# Patient Record
Sex: FEMALE | Race: WHITE | NOT HISPANIC OR LATINO | Employment: OTHER | URBAN - METROPOLITAN AREA
[De-identification: names, ages, dates, MRNs, and addresses within clinical notes are randomized per-mention and may not be internally consistent; named-entity substitution may affect disease eponyms.]

---

## 2019-12-19 ENCOUNTER — HOSPITAL ENCOUNTER (INPATIENT)
Facility: HOSPITAL | Age: 79
LOS: 1 days | Discharge: HOME | DRG: 305 | End: 2019-12-20
Attending: EMERGENCY MEDICINE | Admitting: HOSPITALIST
Payer: MEDICARE

## 2019-12-19 ENCOUNTER — APPOINTMENT (EMERGENCY)
Dept: CT IMAGING | Facility: HOSPITAL | Age: 79
DRG: 305 | End: 2019-12-19
Payer: MEDICARE

## 2019-12-19 DIAGNOSIS — R51.9 HEADACHE: Primary | ICD-10-CM

## 2019-12-19 DIAGNOSIS — N30.00 ACUTE CYSTITIS WITHOUT HEMATURIA: ICD-10-CM

## 2019-12-19 DIAGNOSIS — Z79.4 CONTROLLED TYPE 2 DIABETES MELLITUS WITHOUT COMPLICATION, WITH LONG-TERM CURRENT USE OF INSULIN (HCC): Chronic | ICD-10-CM

## 2019-12-19 DIAGNOSIS — E11.9 CONTROLLED TYPE 2 DIABETES MELLITUS WITHOUT COMPLICATION, WITH LONG-TERM CURRENT USE OF INSULIN (HCC): Chronic | ICD-10-CM

## 2019-12-19 PROBLEM — C80.1 CANCER (HCC): Status: ACTIVE | Noted: 2019-12-19

## 2019-12-19 LAB
ALBUMIN SERPL BCP-MCNC: 4.6 G/DL (ref 3.5–5.7)
ALP SERPL-CCNC: 42 U/L (ref 55–165)
ALT SERPL W P-5'-P-CCNC: 19 U/L (ref 7–52)
ANION GAP SERPL CALCULATED.3IONS-SCNC: 10 MMOL/L (ref 4–13)
APTT PPP: 40 SECONDS (ref 23–37)
AST SERPL W P-5'-P-CCNC: 26 U/L (ref 13–39)
ATRIAL RATE: 82 BPM
BACTERIA UR QL AUTO: ABNORMAL /HPF
BASOPHILS # BLD AUTO: 0.1 THOUSANDS/ΜL (ref 0–0.1)
BASOPHILS NFR BLD AUTO: 1 % (ref 0–2)
BILIRUB SERPL-MCNC: 0.4 MG/DL (ref 0.2–1)
BILIRUB UR QL STRIP: NEGATIVE
BNP SERPL-MCNC: 67 PG/ML (ref 1–100)
BUN SERPL-MCNC: 24 MG/DL (ref 7–25)
CALCIUM SERPL-MCNC: 9.9 MG/DL (ref 8.6–10.5)
CHLORIDE SERPL-SCNC: 102 MMOL/L (ref 98–107)
CHOLEST SERPL-MCNC: 198 MG/DL (ref 0–200)
CLARITY UR: ABNORMAL
CO2 SERPL-SCNC: 28 MMOL/L (ref 21–31)
COLOR UR: YELLOW
CREAT SERPL-MCNC: 0.89 MG/DL (ref 0.6–1.2)
EOSINOPHIL # BLD AUTO: 0.1 THOUSAND/ΜL (ref 0–0.61)
EOSINOPHIL NFR BLD AUTO: 1 % (ref 0–5)
ERYTHROCYTE [DISTWIDTH] IN BLOOD BY AUTOMATED COUNT: 12.2 % (ref 11.5–14.5)
EST. AVERAGE GLUCOSE BLD GHB EST-MCNC: 160 MG/DL
GFR SERPL CREATININE-BSD FRML MDRD: 62 ML/MIN/1.73SQ M
GLUCOSE SERPL-MCNC: 128 MG/DL (ref 65–140)
GLUCOSE SERPL-MCNC: 151 MG/DL (ref 65–140)
GLUCOSE SERPL-MCNC: 220 MG/DL (ref 65–140)
GLUCOSE SERPL-MCNC: 254 MG/DL (ref 65–140)
GLUCOSE SERPL-MCNC: 259 MG/DL (ref 65–140)
GLUCOSE SERPL-MCNC: 59 MG/DL (ref 65–140)
GLUCOSE SERPL-MCNC: 79 MG/DL (ref 65–99)
GLUCOSE UR STRIP-MCNC: NEGATIVE MG/DL
HBA1C MFR BLD: 7.2 % (ref 4.2–6.3)
HCT VFR BLD AUTO: 39 % (ref 42–47)
HDLC SERPL-MCNC: 108 MG/DL
HGB BLD-MCNC: 13.1 G/DL (ref 12–16)
HGB UR QL STRIP.AUTO: NEGATIVE
INR PPP: 0.84 (ref 0.9–1.5)
KETONES UR STRIP-MCNC: ABNORMAL MG/DL
LDLC SERPL CALC-MCNC: 77 MG/DL (ref 0–100)
LEUKOCYTE ESTERASE UR QL STRIP: ABNORMAL
LYMPHOCYTES # BLD AUTO: 1.7 THOUSANDS/ΜL (ref 0.6–4.47)
LYMPHOCYTES NFR BLD AUTO: 22 % (ref 21–51)
MCH RBC QN AUTO: 32.9 PG (ref 26–34)
MCHC RBC AUTO-ENTMCNC: 33.7 G/DL (ref 31–37)
MCV RBC AUTO: 98 FL (ref 81–99)
MONOCYTES # BLD AUTO: 0.6 THOUSAND/ΜL (ref 0.17–1.22)
MONOCYTES NFR BLD AUTO: 8 % (ref 2–12)
NEUTROPHILS # BLD AUTO: 5.1 THOUSANDS/ΜL (ref 1.4–6.5)
NEUTS SEG NFR BLD AUTO: 68 % (ref 42–75)
NITRITE UR QL STRIP: POSITIVE
NON-SQ EPI CELLS URNS QL MICRO: ABNORMAL /HPF
P AXIS: 55 DEGREES
PH UR STRIP.AUTO: 7.5 [PH]
PLATELET # BLD AUTO: 202 THOUSANDS/UL (ref 149–390)
PMV BLD AUTO: 8.3 FL (ref 8.6–11.7)
POTASSIUM SERPL-SCNC: 4.3 MMOL/L (ref 3.5–5.5)
PR INTERVAL: 154 MS
PROT SERPL-MCNC: 7.1 G/DL (ref 6.4–8.9)
PROT UR STRIP-MCNC: NEGATIVE MG/DL
PROTHROMBIN TIME: 9.7 SECONDS (ref 10.2–13)
QRS AXIS: 37 DEGREES
QRSD INTERVAL: 72 MS
QT INTERVAL: 388 MS
QTC INTERVAL: 453 MS
RBC # BLD AUTO: 3.99 MILLION/UL (ref 3.9–5.2)
RBC #/AREA URNS AUTO: ABNORMAL /HPF
SODIUM SERPL-SCNC: 140 MMOL/L (ref 134–143)
SP GR UR STRIP.AUTO: 1.01 (ref 1–1.03)
T WAVE AXIS: 47 DEGREES
TRIGL SERPL-MCNC: 67 MG/DL (ref 44–166)
TROPONIN I SERPL-MCNC: <0.03 NG/ML
TSH SERPL DL<=0.05 MIU/L-ACNC: 6.32 UIU/ML (ref 0.45–5.33)
UROBILINOGEN UR QL STRIP.AUTO: 0.2 E.U./DL
VENTRICULAR RATE: 82 BPM
WBC # BLD AUTO: 7.6 THOUSAND/UL (ref 4.8–10.8)
WBC #/AREA URNS AUTO: ABNORMAL /HPF

## 2019-12-19 PROCEDURE — 82948 REAGENT STRIP/BLOOD GLUCOSE: CPT

## 2019-12-19 PROCEDURE — 99285 EMERGENCY DEPT VISIT HI MDM: CPT

## 2019-12-19 PROCEDURE — 87077 CULTURE AEROBIC IDENTIFY: CPT | Performed by: EMERGENCY MEDICINE

## 2019-12-19 PROCEDURE — 70498 CT ANGIOGRAPHY NECK: CPT

## 2019-12-19 PROCEDURE — 93010 ELECTROCARDIOGRAM REPORT: CPT | Performed by: INTERNAL MEDICINE

## 2019-12-19 PROCEDURE — 84443 ASSAY THYROID STIM HORMONE: CPT | Performed by: PHYSICIAN ASSISTANT

## 2019-12-19 PROCEDURE — 96375 TX/PRO/DX INJ NEW DRUG ADDON: CPT

## 2019-12-19 PROCEDURE — 83880 ASSAY OF NATRIURETIC PEPTIDE: CPT | Performed by: EMERGENCY MEDICINE

## 2019-12-19 PROCEDURE — 70450 CT HEAD/BRAIN W/O DYE: CPT

## 2019-12-19 PROCEDURE — 1123F ACP DISCUSS/DSCN MKR DOCD: CPT | Performed by: NURSE PRACTITIONER

## 2019-12-19 PROCEDURE — G8978 MOBILITY CURRENT STATUS: HCPCS

## 2019-12-19 PROCEDURE — G8979 MOBILITY GOAL STATUS: HCPCS

## 2019-12-19 PROCEDURE — G8987 SELF CARE CURRENT STATUS: HCPCS

## 2019-12-19 PROCEDURE — 80053 COMPREHEN METABOLIC PANEL: CPT | Performed by: EMERGENCY MEDICINE

## 2019-12-19 PROCEDURE — 83036 HEMOGLOBIN GLYCOSYLATED A1C: CPT | Performed by: PHYSICIAN ASSISTANT

## 2019-12-19 PROCEDURE — 36415 COLL VENOUS BLD VENIPUNCTURE: CPT | Performed by: EMERGENCY MEDICINE

## 2019-12-19 PROCEDURE — 97163 PT EVAL HIGH COMPLEX 45 MIN: CPT

## 2019-12-19 PROCEDURE — 80061 LIPID PANEL: CPT | Performed by: PHYSICIAN ASSISTANT

## 2019-12-19 PROCEDURE — 93005 ELECTROCARDIOGRAM TRACING: CPT

## 2019-12-19 PROCEDURE — 85025 COMPLETE CBC W/AUTO DIFF WBC: CPT | Performed by: EMERGENCY MEDICINE

## 2019-12-19 PROCEDURE — G8988 SELF CARE GOAL STATUS: HCPCS

## 2019-12-19 PROCEDURE — 70496 CT ANGIOGRAPHY HEAD: CPT

## 2019-12-19 PROCEDURE — 85610 PROTHROMBIN TIME: CPT | Performed by: EMERGENCY MEDICINE

## 2019-12-19 PROCEDURE — 87086 URINE CULTURE/COLONY COUNT: CPT | Performed by: EMERGENCY MEDICINE

## 2019-12-19 PROCEDURE — 96374 THER/PROPH/DIAG INJ IV PUSH: CPT

## 2019-12-19 PROCEDURE — 81001 URINALYSIS AUTO W/SCOPE: CPT | Performed by: EMERGENCY MEDICINE

## 2019-12-19 PROCEDURE — 99223 1ST HOSP IP/OBS HIGH 75: CPT | Performed by: NURSE PRACTITIONER

## 2019-12-19 PROCEDURE — 99285 EMERGENCY DEPT VISIT HI MDM: CPT | Performed by: EMERGENCY MEDICINE

## 2019-12-19 PROCEDURE — 84484 ASSAY OF TROPONIN QUANT: CPT | Performed by: EMERGENCY MEDICINE

## 2019-12-19 PROCEDURE — 87186 SC STD MICRODIL/AGAR DIL: CPT | Performed by: EMERGENCY MEDICINE

## 2019-12-19 PROCEDURE — 85730 THROMBOPLASTIN TIME PARTIAL: CPT | Performed by: EMERGENCY MEDICINE

## 2019-12-19 PROCEDURE — 97167 OT EVAL HIGH COMPLEX 60 MIN: CPT

## 2019-12-19 RX ORDER — ACETAMINOPHEN 325 MG/1
650 TABLET ORAL EVERY 4 HOURS PRN
Status: DISCONTINUED | OUTPATIENT
Start: 2019-12-19 | End: 2019-12-20 | Stop reason: HOSPADM

## 2019-12-19 RX ORDER — DOCUSATE SODIUM 100 MG/1
300 CAPSULE, LIQUID FILLED ORAL DAILY
Status: ON HOLD | COMMUNITY
End: 2020-05-26 | Stop reason: CLARIF

## 2019-12-19 RX ORDER — HYDRALAZINE HYDROCHLORIDE 20 MG/ML
10 INJECTION INTRAMUSCULAR; INTRAVENOUS EVERY 6 HOURS PRN
Status: DISCONTINUED | OUTPATIENT
Start: 2019-12-19 | End: 2019-12-20 | Stop reason: HOSPADM

## 2019-12-19 RX ORDER — LEVOTHYROXINE SODIUM 0.05 MG/1
50 TABLET ORAL WEEKLY
COMMUNITY
End: 2020-11-05

## 2019-12-19 RX ORDER — IPRATROPIUM BROMIDE 21 UG/1
1 SPRAY, METERED NASAL 2 TIMES DAILY
Status: DISCONTINUED | OUTPATIENT
Start: 2019-12-19 | End: 2019-12-20 | Stop reason: HOSPADM

## 2019-12-19 RX ORDER — CALCIUM CARBONATE 500(1250)
1 TABLET ORAL 2 TIMES DAILY WITH MEALS
Status: DISCONTINUED | OUTPATIENT
Start: 2019-12-19 | End: 2019-12-20 | Stop reason: HOSPADM

## 2019-12-19 RX ORDER — PHENOL 1.4 %
600 AEROSOL, SPRAY (ML) MUCOUS MEMBRANE 2 TIMES DAILY WITH MEALS
Status: ON HOLD | COMMUNITY
End: 2020-06-17 | Stop reason: CLARIF

## 2019-12-19 RX ORDER — SODIUM CHLORIDE 9 MG/ML
75 INJECTION, SOLUTION INTRAVENOUS CONTINUOUS
Status: DISPENSED | OUTPATIENT
Start: 2019-12-19 | End: 2019-12-20

## 2019-12-19 RX ORDER — DEXTROSE MONOHYDRATE 25 G/50ML
25 INJECTION, SOLUTION INTRAVENOUS ONCE
Status: COMPLETED | OUTPATIENT
Start: 2019-12-19 | End: 2019-12-19

## 2019-12-19 RX ORDER — ASPIRIN 81 MG/1
81 TABLET, CHEWABLE ORAL DAILY
Status: DISCONTINUED | OUTPATIENT
Start: 2019-12-19 | End: 2019-12-20 | Stop reason: HOSPADM

## 2019-12-19 RX ORDER — MELATONIN
2000 DAILY
Status: DISCONTINUED | OUTPATIENT
Start: 2019-12-19 | End: 2019-12-20 | Stop reason: HOSPADM

## 2019-12-19 RX ORDER — LEVOTHYROXINE SODIUM 0.05 MG/1
50 TABLET ORAL WEEKLY
Status: DISCONTINUED | OUTPATIENT
Start: 2019-12-22 | End: 2019-12-20 | Stop reason: HOSPADM

## 2019-12-19 RX ORDER — DOCUSATE SODIUM 100 MG/1
300 CAPSULE, LIQUID FILLED ORAL DAILY
Status: DISCONTINUED | OUTPATIENT
Start: 2019-12-19 | End: 2019-12-20 | Stop reason: HOSPADM

## 2019-12-19 RX ORDER — ATORVASTATIN CALCIUM 20 MG/1
20 TABLET, FILM COATED ORAL
COMMUNITY

## 2019-12-19 RX ORDER — MELATONIN
2000 DAILY
Status: ON HOLD | COMMUNITY
End: 2020-06-17 | Stop reason: CLARIF

## 2019-12-19 RX ORDER — BUTALBITAL, ACETAMINOPHEN AND CAFFEINE 50; 325; 40 MG/1; MG/1; MG/1
1 TABLET ORAL EVERY 4 HOURS PRN
Status: DISCONTINUED | OUTPATIENT
Start: 2019-12-19 | End: 2019-12-20 | Stop reason: HOSPADM

## 2019-12-19 RX ORDER — LEVOTHYROXINE SODIUM 0.1 MG/1
100 TABLET ORAL 3 TIMES WEEKLY
Status: DISCONTINUED | OUTPATIENT
Start: 2019-12-20 | End: 2019-12-20 | Stop reason: HOSPADM

## 2019-12-19 RX ORDER — ASPIRIN 81 MG/1
81 TABLET, CHEWABLE ORAL DAILY
Status: ON HOLD | COMMUNITY
End: 2020-06-17 | Stop reason: CLARIF

## 2019-12-19 RX ORDER — CEFTRIAXONE 1 G/50ML
1000 INJECTION, SOLUTION INTRAVENOUS EVERY 24 HOURS
Status: DISCONTINUED | OUTPATIENT
Start: 2019-12-19 | End: 2019-12-20 | Stop reason: HOSPADM

## 2019-12-19 RX ORDER — ONDANSETRON 2 MG/ML
4 INJECTION INTRAMUSCULAR; INTRAVENOUS ONCE
Status: COMPLETED | OUTPATIENT
Start: 2019-12-19 | End: 2019-12-19

## 2019-12-19 RX ORDER — ROSUVASTATIN CALCIUM 10 MG/1
10 TABLET, COATED ORAL DAILY
Status: ON HOLD | COMMUNITY
End: 2020-05-26 | Stop reason: CLARIF

## 2019-12-19 RX ORDER — RAMIPRIL 5 MG/1
5 CAPSULE ORAL DAILY
Status: ON HOLD | COMMUNITY
End: 2020-06-17 | Stop reason: CLARIF

## 2019-12-19 RX ORDER — DIPHENOXYLATE HYDROCHLORIDE AND ATROPINE SULFATE 2.5; .025 MG/1; MG/1
1 TABLET ORAL DAILY
Status: ON HOLD | COMMUNITY
End: 2020-06-17 | Stop reason: CLARIF

## 2019-12-19 RX ORDER — ONDANSETRON 2 MG/ML
4 INJECTION INTRAMUSCULAR; INTRAVENOUS EVERY 6 HOURS PRN
Status: DISCONTINUED | OUTPATIENT
Start: 2019-12-19 | End: 2019-12-20 | Stop reason: HOSPADM

## 2019-12-19 RX ORDER — PRAVASTATIN SODIUM 40 MG
80 TABLET ORAL
Status: DISCONTINUED | OUTPATIENT
Start: 2019-12-19 | End: 2019-12-20 | Stop reason: HOSPADM

## 2019-12-19 RX ORDER — MORPHINE SULFATE 4 MG/ML
4 INJECTION, SOLUTION INTRAMUSCULAR; INTRAVENOUS ONCE
Status: COMPLETED | OUTPATIENT
Start: 2019-12-19 | End: 2019-12-19

## 2019-12-19 RX ORDER — LEVOTHYROXINE SODIUM 0.1 MG/1
100 TABLET ORAL 3 TIMES WEEKLY
Status: DISCONTINUED | OUTPATIENT
Start: 2019-12-23 | End: 2019-12-20 | Stop reason: HOSPADM

## 2019-12-19 RX ORDER — LISINOPRIL 20 MG/1
20 TABLET ORAL DAILY
Status: DISCONTINUED | OUTPATIENT
Start: 2019-12-19 | End: 2019-12-20 | Stop reason: HOSPADM

## 2019-12-19 RX ADMIN — DEXTROSE 50 % IN WATER (D50W) INTRAVENOUS SYRINGE 25 ML: at 04:38

## 2019-12-19 RX ADMIN — INSULIN LISPRO 2 UNITS: 100 INJECTION, SOLUTION INTRAVENOUS; SUBCUTANEOUS at 17:05

## 2019-12-19 RX ADMIN — ENOXAPARIN SODIUM 40 MG: 40 INJECTION SUBCUTANEOUS at 08:18

## 2019-12-19 RX ADMIN — CALCIUM 1 TABLET: 500 TABLET ORAL at 08:18

## 2019-12-19 RX ADMIN — PRAVASTATIN SODIUM 80 MG: 40 TABLET ORAL at 17:05

## 2019-12-19 RX ADMIN — VITAMIN D, TAB 1000IU (100/BT) 2000 UNITS: 25 TAB at 08:17

## 2019-12-19 RX ADMIN — MORPHINE SULFATE 4 MG: 4 INJECTION INTRAVENOUS at 04:35

## 2019-12-19 RX ADMIN — ACETAMINOPHEN 650 MG: 325 TABLET ORAL at 21:09

## 2019-12-19 RX ADMIN — LISINOPRIL 20 MG: 20 TABLET ORAL at 08:18

## 2019-12-19 RX ADMIN — SODIUM CHLORIDE 75 ML/HR: 9 INJECTION, SOLUTION INTRAVENOUS at 08:20

## 2019-12-19 RX ADMIN — SODIUM CHLORIDE 75 ML/HR: 9 INJECTION, SOLUTION INTRAVENOUS at 23:37

## 2019-12-19 RX ADMIN — INSULIN DETEMIR 5 UNITS: 100 INJECTION, SOLUTION SUBCUTANEOUS at 21:14

## 2019-12-19 RX ADMIN — ASPIRIN 81 MG 81 MG: 81 TABLET ORAL at 08:18

## 2019-12-19 RX ADMIN — CEFTRIAXONE 1000 MG: 1 INJECTION, SOLUTION INTRAVENOUS at 08:20

## 2019-12-19 RX ADMIN — ONDANSETRON 4 MG: 2 INJECTION INTRAMUSCULAR; INTRAVENOUS at 04:35

## 2019-12-19 RX ADMIN — CALCIUM 1 TABLET: 500 TABLET ORAL at 17:05

## 2019-12-19 RX ADMIN — IOHEXOL 85 ML: 350 INJECTION, SOLUTION INTRAVENOUS at 05:45

## 2019-12-19 RX ADMIN — INSULIN LISPRO 2 UNITS: 100 INJECTION, SOLUTION INTRAVENOUS; SUBCUTANEOUS at 12:52

## 2019-12-19 RX ADMIN — DOCUSATE SODIUM 300 MG: 100 CAPSULE, LIQUID FILLED ORAL at 08:17

## 2019-12-19 NOTE — ED NOTES
Patient does not want any further pain medications at this time  Denies any nausea       Sophie Ernandez RN  12/19/19 0600

## 2019-12-19 NOTE — ED PROVIDER NOTES
History  Chief Complaint   Patient presents with    Headache     called son in law 80 stating that she can't think, woke up with frontal headache     States she woke up wit headache, acute onset and severe, frontal  Light sensitivity  Does not get frequent headaches, this is unusual for her  She feels "foggy" like she can not think she reported  No cp, sob, leg swelling  Not anticoagualted  No hx of anuersym in family per family  No trauma  States " im not thinking clearly"          Prior to Admission Medications   Prescriptions Last Dose Informant Patient Reported? Taking?    BD INSULIN SYRINGE U/F UNIT 31G X " 0 3 ML MISC   Yes No   Cyanocobalamin (B-12) 2500 MCG SUBL 2019 at Unknown time  Yes Yes   Sig: Place under the tongue   Nepafenac (ILEVRO) 0 3 % SUSP   Yes No   Sig: Ilevro 0 3 % eye drops,suspension   ONE TOUCH ULTRA TEST test strip   Yes No   aspirin 81 mg chewable tablet 2019 at Unknown time  Yes Yes   Sig: Chew 81 mg daily   atorvastatin (LIPITOR) 20 mg tablet 2019 at Unknown time  Yes Yes   Sig: Take 20 mg by mouth daily   calcium carbonate (OS-FELICE) 600 MG tablet 2019 at Unknown time  Yes Yes   Sig: Take 600 mg by mouth 2 (two) times a day with meals    cholecalciferol (VITAMIN D3) 1,000 units tablet 2019 at Unknown time  Yes Yes   Sig: Take 2,000 Units by mouth daily   docusate sodium (COLACE) 100 mg capsule 2019 at Unknown time  Yes Yes   Sig: Take 300 mg by mouth daily   insulin aspart (NOVOLOG) 100 units/mL injection 2019 at Unknown time  Yes Yes   Sig: Sliding scale   insulin detemir (LEVEMIR) 100 units/mL subcutaneous injection 2019 at Unknown time  Yes Yes   Sig: Sliding scale   ipratropium (ATROVENT) 0 03 % nasal spray   No No   Si spray into each nostril 2 (two) times a day for 180 days   levothyroxine 100 mcg tablet 2019 at Unknown time  Yes Yes   Sig: levothyroxine 100 mcg tablet   levothyroxine 50 mcg tablet Past Week at Unknown time  Yes Yes   Sig: Take 50 mcg by mouth once a week On sundays   metFORMIN (GLUCOPHAGE) 500 mg tablet 12/18/2019 at Unknown time  Yes Yes   Sig: metformin 500 mg tablet   take 1 tablet by mouth twice a day   multivitamin (THERAGRAN) TABS 12/18/2019 at Unknown time  Yes Yes   Sig: Take 1 tablet by mouth daily   ramipril (ALTACE) 5 mg capsule 12/18/2019 at Unknown time  Yes Yes   Sig: Take 5 mg by mouth daily   rosuvastatin (CRESTOR) 10 MG tablet Not Taking at Unknown time  Yes No   Sig: Take 10 mg by mouth daily   triamcinolone (KENALOG) 0 025 % cream Not Taking at Unknown time  Yes No   Sig: triamcinolone acetonide 0 025 % topical cream      Facility-Administered Medications: None       Past Medical History:   Diagnosis Date    Cancer ([facility] )     Diabetes mellitus ([facility] )     Disease of thyroid gland     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    HL (hearing loss)     Hodgkin's disease ([facility] ) 2008    Left neck, had radiation    Hypertension     Neuropathy     Osteoporosis        Past Surgical History:   Procedure Laterality Date    ADENOIDECTOMY      MOUTH SURGERY      oral cancer left lower lip    OVARY SURGERY      TONSILLECTOMY      TOTAL THYROIDECTOMY  2008    Performed after left neck dissection and left oral cancer diagnosis       Family History   Problem Relation Age of Onset    Cancer Mother     Diabetes Mother     Diabetes Father     Hypertension Father      I have reviewed and agree with the history as documented  Social History     Tobacco Use    Smoking status: Never Smoker    Smokeless tobacco: Never Used   Substance Use Topics    Alcohol use: Never     Frequency: Never    Drug use: Never        Review of Systems   All other systems reviewed and are negative  Physical Exam  Physical Exam   Constitutional: She is oriented to person, place, and time  She appears well-developed and well-nourished  HENT:   Head: Normocephalic and atraumatic     Right Ear: External ear normal    Left Ear: External ear normal    Eyes: Pupils are equal, round, and reactive to light  Right eye exhibits no discharge  Left eye exhibits no discharge  Neck: Normal range of motion  Neck supple  No JVD present  Cardiovascular: Normal rate, regular rhythm and normal heart sounds  Exam reveals no gallop and no friction rub  No murmur heard  Pulmonary/Chest: Effort normal and breath sounds normal  No stridor  No respiratory distress  She has no wheezes  She has no rales  She exhibits no tenderness  Abdominal: She exhibits no distension and no mass  There is no tenderness  There is no rebound and no guarding  No hernia  Musculoskeletal: Normal range of motion  She exhibits no edema, tenderness or deformity  Neurological: She is alert and oriented to person, place, and time  She displays normal reflexes  No cranial nerve deficit or sensory deficit  She exhibits normal muscle tone  Coordination normal    Skin: Skin is warm  Capillary refill takes less than 2 seconds  No rash noted  No erythema  Psychiatric: She has a normal mood and affect         Vital Signs  ED Triage Vitals   Temperature Pulse Respirations Blood Pressure SpO2   12/19/19 0415 12/19/19 0415 12/19/19 0415 12/19/19 0415 12/19/19 0415   (!) 96 9 °F (36 1 °C) 76 22 (!) 201/98 100 %      Temp Source Heart Rate Source Patient Position - Orthostatic VS BP Location FiO2 (%)   12/19/19 0415 12/19/19 0415 12/19/19 0415 12/19/19 0415 --   Temporal Monitor Lying Left arm       Pain Score       12/19/19 0407       Worst Possible Pain           Vitals:    12/19/19 0700 12/19/19 0757 12/19/19 1530 12/19/19 2209   BP: 153/70 132/82 122/66 96/57   Pulse: 70 68 79 69   Patient Position - Orthostatic VS:  Lying Lying Lying         Visual Acuity  Visual Acuity      Most Recent Value   L Pupil Size (mm)  3   R Pupil Size (mm)  3          ED Medications  Medications   ondansetron (ZOFRAN) injection 4 mg (has no administration in time range) acetaminophen (TYLENOL) tablet 650 mg (650 mg Oral Given 12/19/19 2109)   butalbital-acetaminophen-caffeine (FIORICET,ESGIC) -40 mg per tablet 1 tablet (has no administration in time range)   hydrALAZINE (APRESOLINE) injection 10 mg (has no administration in time range)   insulin lispro (HumaLOG) 100 units/mL subcutaneous injection 1-5 Units (2 Units Subcutaneous Given 12/19/19 1705)   insulin lispro (HumaLOG) 100 units/mL subcutaneous injection 1-5 Units (0 Units Subcutaneous Not Given 12/19/19 2112)   aspirin chewable tablet 81 mg (81 mg Oral Given 12/19/19 0818)   calcium carbonate (OYSTER SHELL,OSCAL) 500 mg tablet 1 tablet (1 tablet Oral Given 12/19/19 1705)   cholecalciferol (VITAMIN D3) tablet 2,000 Units (2,000 Units Oral Given 12/19/19 0817)   docusate sodium (COLACE) capsule 300 mg (300 mg Oral Given 12/19/19 0817)   ipratropium (ATROVENT) 0 03 % nasal spray 1 spray (1 spray Nasal Not Given 12/19/19 2043)   levothyroxine tablet 50 mcg (has no administration in time range)   lisinopril (ZESTRIL) tablet 20 mg (20 mg Oral Given 12/19/19 0818)   pravastatin (PRAVACHOL) tablet 80 mg (80 mg Oral Given 12/19/19 1705)   insulin detemir (LEVEMIR) subcutaneous injection 5 Units (5 Units Subcutaneous Given 12/19/19 2114)   levothyroxine tablet 100 mcg (has no administration in time range)   sodium chloride 0 9 % infusion (75 mL/hr Intravenous New Bag 12/19/19 2337)   enoxaparin (LOVENOX) subcutaneous injection 40 mg (40 mg Subcutaneous Given 12/19/19 0818)   cefTRIAXone (ROCEPHIN) IVPB (premix) 1,000 mg (1,000 mg Intravenous New Bag 12/19/19 0820)   levothyroxine tablet 100 mcg (has no administration in time range)   morphine (PF) 4 mg/mL injection 4 mg (4 mg Intravenous Given 12/19/19 0435)   ondansetron (ZOFRAN) injection 4 mg (4 mg Intravenous Given 12/19/19 3434)   dextrose 50 % IV solution 25 mL (25 mL Intravenous Given 12/19/19 1689)   iohexol (OMNIPAQUE) 350 MG/ML injection (SINGLE-DOSE) 85 mL (85 mL Intravenous Given 12/19/19 0545)       Diagnostic Studies  Results Reviewed     Procedure Component Value Units Date/Time    Hemoglobin A1c w/EAG Estimation (Orders if not completed within the last 90 days) [[id number]]  (Abnormal) Collected:  12/19/19 0438    Lab Status:  Final result Specimen:  Blood from Arm, Right Updated:  12/19/19 1007     Hemoglobin A1C 7 2 %       mg/dl     TSH, 3rd generation [[id number]]  (Abnormal) Collected:  12/19/19 0438    Lab Status:  Final result Specimen:  Blood from Arm, Right Updated:  12/19/19 0730     TSH 3RD GENERATON 6 320 uIU/mL     Narrative:       Patients undergoing fluorescein dye angiography may retain small amounts of fluorescein in the body for 48-72 hours post procedure  Samples containing fluorescein can produce falsely depressed TSH values  If the patient had this procedure,a specimen should be resubmitted post fluorescein clearance  Urine Microscopic [[id number]]  (Abnormal) Collected:  12/19/19 0650    Lab Status:  Final result Specimen:  Urine, Clean Catch Updated:  12/19/19 0709     RBC, UA None Seen /hpf      WBC, UA 10-20 /hpf      Epithelial Cells None Seen /hpf      Bacteria, UA Innumerable /hpf     Urine culture [[id number]] Collected:  12/19/19 0650    Lab Status:   In process Specimen:  Urine, Clean Catch Updated:  12/19/19 0709    UA w Reflex to Microscopic w Reflex to Culture [[id number]]  (Abnormal) Collected:  12/19/19 0650    Lab Status:  Final result Specimen:  Urine, Clean Catch Updated:  12/19/19 0703     Color, UA Yellow     Clarity, UA Slightly Cloudy     Specific [place], UA 1 010     pH, UA 7 5     Leukocytes, UA 1+     Nitrite, UA Positive     Protein, UA Negative mg/dl      Glucose, UA Negative mg/dl      Ketones, UA Trace mg/dl      Urobilinogen, UA 0 2 E U /dl      Bilirubin, UA Negative     Blood, UA Negative    Lipid Panel with Direct LDL reflex [[id number]]  (Normal) Collected:  12/19/19 0438    Lab Status:  Final result Specimen: Blood from Arm, Right Updated:  12/19/19 0659     Cholesterol 198 mg/dL      Triglycerides 67 mg/dL      HDL, Direct 108 mg/dL      LDL Calculated 77 mg/dL     B-Type Natriuretic Peptide ([address] [place] [address]) [[id number]]  (Normal) Collected:  12/19/19 0438    Lab Status:  Final result Specimen:  Blood from Arm, Right Updated:  12/19/19 0513     BNP 67 pg/mL     Comprehensive metabolic panel [[id number]]  (Abnormal) Collected:  12/19/19 0438    Lab Status:  Final result Specimen:  Blood from Arm, Right Updated:  12/19/19 0507     Sodium 140 mmol/L      Potassium 4 3 mmol/L      Chloride 102 mmol/L      CO2 28 mmol/L      ANION GAP 10 mmol/L      BUN 24 mg/dL      Creatinine 0 89 mg/dL      Glucose 79 mg/dL      Calcium 9 9 mg/dL      AST 26 U/L      ALT 19 U/L      Alkaline Phosphatase 42 U/L      Total Protein 7 1 g/dL      Albumin 4 6 g/dL      Total Bilirubin 0 40 mg/dL      eGFR 62 ml/min/1 73sq m     Narrative:       [place] guidelines for Chronic Kidney Disease (CKD):     Stage 1 with normal or high GFR (GFR > 90 mL/min/1 73 square meters)    Stage 2 Mild CKD (GFR = 60-89 mL/min/1 73 square meters)    Stage 3A Moderate CKD (GFR = 45-59 mL/min/1 73 square meters)    Stage 3B Moderate CKD (GFR = 30-44 mL/min/1 73 square meters)    Stage 4 Severe CKD (GFR = 15-29 mL/min/1 73 square meters)    Stage 5 End Stage CKD (GFR <15 mL/min/1 73 square meters)  Note: GFR calculation is accurate only with a steady state creatinine    Troponin I [[id number]]  (Normal) Collected:  12/19/19 0438    Lab Status:  Final result Specimen:  Blood from Arm, Right Updated:  12/19/19 0507     Troponin I <0 03 ng/mL     Fingerstick Glucose (POCT) [[id number]]  (Normal) Collected:  12/19/19 0504    Lab Status:  Final result Updated:  12/19/19 0504     POC Glucose 128 mg/dl     Fingerstick Glucose (POCT) [[id number]]  (Abnormal) Collected:  12/19/19 0433    Lab Status:  Final result Updated:  12/19/19 0504     POC Glucose 59 mg/dl     Protime-INR [[id number]]  (Abnormal) Collected:  12/19/19 0438    Lab Status:  Final result Specimen:  Blood from Arm, Right Updated:  12/19/19 0456     Protime 9 7 seconds      INR 0 84    APTT [[id number]]  (Abnormal) Collected:  12/19/19 0438    Lab Status:  Final result Specimen:  Blood from Arm, Right Updated:  12/19/19 0456     PTT 40 seconds     CBC and differential [[id number]]  (Abnormal) Collected:  12/19/19 0438    Lab Status:  Final result Specimen:  Blood from Arm, Right Updated:  12/19/19 0448     WBC 7 60 Thousand/uL      RBC 3 99 Million/uL      Hemoglobin 13 1 g/dL      Hematocrit 39 0 %      MCV 98 fL      MCH 32 9 pg      MCHC 33 7 g/dL      RDW 12 2 %      MPV 8 3 fL      Platelets 031 Thousands/uL      Neutrophils Relative 68 %      Lymphocytes Relative 22 %      Monocytes Relative 8 %      Eosinophils Relative 1 %      Basophils Relative 1 %      Neutrophils Absolute 5 10 Thousands/µL      Lymphocytes Absolute 1 70 Thousands/µL      Monocytes Absolute 0 60 Thousand/µL      Eosinophils Absolute 0 10 Thousand/µL      Basophils Absolute 0 10 Thousands/µL                  CTA head and neck with and without contrast   Final Result by Negin Boyd DO (12/19 4722)   1  Cerebral atrophy with chronic small vessel ischemic white matter disease  2   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[place]  No evidence of aneurysm, vascular malformation or vascular cut off  No intracranial large vessel central occlusive disease  3   Abnormal appearance of the cervical segments of the internal carotid arteries bilaterally, suggesting underlying fibromuscular dysplasia  No evidence of flow-limiting stenosis  4   No evidence of carotid or vertebral artery dissection  The study was marked in [facility] for immediate notification  Workstation performed: [id number]         CT head without contrast   Final Result by Balbir Hodges MD (12/19 8278)      No acute intracranial abnormality  Microangiopathic changes  Workstation performed: [id number]                    Procedures  Procedures         ED Course                               MDM  Number of Diagnoses or Management Options  Headache:   Diagnosis management comments: Glucose noted to be 55, she is confused and shaky in er  bp 200/100 range and with headache we went over to ct immediately after iv placed and d50 given  She is feeling better, repeat bp 171/82  Headache still present but improved  I do not see a large bleed on ct and have ordered cta brain to look for aneurysm  Her bp is responding to pain control and at this point I have not given any bp lowering meds as her sx are improving wo them and her bp to this point is trending down  bp continues to trend down 138/68 when I am last in room  Still has headache but this is improved  She has is refuses further meds for headache  She remains neuro intact      ekg- nsr at 82, no obvious st deviation, normal axis and intervals  There is baseline artifact that makes some leads harder to eval    cta negative for bld /anursym  Her headache is improving  Sp d50 she did stop with tremors  Hypoglycemia is possibly etiology of her headache trigger  I have spoken to zainab who accepted for cyndy VASQUEZ discussed and refused, I feel this is appropriate  She still is with left sided headache, improved  Neuro fully intact  Disposition  Final diagnoses:   Headache     Time reflects when diagnosis was documented in both MDM as applicable and the Disposition within this note     Time User Action Codes Description Comment    12/19/2019  6:36 AM Celsa Trinh Add [R51] Headache       ED Disposition     ED Disposition Condition Date/Time Comment    Admit Stable Thu Dec 19, 2019  6:36 AM Case was discussed with zainab and the patient's admission status was agreed to be Admission Status: observation status to the service of Dr Damon Quintero           Follow-up Information    None Current Discharge Medication List      CONTINUE these medications which have NOT CHANGED    Details   aspirin 81 mg chewable tablet Chew 81 mg daily      atorvastatin (LIPITOR) 20 mg tablet Take 20 mg by mouth daily      calcium carbonate (OS-FELICE) 600 MG tablet Take 600 mg by mouth 2 (two) times a day with meals       cholecalciferol (VITAMIN D3) 1,000 units tablet Take 2,000 Units by mouth daily      Cyanocobalamin (B-12) 2500 MCG SUBL Place under the tongue      docusate sodium (COLACE) 100 mg capsule Take 300 mg by mouth daily      insulin aspart (NOVOLOG) 100 units/mL injection Sliding scale      insulin detemir (LEVEMIR) 100 units/mL subcutaneous injection Sliding scale      !! levothyroxine 100 mcg tablet levothyroxine 100 mcg tablet      !! levothyroxine 50 mcg tablet Take 50 mcg by mouth once a week On sundays      metFORMIN (GLUCOPHAGE) 500 mg tablet metformin 500 mg tablet   take 1 tablet by mouth twice a day      multivitamin (THERAGRAN) TABS Take 1 tablet by mouth daily      ramipril (ALTACE) 5 mg capsule Take 5 mg by mouth daily      BD INSULIN SYRINGE U/F 1/2UNIT 31G X 5/16" 0 3 ML MISC Refills: 0      ipratropium (ATROVENT) 0 03 % nasal spray 1 spray into each nostril 2 (two) times a day for 180 days  Qty: 30 mL, Refills: 5    Associated Diagnoses: Seasonal allergic rhinitis due to pollen      Nepafenac (ILEVRO) 0 3 % SUSP Ilevro 0 3 % eye drops,suspension      ONE TOUCH ULTRA TEST test strip Refills: 0      rosuvastatin (CRESTOR) 10 MG tablet Take 10 mg by mouth daily      triamcinolone (KENALOG) 0 025 % cream triamcinolone acetonide 0 025 % topical cream       !! - Potential duplicate medications found  Please discuss with provider  No discharge procedures on file      ED Provider  Electronically Signed by           Ricardo Leslie MD  12/20/19 4364

## 2019-12-19 NOTE — PLAN OF CARE
Problem: OCCUPATIONAL THERAPY ADULT  Goal: Performs self-care activities at highest level of function for planned discharge setting  See evaluation for individualized goals  Description  Treatment Interventions: ADL retraining, Endurance training, Patient/family training, Energy conservation, Activityengagement, Functional transfer training          See flowsheet documentation for full assessment, interventions and recommendations  Note:   Limitation: Decreased endurance, Decreased self-care trans, Decreased high-level ADLs, Decreased ADL status  Prognosis: Good  Assessment: Pt is a 78 y o  female seen for OT evaluation s/p admit to [address] on 12/19/2019 w/ Acute intractable headache  Comorbidities affecting pt's functional performance at time of assessment include: DM, HTN and Acute Cystitis w/o hematuria, CAD  Personal factors affecting pt at time of IE include:steps to enter environment, limited home support, difficulty performing ADLS and difficulty performing IADLS   Prior to admission, pt was I with all self care ADL's, IADL's, ambulation, transportation   reportedly 'very active'  Upon evaluation: Pt requires a slight increase in self care and mobility (unable to fully assess r/t pt declination to get OOB/fully participate in all aspects of the eval r/t 'just got settled    Fatigue') r/t the following deficits impacting occupational performance: decreased tolerance, decreased safety awareness and increased pain  Pt to benefit from continued skilled OT tx while in the hospital to address deficits as defined above and maximize level of functional independence w ADL's and functional mobility  Occupational Performance areas to address include: bathing/shower, dressing and functional mobility  From OT standpoint, recommendation at time of d/c would be home with family support (pending participation in all functional tasks successfully)          OT Discharge Recommendation: Home with family support  OT - OK to Discharge:  Yes

## 2019-12-19 NOTE — ASSESSMENT & PLAN NOTE
· Urinalysis shows cloudy urine, 1+ leukocytes, positive nitrates, trace ketones  · Patient denies any urinary symptoms  · Will order IV fluids at 75 mL/hour x1 bag  · Will start patient on Rocephin IV

## 2019-12-19 NOTE — PLAN OF CARE
Problem: Potential for Falls  Goal: Patient will remain free of falls  Description  INTERVENTIONS:  - Assess patient frequently for physical needs  -  Identify cognitive and physical deficits and behaviors that affect risk of falls    -  [place] fall precautions as indicated by assessment   - Educate patient/family on patient safety including physical limitations  - Instruct patient to call for assistance with activity based on assessment  - Modify environment to reduce risk of injury  - Consider OT/PT consult to assist with strengthening/mobility  Outcome: Progressing     Problem: PAIN - ADULT  Goal: Verbalizes/displays adequate comfort level or baseline comfort level  Description  Interventions:  - Encourage patient to monitor pain and request assistance  - Assess pain using appropriate pain scale  - Administer analgesics based on type and severity of pain and evaluate response  - Implement non-pharmacological measures as appropriate and evaluate response  - Consider cultural and social influences on pain and pain management  - Notify physician/advanced practitioner if interventions unsuccessful or patient reports new pain  Outcome: Progressing     Problem: INFECTION - ADULT  Goal: Absence or prevention of progression during hospitalization  Description  INTERVENTIONS:  - Assess and monitor for signs and symptoms of infection  - Monitor lab/diagnostic results  - Monitor all insertion sites, i e  indwelling lines, tubes, and drains  - [place] appropriate cooling/warming therapies per order  - Administer medications as ordered  - Instruct and encourage patient and family to use good hand hygiene technique  - Identify and instruct in appropriate isolation precautions for identified infection/condition   Outcome: Progressing     Problem: SAFETY ADULT  Goal: Maintain or return to baseline ADL function  Description  INTERVENTIONS:  -  Assess patient's ability to carry out ADLs; assess patient's baseline for ADL function and identify physical deficits which impact ability to perform ADLs (bathing, care of mouth/teeth, toileting, grooming, dressing, etc )  - Assess/evaluate cause of self-care deficits   - Assess range of motion  - Assess patient's mobility; develop plan if impaired  - Assess patient's need for assistive devices and provide as appropriate  - Encourage maximum independence but intervene and supervise when necessary  - Involve family in performance of ADLs  - Assess for home care needs following discharge   - Consider OT consult to assist with ADL evaluation and planning for discharge  - Provide patient education as appropriate  Outcome: Progressing  Goal: Maintain or return mobility status to optimal level  Description  INTERVENTIONS:  - Assess patient's baseline mobility status (ambulation, transfers, stairs, etc )    - Identify cognitive and physical deficits and behaviors that affect mobility  - Identify mobility aids required to assist with transfers and/or ambulation (gait belt, sit-to-stand, lift, walker, cane, etc )  - [place] fall precautions as indicated by assessment  - Record patient progress and toleration of activity level on Mobility SBAR; progress patient to next Phase/Stage  - Instruct patient to call for assistance with activity based on assessment  - Consider rehabilitation consult to assist with strengthening/weightbearing, etc   Outcome: Progressing     Problem: DISCHARGE PLANNING  Goal: Discharge to home or other facility with appropriate resources  Description  INTERVENTIONS:  - Identify barriers to discharge w/patient and caregiver  - Arrange for needed discharge resources and transportation as appropriate  - Identify discharge learning needs (meds, wound care, etc )  - Refer to Case Management Department for coordinating discharge planning if the patient needs post-hospital services based on physician/advanced practitioner order or complex needs related to functional status, cognitive ability, or social support system   Outcome: Progressing     Problem: Knowledge Deficit  Goal: Patient/family/caregiver demonstrates understanding of disease process, treatment plan, medications, and discharge instructions  Description  Complete learning assessment and assess knowledge base    Interventions:  - Provide teaching at level of understanding  - Provide teaching via preferred learning methods  Outcome: Progressing     Problem: NEUROSENSORY - ADULT  Goal: Achieves stable or improved neurological status  Description  INTERVENTIONS  - Monitor and report changes in neurological status  - Monitor vital signs such as temperature, blood pressure, glucose, and any other labs ordered   - Initiate measures to prevent increased intracranial pressure  - Monitor for seizure activity and implement precautions if appropriate      Outcome: Progressing     Problem: SKIN/TISSUE INTEGRITY - ADULT  Goal: Skin integrity remains intact  Description  INTERVENTIONS  - Identify patients at risk for skin breakdown  - Assess and monitor skin integrity  - Assess and monitor nutrition and hydration status  - Monitor labs (i e  albumin)  - Assess for incontinence   - Turn and reposition patient  - Assist with mobility/ambulation  - Relieve pressure over bony prominences  - Avoid friction and shearing  - Provide appropriate hygiene as needed including keeping skin clean and dry  - Evaluate need for skin moisturizer/barrier cream  - Collaborate with interdisciplinary team (i e  Nutrition, Rehabilitation, etc )   - Patient/family teaching  Outcome: Progressing     Problem: MUSCULOSKELETAL - ADULT  Goal: Maintain or return mobility to safest level of function  Description  INTERVENTIONS:  - Assess patient's ability to carry out ADLs; assess patient's baseline for ADL function and identify physical deficits which impact ability to perform ADLs (bathing, care of mouth/teeth, toileting, grooming, dressing, etc )  - Assess/evaluate cause of self-care deficits   - Assess range of motion  - Assess patient's mobility  - Assess patient's need for assistive devices and provide as appropriate  - Encourage maximum independence but intervene and supervise when necessary  - Involve family in performance of ADLs  - Assess for home care needs following discharge   - Consider OT consult to assist with ADL evaluation and planning for discharge  - Provide patient education as appropriate  Outcome: Progressing

## 2019-12-19 NOTE — H&P
H&P- Alok Henry 1940, 78 y o  female MRN: [id number]    Unit/Bed#: -02 Encounter: [id number]    Primary Care Provider: Monica Coombs MD   Date and time admitted to hospital: 12/19/2019  4:11 AM        * Acute intractable headache  Assessment & Plan  · Likely secondary to elevated blood pressure  · CT head: no acute intracranial abnormality  · CTA head and neck: Cerebral atrophy with chronic small vessel ischemic white matter disease  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[place]  No evidence of aneurysm, vascular malformation or vascular cut off  No intracranial large vessel central occlusive disease  Abnormal appearance of the cervical segments of the internal carotid arteries bilaterally, suggesting underlying fibromuscular dysplasia  No evidence of flow-limiting stenosis  No evidence of carotid or vertebral artery dissection  · Supportive care with treating BP for headache if persists consider Tele Neuro eval  · P r n  hydralazine and Fioricet ordered    Acute cystitis without hematuria  Assessment & Plan  · Urinalysis shows cloudy urine, 1+ leukocytes, positive nitrates, trace ketones  · Patient denies any urinary symptoms  · Will order IV fluids at 75 mL/hour x1 bag  · Will start patient on Rocephin IV    Essential hypertension  Assessment & Plan  · BP was elevated in the emergency room presenting with BP of 201/98 - currently 153/70  · BP improved with treatment with morphine for headache  · Will give patient her a m  Medications   · And have p r n   Hydralazine    Coronary arteriosclerosis in native artery  Assessment & Plan  · Continue statin    Controlled type 2 diabetes mellitus without complication, with long-term current use of insulin [facility])  Assessment & Plan  No results found for: HGBA1C    Recent Labs     12/19/19  0433 12/19/19  0504   POCGLU 59* 128       Blood Sugar Average: Last 72 hrs:  (P) 93 5   · Check an A1c  · Patient had lower blood sugar on admission to the ER  · Reduce insulin dose for now  · Hold metformin  · Will order Levemir for HS, patient does take this on a p r n  Basis    Mixed hyperlipidemia  Assessment & Plan  · Continue statin    Postoperative hypothyroidism  Assessment & Plan  · Continue levothyroxine        VTE Prophylaxis: Enoxaparin (Lovenox)  Code Status:  Full code  POLST: POLST is not applicable to this patient  Discussion with family:  Discussed with patient and her son Mickey Nunez at the bedside    Anticipated Length of Stay:  Patient will be admitted on an Observation basis with an anticipated length of stay of  > 2 midnights  Justification for Hospital Stay:  Need for IV fluids and IV antibiotics    Total Time for Visit, including Counseling / Coordination of Care: 1 hour  Greater than 50% of this total time spent on direct patient counseling and coordination of care  Chief Complaint:   Left frontal headache    History of Present Illness:    Eze Anguiano is a 78 y o  female who presents with left frontal headache  Patient called her son at 3:20 a m  This morning and stated that she had a very severe headache and could not think clearly  The son brought the patient to the emergency department where she was found to have an elevated blood pressure of 201/98  She was given morphine 4mg IV in the emergency department which was effective for her headache  Patient states that her headache has improved since receiving the morphine in the emergency department but is still affecting her  Patient is rather upset that she feels foggy  Patient states that her headache is a frontal headache that moves across her forehead from right to left and goes down into her left jaw area  She denies any dizziness, lightheadedness, nausea, vomiting, or blurred vision  Upon further evaluation of the patient's labs, urinalysis indicated positive urinary tract infection without hematuria and in numeral bacteria  Patient will be started on antibiotic IV  Patient also has significant history from 2008 where she was diagnosed with left neck Hodgkin's disease and underwent a radical dissection after radiation  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 in which she had of radical forearm free flap performed along as her left neck dissection, and at the same time, of a total thyroidectomy  Patient normally wears a soft cervical collar for comfort  Review of Systems:  Review of Systems   Genitourinary: Negative for difficulty urinating  Neurological: Positive for headaches  Negative for dizziness and light-headedness  All other systems reviewed and are negative  Past Medical and Surgical History:   Past Medical History:   Diagnosis Date    Cancer ([facility] )     Diabetes mellitus ([facility] )     Disease of thyroid gland     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    HL (hearing loss)     Hodgkin's disease ([facility] ) 2008    Left neck, had radiation    Hypertension     Neuropathy     Osteoporosis        Past Surgical History:   Procedure Laterality Date    ADENOIDECTOMY      MOUTH SURGERY      oral cancer left lower lip    OVARY SURGERY      TONSILLECTOMY      TOTAL THYROIDECTOMY  2008    Performed after left neck dissection and left oral cancer diagnosis       Meds/Allergies:  Prior to Admission medications    Medication Sig Start Date End Date Taking?  Authorizing Provider   aspirin 81 mg chewable tablet Chew 81 mg daily   Yes Historical Provider, MD   calcium carbonate (OS-FELICE) 600 MG tablet Take 600 mg by mouth 2 (two) times a day with meals    Yes Historical Provider, MD   cholecalciferol (VITAMIN D3) 1,000 units tablet Take 2,000 Units by mouth daily   Yes Historical Provider, MD   Cyanocobalamin (B-12) 2500 MCG SUBL Place under the tongue   Yes Historical Provider, MD   docusate sodium (COLACE) 100 mg capsule Take 300 mg by mouth daily   Yes Historical Provider, MD   insulin aspart (NOVOLOG) 100 units/mL injection Sliding scale 6/11/10  Yes Historical Provider, MD   insulin detemir (LEVEMIR) 100 units/mL subcutaneous injection Sliding scale 6/11/10  Yes Historical Provider, MD   levothyroxine 100 mcg tablet levothyroxine 100 mcg tablet 3/16/11  Yes Historical Provider, MD   levothyroxine 50 mcg tablet Take 50 mcg by mouth once a week On sundays   Yes Historical Provider, MD   metFORMIN (GLUCOPHAGE) 500 mg tablet metformin 500 mg tablet   take 1 tablet by mouth twice a day 1/25/12  Yes Historical Provider, MD   multivitamin (THERAGRAN) TABS Take 1 tablet by mouth daily   Yes Historical Provider, MD   ramipril (ALTACE) 5 mg capsule Take 5 mg by mouth daily   Yes Historical Provider, MD   rosuvastatin (CRESTOR) 10 MG tablet Take 10 mg by mouth daily   Yes Historical Provider, MD   BD INSULIN SYRINGE U/F 1/2UNIT 31G X 5/16" 0 3 ML MISC  6/3/19   Historical Provider, MD   ipratropium (ATROVENT) 0 03 % nasal spray 1 spray into each nostril 2 (two) times a day for 180 days 5/3/19 10/30/19  Dewain Alert, MD   Nepafenac (ILEVRO) 0 3 % SUSP Ilevro 0 3 % eye drops,suspension 9/26/16   Historical Provider, MD   ONE TOUCH ULTRA TEST test strip  5/20/19   Historical Provider, MD   triamcinolone (KENALOG) 0 025 % cream triamcinolone acetonide 0 025 % topical cream 5/8/15   Historical Provider, MD   atorvastatin (LIPITOR) 20 mg tablet atorvastatin 20 mg tablet 11/2/16 12/19/19  Historical Provider, MD     I have reviewed home medications with patient personally  Allergies: No Known Allergies    Social History:  Marital Status:     Occupation:  Retired  Patient Pre-hospital Living Situation:  At home  Patient Pre-hospital Level of Mobility:  Full function  Patient Pre-hospital Diet Restrictions:  Diabetic  Substance Use History:     Social History     Substance and Sexual Activity   Alcohol Use Never    Frequency: Never     Social History     Tobacco Use   Smoking Status Never Smoker   Smokeless Tobacco Never Used     Social History     Substance and Sexual Activity   Drug Use Never       Family History:  I have reviewed the patients family history    Physical Exam:   Vitals:   Blood Pressure: 132/82 (12/19/19 0757)  Pulse: 68 (12/19/19 0757)  Temperature: (!) 96 5 °F (35 8 °C) (12/19/19 0757)  Temp Source: Tympanic (12/19/19 0757)  Respirations: 20 (12/19/19 0757)  Height: 5' (152 4 cm) (12/19/19 0505)  Weight - Scale: 60 8 kg (134 lb) (12/19/19 0415)  SpO2: 97 % (12/19/19 0757)    Physical Exam   Constitutional: She is oriented to person, place, and time  Vital signs are normal  She appears well-developed and well-nourished  She is cooperative  HENT:   Head: Normocephalic and atraumatic  Nose: Nose normal    Mouth/Throat: Oropharynx is clear and moist and mucous membranes are normal    Eyes: Conjunctivae and EOM are normal    Neck: Full passive range of motion without pain  Cardiovascular: Normal rate, regular rhythm, normal heart sounds and normal pulses  Pulmonary/Chest: Effort normal and breath sounds normal    Abdominal: Soft  Normal appearance and bowel sounds are normal    Genitourinary:   Genitourinary Comments: No complaints of dysuria   Musculoskeletal: Normal range of motion  Neurological: She is alert and oriented to person, place, and time  Skin: Skin is warm and dry  Psychiatric: She has a normal mood and affect  Her speech is normal and behavior is normal        Additional Data:   Lab Results: I have personally reviewed pertinent reports        Results from last 7 days   Lab Units 12/19/19  0438   WBC Thousand/uL 7 60   HEMOGLOBIN g/dL 13 1   HEMATOCRIT % 39 0*   PLATELETS Thousands/uL 202   NEUTROS PCT % 68   LYMPHS PCT % 22   MONOS PCT % 8   EOS PCT % 1     Results from last 7 days   Lab Units 12/19/19  0438   POTASSIUM mmol/L 4 3   CHLORIDE mmol/L 102   CO2 mmol/L 28   BUN mg/dL 24   CREATININE mg/dL 0 89   CALCIUM mg/dL 9 9   ALK PHOS U/L 42*   ALT U/L 19   AST U/L 26     Results from last 7 days   Lab Units 12/19/19  0438   INR 0 84*     Results from last 7 days   Lab Units 12/19/19  0759 12/19/19  0504 12/19/19  0433   POC GLUCOSE mg/dl 151* 128 59*           Imaging: I have personally reviewed pertinent reports  CTA head and neck with and without contrast   Final Result by Cyndee Carroll DO (12/19 6402)   1  Cerebral atrophy with chronic small vessel ischemic white matter disease  2   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[place]  No evidence of aneurysm, vascular malformation or vascular cut off  No intracranial large vessel central occlusive disease  3   Abnormal appearance of the cervical segments of the internal carotid arteries bilaterally, suggesting underlying fibromuscular dysplasia  No evidence of flow-limiting stenosis  4   No evidence of carotid or vertebral artery dissection  The study was marked in [facility] for immediate notification  Workstation performed: [id number]         CT head without contrast   Final Result by Dianne Verdin MD (12/19 7327)      No acute intracranial abnormality  Microangiopathic changes  Workstation performed: [id number]             EKG, Pathology, and Other Studies Reviewed on Admission:   · EKG:  Not available from emergency department    NetAccess/Epic Records Reviewed: Yes     ** Please Note: This note has been constructed using a voice recognition system   **

## 2019-12-19 NOTE — ED NOTES
Dysphagia assessment not completed as Dr Stephanie Fishman wants patient to remain NPO         Nicky Tolbert, RN  12/19/19 9313

## 2019-12-19 NOTE — ASSESSMENT & PLAN NOTE
· BP was elevated in the emergency room presenting with BP of 201/98 - currently 153/70  · BP improved with treatment with morphine for headache  · Will give patient her a m  Medications   · And have p r n   Hydralazine

## 2019-12-19 NOTE — SOCIAL WORK
Chart reviewed by case management,assessment was completed at the bedside with the pt and son present, pt and family are aware that the pt is here as an obs status, pt lives alone in a 1 story home with 6 basement steps, she states that she goes down the steps backwards, pt has had hhc in the past and does not know the agency, pt has a cane which she uses, ,walker and glucometer, pt has a rx plan at [facility], pt drives and is independent, her son lives across the street from her and her daughter lives close to her also, pt denies any d c needs, cm will continue to follow and assess for any additional d/c needs, family will transport the pt home when stable for d/c , d/c plan will be discussed at care coordination rounds today, Patient/caregiver received discharge checklist   Content reviewed  Patient/caregiver encouraged to participate in discharge plan of care prior to discharge home  CM reviewed d/c planning process including the following: identifying help at home, patient preference for d/c planning needs, availability of treatment team to discuss questions or concerns patient and/or family may have regarding understanding medications and recognizing signs and symptoms once discharged  CM also encouraged patient to follow up with all recommended appointments after discharge  Patient advised of importance for patient and family to participate in managing patients medical well being

## 2019-12-19 NOTE — PHYSICAL THERAPY NOTE
Physical Therapy Evaluation     Patient's Name: Haley Gaona    Admitting Diagnosis  Headache [R51]  Headache [R51]    Problem List  Patient Active Problem List   Diagnosis    Coronary arteriosclerosis in native artery    Mixed hyperlipidemia    Essential hypertension    Postoperative hypothyroidism    Controlled type 2 diabetes mellitus without complication, with long-term current use of insulin (HCC)    Acute intractable headache    Acute cystitis without hematuria    Cancer 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    HL (hearing loss)     [facility] ) 2008    Left neck, had radiation    Hypertension     Neuropathy     Osteoporosis        Past Surgical History  Past Surgical History:   Procedure Laterality Date    ADENOIDECTOMY      MOUTH SURGERY      oral cancer left lower lip    OVARY SURGERY      TONSILLECTOMY      TOTAL THYROIDECTOMY  2008    Performed after left neck dissection and left oral cancer diagnosis          12/19/19 1242   Note Type   Note type Eval/Treat   Pain Assessment   Pain Assessment No/denies pain   Pain Score No Pain   Home Living   Type of [address] [place] [address] Av One level;Performs ADLs on one level; Able to live on main level with bedroom/bathroom; Laundry in basement;Stairs to enter with rails  (6 steps down to basement, 2 TEE)   Bathroom Shower/Tub Tub/shower unit   Bathroom Toilet Standard   Bathroom Equipment Grab bars in shower;Grab bars around toilet   [address] Walker;Cane  (pt uses SPC at baseline)   Prior Function   Level of [place] Independent with ADLs and functional mobility   Lives With Alone   Receives Help From Family  (son lives across the street, dtr lives 5 mins away)   ADL Assistance Independent   IADLs Independent   Falls in the last 6 months 0   Vocational Retired   Comments pt drives, reports being very active/independent   Restrictions/Precautions   [place] Bearing Precautions Per Order No   Braces or Orthoses C/S Collar  (soft c/s collar for comfort)   Other Precautions Fall Risk;Multiple lines   General   Family/Caregiver Present Yes  (dtr)   Cognition   Arousal/Participation Alert   Orientation Level Oriented X4   Memory Within functional limits   Following Commands Follows all commands and directions without difficulty   Comments pt agreeable to PT session   RLE Assessment   RLE Assessment WFL  (grossly at least 3/5 observed c bed mobility)   LLE Assessment   LLE Assessment WFL  (grossly at least 3/5 observed c bed mobility)   Coordination   Movements are Fluid and Coordinated 1   Sensation WFL   Light Touch   RLE Light Touch Grossly intact   LLE Light Touch Grossly intact   Bed Mobility   Rolling R 5  Supervision   Rolling L 5  Supervision   Additional Comments pt refusing OOB mobility at this time as she just amb'ed c nsg staff to the bathroom  According to ns staff, pt required CGA x1 for amb into/back from bathroom   Transfers   Sit to Stand Unable to assess   Ambulation/Elevation   Gait pattern Not tested   Balance   Static Sitting Good   Dynamic Sitting   (DNT)   Activity Tolerance   Activity Tolerance Patient limited by fatigue   Nurse Made Aware Yes, PHAM [place] Degree verbalized pt appropriate for PT session   Assessment   Prognosis Good   Problem List Decreased endurance;Decreased mobility   Assessment Pt is 78 y o  female seen for PT evaluation on 12/19/2019 s/p admit to [address] [place] [address] on 12/19/2019 w/ Acute intractable headache  PT consulted to assess pt's functional mobility and d/c needs  Order placed for PT eval and tx, w/ up w/ A and up and OOB as tolerated order  Performed at least 2 patient identifiers during session: Name and wristband   Comorbidities affecting pt's physical performance at time of assessment include: acute cystitis s hematuria, essential HTN, coronary arteriosclerosis, DM type 2, mixed HLD, hypothyroidism  PTA, pt was independent w/ all functional mobility w/ SPC, ambulates community distances and elevations, ambulates household distances, has 2 TEE and lives alone in 1 level home  Personal factors affecting pt at time of IE include: ambulating w/ assistive device, stairs to enter home, unable to perform physical activity and inability to perform IADLs  Please find objective findings from PT assessment regarding body systems outlined above with impairments and limitations including weakness, impaired balance, decreased activity tolerance, decreased functional mobility tolerance and fall risk, as well as mobility assessment (need for cueing for mobility technique)  The following objective measures performed on IE also reveal limitations: Barthel Index: 45/100  Pt's clinical presentation is currently unstable/unpredictable seen in pt's presentation of ongoing medical assessment  Pt to benefit from continued PT tx to address deficits as defined above and maximize level of functional independent mobility and consistency  From PT/mobility standpoint, recommendation at time of d/c would be TBD, pending progress in order to facilitate return to PLOF     Barriers to Discharge Decreased caregiver support  (pt lives alone, + support system)   Goals   Patient Goals "to return home"   RUST Expiration Date 12/29/19   Short Term Goal #1 In 7-10 days: Increase bilateral LE strength 1/2 grade to facilitate independent mobility, Perform all bed mobility tasks modified independent to decrease caregiver burden, Perform all transfers modified independent to improve independence, Ambulate > 150 ft  with least restrictive assistive device modified independent w/o LOB and w/ normalized gait pattern 100% of the time, Navigate 6 stairs modified independent with unilateral handrail to facilitate return to previous living environment and Increase all balance 1/2 grade to decrease risk for falls   PT Treatment Day 0   Plan   Treatment/Interventions Functional transfer training;Elevations; Therapeutic exercise; Endurance training;Patient/family training;Gait training;Spoke to nursing   PT Frequency   (3-5x/wk)   Recommendation   Recommendation Defer at this time  (TBD, pending further mobility assessment)   Equipment Recommended   (TBD)   PT - OK to Discharge No   Barthel Index   Feeding 10   Bathing 0   Grooming Score 5   Dressing Score 5   Bladder Score 10   Bowels Score 10   Toilet Use Score 5   Transfers (Bed/Chair) Score 0   Mobility (Level Surface) Score 0   Stairs Score 0   Barthel Index Score 45         Rinda Rising, PT

## 2019-12-19 NOTE — ASSESSMENT & PLAN NOTE
No results found for: HGBA1C    Recent Labs     12/19/19  0433 12/19/19  0504   POCGLU 59* 128       Blood Sugar Average: Last 72 hrs:  (P) 93 5   · Check an A1c  · Patient had lower blood sugar on admission to the ER  · Reduce insulin dose for now  · Hold metformin  · Will order Levemir for HS, patient does take this on a p r n   Basis

## 2019-12-19 NOTE — ASSESSMENT & PLAN NOTE
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

## 2019-12-19 NOTE — OCCUPATIONAL THERAPY NOTE
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    HL (hearing loss)     Hodgkin's disease ([place] [address] ) 2008    Left neck, had radiation    Hypertension     Neuropathy     Osteoporosis        Past Surgical History:   Procedure Laterality Date    ADENOIDECTOMY      MOUTH SURGERY      oral cancer left lower lip    OVARY SURGERY      TONSILLECTOMY      TOTAL THYROIDECTOMY  2008    Performed after left neck dissection and left oral cancer diagnosis        12/19/19 1243   Note Type   Note type Eval/Treat   Restrictions/Precautions   Weight Bearing Precautions Per Order No   Braces or Orthoses C/S Collar   Other Precautions Fall Risk;Multiple lines   Pain Assessment   Pain Assessment No/denies pain   Pain Score No Pain   Home Living   Type of [address] [place] [address] One level;Performs ADLs on one level; Able to live on main level with bedroom/bathroom; Laundry in basement;Stairs to enter with rails  (2 TEE, 6 steps to basement/laundry)   Bathroom Shower/Tub Tub/shower unit   Bathroom Toilet Standard   Bathroom Equipment Grab bars in shower   Bathroom Accessibility Accessible   Home Equipment Walker;Cane   Additional Comments ambulatory w/ SPC   Prior Function   Level of [place] Independent with ADLs and functional mobility   Lives With Alone   Receives Help From Family  (daughter lives 5' away, son across the street)   ADL Assistance Independent   IADLs Independent   Falls in the last 6 months 0 Vocational Retired   Comments I driving, I laundry, assists at [employer] as well (laundry, get's grandchildren off to school), very active   Psychosocial   Psychosocial (WDL) WDL   Subjective   Subjective agreeable to therapy eval (modified), declined OOB r/t 'just got settled',,,states headache better   ADL   Eating Assistance 7  Independent   Grooming Assistance 5  Supervision/Setup   Grooming Deficit Setup   Toileting Assistance  Unable to assess   Additional Comments declined presently   Bed Mobility   Rolling R 5  Supervision   Rolling L 5  Supervision   Transfers   Sit to Stand Unable to assess   Functional Mobility   Functional Mobility   (unable to assess at this time)   Balance   Static Sitting Good   Activity Tolerance   Activity Tolerance Patient limited by fatigue   Nurse Made Aware yes   RUE Assessment   RUE Assessment WFL   LUE Assessment   LUE Assessment WFL   Hand Function   Gross Motor Coordination Functional   Fine Motor Coordination Functional   Cognition   Overall Cognitive Status WFL   Arousal/Participation Alert; Cooperative   Attention Within functional limits   Orientation Level Oriented X4   Memory Within functional limits   Following Commands Follows all commands and directions without difficulty   Assessment   Limitation Decreased endurance;Decreased self-care trans;Decreased high-level ADLs; Decreased ADL status   Prognosis Good   Assessment Pt is a 78 y o  female seen for OT evaluation s/p admit to [facility] on 12/19/2019 w/ Acute intractable headache  Comorbidities affecting pt's functional performance at time of assessment include: DM, HTN and Acute Cystitis w/o hematuria, CAD  Personal factors affecting pt at time of IE include:steps to enter environment, limited home support, difficulty performing ADLS and difficulty performing IADLS   Prior to admission, pt was I with all self care ADL's, IADL's, ambulation, transportation   reportedly 'very active'   Upon evaluation: Pt requires a slight increase in self care and mobility (unable to fully assess r/t pt declination to get OOB/fully participate in all aspects of the eval r/t 'just got settled    Fatigue') r/t the following deficits impacting occupational performance: decreased tolerance, decreased safety awareness and increased pain  Pt to benefit from continued skilled OT tx while in the hospital to address deficits as defined above and maximize level of functional independence w ADL's and functional mobility  Occupational Performance areas to address include: bathing/shower, dressing and functional mobility  From OT standpoint, recommendation at time of d/c would be home with family support (pending participation in all functional tasks successfully)        Goals   Patient Goals to go home   STG Time Frame 3-5   Short Term Goal #1 pt will complete self care ADL's w/ MI as indicated   Short Term Goal #2 pt will complete bed mobility w/ MI, to participate in self care ADL's   LTG Time Frame 7-10   Long Term Goal #1 pt will complete self toileting with MI/I and good safety   Long Term Goal #2 pt will complete 5-7' of functional mobility tasks in room with good safety demonstrated   Plan   Treatment Interventions ADL retraining; Endurance training;Patient/family training;Energy conservation; Activityengagement; Functional transfer training   Goal Expiration Date 12/29/19   OT Treatment Day 0   OT Frequency 3-5x/wk   Recommendation   OT Discharge Recommendation Home with family support   OT - OK to Discharge Yes   Barthel Index   Feeding 10   Bathing 0   Grooming Score 5   Dressing Score 5   Bladder Score 10   Bowels Score 10   Toilet Use Score 5   Transfers (Bed/Chair) Score 0   Mobility (Level Surface) Score 0   Stairs Score 0   Barthel Index Score 45   Maron Litten, OT

## 2019-12-20 VITALS
RESPIRATION RATE: 18 BRPM | DIASTOLIC BLOOD PRESSURE: 88 MMHG | HEIGHT: 60 IN | WEIGHT: 134 LBS | BODY MASS INDEX: 26.31 KG/M2 | OXYGEN SATURATION: 98 % | SYSTOLIC BLOOD PRESSURE: 178 MMHG | TEMPERATURE: 97.7 F | HEART RATE: 69 BPM

## 2019-12-20 LAB
ALBUMIN SERPL BCP-MCNC: 3.5 G/DL (ref 3.5–5.7)
ALP SERPL-CCNC: 36 U/L (ref 55–165)
ALT SERPL W P-5'-P-CCNC: 14 U/L (ref 7–52)
ANION GAP SERPL CALCULATED.3IONS-SCNC: 7 MMOL/L (ref 4–13)
AST SERPL W P-5'-P-CCNC: 18 U/L (ref 13–39)
BASOPHILS # BLD AUTO: 0.1 THOUSANDS/ΜL (ref 0–0.1)
BASOPHILS NFR BLD AUTO: 1 % (ref 0–2)
BILIRUB SERPL-MCNC: 0.4 MG/DL (ref 0.2–1)
BUN SERPL-MCNC: 13 MG/DL (ref 7–25)
CALCIUM SERPL-MCNC: 8.6 MG/DL (ref 8.6–10.5)
CHLORIDE SERPL-SCNC: 104 MMOL/L (ref 98–107)
CO2 SERPL-SCNC: 28 MMOL/L (ref 21–31)
CREAT SERPL-MCNC: 0.8 MG/DL (ref 0.6–1.2)
EOSINOPHIL # BLD AUTO: 0.2 THOUSAND/ΜL (ref 0–0.61)
EOSINOPHIL NFR BLD AUTO: 4 % (ref 0–5)
ERYTHROCYTE [DISTWIDTH] IN BLOOD BY AUTOMATED COUNT: 12.5 % (ref 11.5–14.5)
GFR SERPL CREATININE-BSD FRML MDRD: 70 ML/MIN/1.73SQ M
GLUCOSE SERPL-MCNC: 256 MG/DL (ref 65–99)
GLUCOSE SERPL-MCNC: 259 MG/DL (ref 65–140)
HCT VFR BLD AUTO: 36.1 % (ref 42–47)
HGB BLD-MCNC: 12.1 G/DL (ref 12–16)
LYMPHOCYTES # BLD AUTO: 1.5 THOUSANDS/ΜL (ref 0.6–4.47)
LYMPHOCYTES NFR BLD AUTO: 25 % (ref 21–51)
MAGNESIUM SERPL-MCNC: 1.6 MG/DL (ref 1.9–2.7)
MCH RBC QN AUTO: 32.5 PG (ref 26–34)
MCHC RBC AUTO-ENTMCNC: 33.4 G/DL (ref 31–37)
MCV RBC AUTO: 97 FL (ref 81–99)
MONOCYTES # BLD AUTO: 0.6 THOUSAND/ΜL (ref 0.17–1.22)
MONOCYTES NFR BLD AUTO: 9 % (ref 2–12)
NEUTROPHILS # BLD AUTO: 3.7 THOUSANDS/ΜL (ref 1.4–6.5)
NEUTS SEG NFR BLD AUTO: 61 % (ref 42–75)
PLATELET # BLD AUTO: 192 THOUSANDS/UL (ref 149–390)
PMV BLD AUTO: 9.1 FL (ref 8.6–11.7)
POTASSIUM SERPL-SCNC: 4.5 MMOL/L (ref 3.5–5.5)
PROT SERPL-MCNC: 5.6 G/DL (ref 6.4–8.9)
RBC # BLD AUTO: 3.72 MILLION/UL (ref 3.9–5.2)
SODIUM SERPL-SCNC: 139 MMOL/L (ref 134–143)
WBC # BLD AUTO: 6 THOUSAND/UL (ref 4.8–10.8)

## 2019-12-20 PROCEDURE — 80053 COMPREHEN METABOLIC PANEL: CPT | Performed by: NURSE PRACTITIONER

## 2019-12-20 PROCEDURE — 97116 GAIT TRAINING THERAPY: CPT

## 2019-12-20 PROCEDURE — 85025 COMPLETE CBC W/AUTO DIFF WBC: CPT | Performed by: NURSE PRACTITIONER

## 2019-12-20 PROCEDURE — 82948 REAGENT STRIP/BLOOD GLUCOSE: CPT

## 2019-12-20 PROCEDURE — 83735 ASSAY OF MAGNESIUM: CPT | Performed by: NURSE PRACTITIONER

## 2019-12-20 PROCEDURE — 97530 THERAPEUTIC ACTIVITIES: CPT

## 2019-12-20 PROCEDURE — 99239 HOSP IP/OBS DSCHRG MGMT >30: CPT | Performed by: NURSE PRACTITIONER

## 2019-12-20 RX ORDER — CEFDINIR 300 MG/1
300 CAPSULE ORAL EVERY 12 HOURS SCHEDULED
Qty: 8 CAPSULE | Refills: 0 | Status: SHIPPED | OUTPATIENT
Start: 2019-12-20 | End: 2019-12-24

## 2019-12-20 RX ADMIN — CALCIUM 1 TABLET: 500 TABLET ORAL at 08:38

## 2019-12-20 RX ADMIN — LISINOPRIL 20 MG: 20 TABLET ORAL at 08:38

## 2019-12-20 RX ADMIN — ASPIRIN 81 MG 81 MG: 81 TABLET ORAL at 08:37

## 2019-12-20 RX ADMIN — DOCUSATE SODIUM 300 MG: 100 CAPSULE, LIQUID FILLED ORAL at 08:38

## 2019-12-20 RX ADMIN — Medication 400 MG: at 08:37

## 2019-12-20 RX ADMIN — LEVOTHYROXINE SODIUM 100 MCG: 100 TABLET ORAL at 06:11

## 2019-12-20 RX ADMIN — ENOXAPARIN SODIUM 40 MG: 40 INJECTION SUBCUTANEOUS at 08:39

## 2019-12-20 RX ADMIN — CEFTRIAXONE 1000 MG: 1 INJECTION, SOLUTION INTRAVENOUS at 08:38

## 2019-12-20 RX ADMIN — INSULIN LISPRO 2 UNITS: 100 INJECTION, SOLUTION INTRAVENOUS; SUBCUTANEOUS at 06:11

## 2019-12-20 RX ADMIN — VITAMIN D, TAB 1000IU (100/BT) 2000 UNITS: 25 TAB at 08:37

## 2019-12-20 NOTE — PLAN OF CARE
Problem: PHYSICAL THERAPY ADULT  Goal: Performs mobility at highest level of function for planned discharge setting  See evaluation for individualized goals  Description  Treatment/Interventions: Functional transfer training, Elevations, Therapeutic exercise, Endurance training, Patient/family training, Gait training, Spoke to nursing  Equipment Recommended: (TBD)       See flowsheet documentation for full assessment, interventions and recommendations  Outcome: Progressing  Note:   Prognosis: Good  Problem List: Decreased endurance, Decreased mobility  Assessment: Pt seen for PT treatment session this date with interventions consisting of gait training w/ emphasis on improving pt's ability to ambulate level surfaces x 220 ft with HHA/CGA provided by therapist with [facility] and therapeutic activity consisting of training: supine<>sit transfers, sit<>stand transfers and vc and tactile cues for static standing posture faciliation  Pt agreeable to PT treatment session upon arrival, pt found supine in bed w/ HOB elevated, in no apparent distress, A&O x 4 and responsive  In comparison to previous session, pt with significant improvements in tolerating increased mobility distances at this time  Post session: pt returned BTB, all needs in reach and RN notified of session findings/recommendations  Continue to recommend anticipate no needs at time of d/c in order to maximize pt's functional independence and safety w/ mobility  Pt continues to be functioning below baseline level, and remains limited 2* factors listed above  PT will continue to see pt while here in order to address the deficits listed above and provide interventions consistent w/ POC in effort to achieve STGs    Barriers to Discharge: Decreased caregiver support(pt lives alone, + support system)     Recommendation: Home with family support(anticipate no needs)     PT - OK to Discharge: Yes(when medically cleared, c + family support)    See flowsheet documentation for full assessment

## 2019-12-20 NOTE — ASSESSMENT & PLAN NOTE
Lab Results   Component Value Date    HGBA1C 7 2 (H) 12/19/2019       Recent Labs     12/19/19  1058 12/19/19  1557 12/19/19  2053 12/20/19  0608   POCGLU 259* 220* 254* 259*       Blood Sugar Average: Last 72 hrs:  (P) 190   · Check an A1c  · Patient had lower blood sugar on admission to the ER  · Reduce insulin dose for now  · Hold metformin  · Will order Levemir for HS, patient does take this on a p r n   Basis  · Patient can return to her previous medication regimen

## 2019-12-20 NOTE — ASSESSMENT & PLAN NOTE
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

## 2019-12-20 NOTE — PLAN OF CARE
Problem: OCCUPATIONAL THERAPY ADULT  Goal: Performs self-care activities at highest level of function for planned discharge setting  See evaluation for individualized goals  Description  Treatment Interventions: ADL retraining, Endurance training, Patient/family training, Energy conservation, Activityengagement, Functional transfer training          See flowsheet documentation for full assessment, interventions and recommendations  Outcome: Progressing  Note:   Limitation: Decreased endurance, Decreased self-care trans, Decreased high-level ADLs, Decreased ADL status  Prognosis: Good  Assessment: Patient participated in Skilled OT session this date with interventions consisting of  therapeutic activities to: increase activity tolerance, increase dynamic sit/ stand balance during functional activity  and increase OOB/ sitting tolerance   Patient agreeable to OT treatment session, upon arrival patient was found supine in bed and in no apparent distress  In comparison to previous session, patient with improvements in functional mobility and endurance  Patient requiring ocassional safety reminders  Patient continues to be functioning below baseline level, occupational performance remains limited secondary to factors listed above and increased risk for falls and injury  From OT standpoint, recommendation at time of d/c would be Home with family support  Patient to benefit from continued Occupational Therapy treatment while in the hospital to address deficits as defined above and maximize level of functional independence with ADLs and functional mobility        OT Discharge Recommendation: Home with family support  OT - OK to Discharge: Yes(Once medically cleared )     Sophie Harris OT

## 2019-12-20 NOTE — SOCIAL WORK
Pt cleared to return home with no needs by PT  Pt continues to deny any discharge needs at this time  Family will transpsort home  CM will continue to follow as needed

## 2019-12-20 NOTE — ASSESSMENT & PLAN NOTE
· Urinalysis shows cloudy urine, 1+ leukocytes, positive nitrates, trace ketones  · Patient denies any urinary symptoms  · Will order IV fluids at 75 mL/hour x1 bag  · Will start patient on Rocephin IV  · Will send patient home on PO Cefdinir

## 2019-12-20 NOTE — PHYSICAL THERAPY NOTE
Physical Therapy Treatment Note       12/20/19 0855   Pain Assessment   Pain Assessment No/denies pain   Pain Score No Pain   Restrictions/Precautions   Weight Bearing Precautions Per Order No   Braces or Orthoses C/S Collar  (soft c/s collar for comfort)   Other Precautions Fall Risk   General   Chart Reviewed Yes   Response to Previous Treatment Patient with no complaints from previous session  Family/Caregiver Present No   Cognition   Overall Cognitive Status WFL   Arousal/Participation Alert; Responsive; Cooperative   Attention Within functional limits   Orientation Level Oriented X4   Memory Within functional limits   Following Commands Follows all commands and directions without difficulty   Comments pt agreeable to PT session   Subjective   Subjective "I would like to go home today"   Bed Mobility   Supine to Sit 6  Modified independent   Additional items HOB elevated   Sit to Supine 6  Modified independent   Additional items HOB elevated   Transfers   Sit to Stand 5  Supervision   Additional items Assist x 1; Increased time required;Verbal cues   Stand to Sit 5  Supervision   Additional items Assist x 1;Verbal cues   Ambulation/Elevation   Gait pattern Improper Weight shift;Decreased foot clearance;Narrow JACQUE   Gait Assistance 4  Minimal assist  (HHA 2/2 pt request)   Additional items Assist x 1;Verbal cues   Assistive Device Straight cane   Distance 220 ft   Stair Management Assistance Not tested   Balance   Static Sitting Good   Dynamic Sitting Fair +   Static Standing Fair   Dynamic Standing Fair -   Ambulatory Fair -   Endurance Deficit   Endurance Deficit Yes   Activity Tolerance   Activity Tolerance Patient limited by fatigue   Nurse Made Aware Yes, RN verbalized pt appropriate for PT session   Assessment   Prognosis Good   Problem List Decreased endurance;Decreased mobility   Assessment Pt seen for PT treatment session this date with interventions consisting of gait training w/ emphasis on improving pt's ability to ambulate level surfaces x 220 ft with HHA/CGA provided by therapist with [facility] and therapeutic activity consisting of training: supine<>sit transfers, sit<>stand transfers and vc and tactile cues for static standing posture faciliation  Pt agreeable to PT treatment session upon arrival, pt found supine in bed w/ HOB elevated, in no apparent distress, A&O x 4 and responsive  In comparison to previous session, pt with significant improvements in tolerating increased mobility distances at this time  Post session: pt returned BTB, all needs in reach and RN notified of session findings/recommendations  Continue to recommend anticipate no needs at time of d/c in order to maximize pt's functional independence and safety w/ mobility  Pt continues to be functioning below baseline level, and remains limited 2* factors listed above  PT will continue to see pt while here in order to address the deficits listed above and provide interventions consistent w/ POC in effort to achieve STGs  Barriers to Discharge Decreased caregiver support  (pt lives alone, + support system)   Goals   Patient Goals "to go home"   STG Expiration Date 12/29/19   Short Term Goal #1 goals remain appropriate   PT Treatment Day 1   Plan   Treatment/Interventions Functional transfer training;Elevations; Therapeutic exercise; Endurance training;Patient/family training;Gait training;Spoke to nursing   Progress Progressing toward goals   PT Frequency   (3-5x/wk)   Recommendation   Recommendation Home with family support  (anticipate no needs)   Equipment Recommended   (continue SPC vs RW use)   PT - OK to Discharge Yes  (when medically cleared, c + family support)       Gregory Lozada PT    Time of PT treatment session: 1351-0551

## 2019-12-20 NOTE — DISCHARGE SUMMARY
Discharge- Miguelina Dawn 1940, 78 y o  female MRN: [id number]    Unit/Bed#: -02 Encounter: [id number]    Primary Care Provider: Gilberto Perez MD   Date and time admitted to hospital: 12/19/2019  4:11 AM        * Acute intractable headache  Assessment & Plan  · Likely secondary to elevated blood pressure  · CT head: no acute intracranial abnormality  · CTA head and neck: Cerebral atrophy with chronic small vessel ischemic white matter disease  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[place]  No evidence of aneurysm, vascular malformation or vascular cut off  No intracranial large vessel central occlusive disease  Abnormal appearance of the cervical segments of the internal carotid arteries bilaterally, suggesting underlying fibromuscular dysplasia  No evidence of flow-limiting stenosis  No evidence of carotid or vertebral artery dissection  · Supportive care with treating BP for headache if persists consider Tele Neuro eval  · P r n  hydralazine and Fioricet ordered  · Resolved     Acute cystitis without hematuria  Assessment & Plan  · Urinalysis shows cloudy urine, 1+ leukocytes, positive nitrates, trace ketones  · Patient denies any urinary symptoms  · Will order IV fluids at 75 mL/hour x1 bag  · Will start patient on Rocephin IV  · Will send patient home on PO Cefdinir    Essential hypertension  Assessment & Plan  · BP was elevated in the emergency room presenting with BP of 201/98 - currently 153/70  · BP improved with treatment with morphine for headache  · Will give patient her a m  Medications   · And have p r n   Hydralazine    Coronary arteriosclerosis in native artery  Assessment & Plan  · Continue statin    Controlled type 2 diabetes mellitus without complication, with long-term current use of insulin [facility])  Assessment & Plan  Lab Results   Component Value Date    HGBA1C 7 2 (H) 12/19/2019       Recent Labs     12/19/19  1058 12/19/19  1557 12/19/19  2053 12/20/19  3287   POCGLU 259* 220* 254* 259*       Blood Sugar Average: Last 72 hrs:  (P) 190   · Check an A1c  · Patient had lower blood sugar on admission to the ER  · Reduce insulin dose for now  · Hold metformin  · Will order Levemir for HS, patient does take this on a p r n  Basis  · Patient can return to her previous medication regimen    Mixed hyperlipidemia  Assessment & Plan  · Continue statin    Postoperative hypothyroidism  Assessment & Plan  · Continue levothyroxine        Discharging Physician / Practitioner: ADRIAN Love  PCP: Pancho Palacios MD  Admission Date:   Admission Orders (From admission, onward)     Ordered        12/19/19 0805  Inpatient Admission  Once         12/19/19 3508  Place in Observation (expected length of stay for this patient is less than two midnights)  Once                   Discharge Date: 12/20/19    Resolved Problems  Date Reviewed: 12/20/2019    None          Consultations During Hospital Stay:  · none    Procedures Performed:   · CT head: No acute intracranial abnormality  Microangiopathic changes  · CTA head and neck:   1  Cerebral atrophy with chronic small vessel ischemic white matter disease  2   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[place]  No evidence of aneurysm, vascular malformation or vascular cut off  No intracranial large vessel central occlusive disease  3   Abnormal appearance of the cervical segments of the internal carotid arteries bilaterally, suggesting underlying fibromuscular dysplasia  No evidence of flow-limiting stenosis  4   No evidence of carotid or vertebral artery dissection  Significant Findings / Test Results:   · none    Incidental Findings:   · none     Test Results Pending at Discharge (will require follow up):   · none     Outpatient Tests Requested:  · none    Complications:     none    Reason for Admission: Left frontal headache    Hospital Course:      Angely Muñoz is a 78 y o  female patient who originally presented to the hospital on 12/19/2019 due to left frontal headache  Patient called her son at 3:20 a m  This morning and stated that she had a very severe headache and could not think clearly  The son brought the patient to the emergency department where she was found to have an elevated blood pressure of 201/98  She was given morphine 4mg IV in the emergency department which was effective for her headache  Patient states that her headache has improved since receiving the morphine in the emergency department but is still affecting her  Patient is rather upset that she feels foggy      Patient states that her headache is a frontal headache that moves across her forehead from right to left and goes down into her left jaw area  She denies any dizziness, lightheadedness, nausea, vomiting, or blurred vision      Upon further evaluation of the patient's labs, urinalysis indicated positive urinary tract infection without hematuria and in numeral bacteria  Patient will be started on antibiotic IV       Patient also has significant history from 2008 where she was diagnosed with left neck Hodgkin's disease and underwent a radical dissection after radiation  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 in which she had of radical forearm free flap performed along as her left neck dissection, and at the same time, of a total thyroidectomy  Patient normally wears a soft cervical collar for comfort  Patient received IV fluids and was started on IV antibiotics for her urinary tract infection  He magnesium was also found to be low and she was started on oral replacement  Upon her discharge she will have 4 more days of oral antibiotics for her UTI  She may return to her regular schedule of medications  Please see above list of diagnoses and related plan for additional information  Condition at Discharge: fair     Discharge Day Visit / Exam:     Subjective:  Patient is sitting in bed, she has no complaints   She states she would like to go home, I am agreeable to her discharge today  Vitals: Blood Pressure: (!) 178/88 (12/20/19 0837)  Pulse: 69 (12/20/19 0710)  Temperature: 97 7 °F (36 5 °C) (12/20/19 0710)  Temp Source: Temporal (12/20/19 0710)  Respirations: 18 (12/20/19 0710)  Height: 5' (152 4 cm) (12/19/19 0505)  Weight - Scale: 60 8 kg (134 lb) (12/19/19 0415)  SpO2: 98 % (12/20/19 0710)  Exam:   Physical Exam   Constitutional: She is oriented to person, place, and time  Vital signs are normal  She appears well-developed and well-nourished  She is cooperative  HENT:   Head: Normocephalic and atraumatic  Nose: Nose normal    Mouth/Throat: Oropharynx is clear and moist and mucous membranes are normal    Eyes: Conjunctivae and EOM are normal    Neck: Full passive range of motion without pain  History of radical neck dissection and left lower facial reconstruction  Cardiovascular: Normal rate, regular rhythm, normal heart sounds and normal pulses  Pulmonary/Chest: Effort normal and breath sounds normal    Abdominal: Soft  Normal appearance and bowel sounds are normal    Musculoskeletal: Normal range of motion  Neurological: She is alert and oriented to person, place, and time  Skin: Skin is warm and dry  Psychiatric: She has a normal mood and affect  Her speech is normal and behavior is normal        Discussion with Family: discussed with patient  Discharge instructions/Information to patient and family:   See after visit summary for information provided to patient and family  Provisions for Follow-Up Care:  See after visit summary for information related to follow-up care and any pertinent home health orders  Disposition:     Home    For Discharges to [facility] SNF:   · Not Applicable to this Patient - Not Applicable to this Patient    Planned Readmission:    no     Discharge Statement:  I spent 30 minutes discharging the patient  This time was spent on the day of discharge   I had direct contact with the patient on the day of discharge  Greater than 50% of the total time was spent examining patient, answering all patient questions, arranging and discussing plan of care with patient as well as directly providing post-discharge instructions  Additional time then spent on discharge activities  Patient was originally thought to need to stay hospitalized for more than two midnights, however, due to the patient improving significantly, it is appropriate for the patient to be discharged home today  Discharge Medications:  See after visit summary for reconciled discharge medications provided to patient and family        ** Please Note: This note has been constructed using a voice recognition system **

## 2019-12-20 NOTE — PLAN OF CARE
Problem: DISCHARGE PLANNING  Goal: Discharge to home or other facility with appropriate resources  Description  INTERVENTIONS:  - Identify barriers to discharge w/patient and caregiver  - Arrange for needed discharge resources and transportation as appropriate  - Identify discharge learning needs (meds, wound care, etc )  - Refer to Case Management Department for coordinating discharge planning if the patient needs post-hospital services based on physician/advanced practitioner order or complex needs related to functional status, cognitive ability, or social support system   Outcome: Adequate for Discharge

## 2019-12-20 NOTE — OCCUPATIONAL THERAPY NOTE
[address] Treatment Note     Patient Name: Shantel Mariscal Date: 12/20/2019  Problem List  Principal Problem:    Acute intractable headache  Active Problems:    Coronary arteriosclerosis in native artery    Mixed hyperlipidemia    Essential hypertension    Postoperative hypothyroidism    Controlled type 2 diabetes mellitus without complication, with long-term current use of insulin (HCC)    Acute cystitis without hematuria       12/20/19 1850   Restrictions/Precautions   Weight Bearing Precautions Per Order No   Braces or Orthoses C/S Collar   Other Precautions Fall Risk;Multiple lines   Pain Assessment   Pain Assessment No/denies pain   Pain Score No Pain   Bed Mobility   Supine to Sit 6  Modified independent   Additional items HOB elevated   Sit to Supine 6  Modified independent   Additional items HOB elevated   Transfers   Sit to Stand 5  Supervision   Additional items Assist x 1; Increased time required;Verbal cues   Stand to Sit 5  Supervision   Additional items Assist x 1;Verbal cues   Functional Mobility   Functional Mobility 5  Supervision   Additional items SPC   Cognition   Overall Cognitive Status [facility]   Arousal/Participation Alert; Responsive; Cooperative   Attention Within functional limits   Orientation Level Oriented X4   Memory Within functional limits   Following Commands Follows all commands and directions without difficulty   Comments Mini-Cog assessment performed today  Version 1 was given  Patient able to recall 2/3 words  Patient able to draw an abnormal clock with the incorrect time  Total score of test was 3/5 indicating  further screening of cognition is recommended     Cognition Assessment Tools   (Mini-cog )   Score 3   Activity Tolerance   Activity Tolerance Patient tolerated treatment well   Medical Staff Made Aware PHAM Renee verbalized pt appropriate for therapy    Assessment   Assessment Patient participated in Skilled OT session this date with interventions consisting of  therapeutic activities to: increase activity tolerance, increase dynamic sit/ stand balance during functional activity  and increase OOB/ sitting tolerance   Patient agreeable to OT treatment session, upon arrival patient was found supine in bed and in no apparent distress  In comparison to previous session, patient with improvements in functional mobility and endurance  Patient requiring ocassional safety reminders  Patient continues to be functioning below baseline level, occupational performance remains limited secondary to factors listed above and increased risk for falls and injury  From OT standpoint, recommendation at time of d/c would be Home with family support  Patient to benefit from continued Occupational Therapy treatment while in the hospital to address deficits as defined above and maximize level of functional independence with ADLs and functional mobility  Plan   Treatment Interventions ADL retraining;Functional transfer training; Endurance training;Cognitive reorientation; Compensatory technique education   Goal Expiration Date 12/29/19   OT Treatment Day 1   OT Frequency 3-5x/wk   Recommendation   OT Discharge Recommendation Home with family support   OT - OK to Discharge Yes  (Once medically cleared )     Leah Larios, OT

## 2019-12-21 LAB — BACTERIA UR CULT: ABNORMAL

## 2020-01-06 NOTE — PHYSICIAN ADVISOR
Current patient class: Inpatient  The patient is currently on Hospital Day: 2 at [address]      The patient was admitted to the hospital  on 12/19/19 at [facility] for the following diagnosis:  Headache [R51]  Headache [R51]     After review of the relevant documentation, labs, vital signs and test results, the patient is a provider liable case and is most appropriate for OBSERVATION STATUS  In this particular case the patient was admitted to the hospital as an inpatient  The patient however fails to satisfy the 2 midnight benchmark and closer scrutiny of the case is warranted  After review of the patient presentation and relevant labs the patient was most appropriate for observation status on admission  Given that this patient has already been discharged prior to this review they become a provider liable case  Rationale is as follows: The patient is a 78 yrs   Female who presented to the ED at 12/19/2019  4:11 AM with a chief complaint of Headache (called son in law 0320 stating that she can't think, woke up with frontal headache)     Patient admitted with a report of a HA with difficulty thinking  She was noted to have an elevated BP and this was decreased after receiving pain medication  She was also noted to have a UTI and initially started on IV antibiotics  After a one night stay, her BP normalized and she was placed on oral antibiotics for her UTI  Imaging studies did not reveal any acute pathology  An OBSERVATION status would be considered appropriate for this patient              The patients vitals on arrival were ED Triage Vitals   Temperature Pulse Respirations Blood Pressure SpO2   12/19/19 0415 12/19/19 0415 12/19/19 0415 12/19/19 0415 12/19/19 0415   (!) 96 9 °F (36 1 °C) 76 22 (!) 201/98 100 %      Temp Source Heart Rate Source Patient Position - Orthostatic VS BP Location FiO2 (%)   12/19/19 0415 12/19/19 0415 12/19/19 0415 12/19/19 0415 --   Temporal Monitor Lying Left arm       Pain Score       12/19/19 0407       Worst Possible Pain           Past Medical History:   Diagnosis Date    [facility]     [facility]     Disease of thyroid gland     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    HL (hearing loss)     Hodgkin's [facility] ) 2008    Left neck, had radiation    Hypertension     Neuropathy     Osteoporosis      Past Surgical History:   Procedure Laterality Date    ADENOIDECTOMY      MOUTH SURGERY      oral cancer left lower lip    OVARY SURGERY      TONSILLECTOMY      TOTAL THYROIDECTOMY  2008    Performed after left neck dissection and left oral cancer diagnosis           Consults have been placed to:   IP CONSULT TO CASE MANAGEMENT    Vitals:    12/19/19 1530 12/19/19 2209 12/20/19 0710 12/20/19 0837   BP: 122/66 96/57 (!) 183/81 (!) 178/88   BP Location: Right arm Left arm Right arm    Pulse: 79 69 69    Resp: 19 18 18    Temp: 98 5 °F (36 9 °C) 97 8 °F (36 6 °C) 97 7 °F (36 5 °C)    TempSrc: Temporal Temporal Temporal    SpO2: 97% 96% 98%    Weight:       Height:           Most recent labs:    No results for input(s): WBC, HGB, HCT, PLT, K, NA, CALCIUM, BUN, CREATININE, LIPASE, AMYLASE, INR, TROPONINI, CKTOTAL, AST, ALT, ALKPHOS, BILITOT in the last 72 hours  Scheduled Meds:  Continuous Infusions:  No current facility-administered medications for this encounter  PRN Meds:      Surgical procedures (if appropriate):

## 2020-03-31 ENCOUNTER — TRANSCRIBE ORDERS (OUTPATIENT)
Dept: URGENT CARE | Facility: CLINIC | Age: 80
End: 2020-03-31

## 2020-03-31 ENCOUNTER — APPOINTMENT (OUTPATIENT)
Dept: RADIOLOGY | Facility: CLINIC | Age: 80
End: 2020-03-31
Payer: MEDICARE

## 2020-03-31 DIAGNOSIS — M25.561 PAIN AND SWELLING OF KNEE, RIGHT: ICD-10-CM

## 2020-03-31 DIAGNOSIS — M25.461 PAIN AND SWELLING OF KNEE, RIGHT: ICD-10-CM

## 2020-03-31 DIAGNOSIS — M25.561 RIGHT KNEE PAIN, UNSPECIFIED CHRONICITY: Primary | ICD-10-CM

## 2020-03-31 PROCEDURE — 73562 X-RAY EXAM OF KNEE 3: CPT

## 2020-04-24 ENCOUNTER — TRANSCRIBE ORDERS (OUTPATIENT)
Dept: ADMINISTRATIVE | Facility: HOSPITAL | Age: 80
End: 2020-04-24

## 2020-04-24 DIAGNOSIS — M25.561 ACUTE PAIN OF RIGHT KNEE: Primary | ICD-10-CM

## 2020-05-06 ENCOUNTER — HOSPITAL ENCOUNTER (OUTPATIENT)
Facility: HOSPITAL | Age: 80
Setting detail: OBSERVATION
Discharge: HOME | End: 2020-05-08
Attending: EMERGENCY MEDICINE | Admitting: INTERNAL MEDICINE
Payer: MEDICARE

## 2020-05-06 ENCOUNTER — APPOINTMENT (EMERGENCY)
Dept: RADIOLOGY | Facility: HOSPITAL | Age: 80
End: 2020-05-06
Payer: MEDICARE

## 2020-05-06 DIAGNOSIS — R50.9 FEVER: ICD-10-CM

## 2020-05-06 DIAGNOSIS — N30.00 ACUTE CYSTITIS WITHOUT HEMATURIA: ICD-10-CM

## 2020-05-06 DIAGNOSIS — M25.569 KNEE PAIN: ICD-10-CM

## 2020-05-06 DIAGNOSIS — M25.561 RIGHT KNEE PAIN: Primary | ICD-10-CM

## 2020-05-06 LAB
ALBUMIN SERPL BCP-MCNC: 4.3 G/DL (ref 3.5–5.7)
ALP SERPL-CCNC: 39 U/L (ref 55–165)
ALT SERPL W P-5'-P-CCNC: 15 U/L (ref 7–52)
ANION GAP SERPL CALCULATED.3IONS-SCNC: 9 MMOL/L (ref 4–13)
AST SERPL W P-5'-P-CCNC: 21 U/L (ref 13–39)
BASOPHILS # BLD AUTO: 0.1 THOUSANDS/ΜL (ref 0–0.1)
BASOPHILS NFR BLD AUTO: 1 % (ref 0–2)
BILIRUB SERPL-MCNC: 0.4 MG/DL (ref 0.2–1)
BUN SERPL-MCNC: 22 MG/DL (ref 7–25)
CALCIUM SERPL-MCNC: 9.4 MG/DL (ref 8.6–10.5)
CHLORIDE SERPL-SCNC: 98 MMOL/L (ref 98–107)
CO2 SERPL-SCNC: 26 MMOL/L (ref 21–31)
CREAT SERPL-MCNC: 0.94 MG/DL (ref 0.6–1.2)
EOSINOPHIL # BLD AUTO: 0.2 THOUSAND/ΜL (ref 0–0.61)
EOSINOPHIL NFR BLD AUTO: 2 % (ref 0–5)
ERYTHROCYTE [DISTWIDTH] IN BLOOD BY AUTOMATED COUNT: 12.7 % (ref 11.5–14.5)
ERYTHROCYTE [SEDIMENTATION RATE] IN BLOOD: 13 MM/HOUR (ref 0–30)
GFR SERPL CREATININE-BSD FRML MDRD: 58 ML/MIN/1.73SQ M
GLUCOSE SERPL-MCNC: 172 MG/DL (ref 65–99)
HCT VFR BLD AUTO: 36.7 % (ref 42–47)
HGB BLD-MCNC: 11.9 G/DL (ref 12–16)
LACTATE SERPL-SCNC: 1.5 MMOL/L (ref 0.5–2)
LYMPHOCYTES # BLD AUTO: 1.2 THOUSANDS/ΜL (ref 0.6–4.47)
LYMPHOCYTES NFR BLD AUTO: 11 % (ref 21–51)
MCH RBC QN AUTO: 31.8 PG (ref 26–34)
MCHC RBC AUTO-ENTMCNC: 32.4 G/DL (ref 31–37)
MCV RBC AUTO: 98 FL (ref 81–99)
MONOCYTES # BLD AUTO: 1 THOUSAND/ΜL (ref 0.17–1.22)
MONOCYTES NFR BLD AUTO: 9 % (ref 2–12)
NEUTROPHILS # BLD AUTO: 8.2 THOUSANDS/ΜL (ref 1.4–6.5)
NEUTS SEG NFR BLD AUTO: 77 % (ref 42–75)
PLATELET # BLD AUTO: 255 THOUSANDS/UL (ref 149–390)
PMV BLD AUTO: 8.9 FL (ref 8.6–11.7)
POTASSIUM SERPL-SCNC: 4.3 MMOL/L (ref 3.5–5.5)
PROT SERPL-MCNC: 6.6 G/DL (ref 6.4–8.9)
RBC # BLD AUTO: 3.73 MILLION/UL (ref 3.9–5.2)
SODIUM SERPL-SCNC: 133 MMOL/L (ref 134–143)
WBC # BLD AUTO: 10.6 THOUSAND/UL (ref 4.8–10.8)

## 2020-05-06 PROCEDURE — 96372 THER/PROPH/DIAG INJ SC/IM: CPT

## 2020-05-06 PROCEDURE — 85652 RBC SED RATE AUTOMATED: CPT | Performed by: EMERGENCY MEDICINE

## 2020-05-06 PROCEDURE — 80053 COMPREHEN METABOLIC PANEL: CPT | Performed by: EMERGENCY MEDICINE

## 2020-05-06 PROCEDURE — 83605 ASSAY OF LACTIC ACID: CPT | Performed by: EMERGENCY MEDICINE

## 2020-05-06 PROCEDURE — 36415 COLL VENOUS BLD VENIPUNCTURE: CPT | Performed by: EMERGENCY MEDICINE

## 2020-05-06 PROCEDURE — 99284 EMERGENCY DEPT VISIT MOD MDM: CPT | Performed by: EMERGENCY MEDICINE

## 2020-05-06 PROCEDURE — 73564 X-RAY EXAM KNEE 4 OR MORE: CPT

## 2020-05-06 PROCEDURE — 87040 BLOOD CULTURE FOR BACTERIA: CPT | Performed by: EMERGENCY MEDICINE

## 2020-05-06 PROCEDURE — 85025 COMPLETE CBC W/AUTO DIFF WBC: CPT | Performed by: EMERGENCY MEDICINE

## 2020-05-06 PROCEDURE — 99285 EMERGENCY DEPT VISIT HI MDM: CPT

## 2020-05-06 RX ORDER — ACETAMINOPHEN 325 MG/1
1000 TABLET ORAL ONCE
Status: COMPLETED | OUTPATIENT
Start: 2020-05-06 | End: 2020-05-06

## 2020-05-06 RX ORDER — MULTIVIT WITH MINERALS/LUTEIN
1000 TABLET ORAL 2 TIMES DAILY
Status: ON HOLD | COMMUNITY
End: 2020-06-17 | Stop reason: CLARIF

## 2020-05-06 RX ORDER — SODIUM CHLORIDE 9 MG/ML
125 INJECTION, SOLUTION INTRAVENOUS CONTINUOUS
Status: DISCONTINUED | OUTPATIENT
Start: 2020-05-06 | End: 2020-05-08

## 2020-05-06 RX ORDER — TRAMADOL HYDROCHLORIDE 50 MG/1
50 TABLET ORAL EVERY 6 HOURS PRN
Qty: 30 TABLET | Refills: 0 | Status: SHIPPED | OUTPATIENT
Start: 2020-05-06 | End: 2020-05-16

## 2020-05-06 RX ORDER — TRAMADOL HYDROCHLORIDE 50 MG/1
50 TABLET ORAL ONCE
Status: DISCONTINUED | OUTPATIENT
Start: 2020-05-07 | End: 2020-05-06

## 2020-05-06 RX ORDER — GLUCOSAMINE/D3/BOSWELLIA SERRA 1500MG-400
10000 TABLET ORAL DAILY
Status: ON HOLD | COMMUNITY
End: 2020-06-17 | Stop reason: CLARIF

## 2020-05-06 RX ORDER — LIDOCAINE HYDROCHLORIDE AND EPINEPHRINE 10; 10 MG/ML; UG/ML
10 INJECTION, SOLUTION INFILTRATION; PERINEURAL ONCE
Status: COMPLETED | OUTPATIENT
Start: 2020-05-06 | End: 2020-05-06

## 2020-05-06 RX ORDER — KETOROLAC TROMETHAMINE 30 MG/ML
15 INJECTION, SOLUTION INTRAMUSCULAR; INTRAVENOUS ONCE
Status: COMPLETED | OUTPATIENT
Start: 2020-05-06 | End: 2020-05-06

## 2020-05-06 RX ORDER — TRAMADOL HYDROCHLORIDE 50 MG/1
50 TABLET ORAL ONCE
Status: COMPLETED | OUTPATIENT
Start: 2020-05-06 | End: 2020-05-06

## 2020-05-06 RX ADMIN — SODIUM CHLORIDE 125 ML/HR: 0.9 INJECTION, SOLUTION INTRAVENOUS at 22:20

## 2020-05-06 RX ADMIN — TRAMADOL HYDROCHLORIDE 50 MG: 50 TABLET, FILM COATED ORAL at 22:12

## 2020-05-06 RX ADMIN — ACETAMINOPHEN 975 MG: 325 TABLET ORAL at 22:11

## 2020-05-06 RX ADMIN — KETOROLAC TROMETHAMINE 15 MG: 30 INJECTION, SOLUTION INTRAMUSCULAR at 22:21

## 2020-05-06 RX ADMIN — LIDOCAINE HYDROCHLORIDE AND EPINEPHRINE 10 ML: 10; 10 INJECTION, SOLUTION INFILTRATION; PERINEURAL at 22:53

## 2020-05-07 ENCOUNTER — APPOINTMENT (EMERGENCY)
Dept: RADIOLOGY | Facility: HOSPITAL | Age: 80
End: 2020-05-07
Payer: MEDICARE

## 2020-05-07 PROBLEM — Z20.822 PERSON UNDER INVESTIGATION FOR COVID-19: Status: ACTIVE | Noted: 2020-05-07

## 2020-05-07 PROBLEM — M25.561 RIGHT KNEE PAIN: Chronic | Status: ACTIVE | Noted: 2020-05-07

## 2020-05-07 LAB
ANION GAP SERPL CALCULATED.3IONS-SCNC: 8 MMOL/L (ref 4–13)
BACTERIA UR QL AUTO: ABNORMAL /HPF
BASOPHILS # BLD AUTO: 0.1 THOUSANDS/ΜL (ref 0–0.1)
BASOPHILS NFR BLD AUTO: 1 % (ref 0–2)
BILIRUB UR QL STRIP: NEGATIVE
BUN SERPL-MCNC: 20 MG/DL (ref 7–25)
CALCIUM SERPL-MCNC: 8.5 MG/DL (ref 8.6–10.5)
CHLORIDE SERPL-SCNC: 100 MMOL/L (ref 98–107)
CLARITY UR: ABNORMAL
CO2 SERPL-SCNC: 26 MMOL/L (ref 21–31)
COLOR UR: YELLOW
CREAT SERPL-MCNC: 0.8 MG/DL (ref 0.6–1.2)
EOSINOPHIL # BLD AUTO: 0.1 THOUSAND/ΜL (ref 0–0.61)
EOSINOPHIL NFR BLD AUTO: 1 % (ref 0–5)
ERYTHROCYTE [DISTWIDTH] IN BLOOD BY AUTOMATED COUNT: 12.9 % (ref 11.5–14.5)
GFR SERPL CREATININE-BSD FRML MDRD: 70 ML/MIN/1.73SQ M
GLUCOSE P FAST SERPL-MCNC: 127 MG/DL (ref 65–99)
GLUCOSE SERPL-MCNC: 127 MG/DL (ref 65–99)
GLUCOSE SERPL-MCNC: 175 MG/DL (ref 65–140)
GLUCOSE SERPL-MCNC: 178 MG/DL (ref 65–140)
GLUCOSE SERPL-MCNC: 191 MG/DL (ref 65–140)
GLUCOSE SERPL-MCNC: 219 MG/DL (ref 65–140)
GLUCOSE SERPL-MCNC: 96 MG/DL (ref 65–140)
GLUCOSE UR STRIP-MCNC: NEGATIVE MG/DL
HCT VFR BLD AUTO: 36.9 % (ref 42–47)
HGB BLD-MCNC: 11.9 G/DL (ref 12–16)
HGB UR QL STRIP.AUTO: NEGATIVE
KETONES UR STRIP-MCNC: ABNORMAL MG/DL
LEUKOCYTE ESTERASE UR QL STRIP: ABNORMAL
LYMPHOCYTES # BLD AUTO: 1.5 THOUSANDS/ΜL (ref 0.6–4.47)
LYMPHOCYTES NFR BLD AUTO: 14 % (ref 21–51)
MCH RBC QN AUTO: 31.9 PG (ref 26–34)
MCHC RBC AUTO-ENTMCNC: 32.3 G/DL (ref 31–37)
MCV RBC AUTO: 99 FL (ref 81–99)
MONOCYTES # BLD AUTO: 1 THOUSAND/ΜL (ref 0.17–1.22)
MONOCYTES NFR BLD AUTO: 9 % (ref 2–12)
NEUTROPHILS # BLD AUTO: 8.2 THOUSANDS/ΜL (ref 1.4–6.5)
NEUTS SEG NFR BLD AUTO: 75 % (ref 42–75)
NITRITE UR QL STRIP: POSITIVE
NON-SQ EPI CELLS URNS QL MICRO: ABNORMAL /HPF
PH UR STRIP.AUTO: 6 [PH]
PLATELET # BLD AUTO: 238 THOUSANDS/UL (ref 149–390)
PMV BLD AUTO: 9.3 FL (ref 8.6–11.7)
POTASSIUM SERPL-SCNC: 4.2 MMOL/L (ref 3.5–5.5)
PROT UR STRIP-MCNC: NEGATIVE MG/DL
RBC # BLD AUTO: 3.74 MILLION/UL (ref 3.9–5.2)
RBC #/AREA URNS AUTO: ABNORMAL /HPF
SARS-COV-2 RNA RESP QL NAA+PROBE: NEGATIVE
SODIUM SERPL-SCNC: 134 MMOL/L (ref 134–143)
SP GR UR STRIP.AUTO: 1.02 (ref 1–1.03)
UROBILINOGEN UR QL STRIP.AUTO: 0.2 E.U./DL
WBC # BLD AUTO: 10.9 THOUSAND/UL (ref 4.8–10.8)
WBC #/AREA URNS AUTO: ABNORMAL /HPF

## 2020-05-07 PROCEDURE — 87186 SC STD MICRODIL/AGAR DIL: CPT | Performed by: EMERGENCY MEDICINE

## 2020-05-07 PROCEDURE — U0003 INFECTIOUS AGENT DETECTION BY NUCLEIC ACID (DNA OR RNA); SEVERE ACUTE RESPIRATORY SYNDROME CORONAVIRUS 2 (SARS-COV-2) (CORONAVIRUS DISEASE [COVID-19]), AMPLIFIED PROBE TECHNIQUE, MAKING USE OF HIGH THROUGHPUT TECHNOLOGIES AS DESCRIBED BY CMS-2020-01-R: HCPCS | Performed by: EMERGENCY MEDICINE

## 2020-05-07 PROCEDURE — 81001 URINALYSIS AUTO W/SCOPE: CPT | Performed by: EMERGENCY MEDICINE

## 2020-05-07 PROCEDURE — 71045 X-RAY EXAM CHEST 1 VIEW: CPT

## 2020-05-07 PROCEDURE — 97166 OT EVAL MOD COMPLEX 45 MIN: CPT

## 2020-05-07 PROCEDURE — 82948 REAGENT STRIP/BLOOD GLUCOSE: CPT

## 2020-05-07 PROCEDURE — 96365 THER/PROPH/DIAG IV INF INIT: CPT

## 2020-05-07 PROCEDURE — 87086 URINE CULTURE/COLONY COUNT: CPT | Performed by: EMERGENCY MEDICINE

## 2020-05-07 PROCEDURE — 99204 OFFICE O/P NEW MOD 45 MIN: CPT | Performed by: ORTHOPAEDIC SURGERY

## 2020-05-07 PROCEDURE — 87077 CULTURE AEROBIC IDENTIFY: CPT | Performed by: EMERGENCY MEDICINE

## 2020-05-07 PROCEDURE — 99220 PR INITIAL OBSERVATION CARE/DAY 70 MINUTES: CPT | Performed by: INTERNAL MEDICINE

## 2020-05-07 PROCEDURE — 85025 COMPLETE CBC W/AUTO DIFF WBC: CPT | Performed by: PHYSICIAN ASSISTANT

## 2020-05-07 PROCEDURE — 80048 BASIC METABOLIC PNL TOTAL CA: CPT | Performed by: PHYSICIAN ASSISTANT

## 2020-05-07 RX ORDER — CEFTRIAXONE 1 G/50ML
1000 INJECTION, SOLUTION INTRAVENOUS EVERY 24 HOURS
Status: DISCONTINUED | OUTPATIENT
Start: 2020-05-07 | End: 2020-05-07 | Stop reason: DRUGHIGH

## 2020-05-07 RX ORDER — CALCIUM CARBONATE 500(1250)
1 TABLET ORAL 2 TIMES DAILY WITH MEALS
Status: DISCONTINUED | OUTPATIENT
Start: 2020-05-07 | End: 2020-05-08 | Stop reason: HOSPADM

## 2020-05-07 RX ORDER — ONDANSETRON 2 MG/ML
4 INJECTION INTRAMUSCULAR; INTRAVENOUS EVERY 6 HOURS PRN
Status: DISCONTINUED | OUTPATIENT
Start: 2020-05-07 | End: 2020-05-08 | Stop reason: HOSPADM

## 2020-05-07 RX ORDER — MELATONIN
2000 DAILY
Status: DISCONTINUED | OUTPATIENT
Start: 2020-05-07 | End: 2020-05-08 | Stop reason: HOSPADM

## 2020-05-07 RX ORDER — LEVOTHYROXINE SODIUM 0.05 MG/1
150 TABLET ORAL
Status: DISCONTINUED | OUTPATIENT
Start: 2020-05-10 | End: 2020-05-08 | Stop reason: HOSPADM

## 2020-05-07 RX ORDER — GLUCOSAMINE/D3/BOSWELLIA SERRA 1500MG-400
10000 TABLET ORAL DAILY
Status: DISCONTINUED | OUTPATIENT
Start: 2020-05-07 | End: 2020-05-07 | Stop reason: RX

## 2020-05-07 RX ORDER — LEVOTHYROXINE SODIUM 0.1 MG/1
100 TABLET ORAL
Status: DISCONTINUED | OUTPATIENT
Start: 2020-05-07 | End: 2020-05-08 | Stop reason: HOSPADM

## 2020-05-07 RX ORDER — ATORVASTATIN CALCIUM 20 MG/1
20 TABLET, FILM COATED ORAL DAILY
Status: DISCONTINUED | OUTPATIENT
Start: 2020-05-07 | End: 2020-05-08 | Stop reason: HOSPADM

## 2020-05-07 RX ORDER — CEFTRIAXONE 1 G/50ML
1000 INJECTION, SOLUTION INTRAVENOUS ONCE
Status: COMPLETED | OUTPATIENT
Start: 2020-05-07 | End: 2020-05-07

## 2020-05-07 RX ORDER — LISINOPRIL 20 MG/1
20 TABLET ORAL DAILY
Status: DISCONTINUED | OUTPATIENT
Start: 2020-05-07 | End: 2020-05-08 | Stop reason: HOSPADM

## 2020-05-07 RX ORDER — ASCORBIC ACID 500 MG
1000 TABLET ORAL 2 TIMES DAILY
Status: DISCONTINUED | OUTPATIENT
Start: 2020-05-07 | End: 2020-05-08 | Stop reason: HOSPADM

## 2020-05-07 RX ORDER — DOCUSATE SODIUM 100 MG/1
300 CAPSULE, LIQUID FILLED ORAL DAILY
Status: DISCONTINUED | OUTPATIENT
Start: 2020-05-07 | End: 2020-05-08 | Stop reason: HOSPADM

## 2020-05-07 RX ORDER — ASPIRIN 81 MG/1
81 TABLET, CHEWABLE ORAL DAILY
Status: DISCONTINUED | OUTPATIENT
Start: 2020-05-07 | End: 2020-05-08 | Stop reason: HOSPADM

## 2020-05-07 RX ORDER — CEFTRIAXONE 1 G/50ML
1000 INJECTION, SOLUTION INTRAVENOUS EVERY 24 HOURS
Status: DISCONTINUED | OUTPATIENT
Start: 2020-05-08 | End: 2020-05-08 | Stop reason: HOSPADM

## 2020-05-07 RX ORDER — ACETAMINOPHEN 325 MG/1
650 TABLET ORAL EVERY 6 HOURS PRN
Status: DISCONTINUED | OUTPATIENT
Start: 2020-05-07 | End: 2020-05-08 | Stop reason: HOSPADM

## 2020-05-07 RX ORDER — HEPARIN SODIUM 5000 [USP'U]/ML
5000 INJECTION, SOLUTION INTRAVENOUS; SUBCUTANEOUS EVERY 8 HOURS SCHEDULED
Status: DISCONTINUED | OUTPATIENT
Start: 2020-05-07 | End: 2020-05-08 | Stop reason: HOSPADM

## 2020-05-07 RX ORDER — VANCOMYCIN HYDROCHLORIDE 1 G/200ML
15 INJECTION, SOLUTION INTRAVENOUS ONCE
Status: COMPLETED | OUTPATIENT
Start: 2020-05-07 | End: 2020-05-07

## 2020-05-07 RX ADMIN — ACETAMINOPHEN 650 MG: 325 TABLET ORAL at 18:41

## 2020-05-07 RX ADMIN — MAGNESIUM GLUCONATE 500 MG ORAL TABLET 400 MG: 500 TABLET ORAL at 09:57

## 2020-05-07 RX ADMIN — CALCIUM 1 TABLET: 500 TABLET ORAL at 09:56

## 2020-05-07 RX ADMIN — HEPARIN SODIUM 5000 UNITS: 5000 INJECTION INTRAVENOUS; SUBCUTANEOUS at 14:46

## 2020-05-07 RX ADMIN — OXYCODONE HYDROCHLORIDE AND ACETAMINOPHEN 1000 MG: 500 TABLET ORAL at 17:02

## 2020-05-07 RX ADMIN — ACETAMINOPHEN 650 MG: 325 TABLET ORAL at 12:30

## 2020-05-07 RX ADMIN — INSULIN LISPRO 1 UNITS: 100 INJECTION, SOLUTION INTRAVENOUS; SUBCUTANEOUS at 12:30

## 2020-05-07 RX ADMIN — LISINOPRIL 20 MG: 20 TABLET ORAL at 09:56

## 2020-05-07 RX ADMIN — ASPIRIN 81 MG 81 MG: 81 TABLET ORAL at 09:57

## 2020-05-07 RX ADMIN — MAGNESIUM GLUCONATE 500 MG ORAL TABLET 400 MG: 500 TABLET ORAL at 17:02

## 2020-05-07 RX ADMIN — SODIUM CHLORIDE 125 ML/HR: 0.9 INJECTION, SOLUTION INTRAVENOUS at 00:30

## 2020-05-07 RX ADMIN — OXYCODONE HYDROCHLORIDE AND ACETAMINOPHEN 1000 MG: 500 TABLET ORAL at 09:57

## 2020-05-07 RX ADMIN — CALCIUM 1 TABLET: 500 TABLET ORAL at 17:02

## 2020-05-07 RX ADMIN — LEVOTHYROXINE SODIUM 100 MCG: 0.1 TABLET ORAL at 05:42

## 2020-05-07 RX ADMIN — SODIUM CHLORIDE 125 ML/HR: 0.9 INJECTION, SOLUTION INTRAVENOUS at 10:20

## 2020-05-07 RX ADMIN — INSULIN LISPRO 1 UNITS: 100 INJECTION, SOLUTION INTRAVENOUS; SUBCUTANEOUS at 17:03

## 2020-05-07 RX ADMIN — VANCOMYCIN HYDROCHLORIDE 1000 MG: 1 INJECTION, SOLUTION INTRAVENOUS at 01:17

## 2020-05-07 RX ADMIN — DICLOFENAC SODIUM 2 G: 10 GEL TOPICAL at 09:56

## 2020-05-07 RX ADMIN — DICLOFENAC SODIUM 2 G: 10 GEL TOPICAL at 17:04

## 2020-05-07 RX ADMIN — DICLOFENAC SODIUM 2 G: 10 GEL TOPICAL at 12:33

## 2020-05-07 RX ADMIN — ACETAMINOPHEN 650 MG: 325 TABLET ORAL at 05:41

## 2020-05-07 RX ADMIN — SODIUM CHLORIDE 125 ML/HR: 0.9 INJECTION, SOLUTION INTRAVENOUS at 18:44

## 2020-05-07 RX ADMIN — DOCUSATE SODIUM 300 MG: 100 CAPSULE, LIQUID FILLED ORAL at 09:57

## 2020-05-07 RX ADMIN — HEPARIN SODIUM 5000 UNITS: 5000 INJECTION INTRAVENOUS; SUBCUTANEOUS at 05:42

## 2020-05-07 RX ADMIN — ATORVASTATIN CALCIUM 20 MG: 20 TABLET, FILM COATED ORAL at 09:56

## 2020-05-07 RX ADMIN — MELATONIN 2000 UNITS: at 09:57

## 2020-05-07 RX ADMIN — CEFTRIAXONE 1000 MG: 1 INJECTION, SOLUTION INTRAVENOUS at 00:29

## 2020-05-08 VITALS
OXYGEN SATURATION: 99 % | SYSTOLIC BLOOD PRESSURE: 142 MMHG | HEIGHT: 60 IN | TEMPERATURE: 96.5 F | DIASTOLIC BLOOD PRESSURE: 65 MMHG | RESPIRATION RATE: 20 BRPM | WEIGHT: 138 LBS | BODY MASS INDEX: 27.09 KG/M2 | HEART RATE: 72 BPM

## 2020-05-08 LAB
GLUCOSE SERPL-MCNC: 196 MG/DL (ref 65–140)
GLUCOSE SERPL-MCNC: 286 MG/DL (ref 65–140)

## 2020-05-08 PROCEDURE — 97116 GAIT TRAINING THERAPY: CPT

## 2020-05-08 PROCEDURE — 99217 PR OBSERVATION CARE DISCHARGE MANAGEMENT: CPT | Performed by: INTERNAL MEDICINE

## 2020-05-08 PROCEDURE — 97535 SELF CARE MNGMENT TRAINING: CPT

## 2020-05-08 PROCEDURE — 97163 PT EVAL HIGH COMPLEX 45 MIN: CPT

## 2020-05-08 PROCEDURE — 82948 REAGENT STRIP/BLOOD GLUCOSE: CPT

## 2020-05-08 PROCEDURE — 97530 THERAPEUTIC ACTIVITIES: CPT

## 2020-05-08 RX ORDER — CEFDINIR 300 MG/1
300 CAPSULE ORAL EVERY 12 HOURS SCHEDULED
Qty: 6 CAPSULE | Refills: 0 | Status: SHIPPED | OUTPATIENT
Start: 2020-05-08 | End: 2020-05-11

## 2020-05-08 RX ADMIN — MAGNESIUM GLUCONATE 500 MG ORAL TABLET 400 MG: 500 TABLET ORAL at 08:52

## 2020-05-08 RX ADMIN — LEVOTHYROXINE SODIUM 100 MCG: 0.1 TABLET ORAL at 06:05

## 2020-05-08 RX ADMIN — OXYCODONE HYDROCHLORIDE AND ACETAMINOPHEN 1000 MG: 500 TABLET ORAL at 08:48

## 2020-05-08 RX ADMIN — SODIUM CHLORIDE 125 ML/HR: 0.9 INJECTION, SOLUTION INTRAVENOUS at 03:32

## 2020-05-08 RX ADMIN — LISINOPRIL 20 MG: 20 TABLET ORAL at 08:51

## 2020-05-08 RX ADMIN — ATORVASTATIN CALCIUM 20 MG: 20 TABLET, FILM COATED ORAL at 08:48

## 2020-05-08 RX ADMIN — INSULIN LISPRO 2 UNITS: 100 INJECTION, SOLUTION INTRAVENOUS; SUBCUTANEOUS at 01:01

## 2020-05-08 RX ADMIN — HEPARIN SODIUM 5000 UNITS: 5000 INJECTION INTRAVENOUS; SUBCUTANEOUS at 01:02

## 2020-05-08 RX ADMIN — INSULIN LISPRO 1 UNITS: 100 INJECTION, SOLUTION INTRAVENOUS; SUBCUTANEOUS at 08:50

## 2020-05-08 RX ADMIN — CEFTRIAXONE 1000 MG: 1 INJECTION, SOLUTION INTRAVENOUS at 01:03

## 2020-05-08 RX ADMIN — MELATONIN 2000 UNITS: at 08:48

## 2020-05-08 RX ADMIN — ASPIRIN 81 MG 81 MG: 81 TABLET ORAL at 08:48

## 2020-05-08 RX ADMIN — DICLOFENAC SODIUM 2 G: 10 GEL TOPICAL at 01:03

## 2020-05-08 RX ADMIN — DICLOFENAC SODIUM 2 G: 10 GEL TOPICAL at 08:49

## 2020-05-08 RX ADMIN — HEPARIN SODIUM 5000 UNITS: 5000 INJECTION INTRAVENOUS; SUBCUTANEOUS at 06:05

## 2020-05-08 RX ADMIN — ACETAMINOPHEN 650 MG: 325 TABLET ORAL at 01:01

## 2020-05-08 RX ADMIN — CALCIUM 1 TABLET: 500 TABLET ORAL at 08:49

## 2020-05-09 LAB
BACTERIA UR CULT: ABNORMAL
BACTERIA UR CULT: ABNORMAL

## 2020-05-12 LAB
BACTERIA BLD CULT: NORMAL
BACTERIA BLD CULT: NORMAL

## 2020-05-14 ENCOUNTER — OFFICE VISIT (OUTPATIENT)
Dept: OBGYN CLINIC | Facility: CLINIC | Age: 80
End: 2020-05-14
Payer: MEDICARE

## 2020-05-14 VITALS
SYSTOLIC BLOOD PRESSURE: 140 MMHG | BODY MASS INDEX: 27.29 KG/M2 | TEMPERATURE: 98.1 F | HEIGHT: 60 IN | DIASTOLIC BLOOD PRESSURE: 90 MMHG | WEIGHT: 139 LBS

## 2020-05-14 DIAGNOSIS — M17.11 PRIMARY OSTEOARTHRITIS OF RIGHT KNEE: Primary | ICD-10-CM

## 2020-05-14 PROCEDURE — 20610 DRAIN/INJ JOINT/BURSA W/O US: CPT | Performed by: ORTHOPAEDIC SURGERY

## 2020-05-14 PROCEDURE — 99214 OFFICE O/P EST MOD 30 MIN: CPT | Performed by: ORTHOPAEDIC SURGERY

## 2020-05-14 RX ORDER — BETAMETHASONE SODIUM PHOSPHATE AND BETAMETHASONE ACETATE 3; 3 MG/ML; MG/ML
6 INJECTION, SUSPENSION INTRA-ARTICULAR; INTRALESIONAL; INTRAMUSCULAR; SOFT TISSUE
Status: COMPLETED | OUTPATIENT
Start: 2020-05-14 | End: 2020-05-14

## 2020-05-14 RX ORDER — LIDOCAINE HYDROCHLORIDE 10 MG/ML
5 INJECTION, SOLUTION INFILTRATION; PERINEURAL
Status: COMPLETED | OUTPATIENT
Start: 2020-05-14 | End: 2020-05-14

## 2020-05-14 RX ADMIN — LIDOCAINE HYDROCHLORIDE 5 ML: 10 INJECTION, SOLUTION INFILTRATION; PERINEURAL at 13:33

## 2020-05-14 RX ADMIN — BETAMETHASONE SODIUM PHOSPHATE AND BETAMETHASONE ACETATE 6 MG: 3; 3 INJECTION, SUSPENSION INTRA-ARTICULAR; INTRALESIONAL; INTRAMUSCULAR; SOFT TISSUE at 13:33

## 2020-05-26 ENCOUNTER — APPOINTMENT (EMERGENCY)
Dept: CT IMAGING | Facility: HOSPITAL | Age: 80
DRG: 481 | End: 2020-05-26
Payer: MEDICARE

## 2020-05-26 ENCOUNTER — HOSPITAL ENCOUNTER (INPATIENT)
Facility: HOSPITAL | Age: 80
LOS: 6 days | DRG: 481 | End: 2020-06-01
Attending: EMERGENCY MEDICINE | Admitting: INTERNAL MEDICINE
Payer: MEDICARE

## 2020-05-26 ENCOUNTER — APPOINTMENT (EMERGENCY)
Dept: RADIOLOGY | Facility: HOSPITAL | Age: 80
DRG: 481 | End: 2020-05-26
Payer: MEDICARE

## 2020-05-26 DIAGNOSIS — S72.009A HIP FRACTURE (HCC): Primary | ICD-10-CM

## 2020-05-26 DIAGNOSIS — S72.141A CLOSED INTERTROCHANTERIC FRACTURE OF RIGHT FEMUR (HCC): ICD-10-CM

## 2020-05-26 DIAGNOSIS — E87.1 HYPONATREMIA: ICD-10-CM

## 2020-05-26 PROBLEM — N30.00 ACUTE CYSTITIS WITHOUT HEMATURIA: Status: RESOLVED | Noted: 2019-12-19 | Resolved: 2020-05-26

## 2020-05-26 PROBLEM — R51.9 ACUTE INTRACTABLE HEADACHE: Status: RESOLVED | Noted: 2019-12-19 | Resolved: 2020-05-26

## 2020-05-26 LAB
ANION GAP SERPL CALCULATED.3IONS-SCNC: 9 MMOL/L (ref 4–13)
APTT PPP: 30 SECONDS (ref 23–37)
BASOPHILS # BLD AUTO: 0.1 THOUSANDS/ΜL (ref 0–0.1)
BASOPHILS NFR BLD AUTO: 1 % (ref 0–2)
BUN SERPL-MCNC: 15 MG/DL (ref 7–25)
CALCIUM SERPL-MCNC: 9.3 MG/DL (ref 8.6–10.5)
CHLORIDE SERPL-SCNC: 88 MMOL/L (ref 98–107)
CO2 SERPL-SCNC: 30 MMOL/L (ref 21–31)
CREAT SERPL-MCNC: 0.85 MG/DL (ref 0.6–1.2)
EOSINOPHIL # BLD AUTO: 0.1 THOUSAND/ΜL (ref 0–0.61)
EOSINOPHIL NFR BLD AUTO: 1 % (ref 0–5)
ERYTHROCYTE [DISTWIDTH] IN BLOOD BY AUTOMATED COUNT: 12.4 % (ref 11.5–14.5)
GFR SERPL CREATININE-BSD FRML MDRD: 65 ML/MIN/1.73SQ M
GLUCOSE SERPL-MCNC: 141 MG/DL (ref 65–99)
GLUCOSE SERPL-MCNC: 249 MG/DL (ref 65–140)
GLUCOSE SERPL-MCNC: 277 MG/DL (ref 65–140)
HCT VFR BLD AUTO: 33.5 % (ref 42–47)
HGB BLD-MCNC: 11.2 G/DL (ref 12–16)
INR PPP: 0.97 (ref 0.84–1.19)
LYMPHOCYTES # BLD AUTO: 1.4 THOUSANDS/ΜL (ref 0.6–4.47)
LYMPHOCYTES NFR BLD AUTO: 16 % (ref 21–51)
MCH RBC QN AUTO: 33 PG (ref 26–34)
MCHC RBC AUTO-ENTMCNC: 33.6 G/DL (ref 31–37)
MCV RBC AUTO: 98 FL (ref 81–99)
MONOCYTES # BLD AUTO: 1 THOUSAND/ΜL (ref 0.17–1.22)
MONOCYTES NFR BLD AUTO: 11 % (ref 2–12)
NEUTROPHILS # BLD AUTO: 6.4 THOUSANDS/ΜL (ref 1.4–6.5)
NEUTS SEG NFR BLD AUTO: 71 % (ref 42–75)
PLATELET # BLD AUTO: 275 THOUSANDS/UL (ref 149–390)
PMV BLD AUTO: 9.8 FL (ref 8.6–11.7)
POTASSIUM SERPL-SCNC: 4 MMOL/L (ref 3.5–5.5)
PROTHROMBIN TIME: 12.4 SECONDS (ref 11.6–14.5)
RBC # BLD AUTO: 3.41 MILLION/UL (ref 3.9–5.2)
SARS-COV-2 RNA RESP QL NAA+PROBE: NEGATIVE
SODIUM SERPL-SCNC: 127 MMOL/L (ref 134–143)
WBC # BLD AUTO: 9 THOUSAND/UL (ref 4.8–10.8)

## 2020-05-26 PROCEDURE — 87635 SARS-COV-2 COVID-19 AMP PRB: CPT | Performed by: EMERGENCY MEDICINE

## 2020-05-26 PROCEDURE — 99223 1ST HOSP IP/OBS HIGH 75: CPT | Performed by: PHYSICIAN ASSISTANT

## 2020-05-26 PROCEDURE — 72125 CT NECK SPINE W/O DYE: CPT

## 2020-05-26 PROCEDURE — 73590 X-RAY EXAM OF LOWER LEG: CPT

## 2020-05-26 PROCEDURE — 36415 COLL VENOUS BLD VENIPUNCTURE: CPT | Performed by: EMERGENCY MEDICINE

## 2020-05-26 PROCEDURE — 85730 THROMBOPLASTIN TIME PARTIAL: CPT | Performed by: EMERGENCY MEDICINE

## 2020-05-26 PROCEDURE — 73502 X-RAY EXAM HIP UNI 2-3 VIEWS: CPT

## 2020-05-26 PROCEDURE — 80048 BASIC METABOLIC PNL TOTAL CA: CPT | Performed by: EMERGENCY MEDICINE

## 2020-05-26 PROCEDURE — 73560 X-RAY EXAM OF KNEE 1 OR 2: CPT

## 2020-05-26 PROCEDURE — 99285 EMERGENCY DEPT VISIT HI MDM: CPT | Performed by: EMERGENCY MEDICINE

## 2020-05-26 PROCEDURE — 70450 CT HEAD/BRAIN W/O DYE: CPT

## 2020-05-26 PROCEDURE — 96374 THER/PROPH/DIAG INJ IV PUSH: CPT

## 2020-05-26 PROCEDURE — 99285 EMERGENCY DEPT VISIT HI MDM: CPT

## 2020-05-26 PROCEDURE — 85610 PROTHROMBIN TIME: CPT | Performed by: EMERGENCY MEDICINE

## 2020-05-26 PROCEDURE — 73552 X-RAY EXAM OF FEMUR 2/>: CPT

## 2020-05-26 PROCEDURE — 85025 COMPLETE CBC W/AUTO DIFF WBC: CPT | Performed by: EMERGENCY MEDICINE

## 2020-05-26 PROCEDURE — 82948 REAGENT STRIP/BLOOD GLUCOSE: CPT

## 2020-05-26 RX ORDER — CALCIUM CARBONATE 500(1250)
1 TABLET ORAL 2 TIMES DAILY WITH MEALS
Status: DISCONTINUED | OUTPATIENT
Start: 2020-05-26 | End: 2020-06-01 | Stop reason: HOSPADM

## 2020-05-26 RX ORDER — ACETAMINOPHEN 325 MG/1
650 TABLET ORAL EVERY 4 HOURS PRN
Status: DISCONTINUED | OUTPATIENT
Start: 2020-05-26 | End: 2020-05-30

## 2020-05-26 RX ORDER — MORPHINE SULFATE 4 MG/ML
4 INJECTION, SOLUTION INTRAMUSCULAR; INTRAVENOUS ONCE
Status: COMPLETED | OUTPATIENT
Start: 2020-05-26 | End: 2020-05-26

## 2020-05-26 RX ORDER — FENTANYL CITRATE 50 UG/ML
25 INJECTION, SOLUTION INTRAMUSCULAR; INTRAVENOUS ONCE
Status: COMPLETED | OUTPATIENT
Start: 2020-05-26 | End: 2020-05-26

## 2020-05-26 RX ORDER — ATORVASTATIN CALCIUM 20 MG/1
20 TABLET, FILM COATED ORAL DAILY
Status: DISCONTINUED | OUTPATIENT
Start: 2020-05-27 | End: 2020-06-01 | Stop reason: HOSPADM

## 2020-05-26 RX ORDER — LEVOTHYROXINE SODIUM 0.05 MG/1
50 TABLET ORAL WEEKLY
Status: DISCONTINUED | OUTPATIENT
Start: 2020-05-31 | End: 2020-06-01 | Stop reason: HOSPADM

## 2020-05-26 RX ORDER — ONDANSETRON 2 MG/ML
4 INJECTION INTRAMUSCULAR; INTRAVENOUS EVERY 6 HOURS PRN
Status: DISCONTINUED | OUTPATIENT
Start: 2020-05-26 | End: 2020-06-01 | Stop reason: HOSPADM

## 2020-05-26 RX ORDER — SODIUM CHLORIDE 9 MG/ML
75 INJECTION, SOLUTION INTRAVENOUS CONTINUOUS
Status: DISCONTINUED | OUTPATIENT
Start: 2020-05-27 | End: 2020-05-27

## 2020-05-26 RX ORDER — MAGNESIUM HYDROXIDE/ALUMINUM HYDROXICE/SIMETHICONE 120; 1200; 1200 MG/30ML; MG/30ML; MG/30ML
30 SUSPENSION ORAL EVERY 4 HOURS PRN
Status: DISCONTINUED | OUTPATIENT
Start: 2020-05-26 | End: 2020-06-01 | Stop reason: HOSPADM

## 2020-05-26 RX ORDER — LISINOPRIL 5 MG/1
5 TABLET ORAL DAILY
Status: DISCONTINUED | OUTPATIENT
Start: 2020-05-27 | End: 2020-06-01 | Stop reason: HOSPADM

## 2020-05-26 RX ORDER — LEVOTHYROXINE SODIUM 0.1 MG/1
100 TABLET ORAL
Status: DISCONTINUED | OUTPATIENT
Start: 2020-05-27 | End: 2020-06-01 | Stop reason: HOSPADM

## 2020-05-26 RX ORDER — ASCORBIC ACID 500 MG
1000 TABLET ORAL 2 TIMES DAILY
Status: DISCONTINUED | OUTPATIENT
Start: 2020-05-26 | End: 2020-06-01 | Stop reason: HOSPADM

## 2020-05-26 RX ORDER — ASPIRIN 81 MG/1
81 TABLET, CHEWABLE ORAL DAILY
Status: DISCONTINUED | OUTPATIENT
Start: 2020-05-27 | End: 2020-06-01 | Stop reason: HOSPADM

## 2020-05-26 RX ORDER — OXYCODONE HYDROCHLORIDE 5 MG/1
5 TABLET ORAL EVERY 4 HOURS PRN
Status: DISCONTINUED | OUTPATIENT
Start: 2020-05-26 | End: 2020-06-01 | Stop reason: HOSPADM

## 2020-05-26 RX ORDER — MELATONIN
2000 DAILY
Status: DISCONTINUED | OUTPATIENT
Start: 2020-05-27 | End: 2020-06-01 | Stop reason: HOSPADM

## 2020-05-26 RX ORDER — DOCUSATE SODIUM 100 MG/1
100 CAPSULE, LIQUID FILLED ORAL 2 TIMES DAILY
Status: DISCONTINUED | OUTPATIENT
Start: 2020-05-26 | End: 2020-06-01 | Stop reason: HOSPADM

## 2020-05-26 RX ORDER — CALCIUM CARBONATE 300MG(750)
400 TABLET,CHEWABLE ORAL 2 TIMES DAILY
Status: ON HOLD | COMMUNITY
End: 2020-05-26 | Stop reason: CLARIF

## 2020-05-26 RX ADMIN — DICLOFENAC SODIUM 2 G: 10 GEL TOPICAL at 19:30

## 2020-05-26 RX ADMIN — INSULIN LISPRO 3 UNITS: 100 INJECTION, SOLUTION INTRAVENOUS; SUBCUTANEOUS at 19:30

## 2020-05-26 RX ADMIN — INSULIN DETEMIR 5 UNITS: 100 INJECTION, SOLUTION SUBCUTANEOUS at 22:57

## 2020-05-26 RX ADMIN — CALCIUM 1 TABLET: 500 TABLET ORAL at 18:29

## 2020-05-26 RX ADMIN — OXYCODONE HYDROCHLORIDE 5 MG: 5 TABLET ORAL at 23:56

## 2020-05-26 RX ADMIN — DICLOFENAC SODIUM 2 G: 10 GEL TOPICAL at 22:57

## 2020-05-26 RX ADMIN — ALUMINUM HYDROXIDE, MAGNESIUM HYDROXIDE, AND SIMETHICONE 30 ML: 200; 200; 20 SUSPENSION ORAL at 22:25

## 2020-05-26 RX ADMIN — OXYCODONE HYDROCHLORIDE AND ACETAMINOPHEN 1000 MG: 500 TABLET ORAL at 18:28

## 2020-05-26 RX ADMIN — FENTANYL CITRATE 25 MCG: 50 INJECTION INTRAMUSCULAR; INTRAVENOUS at 15:38

## 2020-05-26 RX ADMIN — INSULIN LISPRO 2 UNITS: 100 INJECTION, SOLUTION INTRAVENOUS; SUBCUTANEOUS at 22:58

## 2020-05-26 RX ADMIN — MORPHINE SULFATE 4 MG: 4 INJECTION INTRAVENOUS at 18:24

## 2020-05-26 RX ADMIN — ONDANSETRON 4 MG: 2 INJECTION INTRAMUSCULAR; INTRAVENOUS at 18:26

## 2020-05-26 RX ADMIN — MORPHINE SULFATE 2 MG: 2 INJECTION, SOLUTION INTRAMUSCULAR; INTRAVENOUS at 22:31

## 2020-05-27 ENCOUNTER — ANESTHESIA EVENT (INPATIENT)
Dept: PERIOP | Facility: HOSPITAL | Age: 80
DRG: 481 | End: 2020-05-27
Payer: MEDICARE

## 2020-05-27 PROBLEM — E87.1 HYPONATREMIA: Status: ACTIVE | Noted: 2020-05-27

## 2020-05-27 LAB
AMORPH PHOS CRY URNS QL MICRO: ABNORMAL /HPF
ANION GAP SERPL CALCULATED.3IONS-SCNC: 7 MMOL/L (ref 4–13)
ANION GAP SERPL CALCULATED.3IONS-SCNC: 8 MMOL/L (ref 4–13)
BACTERIA UR QL AUTO: ABNORMAL /HPF
BILIRUB UR QL STRIP: NEGATIVE
BUN SERPL-MCNC: 13 MG/DL (ref 7–25)
BUN SERPL-MCNC: 14 MG/DL (ref 7–25)
CALCIUM SERPL-MCNC: 8.1 MG/DL (ref 8.6–10.5)
CALCIUM SERPL-MCNC: 8.3 MG/DL (ref 8.6–10.5)
CHLORIDE SERPL-SCNC: 87 MMOL/L (ref 98–107)
CHLORIDE SERPL-SCNC: 91 MMOL/L (ref 98–107)
CLARITY UR: ABNORMAL
CO2 SERPL-SCNC: 25 MMOL/L (ref 21–31)
CO2 SERPL-SCNC: 27 MMOL/L (ref 21–31)
COLOR UR: YELLOW
CREAT SERPL-MCNC: 0.65 MG/DL (ref 0.6–1.2)
CREAT SERPL-MCNC: 0.71 MG/DL (ref 0.6–1.2)
ERYTHROCYTE [DISTWIDTH] IN BLOOD BY AUTOMATED COUNT: 12.8 % (ref 11.5–14.5)
GFR SERPL CREATININE-BSD FRML MDRD: 81 ML/MIN/1.73SQ M
GFR SERPL CREATININE-BSD FRML MDRD: 85 ML/MIN/1.73SQ M
GLUCOSE SERPL-MCNC: 116 MG/DL (ref 65–99)
GLUCOSE SERPL-MCNC: 122 MG/DL (ref 65–140)
GLUCOSE SERPL-MCNC: 131 MG/DL (ref 65–140)
GLUCOSE SERPL-MCNC: 223 MG/DL (ref 65–140)
GLUCOSE SERPL-MCNC: 245 MG/DL (ref 65–140)
GLUCOSE SERPL-MCNC: 255 MG/DL (ref 65–99)
GLUCOSE UR STRIP-MCNC: NEGATIVE MG/DL
HCT VFR BLD AUTO: 32.3 % (ref 42–47)
HGB BLD-MCNC: 10.4 G/DL (ref 12–16)
HGB UR QL STRIP.AUTO: NEGATIVE
KETONES UR STRIP-MCNC: ABNORMAL MG/DL
LEUKOCYTE ESTERASE UR QL STRIP: ABNORMAL
MCH RBC QN AUTO: 32.5 PG (ref 26–34)
MCHC RBC AUTO-ENTMCNC: 32.3 G/DL (ref 31–37)
MCV RBC AUTO: 101 FL (ref 81–99)
NITRITE UR QL STRIP: NEGATIVE
NON-SQ EPI CELLS URNS QL MICRO: ABNORMAL /HPF
OSMOLALITY UR: 665 MMOL/KG
PH UR STRIP.AUTO: 8 [PH]
PLATELET # BLD AUTO: 234 THOUSANDS/UL (ref 149–390)
PMV BLD AUTO: 8.4 FL (ref 8.6–11.7)
POTASSIUM SERPL-SCNC: 4.6 MMOL/L (ref 3.5–5.5)
POTASSIUM SERPL-SCNC: 4.7 MMOL/L (ref 3.5–5.5)
PROT UR STRIP-MCNC: NEGATIVE MG/DL
RBC # BLD AUTO: 3.21 MILLION/UL (ref 3.9–5.2)
RBC #/AREA URNS AUTO: ABNORMAL /HPF
SODIUM 24H UR-SCNC: 132 MOL/L
SODIUM SERPL-SCNC: 120 MMOL/L (ref 134–143)
SODIUM SERPL-SCNC: 125 MMOL/L (ref 134–143)
SP GR UR STRIP.AUTO: 1.02 (ref 1–1.03)
UROBILINOGEN UR QL STRIP.AUTO: 0.2 E.U./DL
WBC # BLD AUTO: 11 THOUSAND/UL (ref 4.8–10.8)
WBC #/AREA URNS AUTO: ABNORMAL /HPF

## 2020-05-27 PROCEDURE — 84300 ASSAY OF URINE SODIUM: CPT | Performed by: PHYSICIAN ASSISTANT

## 2020-05-27 PROCEDURE — 80048 BASIC METABOLIC PNL TOTAL CA: CPT | Performed by: NURSE PRACTITIONER

## 2020-05-27 PROCEDURE — 99232 SBSQ HOSP IP/OBS MODERATE 35: CPT | Performed by: PHYSICIAN ASSISTANT

## 2020-05-27 PROCEDURE — 99222 1ST HOSP IP/OBS MODERATE 55: CPT | Performed by: NURSE PRACTITIONER

## 2020-05-27 PROCEDURE — 82948 REAGENT STRIP/BLOOD GLUCOSE: CPT

## 2020-05-27 PROCEDURE — 83935 ASSAY OF URINE OSMOLALITY: CPT | Performed by: PHYSICIAN ASSISTANT

## 2020-05-27 PROCEDURE — 81001 URINALYSIS AUTO W/SCOPE: CPT | Performed by: PHYSICIAN ASSISTANT

## 2020-05-27 PROCEDURE — 81003 URINALYSIS AUTO W/O SCOPE: CPT | Performed by: PHYSICIAN ASSISTANT

## 2020-05-27 PROCEDURE — 87086 URINE CULTURE/COLONY COUNT: CPT | Performed by: PHYSICIAN ASSISTANT

## 2020-05-27 PROCEDURE — 87077 CULTURE AEROBIC IDENTIFY: CPT | Performed by: PHYSICIAN ASSISTANT

## 2020-05-27 PROCEDURE — 85027 COMPLETE CBC AUTOMATED: CPT | Performed by: PHYSICIAN ASSISTANT

## 2020-05-27 PROCEDURE — 80048 BASIC METABOLIC PNL TOTAL CA: CPT | Performed by: PHYSICIAN ASSISTANT

## 2020-05-27 PROCEDURE — 87186 SC STD MICRODIL/AGAR DIL: CPT | Performed by: PHYSICIAN ASSISTANT

## 2020-05-27 RX ADMIN — OXYCODONE HYDROCHLORIDE 5 MG: 5 TABLET ORAL at 21:52

## 2020-05-27 RX ADMIN — ALUMINUM HYDROXIDE, MAGNESIUM HYDROXIDE, AND SIMETHICONE 30 ML: 200; 200; 20 SUSPENSION ORAL at 18:00

## 2020-05-27 RX ADMIN — MELATONIN 2000 UNITS: at 08:18

## 2020-05-27 RX ADMIN — CALCIUM 1 TABLET: 500 TABLET ORAL at 08:18

## 2020-05-27 RX ADMIN — INSULIN LISPRO 1 UNITS: 100 INJECTION, SOLUTION INTRAVENOUS; SUBCUTANEOUS at 21:57

## 2020-05-27 RX ADMIN — OXYCODONE HYDROCHLORIDE AND ACETAMINOPHEN 1000 MG: 500 TABLET ORAL at 17:34

## 2020-05-27 RX ADMIN — CALCIUM 1 TABLET: 500 TABLET ORAL at 16:34

## 2020-05-27 RX ADMIN — OXYCODONE HYDROCHLORIDE AND ACETAMINOPHEN 1000 MG: 500 TABLET ORAL at 08:18

## 2020-05-27 RX ADMIN — OXYCODONE HYDROCHLORIDE 5 MG: 5 TABLET ORAL at 17:45

## 2020-05-27 RX ADMIN — SODIUM CHLORIDE 75 ML/HR: 0.9 INJECTION, SOLUTION INTRAVENOUS at 00:06

## 2020-05-27 RX ADMIN — OXYCODONE HYDROCHLORIDE 5 MG: 5 TABLET ORAL at 08:20

## 2020-05-27 RX ADMIN — ZINC 1 TABLET: TAB ORAL at 08:18

## 2020-05-27 RX ADMIN — SODIUM CHLORIDE 75 ML/HR: 0.9 INJECTION, SOLUTION INTRAVENOUS at 16:32

## 2020-05-27 RX ADMIN — INSULIN LISPRO 2 UNITS: 100 INJECTION, SOLUTION INTRAVENOUS; SUBCUTANEOUS at 16:36

## 2020-05-27 RX ADMIN — DICLOFENAC SODIUM 2 G: 10 GEL TOPICAL at 08:22

## 2020-05-27 RX ADMIN — MORPHINE SULFATE 2 MG: 2 INJECTION, SOLUTION INTRAMUSCULAR; INTRAVENOUS at 12:00

## 2020-05-27 RX ADMIN — CYANOCOBALAMIN TAB 500 MCG 1000 MCG: 500 TAB at 08:18

## 2020-05-27 RX ADMIN — MAGNESIUM GLUCONATE 500 MG ORAL TABLET 400 MG: 500 TABLET ORAL at 08:18

## 2020-05-27 RX ADMIN — LISINOPRIL 5 MG: 5 TABLET ORAL at 08:18

## 2020-05-27 RX ADMIN — MORPHINE SULFATE 2 MG: 2 INJECTION, SOLUTION INTRAMUSCULAR; INTRAVENOUS at 16:10

## 2020-05-27 RX ADMIN — MORPHINE SULFATE 2 MG: 2 INJECTION, SOLUTION INTRAMUSCULAR; INTRAVENOUS at 05:19

## 2020-05-27 RX ADMIN — INSULIN DETEMIR 5 UNITS: 100 INJECTION, SOLUTION SUBCUTANEOUS at 21:58

## 2020-05-27 RX ADMIN — MAGNESIUM GLUCONATE 500 MG ORAL TABLET 400 MG: 500 TABLET ORAL at 17:34

## 2020-05-28 ENCOUNTER — ANESTHESIA (INPATIENT)
Dept: PERIOP | Facility: HOSPITAL | Age: 80
DRG: 481 | End: 2020-05-28
Payer: MEDICARE

## 2020-05-28 ENCOUNTER — HOSPITAL ENCOUNTER (INPATIENT)
Dept: RADIOLOGY | Facility: HOSPITAL | Age: 80
Discharge: HOME | DRG: 481 | End: 2020-05-28
Payer: MEDICARE

## 2020-05-28 PROBLEM — E83.42 HYPOMAGNESEMIA: Status: ACTIVE | Noted: 2020-05-28

## 2020-05-28 PROBLEM — E83.39 HYPOPHOSPHATEMIA: Status: ACTIVE | Noted: 2020-05-28

## 2020-05-28 LAB
ABO GROUP BLD: NORMAL
ABO GROUP BLD: NORMAL
ALBUMIN SERPL BCP-MCNC: 3.6 G/DL (ref 3.5–5.7)
ALP SERPL-CCNC: 54 U/L (ref 55–165)
ALT SERPL W P-5'-P-CCNC: 16 U/L (ref 7–52)
ANION GAP SERPL CALCULATED.3IONS-SCNC: 8 MMOL/L (ref 4–13)
ANION GAP SERPL CALCULATED.3IONS-SCNC: 9 MMOL/L (ref 4–13)
AST SERPL W P-5'-P-CCNC: 32 U/L (ref 13–39)
BASOPHILS # BLD AUTO: 0.1 THOUSANDS/ΜL (ref 0–0.1)
BASOPHILS NFR BLD AUTO: 1 % (ref 0–2)
BILIRUB SERPL-MCNC: 0.7 MG/DL (ref 0.2–1)
BLD GP AB SCN SERPL QL: NEGATIVE
BUN SERPL-MCNC: 11 MG/DL (ref 7–25)
BUN SERPL-MCNC: 12 MG/DL (ref 7–25)
CALCIUM SERPL-MCNC: 7.8 MG/DL (ref 8.6–10.5)
CALCIUM SERPL-MCNC: 8.2 MG/DL (ref 8.6–10.5)
CHLORIDE SERPL-SCNC: 85 MMOL/L (ref 98–107)
CHLORIDE SERPL-SCNC: 87 MMOL/L (ref 98–107)
CO2 SERPL-SCNC: 23 MMOL/L (ref 21–31)
CO2 SERPL-SCNC: 24 MMOL/L (ref 21–31)
CREAT SERPL-MCNC: 0.56 MG/DL (ref 0.6–1.2)
CREAT SERPL-MCNC: 0.66 MG/DL (ref 0.6–1.2)
EOSINOPHIL # BLD AUTO: 0 THOUSAND/ΜL (ref 0–0.61)
EOSINOPHIL NFR BLD AUTO: 0 % (ref 0–5)
ERYTHROCYTE [DISTWIDTH] IN BLOOD BY AUTOMATED COUNT: 12.3 % (ref 11.5–14.5)
FERRITIN SERPL-MCNC: 141 NG/ML (ref 8–388)
FOLATE SERPL-MCNC: >20 NG/ML (ref 3.1–17.5)
GFR SERPL CREATININE-BSD FRML MDRD: 84 ML/MIN/1.73SQ M
GFR SERPL CREATININE-BSD FRML MDRD: 89 ML/MIN/1.73SQ M
GLUCOSE SERPL-MCNC: 204 MG/DL (ref 65–140)
GLUCOSE SERPL-MCNC: 214 MG/DL (ref 65–140)
GLUCOSE SERPL-MCNC: 216 MG/DL (ref 65–99)
GLUCOSE SERPL-MCNC: 220 MG/DL (ref 65–140)
GLUCOSE SERPL-MCNC: 221 MG/DL (ref 65–140)
GLUCOSE SERPL-MCNC: 280 MG/DL (ref 65–140)
GLUCOSE SERPL-MCNC: 281 MG/DL (ref 65–140)
GLUCOSE SERPL-MCNC: 284 MG/DL (ref 65–140)
GLUCOSE SERPL-MCNC: 299 MG/DL (ref 65–99)
HCT VFR BLD AUTO: 31.1 % (ref 42–47)
HGB BLD-MCNC: 10.5 G/DL (ref 12–16)
IRON SATN MFR SERPL: 11 %
IRON SERPL-MCNC: 42 UG/DL (ref 50–170)
LYMPHOCYTES # BLD AUTO: 1.2 THOUSANDS/ΜL (ref 0.6–4.47)
LYMPHOCYTES NFR BLD AUTO: 10 % (ref 21–51)
MAGNESIUM SERPL-MCNC: 1.8 MG/DL (ref 1.9–2.7)
MCH RBC QN AUTO: 32.8 PG (ref 26–34)
MCHC RBC AUTO-ENTMCNC: 33.7 G/DL (ref 31–37)
MCV RBC AUTO: 97 FL (ref 81–99)
MONOCYTES # BLD AUTO: 1.2 THOUSAND/ΜL (ref 0.17–1.22)
MONOCYTES NFR BLD AUTO: 10 % (ref 2–12)
NEUTROPHILS # BLD AUTO: 9.9 THOUSANDS/ΜL (ref 1.4–6.5)
NEUTS SEG NFR BLD AUTO: 80 % (ref 42–75)
PHOSPHATE SERPL-MCNC: 2.5 MG/DL (ref 3–5.5)
PLATELET # BLD AUTO: 258 THOUSANDS/UL (ref 149–390)
PMV BLD AUTO: 8.9 FL (ref 8.6–11.7)
POTASSIUM SERPL-SCNC: 4.3 MMOL/L (ref 3.5–5.5)
POTASSIUM SERPL-SCNC: 4.7 MMOL/L (ref 3.5–5.5)
PROT SERPL-MCNC: 6.3 G/DL (ref 6.4–8.9)
RBC # BLD AUTO: 3.19 MILLION/UL (ref 3.9–5.2)
RH BLD: POSITIVE
RH BLD: POSITIVE
SODIUM SERPL-SCNC: 117 MMOL/L (ref 134–143)
SODIUM SERPL-SCNC: 119 MMOL/L (ref 134–143)
SPECIMEN EXPIRATION DATE: NORMAL
T4 FREE SERPL-MCNC: 1.13 NG/DL (ref 0.76–1.46)
TIBC SERPL-MCNC: 399 UG/DL (ref 250–450)
TSH SERPL DL<=0.05 MIU/L-ACNC: 17.07 UIU/ML (ref 0.45–5.33)
VIT B12 SERPL-MCNC: 4033 PG/ML (ref 100–900)
WBC # BLD AUTO: 12.4 THOUSAND/UL (ref 4.8–10.8)

## 2020-05-28 PROCEDURE — 83540 ASSAY OF IRON: CPT | Performed by: NURSE PRACTITIONER

## 2020-05-28 PROCEDURE — 86900 BLOOD TYPING SEROLOGIC ABO: CPT | Performed by: ANESTHESIOLOGY

## 2020-05-28 PROCEDURE — 83550 IRON BINDING TEST: CPT | Performed by: NURSE PRACTITIONER

## 2020-05-28 PROCEDURE — 99232 SBSQ HOSP IP/OBS MODERATE 35: CPT | Performed by: PHYSICIAN ASSISTANT

## 2020-05-28 PROCEDURE — 85025 COMPLETE CBC W/AUTO DIFF WBC: CPT | Performed by: NURSE PRACTITIONER

## 2020-05-28 PROCEDURE — C1713 ANCHOR/SCREW BN/BN,TIS/BN: HCPCS | Performed by: ORTHOPAEDIC SURGERY

## 2020-05-28 PROCEDURE — 99232 SBSQ HOSP IP/OBS MODERATE 35: CPT | Performed by: INTERNAL MEDICINE

## 2020-05-28 PROCEDURE — 82607 VITAMIN B-12: CPT | Performed by: NURSE PRACTITIONER

## 2020-05-28 PROCEDURE — 80048 BASIC METABOLIC PNL TOTAL CA: CPT | Performed by: PHYSICIAN ASSISTANT

## 2020-05-28 PROCEDURE — 84100 ASSAY OF PHOSPHORUS: CPT | Performed by: NURSE PRACTITIONER

## 2020-05-28 PROCEDURE — 97163 PT EVAL HIGH COMPLEX 45 MIN: CPT

## 2020-05-28 PROCEDURE — 97167 OT EVAL HIGH COMPLEX 60 MIN: CPT

## 2020-05-28 PROCEDURE — 86850 RBC ANTIBODY SCREEN: CPT | Performed by: ANESTHESIOLOGY

## 2020-05-28 PROCEDURE — 82746 ASSAY OF FOLIC ACID SERUM: CPT | Performed by: NURSE PRACTITIONER

## 2020-05-28 PROCEDURE — 0QS606Z REPOSITION RIGHT UPPER FEMUR WITH INTRAMEDULLARY INTERNAL FIXATION DEVICE, OPEN APPROACH: ICD-10-PCS | Performed by: ORTHOPAEDIC SURGERY

## 2020-05-28 PROCEDURE — 84443 ASSAY THYROID STIM HORMONE: CPT | Performed by: NURSE PRACTITIONER

## 2020-05-28 PROCEDURE — 80053 COMPREHEN METABOLIC PANEL: CPT | Performed by: NURSE PRACTITIONER

## 2020-05-28 PROCEDURE — C1769 GUIDE WIRE: HCPCS | Performed by: ORTHOPAEDIC SURGERY

## 2020-05-28 PROCEDURE — 83735 ASSAY OF MAGNESIUM: CPT | Performed by: NURSE PRACTITIONER

## 2020-05-28 PROCEDURE — 73502 X-RAY EXAM HIP UNI 2-3 VIEWS: CPT

## 2020-05-28 PROCEDURE — 84439 ASSAY OF FREE THYROXINE: CPT | Performed by: PHYSICIAN ASSISTANT

## 2020-05-28 PROCEDURE — 82728 ASSAY OF FERRITIN: CPT | Performed by: NURSE PRACTITIONER

## 2020-05-28 PROCEDURE — 82948 REAGENT STRIP/BLOOD GLUCOSE: CPT

## 2020-05-28 PROCEDURE — 86901 BLOOD TYPING SEROLOGIC RH(D): CPT | Performed by: ANESTHESIOLOGY

## 2020-05-28 DEVICE — 5.0MM TI LOCKING SCREW 34MM- FOR IM NAILS: Type: IMPLANTABLE DEVICE | Site: LEG | Status: FUNCTIONAL

## 2020-05-28 DEVICE — 11MM/130 DEG TI CANN TROCH FIXATION NAIL 170MM-STERILE: Type: IMPLANTABLE DEVICE | Site: LEG | Status: FUNCTIONAL

## 2020-05-28 DEVICE — 11.0MM TI HELICAL BLADE 100MM: Type: IMPLANTABLE DEVICE | Site: LEG | Status: FUNCTIONAL

## 2020-05-28 RX ORDER — SODIUM CHLORIDE, SODIUM LACTATE, POTASSIUM CHLORIDE, CALCIUM CHLORIDE 600; 310; 30; 20 MG/100ML; MG/100ML; MG/100ML; MG/100ML
INJECTION, SOLUTION INTRAVENOUS CONTINUOUS PRN
Status: DISCONTINUED | OUTPATIENT
Start: 2020-05-28 | End: 2020-05-28 | Stop reason: SURG

## 2020-05-28 RX ORDER — SUCCINYLCHOLINE/SOD CL,ISO/PF 100 MG/5ML
SYRINGE (ML) INTRAVENOUS AS NEEDED
Status: DISCONTINUED | OUTPATIENT
Start: 2020-05-28 | End: 2020-05-28 | Stop reason: SURG

## 2020-05-28 RX ORDER — CEFTRIAXONE 1 G/50ML
1000 INJECTION, SOLUTION INTRAVENOUS EVERY 24 HOURS
Status: COMPLETED | OUTPATIENT
Start: 2020-05-29 | End: 2020-05-30

## 2020-05-28 RX ORDER — SODIUM CHLORIDE 450 MG/100ML
75 INJECTION, SOLUTION INTRAVENOUS CONTINUOUS
Status: DISCONTINUED | OUTPATIENT
Start: 2020-05-28 | End: 2020-05-28

## 2020-05-28 RX ORDER — ONDANSETRON 2 MG/ML
INJECTION INTRAMUSCULAR; INTRAVENOUS AS NEEDED
Status: DISCONTINUED | OUTPATIENT
Start: 2020-05-28 | End: 2020-05-28 | Stop reason: SURG

## 2020-05-28 RX ORDER — PANTOPRAZOLE SODIUM 40 MG/1
40 TABLET, DELAYED RELEASE ORAL
Status: DISCONTINUED | OUTPATIENT
Start: 2020-05-28 | End: 2020-06-01 | Stop reason: HOSPADM

## 2020-05-28 RX ORDER — SODIUM CHLORIDE 9 MG/ML
100 INJECTION, SOLUTION INTRAVENOUS CONTINUOUS
Status: DISCONTINUED | OUTPATIENT
Start: 2020-05-28 | End: 2020-05-29

## 2020-05-28 RX ORDER — CEFAZOLIN SODIUM 1 G/50ML
1000 SOLUTION INTRAVENOUS EVERY 8 HOURS
Status: COMPLETED | OUTPATIENT
Start: 2020-05-28 | End: 2020-05-29

## 2020-05-28 RX ORDER — BUPIVACAINE HYDROCHLORIDE AND EPINEPHRINE 5; 5 MG/ML; UG/ML
INJECTION, SOLUTION PERINEURAL AS NEEDED
Status: DISCONTINUED | OUTPATIENT
Start: 2020-05-28 | End: 2020-05-28 | Stop reason: HOSPADM

## 2020-05-28 RX ORDER — FERROUS SULFATE 325(65) MG
325 TABLET ORAL 2 TIMES DAILY WITH MEALS
Status: DISCONTINUED | OUTPATIENT
Start: 2020-05-28 | End: 2020-05-30

## 2020-05-28 RX ORDER — PROPOFOL 10 MG/ML
INJECTION, EMULSION INTRAVENOUS AS NEEDED
Status: DISCONTINUED | OUTPATIENT
Start: 2020-05-28 | End: 2020-05-28 | Stop reason: SURG

## 2020-05-28 RX ORDER — LIDOCAINE HYDROCHLORIDE 10 MG/ML
INJECTION, SOLUTION EPIDURAL; INFILTRATION; INTRACAUDAL; PERINEURAL AS NEEDED
Status: DISCONTINUED | OUTPATIENT
Start: 2020-05-28 | End: 2020-05-28 | Stop reason: SURG

## 2020-05-28 RX ORDER — ROCURONIUM BROMIDE 10 MG/ML
INJECTION, SOLUTION INTRAVENOUS AS NEEDED
Status: DISCONTINUED | OUTPATIENT
Start: 2020-05-28 | End: 2020-05-28 | Stop reason: SURG

## 2020-05-28 RX ORDER — POVIDONE-IODINE 10 MG/G
OINTMENT TOPICAL AS NEEDED
Status: DISCONTINUED | OUTPATIENT
Start: 2020-05-28 | End: 2020-05-28 | Stop reason: HOSPADM

## 2020-05-28 RX ORDER — FENTANYL CITRATE/PF 50 MCG/ML
25 SYRINGE (ML) INJECTION
Status: DISCONTINUED | OUTPATIENT
Start: 2020-05-28 | End: 2020-05-28 | Stop reason: HOSPADM

## 2020-05-28 RX ORDER — FENTANYL CITRATE 50 UG/ML
INJECTION, SOLUTION INTRAMUSCULAR; INTRAVENOUS AS NEEDED
Status: DISCONTINUED | OUTPATIENT
Start: 2020-05-28 | End: 2020-05-28 | Stop reason: SURG

## 2020-05-28 RX ORDER — FONDAPARINUX SODIUM 2.5 MG/.5ML
2.5 INJECTION SUBCUTANEOUS DAILY
Status: DISCONTINUED | OUTPATIENT
Start: 2020-05-29 | End: 2020-06-01 | Stop reason: HOSPADM

## 2020-05-28 RX ORDER — CEFAZOLIN SODIUM 2 G/50ML
SOLUTION INTRAVENOUS AS NEEDED
Status: DISCONTINUED | OUTPATIENT
Start: 2020-05-28 | End: 2020-05-28 | Stop reason: SURG

## 2020-05-28 RX ORDER — MAGNESIUM SULFATE HEPTAHYDRATE 40 MG/ML
2 INJECTION, SOLUTION INTRAVENOUS ONCE
Status: COMPLETED | OUTPATIENT
Start: 2020-05-28 | End: 2020-05-28

## 2020-05-28 RX ORDER — ONDANSETRON 2 MG/ML
4 INJECTION INTRAMUSCULAR; INTRAVENOUS ONCE AS NEEDED
Status: DISCONTINUED | OUTPATIENT
Start: 2020-05-28 | End: 2020-05-28 | Stop reason: HOSPADM

## 2020-05-28 RX ORDER — SODIUM CHLORIDE, SODIUM LACTATE, POTASSIUM CHLORIDE, CALCIUM CHLORIDE 600; 310; 30; 20 MG/100ML; MG/100ML; MG/100ML; MG/100ML
50 INJECTION, SOLUTION INTRAVENOUS CONTINUOUS
Status: DISCONTINUED | OUTPATIENT
Start: 2020-05-28 | End: 2020-05-28

## 2020-05-28 RX ORDER — SODIUM CHLORIDE, SODIUM LACTATE, POTASSIUM CHLORIDE, CALCIUM CHLORIDE 600; 310; 30; 20 MG/100ML; MG/100ML; MG/100ML; MG/100ML
125 INJECTION, SOLUTION INTRAVENOUS CONTINUOUS
Status: DISCONTINUED | OUTPATIENT
Start: 2020-05-28 | End: 2020-05-28

## 2020-05-28 RX ADMIN — DICLOFENAC SODIUM 2 G: 10 GEL TOPICAL at 11:08

## 2020-05-28 RX ADMIN — PHENYLEPHRINE HYDROCHLORIDE 25 MCG/MIN: 10 INJECTION INTRAVENOUS at 07:46

## 2020-05-28 RX ADMIN — ATORVASTATIN CALCIUM 20 MG: 20 TABLET, FILM COATED ORAL at 10:50

## 2020-05-28 RX ADMIN — FERROUS SULFATE TAB 325 MG (65 MG ELEMENTAL FE) 325 MG: 325 (65 FE) TAB at 10:51

## 2020-05-28 RX ADMIN — OXYCODONE HYDROCHLORIDE AND ACETAMINOPHEN 1000 MG: 500 TABLET ORAL at 17:00

## 2020-05-28 RX ADMIN — CYANOCOBALAMIN TAB 500 MCG 1000 MCG: 500 TAB at 10:52

## 2020-05-28 RX ADMIN — MORPHINE SULFATE 2 MG: 2 INJECTION, SOLUTION INTRAMUSCULAR; INTRAVENOUS at 11:08

## 2020-05-28 RX ADMIN — FERROUS SULFATE TAB 325 MG (65 MG ELEMENTAL FE) 325 MG: 325 (65 FE) TAB at 16:50

## 2020-05-28 RX ADMIN — FENTANYL CITRATE 100 MCG: 50 INJECTION INTRAMUSCULAR; INTRAVENOUS at 07:22

## 2020-05-28 RX ADMIN — PROPOFOL 110 MG: 10 INJECTION, EMULSION INTRAVENOUS at 07:25

## 2020-05-28 RX ADMIN — FENTANYL CITRATE 50 MCG: 50 INJECTION INTRAMUSCULAR; INTRAVENOUS at 08:03

## 2020-05-28 RX ADMIN — LIDOCAINE HYDROCHLORIDE 100 MG: 10 INJECTION, SOLUTION EPIDURAL; INFILTRATION; INTRACAUDAL; PERINEURAL at 07:25

## 2020-05-28 RX ADMIN — POTASSIUM & SODIUM PHOSPHATES POWDER PACK 280-160-250 MG 2 PACKET: 280-160-250 PACK at 16:51

## 2020-05-28 RX ADMIN — DOCUSATE SODIUM 100 MG: 100 CAPSULE, LIQUID FILLED ORAL at 17:01

## 2020-05-28 RX ADMIN — FENTANYL CITRATE 25 MCG: 50 INJECTION INTRAMUSCULAR; INTRAVENOUS at 09:39

## 2020-05-28 RX ADMIN — SODIUM CHLORIDE, SODIUM LACTATE, POTASSIUM CHLORIDE, AND CALCIUM CHLORIDE: .6; .31; .03; .02 INJECTION, SOLUTION INTRAVENOUS at 07:16

## 2020-05-28 RX ADMIN — CALCIUM 1 TABLET: 500 TABLET ORAL at 16:50

## 2020-05-28 RX ADMIN — DICLOFENAC SODIUM 2 G: 10 GEL TOPICAL at 17:28

## 2020-05-28 RX ADMIN — SUGAMMADEX 100 MG: 100 INJECTION, SOLUTION INTRAVENOUS at 08:12

## 2020-05-28 RX ADMIN — Medication 100 MG: at 07:25

## 2020-05-28 RX ADMIN — INSULIN LISPRO 3 UNITS: 100 INJECTION, SOLUTION INTRAVENOUS; SUBCUTANEOUS at 17:00

## 2020-05-28 RX ADMIN — FAMOTIDINE 20 MG: 10 INJECTION, SOLUTION INTRAVENOUS at 14:35

## 2020-05-28 RX ADMIN — MAGNESIUM SULFATE HEPTAHYDRATE 2 G: 40 INJECTION, SOLUTION INTRAVENOUS at 10:52

## 2020-05-28 RX ADMIN — MORPHINE SULFATE 2 MG: 2 INJECTION, SOLUTION INTRAMUSCULAR; INTRAVENOUS at 04:43

## 2020-05-28 RX ADMIN — ROCURONIUM BROMIDE 20 MG: 10 INJECTION INTRAVENOUS at 07:35

## 2020-05-28 RX ADMIN — INSULIN DETEMIR 5 UNITS: 100 INJECTION, SOLUTION SUBCUTANEOUS at 21:14

## 2020-05-28 RX ADMIN — ASPIRIN 81 MG 81 MG: 81 TABLET ORAL at 10:51

## 2020-05-28 RX ADMIN — OXYCODONE HYDROCHLORIDE 5 MG: 5 TABLET ORAL at 13:57

## 2020-05-28 RX ADMIN — LISINOPRIL 5 MG: 5 TABLET ORAL at 10:51

## 2020-05-28 RX ADMIN — SODIUM CHLORIDE 100 ML/HR: 0.9 INJECTION, SOLUTION INTRAVENOUS at 21:14

## 2020-05-28 RX ADMIN — MELATONIN 2000 UNITS: at 10:51

## 2020-05-28 RX ADMIN — CEFAZOLIN SODIUM 2000 MG: 2 SOLUTION INTRAVENOUS at 07:17

## 2020-05-28 RX ADMIN — MAGNESIUM GLUCONATE 500 MG ORAL TABLET 400 MG: 500 TABLET ORAL at 10:51

## 2020-05-28 RX ADMIN — PANTOPRAZOLE SODIUM 40 MG: 40 TABLET, DELAYED RELEASE ORAL at 14:35

## 2020-05-28 RX ADMIN — DICLOFENAC SODIUM 2 G: 10 GEL TOPICAL at 21:15

## 2020-05-28 RX ADMIN — CALCIUM 1 TABLET: 500 TABLET ORAL at 10:51

## 2020-05-28 RX ADMIN — PHENYLEPHRINE HYDROCHLORIDE 100 MCG: 10 INJECTION INTRAVENOUS at 07:46

## 2020-05-28 RX ADMIN — OXYCODONE HYDROCHLORIDE 5 MG: 5 TABLET ORAL at 18:27

## 2020-05-28 RX ADMIN — MAGNESIUM GLUCONATE 500 MG ORAL TABLET 400 MG: 500 TABLET ORAL at 17:01

## 2020-05-28 RX ADMIN — ZINC 1 TABLET: TAB ORAL at 10:51

## 2020-05-28 RX ADMIN — SODIUM CHLORIDE, SODIUM LACTATE, POTASSIUM CHLORIDE, AND CALCIUM CHLORIDE 50 ML/HR: .6; .31; .03; .02 INJECTION, SOLUTION INTRAVENOUS at 17:28

## 2020-05-28 RX ADMIN — CEFAZOLIN SODIUM 1000 MG: 1 SOLUTION INTRAVENOUS at 16:50

## 2020-05-28 RX ADMIN — INSULIN LISPRO 2 UNITS: 100 INJECTION, SOLUTION INTRAVENOUS; SUBCUTANEOUS at 21:15

## 2020-05-28 RX ADMIN — OXYCODONE HYDROCHLORIDE AND ACETAMINOPHEN 1000 MG: 500 TABLET ORAL at 10:52

## 2020-05-28 RX ADMIN — ONDANSETRON 4 MG: 2 INJECTION INTRAMUSCULAR; INTRAVENOUS at 08:01

## 2020-05-29 LAB
ANION GAP SERPL CALCULATED.3IONS-SCNC: 6 MMOL/L (ref 4–13)
ANION GAP SERPL CALCULATED.3IONS-SCNC: 7 MMOL/L (ref 4–13)
ANION GAP SERPL CALCULATED.3IONS-SCNC: 8 MMOL/L (ref 4–13)
BACTERIA UR CULT: ABNORMAL
BUN SERPL-MCNC: 10 MG/DL (ref 7–25)
BUN SERPL-MCNC: 20 MG/DL (ref 7–25)
BUN SERPL-MCNC: 9 MG/DL (ref 7–25)
CALCIUM SERPL-MCNC: 7.5 MG/DL (ref 8.6–10.5)
CALCIUM SERPL-MCNC: 7.5 MG/DL (ref 8.6–10.5)
CALCIUM SERPL-MCNC: 7.7 MG/DL (ref 8.6–10.5)
CHLORIDE SERPL-SCNC: 86 MMOL/L (ref 98–107)
CHLORIDE SERPL-SCNC: 89 MMOL/L (ref 98–107)
CHLORIDE SERPL-SCNC: 91 MMOL/L (ref 98–107)
CO2 SERPL-SCNC: 24 MMOL/L (ref 21–31)
CO2 SERPL-SCNC: 25 MMOL/L (ref 21–31)
CO2 SERPL-SCNC: 27 MMOL/L (ref 21–31)
CREAT SERPL-MCNC: 0.64 MG/DL (ref 0.6–1.2)
CREAT SERPL-MCNC: 0.64 MG/DL (ref 0.6–1.2)
CREAT SERPL-MCNC: 1 MG/DL (ref 0.6–1.2)
ERYTHROCYTE [DISTWIDTH] IN BLOOD BY AUTOMATED COUNT: 12.8 % (ref 11.5–14.5)
GFR SERPL CREATININE-BSD FRML MDRD: 54 ML/MIN/1.73SQ M
GFR SERPL CREATININE-BSD FRML MDRD: 85 ML/MIN/1.73SQ M
GFR SERPL CREATININE-BSD FRML MDRD: 85 ML/MIN/1.73SQ M
GLUCOSE SERPL-MCNC: 168 MG/DL (ref 65–140)
GLUCOSE SERPL-MCNC: 168 MG/DL (ref 65–99)
GLUCOSE SERPL-MCNC: 216 MG/DL (ref 65–99)
GLUCOSE SERPL-MCNC: 346 MG/DL (ref 65–140)
GLUCOSE SERPL-MCNC: 445 MG/DL (ref 65–140)
GLUCOSE SERPL-MCNC: 448 MG/DL (ref 65–99)
GLUCOSE SERPL-MCNC: 450 MG/DL (ref 65–140)
HCT VFR BLD AUTO: 28.7 % (ref 42–47)
HGB BLD-MCNC: 9.8 G/DL (ref 12–16)
MAGNESIUM SERPL-MCNC: 2 MG/DL (ref 1.9–2.7)
MCH RBC QN AUTO: 32.9 PG (ref 26–34)
MCHC RBC AUTO-ENTMCNC: 34.1 G/DL (ref 31–37)
MCV RBC AUTO: 97 FL (ref 81–99)
PHOSPHATE SERPL-MCNC: 2.6 MG/DL (ref 3–5.5)
PLATELET # BLD AUTO: 209 THOUSANDS/UL (ref 149–390)
PLATELET BLD QL SMEAR: ADEQUATE
PLATELET CLUMP BLD QL SMEAR: NORMAL
PMV BLD AUTO: 8.9 FL (ref 8.6–11.7)
POTASSIUM SERPL-SCNC: 4.5 MMOL/L (ref 3.5–5.5)
POTASSIUM SERPL-SCNC: 4.6 MMOL/L (ref 3.5–5.5)
POTASSIUM SERPL-SCNC: 5.2 MMOL/L (ref 3.5–5.5)
RBC # BLD AUTO: 2.97 MILLION/UL (ref 3.9–5.2)
RBC MORPH BLD: NORMAL
SODIUM SERPL-SCNC: 120 MMOL/L (ref 134–143)
SODIUM SERPL-SCNC: 121 MMOL/L (ref 134–143)
SODIUM SERPL-SCNC: 122 MMOL/L (ref 134–143)
WBC # BLD AUTO: 11.6 THOUSAND/UL (ref 4.8–10.8)

## 2020-05-29 PROCEDURE — 82948 REAGENT STRIP/BLOOD GLUCOSE: CPT

## 2020-05-29 PROCEDURE — 80048 BASIC METABOLIC PNL TOTAL CA: CPT | Performed by: PHYSICIAN ASSISTANT

## 2020-05-29 PROCEDURE — 97535 SELF CARE MNGMENT TRAINING: CPT

## 2020-05-29 PROCEDURE — 97530 THERAPEUTIC ACTIVITIES: CPT

## 2020-05-29 PROCEDURE — 99232 SBSQ HOSP IP/OBS MODERATE 35: CPT | Performed by: INTERNAL MEDICINE

## 2020-05-29 PROCEDURE — 80048 BASIC METABOLIC PNL TOTAL CA: CPT | Performed by: NURSE PRACTITIONER

## 2020-05-29 PROCEDURE — 83735 ASSAY OF MAGNESIUM: CPT | Performed by: PHYSICIAN ASSISTANT

## 2020-05-29 PROCEDURE — 85027 COMPLETE CBC AUTOMATED: CPT | Performed by: PHYSICIAN ASSISTANT

## 2020-05-29 PROCEDURE — 84100 ASSAY OF PHOSPHORUS: CPT | Performed by: PHYSICIAN ASSISTANT

## 2020-05-29 PROCEDURE — 99232 SBSQ HOSP IP/OBS MODERATE 35: CPT | Performed by: PHYSICIAN ASSISTANT

## 2020-05-29 RX ORDER — OXYCODONE HYDROCHLORIDE 10 MG/1
10 TABLET ORAL EVERY 4 HOURS PRN
Status: DISCONTINUED | OUTPATIENT
Start: 2020-05-29 | End: 2020-06-01 | Stop reason: HOSPADM

## 2020-05-29 RX ORDER — POLYETHYLENE GLYCOL 3350 17 G/17G
17 POWDER, FOR SOLUTION ORAL DAILY
Status: DISCONTINUED | OUTPATIENT
Start: 2020-05-29 | End: 2020-06-01 | Stop reason: HOSPADM

## 2020-05-29 RX ORDER — LORAZEPAM 0.5 MG/1
0.5 TABLET ORAL ONCE
Status: COMPLETED | OUTPATIENT
Start: 2020-05-29 | End: 2020-05-29

## 2020-05-29 RX ADMIN — MORPHINE SULFATE 2 MG: 2 INJECTION, SOLUTION INTRAMUSCULAR; INTRAVENOUS at 01:19

## 2020-05-29 RX ADMIN — POTASSIUM & SODIUM PHOSPHATES POWDER PACK 280-160-250 MG 2 PACKET: 280-160-250 PACK at 15:42

## 2020-05-29 RX ADMIN — MORPHINE SULFATE 2 MG: 2 INJECTION, SOLUTION INTRAMUSCULAR; INTRAVENOUS at 08:04

## 2020-05-29 RX ADMIN — DICLOFENAC SODIUM 2 G: 10 GEL TOPICAL at 08:23

## 2020-05-29 RX ADMIN — CEFTRIAXONE 1000 MG: 1 INJECTION, SOLUTION INTRAVENOUS at 09:24

## 2020-05-29 RX ADMIN — INSULIN LISPRO 1 UNITS: 100 INJECTION, SOLUTION INTRAVENOUS; SUBCUTANEOUS at 08:28

## 2020-05-29 RX ADMIN — INSULIN DETEMIR 15 UNITS: 100 INJECTION, SOLUTION SUBCUTANEOUS at 22:16

## 2020-05-29 RX ADMIN — OXYCODONE HYDROCHLORIDE AND ACETAMINOPHEN 1000 MG: 500 TABLET ORAL at 08:09

## 2020-05-29 RX ADMIN — POLYETHYLENE GLYCOL 3350 17 G: 17 POWDER, FOR SOLUTION ORAL at 10:38

## 2020-05-29 RX ADMIN — MORPHINE SULFATE 2 MG: 2 INJECTION, SOLUTION INTRAMUSCULAR; INTRAVENOUS at 13:03

## 2020-05-29 RX ADMIN — LISINOPRIL 5 MG: 5 TABLET ORAL at 08:09

## 2020-05-29 RX ADMIN — ATORVASTATIN CALCIUM 20 MG: 20 TABLET, FILM COATED ORAL at 08:09

## 2020-05-29 RX ADMIN — INSULIN LISPRO 5 UNITS: 100 INJECTION, SOLUTION INTRAVENOUS; SUBCUTANEOUS at 22:17

## 2020-05-29 RX ADMIN — DOCUSATE SODIUM 100 MG: 100 CAPSULE, LIQUID FILLED ORAL at 18:29

## 2020-05-29 RX ADMIN — LEVOTHYROXINE SODIUM 100 MCG: 100 TABLET ORAL at 05:36

## 2020-05-29 RX ADMIN — CALCIUM 1 TABLET: 500 TABLET ORAL at 15:42

## 2020-05-29 RX ADMIN — DICLOFENAC SODIUM 2 G: 10 GEL TOPICAL at 22:16

## 2020-05-29 RX ADMIN — CEFAZOLIN SODIUM 1000 MG: 1 SOLUTION INTRAVENOUS at 00:58

## 2020-05-29 RX ADMIN — INSULIN LISPRO 5 UNITS: 100 INJECTION, SOLUTION INTRAVENOUS; SUBCUTANEOUS at 16:48

## 2020-05-29 RX ADMIN — OXYCODONE HYDROCHLORIDE AND ACETAMINOPHEN 1000 MG: 500 TABLET ORAL at 18:29

## 2020-05-29 RX ADMIN — FERROUS SULFATE TAB 325 MG (65 MG ELEMENTAL FE) 325 MG: 325 (65 FE) TAB at 15:42

## 2020-05-29 RX ADMIN — DICLOFENAC SODIUM 2 G: 10 GEL TOPICAL at 11:34

## 2020-05-29 RX ADMIN — OXYCODONE HYDROCHLORIDE 10 MG: 10 TABLET ORAL at 22:31

## 2020-05-29 RX ADMIN — LORAZEPAM 0.5 MG: 0.5 TABLET ORAL at 22:57

## 2020-05-29 RX ADMIN — CYANOCOBALAMIN TAB 500 MCG 1000 MCG: 500 TAB at 08:09

## 2020-05-29 RX ADMIN — DICLOFENAC SODIUM 2 G: 10 GEL TOPICAL at 18:29

## 2020-05-29 RX ADMIN — MELATONIN 2000 UNITS: at 08:09

## 2020-05-29 RX ADMIN — ASPIRIN 81 MG 81 MG: 81 TABLET ORAL at 08:10

## 2020-05-29 RX ADMIN — SODIUM CHLORIDE 100 ML/HR: 0.9 INJECTION, SOLUTION INTRAVENOUS at 08:04

## 2020-05-29 RX ADMIN — DOCUSATE SODIUM 100 MG: 100 CAPSULE, LIQUID FILLED ORAL at 08:10

## 2020-05-29 RX ADMIN — FERROUS SULFATE TAB 325 MG (65 MG ELEMENTAL FE) 325 MG: 325 (65 FE) TAB at 08:10

## 2020-05-29 RX ADMIN — INSULIN LISPRO 4 UNITS: 100 INJECTION, SOLUTION INTRAVENOUS; SUBCUTANEOUS at 11:33

## 2020-05-29 RX ADMIN — OXYCODONE HYDROCHLORIDE 10 MG: 10 TABLET ORAL at 10:37

## 2020-05-29 RX ADMIN — ZINC 1 TABLET: TAB ORAL at 08:09

## 2020-05-29 RX ADMIN — POTASSIUM & SODIUM PHOSPHATES POWDER PACK 280-160-250 MG 2 PACKET: 280-160-250 PACK at 08:22

## 2020-05-29 RX ADMIN — CEFAZOLIN SODIUM 1000 MG: 1 SOLUTION INTRAVENOUS at 08:14

## 2020-05-29 RX ADMIN — MAGNESIUM GLUCONATE 500 MG ORAL TABLET 400 MG: 500 TABLET ORAL at 08:10

## 2020-05-29 RX ADMIN — PANTOPRAZOLE SODIUM 40 MG: 40 TABLET, DELAYED RELEASE ORAL at 05:40

## 2020-05-29 RX ADMIN — OXYCODONE HYDROCHLORIDE 10 MG: 10 TABLET ORAL at 18:29

## 2020-05-29 RX ADMIN — OXYCODONE HYDROCHLORIDE 5 MG: 5 TABLET ORAL at 03:29

## 2020-05-29 RX ADMIN — MORPHINE SULFATE 2 MG: 2 INJECTION, SOLUTION INTRAMUSCULAR; INTRAVENOUS at 20:12

## 2020-05-29 RX ADMIN — FONDAPARINUX SODIUM 2.5 MG: 2.5 INJECTION, SOLUTION SUBCUTANEOUS at 08:22

## 2020-05-30 PROBLEM — R82.71 ASYMPTOMATIC BACTERIURIA: Status: ACTIVE | Noted: 2020-05-30

## 2020-05-30 LAB
ANION GAP SERPL CALCULATED.3IONS-SCNC: 6 MMOL/L (ref 4–13)
BUN SERPL-MCNC: 20 MG/DL (ref 7–25)
CALCIUM SERPL-MCNC: 8 MG/DL (ref 8.6–10.5)
CHLORIDE SERPL-SCNC: 87 MMOL/L (ref 98–107)
CO2 SERPL-SCNC: 30 MMOL/L (ref 21–31)
CREAT SERPL-MCNC: 0.71 MG/DL (ref 0.6–1.2)
GFR SERPL CREATININE-BSD FRML MDRD: 81 ML/MIN/1.73SQ M
GLUCOSE SERPL-MCNC: 109 MG/DL (ref 65–140)
GLUCOSE SERPL-MCNC: 239 MG/DL (ref 65–140)
GLUCOSE SERPL-MCNC: 244 MG/DL (ref 65–140)
GLUCOSE SERPL-MCNC: 271 MG/DL (ref 65–99)
GLUCOSE SERPL-MCNC: 288 MG/DL (ref 65–140)
GLUCOSE SERPL-MCNC: 339 MG/DL (ref 65–140)
HCT VFR BLD AUTO: 26.6 % (ref 42–47)
HGB BLD-MCNC: 9.1 G/DL (ref 12–16)
POTASSIUM SERPL-SCNC: 5.3 MMOL/L (ref 3.5–5.5)
SODIUM SERPL-SCNC: 123 MMOL/L (ref 134–143)

## 2020-05-30 PROCEDURE — 99232 SBSQ HOSP IP/OBS MODERATE 35: CPT | Performed by: NURSE PRACTITIONER

## 2020-05-30 PROCEDURE — 80048 BASIC METABOLIC PNL TOTAL CA: CPT | Performed by: NURSE PRACTITIONER

## 2020-05-30 PROCEDURE — 82948 REAGENT STRIP/BLOOD GLUCOSE: CPT

## 2020-05-30 PROCEDURE — 97535 SELF CARE MNGMENT TRAINING: CPT

## 2020-05-30 PROCEDURE — 85018 HEMOGLOBIN: CPT | Performed by: PHYSICIAN ASSISTANT

## 2020-05-30 PROCEDURE — 99232 SBSQ HOSP IP/OBS MODERATE 35: CPT | Performed by: PHYSICIAN ASSISTANT

## 2020-05-30 PROCEDURE — 85014 HEMATOCRIT: CPT | Performed by: PHYSICIAN ASSISTANT

## 2020-05-30 RX ORDER — HYDROMORPHONE HCL/PF 1 MG/ML
0.5 SYRINGE (ML) INJECTION EVERY 4 HOURS PRN
Status: DISCONTINUED | OUTPATIENT
Start: 2020-05-30 | End: 2020-06-01 | Stop reason: HOSPADM

## 2020-05-30 RX ORDER — GABAPENTIN 100 MG/1
100 CAPSULE ORAL 2 TIMES DAILY
Status: DISCONTINUED | OUTPATIENT
Start: 2020-05-30 | End: 2020-06-01 | Stop reason: HOSPADM

## 2020-05-30 RX ORDER — ACETAMINOPHEN 325 MG/1
975 TABLET ORAL EVERY 8 HOURS SCHEDULED
Status: DISCONTINUED | OUTPATIENT
Start: 2020-05-30 | End: 2020-06-01 | Stop reason: HOSPADM

## 2020-05-30 RX ORDER — LIDOCAINE 50 MG/G
2 PATCH TOPICAL DAILY
Status: DISCONTINUED | OUTPATIENT
Start: 2020-05-31 | End: 2020-06-01 | Stop reason: HOSPADM

## 2020-05-30 RX ADMIN — ACETAMINOPHEN 975 MG: 325 TABLET ORAL at 22:07

## 2020-05-30 RX ADMIN — INSULIN LISPRO 3 UNITS: 100 INJECTION, SOLUTION INTRAVENOUS; SUBCUTANEOUS at 12:06

## 2020-05-30 RX ADMIN — OXYCODONE HYDROCHLORIDE 10 MG: 10 TABLET ORAL at 20:09

## 2020-05-30 RX ADMIN — CEFTRIAXONE 1000 MG: 1 INJECTION, SOLUTION INTRAVENOUS at 08:07

## 2020-05-30 RX ADMIN — PANTOPRAZOLE SODIUM 40 MG: 40 TABLET, DELAYED RELEASE ORAL at 05:59

## 2020-05-30 RX ADMIN — DICLOFENAC SODIUM 2 G: 10 GEL TOPICAL at 12:07

## 2020-05-30 RX ADMIN — DICLOFENAC SODIUM 2 G: 10 GEL TOPICAL at 08:52

## 2020-05-30 RX ADMIN — INSULIN DETEMIR 15 UNITS: 100 INJECTION, SOLUTION SUBCUTANEOUS at 22:04

## 2020-05-30 RX ADMIN — INSULIN LISPRO 5 UNITS: 100 INJECTION, SOLUTION INTRAVENOUS; SUBCUTANEOUS at 16:47

## 2020-05-30 RX ADMIN — DICLOFENAC SODIUM 2 G: 10 GEL TOPICAL at 17:10

## 2020-05-30 RX ADMIN — FERROUS SULFATE TAB 325 MG (65 MG ELEMENTAL FE) 325 MG: 325 (65 FE) TAB at 07:57

## 2020-05-30 RX ADMIN — GABAPENTIN 100 MG: 100 CAPSULE ORAL at 17:10

## 2020-05-30 RX ADMIN — DOCUSATE SODIUM 100 MG: 100 CAPSULE, LIQUID FILLED ORAL at 17:10

## 2020-05-30 RX ADMIN — ASPIRIN 81 MG 81 MG: 81 TABLET ORAL at 08:05

## 2020-05-30 RX ADMIN — ZINC 1 TABLET: TAB ORAL at 08:06

## 2020-05-30 RX ADMIN — LEVOTHYROXINE SODIUM 100 MCG: 100 TABLET ORAL at 05:59

## 2020-05-30 RX ADMIN — IRON SUCROSE 200 MG: 20 INJECTION, SOLUTION INTRAVENOUS at 14:33

## 2020-05-30 RX ADMIN — LISINOPRIL 5 MG: 5 TABLET ORAL at 08:06

## 2020-05-30 RX ADMIN — CYANOCOBALAMIN TAB 500 MCG 1000 MCG: 500 TAB at 08:06

## 2020-05-30 RX ADMIN — MORPHINE SULFATE 2 MG: 2 INJECTION, SOLUTION INTRAMUSCULAR; INTRAVENOUS at 06:12

## 2020-05-30 RX ADMIN — MAGNESIUM GLUCONATE 500 MG ORAL TABLET 400 MG: 500 TABLET ORAL at 08:05

## 2020-05-30 RX ADMIN — ATORVASTATIN CALCIUM 20 MG: 20 TABLET, FILM COATED ORAL at 08:06

## 2020-05-30 RX ADMIN — OXYCODONE HYDROCHLORIDE AND ACETAMINOPHEN 1000 MG: 500 TABLET ORAL at 08:05

## 2020-05-30 RX ADMIN — POLYETHYLENE GLYCOL 3350 17 G: 17 POWDER, FOR SOLUTION ORAL at 08:06

## 2020-05-30 RX ADMIN — INSULIN LISPRO 2 UNITS: 100 INJECTION, SOLUTION INTRAVENOUS; SUBCUTANEOUS at 22:06

## 2020-05-30 RX ADMIN — CALCIUM 1 TABLET: 500 TABLET ORAL at 07:57

## 2020-05-30 RX ADMIN — CALCIUM 1 TABLET: 500 TABLET ORAL at 16:46

## 2020-05-30 RX ADMIN — ACETAMINOPHEN 975 MG: 325 TABLET ORAL at 16:46

## 2020-05-30 RX ADMIN — DICLOFENAC SODIUM 2 G: 10 GEL TOPICAL at 22:11

## 2020-05-30 RX ADMIN — POTASSIUM & SODIUM PHOSPHATES POWDER PACK 280-160-250 MG 2 PACKET: 280-160-250 PACK at 16:45

## 2020-05-30 RX ADMIN — OXYCODONE HYDROCHLORIDE AND ACETAMINOPHEN 1000 MG: 500 TABLET ORAL at 17:10

## 2020-05-30 RX ADMIN — POTASSIUM & SODIUM PHOSPHATES POWDER PACK 280-160-250 MG 2 PACKET: 280-160-250 PACK at 08:51

## 2020-05-30 RX ADMIN — DOCUSATE SODIUM 100 MG: 100 CAPSULE, LIQUID FILLED ORAL at 08:06

## 2020-05-30 RX ADMIN — FONDAPARINUX SODIUM 2.5 MG: 2.5 INJECTION, SOLUTION SUBCUTANEOUS at 08:06

## 2020-05-30 RX ADMIN — MELATONIN 2000 UNITS: at 08:06

## 2020-05-31 PROBLEM — E83.39 HYPOPHOSPHATEMIA: Status: RESOLVED | Noted: 2020-05-28 | Resolved: 2020-05-31

## 2020-05-31 PROBLEM — E83.42 HYPOMAGNESEMIA: Status: RESOLVED | Noted: 2020-05-28 | Resolved: 2020-05-31

## 2020-05-31 LAB
ANION GAP SERPL CALCULATED.3IONS-SCNC: 7 MMOL/L (ref 4–13)
BUN SERPL-MCNC: 21 MG/DL (ref 7–25)
CALCIUM SERPL-MCNC: 8.4 MG/DL (ref 8.6–10.5)
CHLORIDE SERPL-SCNC: 91 MMOL/L (ref 98–107)
CO2 SERPL-SCNC: 32 MMOL/L (ref 21–31)
CREAT SERPL-MCNC: 0.8 MG/DL (ref 0.6–1.2)
GFR SERPL CREATININE-BSD FRML MDRD: 70 ML/MIN/1.73SQ M
GLUCOSE SERPL-MCNC: 240 MG/DL (ref 65–140)
GLUCOSE SERPL-MCNC: 241 MG/DL (ref 65–140)
GLUCOSE SERPL-MCNC: 66 MG/DL (ref 65–140)
GLUCOSE SERPL-MCNC: 77 MG/DL (ref 65–140)
GLUCOSE SERPL-MCNC: 90 MG/DL (ref 65–99)
MAGNESIUM SERPL-MCNC: 2.2 MG/DL (ref 1.9–2.7)
PHOSPHATE SERPL-MCNC: 3.7 MG/DL (ref 3–5.5)
POTASSIUM SERPL-SCNC: 4.6 MMOL/L (ref 3.5–5.5)
SODIUM SERPL-SCNC: 130 MMOL/L (ref 134–143)

## 2020-05-31 PROCEDURE — 97110 THERAPEUTIC EXERCISES: CPT

## 2020-05-31 PROCEDURE — 99232 SBSQ HOSP IP/OBS MODERATE 35: CPT | Performed by: FAMILY MEDICINE

## 2020-05-31 PROCEDURE — 80048 BASIC METABOLIC PNL TOTAL CA: CPT | Performed by: PHYSICIAN ASSISTANT

## 2020-05-31 PROCEDURE — 83735 ASSAY OF MAGNESIUM: CPT | Performed by: NURSE PRACTITIONER

## 2020-05-31 PROCEDURE — 82948 REAGENT STRIP/BLOOD GLUCOSE: CPT

## 2020-05-31 PROCEDURE — 84100 ASSAY OF PHOSPHORUS: CPT | Performed by: NURSE PRACTITIONER

## 2020-05-31 PROCEDURE — 99232 SBSQ HOSP IP/OBS MODERATE 35: CPT | Performed by: NURSE PRACTITIONER

## 2020-05-31 PROCEDURE — 97530 THERAPEUTIC ACTIVITIES: CPT

## 2020-05-31 RX ORDER — METHOCARBAMOL 500 MG/1
500 TABLET, FILM COATED ORAL EVERY 6 HOURS PRN
Status: DISCONTINUED | OUTPATIENT
Start: 2020-05-31 | End: 2020-06-01 | Stop reason: HOSPADM

## 2020-05-31 RX ADMIN — POTASSIUM & SODIUM PHOSPHATES POWDER PACK 280-160-250 MG 2 PACKET: 280-160-250 PACK at 16:47

## 2020-05-31 RX ADMIN — CALCIUM 1 TABLET: 500 TABLET ORAL at 16:46

## 2020-05-31 RX ADMIN — POLYETHYLENE GLYCOL 3350 17 G: 17 POWDER, FOR SOLUTION ORAL at 08:52

## 2020-05-31 RX ADMIN — INSULIN LISPRO 3 UNITS: 100 INJECTION, SOLUTION INTRAVENOUS; SUBCUTANEOUS at 12:12

## 2020-05-31 RX ADMIN — INSULIN LISPRO 3 UNITS: 100 INJECTION, SOLUTION INTRAVENOUS; SUBCUTANEOUS at 16:47

## 2020-05-31 RX ADMIN — DOCUSATE SODIUM 100 MG: 100 CAPSULE, LIQUID FILLED ORAL at 08:50

## 2020-05-31 RX ADMIN — ACETAMINOPHEN 975 MG: 325 TABLET ORAL at 22:02

## 2020-05-31 RX ADMIN — CYANOCOBALAMIN TAB 500 MCG 1000 MCG: 500 TAB at 08:51

## 2020-05-31 RX ADMIN — ZINC 1 TABLET: TAB ORAL at 08:50

## 2020-05-31 RX ADMIN — OXYCODONE HYDROCHLORIDE 10 MG: 10 TABLET ORAL at 01:41

## 2020-05-31 RX ADMIN — ACETAMINOPHEN 975 MG: 325 TABLET ORAL at 06:38

## 2020-05-31 RX ADMIN — METHOCARBAMOL 500 MG: 500 TABLET, FILM COATED ORAL at 17:52

## 2020-05-31 RX ADMIN — DICLOFENAC SODIUM 2 G: 10 GEL TOPICAL at 08:53

## 2020-05-31 RX ADMIN — PANTOPRAZOLE SODIUM 40 MG: 40 TABLET, DELAYED RELEASE ORAL at 06:39

## 2020-05-31 RX ADMIN — GABAPENTIN 100 MG: 100 CAPSULE ORAL at 17:38

## 2020-05-31 RX ADMIN — GABAPENTIN 100 MG: 100 CAPSULE ORAL at 08:51

## 2020-05-31 RX ADMIN — ATORVASTATIN CALCIUM 20 MG: 20 TABLET, FILM COATED ORAL at 08:51

## 2020-05-31 RX ADMIN — OXYCODONE HYDROCHLORIDE AND ACETAMINOPHEN 1000 MG: 500 TABLET ORAL at 08:51

## 2020-05-31 RX ADMIN — POTASSIUM & SODIUM PHOSPHATES POWDER PACK 280-160-250 MG 2 PACKET: 280-160-250 PACK at 08:52

## 2020-05-31 RX ADMIN — OXYCODONE HYDROCHLORIDE AND ACETAMINOPHEN 1000 MG: 500 TABLET ORAL at 17:38

## 2020-05-31 RX ADMIN — INSULIN LISPRO 5 UNITS: 100 INJECTION, SOLUTION INTRAVENOUS; SUBCUTANEOUS at 12:13

## 2020-05-31 RX ADMIN — DICLOFENAC SODIUM 2 G: 10 GEL TOPICAL at 12:13

## 2020-05-31 RX ADMIN — LISINOPRIL 5 MG: 5 TABLET ORAL at 08:51

## 2020-05-31 RX ADMIN — ASPIRIN 81 MG 81 MG: 81 TABLET ORAL at 08:51

## 2020-05-31 RX ADMIN — ACETAMINOPHEN 975 MG: 325 TABLET ORAL at 15:18

## 2020-05-31 RX ADMIN — DOCUSATE SODIUM 100 MG: 100 CAPSULE, LIQUID FILLED ORAL at 17:38

## 2020-05-31 RX ADMIN — MAGNESIUM GLUCONATE 500 MG ORAL TABLET 400 MG: 500 TABLET ORAL at 08:51

## 2020-05-31 RX ADMIN — FONDAPARINUX SODIUM 2.5 MG: 2.5 INJECTION, SOLUTION SUBCUTANEOUS at 08:51

## 2020-05-31 RX ADMIN — IRON SUCROSE 200 MG: 20 INJECTION, SOLUTION INTRAVENOUS at 09:34

## 2020-05-31 RX ADMIN — MELATONIN 2000 UNITS: at 08:51

## 2020-05-31 RX ADMIN — CALCIUM 1 TABLET: 500 TABLET ORAL at 08:51

## 2020-05-31 RX ADMIN — LEVOTHYROXINE SODIUM 50 MCG: 50 TABLET ORAL at 06:46

## 2020-05-31 RX ADMIN — INSULIN LISPRO 5 UNITS: 100 INJECTION, SOLUTION INTRAVENOUS; SUBCUTANEOUS at 16:48

## 2020-06-01 ENCOUNTER — HOSPITAL ENCOUNTER (INPATIENT)
Facility: HOSPITAL | Age: 80
LOS: 17 days | Discharge: HOME HEALTH | DRG: 560 | End: 2020-06-18
Attending: PHYSICAL MEDICINE & REHABILITATION | Admitting: PHYSICAL MEDICINE & REHABILITATION
Payer: MEDICARE

## 2020-06-01 VITALS
WEIGHT: 137.79 LBS | OXYGEN SATURATION: 98 % | BODY MASS INDEX: 25.36 KG/M2 | RESPIRATION RATE: 18 BRPM | HEIGHT: 62 IN | HEART RATE: 98 BPM | DIASTOLIC BLOOD PRESSURE: 59 MMHG | SYSTOLIC BLOOD PRESSURE: 135 MMHG | TEMPERATURE: 98.3 F

## 2020-06-01 DIAGNOSIS — E87.1 HYPONATREMIA: ICD-10-CM

## 2020-06-01 DIAGNOSIS — S72.141A CLOSED INTERTROCHANTERIC FRACTURE OF RIGHT FEMUR (HCC): ICD-10-CM

## 2020-06-01 DIAGNOSIS — I10 ESSENTIAL HYPERTENSION: Primary | Chronic | ICD-10-CM

## 2020-06-01 DIAGNOSIS — E11.42 CONTROLLED TYPE 2 DIABETES MELLITUS WITH DIABETIC POLYNEUROPATHY, WITH LONG-TERM CURRENT USE OF INSULIN (HCC): Chronic | ICD-10-CM

## 2020-06-01 DIAGNOSIS — Z79.4 CONTROLLED TYPE 2 DIABETES MELLITUS WITH DIABETIC POLYNEUROPATHY, WITH LONG-TERM CURRENT USE OF INSULIN (HCC): Chronic | ICD-10-CM

## 2020-06-01 DIAGNOSIS — E03.9 HYPOTHYROIDISM: ICD-10-CM

## 2020-06-01 DIAGNOSIS — D64.9 ANEMIA: ICD-10-CM

## 2020-06-01 PROBLEM — N99.89 POSTOPERATIVE URINARY RETENTION: Status: ACTIVE | Noted: 2020-06-01

## 2020-06-01 PROBLEM — C80.1 CANCER (HCC): Status: RESOLVED | Noted: 2019-12-19 | Resolved: 2020-06-01

## 2020-06-01 PROBLEM — D72.829 LEUKOCYTOSIS: Status: ACTIVE | Noted: 2020-06-01

## 2020-06-01 PROBLEM — R33.8 POSTOPERATIVE URINARY RETENTION: Status: ACTIVE | Noted: 2020-06-01

## 2020-06-01 PROBLEM — R79.89 ELEVATED VITAMIN B12 LEVEL: Status: ACTIVE | Noted: 2020-06-01

## 2020-06-01 PROBLEM — R74.8 ELEVATED VITAMIN B12 LEVEL: Status: ACTIVE | Noted: 2020-06-01

## 2020-06-01 LAB
ANION GAP SERPL CALCULATED.3IONS-SCNC: 10 MMOL/L (ref 4–13)
BUN SERPL-MCNC: 17 MG/DL (ref 7–25)
CALCIUM SERPL-MCNC: 7.9 MG/DL (ref 8.6–10.5)
CHLORIDE SERPL-SCNC: 93 MMOL/L (ref 98–107)
CO2 SERPL-SCNC: 27 MMOL/L (ref 21–31)
CREAT SERPL-MCNC: 0.7 MG/DL (ref 0.6–1.2)
GFR SERPL CREATININE-BSD FRML MDRD: 83 ML/MIN/1.73SQ M
GLUCOSE SERPL-MCNC: 106 MG/DL (ref 65–140)
GLUCOSE SERPL-MCNC: 131 MG/DL (ref 65–140)
GLUCOSE SERPL-MCNC: 266 MG/DL (ref 65–140)
GLUCOSE SERPL-MCNC: 309 MG/DL (ref 65–99)
GLUCOSE SERPL-MCNC: 351 MG/DL (ref 65–140)
POTASSIUM SERPL-SCNC: 5.2 MMOL/L (ref 3.5–5.5)
SODIUM SERPL-SCNC: 130 MMOL/L (ref 134–143)

## 2020-06-01 PROCEDURE — 99223 1ST HOSP IP/OBS HIGH 75: CPT | Performed by: PHYSICAL MEDICINE & REHABILITATION

## 2020-06-01 PROCEDURE — 99239 HOSP IP/OBS DSCHRG MGMT >30: CPT | Performed by: NURSE PRACTITIONER

## 2020-06-01 PROCEDURE — 99232 SBSQ HOSP IP/OBS MODERATE 35: CPT | Performed by: INTERNAL MEDICINE

## 2020-06-01 PROCEDURE — 97116 GAIT TRAINING THERAPY: CPT

## 2020-06-01 PROCEDURE — 80048 BASIC METABOLIC PNL TOTAL CA: CPT | Performed by: NURSE PRACTITIONER

## 2020-06-01 PROCEDURE — NC001 PR NO CHARGE

## 2020-06-01 PROCEDURE — 97535 SELF CARE MNGMENT TRAINING: CPT

## 2020-06-01 PROCEDURE — 82948 REAGENT STRIP/BLOOD GLUCOSE: CPT

## 2020-06-01 PROCEDURE — 97530 THERAPEUTIC ACTIVITIES: CPT

## 2020-06-01 RX ORDER — OXYCODONE HYDROCHLORIDE 5 MG/1
5 TABLET ORAL EVERY 4 HOURS PRN
Status: CANCELLED | OUTPATIENT
Start: 2020-06-01

## 2020-06-01 RX ORDER — GABAPENTIN 100 MG/1
100 CAPSULE ORAL 2 TIMES DAILY
Qty: 60 CAPSULE | Refills: 0 | OUTPATIENT
Start: 2020-06-01 | End: 2020-07-01

## 2020-06-01 RX ORDER — BISACODYL 10 MG
10 SUPPOSITORY, RECTAL RECTAL DAILY PRN
Status: DISCONTINUED | OUTPATIENT
Start: 2020-06-01 | End: 2020-06-18 | Stop reason: HOSPADM

## 2020-06-01 RX ORDER — LEVOTHYROXINE SODIUM 0.1 MG/1
100 TABLET ORAL
Status: CANCELLED | OUTPATIENT
Start: 2020-06-02

## 2020-06-01 RX ORDER — ASPIRIN 81 MG/1
81 TABLET, CHEWABLE ORAL DAILY
Status: CANCELLED | OUTPATIENT
Start: 2020-06-02

## 2020-06-01 RX ORDER — FONDAPARINUX SODIUM 2.5 MG/.5ML
2.5 INJECTION SUBCUTANEOUS DAILY
Qty: 15 ML | Refills: 0 | OUTPATIENT
Start: 2020-05-28 | End: 2020-06-27

## 2020-06-01 RX ORDER — HYDROMORPHONE HCL/PF 1 MG/ML
0.5 SYRINGE (ML) INJECTION EVERY 4 HOURS PRN
Status: CANCELLED | OUTPATIENT
Start: 2020-06-01

## 2020-06-01 RX ORDER — ATORVASTATIN CALCIUM 20 MG/1
20 TABLET, FILM COATED ORAL
Status: DISCONTINUED | OUTPATIENT
Start: 2020-06-02 | End: 2020-06-18 | Stop reason: HOSPADM

## 2020-06-01 RX ORDER — FONDAPARINUX SODIUM 2.5 MG/.5ML
2.5 INJECTION SUBCUTANEOUS DAILY
Status: COMPLETED | OUTPATIENT
Start: 2020-06-02 | End: 2020-06-11

## 2020-06-01 RX ORDER — ACETAMINOPHEN 325 MG/1
975 TABLET ORAL EVERY 8 HOURS SCHEDULED
Status: CANCELLED | OUTPATIENT
Start: 2020-06-01

## 2020-06-01 RX ORDER — LIDOCAINE 50 MG/G
2 PATCH TOPICAL DAILY
Qty: 60 PATCH | Refills: 0 | OUTPATIENT
Start: 2020-06-02 | End: 2020-07-02

## 2020-06-01 RX ORDER — FONDAPARINUX SODIUM 2.5 MG/.5ML
2.5 INJECTION SUBCUTANEOUS DAILY
Status: CANCELLED | OUTPATIENT
Start: 2020-06-02

## 2020-06-01 RX ORDER — PANTOPRAZOLE SODIUM 40 MG/1
40 TABLET, DELAYED RELEASE ORAL
Status: CANCELLED | OUTPATIENT
Start: 2020-06-02

## 2020-06-01 RX ORDER — MELATONIN
2000 DAILY
Status: CANCELLED | OUTPATIENT
Start: 2020-06-02

## 2020-06-01 RX ORDER — LEVOTHYROXINE SODIUM 0.05 MG/1
50 TABLET ORAL WEEKLY
Status: CANCELLED | OUTPATIENT
Start: 2020-06-07

## 2020-06-01 RX ORDER — LISINOPRIL 5 MG/1
5 TABLET ORAL DAILY
Status: CANCELLED | OUTPATIENT
Start: 2020-06-02

## 2020-06-01 RX ORDER — DOCUSATE SODIUM 100 MG/1
100 CAPSULE, LIQUID FILLED ORAL 2 TIMES DAILY
Status: CANCELLED | OUTPATIENT
Start: 2020-06-01

## 2020-06-01 RX ORDER — ASPIRIN 325 MG
325 TABLET, DELAYED RELEASE (ENTERIC COATED) ORAL DAILY
Status: DISCONTINUED | OUTPATIENT
Start: 2020-06-12 | End: 2020-06-18 | Stop reason: HOSPADM

## 2020-06-01 RX ORDER — LEVOTHYROXINE SODIUM 0.03 MG/1
50 TABLET ORAL WEEKLY
Status: DISCONTINUED | OUTPATIENT
Start: 2020-06-07 | End: 2020-06-18 | Stop reason: HOSPADM

## 2020-06-01 RX ORDER — METHOCARBAMOL 500 MG/1
500 TABLET, FILM COATED ORAL EVERY 6 HOURS PRN
Status: DISCONTINUED | OUTPATIENT
Start: 2020-06-01 | End: 2020-06-18 | Stop reason: HOSPADM

## 2020-06-01 RX ORDER — OXYCODONE HYDROCHLORIDE 10 MG/1
10 TABLET ORAL EVERY 4 HOURS PRN
Status: DISCONTINUED | OUTPATIENT
Start: 2020-06-01 | End: 2020-06-01

## 2020-06-01 RX ORDER — OXYCODONE HYDROCHLORIDE 5 MG/1
5 TABLET ORAL EVERY 4 HOURS PRN
Status: DISCONTINUED | OUTPATIENT
Start: 2020-06-01 | End: 2020-06-01

## 2020-06-01 RX ORDER — CALCIUM CARBONATE 500(1250)
1 TABLET ORAL 2 TIMES DAILY WITH MEALS
Status: CANCELLED | OUTPATIENT
Start: 2020-06-01

## 2020-06-01 RX ORDER — POLYETHYLENE GLYCOL 3350 17 G/17G
17 POWDER, FOR SOLUTION ORAL DAILY
Qty: 7 PACKET | Refills: 0 | OUTPATIENT
Start: 2020-06-02 | End: 2020-06-09

## 2020-06-01 RX ORDER — TRAMADOL HYDROCHLORIDE 50 MG/1
50 TABLET ORAL EVERY 6 HOURS PRN
Status: DISCONTINUED | OUTPATIENT
Start: 2020-06-01 | End: 2020-06-02

## 2020-06-01 RX ORDER — METHOCARBAMOL 500 MG/1
500 TABLET, FILM COATED ORAL EVERY 6 HOURS PRN
Status: CANCELLED | OUTPATIENT
Start: 2020-06-01

## 2020-06-01 RX ORDER — LIDOCAINE 50 MG/G
2 PATCH TOPICAL DAILY
Status: CANCELLED | OUTPATIENT
Start: 2020-06-02

## 2020-06-01 RX ORDER — ACETAMINOPHEN 325 MG/1
975 TABLET ORAL EVERY 8 HOURS SCHEDULED
Qty: 30 TABLET | Refills: 0 | OUTPATIENT
Start: 2020-06-01

## 2020-06-01 RX ORDER — ASPIRIN 81 MG/1
81 TABLET ORAL DAILY
Status: COMPLETED | OUTPATIENT
Start: 2020-06-02 | End: 2020-06-11

## 2020-06-01 RX ORDER — GABAPENTIN 100 MG/1
100 CAPSULE ORAL 2 TIMES DAILY
Status: CANCELLED | OUTPATIENT
Start: 2020-06-01

## 2020-06-01 RX ORDER — ASCORBIC ACID 500 MG
1000 TABLET ORAL 2 TIMES DAILY
Status: CANCELLED | OUTPATIENT
Start: 2020-06-01

## 2020-06-01 RX ORDER — ACETAMINOPHEN 325 MG/1
975 TABLET ORAL EVERY 8 HOURS SCHEDULED
Status: DISCONTINUED | OUTPATIENT
Start: 2020-06-01 | End: 2020-06-18 | Stop reason: HOSPADM

## 2020-06-01 RX ORDER — LISINOPRIL 5 MG/1
5 TABLET ORAL DAILY
Status: DISCONTINUED | OUTPATIENT
Start: 2020-06-02 | End: 2020-06-18 | Stop reason: HOSPADM

## 2020-06-01 RX ORDER — ONDANSETRON 2 MG/ML
4 INJECTION INTRAMUSCULAR; INTRAVENOUS EVERY 6 HOURS PRN
Status: CANCELLED | OUTPATIENT
Start: 2020-06-01

## 2020-06-01 RX ORDER — PANTOPRAZOLE SODIUM 40 MG/1
40 TABLET, DELAYED RELEASE ORAL
Qty: 30 TABLET | Refills: 0 | OUTPATIENT
Start: 2020-06-02 | End: 2020-07-02

## 2020-06-01 RX ORDER — POLYETHYLENE GLYCOL 3350 17 G/17G
17 POWDER, FOR SOLUTION ORAL DAILY
Status: CANCELLED | OUTPATIENT
Start: 2020-06-02

## 2020-06-01 RX ORDER — POLYETHYLENE GLYCOL 3350 17 G/17G
17 POWDER, FOR SOLUTION ORAL DAILY
Status: DISCONTINUED | OUTPATIENT
Start: 2020-06-02 | End: 2020-06-18 | Stop reason: HOSPADM

## 2020-06-01 RX ORDER — METHOCARBAMOL 500 MG/1
500 TABLET, FILM COATED ORAL EVERY 6 HOURS PRN
Qty: 60 TABLET | Refills: 0 | OUTPATIENT
Start: 2020-06-01 | End: 2020-07-01

## 2020-06-01 RX ORDER — ATORVASTATIN CALCIUM 20 MG/1
20 TABLET, FILM COATED ORAL DAILY
Status: CANCELLED | OUTPATIENT
Start: 2020-06-02

## 2020-06-01 RX ORDER — LEVOTHYROXINE SODIUM 0.1 MG/1
100 TABLET ORAL
Status: DISCONTINUED | OUTPATIENT
Start: 2020-06-02 | End: 2020-06-18 | Stop reason: HOSPADM

## 2020-06-01 RX ORDER — MAGNESIUM HYDROXIDE/ALUMINUM HYDROXICE/SIMETHICONE 120; 1200; 1200 MG/30ML; MG/30ML; MG/30ML
30 SUSPENSION ORAL EVERY 4 HOURS PRN
Status: CANCELLED | OUTPATIENT
Start: 2020-06-01

## 2020-06-01 RX ORDER — OXYCODONE HYDROCHLORIDE 10 MG/1
10 TABLET ORAL EVERY 4 HOURS PRN
Status: CANCELLED | OUTPATIENT
Start: 2020-06-01

## 2020-06-01 RX ORDER — DOCUSATE SODIUM 100 MG/1
100 CAPSULE, LIQUID FILLED ORAL 2 TIMES DAILY
Qty: 10 CAPSULE | Refills: 0 | OUTPATIENT
Start: 2020-06-01

## 2020-06-01 RX ADMIN — ACETAMINOPHEN 975 MG: 325 TABLET ORAL at 21:49

## 2020-06-01 RX ADMIN — GABAPENTIN 100 MG: 100 CAPSULE ORAL at 09:33

## 2020-06-01 RX ADMIN — FONDAPARINUX SODIUM 2.5 MG: 2.5 INJECTION, SOLUTION SUBCUTANEOUS at 09:34

## 2020-06-01 RX ADMIN — POTASSIUM & SODIUM PHOSPHATES POWDER PACK 280-160-250 MG 2 PACKET: 280-160-250 PACK at 08:11

## 2020-06-01 RX ADMIN — ZINC 1 TABLET: TAB ORAL at 09:32

## 2020-06-01 RX ADMIN — INSULIN LISPRO 3 UNITS: 100 INJECTION, SOLUTION INTRAVENOUS; SUBCUTANEOUS at 11:51

## 2020-06-01 RX ADMIN — DICLOFENAC SODIUM 2 G: 10 GEL TOPICAL at 11:52

## 2020-06-01 RX ADMIN — OXYCODONE HYDROCHLORIDE AND ACETAMINOPHEN 1000 MG: 500 TABLET ORAL at 09:32

## 2020-06-01 RX ADMIN — IRON SUCROSE 200 MG: 20 INJECTION, SOLUTION INTRAVENOUS at 09:34

## 2020-06-01 RX ADMIN — ACETAMINOPHEN 975 MG: 325 TABLET ORAL at 05:02

## 2020-06-01 RX ADMIN — INSULIN LISPRO 6 UNITS: 100 INJECTION, SOLUTION INTRAVENOUS; SUBCUTANEOUS at 08:10

## 2020-06-01 RX ADMIN — LEVOTHYROXINE SODIUM 100 MCG: 100 TABLET ORAL at 05:15

## 2020-06-01 RX ADMIN — PANTOPRAZOLE SODIUM 40 MG: 40 TABLET, DELAYED RELEASE ORAL at 05:02

## 2020-06-01 RX ADMIN — TRAMADOL HYDROCHLORIDE 50 MG: 50 TABLET, FILM COATED ORAL at 20:28

## 2020-06-01 RX ADMIN — ATORVASTATIN CALCIUM 20 MG: 20 TABLET, FILM COATED ORAL at 09:32

## 2020-06-01 RX ADMIN — GABAPENTIN 100 MG: 100 CAPSULE ORAL at 17:36

## 2020-06-01 RX ADMIN — LISINOPRIL 5 MG: 5 TABLET ORAL at 09:33

## 2020-06-01 RX ADMIN — MAGNESIUM GLUCONATE 500 MG ORAL TABLET 400 MG: 500 TABLET ORAL at 09:33

## 2020-06-01 RX ADMIN — ASPIRIN 81 MG 81 MG: 81 TABLET ORAL at 09:33

## 2020-06-01 RX ADMIN — OXYCODONE HYDROCHLORIDE AND ACETAMINOPHEN 1000 MG: 500 TABLET ORAL at 17:36

## 2020-06-01 RX ADMIN — ACETAMINOPHEN 975 MG: 325 TABLET ORAL at 13:46

## 2020-06-01 RX ADMIN — CALCIUM 1 TABLET: 500 TABLET ORAL at 08:11

## 2020-06-01 RX ADMIN — POLYETHYLENE GLYCOL 3350 17 G: 17 POWDER, FOR SOLUTION ORAL at 09:34

## 2020-06-01 RX ADMIN — INSULIN LISPRO 5 UNITS: 100 INJECTION, SOLUTION INTRAVENOUS; SUBCUTANEOUS at 08:10

## 2020-06-01 RX ADMIN — CYANOCOBALAMIN TAB 500 MCG 1000 MCG: 500 TAB at 09:33

## 2020-06-01 RX ADMIN — DOCUSATE SODIUM 100 MG: 100 CAPSULE, LIQUID FILLED ORAL at 09:32

## 2020-06-01 RX ADMIN — DOCUSATE SODIUM 100 MG: 100 CAPSULE, LIQUID FILLED ORAL at 17:36

## 2020-06-01 RX ADMIN — DICLOFENAC SODIUM 2 G: 10 GEL TOPICAL at 09:34

## 2020-06-01 RX ADMIN — CALCIUM 1 TABLET: 500 TABLET ORAL at 16:22

## 2020-06-01 RX ADMIN — POTASSIUM & SODIUM PHOSPHATES POWDER PACK 280-160-250 MG 2 PACKET: 280-160-250 PACK at 16:22

## 2020-06-01 RX ADMIN — MELATONIN 2000 UNITS: at 09:32

## 2020-06-01 RX ADMIN — INSULIN DETEMIR 10 UNITS: 100 INJECTION, SOLUTION SUBCUTANEOUS at 22:00

## 2020-06-01 NOTE — ASSESSMENT & PLAN NOTE
- TSH of 5/28 elevated (free T4 WNL)  - on home regimen of levothyroxine 100 mcg qd except for Sundays when she takes 50 mcg qd  - follows with Dr Liya Ernandez of endocrine  - IM managing and recommend patient continue current home regimen noted above upon dc have repeat TFTs (TSH/free 4) as OP and FU with Dr Awilda Puente (scrip provided with results to Dr Awilda Puente)

## 2020-06-01 NOTE — ASSESSMENT & PLAN NOTE
- Hg currently trending up to 10 3 post-operatively  - IM & renal co-managing and have cleared patient for dc with scrip for H&H with results to PCP

## 2020-06-01 NOTE — ASSESSMENT & PLAN NOTE
- s/p nail fixation by Dr Sg Foote on 5/28  - TTWB per ortho  - site C/D/I and well approximated   - OP FU with Dr Sg Foote

## 2020-06-01 NOTE — PLAN OF CARE
Problem: Prexisting or High Potential for Compromised Skin Integrity  Goal: Skin integrity is maintained or improved  Description  INTERVENTIONS:  - Identify patients at risk for skin breakdown  - Assess and monitor skin integrity  - Assess and monitor nutrition and hydration status  - Monitor labs   - Assess for incontinence   - Turn and reposition patient  - Assist with mobility/ambulation  - Relieve pressure over bony prominences  - Avoid friction and shearing  - Provide appropriate hygiene as needed including keeping skin clean and dry  - Evaluate need for skin moisturizer/barrier cream  - Collaborate with interdisciplinary team   - Patient/family teaching  - Consider wound care consult   Outcome: Progressing     Problem: Potential for Falls  Goal: Patient will remain free of falls  Description  INTERVENTIONS:  - Assess patient frequently for physical needs  -  Identify cognitive and physical deficits and behaviors that affect risk of falls    -  [place] fall precautions as indicated by assessment   - Educate patient/family on patient safety including physical limitations  - Instruct patient to call for assistance with activity based on assessment  - Modify environment to reduce risk of injury  - Consider OT/PT consult to assist with strengthening/mobility  Outcome: Progressing     Problem: PAIN - ADULT  Goal: Verbalizes/displays adequate comfort level or baseline comfort level  Description  Interventions:  - Encourage patient to monitor pain and request assistance  - Assess pain using appropriate pain scale  - Administer analgesics based on type and severity of pain and evaluate response  - Implement non-pharmacological measures as appropriate and evaluate response  - Consider cultural and social influences on pain and pain management  - Notify physician/advanced practitioner if interventions unsuccessful or patient reports new pain  Outcome: Progressing     Problem: INFECTION - ADULT  Goal: Absence or prevention of progression during hospitalization  Description  INTERVENTIONS:  - Assess and monitor for signs and symptoms of infection  - Monitor lab/diagnostic results  - Monitor all insertion sites, i e  indwelling lines, tubes, and drains  - Monitor endotracheal if appropriate and nasal secretions for changes in amount and color  - [place] appropriate cooling/warming therapies per order  - Administer medications as ordered  - Instruct and encourage patient and family to use good hand hygiene technique  - Identify and instruct in appropriate isolation precautions for identified infection/condition  Outcome: Progressing  Goal: Absence of fever/infection during neutropenic period  Description  INTERVENTIONS:  - Monitor WBC    Outcome: Progressing     Problem: SAFETY ADULT  Goal: Patient will remain free of falls  Description  INTERVENTIONS:  - Assess patient frequently for physical needs  -  Identify cognitive and physical deficits and behaviors that affect risk of falls    -  [place] fall precautions as indicated by assessment   - Educate patient/family on patient safety including physical limitations  - Instruct patient to call for assistance with activity based on assessment  - Modify environment to reduce risk of injury  - Consider OT/PT consult to assist with strengthening/mobility  Outcome: Progressing  Goal: Maintain or return to baseline ADL function  Description  INTERVENTIONS:  -  Assess patient's ability to carry out ADLs; assess patient's baseline for ADL function and identify physical deficits which impact ability to perform ADLs (bathing, care of mouth/teeth, toileting, grooming, dressing, etc )  - Assess/evaluate cause of self-care deficits   - Assess range of motion  - Assess patient's mobility; develop plan if impaired  - Assess patient's need for assistive devices and provide as appropriate  - Encourage maximum independence but intervene and supervise when necessary  - Involve family in performance of ADLs  - Assess for home care needs following discharge   - Consider OT consult to assist with ADL evaluation and planning for discharge  - Provide patient education as appropriate  Outcome: Progressing  Goal: Maintain or return mobility status to optimal level  Description  INTERVENTIONS:  - Assess patient's baseline mobility status (ambulation, transfers, stairs, etc )    - Identify cognitive and physical deficits and behaviors that affect mobility  - Identify mobility aids required to assist with transfers and/or ambulation (gait belt, sit-to-stand, lift, walker, cane, etc )  - [place] fall precautions as indicated by assessment  - Record patient progress and toleration of activity level on Mobility SBAR; progress patient to next Phase/Stage  - Instruct patient to call for assistance with activity based on assessment  - Consider rehabilitation consult to assist with strengthening/weightbearing, etc   Outcome: Progressing     Problem: DISCHARGE PLANNING  Goal: Discharge to home or other facility with appropriate resources  Description  INTERVENTIONS:  - Identify barriers to discharge w/patient and caregiver  - Arrange for needed discharge resources and transportation as appropriate  - Identify discharge learning needs (meds, wound care, etc )  - Arrange for interpretive services to assist at discharge as needed  - Refer to Case Management Department for coordinating discharge planning if the patient needs post-hospital services based on physician/advanced practitioner order or complex needs related to functional status, cognitive ability, or social support system  Outcome: Progressing

## 2020-06-01 NOTE — ASSESSMENT & PLAN NOTE
- per patient on metformin 500 mg BID with meals, insulin (per patient novolog) 5 units with meals, and levemir 5 (not 10) units at HS   - follows with Dr Bertin Mckinnon of endocrine  - IM managing and recommend patient resume home regimen upon dc

## 2020-06-01 NOTE — PROGRESS NOTES
06/01/20 1811   Charting Type   Charting Type Admission   Neurological   Neuro (WDL) X   Level of Consciousness Alert/awake   Orientation Level Oriented X4   Cognition Follows commands   Extraocular Movements Full   Facial Symmetry Symmetrical   Speech Clear   Swallow Able to swallow solids and liquids without difficulty   R Hand  Strong   L Hand  Weak   RUE Motor Response Responds to commands   RUE Sensation Full sensation   RUE Muscle Strength 5- Normal strength   LUE Motor Response Responds to commands   LUE Sensation Full sensation   LUE Muscle Strength 4- Movement against gravity and limited resistance   RLE Motor Response Responds to commands; Purposeful movement   RLE Sensation Full sensation   RLE Muscle Strength 3- Movement against gravity only   LLE Motor Response Responds to commands   LLE Sensation Full sensation   LLE Muscle Strength 5- Normal strength   Neuro Symptoms Forgetful   Relieved by Rest   Neuro Additional Assessments No   Delirium Assessment- CAM    Acute Onset and Fluctuating Course (1) No   Inattention (2) No   Disorganized Thinking (3) No   Rate Patient's Level of Consciousness (4) Alert (Normal), No   Delirium Present No   Reflexes   Gag Present   [place] Coma Scale   Eye Opening 4   Best Verbal Response 5   Best Motor Response 6   [place] Coma Scale Score 15   HEENT   HEENT (WDL) X   Head and Face Asymmetrical   R Eye Intact   L Eye Intact   R Ear Intact   L Ear Intact   Teeth Missing teeth   Respiratory   Respiratory (WDL) X   Respiratory Pattern Normal   Chest Assessment Chest expansion symmetrical   Bilateral Breath Sounds Clear   Respiratory Additional Assessments No   Respiratory Interventions   Respiratory Interventions Cough and deep breathe   Cough and Deep Breathe   Cough and Deep Breathe Yes   Cardiac   Cardiac (WDL) WDL   Cardiac Regularity Regular   Heart Sounds No adventitious heart sounds   Jugular Venous Distention (JVD) No   Cardiac Symptoms None   Chest Pain Present No   Pacemaker/ICD No   Peripheral Vascular   Peripheral Vascular (WDL) WDL   Pulses R radial;L radial;R pedal;L pedal   Edema Right lower extremity   RLE Edema Non-pitting   RUE Neurovascular Assessment   R Radial Pulse +2   LUE Neurovascular Assessment   L Radial Pulse +1   RLE Neurovascular Assessment   R Pedal Pulse +2   LLE Neurovascular Assessment   L Pedal Pulse +2   Integumentary   Integumentary (WDL) X   Skin Condition/Temp Warm;Dry   Skin Integrity Bruising   Skin Location medial rt knee, rt hip, left arm   Skin Turgor Epidermis thin with loss of subcutaneous tissue   Integumentary Additional Assessments Yes   2 RN Skin Assessment completed with Lisa Ledbetter RN   Tattoos/Piercings   Does patient have tattoos? No   Piercings Remaining No   Nate Scale   Sensory Perceptions 4   Moisture 4   Activity 2   Mobility 2   Nutrition 2   Friction and Shear 2   Nate Scale Score 16   Wound 05/28/20 Incision Hip Right   Date First Assessed/Time First Assessed: 05/28/20 0650   Primary Wound Type: Incision  Location: Hip  Wound Location Orientation: Right  Wound Description (Comments): betadine ointment to incision  Incision's 1st Dressing: DRESSING XEROFORM 5 X 9, SPO    Wound Description HASMUKH   Tess-wound Assessment HASMUKH   Drainage Amount Small   Drainage Description Serous; Yellow  (circled and dated and times)   Treatments Ice applied   Dressing Gauze; Other (Comment)  (gauze and tegaderm left intact and not removed)   Dressing Status Clean;Dry; Intact   Musculoskeletal   Musculoskeletal (WDL) X   Level of Assistance Maximum assist, patient does 25-49%   Assistive Device Front wheel walker   LUE Limited movement   RLE Limited movement;Swelling   LLE Limited movement   Musculoskeletal Additional Assessments No   Gastrointestinal   Gastrointestinal (WDL) X   Abdomen Inspection Soft;Nondistended   Bowel Sounds (All Quadrants) Normoactive   Tenderness Soft;Nontender   Last BM Date 06/01/20   GI Symptoms None Gastrointestinal Additional Assessments No   Admission Bowel Assessment   Usual Pattern Daily   Medications used at home Stool softeners   Problems prior to admission? No   Bowel Shift Assessment   Assistance Needed Stool softener   Bowel Incontinence No   Stool Assessment   Bowel Incontinence No   Genitourinary   Genitourinary (WDL) X   Genitourinary Symptoms Inability to urinate; Other (Comment)  (straight cath at night per pt)   Admission Bladder Assessment   Bladder Problems Prior to Admission? No   Bladder History No problems   Bladder Device Used at [facility] liner   Bladder Shift Assessment   Bladder Incontinence No   Bladder Management Other (Comment)  (need for striaght cath)   Bladder Management Deficit   (need for straight cath per report and per pt)   Urine Assessment   Urinary Incontinence No   Genitalia   Female Genitalia Intact   Genitourinary Additional Assessments   Genitourinary Additional Assessments No   Anal/Rectal   Anal/Rectal (WDL) WDL   Psychosocial   Psychosocial (WDL) WDL   Patient Behaviors/Mood Aggressive verbally, others; Cooperative   Needs Expressed Physical   Ability to Express Feelings Able to express   Ability to Express Needs Able to express   Ability to Express Thoughts Able to express   Ability to Understand Others Usually understands   [place] Suicide Severity Rating Scale   1  Wish to be Dead No   2  Suicidal Thoughts Never   6   Suicide Behavior Question Never   *Risk Level* Low Risk

## 2020-06-01 NOTE — ASSESSMENT & PLAN NOTE
- d/w Dr Marin Finnegan of nephrology who recommended that FR of 1800 cc should be discontinued which was done on 6/12   - currently WNL and stable at 137 on 6/17 (was 137 on 6/12 when FR was stopped)   - cleared for dc by renal/IM

## 2020-06-01 NOTE — ASSESSMENT & PLAN NOTE
- elevated to 4033 ()  - patient takes supplemental B12 therefore will stop and have patient FU with PCP

## 2020-06-01 NOTE — H&P
PHYSICAL MEDICINE AND REHABILITATION H&P/ADMISSION NOTE  Jeff Burnette 78 y o  female MRN: [id number]  Unit/Bed#: -01 Encounter: [id number]     Rehab Diagnosis: 08 11    CC: Rt hip fx     History of Present Illness:   Jeff Burnette is a 78 y o  female who presented to the [facility] after fall and was found to have a right IT fx and is s/p nail fixation by Dr Darian Islas on 5/28  Course complicated by hyponatremia and patient was placed on FR  Subjective: d/w patient active medical issues     Review of Systems: A 10-point review of systems was performed  Negative except as listed above      Plan:     * Closed intertrochanteric fracture of right femur [facility])  Assessment & Plan  - s/p nail fixation by Dr Darian Islas on 5/28  - TTWB per ortho  - OP FU with Dr Sarai Mera  - Hg currently 9 1 post-operatively; recheck in AM  - IV venofer through 6/3 per renal  - IM & renal co-managing     Leukocytosis  Assessment & Plan  - currently 11 6 (likely reactive); recheck in AM  - IM monitoring     Hyponatremia  Assessment & Plan  - currently 130 however WNL at 135 when corrected for blood sugar level--> d/w Dr Augusto May and she recommend FR be changed from 1200 to 1500 cc per day  - renal managing     Elevated vitamin B12 level  Assessment & Plan  - elevated to 4033 ()  - patient takes supplemental B12 therefore will stop and have patient FU with PCP     Postoperative urinary retention  Assessment & Plan  - required straight cath in acute care  - urinary retention protocol      DM (diabetes mellitus) ([facility] )  Assessment & Plan  - per patient on metformin 500 mg BID with meals, insulin 5 units with meals, and levemir 5 (not 10) units at HS   - follows with Dr Sherie Douglass of endocrine  - IM managing     Hypothyroidism  Assessment & Plan  - TSH of 5/28 elevated (free T4 WNL)  - on home regimen of levothyroxine 100 mcg qd except for Sundays when she takes 50 mcg qd  - follows with Dr Andre Duran of endocrine  - IM managing and recommend patient have repeat TFTs as OP and FU with Dr Mireya Gayle hypertension  Assessment & Plan  - at home ramapril 5 mg qd  - IM & renal co-managing     Hyperlipidemia  Assessment & Plan  - on home lipitor 20 mg qpm (per patient no longer on crestor)         Drug regimen reviewed, all potential adverse effects identified and addressed:    Scheduled Meds:  Current Facility-Administered Medications:  acetaminophen 975 mg Oral Q8H Laura Duarte MD   [START ON 6/2/2020] aspirin 81 mg Oral Daily Janeen Vargas MD   [START ON 6/12/2020] aspirin 325 mg Oral Daily MD Hellen Mtz ON 6/2/2020] atorvastatin 20 mg Oral Daily With Jefferson Chapman MD   bisacodyl 10 mg Rectal Daily PRN MD Hellen Mtz ON 6/2/2020] fondaparinux 2 5 mg Subcutaneous Daily Janeen Vargas MD   insulin detemir 20 Units Subcutaneous HS MD Hellen Mtz ON 6/2/2020] insulin lispro 1-5 Units Subcutaneous TID AC Janeen Vargas MD   insulin lispro 1-5 Units Subcutaneous HS MD Hellen Mtz ON 6/2/2020] insulin lispro 10 Units Subcutaneous TID With MD Hellen Ballard ON 6/2/2020] iron sucrose 200 mg Intravenous Daily Janeen Vargas MD   [START ON 6/2/2020] levothyroxine 100 mcg Oral Once per day on Mon Tue Wed Thu Fri Sat MD Hellen Mtz ON 6/7/2020] levothyroxine 50 mcg Oral Weekly MD Hellen Mtz ON 6/2/2020] lisinopril 5 mg Oral Daily Janeen Vargas MD   methocarbamol 500 mg Oral Q6H PRN MD Hellen Mtz ON 6/2/2020] polyethylene glycol 17 g Oral Daily MD Hellen Mtz ON 6/2/2020] potassium-sodium phosphates 2 packet Oral BID With Meals Janeen Vargas MD   traMADol 50 mg Oral Q6H PRN Janeen Vargas MD            Incidental findings:    1) mildly decreased alk phos level: currently 54 (LLN 55)    DVT ppx: per Dr Lukas Barth protocol arixtra 2 5 mg sc x 2 weeks post-op then followed by asa 325 mg for 4 weeks (note patient is on asa 81 mg qd at home and was cleared in acute care to be on both home asa 81 mg qd and arixtra and patient has been on both since 5/29 however once patient transitions to asa 325 mg qd on 6/12 per Dr Lennox Curls protocol she will stop asa 81 mg qd until she has completed the 4 week course of asa 325 gm qd per Dr Lennox Curls protocol)     Code: confirmed with patient that she wishes to be DNR/DNI and patient verbalized her understanding of what this means     Note: confirmed home meds with patient             Functional History - Prior to Admission:      I PTA     Functional Status Upon Admission to ARC:  Mobility: max  Transfers: max  ADLs: max     Physical Exam:      General: alert, no apparent distress, cooperative and comfortable  HEENT:  Head: Normal, normocephalic, atraumatic  CARDIAC:  +S1/2  LUNGS:  no abnormal respiratory pattern, no retractions noted, non-labored breathing   ABDOMEN:  soft NT  EXTREMITIES:  volume status currently stable   NEURO:  CN 2-12 grossly intact, 4/5 UE B/L, 4/5 L hip flexion, 4/5 B/L dorsiflexion/plantar flexion, lt touch grossly intact, no gross dysmetria on F-N testing B/L  PSYCH:  mood/affect currently stable          Laboratory:    Results from last 7 days   Lab Units 05/30/20  0608 05/29/20  0841 05/28/20  0508 05/27/20  0654   HEMOGLOBIN g/dL 9 1* 9 8* 10 5* 10 4*   HEMATOCRIT % 26 6* 28 7* 31 1* 32 3*   WBC Thousand/uL  --  11 60* 12 40* 11 00*     Results from last 7 days   Lab Units 06/01/20  0503 05/31/20  0657 05/30/20  1145  05/28/20  0508   BUN mg/dL 17 21 20   < > 11   SODIUM mmol/L 130* 130* 123*   < > 119*   POTASSIUM mmol/L 5 2 4 6 5 3   < > 4 3   CHLORIDE mmol/L 93* 91* 87*   < > 87*   CREATININE mg/dL 0 70 0 80 0 71   < > 0 56*   AST U/L  --   --   --   --  32   ALT U/L  --   --   --   --  16    < > = values in this interval not displayed       Results from last 7 days   Lab Units 05/26/20  1534   PROTIME seconds 12 4   INR  0 97              Past Medical History: Past Surgical History:   Family History:   Social history:   Past Medical History:   Diagnosis Date    Cancer [facility])     Controlled type 2 diabetes mellitus with diabetic polyneuropathy, with long-term current use of insulin ([facility] ) 5/21/2008    Diabetes mellitus ([facility] )     Diabetic polyneuropathy ([facility] ) 5/21/2008    ICD10 clean up    Disease of thyroid gland     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    HL (hearing loss)     Hodgkin's disease ([facility] ) 2008    Left neck, had radiation    Hypertension     Neuropathy     Osteoporosis     Past Surgical History:   Procedure Laterality Date    ADENOIDECTOMY      APPENDECTOMY      CATARACT EXTRACTION, BILATERAL      CHOLECYSTECTOMY      MOUTH SURGERY      oral cancer left lower lip    OVARY SURGERY      [place] OPEN RX FEMUR FX+INTRAMED SHE Right 5/28/2020    Procedure: INSERTION NAIL IM FEMUR ANTEGRADE (TROCHANTERIC); Surgeon: Ivy Sheehan DO;  Location: [address] [place] [address];  Service: Orthopedics    TONSILLECTOMY      TOTAL THYROIDECTOMY  2008    Performed after left neck dissection and left oral cancer diagnosis     Family History   Problem Relation Age of Onset    Cancer Mother     Diabetes Mother     Diabetes Father     Hypertension Father       Social History     Socioeconomic History    Marital status:       Spouse name: None    Number of children: None    Years of education: None    Highest education level: None   Occupational History    None   Social Needs    Financial resource strain: None    Food insecurity:     Worry: None     Inability: None    Transportation needs:     Medical: None     Non-medical: None   Tobacco Use    Smoking status: Never Smoker    Smokeless tobacco: Never Used   Substance and Sexual Activity    Alcohol use: Not Currently     Alcohol/week: 0 0 standard drinks     Frequency: Never     Drinks per session: Patient refused     Binge frequency: Never    Drug use: Never    Sexual activity: Not Currently   Lifestyle    Physical activity:     Days per week: None     Minutes per session: None    Stress: None   Relationships    Social connections:     Talks on phone: None     Gets together: None     Attends Confucianist service: None     Active member of club or organization: None     Attends meetings of clubs or organizations: None     Relationship status: None    Intimate partner violence:     Fear of current or ex partner: None     Emotionally abused: None     Physically abused: None     Forced sexual activity: None   Other Topics Concern    None   Social History Narrative    None          Current Medical Diagnosis Allergies   Patient Active Problem List   Diagnosis    Hyperlipidemia    Essential hypertension    Hypothyroidism    DM [facility]diabetes mellitus) ([facility] )    Closed intertrochanteric fracture of right femur ([facility] )    Hyponatremia    Postoperative urinary retention    Elevated vitamin B12 level    Leukocytosis    Anemia    No Known Allergies        Medical Necessity Criteria for ARC Admission: anemia  In addition, the preadmission screen, post-admission physical evaluation, overall plan of care and admissions order demonstrate a reasonable expectation that the following criteria were met at the time of admission to the [facility]  1  The patient requires active and ongoing therapeutic intervention of multiple therapy disciplines (physical therapy, occupational therapy, speech-language pathology, or prosthetics/orthotics), one of which is physical or occupational therapy  2  Patient requires an intensive rehabilitation therapy program, as defined in Chapter 1, section 110 2 2 of the CMS Medicare Policy Manual  This intensive rehabilitation therapy program will consist of at least 3 hours of therapy per day at least 5 days per week or at least 15 hours of intensive rehabilitation therapy within a 7 consecutive day period, beginning with the date of admission to the [facility]      3  The patient is reasonably expected to actively participate in, and benefit significantly from, the intensive rehabilitation therapy program as defined in Chapter 1, section 110 2 2 of the CMS Medicare Policy Manual at this time of admission to the [facility]  She can reasonably be expected to make measurable improvement (that will be of practical value to improve the patients functional capacity or adaptation to impairments) as a result of the rehabilitation treatment, as defined in section 110 3, and such improvement can be expected to be made within the prescribed period of time  As noted in the CMS Medicare Policy Manual, the patient need not be expected to achieve complete independence in the domain of self-care nor be expected to return to his or her prior level of functioning in order to meet this standard  4  The patient must require physician supervision by a rehabilitation physician  As such, a rehabilitation physician will conduct face-to-face visits with the patient at least 3 days per week throughout the patients stay in the [facility] to assess the patient both medically and functionally, as well as to modify the course of treatment as needed to maximize the patients capacity to benefit from the rehabilitation process  5  The patient requires an intensive and coordinated interdisciplinary approach to providing rehabilitation, as defined in Chapter 1, section 110 2 5 of the CMS Medicare Policy Manual  This will be achieved through periodic team conferences, conducted at least once in a 7-day period, and comprising of an interdisciplinary team of medical professionals consisting of: a rehabilitation physician, registered nurse,  and/or , and a licensed/certified therapist from each therapy discipline involved in treating the patient  Changes Since Pre-admission Assessment: None -This patient's participation in rehab continues to be reasonable, necessary and appropriate      CMS Required Post-Admission Physician Evaluation Elements  History and Physical, including medical history, functional history and active comorbidities as in above text  Post-Admission Physician Evaluation:  The patient has the potential to make improvement and is in need of physical, occupational, and/or therapy services  The patient may also need nutritional services  Given the patient's complex medical condition and risk of further medical complications, rehabilitative services cannot be safely provided at a lower level of care, such as a skilled nursing facility  I have reviewed the patient's functional and medical status at the time of the preadmission screening and they are the same as on the day of this admission  I acknowledge that I have personally performed a full physical examination on this patient within 24 hours of admission  The patient and/or family demonstrated understanding the rehabilitation program and the discharge process after we discussed them       Agree in entirety: yes  Minor adaptions: none    Major changes: none     Arabella Yanez MD  Physical Medicine and Rehabilitation

## 2020-06-01 NOTE — PROGRESS NOTES
Pt admitted to ARU s/p right femur fx surgery  AAO x 3  Per nursing report from med surg and daughter, pt cannot take oxycodone for pain due to increased confusion which happened last night   Dr Roshan Donis was made aware  NV check stable to right leg, dressing w small amt serous yellow drainage, same circled and dated  Limitations in left arm due to skin graft needed for left facial oral surgery, deformity noted left face  No facial grimacing but does c/o post op pain with any movement  Assist of 2 transfer from chair to bed  Oriented to room and surroundings and rehab schedules  Safety tab maintained, callbell within reach  Daughter with pt at this time

## 2020-06-01 NOTE — ASSESSMENT & PLAN NOTE
- at home ramapril 5 mg qd  - IM & renal co-managing and recommend patient be dc'd on current inpt regimen of lisinopril 5 mg qd instead of ramapril

## 2020-06-02 LAB
ANION GAP SERPL CALCULATED.3IONS-SCNC: 6 MMOL/L (ref 4–13)
BASOPHILS # BLD AUTO: 0.1 THOUSANDS/ΜL (ref 0–0.1)
BASOPHILS NFR BLD AUTO: 1 % (ref 0–2)
BUN SERPL-MCNC: 21 MG/DL (ref 7–25)
CALCIUM SERPL-MCNC: 8.1 MG/DL (ref 8.6–10.5)
CHLORIDE SERPL-SCNC: 95 MMOL/L (ref 98–107)
CO2 SERPL-SCNC: 30 MMOL/L (ref 21–31)
CREAT SERPL-MCNC: 0.72 MG/DL (ref 0.6–1.2)
EOSINOPHIL # BLD AUTO: 0.3 THOUSAND/ΜL (ref 0–0.61)
EOSINOPHIL NFR BLD AUTO: 3 % (ref 0–5)
ERYTHROCYTE [DISTWIDTH] IN BLOOD BY AUTOMATED COUNT: 13.2 % (ref 11.5–14.5)
GFR SERPL CREATININE-BSD FRML MDRD: 80 ML/MIN/1.73SQ M
GLUCOSE SERPL-MCNC: 101 MG/DL (ref 65–140)
GLUCOSE SERPL-MCNC: 119 MG/DL (ref 65–140)
GLUCOSE SERPL-MCNC: 138 MG/DL (ref 65–140)
GLUCOSE SERPL-MCNC: 169 MG/DL (ref 65–99)
GLUCOSE SERPL-MCNC: 47 MG/DL (ref 65–140)
GLUCOSE SERPL-MCNC: 68 MG/DL (ref 65–140)
GLUCOSE SERPL-MCNC: 94 MG/DL (ref 65–140)
HCT VFR BLD AUTO: 24.1 % (ref 42–47)
HGB BLD-MCNC: 8.4 G/DL (ref 12–16)
LYMPHOCYTES # BLD AUTO: 1.5 THOUSANDS/ΜL (ref 0.6–4.47)
LYMPHOCYTES NFR BLD AUTO: 18 % (ref 21–51)
MAGNESIUM SERPL-MCNC: 2.1 MG/DL (ref 1.9–2.7)
MCH RBC QN AUTO: 33.6 PG (ref 26–34)
MCHC RBC AUTO-ENTMCNC: 34.8 G/DL (ref 31–37)
MCV RBC AUTO: 96 FL (ref 81–99)
MONOCYTES # BLD AUTO: 0.9 THOUSAND/ΜL (ref 0.17–1.22)
MONOCYTES NFR BLD AUTO: 10 % (ref 2–12)
NEUTROPHILS # BLD AUTO: 5.7 THOUSANDS/ΜL (ref 1.4–6.5)
NEUTS SEG NFR BLD AUTO: 68 % (ref 42–75)
OSMOLALITY UR: 566 MMOL/KG
PHOSPHATE SERPL-MCNC: 3.6 MG/DL (ref 3–5.5)
PLATELET # BLD AUTO: 327 THOUSANDS/UL (ref 149–390)
PMV BLD AUTO: 8.4 FL (ref 8.6–11.7)
POTASSIUM SERPL-SCNC: 4.4 MMOL/L (ref 3.5–5.5)
RBC # BLD AUTO: 2.5 MILLION/UL (ref 3.9–5.2)
SODIUM SERPL-SCNC: 131 MMOL/L (ref 134–143)
WBC # BLD AUTO: 8.4 THOUSAND/UL (ref 4.8–10.8)

## 2020-06-02 PROCEDURE — 84100 ASSAY OF PHOSPHORUS: CPT | Performed by: PHYSICAL MEDICINE & REHABILITATION

## 2020-06-02 PROCEDURE — 97542 WHEELCHAIR MNGMENT TRAINING: CPT

## 2020-06-02 PROCEDURE — 99223 1ST HOSP IP/OBS HIGH 75: CPT | Performed by: INTERNAL MEDICINE

## 2020-06-02 PROCEDURE — 97535 SELF CARE MNGMENT TRAINING: CPT

## 2020-06-02 PROCEDURE — 97530 THERAPEUTIC ACTIVITIES: CPT

## 2020-06-02 PROCEDURE — 80048 BASIC METABOLIC PNL TOTAL CA: CPT | Performed by: PHYSICAL MEDICINE & REHABILITATION

## 2020-06-02 PROCEDURE — 85025 COMPLETE CBC W/AUTO DIFF WBC: CPT | Performed by: PHYSICAL MEDICINE & REHABILITATION

## 2020-06-02 PROCEDURE — 83735 ASSAY OF MAGNESIUM: CPT | Performed by: PHYSICAL MEDICINE & REHABILITATION

## 2020-06-02 PROCEDURE — 83935 ASSAY OF URINE OSMOLALITY: CPT | Performed by: INTERNAL MEDICINE

## 2020-06-02 PROCEDURE — 99232 SBSQ HOSP IP/OBS MODERATE 35: CPT | Performed by: NURSE PRACTITIONER

## 2020-06-02 PROCEDURE — 97163 PT EVAL HIGH COMPLEX 45 MIN: CPT

## 2020-06-02 PROCEDURE — 82948 REAGENT STRIP/BLOOD GLUCOSE: CPT

## 2020-06-02 PROCEDURE — 97167 OT EVAL HIGH COMPLEX 60 MIN: CPT

## 2020-06-02 PROCEDURE — 99233 SBSQ HOSP IP/OBS HIGH 50: CPT | Performed by: PHYSICAL MEDICINE & REHABILITATION

## 2020-06-02 PROCEDURE — 97110 THERAPEUTIC EXERCISES: CPT

## 2020-06-02 RX ORDER — TRAMADOL HYDROCHLORIDE 50 MG/1
50 TABLET ORAL EVERY 6 HOURS SCHEDULED
Status: DISCONTINUED | OUTPATIENT
Start: 2020-06-02 | End: 2020-06-18 | Stop reason: HOSPADM

## 2020-06-02 RX ADMIN — TRAMADOL HYDROCHLORIDE 50 MG: 50 TABLET, FILM COATED ORAL at 23:55

## 2020-06-02 RX ADMIN — INSULIN DETEMIR 1 UNITS: 100 INJECTION, SOLUTION SUBCUTANEOUS at 21:37

## 2020-06-02 RX ADMIN — ACETAMINOPHEN 975 MG: 325 TABLET ORAL at 13:12

## 2020-06-02 RX ADMIN — ASPIRIN 81 MG: 81 TABLET, COATED ORAL at 08:27

## 2020-06-02 RX ADMIN — INSULIN LISPRO 6 UNITS: 100 INJECTION, SOLUTION INTRAVENOUS; SUBCUTANEOUS at 08:25

## 2020-06-02 RX ADMIN — TRAMADOL HYDROCHLORIDE 50 MG: 50 TABLET, FILM COATED ORAL at 02:43

## 2020-06-02 RX ADMIN — METHOCARBAMOL 500 MG: 500 TABLET, FILM COATED ORAL at 08:27

## 2020-06-02 RX ADMIN — FONDAPARINUX SODIUM 2.5 MG: 2.5 INJECTION, SOLUTION SUBCUTANEOUS at 08:24

## 2020-06-02 RX ADMIN — POTASSIUM & SODIUM PHOSPHATES POWDER PACK 280-160-250 MG 2 PACKET: 280-160-250 PACK at 17:10

## 2020-06-02 RX ADMIN — TRAMADOL HYDROCHLORIDE 50 MG: 50 TABLET, FILM COATED ORAL at 10:27

## 2020-06-02 RX ADMIN — ACETAMINOPHEN 975 MG: 325 TABLET ORAL at 05:26

## 2020-06-02 RX ADMIN — TRAMADOL HYDROCHLORIDE 50 MG: 50 TABLET, FILM COATED ORAL at 17:10

## 2020-06-02 RX ADMIN — LEVOTHYROXINE SODIUM 100 MCG: 100 TABLET ORAL at 06:12

## 2020-06-02 RX ADMIN — INSULIN LISPRO 6 UNITS: 100 INJECTION, SOLUTION INTRAVENOUS; SUBCUTANEOUS at 17:10

## 2020-06-02 RX ADMIN — ACETAMINOPHEN 975 MG: 325 TABLET ORAL at 21:35

## 2020-06-02 RX ADMIN — ATORVASTATIN CALCIUM 20 MG: 20 TABLET, FILM COATED ORAL at 17:10

## 2020-06-02 RX ADMIN — POTASSIUM & SODIUM PHOSPHATES POWDER PACK 280-160-250 MG 2 PACKET: 280-160-250 PACK at 08:28

## 2020-06-02 RX ADMIN — LISINOPRIL 5 MG: 5 TABLET ORAL at 08:27

## 2020-06-02 RX ADMIN — INSULIN LISPRO 5 UNITS: 100 INJECTION, SOLUTION INTRAVENOUS; SUBCUTANEOUS at 12:50

## 2020-06-02 RX ADMIN — IRON SUCROSE 200 MG: 20 INJECTION, SOLUTION INTRAVENOUS at 15:03

## 2020-06-02 NOTE — PCC OCCUPATIONAL THERAPY
6/2/2020: Pt participates in ADLs and transfers following eval this day  Pt requires assist due to decreased balance, safety, endurance and RLE ROM/ str with TTWB and pain  Pt's current LOF as listed above and will benefit from skilled OT services to increase independence with daily tasks  6/8/2020: Pt participates in ADLs, transfers and BUE therex  Pt is making great progress with A/E use for LB and assist of one for all tasks maintaining TTWB RLE well  Pt requires assist due to decreased balance, safety, endurance and RLE strength and ROM  Pt's current LOF as listed above  Pt will benefit from continued skilled OT services to increase independence with daily tasks  06/15/2020 Pt participating in [facility] via basin level with compensatory strategies and safe techniques, safety with functional txfs, home mgmt tasks via w/c level, generalized strength and endurance through the use of TE/TA  Pt progressing toward OT goals  LOF noted above  Barriers include TTWB R LE, impaired balance in stance, pain, decreased UE ROM and safety deficits  OT issued AE including leg  and ongoing educ in same  Simulating home s/u with txfs in preparation for d/c  W/C to be primary mode of transport at home  Plan is to continue OT to address established OT goals in prep for d/c to home with GD staying with pt

## 2020-06-02 NOTE — ASSESSMENT & PLAN NOTE
· BP appears to be well controlled  · Continue with ramipril substitute- lisinopril while in house  · Monitor blood pressure closely

## 2020-06-02 NOTE — TEAM CONFERENCE
Acute RehabilitationTeam Conference Note  Date: 6/2/2020   Time: 11:44 AM       Patient Name:  Karine Griffin       Medical Record Number: [id number]   YOB: 1940  Sex: Female          Room/Bed:  [facility] 217-01  Payor Info:  Payor: Duc German / Plan: MEDICARE A AND B / Product Type: Medicare A & B Fee for Service /      Admitting Diagnosis: Closed femur fracture ([facility] ) Albert Johnston   Admit Date/Time:  6/1/2020  6:03 PM  Admission Comments: No comment available     Primary Diagnosis:  Closed intertrochanteric fracture of right femur ([facility] )  Principal Problem: Closed intertrochanteric fracture of right femur [facility])    Patient Active Problem List    Diagnosis Date Noted    Postoperative urinary retention 06/01/2020    Elevated vitamin B12 level 06/01/2020    Leukocytosis 06/01/2020    Anemia 06/01/2020    Hyponatremia 05/27/2020    Closed intertrochanteric fracture of right femur ([facility] ) 05/26/2020    Hypothyroidism 08/14/2015    Hyperlipidemia 12/23/2014    Essential hypertension 10/10/2013    DM (diabetes mellitus) ([facility] ) 05/21/2008       Physical Therapy:    Weight Bearing Status: Toe touch  Transfers: Moderate Assistance  Bed Mobility: Moderate Assistance  Wheelchair Mobility Distance: 163 ft  Wheelchair Mobility: Supervision  Discharge Recommendations: Home with:  [address] with[de-identified] Family Support, First Floor Setup, Home Physical Therapy    06/02/2020:   Patient seen for IE today  Presents with decreased ROM/strength, decreased balance and safety, TTWB RLE, and pain  Patient MOD A bed mobility, MOD A transfers with walker, S wheelchair mobility  Patient non-ambulatory at this time due to pain and decrease weight bearing RLE  May benefit from continued inpatient [facility] PT to increased function, safety, and increased independence in prep for safe d/c      Occupational Therapy:  Eating: Independent  Grooming: Supervision  Bathing: Assist of 2  Bathing: Assist of 2  Upper Body Dressing: Supervision  Lower Body Dressing: Assist of 2  Toileting: Assist of 2  Tub/Shower Transfer: (TBA)  Toilet Transfer: Assist of 2  Cognition: Within Defined Limits  Orientation: Person, Time, Place, Situation  Discharge Recommendations: Home with:  [address] with[de-identified] Family Support, First Floor Setup, Home Occupational Therapy       6/2/2020: Pt participates in ADLs and transfers following eval this day  Pt requires assist due to decreased balance, safety, endurance and RLE ROM/ str with TTWB and pain  Pt's current LOF as listed above and will benefit from skilled OT services to increase independence with daily tasks  Speech Therapy:           No notes on file    Nursing Notes:  Appetite: Good  Diet Type: Diabetic(1500 fld restriction)                      Diet Patient/Family Education Complete: Yes    Type of Wound (LDA): Wound                    Type of Wound Patient/Family Education: Yes  Bladder: 2 - Maximal Assistance     Bladder Patient/Family Education: Yes  Bowel: 2 - Maximal Assistance     Bowel Patient/Family Education: Yes  Pain Location/Orientation: Location: Hip, Location: Knee, Orientation: Right  Pain Score: 9                       Hospital Pain Intervention(s): Medication (See MAR), Repositioned  Pain Patient/Family Education: Yes  Medication Management/Safety  Injectable: Arixtra  Medication Patient/Family Education Complete: No    6/2/20 Admit to [facility] 6/1/20 s/p R femur fx with surgical repair  Alert and oriented  Lungs clear/decreased  HR regular  +1/+2 edema bilateral legs  Dsg intact over incision R hip  Pt is TTWB to RLE  Pt has been having difficulty with urine retention since ramos removal  Pt has prn tramadol for pain   [facility]    Case Management:     Discharge Planning  Living Arrangements: Alone  Support Systems: Family members, Friends/neighbors  Assistance Needed: [facility]  Type of Current Residence: Private residence  [address]: No  No notes on file    Is the patient actively participating in therapies? yes  List any modifications to the treatment plan: na    Barriers Interventions   pain Medication management   Right TTWB Cues, ADL, transfer, pre- gait training   Decreased strength Therapeutic exercise, therapeutic activity   Decreased balance ADL, transfer, pre-gait training         Is the patient making expected progress toward goals?  yes  List any update or changes to goals: na    Medical Goals: Patient will be able to manage medical conditions and comorbid conditions with medications and follow up upon completion of rehab program    Weekly Team Goals:   Rehab Team Goals  ADL Team Goal: Patient will be independent with ADLs with least restrictive device upon completion of rehab program  Bowel/Bladder Team Goal: Patient will be independent with bladder/bowel management with least restrictive device upon completion of rehab program  Cognitive Team Goal: Patient will return to premorbid level of cognitive activity upon completion of rehab program     Min assist bed mobility and transfers  S wc mobility  Min assist self care    Health and wellness: to be able to return to completing homemaking tasks    Discussion: Plan for return home alone with family support with [facility] for PT, OT, and nursing     Anticipated Discharge Date:  June 16, 2020

## 2020-06-02 NOTE — PROGRESS NOTES
06/02/20 0916   Charting Type   Charting Type Shift assessment   Neurological   Neuro (WDL) X   Level of Consciousness Alert/awake   Orientation Level Oriented X4   Cognition Follows commands   Speech Clear   Swallow Able to swallow solids and liquids without difficulty   R Hand  Strong   L Hand  Weak   R Foot Dorsiflexion Moderate   L Foot Dorsiflexion Moderate   R Foot Plantar Flexion Moderate   L Foot Plantar Flexion Moderate   RUE Motor Response Responds to commands   RUE Sensation Full sensation   RUE Muscle Strength 5- Normal strength   LUE Motor Response Responds to commands   LUE Sensation Full sensation   LUE Muscle Strength 4- Movement against gravity and limited resistance   RLE Motor Response Responds to commands; Purposeful movement   RLE Sensation Full sensation   RLE Muscle Strength 3- Movement against gravity only   LLE Motor Response Responds to commands   LLE Sensation Full sensation   LLE Muscle Strength 5- Normal strength   Neuro Symptoms Forgetful   Mercedes Coma Scale   Eye Opening 4   Best Verbal Response 5   Best Motor Response 6   Mercedes Coma Scale Score 15   HEENT   HEENT (WDL) X   Head and Face Asymmetrical;Other (Comment)  (r/t prior oral surgery)   R Eye Intact   L Eye Intact   R Ear Intact   L Ear Intact   Tongue Pink & moist   Mucous Membrane(s) Moist;Pink   Teeth Missing teeth   Neck Asymmetrical   Respiratory   Respiratory (WDL) X   Respiratory Pattern Normal   Chest Assessment Chest expansion symmetrical   Bilateral Breath Sounds Clear;Diminished   R Breath Sounds Clear;Diminished   L Breath Sounds Clear;Diminished   Respiratory Interventions   Respiratory Interventions Cough and deep breathe;Incentive spirometry   Cardiac   Cardiac (WDL) WDL   Cardiac Regularity Regular   Pacemaker/ICD No   Pain Assessment   Pain Assessment Tool 0-10   Pain Score 9   Pain Location/Orientation Orientation: Right;Location: Hip;Location: Leg   Hospital Pain Intervention(s) Medication (See MAR)   Pain Assessment Post Intervention   Post Intervention POSS 1   Peripheral Vascular   Peripheral Vascular (WDL) WDL   Edema Right lower extremity   RLE Edema Non-pitting   RUE Neurovascular Assessment   R Radial Pulse +2   LUE Neurovascular Assessment   L Radial Pulse +1   RLE Neurovascular Assessment   R Pedal Pulse +2   LLE Neurovascular Assessment   L Pedal Pulse +2   Integumentary   Integumentary (WDL) X   Skin Color Appropriate for ethnicity   Skin Condition/Temp Warm;Dry   Skin Integrity Bruising   Skin Location R hip; R medial knee   Skin Turgor Epidermis thin with loss of subcutaneous tissue   Nate Scale   Sensory Perceptions 4   Moisture 4   Activity 2   Mobility 2   Nutrition 3   Friction and Shear 2   Nate Scale Score 17   Wound 05/28/20 Incision Hip Right   Date First Assessed/Time First Assessed: 05/28/20 0650   Primary Wound Type: Incision  Location: Hip  Wound Location Orientation: Right  Wound Description (Comments): betadine ointment to incision  Incision's 1st Dressing: DRESSING XEROFORM 5 X 9, SPO    Wound Description Clean;Dry; Intact; Swelling; Other (Comment)  (2 incisions)   Tess-wound Assessment Other (Comment)  (bruising)   Closure Staples   Drainage Amount None   Treatments   (refused ice pack)   Dressing Open to air   Wound 06/01/20 Pressure Injury Heel Right   Date First Assessed/Time First Assessed: 06/01/20 2028   Primary Wound Type: Pressure Injury  Location: Heel  Wound Location Orientation: Right   Wound Description Light purple   Staging Deep Tissue Injury   Dressing Open to air   Musculoskeletal   Musculoskeletal (WDL) X   Level of Assistance Maximum assist, patient does 25-49%   Assistive Device Front wheel walker   RUE Full movement   LUE Limited movement  (prior Lt shoulder sx)   RLE Limited movement; Injury/trauma; Surgery   LLE Limited movement   Gastrointestinal   Gastrointestinal (WDL) X   Last BM Date 06/01/20   Bowel Shift Assessment   Assistance Needed Stool softener   Genitourinary   Genitourinary (WDL) X   Genitourinary Symptoms Bladder distention  (denies urge to void)   Bladder Shift Assessment   Bladder Device   (will scan in 3 hrs if no void)   Genitalia   Female Genitalia Intact   Genitourinary Additional Assessments   Genitourinary Additional Assessments No   Anal/Rectal   Anal/Rectal (WDL) WDL   Psychosocial   Psychosocial (WDL) WDL   Patient Behaviors/Mood Pleasant; Cooperative   Ability to Express Feelings Able to express   Ability to Express Needs Able to express   Ability to Express Thoughts Able to express   Ability to Understand Others Understands   Cough   Cough None

## 2020-06-02 NOTE — PROGRESS NOTES
06/02/20 0853   Patient Data   Rehab Impairment R hip fx s/p ORIF; TTWB RLE   Support System   Name daughter and son   Robert Santos and [facility] Status both in good shape   Home Setup   Type of Home Single Level   Method of Entry Stairs;Hand Rail Right   Number of Stairs 2   Available Equipment Roller Walker;Single [place]; Wheelchair;Bedside Commode   Prior Level of Function   Indoor-Mobility (Ambulation) 3  Independent - Patient completed the activities by him/herself, with or without an assistive device, with no assistance from a helper  Stairs 3  Independent - Patient completed the activities by him/herself, with or without an assistive device, with no assistance from a helper  Prior Device Used D  Walker  (occasional use)   Restrictions/Precautions   Precautions Fall Risk;Pain   RLE Weight Bearing Per Order TTWB   Pain Assessment   Pain Assessment Tool 0-10   Pain Score 9   Pain Location/Orientation Orientation: Right;Location: Leg;Location: Hip;Location: Knee   Transfer Bed/Chair/Wheelchair   Limitations Noted In Balance; Endurance;Pain Management;LE Strength  (TTWB RLE)   Adaptive Equipment Roller Walker   Type of Assistance Needed Physical assistance   Amount of Physical Assistance Provided 50%-74%   Comment MOD assist with cues   Chair/Bed-to-Chair Transfer CARE Score 2   Roll Left and Right   Type of Assistance Needed Physical assistance   Amount of Physical Assistance Provided 75% or more   Comment max assist to R; mod assist to L using bed rail   Roll Left and Right CARE Score 2   Sit to Lying   Type of Assistance Needed Physical assistance   Amount of Physical Assistance Provided 50%-74%   Comment MOD A   Sit to Lying CARE Score 2   Lying to Sitting on Side of Bed   Type of Assistance Needed Physical assistance   Amount of Physical Assistance Provided 50%-74%   Comment mod assist   Lying to Sitting on Side of Bed CARE Score 2   Sit to Stand   Type of Assistance Needed Physical assistance   Amount of Physical Assistance Provided 50%-74%   Comment MOD A with cues   Sit to Stand CARE Score 2   Car Transfer   Reason if not Attempted Safety concerns   Car Transfer CARE Score 88   Ambulation   Findings not tested, TBA as able   Walk 10 Feet   Reason if not Attempted Safety concerns   Walk 10 Feet CARE Score 88   Walk 50 Feet with Two Turns   Reason if not Attempted Safety concerns   Walk 50 Feet with Two Turns CARE Score 88   Walk 150 Feet   Reason if not Attempted Safety concerns   Walk 150 Feet CARE Score 88   Walking 10 Feet on Uneven Surfaces   Reason if not Attempted Safety concerns   Walking 10 Feet on Uneven Surfaces CARE Score 88   Wheelchair mobility   Type of Wheelchair Used 1  Manual   Method Right upper extremity; Left upper extremity; Left lower extremity   Distance Level Surface (feet) 163 ft  (163' 157')   Distance Wheeled 3% Grade 30 ft   Findings level and carpet   Wheel 50 Feet with Two Turns   Type of Assistance Needed Supervision   Wheel 50 Feet with Two Turns CARE Score 4   Wheel 150 Feet   Type of Assistance Needed Supervision   Wheel 150 Feet CARE Score 4   Curb or Single Stair   Reason if not Attempted Safety concerns   1 Step (Curb) CARE Score 88   4 Steps   Reason if not Attempted Safety concerns   4 Steps CARE Score 88   12 Steps   Reason if not Attempted Safety concerns   12 Steps CARE Score 88   Stairs   Findings TBA as able   Comprehension   Comprehension (FIM) 6 - Understands complex/abstract but requires more time   Expression   Expression (FIM) 7 - Expresses complex/abstract ideas in a reasonable time w/o devices or helper     Social Interaction   Social Interaction (FIM) 7 - Interacts appropriately without assistive device, medication or helper   Problem Solving   Problem solving (FIM) 5 - Solves basic problems 90% of time   Memory   Memory (FIM) 6 - Recognizes with extra time   RLE Assessment   RLE Assessment   (decreased ROM knee and hip; ankle WFL)   Strength RLE   R Hip Flexion 2-/5   R Hip Extension 2-/5   R Hip ABduction 2-/5   R Hip ADduction 2-/5   R Knee Flexion 2/5   R Knee Extension 2/5   R Ankle Dorsiflexion 3/5   R Ankle Plantar Flexion 3/5   LLE Assessment   LLE Assessment   (ROM WFL)   Strength LLE   L Hip Flexion 4/5   L Hip Extension 4/5   L Hip ABduction 3+/5   L Hip ADduction 3+/5   L Knee Flexion 3+/5   L Knee Extension 4+/5   L Ankle Dorsiflexion 4/5   L Ankle Plantar Flexion 4/5   Coordination   Movements are Fluid and Coordinated 1   Sensation   Light Touch No apparent deficits   Propioception No apparent deficits   Cognition   Overall Cognitive Status WFL   Arousal/Participation Alert; Responsive; Cooperative   Attention Within functional limits   Orientation Level Oriented X4   Memory Within functional limits   Following Commands Follows all commands and directions without difficulty   Therapeutic Exercise   Therapeutic Exercise/Activity seated ther ex   Discharge Information   Patient's Discharge Plan home   Patient's Rehab Expectations " I want to walk without pain"   Barriers to Discharge Home Safety Considerations   Impressions Patient seen for IE  Presents, followiing R hip fx and repair, with decreased ROM/strength, decreased balance and safety, TTWB RLE, pain; all affecting functional mobility  Would benefir from continued inpatient [facility] PT to increase function, safety, and increased independence in prep for safe d/c  Patient appropriate for concurrent theapy to increase motivation among pts with similar deficits while completing therapy session     PT Therapy Minutes   PT Time In 0853   PT Time Out 1023   PT Total Time (minutes) 90   PT Mode of treatment - Individual (minutes) 67   PT Mode of treatment - Concurrent (minutes) 23   PT Mode of treatment - Group (minutes) 0   PT Mode of treatment - Co-treat (minutes) 0   PT Mode of Treatment - Total time(minutes) 90 minutes   PT Cumulative Minutes 90   Cumulative Minutes   Cumulative therapy minutes 90

## 2020-06-02 NOTE — PCC PHYSICAL THERAPY
06/02/2020:   Patient seen for IE today  Presents with decreased ROM/strength, decreased balance and safety, TTWB RLE, and pain  Patient MOD A bed mobility, MOD A transfers with walker, S wheelchair mobility  Patient non-ambulatory at this time due to pain and decrease weight bearing RLE  May benefit from continued inpatient [facility] PT to increased function, safety, and increased independence in prep for safe d/c     6/8/20: Pt is participating in PT and progressing towards goals; Presents with decreased ROM/strength, decreased balance and safety, TTWB RLE, and pain  Patient MOD A bed mobility, CGA transfers with walker, S wheelchair mobility 175'  Pt amb 5' with RW and CGA  Pt is very limited by pain in R LE  May benefit from continued inpatient [facility] PT to increased function, safety, and increased independence in prep for safe d/c     6/15/20: Pt is participating in PT and progressing towards goals; Presents with decreased ROM/strength, decreased balance and safety, TTWB RLE, and pain  Patient CGA bed mobility with leg  and cues, CGA transfers with walker, MI wheelchair mobility 200'  Pt amb 25' with RW and S  Pt is very limited by pain in R LE  May benefit from continued inpatient [facility] PT to increased function, safety, and increased independence in prep for safe d/c

## 2020-06-02 NOTE — PCC NURSING
6/2/20 Admit to [facility] 6/1/20 s/p R femur fx with surgical repair  Alert and oriented  Lungs clear/decreased  HR regular  +1/+2 edema bilateral legs  Dsg intact over incision R hip  Pt is TTWB to RLE  Pt has been having difficulty with urine retention since ramos removal  Pt has prn tramadol for pain  [facility]    6/9/20 Alert and oriented  Lungs clear/decreased on RA  HR regular  Gale intact to both incisions R hip  Swelling/edema present on R hip  Pt is TTWB to RLE  Pt ramos removed in evening of 6/8/20 for voiding trial  Pt has scheduled tylenol and tramadol and prn oxycodone for pain management  Pt has been free from falls while in [facility]  [facility]    6/16/20 Alert and oriented  Lungs/decreased on RA  HR regular  Staples were removed and steri strips are in place to R hip  Pt is TTWB to RLE  DTI to R heel is healing  Pt is continent of bowel and bladder  Pt has scheduled tylenol and tramadol for pain management  Pt has been free from falls while in [facility]   [facility]

## 2020-06-02 NOTE — PROGRESS NOTES
OT eval and ADL     06/02/20 0713   Patient Data   Rehab Impairment R hip fx s/p ORIF TTWB RLE   Etiologic Diagnosis R hip fx s/p mechanical fall   Date of Onset 05/26/20   Support System   Name Daughter and Son   Relationship Kennedy Tovar   Home Setup   Type of Home Single Level   Method of Entry Stairs   Number of Stairs 2   Number of Stairs in Home 10  (to basement)   First Floor Bathroom Full;Combo;Grab Bars   First Floor Bathroom Accessibility Shower chair;Grab bars in tub/shower;Grab bars by toilet   Available Equipment Snip.ly; Wheelchair;Bedside Commode   Baseline Information   Transportation    Prior Device(s) Used Roller Walker;Single Point Cane   Prior IADL Participation   Money Management Identify Money;Estimate Costs;Estimate Change;Combine Bills;Manage Checkbook   Meal Preparation Full Participation   Laundry Full Participation   Home Cleaning Full Participation   Prior Level of Function   Self-Care 3  Independent - Patient completed the activities by him/herself, with or without an assistive device, with no assistance from a helper  Functional Cognition 3  Independent - Patient completed the activities by him/herself, with or without an assistive device, with no assistance from a helper  Prior Device Used OMERO Baker in the Last Year   Number of falls in the past 12 months 1   Type of Injury Associated with Fall Major injury  (reason for admission)   Patient Preference   Nickname (Patient Preference) Julianne   Restrictions/Precautions   Precautions Fall Risk;Limb alert;Pain;Fluid restriction  (1500 ml FR, decreased RLE ROM/ strength)   RLE Weight Bearing Per Order TTWB   Pain Assessment   Pain Assessment Tool 0-10   Pain Score 8   Pain Location/Orientation Orientation: Left; Location: Shoulder;Orientation: Right;Location: Leg   Pain Onset/Description Descriptor: Throbbing  (new)   Eating Assessment   Food To Mouth Yes   Meal Assessed Breakfast   Current Diet Regular   Opens Packages Yes   Type of Assistance Needed Independent   Eating CARE Score 6   Oral Hygiene   Type of Assistance Needed Set-up / clean-up   Comment sitting at the sink   Oral Hygiene CARE Score 5   Grooming   Able To Initiate Tasks;Comb/Brush Hair;Wash/Dry Face;Wash/Dry Hands;Brush/Clean Teeth   Limitation Noted In Safety  (TTWB RLE)   Findings s/u   Tub/Shower Transfer   Reason Not Assessed Sponge Bath;Medical   Shower/Bathe Self   Type of Assistance Needed Physical assistance   Amount of Physical Assistance Provided Total assistance   Comment assist of 2 standing   Shower/Bathe Self CARE Score 1   Bathing   Assessed Bath Style Sponge Bath   Anticipated D/C Bath Style Sponge Bath; Shower   Able to Antonella Maximo No   Able to [employer] Temperature No   Able to Wash/Rinse/Dry (body part) Left Arm;Right Arm;L Upper Leg;R Upper Leg;Chest;Abdomen;Perineal Area   Limitations Noted in Balance; Endurance; Safety;Problem Solving;Strength  (TTWB RLE)   Positioning Standing;Seated   Dressing/Undressing Clothing   Remove UB Clothes Pullover Shirt   Don UB Clothes Pullover Shirt   Type of Assistance Needed Set-up / clean-up   Upper Body Dressing CARE Score 5   Remove LB Clothes Socks; Shoes   Don LB [employer]; Undergarment;Socks; Shoes   Type of Assistance Needed Physical assistance   Amount of Physical Assistance Provided Total assistance   Comment assist of 2 standing   Lower Body Dressing CARE Score 1   Limitations Noted In Balance; Endurance;Problem Solving; Safety;Strength;ROM  (TTWB RLE)   Positioning Supported Sit;Standing   Putting On/Taking Off Footwear   Type of Assistance Needed Physical assistance   Amount of Physical Assistance Provided 75% or more   Putting On/Taking Off Footwear CARE Score 2   Toileting Hygiene   Type of Assistance Needed Physical assistance   Amount of Physical Assistance Provided Total assistance   Comment assist of 2   Toileting Hygiene CARE Score 1   Toilet Transfer Surface Assessed Standard Commode   Transfer Technique Stand Pivot   Limitations Noted In Balance; Endurance;Problem Solving;ROM;Safety;LE Strength  (TTWB RLE)   Type of Assistance Needed Physical assistance   Amount of Physical Assistance Provided Total assistance   Comment assist of 2   Toilet Transfer CARE Score 1   Toileting   Able to Pull Clothing down no, up no  Limitations Noted In Balance;Problem Solving;ROM;Safety;LE Strength  (TTWB RLE)   Transfer Bed/Chair/Wheelchair   Limitations Noted In Balance; Endurance;Pain Management;LE Strength  (TTWB RLE)   Type of Assistance Needed Physical assistance   Amount of Physical Assistance Provided Total assistance   Comment min A of one and mod A of one   Chair/Bed-to-Chair Transfer CARE Score 1   Lying to Sitting on Side of Bed   Type of Assistance Needed Physical assistance   Amount of Physical Assistance Provided 50%-74%   Lying to Sitting on Side of Bed CARE Score 2   Sit to Stand   Type of Assistance Needed Physical assistance   Amount of Physical Assistance Provided 50%-74%   Sit to Stand CARE Score 2   Picking Up Object   Type of Assistance Needed Physical assistance   Amount of Physical Assistance Provided Total assistance   Picking Up Object CARE Score 1   Comprehension   Comprehension (FIM) 6 - Understands complex/abstract but requires more time   Expression   Expression (FIM) 7 - Expresses complex/abstract ideas in a reasonable time w/o devices or helper     Social Interaction   Social Interaction (FIM) 7 - Interacts appropriately without assistive device, medication or helper   Problem Solving   Problem solving (FIM) 5 - Solves basic problems 90% of time   Memory   Memory (FIM) 6 - Recognizes with extra time   RUE Assessment   RUE Assessment WFL  (4-/5 grossly sh to hand MMT)   LUE Assessment   LUE Assessment WFL  (4-/5 grossly sh to hand MMT)   Coordination   Movements are Fluid and Coordinated 1   Sensation   Light Touch No apparent deficits Propioception No apparent deficits   Cognition   Overall Cognitive Status WFL   Arousal/Participation Alert; Responsive; Cooperative   Attention Within functional limits   Orientation Level Oriented X4   Memory Within functional limits   Following Commands Follows all commands and directions without difficulty   Discharge [address] Retired/not working  (enjoys helping children and working with son)   Patient's Rehab Expectations "Do what I used to be able to do "   Impressions Pt presents following hospitalization due to R hip fx s/p ORIF with TTWB RLE  Pt requires assist due to decreased balance, safety, endurance, RLE ROM/ strength with TTWB RLE and pain  Pt will benefit from skilled OT services to increase independence with daily tasks     OT Therapy Minutes   OT Time In 2520   OT Time Out 0843   OT Total Time (minutes) 90   OT Mode of treatment - Individual (minutes) 90

## 2020-06-02 NOTE — NURSING NOTE
During assessment pt noted to have distended bladder and c/o feeling full  Pt was last bladder scanned and straight cath at 1545 according to report  Pt bladder scanned for 550ml  Pt refused to attempt to void, stated I know I can't, it won't come out  Pt straight cath for 600ml of clear, yellow urine  Pt refused to reposition off of back at this time  Pt educated on skin importance of repositioning to prevent skin breakdown, pt continued to refuse but was agreeable to elevate heels

## 2020-06-02 NOTE — ASSESSMENT & PLAN NOTE
· Likely related to iron deficiency and acute blood loss post surgery  · H&H is stable at this time  · The patient is receiving Venofer IV  · Will monitor H&H closely

## 2020-06-02 NOTE — ASSESSMENT & PLAN NOTE
· Continue Kinney catheter at this time  · Would recommend voiding trial once the patient is more mobile

## 2020-06-02 NOTE — ASSESSMENT & PLAN NOTE
Lab Results   Component Value Date    HGBA1C 7 2 (H) 12/19/2019       Recent Labs     06/01/20 2022 06/02/20  0723 06/02/20  1235 06/02/20  1617   POCGLU 131 101 119 138       Blood Sugar Average: Last 72 hrs:  (P) 568 [id number]   · Blood glucose appears to be well controlled  · Continue with insulin sliding scale, meal coverage and long-acting insulin

## 2020-06-02 NOTE — ASSESSMENT & PLAN NOTE
· Status post right hip ORIF on 05/28/2020  · Continue with PT and OT  · Will need outpatient follow-up with Dr Nawaf Chew

## 2020-06-02 NOTE — ASSESSMENT & PLAN NOTE
· Continue with levothyroxine 100 mcg every day except for Sundays which she takes 50 mcg  · Recommend to follow-up with Dr Nirali Pozo- endocrine after discharge

## 2020-06-02 NOTE — CONSULTS
Consultation - Nephrology   Rod Ceja 78 y o  female MRN: [id number]  Unit/Bed#: [facility] 217-01 Encounter: [id number]      A/P:  1  Hyponatremia:  Due to SIADH/ADH release due to pain and nausea on admission  She was treated with a low sodium diet and all 1200 mils fluid restriction  Her serum sodium did improve  At this point her serum sodium is 131 and her fluid restriction can be liberalized to 1800 ml per day  She is not on opioids but is taking tramadol for pain  If her sodium drops again, could consider salt tablets  2  Hypertension:  Controlled at 111/59 today  3  Insulin-dependent type 2 diabetes mellitus  Sugars are being covered  4  Closed intertrochanteric fracture of the right femur:  Status post open reduction and nailing with significant pain  according to the patient           Thank you for allowing us to participate in the care of your patient  Please feel free to contact us regarding the care of this patient, or any other questions/concerns that may be applicable  Patient Active Problem List   Diagnosis    Hyperlipidemia    Essential hypertension    Hypothyroidism    DM (diabetes mellitus) ([facility] [address] )    Closed intertrochanteric fracture of right femur (HCC)    Hyponatremia    Postoperative urinary retention    Elevated vitamin B12 level    Leukocytosis    Anemia       History of Present Illness   Physician Requesting Consult: Lucrecia Samuels MD  Reason for Consult / Principal Problem:  Hyponatremia  Hx and PE limited by:   HPI: Rod Ceja is a 78y o  year old female who presents with hyponatremia  She was evaluated for a sodium of 127 mmol/liter on admission which did drop to 125 mmol/liter  Urine studies suggested SIADH due to pain and nausea  She was managed with a low sodium diet and very  significant fluid restriction (1 2 liters) with improvement     She underwent nailing of a closed intertrochanteric fracture of the right femur on May 28th  She was transferred to the Acute Rehab Unit yesterday and had a serum sodium of 130 which corrected higher due to hyperglycemia  Fluid restriction was liberalized to 1500 ml and this morning her sodium is 131  Her PMH is significant for chronic hyponatremia  She drinks a lot of fluid to "force fluids" and does crave salty foods  History obtained from chart review and the patient    Review of Systems - Negative except as mentioned above in HPI, more specifics as mentioned below  Review of Systems - Negative - a complete  10 point review of systems was negative today  She is quite depressed about her injury and slow rehabilitation  She also has significant pain in her right leg  She is only taking tramadol  She says that opioids make her and loopy  Historical Information   Past Medical History:   Diagnosis Date    Cancer [facility])     Controlled type 2 diabetes mellitus with diabetic polyneuropathy, with long-term current use of insulin ([address] ) 5/21/2008    Diabetes mellitus ([address] )     Diabetic polyneuropathy ([facility] [address] ) 5/21/2008    ICD10 clean up    Disease of thyroid gland     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    HL (hearing loss)     Hodgkin's disease ([address] ) 2008    Left neck, had radiation    Hypertension     Neuropathy     Osteoporosis      Past Surgical History:   Procedure Laterality Date    ADENOIDECTOMY      APPENDECTOMY      CATARACT EXTRACTION, BILATERAL      CHOLECYSTECTOMY      MOUTH SURGERY      oral cancer left lower lip    OVARY SURGERY      ND OPEN RX FEMUR FX+INTRAMED SHE Right 5/28/2020    Procedure: INSERTION NAIL IM FEMUR ANTEGRADE (TROCHANTERIC);   Surgeon: Sarah Solorio DO;  Location: [address] [place] [address];  Service: Orthopedics    TONSILLECTOMY      TOTAL THYROIDECTOMY  2008    Performed after left neck dissection and left oral cancer diagnosis     Social History   Social History     Substance and Sexual Activity   Alcohol Use Not Currently    Alcohol/week: 0 0 standard drinks    Frequency: Never    Drinks per session: Patient refused    Binge frequency: Never     Social History     Substance and Sexual Activity   Drug Use Never     Social History     Tobacco Use   Smoking Status Never Smoker   Smokeless Tobacco Never Used     Family History   Problem Relation Age of Onset    Cancer Mother     Diabetes Mother     Diabetes Father     Hypertension Father        Meds/Allergies   all current active meds have been reviewed, current meds:   Current Facility-Administered Medications   Medication Dose Route Frequency    acetaminophen (TYLENOL) tablet 975 mg  975 mg Oral Q8H [address]    aspirin (ECOTRIN LOW STRENGTH) EC tablet 81 mg  81 mg Oral Daily    [START ON 6/12/2020] aspirin (ECOTRIN) EC tablet 325 mg  325 mg Oral Daily    atorvastatin (LIPITOR) tablet 20 mg  20 mg Oral Daily With Dinner    bisacodyl (DULCOLAX) rectal suppository 10 mg  10 mg Rectal Daily PRN    fondaparinux (ARIXTRA) subcutaneous injection 2 5 mg  2 5 mg Subcutaneous Daily    insulin detemir (LEVEMIR) subcutaneous injection 10 Units  10 Units Subcutaneous HS    insulin lispro (HumaLOG) 100 units/mL subcutaneous injection 1-5 Units  1-5 Units Subcutaneous TID AC    insulin lispro (HumaLOG) 100 units/mL subcutaneous injection 1-5 Units  1-5 Units Subcutaneous HS    insulin lispro (HumaLOG) 100 units/mL subcutaneous injection 10 Units  10 Units Subcutaneous TID With Meals    iron sucrose (VENOFER) 200 mg in sodium chloride 0 9 % 100 mL IVPB  200 mg Intravenous Daily    levothyroxine tablet 100 mcg  100 mcg Oral Once per day on Mon Tue Wed Thu Fri Sat    [START ON 6/7/2020] levothyroxine tablet 50 mcg  50 mcg Oral Weekly    lisinopril (ZESTRIL) tablet 5 mg  5 mg Oral Daily    methocarbamol (ROBAXIN) tablet 500 mg  500 mg Oral Q6H PRN    polyethylene glycol (MIRALAX) packet 17 g  17 g Oral Daily    potassium-sodium phosphates (PHOS-NAK) packet 2 packet  2 packet Oral BID With Meals    traMADol (ULTRAM) tablet 50 mg  50 mg Oral Q6H PRN    and PTA meds:    Medications Prior to Admission   Medication    Ascorbic Acid (VITAMIN C) 1000 MG tablet    aspirin 81 mg chewable tablet    atorvastatin (LIPITOR) 20 mg tablet    BD INSULIN SYRINGE U/F 1/2UNIT 31G X 5/16" 0 3 ML MISC    Biotin 05973 MCG TABS    calcium carbonate (OS-FELICE) 600 MG tablet    cholecalciferol (VITAMIN D3) 1,000 units tablet    Cyanocobalamin (B-12) 2500 MCG SUBL    diclofenac sodium (VOLTAREN) 1 %    insulin aspart (NOVOLOG) 100 units/mL injection    insulin detemir (LEVEMIR) 100 units/mL subcutaneous injection    ipratropium (ATROVENT) 0 03 % nasal spray    levothyroxine 100 mcg tablet    levothyroxine 50 mcg tablet    magnesium oxide (MAG-OX) 400 mg    metFORMIN (GLUCOPHAGE) 500 mg tablet    multivitamin (THERAGRAN) TABS    ONE TOUCH ULTRA TEST test strip    ramipril (ALTACE) 5 mg capsule         No Known Allergies    Objective     Intake/Output Summary (Last 24 hours) at 6/2/2020 1150  Last data filed at 6/2/2020 0500  Gross per 24 hour   Intake --   Output 1350 ml   Net -1350 ml       Invasive Devices:        Physical Exam      I/O last 3 completed shifts:  In: -   Out: 1350 [Urine:1350]    Vitals:    06/02/20 0700   BP: 111/59   Pulse: 75   Resp: 18   Temp: 97 6 °F (36 4 °C)   SpO2: 97%       Gen: in distress due to pain, Alert/Awake  HEENT: no sclerous icterus, MMM, neck supple  CV: +S1/S2, RRR  Lungs: CTA bilaterally  Abd: +BS, soft NT/ND  Ext: all four extremities are warm and cannot move her right leg well  Skin: no rashes noted  Neuro: CN II-XII intact - she is tearful and depressed    Current Weight: Weight - Scale: 62 3 kg (137 lb 5 6 oz)  First Weight: Weight - Scale: 62 3 kg (137 lb 5 6 oz)    Lab Results:  I have personally reviewed pertinent labs      CBC:   Lab Results   Component Value Date    WBC 8 40 06/02/2020    HGB 8 4 (L) 06/02/2020    HCT 24 1 (L) 06/02/2020    MCV 96 06/02/2020     06/02/2020    MCH 33 6 06/02/2020    MCHC 34 8 06/02/2020 RDW 13 2 06/02/2020    MPV 8 4 (L) 06/02/2020     CMP:   Lab Results   Component Value Date    K 4 4 06/02/2020    CL 95 (L) 06/02/2020    CO2 30 06/02/2020    BUN 21 06/02/2020    CREATININE 0 72 06/02/2020    CALCIUM 8 1 (L) 06/02/2020    EGFR 80 06/02/2020     Phosphorus:   Lab Results   Component Value Date    PHOS 3 6 06/02/2020     Magnesium:   Lab Results   Component Value Date    MG 2 1 06/02/2020     Urinalysis: No results found for: Walter Ice, SPECGRAV, PHUR, LEUKOCYTESUR, NITRITE, PROTEINUA, GLUCOSEU, KETONESU, BILIRUBINUR, BLOODU  Ionized Calcium: No results found for: CAION  Coagulation: No results found for: PT, INR, APTT  Troponin: No results found for: TROPONINI  ABG: No results found for: PHART, EQH6ZKJ, PO2ART, EET1ZGP, P5UMJDSO, BEART, SOURCE    Results from last 7 days   Lab Units 06/02/20  0501 06/01/20  0503 05/31/20  0657  05/28/20  0508   POTASSIUM mmol/L 4 4 5 2 4 6   < > 4 3   CHLORIDE mmol/L 95* 93* 91*   < > 87*   CO2 mmol/L 30 27 32*   < > 23   BUN mg/dL 21 17 21   < > 11   CREATININE mg/dL 0 72 0 70 0 80   < > 0 56*   CALCIUM mg/dL 8 1* 7 9* 8 4*   < > 8 2*   ALK PHOS U/L  --   --   --   --  54*   ALT U/L  --   --   --   --  16   AST U/L  --   --   --   --  32    < > = values in this interval not displayed  Radiology review:  No results found  EKG, Pathology, and Other Studies: I have reviewed all labs from this admission and prior admissions      Counseling / Coordination of Care  Total ADDITIONAL floor / unit time spent today 35 minutes  Greater than 50% of total time was spent with the patient and / or family counseling and / or coordination of care  A description of the counseling / coordination of care: follows    Ewa Valente MD      This consultation note was produced in part using a dictation device which may document imprecise wording from author's original intent

## 2020-06-02 NOTE — PROGRESS NOTES
Physical Medicine and Rehabilitation Progress Note  Ashli Bauer 78 y o  female MRN: [id number]  Unit/Bed#: -01 Encounter: [id number]    HPI: Ashli Bauer is a 78 y o  female who presented to the [facility] after fall and was found to have a right IT fx and is s/p nail fixation by Dr Abner Otto on 5/28  Course complicated by hyponatremia and patient was placed on FR  Chief Complaint: Rt hip fx     Interval/subjective: d/w patient potential dc date and patient is agreeable     ROS: A 10 point ROS was performed; negative except as noted above       Assessment/Plan:      * Closed intertrochanteric fracture of right femur [facility])  Assessment & Plan  - s/p nail fixation by Dr Abner Otto on 5/28  - TTWB per ortho  - OP FU with Dr Laura Adam  -  currently 8 4 post-operatively  - IV venofer through 6/3 per renal  - IM & renal co-managing     Leukocytosis  Assessment & Plan  - now WNL     Hyponatremia  Assessment & Plan  - currently 131 however WNL at 133 when corrected for blood sugar level--> initially per d/w Dr Chelsea Mathis on 6/1 she recommended change FR of 1200 to 1500 cc per day however upon review of AM labs on 6/2 Dr Chelsea Mathis now recommends change to 1800 cc per day   - renal managing     Elevated vitamin B12 level  Assessment & Plan  - elevated to 4033 ()  - patient takes supplemental B12 therefore will stop and have patient FU with PCP     Postoperative urinary retention  Assessment & Plan  - + ramos  - t/c TOV when patient more mobile       DM (diabetes mellitus) ([facility] [address] )  Assessment & Plan  - per patient on metformin 500 mg BID with meals, insulin 5 units with meals, and levemir 5 (not 10) units at HS   - follows with Dr Cosme Mohs of endocrine  - IM managing     Hypothyroidism  Assessment & Plan  - TSH of 5/28 elevated (free T4 WNL)  - on home regimen of levothyroxine 100 mcg qd except for Sundays when she takes 50 mcg qd  - follows with Dr Cosme Mohs of endocrine  - IM managing and recommend patient have repeat TFTs as OP and FU with Dr Marilu Rojas hypertension  Assessment & Plan  - at home ramapril 5 mg qd  - IM & renal co-managing     Hyperlipidemia  Assessment & Plan  - on home lipitor 20 mg qpm (per patient no longer on crestor)       Scheduled Meds:    Current Facility-Administered Medications:  acetaminophen 975 mg Oral Q8H Bren Ashby MD   aspirin 81 mg Oral Daily Sylvester Aschoff, MD Jenni Grumet ON 6/12/2020] aspirin 325 mg Oral Daily Sylvester Aschoff, MD   atorvastatin 20 mg Oral Daily With Beto Pedersen MD   bisacodyl 10 mg Rectal Daily PRN Sylvester Aschoff, MD   fondaparinux 2 5 mg Subcutaneous Daily Sylvester Aschoff, MD   insulin detemir 10 Units Subcutaneous HS Leighann Martin PA-C   insulin lispro 1-5 Units Subcutaneous TID Jes Renner MD   insulin lispro 1-5 Units Subcutaneous HS Sylvester Aschoff, MD   insulin lispro 10 Units Subcutaneous TID With Hi Moore MD   iron sucrose 200 mg Intravenous Daily Sylvester Aschoff, MD   levothyroxine 100 mcg Oral Once per day on Mon Tue Wed Thu Fri Sat Sylvester Aschoff, MD Jenni Grumet ON 6/7/2020] levothyroxine 50 mcg Oral Weekly Sylvester Aschoff, MD   lisinopril 5 mg Oral Daily Sylvester Aschoff, MD   methocarbamol 500 mg Oral Q6H PRN Sylvester Aschoff, MD   polyethylene glycol 17 g Oral Daily Sylvester Aschoff, MD   potassium-sodium phosphates 2 packet Oral BID With Meals Sylvester Aschoff, MD   traMADol 50 mg Oral Q6H PRN Sylvester Aschoff, MD        Incidental findings:     1) mildly decreased alk phos level: currently 54 (LLN 55)     DVT ppx: per Dr Reyes Dong protocol arixtra 2 5 mg sc x 2 weeks post-op then followed by asa 325 mg for 4 weeks (note patient is on asa 81 mg qd at home and was cleared in acute care to be on both home asa 81 mg qd and arixtra and patient has been on both since 5/29 however once patient transitions to asa 325 mg qd on 6/12 per Dr Reyes Dong protocol she will stop asa 81 mg qd until she has completed the 4 week course of asa 325 gm qd per Dr Jalyn Zambrano protocol)     Code: confirmed with patient that she wishes to be DNR/DNI and verbalized her understanding of what this means      Note: confirmed home meds with patient     Objective:    Functional Update:  Mobility: max   Transfers: max   ADLs: max       Physical Exam:    Vitals:    06/02/20 0700   BP: 111/59   Pulse: 75   Resp: 18   Temp: 97 6 °F (36 4 °C)   SpO2: 97%         General: alert, no apparent distress, cooperative and comfortable  HEENT:  Head: Normal, normocephalic, atraumatic  CARDIAC:  +S1/2  LUNGS:  no abnormal respiratory pattern, no retractions noted, non-labored breathing   ABDOMEN:  soft NT   EXTREMITIES:  volume status currently stable   NEURO:  awake, alert, appropriately answering questions   PSYCH:  mood/affect currently stable       Diagnostic Studies:   No orders to display       Laboratory:   Results from last 7 days   Lab Units 06/02/20  0501 05/30/20  0608 05/29/20  0841 05/28/20  0508   HEMOGLOBIN g/dL 8 4* 9 1* 9 8* 10 5*   HEMATOCRIT % 24 1* 26 6* 28 7* 31 1*   WBC Thousand/uL 8 40  --  11 60* 12 40*     Results from last 7 days   Lab Units 06/02/20  0501 06/01/20  0503 05/31/20  0657  05/28/20  0508   BUN mg/dL 21 17 21   < > 11   SODIUM mmol/L 131* 130* 130*   < > 119*   POTASSIUM mmol/L 4 4 5 2 4 6   < > 4 3   CHLORIDE mmol/L 95* 93* 91*   < > 87*   CREATININE mg/dL 0 72 0 70 0 80   < > 0 56*   AST U/L  --   --   --   --  32   ALT U/L  --   --   --   --  16    < > = values in this interval not displayed  Results from last 7 days   Lab Units 05/26/20  1534   PROTIME seconds 12 4   INR  0 97        This patient was discussed by the Interdisciplinary Team in weekly case conference today  The care of the patient was extensively discussed with all care providers and an appropriate rehabilitation plan was formulated unique for this patient   Barriers were identified preventing progression of therapy and appropriate interventions were discussed with each discipline  Please see the team note for input from all disciplines regarding barriers, intervention, and discharge planning  [ x ] Total time spent: 35 Mins, and greater than 50% of this time was spent counseling/coordinating care

## 2020-06-02 NOTE — PROGRESS NOTES
Progress Note - Quintin Cloud 1940, 78 y o  female MRN: [id number]    Unit/Bed#: -01 Encounter: [id number]    Primary Care Provider: Adan Sullivan MD   Date and time admitted to hospital: 6/1/2020  6:03 PM        * Closed intertrochanteric fracture of right femur [facility])  Assessment & Plan  · Status post right hip ORIF on 05/28/2020  · Continue with PT and OT  · Will need outpatient follow-up with Dr Shantelle Fischer  · Likely related to iron deficiency and acute blood loss post surgery  · H&H is stable at this time  · The patient is receiving Venofer IV  · Will monitor H&H closely    Postoperative urinary retention  Assessment & Plan  · Continue Kinney catheter at this time  · Would recommend voiding trial once the patient is more mobile    Hyponatremia  Assessment & Plan  · Nephrology is following  · Continue fluid restriction at 1800 mL  · Monitor sodium level closely    Type 2 diabetes mellitus without complication, with long-term current use of insulin [facility])  Assessment & Plan  Lab Results   Component Value Date    HGBA1C 7 2 (H) 12/19/2019       Recent Labs     06/01/20  2022 06/02/20  0723 06/02/20  1235 06/02/20  1617   POCGLU 131 101 119 138       Blood Sugar Average: Last 72 hrs:  (P) 689 [id number]   · Blood glucose appears to be well controlled  · Continue with insulin sliding scale, meal coverage and long-acting insulin    Hypothyroidism  Assessment & Plan  · Continue with levothyroxine 100 mcg every day except for Sundays which she takes 50 mcg  · Recommend to follow-up with Dr Marisa Sanchez- endocrine after discharge    Essential hypertension  Assessment & Plan  · BP appears to be well controlled  · Continue with ramipril substitute- lisinopril while in house  · Monitor blood pressure closely        VTE Prophylaxis:  Fondaparinux (Arixtra)    Patient Centered Rounds: I have performed bedside rounds with nursing staff today      Discussions with Specialists or Other Care Team Provider: nursing   Education and Discussions with Family / Patient: patient and daughter     Current Length of Stay: 1 day(s)    Current Patient Status: Inpatient Rehab     Discharge Plan: per primary team    Code Status: Level 3 - DNAR and DNI    Subjective:   Feeling well  Denies any pain currently  The patient states that she has feels much more comfortable after Kinney catheter was placed  Objective:     Vitals:   Temp (24hrs), Av 7 °F (36 5 °C), Min:97 6 °F (36 4 °C), Max:97 7 °F (36 5 °C)    Temp:  [97 6 °F (36 4 °C)-97 7 °F (36 5 °C)] 97 6 °F (36 4 °C)  HR:  [75-78] 75  Resp:  [18] 18  BP: (111-122)/(59-60) 111/59  SpO2:  [97 %] 97 %  Body mass index is 25 12 kg/m²  Input and Output Summary (last 24 hours): Intake/Output Summary (Last 24 hours) at 2020 1845  Last data filed at 2020 1801  Gross per 24 hour   Intake 960 ml   Output 1900 ml   Net -940 ml       Physical Exam:     Physical Exam   Constitutional: She is oriented to person, place, and time  She appears well-developed  No distress  Frail and elderly    HENT:   Head: Normocephalic and atraumatic  Mouth/Throat: Oropharynx is clear and moist    Eyes: Pupils are equal, round, and reactive to light  Conjunctivae and EOM are normal    Neck: Normal range of motion  Neck supple  No thyromegaly present  Cardiovascular: Normal rate, regular rhythm and normal heart sounds  Exam reveals no gallop and no friction rub  No murmur heard  Pulmonary/Chest: Effort normal and breath sounds normal  She has no wheezes  She has no rales  Abdominal: Soft  Bowel sounds are normal  She exhibits no distension  There is no tenderness  There is no rebound  Musculoskeletal: Normal range of motion  She exhibits no edema, tenderness or deformity  Neurological: She is alert and oriented to person, place, and time  No cranial nerve deficit  Skin: Skin is warm and dry  No rash noted  No erythema     Right hip surgical site dry and intact    Psychiatric: She has a normal mood and affect  Her behavior is normal  Thought content normal    Vitals reviewed  Additional Data:     Labs:    Results from last 7 days   Lab Units 06/02/20  0501   WBC Thousand/uL 8 40   HEMOGLOBIN g/dL 8 4*   HEMATOCRIT % 24 1*   PLATELETS Thousands/uL 327   NEUTROS PCT % 68   LYMPHS PCT % 18*   MONOS PCT % 10   EOS PCT % 3     Results from last 7 days   Lab Units 06/02/20  0501  05/28/20  0508   POTASSIUM mmol/L 4 4   < > 4 3   CHLORIDE mmol/L 95*   < > 87*   CO2 mmol/L 30   < > 23   BUN mg/dL 21   < > 11   CREATININE mg/dL 0 72   < > 0 56*   CALCIUM mg/dL 8 1*   < > 8 2*   ALK PHOS U/L  --   --  54*   ALT U/L  --   --  16   AST U/L  --   --  32    < > = values in this interval not displayed  Results from last 7 days   Lab Units 06/02/20  1617 06/02/20  1235 06/02/20  0723 06/01/20  2022 06/01/20  1616 06/01/20  1043 06/01/20  0653 05/31/20  2047 05/31/20  1635 05/31/20  1128 05/31/20  0623 05/30/20  2132   POC GLUCOSE mg/dl 138 119 101 131 106 266* 351* 77 241* 240* 66 244*           * I Have Reviewed All Lab Data Listed Above  * Additional Pertinent Lab Tests Reviewed:  [facility] Admission  Reviewed    Imaging:  Imaging Reports Reviewed Today Include: n/a     Recent Cultures (last 7 days):     Results from last 7 days   Lab Units 05/27/20  0828   URINE CULTURE  60,000-69,000 cfu/ml Klebsiella pneumoniae*       Last 24 Hours Medication List:     Current Facility-Administered Medications:  acetaminophen 975 mg Oral Q8H Prince Dipak MD    aspirin 81 mg Oral Daily Myranda Kapadia MD    [START ON 6/12/2020] aspirin 325 mg Oral Daily Myranda Kapadia MD    atorvastatin 20 mg Oral Daily With Koko Kendall MD    bisacodyl 10 mg Rectal Daily PRN Mryanda Kapadia MD    fondaparinux 2 5 mg Subcutaneous Daily Myranda Kapadia MD    insulin detemir 10 Units Subcutaneous HS Nadege Gayle PA-C    insulin lispro 1-5 Units Subcutaneous TID Celsa Mckenzie MD insulin lispro 1-5 Units Subcutaneous HS Corina Fong MD    insulin lispro 10 Units Subcutaneous TID With Meals Corina Fong MD    iron sucrose 200 mg Intravenous Daily ADRIAN Weber Last Rate: Stopped (06/02/20 1605)   levothyroxine 100 mcg Oral Once per day on Mon Tue Wed Thu Fri Sat MD Isabel Rodriguez ON 6/7/2020] levothyroxine 50 mcg Oral Weekly Corina Fong MD    lisinopril 5 mg Oral Daily Corina Fong MD    methocarbamol 500 mg Oral Q6H PRN Corina Fong MD    polyethylene glycol 17 g Oral Daily Corina Fong MD    potassium-sodium phosphates 2 packet Oral BID With Meals Corina Fong MD    traMADol 50 mg Oral Q6H Alondra Pereira MD         Today, Patient Was Seen By: ADRIAN Dill    ** Please Note: Dictation voice to text software may have been used in the creation of this document   **

## 2020-06-02 NOTE — NURSING NOTE
Pt originally ordered 20 units of Levemir, pt refused 20 units stated she takes 10 units   Hospitalist notified, order changed to 10 units and 10 units emptied from 20 units in syringe and confirmed with pt before administration to pt

## 2020-06-02 NOTE — TREATMENT PLAN
Individualized Plan of [address] Maynor Hinkle 78 y o  female MRN: [id number]  Unit/Bed#: -01 Encounter: [id number]     PATIENT INFORMATION  ADMISSION DATE: 6/1/2020  6:03 PM VINICIO CATEGORY: Rt hip fx    ADMISSION DIAGNOSIS: Closed femur fracture ([facility] [place] [address] ) [[id number]]  EXPECTED LOS: 1-2 wks      MEDICAL/FUNCTIONAL PROGNOSIS  Based on my assessment of the patient's medical conditions and current functional status, the prognosis for attaining medical and functional goals or the IRF stay is:  Fair     Medical Goals: pain control     ANTICIPATED DISCHARGE DISPOSITION AND SERVICES  COMMUNITY SETTING: home      ANTICIPATED FOLLOW-UP SERVICE:   Therapy Services: PT/OT     DISCIPLINE SPECIFIC PLANS:  Required Disciplines & Services: PT/OT     REQUIRED THERAPY:  Therapy Hours per Day Days per Week Total Days   Physical Therapy 1 5 5 10   Occupational Therapy 1 5 5 10   Speech/Language Therapy 0 0 0   NOTE: Additional therapy time(s) may be added as appropriate to meet patient needs and to achieve functional goals        ANTICIPATED FUNCTIONAL OUTCOMES:  ADL:   Patient will maximize level for ADLs with least restrictive device upon completion of the rehab program     Bladder/Bowel: Patient will maximize level for bladder/bowel management with least restrictive device upon completion of the rehab program     Transfers:   Patient will maximize level for transfers with least restrictive device upon completion of the rehab program     Locomotion:   Patient will maximize level for locomotion with least restrictive device upon completion of the rehab program     Cognitive:   Patient appears to be at baseline          [address] [facility] needs: tbd       REHAB ANTICIPATED PARTICIPATION RESTRICTIONS:  None

## 2020-06-03 LAB
ANION GAP SERPL CALCULATED.3IONS-SCNC: 7 MMOL/L (ref 4–13)
BUN SERPL-MCNC: 21 MG/DL (ref 7–25)
CALCIUM SERPL-MCNC: 8.4 MG/DL (ref 8.6–10.5)
CHLORIDE SERPL-SCNC: 93 MMOL/L (ref 98–107)
CO2 SERPL-SCNC: 30 MMOL/L (ref 21–31)
CREAT SERPL-MCNC: 0.81 MG/DL (ref 0.6–1.2)
GFR SERPL CREATININE-BSD FRML MDRD: 69 ML/MIN/1.73SQ M
GLUCOSE P FAST SERPL-MCNC: 255 MG/DL (ref 65–99)
GLUCOSE SERPL-MCNC: 154 MG/DL (ref 65–140)
GLUCOSE SERPL-MCNC: 206 MG/DL (ref 65–140)
GLUCOSE SERPL-MCNC: 255 MG/DL (ref 65–99)
GLUCOSE SERPL-MCNC: 275 MG/DL (ref 65–140)
GLUCOSE SERPL-MCNC: 298 MG/DL (ref 65–140)
POTASSIUM SERPL-SCNC: 5.2 MMOL/L (ref 3.5–5.5)
SODIUM SERPL-SCNC: 130 MMOL/L (ref 134–143)

## 2020-06-03 PROCEDURE — 80048 BASIC METABOLIC PNL TOTAL CA: CPT | Performed by: INTERNAL MEDICINE

## 2020-06-03 PROCEDURE — 97530 THERAPEUTIC ACTIVITIES: CPT

## 2020-06-03 PROCEDURE — 97110 THERAPEUTIC EXERCISES: CPT

## 2020-06-03 PROCEDURE — 97535 SELF CARE MNGMENT TRAINING: CPT

## 2020-06-03 PROCEDURE — 82948 REAGENT STRIP/BLOOD GLUCOSE: CPT

## 2020-06-03 PROCEDURE — 97542 WHEELCHAIR MNGMENT TRAINING: CPT

## 2020-06-03 PROCEDURE — 99232 SBSQ HOSP IP/OBS MODERATE 35: CPT | Performed by: PHYSICAL MEDICINE & REHABILITATION

## 2020-06-03 RX ORDER — OXYCODONE HYDROCHLORIDE 5 MG/1
2.5 TABLET ORAL ONCE
Status: COMPLETED | OUTPATIENT
Start: 2020-06-03 | End: 2020-06-03

## 2020-06-03 RX ADMIN — ASPIRIN 81 MG: 81 TABLET, COATED ORAL at 08:34

## 2020-06-03 RX ADMIN — TRAMADOL HYDROCHLORIDE 50 MG: 50 TABLET, FILM COATED ORAL at 17:47

## 2020-06-03 RX ADMIN — INSULIN DETEMIR 8 UNITS: 100 INJECTION, SOLUTION SUBCUTANEOUS at 21:14

## 2020-06-03 RX ADMIN — ACETAMINOPHEN 975 MG: 325 TABLET ORAL at 14:21

## 2020-06-03 RX ADMIN — OXYCODONE HYDROCHLORIDE 2.5 MG: 5 TABLET ORAL at 17:47

## 2020-06-03 RX ADMIN — LISINOPRIL 5 MG: 5 TABLET ORAL at 08:34

## 2020-06-03 RX ADMIN — TRAMADOL HYDROCHLORIDE 50 MG: 50 TABLET, FILM COATED ORAL at 13:11

## 2020-06-03 RX ADMIN — METHOCARBAMOL 500 MG: 500 TABLET, FILM COATED ORAL at 09:27

## 2020-06-03 RX ADMIN — LEVOTHYROXINE SODIUM 100 MCG: 100 TABLET ORAL at 06:12

## 2020-06-03 RX ADMIN — METHOCARBAMOL 500 MG: 500 TABLET, FILM COATED ORAL at 16:05

## 2020-06-03 RX ADMIN — TRAMADOL HYDROCHLORIDE 50 MG: 50 TABLET, FILM COATED ORAL at 06:12

## 2020-06-03 RX ADMIN — INSULIN LISPRO 4.5 UNITS: 100 INJECTION, SOLUTION INTRAVENOUS; SUBCUTANEOUS at 13:12

## 2020-06-03 RX ADMIN — POTASSIUM & SODIUM PHOSPHATES POWDER PACK 280-160-250 MG 2 PACKET: 280-160-250 PACK at 08:34

## 2020-06-03 RX ADMIN — ACETAMINOPHEN 975 MG: 325 TABLET ORAL at 06:12

## 2020-06-03 RX ADMIN — IRON SUCROSE 200 MG: 20 INJECTION, SOLUTION INTRAVENOUS at 16:05

## 2020-06-03 RX ADMIN — ACETAMINOPHEN 975 MG: 325 TABLET ORAL at 21:14

## 2020-06-03 RX ADMIN — FONDAPARINUX SODIUM 2.5 MG: 2.5 INJECTION, SOLUTION SUBCUTANEOUS at 08:34

## 2020-06-03 RX ADMIN — ATORVASTATIN CALCIUM 20 MG: 20 TABLET, FILM COATED ORAL at 16:05

## 2020-06-03 RX ADMIN — INSULIN LISPRO 5.5 UNITS: 100 INJECTION, SOLUTION INTRAVENOUS; SUBCUTANEOUS at 08:32

## 2020-06-03 RX ADMIN — POTASSIUM & SODIUM PHOSPHATES POWDER PACK 280-160-250 MG 2 PACKET: 280-160-250 PACK at 16:05

## 2020-06-03 NOTE — PROGRESS NOTES
06/03/20 1147   Pain Assessment   Pain Assessment Tool 0-10   Pain Score 9   Pain Location/Orientation Orientation: Right;Location: Hip;Location: Knee   Restrictions/Precautions   Precautions Fall Risk;Pain   RLE Weight Bearing Per Order TTWB   ROM Restrictions No   Eating   Type of Assistance Needed Set-up / clean-up   Amount of Physical Assistance Provided No physical assistance   Eating CARE Score 5   Eating Assessment   Food To Mouth Yes   Able To Cut Yes   Positioning Upright;Out of Bed   Meal Assessed Lunch   QI: Swallowing/Nutritional Status Regular food   Current Diet Regular; Thin   Intake Mode PO   Dentures   (missing teeth)   Opens Packages No   Sit to Stand   Type of Assistance Needed Physical assistance   Amount of Physical Assistance Provided Total assistance   Comment maxA x2   Sit to Stand CARE Score 1   Toileting Hygiene   Type of Assistance Needed Physical assistance   Amount of Physical Assistance Provided Total assistance   Toileting Hygiene CARE Score 1   Toileting   Able to [address] down no, up no  Manage Hygiene Bowel   Limitations Noted In Balance;ROM;Safety;LE Strength   Toilet Transfer   Type of Assistance Needed Physical assistance   Amount of Physical Assistance Provided Total assistance   Comment assist x1 to assist standing, assist x1 to remove w/c and replace with commode   Toilet Transfer CARE Score 1   Toilet Transfer   Surface Assessed Standard Commode   Transfer Technique   (w/c to commode replacement)   Limitations Noted In Balance;Confidence; Endurance;ROM;Safety;LE Strength   Adaptive Equipment   (bed rail)   Therapeutic Excerise-Strength   UE Strength Yes   Right Upper Extremity- Strength   RUE Strength Comment searched for and retrieved small objects in yellow theraputty   Left Upper Extremity-Strength   LUE Strength Comment searched for and retrieved small objects in yellow theraputty   Cognition   Overall Cognitive Status [facility]   Arousal/Participation Alert; Responsive; Cooperative   Attention Within functional limits   Orientation Level Oriented X4   Memory Within functional limits   Following Commands Follows one step commands without difficulty   Additional Activities   Additional Activities Comments game of war with OT; multiple rest breaks needed from leaning forward to play   Activity Tolerance   Activity Tolerance Patient limited by pain; Patient limited by fatigue   Assessment   Treatment Assessment Upon entry into pt's room, pt was sobbing and explained to OT that she has "the shakes" from her diabetes; OT inquired what pt's sugar level was on previous reading, informed RN, and placated pt as much as possible until it was time for her medicine  Pt agreeable to OT session after her "shakes" taken care of  Completed intrinsic hand strengthening and activity tolerance activities with very low activity tolerance; pt with severe pain in R hip and knee as well as generalized fatigue  Frequent rest breaks taken to allow pain and fatigue to subside  Toileting completed with assist x2  Required set up for lunch tray  Pt would benefit from continued skilled OT services in order to maximize independence in self care, functional mobility/transfers, ROM/strength, and activity tolerance  Prognosis Fair   Problem List Decreased strength;Decreased endurance;Decreased range of motion; Impaired balance; Impaired judgement;Decreased safety awareness;Orthopedic restrictions;Pain   Plan   Treatment/Interventions ADL retraining;Functional transfer training;LE strengthening/ROM; Therapeutic exercise; Endurance training;Patient/family training;Equipment eval/education; Compensatory technique education;OT   Progress Slow progress, decreased activity tolerance   OT Therapy Minutes   OT Time In 1147   OT Time Out 1236   OT Total Time (minutes) 49   OT Mode of treatment - Individual (minutes) 49   Therapy Time missed   Time missed?  No

## 2020-06-03 NOTE — NURSING NOTE
Pt refuses to receive ordered Humalog units with meals  Pt states her Endocrinologist gave her a certain calculation scale and she would like to follow that  According to ordered scale, BS at breakfast 275 pt should have received a base 10 units plus 2 units on sliding scale  Pt would requested to only receive 5 5 units Humalog  Educated pt on risk of hyperglycemia without proper amount of insulin  Pt c/o "shakiness" at 1145,   Will continue to educate pt on following ordered amount of units  Will continue to monitor and follow plan of care  Call bell within reach, bed and chair alarms in place and turned on

## 2020-06-03 NOTE — CASE MANAGEMENT
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

## 2020-06-03 NOTE — PROGRESS NOTES
Physical Medicine and Rehabilitation Progress Note  Tabitha Carter 78 y o  female MRN: [id number]  Unit/Bed#: -01 Encounter: [id number]    HPI: Tabitha Carter is a 78 y o  female who presented to the [facility] [address] after fall and was found to have a right IT fx and is s/p nail fixation by Dr Ant Contreras on 5/28  Course complicated by hyponatremia and patient was placed on FR  Chief Complaint: Rt hip fx     Interval/subjective: d/w patient pain management strategies     ROS: A 10 point ROS was performed; negative except as noted above       Assessment/Plan:      * Closed intertrochanteric fracture of right femur [facility])  Assessment & Plan  - s/p nail fixation by Dr Ant Contreras on 5/28  - TTWB per ortho  - OP FU with Dr Trina Ormond  - Hg currently 8 4 post-operatively  - IM & renal co-managing     Hyponatremia  Assessment & Plan  - currently 130 however WNL at 134 when corrected for blood sugar level  - FR per renal   - renal managing     Elevated vitamin B12 level  Assessment & Plan  - elevated to 4033 ()  - patient takes supplemental B12 therefore will stop and have patient FU with PCP     Postoperative urinary retention  Assessment & Plan  - + ramos  - t/c TOV when patient more mobile       DM (diabetes mellitus) ([facility] [address] )  Assessment & Plan  - per patient on metformin 500 mg BID with meals, insulin 5 units with meals, and levemir 5 (not 10) units at HS   - follows with Dr Maximino Roman of endocrine  - IM managing     Hypothyroidism  Assessment & Plan  - TSH of 5/28 elevated (free T4 WNL)  - on home regimen of levothyroxine 100 mcg qd except for Sundays when she takes 50 mcg qd  - follows with Dr Maximino Roman of endocrine  - IM managing and recommend patient have repeat TFTs as OP and FU with Dr Marilee Amor hypertension  Assessment & Plan  - at home ramapril 5 mg qd  - IM & renal co-managing     Hyperlipidemia  Assessment & Plan  - on home lipitor 20 mg qpm (per patient no longer on crestor)       Scheduled Meds:    Current Facility-Administered Medications:  acetaminophen 975 mg Oral Q8H Valentino Asal, MD   aspirin 81 mg Oral Daily Valente Juarez MD   [START ON 6/12/2020] aspirin 325 mg Oral Daily Valente Juarez MD   atorvastatin 20 mg Oral Daily With Peg MD Valentina   bisacodyl 10 mg Rectal Daily PRN Valente Juarez MD   fondaparinux 2 5 mg Subcutaneous Daily Valente Juarez MD   insulin detemir 10 Units Subcutaneous HS Ruperto Esquivel PA-C   insulin lispro 1-5 Units Subcutaneous TID AC Valente Juarez MD   insulin lispro 1-5 Units Subcutaneous HS Valente Juarez MD   insulin lispro 10 Units Subcutaneous TID With Meals Valente Juarez MD   levothyroxine 100 mcg Oral Once per day on Mon Tue Wed Thu Fri Sat MD Caryl Lira Breana ON 6/7/2020] levothyroxine 50 mcg Oral Weekly Valente Juarez MD   lisinopril 5 mg Oral Daily Valente Juarez MD   methocarbamol 500 mg Oral Q6H PRN Valente Juarez MD   polyethylene glycol 17 g Oral Daily Valente Juarez MD   potassium-sodium phosphates 2 packet Oral BID With Meals Valente Juarez MD   traMADol 50 mg Oral Q6H Valentino Asal, MD        Incidental findings:     1) mildly decreased alk phos level: currently 54 (LLN 55)     DVT ppx: per Dr Mp Iraheta protocol arixtra 2 5 mg sc x 2 weeks post-op then followed by asa 325 mg for 4 weeks (note patient is on asa 81 mg qd at home and was cleared in acute care to be on both home asa 81 mg qd and arixtra and patient has been on both since 5/29 however once patient transitions to asa 325 mg qd on 6/12 per Dr Mp Iraheta protocol she will stop asa 81 mg qd until she has completed the 4 week course of asa 325 gm qd per Dr Mp Iraheta protocol)     Code: confirmed with patient that she wishes to be DNR/DNI and verbalized her understanding of what this means      Note: confirmed home meds with patient     Objective:    Functional Update:  Mobility: max   Transfers: max   ADLs: max       Physical Exam:    Vitals:    06/03/20 0715   BP: 134/60   Pulse: 76   Resp: 16   Temp: 98 3 °F (36 8 °C)   SpO2: 100%         General: alert, no apparent distress, cooperative and comfortable  HEENT:  Head: Normal, normocephalic, atraumatic  CARDIAC:  +S1/2  LUNGS:  no abnormal respiratory pattern, no retractions noted, non-labored breathing   ABDOMEN:  soft NT   EXTREMITIES:  volume status currently stable   NEURO:  awake, alert, appropriately answering questions   PSYCH:  mood/affect currently stable          Diagnostic Studies:   No orders to display       Laboratory:   Results from last 7 days   Lab Units 06/02/20  0501 05/30/20  0608 05/29/20  0841 05/28/20  0508   HEMOGLOBIN g/dL 8 4* 9 1* 9 8* 10 5*   HEMATOCRIT % 24 1* 26 6* 28 7* 31 1*   WBC Thousand/uL 8 40  --  11 60* 12 40*     Results from last 7 days   Lab Units 06/03/20  0536 06/02/20  0501 06/01/20  0503  05/28/20  0508   BUN mg/dL 21 21 17   < > 11   SODIUM mmol/L 130* 131* 130*   < > 119*   POTASSIUM mmol/L 5 2 4 4 5 2   < > 4 3   CHLORIDE mmol/L 93* 95* 93*   < > 87*   CREATININE mg/dL 0 81 0 72 0 70   < > 0 56*   AST U/L  --   --   --   --  32   ALT U/L  --   --   --   --  16    < > = values in this interval not displayed

## 2020-06-03 NOTE — PLAN OF CARE
Problem: Prexisting or High Potential for Compromised Skin Integrity  Goal: Skin integrity is maintained or improved  Description  INTERVENTIONS:  - Identify patients at risk for skin breakdown  - Assess and monitor skin integrity  - Assess and monitor nutrition and hydration status  - Monitor labs   - Assess for incontinence   - Turn and reposition patient  - Assist with mobility/ambulation  - Relieve pressure over bony prominences  - Avoid friction and shearing  - Provide appropriate hygiene as needed including keeping skin clean and dry  - Evaluate need for skin moisturizer/barrier cream  - Collaborate with interdisciplinary team   - Patient/family teaching  - Consider wound care consult   Outcome: Progressing     Problem: Potential for Falls  Goal: Patient will remain free of falls  Description  INTERVENTIONS:  - Assess patient frequently for physical needs  -  Identify cognitive and physical deficits and behaviors that affect risk of falls    -  [place] fall precautions as indicated by assessment   - Educate patient/family on patient safety including physical limitations  - Instruct patient to call for assistance with activity based on assessment  - Modify environment to reduce risk of injury  - Consider OT/PT consult to assist with strengthening/mobility  Outcome: Progressing     Problem: PAIN - ADULT  Goal: Verbalizes/displays adequate comfort level or baseline comfort level  Description  Interventions:  - Encourage patient to monitor pain and request assistance  - Assess pain using appropriate pain scale  - Administer analgesics based on type and severity of pain and evaluate response  - Implement non-pharmacological measures as appropriate and evaluate response  - Consider cultural and social influences on pain and pain management  - Notify physician/advanced practitioner if interventions unsuccessful or patient reports new pain  Outcome: Progressing     Problem: INFECTION - ADULT  Goal: Absence or prevention of progression during hospitalization  Description  INTERVENTIONS:  - Assess and monitor for signs and symptoms of infection  - Monitor lab/diagnostic results  - Monitor all insertion sites, i e  indwelling lines, tubes, and drains  - Monitor endotracheal if appropriate and nasal secretions for changes in amount and color  - [place] appropriate cooling/warming therapies per order  - Administer medications as ordered  - Instruct and encourage patient and family to use good hand hygiene technique  - Identify and instruct in appropriate isolation precautions for identified infection/condition  Outcome: Progressing  Goal: Absence of fever/infection during neutropenic period  Description  INTERVENTIONS:  - Monitor WBC    Outcome: Progressing     Problem: SAFETY ADULT  Goal: Patient will remain free of falls  Description  INTERVENTIONS:  - Assess patient frequently for physical needs  -  Identify cognitive and physical deficits and behaviors that affect risk of falls    -  [place] fall precautions as indicated by assessment   - Educate patient/family on patient safety including physical limitations  - Instruct patient to call for assistance with activity based on assessment  - Modify environment to reduce risk of injury  - Consider OT/PT consult to assist with strengthening/mobility  Outcome: Progressing  Goal: Maintain or return to baseline ADL function  Description  INTERVENTIONS:  -  Assess patient's ability to carry out ADLs; assess patient's baseline for ADL function and identify physical deficits which impact ability to perform ADLs (bathing, care of mouth/teeth, toileting, grooming, dressing, etc )  - Assess/evaluate cause of self-care deficits   - Assess range of motion  - Assess patient's mobility; develop plan if impaired  - Assess patient's need for assistive devices and provide as appropriate  - Encourage maximum independence but intervene and supervise when necessary  - Involve family in performance of ADLs  - Assess for home care needs following discharge   - Consider OT consult to assist with ADL evaluation and planning for discharge  - Provide patient education as appropriate  Outcome: Progressing  Goal: Maintain or return mobility status to optimal level  Description  INTERVENTIONS:  - Assess patient's baseline mobility status (ambulation, transfers, stairs, etc )    - Identify cognitive and physical deficits and behaviors that affect mobility  - Identify mobility aids required to assist with transfers and/or ambulation (gait belt, sit-to-stand, lift, walker, cane, etc )  - [place] fall precautions as indicated by assessment  - Record patient progress and toleration of activity level on Mobility SBAR; progress patient to next Phase/Stage  - Instruct patient to call for assistance with activity based on assessment  - Consider rehabilitation consult to assist with strengthening/weightbearing, etc   Outcome: Progressing     Problem: DISCHARGE PLANNING  Goal: Discharge to home or other facility with appropriate resources  Description  INTERVENTIONS:  - Identify barriers to discharge w/patient and caregiver  - Arrange for needed discharge resources and transportation as appropriate  - Identify discharge learning needs (meds, wound care, etc )  - Arrange for interpretive services to assist at discharge as needed  - Refer to Case Management Department for coordinating discharge planning if the patient needs post-hospital services based on physician/advanced practitioner order or complex needs related to functional status, cognitive ability, or social support system  Outcome: Progressing

## 2020-06-03 NOTE — PCC CARE MANAGEMENT
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  Discussed role of this worker & reviewed [address] with Pt & Pt's family, who expressed understanding & agreement  Tx team recommendations reviewed with patient & family, who expressed understanding & agreement  Target DC date is in 2 weeks with [facility] (PT, OT, Nsg); a list of providers was provided to Pt & a referral will be made based on Pt preference  SW will continue to monitor & assist as needed with Tx & DC planning  IMM reviewed, signed & submitted for scanning  6/9/20 - Tx team recommendations reviewed with patient & family, who expressed understanding & agreement  Target DC date is extended to 6/18 with [facility] (Nsg, PT, OT); a list of providers was provided to Pt & a referral will be made based on Pt preference  SW will continue to monitor & assist as needed with Tx & DC planning  6/16/20 - Tx team recommendations reviewed with patient & daughter, who expressed understanding & agreement  Target DC date is 6/18 with [facility] (PT, OT, Nsg); a list of providers was provided to Pt & a referral was made to  [facility] per Pt preference  SW will continue to monitor & assist as needed with Tx & DC planning

## 2020-06-03 NOTE — PROGRESS NOTES
06/03/20 0956   Pain Assessment   Pain Assessment Tool 0-10   Pain Score 8   Pain Location/Orientation Orientation: Right;Location: Leg;Location: Hip;Location: Knee   Restrictions/Precautions   Precautions Fall Risk;Pain   RLE Weight Bearing Per Order TTWB   Cognition   Overall Cognitive Status WFL   Orientation Level Oriented X4   Sit to Stand   Type of Assistance Needed Physical assistance   Amount of Physical Assistance Provided 75% or more   Comment max assist partial stand with walker; MOD A STS at wall rail; max cues for TTWB RLE  Sit to Stand CARE Score 2   Wheel 50 Feet with Two Turns   Type of Assistance Needed Supervision;Physical assistance   Amount of Physical Assistance Provided 25%-49%   Comment min assist carpet; S level    Wheel 50 Feet with Two Turns CARE Score 3   Wheel 150 Feet   Type of Assistance Needed Supervision   Wheel 150 Feet CARE Score 4   Wheelchair mobility   Does the patient use a wheelchair? 1  Yes   Type of Wheelchair Used 1  Manual   Method Right upper extremity; Left upper extremity; Left lower extremity   Distance Level Surface (feet) 156 ft  (162' 156')   Distance Wheeled 3% Grade 30 ft   Findings level and carpet   Therapeutic Interventions   Strengthening seated ther ex AROM LLE; AAROM RLE   Balance transfer training   Other wheelchair mobility   Assessment   Treatment Assessment Patient tolerated therapy session today  Increased pain RLE noted today and patient unable to complete full STS transfer using walker  Was able to complete same transfer using wall rail with mod assist   Needs constant cues for TTWB RLE  Patient completed ther ex for general LE strengthening  Able to propel wheelchair S level surfaces and MIN A over carpet   PT Barriers   Physical Impairment Decreased strength;Decreased range of motion;Decreased endurance; Impaired balance;Decreased mobility; Decreased safety awareness;Pain   Functional Limitation Wheelchair management; Walking;Transfers;Standing Plan   Treatment/Interventions Functional transfer training;LE strengthening/ROM; Endurance training   Progress Slow progress, decreased activity tolerance   PT Therapy Minutes   PT Time In 0956   PT Time Out 1057   PT Total Time (minutes) 61   PT Mode of treatment - Individual (minutes) 61   PT Mode of treatment - Concurrent (minutes) 0   PT Mode of treatment - Group (minutes) 0   PT Mode of treatment - Co-treat (minutes) 0   PT Mode of Treatment - Total time(minutes) 61 minutes   PT Cumulative Minutes 151

## 2020-06-03 NOTE — PROGRESS NOTES
06/03/20 1430   Pain Assessment   Pain Assessment Tool 0-10   Pain Score 9   Pain Location/Orientation Orientation: Right;Location: Hip;Location: Knee   Therapeutic Interventions   Strengthening supine ther ex; AROM LLE; AAROM RLE   Assessment   Treatment Assessment Completed ther ex for general LE strengthening  Requries AAROM RLE  Ice applied to R hip post exercise  Plan   Treatment/Interventions LE strengthening/ROM; Therapeutic exercise   Progress Slow progress, decreased activity tolerance   PT Therapy Minutes   PT Time In 1430   PT Time Out 1500   PT Total Time (minutes) 30   PT Mode of treatment - Individual (minutes) 30   PT Mode of treatment - Concurrent (minutes) 0   PT Mode of treatment - Group (minutes) 0   PT Mode of treatment - Co-treat (minutes) 0   PT Mode of Treatment - Total time(minutes) 30 minutes   PT Cumulative Minutes 181   Therapy Time missed   Time missed?  No

## 2020-06-03 NOTE — PROGRESS NOTES
06/03/20 1307   Pain Assessment   Pain Assessment Tool 0-10   Pain Score 9   Pain Location/Orientation Orientation: Right;Location: Hip;Location: Knee   Restrictions/Precautions   Precautions Fall Risk;Pain   RLE Weight Bearing Per Order TTWB   ROM Restrictions No   Sit to Lying   Type of Assistance Needed Physical assistance   Amount of Physical Assistance Provided Total assistance   Comment assist x2   Sit to Lying CARE Score 1   Sit to Stand   Type of Assistance Needed Physical assistance   Amount of Physical Assistance Provided Total assistance   Comment assist x2   Sit to Stand CARE Score 1   Bed-Chair Transfer   Type of Assistance Needed Physical assistance   Amount of Physical Assistance Provided Total assistance   Comment assist x2   Chair/Bed-to-Chair Transfer CARE Score 1   Transfer Bed/Chair/Wheelchair   Limitations Noted In Balance; Endurance;Pain Management;LE Strength   Therapeutic Exercise - ROM   UE-ROM Yes   ROM- Right Upper Extremities   RUE ROM Comment used reacher to retrieve clothespins from floor and place in basket on table   ROM - Left Upper Extremities    LUE ROM Comment used reacher to retrieve clothespins from floor and place in basket on table   Therapeutic Excerise-Strength   UE Strength Yes   Right Upper Extremity- Strength   RUE Strength Comment x30 using 1# dumbbell: elbow flexion/extension, protraction/retraction, shoulder flexion/extension, internal/external rotation, pronation/supination; pinched clothespins to hang and remove from rods; x30 digit flexion/extension using 9# digi flex   Left Upper Extremity-Strength   LUE Strength Comment x30 using 1# dumbbell: elbow flexion/extension, protraction/retraction, shoulder flexion/extension, internal/external rotation, pronation/supination; pinched clothespins to hang and remove from rods; x30 digit flexion/extension using 9# digi flex   Cognition   Overall Cognitive Status WFL   Arousal/Participation Alert; Responsive; Cooperative Attention Within functional limits   Orientation Level Oriented X4   Memory Within functional limits   Following Commands Follows one step commands without difficulty   Assessment   Treatment Assessment Pt agreeable to OT session this PM  Received sitting upright in w/c  ROM/strength activities completed, details in respective sections  Rest breaks taken as needed 2* fatigue, but overall pt with improved tolerance as compared to previous session  Pt reported severe pain in R hip and knee, but that her "shakes" had subsided  Required assist x2 for chair-bed transfer  Pt would benefit from continued skilled OT services in order to maximize functional mobility/transfers, ROM/strength, and activity tolerance  Prognosis Fair   Problem List Decreased strength;Decreased range of motion;Decreased endurance; Impaired balance;Orthopedic restrictions;Pain   Plan   Treatment/Interventions ADL retraining;Functional transfer training;LE strengthening/ROM; Therapeutic exercise; Endurance training;Patient/family training;Equipment eval/education; Compensatory technique education;OT   Progress Slow progress, decreased activity tolerance   OT Therapy Minutes   OT Time In 1307   OT Time Out 1352   OT Total Time (minutes) 45   OT Mode of treatment - Individual (minutes) 45   Therapy Time missed   Time missed?  No

## 2020-06-04 PROBLEM — E83.39 HYPOPHOSPHATEMIA: Status: ACTIVE | Noted: 2020-06-04

## 2020-06-04 LAB
CREAT UR-MCNC: 136 MG/DL
GLUCOSE SERPL-MCNC: 167 MG/DL (ref 65–140)
GLUCOSE SERPL-MCNC: 79 MG/DL (ref 65–140)
GLUCOSE SERPL-MCNC: 86 MG/DL (ref 65–140)
GLUCOSE SERPL-MCNC: 92 MG/DL (ref 65–140)
OSMOLALITY UR: 568 MMOL/KG
SODIUM 24H UR-SCNC: 64 MOL/L

## 2020-06-04 PROCEDURE — 97110 THERAPEUTIC EXERCISES: CPT

## 2020-06-04 PROCEDURE — 99232 SBSQ HOSP IP/OBS MODERATE 35: CPT | Performed by: INTERNAL MEDICINE

## 2020-06-04 PROCEDURE — 97535 SELF CARE MNGMENT TRAINING: CPT

## 2020-06-04 PROCEDURE — 82570 ASSAY OF URINE CREATININE: CPT | Performed by: INTERNAL MEDICINE

## 2020-06-04 PROCEDURE — 97542 WHEELCHAIR MNGMENT TRAINING: CPT

## 2020-06-04 PROCEDURE — 97530 THERAPEUTIC ACTIVITIES: CPT

## 2020-06-04 PROCEDURE — 84300 ASSAY OF URINE SODIUM: CPT | Performed by: INTERNAL MEDICINE

## 2020-06-04 PROCEDURE — 83935 ASSAY OF URINE OSMOLALITY: CPT | Performed by: INTERNAL MEDICINE

## 2020-06-04 PROCEDURE — 99232 SBSQ HOSP IP/OBS MODERATE 35: CPT | Performed by: PHYSICAL MEDICINE & REHABILITATION

## 2020-06-04 PROCEDURE — 82948 REAGENT STRIP/BLOOD GLUCOSE: CPT

## 2020-06-04 RX ORDER — OXYCODONE HYDROCHLORIDE 5 MG/1
2.5 TABLET ORAL EVERY 6 HOURS PRN
Status: DISCONTINUED | OUTPATIENT
Start: 2020-06-04 | End: 2020-06-18 | Stop reason: HOSPADM

## 2020-06-04 RX ADMIN — TRAMADOL HYDROCHLORIDE 50 MG: 50 TABLET, FILM COATED ORAL at 00:07

## 2020-06-04 RX ADMIN — TRAMADOL HYDROCHLORIDE 50 MG: 50 TABLET, FILM COATED ORAL at 17:46

## 2020-06-04 RX ADMIN — FONDAPARINUX SODIUM 2.5 MG: 2.5 INJECTION, SOLUTION SUBCUTANEOUS at 08:38

## 2020-06-04 RX ADMIN — TRAMADOL HYDROCHLORIDE 50 MG: 50 TABLET, FILM COATED ORAL at 23:57

## 2020-06-04 RX ADMIN — LEVOTHYROXINE SODIUM 100 MCG: 100 TABLET ORAL at 07:34

## 2020-06-04 RX ADMIN — ACETAMINOPHEN 975 MG: 325 TABLET ORAL at 21:03

## 2020-06-04 RX ADMIN — TRAMADOL HYDROCHLORIDE 50 MG: 50 TABLET, FILM COATED ORAL at 06:30

## 2020-06-04 RX ADMIN — LISINOPRIL 5 MG: 5 TABLET ORAL at 08:37

## 2020-06-04 RX ADMIN — ACETAMINOPHEN 975 MG: 325 TABLET ORAL at 14:54

## 2020-06-04 RX ADMIN — INSULIN DETEMIR 3.5 UNITS: 100 INJECTION, SOLUTION SUBCUTANEOUS at 21:51

## 2020-06-04 RX ADMIN — TRAMADOL HYDROCHLORIDE 50 MG: 50 TABLET, FILM COATED ORAL at 13:06

## 2020-06-04 RX ADMIN — INSULIN LISPRO 2 UNITS: 100 INJECTION, SOLUTION INTRAVENOUS; SUBCUTANEOUS at 08:38

## 2020-06-04 RX ADMIN — OXYCODONE HYDROCHLORIDE 2.5 MG: 5 TABLET ORAL at 21:03

## 2020-06-04 RX ADMIN — OXYCODONE HYDROCHLORIDE 2.5 MG: 5 TABLET ORAL at 11:09

## 2020-06-04 RX ADMIN — ASPIRIN 81 MG: 81 TABLET, COATED ORAL at 08:37

## 2020-06-04 RX ADMIN — INSULIN LISPRO 2.5 UNITS: 100 INJECTION, SOLUTION INTRAVENOUS; SUBCUTANEOUS at 13:07

## 2020-06-04 RX ADMIN — ATORVASTATIN CALCIUM 20 MG: 20 TABLET, FILM COATED ORAL at 17:46

## 2020-06-04 RX ADMIN — ACETAMINOPHEN 975 MG: 325 TABLET ORAL at 06:30

## 2020-06-04 RX ADMIN — POTASSIUM & SODIUM PHOSPHATES POWDER PACK 280-160-250 MG 2 PACKET: 280-160-250 PACK at 17:47

## 2020-06-04 RX ADMIN — METHOCARBAMOL 500 MG: 500 TABLET, FILM COATED ORAL at 00:07

## 2020-06-04 RX ADMIN — INSULIN LISPRO 7 UNITS: 100 INJECTION, SOLUTION INTRAVENOUS; SUBCUTANEOUS at 17:47

## 2020-06-04 RX ADMIN — METHOCARBAMOL 500 MG: 500 TABLET, FILM COATED ORAL at 06:29

## 2020-06-04 RX ADMIN — POTASSIUM & SODIUM PHOSPHATES POWDER PACK 280-160-250 MG 2 PACKET: 280-160-250 PACK at 07:34

## 2020-06-04 NOTE — PROGRESS NOTES
06/04/20 1130   Pain Assessment   Pain Assessment Tool 0-10   Pain Score 9   Pain Location/Orientation Orientation: Right;Location: Hip;Location: Knee   Restrictions/Precautions   Precautions Fall Risk;Pain   RLE Weight Bearing Per Order TTWB   ROM Restrictions No   Eating   Type of Assistance Needed Independent   Amount of Physical Assistance Provided No physical assistance   Eating CARE Score 6   Eating Assessment   Food To Mouth Yes   Able To Cut Yes   Positioning Upright;Out of Bed   Meal Assessed Lunch   QI: Swallowing/Nutritional Status Regular food   Current Diet Regular; Thin   Intake Mode PO   Finishes Timely   (quickly 2* decreased appetite)   Opens Packages Yes   Findings drank water from room during session   Therapeutic Exercise - ROM   UE-ROM Yes   ROM- Right Upper Extremities   RUE ROM Comment built pipe constructions while seated in chair; pushed weighted wooden box x15 all planes of motion; placed playing cards in matching pockets while seated   ROM - Left Upper Extremities    LUE ROM Comment built pipe constructions while seated in chair; pushed weighted wooden box x15 all planes of motion; placed playing cards in matching pockets while seated   Therapeutic Excerise-Strength   UE Strength Yes   Right Upper Extremity- Strength   RUE Strength Comment 2x15 using 2# dumbbell: elbow flexion/extension, protraction/retraction, internal/external rotation, pronation/supination   Left Upper Extremity-Strength   LUE Strength Comment 2x15 using 2# dumbbell: elbow flexion/extension, protraction/retraction, internal/external rotation, pronation/supination   Cognition   Overall Cognitive Status WFL   Arousal/Participation Alert; Responsive; Cooperative   Attention Within functional limits   Orientation Level Oriented X4   Memory Within functional limits   Following Commands Follows all commands and directions without difficulty   Assessment   Treatment Assessment Pt agreeable to OT session this AM  Received propelling self in w/c from PT session  Completed ROM/strength exercises with frequent rest breaks taken 2* fatigue  Pt continued to report severe pain in RLE; pt reported taking an Oxy during PT session but it did not yet kick in  Propelled self back to room in w/c, required assist from OT after propelling approx 75% of the way to room  Independent with lunch tray set up  Pt would benefit from continued skilled OT services in order to maximize independence in self care, functional mobility/transfers, ROM/strength, and activity tolerance  Prognosis Fair   Problem List Decreased strength;Decreased range of motion;Decreased endurance; Impaired balance;Orthopedic restrictions;Pain   Plan   Treatment/Interventions Functional transfer training;ADL retraining;LE strengthening/ROM; Therapeutic exercise; Endurance training;Patient/family training;Equipment eval/education; Compensatory technique education;OT   Progress Slow progress, decreased activity tolerance   OT Therapy Minutes   OT Time In 1130   OT Time Out 1228   OT Total Time (minutes) 58   OT Mode of treatment - Individual (minutes) 58   Therapy Time missed   Time missed?  No

## 2020-06-04 NOTE — PROGRESS NOTES
06/04/20 0958   Pain Assessment   Pain Assessment Tool 0-10   Pain Score Worst Possible Pain   Pain Location/Orientation Orientation: Right;Location: Hip;Location: Leg   Restrictions/Precautions   Precautions Fall Risk;Pain   RLE Weight Bearing Per Order TTWB   Cognition   Overall Cognitive Status WFL   Arousal/Participation Alert; Responsive; Cooperative   Attention Within functional limits   Orientation Level Oriented X4   Memory Within functional limits   Following Commands Follows one step commands without difficulty   Lying to Sitting on Side of Bed   Type of Assistance Needed Physical assistance   Amount of Physical Assistance Provided 25%-49%   Lying to Sitting on Side of Bed CARE Score 3   Sit to Stand   Type of Assistance Needed Physical assistance   Amount of Physical Assistance Provided Total assistance   Comment CGA x2   Sit to Stand CARE Score 1   Bed-Chair Transfer   Type of Assistance Needed Physical assistance   Amount of Physical Assistance Provided Total assistance   Comment CGA x2   Chair/Bed-to-Chair Transfer CARE Score 1   Transfer Bed/Chair/Wheelchair   Limitations Noted In Balance;Confidence; Coordination; Endurance;Pain Management;LE Strength   Adaptive Equipment Roller Walker   Stand Pivot Total Assist  (CGAx2)   Sit to Stand Total Assist  (CGAx2)   Stand to Sit Contact Guard   Supine to Sit Minimal Assist   All Transfer Total Assist  (CGAx2)   Wheel 50 Feet with Two Turns   Type of Assistance Needed Supervision   Amount of Physical Assistance Provided No physical assistance   Wheel 50 Feet with Two Turns CARE Score 4   Wheel 150 Feet   Type of Assistance Needed Supervision   Amount of Physical Assistance Provided No physical assistance   Wheel 150 Feet CARE Score 4   Wheelchair mobility   Does the patient use a wheelchair? 1  Yes   Type of Wheelchair Used 1  Manual   Method Right upper extremity; Left upper extremity; Left lower extremity   Distance Level Surface (feet) 168 ft  (150) Findings level and carpet   Therapeutic Interventions   Strengthening seated and supine ther ex 30 reps   Other transfer and w/c mobility   Assessment   Treatment Assessment Upon entering room patient was uspine in bed and reported being nauseated; Pt agreeable to supine ther ex for general conditioning; Pt has difficult time moving R LE and limits herself due to pain; Pt needed encouragement to aprticipate with PT OOB; Pt needed Min A for bed mobility; Pt was CGA of 2 for STS and SPT due to pain; Pt is slow moving and needs increased time to complete all tasks; Pt is S for w/c mobility up to 168'; Seated ther ex as vega for strengthening and ROM; Pt appropriate for concurrent therapy to increase motivation while working on similar exercises    PT Barriers   Physical Impairment Decreased strength;Decreased range of motion;Decreased endurance; Impaired balance;Decreased mobility; Decreased safety awareness;Pain   Functional Limitation Wheelchair management; Walking;Standing   Plan   Treatment/Interventions Functional transfer training;LE strengthening/ROM; Therapeutic exercise   Progress Slow progress, decreased activity tolerance   PT Therapy Minutes   PT Time In 0958   PT Time Out 1128   PT Total Time (minutes) 90   PT Mode of treatment - Individual (minutes) 62   PT Mode of treatment - Concurrent (minutes) 28   PT Mode of treatment - Group (minutes) 0   PT Mode of treatment - Co-treat (minutes) 0   PT Mode of Treatment - Total time(minutes) 90 minutes   PT Cumulative Minutes 271   Therapy Time missed   Time missed?  No

## 2020-06-04 NOTE — PLAN OF CARE
Problem: Prexisting or High Potential for Compromised Skin Integrity  Goal: Skin integrity is maintained or improved  Description  INTERVENTIONS:  - Identify patients at risk for skin breakdown  - Assess and monitor skin integrity  - Assess and monitor nutrition and hydration status  - Monitor labs   - Assess for incontinence   - Turn and reposition patient  - Assist with mobility/ambulation  - Relieve pressure over bony prominences  - Avoid friction and shearing  - Provide appropriate hygiene as needed including keeping skin clean and dry  - Evaluate need for skin moisturizer/barrier cream  - Collaborate with interdisciplinary team   - Patient/family teaching  - Consider wound care consult   Outcome: Progressing     Problem: Potential for Falls  Goal: Patient will remain free of falls  Description  INTERVENTIONS:  - Assess patient frequently for physical needs  -  Identify cognitive and physical deficits and behaviors that affect risk of falls    -  [place] fall precautions as indicated by assessment   - Educate patient/family on patient safety including physical limitations  - Instruct patient to call for assistance with activity based on assessment  - Modify environment to reduce risk of injury  - Consider OT/PT consult to assist with strengthening/mobility  Outcome: Progressing     Problem: PAIN - ADULT  Goal: Verbalizes/displays adequate comfort level or baseline comfort level  Description  Interventions:  - Encourage patient to monitor pain and request assistance  - Assess pain using appropriate pain scale  - Administer analgesics based on type and severity of pain and evaluate response  - Implement non-pharmacological measures as appropriate and evaluate response  - Consider cultural and social influences on pain and pain management  - Notify physician/advanced practitioner if interventions unsuccessful or patient reports new pain  Outcome: Progressing     Problem: INFECTION - ADULT  Goal: Absence or prevention of progression during hospitalization  Description  INTERVENTIONS:  - Assess and monitor for signs and symptoms of infection  - Monitor lab/diagnostic results  - Monitor all insertion sites, i e  indwelling lines, tubes, and drains  - Monitor endotracheal if appropriate and nasal secretions for changes in amount and color  - [place] appropriate cooling/warming therapies per order  - Administer medications as ordered  - Instruct and encourage patient and family to use good hand hygiene technique  - Identify and instruct in appropriate isolation precautions for identified infection/condition  Outcome: Progressing  Goal: Absence of fever/infection during neutropenic period  Description  INTERVENTIONS:  - Monitor WBC    Outcome: Progressing     Problem: SAFETY ADULT  Goal: Patient will remain free of falls  Description  INTERVENTIONS:  - Assess patient frequently for physical needs  -  Identify cognitive and physical deficits and behaviors that affect risk of falls    -  [place] fall precautions as indicated by assessment   - Educate patient/family on patient safety including physical limitations  - Instruct patient to call for assistance with activity based on assessment  - Modify environment to reduce risk of injury  - Consider OT/PT consult to assist with strengthening/mobility  Outcome: Progressing  Goal: Maintain or return to baseline ADL function  Description  INTERVENTIONS:  -  Assess patient's ability to carry out ADLs; assess patient's baseline for ADL function and identify physical deficits which impact ability to perform ADLs (bathing, care of mouth/teeth, toileting, grooming, dressing, etc )  - Assess/evaluate cause of self-care deficits   - Assess range of motion  - Assess patient's mobility; develop plan if impaired  - Assess patient's need for assistive devices and provide as appropriate  - Encourage maximum independence but intervene and supervise when necessary  - Involve family in performance of ADLs  - Assess for home care needs following discharge   - Consider OT consult to assist with ADL evaluation and planning for discharge  - Provide patient education as appropriate  Outcome: Progressing  Goal: Maintain or return mobility status to optimal level  Description  INTERVENTIONS:  - Assess patient's baseline mobility status (ambulation, transfers, stairs, etc )    - Identify cognitive and physical deficits and behaviors that affect mobility  - Identify mobility aids required to assist with transfers and/or ambulation (gait belt, sit-to-stand, lift, walker, cane, etc )  - [place] fall precautions as indicated by assessment  - Record patient progress and toleration of activity level on Mobility SBAR; progress patient to next Phase/Stage  - Instruct patient to call for assistance with activity based on assessment  - Consider rehabilitation consult to assist with strengthening/weightbearing, etc   Outcome: Progressing     Problem: DISCHARGE PLANNING  Goal: Discharge to home or other facility with appropriate resources  Description  INTERVENTIONS:  - Identify barriers to discharge w/patient and caregiver  - Arrange for needed discharge resources and transportation as appropriate  - Identify discharge learning needs (meds, wound care, etc )  - Arrange for interpretive services to assist at discharge as needed  - Refer to Case Management Department for coordinating discharge planning if the patient needs post-hospital services based on physician/advanced practitioner order or complex needs related to functional status, cognitive ability, or social support system  Outcome: Progressing

## 2020-06-04 NOTE — PROGRESS NOTES
06/03/20 1945   Charting Type   Charting Type Shift assessment   Neurological   Neuro (WDL) X   Level of Consciousness Alert/awake   Orientation Level Oriented X4   Cognition Follows commands   Extraocular Movements Full   Tongue Deviation Midline   Speech Clear   Swallow Able to swallow solids and liquids without difficulty   RUE Muscle Strength 5- Normal strength   LUE Muscle Strength 4- Movement against gravity and limited resistance   RLE Muscle Strength 3- Movement against gravity only   LLE Muscle Strength 5- Normal strength   [place] Coma Scale   Eye Opening 4   Best Verbal Response 5   Best Motor Response 6   [place] Coma Scale Score 15   HEENT   HEENT (WDL) X   Head and Face Asymmetrical;Other (Comment)  (prior surgery)   Respiratory   Respiratory (WDL) X   Respiratory Pattern Normal   Chest Assessment Chest expansion symmetrical   Bilateral Breath Sounds Clear   Cardiac   Cardiac (WDL) WDL   Cardiac Regularity Regular   Peripheral Vascular   Peripheral Vascular (WDL) WDL   Cyanosis None   Capillary Refill Less than/equal to 2 seconds (All extremities)   Pulses R pedal;L pedal   Edema Right lower extremity   RLE Edema Non-pitting   RLE Neurovascular Assessment   R Pedal Pulse +1   LLE Neurovascular Assessment   L Pedal Pulse +2   Integumentary   Integumentary (WDL) X   Skin Color Appropriate for ethnicity   Skin Condition/Temp Warm;Dry   Skin Integrity Bruising   Skin Location R hip; R medial knee   Skin Turgor Epidermis thin with loss of subcutaneous tissue   Integumentary Additional Assessments No   Nate Scale   Sensory Perceptions 4   Moisture 4   Activity 2   Mobility 2   Nutrition 3   Friction and Shear 2   Nate Scale Score 17   Wound (LDAs)   Type of Wound (LDA) Wound   Wound 05/28/20 Incision Hip Right   Date First Assessed/Time First Assessed: 05/28/20 0650   Primary Wound Type:  Incision  Location: Hip  Wound Location Orientation: Right  Wound Description (Comments): betadine ointment to incision  Incision's 1st Dressing: DRESSING XEROFORM 5 X 9, SPO    Tess-wound Assessment Edema; Other (Comment)  (bruising)   Closure Staples   Dressing Open to air   Wound 06/01/20 Pressure Injury Heel Right   Date First Assessed/Time First Assessed: 06/01/20 2028   Primary Wound Type: Pressure Injury  Location: Heel  Wound Location Orientation: Right   Wound Description Light purple   Staging Deep Tissue Injury   Tess-wound Assessment Pink   Dressing Open to air   Musculoskeletal   Musculoskeletal (WDL) X   Level of Assistance Maximum assist, patient does 25-49%   Assistive Device Front wheel walker   RUE Full movement   LUE Limited movement   RLE Limited movement   LLE Limited movement   Musculoskeletal Additional Assessments No   Gastrointestinal   Gastrointestinal (WDL) WDL   Abdomen Inspection Soft;Nondistended   Bowel Sounds (All Quadrants) Normoactive   Tenderness Soft;Nontender   Admission Bowel Assessment   Usual Pattern Daily   Medications used at home Stool softeners   Problems prior to admission? No   Bowel Shift Assessment   Assistance Needed Other (Comment)  (pt refused stool softner 6-3)   Bowel Incontinence No   Genitourinary   Genitourinary (WDL) X   Genitourinary Symptoms Indwelling catheter   Admission Bladder Assessment   Bladder Problems Prior to Admission?  No   Bladder History No problems   Bladder Device Used at [facility] liner   Bladder Shift Assessment   Bladder Device Ramos   Bladder Incontinence Use of device (Comment)  (ramos)   Bladder Management Total assistance   Bladder Management Deficit Emptied by staff   Cough   Cough None

## 2020-06-04 NOTE — ASSESSMENT & PLAN NOTE
- patient with decreased level on 5/28 therefore started on phos supplements in acute care on 5/28 and on 5/31, 6/2, 6/5, 6/9 levels WNL at 3 7, 3 6, 3 7, & 3 7 respectively  - additionally phos packets contain K and K level of 6/9 was WNL at 4 2  - d/w Dr Deidra Jacome of renal above findings and he recommended phos (with k) packets be stopped which was done on 6/12  - recheck of phos and K on 6/17 (after k-phos packets were stopped on 6/12) were WNL & stable at 3 6 and 4 2 respectively (was 3 7 and 4 2 respectively on 6/9)  - cleared for dc by renal/IM

## 2020-06-04 NOTE — PROGRESS NOTES
Physical Medicine and Rehabilitation Progress Note  Kamini Judd 78 y o  female MRN: [id number]  Unit/Bed#: -01 Encounter: [id number]    HPI: Kamini Judd is a 78 y o  female who presented to the [facility] after fall and was found to have a right IT fx and is s/p nail fixation by Dr Helga Boyd on 5/28  Course complicated by hyponatremia and patient was placed on FR  Chief Complaint: Rt hip fx     Interval/subjective: per patient pain is currently better controlled     ROS: A 10 point ROS was performed; negative except as noted above       Assessment/Plan:      * Closed intertrochanteric fracture of right femur [facility])  Assessment & Plan  - s/p nail fixation by Dr Helga Boyd on 5/28  - TTWB per ortho  - OP FU with Dr Helga Boyd     Hypophosphatemia  Dion Sewell  - patient with decreased level on 5/28 therefore started on phos supplements in acute care on 5/28 as well and on 5/31 and 6/2 levels WNL at 3 7 and 3 6 respectively; recheck in AM     Anemia  Assessment & Plan  - Hg currently 8 4 post-operatively; recheck in AM   - IM & renal co-managing     Hyponatremia  Assessment & Plan  - currently 130 however WNL at 134 when corrected for blood sugar level; recheck in AM   - FR per renal   - renal managing     Elevated vitamin B12 level  Assessment & Plan  - elevated to 4033 ()  - patient takes supplemental B12 therefore will stop and have patient FU with PCP     Postoperative urinary retention  Assessment & Plan  - + ramos  - t/c TOV when patient more mobile       DM (diabetes mellitus) ([facility] [address] )  Assessment & Plan  - per patient on metformin 500 mg BID with meals, insulin 5 units with meals, and levemir 5 (not 10) units at HS   - follows with Dr Seema Aldridge of endocrine  - IM managing     Hypothyroidism  Assessment & Plan  - TSH of 5/28 elevated (free T4 WNL)  - on home regimen of levothyroxine 100 mcg qd except for Sundays when she takes 50 mcg qd  - follows with Dr Seema Aldridge of endocrine  - IM managing and recommend patient have repeat TFTs as OP and FU with Dr Patrick Thibodeaux hypertension  Assessment & Plan  - at home ramapril 5 mg qd  - IM & renal co-managing     Hyperlipidemia  Assessment & Plan  - on home lipitor 20 mg qpm (per patient no longer on crestor)       Scheduled Meds:    Current Facility-Administered Medications:  acetaminophen 975 mg Oral Q8H César Nielsen MD   aspirin 81 mg Oral Daily MD Porsha Summers ON 6/12/2020] aspirin 325 mg Oral Daily Katja Clark MD   atorvastatin 20 mg Oral Daily With Anna Bass MD   bisacodyl 10 mg Rectal Daily PRN Katja Clark MD   fondaparinux 2 5 mg Subcutaneous Daily Katja Clark MD   insulin detemir 10 Units Subcutaneous HS Nisha Joseph PA-C   insulin lispro 1-5 Units Subcutaneous TID AC Katja Clark MD   insulin lispro 1-5 Units Subcutaneous HS Katja Clark MD   insulin lispro 10 Units Subcutaneous TID With Meals Katja Clark MD   levothyroxine 100 mcg Oral Once per day on Mon Tue Wed Thu Fri Sat MD Porsha Summers ON 6/7/2020] levothyroxine 50 mcg Oral Weekly Katja Clark MD   lisinopril 5 mg Oral Daily Katja Clark MD   methocarbamol 500 mg Oral Q6H PRN Katja Clark MD   oxyCODONE 2 5 mg Oral Q6H PRN Katja Clark MD   polyethylene glycol 17 g Oral Daily Katja Clark MD   potassium-sodium phosphates 2 packet Oral BID With Meals Katja Clark MD   traMADol 50 mg Oral Q6H César Nielsen MD        Incidental findings:     1) mildly decreased alk phos level: currently 54 (LLN 55)     DVT ppx: per Dr Lennox Curls protocol arixtra 2 5 mg sc x 2 weeks post-op then followed by asa 325 mg for 4 weeks (note patient is on asa 81 mg qd at home and was cleared in acute care to be on both home asa 81 mg qd and arixtra and patient has been on both since 5/29 however once patient transitions to asa 325 mg qd on 6/12 per Dr Lennox Curls protocol she will stop asa 81 mg qd until she has completed the 4 week course of asa 325 gm qd per Dr Poncho Adamson protocol)     Code: confirmed with patient that she wishes to be DNR/DNI and verbalized her understanding of what this means      Note: confirmed home meds with patient     Objective:    Functional Update:  Mobility: max   Transfers: max   ADLs: max       Physical Exam:            General: alert, no apparent distress, cooperative and comfortable  HEENT:  Head: Normal, normocephalic, atraumatic    CARDIAC:  +S1/2  LUNGS:  no abnormal respiratory pattern, no retractions noted, non-labored breathing   ABDOMEN:  soft NT   EXTREMITIES:  volume status currently stable   NEURO:  awake, alert, appropriately answering questions   PSYCH:  mood/affect currently stable          Diagnostic Studies:   No orders to display       Laboratory:   Results from last 7 days   Lab Units 06/02/20  0501 05/30/20  0608 05/29/20  0841   HEMOGLOBIN g/dL 8 4* 9 1* 9 8*   HEMATOCRIT % 24 1* 26 6* 28 7*   WBC Thousand/uL 8 40  --  11 60*     Results from last 7 days   Lab Units 06/03/20  0536 06/02/20  0501 06/01/20  0503   BUN mg/dL 21 21 17   SODIUM mmol/L 130* 131* 130*   POTASSIUM mmol/L 5 2 4 4 5 2   CHLORIDE mmol/L 93* 95* 93*   CREATININE mg/dL 0 81 0 72 0 70

## 2020-06-04 NOTE — PROGRESS NOTES
06/04/20 1255   Pain Assessment   Pain Assessment Tool 0-10   Pain Score Worst Possible Pain   Pain Location/Orientation Orientation: Right;Location: Hip;Location: Knee   Restrictions/Precautions   Precautions Fall Risk;Pain   RLE Weight Bearing Per Order TTWB   ROM Restrictions No   Sit to Lying   Type of Assistance Needed Physical assistance   Amount of Physical Assistance Provided Total assistance   Comment assist x2   Sit to Lying CARE Score 1   Sit to Stand   Type of Assistance Needed Physical assistance   Amount of Physical Assistance Provided 75% or more   Sit to Stand CARE Score 2   Bed-Chair Transfer   Type of Assistance Needed Physical assistance;Verbal cues   Amount of Physical Assistance Provided Total assistance   Comment assist x2   Chair/Bed-to-Chair Transfer CARE Score 1   Transfer Bed/Chair/Wheelchair   Limitations Noted In Balance;Confidence; Endurance;Pain Management;LE Strength   Toileting Hygiene   Type of Assistance Needed Physical assistance   Amount of Physical Assistance Provided Total assistance   Comment assist x2; happened x2 during session   [address] Score 1   Toileting   Able to [address] down no, up no  Manage Hygiene Bladder   Limitations Noted In Balance;ROM;Safety;Problem Solving;LE Strength   Adaptive Equipment   (bed rail)   Toilet Transfer   Type of Assistance Needed Physical assistance   Amount of Physical Assistance Provided Total assistance   Comment assist x2   Toilet Transfer CARE Score 1   Toilet Transfer   Surface Assessed Standard Commode   Transfer Technique   (w/c to commode replacement)   Limitations Noted In Balance; Endurance;Problem Solving;ROM;Safety;LE Strength   Adaptive Equipment   (bed rail)   Therapeutic Excerise-Strength   UE Strength Yes   Right Upper Extremity- Strength   RUE Strength Comment searched for and retrieved small objects in red theraputty   Left Upper Extremity-Strength   LUE Strength Comment searched for and retrieved small objects in red theraputty   Cognition   Overall Cognitive Status [facility]   Arousal/Participation Alert; Responsive; Cooperative   Attention Within functional limits   Orientation Level Oriented X4   Memory Within functional limits   Following Commands Follows all commands and directions without difficulty   Assessment   Treatment Assessment Pt agreeable to OT session this PM  Received sitting upright in w/c  Toileting and transfer completed with assist x2; at start of session, pt felt she needed to go but did not, and at end of session, completed again and pt had bowel accident en route to toilet  Completed intrinsic hand strengthening in between toileting trials  Returned to supine at end of session with assist x2  Continues to report severe pain in RLE that is not improving with pain medicine  Pt would benefit from continued skilled OT services in order to maximize independence in self care, functional mobility/transfers, ROM/strength, and activity tolerance  Prognosis Fair   Problem List Decreased strength;Decreased range of motion;Decreased endurance; Impaired balance;Orthopedic restrictions;Pain   Plan   Treatment/Interventions ADL retraining;Functional transfer training;LE strengthening/ROM; Therapeutic exercise; Endurance training;Patient/family training;Equipment eval/education; Compensatory technique education;OT   Progress Slow progress, decreased activity tolerance   OT Therapy Minutes   OT Time In 1255   OT Time Out 1336   OT Total Time (minutes) 41   OT Mode of treatment - Individual (minutes) 15   OT Mode of treatment - Concurrent (minutes) 26   Therapy Time missed   Time missed?  No

## 2020-06-04 NOTE — NURSING NOTE
Pt  Requesting to speak with Dr Emmy Welch  I informed pt  He is not in yet but would pass request on for day shift nurse to make him aware she would like to speak with him  Pt reports she did not sleep all night but she did sleep from approx 0030 until she felt need to move bowels shortly after 4am and was assisted by 2 plus a 3rd staff member to pull pants/pull-up down and up to the [facility]  Pt did not move her bowels   Pt then fell  Back asleep until close to 630 am

## 2020-06-04 NOTE — PROGRESS NOTES
Progress Note - Nephrology   Venice Villarreal 78 y o  female MRN: [id number]  Unit/Bed#: -01 Encounter: [id number]    A/P:  1  Hyponatremia:  Due to ADH release due to pain and nausea on admission  He was placed on a fluid restriction which has been liberalize 1800 mils per day  Requip she did require opioids last night due to severe pain  Will recheck labs  2  Hypertension:  Well controlled  3  Urinary retention[de-identified]  Has a Kinney catheter and a voiding trial will be attempted at some point  4  Iron deficiency anemia:  Receiving Venofer 100 mg IV daily      Follow up reason for today's visit:  Hyponatremia    Closed intertrochanteric fracture of right femur [facility])    Patient Active Problem List   Diagnosis    Hyperlipidemia    Essential hypertension    Hypothyroidism    [facility]    Closed [facility]trochanteric [facility] of right femur [facility]    Hyponatremia    Postoperative urinary retention    Elevated vitamin B12 level    Anemia         Subjective:   Denies chest pain or shortness of breath  Has nausea  Complained of severe pain requiring oxycodone last night  She has a Kinney draining clear yellow urine    Objective:     Vitals: Blood pressure 157/75, pulse 84, temperature 97 6 °F (36 4 °C), temperature source Temporal, resp  rate 20, height 5' 2" (1 575 m), weight 62 3 kg (137 lb 5 6 oz), SpO2 98 %  ,Body mass index is 25 12 kg/m²  Weight (last 2 days)     None            Intake/Output Summary (Last 24 hours) at 6/4/2020 1155  Last data filed at 6/4/2020 0500  Gross per 24 hour   Intake --   Output 1425 ml   Net -1425 ml     I/O last 3 completed shifts:  In: -   Out: 2725 [Urine:2725]    Urethral Catheter Latex 18 Fr   (Active)   Amt returned on insertion(mL) 325 mL 6/2/2020 12:40 PM   Reasons to continue Urinary Catheter  Acute urinary retention/obstruction failing urinary retention protocol 6/2/2020 12:40 PM   Goal for Removal Voiding trial when ambulation improves 6/2/2020 12:40 PM   Site Assessment Clean;Skin intact 6/2/2020 12:40 PM   Collection Container Standard drainage bag 6/2/2020 12:40 PM   Securement Method Securing device (Describe) 6/2/2020 12:40 PM   Output (mL) 600 mL 6/4/2020  5:00 AM       Physical Exam: /75 (BP Location: Left arm)   Pulse 84   Temp 97 6 °F (36 4 °C) (Temporal)   Resp 20   Ht 5' 2" (1 575 m)   Wt 62 3 kg (137 lb 5 6 oz)   SpO2 98%   BMI 25 12 kg/m²     General Appearance:    Alert, cooperative, no distress, appears stated age   Head:    Normocephalic, without obvious abnormality, atraumatic   Eyes:    Conjunctiva/corneas clear   Ears:    Normal external ears   Nose:   Nares normal, septum midline, mucosa normal, no drainage    or sinus tenderness   Throat:   Lips, mucosa, and tongue normal; teeth and gums normal   Neck:   Supple, symmetrical, trachea midline, no adenopathy;        thyroid:  No enlargement/tenderness/nodules; no carotid    bruit or JVD   Back:     Symmetric, no curvature, ROM normal, no CVA tenderness   Lungs:     Clear to auscultation bilaterally, respirations unlabored   Chest wall:    No tenderness or deformity   Heart:    Regular rate and rhythm, S1 and S2 normal, no murmur, rub   or gallop   Abdomen:     Soft, non-tender, bowel sounds active has ramos    Extremities:   Extremities normal, atraumatic, no cyanosis or edema   Skin:   Skin color, texture, turgor normal, no rashes or lesions   Lymph nodes:   Cervical normal   Neurologic:   CNII-XII intact            Lab, Imaging and other studies: I have personally reviewed pertinent labs  CBC: No results found for: WBC, HGB, HCT, MCV, PLT, ADJUSTEDWBC, MCH, MCHC, RDW, MPV, NRBC  CMP: No results found for: NA, K, CL, CO2, ANIONGAP, BUN, CREATININE, GLUCOSE, CALCIUM, AST, ALT, ALKPHOS, PROT, BILITOT, EGFR        Results from last 7 days   Lab Units 06/03/20  0536 06/02/20  0501 06/01/20  0503   POTASSIUM mmol/L 5 2 4 4 5 2   CHLORIDE mmol/L 93* 95* 93*   CO2 mmol/L 30 30 27   BUN mg/dL 21 21 17   CREATININE mg/dL 0 81 0 72 0 70   CALCIUM mg/dL 8 4* 8 1* 7 9*         Phosphorus: No results found for: PHOS  Magnesium: No results found for: MG  Urinalysis: No results found for: COLORU, CLARITYU, SPECGRAV, PHUR, LEUKOCYTESUR, NITRITE, PROTEINUA, GLUCOSEU, KETONESU, BILIRUBINUR, BLOODU  Ionized Calcium: No results found for: CAION  Coagulation: No results found for: PT, INR, APTT  Troponin: No results found for: TROPONINI  ABG: No results found for: PHART, CKR8CJX, PO2ART, PQS5WUW, O4TPZAAH, BEART, SOURCE  Radiology review:     IMAGING  No results found      Current Facility-Administered Medications   Medication Dose Route Frequency    acetaminophen (TYLENOL) tablet 975 mg  975 mg Oral Q8H [address]    aspirin (ECOTRIN LOW STRENGTH) EC tablet 81 mg  81 mg Oral Daily    [START ON 6/12/2020] aspirin (ECOTRIN) EC tablet 325 mg  325 mg Oral Daily    atorvastatin (LIPITOR) tablet 20 mg  20 mg Oral Daily With Dinner    bisacodyl (DULCOLAX) rectal suppository 10 mg  10 mg Rectal Daily PRN    fondaparinux (ARIXTRA) subcutaneous injection 2 5 mg  2 5 mg Subcutaneous Daily    insulin detemir (LEVEMIR) subcutaneous injection 10 Units  10 Units Subcutaneous HS    insulin lispro (HumaLOG) 100 units/mL subcutaneous injection 1-5 Units  1-5 Units Subcutaneous TID AC    insulin lispro (HumaLOG) 100 units/mL subcutaneous injection 1-5 Units  1-5 Units Subcutaneous HS    insulin lispro (HumaLOG) 100 units/mL subcutaneous injection 10 Units  10 Units Subcutaneous TID With Meals    levothyroxine tablet 100 mcg  100 mcg Oral Once per day on Mon Tue Wed Thu Fri Sat    [START ON 6/7/2020] levothyroxine tablet 50 mcg  50 mcg Oral Weekly    lisinopril (ZESTRIL) tablet 5 mg  5 mg Oral Daily    methocarbamol (ROBAXIN) tablet 500 mg  500 mg Oral Q6H PRN    oxyCODONE (ROXICODONE) IR tablet 2 5 mg  2 5 mg Oral Q6H PRN    polyethylene glycol (MIRALAX) packet 17 g  17 g Oral Daily    potassium-sodium phosphates (PHOS-NAK) packet 2 packet  2 packet Oral BID With Meals    traMADol (ULTRAM) tablet 50 mg  50 mg Oral Q6H [address]     Medications Discontinued During This Encounter   Medication Reason    oxyCODONE (ROXICODONE) immediate release tablet 10 mg     oxyCODONE (ROXICODONE) IR tablet 5 mg     insulin detemir (LEVEMIR) subcutaneous injection 20 Units     iron sucrose (VENOFER) 200 mg in sodium chloride 0 9 % 100 mL IVPB     traMADol (ULTRAM) tablet 50 mg        Seema Curtis MD      This progress note was produced in part using a dictation device which may document imprecise wording from author's original intent

## 2020-06-04 NOTE — NURSING NOTE
Pt is refusing prescribed sliding and base units of short acting insulin at mealtimes  Pt calculates carbs eaten at meals and requests amount of SA units to be administered  Blood sugars were 96 at 0730 and 79 at 1145  Will continue to monitor and follow plan of care

## 2020-06-04 NOTE — NURSING NOTE
Pt's sugar 206, according to written scale she should receive 1 unit Humalog and 10 levemir  Pt first was okay with the 1 unit humalog but then changed her mind and did not want any short acting insulin  She also said 10 units Levemir  Was to much and requested only 8 units  Hs snack was given  Will continue to monitor sugars

## 2020-06-05 LAB
ANION GAP SERPL CALCULATED.3IONS-SCNC: 6 MMOL/L (ref 4–13)
BUN SERPL-MCNC: 16 MG/DL (ref 7–25)
CALCIUM SERPL-MCNC: 8 MG/DL (ref 8.6–10.5)
CHLORIDE SERPL-SCNC: 99 MMOL/L (ref 98–107)
CO2 SERPL-SCNC: 29 MMOL/L (ref 21–31)
CREAT SERPL-MCNC: 0.7 MG/DL (ref 0.6–1.2)
GFR SERPL CREATININE-BSD FRML MDRD: 83 ML/MIN/1.73SQ M
GLUCOSE P FAST SERPL-MCNC: 62 MG/DL (ref 65–99)
GLUCOSE SERPL-MCNC: 128 MG/DL (ref 65–140)
GLUCOSE SERPL-MCNC: 172 MG/DL (ref 65–140)
GLUCOSE SERPL-MCNC: 62 MG/DL (ref 65–99)
GLUCOSE SERPL-MCNC: 74 MG/DL (ref 65–140)
GLUCOSE SERPL-MCNC: 86 MG/DL (ref 65–140)
HCT VFR BLD AUTO: 23.1 % (ref 42–47)
HGB BLD-MCNC: 8.1 G/DL (ref 12–16)
PHOSPHATE SERPL-MCNC: 3.7 MG/DL (ref 3–5.5)
POTASSIUM SERPL-SCNC: 4.3 MMOL/L (ref 3.5–5.5)
SODIUM SERPL-SCNC: 134 MMOL/L (ref 134–143)

## 2020-06-05 PROCEDURE — 84100 ASSAY OF PHOSPHORUS: CPT | Performed by: PHYSICAL MEDICINE & REHABILITATION

## 2020-06-05 PROCEDURE — 97110 THERAPEUTIC EXERCISES: CPT

## 2020-06-05 PROCEDURE — 97530 THERAPEUTIC ACTIVITIES: CPT

## 2020-06-05 PROCEDURE — 85014 HEMATOCRIT: CPT | Performed by: PHYSICAL MEDICINE & REHABILITATION

## 2020-06-05 PROCEDURE — 82948 REAGENT STRIP/BLOOD GLUCOSE: CPT

## 2020-06-05 PROCEDURE — 97542 WHEELCHAIR MNGMENT TRAINING: CPT

## 2020-06-05 PROCEDURE — 97535 SELF CARE MNGMENT TRAINING: CPT

## 2020-06-05 PROCEDURE — 85018 HEMOGLOBIN: CPT | Performed by: PHYSICAL MEDICINE & REHABILITATION

## 2020-06-05 PROCEDURE — 80048 BASIC METABOLIC PNL TOTAL CA: CPT | Performed by: INTERNAL MEDICINE

## 2020-06-05 RX ADMIN — LEVOTHYROXINE SODIUM 100 MCG: 100 TABLET ORAL at 06:11

## 2020-06-05 RX ADMIN — ATORVASTATIN CALCIUM 20 MG: 20 TABLET, FILM COATED ORAL at 16:57

## 2020-06-05 RX ADMIN — ACETAMINOPHEN 975 MG: 325 TABLET ORAL at 21:29

## 2020-06-05 RX ADMIN — INSULIN LISPRO 4 UNITS: 100 INJECTION, SOLUTION INTRAVENOUS; SUBCUTANEOUS at 12:57

## 2020-06-05 RX ADMIN — TRAMADOL HYDROCHLORIDE 50 MG: 50 TABLET, FILM COATED ORAL at 17:00

## 2020-06-05 RX ADMIN — OXYCODONE HYDROCHLORIDE 2.5 MG: 5 TABLET ORAL at 16:56

## 2020-06-05 RX ADMIN — OXYCODONE HYDROCHLORIDE 2.5 MG: 5 TABLET ORAL at 09:37

## 2020-06-05 RX ADMIN — POTASSIUM & SODIUM PHOSPHATES POWDER PACK 280-160-250 MG 2 PACKET: 280-160-250 PACK at 08:13

## 2020-06-05 RX ADMIN — FONDAPARINUX SODIUM 2.5 MG: 2.5 INJECTION, SOLUTION SUBCUTANEOUS at 08:14

## 2020-06-05 RX ADMIN — ACETAMINOPHEN 975 MG: 325 TABLET ORAL at 06:01

## 2020-06-05 RX ADMIN — INSULIN DETEMIR 4.5 UNITS: 100 INJECTION, SOLUTION SUBCUTANEOUS at 21:34

## 2020-06-05 RX ADMIN — ACETAMINOPHEN 975 MG: 325 TABLET ORAL at 14:39

## 2020-06-05 RX ADMIN — ASPIRIN 81 MG: 81 TABLET, COATED ORAL at 08:13

## 2020-06-05 RX ADMIN — TRAMADOL HYDROCHLORIDE 50 MG: 50 TABLET, FILM COATED ORAL at 12:57

## 2020-06-05 RX ADMIN — INSULIN LISPRO 3 UNITS: 100 INJECTION, SOLUTION INTRAVENOUS; SUBCUTANEOUS at 08:26

## 2020-06-05 RX ADMIN — POTASSIUM & SODIUM PHOSPHATES POWDER PACK 280-160-250 MG 2 PACKET: 280-160-250 PACK at 16:57

## 2020-06-05 RX ADMIN — INSULIN LISPRO 3 UNITS: 100 INJECTION, SOLUTION INTRAVENOUS; SUBCUTANEOUS at 17:49

## 2020-06-05 RX ADMIN — TRAMADOL HYDROCHLORIDE 50 MG: 50 TABLET, FILM COATED ORAL at 06:01

## 2020-06-05 NOTE — PROGRESS NOTES
06/05/20 1001   Pain Assessment   Pain Assessment Tool 0-10   Pain Score 9   Pain Location/Orientation Orientation: Right;Location: Hip;Location: Knee   Restrictions/Precautions   Precautions Fall Risk;Pain   RLE Weight Bearing Per Order TTWB   ROM Restrictions No   Oral Hygiene   Type of Assistance Needed Set-up / clean-up   Amount of Physical Assistance Provided No physical assistance   Oral Hygiene CARE Score 5   Grooming   Able To Initiate Tasks;Comb/Brush Hair;Brush/Clean Teeth;Wash/Dry Face   Limitation Noted In Safety   Shower/Bathe Self   Type of Assistance Needed Physical assistance   Amount of Physical Assistance Provided Less than 25%   Comment assist for perineal and buttocks   Shower/Bathe Self CARE Score 3   Bathing   Assessed Bath Style Sponge Bath   Anticipated D/C Bath Style Shower;Sponge Bath   Able to [place] No   Able to Wash/Rinse/Dry (body part) Left Arm;Right Arm;L Upper Leg;R Upper Leg;L Lower Leg/Foot;Chest;R Lower Leg/Foot; Abdomen   Limitations Noted in Balance; Endurance;ROM;Safety;Strength   Positioning Seated;Standing   Adaptive Equipment Longhand Sponge   Upper Body Dressing   Type of Assistance Needed Set-up / clean-up   Amount of Physical Assistance Provided No physical assistance   Upper Body Dressing CARE Score 5   Lower Body Dressing   Type of Assistance Needed Physical assistance; Adaptive equipment   Amount of Physical Assistance Provided 50%-74%   Comment assist to maneuver AE, get catheter through pants, pull up and down   Lower Body Dressing CARE Score 2   Putting On/Taking Off Footwear   Type of Assistance Needed Adaptive equipment;Physical assistance   Amount of Physical Assistance Provided Less than 25%   Comment assist to doff R sock   Putting On/Taking Off Footwear CARE Score 3   Dressing/Undressing Clothing   Remove UB Clothes Pullover Shirt   Don UB Clothes Pullover Shirt   Remove LB Clothes Pants;Socks; Undergarment; [facility] Pants;Undergarment;Socks; Shoes   Limitations Noted In Balance; Endurance; Safety;Strength;ROM   Adaptive Equipment Reacher;Sock Aide   Positioning Supported Sit   Lying to Sitting on Side of Bed   Type of Assistance Needed Physical assistance   Amount of Physical Assistance Provided 25%-49%   Lying to Sitting on Side of Bed CARE Score 3   Sit to Stand   Type of Assistance Needed Physical assistance   Amount of Physical Assistance Provided 75% or more   Sit to Stand CARE Score 2   Bed-Chair Transfer   Type of Assistance Needed Physical assistance   Amount of Physical Assistance Provided Total assistance   Comment modA x2   Chair/Bed-to-Chair Transfer CARE Score 1   Transfer Bed/Chair/Wheelchair   Limitations Noted In Balance; Endurance;Pain Management;LE Strength   Adaptive Equipment Roller Walker   Therapeutic Excerise-Strength   UE Strength Yes   Right Upper Extremity- Strength   RUE Strength Comment varying repetitions using 2# dumbbell: elbow flexion/extension, protraction/retraction, shoulder flexion/extension   Left Upper Extremity-Strength   LUE Strength Comment varying repetitions using 2# dumbbell: elbow flexion/extension, protraction/retraction, shoulder flexion/extension   Cognition   Overall Cognitive Status WFL   Arousal/Participation Alert; Responsive; Cooperative   Attention Within functional limits   Orientation Level Oriented X4   Memory Within functional limits   Following Commands Follows all commands and directions without difficulty   Additional Activities   Additional Activities Comments game of war with OT; pt unable to finish game 2* fatigue   Assessment   Treatment Assessment Pt agreeable to OT session this AM  Received lying supine in bed  ADL session completed; current LOF and details listed in respective sections  Pt requires physical assist for bathing and LB dressing, especially to maneuver AE to don pants   Standing tolerance is significantly improving; pt maxA x1 for sit to stand when not transferring in or out of bed  Continues to report severe pain in R hip and knee when transferring but is able to relieve pain by leaning on RW  After ADL, completed strengthening and activity tolerance exercises  Pt had low activity tolerance for these tasks and required frequent rest breaks; pt reported UE fatigue during card game that made her unable to finish task  Returned to supine after cards with assist x2  Pt would benefit from continued skilled OT services in order to maximize independence in self care, functional mobility/transfers, ROM/strength, and activity tolerance  Prognosis Fair   Problem List Decreased strength;Decreased range of motion; Impaired balance;Decreased endurance;Orthopedic restrictions;Pain   Plan   Treatment/Interventions ADL retraining;Functional transfer training;LE strengthening/ROM; Therapeutic exercise; Endurance training;Patient/family training;Equipment eval/education; Compensatory technique education;OT   Progress Slow progress, decreased activity tolerance   OT Therapy Minutes   OT Time In 1001   OT Time Out 1132   OT Total Time (minutes) 91   OT Mode of treatment - Individual (minutes) 91   Therapy Time missed   Time missed?  No

## 2020-06-05 NOTE — PROGRESS NOTES
06/05/20 1301   Pain Assessment   Pain Assessment Tool 0-10   Pain Score 9   Pain Location/Orientation Orientation: Right;Location: Hip;Location: Knee   Restrictions/Precautions   Precautions Fall Risk;Pain   RLE Weight Bearing Per Order TTWB   Cognition   Overall Cognitive Status WFL   Arousal/Participation Alert; Responsive; Cooperative   Attention Within functional limits   Orientation Level Oriented X4   Memory Within functional limits   Following Commands Follows all commands and directions without difficulty   Sit to Lying   Type of Assistance Needed Physical assistance   Amount of Physical Assistance Provided 50%-74%   Sit to Lying CARE Score 2   Sit to Stand   Type of Assistance Needed Physical assistance   Amount of Physical Assistance Provided 75% or more   Sit to Stand CARE Score 2   Bed-Chair Transfer   Type of Assistance Needed Physical assistance   Amount of Physical Assistance Provided 75% or more   Chair/Bed-to-Chair Transfer CARE Score 2   Transfer Bed/Chair/Wheelchair   Limitations Noted In Balance;Confidence; Coordination; Endurance;Pain Management;LE Strength   Adaptive Equipment Roller Walker   Stand Pivot Maximum Assist   Sit to Stand Maximum Assist   Stand to Sit Maximum Assist   Sit to Supine Moderate Assist   All Transfer Maximum Assist   Wheel 50 Feet with Two Turns   Type of Assistance Needed Supervision   Amount of Physical Assistance Provided No physical assistance   Wheel 50 Feet with Two Turns CARE Score 4   Wheel 150 Feet   Type of Assistance Needed Supervision   Amount of Physical Assistance Provided No physical assistance   Wheel 150 Feet CARE Score 4   Wheelchair mobility   Does the patient use a wheelchair? 1  Yes   Type of Wheelchair Used 1  Manual   Method Right upper extremity; Left upper extremity   Distance Level Surface (feet) 155 ft  (122)   Findings level and carpet   Therapeutic Interventions   Strengthening seated and supine ther ex 30 reps   Other transfers and w/c mobility   Assessment   Treatment Assessment Pt needed encouragement to participate in PT session today; Pt has very limited vega for all tasks due to pain; S for w/c mobility up to 150'; Pt continues to need assist of 2 for all transfers with max cues for sequence and encouragement; Frequent rests needed throughout session; Pt performed AROM sitting and supine for L LE and AAROM for R LE   PT Barriers   Physical Impairment Decreased strength;Decreased range of motion;Decreased endurance; Impaired balance;Decreased mobility; Decreased safety awareness;Pain   Functional Limitation Wheelchair management;Transfers;Standing   Plan   Treatment/Interventions Functional transfer training;LE strengthening/ROM; Therapeutic exercise   Progress Slow progress, decreased activity tolerance   PT Therapy Minutes   PT Time In 1301   PT Time Out 1431   PT Total Time (minutes) 90   PT Mode of treatment - Individual (minutes) 59   PT Mode of treatment - Concurrent (minutes) 31   PT Mode of treatment - Group (minutes) 0   PT Mode of treatment - Co-treat (minutes) 0   PT Mode of Treatment - Total time(minutes) 90 minutes   PT Cumulative Minutes 361   Therapy Time missed   Time missed?  No

## 2020-06-05 NOTE — PLAN OF CARE
Problem: Prexisting or High Potential for Compromised Skin Integrity  Goal: Skin integrity is maintained or improved  Description  INTERVENTIONS:  - Identify patients at risk for skin breakdown  - Assess and monitor skin integrity  - Assess and monitor nutrition and hydration status  - Monitor labs   - Assess for incontinence   - Turn and reposition patient  - Assist with mobility/ambulation  - Relieve pressure over bony prominences  - Avoid friction and shearing  - Provide appropriate hygiene as needed including keeping skin clean and dry  - Evaluate need for skin moisturizer/barrier cream  - Collaborate with interdisciplinary team   - Patient/family teaching  - Consider wound care consult   Outcome: Progressing     Problem: Potential for Falls  Goal: Patient will remain free of falls  Description  INTERVENTIONS:  - Assess patient frequently for physical needs  -  Identify cognitive and physical deficits and behaviors that affect risk of falls    -  [place] fall precautions as indicated by assessment   - Educate patient/family on patient safety including physical limitations  - Instruct patient to call for assistance with activity based on assessment  - Modify environment to reduce risk of injury  - Consider OT/PT consult to assist with strengthening/mobility  Outcome: Progressing     Problem: PAIN - ADULT  Goal: Verbalizes/displays adequate comfort level or baseline comfort level  Description  Interventions:  - Encourage patient to monitor pain and request assistance  - Assess pain using appropriate pain scale  - Administer analgesics based on type and severity of pain and evaluate response  - Implement non-pharmacological measures as appropriate and evaluate response  - Consider cultural and social influences on pain and pain management  - Notify physician/advanced practitioner if interventions unsuccessful or patient reports new pain  Outcome: Progressing     Problem: INFECTION - ADULT  Goal: Absence or prevention of progression during hospitalization  Description  INTERVENTIONS:  - Assess and monitor for signs and symptoms of infection  - Monitor lab/diagnostic results  - Monitor all insertion sites, i e  indwelling lines, tubes, and drains  - Monitor endotracheal if appropriate and nasal secretions for changes in amount and color  - [place] appropriate cooling/warming therapies per order  - Administer medications as ordered  - Instruct and encourage patient and family to use good hand hygiene technique  - Identify and instruct in appropriate isolation precautions for identified infection/condition  Outcome: Progressing  Goal: Absence of fever/infection during neutropenic period  Description  INTERVENTIONS:  - Monitor WBC    Outcome: Progressing     Problem: SAFETY ADULT  Goal: Patient will remain free of falls  Description  INTERVENTIONS:  - Assess patient frequently for physical needs  -  Identify cognitive and physical deficits and behaviors that affect risk of falls    -  [place] fall precautions as indicated by assessment   - Educate patient/family on patient safety including physical limitations  - Instruct patient to call for assistance with activity based on assessment  - Modify environment to reduce risk of injury  - Consider OT/PT consult to assist with strengthening/mobility  Outcome: Progressing  Goal: Maintain or return to baseline ADL function  Description  INTERVENTIONS:  -  Assess patient's ability to carry out ADLs; assess patient's baseline for ADL function and identify physical deficits which impact ability to perform ADLs (bathing, care of mouth/teeth, toileting, grooming, dressing, etc )  - Assess/evaluate cause of self-care deficits   - Assess range of motion  - Assess patient's mobility; develop plan if impaired  - Assess patient's need for assistive devices and provide as appropriate  - Encourage maximum independence but intervene and supervise when necessary  - Involve family in performance of ADLs  - Assess for home care needs following discharge   - Consider OT consult to assist with ADL evaluation and planning for discharge  - Provide patient education as appropriate  Outcome: Progressing  Goal: Maintain or return mobility status to optimal level  Description  INTERVENTIONS:  - Assess patient's baseline mobility status (ambulation, transfers, stairs, etc )    - Identify cognitive and physical deficits and behaviors that affect mobility  - Identify mobility aids required to assist with transfers and/or ambulation (gait belt, sit-to-stand, lift, walker, cane, etc )  - [place] fall precautions as indicated by assessment  - Record patient progress and toleration of activity level on Mobility SBAR; progress patient to next Phase/Stage  - Instruct patient to call for assistance with activity based on assessment  - Consider rehabilitation consult to assist with strengthening/weightbearing, etc   Outcome: Progressing     Problem: DISCHARGE PLANNING  Goal: Discharge to home or other facility with appropriate resources  Description  INTERVENTIONS:  - Identify barriers to discharge w/patient and caregiver  - Arrange for needed discharge resources and transportation as appropriate  - Identify discharge learning needs (meds, wound care, etc )  - Arrange for interpretive services to assist at discharge as needed  - Refer to Case Management Department for coordinating discharge planning if the patient needs post-hospital services based on physician/advanced practitioner order or complex needs related to functional status, cognitive ability, or social support system  Outcome: Progressing

## 2020-06-06 LAB
GLUCOSE SERPL-MCNC: 160 MG/DL (ref 65–140)
GLUCOSE SERPL-MCNC: 280 MG/DL (ref 65–140)
GLUCOSE SERPL-MCNC: 31 MG/DL (ref 65–140)
GLUCOSE SERPL-MCNC: 395 MG/DL (ref 65–140)
GLUCOSE SERPL-MCNC: 44 MG/DL (ref 65–140)
GLUCOSE SERPL-MCNC: 458 MG/DL (ref 65–140)
GLUCOSE SERPL-MCNC: 460 MG/DL (ref 65–140)
GLUCOSE SERPL-MCNC: 465 MG/DL (ref 65–140)
GLUCOSE SERPL-MCNC: 63 MG/DL (ref 65–140)
GLUCOSE SERPL-MCNC: 71 MG/DL (ref 65–140)

## 2020-06-06 PROCEDURE — 82948 REAGENT STRIP/BLOOD GLUCOSE: CPT

## 2020-06-06 PROCEDURE — 99232 SBSQ HOSP IP/OBS MODERATE 35: CPT | Performed by: INTERNAL MEDICINE

## 2020-06-06 RX ADMIN — LEVOTHYROXINE SODIUM 100 MCG: 100 TABLET ORAL at 05:52

## 2020-06-06 RX ADMIN — POTASSIUM & SODIUM PHOSPHATES POWDER PACK 280-160-250 MG 2 PACKET: 280-160-250 PACK at 07:33

## 2020-06-06 RX ADMIN — LISINOPRIL 5 MG: 5 TABLET ORAL at 08:23

## 2020-06-06 RX ADMIN — INSULIN LISPRO 5 UNITS: 100 INJECTION, SOLUTION INTRAVENOUS; SUBCUTANEOUS at 17:05

## 2020-06-06 RX ADMIN — INSULIN LISPRO 1 UNITS: 100 INJECTION, SOLUTION INTRAVENOUS; SUBCUTANEOUS at 07:56

## 2020-06-06 RX ADMIN — TRAMADOL HYDROCHLORIDE 50 MG: 50 TABLET, FILM COATED ORAL at 17:29

## 2020-06-06 RX ADMIN — ASPIRIN 81 MG: 81 TABLET, COATED ORAL at 08:23

## 2020-06-06 RX ADMIN — ATORVASTATIN CALCIUM 20 MG: 20 TABLET, FILM COATED ORAL at 15:45

## 2020-06-06 RX ADMIN — ACETAMINOPHEN 975 MG: 325 TABLET ORAL at 05:51

## 2020-06-06 RX ADMIN — POTASSIUM & SODIUM PHOSPHATES POWDER PACK 280-160-250 MG 2 PACKET: 280-160-250 PACK at 15:45

## 2020-06-06 RX ADMIN — TRAMADOL HYDROCHLORIDE 50 MG: 50 TABLET, FILM COATED ORAL at 05:52

## 2020-06-06 RX ADMIN — INSULIN DETEMIR 8 UNITS: 100 INJECTION, SOLUTION SUBCUTANEOUS at 21:31

## 2020-06-06 RX ADMIN — INSULIN LISPRO 10 UNITS: 100 INJECTION, SOLUTION INTRAVENOUS; SUBCUTANEOUS at 08:23

## 2020-06-06 RX ADMIN — TRAMADOL HYDROCHLORIDE 50 MG: 50 TABLET, FILM COATED ORAL at 12:41

## 2020-06-06 RX ADMIN — ACETAMINOPHEN 975 MG: 325 TABLET ORAL at 14:28

## 2020-06-06 RX ADMIN — FONDAPARINUX SODIUM 2.5 MG: 2.5 INJECTION, SOLUTION SUBCUTANEOUS at 08:23

## 2020-06-06 RX ADMIN — ACETAMINOPHEN 975 MG: 325 TABLET ORAL at 21:29

## 2020-06-06 NOTE — PROGRESS NOTES
06/06/20 0735   Charting Type   Charting Type Shift assessment   Neurological   Neuro (WDL) X   Level of Consciousness Alert/awake   Orientation Level Oriented X4   Cognition Follows commands; Appropriate judgement   Extraocular Movements Full   Facial Symmetry Symmetrical   Swallow Able to swallow solids and liquids without difficulty   R Pupil Size (mm) 3   L Pupil Size (mm) 3   RUE Muscle Strength 5- Normal strength   LUE Muscle Strength 4- Movement against gravity and limited resistance   RLE Muscle Strength 3- Movement against gravity only   LLE Muscle Strength 5- Normal strength   Neuro Symptoms Forgetful; Fatigue   Relieved by Rest   Neuro Additional Assessments No   Delirium Assessment- CAM    Acute Onset and Fluctuating Course (1) No   Inattention (2) No   Disorganized Thinking (3) No   Rate Patient's Level of Consciousness (4) Alert (Normal), No   Delirium Present No   [place] Coma Scale   Eye Opening 4   Best Verbal Response 5   Best Motor Response 6   [place] Coma Scale Score 15   HEENT   HEENT (WDL) X   Head and Face Asymmetrical  (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)   R Eye Intact   L Eye Intact   R Ear Intact   L Ear Intact   Teeth Missing teeth   Respiratory   Respiratory (WDL) X   Respiratory Pattern Normal   Chest Assessment Chest expansion symmetrical   Bilateral Breath Sounds Clear;Diminished   Respiratory Additional Assessments No   Respiratory Interventions   Respiratory Interventions Incentive spirometry;Cough and deep breathe   Cough and Deep Breathe   Cough and Deep Breathe Yes   Cardiac   Cardiac (WDL) WDL   Cardiac Regularity Regular   Heart Sounds No adventitious heart sounds   Jugular Venous Distention (JVD) No   Cardiac Symptoms None   Chest Pain Present No   Pacemaker/ICD No   Pain Assessment   Pain Assessment Tool 0-10   Pain Score No Pain   Peripheral Vascular   Peripheral Vascular (WDL) WDL   Cyanosis None   Edema Right lower extremity   RLE Edema Non-pitting   RUE Neurovascular Assessment   R Radial Pulse +2   LUE Neurovascular Assessment   L Radial Pulse +2   RLE Neurovascular Assessment   R Pedal Pulse +1   LLE Neurovascular Assessment   L Pedal Pulse +2   Integumentary   Integumentary (WDL) X   Skin Color Appropriate for ethnicity   Skin Condition/Temp Warm;Dry   Skin Integrity Bruising   Skin Location RLE   Skin Turgor Epidermis thin with loss of subcutaneous tissue   Integumentary Additional Assessments No   Tattoos/Piercings   Does patient have tattoos? No   Piercings Remaining No   Nate Scale   Sensory Perceptions 4   Moisture 4   Activity 3   Mobility 2   Nutrition 2   Friction and Shear 2   Nate Scale Score 17   Wound (LDAs)   Type of Wound (LDA) Wound   Wound 05/28/20 Incision Hip Right   Date First Assessed/Time First Assessed: 05/28/20 0650   Primary Wound Type: Incision  Location: Hip  Wound Location Orientation: Right  Wound Description (Comments): betadine ointment to incision  Incision's 1st Dressing: DRESSING XEROFORM 5 X 9, SPO    Wound Description Clean;Dry; Intact   Tess-wound Assessment Edema  (bruising)   Closure Staples   Drainage Amount None   Dressing Open to air   Wound 06/01/20 Pressure Injury Heel Right   Date First Assessed/Time First Assessed: 06/01/20 2028   Primary Wound Type: Pressure Injury  Location: Heel  Wound Location Orientation: Right   Staging Deep Tissue Injury   Dressing Open to air   Musculoskeletal   Musculoskeletal (WDL) X   LUE Limited movement   RLE Limited movement   LLE Limited movement   Musculoskeletal Additional Assessments No   Gastrointestinal   Gastrointestinal (WDL) WDL   Abdomen Inspection Soft;Nondistended   Bowel Sounds (All Quadrants) Normoactive   Tenderness Soft;Nontender   GI Symptoms None   Gastrointestinal Additional Assessments No   Stool Assessment   Bowel Incontinence No   Genitourinary   Genitourinary (WDL) X   Genitourinary Symptoms Indwelling catheter   Urine Assessment   Urinary Incontinence No   Urine Color Yellow/straw   Urine Appearance Clear   Urine Odor No odor   Genitalia   Female Genitalia Intact   Genitourinary Additional Assessments   Genitourinary Additional Assessments No   Anal/Rectal   Anal/Rectal (WDL) WDL   Psychosocial   Psychosocial (WDL) X   Patient Behaviors/Mood Calm; Cooperative   Needs Expressed Denies   Ability to Express Feelings Able to express   Ability to Express Needs Able to express   Ability to Express Thoughts Able to express   Ability to Understand Others Understands   [place] Suicide Severity Rating Scale   1  Wish to be Dead No   2  Suicidal Thoughts Never   6   Suicide Behavior Question Never   *Risk Level* Low Risk   Cough   Cough None

## 2020-06-06 NOTE — PROGRESS NOTES
Pt  Rang callbell due to feeling diaphoretic  Accu check BG rechecked and was 395  Pt  Made aware hospitalist was notified  and we will continue to monitor same  Made comfortable , callbell within reach

## 2020-06-06 NOTE — NURSING NOTE
Pt resting in bed, c/o pain 9/10 to R hip  Medicated as ordered, scheduled tylenol and tramadol  Encouraged pt to turn on side, sacrum b/l buttocks pink blanchable, pt did refuse to turn frequently  Kinney in place draining to gravity  Assessment unchanged  Continuing to monitor and follow plan of care

## 2020-06-06 NOTE — PROGRESS NOTES
Accu check 160 this a m  No c/o  At 1020 became diaphoretic and pale, BG 31  Jasper Po   OJ and peanut butter crackers given, pt responsive  Recheck accu check after same was  44  Responsive and feels a lil better per pt mian remains diaphoretic  Given OJ and pudding  After same recheck accu check was 63, skin warm and dry, no complaints  Pt states she follows insulin coverage at home per carb intake and never takes more than 5 units humalog with meals  [facility] made aware

## 2020-06-06 NOTE — PROGRESS NOTES
[facility] who called me regarding tiger text message of recheck BG=  460  No s/s hyperglycemia, pt asymptomatic  No new orders at this time  Will follow with 2100 BG

## 2020-06-06 NOTE — QUICK NOTE
Notified by nursing staff that patient was having hypoglycemic episode  Improvement with juice  I will cut meal coverage to 3 units starting with dinner today  Continue with ISS and long-acting at night time

## 2020-06-06 NOTE — PROGRESS NOTES
Progress Note - Nephrology   Macel Fulling 78 y o  female MRN: [id number]  Unit/Bed#: -01 Encounter: [id number]    A/P:  1  Hyponatremia    Continue fluid restriction, currently at 1 8 L, reassess blood work in about 48-72 hours  2  SIADH   Patient has several reasons for increased ADH secretion, continue to work on pain control medication reduction  3  Hypertension   continue current medications  4  Urinary retention   Continues have Kinney catheter intact, consider voiding trial   5  Iron deficiency anemia   Patient is status post 500 mg of Venofer  Follow up reason for today's visit:  Hyponatremia    Closed intertrochanteric fracture of right femur [facility])    Patient Active Problem List   Diagnosis    Hyperlipidemia    Essential hypertension    Hypothyroidism    DM (diabetes mellitus) [facility] [address] [facility]    [facility]chan[facility] right [facility]    Hyponatremia    Postoperative urinary retention    Elevated vitamin B12 level    Anemia    Hypophosphatemia         Subjective:   Patient suffered from a hypoglycemic event this morning  Currently feeling okay  Objective:     Vitals: Blood pressure 118/60, pulse 78, temperature (!) 97 3 °F (36 3 °C), temperature source Tympanic, resp  rate 18, height 5' 2" (1 575 m), weight 62 3 kg (137 lb 5 6 oz), SpO2 96 %  ,Body mass index is 25 12 kg/m²  Weight (last 2 days)     None            Intake/Output Summary (Last 24 hours) at 6/6/2020 1227  Last data filed at 6/6/2020 0500  Gross per 24 hour   Intake 720 ml   Output 625 ml   Net 95 ml     I/O last 3 completed shifts: In: 12 [P O :960]  Out: 2425 [Urine:2425]    Urethral Catheter Latex 18 Fr   (Active)   Amt returned on insertion(mL) 325 mL 6/2/2020 12:40 PM   Reasons to continue Urinary Catheter  Acute urinary retention/obstruction failing urinary retention protocol 6/2/2020 12:40 PM   Goal for Removal Voiding trial when ambulation improves 6/2/2020 12:40 PM   Site Assessment Clean;Skin intact 6/2/2020 12:40 PM   Collection Container Standard drainage bag 6/2/2020 12:40 PM   Securement Method Securing device (Describe) 6/2/2020 12:40 PM   Output (mL) 1300 mL 6/5/2020  5:00 AM       Physical Exam: /60   Pulse 78   Temp (!) 97 3 °F (36 3 °C) (Tympanic)   Resp 18   Ht 5' 2" (1 575 m)   Wt 62 3 kg (137 lb 5 6 oz)   SpO2 96%   BMI 25 12 kg/m²     General Appearance:    Alert, cooperative, no distress, appears stated age   Head:    Normocephalic, without obvious abnormality, atraumatic   Eyes:    Conjunctiva/corneas clear   Ears:    Normal external ears   Nose:   Nares normal, septum midline, mucosa normal, no drainage    or sinus tenderness   Throat:   Lips, mucosa, and tongue normal; teeth and gums normal   Neck:   Supple   Back:     Symmetric, no curvature, ROM normal, no CVA tenderness   Lungs:     Clear to auscultation bilaterally, respirations unlabored   Chest wall:    No tenderness or deformity   Heart:    Regular rate and rhythm, S1 and S2 normal, no murmur, rub   or gallop   Abdomen:     Soft, non-tender, bowel sounds active   Extremities:   Extremities normal, atraumatic, no cyanosis or edema   Skin:   Skin color, texture, turgor normal, no rashes or lesions   Lymph nodes:   Cervical normal   Neurologic:   CNII-XII intact            Lab, Imaging and other studies: I have personally reviewed pertinent labs  CBC: No results found for: WBC, HGB, HCT, MCV, PLT, ADJUSTEDWBC, MCH, MCHC, RDW, MPV, NRBC  CMP: No results found for: NA, K, CL, CO2, ANIONGAP, BUN, CREATININE, GLUCOSE, CALCIUM, AST, ALT, ALKPHOS, PROT, BILITOT, EGFR        Results from last 7 days   Lab Units 06/05/20  0556 06/03/20  0536 06/02/20  0501   POTASSIUM mmol/L 4 3 5 2 4 4   CHLORIDE mmol/L 99 93* 95*   CO2 mmol/L 29 30 30   BUN mg/dL 16 21 21   CREATININE mg/dL 0 70 0 81 0 72   CALCIUM mg/dL 8 0* 8 4* 8 1*         Phosphorus: No results found for: PHOS  Magnesium: No results found for: MG  Urinalysis: No results found for: [place] Felipe, SPECGRAV, PHUR, LEUKOCYTESUR, NITRITE, PROTEINUA, GLUCOSEU, KETONESU, BILIRUBINUR, BLOODU  Ionized Calcium: No results found for: CAION  Coagulation: No results found for: PT, INR, APTT  Troponin: No results found for: TROPONINI  ABG: No results found for: PHART, UFT7ETN, PO2ART, IFM0BWH, T4JHPJTQ, BEART, SOURCE  Radiology review:     IMAGING  No results found      Current Facility-Administered Medications   Medication Dose Route Frequency    acetaminophen (TYLENOL) tablet 975 mg  975 mg Oral Q8H [address]    aspirin (ECOTRIN LOW STRENGTH) EC tablet 81 mg  81 mg Oral Daily    [START ON 6/12/2020] aspirin (ECOTRIN) EC tablet 325 mg  325 mg Oral Daily    atorvastatin (LIPITOR) tablet 20 mg  20 mg Oral Daily With Dinner    bisacodyl (DULCOLAX) rectal suppository 10 mg  10 mg Rectal Daily PRN    fondaparinux (ARIXTRA) subcutaneous injection 2 5 mg  2 5 mg Subcutaneous Daily    insulin detemir (LEVEMIR) subcutaneous injection 10 Units  10 Units Subcutaneous HS    insulin lispro (HumaLOG) 100 units/mL subcutaneous injection 1-5 Units  1-5 Units Subcutaneous TID AC    insulin lispro (HumaLOG) 100 units/mL subcutaneous injection 1-5 Units  1-5 Units Subcutaneous HS    insulin lispro (HumaLOG) 100 units/mL subcutaneous injection 3 Units  3 Units Subcutaneous TID With Meals    levothyroxine tablet 100 mcg  100 mcg Oral Once per day on Mon Tue Wed Thu Fri Sat    [START ON 6/7/2020] levothyroxine tablet 50 mcg  50 mcg Oral Weekly    lisinopril (ZESTRIL) tablet 5 mg  5 mg Oral Daily    methocarbamol (ROBAXIN) tablet 500 mg  500 mg Oral Q6H PRN    oxyCODONE (ROXICODONE) IR tablet 2 5 mg  2 5 mg Oral Q6H PRN    polyethylene glycol (MIRALAX) packet 17 g  17 g Oral Daily    potassium-sodium phosphates (PHOS-NAK) packet 2 packet  2 packet Oral BID With Meals    traMADol (ULTRAM) tablet 50 mg  50 mg Oral Q6H [address]     Medications Discontinued During This Encounter   Medication Reason    oxyCODONE (ROXICODONE) immediate release tablet 10 mg     oxyCODONE (ROXICODONE) IR tablet 5 mg     insulin detemir (LEVEMIR) subcutaneous injection 20 Units     iron sucrose (VENOFER) 200 mg in sodium chloride 0 9 % 100 mL IVPB     traMADol (ULTRAM) tablet 50 mg     insulin lispro (HumaLOG) 100 units/mL subcutaneous injection 10 Units     insulin lispro (HumaLOG) 100 units/mL subcutaneous injection 3 Units        Martinez Vargas, DO      This progress note was produced in part using a dictation device which may document imprecise wording from author's original intent

## 2020-06-06 NOTE — PLAN OF CARE
Problem: Prexisting or High Potential for Compromised Skin Integrity  Goal: Skin integrity is maintained or improved  Description  INTERVENTIONS:  - Identify patients at risk for skin breakdown  - Assess and monitor skin integrity  - Assess and monitor nutrition and hydration status  - Monitor labs   - Assess for incontinence   - Turn and reposition patient  - Assist with mobility/ambulation  - Relieve pressure over bony prominences  - Avoid friction and shearing  - Provide appropriate hygiene as needed including keeping skin clean and dry  - Evaluate need for skin moisturizer/barrier cream  - Collaborate with interdisciplinary team   - Patient/family teaching  - Consider wound care consult   Outcome: Progressing     Problem: Potential for Falls  Goal: Patient will remain free of falls  Description  INTERVENTIONS:  - Assess patient frequently for physical needs  -  Identify cognitive and physical deficits and behaviors that affect risk of falls    -  [place] fall precautions as indicated by assessment   - Educate patient/family on patient safety including physical limitations  - Instruct patient to call for assistance with activity based on assessment  - Modify environment to reduce risk of injury  - Consider OT/PT consult to assist with strengthening/mobility  Outcome: Progressing     Problem: PAIN - ADULT  Goal: Verbalizes/displays adequate comfort level or baseline comfort level  Description  Interventions:  - Encourage patient to monitor pain and request assistance  - Assess pain using appropriate pain scale  - Administer analgesics based on type and severity of pain and evaluate response  - Implement non-pharmacological measures as appropriate and evaluate response  - Consider cultural and social influences on pain and pain management  - Notify physician/advanced practitioner if interventions unsuccessful or patient reports new pain  Outcome: Progressing     Problem: INFECTION - ADULT  Goal: Absence or prevention of progression during hospitalization  Description  INTERVENTIONS:  - Assess and monitor for signs and symptoms of infection  - Monitor lab/diagnostic results  - Monitor all insertion sites, i e  indwelling lines, tubes, and drains  - Monitor endotracheal if appropriate and nasal secretions for changes in amount and color  - [place] appropriate cooling/warming therapies per order  - Administer medications as ordered  - Instruct and encourage patient and family to use good hand hygiene technique  - Identify and instruct in appropriate isolation precautions for identified infection/condition  Outcome: Progressing  Goal: Absence of fever/infection during neutropenic period  Description  INTERVENTIONS:  - Monitor WBC    Outcome: Progressing     Problem: SAFETY ADULT  Goal: Patient will remain free of falls  Description  INTERVENTIONS:  - Assess patient frequently for physical needs  -  Identify cognitive and physical deficits and behaviors that affect risk of falls    -  [place] fall precautions as indicated by assessment   - Educate patient/family on patient safety including physical limitations  - Instruct patient to call for assistance with activity based on assessment  - Modify environment to reduce risk of injury  - Consider OT/PT consult to assist with strengthening/mobility  Outcome: Progressing  Goal: Maintain or return to baseline ADL function  Description  INTERVENTIONS:  -  Assess patient's ability to carry out ADLs; assess patient's baseline for ADL function and identify physical deficits which impact ability to perform ADLs (bathing, care of mouth/teeth, toileting, grooming, dressing, etc )  - Assess/evaluate cause of self-care deficits   - Assess range of motion  - Assess patient's mobility; develop plan if impaired  - Assess patient's need for assistive devices and provide as appropriate  - Encourage maximum independence but intervene and supervise when necessary  - Involve family in performance of ADLs  - Assess for home care needs following discharge   - Consider OT consult to assist with ADL evaluation and planning for discharge  - Provide patient education as appropriate  Outcome: Progressing  Goal: Maintain or return mobility status to optimal level  Description  INTERVENTIONS:  - Assess patient's baseline mobility status (ambulation, transfers, stairs, etc )    - Identify cognitive and physical deficits and behaviors that affect mobility  - Identify mobility aids required to assist with transfers and/or ambulation (gait belt, sit-to-stand, lift, walker, cane, etc )  - [place] fall precautions as indicated by assessment  - Record patient progress and toleration of activity level on Mobility SBAR; progress patient to next Phase/Stage  - Instruct patient to call for assistance with activity based on assessment  - Consider rehabilitation consult to assist with strengthening/weightbearing, etc   Outcome: Progressing     Problem: DISCHARGE PLANNING  Goal: Discharge to home or other facility with appropriate resources  Description  INTERVENTIONS:  - Identify barriers to discharge w/patient and caregiver  - Arrange for needed discharge resources and transportation as appropriate  - Identify discharge learning needs (meds, wound care, etc )  - Arrange for interpretive services to assist at discharge as needed  - Refer to Case Management Department for coordinating discharge planning if the patient needs post-hospital services based on physician/advanced practitioner order or complex needs related to functional status, cognitive ability, or social support system  Outcome: Progressing

## 2020-06-07 LAB
GLUCOSE SERPL-MCNC: 155 MG/DL (ref 65–140)
GLUCOSE SERPL-MCNC: 73 MG/DL (ref 65–140)
GLUCOSE SERPL-MCNC: 92 MG/DL (ref 65–140)
GLUCOSE SERPL-MCNC: 97 MG/DL (ref 65–140)

## 2020-06-07 PROCEDURE — 97530 THERAPEUTIC ACTIVITIES: CPT

## 2020-06-07 PROCEDURE — 97542 WHEELCHAIR MNGMENT TRAINING: CPT

## 2020-06-07 PROCEDURE — 97110 THERAPEUTIC EXERCISES: CPT

## 2020-06-07 PROCEDURE — 97537 COMMUNITY/WORK REINTEGRATION: CPT

## 2020-06-07 PROCEDURE — 97535 SELF CARE MNGMENT TRAINING: CPT

## 2020-06-07 PROCEDURE — 82948 REAGENT STRIP/BLOOD GLUCOSE: CPT

## 2020-06-07 PROCEDURE — 97116 GAIT TRAINING THERAPY: CPT

## 2020-06-07 RX ADMIN — ACETAMINOPHEN 975 MG: 325 TABLET ORAL at 14:23

## 2020-06-07 RX ADMIN — ASPIRIN 81 MG: 81 TABLET, COATED ORAL at 08:22

## 2020-06-07 RX ADMIN — LEVOTHYROXINE SODIUM 50 MCG: 25 TABLET ORAL at 06:08

## 2020-06-07 RX ADMIN — LISINOPRIL 5 MG: 5 TABLET ORAL at 08:20

## 2020-06-07 RX ADMIN — TRAMADOL HYDROCHLORIDE 50 MG: 50 TABLET, FILM COATED ORAL at 00:01

## 2020-06-07 RX ADMIN — ACETAMINOPHEN 975 MG: 325 TABLET ORAL at 06:08

## 2020-06-07 RX ADMIN — ACETAMINOPHEN 975 MG: 325 TABLET ORAL at 21:14

## 2020-06-07 RX ADMIN — ATORVASTATIN CALCIUM 20 MG: 20 TABLET, FILM COATED ORAL at 16:25

## 2020-06-07 RX ADMIN — METHOCARBAMOL 500 MG: 500 TABLET, FILM COATED ORAL at 18:41

## 2020-06-07 RX ADMIN — TRAMADOL HYDROCHLORIDE 50 MG: 50 TABLET, FILM COATED ORAL at 11:42

## 2020-06-07 RX ADMIN — INSULIN DETEMIR 5 UNITS: 100 INJECTION, SOLUTION SUBCUTANEOUS at 21:13

## 2020-06-07 RX ADMIN — TRAMADOL HYDROCHLORIDE 50 MG: 50 TABLET, FILM COATED ORAL at 17:53

## 2020-06-07 RX ADMIN — FONDAPARINUX SODIUM 2.5 MG: 2.5 INJECTION, SOLUTION SUBCUTANEOUS at 08:19

## 2020-06-07 RX ADMIN — POTASSIUM & SODIUM PHOSPHATES POWDER PACK 280-160-250 MG 2 PACKET: 280-160-250 PACK at 07:29

## 2020-06-07 RX ADMIN — POTASSIUM & SODIUM PHOSPHATES POWDER PACK 280-160-250 MG 2 PACKET: 280-160-250 PACK at 16:25

## 2020-06-07 RX ADMIN — TRAMADOL HYDROCHLORIDE 50 MG: 50 TABLET, FILM COATED ORAL at 06:08

## 2020-06-07 NOTE — PROGRESS NOTES
06/07/20 0830   Pain Assessment   Pain Assessment Tool 0-10   Pain Score 8   Pain Location/Orientation Orientation: Right;Location: Hip;Location: Knee   Restrictions/Precautions   Precautions Kinney; Fall Risk   Weight Bearing Restrictions Yes   RLE Weight Bearing Per Order TTWB   Cognition   Overall Cognitive Status [facility]   Arousal/Participation Alert; Responsive   Subjective   Subjective "I feel like a rag doll  I am so wiped out "   Transfer Bed/Chair/Wheelchair   Sit Pivot Supervision;Contact Guard   Stand Pivot Supervision;Contact Guard   Sit to Stand Minimal Assist   Stand to Sit Minimal Assist   Ambulation   Primary Mode of Locomotion Prior to Admission Walk   Distance Walked (feet)   (4 steps)   Assist Device Roller Walker   Gait Pattern Slow Noemi;Decreased R stance; Improper weight shift   Wheelchair mobility   Method Right upper extremity; Left upper extremity; Left lower extremity   Assistance Provided For Remove Leg Rest   Distance Level Surface (feet) 150 ft   Assessment   Treatment Assessment Pt able to transfer sit-stand with S/CG and cueing  Good tolerance with w/c mobility  Pt fatigued at end of session  Problem List Decreased strength;Decreased range of motion;Decreased endurance; Impaired balance;Decreased mobility;Orthopedic restrictions;Pain   Plan   Treatment/Interventions Functional transfer training;LE strengthening/ROM; Therapeutic exercise; Endurance training;Bed mobility;Gait training   Progress Progressing toward goals   PT Therapy Minutes   PT Time In 0830   PT Time Out 0930   PT Total Time (minutes) 60   Therapy Time missed   Time missed?  No

## 2020-06-07 NOTE — PROGRESS NOTES
06/07/20 0700   Pain Assessment   Pain Assessment Tool 0-10   Pain Score 8   Pain Location/Orientation Orientation: Right;Location: Hip;Location: Knee   Restrictions/Precautions   Precautions Fall Risk; Kinney   Weight Bearing Restrictions Yes   RLE Weight Bearing Per Order TTWB   Cognition   Overall Cognitive Status [facility]   Arousal/Participation Alert; Responsive   Subjective   Subjective "I wish I was doing better than I am    I have a lot of pain in my hip and knee "   Therapeutic Interventions   Strengthening B LE x30 ea AAROM R AROM L   Assessment   Treatment Assessment Pt very pleasant and willing to participate in therapy  Pt completed supine bilateral LE strengthening  Strength deficits noted with R LE  Painful motion noted  Problem List Decreased strength;Decreased range of motion;Decreased endurance; Impaired balance;Decreased mobility;Orthopedic restrictions;Pain   Plan   Treatment/Interventions Functional transfer training;LE strengthening/ROM; Therapeutic exercise; Endurance training;Gait training   Progress Progressing toward goals   PT Therapy Minutes   PT Time In 0700   PT Time Out 0800   PT Total Time (minutes) 60   Therapy Time missed   Time missed?  No

## 2020-06-07 NOTE — PLAN OF CARE
Problem: Prexisting or High Potential for Compromised Skin Integrity  Goal: Skin integrity is maintained or improved  Description  INTERVENTIONS:  - Identify patients at risk for skin breakdown  - Assess and monitor skin integrity  - Assess and monitor nutrition and hydration status  - Monitor labs   - Assess for incontinence   - Turn and reposition patient  - Assist with mobility/ambulation  - Relieve pressure over bony prominences  - Avoid friction and shearing  - Provide appropriate hygiene as needed including keeping skin clean and dry  - Evaluate need for skin moisturizer/barrier cream  - Collaborate with interdisciplinary team   - Patient/family teaching  - Consider wound care consult   Outcome: Progressing     Problem: Potential for Falls  Goal: Patient will remain free of falls  Description  INTERVENTIONS:  - Assess patient frequently for physical needs  -  Identify cognitive and physical deficits and behaviors that affect risk of falls    -  [place] fall precautions as indicated by assessment   - Educate patient/family on patient safety including physical limitations  - Instruct patient to call for assistance with activity based on assessment  - Modify environment to reduce risk of injury  - Consider OT/PT consult to assist with strengthening/mobility  Outcome: Progressing     Problem: PAIN - ADULT  Goal: Verbalizes/displays adequate comfort level or baseline comfort level  Description  Interventions:  - Encourage patient to monitor pain and request assistance  - Assess pain using appropriate pain scale  - Administer analgesics based on type and severity of pain and evaluate response  - Implement non-pharmacological measures as appropriate and evaluate response  - Consider cultural and social influences on pain and pain management  - Notify physician/advanced practitioner if interventions unsuccessful or patient reports new pain  Outcome: Progressing     Problem: INFECTION - ADULT  Goal: Absence or prevention of progression during hospitalization  Description  INTERVENTIONS:  - Assess and monitor for signs and symptoms of infection  - Monitor lab/diagnostic results  - Monitor all insertion sites, i e  indwelling lines, tubes, and drains  - Monitor endotracheal if appropriate and nasal secretions for changes in amount and color  - [place] appropriate cooling/warming therapies per order  - Administer medications as ordered  - Instruct and encourage patient and family to use good hand hygiene technique  - Identify and instruct in appropriate isolation precautions for identified infection/condition  Outcome: Progressing  Goal: Absence of fever/infection during neutropenic period  Description  INTERVENTIONS:  - Monitor WBC    Outcome: Progressing     Problem: SAFETY ADULT  Goal: Patient will remain free of falls  Description  INTERVENTIONS:  - Assess patient frequently for physical needs  -  Identify cognitive and physical deficits and behaviors that affect risk of falls    -  [place] fall precautions as indicated by assessment   - Educate patient/family on patient safety including physical limitations  - Instruct patient to call for assistance with activity based on assessment  - Modify environment to reduce risk of injury  - Consider OT/PT consult to assist with strengthening/mobility  Outcome: Progressing  Goal: Maintain or return to baseline ADL function  Description  INTERVENTIONS:  -  Assess patient's ability to carry out ADLs; assess patient's baseline for ADL function and identify physical deficits which impact ability to perform ADLs (bathing, care of mouth/teeth, toileting, grooming, dressing, etc )  - Assess/evaluate cause of self-care deficits   - Assess range of motion  - Assess patient's mobility; develop plan if impaired  - Assess patient's need for assistive devices and provide as appropriate  - Encourage maximum independence but intervene and supervise when necessary  - Involve family in performance of ADLs  - Assess for home care needs following discharge   - Consider OT consult to assist with ADL evaluation and planning for discharge  - Provide patient education as appropriate  Outcome: Progressing  Goal: Maintain or return mobility status to optimal level  Description  INTERVENTIONS:  - Assess patient's baseline mobility status (ambulation, transfers, stairs, etc )    - Identify cognitive and physical deficits and behaviors that affect mobility  - Identify mobility aids required to assist with transfers and/or ambulation (gait belt, sit-to-stand, lift, walker, cane, etc )  - [place] fall precautions as indicated by assessment  - Record patient progress and toleration of activity level on Mobility SBAR; progress patient to next Phase/Stage  - Instruct patient to call for assistance with activity based on assessment  - Consider rehabilitation consult to assist with strengthening/weightbearing, etc   Outcome: Progressing     Problem: DISCHARGE PLANNING  Goal: Discharge to home or other facility with appropriate resources  Description  INTERVENTIONS:  - Identify barriers to discharge w/patient and caregiver  - Arrange for needed discharge resources and transportation as appropriate  - Identify discharge learning needs (meds, wound care, etc )  - Arrange for interpretive services to assist at discharge as needed  - Refer to Case Management Department for coordinating discharge planning if the patient needs post-hospital services based on physician/advanced practitioner order or complex needs related to functional status, cognitive ability, or social support system  Outcome: Progressing

## 2020-06-07 NOTE — PROGRESS NOTES
Neuro check stable to right leg, incision x 2 clean and dry, staples intact  In good spirits  Medications reviewed with pt and understood  Pt understands decrease in insulin ordered with meals  Continue same plan of care  Safety maintained at all times

## 2020-06-07 NOTE — PROGRESS NOTES
OT Daily Progress       06/07/20 1030   Pain Assessment   Pain Assessment Tool 0-10   Pain Score 8   Pain Location/Orientation   (right hip and knee)   Restrictions/Precautions   Precautions Fall Risk   Weight Bearing Restrictions Yes   RLE Weight Bearing Per Order TTWB   Sit to Stand   Type of Assistance Needed Physical assistance   Amount of Physical Assistance Provided 25%-49%   Sit to Stand CARE Score 3   Bed Mobility   Sit to Supine 3  Moderate assistance   Therapeutic Exercise - ROM   UE-ROM Yes   Therapeutic Excerise-Strength   UE Strength Yes   Activity Tolerance   Activity Tolerance Patient limited by fatigue;Patient limited by pain   OT Therapy Minutes   OT Time In 1030   OT Time Out 1130   OT Total Time (minutes) 60   OT Mode of treatment - Concurrent (minutes) 60       Pt seen for follow up visit  Initially hesitant to participate, however, after encouragement, does agree to OT gym treatment  Completes UE exercises for endurance which will assist for functional transfers and self care tasks  Arm bike 5 minutes forward and back with rest break between sets  2# free weight for bicep curls, chest press, shoulder press, 2 sets x 20 reps  Key sorting activity with 100% accuracy with forward flexion in w/c to reach top of board  Transfers back to bed at end of session, SPT from W/C to bed with min A  Requires assist to lift legs into bed from EOB to supine  Positioned with LEs elevated on pillow  Tolerating well  Decreased endurance and increased pain limit session    Will continue to progress treatment as status allows

## 2020-06-08 LAB
GLUCOSE SERPL-MCNC: 124 MG/DL (ref 65–140)
GLUCOSE SERPL-MCNC: 199 MG/DL (ref 65–140)
GLUCOSE SERPL-MCNC: 270 MG/DL (ref 65–140)
GLUCOSE SERPL-MCNC: 57 MG/DL (ref 65–140)
GLUCOSE SERPL-MCNC: 71 MG/DL (ref 65–140)

## 2020-06-08 PROCEDURE — 97110 THERAPEUTIC EXERCISES: CPT

## 2020-06-08 PROCEDURE — 82948 REAGENT STRIP/BLOOD GLUCOSE: CPT

## 2020-06-08 PROCEDURE — 97530 THERAPEUTIC ACTIVITIES: CPT

## 2020-06-08 PROCEDURE — 97542 WHEELCHAIR MNGMENT TRAINING: CPT

## 2020-06-08 PROCEDURE — 97535 SELF CARE MNGMENT TRAINING: CPT

## 2020-06-08 PROCEDURE — 99232 SBSQ HOSP IP/OBS MODERATE 35: CPT | Performed by: PHYSICAL MEDICINE & REHABILITATION

## 2020-06-08 PROCEDURE — 97116 GAIT TRAINING THERAPY: CPT

## 2020-06-08 RX ADMIN — OXYCODONE HYDROCHLORIDE 2.5 MG: 5 TABLET ORAL at 11:11

## 2020-06-08 RX ADMIN — ASPIRIN 81 MG: 81 TABLET, COATED ORAL at 08:54

## 2020-06-08 RX ADMIN — TRAMADOL HYDROCHLORIDE 50 MG: 50 TABLET, FILM COATED ORAL at 00:16

## 2020-06-08 RX ADMIN — TRAMADOL HYDROCHLORIDE 50 MG: 50 TABLET, FILM COATED ORAL at 23:52

## 2020-06-08 RX ADMIN — ACETAMINOPHEN 975 MG: 325 TABLET ORAL at 21:38

## 2020-06-08 RX ADMIN — INSULIN LISPRO 2 UNITS: 100 INJECTION, SOLUTION INTRAVENOUS; SUBCUTANEOUS at 17:18

## 2020-06-08 RX ADMIN — ACETAMINOPHEN 975 MG: 325 TABLET ORAL at 05:46

## 2020-06-08 RX ADMIN — TRAMADOL HYDROCHLORIDE 50 MG: 50 TABLET, FILM COATED ORAL at 17:17

## 2020-06-08 RX ADMIN — POTASSIUM & SODIUM PHOSPHATES POWDER PACK 280-160-250 MG 2 PACKET: 280-160-250 PACK at 08:54

## 2020-06-08 RX ADMIN — TRAMADOL HYDROCHLORIDE 50 MG: 50 TABLET, FILM COATED ORAL at 05:46

## 2020-06-08 RX ADMIN — POTASSIUM & SODIUM PHOSPHATES POWDER PACK 280-160-250 MG 2 PACKET: 280-160-250 PACK at 17:16

## 2020-06-08 RX ADMIN — ACETAMINOPHEN 975 MG: 325 TABLET ORAL at 14:03

## 2020-06-08 RX ADMIN — INSULIN DETEMIR 7 UNITS: 100 INJECTION, SOLUTION SUBCUTANEOUS at 21:38

## 2020-06-08 RX ADMIN — TRAMADOL HYDROCHLORIDE 50 MG: 50 TABLET, FILM COATED ORAL at 13:00

## 2020-06-08 RX ADMIN — LISINOPRIL 5 MG: 5 TABLET ORAL at 08:54

## 2020-06-08 RX ADMIN — LEVOTHYROXINE SODIUM 100 MCG: 100 TABLET ORAL at 05:46

## 2020-06-08 RX ADMIN — ATORVASTATIN CALCIUM 20 MG: 20 TABLET, FILM COATED ORAL at 17:17

## 2020-06-08 RX ADMIN — FONDAPARINUX SODIUM 2.5 MG: 2.5 INJECTION, SOLUTION SUBCUTANEOUS at 08:54

## 2020-06-08 NOTE — PROGRESS NOTES
06/08/20 1105   Pain Assessment   Pain Assessment Tool Pain Assessment not indicated - pt denies pain   Hospital Pain Intervention(s) Medication (See MAR)  (Medication prior to OT session)   Restrictions/Precautions   Precautions Fall Risk;Fluid restriction; Kinney  (1800 ml FR)   RLE Weight Bearing Per Order TTWB   Eating   Type of Assistance Needed Independent   Eating CARE Score 6   Eating Assessment   Food To Mouth Yes   Able To Cut Yes   Positioning Out of Bed;Upright   Findings pt educated on benefits of sitting upright in w/c for meals to increase activity tolerance allowing for improved ADL function   Oral Hygiene   Type of Assistance Needed Set-up / clean-up   Oral Hygiene CARE Score 5   Grooming   Able To Initiate Tasks;Comb/Brush Hair;Wash/Dry Face;Brush/Clean Teeth;Wash/Dry Hands   Findings setup at sink   Shower/Bathe Self   Type of Assistance Needed Physical assistance   Amount of Physical Assistance Provided Less than 25%   Shower/Bathe Self CARE Score 3   Bathing   Assessed Bath Style Sponge Bath   Anticipated D/C Bath Style Sponge Bath   Able to Gather/Transport No   Able to [employer] Temperature No   Able to Wash/Rinse/Dry (body part) Left Arm;Right Arm;L Upper Leg;R Upper Leg;L Lower Leg/Foot;R Lower Leg/Foot;Chest;Abdomen;Perineal Area   Limitations Noted in Balance; Endurance; Safety;Strength;ROM  (TTWB RLE)   Positioning Seated;Standing   Adaptive Equipment Longhand Sponge   Tub/Shower Transfer   Findings pt declines a shower   Upper Body Dressing   Type of Assistance Needed Set-up / clean-up   Upper Body Dressing CARE Score 5   Lower Body Dressing   Type of Assistance Needed Physical assistance   Amount of Physical Assistance Provided Less than 25%   Lower Body Dressing CARE Score 3   Putting On/Taking Off Footwear   Type of Assistance Needed Physical assistance   Amount of Physical Assistance Provided Less than 25%   Putting On/Taking Off Footwear CARE Score 3   Dressing/Undressing Clothing Remove UB Clothes Pullover Shirt   Don UB Clothes Pullover Shirt   Remove LB Clothes Socks; Shoes   Don LB [employer]; Undergarment;Socks; Shoes   Limitations Noted In Balance; Endurance; Safety;Strength;ROM  (TTWB RLE)   Adaptive Equipment Reacher;Sock Aide   Positioning Supported Sit;Standing   Lying to Sitting on Side of Bed   Type of Assistance Needed Physical assistance   Amount of Physical Assistance Provided Less than 25%   Lying to Sitting on Side of Bed CARE Score 3   Sit to Stand   Type of Assistance Needed Incidental touching   Sit to Stand CARE Score 4   Bed-Chair Transfer   Type of Assistance Needed Incidental touching   Chair/Bed-to-Chair Transfer CARE Score 4   Exercise Tools   Exercise Tools Yes   Hand Gripper gripper with pegs B hands   Other Exercise Tool 1 fxl reacher use with R hand for retrieval of pegs from the floor   Cognition   Overall Cognitive Status [facility]   Arousal/Participation Alert; Responsive; Cooperative   Attention Within functional limits   Orientation Level Oriented X4   Memory Within functional limits   Following Commands Follows all commands and directions without difficulty   Additional Activities   Additional Activities Other (Comment)   Additional Activities Comments assist provided for ramos management and repositioining to increase comfort   Assessment   Treatment Assessment Pt participates in ADLs, transfers and BUE therex  Pt requires assist due to decreased balance, safety, endurance and RLEU strength and ROM with TTWB RLE  Pt presents for seesion in bed and encouraged to sit in w/c for lunch with preference to return to bed   Pt is making progress towards goals and will benefit from continued skilled OT servcies to increase independence with daily tasks  Problem List Decreased strength;Decreased range of motion;Decreased endurance; Impaired balance;Decreased safety awareness;Orthopedic restrictions   Plan   Treatment/Interventions ADL retraining;Functional transfer training; Therapeutic exercise; Endurance training;Patient/family training; Compensatory technique education   Progress Progressing toward goals   OT Therapy Minutes   OT Time In 1105   OT Time Out 1241   OT Total Time (minutes) 96   OT Mode of treatment - Individual (minutes) 96   Therapy Time missed   Time missed?  No

## 2020-06-08 NOTE — NURSING NOTE
Pt in bed resting, slept during night  Scheduled pain medications admin as ordered, refer to [facility]  Turned and repo during night for pressure relief  SCDs in place  Continuing to monitor and follow plan of care  Items within reach

## 2020-06-08 NOTE — PLAN OF CARE
Problem: Prexisting or High Potential for Compromised Skin Integrity  Goal: Skin integrity is maintained or improved  Description  INTERVENTIONS:  - Identify patients at risk for skin breakdown  - Assess and monitor skin integrity  - Assess and monitor nutrition and hydration status  - Monitor labs   - Assess for incontinence   - Turn and reposition patient  - Assist with mobility/ambulation  - Relieve pressure over bony prominences  - Avoid friction and shearing  - Provide appropriate hygiene as needed including keeping skin clean and dry  - Evaluate need for skin moisturizer/barrier cream  - Collaborate with interdisciplinary team   - Patient/family teaching  - Consider wound care consult   Outcome: Progressing     Problem: Potential for Falls  Goal: Patient will remain free of falls  Description  INTERVENTIONS:  - Assess patient frequently for physical needs  -  Identify cognitive and physical deficits and behaviors that affect risk of falls    -  [place] fall precautions as indicated by assessment   - Educate patient/family on patient safety including physical limitations  - Instruct patient to call for assistance with activity based on assessment  - Modify environment to reduce risk of injury  - Consider OT/PT consult to assist with strengthening/mobility  Outcome: Progressing     Problem: PAIN - ADULT  Goal: Verbalizes/displays adequate comfort level or baseline comfort level  Description  Interventions:  - Encourage patient to monitor pain and request assistance  - Assess pain using appropriate pain scale  - Administer analgesics based on type and severity of pain and evaluate response  - Implement non-pharmacological measures as appropriate and evaluate response  - Consider cultural and social influences on pain and pain management  - Notify physician/advanced practitioner if interventions unsuccessful or patient reports new pain  Outcome: Progressing     Problem: INFECTION - ADULT  Goal: Absence or prevention of progression during hospitalization  Description  INTERVENTIONS:  - Assess and monitor for signs and symptoms of infection  - Monitor lab/diagnostic results  - Monitor all insertion sites, i e  indwelling lines, tubes, and drains  - Monitor endotracheal if appropriate and nasal secretions for changes in amount and color  - [place] appropriate cooling/warming therapies per order  - Administer medications as ordered  - Instruct and encourage patient and family to use good hand hygiene technique  - Identify and instruct in appropriate isolation precautions for identified infection/condition  Outcome: Progressing  Goal: Absence of fever/infection during neutropenic period  Description  INTERVENTIONS:  - Monitor WBC    Outcome: Progressing     Problem: SAFETY ADULT  Goal: Patient will remain free of falls  Description  INTERVENTIONS:  - Assess patient frequently for physical needs  -  Identify cognitive and physical deficits and behaviors that affect risk of falls    -  [place] fall precautions as indicated by assessment   - Educate patient/family on patient safety including physical limitations  - Instruct patient to call for assistance with activity based on assessment  - Modify environment to reduce risk of injury  - Consider OT/PT consult to assist with strengthening/mobility  Outcome: Progressing  Goal: Maintain or return to baseline ADL function  Description  INTERVENTIONS:  -  Assess patient's ability to carry out ADLs; assess patient's baseline for ADL function and identify physical deficits which impact ability to perform ADLs (bathing, care of mouth/teeth, toileting, grooming, dressing, etc )  - Assess/evaluate cause of self-care deficits   - Assess range of motion  - Assess patient's mobility; develop plan if impaired  - Assess patient's need for assistive devices and provide as appropriate  - Encourage maximum independence but intervene and supervise when necessary  - Involve family in performance of ADLs  - Assess for home care needs following discharge   - Consider OT consult to assist with ADL evaluation and planning for discharge  - Provide patient education as appropriate  Outcome: Progressing  Goal: Maintain or return mobility status to optimal level  Description  INTERVENTIONS:  - Assess patient's baseline mobility status (ambulation, transfers, stairs, etc )    - Identify cognitive and physical deficits and behaviors that affect mobility  - Identify mobility aids required to assist with transfers and/or ambulation (gait belt, sit-to-stand, lift, walker, cane, etc )  - [place] fall precautions as indicated by assessment  - Record patient progress and toleration of activity level on Mobility SBAR; progress patient to next Phase/Stage  - Instruct patient to call for assistance with activity based on assessment  - Consider rehabilitation consult to assist with strengthening/weightbearing, etc   Outcome: Progressing     Problem: DISCHARGE PLANNING  Goal: Discharge to home or other facility with appropriate resources  Description  INTERVENTIONS:  - Identify barriers to discharge w/patient and caregiver  - Arrange for needed discharge resources and transportation as appropriate  - Identify discharge learning needs (meds, wound care, etc )  - Arrange for interpretive services to assist at discharge as needed  - Refer to Case Management Department for coordinating discharge planning if the patient needs post-hospital services based on physician/advanced practitioner order or complex needs related to functional status, cognitive ability, or social support system  Outcome: Progressing

## 2020-06-08 NOTE — PROGRESS NOTES
Physical Medicine and Rehabilitation Progress Note  Tila Blackwell 78 y o  female MRN: [id number]  Unit/Bed#: -01 Encounter: [id number]    HPI: Tila Blackwell is a 78 y o  female who presented to the [facility] after fall and was found to have a right IT fx and is s/p nail fixation by Dr Delia Zhao on 5/28  Course complicated by hyponatremia and patient was placed on FR  Chief Complaint: Rt hip fx     Interval/subjective: patient denies any events over the weekend     ROS: A 10 point ROS was performed; negative except as noted above       Assessment/Plan:      * Closed intertrochanteric fracture of right femur [facility])  Assessment & Plan  - s/p nail fixation by Dr Delia Zhao on 5/28  - TTWB per ortho  - OP FU with Dr Delia Zhao     Hypophosphatemia  [address] [place] [address]  - patient with decreased level on 5/28 therefore started on phos supplements in acute care on 5/28 and on 5/31, 6/2, & 6/5 levels WNL at 3 7, 3 6, and 3 7 respectively; therefore suspect patient will likely need to remain on phos supplementation however will defer to renal; will recheck with next set of labs     Anemia  Assessment & Plan  - Hg currently 8 1 post-operatively  - IM & renal co-managing     Hyponatremia  Assessment & Plan  - currently WNL   - FR per renal   - renal managing     Elevated vitamin B12 level  Assessment & Plan  - elevated to 4033 ()  - patient takes supplemental B12 therefore will stop and have patient FU with PCP     Postoperative urinary retention  Assessment & Plan  - TOV in process     DM (diabetes mellitus) ([facility] [address] )  Assessment & Plan  - per patient on metformin 500 mg BID with meals, insulin 5 units with meals, and levemir 5 (not 10) units at HS   - follows with Dr Les Velazquez of endocrine  - IM managing     Hypothyroidism  Assessment & Plan  - TSH of 5/28 elevated (free T4 WNL)  - on home regimen of levothyroxine 100 mcg qd except for Sundays when she takes 50 mcg qd  - follows with Dr Tameka Louis Pink [place]e of endocrine  - IM managing and recommend patient have repeat TFTs as OP and FU with Dr Latonya Bernardo hypertension  Assessment & Plan  - at home ramapril 5 mg qd  - IM & renal co-managing     Hyperlipidemia  Assessment & Plan  - on home lipitor 20 mg qpm (per patient no longer on crestor)       Scheduled Meds:    Current Facility-Administered Medications:  acetaminophen 975 mg Oral Q8H Martinez Rubi MD   aspirin 81 mg Oral Daily Oneyda Mercedes MD   Sherice Demetri ON 6/12/2020] aspirin 325 mg Oral Daily Oneyda Mrecedes MD   atorvastatin 20 mg Oral Daily With Farhana Flavors, MD   bisacodyl 10 mg Rectal Daily PRN Oneyda Mercedes MD   fondaparinux 2 5 mg Subcutaneous Daily Oneyda Mercedes MD   insulin detemir 10 Units Subcutaneous HS [place] Clamp, PA-C   insulin lispro 1-5 Units Subcutaneous TID AC Oneyda Mercedes MD   insulin lispro 1-5 Units Subcutaneous HS Oneyda Mercedes MD   insulin lispro 3 Units Subcutaneous TID With Meals [place]   levothyroxine 100 mcg Oral Once per day on Mon Tue Wed Thu Fri Sat Oneyda Mercedes MD   levothyroxine 50 mcg Oral Weekly Oneyda Mercedes MD   lisinopril 5 mg Oral Daily Oneyda Mercedes MD   methocarbamol 500 mg Oral Q6H PRN Oneyda Mercedes MD   oxyCODONE 2 5 mg Oral Q6H PRN Oneyda Mercedes MD   polyethylene glycol 17 g Oral Daily Oneyda Mercedes MD   potassium-sodium phosphates 2 packet Oral BID With Meals Oneyda Mercedes MD   traMADol 50 mg Oral Q6H Martinez Rubi MD        Incidental findings:     1) mildly decreased alk phos level: currently 54 (LLN 55)     DVT ppx: per Dr Ben Roman protocol arixtra 2 5 mg sc x 2 weeks post-op then followed by asa 325 mg for 4 weeks (note patient is on asa 81 mg qd at home and was cleared in acute care to be on both home asa 81 mg qd and arixtra and patient has been on both since 5/29 however once patient transitions to asa 325 mg qd on 6/12 per Dr Ben Roman protocol she will stop asa 81 mg qd until she has completed the 4 week course of asa 325 gm qd per   Bozena's protocol)     Code: confirmed with patient that she wishes to be DNR/DNI and verbalized her understanding of what this means      Note: confirmed home meds with patient     Objective:    Functional Update:  Mobility: max   Transfers: max   ADLs: max       Physical Exam:  Vitals:    06/08/20 0728   BP: 138/66   Pulse: 82   Resp: 18   Temp: 97 8 °F (36 6 °C)   SpO2: 98%           General: alert, no apparent distress, cooperative and comfortable  HEENT:  Head: Normal, normocephalic, atraumatic    CARDIAC:  +S1/2  LUNGS:  no abnormal respiratory pattern, no retractions noted, non-labored breathing   ABDOMEN:  soft NT   EXTREMITIES:  volume status currently stable   NEURO:  awake, alert, appropriately answering questions   PSYCH:  mood/affect currently stable          Diagnostic Studies:   No orders to display       Laboratory:   Results from last 7 days   Lab Units 06/05/20  0556 06/02/20  0501   HEMOGLOBIN g/dL 8 1* 8 4*   HEMATOCRIT % 23 1* 24 1*   WBC [place]/uL  --  8 40     Results from last 7 days   Lab Units 06/05/20  0556 06/03/20  0536 06/02/20  0501   BUN mg/dL 16 21 21   SODIUM mmol/L 134 130* 131*   POTASSIUM mmol/L 4 3 5 2 4 4   CHLORIDE mmol/L 99 93* 95*   CREATININE mg/dL 0 70 0 81 0 72

## 2020-06-08 NOTE — NURSING NOTE
[place] catheter removed per Dr Zayra Lainez order  Pt instructed to ring when urge to urininate and protocol to scan bladder after each urination x3 to check for retention  Pt currently in bed, no c/o pain at this time, all needs are met  Bed alarm in place and turned on  Will continue to monitor and follow plan of care

## 2020-06-08 NOTE — PROGRESS NOTES
06/08/20 1301   Pain Assessment   Pain Assessment Tool 0-10   Pain Score 9   Restrictions/Precautions   Precautions Fall Risk;Fluid restriction; Kinney   RLE Weight Bearing Per Order TTWB   Cognition   Overall Cognitive Status [facility]   Arousal/Participation Alert; Responsive; Cooperative   Attention Within functional limits   Orientation Level Oriented X4   Memory Within functional limits   Following Commands Follows all commands and directions without difficulty   Sit to Lying   Type of Assistance Needed Physical assistance   Amount of Physical Assistance Provided 50%-74%   Sit to Lying CARE Score 2   Sit to Stand   Type of Assistance Needed Incidental touching   Amount of Physical Assistance Provided No physical assistance   Sit to Stand CARE Score 4   Bed-Chair Transfer   Type of Assistance Needed Incidental touching   Amount of Physical Assistance Provided No physical assistance   Chair/Bed-to-Chair Transfer CARE Score 4   Transfer Bed/Chair/Wheelchair   Limitations Noted In Balance;Confidence; Coordination; Endurance;Pain Management;LE Strength   Adaptive Equipment Roller Walker   Stand Pivot Contact Guard   Sit to [facility]   Stand to On license of [facility]   Sit to Supine Moderate Assist   All Transfer Moderate Assist   Walk 10 Feet   Reason if not Attempted Safety concerns   Walk 10 Feet CARE Score 88   Walk 50 Feet with Two Turns   Reason if not Attempted Safety concerns   Walk 50 Feet with Two Turns CARE Score 88   Walk 150 Feet   Reason if not Attempted Safety concerns   Walk 150 Feet CARE Score 88   Walking 10 Feet on Uneven Surfaces   Reason if not Attempted Safety concerns   Walking 10 Feet on Uneven Surfaces CARE Score 88   Ambulation   Does the patient walk? 2  Yes   Primary Mode of Locomotion Prior to Admission Walk   Distance Walked (feet) 5 ft  (4)   Assist Device Roller Walker   Gait Pattern Inconsistant Noemi; Slow Noemi; Improper weight shift   Limitations Noted In Balance; Coordination; Endurance; Safety;Strength   Provided Assistance with: Balance   Walk Assist Level Contact Guard   Wheel 50 Feet with Two Turns   Type of Assistance Needed Supervision   Amount of Physical Assistance Provided No physical assistance   Wheel 50 Feet with Two Turns CARE Score 4   Wheel 150 Feet   Type of Assistance Needed Supervision   Amount of Physical Assistance Provided No physical assistance   Wheel 150 Feet CARE Score 4   Wheelchair mobility   Does the patient use a wheelchair? 1  Yes   Type of Wheelchair Used 1  Manual   Method Right upper extremity; Left upper extremity; Left lower extremity   Distance Level Surface (feet) 175 ft  (118)   Findings level and carpet   Therapeutic Interventions   Strengthening seated and supine ther ex 30 reps   Other gait, transfers, w/c mobility   Assessment   Treatment Assessment Pt pleasant and cooperative with PT session; Pt is S for w/c mobility up to 175' on level and carpet; Pt trail amb today despite pain, she amb 4' and 5' with RW and CGA; Pt needs encouragement at times to push herself; Pt has limited vega for tasks due to pain; Pt is CGA for transfers with cues for hand placement and R LE placement due to TTWB on R LE; Pt performed seated and supine ther ex for general conditioning and strengthening; Pt appropriate for concurrent therapy to increase motivation/socialzation while focusing on similar exercises   PT Barriers   Physical Impairment Decreased strength;Decreased range of motion;Decreased endurance; Impaired balance;Decreased mobility; Decreased safety awareness;Pain   Functional Limitation Wheelchair management; Walking;Transfers;Standing   Plan   Treatment/Interventions Functional transfer training;LE strengthening/ROM; Therapeutic exercise;Gait training   Progress Progressing toward goals   PT Therapy Minutes   PT Time In 1301   PT Time Out 1431   PT Total Time (minutes) 90   PT Mode of treatment - Individual (minutes) 31   PT Mode of treatment - Concurrent (minutes) 59   PT Mode of treatment - Group (minutes) 0   PT Mode of treatment - Co-treat (minutes) 0   PT Mode of Treatment - Total time(minutes) 90 minutes   PT Cumulative Minutes 451   Therapy Time missed   Time missed?  No

## 2020-06-09 LAB
ANION GAP SERPL CALCULATED.3IONS-SCNC: 7 MMOL/L (ref 4–13)
BUN SERPL-MCNC: 19 MG/DL (ref 7–25)
CALCIUM SERPL-MCNC: 8 MG/DL (ref 8.6–10.5)
CHLORIDE SERPL-SCNC: 101 MMOL/L (ref 98–107)
CO2 SERPL-SCNC: 30 MMOL/L (ref 21–31)
CREAT SERPL-MCNC: 0.67 MG/DL (ref 0.6–1.2)
GFR SERPL CREATININE-BSD FRML MDRD: 84 ML/MIN/1.73SQ M
GLUCOSE P FAST SERPL-MCNC: 69 MG/DL (ref 65–99)
GLUCOSE SERPL-MCNC: 265 MG/DL (ref 65–140)
GLUCOSE SERPL-MCNC: 334 MG/DL (ref 65–140)
GLUCOSE SERPL-MCNC: 334 MG/DL (ref 65–140)
GLUCOSE SERPL-MCNC: 50 MG/DL (ref 65–140)
GLUCOSE SERPL-MCNC: 68 MG/DL (ref 65–140)
GLUCOSE SERPL-MCNC: 69 MG/DL (ref 65–99)
MAGNESIUM SERPL-MCNC: 1.9 MG/DL (ref 1.9–2.7)
PHOSPHATE SERPL-MCNC: 3.7 MG/DL (ref 3–5.5)
POTASSIUM SERPL-SCNC: 4.2 MMOL/L (ref 3.5–5.5)
SODIUM SERPL-SCNC: 138 MMOL/L (ref 134–143)

## 2020-06-09 PROCEDURE — 82948 REAGENT STRIP/BLOOD GLUCOSE: CPT

## 2020-06-09 PROCEDURE — 80048 BASIC METABOLIC PNL TOTAL CA: CPT | Performed by: INTERNAL MEDICINE

## 2020-06-09 PROCEDURE — 97116 GAIT TRAINING THERAPY: CPT

## 2020-06-09 PROCEDURE — 83735 ASSAY OF MAGNESIUM: CPT | Performed by: INTERNAL MEDICINE

## 2020-06-09 PROCEDURE — 99233 SBSQ HOSP IP/OBS HIGH 50: CPT | Performed by: PHYSICAL MEDICINE & REHABILITATION

## 2020-06-09 PROCEDURE — 97530 THERAPEUTIC ACTIVITIES: CPT

## 2020-06-09 PROCEDURE — 97110 THERAPEUTIC EXERCISES: CPT

## 2020-06-09 PROCEDURE — 97542 WHEELCHAIR MNGMENT TRAINING: CPT

## 2020-06-09 PROCEDURE — 99232 SBSQ HOSP IP/OBS MODERATE 35: CPT | Performed by: INTERNAL MEDICINE

## 2020-06-09 PROCEDURE — 84100 ASSAY OF PHOSPHORUS: CPT | Performed by: PHYSICAL MEDICINE & REHABILITATION

## 2020-06-09 RX ADMIN — INSULIN DETEMIR 5 UNITS: 100 INJECTION, SOLUTION SUBCUTANEOUS at 21:24

## 2020-06-09 RX ADMIN — POTASSIUM & SODIUM PHOSPHATES POWDER PACK 280-160-250 MG 2 PACKET: 280-160-250 PACK at 08:18

## 2020-06-09 RX ADMIN — INSULIN LISPRO 2 UNITS: 100 INJECTION, SOLUTION INTRAVENOUS; SUBCUTANEOUS at 12:57

## 2020-06-09 RX ADMIN — LISINOPRIL 5 MG: 5 TABLET ORAL at 08:18

## 2020-06-09 RX ADMIN — ACETAMINOPHEN 975 MG: 325 TABLET ORAL at 05:12

## 2020-06-09 RX ADMIN — TRAMADOL HYDROCHLORIDE 50 MG: 50 TABLET, FILM COATED ORAL at 12:49

## 2020-06-09 RX ADMIN — LEVOTHYROXINE SODIUM 100 MCG: 100 TABLET ORAL at 05:12

## 2020-06-09 RX ADMIN — ACETAMINOPHEN 975 MG: 325 TABLET ORAL at 14:31

## 2020-06-09 RX ADMIN — POTASSIUM & SODIUM PHOSPHATES POWDER PACK 280-160-250 MG 2 PACKET: 280-160-250 PACK at 16:56

## 2020-06-09 RX ADMIN — METHOCARBAMOL 500 MG: 500 TABLET, FILM COATED ORAL at 10:47

## 2020-06-09 RX ADMIN — FONDAPARINUX SODIUM 2.5 MG: 2.5 INJECTION, SOLUTION SUBCUTANEOUS at 08:18

## 2020-06-09 RX ADMIN — ATORVASTATIN CALCIUM 20 MG: 20 TABLET, FILM COATED ORAL at 16:56

## 2020-06-09 RX ADMIN — TRAMADOL HYDROCHLORIDE 50 MG: 50 TABLET, FILM COATED ORAL at 18:03

## 2020-06-09 RX ADMIN — ACETAMINOPHEN 975 MG: 325 TABLET ORAL at 21:22

## 2020-06-09 RX ADMIN — OXYCODONE HYDROCHLORIDE 2.5 MG: 5 TABLET ORAL at 10:48

## 2020-06-09 RX ADMIN — TRAMADOL HYDROCHLORIDE 50 MG: 50 TABLET, FILM COATED ORAL at 05:12

## 2020-06-09 RX ADMIN — ASPIRIN 81 MG: 81 TABLET, COATED ORAL at 08:18

## 2020-06-09 NOTE — PROGRESS NOTES
Physical Medicine and Rehabilitation Progress Note  Zoran Maharaj 78 y o  female MRN: [id number]  Unit/Bed#: -01 Encounter: [id number]    HPI: Zoran Maharaj is a 78 y o  female who presented to the [facility] [address] after fall and was found to have a right IT fx and is s/p nail fixation by Dr Nawaf Chew on 5/28  Course complicated by hyponatremia and patient was placed on FR  Chief Complaint: Rt hip fx     Interval/subjective: d/w patient potential dc date and patient is agreeable     ROS: A 10 point ROS was performed; negative except as noted above       Assessment/Plan:      * Closed intertrochanteric fracture of right femur [facility])  Assessment & Plan  - s/p nail fixation by Dr Nawaf Chew on 5/28  - TTWB per ortho  - OP FU with Dr Nawaf Chew     Hypophosphatemia  Sreedhar Caller  - patient with decreased level on 5/28 therefore started on phos supplements in acute care on 5/28 and on 5/31, 6/2, & 6/5 levels WNL at 3 7, 3 6, and 3 7 respectively; therefore suspect patient will likely need to remain on phos supplementation however will defer to renal; repeat level PENDING     Anemia  Assessment & Plan  - Hg currently 8 1 post-operatively  - IM & renal co-managing     Hyponatremia  Assessment & Plan  - currently WNL   - FR per renal   - renal managing     Elevated vitamin B12 level  Assessment & Plan  - elevated to 4033 ()  - patient takes supplemental B12 therefore will stop and have patient FU with PCP     Postoperative urinary retention  Assessment & Plan  - resolved (PVRs 118, 210, 32)     DM (diabetes mellitus) ([facility] [address] )  Assessment & Plan  - per patient on metformin 500 mg BID with meals, insulin 5 units with meals, and levemir 5 (not 10) units at HS   - follows with Dr Amber Harris of endocrine  - IM managing     Hypothyroidism  Assessment & Plan  - TSH of 5/28 elevated (free T4 WNL)  - on home regimen of levothyroxine 100 mcg qd except for Sundays when she takes 50 mcg qd  - follows with Dr Fede Gonzalez of endocrine  - IM managing and recommend patient have repeat TFTs as OP and FU with Dr Jc Hernandez hypertension  Assessment & Plan  - at home ramapril 5 mg qd  - IM & renal co-managing     Hyperlipidemia  Assessment & Plan  - on home lipitor 20 mg qpm (per patient no longer on crestor)       Scheduled Meds:    Current Facility-Administered Medications:  acetaminophen 975 mg Oral Q8H Nicci Hua MD   aspirin 81 mg Oral Daily MD Colette Fitch ON 6/12/2020] aspirin 325 mg Oral Daily Aicha Mckinley MD   atorvastatin 20 mg Oral Daily With Hernán Armendariz MD   bisacodyl 10 mg Rectal Daily PRN Aicha Mckinley MD   fondaparinux 2 5 mg Subcutaneous Daily Aicha Mckinley MD   insulin detemir 10 Units Subcutaneous HS Gwen Arias PA-C   insulin lispro 1-5 Units Subcutaneous TID AC Aicha Mckinley MD   insulin lispro 1-5 Units Subcutaneous HS Aicha Mckinley MD   insulin lispro 3 Units Subcutaneous TID With Meals ADRIAN Silveira   levothyroxine 100 mcg Oral Once per day on Mon Tue Wed Thu Fri Sat Aicha Mckinley MD   levothyroxine 50 mcg Oral Weekly Aicha Mckinley MD   lisinopril 5 mg Oral Daily Aicha Mckinley MD   methocarbamol 500 mg Oral Q6H PRN Aicha Mckinley MD   oxyCODONE 2 5 mg Oral Q6H PRN Aicha Mckinley MD   polyethylene glycol 17 g Oral Daily Aicha Mckinley MD   potassium-sodium phosphates 2 packet Oral BID With Meals Aicha Mckinley MD   traMADol 50 mg Oral Q6H Nicci Hua MD        Incidental findings:     1) mildly decreased alk phos level: currently 54 (LLN 55)     DVT ppx: per Dr Razia Keene protocol arixtra 2 5 mg sc x 2 weeks post-op then followed by asa 325 mg for 4 weeks (note patient is on asa 81 mg qd at home and was cleared in acute care to be on both home asa 81 mg qd and arixtra and patient has been on both since 5/29 however once patient transitions to asa 325 mg qd on 6/12 per Dr Razia Keene protocol she will stop asa 81 mg qd until she has completed the 4 week course of asa 325 gm qd per Dr Mp Iraheta protocol)     Code: confirmed with patient that she wishes to be DNR/DNI and verbalized her understanding of what this means      Note: confirmed home meds with patient     Objective:    Functional Update:  Mobility: cg  Transfers: cg   ADLs: min-mod       Physical Exam:  Vitals:    06/09/20 1914   BP: 142/70   Pulse: 74   Resp: 18   Temp: 98 3   SpO2: 95%           General: alert, no apparent distress, cooperative and comfortable  HEENT:  Head: Normal, normocephalic, atraumatic  CARDIAC:  +S1/2  LUNGS:  no abnormal respiratory pattern, no retractions noted, non-labored breathing   ABDOMEN:  soft NT   EXTREMITIES:  volume status currently stable   NEURO:  awake, alert, appropriately answering questions   PSYCH:  mood/affect currently stable          Diagnostic Studies:   No orders to display       Laboratory:   Results from last 7 days   Lab Units 06/05/20  0556   HEMOGLOBIN g/dL 8 1*   HEMATOCRIT % 23 1*     Results from last 7 days   Lab Units 06/09/20  0502 06/05/20  0556 06/03/20  0536   BUN mg/dL 19 16 21   SODIUM mmol/L 138 134 130*   POTASSIUM mmol/L 4 2 4 3 5 2   CHLORIDE mmol/L 101 99 93*   CREATININE mg/dL 0 67 0 70 0 81            This patient was discussed by the Interdisciplinary Team in weekly case conference today  The care of the patient was extensively discussed with all care providers and an appropriate rehabilitation plan was formulated unique for this patient  Barriers were identified preventing progression of therapy and appropriate interventions were discussed with each discipline  Please see the team note for input from all disciplines regarding barriers, intervention, and discharge planning  [ x ] Total time spent: 35 Mins, and greater than 50% of this time was spent counseling/coordinating care

## 2020-06-09 NOTE — NURSING NOTE
Pt felt urge to void, assisted up to [facility]  Pt voided 300ml, PVR bladder scan 210

## 2020-06-09 NOTE — TEAM CONFERENCE
Acute RehabilitationTeam Conference Note  Date: 6/9/2020   Time: 11:29 AM       Patient Name:  Shan Suero       Medical Record Number: [id number]   YOB: 1940  Sex: Female          Room/Bed:  [facility] 217/[facility] 217-01  Payor Info:  Payor: Alexysyvrose Henry / Plan: MEDICARE A AND B / Product Type: Medicare A & B Fee for Service /      Admitting Diagnosis: Closed femur fracture ([facility] ) Fiorella Vergara   Admit Date/Time:  6/1/2020  6:03 PM  Admission Comments: No comment available     Primary Diagnosis:  Closed intertrochanteric fracture of right femur ([facility] )  Principal Problem: Closed intertrochanteric fracture of right femur [facility])    Patient Active Problem List    Diagnosis Date Noted    Hypophosphatemia 06/04/2020    Postoperative urinary retention 06/01/2020    Elevated vitamin B12 level 06/01/2020    Anemia 06/01/2020    Hyponatremia 05/27/2020    Closed intertrochanteric fracture of right femur ([facility] ) 05/26/2020    Hypothyroidism 08/14/2015    Hyperlipidemia 12/23/2014    Essential hypertension 10/10/2013    DM (diabetes mellitus) ([facility] ) 05/21/2008       Physical Therapy:    Weight Bearing Status: Toe touch  Transfers: Incidental Touching  Bed Mobility: Moderate Assistance  Amulation Distance (ft): 5 feet  Ambulation: Incidental Touching  Assistive Device for Ambulation: Roller Walker  Wheelchair Mobility Distance: 175 ft  Wheelchair Mobility: Supervision  Discharge Recommendations: Home with:  [address] [facility] Devyn with[de-identified] Family Support, First Floor Setup, Home Physical Therapy    06/02/2020:   Patient seen for IE today  Presents with decreased ROM/strength, decreased balance and safety, TTWB RLE, and pain  Patient MOD A bed mobility, MOD A transfers with walker, S wheelchair mobility  Patient non-ambulatory at this time due to pain and decrease weight bearing RLE    May benefit from continued inpatient [facility] PT to increased function, safety, and increased independence in prep for safe d/c     6/8/20: Pt is participating in PT and progressing towards goals; Presents with decreased ROM/strength, decreased balance and safety, TTWB RLE, and pain  Patient MOD A bed mobility, CGA transfers with walker, S wheelchair mobility 175'  Pt amb 5' with RW and CGA  Pt is very limited by pain in R LE  May benefit from continued inpatient [facility] PT to increased function, safety, and increased independence in prep for safe d/c  Occupational Therapy:  Eating: Independent  Grooming: Supervision  Bathing: Minimal Assistance  Bathing: Minimal Assistance  Upper Body Dressing: Supervision  Lower Body Dressing: Minimal Assistance  Toileting: Moderate Assistance  Tub/Shower Transfer: (TBA   pt declines shower)  Toilet Transfer: Minimal Assistance  Cognition: Within Defined Limits  Orientation: Person, Place, Time, Situation  Discharge Recommendations: Home with:  [address] with[de-identified] Family Support, Home Occupational Therapy, First Floor Setup       6/2/2020: Pt participates in ADLs and transfers following eval this day  Pt requires assist due to decreased balance, safety, endurance and RLE ROM/ str with TTWB and pain  Pt's current LOF as listed above and will benefit from skilled OT services to increase independence with daily tasks  6/8/2020: Pt participates in ADLs, transfers and BUE therex  Pt is making great progress with A/E use for LB and assist of one for all tasks maintaining TTWB RLE well  Pt requires assist due to decreased balance, safety, endurance and RLE strength and ROM  Pt's current LOF as listed above  Pt will benefit from continued skilled OT services to increase independence with daily tasks  Speech Therapy:           No notes on file    Nursing Notes:  Appetite: Good  Diet Type: Diabetic(F R  1800)                      Diet Patient/Family Education Complete: Yes    Type of Wound (LDA):  Wound                    Type of Wound Patient/Family Education: Yes  Bladder: Continent     Bladder Patient/Family Education: Yes  Bowel: Continent     Bowel Patient/Family Education: Yes  Pain Location/Orientation: Orientation: Right, Location: Hip, Location: Leg, Location: Knee  Pain Score: 9                       Hospital Pain Intervention(s): Medication (See MAR)(Medication prior to OT session)  Pain Patient/Family Education: Yes  Medication Management/Safety  Injectable: Arixtra  Medication Patient/Family Education Complete: No    6/2/20 Admit to [facility] 6/1/20 s/p R femur fx with surgical repair  Alert and oriented  Lungs clear/decreased  HR regular  +1/+2 edema bilateral legs  Dsg intact over incision R hip  Pt is TTWB to RLE  Pt has been having difficulty with urine retention since ramos removal  Pt has prn tramadol for pain  [address] [facility]    6/9/20 Alert and oriented  Lungs clear/decreased on RA  HR regular  Gale intact to both incisions R hip  Swelling/edema present on R hip  Pt is TTWB to RLE  Pt ramos removed in evening of 6/8/20 for voiding trial  Pt has scheduled tylenol and tramadol and prn oxycodone for pain management  Pt has been free from falls while in [facility]  LC    Case Management:     Discharge Planning  Living Arrangements: Alone  Support Systems: Family members, Friends/neighbors  Assistance Needed: [facility]  Type of Current Residence: Private residence  [address]: No  Initial assessment & orientation to [facility] with Pt & family completed on 6/2/20  Pt resides alone in a single story home, 2 steps to enter, HR on R  She reported that her son & daughter reside nearby and assist as needed, though Pt was independent PTA  Pt expressed a high level of motivation to reach her goals & to eventually resume activities such as 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 yes  List any modifications to the treatment plan: na    Barriers Interventions   pain Medication management   TTWB right LE Cues, ADL, transfer, gait training   Decreased right ROM and strength Therapeutic exercise, therapeutic activity   Decreased balance ADL, transfer, gait training         Is the patient making expected progress toward goals?  yes  List any update or changes to goals: na    Medical Goals: Patient will be medically stable for discharge to [facility] restrictive envrionment upon completion of rehab program    Weekly Team Goals:   Rehab Team Goals  ADL Team Goal: Patient will be independent with ADLs with least restrictive device upon completion of rehab program  Bowel/Bladder Team Goal: Patient will be independent with bladder/bowel management with least restrictive device upon completion of rehab program  Transfer Team Goal: Patient will be independent with transfers with least restrictive device upon completion of rehab program  Locomotion Team Goal: Patient will return to premorbid level for locomotion upon completion of rehab program  Cognitive Team Goal: Patient will return to premorbid level of cognitive activity upon completion of rehab program     Min assist bed mobility  S tranfers, CG/S mobility  Mod I self care    Health and wellness: to be able to complete homemaking tasks    Discussion: Plan for return home with family support with [facility] for PT, OT, nursing     Anticipated Discharge Date:  June 18, 2020

## 2020-06-09 NOTE — CASE MANAGEMENT
Tx team recommendations reviewed with patient & family, who expressed understanding & agreement  Target DC date is extended to 6/18 with [facility] (Nsg, PT, OT); a list of providers was provided to Pt & a referral will be made based on Pt preference  SW will continue to monitor & assist as needed with Tx & DC planning

## 2020-06-09 NOTE — PROGRESS NOTES
06/09/20 0937   Pain Assessment   Pain Assessment Tool 0-10   Pain Score 8   Pain Location/Orientation Orientation: Right;Location: Leg   Restrictions/Precautions   Precautions Fall Risk;Fluid restriction; Kinney  (1500 FR, decreased RLE strength and ROM)   RLE Weight Bearing Per Order TTWB   Exercise Tools   Hand Gripper gripper with pegs B hands   Other Exercise Tool 1 fxl reacher use with RUE for retireval of pegs from the floor   Other Exercise Tool 2 card match reaching with BUE therex while sitting   Cognition   Overall Cognitive Status [facility]   Arousal/Participation Alert; Responsive; Cooperative   Attention Within functional limits   Orientation Level Oriented X4   Memory Within functional limits   Following Commands Follows all commands and directions without difficulty   Additional Activities   Additional Activities Other (Comment)   Additional Activities Comments w/c mobility to and from OT room with BUE and LLE S/ Nano   Other Comments   Assessment Pt participates in 43 minutes concurrent treatment forcusing on Ub therex to increase endurance and ub strength with similar goals as another   Assessment   Treatment Assessment Pt participates in BUE therex and w/c mobility during OT session  Pt tolerates sesion without complaints and is making gains towards goals  Pt requires assist due to decreased balance, safety, endurance, and decreased strength/ ROM RLE with TTWB  Pt will benefit from continued skilled OT services to increase independence with daily tasks  Pt prefers to remain chair at end of session to build endurance and toleracne to being OOB  Problem List Decreased strength;Decreased range of motion;Decreased endurance; Impaired balance;Decreased safety awareness;Orthopedic restrictions;Pain   Plan   Treatment/Interventions Functional transfer training;ADL retraining; Therapeutic exercise; Endurance training;Patient/family training; Compensatory technique education   Progress Progressing toward goals   OT Therapy Minutes   OT Time In 0937   OT Time Out 1020   OT Total Time (minutes) 43   OT Mode of treatment - Concurrent (minutes) 43   Therapy Time missed   Time missed?  No

## 2020-06-09 NOTE — PROGRESS NOTES
06/09/20 1138   Pain Assessment   Pain Assessment Tool 0-10   Pain Score 9   Pain Location/Orientation Orientation: Right;Location: Leg   Restrictions/Precautions   Precautions Fall Risk;Fluid restriction  (1500 FR, ramos removed )   Grooming   Able To Initiate Tasks; Wash/Dry Hands   Findings CGA standing at the sink   Sit to Stand   Type of Assistance Needed Physical assistance   Amount of Physical Assistance Provided Less than 25%   Sit to Stand CARE Score 3   Exercise Tools   Other Exercise Tool 1 Sanderbox 2 sets 20 BUE with 2# wt all directions   Other Exercise Tool 2 push pegs out in stacks of 3 using B hands'   Other Exercise Tool 3 3# weighted bar  sets 15 all planes of shoulder and elbow   Additional Activities   Additional Activities Other (Comment)   Additional Activities Comments w/c mobility with BUE S and increased time   Assessment   Treatment Assessment Pt participates in BUE therex, w/c mobility, transfers and standing for washing hands at the sink  Pt requires assist due to decreased balance, safety, endurance and RLE ROM/ strength with TTWB and increased pain  Pt will benefit from continued skilled OT services to increase independence with daily tasks  Problem List Decreased strength;Decreased range of motion;Decreased endurance; Impaired balance;Decreased safety awareness;Orthopedic restrictions;Pain   Plan   Treatment/Interventions ADL retraining;Functional transfer training; Therapeutic exercise; Endurance training;Patient/family training; Compensatory technique education   Progress Progressing toward goals   OT Therapy Minutes   OT Time In 1138   OT Time Out 1233   OT Total Time (minutes) 55   OT Mode of treatment - Individual (minutes) 55   Therapy Time missed   Time missed? No   Correction: Pt is on a 1800 ml Fluid restriction not 1500 ml as listed above

## 2020-06-09 NOTE — PROGRESS NOTES
06/09/20 0853   Charting Type   Charting Type Shift assessment   Neurological   Neuro (WDL) X   Muscle Function/Sensation Assessment ;Muscle strength   R Hand  Moderate   L Hand  Moderate   RLE Muscle Strength 4- Movement against gravity and limited resistance   Neuro Symptoms Fatigue   Relieved by Rest   Delirium Assessment- CAM    Acute Onset and Fluctuating Course (1) No   Inattention (2) No   Disorganized Thinking (3) No   Rate Patient's Level of Consciousness (4) Alert (Normal), No   Delirium Present No   [place] Coma Scale   Eye Opening 4   Best Verbal Response 5   Best Motor Response 6   [place] Coma Scale Score 15   HEENT   HEENT (WDL) X   Head and Face Asymmetrical  (hx oral ca)   Teeth Missing teeth   Respiratory   Respiratory (WDL) X   Bilateral Breath Sounds Clear;Diminished   Cardiac   Cardiac (WDL) WDL   Pain Assessment   Pain Assessment Tool 0-10   Pain Score 4   Pain Location/Orientation Orientation: Right;Location: Leg   Peripheral Vascular   Peripheral Vascular (WDL) WDL   Integumentary   Integumentary (WDL) X   Skin Condition/Temp Warm;Dry   Skin Integrity Bruising   Skin Location R knee   Nate Scale   Sensory Perceptions 4   Moisture 4   Activity 3   Mobility 2   Nutrition 3   Friction and Shear 2   Nate Scale Score 18   Wound 05/28/20 Incision Hip Right   Date First Assessed/Time First Assessed: 05/28/20 0650   Primary Wound Type: Incision  Location: Hip  Wound Location Orientation: Right  Wound Description (Comments): betadine ointment to incision  Incision's 1st Dressing: DRESSING XEROFORM 5 X 9, SPO       Wound Description Clean;Dry   Closure Staples   Drainage Amount None   Wound 06/01/20 Pressure Injury Heel Right   Date First Assessed/Time First Assessed: 06/01/20 2028   Primary Wound Type: Pressure Injury  Location: Heel  Wound Location Orientation: Right   Wound Description Light purple   Staging Deep Tissue Injury   Tess-wound Assessment Pink   Dressing Open to air;Protective barrier   Musculoskeletal   Musculoskeletal (WDL) X   Level of Assistance Moderate assist, patient does 50-74%   Assistive Device Front wheel walker   LUE Limited movement   RLE Limited movement   LLE Limited movement   Gastrointestinal   Gastrointestinal (WDL) WDL   Stool Assessment   Bowel Incontinence No   Genitourinary   Genitourinary (WDL) WDL   Urine Assessment   Urinary Incontinence No   Urine Color [place]   Urine Appearance Clear   Anal/Rectal   Anal/Rectal (WDL) WDL   Psychosocial   Psychosocial (WDL) WDL

## 2020-06-09 NOTE — PROGRESS NOTES
Progress Note - Nephrology   Karine Griffin 78 y o  female MRN: [id number]  Unit/Bed#: -01 Encounter: [id number]    A/P:  1  Hyponatremia               serum sodium levels excellent, continue 1 8 L fluid restriction  May be able to discontinue fluid restriction at some point in the future  2  SIADH              Patient is stable, causes for increased ADH secretion are improving which should mean that SIADH also be improving  3  Hypertension               blood pressure appropriate  4  Urinary retention               status post Kinney catheter removal, postvoid residual borderline high  Continue monitoring post voids  5  Iron deficiency anemia              Patient is status post 500 mg of Venofer  Follow up reason for today's visit:  Hyponatremia/SIADH    Closed intertrochanteric fracture of right femur [facility])    Patient Active Problem List   Diagnosis    Hyperlipidemia    Essential hypertension    Hypothyroidism    DM (diabetes mellitus) ([facility] )    Closed intertrochanteric fracture of right femur ([facility] )    Hyponatremia    Postoperative urinary retention    Elevated vitamin B12 level    Anemia    Hypophosphatemia         Subjective:   No acute events overnight    Objective:     Vitals: Blood pressure 128/60, pulse 74, temperature (!) 97 3 °F (36 3 °C), temperature source Temporal, resp  rate 16, height 5' 2" (1 575 m), weight 63 2 kg (139 lb 5 3 oz), SpO2 98 %  ,Body mass index is 25 48 kg/m²  Weight (last 2 days)     Date/Time   Weight    06/08/20 0900   63 2 (139 33)                Intake/Output Summary (Last 24 hours) at 6/9/2020 0835  Last data filed at 6/9/2020 0746  Gross per 24 hour   Intake 240 ml   Output 600 ml   Net -360 ml     I/O last 3 completed shifts:   In: 240 [P O :240]  Out: 1300 [Urine:1300]         Physical Exam: /60 (BP Location: Right arm)   Pulse 74   Temp (!) 97 3 °F (36 3 °C) (Temporal)   Resp 16   Ht 5' 2" (1 575 m)   Wt 63 2 kg (139 lb 5 3 oz)   SpO2 98%   BMI 25 48 kg/m²     General Appearance:    Alert, cooperative, no distress, appears stated age   Head:    Normocephalic, without obvious abnormality, atraumatic   Eyes:    Conjunctiva/corneas clear   Ears:    Normal external ears   Nose:   Nares normal, septum midline, mucosa normal, no drainage    or sinus tenderness   Throat:   Lips, mucosa, and tongue normal; teeth and gums normal   Neck:   Supple   Back:     Symmetric, no curvature, ROM normal, no CVA tenderness   Lungs:     Clear to auscultation bilaterally, respirations unlabored   Chest wall:    No tenderness or deformity   Heart:    Regular rate and rhythm, S1 and S2 normal, no murmur, rub   or gallop   Abdomen:     Soft, non-tender, bowel sounds active   Extremities:   Extremities normal, atraumatic, no cyanosis or edema   Skin:   Skin color, texture, turgor normal, no rashes or lesions   Lymph nodes:   Cervical normal   Neurologic:   CNII-XII intact            Lab, Imaging and other studies: I have personally reviewed pertinent labs  CBC: No results found for: WBC, HGB, HCT, MCV, PLT, ADJUSTEDWBC, MCH, MCHC, RDW, MPV, NRBC  CMP:   Lab Results   Component Value Date    K 4 2 06/09/2020     06/09/2020    CO2 30 06/09/2020    BUN 19 06/09/2020    CREATININE 0 67 06/09/2020    CALCIUM 8 0 (L) 06/09/2020    EGFR 84 06/09/2020           Results from last 7 days   Lab Units 06/09/20  0502 06/05/20  0556 06/03/20  0536   POTASSIUM mmol/L 4 2 4 3 5 2   CHLORIDE mmol/L 101 99 93*   CO2 mmol/L 30 29 30   BUN mg/dL 19 16 21   CREATININE mg/dL 0 67 0 70 0 81   CALCIUM mg/dL 8 0* 8 0* 8 4*         Phosphorus: No results found for: PHOS  Magnesium:   Lab Results   Component Value Date    MG 1 9 06/09/2020     Urinalysis: No results found for: COLORU, CLARITYU, SPECGRAV, PHUR, LEUKOCYTESUR, NITRITE, PROTEINUA, GLUCOSEU, KETONESU, BILIRUBINUR, BLOODU  Ionized Calcium: No results found for: CAION  Coagulation: No results found for: PT, INR, APTT  Troponin: No results found for: TROPONINI  ABG: No results found for: PHART, QJA5MCB, PO2ART, XVU5WBM, Y0BSAVCY, BEART, SOURCE  Radiology review:     IMAGING  No results found      Current Facility-Administered Medications   Medication Dose Route Frequency    acetaminophen (TYLENOL) tablet 975 mg  975 mg Oral Q8H [address]    aspirin (ECOTRIN LOW STRENGTH) EC tablet 81 mg  81 mg Oral Daily    [START ON 6/12/2020] aspirin (ECOTRIN) EC tablet 325 mg  325 mg Oral Daily    atorvastatin (LIPITOR) tablet 20 mg  20 mg Oral Daily With Dinner    bisacodyl (DULCOLAX) rectal suppository 10 mg  10 mg Rectal Daily PRN    fondaparinux (ARIXTRA) subcutaneous injection 2 5 mg  2 5 mg Subcutaneous Daily    insulin detemir (LEVEMIR) subcutaneous injection 10 Units  10 Units Subcutaneous HS    insulin lispro (HumaLOG) 100 units/mL subcutaneous injection 1-5 Units  1-5 Units Subcutaneous TID AC    insulin lispro (HumaLOG) 100 units/mL subcutaneous injection 1-5 Units  1-5 Units Subcutaneous HS    insulin lispro (HumaLOG) 100 units/mL subcutaneous injection 3 Units  3 Units Subcutaneous TID With Meals    levothyroxine tablet 100 mcg  100 mcg Oral Once per day on Mon Tue Wed Thu Fri Sat    levothyroxine tablet 50 mcg  50 mcg Oral Weekly    lisinopril (ZESTRIL) tablet 5 mg  5 mg Oral Daily    methocarbamol (ROBAXIN) tablet 500 mg  500 mg Oral Q6H PRN    oxyCODONE (ROXICODONE) IR tablet 2 5 mg  2 5 mg Oral Q6H PRN    polyethylene glycol (MIRALAX) packet 17 g  17 g Oral Daily    potassium-sodium phosphates (PHOS-NAK) packet 2 packet  2 packet Oral BID With Meals    traMADol (ULTRAM) tablet 50 mg  50 mg Oral Q6H [address]     Medications Discontinued During This Encounter   Medication Reason    oxyCODONE (ROXICODONE) immediate release tablet 10 mg     oxyCODONE (ROXICODONE) IR tablet 5 mg     insulin detemir (LEVEMIR) subcutaneous injection 20 Units     iron sucrose (VENOFER) 200 mg in sodium chloride 0 9 % 100 mL IVPB     traMADol (ULTRAM) tablet 50 mg     insulin lispro (HumaLOG) 100 units/mL subcutaneous injection 10 Units     insulin lispro (HumaLOG) 100 units/mL subcutaneous injection 3 Units        Samira Hernandez, DO      This progress note was produced in part using a dictation device which may document imprecise wording from author's original intent

## 2020-06-09 NOTE — PLAN OF CARE
Problem: Prexisting or High Potential for Compromised Skin Integrity  Goal: Skin integrity is maintained or improved  Description  INTERVENTIONS:  - Identify patients at risk for skin breakdown  - Assess and monitor skin integrity  - Assess and monitor nutrition and hydration status  - Monitor labs   - Assess for incontinence   - Turn and reposition patient  - Assist with mobility/ambulation  - Relieve pressure over bony prominences  - Avoid friction and shearing  - Provide appropriate hygiene as needed including keeping skin clean and dry  - Evaluate need for skin moisturizer/barrier cream  - Collaborate with interdisciplinary team   - Patient/family teaching  - Consider wound care consult   Outcome: Progressing     Problem: Potential for Falls  Goal: Patient will remain free of falls  Description  INTERVENTIONS:  - Assess patient frequently for physical needs  -  Identify cognitive and physical deficits and behaviors that affect risk of falls    -  [place] fall precautions as indicated by assessment   - Educate patient/family on patient safety including physical limitations  - Instruct patient to call for assistance with activity based on assessment  - Modify environment to reduce risk of injury  - Consider OT/PT consult to assist with strengthening/mobility  Outcome: Progressing     Problem: PAIN - ADULT  Goal: Verbalizes/displays adequate comfort level or baseline comfort level  Description  Interventions:  - Encourage patient to monitor pain and request assistance  - Assess pain using appropriate pain scale  - Administer analgesics based on type and severity of pain and evaluate response  - Implement non-pharmacological measures as appropriate and evaluate response  - Consider cultural and social influences on pain and pain management  - Notify physician/advanced practitioner if interventions unsuccessful or patient reports new pain  Outcome: Progressing     Problem: INFECTION - ADULT  Goal: Absence or prevention of progression during hospitalization  Description  INTERVENTIONS:  - Assess and monitor for signs and symptoms of infection  - Monitor lab/diagnostic results  - Monitor all insertion sites, i e  indwelling lines, tubes, and drains  - Monitor endotracheal if appropriate and nasal secretions for changes in amount and color  - [place] appropriate cooling/warming therapies per order  - Administer medications as ordered  - Instruct and encourage patient and family to use good hand hygiene technique  - Identify and instruct in appropriate isolation precautions for identified infection/condition  Outcome: Progressing  Goal: Absence of fever/infection during neutropenic period  Description  INTERVENTIONS:  - Monitor WBC    Outcome: Progressing     Problem: SAFETY ADULT  Goal: Patient will remain free of falls  Description  INTERVENTIONS:  - Assess patient frequently for physical needs  -  Identify cognitive and physical deficits and behaviors that affect risk of falls    -  [place] fall precautions as indicated by assessment   - Educate patient/family on patient safety including physical limitations  - Instruct patient to call for assistance with activity based on assessment  - Modify environment to reduce risk of injury  - Consider OT/PT consult to assist with strengthening/mobility  Outcome: Progressing  Goal: Maintain or return to baseline ADL function  Description  INTERVENTIONS:  -  Assess patient's ability to carry out ADLs; assess patient's baseline for ADL function and identify physical deficits which impact ability to perform ADLs (bathing, care of mouth/teeth, toileting, grooming, dressing, etc )  - Assess/evaluate cause of self-care deficits   - Assess range of motion  - Assess patient's mobility; develop plan if impaired  - Assess patient's need for assistive devices and provide as appropriate  - Encourage maximum independence but intervene and supervise when necessary  - Involve family in performance of ADLs  - Assess for home care needs following discharge   - Consider OT consult to assist with ADL evaluation and planning for discharge  - Provide patient education as appropriate  Outcome: Progressing  Goal: Maintain or return mobility status to optimal level  Description  INTERVENTIONS:  - Assess patient's baseline mobility status (ambulation, transfers, stairs, etc )    - Identify cognitive and physical deficits and behaviors that affect mobility  - Identify mobility aids required to assist with transfers and/or ambulation (gait belt, sit-to-stand, lift, walker, cane, etc )  - [place] fall precautions as indicated by assessment  - Record patient progress and toleration of activity level on Mobility SBAR; progress patient to next Phase/Stage  - Instruct patient to call for assistance with activity based on assessment  - Consider rehabilitation consult to assist with strengthening/weightbearing, etc   Outcome: Progressing     Problem: DISCHARGE PLANNING  Goal: Discharge to home or other facility with appropriate resources  Description  INTERVENTIONS:  - Identify barriers to discharge w/patient and caregiver  - Arrange for needed discharge resources and transportation as appropriate  - Identify discharge learning needs (meds, wound care, etc )  - Arrange for interpretive services to assist at discharge as needed  - Refer to Case Management Department for coordinating discharge planning if the patient needs post-hospital services based on physician/advanced practitioner order or complex needs related to functional status, cognitive ability, or social support system  Outcome: Progressing

## 2020-06-09 NOTE — PROGRESS NOTES
06/09/20 1305   Pain Assessment   Pain Assessment Tool 0-10   Pain Score Worst Possible Pain   Pain Location/Orientation Orientation: Right;Location: Leg   Restrictions/Precautions   Precautions Fall Risk;Fluid restriction   RLE Weight Bearing Per Order TTWB   ROM Restrictions No   Cognition   Overall Cognitive Status WFL   Arousal/Participation Alert; Responsive; Cooperative   Attention Within functional limits   Orientation Level Oriented X4   Memory Within functional limits   Following Commands Follows all commands and directions without difficulty   Sit to Lying   Type of Assistance Needed Physical assistance   Amount of Physical Assistance Provided Less than 25%   Sit to Lying CARE Score 3   Sit to Stand   Type of Assistance Needed Incidental touching   Amount of Physical Assistance Provided No physical assistance   Sit to Stand CARE Score 4   Bed-Chair Transfer   Type of Assistance Needed Incidental touching   Amount of Physical Assistance Provided No physical assistance   Chair/Bed-to-Chair Transfer CARE Score 4   Transfer Bed/Chair/Wheelchair   Limitations Noted In Balance;Confidence; Coordination; Endurance;Pain Management;LE Strength   Adaptive Equipment Roller Walker   Stand Pivot Contact Guard   [facility]   Stand to [facility]   Sit to Supine Minimal Assist   All Transfer Minimal Assist   Walk 10 Feet   Reason if not Attempted Safety concerns   Walk 10 Feet CARE Score 88   Walk 50 Feet with Two Turns   Reason if not Attempted Safety concerns   Walk 50 Feet with Two Turns CARE Score 88   Walk 150 Feet   Reason if not Attempted Safety concerns   Walk 150 Feet CARE Score 88   Walking 10 Feet on Uneven Surfaces   Reason if not Attempted Safety concerns   Walking 10 Feet on Uneven Surfaces CARE Score 88   Ambulation   Does the patient walk? 2   Yes   Primary Mode of Locomotion Prior to Admission Walk   Distance Walked (feet) 5 ft  (x2)   Assist Device Roller Walker   Gait Pattern Inconsistant Noemi; Improper weight shift; Slow Noemi   Limitations Noted In Balance; Coordination; Endurance; Safety;Strength   Provided Assistance with: Balance   Walk Assist Level Close Supervision   Wheel 50 Feet with Two Turns   Type of Assistance Needed Supervision   Amount of Physical Assistance Provided No physical assistance   Wheel 50 Feet with Two Turns CARE Score 4   Wheel 150 Feet   Type of Assistance Needed Supervision   Amount of Physical Assistance Provided No physical assistance   Wheel 150 Feet CARE Score 4   Wheelchair mobility   Does the patient use a wheelchair? 1  Yes   Type of Wheelchair Used 1  Manual   Method Right upper extremity; Left upper extremity   Distance Level Surface (feet) 182 ft  (85)   Findings level and carpet   Therapeutic Interventions   Strengthening seated and supine ther ex (AAROM R LE and AROM L LE)   Other gait and transfers   Assessment   Treatment Assessment Pt is pleasant and cooperative today with PT; Pt is CGA for transfers with cues for hand placement; Pt was agreeable to amb up to 5' x2 with RW and close S; Pt maintains TTWB on R LE with amb and transfers; Pt continues to be limited by pain but seems to be progressing in therapy; S for w/c mobility up to 182'; pt needs Min A for R LE for sitting to supine; Pt performed seated and supine ther ex for strengthening B LE, AAROM for R LE and AROm L LE   PT Barriers   Physical Impairment Decreased strength;Decreased range of motion;Decreased endurance; Impaired balance;Decreased mobility; Decreased safety awareness;Pain   Functional Limitation Wheelchair management; Walking;Transfers;Standing   Plan   Treatment/Interventions Functional transfer training;LE strengthening/ROM; Therapeutic exercise;Gait training   Progress Progressing toward goals   PT Therapy Minutes   PT Time In 1305   PT Time Out 1435   PT Total Time (minutes) 90   PT Mode of treatment - Individual (minutes) 90   PT Mode of treatment - Concurrent (minutes) 0 PT Mode of treatment - Group (minutes) 0   PT Mode of treatment - Co-treat (minutes) 0   PT Mode of Treatment - Total time(minutes) 90 minutes   PT Cumulative Minutes 541   Therapy Time missed   Time missed?  No

## 2020-06-09 NOTE — PROGRESS NOTES
06/09/20 0937   Pain Assessment   Pain Assessment Tool 0-10   Pain Score 8   Pain Location/Orientation Orientation: Right;Location: Leg   Restrictions/Precautions   Precautions Fall Risk;Fluid restriction  (1800 FR, decreased RLE strength and ROM)   RLE Weight Bearing Per Order TTWB   Exercise Tools   Hand Gripper gripper with pegs B hands   Other Exercise Tool 1 fxl reacher use with RUE for retireval of pegs from the floor   Other Exercise Tool 2 card match reaching with BUE therex while sitting   Cognition   Overall Cognitive Status [facility]   Arousal/Participation Alert; Responsive; Cooperative   Attention Within functional limits   Orientation Level Oriented X4   Memory Within functional limits   Following Commands Follows all commands and directions without difficulty   Additional Activities   Additional Activities Other (Comment)   Additional Activities Comments w/c mobility to and from OT room with BUE and LLE S/ Nano   Other Comments   Assessment Pt participates in 43 minutes concurrent treatment forcusing on Ub therex to increase endurance and ub strength with similar goals as another   Assessment   Treatment Assessment Pt participates in BUE therex and w/c mobility during OT session  Pt tolerates sesion without complaints and is making gains towards goals  Pt requires assist due to decreased balance, safety, endurance, and decreased strength/ ROM RLE with TTWB  Pt will benefit from continued skilled OT services to increase independence with daily tasks  Pt prefers to remain chair at end of session to build endurance and toleracne to being OOB  Problem List Decreased strength;Decreased range of motion;Decreased endurance; Impaired balance;Decreased safety awareness;Orthopedic restrictions;Pain   Plan   Treatment/Interventions Functional transfer training;ADL retraining; Therapeutic exercise; Endurance training;Patient/family training; Compensatory technique education   Progress Progressing toward goals   OT Therapy Minutes   OT Time In 0937   OT Time Out 1020   OT Total Time (minutes) 43   OT Mode of treatment - Concurrent (minutes) 43   Therapy Time missed   Time missed?  No

## 2020-06-10 LAB
GLUCOSE SERPL-MCNC: 106 MG/DL (ref 65–140)
GLUCOSE SERPL-MCNC: 144 MG/DL (ref 65–140)
GLUCOSE SERPL-MCNC: 213 MG/DL (ref 65–140)
GLUCOSE SERPL-MCNC: 388 MG/DL (ref 65–140)
GLUCOSE SERPL-MCNC: 39 MG/DL (ref 65–140)

## 2020-06-10 PROCEDURE — 97530 THERAPEUTIC ACTIVITIES: CPT

## 2020-06-10 PROCEDURE — 97110 THERAPEUTIC EXERCISES: CPT

## 2020-06-10 PROCEDURE — 82948 REAGENT STRIP/BLOOD GLUCOSE: CPT

## 2020-06-10 PROCEDURE — 97116 GAIT TRAINING THERAPY: CPT

## 2020-06-10 PROCEDURE — 97535 SELF CARE MNGMENT TRAINING: CPT

## 2020-06-10 PROCEDURE — 97542 WHEELCHAIR MNGMENT TRAINING: CPT

## 2020-06-10 RX ADMIN — ASPIRIN 81 MG: 81 TABLET, COATED ORAL at 09:05

## 2020-06-10 RX ADMIN — TRAMADOL HYDROCHLORIDE 50 MG: 50 TABLET, FILM COATED ORAL at 17:09

## 2020-06-10 RX ADMIN — INSULIN LISPRO 2 UNITS: 100 INJECTION, SOLUTION INTRAVENOUS; SUBCUTANEOUS at 13:06

## 2020-06-10 RX ADMIN — TRAMADOL HYDROCHLORIDE 50 MG: 50 TABLET, FILM COATED ORAL at 05:50

## 2020-06-10 RX ADMIN — TRAMADOL HYDROCHLORIDE 50 MG: 50 TABLET, FILM COATED ORAL at 00:00

## 2020-06-10 RX ADMIN — ACETAMINOPHEN 975 MG: 325 TABLET ORAL at 05:50

## 2020-06-10 RX ADMIN — LEVOTHYROXINE SODIUM 100 MCG: 100 TABLET ORAL at 05:50

## 2020-06-10 RX ADMIN — TRAMADOL HYDROCHLORIDE 50 MG: 50 TABLET, FILM COATED ORAL at 13:08

## 2020-06-10 RX ADMIN — TRAMADOL HYDROCHLORIDE 50 MG: 50 TABLET, FILM COATED ORAL at 23:45

## 2020-06-10 RX ADMIN — ATORVASTATIN CALCIUM 20 MG: 20 TABLET, FILM COATED ORAL at 16:34

## 2020-06-10 RX ADMIN — FONDAPARINUX SODIUM 2.5 MG: 2.5 INJECTION, SOLUTION SUBCUTANEOUS at 09:06

## 2020-06-10 RX ADMIN — ACETAMINOPHEN 975 MG: 325 TABLET ORAL at 21:24

## 2020-06-10 RX ADMIN — POTASSIUM & SODIUM PHOSPHATES POWDER PACK 280-160-250 MG 2 PACKET: 280-160-250 PACK at 16:35

## 2020-06-10 RX ADMIN — LISINOPRIL 5 MG: 5 TABLET ORAL at 09:07

## 2020-06-10 RX ADMIN — INSULIN DETEMIR 5 UNITS: 100 INJECTION, SOLUTION SUBCUTANEOUS at 21:28

## 2020-06-10 RX ADMIN — POTASSIUM & SODIUM PHOSPHATES POWDER PACK 280-160-250 MG 2 PACKET: 280-160-250 PACK at 09:01

## 2020-06-10 NOTE — NURSING NOTE
Pt transfers stand/pivot to [facility] with min assist  Voided regularly throughout the shift  Continent of bowel and bladder  Compliant with SCDs all shift

## 2020-06-10 NOTE — PROGRESS NOTES
06/10/20 1020   Pain Assessment   Pain Assessment Tool 0-10   Pain Score 8   Pain Location/Orientation Orientation: Right;Location: Leg   Hospital Pain Intervention(s) Repositioned   Restrictions/Precautions   Precautions Fall Risk;Fluid restriction  (1800 ml FR)   RLE Weight Bearing Per Order TTWB   Grooming   Able To Initiate Tasks; Wash/Dry Hands   Findings Independent sitting in w/c   Exercise Tools   Hand Gripper gripper with pegs B hands   Other Exercise Tool 1 fxl reacher use while sitting R hand for retrieval of pegs from the floor   Other Exercise Tool 2 Sanderbox all directions BUE 2# 2 sets 20   Cognition   Overall Cognitive Status [facility]   Arousal/Participation Alert; Responsive; Cooperative   Attention Within functional limits   Orientation Level Oriented X4   Memory Within functional limits   Following Commands Follows all commands and directions without difficulty   Additional Activities   Additional Activities Other (Comment)   Additional Activities Comments w/c mobility in room and hallway S/ Mod I   Other Comments   Assessment Pt participates in 33 minutes concurrent treatment focusing on UB therex and activities to increase strength and endurance required for ADL tasks with similar goals as another patient   Assessment   Treatment Assessment Pt presents sitting in w/c ready for OT session  Pt toelrates session without complaints  Pt requires assist due to decreased balance, safety, endurance and RLE WB and ROM/ str  Pt will benefit from continued skilled OT services to increased independence with daily tasks  Problem List Decreased strength;Decreased range of motion;Decreased endurance; Impaired balance;Decreased cognition;Decreased safety awareness;Pain;Orthopedic restrictions   Plan   Treatment/Interventions ADL retraining;Functional transfer training; Therapeutic exercise; Endurance training;Patient/family training; Compensatory technique education   Progress Progressing toward goals   OT Therapy Minutes   OT Time In 1020   OT Time Out 1105   OT Total Time (minutes) 45   OT Mode of treatment - Individual (minutes) 12   OT Mode of treatment - Concurrent (minutes) 33   Therapy Time missed   Time missed?  No

## 2020-06-10 NOTE — PROGRESS NOTES
06/10/20 1130   Pain Assessment   Pain Assessment Tool 0-10   Pain Score 8   Pain Location/Orientation Orientation: Right;Location: Hip   Restrictions/Precautions   Precautions Fall Risk;Fluid restriction   RLE Weight Bearing Per Order TTWB   Cognition   Overall Cognitive Status WFL   Arousal/Participation Alert; Responsive; Cooperative   Attention Within functional limits   Orientation Level Oriented X4   Memory Within functional limits   Following Commands Follows all commands and directions without difficulty   Sit to Lying   Type of Assistance Needed Physical assistance   Amount of Physical Assistance Provided Less than 25%   Sit to Lying CARE Score 3   Lying to Sitting on Side of Bed   Type of Assistance Needed Physical assistance   Amount of Physical Assistance Provided Less than 25%   Lying to Sitting on Side of Bed CARE Score 3   Sit to Stand   Type of Assistance Needed Supervision   Amount of Physical Assistance Provided No physical assistance   Sit to Stand CARE Score 4   Bed-Chair Transfer   Type of Assistance Needed Supervision   Amount of Physical Assistance Provided No physical assistance   Chair/Bed-to-Chair Transfer CARE Score 4   Transfer Bed/Chair/Wheelchair   Limitations Noted In Balance;Confidence; Coordination; Endurance;Pain Management;LE Strength   Adaptive Equipment Roller Walker   Stand Pivot Supervision   Sit to Stand Supervision   Stand to Sit Supervision   Supine to Sit Minimal Assist   Sit to Supine Minimal Assist   All Transfer Minimal Assist   Walk 10 Feet   Type of Assistance Needed Supervision   Amount of Physical Assistance Provided No physical assistance   Walk 10 Feet CARE Score 4   Walk 50 Feet with Two Turns   Reason if not Attempted Safety concerns   Walk 50 Feet with Two Turns CARE Score 88   Walk 150 Feet   Reason if not Attempted Safety concerns   Walk 150 Feet CARE Score 88   Walking 10 Feet on Uneven Surfaces   Reason if not Attempted Safety concerns   Walking 10 Feet on Uneven Surfaces CARE Score 88   Ambulation   Does the patient walk? 2  Yes   Primary Mode of Locomotion Prior to Admission Walk   Distance Walked (feet) 12 ft  (9)   Assist Device Roller Walker   Gait Pattern Inconsistant Noemi; Improper weight shift   Limitations Noted In Balance; Coordination; Endurance; Safety;Strength   Provided Assistance with: Balance   Walk Assist Level Close Supervision   Wheel 50 Feet with Two Turns   Type of Assistance Needed Supervision   Amount of Physical Assistance Provided No physical assistance   Wheel 50 Feet with Two Turns CARE Score 4   Wheel 150 Feet   Type of Assistance Needed Supervision   Amount of Physical Assistance Provided No physical assistance   Wheel 150 Feet CARE Score 4   Wheelchair mobility   Does the patient use a wheelchair? 1  Yes   Type of Wheelchair Used 1  Manual   Method Right upper extremity; Left upper extremity   Distance Level Surface (feet) 200 ft  (145)   Findings level and carpet   Therapeutic Interventions   Strengthening supine ther ex 30 reps   Other gait, transfers, and w/c mobility   Assessment   Treatment Assessment Pt has been making gains towards PT goals; Pt continues to have pain but is more motivated and works through it because she wants to go home; Pt is Min A for bed mobility for R LE; Pt is S for transfers; Pt maintains TTWB weel with transfers and amb; Pt increased amb distance to 14' with RW and Close S; Pt performed supine ther ex for general conditioning; Pt is S for w/c mobility up to 200'   PT Barriers   Physical Impairment Decreased strength;Decreased range of motion;Decreased endurance; Impaired balance;Decreased mobility; Decreased safety awareness;Pain   Functional Limitation Wheelchair management; Walking;Transfers;Standing   Plan   Treatment/Interventions Functional transfer training;LE strengthening/ROM; Therapeutic exercise;Gait training   Progress Progressing toward goals   PT Therapy Minutes   PT Time In 1130   PT Time Out 1230 PT Total Time (minutes) 60   PT Mode of treatment - Individual (minutes) 30   PT Mode of treatment - Concurrent (minutes) 30   PT Mode of treatment - Group (minutes) 0   PT Mode of treatment - Co-treat (minutes) 0   PT Mode of Treatment - Total time(minutes) 60 minutes   PT Cumulative Minutes 601   Therapy Time missed   Time missed?  No

## 2020-06-10 NOTE — DISCHARGE INSTR - OTHER ORDERS
Skin and Wound Care Plan:   1  3M No Sting barrier film to the right heel, apply daily  2  Elevate heels off of bed with pillow to offload and prevent pressure  3  Apply skin nourishing cream to the skin daily  4  Hydraguard to buttocks and left heel TID and PRN      Drink plenty of fluids and consume fruits, vegetables to keep bowels active  If constipation occurs, consider over the counter stool softeners, senekot, milk-of-magnesia, and/or fleets enema  If unable to move bowels in 3-5 days, or if feeling uncomfortable, notify primary care physician or go to the emergency room

## 2020-06-10 NOTE — WOUND OSTOMY CARE
Progress Note - Wound   Javier Solar 78 y o  female MRN: [id number]  Unit/Bed#: -01 Encounter: [id number]      Assessment:   Patient assessed for heel wound per documentation on wound report  Patient OOB to the wheelchair, s/p ORIF of the right hip  Patient states that nursing has been elevating her heel while in bed  1  Right heel DTI, maroon and purple intact blistered area, minimal fluctuance noted to the blistered area    Plan:   1  3M No Sting barrier film to the right heel, apply daily  2  Elevate heels off of bed with pillow to offload and prevent pressure  3  Apply skin nourishing cream to the skin daily        Wound 06/01/20 Pressure Injury Heel Right (Active)   Wound Image   6/10/2020 10:11 AM   Wound Description Light purple; Other (Comment); Intact 6/10/2020 10:11 AM   Staging Deep Tissue Injury 6/10/2020 10:11 AM   Tess-wound Assessment Intact 6/10/2020 10:11 AM   Wound Length (cm) 2 cm 6/10/2020 10:11 AM   Wound Width (cm) 1 5 cm 6/10/2020 10:11 AM   Wound Depth (cm) 0 6/10/2020 10:11 AM   Calculated Wound Area (cm^2) 3 cm^2 6/10/2020 10:11 AM   Calculated Wound Volume (cm^3) 0 cm^3 6/10/2020 10:11 AM   Drainage Amount None 6/10/2020 10:11 AM   Dressing Protective barrier 6/10/2020 10:11 AM   Patient Tolerance Tolerated well 6/10/2020 10:11 AM     Reviewed plan of care with primary PHAM Gallegos  Recommendations written as orders  Wound care to follow weekly  Questions or concerns Federico CISNEROSN, RN

## 2020-06-10 NOTE — PROGRESS NOTES
06/10/20 1301   Pain Assessment   Pain Assessment Tool 0-10   Restrictions/Precautions   Precautions Fall Risk;Fluid restriction   RLE Weight Bearing Per Order TTWB   Cognition   Overall Cognitive Status [facility]   Arousal/Participation Alert; Responsive; Cooperative   Attention Within functional limits   Orientation Level Oriented X4   Memory Within functional limits   Following Commands Follows all commands and directions without difficulty   Therapeutic Interventions   Strengthening seated ther ex 30 reps   Assessment   Treatment Assessment Pt performed seated ther ex for strengthening B LE   PT Barriers   Physical Impairment Decreased strength;Decreased range of motion;Decreased endurance; Impaired balance;Decreased mobility; Decreased safety awareness;Pain   Plan   Treatment/Interventions LE strengthening/ROM; Therapeutic exercise   Progress Progressing toward goals   PT Therapy Minutes   PT Time In 1301   PT Time Out 1331   PT Total Time (minutes) 30   PT Mode of treatment - Individual (minutes) 30   PT Mode of treatment - Concurrent (minutes) 0   PT Mode of treatment - Group (minutes) 0   PT Mode of treatment - Co-treat (minutes) 0   PT Mode of Treatment - Total time(minutes) 30 minutes   PT Cumulative Minutes 631   Therapy Time missed   Time missed?  No

## 2020-06-10 NOTE — PROGRESS NOTES
06/10/20 1349   Pain Assessment   Pain Assessment Tool 0-10   Pain Score 8   Pain Location/Orientation Orientation: Right;Location: Leg   Restrictions/Precautions   Precautions Fall Risk;Fluid restriction  (1800 ml FR, decreased RLE str and ROM)   RLE Weight Bearing Per Order TTWB   Oral Hygiene   Type of Assistance Needed Set-up / clean-up   Oral Hygiene CARE Score 5   Grooming   Able To Initiate Tasks;Comb/Brush Hair;Wash/Dry Face;Brush/Clean Teeth;Wash/Dry Hands   Findings setup   Shower/Bathe Self   Type of Assistance Needed Physical assistance   Amount of Physical Assistance Provided Less than 25%   Shower/Bathe Self CARE Score 3   Bathing   Assessed Bath Style Sponge Bath   Anticipated D/C Bath Style Sponge Bath   Able to Gather/Transport No   Able to [employer] Temperature No   Able to Wash/Rinse/Dry (body part) Left Arm;L Upper Leg;Right Arm;R Upper Leg;L Lower Leg/Foot;R Lower Leg/Foot; Abdomen; Chest;Perineal Area   Limitations Noted in Balance; Endurance;Problem Solving;ROM;Safety  (TTWB RLE)   Positioning Standing;Seated   Tub/Shower Transfer   Findings pt prefers to sponge bathe   Upper Body Dressing   Type of Assistance Needed Set-up / clean-up   Upper Body Dressing CARE Score 5   Lower Body Dressing   Type of Assistance Needed Physical assistance   Amount of Physical Assistance Provided Less than 25%   Lower Body Dressing CARE Score 3   Putting On/Taking Off Footwear   Type of Assistance Needed Physical assistance   Amount of Physical Assistance Provided 25%-49%   Putting On/Taking Off Footwear CARE Score 3   Dressing/Undressing Clothing   Remove UB Clothes [facility] [address]; Undergarment;Socks; Shoes   Don LB [email or web address]; Undergarment;Socks   Limitations Noted In Balance; Endurance;Problem Solving; Safety;Strength;ROM  (TTWB RLE)   Adaptive Equipment Reacher   Positioning Supported Sit;Standing   Sit to Lying   Type of Assistance Needed Physical assistance   Amount of Physical Assistance Provided Less than 25%   Sit to Lying CARE Score 3   Sit to Stand   Type of Assistance Needed Physical assistance   Amount of Physical Assistance Provided Less than 25%   Sit to Stand CARE Score 3   Bed-Chair Transfer   Type of Assistance Needed Physical assistance   Amount of Physical Assistance Provided Less than 25%   Chair/Bed-to-Chair Transfer CARE Score 3   Cognition   Overall Cognitive Status WFL   Assessment   Treatment Assessment Pt participates in ADLs, transfers, standing adn use of A/E this afternoon  Pt tolerates session well with increased time and encouragement to do as much as possible for self  Pt requires assist due to decreased balance, safety, endurance and RLE ROM/ strength with TTWB  Pt is making gains towards goals and will benefit from continued skilled OT services to increase independence with daily tasks  Problem List Decreased strength;Decreased range of motion;Decreased endurance; Impaired balance;Decreased safety awareness;Orthopedic restrictions;Pain   Plan   Treatment/Interventions Functional transfer training;ADL retraining; Therapeutic exercise; Endurance training;Patient/family training; Compensatory technique education   Progress Progressing toward goals   OT Therapy Minutes   OT Time In 1349   OT Time Out 1435   OT Total Time (minutes) 46   OT Mode of treatment - Individual (minutes) 46   Therapy Time missed   Time missed?  No

## 2020-06-10 NOTE — PROGRESS NOTES
1600 accu check was 39 verbalizes no complaints  Given orange juice and pudding  Accu check rechecked at 144  Continue to monitor

## 2020-06-11 LAB
GLUCOSE SERPL-MCNC: 138 MG/DL (ref 65–140)
GLUCOSE SERPL-MCNC: 159 MG/DL (ref 65–140)
GLUCOSE SERPL-MCNC: 172 MG/DL (ref 65–140)
GLUCOSE SERPL-MCNC: 183 MG/DL (ref 65–140)

## 2020-06-11 PROCEDURE — 97542 WHEELCHAIR MNGMENT TRAINING: CPT

## 2020-06-11 PROCEDURE — 97530 THERAPEUTIC ACTIVITIES: CPT

## 2020-06-11 PROCEDURE — 97110 THERAPEUTIC EXERCISES: CPT

## 2020-06-11 PROCEDURE — 99232 SBSQ HOSP IP/OBS MODERATE 35: CPT | Performed by: PHYSICAL MEDICINE & REHABILITATION

## 2020-06-11 PROCEDURE — 97535 SELF CARE MNGMENT TRAINING: CPT

## 2020-06-11 PROCEDURE — 97116 GAIT TRAINING THERAPY: CPT

## 2020-06-11 PROCEDURE — 82948 REAGENT STRIP/BLOOD GLUCOSE: CPT

## 2020-06-11 RX ADMIN — INSULIN LISPRO 1 UNITS: 100 INJECTION, SOLUTION INTRAVENOUS; SUBCUTANEOUS at 08:31

## 2020-06-11 RX ADMIN — ATORVASTATIN CALCIUM 20 MG: 20 TABLET, FILM COATED ORAL at 17:11

## 2020-06-11 RX ADMIN — LEVOTHYROXINE SODIUM 100 MCG: 100 TABLET ORAL at 05:53

## 2020-06-11 RX ADMIN — ACETAMINOPHEN 975 MG: 325 TABLET ORAL at 13:12

## 2020-06-11 RX ADMIN — ASPIRIN 81 MG: 81 TABLET, COATED ORAL at 08:28

## 2020-06-11 RX ADMIN — ACETAMINOPHEN 975 MG: 325 TABLET ORAL at 05:53

## 2020-06-11 RX ADMIN — FONDAPARINUX SODIUM 2.5 MG: 2.5 INJECTION, SOLUTION SUBCUTANEOUS at 08:30

## 2020-06-11 RX ADMIN — TRAMADOL HYDROCHLORIDE 50 MG: 50 TABLET, FILM COATED ORAL at 12:41

## 2020-06-11 RX ADMIN — TRAMADOL HYDROCHLORIDE 50 MG: 50 TABLET, FILM COATED ORAL at 05:53

## 2020-06-11 RX ADMIN — METHOCARBAMOL 500 MG: 500 TABLET, FILM COATED ORAL at 17:11

## 2020-06-11 RX ADMIN — INSULIN DETEMIR 5 UNITS: 100 INJECTION, SOLUTION SUBCUTANEOUS at 21:40

## 2020-06-11 RX ADMIN — ACETAMINOPHEN 975 MG: 325 TABLET ORAL at 21:39

## 2020-06-11 RX ADMIN — POTASSIUM & SODIUM PHOSPHATES POWDER PACK 280-160-250 MG 2 PACKET: 280-160-250 PACK at 08:29

## 2020-06-11 RX ADMIN — TRAMADOL HYDROCHLORIDE 50 MG: 50 TABLET, FILM COATED ORAL at 17:11

## 2020-06-11 RX ADMIN — POTASSIUM & SODIUM PHOSPHATES POWDER PACK 280-160-250 MG 2 PACKET: 280-160-250 PACK at 17:10

## 2020-06-11 RX ADMIN — INSULIN LISPRO 1 UNITS: 100 INJECTION, SOLUTION INTRAVENOUS; SUBCUTANEOUS at 13:08

## 2020-06-11 NOTE — PROGRESS NOTES
06/11/20 1300   Pain Assessment   Pain Assessment Tool 0-10   Pain Score 8   Pain Location/Orientation Orientation: Right;Location: Leg   Restrictions/Precautions   Precautions Fall Risk;Fluid restriction   RLE Weight Bearing Per Order TTWB   Cognition   Overall Cognitive Status WFL   Arousal/Participation Alert; Responsive; Cooperative   Attention Within functional limits   Orientation Level Oriented X4   Memory Within functional limits   Following Commands Follows all commands and directions without difficulty   Sit to Lying   Type of Assistance Needed Physical assistance   Amount of Physical Assistance Provided Less than 25%   Sit to Lying CARE Score 3   Sit to Stand   Type of Assistance Needed Incidental touching   Amount of Physical Assistance Provided No physical assistance   Sit to Stand CARE Score 4   Bed-Chair Transfer   Type of Assistance Needed Incidental touching   Amount of Physical Assistance Provided No physical assistance   Chair/Bed-to-Chair Transfer CARE Score 4   Transfer Bed/Chair/Wheelchair   Limitations Noted In Balance;Confidence; Coordination; Endurance;Pain Management;LE Strength   Adaptive Equipment Roller Walker   Stand Pivot Contact Guard   Sit to [facility]   Stand to [facility]   Sit to Supine Minimal Assist   All Transfer Minimal Assist   Therapeutic Interventions   Strengthening supine ther ex 30 reps   Other transfer   Assessment   Treatment Assessment Pt performed supine ther ex for general conditioning B LE   PT Barriers   Physical Impairment Decreased strength;Decreased range of motion;Decreased endurance; Impaired balance;Decreased mobility; Decreased safety awareness   Plan   Treatment/Interventions LE strengthening/ROM; Functional transfer training; Therapeutic exercise   Progress Progressing toward goals   PT Therapy Minutes   PT Time In 1300   PT Time Out 1330   PT Total Time (minutes) 30   PT Mode of treatment - Individual (minutes) 30   PT Mode of treatment - Concurrent (minutes) 0   PT Mode of treatment - Group (minutes) 0   PT Mode of treatment - Co-treat (minutes) 0   PT Mode of Treatment - Total time(minutes) 30 minutes   PT Cumulative Minutes 726   Therapy Time missed   Time missed?  No

## 2020-06-11 NOTE — PROGRESS NOTES
Physical Medicine and Rehabilitation Progress Note  Ezra Erwin 78 y o  female MRN: [id number]  Unit/Bed#: -01 Encounter: [id number]    HPI: Ezra Erwin is a 78 y o  female who presented to the [facility] [address] after fall and was found to have a right IT fx and is s/p nail fixation by Dr Hero Rowe on 5/28  Course complicated by hyponatremia and patient was placed on FR  Chief Complaint: Rt hip fx     Interval/subjective: patient denies any events overnight      ROS: A 10 point ROS was performed; negative except as noted above       Assessment/Plan:      * Closed intertrochanteric fracture of right femur [facility])  Assessment & Plan  - s/p nail fixation by Dr Hero Rowe on 5/28  - TTWB per ortho  - OP FU with Dr Hero Rowe     Hypophosphatemia  Salome Vernon  - patient with decreased level on 5/28 therefore started on phos supplements in acute care on 5/28 and on 5/31, 6/2, 6/5, 6/9 levels WNL at 3 7, 3 6, 3 7, & 3 7 respectively; therefore suspect patient will likely need to remain on phos supplementation however will defer to renal  - additionally phos packets contain K and K level of 6/9 was WNL at 4 2; again will defer to renal regarding wether or not to continue phos packets     Anemia  Assessment & Plan  - Hg currently 8 1 post-operatively  - IM & renal co-managing     Hyponatremia  Assessment & Plan  - currently WNL   - FR per renal   - renal managing     Elevated vitamin B12 level  Assessment & Plan  - elevated to 4033 ()  - patient takes supplemental B12 therefore will stop and have patient FU with PCP     Postoperative urinary retention  Assessment & Plan  - resolved (PVRs 118, 210, 32)     DM (diabetes mellitus) ([facility] [address] )  Assessment & Plan  - per patient on metformin 500 mg BID with meals, insulin 5 units with meals, and levemir 5 (not 10) units at HS   - follows with Dr Ranjit Alvarado of endocrine  - IM managing     Hypothyroidism  Assessment & Plan  - TSH of 5/28 elevated (free T4 WNL)  - on home regimen of levothyroxine 100 mcg qd except for Sundays when she takes 50 mcg qd  - follows with Dr Amber Harris of endocrine  - IM managing and recommend patient have repeat TFTs as OP and FU with Dr Marilu Rojas hypertension  Assessment & Plan  - at home ramapril 5 mg qd  - IM & renal co-managing     Hyperlipidemia  Assessment & Plan  - on home lipitor 20 mg qpm (per patient no longer on crestor)       Scheduled Meds:    Current Facility-Administered Medications:  acetaminophen 975 mg Oral Q8H Marlon Tarango MD   [START ON 6/12/2020] aspirin 325 mg Oral Daily Sylvester Aschoff, MD   atorvastatin 20 mg Oral Daily With Cecilia Jara MD   bisacodyl 10 mg Rectal Daily PRN Sylvester Aschoff, MD   insulin detemir 10 Units Subcutaneous HS Leighann Martin PA-C   insulin lispro 1-5 Units Subcutaneous TID AC Sylvester Aschoff, MD   insulin lispro 1-5 Units Subcutaneous HS Sylvester Aschoff, MD   insulin lispro 3 Units Subcutaneous TID With Meals ADRIAN Benson   levothyroxine 100 mcg Oral Once per day on Mon Tue Wed Thu Fri Sat Sylvester Aschoff, MD   levothyroxine 50 mcg Oral Weekly Sylvester Aschoff, MD   lisinopril 5 mg Oral Daily Sylvester Aschoff, MD   methocarbamol 500 mg Oral Q6H PRN Sylvester Aschoff, MD   oxyCODONE 2 5 mg Oral Q6H PRN Sylvester Aschoff, MD   polyethylene glycol 17 g Oral Daily Sylvester Aschoff, MD   potassium-sodium phosphates 2 packet Oral BID With Meals Sylvester Aschoff, MD   traMADol 50 mg Oral Q6H Marlon Tarango MD        Incidental findings:     1) mildly decreased alk phos level: currently 54 (LLN 55)     DVT ppx: per Dr Reyes Dong protocol arixtra 2 5 mg sc x 2 weeks post-op then followed by asa 325 mg for 4 weeks (note patient is on asa 81 mg qd at home and was cleared in acute care to be on both home asa 81 mg qd and arixtra and patient has been on both since 5/29 however once patient transitions to asa 325 mg qd on 6/12 per Dr Reyes Dong protocol she will stop asa 81 mg qd until she has completed the 4 week course of asa 325 gm qd per Dr Jessica Velasco protocol)     Code: confirmed with patient that she wishes to be DNR/DNI and verbalized her understanding of what this means      Note: confirmed home meds with patient     Objective:    Functional Update:  Mobility: cg  Transfers: cg   ADLs: min-mod       Physical Exam:  Vitals:    06/11/20 1911   BP: 102/70   Pulse: 82   Resp: 18   Temp: 97 8 °F (36 6 °C)   SpO2: 98%           General: alert, no apparent distress, cooperative and comfortable  HEENT:  Head: Normal, normocephalic, atraumatic    CARDIAC:  +S1/2  LUNGS:  no abnormal respiratory pattern, no retractions noted, non-labored breathing   ABDOMEN:  soft NT   EXTREMITIES:  volume status currently stable   NEURO:  awake, alert, appropriately answering questions   PSYCH:  mood/affect currently stable          Diagnostic Studies:   No orders to display       Laboratory:   Results from last 7 days   Lab Units 06/05/20  0556   HEMOGLOBIN g/dL 8 1*   HEMATOCRIT % 23 1*     Results from last 7 days   Lab Units 06/09/20  0502 06/05/20  0556   BUN mg/dL 19 16   SODIUM mmol/L 138 134   POTASSIUM mmol/L 4 2 4 3   CHLORIDE mmol/L 101 99   CREATININE mg/dL 0 67 0 70

## 2020-06-11 NOTE — PLAN OF CARE
Problem: Prexisting or High Potential for Compromised Skin Integrity  Goal: Skin integrity is maintained or improved  Description  INTERVENTIONS:  - Identify patients at risk for skin breakdown  - Assess and monitor skin integrity  - Assess and monitor nutrition and hydration status  - Monitor labs   - Assess for incontinence   - Turn and reposition patient  - Assist with mobility/ambulation  - Relieve pressure over bony prominences  - Avoid friction and shearing  - Provide appropriate hygiene as needed including keeping skin clean and dry  - Evaluate need for skin moisturizer/barrier cream  - Collaborate with interdisciplinary team   - Patient/family teaching  - Consider wound care consult   Outcome: Progressing     Problem: Potential for Falls  Goal: Patient will remain free of falls  Description  INTERVENTIONS:  - Assess patient frequently for physical needs  -  Identify cognitive and physical deficits and behaviors that affect risk of falls    -  [place] fall precautions as indicated by assessment   - Educate patient/family on patient safety including physical limitations  - Instruct patient to call for assistance with activity based on assessment  - Modify environment to reduce risk of injury  - Consider OT/PT consult to assist with strengthening/mobility  Outcome: Progressing     Problem: PAIN - ADULT  Goal: Verbalizes/displays adequate comfort level or baseline comfort level  Description  Interventions:  - Encourage patient to monitor pain and request assistance  - Assess pain using appropriate pain scale  - Administer analgesics based on type and severity of pain and evaluate response  - Implement non-pharmacological measures as appropriate and evaluate response  - Consider cultural and social influences on pain and pain management  - Notify physician/advanced practitioner if interventions unsuccessful or patient reports new pain  Outcome: Progressing     Problem: INFECTION - ADULT  Goal: Absence or prevention of progression during hospitalization  Description  INTERVENTIONS:  - Assess and monitor for signs and symptoms of infection  - Monitor lab/diagnostic results  - Monitor all insertion sites, i e  indwelling lines, tubes, and drains  - Monitor endotracheal if appropriate and nasal secretions for changes in amount and color  - [place] appropriate cooling/warming therapies per order  - Administer medications as ordered  - Instruct and encourage patient and family to use good hand hygiene technique  - Identify and instruct in appropriate isolation precautions for identified infection/condition  Outcome: Progressing  Goal: Absence of fever/infection during neutropenic period  Description  INTERVENTIONS:  - Monitor WBC    Outcome: Progressing     Problem: SAFETY ADULT  Goal: Patient will remain free of falls  Description  INTERVENTIONS:  - Assess patient frequently for physical needs  -  Identify cognitive and physical deficits and behaviors that affect risk of falls    -  [place] fall precautions as indicated by assessment   - Educate patient/family on patient safety including physical limitations  - Instruct patient to call for assistance with activity based on assessment  - Modify environment to reduce risk of injury  - Consider OT/PT consult to assist with strengthening/mobility  Outcome: Progressing  Goal: Maintain or return to baseline ADL function  Description  INTERVENTIONS:  -  Assess patient's ability to carry out ADLs; assess patient's baseline for ADL function and identify physical deficits which impact ability to perform ADLs (bathing, care of mouth/teeth, toileting, grooming, dressing, etc )  - Assess/evaluate cause of self-care deficits   - Assess range of motion  - Assess patient's mobility; develop plan if impaired  - Assess patient's need for assistive devices and provide as appropriate  - Encourage maximum independence but intervene and supervise when necessary  - Involve family in performance of ADLs  - Assess for home care needs following discharge   - Consider OT consult to assist with ADL evaluation and planning for discharge  - Provide patient education as appropriate  Outcome: Progressing  Goal: Maintain or return mobility status to optimal level  Description  INTERVENTIONS:  - Assess patient's baseline mobility status (ambulation, transfers, stairs, etc )    - Identify cognitive and physical deficits and behaviors that affect mobility  - Identify mobility aids required to assist with transfers and/or ambulation (gait belt, sit-to-stand, lift, walker, cane, etc )  - [place] fall precautions as indicated by assessment  - Record patient progress and toleration of activity level on Mobility SBAR; progress patient to next Phase/Stage  - Instruct patient to call for assistance with activity based on assessment  - Consider rehabilitation consult to assist with strengthening/weightbearing, etc   Outcome: Progressing     Problem: DISCHARGE PLANNING  Goal: Discharge to home or other facility with appropriate resources  Description  INTERVENTIONS:  - Identify barriers to discharge w/patient and caregiver  - Arrange for needed discharge resources and transportation as appropriate  - Identify discharge learning needs (meds, wound care, etc )  - Arrange for interpretive services to assist at discharge as needed  - Refer to Case Management Department for coordinating discharge planning if the patient needs post-hospital services based on physician/advanced practitioner order or complex needs related to functional status, cognitive ability, or social support system  Outcome: Progressing     Problem: Nutrition/Hydration-ADULT  Goal: Nutrient/Hydration intake appropriate for improving, restoring or maintaining nutritional needs  Description  Monitor and assess patient's nutrition/hydration status for malnutrition  Collaborate with interdisciplinary team and initiate plan and interventions as ordered  Monitor patient's weight and dietary intake as ordered or per policy  Utilize nutrition screening tool and intervene as necessary  Determine patient's food preferences and provide high-protein, high-caloric foods as appropriate       INTERVENTIONS:  - Monitor oral intake, urinary output, labs, and treatment plans  - Assess nutrition and hydration status and recommend course of action  - Evaluate amount of meals eaten  - Assist patient with eating if necessary   - Allow adequate time for meals  - Recommend/ encourage appropriate diets, oral nutritional supplements, and vitamin/mineral supplements  - Order, calculate, and assess calorie counts as needed  - Recommend, monitor, and adjust tube feedings and TPN/PPN based on assessed needs  - Assess need for intravenous fluids  - Provide specific nutrition/hydration education as appropriate  - Include patient/family/caregiver in decisions related to nutrition  Outcome: Progressing

## 2020-06-11 NOTE — PROGRESS NOTES
06/11/20 1400   Exercise Tools   Other Exercise Tool 2 Red T band 2x15 reps in 5 planes of movement    Cognition   Overall Cognitive Status WFL   Orientation Level Oriented X4   Activity Tolerance   Activity Tolerance Patient tolerated treatment well   Assessment   Treatment Assessment OT second session addressed therapeutic exercise in bed for B UE strength and endurance  Tolerated well with minimal c/o R shoulder discomfort  Plan to continue OT to address established OT goals  Prognosis Good   Problem List Decreased strength;Decreased range of motion;Decreased endurance   Plan   Treatment/Interventions Therapeutic exercise; Endurance training   Progress Progressing toward goals   OT Therapy Minutes   OT Time In 1400   OT Time Out 1420   OT Total Time (minutes) 20   OT Mode of treatment - Individual (minutes) 20   OT Mode of treatment - Concurrent (minutes) 0   OT Mode of treatment - Group (minutes) 0   OT Mode of treatment - Co-treat (minutes) 0   OT Mode of Treatment - Total time(minutes) 20 minutes   OT Cumulative Minutes 90   Therapy Time missed   Time missed?  No

## 2020-06-11 NOTE — PROGRESS NOTES
06/11/20 1130   Pain Assessment   Pain Assessment Tool 0-10   Pain Score 8   Pain Location/Orientation Orientation: Right;Location: Leg   Restrictions/Precautions   Weight Bearing Restrictions Yes   RLE Weight Bearing Per Order TTWB   Grooming   Findings Pt seated at sink and OT assisted pt to wash her hair    Bathing   Findings  OT asissted pt to wash margarette/buttock area in stance with CGA while maintaining TTWB R LE    Upper Body Dressing   Type of Assistance Needed Set-up / clean-up   Upper Body Dressing CARE Score 5   Lower Body Dressing   Type of Assistance Needed Verbal cues; Physical assistance   Amount of Physical Assistance Provided 25%-49%   Comment Min A    Lower Body Dressing CARE Score 3   Dressing/Undressing Clothing   Remove UB Clothes Pullover Shirt   Don UB Clothes Pullover Shirt   Remove LB Clothes Undergarment;Pants   Don LB Clothes Pants; Undergarment   Limitations Noted In Balance; Safety;Strength;ROM   Findings Min A;educ in AE with good recall; pt prefers to don L LE first despite educ in R LE donning first   Lying to Sitting on Side of Bed   Type of Assistance Needed Physical assistance;Verbal cues   Amount of Physical Assistance Provided 25%-49%   Comment Min A    Lying to Sitting on Side of Bed CARE Score 3   Sit to Stand   Type of Assistance Needed Incidental touching;Verbal cues   Sit to Stand CARE Score 4   Bed-Chair Transfer   Type of Assistance Needed Incidental touching;Verbal cues   Chair/Bed-to-Chair Transfer CARE Score 4   Transfer Bed/Chair/Wheelchair   Adaptive Equipment   (CGA good knowledge of TTWB R LE )   Exercise Tools   Hand Gripper 7# digi x 50 reps B hands    Other Exercise Tool 1 2# dowel 3x15 reps in 6 planes of movement    Cognition   Overall Cognitive Status WFL   Arousal/Participation Alert; Cooperative   Attention Within functional limits   Orientation Level Oriented X4   Memory Within functional limits   Following Commands Follows all commands and directions without difficulty   Additional Activities   Additional Activities Comments w/c mobility to OT room    Activity Tolerance   Activity Tolerance Patient tolerated treatment well   Assessment   Treatment Assessment Pt participating in ADLs/txf training/generalized strength and endurance tasks for UEs  Tolerance is fair to all activity  Pt is able to maintain TTWB to R LE during txfs  Pt making gains toward OT goals  Plan to continue skilled OT to address established OT goals as per POC  Prognosis Good   Problem List Decreased strength;Decreased range of motion;Decreased mobility; Impaired balance;Decreased safety awareness;Pain;Orthopedic restrictions   Plan   Treatment/Interventions ADL retraining;Functional transfer training; Therapeutic exercise; Endurance training;Patient/family training; Compensatory technique education; Bed mobility;OT   Progress Improving as expected   OT Therapy Minutes   OT Time In 1130   OT Time Out 1240   OT Total Time (minutes) 70   OT Mode of treatment - Individual (minutes) 70   OT Mode of treatment - Concurrent (minutes) 0   OT Mode of treatment - Group (minutes) 0   OT Mode of treatment - Co-treat (minutes) 0   OT Mode of Treatment - Total time(minutes) 70 minutes   OT Cumulative Minutes 70   Therapy Time missed   Time missed?  No

## 2020-06-12 LAB
GLUCOSE P FAST SERPL-MCNC: 84 MG/DL (ref 65–99)
GLUCOSE SERPL-MCNC: 152 MG/DL (ref 65–140)
GLUCOSE SERPL-MCNC: 154 MG/DL (ref 65–140)
GLUCOSE SERPL-MCNC: 201 MG/DL (ref 65–140)
GLUCOSE SERPL-MCNC: 232 MG/DL (ref 65–140)
GLUCOSE SERPL-MCNC: 31 MG/DL (ref 65–140)
GLUCOSE SERPL-MCNC: 35 MG/DL (ref 65–140)
GLUCOSE SERPL-MCNC: 79 MG/DL (ref 65–140)
SODIUM SERPL-SCNC: 137 MMOL/L (ref 134–143)

## 2020-06-12 PROCEDURE — 97542 WHEELCHAIR MNGMENT TRAINING: CPT

## 2020-06-12 PROCEDURE — 97530 THERAPEUTIC ACTIVITIES: CPT

## 2020-06-12 PROCEDURE — 82947 ASSAY GLUCOSE BLOOD QUANT: CPT | Performed by: PHYSICAL MEDICINE & REHABILITATION

## 2020-06-12 PROCEDURE — 84295 ASSAY OF SERUM SODIUM: CPT | Performed by: PHYSICAL MEDICINE & REHABILITATION

## 2020-06-12 PROCEDURE — 99232 SBSQ HOSP IP/OBS MODERATE 35: CPT | Performed by: PHYSICAL MEDICINE & REHABILITATION

## 2020-06-12 PROCEDURE — 82948 REAGENT STRIP/BLOOD GLUCOSE: CPT

## 2020-06-12 PROCEDURE — 97535 SELF CARE MNGMENT TRAINING: CPT

## 2020-06-12 PROCEDURE — 97116 GAIT TRAINING THERAPY: CPT

## 2020-06-12 PROCEDURE — 97110 THERAPEUTIC EXERCISES: CPT

## 2020-06-12 RX ADMIN — LEVOTHYROXINE SODIUM 100 MCG: 100 TABLET ORAL at 06:13

## 2020-06-12 RX ADMIN — ACETAMINOPHEN 975 MG: 325 TABLET ORAL at 06:01

## 2020-06-12 RX ADMIN — TRAMADOL HYDROCHLORIDE 50 MG: 50 TABLET, FILM COATED ORAL at 12:59

## 2020-06-12 RX ADMIN — INSULIN DETEMIR 4 UNITS: 100 INJECTION, SOLUTION SUBCUTANEOUS at 21:03

## 2020-06-12 RX ADMIN — INSULIN LISPRO 1 UNITS: 100 INJECTION, SOLUTION INTRAVENOUS; SUBCUTANEOUS at 13:01

## 2020-06-12 RX ADMIN — ASPIRIN 325 MG: 325 TABLET, COATED ORAL at 08:42

## 2020-06-12 RX ADMIN — ATORVASTATIN CALCIUM 20 MG: 20 TABLET, FILM COATED ORAL at 17:24

## 2020-06-12 RX ADMIN — ACETAMINOPHEN 975 MG: 325 TABLET ORAL at 21:02

## 2020-06-12 RX ADMIN — POTASSIUM & SODIUM PHOSPHATES POWDER PACK 280-160-250 MG 2 PACKET: 280-160-250 PACK at 08:42

## 2020-06-12 RX ADMIN — TRAMADOL HYDROCHLORIDE 50 MG: 50 TABLET, FILM COATED ORAL at 00:03

## 2020-06-12 RX ADMIN — TRAMADOL HYDROCHLORIDE 50 MG: 50 TABLET, FILM COATED ORAL at 06:01

## 2020-06-12 RX ADMIN — METHOCARBAMOL 500 MG: 500 TABLET, FILM COATED ORAL at 08:48

## 2020-06-12 RX ADMIN — TRAMADOL HYDROCHLORIDE 50 MG: 50 TABLET, FILM COATED ORAL at 17:22

## 2020-06-12 RX ADMIN — LISINOPRIL 5 MG: 5 TABLET ORAL at 08:42

## 2020-06-12 RX ADMIN — ACETAMINOPHEN 975 MG: 325 TABLET ORAL at 14:23

## 2020-06-12 NOTE — PLAN OF CARE
Problem: Prexisting or High Potential for Compromised Skin Integrity  Goal: Skin integrity is maintained or improved  Description  INTERVENTIONS:  - Identify patients at risk for skin breakdown  - Assess and monitor skin integrity  - Assess and monitor nutrition and hydration status  - Monitor labs   - Assess for incontinence   - Turn and reposition patient  - Assist with mobility/ambulation  - Relieve pressure over bony prominences  - Avoid friction and shearing  - Provide appropriate hygiene as needed including keeping skin clean and dry  - Evaluate need for skin moisturizer/barrier cream  - Collaborate with interdisciplinary team   - Patient/family teaching  - Consider wound care consult   Outcome: Progressing     Problem: Potential for Falls  Goal: Patient will remain free of falls  Description  INTERVENTIONS:  - Assess patient frequently for physical needs  -  Identify cognitive and physical deficits and behaviors that affect risk of falls    -  [place] fall precautions as indicated by assessment   - Educate patient/family on patient safety including physical limitations  - Instruct patient to call for assistance with activity based on assessment  - Modify environment to reduce risk of injury  - Consider OT/PT consult to assist with strengthening/mobility  Outcome: Progressing     Problem: PAIN - ADULT  Goal: Verbalizes/displays adequate comfort level or baseline comfort level  Description  Interventions:  - Encourage patient to monitor pain and request assistance  - Assess pain using appropriate pain scale  - Administer analgesics based on type and severity of pain and evaluate response  - Implement non-pharmacological measures as appropriate and evaluate response  - Consider cultural and social influences on pain and pain management  - Notify physician/advanced practitioner if interventions unsuccessful or patient reports new pain  Outcome: Progressing     Problem: INFECTION - ADULT  Goal: Absence or prevention of progression during hospitalization  Description  INTERVENTIONS:  - Assess and monitor for signs and symptoms of infection  - Monitor lab/diagnostic results  - Monitor all insertion sites, i e  indwelling lines, tubes, and drains  - Monitor endotracheal if appropriate and nasal secretions for changes in amount and color  - [place] appropriate cooling/warming therapies per order  - Administer medications as ordered  - Instruct and encourage patient and family to use good hand hygiene technique  - Identify and instruct in appropriate isolation precautions for identified infection/condition  Outcome: Progressing  Goal: Absence of fever/infection during neutropenic period  Description  INTERVENTIONS:  - Monitor WBC    Outcome: Progressing     Problem: SAFETY ADULT  Goal: Patient will remain free of falls  Description  INTERVENTIONS:  - Assess patient frequently for physical needs  -  Identify cognitive and physical deficits and behaviors that affect risk of falls    -  [place] fall precautions as indicated by assessment   - Educate patient/family on patient safety including physical limitations  - Instruct patient to call for assistance with activity based on assessment  - Modify environment to reduce risk of injury  - Consider OT/PT consult to assist with strengthening/mobility  Outcome: Progressing  Goal: Maintain or return to baseline ADL function  Description  INTERVENTIONS:  -  Assess patient's ability to carry out ADLs; assess patient's baseline for ADL function and identify physical deficits which impact ability to perform ADLs (bathing, care of mouth/teeth, toileting, grooming, dressing, etc )  - Assess/evaluate cause of self-care deficits   - Assess range of motion  - Assess patient's mobility; develop plan if impaired  - Assess patient's need for assistive devices and provide as appropriate  - Encourage maximum independence but intervene and supervise when necessary  - Involve family in performance of ADLs  - Assess for home care needs following discharge   - Consider OT consult to assist with ADL evaluation and planning for discharge  - Provide patient education as appropriate  Outcome: Progressing  Goal: Maintain or return mobility status to optimal level  Description  INTERVENTIONS:  - Assess patient's baseline mobility status (ambulation, transfers, stairs, etc )    - Identify cognitive and physical deficits and behaviors that affect mobility  - Identify mobility aids required to assist with transfers and/or ambulation (gait belt, sit-to-stand, lift, walker, cane, etc )  - [place] fall precautions as indicated by assessment  - Record patient progress and toleration of activity level on Mobility SBAR; progress patient to next Phase/Stage  - Instruct patient to call for assistance with activity based on assessment  - Consider rehabilitation consult to assist with strengthening/weightbearing, etc   Outcome: Progressing     Problem: DISCHARGE PLANNING  Goal: Discharge to home or other facility with appropriate resources  Description  INTERVENTIONS:  - Identify barriers to discharge w/patient and caregiver  - Arrange for needed discharge resources and transportation as appropriate  - Identify discharge learning needs (meds, wound care, etc )  - Arrange for interpretive services to assist at discharge as needed  - Refer to Case Management Department for coordinating discharge planning if the patient needs post-hospital services based on physician/advanced practitioner order or complex needs related to functional status, cognitive ability, or social support system  Outcome: Progressing     Problem: Nutrition/Hydration-ADULT  Goal: Nutrient/Hydration intake appropriate for improving, restoring or maintaining nutritional needs  Description  Monitor and assess patient's nutrition/hydration status for malnutrition  Collaborate with interdisciplinary team and initiate plan and interventions as ordered  Monitor patient's weight and dietary intake as ordered or per policy  Utilize nutrition screening tool and intervene as necessary  Determine patient's food preferences and provide high-protein, high-caloric foods as appropriate       INTERVENTIONS:  - Monitor oral intake, urinary output, labs, and treatment plans  - Assess nutrition and hydration status and recommend course of action  - Evaluate amount of meals eaten  - Assist patient with eating if necessary   - Allow adequate time for meals  - Recommend/ encourage appropriate diets, oral nutritional supplements, and vitamin/mineral supplements  - Order, calculate, and assess calorie counts as needed  - Recommend, monitor, and adjust tube feedings and TPN/PPN based on assessed needs  - Assess need for intravenous fluids  - Provide specific nutrition/hydration education as appropriate  - Include patient/family/caregiver in decisions related to nutrition  Outcome: Progressing

## 2020-06-12 NOTE — PROGRESS NOTES
Pt was seen at 1330 however was documented in incorrect column  06/12/20 1300   Pain Assessment   Pain Assessment Tool 0-10   Pain Score 8   Pain Location/Orientation Orientation: Right;Location: Knee   Restrictions/Precautions   Precautions Fall Risk;Fluid restriction   Weight Bearing Restrictions Yes   RLE Weight Bearing Per Order TTWB   Cognition   Overall Cognitive Status WFL   Arousal/Participation Alert; Responsive; Cooperative   Attention Within functional limits   Orientation Level Oriented X4   Memory Within functional limits   Following Commands Follows all commands and directions without difficulty   Sit to Lying   Type of Assistance Needed Physical assistance   Amount of Physical Assistance Provided Less than 25%   Sit to Lying CARE Score 3   Sit to Stand   Type of Assistance Needed Supervision   Amount of Physical Assistance Provided No physical assistance   Sit to Stand CARE Score 4   Bed-Chair Transfer   Type of Assistance Needed Supervision   Amount of Physical Assistance Provided No physical assistance   Chair/Bed-to-Chair Transfer CARE Score 4   Transfer Bed/Chair/Wheelchair   Limitations Noted In Balance;Confidence; Coordination; Endurance;Pain Management;LE Strength   Adaptive Equipment Roller Walker   Stand Pivot Supervision   Sit to Stand Supervision   Stand to Sit Supervision   Sit to Supine Minimal Assist   All Transfer Minimal Assist   Walk 10 Feet   Type of Assistance Needed Supervision   Amount of Physical Assistance Provided No physical assistance   Walk 10 Feet CARE Score 4   Walk 50 Feet with Two Turns   Reason if not Attempted Safety concerns   Walk 50 Feet with Two Turns CARE Score 88   Walk 150 Feet   Reason if not Attempted Safety concerns   Walk 150 Feet CARE Score 88   Walking 10 Feet on Uneven Surfaces   Reason if not Attempted Safety concerns   Walking 10 Feet on Uneven Surfaces CARE Score 88   Ambulation   Does the patient walk? 2   Yes   Primary Mode of Locomotion Prior to Admission Walk   Distance Walked (feet) 17 ft  (8)   Assist Device Roller Walker   Gait Pattern Inconsistant Noemi; Improper weight shift   Limitations Noted In Balance; Coordination; Endurance; Safety;Strength   Provided Assistance with: Balance   Walk Assist Level Close Supervision   Wheel 50 Feet with Two Turns   Type of Assistance Needed Independent   Amount of Physical Assistance Provided No physical assistance   Wheel 50 Feet with Two Turns CARE Score 6   Wheel 150 Feet   Type of Assistance Needed Independent   Amount of Physical Assistance Provided No physical assistance   Wheel 150 Feet CARE Score 6   Wheelchair mobility   Does the patient use a wheelchair? 1  Yes   Type of Wheelchair Used 1  Manual   Method Right upper extremity; Left upper extremity   Distance Level Surface (feet) 200 ft  (158)   Findings level and carpet   Therapeutic Interventions   Strengthening seated and supine ther ex 30 reps   Other gait and transfers   Assessment   Treatment Assessment Pt appropriate for concurrent therapy to increase motivation while focsuding on similar exercises; Pt was S for transfers today with good technique  Pt maintains TTWB on R LE well during transfers and amb; Pt increased amb distance to 17' with RW and Close S; Pt continunes to be limited by pain and WB status; Pt is MI for w/c mobility up to 200'; Pt needs Min A for getting R LE into bed; Pt performed seated and supine ther ex for strengthening B LE   PT Barriers   Physical Impairment Decreased strength;Decreased range of motion;Decreased endurance; Impaired balance;Decreased mobility; Decreased safety awareness   Functional Limitation Wheelchair management; Walking;Transfers;Standing   Plan   Treatment/Interventions Functional transfer training;LE strengthening/ROM; Therapeutic exercise;Gait training   Progress Progressing toward goals   PT Therapy Minutes   PT Time In 1330   PT Time Out 1500   PT Total Time (minutes) 90   PT Mode of treatment - Individual (minutes) 60   PT Mode of treatment - Concurrent (minutes) 30   PT Mode of treatment - Group (minutes) 0   PT Mode of treatment - Co-treat (minutes) 0   PT Mode of Treatment - Total time(minutes) 90 minutes   PT Cumulative Minutes 816   Therapy Time missed   Time missed?  No

## 2020-06-12 NOTE — PROGRESS NOTES
Physical Medicine and Rehabilitation Progress Note  Roxy Romberg 78 y o  female MRN: [id number]  Unit/Bed#: -01 Encounter: [id number]    HPI: Roxy Romberg is a 78 y o  female who presented to the [facility] [address] after fall and was found to have a right IT fx and is s/p nail fixation by Dr Donell Bradley on 5/28  Course complicated by hyponatremia and patient was placed on FR  Chief Complaint: Rt hip fx     Interval/subjective: patient without complaint currently     ROS: A 10 point ROS was performed; negative except as noted above       Assessment/Plan:      * Closed intertrochanteric fracture of right femur [facility])  Assessment & Plan  - s/p nail fixation by Dr Donell Bradley on 5/28  - TTWB per ortho  - OP FU with Dr Donell Bradley     Hypophosphatemia  [place]  - patient with decreased level on 5/28 therefore started on phos supplements in acute care on 5/28 and on 5/31, 6/2, 6/5, 6/9 levels WNL at 3 7, 3 6, 3 7, & 3 7 respectively  - additionally phos packets contain K and K level of 6/9 was WNL at 4 2  - d/w Dr Lynn Powell of renal above findings and he recommended phos (with k) packets be stopped which was done on 6/12    Anemia  Assessment & Plan  - Hg currently 8 1 post-operatively  - IM & renal co-managing     Hyponatremia  Assessment & Plan  - currently WNL at 137  - d/w Dr Lynn Powell of nephrology who recommended that FR of 1800 cc should be discontinued which was done on 6/12   - renal managing     Elevated vitamin B12 level  Assessment & Plan  - elevated to 4033 ()  - patient takes supplemental B12 therefore will stop and have patient FU with PCP     Postoperative urinary retention  Assessment & Plan  - resolved (PVRs 118, 210, 32)     DM (diabetes mellitus) ([facility] [address] )  Assessment & Plan  - per patient on metformin 500 mg BID with meals, insulin 5 units with meals, and levemir 5 (not 10) units at HS   - follows with Dr Efe Walls of endocrine  - IM managing Hypothyroidism  Assessment & Plan  - TSH of 5/28 elevated (free T4 WNL)  - on home regimen of levothyroxine 100 mcg qd except for Sundays when she takes 50 mcg qd  - follows with Dr Nory Tolentino of endocrine  - IM managing and recommend patient have repeat TFTs as OP and FU with Dr Cipriano Silverman hypertension  Assessment & Plan  - at home ramapril 5 mg qd  - IM & renal co-managing     Hyperlipidemia  Assessment & Plan  - on home lipitor 20 mg qpm (per patient no longer on crestor)       Scheduled Meds:    Current Facility-Administered Medications:  acetaminophen 975 mg Oral Q8H Valentino Asal, MD   aspirin 325 mg Oral Daily Valente Juarez MD   atorvastatin 20 mg Oral Daily With Peg MD Valentina   bisacodyl 10 mg Rectal Daily PRN Valente Juarez MD   insulin detemir 10 Units Subcutaneous HS Ruperto Esquivel PA-C   insulin lispro 1-5 Units Subcutaneous TID AC Valente Juarez MD   insulin lispro 1-5 Units Subcutaneous HS Valente Juarez MD   insulin lispro 3 Units Subcutaneous TID With Meals ADRIAN Oscar   levothyroxine 100 mcg Oral Once per day on Mon Tue Wed Thu Fri Sat Valente Juarez MD   levothyroxine 50 mcg Oral Weekly Valente Juarez MD   lisinopril 5 mg Oral Daily Valente Juarez MD   methocarbamol 500 mg Oral Q6H PRN Valente Juarez MD   oxyCODONE 2 5 mg Oral Q6H PRN Valente Juarez MD   polyethylene glycol 17 g Oral Daily Valente Juarez MD   traMADol 50 mg Oral Q6H Valentino Asal, MD        Incidental findings:     1) mildly decreased alk phos level: currently 54 (LLN 55)     DVT ppx: per Dr Mp Iraheta protocol arixtra 2 5 mg sc x 2 weeks post-op then followed by asa 325 mg for 4 weeks (note patient is on asa 81 mg qd at home and was cleared in acute care to be on both home asa 81 mg qd and arixtra and patient has been on both since 5/29 however once patient transitions to asa 325 mg qd on 6/12 per Dr Mp Iraheta protocol she will stop asa 81 mg qd until she has completed the 4 week course of asa 325 gm qd per   Bozena's protocol)     Code: confirmed with patient that she wishes to be DNR/DNI and verbalized her understanding of what this means      Note: confirmed home meds with patient     Objective:    Functional Update:  Mobility: cg  Transfers: cg   ADLs: min-mod       Physical Exam:          General: alert, no apparent distress, cooperative and comfortable  HEENT:  Head: Normal, normocephalic, atraumatic    CARDIAC:  +S1/2  LUNGS:  no abnormal respiratory pattern, no retractions noted, non-labored breathing   ABDOMEN:  soft NT   EXTREMITIES:  volume status currently stable   NEURO:  awake, alert, appropriately answering questions   PSYCH:  mood/affect currently stable          Diagnostic Studies:   No orders to display       Laboratory:         Invalid input(s): PLTCT  Results from last 7 days   Lab Units 06/12/20  0600 06/09/20  0502   BUN mg/dL  --  19   SODIUM mmol/L 137 138   POTASSIUM mmol/L  --  4 2   CHLORIDE mmol/L  --  101   CREATININE mg/dL  --  0 67

## 2020-06-12 NOTE — PROGRESS NOTES
06/12/20 1100   Pain Assessment   Pain Assessment Tool 0-10   Pain Score 8   Restrictions/Precautions   RLE Weight Bearing Per Order TTWB   Eating   Type of Assistance Needed Independent   Eating CARE Score 6   Oral Hygiene   Type of Assistance Needed Set-up / clean-up   Amount of Physical Assistance Provided No physical assistance   Oral Hygiene CARE Score 5   Grooming   Able To Initiate Tasks;Comb/Brush Hair;Wash/Dry Face;Brush/Clean Teeth;Wash/Dry Hands   Findings seated in w/c at sink    Shower/Bathe Self   Type of Assistance Needed Incidental touching;Verbal cues   Shower/Bathe Self CARE Score 4   Bathing   Assessed Bath Style Sponge Bath   Able to Wash/Rinse/Dry (body part) Left Arm;Right Arm;L Upper Leg;R Upper Leg;Chest;Abdomen;Perineal Area; Buttocks;R Lower Leg/Foot;L Lower Leg/Foot   Limitations Noted in Balance;Strength;ROM   Upper Body Dressing   Type of Assistance Needed Set-up / clean-up   Upper Body Dressing CARE Score 5   Lower Body Dressing   Type of Assistance Needed Incidental touching;Verbal cues; Adaptive equipment   Lower Body Dressing CARE Score 4   Putting On/Taking Off Footwear   Type of Assistance Needed Supervision   Putting On/Taking Off Footwear CARE Score 4   Dressing/Undressing Clothing   Remove UB Clothes Button Shirt   Don UB Clothes Button Shirt   Remove LB Clothes Pants; Undergarment;Socks   Don LB Clothes Pants; Undergarment;Socks   Limitations Noted In Balance; Safety;ROM;Strength   Adaptive Equipment Reacher   Findings CGA; did not need sock aide    Roll Left and Right   Type of Assistance Needed Incidental touching   Amount of Physical Assistance Provided Less than 25%   Roll Left and Right CARE Score 3   Sit to Lying   Type of Assistance Needed Physical assistance   Amount of Physical Assistance Provided Less than 25%   Sit to Lying CARE Score 3   Lying to Sitting on Side of Bed   Type of Assistance Needed Physical assistance   Amount of Physical Assistance Provided Less than 25%   Lying to Sitting on Side of Bed CARE Score 3   Sit to Stand   Type of Assistance Needed Incidental touching   Amount of Physical Assistance Provided No physical assistance   Sit to Stand CARE Score 4   Bed-Chair Transfer   Type of Assistance Needed Incidental touching   Amount of Physical Assistance Provided No physical assistance   Chair/Bed-to-Chair Transfer CARE Score 4   Toileting Hygiene   Type of Assistance Needed Incidental touching   Amount of Physical Assistance Provided Less than 25%   Toileting Hygiene CARE Score 3   Toilet Transfer   Type of Assistance Needed Incidental touching   Amount of Physical Assistance Provided Less than 25%   Toilet Transfer CARE Score 3   Exercise Tools   Hand Gripper 7# digi x 30 reps    Other Exercise Tool 1 UBE 4 F/B minimal resistance    Other Exercise Tool 2 push box 2# 3x20 reps in 4 planes of movement    Cognition   Overall Cognitive Status WFL   Arousal/Participation Alert   Attention Within functional limits   Orientation Level Oriented X4   Memory Within functional limits   Following Commands Follows all commands and directions without difficulty   Additional Activities   Additional Activities   (TA assisting OT to organize puzzles table top )   Additional Activities Comments w/c mobility S/MI    Activity Tolerance   Activity Tolerance Patient tolerated treatment well   Assessment   Treatment Assessment Upon entry to room pt reporting she didnt feel well earlier in day due to low BS  Pt was agreeable to ADL via basin level but declined shower due to fear  OT reviewed compensatory strategies and safe techniques to increase Indep with tasks  Refer to respective sections for details of session  Pt demon good awareness of techniques  OT then addressed TE/TA for generalized strength and endurance for functional activity performance  Pt would benefit from continued skilled OT to address established OT goals in preparation for d/c to home        Prognosis Good Problem List Decreased strength;Decreased range of motion;Decreased endurance; Impaired balance;Decreased mobility;Pain;Orthopedic restrictions   Plan   Treatment/Interventions ADL retraining;Functional transfer training; Endurance training; Therapeutic exercise;Patient/family training; Compensatory technique education;OT   Progress Progressing toward goals   OT Therapy Minutes   OT Time In 1100   OT Time Out 1230   OT Total Time (minutes) 90   OT Mode of treatment - Individual (minutes) 90   OT Mode of treatment - Concurrent (minutes) 0   OT Mode of treatment - Group (minutes) 0   OT Mode of treatment - Co-treat (minutes) 0   OT Mode of Treatment - Total time(minutes) 90 minutes   OT Cumulative Minutes 180   Therapy Time missed   Time missed?  No

## 2020-06-12 NOTE — PROGRESS NOTES
06/11/20 2000   Charting Type   Charting Type Shift assessment   Neurological   Neuro (WDL) X   Level of Consciousness Alert/awake   Orientation Level Oriented X4   Cognition Appropriate judgement   Extraocular Movements Full   RUE Muscle Strength 5- Normal strength   LUE Muscle Strength 5- Normal strength   RLE Muscle Strength 4- Movement against gravity and limited resistance   LLE Muscle Strength 5- Normal strength   [place] Coma Scale   Eye Opening 4   Best Verbal Response 5   Best Motor Response 6   [place] Coma Scale Score 15   HEENT   HEENT (WDL) X   Head and Face Asymmetrical   Respiratory   Respiratory (WDL) X   Respiratory Pattern Normal   Cardiac   Cardiac (WDL) WDL   Cardiac Regularity Regular   Peripheral Vascular   Peripheral Vascular (WDL) WDL   Cyanosis None   Integumentary   Integumentary (WDL) X   Skin Color Appropriate for ethnicity   Skin Condition/Temp Warm;Dry   Skin Integrity Bruising   Skin Location right knee   Skin Turgor Epidermis thin with loss of subcutaneous tissue   Nate Scale   Sensory Perceptions 4   Moisture 4   Activity 3   Mobility 3   Nutrition 3   Friction and Shear 2   Nate Scale Score 19   Cough   Cough None

## 2020-06-12 NOTE — NURSING NOTE
Called to pt room by pt , reports being sweaty, cold, clammy at 0535  Sugar checked showed <31 repeat showed <35  Pt alert, oriented  OJ given   Lab drawn for venous sugar per protocol  Sugar check after 15 min =79 , pt reports feeling better did then have some virgie crackers with peanut butter

## 2020-06-13 LAB
GLUCOSE SERPL-MCNC: 129 MG/DL (ref 65–140)
GLUCOSE SERPL-MCNC: 175 MG/DL (ref 65–140)
GLUCOSE SERPL-MCNC: 215 MG/DL (ref 65–140)
GLUCOSE SERPL-MCNC: 249 MG/DL (ref 65–140)
GLUCOSE SERPL-MCNC: 308 MG/DL (ref 65–140)

## 2020-06-13 PROCEDURE — 82948 REAGENT STRIP/BLOOD GLUCOSE: CPT

## 2020-06-13 RX ADMIN — INSULIN DETEMIR 4 UNITS: 100 INJECTION, SOLUTION SUBCUTANEOUS at 21:27

## 2020-06-13 RX ADMIN — TRAMADOL HYDROCHLORIDE 50 MG: 50 TABLET, FILM COATED ORAL at 17:19

## 2020-06-13 RX ADMIN — TRAMADOL HYDROCHLORIDE 50 MG: 50 TABLET, FILM COATED ORAL at 01:11

## 2020-06-13 RX ADMIN — ATORVASTATIN CALCIUM 20 MG: 20 TABLET, FILM COATED ORAL at 17:19

## 2020-06-13 RX ADMIN — LISINOPRIL 5 MG: 5 TABLET ORAL at 08:56

## 2020-06-13 RX ADMIN — ACETAMINOPHEN 975 MG: 325 TABLET ORAL at 06:11

## 2020-06-13 RX ADMIN — ACETAMINOPHEN 975 MG: 325 TABLET ORAL at 13:15

## 2020-06-13 RX ADMIN — ASPIRIN 325 MG: 325 TABLET, COATED ORAL at 08:56

## 2020-06-13 RX ADMIN — TRAMADOL HYDROCHLORIDE 50 MG: 50 TABLET, FILM COATED ORAL at 23:48

## 2020-06-13 RX ADMIN — LEVOTHYROXINE SODIUM 100 MCG: 100 TABLET ORAL at 06:11

## 2020-06-13 RX ADMIN — TRAMADOL HYDROCHLORIDE 50 MG: 50 TABLET, FILM COATED ORAL at 13:15

## 2020-06-13 RX ADMIN — INSULIN LISPRO 3 UNITS: 100 INJECTION, SOLUTION INTRAVENOUS; SUBCUTANEOUS at 13:16

## 2020-06-13 RX ADMIN — TRAMADOL HYDROCHLORIDE 50 MG: 50 TABLET, FILM COATED ORAL at 06:11

## 2020-06-13 RX ADMIN — ACETAMINOPHEN 975 MG: 325 TABLET ORAL at 21:27

## 2020-06-13 NOTE — PLAN OF CARE
Problem: Prexisting or High Potential for Compromised Skin Integrity  Goal: Skin integrity is maintained or improved  Description  INTERVENTIONS:  - Identify patients at risk for skin breakdown  - Assess and monitor skin integrity  - Assess and monitor nutrition and hydration status  - Monitor labs   - Assess for incontinence   - Turn and reposition patient  - Assist with mobility/ambulation  - Relieve pressure over bony prominences  - Avoid friction and shearing  - Provide appropriate hygiene as needed including keeping skin clean and dry  - Evaluate need for skin moisturizer/barrier cream  - Collaborate with interdisciplinary team   - Patient/family teaching  - Consider wound care consult   Outcome: Progressing     Problem: Potential for Falls  Goal: Patient will remain free of falls  Description  INTERVENTIONS:  - Assess patient frequently for physical needs  -  Identify cognitive and physical deficits and behaviors that affect risk of falls    -  [place] fall precautions as indicated by assessment   - Educate patient/family on patient safety including physical limitations  - Instruct patient to call for assistance with activity based on assessment  - Modify environment to reduce risk of injury  - Consider OT/PT consult to assist with strengthening/mobility  Outcome: Progressing     Problem: PAIN - ADULT  Goal: Verbalizes/displays adequate comfort level or baseline comfort level  Description  Interventions:  - Encourage patient to monitor pain and request assistance  - Assess pain using appropriate pain scale  - Administer analgesics based on type and severity of pain and evaluate response  - Implement non-pharmacological measures as appropriate and evaluate response  - Consider cultural and social influences on pain and pain management  - Notify physician/advanced practitioner if interventions unsuccessful or patient reports new pain  Outcome: Progressing     Problem: INFECTION - ADULT  Goal: Absence or prevention of progression during hospitalization  Description  INTERVENTIONS:  - Assess and monitor for signs and symptoms of infection  - Monitor lab/diagnostic results  - Monitor all insertion sites, i e  indwelling lines, tubes, and drains  - Monitor endotracheal if appropriate and nasal secretions for changes in amount and color  - [place] appropriate cooling/warming therapies per order  - Administer medications as ordered  - Instruct and encourage patient and family to use good hand hygiene technique  - Identify and instruct in appropriate isolation precautions for identified infection/condition  Outcome: Progressing  Goal: Absence of fever/infection during neutropenic period  Description  INTERVENTIONS:  - Monitor WBC    Outcome: Progressing     Problem: SAFETY ADULT  Goal: Patient will remain free of falls  Description  INTERVENTIONS:  - Assess patient frequently for physical needs  -  Identify cognitive and physical deficits and behaviors that affect risk of falls    -  [place] fall precautions as indicated by assessment   - Educate patient/family on patient safety including physical limitations  - Instruct patient to call for assistance with activity based on assessment  - Modify environment to reduce risk of injury  - Consider OT/PT consult to assist with strengthening/mobility  Outcome: Progressing  Goal: Maintain or return to baseline ADL function  Description  INTERVENTIONS:  -  Assess patient's ability to carry out ADLs; assess patient's baseline for ADL function and identify physical deficits which impact ability to perform ADLs (bathing, care of mouth/teeth, toileting, grooming, dressing, etc )  - Assess/evaluate cause of self-care deficits   - Assess range of motion  - Assess patient's mobility; develop plan if impaired  - Assess patient's need for assistive devices and provide as appropriate  - Encourage maximum independence but intervene and supervise when necessary  - Involve family in performance of ADLs  - Assess for home care needs following discharge   - Consider OT consult to assist with ADL evaluation and planning for discharge  - Provide patient education as appropriate  Outcome: Progressing  Goal: Maintain or return mobility status to optimal level  Description  INTERVENTIONS:  - Assess patient's baseline mobility status (ambulation, transfers, stairs, etc )    - Identify cognitive and physical deficits and behaviors that affect mobility  - Identify mobility aids required to assist with transfers and/or ambulation (gait belt, sit-to-stand, lift, walker, cane, etc )  - [place] fall precautions as indicated by assessment  - Record patient progress and toleration of activity level on Mobility SBAR; progress patient to next Phase/Stage  - Instruct patient to call for assistance with activity based on assessment  - Consider rehabilitation consult to assist with strengthening/weightbearing, etc   Outcome: Progressing     Problem: DISCHARGE PLANNING  Goal: Discharge to home or other facility with appropriate resources  Description  INTERVENTIONS:  - Identify barriers to discharge w/patient and caregiver  - Arrange for needed discharge resources and transportation as appropriate  - Identify discharge learning needs (meds, wound care, etc )  - Arrange for interpretive services to assist at discharge as needed  - Refer to Case Management Department for coordinating discharge planning if the patient needs post-hospital services based on physician/advanced practitioner order or complex needs related to functional status, cognitive ability, or social support system  Outcome: Progressing     Problem: Nutrition/Hydration-ADULT  Goal: Nutrient/Hydration intake appropriate for improving, restoring or maintaining nutritional needs  Description  Monitor and assess patient's nutrition/hydration status for malnutrition  Collaborate with interdisciplinary team and initiate plan and interventions as ordered  Monitor patient's weight and dietary intake as ordered or per policy  Utilize nutrition screening tool and intervene as necessary  Determine patient's food preferences and provide high-protein, high-caloric foods as appropriate       INTERVENTIONS:  - Monitor oral intake, urinary output, labs, and treatment plans  - Assess nutrition and hydration status and recommend course of action  - Evaluate amount of meals eaten  - Assist patient with eating if necessary   - Allow adequate time for meals  - Recommend/ encourage appropriate diets, oral nutritional supplements, and vitamin/mineral supplements  - Order, calculate, and assess calorie counts as needed  - Recommend, monitor, and adjust tube feedings and TPN/PPN based on assessed needs  - Assess need for intravenous fluids  - Provide specific nutrition/hydration education as appropriate  - Include patient/family/caregiver in decisions related to nutrition  Outcome: Progressing

## 2020-06-13 NOTE — NURSING NOTE
Pt awake resting in bed  SPTx1 to WC and into BR  Encouraged pt to ambulate with RW, refused  Pain present to RLE refer to [facility] for medication admin  Continuing to monitor

## 2020-06-14 LAB
GLUCOSE SERPL-MCNC: 160 MG/DL (ref 65–140)
GLUCOSE SERPL-MCNC: 196 MG/DL (ref 65–140)
GLUCOSE SERPL-MCNC: 237 MG/DL (ref 65–140)
GLUCOSE SERPL-MCNC: 274 MG/DL (ref 65–140)

## 2020-06-14 PROCEDURE — 97530 THERAPEUTIC ACTIVITIES: CPT

## 2020-06-14 PROCEDURE — 82948 REAGENT STRIP/BLOOD GLUCOSE: CPT

## 2020-06-14 RX ADMIN — INSULIN LISPRO 1 UNITS: 100 INJECTION, SOLUTION INTRAVENOUS; SUBCUTANEOUS at 12:25

## 2020-06-14 RX ADMIN — LEVOTHYROXINE SODIUM 50 MCG: 25 TABLET ORAL at 05:56

## 2020-06-14 RX ADMIN — ATORVASTATIN CALCIUM 20 MG: 20 TABLET, FILM COATED ORAL at 16:41

## 2020-06-14 RX ADMIN — TRAMADOL HYDROCHLORIDE 50 MG: 50 TABLET, FILM COATED ORAL at 05:49

## 2020-06-14 RX ADMIN — INSULIN LISPRO 2 UNITS: 100 INJECTION, SOLUTION INTRAVENOUS; SUBCUTANEOUS at 16:42

## 2020-06-14 RX ADMIN — TRAMADOL HYDROCHLORIDE 50 MG: 50 TABLET, FILM COATED ORAL at 13:28

## 2020-06-14 RX ADMIN — ASPIRIN 325 MG: 325 TABLET, COATED ORAL at 09:11

## 2020-06-14 RX ADMIN — ACETAMINOPHEN 975 MG: 325 TABLET ORAL at 13:51

## 2020-06-14 RX ADMIN — LISINOPRIL 5 MG: 5 TABLET ORAL at 09:11

## 2020-06-14 RX ADMIN — ACETAMINOPHEN 975 MG: 325 TABLET ORAL at 21:46

## 2020-06-14 RX ADMIN — TRAMADOL HYDROCHLORIDE 50 MG: 50 TABLET, FILM COATED ORAL at 23:02

## 2020-06-14 RX ADMIN — ACETAMINOPHEN 975 MG: 325 TABLET ORAL at 06:15

## 2020-06-14 RX ADMIN — POLYETHYLENE GLYCOL 3350 17 G: 17 POWDER, FOR SOLUTION ORAL at 09:11

## 2020-06-14 RX ADMIN — INSULIN LISPRO 2 UNITS: 100 INJECTION, SOLUTION INTRAVENOUS; SUBCUTANEOUS at 08:36

## 2020-06-14 RX ADMIN — TRAMADOL HYDROCHLORIDE 50 MG: 50 TABLET, FILM COATED ORAL at 18:49

## 2020-06-14 RX ADMIN — METHOCARBAMOL 500 MG: 500 TABLET, FILM COATED ORAL at 22:12

## 2020-06-14 RX ADMIN — INSULIN DETEMIR 5 UNITS: 100 INJECTION, SOLUTION SUBCUTANEOUS at 21:46

## 2020-06-14 NOTE — PROGRESS NOTES
06/14/20 0842   Charting Type   Charting Type Shift assessment   Neurological   Neuro (WDL) X   Muscle Function/Sensation Assessment ;Muscle strength   R Hand  Moderate   L Hand  Moderate   RLE Muscle Strength 4- Movement against gravity and limited resistance   LLE Muscle Strength 4- Movement against gravity and limited resistance   Neuro Symptoms Fatigue   Relieved by Rest   Delirium Assessment- CAM    Acute Onset and Fluctuating Course (1) No   Inattention (2) No   Disorganized Thinking (3) No   Rate Patient's Level of Consciousness (4) Alert (Normal), No   Delirium Present No   [place] Coma Scale   Eye Opening 4   Best Verbal Response 5   Best Motor Response 6   [place] Coma Scale Score 15   HEENT   HEENT (WDL) X   Head and Face Asymmetrical   Teeth Missing teeth   Neck Asymmetrical   Respiratory   Respiratory (WDL) WDL   Cardiac   Cardiac (WDL) WDL   Peripheral Vascular   Peripheral Vascular (WDL) WDL   Integumentary   Integumentary (WDL) X   Skin Condition/Temp Warm;Dry   Skin Integrity Bruising   Skin Location right knee   Nate Scale   Sensory Perceptions 4   Moisture 4   Activity 3   Mobility 3   Nutrition 3   Friction and Shear 2   Nate Scale Score 19   Wound 05/28/20 Incision Hip Right   Date First Assessed/Time First Assessed: 05/28/20 0650   Primary Wound Type: Incision  Location: Hip  Wound Location Orientation: Right  Wound Description (Comments): betadine ointment to incision  Incision's 1st Dressing: DRESSING XEROFORM 5 X 9, SPO    Wound Description Clean;Dry; Intact   Closure Staples   Drainage Amount None   Dressing Open to air   Wound 06/01/20 Pressure Injury Heel Right   Date First Assessed/Time First Assessed: 06/01/20 2028   Pre-Existing Wound: No  Primary Wound Type: Pressure Injury  Location: Heel  Wound Location Orientation: Right   Wound Description Light purple   Staging Deep Tissue Injury   Tess-wound Assessment Intact   Drainage Amount None   Treatments Site care Dressing   (sure prep)   Patient Tolerance Tolerated well   Dressing Status Clean;Dry; Intact   Musculoskeletal   Musculoskeletal (WDL) X   Level of Assistance Minimal assist, patient does 75% or more   Assistive Device Wheelchair   LUE Limited movement   RLE Limited movement   LLE Limited movement   Gastrointestinal   Gastrointestinal (WDL) WDL   Genitourinary   Genitourinary (WDL) WDL   Urine Assessment   Urinary Incontinence No   Anal/Rectal   Anal/Rectal (WDL) WDL   Psychosocial   Psychosocial (WDL) WDL

## 2020-06-14 NOTE — PLAN OF CARE
Problem: Prexisting or High Potential for Compromised Skin Integrity  Goal: Skin integrity is maintained or improved  Description  INTERVENTIONS:  - Identify patients at risk for skin breakdown  - Assess and monitor skin integrity  - Assess and monitor nutrition and hydration status  - Monitor labs   - Assess for incontinence   - Turn and reposition patient  - Assist with mobility/ambulation  - Relieve pressure over bony prominences  - Avoid friction and shearing  - Provide appropriate hygiene as needed including keeping skin clean and dry  - Evaluate need for skin moisturizer/barrier cream  - Collaborate with interdisciplinary team   - Patient/family teaching  - Consider wound care consult   Outcome: Progressing     Problem: Potential for Falls  Goal: Patient will remain free of falls  Description  INTERVENTIONS:  - Assess patient frequently for physical needs  -  Identify cognitive and physical deficits and behaviors that affect risk of falls    -  [place] fall precautions as indicated by assessment   - Educate patient/family on patient safety including physical limitations  - Instruct patient to call for assistance with activity based on assessment  - Modify environment to reduce risk of injury  - Consider OT/PT consult to assist with strengthening/mobility  Outcome: Progressing     Problem: PAIN - ADULT  Goal: Verbalizes/displays adequate comfort level or baseline comfort level  Description  Interventions:  - Encourage patient to monitor pain and request assistance  - Assess pain using appropriate pain scale  - Administer analgesics based on type and severity of pain and evaluate response  - Implement non-pharmacological measures as appropriate and evaluate response  - Consider cultural and social influences on pain and pain management  - Notify physician/advanced practitioner if interventions unsuccessful or patient reports new pain  Outcome: Progressing     Problem: INFECTION - ADULT  Goal: Absence or prevention of progression during hospitalization  Description  INTERVENTIONS:  - Assess and monitor for signs and symptoms of infection  - Monitor lab/diagnostic results  - Monitor all insertion sites, i e  indwelling lines, tubes, and drains  - Monitor endotracheal if appropriate and nasal secretions for changes in amount and color  - [place] appropriate cooling/warming therapies per order  - Administer medications as ordered  - Instruct and encourage patient and family to use good hand hygiene technique  - Identify and instruct in appropriate isolation precautions for identified infection/condition  Outcome: Progressing  Goal: Absence of fever/infection during neutropenic period  Description  INTERVENTIONS:  - Monitor WBC    Outcome: Progressing     Problem: SAFETY ADULT  Goal: Patient will remain free of falls  Description  INTERVENTIONS:  - Assess patient frequently for physical needs  -  Identify cognitive and physical deficits and behaviors that affect risk of falls    -  [place] fall precautions as indicated by assessment   - Educate patient/family on patient safety including physical limitations  - Instruct patient to call for assistance with activity based on assessment  - Modify environment to reduce risk of injury  - Consider OT/PT consult to assist with strengthening/mobility  Outcome: Progressing  Goal: Maintain or return to baseline ADL function  Description  INTERVENTIONS:  -  Assess patient's ability to carry out ADLs; assess patient's baseline for ADL function and identify physical deficits which impact ability to perform ADLs (bathing, care of mouth/teeth, toileting, grooming, dressing, etc )  - Assess/evaluate cause of self-care deficits   - Assess range of motion  - Assess patient's mobility; develop plan if impaired  - Assess patient's need for assistive devices and provide as appropriate  - Encourage maximum independence but intervene and supervise when necessary  - Involve family in performance of ADLs  - Assess for home care needs following discharge   - Consider OT consult to assist with ADL evaluation and planning for discharge  - Provide patient education as appropriate  Outcome: Progressing  Goal: Maintain or return mobility status to optimal level  Description  INTERVENTIONS:  - Assess patient's baseline mobility status (ambulation, transfers, stairs, etc )    - Identify cognitive and physical deficits and behaviors that affect mobility  - Identify mobility aids required to assist with transfers and/or ambulation (gait belt, sit-to-stand, lift, walker, cane, etc )  - [place] fall precautions as indicated by assessment  - Record patient progress and toleration of activity level on Mobility SBAR; progress patient to next Phase/Stage  - Instruct patient to call for assistance with activity based on assessment  - Consider rehabilitation consult to assist with strengthening/weightbearing, etc   Outcome: Progressing     Problem: DISCHARGE PLANNING  Goal: Discharge to home or other facility with appropriate resources  Description  INTERVENTIONS:  - Identify barriers to discharge w/patient and caregiver  - Arrange for needed discharge resources and transportation as appropriate  - Identify discharge learning needs (meds, wound care, etc )  - Arrange for interpretive services to assist at discharge as needed  - Refer to Case Management Department for coordinating discharge planning if the patient needs post-hospital services based on physician/advanced practitioner order or complex needs related to functional status, cognitive ability, or social support system  Outcome: Progressing     Problem: Nutrition/Hydration-ADULT  Goal: Nutrient/Hydration intake appropriate for improving, restoring or maintaining nutritional needs  Description  Monitor and assess patient's nutrition/hydration status for malnutrition  Collaborate with interdisciplinary team and initiate plan and interventions as ordered  Monitor patient's weight and dietary intake as ordered or per policy  Utilize nutrition screening tool and intervene as necessary  Determine patient's food preferences and provide high-protein, high-caloric foods as appropriate       INTERVENTIONS:  - Monitor oral intake, urinary output, labs, and treatment plans  - Assess nutrition and hydration status and recommend course of action  - Evaluate amount of meals eaten  - Assist patient with eating if necessary   - Allow adequate time for meals  - Recommend/ encourage appropriate diets, oral nutritional supplements, and vitamin/mineral supplements  - Order, calculate, and assess calorie counts as needed  - Recommend, monitor, and adjust tube feedings and TPN/PPN based on assessed needs  - Assess need for intravenous fluids  - Provide specific nutrition/hydration education as appropriate  - Include patient/family/caregiver in decisions related to nutrition  Outcome: Progressing

## 2020-06-14 NOTE — NURSING NOTE
Patient calculating her own dose of insulin based on food on tray  Patient chose not to take 5 units total which was 2 coverage and 3 routine  Patient only wanted 4 units with breakfast  Per nurse caring for patient Dr Halina Juarez patient adjusts insulin with her food on tray  Patient states she has been doing this since she was at home

## 2020-06-14 NOTE — PROGRESS NOTES
06/14/20 1030   Pain Assessment   Pain Assessment Tool 0-10   Pain Score 4   Pain Location/Orientation Orientation: Right;Location: Leg   Restrictions/Precautions   Precautions Fall Risk;Pain   RLE Weight Bearing Per Order TTWB   Sit to Stand   Type of Assistance Needed Incidental touching;Verbal cues   Comment CGA;txf has improved with stand to sit txf    Sit to Stand CARE Score 4   Cognition   Overall Cognitive Status WFL   Orientation Level Oriented X4   Activity Tolerance   Activity Tolerance Patient tolerated treatment well   Assessment   Treatment Assessment OT tx addressed functional standing tasks through the task of functional mobility with RW  Pt dmeon ability to ambulate 24 ft/20ft/11ft whike maintaining TTWB R LE with CGA  Lenghty rest period offerred between sets  STS with CGA with improved technqiue  Plan is to continue skilled OT to address goals in prep for d/c to home  Prognosis Good   Problem List Decreased strength; Impaired balance;Decreased mobility; Decreased endurance;Orthopedic restrictions;Pain   Plan   Treatment/Interventions Endurance training;Functional transfer training;OT   Progress Progressing toward goals   OT Therapy Minutes   OT Time In 1030   OT Time Out 1100   OT Total Time (minutes) 30   OT Mode of treatment - Individual (minutes) 30   OT Mode of treatment - Concurrent (minutes) 0   OT Mode of treatment - Group (minutes) 0   OT Mode of treatment - Co-treat (minutes) 0   OT Mode of Treatment - Total time(minutes) 30 minutes   OT Cumulative Minutes 210   Therapy Time missed   Time missed?  No

## 2020-06-15 LAB
GLUCOSE SERPL-MCNC: 114 MG/DL (ref 65–140)
GLUCOSE SERPL-MCNC: 124 MG/DL (ref 65–140)
GLUCOSE SERPL-MCNC: 133 MG/DL (ref 65–140)
GLUCOSE SERPL-MCNC: 161 MG/DL (ref 65–140)
GLUCOSE SERPL-MCNC: 185 MG/DL (ref 65–140)

## 2020-06-15 PROCEDURE — 99232 SBSQ HOSP IP/OBS MODERATE 35: CPT | Performed by: PHYSICAL MEDICINE & REHABILITATION

## 2020-06-15 PROCEDURE — 97535 SELF CARE MNGMENT TRAINING: CPT

## 2020-06-15 PROCEDURE — 97537 COMMUNITY/WORK REINTEGRATION: CPT

## 2020-06-15 PROCEDURE — 97116 GAIT TRAINING THERAPY: CPT

## 2020-06-15 PROCEDURE — 97110 THERAPEUTIC EXERCISES: CPT

## 2020-06-15 PROCEDURE — 97530 THERAPEUTIC ACTIVITIES: CPT

## 2020-06-15 PROCEDURE — 82948 REAGENT STRIP/BLOOD GLUCOSE: CPT

## 2020-06-15 RX ADMIN — INSULIN LISPRO 1 UNITS: 100 INJECTION, SOLUTION INTRAVENOUS; SUBCUTANEOUS at 12:52

## 2020-06-15 RX ADMIN — INSULIN DETEMIR 4 UNITS: 100 INJECTION, SOLUTION SUBCUTANEOUS at 21:59

## 2020-06-15 RX ADMIN — ACETAMINOPHEN 975 MG: 325 TABLET ORAL at 22:01

## 2020-06-15 RX ADMIN — METHOCARBAMOL 500 MG: 500 TABLET, FILM COATED ORAL at 20:26

## 2020-06-15 RX ADMIN — TRAMADOL HYDROCHLORIDE 50 MG: 50 TABLET, FILM COATED ORAL at 23:52

## 2020-06-15 RX ADMIN — ACETAMINOPHEN 975 MG: 325 TABLET ORAL at 05:06

## 2020-06-15 RX ADMIN — ATORVASTATIN CALCIUM 20 MG: 20 TABLET, FILM COATED ORAL at 16:48

## 2020-06-15 RX ADMIN — TRAMADOL HYDROCHLORIDE 50 MG: 50 TABLET, FILM COATED ORAL at 05:06

## 2020-06-15 RX ADMIN — LEVOTHYROXINE SODIUM 100 MCG: 100 TABLET ORAL at 05:06

## 2020-06-15 RX ADMIN — ACETAMINOPHEN 975 MG: 325 TABLET ORAL at 14:42

## 2020-06-15 RX ADMIN — TRAMADOL HYDROCHLORIDE 50 MG: 50 TABLET, FILM COATED ORAL at 12:30

## 2020-06-15 RX ADMIN — TRAMADOL HYDROCHLORIDE 50 MG: 50 TABLET, FILM COATED ORAL at 17:34

## 2020-06-15 RX ADMIN — ASPIRIN 325 MG: 325 TABLET, COATED ORAL at 08:52

## 2020-06-15 RX ADMIN — LISINOPRIL 5 MG: 5 TABLET ORAL at 08:52

## 2020-06-15 NOTE — PROGRESS NOTES
Physical Medicine and Rehabilitation Progress Note  Chanel Hu 78 y o  female MRN: [id number]  Unit/Bed#: -01 Encounter: [id number]    HPI: Chanel Hu is a 78 y o  female who presented to the [facility] [address] after fall and was found to have a right IT fx and is s/p nail fixation by Dr Rocco Palomino on 5/28  Course complicated by hyponatremia and patient was placed on FR  Chief Complaint: Rt hip fx     Interval/subjective: patient denies any events over the weekend     ROS: A 10 point ROS was performed; negative except as noted above       Assessment/Plan:      * Closed intertrochanteric fracture of right femur [facility])  Assessment & Plan  - s/p nail fixation by Dr Rocco Palomino on 5/28  - TTWB per ortho  - OP FU with Dr Rocco Palomino     Hypophosphatemia  Tereasa Daniela  - patient with decreased level on 5/28 therefore started on phos supplements in acute care on 5/28 and on 5/31, 6/2, 6/5, 6/9 levels WNL at 3 7, 3 6, 3 7, & 3 7 respectively  - additionally phos packets contain K and K level of 6/9 was WNL at 4 2  - d/w Dr Zechariah Braxton of renal above findings and he recommended phos (with k) packets be stopped which was done on 6/12    Anemia  Assessment & Plan  - Hg currently 8 1 post-operatively  - IM & renal co-managing     Hyponatremia  Assessment & Plan  - currently WNL at 137  - d/w Dr Zechariah Braxton of nephrology who recommended that FR of 1800 cc should be discontinued which was done on 6/12   - renal managing     Elevated vitamin B12 level  Assessment & Plan  - elevated to 4033 ()  - patient takes supplemental B12 therefore will stop and have patient FU with PCP     Postoperative urinary retention  Assessment & Plan  - resolved (PVRs 118, 210, 32)     DM (diabetes mellitus) ([address] )  Assessment & Plan  - per patient on metformin 500 mg BID with meals, insulin 5 units with meals, and levemir 5 (not 10) units at HS   - follows with Dr Kelvin Wallace of endocrine  - IM managing Hypothyroidism  Assessment & Plan  - TSH of 5/28 elevated (free T4 WNL)  - on home regimen of levothyroxine 100 mcg qd except for Sundays when she takes 50 mcg qd  - follows with Dr Malathi Bui of endocrine  - IM managing and recommend patient have repeat TFTs as OP and FU with Dr Stella Faria hypertension  Assessment & Plan  - at home ramapril 5 mg qd  - IM & renal co-managing     Hyperlipidemia  Assessment & Plan  - on home lipitor 20 mg qpm (per patient no longer on crestor)       Scheduled Meds:    Current Facility-Administered Medications:  acetaminophen 975 mg Oral Q8H Ventura [place], MD   aspirin 325 mg Oral Daily Rere Patterson MD   atorvastatin 20 mg Oral Daily With Jael Sal MD   bisacodyl 10 mg Rectal Daily PRN Rere Patterson MD   insulin detemir 10 Units Subcutaneous HS Dali Nguyen PA-C   insulin lispro 1-5 Units Subcutaneous TID AC Rere Patterson MD   insulin lispro 1-5 Units Subcutaneous HS Rere Patterson MD   insulin lispro 3 Units Subcutaneous TID With Meals ADRIAN Vasquez   levothyroxine 100 mcg Oral Once per day on Mon Tue Wed Thu Fri Sat Rere Patterson MD   levothyroxine 50 mcg Oral Weekly Rere Patterson MD   lisinopril 5 mg Oral Daily Rere Patterson MD   methocarbamol 500 mg Oral Q6H PRN Rere Patterson MD   oxyCODONE 2 5 mg Oral Q6H PRN Rere Patterson MD   polyethylene glycol 17 g Oral Daily Rere Patterson MD   traMADol 50 mg Oral Q6H Ventura [place], MD        Incidental findings:     1) mildly decreased alk phos level: currently 54 (LLN 55)     DVT ppx: per Dr Ralf Barraza protocol arixtra 2 5 mg sc x 2 weeks post-op then followed by asa 325 mg for 4 weeks (note patient is on asa 81 mg qd at home and was cleared in acute care to be on both home asa 81 mg qd and arixtra and patient has been on both since 5/29 however once patient transitions to asa 325 mg qd on 6/12 per Dr Ralf Barraza protocol she will stop asa 81 mg qd until she has completed the 4 week course of asa 325 gm qd per   Bozena's protocol)     Code: confirmed with patient that she wishes to be DNR/DNI and verbalized her understanding of what this means      Note: confirmed home meds with patient     Objective:    Functional Update:  Mobility: cg  Transfers: cg   ADLs: min-mod       Physical Exam:     Vitals:    06/15/20 0716   BP: 149/75   Pulse: 70   Resp: 16   Temp: 97 9   SpO2: 97%           General: alert, no apparent distress, cooperative and comfortable  HEENT:  Head: Normal, normocephalic, atraumatic    CARDIAC:  +S1/2  LUNGS:  no abnormal respiratory pattern, no retractions noted, non-labored breathing   ABDOMEN:  soft NT   EXTREMITIES:  volume status currently stable   NEURO:  awake, alert, appropriately answering questions   PSYCH:  mood/affect currently stable          Diagnostic Studies:   No orders to display       Laboratory:         Invalid input(s): PLTCT  Results from last 7 days   Lab Units 06/12/20  0600 06/09/20  0502   BUN mg/dL  --  19   SODIUM mmol/L 137 138   POTASSIUM mmol/L  --  4 2   CHLORIDE mmol/L  --  101   CREATININE mg/dL  --  0 67

## 2020-06-15 NOTE — PROGRESS NOTES
06/15/20 1130   Pain Assessment   Pain Assessment Tool 0-10   Pain Score 8   Pain Location/Orientation Orientation: Right;Location: Leg;Location: Shoulder   Restrictions/Precautions   Precautions Fall Risk;Pain   RLE Weight Bearing Per Order TTWB   Cognition   Overall Cognitive Status WFL   Arousal/Participation Alert; Responsive; Cooperative   Attention Within functional limits   Orientation Level Oriented X4   Memory Within functional limits   Following Commands Follows all commands and directions without difficulty   Sit to Stand   Type of Assistance Needed Incidental touching   Amount of Physical Assistance Provided No physical assistance   Sit to Stand CARE Score 4   Bed-Chair Transfer   Type of Assistance Needed Incidental touching   Amount of Physical Assistance Provided No physical assistance   Chair/Bed-to-Chair Transfer CARE Score 4   Transfer Bed/Chair/Wheelchair   Limitations Noted In Balance;Confidence; Coordination; Endurance;Pain Management;LE Strength   Adaptive Equipment Roller Walker   Stand Pivot Contact Guard   Sit to [facility]   Stand to [facility]   All Transfer Contact Guard   Walk 10 Feet   Type of Assistance Needed Supervision   Amount of Physical Assistance Provided No physical assistance   Walk 10 Feet CARE Score 4   Walk 50 Feet with Two Turns   Reason if not Attempted Safety concerns   Walk 50 Feet with Two Turns CARE Score 88   Walk 150 Feet   Reason if not Attempted Safety concerns   Walk 150 Feet CARE Score 88   Walking 10 Feet on Uneven Surfaces   Reason if not Attempted Safety concerns   Walking 10 Feet on Uneven Surfaces CARE Score 88   Ambulation   Does the patient walk? 2  Yes   Primary Mode of Locomotion Prior to Admission Walk   Distance Walked (feet) 18 ft  (11)   Assist Device Roller Walker   Gait Pattern Inconsistant Noemi; Improper weight shift   Limitations Noted In Balance; Coordination; Endurance; Safety;Strength   Provided Assistance with: Balance   Walk Assist Level Close Supervision   Wheel 50 Feet with Two Turns   Type of Assistance Needed Independent   Amount of Physical Assistance Provided No physical assistance   Wheel 50 Feet with Two Turns CARE Score 6   Wheel 150 Feet   Type of Assistance Needed Independent   Amount of Physical Assistance Provided No physical assistance   Wheel 150 Feet CARE Score 6   Wheelchair mobility   Does the patient use a wheelchair? 1  Yes   Type of Wheelchair Used 1  Manual   Method Right upper extremity; Left upper extremity   Distance Level Surface (feet) 200 ft  (162)   Findings level and carpet   Curb or Single Stair   Style negotiated Curb   Type of Assistance Needed Incidental touching   Amount of Physical Assistance Provided No physical assistance   1 Step (Curb) CARE Score 4   4 Steps   Reason if not Attempted Safety concerns   4 Steps CARE Score 88   12 Steps   Reason if not Attempted Safety concerns   12 Steps CARE Score 88   Stairs   Type Curb   # of Steps 1   Weight Bearing Precautions TTWB   Assist Devices Roller Walker   Findings CGA for stepping up backwards on curb steps with RW   Therapeutic Interventions   Strengthening seated ther ex 30 reps   Other gait and transfers   Assessment   Treatment Assessment Pt is pleasant and cooperative with PT; Pt is Mi for w/c mobility; Trial steps today pt unable to going forward with HR; Pt was able to go up 1 curb step backwards with RW with CGA and max cues for sequence and encoursgement; Pt had a hard time lifting L foot up to step; Pt amb up to 25' with RW and TTWB on R LE; Pt performed seated ther ex for strengthening B LE; Pt appropriate for concurrent therapy to increase motivation while focusing in similar exercises   PT Barriers   Physical Impairment Decreased strength;Decreased range of motion;Decreased endurance; Impaired balance;Decreased mobility; Decreased safety awareness;Pain   Functional Limitation Wheelchair management; Walking;Transfers;Standing   Plan Treatment/Interventions Functional transfer training;LE strengthening/ROM; Therapeutic exercise;Gait training   Progress Progressing toward goals   PT Therapy Minutes   PT Time In 1130   PT Time Out 1230   PT Total Time (minutes) 60   PT Mode of treatment - Individual (minutes) 30   PT Mode of treatment - Concurrent (minutes) 30   PT Mode of treatment - Group (minutes) 0   PT Mode of treatment - Co-treat (minutes) 0   PT Mode of Treatment - Total time(minutes) 60 minutes   PT Cumulative Minutes 876   Therapy Time missed   Time missed?  No

## 2020-06-15 NOTE — PLAN OF CARE
Problem: Prexisting or High Potential for Compromised Skin Integrity  Goal: Skin integrity is maintained or improved  Description  INTERVENTIONS:  - Identify patients at risk for skin breakdown  - Assess and monitor skin integrity  - Assess and monitor nutrition and hydration status  - Monitor labs   - Assess for incontinence   - Turn and reposition patient  - Assist with mobility/ambulation  - Relieve pressure over bony prominences  - Avoid friction and shearing  - Provide appropriate hygiene as needed including keeping skin clean and dry  - Evaluate need for skin moisturizer/barrier cream  - Collaborate with interdisciplinary team   - Patient/family teaching  - Consider wound care consult   Outcome: Progressing     Problem: Potential for Falls  Goal: Patient will remain free of falls  Description  INTERVENTIONS:  - Assess patient frequently for physical needs  -  Identify cognitive and physical deficits and behaviors that affect risk of falls    -  [place] fall precautions as indicated by assessment   - Educate patient/family on patient safety including physical limitations  - Instruct patient to call for assistance with activity based on assessment  - Modify environment to reduce risk of injury  - Consider OT/PT consult to assist with strengthening/mobility  Outcome: Progressing     Problem: PAIN - ADULT  Goal: Verbalizes/displays adequate comfort level or baseline comfort level  Description  Interventions:  - Encourage patient to monitor pain and request assistance  - Assess pain using appropriate pain scale  - Administer analgesics based on type and severity of pain and evaluate response  - Implement non-pharmacological measures as appropriate and evaluate response  - Consider cultural and social influences on pain and pain management  - Notify physician/advanced practitioner if interventions unsuccessful or patient reports new pain  Outcome: Progressing     Problem: INFECTION - ADULT  Goal: Absence or prevention of progression during hospitalization  Description  INTERVENTIONS:  - Assess and monitor for signs and symptoms of infection  - Monitor lab/diagnostic results  - Monitor all insertion sites, i e  indwelling lines, tubes, and drains  - Monitor endotracheal if appropriate and nasal secretions for changes in amount and color  - [place] appropriate cooling/warming therapies per order  - Administer medications as ordered  - Instruct and encourage patient and family to use good hand hygiene technique  - Identify and instruct in appropriate isolation precautions for identified infection/condition  Outcome: Progressing  Goal: Absence of fever/infection during neutropenic period  Description  INTERVENTIONS:  - Monitor WBC    Outcome: Progressing     Problem: SAFETY ADULT  Goal: Patient will remain free of falls  Description  INTERVENTIONS:  - Assess patient frequently for physical needs  -  Identify cognitive and physical deficits and behaviors that affect risk of falls    -  [place] fall precautions as indicated by assessment   - Educate patient/family on patient safety including physical limitations  - Instruct patient to call for assistance with activity based on assessment  - Modify environment to reduce risk of injury  - Consider OT/PT consult to assist with strengthening/mobility  Outcome: Progressing  Goal: Maintain or return to baseline ADL function  Description  INTERVENTIONS:  -  Assess patient's ability to carry out ADLs; assess patient's baseline for ADL function and identify physical deficits which impact ability to perform ADLs (bathing, care of mouth/teeth, toileting, grooming, dressing, etc )  - Assess/evaluate cause of self-care deficits   - Assess range of motion  - Assess patient's mobility; develop plan if impaired  - Assess patient's need for assistive devices and provide as appropriate  - Encourage maximum independence but intervene and supervise when necessary  - Involve family in performance of ADLs  - Assess for home care needs following discharge   - Consider OT consult to assist with ADL evaluation and planning for discharge  - Provide patient education as appropriate  Outcome: Progressing  Goal: Maintain or return mobility status to optimal level  Description  INTERVENTIONS:  - Assess patient's baseline mobility status (ambulation, transfers, stairs, etc )    - Identify cognitive and physical deficits and behaviors that affect mobility  - Identify mobility aids required to assist with transfers and/or ambulation (gait belt, sit-to-stand, lift, walker, cane, etc )  - [place] fall precautions as indicated by assessment  - Record patient progress and toleration of activity level on Mobility SBAR; progress patient to next Phase/Stage  - Instruct patient to call for assistance with activity based on assessment  - Consider rehabilitation consult to assist with strengthening/weightbearing, etc   Outcome: Progressing     Problem: DISCHARGE PLANNING  Goal: Discharge to home or other facility with appropriate resources  Description  INTERVENTIONS:  - Identify barriers to discharge w/patient and caregiver  - Arrange for needed discharge resources and transportation as appropriate  - Identify discharge learning needs (meds, wound care, etc )  - Arrange for interpretive services to assist at discharge as needed  - Refer to Case Management Department for coordinating discharge planning if the patient needs post-hospital services based on physician/advanced practitioner order or complex needs related to functional status, cognitive ability, or social support system  Outcome: Progressing     Problem: Nutrition/Hydration-ADULT  Goal: Nutrient/Hydration intake appropriate for improving, restoring or maintaining nutritional needs  Description  Monitor and assess patient's nutrition/hydration status for malnutrition  Collaborate with interdisciplinary team and initiate plan and interventions as ordered  Monitor patient's weight and dietary intake as ordered or per policy  Utilize nutrition screening tool and intervene as necessary  Determine patient's food preferences and provide high-protein, high-caloric foods as appropriate       INTERVENTIONS:  - Monitor oral intake, urinary output, labs, and treatment plans  - Assess nutrition and hydration status and recommend course of action  - Evaluate amount of meals eaten  - Assist patient with eating if necessary   - Allow adequate time for meals  - Recommend/ encourage appropriate diets, oral nutritional supplements, and vitamin/mineral supplements  - Order, calculate, and assess calorie counts as needed  - Recommend, monitor, and adjust tube feedings and TPN/PPN based on assessed needs  - Assess need for intravenous fluids  - Provide specific nutrition/hydration education as appropriate  - Include patient/family/caregiver in decisions related to nutrition  Outcome: Progressing

## 2020-06-15 NOTE — PROGRESS NOTES
06/15/20 0856   Charting Type   Charting Type Shift assessment   Neurological   Neuro (WDL) X   Muscle Function/Sensation Assessment ;Muscle strength   R Hand  Moderate   L Hand  Moderate   RLE Muscle Strength 4- Movement against gravity and limited resistance   LLE Muscle Strength 4- Movement against gravity and limited resistance   Neuro Symptoms Fatigue   Relieved by Rest   Delirium Assessment- CAM    Acute Onset and Fluctuating Course (1) No   Inattention (2) No   Disorganized Thinking (3) No   Rate Patient's Level of Consciousness (4) Alert (Normal), No   Delirium Present No   [place] Coma Scale   Eye Opening 4   Best Verbal Response 5   Best Motor Response 6   [place] Coma Scale Score 15   HEENT   HEENT (WDL) X   Head and Face Asymmetrical   Teeth Missing teeth   Neck Asymmetrical   Respiratory   Respiratory (WDL) WDL   Cardiac   Cardiac (WDL) WDL   Pain Assessment   Pain Assessment Tool 0-10   Pain Score 6   Pain Location/Orientation Orientation: Right;Location: Leg   Peripheral Vascular   Peripheral Vascular (WDL) WDL   Integumentary   Integumentary (WDL) X   Skin Condition/Temp Warm;Dry   Skin Integrity Bruising   Skin Location right knee   Nate Scale   Sensory Perceptions 4   Moisture 4   Activity 3   Mobility 3   Nutrition 3   Friction and Shear 2   Nate Scale Score 19   Wound 05/28/20 Incision Hip Right   Date First Assessed/Time First Assessed: 05/28/20 0650   Primary Wound Type: Incision  Location: Hip  Wound Location Orientation: Right  Wound Description (Comments): betadine ointment to incision  Incision's 1st Dressing: DRESSING XEROFORM 5 X 9, SPO    Wound Description Clean;Dry; Intact   Closure Staples   Drainage Amount None   Treatments Cleansed   Dressing Open to air   Wound 06/01/20 Pressure Injury Heel Right   Date First Assessed/Time First Assessed: 06/01/20 2028   Pre-Existing Wound: No  Primary Wound Type: Pressure Injury  Location: Heel  Wound Location Orientation: Right Wound Description Light purple   Staging Deep Tissue Injury   Tess-wound Assessment Intact   Treatments Site care  (sure prep)   Musculoskeletal   Musculoskeletal (WDL) X   Level of Assistance Minimal assist, patient does 75% or more   Assistive Device Wheelchair   LUE Limited movement   RLE Limited movement   LLE Limited movement   Gastrointestinal   Gastrointestinal (WDL) WDL   Genitourinary   Genitourinary (WDL) WDL   Urine Assessment   Urinary Incontinence No   Anal/Rectal   Anal/Rectal (WDL) WDL   Psychosocial   Psychosocial (WDL) WDL

## 2020-06-15 NOTE — PROGRESS NOTES
06/15/20 1310   Pain Assessment   Pain Assessment Tool 0-10   Pain Score 8   Pain Location/Orientation Orientation: Right;Location: Leg   Restrictions/Precautions   Precautions Fall Risk;Pain   RLE Weight Bearing Per Order TTWB   Cognition   Overall Cognitive Status WFL   Arousal/Participation Alert; Responsive; Cooperative   Attention Within functional limits   Orientation Level Oriented X4   Memory Within functional limits   Following Commands Follows all commands and directions without difficulty   Sit to Lying   Type of Assistance Needed Incidental touching; Adaptive equipment;Verbal cues   Amount of Physical Assistance Provided No physical assistance   Sit to Lying CARE Score 4   Sit to Stand   Type of Assistance Needed Incidental touching   Amount of Physical Assistance Provided No physical assistance   Sit to Stand CARE Score 4   Bed-Chair Transfer   Type of Assistance Needed Incidental touching   Amount of Physical Assistance Provided No physical assistance   Chair/Bed-to-Chair Transfer CARE Score 4   Transfer Bed/Chair/Wheelchair   Limitations Noted In Balance;Confidence; Coordination; Endurance;Pain Management;LE Strength   Adaptive Equipment Roller Walker   Stand Pivot Contact Guard   Sit to [employer]   Stand to [facility]   Sit to Supine Contact Guard   All Transfer Contact Guard   Therapeutic Interventions   Strengthening supine ther ex 30 reps   Other transfers   Assessment   Treatment Assessment Pt transferred to bed with CGA, pt then used leg  to get R LE into bed with cues for technique; Pt performed supine ther ex for general conditioning B LE   PT Barriers   Physical Impairment Decreased strength;Decreased range of motion;Decreased endurance; Impaired balance;Decreased mobility; Decreased safety awareness;Pain   Functional Limitation Transfers   Plan   Treatment/Interventions Functional transfer training;LE strengthening/ROM; Therapeutic exercise   Progress Progressing toward goals   PT Therapy Minutes   PT Time In 1310   PT Time Out 1340   PT Total Time (minutes) 30   PT Mode of treatment - Individual (minutes) 30   PT Mode of treatment - Concurrent (minutes) 0   PT Mode of treatment - Group (minutes) 0   PT Mode of treatment - Co-treat (minutes) 0   PT Mode of Treatment - Total time(minutes) 30 minutes   PT Cumulative Minutes 906   Therapy Time missed   Time missed?  No

## 2020-06-15 NOTE — PROGRESS NOTES
06/15/20 0858   Pain Assessment   Pain Assessment Tool 0-10   Pain Score 8   Pain Location/Orientation Orientation: Right;Location: Leg   Restrictions/Precautions   Precautions Fall Risk;Pain   RLE Weight Bearing Per Order TTWB   Oral Hygiene   Type of Assistance Needed Set-up / clean-up   Oral Hygiene CARE Score 5   Grooming   Able To Initiate Tasks;Comb/Brush Hair;Wash/Dry Face;Brush/Clean Teeth;Wash/Dry Hands   Findings pt seated at sink;OT assisted to wash hair    Shower/Bathe Self   Type of Assistance Needed Incidental touching;Verbal cues; Adaptive equipment   Shower/Bathe Self CARE Score 4   Bathing   Assessed Bath Style Sponge Bath   Anticipated D/C Bath Style Shower;Sponge Bath   Able to Wash/Rinse/Dry (body part) Left Arm;Right Arm;R Upper Leg;L Upper Leg;L Lower Leg/Foot;Chest;R Lower Leg/Foot; Abdomen;Perineal Area; Buttocks   Limitations Noted in Balance;ROM;Safety;Strength   Adaptive Equipment Longhand Sponge;Longhand Reacher   Findings  Pt declined shower stating she will perform at home  Upper Body Dressing   Type of Assistance Needed Set-up / clean-up   Upper Body Dressing CARE Score 5   Lower Body Dressing   Type of Assistance Needed Incidental touching;Verbal cues; Adaptive equipment   Lower Body Dressing CARE Score 4   Putting On/Taking Off Footwear   Type of Assistance Needed Adaptive equipment;Supervision   Putting On/Taking Off Footwear CARE Score 4   Dressing/Undressing Clothing   Remove UB Clothes Pullover Shirt   Don UB Clothes Pullover Shirt;Bra   Remove LB Clothes Undergarment;Pants;Socks   Don LB Clothes Pants; Undergarment;Socks   Limitations Noted In Balance; Safety;Strength;ROM   Adaptive Equipment Sock Aide;Reacher   Findings CGA ;AE with good knowledge    Sit to Lying   Type of Assistance Needed Supervision; Adaptive equipment;Verbal cues   Sit to Lying CARE Score 4   Lying to Sitting on Side of Bed   Type of Assistance Needed Supervision; Adaptive equipment;Verbal cues   Lying to Sitting on Side of Bed CARE Score 4   Sit to Stand   Type of Assistance Needed Incidental touching;Verbal cues   Comment R shoulder and elbow is bothering pt    Sit to Stand CARE Score 4   Bed-Chair Transfer   Type of Assistance Needed Incidental touching;Verbal cues   Chair/Bed-to-Chair Transfer CARE Score 4   Transfer Bed/Chair/Wheelchair   Limitations Noted In Balance;LE Strength;UE Strength   Toilet Transfer   Type of Assistance Needed Incidental touching;Verbal cues   Toilet Transfer CARE Score 4   Toilet Transfer   Surface Assessed Raised Toilet   Transfer Technique Stand Pivot   Limitations Noted In Balance;LE Strength   Cognition   Overall Cognitive Status WFL   Arousal/Participation Alert   Attention Within functional limits   Orientation Level Oriented X4   Memory Within functional limits   Following Commands Follows all commands and directions without difficulty   Activity Tolerance   Activity Tolerance Patient tolerated treatment well   Assessment   Treatment Assessment OT tx addressed ADL via basin bath due to declining shower  OT instructed in compensatory strategies and safe technqiues  Pt has fair to good recall of techniques  Pt overall CGA with LB  self care;refer to respective sections for details of session  Pt having pain in R shld and elbow which inhibits pushing up from sitting surface  Issued leg  and educated in technqiues and practcied multiple times with bed flat simulating home s/u  Pt completed with S and VCs  Pt will have a craftmatic bed at home  OT then simulated home s/u with 3in1 at bedside and recomm placement and how to secure device  Completed txf 2 complete times with CG/S  Pt confidence improved after review  Pt will be using w/c for primary mode of transportation at home  BR is too small for w/c and reports walker wont fit in BR therefor this s/u is deemed most effective  Pt progressing toward OT goals and d/c planning ongoing   Plan to continue skilled OT to address esatblished OT goals in preparation for d/c to home  Prognosis Good   Problem List Decreased strength;Decreased range of motion;Decreased mobility; Impaired balance;Decreased safety awareness;Orthopedic restrictions;Pain   Assessment Pt participated in 30 minutes of concurrent tx addressing similar goals with a pt with similar deficits  Plan   Treatment/Interventions ADL retraining;Functional transfer training; Therapeutic exercise; Endurance training;Patient/family training;Equipment eval/education; Compensatory technique education;OT   Progress Improving as expected   OT Therapy Minutes   OT Time In 0858   OT Time Out 1030   OT Total Time (minutes) 92   OT Mode of treatment - Individual (minutes) 62   OT Mode of treatment - Concurrent (minutes) 30   OT Mode of treatment - Group (minutes) 0   OT Mode of treatment - Co-treat (minutes) 0   OT Mode of Treatment - Total time(minutes) 92 minutes   OT Cumulative Minutes 302   Therapy Time missed   Time missed?  No

## 2020-06-16 LAB
GLUCOSE SERPL-MCNC: 103 MG/DL (ref 65–140)
GLUCOSE SERPL-MCNC: 137 MG/DL (ref 65–140)
GLUCOSE SERPL-MCNC: 140 MG/DL (ref 65–140)
GLUCOSE SERPL-MCNC: 236 MG/DL (ref 65–140)

## 2020-06-16 PROCEDURE — 97530 THERAPEUTIC ACTIVITIES: CPT

## 2020-06-16 PROCEDURE — 97116 GAIT TRAINING THERAPY: CPT

## 2020-06-16 PROCEDURE — 97537 COMMUNITY/WORK REINTEGRATION: CPT

## 2020-06-16 PROCEDURE — 82948 REAGENT STRIP/BLOOD GLUCOSE: CPT

## 2020-06-16 PROCEDURE — 97110 THERAPEUTIC EXERCISES: CPT

## 2020-06-16 PROCEDURE — 99233 SBSQ HOSP IP/OBS HIGH 50: CPT | Performed by: PHYSICAL MEDICINE & REHABILITATION

## 2020-06-16 PROCEDURE — 97535 SELF CARE MNGMENT TRAINING: CPT

## 2020-06-16 RX ADMIN — INSULIN LISPRO 2 UNITS: 100 INJECTION, SOLUTION INTRAVENOUS; SUBCUTANEOUS at 08:30

## 2020-06-16 RX ADMIN — LEVOTHYROXINE SODIUM 100 MCG: 100 TABLET ORAL at 05:21

## 2020-06-16 RX ADMIN — LISINOPRIL 5 MG: 5 TABLET ORAL at 08:35

## 2020-06-16 RX ADMIN — TRAMADOL HYDROCHLORIDE 50 MG: 50 TABLET, FILM COATED ORAL at 05:21

## 2020-06-16 RX ADMIN — TRAMADOL HYDROCHLORIDE 50 MG: 50 TABLET, FILM COATED ORAL at 12:14

## 2020-06-16 RX ADMIN — ACETAMINOPHEN 975 MG: 325 TABLET ORAL at 13:56

## 2020-06-16 RX ADMIN — INSULIN DETEMIR 4 UNITS: 100 INJECTION, SOLUTION SUBCUTANEOUS at 21:53

## 2020-06-16 RX ADMIN — ATORVASTATIN CALCIUM 20 MG: 20 TABLET, FILM COATED ORAL at 17:41

## 2020-06-16 RX ADMIN — POLYETHYLENE GLYCOL 3350 17 G: 17 POWDER, FOR SOLUTION ORAL at 17:41

## 2020-06-16 RX ADMIN — ASPIRIN 325 MG: 325 TABLET, COATED ORAL at 08:35

## 2020-06-16 RX ADMIN — METHOCARBAMOL 500 MG: 500 TABLET, FILM COATED ORAL at 21:53

## 2020-06-16 RX ADMIN — ACETAMINOPHEN 975 MG: 325 TABLET ORAL at 21:51

## 2020-06-16 RX ADMIN — ACETAMINOPHEN 975 MG: 325 TABLET ORAL at 05:21

## 2020-06-16 RX ADMIN — TRAMADOL HYDROCHLORIDE 50 MG: 50 TABLET, FILM COATED ORAL at 17:41

## 2020-06-16 RX ADMIN — TRAMADOL HYDROCHLORIDE 50 MG: 50 TABLET, FILM COATED ORAL at 23:48

## 2020-06-16 NOTE — TEAM CONFERENCE
Acute RehabilitationTeam Conference Note  Date: 6/16/2020   Time: 11:58 AM       Patient Name:  Kam Conley       Medical Record Number: [id number]   YOB: 1940  Sex: Female          Room/Bed:  [facility] 217/[facility] 217-01  Payor Info:  Payor: Mihai Luther / Plan: MEDICARE A AND B / Product Type: Medicare A & B Fee for Service /      Admitting Diagnosis: Closed femur fracture ([facility] ) Cora Fishman   Admit Date/Time:  6/1/2020  6:03 PM  Admission Comments: No comment available     Primary Diagnosis:  Closed intertrochanteric fracture of right femur ([facility] )  Principal Problem: Closed intertrochanteric fracture of right femur [facility])    Patient Active Problem List    Diagnosis Date Noted    Hypophosphatemia 06/04/2020    Postoperative urinary retention 06/01/2020    Elevated vitamin B12 level 06/01/2020    Anemia 06/01/2020    Hyponatremia 05/27/2020    Closed intertrochanteric fracture of right femur ([place] [address] ) 05/26/2020    Hypothyroidism 08/14/2015    Hyperlipidemia 12/23/2014    Essential hypertension 10/10/2013    DM (diabetes mellitus) ([place] [address] ) 05/21/2008       Physical Therapy:    Weight Bearing Status: Toe touch  Transfers: Incidental Touching  Bed Mobility: Incidental Touching(with leg )  Amulation Distance (ft): 18 feet  Ambulation: Supervision  Assistive Device for Ambulation: Roller Walker  Wheelchair Mobility Distance: 200 ft  Wheelchair Mobility: Independent  Number of Stairs: 1(curb step)  Assistive Device for Stairs: [employer] Assistance: Incidental Touching  Discharge Recommendations: Home with:  [address] [facility] Momo Shepard with[de-identified] Family Support, First Floor Setup, Home Physical Therapy    06/02/2020:   Patient seen for IE today  Presents with decreased ROM/strength, decreased balance and safety, TTWB RLE, and pain  Patient MOD A bed mobility, MOD A transfers with walker, S wheelchair mobility  Patient non-ambulatory at this time due to pain and decrease weight bearing RLE    May benefit from continued inpatient ARC PT to increased function, safety, and increased independence in prep for safe d/c     6/8/20: Pt is participating in PT and progressing towards goals; Presents with decreased ROM/strength, decreased balance and safety, TTWB RLE, and pain  Patient MOD A bed mobility, CGA transfers with walker, S wheelchair mobility 175'  Pt amb 5' with RW and CGA  Pt is very limited by pain in R LE  May benefit from continued inpatient ARC PT to increased function, safety, and increased independence in prep for safe d/c     6/15/20: Pt is participating in PT and progressing towards goals; Presents with decreased ROM/strength, decreased balance and safety, TTWB RLE, and pain  Patient CGA bed mobility with leg  and cues, CGA transfers with walker, MI wheelchair mobility 200'  Pt amb 25' with RW and S  Pt is very limited by pain in R LE  May benefit from continued inpatient ARC PT to increased function, safety, and increased independence in prep for safe d/c  Occupational Therapy:  Eating: Independent  Grooming: Independent  Bathing: Incidental Touching  Bathing: Incidental Touching  Upper Body Dressing: Supervision  Lower Body Dressing: Incidental Touching  Toileting: Incidental Touching  Tub/Shower Transfer: (TBA   pt declines shower)  Toilet Transfer: Incidental Touching  Cognition: Within Defined Limits  Orientation: Person, Place, Time, Situation  Discharge Recommendations: Home with:  [address] She[address] with[de-identified] First Floor Setup, Family Support, Home Occupational Therapy       6/2/2020: Pt participates in ADLs and transfers following eval this day  Pt requires assist due to decreased balance, safety, endurance and RLE ROM/ str with TTWB and pain  Pt's current LOF as listed above and will benefit from skilled OT services to increase independence with daily tasks  6/8/2020: Pt participates in ADLs, transfers and BUE therex   Pt is making great progress with A/E use for LB and assist of one for all tasks maintaining TTWB RLE well  Pt requires assist due to decreased balance, safety, endurance and RLE strength and ROM  Pt's current LOF as listed above  Pt will benefit from continued skilled OT services to increase independence with daily tasks  06/15/2020 Pt participating in [facility] via basin level with compensatory strategies and safe techniques, safety with functional txfs, home mgmt tasks via w/c level, generalized strength and endurance through the use of TE/TA  Pt progressing toward OT goals  LOF noted above  Barriers include TTWB R LE, impaired balance in stance, pain, decreased UE ROM and safety deficits  OT issued AE including leg  and ongoing educ in same  Simulating home s/u with txfs in preparation for d/c  W/C to be primary mode of transport at home  Plan is to continue OT to address established OT goals in prep for d/c to home with GD staying with pt  Speech Therapy:           No notes on file    Nursing Notes:  Appetite: Good  Diet Type: Thin Liquids, Diabetic                      Diet Patient/Family Education Complete: Yes    Type of Wound (LDA): Wound                    Type of Wound Patient/Family Education: Yes  Bladder: Continent     Bladder Patient/Family Education: Yes  Bowel: Continent     Bowel Patient/Family Education: Yes  Pain Location/Orientation: Orientation: Right, Location: Shoulder  Pain Score: 8                       Hospital Pain Intervention(s): Repositioned, Relaxation technique  Pain Patient/Family Education: Yes  Medication Management/Safety  Injectable: Arixtra  Medication Patient/Family Education Complete: No    6/2/20 Admit to [facility] 6/1/20 s/p R femur fx with surgical repair  Alert and oriented  Lungs clear/decreased  HR regular  +1/+2 edema bilateral legs  Dsg intact over incision R hip  Pt is TTWB to RLE  Pt has been having difficulty with urine retention since ramos removal  Pt has prn tramadol for pain  [facility]    6/9/20 Alert and oriented  Lungs clear/decreased on RA  HR regular  Staples intact to both incisions R hip  Swelling/edema present on R hip  Pt is TTWB to RLE  Pt ramos removed in evening of 6/8/20 for voiding trial  Pt has scheduled tylenol and tramadol and prn oxycodone for pain management  Pt has been free from falls while in [facility]    6/16/20 Alert and oriented  Lungs/decreased on RA  HR regular  Staples were removed and steri strips are in place to R hip  Pt is TTWB to RLE  DTI to R heel is healing  Pt is continent of bowel and bladder  Pt has scheduled tylenol and tramadol for pain management  Pt has been free from falls while in [facility]    Case Management:     Discharge Planning  Living Arrangements: Alone  Support Systems: Family members, Friends/neighbors  Assistance Needed: [facility]  Type of Current Residence: Private residence  [address]: No  Initial assessment & orientation to [facility] with Pt & family completed on 6/2/20  Pt resides alone in a single story home, 2 steps to enter, HR on R  She reported that her son & daughter reside nearby and assist as needed, though Pt was independent PTA  Pt expressed a high level of motivation to reach her goals & to eventually resume activities such 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  Discussed role of this worker & reviewed [address] with Pt & Pt's family, who expressed understanding & agreement  Tx team recommendations reviewed with patient & family, who expressed understanding & agreement  Target DC date is in 2 weeks with [facility] (PT, OT, Nsg); a list of providers was provided to Pt & a referral will be made based on Pt preference  SW will continue to monitor & assist as needed with Tx & DC planning  IMM reviewed, signed & submitted for scanning  6/9/20 - Tx team recommendations reviewed with patient & family, who expressed understanding & agreement  Target DC date is extended to 6/18 with [facility] (Nsg, PT, OT); a list of providers was provided to Pt & a referral will be made based on Pt preference   Archie Irwin will continue to monitor & assist as needed with Tx & DC planning  Is the patient actively participating in therapies? yes  List any modifications to the treatment plan: na    Barriers Interventions   Left heal wound Positioning    TTWB right LE Cues, ADL, transfer, gait training   pain Medication management   Decreased balance ADL, transfer, gait training         Is the patient making expected progress toward goals?  yes  List any update or changes to goals: na    Medical Goals: Patient will be able to manage medical conditions and comorbid conditions with medications and follow up upon completion of rehab program    Weekly Team Goals:   Rehab Team Goals  ADL Team Goal: Patient will be independent with ADLs with least restrictive device upon completion of rehab program  Bowel/Bladder Team Goal: Patient will be independent with bladder/bowel management with least restrictive device upon completion of rehab program  Transfer Team Goal: Patient will be independent with transfers with least restrictive device upon completion of rehab program  Locomotion Team Goal: Patient will return to premorbid level for locomotion upon completion of rehab program  Cognitive Team Goal: Patient will return to premorbid level of cognitive activity upon completion of rehab program     Mod I transfers, self care  S mobility    Health and wellness: to be able to return to assisting son at his [employer] shop    Discussion: Plan for return home with [facility] with PTSally, and nursing    Anticipated Discharge Date:  June 18, 2020

## 2020-06-16 NOTE — PLAN OF CARE
Problem: Prexisting or High Potential for Compromised Skin Integrity  Goal: Skin integrity is maintained or improved  Description  INTERVENTIONS:  - Identify patients at risk for skin breakdown  - Assess and monitor skin integrity  - Assess and monitor nutrition and hydration status  - Monitor labs   - Assess for incontinence   - Turn and reposition patient  - Assist with mobility/ambulation  - Relieve pressure over bony prominences  - Avoid friction and shearing  - Provide appropriate hygiene as needed including keeping skin clean and dry  - Evaluate need for skin moisturizer/barrier cream  - Collaborate with interdisciplinary team   - Patient/family teaching  - Consider wound care consult   Outcome: Progressing     Problem: Potential for Falls  Goal: Patient will remain free of falls  Description  INTERVENTIONS:  - Assess patient frequently for physical needs  -  Identify cognitive and physical deficits and behaviors that affect risk of falls    -  [place] fall precautions as indicated by assessment   - Educate patient/family on patient safety including physical limitations  - Instruct patient to call for assistance with activity based on assessment  - Modify environment to reduce risk of injury  - Consider OT/PT consult to assist with strengthening/mobility  Outcome: Progressing     Problem: PAIN - ADULT  Goal: Verbalizes/displays adequate comfort level or baseline comfort level  Description  Interventions:  - Encourage patient to monitor pain and request assistance  - Assess pain using appropriate pain scale  - Administer analgesics based on type and severity of pain and evaluate response  - Implement non-pharmacological measures as appropriate and evaluate response  - Consider cultural and social influences on pain and pain management  - Notify physician/advanced practitioner if interventions unsuccessful or patient reports new pain  Outcome: Progressing     Problem: INFECTION - ADULT  Goal: Absence or prevention of progression during hospitalization  Description  INTERVENTIONS:  - Assess and monitor for signs and symptoms of infection  - Monitor lab/diagnostic results  - Monitor all insertion sites, i e  indwelling lines, tubes, and drains  - Monitor endotracheal if appropriate and nasal secretions for changes in amount and color  - [place] appropriate cooling/warming therapies per order  - Administer medications as ordered  - Instruct and encourage patient and family to use good hand hygiene technique  - Identify and instruct in appropriate isolation precautions for identified infection/condition  Outcome: Progressing  Goal: Absence of fever/infection during neutropenic period  Description  INTERVENTIONS:  - Monitor WBC    Outcome: Progressing     Problem: SAFETY ADULT  Goal: Patient will remain free of falls  Description  INTERVENTIONS:  - Assess patient frequently for physical needs  -  Identify cognitive and physical deficits and behaviors that affect risk of falls    -  [place] fall precautions as indicated by assessment   - Educate patient/family on patient safety including physical limitations  - Instruct patient to call for assistance with activity based on assessment  - Modify environment to reduce risk of injury  - Consider OT/PT consult to assist with strengthening/mobility  Outcome: Progressing  Goal: Maintain or return to baseline ADL function  Description  INTERVENTIONS:  -  Assess patient's ability to carry out ADLs; assess patient's baseline for ADL function and identify physical deficits which impact ability to perform ADLs (bathing, care of mouth/teeth, toileting, grooming, dressing, etc )  - Assess/evaluate cause of self-care deficits   - Assess range of motion  - Assess patient's mobility; develop plan if impaired  - Assess patient's need for assistive devices and provide as appropriate  - Encourage maximum independence but intervene and supervise when necessary  - Involve family in performance of ADLs  - Assess for home care needs following discharge   - Consider OT consult to assist with ADL evaluation and planning for discharge  - Provide patient education as appropriate  Outcome: Progressing  Goal: Maintain or return mobility status to optimal level  Description  INTERVENTIONS:  - Assess patient's baseline mobility status (ambulation, transfers, stairs, etc )    - Identify cognitive and physical deficits and behaviors that affect mobility  - Identify mobility aids required to assist with transfers and/or ambulation (gait belt, sit-to-stand, lift, walker, cane, etc )  - [place] fall precautions as indicated by assessment  - Record patient progress and toleration of activity level on Mobility SBAR; progress patient to next Phase/Stage  - Instruct patient to call for assistance with activity based on assessment  - Consider rehabilitation consult to assist with strengthening/weightbearing, etc   Outcome: Progressing     Problem: DISCHARGE PLANNING  Goal: Discharge to home or other facility with appropriate resources  Description  INTERVENTIONS:  - Identify barriers to discharge w/patient and caregiver  - Arrange for needed discharge resources and transportation as appropriate  - Identify discharge learning needs (meds, wound care, etc )  - Arrange for interpretive services to assist at discharge as needed  - Refer to Case Management Department for coordinating discharge planning if the patient needs post-hospital services based on physician/advanced practitioner order or complex needs related to functional status, cognitive ability, or social support system  Outcome: Progressing

## 2020-06-16 NOTE — NURSING NOTE
Patient out of bed with assist times one with walker  Right hip incision LAURI with steri strips  Patient medicated for pain in right hip and right shoulder with relief noted  Compliant with SCDs  Will continue to monitor and follow plan of care

## 2020-06-16 NOTE — CASE MANAGEMENT
Tx team recommendations reviewed with patient & daughter, who expressed understanding & agreement  Target DC date is 6/18 with [facility] (PT, OT, Nsg); a list of providers was provided to Pt & a referral was made to [facility] per Pt preference  SW will continue to monitor & assist as needed with Tx & DC planning

## 2020-06-16 NOTE — PROGRESS NOTES
Physical Medicine and Rehabilitation Progress Note  Sheldon Chu 78 y o  female MRN: [id number]  Unit/Bed#: -01 Encounter: [id number]    HPI: Sheldon Chu is a 78 y o  female who presented to the [facility] [address] after fall and was found to have a right IT fx and is s/p nail fixation by Dr Reese Sifuentes on 5/28  Course complicated by hyponatremia and patient was placed on FR  Chief Complaint: Rt hip fx     Interval/subjective: d/w patient potential dc date and patient is agreeable     ROS: A 10 point ROS was performed; negative except as noted above       Assessment/Plan:      * Closed intertrochanteric fracture of right femur [facility])  Assessment & Plan  - s/p nail fixation by Dr Reese Sifuentes on 5/28  - TTWB per ortho  - OP FU with Dr Reese Sifuentes     Hypophosphatemia  Tacey Heckle  - patient with decreased level on 5/28 therefore started on phos supplements in acute care on 5/28 and on 5/31, 6/2, 6/5, 6/9 levels WNL at 3 7, 3 6, 3 7, & 3 7 respectively  - additionally phos packets contain K and K level of 6/9 was WNL at 4 2  - d/w Dr Jewelene Buerger of renal above findings and he recommended phos (with k) packets be stopped which was done on 6/12  - recheck phos and K in AM     Anemia  Assessment & Plan  - Hg currently 8 1 post-operatively; recheck in AM   - IM & renal co-managing     Hyponatremia  Assessment & Plan  - currently WNL at 137; recheck in AM   - d/w Dr Jewelene Buerger of nephrology who recommended that FR of 1800 cc should be discontinued which was done on 6/12   - renal managing     Elevated vitamin B12 level  Assessment & Plan  - elevated to 4033 ()  - patient takes supplemental B12 therefore will stop and have patient FU with PCP     Postoperative urinary retention  Assessment & Plan  - resolved (PVRs 118, 210, 32)     DM (diabetes mellitus) ([facility] )  Assessment & Plan  - per patient on metformin 500 mg BID with meals, insulin 5 units with meals, and levemir 5 (not 10) units at HS   - follows with Dr Kelvin Wallace of endocrine  - IM managing     Hypothyroidism  Assessment & Plan  - TSH of 5/28 elevated (free T4 WNL)  - on home regimen of levothyroxine 100 mcg qd except for Sundays when she takes 50 mcg qd  - follows with Dr Kelvin Wallace of endocrine  - IM managing and recommend patient have repeat TFTs as OP and FU with Dr Chun Lainez hypertension  Assessment & Plan  - at home ramapril 5 mg qd  - IM & renal co-managing     Hyperlipidemia  Assessment & Plan  - on home lipitor 20 mg qpm (per patient no longer on crestor)       Scheduled Meds:    Current Facility-Administered Medications:  acetaminophen 975 mg Oral Q8H Buffy Packer MD   aspirin 325 mg Oral Daily Rochelle Robison MD   atorvastatin 20 mg Oral Daily With Ancelmo Perez MD   bisacodyl 10 mg Rectal Daily PRN Rochelle Robison MD   insulin detemir 10 Units Subcutaneous HS Penny Espinosa PA-C   insulin lispro 1-5 Units Subcutaneous TID AC Rochelle Robison MD   insulin lispro 1-5 Units Subcutaneous HS Rochelle Robison MD   insulin lispro 3 Units Subcutaneous TID With Meals Verlan Pretty, CRDURAN   levothyroxine 100 mcg Oral Once per day on Mon Tue Wed Thu Fri Sat Rochelle Robison MD   levothyroxine 50 mcg Oral Weekly Rochelle Robison MD   lisinopril 5 mg Oral Daily Rochelle Robison MD   methocarbamol 500 mg Oral Q6H PRN Rochelle Robison MD   oxyCODONE 2 5 mg Oral Q6H PRN Rochelle Robison MD   polyethylene glycol 17 g Oral Daily Rochelle Robison MD   traMADol 50 mg Oral Q6H Buffy Packer MD        Incidental findings:     1) mildly decreased alk phos level: currently 54 (LLN 55)     DVT ppx: per Dr Aneudy Duarte protocol arixtra 2 5 mg sc x 2 weeks post-op then followed by asa 325 mg for 4 weeks (note patient is on asa 81 mg qd at home and was cleared in acute care to be on both home asa 81 mg qd and arixtra and patient has been on both since 5/29 however once patient transitions to asa 325 mg qd on 6/12 per Dr Aneudy Duarte protocol she will stop asa 81 mg qd until she has completed the 4 week course of asa 325 gm qd per Dr Linus Sandifer protocol)     Code: confirmed with patient that she wishes to be DNR/DNI and verbalized her understanding of what this means      Note: confirmed home meds with patient     Objective:    Functional Update:  Mobility: sup   Transfers: cg  ADLs: cg      Physical Exam:     Vitals:    06/16/20 0633   BP: 112/67   Pulse: 72   Resp: 16   Temp: 98 4   SpO2: 97%           General: alert, no apparent distress, cooperative and comfortable  HEENT:  Head: Normal, normocephalic, atraumatic  CARDIAC:  +S1/2  LUNGS:  no abnormal respiratory pattern, no retractions noted, non-labored breathing   ABDOMEN:  soft NT   EXTREMITIES:  volume status currently stable   NEURO:  awake, alert, appropriately answering questions   PSYCH:  mood/affect currently stable          Diagnostic Studies:   No orders to display       Laboratory:         Invalid input(s): PLTCT  Results from last 7 days   Lab Units 06/12/20  0600   SODIUM mmol/L 137              This patient was discussed by the Interdisciplinary Team in weekly case conference today  The care of the patient was extensively discussed with all care providers and an appropriate rehabilitation plan was formulated unique for this patient  Barriers were identified preventing progression of therapy and appropriate interventions were discussed with each discipline  Please see the team note for input from all disciplines regarding barriers, intervention, and discharge planning  [ x ] Total time spent: 35 Mins, and greater than 50% of this time was spent counseling/coordinating care

## 2020-06-16 NOTE — NURSING NOTE
Pt transfers with walker and supervision  Continent of bowel and bladder  Pt has expressed anxiety about ability to manage stairs after discharge  Compliant with SCDs

## 2020-06-16 NOTE — PROGRESS NOTES
06/16/20 1302   Pain Assessment   Pain Assessment Tool 0-10   Pain Score 9   Pain Location/Orientation Orientation: Right;Location: Shoulder; Location: Arm;Location: Hip   Restrictions/Precautions   Precautions Fall Risk;Fluid restriction;Limb alert   RLE Weight Bearing Per Order TTWB   Cognition   Overall Cognitive Status [facility]   Arousal/Participation Alert; Responsive; Cooperative   Attention Within functional limits   Orientation Level Oriented X4   Memory Within functional limits   Following Commands Follows all commands and directions without difficulty   Sit to Lying   Type of Assistance Needed Adaptive equipment;Supervision   Amount of Physical Assistance Provided No physical assistance   Sit to Lying CARE Score 4   Sit to Stand   Type of Assistance Needed Supervision   Amount of Physical Assistance Provided No physical assistance   Sit to Stand CARE Score 4   Bed-Chair Transfer   Type of Assistance Needed Supervision   Amount of Physical Assistance Provided No physical assistance   Chair/Bed-to-Chair Transfer CARE Score 4   Transfer Bed/Chair/Wheelchair   Limitations Noted In Balance;Confidence; Coordination; Endurance;Pain Management;UE Strength;LE Strength   Adaptive Equipment Roller Walker   Stand Pivot Supervision   Sit to Stand Supervision   Stand to Sit Supervision   Sit to Supine Supervision   All Transfer Supervision   Walk 10 Feet   Type of Assistance Needed Supervision   Amount of Physical Assistance Provided No physical assistance   Walk 10 Feet CARE Score 4   Walk 50 Feet with Two Turns   Reason if not Attempted Safety concerns   Walk 50 Feet with Two Turns CARE Score 88   Walk 150 Feet   Reason if not Attempted Safety concerns   Walk 150 Feet CARE Score 88   Walking 10 Feet on Uneven Surfaces   Reason if not Attempted Safety concerns   Walking 10 Feet on Uneven Surfaces CARE Score 88   Ambulation   Does the patient walk? 2   Yes   Primary Mode of Locomotion Prior to Admission [facility] (feet) 29 ft  (13)   Assist Device Roller Walker   Gait Pattern Inconsistant Noemi; Improper weight shift   Limitations Noted In Balance; Coordination; Endurance; Safety;Strength   Provided Assistance with: Balance   Walk Assist Level Close Supervision   Wheel 50 Feet with Two Turns   Type of Assistance Needed Independent   Amount of Physical Assistance Provided No physical assistance   Wheel 50 Feet with Two Turns CARE Score 6   Wheel 150 Feet   Type of Assistance Needed Independent   Amount of Physical Assistance Provided No physical assistance   Wheel 150 Feet CARE Score 6   Wheelchair mobility   Does the patient use a wheelchair? 1  Yes   Type of Wheelchair Used 1  Manual   Method Right upper extremity; Left upper extremity   Distance Level Surface (feet) 184 ft   Findings level and carpet   Curb or Single Stair   Style negotiated Curb   Type of Assistance Needed Incidental touching   Amount of Physical Assistance Provided No physical assistance   1 Step (Curb) CARE Score 4   4 Steps   Reason if not Attempted Safety concerns   4 Steps CARE Score 88   12 Steps   Reason if not Attempted Safety concerns   12 Steps CARE Score 88   Stairs   Type Stairs   # of Steps 1   Weight Bearing Precautions TTWB   Assist Devices Roller Walker   Findings CGA with cues for encouragement and sequence   Therapeutic Interventions   Strengthening seated and supine ther ex 30 reps   Other gait and tranfers   Assessment   Treatment Assessment Pt is pleasant and cooperative with PT session; Pt is Mi for w/c mobility; Pt will be primary mode of mobility; Pt was able to increase amb distance to 34' with RW and close S; Pt continues to need encouragemnt for amb and curb step, she gets anxious; Pt was not able to go forward up curb step swith RW but is able to step up backwards with RW and CGA with cues for sequence; Pt maintains TTWB throughout session;  Pt performed seated and supine ther ex for general conditioning and strengthening B LE   PT Barriers   Physical Impairment Decreased strength;Decreased range of motion;Decreased endurance; Impaired balance;Decreased mobility; Decreased safety awareness;Pain   Functional Limitation Wheelchair management; Walking;Standing;Transfers   Plan   Treatment/Interventions Functional transfer training;LE strengthening/ROM; Elevations; Therapeutic exercise;Gait training   Progress Progressing toward goals   PT Therapy Minutes   PT Time In 1302   PT Time Out 1435   PT Total Time (minutes) 93   PT Mode of treatment - Individual (minutes) 93   PT Mode of treatment - Concurrent (minutes) 0   PT Mode of treatment - Group (minutes) 0   PT Mode of treatment - Co-treat (minutes) 0   PT Mode of Treatment - Total time(minutes) 93 minutes   PT Cumulative Minutes 999   Therapy Time missed   Time missed?  No

## 2020-06-16 NOTE — PROGRESS NOTES
06/16/20 0830   Pain Assessment   Pain Assessment Tool 0-10   Pain Score 8   Pain Location/Orientation Orientation: Right;Location: Leg;Location: Shoulder   Restrictions/Precautions   RLE Weight Bearing Per Order TTWB   Eating   Type of Assistance Needed Independent   Eating CARE Score 6   Oral Hygiene   Type of Assistance Needed Set-up / clean-up   Oral Hygiene CARE Score 5   Grooming   Able To Initiate Tasks;Comb/Brush Hair;Wash/Dry Face;Brush/Clean Teeth;Wash/Dry Hands   Findings seated at sink in w/c   Shower/Bathe Self   Type of Assistance Needed Adaptive equipment;Verbal cues; Supervision   Shower/Bathe Self CARE Score 4   Bathing   Assessed Bath Style Sponge Bath   Able to Adjust Water Temperature Yes   Able to Wash/Rinse/Dry (body part) Left Arm;Right Arm;L Upper Leg;R Upper Leg;L Lower Leg/Foot;R Lower Leg/Foot;Chest;Abdomen;Perineal Area; Buttocks   Limitations Noted in Balance;Strength;ROM   Adaptive Equipment Longhand Reacher;Longhand Sponge   Findings  Pt has improved confidence with task and able to complete bathing with close supervision    Upper Body Dressing   Type of Assistance Needed Set-up / clean-up   Upper Body Dressing CARE Score 5   Lower Body Dressing   Type of Assistance Needed Incidental touching   Lower Body Dressing CARE Score 4   Putting On/Taking Off Footwear   Type of Assistance Needed Adaptive equipment;Set-up / clean-up   Putting On/Taking Off Footwear CARE Score 5   Dressing/Undressing Clothing   Remove UB Clothes Pullover [place]; Button Shirt;Bra   Remove LB Clothes Pants; Undergarment;Socks   Don LB Clothes Pants; Undergarment;Socks   Limitations Noted In Balance; Endurance; Safety;Strength;ROM   Adaptive Equipment Reacher;Sock Aide   Roll Left and Right   Type of Assistance Needed Independent   Roll Left and Right CARE Score 6   Sit to Lying   Type of Assistance Needed Adaptive equipment;Supervision   Sit to Lying CARE Score 4   Lying to Sitting on Side of Bed   Type of Assistance Needed Supervision; Adaptive equipment   Comment Pt getting a craftmatic bed;was able to manage with bed flat and no SRs   Lying to Sitting on Side of Bed CARE Score 4   Sit to Stand   Type of Assistance Needed Supervision;Verbal cues   Sit to Stand CARE Score 4   Exercise Tools   Other Exercise Tool 2 1# dumbell 2x15 reps in 4 planes in supine    Cognition   Overall Cognitive Status [facility]   Arousal/Participation Alert; Cooperative   Attention Within functional limits   Orientation Level Oriented X4   Memory Within functional limits   Following Commands Follows all commands and directions without difficulty   Activity Tolerance   Activity Tolerance Patient tolerated treatment well   Assessment   Treatment Assessment OT tx addressed ADL via basin bath due to declining shower  OT instructed in compensatory strategies and safe technqiues  Pt has  good recall of techniques  Pt overall CGA/S  with LB  self care;refer to respective sections for details of session  Pt having pain in R shld and elbow which inhibits pushing up from sitting surface  Simulating home s/u with C txfs and completed 2x with supervision and was able to use leg  to enter and exit bed on her own  Pt also able to retireve items w/c level MI  Pt participated in 30 minutes of concurrent tx addressing similar goals with a pt with similar deficits  Plan to continue skilled OT to address esatblished OT goals in preparation for d/c to home  Prognosis Good   Problem List Decreased strength;Decreased range of motion;Decreased endurance; Impaired balance;Decreased mobility; Decreased safety awareness;Orthopedic restrictions;Pain   Assessment Discussed d/c plan with pt due to pt concerned about steps  OT talked with SW and PT and it was determined that 06/18 will be discharge date after therapy  Pt declined F/T       Plan   Treatment/Interventions ADL retraining;Functional transfer training;Patient/family training;Equipment eval/education; Compensatory technique education;OT   Progress Improving as expected   OT Therapy Minutes   OT Time In 0830   OT Time Out 1000   OT Total Time (minutes) 90   OT Mode of treatment - Individual (minutes) 60   OT Mode of treatment - Concurrent (minutes) 30   OT Mode of treatment - Group (minutes) 0   OT Mode of treatment - Co-treat (minutes) 0   OT Mode of Treatment - Total time(minutes) 90 minutes   OT Cumulative Minutes 392   Therapy Time missed   Time missed?  No

## 2020-06-17 LAB
GLUCOSE SERPL-MCNC: 120 MG/DL (ref 65–140)
GLUCOSE SERPL-MCNC: 171 MG/DL (ref 65–140)
GLUCOSE SERPL-MCNC: 198 MG/DL (ref 65–140)
GLUCOSE SERPL-MCNC: 225 MG/DL (ref 65–140)
HCT VFR BLD AUTO: 30 % (ref 42–47)
HGB BLD-MCNC: 10.3 G/DL (ref 12–16)
PHOSPHATE SERPL-MCNC: 3.6 MG/DL (ref 3–5.5)
POTASSIUM SERPL-SCNC: 4.2 MMOL/L (ref 3.5–5.5)
SODIUM SERPL-SCNC: 137 MMOL/L (ref 134–143)

## 2020-06-17 PROCEDURE — 84295 ASSAY OF SERUM SODIUM: CPT | Performed by: PHYSICAL MEDICINE & REHABILITATION

## 2020-06-17 PROCEDURE — 85014 HEMATOCRIT: CPT | Performed by: PHYSICAL MEDICINE & REHABILITATION

## 2020-06-17 PROCEDURE — 99232 SBSQ HOSP IP/OBS MODERATE 35: CPT | Performed by: PHYSICAL MEDICINE & REHABILITATION

## 2020-06-17 PROCEDURE — 97537 COMMUNITY/WORK REINTEGRATION: CPT

## 2020-06-17 PROCEDURE — 97116 GAIT TRAINING THERAPY: CPT

## 2020-06-17 PROCEDURE — 97530 THERAPEUTIC ACTIVITIES: CPT

## 2020-06-17 PROCEDURE — 97110 THERAPEUTIC EXERCISES: CPT

## 2020-06-17 PROCEDURE — 84100 ASSAY OF PHOSPHORUS: CPT | Performed by: PHYSICAL MEDICINE & REHABILITATION

## 2020-06-17 PROCEDURE — 84132 ASSAY OF SERUM POTASSIUM: CPT | Performed by: PHYSICAL MEDICINE & REHABILITATION

## 2020-06-17 PROCEDURE — 85018 HEMOGLOBIN: CPT | Performed by: PHYSICAL MEDICINE & REHABILITATION

## 2020-06-17 PROCEDURE — 82948 REAGENT STRIP/BLOOD GLUCOSE: CPT

## 2020-06-17 PROCEDURE — 97535 SELF CARE MNGMENT TRAINING: CPT

## 2020-06-17 RX ORDER — ASPIRIN 81 MG/1
81 TABLET ORAL DAILY
COMMUNITY

## 2020-06-17 RX ORDER — LISINOPRIL 5 MG/1
5 TABLET ORAL DAILY
Qty: 30 TABLET | Refills: 0 | Status: SHIPPED | OUTPATIENT
Start: 2020-06-19 | End: 2022-05-20

## 2020-06-17 RX ORDER — ASPIRIN 325 MG
325 TABLET, DELAYED RELEASE (ENTERIC COATED) ORAL DAILY
Qty: 21 TABLET | Refills: 0 | Status: SHIPPED | OUTPATIENT
Start: 2020-06-19 | End: 2020-07-10

## 2020-06-17 RX ADMIN — METHOCARBAMOL 500 MG: 500 TABLET, FILM COATED ORAL at 21:00

## 2020-06-17 RX ADMIN — LISINOPRIL 5 MG: 5 TABLET ORAL at 09:21

## 2020-06-17 RX ADMIN — TRAMADOL HYDROCHLORIDE 50 MG: 50 TABLET, FILM COATED ORAL at 12:01

## 2020-06-17 RX ADMIN — TRAMADOL HYDROCHLORIDE 50 MG: 50 TABLET, FILM COATED ORAL at 17:25

## 2020-06-17 RX ADMIN — ACETAMINOPHEN 975 MG: 325 TABLET ORAL at 21:00

## 2020-06-17 RX ADMIN — INSULIN LISPRO 1 UNITS: 100 INJECTION, SOLUTION INTRAVENOUS; SUBCUTANEOUS at 17:00

## 2020-06-17 RX ADMIN — INSULIN LISPRO 1 UNITS: 100 INJECTION, SOLUTION INTRAVENOUS; SUBCUTANEOUS at 12:47

## 2020-06-17 RX ADMIN — ASPIRIN 325 MG: 325 TABLET, COATED ORAL at 09:21

## 2020-06-17 RX ADMIN — ACETAMINOPHEN 975 MG: 325 TABLET ORAL at 05:41

## 2020-06-17 RX ADMIN — TRAMADOL HYDROCHLORIDE 50 MG: 50 TABLET, FILM COATED ORAL at 05:41

## 2020-06-17 RX ADMIN — INSULIN DETEMIR 10 UNITS: 100 INJECTION, SOLUTION SUBCUTANEOUS at 21:02

## 2020-06-17 RX ADMIN — ATORVASTATIN CALCIUM 20 MG: 20 TABLET, FILM COATED ORAL at 17:25

## 2020-06-17 RX ADMIN — LEVOTHYROXINE SODIUM 100 MCG: 100 TABLET ORAL at 05:41

## 2020-06-17 RX ADMIN — ACETAMINOPHEN 975 MG: 325 TABLET ORAL at 14:15

## 2020-06-17 NOTE — PROGRESS NOTES
06/17/20 1305   Pain Assessment   Pain Assessment Tool 0-10   Pain Score 8   Pain Location/Orientation Orientation: Right;Location: Leg   Restrictions/Precautions   Precautions Fall Risk;Pain   RLE Weight Bearing Per Order TTWB   Cognition   Overall Cognitive Status WFL   Arousal/Participation Alert; Responsive; Cooperative   Attention Within functional limits   Orientation Level Oriented X4   Memory Within functional limits   Following Commands Follows all commands and directions without difficulty   Sit to Lying   Type of Assistance Needed Supervision; Adaptive equipment   Amount of Physical Assistance Provided No physical assistance   Sit to Lying CARE Score 4   Sit to Stand   Type of Assistance Needed Supervision   Amount of Physical Assistance Provided No physical assistance   Sit to Stand CARE Score 4   Bed-Chair Transfer   Type of Assistance Needed Supervision   Amount of Physical Assistance Provided No physical assistance   Chair/Bed-to-Chair Transfer CARE Score 4   Transfer Bed/Chair/Wheelchair   Limitations Noted In Balance;Confidence; Coordination; Endurance;Pain Management;LE Strength   Adaptive Equipment Roller Walker   Stand Pivot Supervision   Sit to Stand Supervision   Stand to Sit Supervision   Sit to Supine Supervision   All Transfer Supervision   Walk 10 Feet   Type of Assistance Needed Supervision   Amount of Physical Assistance Provided No physical assistance   Walk 10 Feet CARE Score 4   Walk 50 Feet with Two Turns   Reason if not Attempted Safety concerns   Walk 50 Feet with Two Turns CARE Score 88   Walk 150 Feet   Reason if not Attempted Safety concerns   Walk 150 Feet CARE Score 88   Walking 10 Feet on Uneven Surfaces   Reason if not Attempted Safety concerns   Walking 10 Feet on Uneven Surfaces CARE Score 88   Ambulation   Does the patient walk? 2   Yes   Primary Mode of Locomotion Prior to Admission Walk   Distance Walked (feet) 27 ft  (10)   Assist Device vitaMedMD   Gait Pattern Inconsistant Noemi   Limitations Noted In Balance; Coordination; Endurance; Safety;Strength   Provided Assistance with: Balance   Walk Assist Level Close Supervision   Wheel 50 Feet with Two Turns   Type of Assistance Needed Independent   Amount of Physical Assistance Provided No physical assistance   Wheel 50 Feet with Two Turns CARE Score 6   Wheel 150 Feet   Type of Assistance Needed Independent   Amount of Physical Assistance Provided No physical assistance   Wheel 150 Feet CARE Score 6   Wheelchair mobility   Does the patient use a wheelchair? 1  Yes   Type of Wheelchair Used 1  Manual   Method Right upper extremity; Left upper extremity   Distance Level Surface (feet) 200 ft   Findings level and carpet   Curb or Single Stair   Style negotiated Curb   Type of Assistance Needed Incidental touching   Amount of Physical Assistance Provided No physical assistance   1 Step (Curb) CARE Score 4   4 Steps   Reason if not Attempted Safety concerns   4 Steps CARE Score 88   12 Steps   Reason if not Attempted Safety concerns   12 Steps CARE Score 88   Stairs   Type Curb   # of Steps 1   Weight Bearing Precautions TTWB   Assist Devices Roller Walker   Findings CGA with cues   Therapeutic Interventions   Strengthening seated and supine ther ex 30 reps   Other gait, transfer, w/c mobility   Assessment   Treatment Assessment Pt is S level for transfers and bed mobility  using leg ; Pt is Mi for w/c mobility up to 200'; Focused on technique with curb step today; Pt steps up backwards on curb step using RW with CGA and cues for encouragment; Pt will have family with her when going up curb step at home; Pt amb up to 32' with RW and Close S; Pt will primarily be using w/c around home; Pt performed seated and supine ther ex for strengthening B LE; Pt is planned for D/C tomorrow after therapy   PT Barriers   Physical Impairment Decreased strength;Decreased range of motion;Decreased endurance; Impaired balance;Decreased mobility; Decreased safety awareness;Pain   Functional Limitation Wheelchair management; [facility] negotiation   Plan   Treatment/Interventions Functional transfer training;LE strengthening/ROM; Elevations; Therapeutic exercise;Gait training   Progress Progressing toward goals   PT Therapy Minutes   PT Time In 1305   PT Time Out 1437   PT Total Time (minutes) 92   PT Mode of treatment - Individual (minutes) 55   PT Mode of treatment - Concurrent (minutes) 37   PT Mode of treatment - Group (minutes) 0   PT Mode of treatment - Co-treat (minutes) 0   PT Mode of Treatment - Total time(minutes) 92 minutes   PT Cumulative Minutes 1091   Therapy Time missed   Time missed?  No

## 2020-06-17 NOTE — PROGRESS NOTES
Physical Medicine and Rehabilitation Progress Note  Allen Johnston 78 y o  female MRN: [id number]  Unit/Bed#: -01 Encounter: [id number]    HPI: Allen Johnston is a 78 y o  female who presented to the [address] [place], [place] [address] after fall and was found to have a right IT fx and is s/p nail fixation by Dr Sia Medrano on 5/28  Course complicated by hyponatremia and patient was placed on FR  Chief Complaint: Rt hip fx     Interval/subjective: patient without complaint currently and looking forward to dc tomorrow     ROS: A 10 point ROS was performed; negative except as noted above       Assessment/Plan:      * Closed intertrochanteric fracture of right femur [facility])  Assessment & Plan  - s/p nail fixation by Dr Sia Medrano on 5/28  - TTWB per ortho  - site C/D/I and well approximated   - OP FU with Dr Sia Medrano     Hypophosphatemia  Geeta Singleton  - patient with decreased level on 5/28 therefore started on phos supplements in acute care on 5/28 and on 5/31, 6/2, 6/5, 6/9 levels WNL at 3 7, 3 6, 3 7, & 3 7 respectively  - additionally phos packets contain K and K level of 6/9 was WNL at 4 2  - d/w Dr Milan Vergara of renal above findings and he recommended phos (with k) packets be stopped which was done on 6/12  - recheck of phos and K on 6/17 (after k-phos packets were stopped on 6/12) were WNL & stable at 3 6 and 4 2 respectively (was 3 7 and 4 2 respectively on 6/9)  - cleared for dc by renal/IM     Anemia  Assessment & Plan  - Hg currently trending up to 10 3 post-operatively  - IM & renal co-managing and have cleared patient for dc with scrip for H&H with results to PCP    Hyponatremia  Assessment & Plan  - d/w Dr Milan Vergara of nephrology who recommended that FR of 1800 cc should be discontinued which was done on 6/12   - currently WNL and stable at 137 on 6/17 (was 137 on 6/12 when FR was stopped)   - cleared for dc by renal/IM      Elevated vitamin B12 level  Assessment & Plan  - elevated to 4033 (ULN 900)  - patient takes supplemental B12 therefore will stop and have patient FU with PCP     DM (diabetes mellitus) ([facility] [address] )  Assessment & Plan  - per patient on metformin 500 mg BID with meals, insulin (per patient novolog) 5 units with meals, and levemir 5 (not 10) units at HS   - follows with Dr Liya Ernandez of endocrine  - IM managing and recommend patient resume home regimen upon dc     Hypothyroidism  Assessment & Plan  - TSH of 5/28 elevated (free T4 WNL)  - on home regimen of levothyroxine 100 mcg qd except for Sundays when she takes 50 mcg qd  - follows with Dr Liya Ernandez of endocrine  - IM managing and recommend patient continue current home regimen noted above upon dc have repeat TFTs (TSH/free 4) as OP and FU with Dr Awilda Puente (scrip provided with results to Dr Awilda Puente)     Essential hypertension  Assessment & Plan  - at home ramapril 5 mg qd  - IM & renal co-managing and recommend patient be dc'd on current inpt regimen of lisinopril 5 mg qd instead of ramapril     Hyperlipidemia  Assessment & Plan  - on home lipitor 20 mg qpm (per patient no longer on crestor)       Scheduled Meds:    Current Facility-Administered Medications:  acetaminophen 975 mg Oral Q8H Isidra Khan MD   aspirin 325 mg Oral Daily Natali Ojeda MD   atorvastatin 20 mg Oral Daily With Earzachary Darby MD   bisacodyl 10 mg Rectal Daily PRN Natali Ojeda MD   insulin detemir 10 Units Subcutaneous HS Álvaro Gallegos PA-C   insulin lispro 1-5 Units Subcutaneous TID Nicolasa aSles MD   insulin lispro 1-5 Units Subcutaneous HS Natali Ojeda MD   insulin lispro 3 Units Subcutaneous TID With Meals ADRIAN Goode   levothyroxine 100 mcg Oral Once per day on Mon Tue Wed Thu Fri Sat Natali Ojeda MD   levothyroxine 50 mcg Oral Weekly Natali Ojeda MD   lisinopril 5 mg Oral Daily Natali Ojeda MD   methocarbamol 500 mg Oral Q6H PRN Natali Ojeda MD   oxyCODONE 2 5 mg Oral Q6H PRN Natali Ojeda MD   polyethylene glycol 17 g Oral Daily Shiraz Dwyer, MD   traMADol 50 mg Oral Q6H Bisi Raymundo MD        Incidental findings:     1) mildly decreased alk phos level: currently 54 (LLN 55)     DVT ppx: per Dr Pat Perla protocol arixtra 2 5 mg sc x 2 weeks post-op then followed by asa 325 mg for 4 weeks (note patient is on asa 81 mg qd at home and was cleared in acute care to be on both home asa 81 mg qd and arixtra and patient has been on both since 5/29 however once patient transitions to asa 325 mg qd on 6/12 per Dr Pat Perla protocol she will stop asa 81 mg qd until she has completed the 4 week course of asa 325 gm qd per Dr Pat Perla protocol)     Code: confirmed with patient that she wishes to be DNR/DNI and verbalized her understanding of what this means      Note: confirmed home meds with patient     Objective:    Functional Update:  Mobility: mod I   Transfers: mod I   ADLs: mod I       Physical Exam:           General: alert, no apparent distress, cooperative and comfortable  HEENT:  Head: Normal, normocephalic, atraumatic    CARDIAC:  +S1/2  LUNGS:  no abnormal respiratory pattern, no retractions noted, non-labored breathing   ABDOMEN:  soft NT   EXTREMITIES:  volume status currently stable   NEURO:  awake, alert, appropriately answering questions   PSYCH:  mood/affect currently stable   INCISION:  C/D/I and well approximated         Diagnostic Studies:   No orders to display       Laboratory:   Results from last 7 days   Lab Units 06/17/20  0522   HEMOGLOBIN g/dL 10 3*   HEMATOCRIT % 30 0*     Results from last 7 days   Lab Units 06/17/20  0522 06/12/20  0600   SODIUM mmol/L 137 137   POTASSIUM mmol/L 4 2  --

## 2020-06-17 NOTE — PROGRESS NOTES
06/17/20 0962   Pain Assessment   Pain Assessment Tool 0-10   Pain Score 8   Pain Location/Orientation Orientation: Right;Location: Knee; Location: Shoulder   Restrictions/Precautions   Precautions Fall Risk;Pain   RLE Weight Bearing Per Order TTWB   Eating   Type of Assistance Needed Independent   Eating CARE Score 6   Oral Hygiene   Type of Assistance Needed Independent   Oral Hygiene CARE Score 6   Grooming   Able To Wash/Dry Hands;Comb/Brush Hair;Wash/Dry Face;Brush/Clean Teeth   Findings once in w/c pt Indep with groomong;OT did assist pt with washing hair    Shower/Bathe Self   Type of Assistance Needed Adaptive equipment;Supervision;Verbal cues   Shower/Bathe Self CARE Score 4   Bathing   Assessed Bath Style Sponge Bath   Able to Wash/Rinse/Dry (body part) Left Arm;Right Arm;L Upper Leg;R Upper Leg;L Lower Leg/Foot;R Lower Leg/Foot;Chest;Abdomen;Perineal Area; Buttocks   Limitations Noted in Balance;Strength;ROM;Safety   Positioning Standing;Seated   Adaptive Equipment Longhand Sponge;Longhand Reacher   Findings  CS at sink;confidence is improving    Upper Body Dressing   Type of Assistance Needed Independent   Upper Body Dressing CARE Score 6   Lower Body Dressing   Type of Assistance Needed Supervision   Lower Body Dressing CARE Score 4   Putting On/Taking Off Footwear   Type of Assistance Needed Supervision; Adaptive equipment   Putting On/Taking Off Footwear CARE Score 4   Dressing/Undressing Clothing   Remove UB Clothes Pullover Shirt;Bra   Don UB Clothes Pullover Shirt;Bra   Remove LB Clothes Pants; Undergarment;Socks   Don LB Clothes Pants; Undergarment;Socks   Limitations Noted In Balance;Strength;ROM   Adaptive Equipment Sock Aide   Findings discussed with wound nurse if shoes can be donned due too area on R heel and she reports it is ok to don sneaker but pt declines     Sit to Lying   Type of Assistance Needed Supervision; Adaptive equipment   Sit to Lying CARE Score 4   Sit to Stand   Type of Assistance Needed Supervision   Sit to Stand CARE Score 4   Bed-Chair Transfer   Type of Assistance Needed Supervision;Verbal cues   Chair/Bed-to-Chair Transfer CARE Score 4   Kitchen Mobility   Kitchen-Mobility Level Wheelchair   Kitchen Activity Retrieve items;Transport items   Kitchen Mobility Comments good safety with w/c mgmt and kitchen tasks;pt reporting s/u at home and reported routine whichis all deemed safe    Cognition   Overall Cognitive Status [facility]   Arousal/Participation Alert; Cooperative   Attention Within functional limits   Orientation Level Oriented X4   Memory Within functional limits   Following Commands Follows all commands and directions without difficulty   Additional Activities   Additional Activities Comments w/c mobility MI   Activity Tolerance   Activity Tolerance Patient tolerated treatment well   Assessment   Treatment Assessment OT tx addressed ADL via basin bath due to declining shower  OT instructed in compensatory strategies and safe technqiues  Pt has  good recall of techniques and TTWB R LE  Pt overall CS  with LB  self care;refer to respective sections for details of session  Pt having pain in R shld and R knee  which inhibits pushing up from sitting surface  Simulating home s/u with BSC txfs and completed once  with supervision and was able to use leg  to enter and exit bed on her own  OT  offerred to set up room at [facility] so that she could perform task to prepare her for transition to home and she declined reporting she feels confident with technqiues she was instructed in by OT  Pt also able to retireve items w/c level MI  Pt has baby monitor at home she will use during hour of sleep  Educ in heel floating and frequent positional changes  Plan to continue skilled OT to address esatblished OT goals in preparation for d/c to home  Prognosis Good   Problem List Decreased strength;Decreased range of motion;Decreased endurance; Impaired balance;Decreased mobility; Decreased safety awareness;Orthopedic restrictions;Pain   Plan   Treatment/Interventions ADL retraining;Functional transfer training; Endurance training;Equipment eval/education; Compensatory technique education;Patient/family training;OT   Progress Improving as expected   OT Therapy Minutes   OT Time In 0930   OT Time Out 1100   OT Total Time (minutes) 90   OT Mode of treatment - Individual (minutes) 90   OT Mode of treatment - Concurrent (minutes) 0   OT Mode of treatment - Group (minutes) 0   OT Mode of treatment - Co-treat (minutes) 0   OT Mode of Treatment - Total time(minutes) 90 minutes   OT Cumulative Minutes 482   Therapy Time missed   Time missed?  No

## 2020-06-17 NOTE — PHYSICAL THERAPY NOTE
Patient requires wheelchair at home for independent mobility and to complete MR ADLs  Needs regular wheelchair with desk top arm rests and swing away leg rests  Unable to independently ambulate with walker

## 2020-06-17 NOTE — INCIDENTAL FINDINGS
1) mildly decreased alkaline phosphatase level: currently 54 (LLN 55); please follow up with your primary care provider for further testing/treatment if any and/or specialist referral if any as they deem necessary     2) low hemoglobin (anemia): please have your blood work drawn as prescribed to monitor your hemoglobin level the results will be sent to your primary care provider     3) elevated B12 level: please do not resume your B12 supplements until discussing this finding with your primary care provider    4) elevated TSH level: please have your blood work drawn as prescribed to monitor your TSH level the results will be sent to Dr Reza Tuttle

## 2020-06-17 NOTE — DISCHARGE INSTRUCTIONS
Please continue your toe touch weight bearing restriction of your right leg as instructed until cleared by Dr Stephane Marrero    Please do not resume your home Aspirin 81 mg by mouth daily until 7/10/20 the day after you have completed your course of Asprin 325 mg by mouth daily through 7/9/20 (you have been provided a prescription for the remainder of the Aspirin 325 mg by mouth daily course)     Should you develop fevers, chills, sweats, rigors, or any drainage from your surgical site please contact your family doctor or surgeon immediately or go to the ER immediately as these are indicators of possible infection     Please have your blood work drawn as prescribed the results will be sent to your doctors    Please note you are restricted from driving/operating a motorized vehicle/operating heavy machinery/etc until you are cleared by Dr Stephane Marrero and Dr Chitra Morales     Please see your doctors listed in the follow up providers section of your discharge paperwork, and take the discharge paperwork with you to your appointments    Please call the doctors listed in the follow up providers section to confirm office locations of your follow up appointments     Please note changes may have been made to your medications please refer to your discharge paperwork for your current medications and take this list with you to all your doctors appointments for your doctors to review    Please do not resume a home medication unless the medication reconciliation sheet indicates to do so, please do not assume that a medication that you were given a prescription for is the same as a medication you have at home based on both medications having the same name as dosages and frequency may have changed  Your nurse also reviewed with you just prior to your discharge from the hospital your medications, when to take them, how to take them, what they are for, how much to take, and when your next dose is due, please follow these instructions       Please check your blood sugars 4 times daily before meals and at bedtime and record them to provide to your doctors, contact your family doctor or Dr Chen Bridges immediately for blood sugars higher than 220 or lower than 100     Please check your blood pressure prior to taking your blood pressure medications and 1 hour after, please contact your family doctor or cardiologist immediately for a blood pressure below 100/60 and do not take your blood pressure medications until speaking with them, please contact your family doctor or cardiologist immediately for blood pressure greater than 160/100    Please note the following incidental findings were found during your recent hospitalization please discuss them with your doctors so that they may arrange any tests/referrals as they deem necessary:     1) mildly decreased alkaline phosphatase level: currently 54 (LLN 55); please follow up with your primary care provider for further testing/treatment if any and/or specialist referral if any as they deem necessary     2) low hemoglobin (anemia): please have your blood work drawn as prescribed to monitor your hemoglobin level the results will be sent to your primary care provider     3) elevated B12 level: please do not resume your B12 supplements until discussing this finding with your primary care provider    4) elevated TSH level: please have your blood work drawn as prescribed to monitor your TSH level the results will be sent to Dr Chen Bridges      Please avoid NSAID (including but not limited to nabumetone, relafen, celebrex, celecoxib, advil, aleve, motrin, naproxen, ibuprofen, mobic, meloxicam, diclofenac, voltaren etc) medications as NSAID medications may delay bone healing unless cleared to do so by your doctors       Please avoid NSAID (including but not limited to nabumetone, relafen, celebrex, celecoxib, advil, aleve, motrin, naproxen, ibuprofen, mobic, meloxicam, diclofenac, voltaren etc) medications due to your already being on aspirin and when combined with these other medications they can increase your risk of bleeding; you may be cleared to use these medications in the future but do not do so unless advised to do so by your doctors      Please note a summary of your hospital stay with relevant information for your doctors has been sent to them, please confirm with your doctors at your follow up visits that they have received this summary and have them contact [address] [place] [address] if they have not received them along with any other medical records they may require

## 2020-06-17 NOTE — PLAN OF CARE
Problem: Prexisting or High Potential for Compromised Skin Integrity  Goal: Skin integrity is maintained or improved  Description  INTERVENTIONS:  - Identify patients at risk for skin breakdown  - Assess and monitor skin integrity  - Assess and monitor nutrition and hydration status  - Monitor labs   - Assess for incontinence   - Turn and reposition patient  - Assist with mobility/ambulation  - Relieve pressure over bony prominences  - Avoid friction and shearing  - Provide appropriate hygiene as needed including keeping skin clean and dry  - Evaluate need for skin moisturizer/barrier cream  - Collaborate with interdisciplinary team   - Patient/family teaching  - Consider wound care consult   Outcome: Progressing     Problem: Potential for Falls  Goal: Patient will remain free of falls  Description  INTERVENTIONS:  - Assess patient frequently for physical needs  -  Identify cognitive and physical deficits and behaviors that affect risk of falls    -  [place] fall precautions as indicated by assessment   - Educate patient/family on patient safety including physical limitations  - Instruct patient to call for assistance with activity based on assessment  - Modify environment to reduce risk of injury  - Consider OT/PT consult to assist with strengthening/mobility  Outcome: Progressing     Problem: PAIN - ADULT  Goal: Verbalizes/displays adequate comfort level or baseline comfort level  Description  Interventions:  - Encourage patient to monitor pain and request assistance  - Assess pain using appropriate pain scale  - Administer analgesics based on type and severity of pain and evaluate response  - Implement non-pharmacological measures as appropriate and evaluate response  - Consider cultural and social influences on pain and pain management  - Notify physician/advanced practitioner if interventions unsuccessful or patient reports new pain  Outcome: Progressing     Problem: INFECTION - ADULT  Goal: Absence or prevention of progression during hospitalization  Description  INTERVENTIONS:  - Assess and monitor for signs and symptoms of infection  - Monitor lab/diagnostic results  - Monitor all insertion sites, i e  indwelling lines, tubes, and drains  - Monitor endotracheal if appropriate and nasal secretions for changes in amount and color  - [place] appropriate cooling/warming therapies per order  - Administer medications as ordered  - Instruct and encourage patient and family to use good hand hygiene technique  - Identify and instruct in appropriate isolation precautions for identified infection/condition  Outcome: Progressing  Goal: Absence of fever/infection during neutropenic period  Description  INTERVENTIONS:  - Monitor WBC    Outcome: Progressing     Problem: INFECTION - ADULT  Goal: Absence or prevention of progression during hospitalization  Description  INTERVENTIONS:  - Assess and monitor for signs and symptoms of infection  - Monitor lab/diagnostic results  - Monitor all insertion sites, i e  indwelling lines, tubes, and drains  - Monitor endotracheal if appropriate and nasal secretions for changes in amount and color  - [place] appropriate cooling/warming therapies per order  - Administer medications as ordered  - Instruct and encourage patient and family to use good hand hygiene technique  - Identify and instruct in appropriate isolation precautions for identified infection/condition  Outcome: Progressing  Goal: Absence of fever/infection during neutropenic period  Description  INTERVENTIONS:  - Monitor WBC    Outcome: Progressing     Problem: SAFETY ADULT  Goal: Patient will remain free of falls  Description  INTERVENTIONS:  - Assess patient frequently for physical needs  -  Identify cognitive and physical deficits and behaviors that affect risk of falls    -  [place] fall precautions as indicated by assessment   - Educate patient/family on patient safety including physical limitations  - Instruct patient to call for assistance with activity based on assessment  - Modify environment to reduce risk of injury  - Consider OT/PT consult to assist with strengthening/mobility  Outcome: Progressing  Goal: Maintain or return to baseline ADL function  Description  INTERVENTIONS:  -  Assess patient's ability to carry out ADLs; assess patient's baseline for ADL function and identify physical deficits which impact ability to perform ADLs (bathing, care of mouth/teeth, toileting, grooming, dressing, etc )  - Assess/evaluate cause of self-care deficits   - Assess range of motion  - Assess patient's mobility; develop plan if impaired  - Assess patient's need for assistive devices and provide as appropriate  - Encourage maximum independence but intervene and supervise when necessary  - Involve family in performance of ADLs  - Assess for home care needs following discharge   - Consider OT consult to assist with ADL evaluation and planning for discharge  - Provide patient education as appropriate  Outcome: Progressing  Goal: Maintain or return mobility status to optimal level  Description  INTERVENTIONS:  - Assess patient's baseline mobility status (ambulation, transfers, stairs, etc )    - Identify cognitive and physical deficits and behaviors that affect mobility  - Identify mobility aids required to assist with transfers and/or ambulation (gait belt, sit-to-stand, lift, walker, cane, etc )  - [place] fall precautions as indicated by assessment  - Record patient progress and toleration of activity level on Mobility SBAR; progress patient to next Phase/Stage  - Instruct patient to call for assistance with activity based on assessment  - Consider rehabilitation consult to assist with strengthening/weightbearing, etc   Outcome: Progressing     Problem: DISCHARGE PLANNING  Goal: Discharge to home or other facility with appropriate resources  Description  INTERVENTIONS:  - Identify barriers to discharge w/patient and caregiver  - Arrange for needed discharge resources and transportation as appropriate  - Identify discharge learning needs (meds, wound care, etc )  - Arrange for interpretive services to assist at discharge as needed  - Refer to Case Management Department for coordinating discharge planning if the patient needs post-hospital services based on physician/advanced practitioner order or complex needs related to functional status, cognitive ability, or social support system  Outcome: Progressing     Problem: Nutrition/Hydration-ADULT  Goal: Nutrient/Hydration intake appropriate for improving, restoring or maintaining nutritional needs  Description  Monitor and assess patient's nutrition/hydration status for malnutrition  Collaborate with interdisciplinary team and initiate plan and interventions as ordered  Monitor patient's weight and dietary intake as ordered or per policy  Utilize nutrition screening tool and intervene as necessary  Determine patient's food preferences and provide high-protein, high-caloric foods as appropriate       INTERVENTIONS:  - Monitor oral intake, urinary output, labs, and treatment plans  - Assess nutrition and hydration status and recommend course of action  - Evaluate amount of meals eaten  - Assist patient with eating if necessary   - Allow adequate time for meals  - Recommend/ encourage appropriate diets, oral nutritional supplements, and vitamin/mineral supplements  - Order, calculate, and assess calorie counts as needed  - Recommend, monitor, and adjust tube feedings and TPN/PPN based on assessed needs  - Assess need for intravenous fluids  - Provide specific nutrition/hydration education as appropriate  - Include patient/family/caregiver in decisions related to nutrition  Outcome: Progressing

## 2020-06-17 NOTE — WOUND OSTOMY CARE
Progress Note - Wound   Devan Helms 78 y o  female MRN: [id number]  Unit/Bed#: -01 Encounter: [id number]      Assessment:   Patient follow up for heel wound per documentation on wound report  Patient OOB to the wheelchair, s/p ORIF of the right hip  Patient states that nursing has been elevating her heel while in bed  OT present for assessment  Patient is due to be discharged home 6/18/2020  Patient states her daughter is a nurse and is able to assist with wound care  1  HA-Right heel DTI, maroon and dark red discolored tissue, no fluctuance or open areas  2  HA-Right buttock one stage 2 pressure injury, crusted and dry wound bed to the inner buttock  Two stage 1 linear areas of non-blanchable erythema mid and lateral wounds, total of three linear wounds measured as one area  OT stated the buttock pressure wounds are not new, appears as excoriation however patient states the area is not itchy nor painful, she has not been scratching  All three areas are non-blanchable with the medial wound crusted    Plan:   1  Hydraguard to the left heel and buttocks TID and PRN        Wound 05/28/20 Incision Hip Right (Active)   Wound Description Clean;Dry; Intact 6/17/2020  9:20 AM   Tess-wound Assessment Edema 6/13/2020  7:39 AM   Closure Adhesive closure strips 6/17/2020  9:20 AM   Drainage Amount None 6/15/2020  8:56 AM   Drainage Description Serous; Yellow 6/1/2020  6:11 PM   Treatments Cleansed 6/15/2020  8:56 AM   Dressing Open to air 6/17/2020  9:20 AM   Patient Tolerance Tolerated well 6/13/2020  7:39 AM   Dressing Status Clean;Dry; Intact 6/13/2020  7:39 AM       Wound 06/01/20 Pressure Injury Heel Right (Active)   Wound Image   6/17/2020  9:46 AM   Wound Description Intact;Fragile;Light purple; Other (Comment) 6/17/2020  9:46 AM   Staging Deep Tissue Injury 6/17/2020  9:46 AM   Tess-wound Assessment Clean;Dry; Intact 6/17/2020  9:46 AM   Wound Length (cm) 1 8 cm 6/17/2020  9:46 AM   Wound Width (cm) 1 5 cm 6/17/2020  9:46 AM   Wound Depth (cm) 0 6/17/2020  9:46 AM   Calculated Wound Area (cm^2) 2 7 cm^2 6/17/2020  9:46 AM   Calculated Wound Volume (cm^3) 0 cm^3 6/17/2020  9:46 AM   Drainage Amount None 6/17/2020  9:46 AM   Treatments Site care 6/17/2020  9:20 AM   Dressing Protective barrier; Other (Comment) 6/17/2020  9:46 AM   Patient Tolerance Tolerated well 6/17/2020  9:46 AM   Dressing Status Clean;Dry; Intact 6/14/2020  8:42 AM       Wound 06/17/20 Buttocks (Active)   Wound Image   6/17/2020  9:53 AM   Wound Description Pink;Non-blanchable erythema; Beefy red;Fragile 6/17/2020  9:53 AM   Staging Stage II 6/17/2020  9:53 AM   Tess-wound Assessment Clean;Dry; Intact 6/17/2020  9:53 AM   Wound Length (cm) 8 cm 6/17/2020  9:53 AM   Wound Width (cm) 8 cm 6/17/2020  9:53 AM   Wound Depth (cm) 0 1 6/17/2020  9:53 AM   Calculated Wound Area (cm^2) 64 cm^2 6/17/2020  9:53 AM   Calculated Wound Volume (cm^3) 6 4 cm^3 6/17/2020  9:53 AM   Drainage Amount None 6/17/2020  9:53 AM   Non-staged Wound Description Partial thickness 6/17/2020  9:53 AM   Treatments Site care 6/17/2020  9:20 AM   Dressing Moisture barrier 6/17/2020  9:53 AM   Patient Tolerance Tolerated well 6/17/2020  9:53 AM     Reviewed plan of care with primary RN Jenny Damian  Recommendations written as orders  Wound care to follow weekly  Questions or concerns Caro Ballard BSN, RN

## 2020-06-17 NOTE — NURSING NOTE
Pt transfers with supervision  Uses leg  to get RLE back into bed  Continent of bowel and bladder overnight  Compliant with SCDs all shift

## 2020-06-17 NOTE — CASE MANAGEMENT
DC instructions reviewed with Pt & Pt's daughter, who expressed an understanding & agreement  Pt being [address] home tomorrow at 4 PM, being transported by family via car, which tx team has determined to be a safe mode of transport  FU appts indicated on DC instructions, to be scheduled by Pt per scheduling preferences  HHC arranged with [facility] (Nsg, PT, OT), per Pt preferences from choice list provided  The Pt's current course of Tx & post DC goals of care have been shared with Post-acute care service providers  FU IMM reviewed & signed

## 2020-06-17 NOTE — NURSING NOTE
Patient out of bed with supervision to wheelchair  Maintains TTWB to RLE  Incision clean, dry, and intact  Medicated for pain as ordered  Patient instructs nurse on how much insulin she wants during meals based on food on tray  Will continue to monitor and follow plan of care

## 2020-06-18 VITALS
OXYGEN SATURATION: 99 % | HEIGHT: 62 IN | BODY MASS INDEX: 23.25 KG/M2 | HEART RATE: 74 BPM | RESPIRATION RATE: 16 BRPM | TEMPERATURE: 97.8 F | WEIGHT: 126.32 LBS | SYSTOLIC BLOOD PRESSURE: 120 MMHG | DIASTOLIC BLOOD PRESSURE: 65 MMHG

## 2020-06-18 LAB
GLUCOSE SERPL-MCNC: 153 MG/DL (ref 65–140)
GLUCOSE SERPL-MCNC: 174 MG/DL (ref 65–140)

## 2020-06-18 PROCEDURE — 99239 HOSP IP/OBS DSCHRG MGMT >30: CPT | Performed by: PHYSICAL MEDICINE & REHABILITATION

## 2020-06-18 PROCEDURE — NC001 PR NO CHARGE: Performed by: INTERNAL MEDICINE

## 2020-06-18 PROCEDURE — 97110 THERAPEUTIC EXERCISES: CPT

## 2020-06-18 PROCEDURE — 97116 GAIT TRAINING THERAPY: CPT

## 2020-06-18 PROCEDURE — 82948 REAGENT STRIP/BLOOD GLUCOSE: CPT

## 2020-06-18 PROCEDURE — 97537 COMMUNITY/WORK REINTEGRATION: CPT

## 2020-06-18 PROCEDURE — 97530 THERAPEUTIC ACTIVITIES: CPT

## 2020-06-18 PROCEDURE — 97535 SELF CARE MNGMENT TRAINING: CPT

## 2020-06-18 RX ORDER — METHOCARBAMOL 500 MG/1
500 TABLET, FILM COATED ORAL EVERY 6 HOURS PRN
Qty: 20 TABLET | Refills: 0 | Status: SHIPPED | OUTPATIENT
Start: 2020-06-18 | End: 2022-03-23 | Stop reason: ALTCHOICE

## 2020-06-18 RX ADMIN — ACETAMINOPHEN 975 MG: 325 TABLET ORAL at 05:29

## 2020-06-18 RX ADMIN — LISINOPRIL 5 MG: 5 TABLET ORAL at 08:45

## 2020-06-18 RX ADMIN — TRAMADOL HYDROCHLORIDE 50 MG: 50 TABLET, FILM COATED ORAL at 00:01

## 2020-06-18 RX ADMIN — TRAMADOL HYDROCHLORIDE 50 MG: 50 TABLET, FILM COATED ORAL at 12:38

## 2020-06-18 RX ADMIN — LEVOTHYROXINE SODIUM 100 MCG: 100 TABLET ORAL at 05:29

## 2020-06-18 RX ADMIN — INSULIN LISPRO 1 UNITS: 100 INJECTION, SOLUTION INTRAVENOUS; SUBCUTANEOUS at 08:42

## 2020-06-18 RX ADMIN — ASPIRIN 325 MG: 325 TABLET, COATED ORAL at 08:44

## 2020-06-18 RX ADMIN — TRAMADOL HYDROCHLORIDE 50 MG: 50 TABLET, FILM COATED ORAL at 05:29

## 2020-06-18 RX ADMIN — ACETAMINOPHEN 975 MG: 325 TABLET ORAL at 13:45

## 2020-06-18 RX ADMIN — METHOCARBAMOL 500 MG: 500 TABLET, FILM COATED ORAL at 11:43

## 2020-06-18 RX ADMIN — POLYETHYLENE GLYCOL 3350 17 G: 17 POWDER, FOR SOLUTION ORAL at 08:43

## 2020-06-18 NOTE — CASE MANAGEMENT
DC instructions reviewed with Pt & Pt's daughter, who expressed an understanding & agreement  Pt being [address] home today at 4 PM, being transported by family via car, which tx team has determined to be a safe mode of transport  FU appts indicated on DC instructions, to be scheduled by Pt per scheduling preferences  HHC arranged with [facility] (Nsg, PT, OT), per Pt preferences from choice list provided  The Pt's current course of Tx & post DC goals of care have been shared with Post-acute care service providers  FU IMM reviewed & signed

## 2020-06-18 NOTE — PROGRESS NOTES
06/18/20 0753   Charting Type   Charting Type Shift assessment   Neurological   Neuro (WDL) X   Level of Consciousness Alert/awake   Orientation Level Oriented X4   Cognition Appropriate judgement   Speech Clear   Swallow Able to swallow solids and liquids without difficulty   R Hand  Moderate   L Hand  Moderate   R Foot Dorsiflexion Moderate   L Foot Dorsiflexion Moderate   R Foot Plantar Flexion Moderate   L Foot Plantar Flexion Moderate   RUE Motor Response Responds to commands   RUE Sensation Full sensation   RUE Muscle Strength 5- Normal strength   LUE Motor Response Responds to commands   LUE Sensation Full sensation   LUE Muscle Strength 5- Normal strength   RLE Motor Response Responds to commands   RLE Sensation Full sensation   RLE Muscle Strength 4- Movement against gravity and limited resistance   LLE Motor Response Responds to commands   LLE Sensation Full sensation   LLE Muscle Strength 4- Movement against gravity and limited resistance   Neuro Symptoms Fatigue   Mercedes Coma Scale   Eye Opening 4   Best Verbal Response 5   Best Motor Response 6   Mercedes Coma Scale Score 15   HEENT   HEENT (WDL) X   Head and Face Asymmetrical   R Eye Intact   L Eye Intact   Tongue Pink & moist   Teeth Missing teeth   Neck Asymmetrical   Respiratory   Respiratory (WDL) X   Respiratory Pattern Normal   Chest Assessment Chest expansion symmetrical   Bilateral Breath Sounds Clear;Diminished   R Breath Sounds Clear;Diminished   L Breath Sounds Clear;Diminished   Respiratory Interventions   Respiratory Interventions Incentive spirometry;Cough and deep breathe   Incentive Spirometry   IS level of assistance Assisted by nursing   Frequency q1hr W/A   Respiratory Effort Normal   Treatment Tolerance Tolerated well   Incentive Spirometry Goal (mL) 2000 mL   Incentive Spirometry Achieved (mL) 1000 mL   Cardiac   Cardiac (WDL) WDL   Pacemaker/ICD No   Pain Assessment   Pain Assessment Tool 0-10   Pain Score 3   Pain Location/Orientation Orientation: Right;Location: Leg;Location: Hip   Pain Assessment Post Intervention   Post Intervention POSS 1   Peripheral Vascular   Peripheral Vascular (WDL) X   Cyanosis None   Capillary Refill Less than/equal to 2 seconds (All extremities)   Pulses R radial;L radial;R pedal;L pedal   Edema Right lower extremity   RLE Edema Non-pitting   RUE Neurovascular Assessment   R Radial Pulse +2   LUE Neurovascular Assessment   L Radial Pulse +2   RLE Neurovascular Assessment   R Pedal Pulse +1   LLE Neurovascular Assessment   L Pedal Pulse +2   Integumentary   Integumentary (WDL) X   Skin Color Appropriate for ethnicity   Skin Condition/Temp Warm;Dry   Skin Integrity Bruising   Skin Location scattered bruising; scratches R buttock   Skin Turgor Epidermis thin with loss of subcutaneous tissue   Nate Scale   Sensory Perceptions 4   Moisture 4   Activity 3   Mobility 3   Nutrition 3   Friction and Shear 2   Nate Scale Score 19   Wound 05/28/20 Incision Hip Right   Date First Assessed/Time First Assessed: 05/28/20 0650   Primary Wound Type: Incision  Location: Hip  Wound Location Orientation: Right  Wound Description (Comments): betadine ointment to incision  Incision's 1st Dressing: DRESSING XEROFORM 5 X 9, SPO    Wound Description Clean;Dry; Intact   Tess-wound Assessment Edema   Closure None   Drainage Amount None   Treatments Site care   Dressing Open to air   Wound 06/01/20 Pressure Injury Heel Right   Date First Assessed/Time First Assessed: 06/01/20 2028   Pre-Existing Wound: No  Primary Wound Type: Pressure Injury  Location: Heel  Wound Location Orientation: Right   Wound Description Light purple   Staging Deep Tissue Injury   Tess-wound Assessment Clean;Dry; Intact   Drainage Amount None   Treatments Site care   Dressing Protective barrier   Patient Tolerance Tolerated well   Dressing Status Clean;Dry; Intact   Wound 06/17/20 Buttocks   Date First Assessed/Time First Assessed: 06/17/20 6803 Location: Buttocks   Wound Description Fragile;Pink   Staging Stage II  (Stage 1 now)   Tess-wound Assessment Clean;Dry; Intact   Drainage Amount None   Treatments Site care   Dressing Moisture barrier   Musculoskeletal   Musculoskeletal (WDL) X   Level of Assistance Contact guard assist, steadying assist   Assistive Device Front wheel walker   RUE Full movement   LUE Limited movement   RLE Limited movement   LLE Limited movement   Gastrointestinal   Gastrointestinal (WDL) WDL   Last BM Date 06/17/20   Passing Flatus Yes   Genitourinary   Genitourinary (WDL) WDL   Bladder Shift Assessment   Bladder Device Pull-up   Bladder Incontinence No   Bladder Management Supervision/setup   Bladder Management Deficit Supervision/safety   Urine Assessment   Urinary Incontinence No   Urine Color Yellow/straw   Urine Appearance Clear   Urine Odor No odor   Genitalia   Female Genitalia Intact   Anal/Rectal   Anal/Rectal (WDL) WDL   Psychosocial   Psychosocial (WDL) WDL

## 2020-06-18 NOTE — NURSING NOTE
Pt transfers with supervision  Pt c/o increased pain in R leg this shift and requested SCDs be left off overnight  Continent of bowel and bladder  DTI present on L heel  Pt also has pink areas on R buttocks which have been present during hospital stay  Pt had stated that while she was pulling out wrinkles on the paper pad on bed the paper pad pinched her skin  Areas are not open

## 2020-06-18 NOTE — NURSING NOTE
Discharge instructions given to pt & pt's daughter  Reviewed & discussed meds & precautions  Verbalized understanding, questions asked & answered  Prescriptions given to daughter  Pt maintains TTWB/RLE, incisions are healed & pink  Scattered bruising is fading  Pt will have Simba Hyde  CGA for transfer from [facility] to [facility]

## 2020-06-18 NOTE — DISCHARGE SUMMARY
Physical Medicine and Rehabilitation Progress Note  Sharon Perez 78 y o  female MRN: [id number]  Unit/Bed#: -01 Encounter: [id number]      Discharge Summary - PMR           Admission Date:    6/1/20  Discharge Date:   6/18/20    Diagnosis:   Rt hip fx     Functional Status Upon Discharge: Mod I     Condition at Discharge: stable    Discharge instructions/Information to patient and family:   See after visit summary for information provided to patient and family  Provisions for Follow-Up Care:  See after visit summary for information related to follow-up care and any pertinent home health orders  Disposition: home     Planned Readmission: no        Discharge Medications:  See after visit summary for reconciled discharge medications provided to patient and family  Comorbidities:   See below for medical details     Hospital Course: The patient had an unremarkable rehab course with significant functional gains made, please see below for medical details:      Sharon Perez is a 78 y o  female who presented to the [facility] after fall and was found to have a right IT fx and is s/p nail fixation by Dr Blanca Astorga on 5/28  Course complicated by hyponatremia and patient was placed on FR             * Closed intertrochanteric fracture of right femur [facility])  Assessment & Plan  - s/p nail fixation by Dr Blanca Astorga on 5/28  - TTWB per ortho  - site C/D/I and well approximated   - OP FU with Dr Blanca Astorga     Hypophosphatemia  [address] [place] [address]  - patient with decreased level on 5/28 therefore started on phos supplements in acute care on 5/28 and on 5/31, 6/2, 6/5, 6/9 levels WNL at 3 7, 3 6, 3 7, & 3 7 respectively  - additionally phos packets contain K and K level of 6/9 was WNL at 4 2  - d/w Dr Mary Morillo of renal above findings and he recommended phos (with k) packets be stopped which was done on 6/12  - recheck of phos and K on 6/17 (after k-phos packets were stopped on 6/12) were WNL & stable at 3 6 and 4 2 respectively (was 3 7 and 4 2 respectively on 6/9)  - cleared for dc by renal/IM     Anemia  Assessment & Plan  - Hg currently trending up to 10 3 post-operatively  - IM & renal co-managing and have cleared patient for dc with scrip for H&H with results to PCP    Hyponatremia  Assessment & Plan  - d/w Dr Nidhi Camarillo of nephrology who recommended that FR of 1800 cc should be discontinued which was done on 6/12   - currently WNL and stable at 137 on 6/17 (was 137 on 6/12 when FR was stopped)   - cleared for dc by renal/IM      Elevated vitamin B12 level  Assessment & Plan  - elevated to 4033 ()  - patient takes supplemental B12 therefore will stop and have patient FU with PCP     [facility]  Assessment & Plan  - per patient on metformin 500 mg BID with meals, insulin (per patient novolog) 5 units with meals, and levemir 5 (not 10) units at HS   - follows with Dr Amber Harris of endocrine  - IM managing and recommend patient resume home regimen upon dc     Hypothyroidism  Assessment & Plan  - TSH of 5/28 elevated (free T4 WNL)  - on home regimen of levothyroxine 100 mcg qd except for Sundays when she takes 50 mcg qd  - follows with Dr Amber Harris of endocrine  - IM managing and recommend patient continue current home regimen noted above upon dc have repeat TFTs (TSH/free 4) as OP and FU with Dr Fidelia Shabazz (scrip provided with results to Dr Fidelia Shabazz)     Essential hypertension  Assessment & Plan  - at home ramapril 5 mg qd  - IM & renal co-managing and recommend patient be dc'd on current inpt regimen of lisinopril 5 mg qd instead of ramapril     Hyperlipidemia  Assessment & Plan  - on home lipitor 20 mg qpm (per patient no longer on crestor)       Scheduled Meds:    Current Facility-Administered Medications:  acetaminophen 975 mg Oral Q8H Bren Ashby MD   aspirin 325 mg Oral Daily Sylvester Aschoff, MD   atorvastatin 20 mg Oral Daily With Cecilia Jara MD   bisacodyl 10 mg Rectal Daily PRN Ciro Hammer MD   insulin detemir 10 Units Subcutaneous HS Sylvia Field PA-C   insulin lispro 1-5 Units Subcutaneous TID AC Ciro Hammer MD   insulin lispro 1-5 Units Subcutaneous HS Ciro Hammer MD   insulin lispro 3 Units Subcutaneous TID With Meals ADRIAN Lomeli   levothyroxine 100 mcg Oral Once per day on Mon Tue Wed Thu Fri Sat Ciro Hammer MD   levothyroxine 50 mcg Oral Weekly Ciro Hammer MD   lisinopril 5 mg Oral Daily Ciro Hammer MD   methocarbamol 500 mg Oral Q6H PRN Ciro Hammer MD   oxyCODONE 2 5 mg Oral Q6H PRN Ciro Hammer MD   polyethylene glycol 17 g Oral Daily Ciro Hammer MD   traMADol 50 mg Oral Q6H Denzel Johnston MD        Incidental findings:     1) mildly decreased alk phos level: currently 54 (LLN 55)     DVT ppx: per Dr Red Rodgers protocol arixtra 2 5 mg sc x 2 weeks post-op then followed by asa 325 mg for 4 weeks (note patient is on asa 81 mg qd at home and was cleared in acute care to be on both home asa 81 mg qd and arixtra and patient has been on both since 5/29 however once patient transitions to asa 325 mg qd on 6/12 per Dr Red Rodgers protocol she will stop asa 81 mg qd until she has completed the 4 week course of asa 325 gm qd per Dr Red Rodgers protocol)     Code: confirmed with patient that she wishes to be DNR/DNI and verbalized her understanding of what this means      Note: confirmed home meds with patient           Vitals:     Vitals:    06/18/20 0721   BP: 120/65   Pulse: 74   Resp: 16   Temp: 97 8 °F (36 6 °C)   SpO2: 99%              * 63 min was spent in preparation for patient discharge including preparing discharge instructions, prescriptions, & medication reconciliation

## 2020-06-18 NOTE — PROGRESS NOTES
06/18/20 1100   Pain Assessment   Pain Assessment Tool 0-10   Pain Score 8   Pain Location/Orientation Orientation: Right;Location: Leg   Restrictions/Precautions   Precautions Fall Risk;Pain   RLE Weight Bearing Per Order TTWB   Cognition   Overall Cognitive Status WFL   Arousal/Participation Alert; Responsive; Cooperative   Attention Within functional limits   Orientation Level Oriented X4   Memory Within functional limits   Following Commands Follows all commands and directions without difficulty   Roll Left and Right   Type of Assistance Needed Independent   Amount of Physical Assistance Provided No physical assistance   Roll Left and Right CARE Score 6   Sit to Lying   Type of Assistance Needed Supervision; Adaptive equipment   Amount of Physical Assistance Provided No physical assistance   Sit to Lying CARE Score 4   Lying to Sitting on Side of Bed   Type of Assistance Needed Supervision; Adaptive equipment   Amount of Physical Assistance Provided No physical assistance   Lying to Sitting on Side of Bed CARE Score 4   Sit to Stand   Type of Assistance Needed Supervision   Amount of Physical Assistance Provided No physical assistance   Sit to Stand CARE Score 4   Bed-Chair Transfer   Type of Assistance Needed Supervision   Amount of Physical Assistance Provided No physical assistance   Chair/Bed-to-Chair Transfer CARE Score 4   Transfer Bed/Chair/Wheelchair   Limitations Noted In Balance;Confidence; Coordination; Endurance;Pain Management;LE Strength   Adaptive Equipment Roller Walker   Stand Pivot Supervision   Sit to Stand Supervision   Stand to Sit Supervision   Supine to Sit Supervision   Sit to Supine Supervision   All Transfer Supervision   Walk 10 Feet   Type of Assistance Needed Supervision   Amount of Physical Assistance Provided No physical assistance   Walk 10 Feet CARE Score 4   Walk 50 Feet with Two Turns   Reason if not Attempted Safety concerns   Walk 50 Feet with Two Turns CARE Score 88   Walk 150 Feet   Reason if not Attempted Safety concerns   Walk 150 Feet CARE Score 88   Walking 10 Feet on Uneven Surfaces   Reason if not Attempted Safety concerns   Walking 10 Feet on Uneven Surfaces CARE Score 88   Ambulation   Does the patient walk? 2  Yes   Primary Mode of Locomotion Prior to Admission Walk   Distance Walked (feet) 27 ft  (26)   Assist Device Roller Walker   Gait Pattern Inconsistant Noemi; Slow Noemi   Limitations Noted In Balance; Coordination; Endurance; Safety;Strength   Provided Assistance with: Balance   Walk Assist Level Close Supervision   Wheel 50 Feet with Two Turns   Type of Assistance Needed Independent   Amount of Physical Assistance Provided No physical assistance   Wheel 50 Feet with Two Turns CARE Score 6   Wheel 150 Feet   Type of Assistance Needed Independent   Amount of Physical Assistance Provided No physical assistance   Wheel 150 Feet CARE Score 6   Wheelchair mobility   Does the patient use a wheelchair? 1  Yes   Type of Wheelchair Used 1  Manual   Method Right upper extremity; Left upper extremity   Distance Level Surface (feet) 200 ft   Findings level and carpet   Curb or Single Stair   Style negotiated Curb   Type of Assistance Needed Incidental touching   Amount of Physical Assistance Provided No physical assistance   1 Step (Curb) CARE Score 4   4 Steps   Reason if not Attempted Safety concerns   4 Steps CARE Score 88   12 Steps   Reason if not Attempted Safety concerns   12 Steps CARE Score 88   Stairs   Type Curb   # of Steps 1   Weight Bearing Precautions TTWB   Assist Devices Roller Walker   Findings CGA   Therapeutic Interventions   Strengthening seated and supine ther ex 30 reps   Other gait, transfer, w/c mobility   Assessment   Treatment Assessment Pt is being D/C home today with family support and assistance; [facility]; All DME is in place;  Pt is S for transfers and bed mobility using leg ; Pt is able to amb up to 27' with RW and maintain TTWB on R LE; Pt is MI for w/c mobility which will be her primary mode of mobility at home; Pt is able to go up/down 1 curb step with RW; Pt does have ongoing deficits with pain, balance, safety, and strength which she needs increased time and encouragement for tasks  PT Barriers   Physical Impairment Decreased strength;Decreased range of motion;Decreased endurance; Impaired balance;Decreased mobility; Decreased safety awareness;Pain   Functional Limitation Wheelchair management; [facility] negotiation   Plan   Treatment/Interventions Functional transfer training;LE strengthening/ROM; Elevations; Therapeutic exercise;Gait training   Progress Progressing toward goals   Recommendation   PT - OK to Discharge Yes   Discharge Summary Pt is S level for all transfers/gait with RW and MI for w/c mobility; Pt continues with deficits from pain, balance, safety, and TTWB on R LE; Pt will have family with her at all times; All DME is in place; Pt is cleared for D/C home today   PT Therapy Minutes   PT Time In 1100   PT Time Out 1230   PT Total Time (minutes) 90   PT Mode of treatment - Individual (minutes) 90   PT Mode of treatment - Concurrent (minutes) 0   PT Mode of treatment - Group (minutes) 0   PT Mode of treatment - Co-treat (minutes) 0   PT Mode of Treatment - Total time(minutes) 90 minutes   PT Cumulative Minutes 1181   Therapy Time missed   Time missed?  No

## 2020-06-18 NOTE — PROGRESS NOTES
06/18/20 0830   Pain Assessment   Pain Assessment Tool 0-10   Pain Score 3   Pain Location/Orientation Orientation: Right;Location: Arm;Location: Leg   Restrictions/Precautions   Precautions Fall Risk;Pain   RLE Weight Bearing Per Order TTWB   Eating   Type of Assistance Needed Independent   Eating CARE Score 6   Oral Hygiene   Type of Assistance Needed Independent   Oral Hygiene CARE Score 6   Grooming   Able To Initiate Tasks;Comb/Brush Hair;Brush/Clean Teeth;Wash/Dry Face;Wash/Dry Hands   Findings w/c level at sink   Shower/Bathe Self   Type of Assistance Needed Supervision; Adaptive equipment   Amount of Physical Assistance Provided No physical assistance   Shower/Bathe Self CARE Score 4   Bathing   Assessed Bath Style Sponge Bath   Able to Wash/Rinse/Dry (body part) Left Arm;Right Arm;L Upper Leg;R Upper Leg;L Lower Leg/Foot;R Lower Leg/Foot;Chest;Abdomen;Perineal Area; Buttocks   Limitations Noted in Balance;Strength;ROM;Safety   Positioning Seated;Standing   Adaptive Equipment Longhand Sponge;Longhand Reacher   Findings  CS in stance; pt lacks confidence    Upper Body Dressing   Type of Assistance Needed Independent   Upper Body Dressing CARE Score 6   Lower Body Dressing   Type of Assistance Needed Supervision;Verbal cues; Adaptive equipment   Lower Body Dressing CARE Score 4   Putting On/Taking Off Footwear   Type of Assistance Needed Independent; Adaptive equipment   Putting On/Taking Off Footwear CARE Score 6   Picking Up Object   Type of Assistance Needed Incidental touching; Adaptive equipment;Verbal cues   Picking Up Object CARE Score 4   Dressing/Undressing Clothing   Remove UB Clothes Pullover Shirt;Bra;Button Shirt   Don UB Clothes Button Shirt;Pullover Shirt;Bra   Remove LB Clothes Pants; Undergarment;Socks   Don LB Clothes Pants; Undergarment;Socks   Limitations Noted In Balance;Strength;ROM;Safety; Endurance   Adaptive Equipment Reacher;Sock Aide   Sit to Lying   Type of Assistance Needed Adaptive equipment;Supervision   Sit to Lying CARE Score 4   Lying to Sitting on Side of Bed   Type of Assistance Needed Independent; Adaptive equipment   Lying to Sitting on Side of Bed CARE Score 6   Sit to Stand   Type of Assistance Needed Supervision;Verbal cues   Comment Pt struggling with STS due to pain in R UE and leg; multiple attempts; utilizing grab bar in front of her     Sit to Stand CARE Score 4   Bed-Chair Transfer   Type of Assistance Needed Supervision;Verbal cues   Chair/Bed-to-Chair Transfer CARE Score 4   Transfer Bed/Chair/Wheelchair   Limitations Noted In Balance; Endurance;LE Strength   Toileting Hygiene   Type of Assistance Needed Supervision   Toileting Hygiene CARE Score 4   Toileting   Able to Pull Clothing down yes, up yes  Manage Hygiene Bladder   Limitations Noted In Balance;LE Strength   Findings Increased time     Toilet Transfer   Type of Assistance Needed Supervision;Verbal cues; Adaptive equipment   Toilet Transfer CARE Score 4   Toilet Transfer   Surface Assessed Raised Toilet   Transfer Technique Stand Pivot   Limitations Noted In Balance;Confidence;ROM;Safety;LE Strength;UE Strength; Endurance   Adaptive Equipment Grab Bar   Findings Increased time and attempts due to taxing effort    Cognition   Overall Cognitive Status [facility]   Arousal/Participation Alert; Cooperative   Attention Within functional limits   Orientation Level Oriented X4   Memory Within functional limits   Following Commands Follows all commands and directions without difficulty   Additional Activities   Additional Activities Comments Pt seated at table and sorting items for AROM B UEs   Activity Tolerance   Activity Tolerance Patient limited by pain   Other Comments   Assessment w/c mobility MI    Assessment   Treatment Assessment Pt being discharged to home today with family support  OT completed final assessment with compensatory strategies and safe technqiues   Pt continues to need overall superviision with self care with AE  OT simulated home set up with BSC txf and it was determined that pt would benefit form a SR on bed  OT sent picture to pts daughter via phone and she reposnded that she already ordered device  Pt able to complete txf with supervision with increased time and multiple attempts  Daughter also provided with info on resin  for toielt for future use and she will order this  Presently pt will use BSC at bed for all txfs  Pt does have ongoing deficits with needing increased time due to confidence deficits and fear of falling and also pain in R side  CHRISTELLE ordered craftmatic bed  DAughter and GD to stay with pt initially and home modifications made to accomodate pt needs  Pt primary mode of transport is w/c due to TTWB R LE  Pt using leg  with S/I for bed mobility  Pt to use baby monitor in bedroom  Refer to respective sections for details of session  Plan d/c OT today to home  Prognosis Good   Problem List Decreased strength;Decreased range of motion; Impaired balance;Decreased mobility; Decreased safety awareness;Orthopedic restrictions;Pain   Plan   Treatment/Interventions ADL retraining;Functional transfer training; Endurance training;Patient/family training;Equipment eval/education; Bed mobility; Compensatory technique education;OT   Progress Progressing toward goals   Recommendation   Discharge Summary Pt being discharged to home with family support  [facility] Hyde services in place  HAs w/c/3in1/resin  for toilet/RW/tub seat/craftmatic bed/bed rail and AE for LB self care  Pt did not meet MI goals   Pt at CS level due to ongoing deficits with pain/safety/balance/impaired ROM and TTWB R LE   OT Therapy Minutes   OT Time In 0830   OT Time Out 1000   OT Total Time (minutes) 90   OT Mode of treatment - Individual (minutes) 90   OT Mode of treatment - Concurrent (minutes) 0   OT Mode of treatment - Group (minutes) 0   OT Mode of treatment - Co-treat (minutes) 0   OT Mode of Treatment - Total time(minutes) 90 minutes   OT Cumulative Minutes 572   Therapy Time missed   Time missed?  No

## 2020-06-18 NOTE — OCCUPATIONAL THERAPY NOTE
Discharge Summary   Pt participates in ADLs, transfers/ mobility in w/c, light homemaking and BUE therex  Pt being discharged to home with family support  [facility] services in place  HAs w/c/3in1/resin  for toilet/RW/tub seat/craftmatic bed/bed rail and AE for LB self care  Pt has made great gains towards goals however did not meet MI goals  Pt at CS level due to ongoing deficits with pain/safety/balance/impaired ROM and TTWB R LE  Discharge planned 6/18/2020 with max rehab benefit achieved

## 2020-06-19 NOTE — PHYSICAL THERAPY NOTE
PT DISCHARGE SUMMARY:      Patient has met max benefit for inpatient [facility] PT  Patient S bed mobility, S all transfers with walker, S ambulation up to 27' level surfaces with walker, MOD I wheelchair mobility level and carpet, CG negotiation of 1 curb step using walker going up forward and down backward, CG car transfer (per nursing)  Patient remains limited by pain, TTWB RLE, decreased ROM/strength, decreased balance and safety  Patient for d/c to home with continued PT services 06/18/2020

## 2020-06-24 ENCOUNTER — APPOINTMENT (OUTPATIENT)
Dept: LAB | Facility: CLINIC | Age: 80
End: 2020-06-24
Payer: MEDICARE

## 2020-06-24 DIAGNOSIS — D64.9 ANEMIA: ICD-10-CM

## 2020-06-24 DIAGNOSIS — E03.9 HYPOTHYROIDISM: ICD-10-CM

## 2020-06-24 LAB
HCT VFR BLD AUTO: 37.2 % (ref 34.8–46.1)
HGB BLD-MCNC: 12 G/DL (ref 11.5–15.4)
T4 FREE SERPL-MCNC: 1.29 NG/DL (ref 0.76–1.46)
TSH SERPL DL<=0.05 MIU/L-ACNC: 19.8 UIU/ML (ref 0.36–3.74)

## 2020-06-24 PROCEDURE — 85018 HEMOGLOBIN: CPT

## 2020-06-24 PROCEDURE — 84439 ASSAY OF FREE THYROXINE: CPT

## 2020-06-24 PROCEDURE — 85014 HEMATOCRIT: CPT

## 2020-06-24 PROCEDURE — 36415 COLL VENOUS BLD VENIPUNCTURE: CPT

## 2020-06-24 PROCEDURE — 84443 ASSAY THYROID STIM HORMONE: CPT

## 2020-07-20 ENCOUNTER — APPOINTMENT (OUTPATIENT)
Dept: LAB | Facility: CLINIC | Age: 80
End: 2020-07-20
Payer: MEDICARE

## 2020-07-20 ENCOUNTER — TRANSCRIBE ORDERS (OUTPATIENT)
Dept: URGENT CARE | Facility: CLINIC | Age: 80
End: 2020-07-20

## 2020-07-20 DIAGNOSIS — E55.9 VITAMIN D DEFICIENCY: ICD-10-CM

## 2020-07-20 DIAGNOSIS — E83.42 HYPOMAGNESEMIA: ICD-10-CM

## 2020-07-20 DIAGNOSIS — E03.9 HYPOTHYROIDISM, UNSPECIFIED TYPE: ICD-10-CM

## 2020-07-20 DIAGNOSIS — E11.69 TYPE 2 DIABETES MELLITUS WITH OTHER SPECIFIED COMPLICATION, UNSPECIFIED WHETHER LONG TERM INSULIN USE (HCC): ICD-10-CM

## 2020-07-20 DIAGNOSIS — I25.10 ASCVD (ARTERIOSCLEROTIC CARDIOVASCULAR DISEASE): ICD-10-CM

## 2020-07-20 DIAGNOSIS — E11.69 TYPE 2 DIABETES MELLITUS WITH OTHER SPECIFIED COMPLICATION, UNSPECIFIED WHETHER LONG TERM INSULIN USE (HCC): Primary | ICD-10-CM

## 2020-07-20 LAB
25(OH)D3 SERPL-MCNC: 36.7 NG/ML (ref 30–100)
ALBUMIN SERPL BCP-MCNC: 4.1 G/DL (ref 3.5–5)
ALP SERPL-CCNC: 73 U/L (ref 46–116)
ALT SERPL W P-5'-P-CCNC: 25 U/L (ref 12–78)
ANION GAP SERPL CALCULATED.3IONS-SCNC: 7 MMOL/L (ref 4–13)
AST SERPL W P-5'-P-CCNC: 24 U/L (ref 5–45)
BILIRUB SERPL-MCNC: 0.35 MG/DL (ref 0.2–1)
BUN SERPL-MCNC: 11 MG/DL (ref 5–25)
CALCIUM SERPL-MCNC: 9.5 MG/DL (ref 8.3–10.1)
CHLORIDE SERPL-SCNC: 97 MMOL/L (ref 100–108)
CHOLEST SERPL-MCNC: 186 MG/DL (ref 50–200)
CO2 SERPL-SCNC: 27 MMOL/L (ref 21–32)
CREAT SERPL-MCNC: 0.7 MG/DL (ref 0.6–1.3)
ERYTHROCYTE [DISTWIDTH] IN BLOOD BY AUTOMATED COUNT: 13.7 % (ref 11.6–15.1)
FOLATE SERPL-MCNC: >20 NG/ML (ref 3.1–17.5)
GFR SERPL CREATININE-BSD FRML MDRD: 83 ML/MIN/1.73SQ M
GLUCOSE P FAST SERPL-MCNC: 90 MG/DL (ref 65–99)
HCT VFR BLD AUTO: 40 % (ref 34.8–46.1)
HDLC SERPL-MCNC: 93 MG/DL
HGB BLD-MCNC: 12.9 G/DL (ref 11.5–15.4)
IRON SERPL-MCNC: 86 UG/DL (ref 50–170)
LDLC SERPL CALC-MCNC: 79 MG/DL (ref 0–100)
MAGNESIUM SERPL-MCNC: 2 MG/DL (ref 1.6–2.6)
MCH RBC QN AUTO: 34.3 PG (ref 26.8–34.3)
MCHC RBC AUTO-ENTMCNC: 32.3 G/DL (ref 31.4–37.4)
MCV RBC AUTO: 106 FL (ref 82–98)
NONHDLC SERPL-MCNC: 93 MG/DL
PLATELET # BLD AUTO: 276 THOUSANDS/UL (ref 149–390)
PMV BLD AUTO: 11.8 FL (ref 8.9–12.7)
POTASSIUM SERPL-SCNC: 4.4 MMOL/L (ref 3.5–5.3)
PROT SERPL-MCNC: 7.1 G/DL (ref 6.4–8.2)
RBC # BLD AUTO: 3.76 MILLION/UL (ref 3.81–5.12)
RETICS # AUTO: NORMAL 10*3/UL (ref 14097–95744)
RETICS # CALC: 0.8 % (ref 0.37–1.87)
SODIUM SERPL-SCNC: 131 MMOL/L (ref 136–145)
T4 FREE SERPL-MCNC: 1 NG/DL (ref 0.76–1.46)
TIBC SERPL-MCNC: 316 UG/DL (ref 250–450)
TRIGL SERPL-MCNC: 72 MG/DL
TSH SERPL DL<=0.05 MIU/L-ACNC: 15.6 UIU/ML (ref 0.36–3.74)
VIT B12 SERPL-MCNC: 829 PG/ML (ref 100–900)
WBC # BLD AUTO: 5.93 THOUSAND/UL (ref 4.31–10.16)

## 2020-07-20 PROCEDURE — 85027 COMPLETE CBC AUTOMATED: CPT

## 2020-07-20 PROCEDURE — 36415 COLL VENOUS BLD VENIPUNCTURE: CPT

## 2020-07-20 PROCEDURE — 82607 VITAMIN B-12: CPT

## 2020-07-20 PROCEDURE — 82746 ASSAY OF FOLIC ACID SERUM: CPT

## 2020-07-20 PROCEDURE — 82306 VITAMIN D 25 HYDROXY: CPT

## 2020-07-20 PROCEDURE — 85045 AUTOMATED RETICULOCYTE COUNT: CPT

## 2020-07-20 PROCEDURE — 80061 LIPID PANEL: CPT

## 2020-07-20 PROCEDURE — 84443 ASSAY THYROID STIM HORMONE: CPT

## 2020-07-20 PROCEDURE — 84439 ASSAY OF FREE THYROXINE: CPT

## 2020-07-20 PROCEDURE — 83735 ASSAY OF MAGNESIUM: CPT

## 2020-07-20 PROCEDURE — 83550 IRON BINDING TEST: CPT

## 2020-07-20 PROCEDURE — 80053 COMPREHEN METABOLIC PANEL: CPT

## 2020-07-20 PROCEDURE — 83540 ASSAY OF IRON: CPT

## 2020-08-07 ENCOUNTER — OFFICE VISIT (OUTPATIENT)
Dept: OBGYN CLINIC | Facility: CLINIC | Age: 80
End: 2020-08-07
Payer: MEDICARE

## 2020-08-07 VITALS
TEMPERATURE: 97.7 F | DIASTOLIC BLOOD PRESSURE: 68 MMHG | BODY MASS INDEX: 23.19 KG/M2 | HEIGHT: 62 IN | WEIGHT: 126 LBS | SYSTOLIC BLOOD PRESSURE: 140 MMHG

## 2020-08-07 DIAGNOSIS — S72.141D CLOSED DISPLACED INTERTROCHANTERIC FRACTURE OF RIGHT FEMUR WITH ROUTINE HEALING, SUBSEQUENT ENCOUNTER: Primary | ICD-10-CM

## 2020-08-07 DIAGNOSIS — M17.0 PRIMARY OSTEOARTHRITIS OF BOTH KNEES: ICD-10-CM

## 2020-08-07 PROCEDURE — 99024 POSTOP FOLLOW-UP VISIT: CPT | Performed by: ORTHOPAEDIC SURGERY

## 2020-08-07 PROCEDURE — 20610 DRAIN/INJ JOINT/BURSA W/O US: CPT | Performed by: ORTHOPAEDIC SURGERY

## 2020-08-07 RX ORDER — TRAMADOL HYDROCHLORIDE 50 MG/1
TABLET ORAL
COMMUNITY

## 2020-08-07 RX ADMIN — BUPIVACAINE HYDROCHLORIDE 3.5 ML: 5 INJECTION, SOLUTION PERINEURAL at 12:06

## 2020-08-07 RX ADMIN — METHYLPREDNISOLONE ACETATE 2 ML: 40 INJECTION, SUSPENSION INTRA-ARTICULAR; INTRALESIONAL; INTRAMUSCULAR; SOFT TISSUE at 12:06

## 2020-08-07 NOTE — PROGRESS NOTES
Assessment/Plan:    No problem-specific Assessment & Plan notes found for this encounter  Diagnoses and all orders for this visit:    Closed displaced intertrochanteric fracture of right femur with routine healing, subsequent encounter  -     XR hip/pelv 2-3 vws right if performed; Future    Primary osteoarthritis of both knees    Other orders  -     traMADol (ULTRAM) 50 mg tablet; tramadol 50 mg tablet   take 1 tablet by mouth three times a day if needed          Both knees were injected with Depo-Medrol and Marcaine  She tolerated the procedure quite well  She returned back in 3 months for re-evaluation with x-rays of right hip-two views    Subjective:      Patient ID: Ranjana Gould is a 78 y o  female  HPI    The patient is status post TFN nail fixation of her right hip from May 28th  She offers no complaints of pain along her right hip  She is having more pain along her bilateral knees more on the right than the left  She was having trouble with her knees prior to her fall  She desires to have bilateral corticosteroid injections today    The following portions of the patient's history were reviewed and updated as appropriate: allergies, current medications, past family history, past medical history, past social history, past surgical history and problem list     Review of Systems   Constitutional: Negative for chills, fever and unexpected weight change  HENT: Negative for hearing loss, nosebleeds and sore throat  Eyes: Negative for pain, redness and visual disturbance  Respiratory: Negative for cough, shortness of breath and wheezing  Cardiovascular: Negative for chest pain, palpitations and leg swelling  Gastrointestinal: Negative for abdominal pain, nausea and vomiting  Endocrine: Negative for polydipsia and polyuria  Genitourinary: Negative for dysuria and hematuria  Musculoskeletal: Positive for arthralgias, gait problem, joint swelling and neck stiffness   Negative for back pain, myalgias and neck pain  As noted in HPI   Skin: Negative for rash and wound  Neurological: Negative for dizziness, numbness and headaches  Psychiatric/Behavioral: Negative for decreased concentration and suicidal ideas  The patient is not nervous/anxious  Objective:      /68 (BP Location: Right arm, Patient Position: Sitting, Cuff Size: Standard)   Temp 97 7 °F (36 5 °C)   Ht 5' 2" (1 575 m)   Wt 57 2 kg (126 lb)   BMI 23 05 kg/m²          Physical Exam        Bilateral lower extremities are neurovascular intact  Toes are pink and mobile  Compartments are soft  Incision of the right hip is clean, dry, and intact  There is no pain along the hip area  As far as her knees are concerned, there is a varus deformity present  Range of motion of both her knees are from from 5-110 degrees  There is negative Lachman's, drawer, and pivot shift test   There is medial joint tenderness, lateral joint tenderness, and patellofemoral crepitation  There is a painful arc of motion throughout her bilateral knees  Negative Homans  X-rays of the right hip shows the fracture to be good alignment and well healed    No hardware complications    Large joint arthrocentesis: bilateral knee  Date/Time: 8/7/2020 12:06 PM  Site marked: site marked  Timeout: Immediately prior to procedure a time out was called to verify the correct patient, procedure, equipment, support staff and site/side marked as required   Supporting Documentation  Indications: pain   Procedure Details  Location: knee - bilateral knee  Needle size: 22 G  Ultrasound guidance: no  Approach: lateral    Medications (Right): 2 mL methylPREDNISolone acetate 40 mg/mL; 3 5 mL bupivacaine 0 5 %Medications (Left): 2 mL methylPREDNISolone acetate 40 mg/mL; 3 5 mL bupivacaine 0 5 %   Patient tolerance: patient tolerated the procedure well with no immediate complications  Dressing:  Sterile dressing applied

## 2020-08-10 RX ORDER — BUPIVACAINE HYDROCHLORIDE 5 MG/ML
3.5 INJECTION, SOLUTION PERINEURAL
Status: COMPLETED | OUTPATIENT
Start: 2020-08-07 | End: 2020-08-07

## 2020-08-10 RX ORDER — METHYLPREDNISOLONE ACETATE 40 MG/ML
2 INJECTION, SUSPENSION INTRA-ARTICULAR; INTRALESIONAL; INTRAMUSCULAR; SOFT TISSUE
Status: COMPLETED | OUTPATIENT
Start: 2020-08-07 | End: 2020-08-07

## 2020-10-22 ENCOUNTER — TELEPHONE (OUTPATIENT)
Dept: LAB | Facility: HOSPITAL | Age: 80
End: 2020-10-22

## 2020-10-26 ENCOUNTER — TRANSCRIBE ORDERS (OUTPATIENT)
Dept: LAB | Facility: HOSPITAL | Age: 80
End: 2020-10-26

## 2020-10-26 ENCOUNTER — APPOINTMENT (OUTPATIENT)
Dept: LAB | Facility: HOSPITAL | Age: 80
End: 2020-10-26
Payer: MEDICARE

## 2020-10-26 DIAGNOSIS — Z79.899 ENCOUNTER FOR LONG-TERM (CURRENT) USE OF OTHER MEDICATIONS: ICD-10-CM

## 2020-10-26 DIAGNOSIS — E13.69 OTHER SPECIFIED DIABETES MELLITUS WITH OTHER SPECIFIED COMPLICATION, UNSPECIFIED WHETHER LONG TERM INSULIN USE (HCC): ICD-10-CM

## 2020-10-26 DIAGNOSIS — E78.5 HYPERLIPIDEMIA, UNSPECIFIED HYPERLIPIDEMIA TYPE: ICD-10-CM

## 2020-10-26 DIAGNOSIS — I10 HYPERTENSION, UNSPECIFIED TYPE: ICD-10-CM

## 2020-10-26 DIAGNOSIS — D64.9 ANEMIA, UNSPECIFIED TYPE: ICD-10-CM

## 2020-10-26 DIAGNOSIS — E03.9 HYPOTHYROIDISM, UNSPECIFIED TYPE: ICD-10-CM

## 2020-10-26 DIAGNOSIS — E11.69 TYPE 2 DIABETES MELLITUS WITH OTHER SPECIFIED COMPLICATION, UNSPECIFIED WHETHER LONG TERM INSULIN USE (HCC): ICD-10-CM

## 2020-10-26 DIAGNOSIS — E10.69 TYPE 1 DIABETES MELLITUS WITH OTHER SPECIFIED COMPLICATION (HCC): Primary | ICD-10-CM

## 2020-10-26 DIAGNOSIS — E55.9 VITAMIN D DEFICIENCY: ICD-10-CM

## 2020-10-26 LAB
25(OH)D3 SERPL-MCNC: 39 NG/ML (ref 30–100)
ALBUMIN SERPL BCP-MCNC: 4.1 G/DL (ref 3.5–5)
ALP SERPL-CCNC: 65 U/L (ref 46–116)
ALT SERPL W P-5'-P-CCNC: 25 U/L (ref 12–78)
ANION GAP SERPL CALCULATED.3IONS-SCNC: 5 MMOL/L (ref 4–13)
AST SERPL W P-5'-P-CCNC: 21 U/L (ref 5–45)
BILIRUB SERPL-MCNC: 0.65 MG/DL (ref 0.2–1)
BUN SERPL-MCNC: 15 MG/DL (ref 5–25)
CALCIUM SERPL-MCNC: 8.6 MG/DL (ref 8.3–10.1)
CHLORIDE SERPL-SCNC: 103 MMOL/L (ref 100–108)
CHOLEST SERPL-MCNC: 191 MG/DL (ref 50–200)
CO2 SERPL-SCNC: 29 MMOL/L (ref 21–32)
CREAT SERPL-MCNC: 0.6 MG/DL (ref 0.6–1.3)
ERYTHROCYTE [DISTWIDTH] IN BLOOD BY AUTOMATED COUNT: 13.1 % (ref 11.6–15.1)
EST. AVERAGE GLUCOSE BLD GHB EST-MCNC: 160 MG/DL
FERRITIN SERPL-MCNC: 275 NG/ML (ref 8–388)
FOLATE SERPL-MCNC: >20 NG/ML (ref 3.1–17.5)
GFR SERPL CREATININE-BSD FRML MDRD: 87 ML/MIN/1.73SQ M
GLUCOSE P FAST SERPL-MCNC: 182 MG/DL (ref 65–99)
HBA1C MFR BLD: 7.2 %
HCT VFR BLD AUTO: 37.5 % (ref 34.8–46.1)
HDLC SERPL-MCNC: 102 MG/DL
HGB BLD-MCNC: 12.1 G/DL (ref 11.5–15.4)
IRON SATN MFR SERPL: 34 %
IRON SERPL-MCNC: 105 UG/DL (ref 50–170)
LDLC SERPL CALC-MCNC: 73 MG/DL (ref 0–100)
MCH RBC QN AUTO: 33.5 PG (ref 26.8–34.3)
MCHC RBC AUTO-ENTMCNC: 32.3 G/DL (ref 31.4–37.4)
MCV RBC AUTO: 104 FL (ref 82–98)
NONHDLC SERPL-MCNC: 89 MG/DL
NT-PROBNP SERPL-MCNC: 213 PG/ML
PLATELET # BLD AUTO: 251 THOUSANDS/UL (ref 149–390)
PMV BLD AUTO: 12 FL (ref 8.9–12.7)
POTASSIUM SERPL-SCNC: 4.1 MMOL/L (ref 3.5–5.3)
PROT SERPL-MCNC: 7 G/DL (ref 6.4–8.2)
RBC # BLD AUTO: 3.61 MILLION/UL (ref 3.81–5.12)
RETICS # AUTO: NORMAL 10*3/UL (ref 14097–95744)
RETICS # CALC: 1.41 % (ref 0.37–1.87)
SODIUM SERPL-SCNC: 137 MMOL/L (ref 136–145)
T4 FREE SERPL-MCNC: 1.19 NG/DL (ref 0.76–1.46)
TIBC SERPL-MCNC: 309 UG/DL (ref 250–450)
TRIGL SERPL-MCNC: 80 MG/DL
TSH SERPL DL<=0.05 MIU/L-ACNC: 5.83 UIU/ML (ref 0.36–3.74)
VIT B12 SERPL-MCNC: 703 PG/ML (ref 100–900)
WBC # BLD AUTO: 5.47 THOUSAND/UL (ref 4.31–10.16)

## 2020-10-26 PROCEDURE — 84443 ASSAY THYROID STIM HORMONE: CPT

## 2020-10-26 PROCEDURE — 36415 COLL VENOUS BLD VENIPUNCTURE: CPT

## 2020-10-26 PROCEDURE — 82746 ASSAY OF FOLIC ACID SERUM: CPT

## 2020-10-26 PROCEDURE — 83036 HEMOGLOBIN GLYCOSYLATED A1C: CPT

## 2020-10-26 PROCEDURE — 83880 ASSAY OF NATRIURETIC PEPTIDE: CPT

## 2020-10-26 PROCEDURE — 83540 ASSAY OF IRON: CPT

## 2020-10-26 PROCEDURE — 80061 LIPID PANEL: CPT

## 2020-10-26 PROCEDURE — 83550 IRON BINDING TEST: CPT

## 2020-10-26 PROCEDURE — 85027 COMPLETE CBC AUTOMATED: CPT

## 2020-10-26 PROCEDURE — 82306 VITAMIN D 25 HYDROXY: CPT

## 2020-10-26 PROCEDURE — 82728 ASSAY OF FERRITIN: CPT

## 2020-10-26 PROCEDURE — 84439 ASSAY OF FREE THYROXINE: CPT

## 2020-10-26 PROCEDURE — 82747 ASSAY OF FOLIC ACID RBC: CPT

## 2020-10-26 PROCEDURE — 80053 COMPREHEN METABOLIC PANEL: CPT

## 2020-10-26 PROCEDURE — 82607 VITAMIN B-12: CPT

## 2020-10-26 PROCEDURE — 85045 AUTOMATED RETICULOCYTE COUNT: CPT

## 2020-10-27 ENCOUNTER — TRANSCRIBE ORDERS (OUTPATIENT)
Dept: LAB | Facility: HOSPITAL | Age: 80
End: 2020-10-27

## 2020-10-28 LAB
FOLATE BLD-MCNC: >620 NG/ML
FOLATE RBC-MCNC: >1699 NG/ML
HCT VFR BLD AUTO: 36.5 % (ref 34–46.6)

## 2020-11-05 ENCOUNTER — APPOINTMENT (EMERGENCY)
Dept: CT IMAGING | Facility: HOSPITAL | Age: 80
End: 2020-11-05
Payer: MEDICARE

## 2020-11-05 ENCOUNTER — HOSPITAL ENCOUNTER (OUTPATIENT)
Facility: HOSPITAL | Age: 80
Setting detail: OBSERVATION
Discharge: HOME | End: 2020-11-06
Attending: FAMILY MEDICINE | Admitting: INTERNAL MEDICINE
Payer: MEDICARE

## 2020-11-05 DIAGNOSIS — R26.2 AMBULATORY DYSFUNCTION: Primary | ICD-10-CM

## 2020-11-05 DIAGNOSIS — M25.551 RIGHT HIP PAIN: ICD-10-CM

## 2020-11-05 LAB
ANION GAP SERPL CALCULATED.3IONS-SCNC: 11 MMOL/L (ref 4–13)
BASOPHILS # BLD AUTO: 0 THOUSANDS/ΜL (ref 0–0.1)
BASOPHILS NFR BLD AUTO: 1 % (ref 0–2)
BUN SERPL-MCNC: 14 MG/DL (ref 7–25)
CALCIUM SERPL-MCNC: 9.6 MG/DL (ref 8.6–10.5)
CHLORIDE SERPL-SCNC: 96 MMOL/L (ref 98–107)
CO2 SERPL-SCNC: 28 MMOL/L (ref 21–31)
CREAT SERPL-MCNC: 0.73 MG/DL (ref 0.6–1.2)
EOSINOPHIL # BLD AUTO: 0.1 THOUSAND/ΜL (ref 0–0.61)
EOSINOPHIL NFR BLD AUTO: 2 % (ref 0–5)
ERYTHROCYTE [DISTWIDTH] IN BLOOD BY AUTOMATED COUNT: 13.4 % (ref 11.5–14.5)
GFR SERPL CREATININE-BSD FRML MDRD: 79 ML/MIN/1.73SQ M
GLUCOSE SERPL-MCNC: 154 MG/DL (ref 65–99)
GLUCOSE SERPL-MCNC: 292 MG/DL (ref 65–140)
HCT VFR BLD AUTO: 36.5 % (ref 42–47)
HGB BLD-MCNC: 12.6 G/DL (ref 12–16)
INR PPP: 0.95 (ref 0.84–1.19)
LYMPHOCYTES # BLD AUTO: 1.2 THOUSANDS/ΜL (ref 0.6–4.47)
LYMPHOCYTES NFR BLD AUTO: 22 % (ref 21–51)
MCH RBC QN AUTO: 34.8 PG (ref 26–34)
MCHC RBC AUTO-ENTMCNC: 34.4 G/DL (ref 31–37)
MCV RBC AUTO: 101 FL (ref 81–99)
MONOCYTES # BLD AUTO: 0.5 THOUSAND/ΜL (ref 0.17–1.22)
MONOCYTES NFR BLD AUTO: 9 % (ref 2–12)
NEUTROPHILS # BLD AUTO: 3.7 THOUSANDS/ΜL (ref 1.4–6.5)
NEUTS SEG NFR BLD AUTO: 67 % (ref 42–75)
PLATELET # BLD AUTO: 220 THOUSANDS/UL (ref 149–390)
PMV BLD AUTO: 8.8 FL (ref 8.6–11.7)
POTASSIUM SERPL-SCNC: 4.2 MMOL/L (ref 3.5–5.5)
PROTHROMBIN TIME: 12.6 SECONDS (ref 11.6–14.5)
RBC # BLD AUTO: 3.61 MILLION/UL (ref 3.9–5.2)
SODIUM SERPL-SCNC: 135 MMOL/L (ref 134–143)
WBC # BLD AUTO: 5.5 THOUSAND/UL (ref 4.8–10.8)

## 2020-11-05 PROCEDURE — 80048 BASIC METABOLIC PNL TOTAL CA: CPT | Performed by: FAMILY MEDICINE

## 2020-11-05 PROCEDURE — 72192 CT PELVIS W/O DYE: CPT

## 2020-11-05 PROCEDURE — 97163 PT EVAL HIGH COMPLEX 45 MIN: CPT

## 2020-11-05 PROCEDURE — 93005 ELECTROCARDIOGRAM TRACING: CPT

## 2020-11-05 PROCEDURE — 82948 REAGENT STRIP/BLOOD GLUCOSE: CPT

## 2020-11-05 PROCEDURE — 99220 PR INITIAL OBSERVATION CARE/DAY 70 MINUTES: CPT | Performed by: PHYSICIAN ASSISTANT

## 2020-11-05 PROCEDURE — 99285 EMERGENCY DEPT VISIT HI MDM: CPT | Performed by: FAMILY MEDICINE

## 2020-11-05 PROCEDURE — 85610 PROTHROMBIN TIME: CPT | Performed by: FAMILY MEDICINE

## 2020-11-05 PROCEDURE — G1004 CDSM NDSC: HCPCS

## 2020-11-05 PROCEDURE — 36415 COLL VENOUS BLD VENIPUNCTURE: CPT | Performed by: FAMILY MEDICINE

## 2020-11-05 PROCEDURE — 85025 COMPLETE CBC W/AUTO DIFF WBC: CPT | Performed by: FAMILY MEDICINE

## 2020-11-05 PROCEDURE — 99285 EMERGENCY DEPT VISIT HI MDM: CPT

## 2020-11-05 PROCEDURE — 97166 OT EVAL MOD COMPLEX 45 MIN: CPT

## 2020-11-05 RX ORDER — ATORVASTATIN CALCIUM 20 MG/1
20 TABLET, FILM COATED ORAL
Status: DISCONTINUED | OUTPATIENT
Start: 2020-11-06 | End: 2020-11-06 | Stop reason: HOSPADM

## 2020-11-05 RX ORDER — B-COMPLEX WITH VITAMIN C
1 TABLET ORAL
Status: DISCONTINUED | OUTPATIENT
Start: 2020-11-06 | End: 2020-11-06 | Stop reason: HOSPADM

## 2020-11-05 RX ORDER — ACETAMINOPHEN 325 MG/1
650 TABLET ORAL EVERY 6 HOURS PRN
Status: DISCONTINUED | OUTPATIENT
Start: 2020-11-05 | End: 2020-11-06 | Stop reason: HOSPADM

## 2020-11-05 RX ORDER — FENTANYL CITRATE 50 UG/ML
50 INJECTION, SOLUTION INTRAMUSCULAR; INTRAVENOUS ONCE
Status: DISCONTINUED | OUTPATIENT
Start: 2020-11-05 | End: 2020-11-05

## 2020-11-05 RX ORDER — PHENOL 1.4 %
600 AEROSOL, SPRAY (ML) MUCOUS MEMBRANE 2 TIMES DAILY WITH MEALS
COMMUNITY

## 2020-11-05 RX ORDER — METHOCARBAMOL 500 MG/1
500 TABLET, FILM COATED ORAL EVERY 6 HOURS PRN
Status: DISCONTINUED | OUTPATIENT
Start: 2020-11-05 | End: 2020-11-06 | Stop reason: HOSPADM

## 2020-11-05 RX ORDER — HEPARIN SODIUM 5000 [USP'U]/ML
5000 INJECTION, SOLUTION INTRAVENOUS; SUBCUTANEOUS EVERY 8 HOURS SCHEDULED
Status: DISCONTINUED | OUTPATIENT
Start: 2020-11-05 | End: 2020-11-06 | Stop reason: HOSPADM

## 2020-11-05 RX ORDER — TRAMADOL HYDROCHLORIDE 50 MG/1
50 TABLET ORAL EVERY 6 HOURS PRN
Status: DISCONTINUED | OUTPATIENT
Start: 2020-11-05 | End: 2020-11-06 | Stop reason: HOSPADM

## 2020-11-05 RX ORDER — TRAMADOL HYDROCHLORIDE 50 MG/1
50 TABLET ORAL ONCE
Status: COMPLETED | OUTPATIENT
Start: 2020-11-05 | End: 2020-11-05

## 2020-11-05 RX ORDER — LISINOPRIL 5 MG/1
5 TABLET ORAL DAILY
Status: DISCONTINUED | OUTPATIENT
Start: 2020-11-06 | End: 2020-11-06 | Stop reason: HOSPADM

## 2020-11-05 RX ORDER — ASPIRIN 81 MG/1
81 TABLET ORAL DAILY
Status: DISCONTINUED | OUTPATIENT
Start: 2020-11-06 | End: 2020-11-06 | Stop reason: HOSPADM

## 2020-11-05 RX ORDER — ONDANSETRON 2 MG/ML
4 INJECTION INTRAMUSCULAR; INTRAVENOUS EVERY 4 HOURS PRN
Status: DISCONTINUED | OUTPATIENT
Start: 2020-11-05 | End: 2020-11-06 | Stop reason: HOSPADM

## 2020-11-05 RX ORDER — LEVOTHYROXINE SODIUM 0.1 MG/1
100 TABLET ORAL
Status: DISCONTINUED | OUTPATIENT
Start: 2020-11-06 | End: 2020-11-06 | Stop reason: HOSPADM

## 2020-11-05 RX ADMIN — TRAMADOL HYDROCHLORIDE 50 MG: 50 TABLET, FILM COATED ORAL at 21:51

## 2020-11-05 RX ADMIN — TRAMADOL HYDROCHLORIDE 50 MG: 50 TABLET, FILM COATED ORAL at 12:46

## 2020-11-05 RX ADMIN — HEPARIN SODIUM 5000 UNITS: 5000 INJECTION INTRAVENOUS; SUBCUTANEOUS at 21:50

## 2020-11-06 ENCOUNTER — APPOINTMENT (OUTPATIENT)
Dept: RADIOLOGY | Facility: HOSPITAL | Age: 80
End: 2020-11-06
Attending: ORTHOPAEDIC SURGERY
Payer: MEDICARE

## 2020-11-06 VITALS
RESPIRATION RATE: 18 BRPM | OXYGEN SATURATION: 97 % | TEMPERATURE: 97.8 F | HEIGHT: 61 IN | SYSTOLIC BLOOD PRESSURE: 120 MMHG | BODY MASS INDEX: 23.06 KG/M2 | HEART RATE: 81 BPM | WEIGHT: 122.14 LBS | DIASTOLIC BLOOD PRESSURE: 56 MMHG

## 2020-11-06 PROBLEM — M25.561 RIGHT KNEE PAIN: Status: ACTIVE | Noted: 2020-11-06

## 2020-11-06 LAB
ANION GAP SERPL CALCULATED.3IONS-SCNC: 9 MMOL/L (ref 4–13)
ATRIAL RATE: 79 BPM
BASOPHILS # BLD AUTO: 0.1 THOUSANDS/ΜL (ref 0–0.1)
BASOPHILS NFR BLD AUTO: 1 % (ref 0–2)
BUN SERPL-MCNC: 12 MG/DL (ref 7–25)
CALCIUM SERPL-MCNC: 8.6 MG/DL (ref 8.6–10.5)
CHLORIDE SERPL-SCNC: 99 MMOL/L (ref 98–107)
CO2 SERPL-SCNC: 27 MMOL/L (ref 21–31)
CREAT SERPL-MCNC: 0.69 MG/DL (ref 0.6–1.2)
EOSINOPHIL # BLD AUTO: 0.2 THOUSAND/ΜL (ref 0–0.61)
EOSINOPHIL NFR BLD AUTO: 2 % (ref 0–5)
ERYTHROCYTE [DISTWIDTH] IN BLOOD BY AUTOMATED COUNT: 13.5 % (ref 11.5–14.5)
GFR SERPL CREATININE-BSD FRML MDRD: 83 ML/MIN/1.73SQ M
GLUCOSE P FAST SERPL-MCNC: 250 MG/DL (ref 65–99)
GLUCOSE SERPL-MCNC: 240 MG/DL (ref 65–140)
GLUCOSE SERPL-MCNC: 250 MG/DL (ref 65–99)
GLUCOSE SERPL-MCNC: 327 MG/DL (ref 65–140)
HCT VFR BLD AUTO: 34.2 % (ref 42–47)
HGB BLD-MCNC: 11.6 G/DL (ref 12–16)
LYMPHOCYTES # BLD AUTO: 1.7 THOUSANDS/ΜL (ref 0.6–4.47)
LYMPHOCYTES NFR BLD AUTO: 25 % (ref 21–51)
MAGNESIUM SERPL-MCNC: 1.6 MG/DL (ref 1.9–2.7)
MCH RBC QN AUTO: 34.6 PG (ref 26–34)
MCHC RBC AUTO-ENTMCNC: 33.9 G/DL (ref 31–37)
MCV RBC AUTO: 102 FL (ref 81–99)
MONOCYTES # BLD AUTO: 0.5 THOUSAND/ΜL (ref 0.17–1.22)
MONOCYTES NFR BLD AUTO: 8 % (ref 2–12)
NEUTROPHILS # BLD AUTO: 4.4 THOUSANDS/ΜL (ref 1.4–6.5)
NEUTS SEG NFR BLD AUTO: 64 % (ref 42–75)
P AXIS: 55 DEGREES
PHOSPHATE SERPL-MCNC: 4.2 MG/DL (ref 3–5.5)
PLATELET # BLD AUTO: 196 THOUSANDS/UL (ref 149–390)
PMV BLD AUTO: 10.1 FL (ref 8.6–11.7)
POTASSIUM SERPL-SCNC: 4.4 MMOL/L (ref 3.5–5.5)
PR INTERVAL: 142 MS
QRS AXIS: 1 DEGREES
QRSD INTERVAL: 76 MS
QT INTERVAL: 374 MS
QTC INTERVAL: 428 MS
RBC # BLD AUTO: 3.35 MILLION/UL (ref 3.9–5.2)
SODIUM SERPL-SCNC: 135 MMOL/L (ref 134–143)
T WAVE AXIS: -3 DEGREES
VENTRICULAR RATE: 79 BPM
WBC # BLD AUTO: 6.8 THOUSAND/UL (ref 4.8–10.8)

## 2020-11-06 PROCEDURE — 80048 BASIC METABOLIC PNL TOTAL CA: CPT | Performed by: NURSE PRACTITIONER

## 2020-11-06 PROCEDURE — 97535 SELF CARE MNGMENT TRAINING: CPT

## 2020-11-06 PROCEDURE — 97530 THERAPEUTIC ACTIVITIES: CPT

## 2020-11-06 PROCEDURE — 83735 ASSAY OF MAGNESIUM: CPT | Performed by: NURSE PRACTITIONER

## 2020-11-06 PROCEDURE — 20610 DRAIN/INJ JOINT/BURSA W/O US: CPT | Performed by: ORTHOPAEDIC SURGERY

## 2020-11-06 PROCEDURE — 99215 OFFICE O/P EST HI 40 MIN: CPT | Performed by: ORTHOPAEDIC SURGERY

## 2020-11-06 PROCEDURE — 97116 GAIT TRAINING THERAPY: CPT

## 2020-11-06 PROCEDURE — 84100 ASSAY OF PHOSPHORUS: CPT | Performed by: NURSE PRACTITIONER

## 2020-11-06 PROCEDURE — 99217 PR OBSERVATION CARE DISCHARGE MANAGEMENT: CPT | Performed by: FAMILY MEDICINE

## 2020-11-06 PROCEDURE — 82948 REAGENT STRIP/BLOOD GLUCOSE: CPT

## 2020-11-06 PROCEDURE — 93010 ELECTROCARDIOGRAM REPORT: CPT | Performed by: INTERNAL MEDICINE

## 2020-11-06 PROCEDURE — 85025 COMPLETE CBC W/AUTO DIFF WBC: CPT | Performed by: NURSE PRACTITIONER

## 2020-11-06 PROCEDURE — 73562 X-RAY EXAM OF KNEE 3: CPT

## 2020-11-06 RX ORDER — BUPIVACAINE HYDROCHLORIDE 5 MG/ML
5 INJECTION, SOLUTION EPIDURAL; INTRACAUDAL ONCE
Status: COMPLETED | OUTPATIENT
Start: 2020-11-06 | End: 2020-11-06

## 2020-11-06 RX ORDER — METHYLPREDNISOLONE ACETATE 80 MG/ML
80 INJECTION, SUSPENSION INTRA-ARTICULAR; INTRALESIONAL; INTRAMUSCULAR; SOFT TISSUE ONCE
Status: COMPLETED | OUTPATIENT
Start: 2020-11-06 | End: 2020-11-06

## 2020-11-06 RX ORDER — MAGNESIUM SULFATE HEPTAHYDRATE 40 MG/ML
2 INJECTION, SOLUTION INTRAVENOUS ONCE
Status: COMPLETED | OUTPATIENT
Start: 2020-11-06 | End: 2020-11-06

## 2020-11-06 RX ADMIN — Medication 1 TABLET: at 09:15

## 2020-11-06 RX ADMIN — INSULIN LISPRO 2 UNITS: 100 INJECTION, SOLUTION INTRAVENOUS; SUBCUTANEOUS at 09:05

## 2020-11-06 RX ADMIN — INSULIN LISPRO 3 UNITS: 100 INJECTION, SOLUTION INTRAVENOUS; SUBCUTANEOUS at 11:32

## 2020-11-06 RX ADMIN — LEVOTHYROXINE SODIUM 100 MCG: 100 TABLET ORAL at 05:09

## 2020-11-06 RX ADMIN — METHYLPREDNISOLONE ACETATE 80 MG: 80 INJECTION, SUSPENSION INTRA-ARTICULAR; INTRALESIONAL; INTRAMUSCULAR; SOFT TISSUE at 12:45

## 2020-11-06 RX ADMIN — BUPIVACAINE HYDROCHLORIDE 5 ML: 5 INJECTION, SOLUTION EPIDURAL; INTRACAUDAL at 12:45

## 2020-11-06 RX ADMIN — MAGNESIUM SULFATE IN WATER 2 G: 40 INJECTION, SOLUTION INTRAVENOUS at 09:04

## 2020-11-06 RX ADMIN — HEPARIN SODIUM 5000 UNITS: 5000 INJECTION INTRAVENOUS; SUBCUTANEOUS at 05:09

## 2020-11-06 RX ADMIN — ASPIRIN 81 MG: 81 TABLET, COATED ORAL at 09:03

## 2020-11-06 RX ADMIN — LISINOPRIL 5 MG: 5 TABLET ORAL at 09:02

## 2020-11-13 ENCOUNTER — OFFICE VISIT (OUTPATIENT)
Dept: OBGYN CLINIC | Facility: CLINIC | Age: 80
End: 2020-11-13
Payer: MEDICARE

## 2020-11-13 VITALS — TEMPERATURE: 98.2 F

## 2020-11-13 DIAGNOSIS — M17.12 PRIMARY OSTEOARTHRITIS OF LEFT KNEE: Primary | ICD-10-CM

## 2020-11-13 DIAGNOSIS — M19.011 PRIMARY OSTEOARTHRITIS OF RIGHT SHOULDER: ICD-10-CM

## 2020-11-13 PROCEDURE — 99213 OFFICE O/P EST LOW 20 MIN: CPT | Performed by: ORTHOPAEDIC SURGERY

## 2020-11-13 PROCEDURE — 20610 DRAIN/INJ JOINT/BURSA W/O US: CPT | Performed by: ORTHOPAEDIC SURGERY

## 2020-11-13 RX ORDER — BUPIVACAINE HYDROCHLORIDE 2.5 MG/ML
4 INJECTION, SOLUTION INFILTRATION; PERINEURAL
Status: COMPLETED | OUTPATIENT
Start: 2020-11-13 | End: 2020-11-13

## 2020-11-13 RX ORDER — METHYLPREDNISOLONE ACETATE 40 MG/ML
2 INJECTION, SUSPENSION INTRA-ARTICULAR; INTRALESIONAL; INTRAMUSCULAR; SOFT TISSUE
Status: COMPLETED | OUTPATIENT
Start: 2020-11-13 | End: 2020-11-13

## 2020-11-13 RX ADMIN — BUPIVACAINE HYDROCHLORIDE 4 ML: 2.5 INJECTION, SOLUTION INFILTRATION; PERINEURAL at 11:27

## 2020-11-13 RX ADMIN — METHYLPREDNISOLONE ACETATE 2 ML: 40 INJECTION, SUSPENSION INTRA-ARTICULAR; INTRALESIONAL; INTRAMUSCULAR; SOFT TISSUE at 11:27

## 2021-01-15 ENCOUNTER — OFFICE VISIT (OUTPATIENT)
Dept: OBGYN CLINIC | Facility: CLINIC | Age: 81
End: 2021-01-15
Payer: MEDICARE

## 2021-01-15 VITALS — HEIGHT: 61 IN | WEIGHT: 122 LBS | BODY MASS INDEX: 23.03 KG/M2

## 2021-01-15 DIAGNOSIS — M17.0 PRIMARY OSTEOARTHRITIS OF BOTH KNEES: Primary | ICD-10-CM

## 2021-01-15 DIAGNOSIS — M19.011 PRIMARY OSTEOARTHRITIS OF RIGHT SHOULDER: ICD-10-CM

## 2021-01-15 PROCEDURE — 20610 DRAIN/INJ JOINT/BURSA W/O US: CPT | Performed by: ORTHOPAEDIC SURGERY

## 2021-01-15 PROCEDURE — 99213 OFFICE O/P EST LOW 20 MIN: CPT | Performed by: ORTHOPAEDIC SURGERY

## 2021-01-15 RX ORDER — BUPIVACAINE HYDROCHLORIDE 2.5 MG/ML
4 INJECTION, SOLUTION INFILTRATION; PERINEURAL
Status: COMPLETED | OUTPATIENT
Start: 2021-01-15 | End: 2021-01-15

## 2021-01-15 RX ORDER — METHYLPREDNISOLONE ACETATE 40 MG/ML
2 INJECTION, SUSPENSION INTRA-ARTICULAR; INTRALESIONAL; INTRAMUSCULAR; SOFT TISSUE
Status: COMPLETED | OUTPATIENT
Start: 2021-01-15 | End: 2021-01-15

## 2021-01-15 RX ADMIN — BUPIVACAINE HYDROCHLORIDE 4 ML: 2.5 INJECTION, SOLUTION INFILTRATION; PERINEURAL at 11:26

## 2021-01-15 RX ADMIN — METHYLPREDNISOLONE ACETATE 2 ML: 40 INJECTION, SUSPENSION INTRA-ARTICULAR; INTRALESIONAL; INTRAMUSCULAR; SOFT TISSUE at 11:26

## 2021-01-15 NOTE — PROGRESS NOTES
ASSESSMENT/PLAN:    Diagnoses and all orders for this visit:    Primary osteoarthritis of both knees    Primary osteoarthritis of right shoulder    Other orders  -     Large joint arthrocentesis  -     Large joint arthrocentesis        Both of the patient's knees and her right shoulder were injected with Depo-Medrol and Marcaine  She tolerated the injections quite well  She will follow up with our office in 3 months  The patient is acceptable to this plan  Return in about 3 months (around 4/15/2021)  _____________________________________________________  CHIEF COMPLAINT:  Chief Complaint   Patient presents with    Left Knee - Follow-up    Right Knee - Follow-up    Right Shoulder - Follow-up         SUBJECTIVE:  Katlin Brown is a [de-identified] y o  female with a history of primary osteoarthritis of both knees and right shoulder  She presents today complaining of pain in all 3 structures  She would like corticosteroid injections today  She denies any numbness or tingling  She denies any fever or chills  She denies any new falls or trauma      The following portions of the patient's history were reviewed and updated as appropriate: allergies, current medications, past family history, past medical history, past social history, past surgical history and problem list     PAST MEDICAL HISTORY:  Past Medical History:   Diagnosis Date    Cancer [facility])     Controlled type 2 diabetes mellitus with diabetic polyneuropathy, with long-term current use of insulin ([address] ) 5/21/2008    Diabetes mellitus ([address] )     Diabetic polyneuropathy ([address] ) 5/21/2008    ICD10 clean up    Disease of thyroid gland     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    HL (hearing loss)     Hodgkin's disease ([address] ) 2008    Left neck, had radiation    Hypertension     Neuropathy     Osteoporosis        PAST SURGICAL HISTORY:  Past Surgical History:   Procedure Laterality Date    ADENOIDECTOMY      APPENDECTOMY      CATARACT EXTRACTION, BILATERAL      CHOLECYSTECTOMY      MOUTH SURGERY      oral cancer left lower lip    OVARY SURGERY      [place] OPEN RX FEMUR FX+INTRAMED SHE Right 5/28/2020    Procedure: INSERTION NAIL IM FEMUR ANTEGRADE (TROCHANTERIC);   Surgeon: Pierre Opitz, DO;  Location: [address] [place], [place] [address]o [facility] MAIN OR;  Service: Orthopedics    TONSILLECTOMY      TOTAL THYROIDECTOMY  2008    Performed after left neck dissection and left oral cancer diagnosis       FAMILY HISTORY:  Family History   Problem Relation Age of Onset    Cancer Mother     Diabetes Mother     Diabetes Father     Hypertension Father        SOCIAL HISTORY:  Social History     Tobacco Use    Smoking status: Never Smoker    Smokeless tobacco: Never Used   Substance Use Topics    Alcohol use: Not Currently     Alcohol/week: 0 0 standard drinks     Frequency: Never     Drinks per session: Patient refused     Binge frequency: Never    Drug use: Never       MEDICATIONS:    Current Outpatient Medications:     aspirin (ECOTRIN LOW STRENGTH) 81 mg EC tablet, Take 162 mg by mouth daily DO NOT RESUME UNTIL 7/10/20 (THE DAY AFTER YOU HAVE COMPLETED YOUR COURSE OF ASPIRIN 325 MG BY MOUTH DAILY THAT YOU HAVE BEEN PRESCRIBED) , Disp: , Rfl:     atorvastatin (LIPITOR) 20 mg tablet, Take 20 mg by mouth daily with dinner , Disp: , Rfl:     calcium carbonate (OS-FELICE) 600 MG tablet, Take 600 mg by mouth 2 (two) times a day with meals, Disp: , Rfl:     Insulin Aspart (NOVOLOG SC), Inject 5 Units under the skin 3 (three) times a day with meals, Disp: , Rfl:     Insulin Detemir (LEVEMIR SC), Inject 5 Units under the skin daily at bedtime, Disp: , Rfl:     ipratropium (ATROVENT) 0 03 % nasal spray, 2 sprays into each nostril every 12 (twelve) hours, Disp: 30 mL, Rfl: 5    levothyroxine 100 mcg tablet, Take 100 mcg by mouth every morning , Disp: , Rfl:     lisinopril (ZESTRIL) 5 mg tablet, Take 1 tablet (5 mg total) by mouth daily, Disp: 30 tablet, Rfl: 0    methocarbamol (ROBAXIN) 500 mg tablet, Take 1 tablet (500 mg total) by mouth every 6 (six) hours as needed for muscle spasms, Disp: 20 tablet, Rfl: 0    traMADol (ULTRAM) 50 mg tablet, tramadol 50 mg tablet  take 1 tablet by mouth three times a day if needed, Disp: , Rfl:     aspirin (ECOTRIN) 325 mg EC tablet, Take 1 tablet (325 mg total) by mouth daily for 21 days starting on 6/19/20, Disp: 21 tablet, Rfl: 0    ALLERGIES:  No Known Allergies    ROS:  Review of Systems     Constitutional: Negative for fatigue, fever or loss of apetite  HENT: Negative  Respiratory: Negative for shortness of breath, dyspnea  Cardiovascular: Negative for chest pain/tightness  Gastrointestinal: Negative for abdominal pain, N/V  Endocrine: Negative for cold/heat intolerance, unexplained weight loss/gain  Genitourinary: Negative for flank pain, dysuria, hematuria  Musculoskeletal: Positive for arthralgia   Skin: Negative for rash  Neurological: Negative for numbness or tingling  Psychiatric/Behavioral: Negative for agitation  _____________________________________________________  PHYSICAL EXAMINATION:    Height 5' 1" (1 549 m), weight 55 3 kg (122 lb)  Constitutional: Oriented to person, place, and time  Appears well-developed and well-nourished  No distress  HENT:   Head: Normocephalic  Eyes: Conjunctivae are normal  Right eye exhibits no discharge  Left eye exhibits no discharge  No scleral icterus  Cardiovascular: Normal rate  Pulmonary/Chest: Effort normal    Neurological: Alert and oriented to person, place, and time  Skin: Skin is warm and dry  No rash noted  Not diaphoretic  No erythema  No pallor  Psychiatric: Normal mood and affect   Behavior is normal  Judgment and thought content normal       MUSCULOSKELETAL EXAMINATION:   Physical Exam  Ortho Exam    Bilateral lower extremities are neurovascularly intact  Toes are pink and mobile   Compartments are soft  No warmth, erythema or ecchymosis  ROM of knees are from 5-115 degrees  Negative Lachman, drawer or pivot shift  No medial instability  Medial joint line tenderness, slight lateral joint line tenderness  Patellofemoral crepitation    Neck is soft and supple  Negative axial compression test of the neck  Right upper extremity is neurovascularly intact  Fingers are pink and mobile  Compartments are soft  Range of motion of the right shoulder was 95° of forward flexion and abduction and internal rotation to L5  Minimal signs of impingement with no instability  Glenohumeral crepitation is present  Rotator cuff strength is 4/5  Pulses intact  Objective:  BP Readings from Last 1 Encounters:   11/06/20 120/56      Wt Readings from Last 1 Encounters:   01/15/21 55 3 kg (122 lb)        BMI:   Estimated body mass index is 23 05 kg/m² as calculated from the following:    Height as of this encounter: 5' 1" (1 549 m)  Weight as of this encounter: 55 3 kg (122 lb)  PROCEDURES PERFORMED:  Large joint arthrocentesis: bilateral knee  Universal Protocol:  Consent given by: patient  Time out: Immediately prior to procedure a "time out" was called to verify the correct patient, procedure, equipment, support staff and site/side marked as required  Site marked: the operative site was marked  Supporting Documentation  Indications: pain   Procedure Details  Location: knee - bilateral knee  Needle size: 22 G  Approach: lateral    Medications (Right): 4 mL bupivacaine 0 25 %; 2 mL methylPREDNISolone acetate 40 mg/mLMedications (Left): 4 mL bupivacaine 0 25 %; 2 mL methylPREDNISolone acetate 40 mg/mL   Patient tolerance: patient tolerated the procedure well with no immediate complications  Dressing:  Sterile dressing applied    Large joint arthrocentesis: R glenohumeral  Universal Protocol:  Consent given by: patient  Time out: Immediately prior to procedure a "time out" was called to verify the correct patient, procedure, equipment, support staff and site/side marked as required    Site marked: the operative site was marked  Supporting Documentation  Indications: pain   Procedure Details  Location: shoulder - R glenohumeral  Needle size: 22 G  Approach: lateral  Medications administered: 2 mL methylPREDNISolone acetate 40 mg/mL; 4 mL bupivacaine 0 25 %    Patient tolerance: patient tolerated the procedure well with no immediate complications  Dressing:  Sterile dressing applied            Janki Deras PA-C

## 2021-01-20 DIAGNOSIS — Z23 ENCOUNTER FOR IMMUNIZATION: ICD-10-CM

## 2021-02-05 ENCOUNTER — IMMUNIZATIONS (OUTPATIENT)
Dept: FAMILY MEDICINE CLINIC | Facility: HOSPITAL | Age: 81
End: 2021-02-05

## 2021-02-05 DIAGNOSIS — Z23 ENCOUNTER FOR IMMUNIZATION: Primary | ICD-10-CM

## 2021-02-05 PROCEDURE — 91301 SARS-COV-2 / COVID-19 MRNA VACCINE (MODERNA) 100 MCG: CPT

## 2021-02-05 PROCEDURE — 0011A SARS-COV-2 / COVID-19 MRNA VACCINE (MODERNA) 100 MCG: CPT

## 2021-02-12 ENCOUNTER — TRANSCRIBE ORDERS (OUTPATIENT)
Dept: URGENT CARE | Facility: CLINIC | Age: 81
End: 2021-02-12

## 2021-02-12 ENCOUNTER — TELEPHONE (OUTPATIENT)
Dept: LAB | Facility: HOSPITAL | Age: 81
End: 2021-02-12

## 2021-02-12 ENCOUNTER — LAB (OUTPATIENT)
Dept: LAB | Facility: CLINIC | Age: 81
End: 2021-02-12
Payer: MEDICARE

## 2021-02-12 DIAGNOSIS — E55.9 VITAMIN D DEFICIENCY: ICD-10-CM

## 2021-02-12 DIAGNOSIS — Z79.899 ENCOUNTER FOR LONG-TERM (CURRENT) USE OF OTHER MEDICATIONS: ICD-10-CM

## 2021-02-12 DIAGNOSIS — E03.9 HYPOTHYROIDISM, UNSPECIFIED TYPE: ICD-10-CM

## 2021-02-12 DIAGNOSIS — R60.9 EDEMA, UNSPECIFIED TYPE: ICD-10-CM

## 2021-02-12 DIAGNOSIS — E11.69 TYPE 2 DIABETES MELLITUS WITH OTHER SPECIFIED COMPLICATION, UNSPECIFIED WHETHER LONG TERM INSULIN USE (HCC): ICD-10-CM

## 2021-02-12 DIAGNOSIS — E78.5 HYPERLIPIDEMIA, UNSPECIFIED HYPERLIPIDEMIA TYPE: ICD-10-CM

## 2021-02-12 DIAGNOSIS — I10 HYPERTENSION, UNSPECIFIED TYPE: ICD-10-CM

## 2021-02-12 DIAGNOSIS — I10 HYPERTENSION, UNSPECIFIED TYPE: Primary | ICD-10-CM

## 2021-02-12 LAB
25(OH)D3 SERPL-MCNC: 30.5 NG/ML (ref 30–100)
ALBUMIN SERPL BCP-MCNC: 4.1 G/DL (ref 3.5–5)
ALP SERPL-CCNC: 75 U/L (ref 46–116)
ALT SERPL W P-5'-P-CCNC: 36 U/L (ref 12–78)
ANION GAP SERPL CALCULATED.3IONS-SCNC: 7 MMOL/L (ref 4–13)
AST SERPL W P-5'-P-CCNC: 25 U/L (ref 5–45)
BILIRUB SERPL-MCNC: 0.44 MG/DL (ref 0.2–1)
BUN SERPL-MCNC: 15 MG/DL (ref 5–25)
CALCIUM SERPL-MCNC: 9.2 MG/DL (ref 8.3–10.1)
CHLORIDE SERPL-SCNC: 105 MMOL/L (ref 100–108)
CHOLEST SERPL-MCNC: 215 MG/DL (ref 50–200)
CO2 SERPL-SCNC: 28 MMOL/L (ref 21–32)
CREAT SERPL-MCNC: 0.76 MG/DL (ref 0.6–1.3)
ERYTHROCYTE [DISTWIDTH] IN BLOOD BY AUTOMATED COUNT: 12.8 % (ref 11.6–15.1)
EST. AVERAGE GLUCOSE BLD GHB EST-MCNC: 169 MG/DL
GFR SERPL CREATININE-BSD FRML MDRD: 74 ML/MIN/1.73SQ M
GLUCOSE SERPL-MCNC: 168 MG/DL (ref 65–140)
HBA1C MFR BLD: 7.5 %
HCT VFR BLD AUTO: 37 % (ref 34.8–46.1)
HDLC SERPL-MCNC: 118 MG/DL
HGB BLD-MCNC: 11.7 G/DL (ref 11.5–15.4)
LDLC SERPL CALC-MCNC: 81 MG/DL (ref 0–100)
MCH RBC QN AUTO: 33.2 PG (ref 26.8–34.3)
MCHC RBC AUTO-ENTMCNC: 31.6 G/DL (ref 31.4–37.4)
MCV RBC AUTO: 105 FL (ref 82–98)
NONHDLC SERPL-MCNC: 97 MG/DL
NT-PROBNP SERPL-MCNC: 397 PG/ML
PLATELET # BLD AUTO: 204 THOUSANDS/UL (ref 149–390)
PMV BLD AUTO: 12.2 FL (ref 8.9–12.7)
POTASSIUM SERPL-SCNC: 4.1 MMOL/L (ref 3.5–5.3)
PROT SERPL-MCNC: 7.4 G/DL (ref 6.4–8.2)
RBC # BLD AUTO: 3.52 MILLION/UL (ref 3.81–5.12)
SODIUM SERPL-SCNC: 140 MMOL/L (ref 136–145)
T4 FREE SERPL-MCNC: 1.01 NG/DL (ref 0.76–1.46)
TRIGL SERPL-MCNC: 79 MG/DL
TSH SERPL DL<=0.05 MIU/L-ACNC: 2.51 UIU/ML (ref 0.36–3.74)
WBC # BLD AUTO: 6.61 THOUSAND/UL (ref 4.31–10.16)

## 2021-02-12 PROCEDURE — 84439 ASSAY OF FREE THYROXINE: CPT

## 2021-02-12 PROCEDURE — 84443 ASSAY THYROID STIM HORMONE: CPT

## 2021-02-12 PROCEDURE — 83880 ASSAY OF NATRIURETIC PEPTIDE: CPT

## 2021-02-12 PROCEDURE — 83036 HEMOGLOBIN GLYCOSYLATED A1C: CPT

## 2021-02-12 PROCEDURE — 82306 VITAMIN D 25 HYDROXY: CPT

## 2021-02-12 PROCEDURE — 80061 LIPID PANEL: CPT

## 2021-02-12 PROCEDURE — 85027 COMPLETE CBC AUTOMATED: CPT

## 2021-02-12 PROCEDURE — 36415 COLL VENOUS BLD VENIPUNCTURE: CPT

## 2021-02-12 PROCEDURE — 80053 COMPREHEN METABOLIC PANEL: CPT

## 2021-03-03 ENCOUNTER — IMMUNIZATIONS (OUTPATIENT)
Dept: FAMILY MEDICINE CLINIC | Facility: HOSPITAL | Age: 81
End: 2021-03-03

## 2021-03-03 DIAGNOSIS — Z23 ENCOUNTER FOR IMMUNIZATION: Primary | ICD-10-CM

## 2021-03-03 PROCEDURE — 0012A SARS-COV-2 / COVID-19 MRNA VACCINE (MODERNA) 100 MCG: CPT

## 2021-03-03 PROCEDURE — 91301 SARS-COV-2 / COVID-19 MRNA VACCINE (MODERNA) 100 MCG: CPT

## 2021-04-16 ENCOUNTER — OFFICE VISIT (OUTPATIENT)
Dept: OBGYN CLINIC | Facility: CLINIC | Age: 81
End: 2021-04-16
Payer: MEDICARE

## 2021-04-16 VITALS
DIASTOLIC BLOOD PRESSURE: 90 MMHG | BODY MASS INDEX: 23.03 KG/M2 | SYSTOLIC BLOOD PRESSURE: 166 MMHG | HEART RATE: 86 BPM | HEIGHT: 61 IN | WEIGHT: 122 LBS

## 2021-04-16 DIAGNOSIS — M17.0 PRIMARY OSTEOARTHRITIS OF BOTH KNEES: Primary | ICD-10-CM

## 2021-04-16 DIAGNOSIS — M19.011 PRIMARY OSTEOARTHRITIS OF RIGHT SHOULDER: ICD-10-CM

## 2021-04-16 PROCEDURE — 99213 OFFICE O/P EST LOW 20 MIN: CPT | Performed by: PHYSICIAN ASSISTANT

## 2021-04-16 PROCEDURE — 20610 DRAIN/INJ JOINT/BURSA W/O US: CPT | Performed by: PHYSICIAN ASSISTANT

## 2021-04-16 RX ORDER — BUPIVACAINE HYDROCHLORIDE 2.5 MG/ML
4 INJECTION, SOLUTION INFILTRATION; PERINEURAL
Status: COMPLETED | OUTPATIENT
Start: 2021-04-16 | End: 2021-04-16

## 2021-04-16 RX ORDER — METHYLPREDNISOLONE ACETATE 40 MG/ML
2 INJECTION, SUSPENSION INTRA-ARTICULAR; INTRALESIONAL; INTRAMUSCULAR; SOFT TISSUE
Status: COMPLETED | OUTPATIENT
Start: 2021-04-16 | End: 2021-04-16

## 2021-04-16 RX ADMIN — BUPIVACAINE HYDROCHLORIDE 4 ML: 2.5 INJECTION, SOLUTION INFILTRATION; PERINEURAL at 11:26

## 2021-04-16 RX ADMIN — METHYLPREDNISOLONE ACETATE 2 ML: 40 INJECTION, SUSPENSION INTRA-ARTICULAR; INTRALESIONAL; INTRAMUSCULAR; SOFT TISSUE at 11:26

## 2021-04-16 NOTE — PROGRESS NOTES
ASSESSMENT/PLAN:    Diagnoses and all orders for this visit:    Primary osteoarthritis of both knees    Primary osteoarthritis of right shoulder    Other orders  -     Large joint arthrocentesis  -     Large joint arthrocentesis         The patient's right shoulder and both of her knees were injected with Depo-Medrol and Marcaine  She tolerated the injections quite well  She will follow up with our office in 3 months  The patient is acceptable to this plan  Return in about 3 months (around 7/16/2021)  _____________________________________________________  CHIEF COMPLAINT:  Chief Complaint   Patient presents with    Right Shoulder - Follow-up    Left Knee - Follow-up    Right Knee - Follow-up         SUBJECTIVE:  Ranjana Gould is a [de-identified] y o  female  with a history of primary osteoarthritis of both knees and her right shoulder  She presents to our office today for quarterly corticosteroid injections  She is complaining of pain in all 3 structures  She denies any fever or chills  She denies any numbness or tingling      The following portions of the patient's history were reviewed and updated as appropriate: allergies, current medications, past family history, past medical history, past social history, past surgical history and problem list     PAST MEDICAL HISTORY:  Past Medical History:   Diagnosis Date    Cancer 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    HL (hearing loss)     Hodgkin's disease ([address] ) 2008    Left neck, had radiation    Hypertension     Neuropathy     Osteoporosis        PAST SURGICAL HISTORY:  Past Surgical History:   Procedure Laterality Date    ADENOIDECTOMY      APPENDECTOMY      CATARACT EXTRACTION, BILATERAL      CHOLECYSTECTOMY      MOUTH SURGERY      oral cancer left lower lip    OVARY SURGERY      [place] OPEN RX FEMUR FX+INTRAMED SHE Right 5/28/2020    Procedure: INSERTION NAIL IM FEMUR ANTEGRADE (TROCHANTERIC);   Surgeon: Donnell Casiano DO;  Location: [facility] MAIN OR;  Service: Orthopedics    TONSILLECTOMY      TOTAL THYROIDECTOMY  2008    Performed after left neck dissection and left oral cancer diagnosis       FAMILY HISTORY:  Family History   Problem Relation Age of Onset    Cancer Mother     Diabetes Mother     Diabetes Father     Hypertension Father        SOCIAL HISTORY:  Social History     Tobacco Use    Smoking status: Never Smoker    Smokeless tobacco: Never Used   Substance Use Topics    Alcohol use: Not Currently     Alcohol/week: 0 0 standard drinks     Frequency: Never     Drinks per session: Patient refused     Binge frequency: Never    Drug use: Never       MEDICATIONS:    Current Outpatient Medications:     aspirin (ECOTRIN LOW STRENGTH) 81 mg EC tablet, Take 162 mg by mouth daily DO NOT RESUME UNTIL 7/10/20 (THE DAY AFTER YOU HAVE COMPLETED YOUR COURSE OF ASPIRIN 325 MG BY MOUTH DAILY THAT YOU HAVE BEEN PRESCRIBED) , Disp: , Rfl:     atorvastatin (LIPITOR) 20 mg tablet, Take 20 mg by mouth daily with dinner , Disp: , Rfl:     calcium carbonate (OS-FELICE) 600 MG tablet, Take 600 mg by mouth 2 (two) times a day with meals, Disp: , Rfl:     Insulin Aspart (NOVOLOG SC), Inject 5 Units under the skin 3 (three) times a day with meals, Disp: , Rfl:     Insulin Detemir (LEVEMIR SC), Inject 5 Units under the skin daily at bedtime, Disp: , Rfl:     ipratropium (ATROVENT) 0 03 % nasal spray, 2 sprays into each nostril every 12 (twelve) hours, Disp: 30 mL, Rfl: 5    levothyroxine 100 mcg tablet, Take 100 mcg by mouth every morning , Disp: , Rfl:     lisinopril (ZESTRIL) 5 mg tablet, Take 1 tablet (5 mg total) by mouth daily, Disp: 30 tablet, Rfl: 0    methocarbamol (ROBAXIN) 500 mg tablet, Take 1 tablet (500 mg total) by mouth every 6 (six) hours as needed for muscle spasms, Disp: 20 tablet, Rfl: 0    traMADol (ULTRAM) 50 mg tablet, tramadol 50 mg tablet  take 1 tablet by mouth three times a day if needed, Disp: , Rfl:     aspirin (ECOTRIN) 325 mg EC tablet, Take 1 tablet (325 mg total) by mouth daily for 21 days starting on 6/19/20, Disp: 21 tablet, Rfl: 0    ALLERGIES:  No Known Allergies    ROS:  Review of Systems     Constitutional: Negative for fatigue, fever or loss of appetite  HENT: Negative  Respiratory: Negative for shortness of breath, dyspnea  Cardiovascular: Negative for chest pain/tightness  Gastrointestinal: Negative for abdominal pain, N/V  Endocrine: Negative for cold/heat intolerance, unexplained weight loss/gain  Genitourinary: Negative for flank pain, dysuria, hematuria  Musculoskeletal: Positive for arthralgia   Skin: Negative for rash  Neurological: Negative for numbness or tingling  Psychiatric/Behavioral: Negative for agitation  _____________________________________________________  PHYSICAL EXAMINATION:    Blood pressure 166/90, pulse 86, height 5' 1" (1 549 m), weight 55 3 kg (122 lb)  Constitutional: Oriented to person, place, and time  Appears well-developed and well-nourished  No distress  HENT:   Head: Normocephalic  Eyes: Conjunctivae are normal  Right eye exhibits no discharge  Left eye exhibits no discharge  No scleral icterus  Cardiovascular: Normal rate  Pulmonary/Chest: Effort normal    Neurological: Alert and oriented to person, place, and time  Skin: Skin is warm and dry  No rash noted  Not diaphoretic  No erythema  No pallor  Psychiatric: Normal mood and affect   Behavior is normal  Judgment and thought content normal       MUSCULOSKELETAL EXAMINATION:   Physical Exam  Ortho Exam    Neck is soft and supple  Negative axial compression test of the neck  Right  upper extremity is neurovascularly intact  Fingers are pink and mobile  Compartments are soft  Range of motion of the shoulder was 95° of forward flexion and abduction and internal rotation to L5  Minimal signs of impingement with no instability  Glenohumeral crepitation is present  Rotator cuff strength is 4/5  Pulses intact  Bilateral lower extremities are neurovascularly intact  Toes are pink and mobile   Compartments are soft  No warmth, erythema or ecchymosis  ROM of knees are from 5-115 degrees  Negative Lachman, drawer or pivot shift  No medial instability  Medial joint line tenderness, slight lateral joint line tenderness  Patellofemoral crepitation    Objective:  BP Readings from Last 1 Encounters:   04/16/21 166/90      Wt Readings from Last 1 Encounters:   04/16/21 55 3 kg (122 lb)        BMI:   Estimated body mass index is 23 05 kg/m² as calculated from the following:    Height as of this encounter: 5' 1" (1 549 m)  Weight as of this encounter: 55 3 kg (122 lb)  PROCEDURES PERFORMED:  Large joint arthrocentesis: R glenohumeral  Universal Protocol:  Consent given by: patient  Time out: Immediately prior to procedure a "time out" was called to verify the correct patient, procedure, equipment, support staff and site/side marked as required  Site marked: the operative site was marked  Supporting Documentation  Indications: pain   Procedure Details  Location: shoulder - R glenohumeral  Needle size: 22 G  Approach: lateral  Medications administered: 2 mL methylPREDNISolone acetate 40 mg/mL; 4 mL bupivacaine 0 25 %    Patient tolerance: patient tolerated the procedure well with no immediate complications  Dressing:  Sterile dressing applied    Large joint arthrocentesis: bilateral knee  Universal Protocol:  Consent given by: patient  Time out: Immediately prior to procedure a "time out" was called to verify the correct patient, procedure, equipment, support staff and site/side marked as required    Site marked: the operative site was marked  Supporting Documentation  Indications: pain Procedure Details  Location: knee - bilateral knee  Needle size: 22 G  Approach: lateral    Medications (Right): 4 mL bupivacaine 0 25 %; 2 mL methylPREDNISolone acetate 40 mg/mLMedications (Left): 4 mL bupivacaine 0 25 %; 2 mL methylPREDNISolone acetate 40 mg/mL   Patient tolerance: patient tolerated the procedure well with no immediate complications  Dressing:  Sterile dressing applied            Nathan Mayer PA-C

## 2021-06-02 ENCOUNTER — APPOINTMENT (OUTPATIENT)
Dept: RADIOLOGY | Facility: CLINIC | Age: 81
End: 2021-06-02
Payer: MEDICARE

## 2021-06-02 ENCOUNTER — OFFICE VISIT (OUTPATIENT)
Dept: URGENT CARE | Facility: CLINIC | Age: 81
End: 2021-06-02
Payer: MEDICARE

## 2021-06-02 VITALS
HEART RATE: 94 BPM | HEIGHT: 61 IN | WEIGHT: 129.4 LBS | BODY MASS INDEX: 24.43 KG/M2 | RESPIRATION RATE: 18 BRPM | TEMPERATURE: 98.2 F | OXYGEN SATURATION: 100 %
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
## 2021-06-02 DIAGNOSIS — S93.401A SPRAIN OF RIGHT ANKLE, UNSPECIFIED LIGAMENT, INITIAL ENCOUNTER: ICD-10-CM
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
## 2021-06-02 DIAGNOSIS — S80.01XA CONTUSION OF RIGHT KNEE, INITIAL ENCOUNTER: ICD-10-CM

## 2021-06-02 PROCEDURE — 73564 X-RAY EXAM KNEE 4 OR MORE: CPT

## 2021-06-02 PROCEDURE — 82948 REAGENT STRIP/BLOOD GLUCOSE: CPT | Performed by: NURSE PRACTITIONER

## 2021-06-02 PROCEDURE — 73630 X-RAY EXAM OF FOOT: CPT

## 2021-06-02 PROCEDURE — 73610 X-RAY EXAM OF ANKLE: CPT

## 2021-06-02 RX ORDER — IPRATROPIUM BROMIDE 21 UG/1
SPRAY, METERED NASAL
COMMUNITY
Start: 2011-01-14 | End: 2021-08-22

## 2021-06-02 RX ORDER — METHOCARBAMOL 500 MG/1
TABLET, FILM COATED ORAL
COMMUNITY
End: 2022-03-23 | Stop reason: ALTCHOICE

## 2021-06-02 RX ORDER — RIBOFLAVIN (VITAMIN B2) 100 MG
500 TABLET ORAL 2 TIMES DAILY
COMMUNITY

## 2021-06-02 RX ORDER — LEVOTHYROXINE SODIUM 112 UG/1
TABLET ORAL
COMMUNITY
End: 2022-03-23 | Stop reason: ALTCHOICE

## 2021-06-02 RX ORDER — LISINOPRIL 5 MG/1
5 TABLET ORAL DAILY
COMMUNITY
End: 2022-07-15

## 2021-06-02 RX ORDER — LEVOTHYROXINE SODIUM 112 UG/1
112 TABLET ORAL DAILY
COMMUNITY
Start: 2021-05-20 | End: 2022-03-23 | Stop reason: ALTCHOICE

## 2021-06-02 NOTE — PATIENT INSTRUCTIONS
No acute abnormalities on x-ray  Ace wrap applied in office  Ankle stirrup given for right ankle  You declined hitting her head during fall  Denies any headaches, nausea, vomiting, change in vision at this time  As we discussed if he develops any headaches, nausea, vomiting, chest pain, shortness of breath, weakness, slurred speech, dizziness or any other changes you need to go directly to the ER  As we discussed I would monitor your blood sugar closely in the morning as you state your blood sugar was 59 when EMS came  You state you will continue to monitor blood sugars closely  Follow-up with Orthopedics for right knee and right ankle  You state your he seeing Dr contreras will heavy daughter range  Continue to use her walker

## 2021-06-02 NOTE — PROGRESS NOTES
[facility]        NAME: Quintin Cloud is a [de-identified] y o  female  : 1940    MRN: [id number]  DATE: 2021  TIME: 7:08 PM    Assessment and Plan   Fall, initial encounter [W19  XXXA]  1  Fall, initial encounter  XR foot 3+ vw right    XR ankle 3+ vw right    XR knee 4+ vw right injury   2  Sprain of right ankle, unspecified ligament, initial encounter     3  Contusion of right knee, initial encounter           Patient Instructions     Patient Instructions   No acute abnormalities on x-ray  Ace wrap applied in office  Ankle stirrup given for right ankle  You declined hitting her head during fall  Denies any headaches, nausea, vomiting, change in vision at this time  As we discussed if he develops any headaches, nausea, vomiting, chest pain, shortness of breath, weakness, slurred speech, dizziness or any other changes you need to go directly to the ER  As we discussed I would monitor your blood sugar closely in the morning as you state your blood sugar was 59 when EMS came  You state you will continue to monitor blood sugars closely  Follow-up with Orthopedics for right knee and right ankle  You state your he seeing Dr contreras will heavy daughter range  Continue to use her walker  Chief Complaint     Chief Complaint   Patient presents with    Fall     fell at home this morning injured her right knee and ankle         History of Present Illness   Quintin Cloud presents to the clinic c/o    This is a 70-year-old female here today after having a fall this morning  She was at home  She was out back sweeping  She fell onto her right side  SHe then slid down at tarp on her outside cellar door  She states she fell slowly trying to avoid falling onto her right hip as she has had her hip replacement  She denies hitting her head  She denies any headaches, dizziness or blurred vision  No shortness of breath or chest pain    She states she is only having pain in her right knee and her right ankle  Patient denies any neck or back pain  She is wearing a neck brace but states she was is all the time  This is not due to fall  She also denies any pain in her right hip  She states ambulance did come and help her get up  She was only down for about 15 minutes  She states they offered to take her to the ER for evaluation but she declined  She is having pain in her right ankle when she bears weight  Again she denies any pain in her hip  She denies any weakness or dizziness prior to fall  She does note when they checked her blood her blood sugar was 59  She is diabetic  She states she checks her blood sugar 4 times a day  She states her blood sugar was 50 when she woke up  She drink a large glass of orange juice  Had cereal with milk  She denies any weakness or change in speech  She is here today with her daughter-in-law and her daughter and law denies any change  Review of Systems   Review of Systems   Constitutional: Negative for activity change, chills, diaphoresis, fatigue and fever  Respiratory: Negative for cough, chest tightness, shortness of breath and wheezing  Cardiovascular: Negative  Negative for chest pain and palpitations  Musculoskeletal: Positive for arthralgias  Skin: Negative  Neurological: Negative for dizziness, tremors, syncope, speech difficulty, weakness, light-headedness, numbness and headaches  Psychiatric/Behavioral: Negative  Negative for agitation           Current Medications     Long-Term Medications   Medication Sig Dispense Refill    aspirin (ECOTRIN LOW STRENGTH) 81 mg EC tablet Take 162 mg by mouth daily DO NOT RESUME UNTIL 7/10/20 (THE DAY AFTER YOU HAVE COMPLETED YOUR COURSE OF ASPIRIN 325 MG BY MOUTH DAILY THAT YOU HAVE BEEN PRESCRIBED)       Insulin Aspart (NOVOLOG SC) Inject 5 Units under the skin 3 (three) times a day with meals      Insulin Detemir (LEVEMIR SC) Inject 5 Units under the skin daily at bedtime      ipratropium (ATROVENT) 0 03 % nasal spray 2 sprays into each nostril every 12 (twelve) hours 30 mL 5    ipratropium (ATROVENT) 0 03 % nasal spray ipratropium bromide 0 03 % nasal spray      levothyroxine 112 mcg tablet levothyroxine 112 mcg tablet   take 1 tablet by mouth daily      lisinopril (ZESTRIL) 5 mg tablet Take 1 tablet (5 mg total) by mouth daily 30 tablet 0    aspirin (ECOTRIN) 325 mg EC tablet Take 1 tablet (325 mg total) by mouth daily for 21 days starting on 6/19/20 21 tablet 0    levothyroxine 100 mcg tablet Take 112 mcg by mouth every morning       levothyroxine 112 mcg tablet Take 112 mcg by mouth daily      lisinopril (ZESTRIL) 5 mg tablet lisinopril 5 mg tablet      methocarbamol (ROBAXIN) 500 mg tablet Take 1 tablet (500 mg total) by mouth every 6 (six) hours as needed for muscle spasms (Patient not taking: Reported on 6/2/2021) 20 tablet 0    methocarbamol (ROBAXIN) 500 mg tablet methocarbamol 500 mg tablet         Current Allergies     Allergies as of 06/02/2021    (No Known Allergies)            The following portions of the patient's history were reviewed and updated as appropriate: allergies, current medications, past family history, past medical history, past social history, past surgical history and problem list     Objective   Pulse 94   Temp 98 2 °F (36 8 °C)   Resp 18   Ht 5' 1" (1 549 m)   Wt 58 7 kg (129 lb 6 4 oz)   SpO2 100%   BMI 24 45 kg/m²        Physical Exam     Physical Exam  Vitals signs and nursing note reviewed  Constitutional:       General: She is not in acute distress  Appearance: Normal appearance  She is not ill-appearing or toxic-appearing  Comments: Using walker    Eyes:      Pupils: Pupils are equal, round, and reactive to light  Cardiovascular:      Rate and Rhythm: Normal rate and regular rhythm  Pulses: Normal pulses  Heart sounds: Normal heart sounds     Pulmonary:      Effort: Pulmonary effort is normal       Breath sounds: Normal breath sounds  Musculoskeletal:      Comments: Right knee:  Tenderness but to palpate over the patellar region  There is no swelling  There is no abrasion  There is no laxity in the joint  Full range of motion  Right ankle: Tenderness to palpate over the medial aspect of the ankle  No bruising or swelling noted  No laxity in the joint  Pain with flexion at the foot  Right foot: No tenderness to palpate over the foot  No bruising or swelling of the foot  Skin:     General: Skin is warm and dry  Neurological:      General: No focal deficit present  Mental Status: She is alert and oriented to person, place, and time  Cranial Nerves: No cranial nerve deficit  Motor: No weakness  Coordination: Coordination normal       Gait: Gait normal    Psychiatric:         Mood and Affect: Mood normal          Behavior: Behavior normal          Thought Content:  Thought content normal          Judgment: Judgment normal        No acute abnormality on x-ray

## 2021-06-07 LAB — GLUCOSE SERPL-MCNC: 251 MG/DL (ref 65–140)

## 2021-07-02 ENCOUNTER — TELEPHONE (OUTPATIENT)
Dept: LAB | Facility: HOSPITAL | Age: 81
End: 2021-07-02

## 2021-07-12 ENCOUNTER — APPOINTMENT (OUTPATIENT)
Dept: LAB | Facility: HOSPITAL | Age: 81
End: 2021-07-12
Payer: MEDICARE

## 2021-07-12 DIAGNOSIS — R71.8 ELEVATED MCV: ICD-10-CM

## 2021-07-12 DIAGNOSIS — E78.00 PURE HYPERCHOLESTEROLEMIA: ICD-10-CM

## 2021-07-12 DIAGNOSIS — D64.9 ANEMIA, UNSPECIFIED TYPE: ICD-10-CM

## 2021-07-12 DIAGNOSIS — I10 HYPERTENSION, UNSPECIFIED TYPE: Primary | ICD-10-CM

## 2021-07-12 DIAGNOSIS — E13.9 DIABETES MELLITUS OF OTHER TYPE WITHOUT COMPLICATION, UNSPECIFIED WHETHER LONG TERM INSULIN USE (HCC): ICD-10-CM

## 2021-07-12 DIAGNOSIS — E55.9 VITAMIN D DEFICIENCY: ICD-10-CM

## 2021-07-12 DIAGNOSIS — E78.5 HYPERLIPIDEMIA, UNSPECIFIED HYPERLIPIDEMIA TYPE: ICD-10-CM

## 2021-07-12 LAB
25(OH)D3 SERPL-MCNC: 29.5 NG/ML (ref 30–100)
ALBUMIN SERPL BCP-MCNC: 3.4 G/DL (ref 3.5–5)
ALP SERPL-CCNC: 81 U/L (ref 46–116)
ALT SERPL W P-5'-P-CCNC: 28 U/L (ref 12–78)
ANION GAP SERPL CALCULATED.3IONS-SCNC: 4 MMOL/L (ref 4–13)
AST SERPL W P-5'-P-CCNC: 26 U/L (ref 5–45)
BILIRUB SERPL-MCNC: 0.38 MG/DL (ref 0.2–1)
BUN SERPL-MCNC: 15 MG/DL (ref 5–25)
CALCIUM ALBUM COR SERPL-MCNC: 9.6 MG/DL (ref 8.3–10.1)
CALCIUM SERPL-MCNC: 9.1 MG/DL (ref 8.3–10.1)
CHLORIDE SERPL-SCNC: 105 MMOL/L (ref 100–108)
CHOLEST SERPL-MCNC: 152 MG/DL (ref 50–200)
CO2 SERPL-SCNC: 29 MMOL/L (ref 21–32)
CREAT SERPL-MCNC: 0.66 MG/DL (ref 0.6–1.3)
ERYTHROCYTE [DISTWIDTH] IN BLOOD BY AUTOMATED COUNT: 12.5 % (ref 11.6–15.1)
EST. AVERAGE GLUCOSE BLD GHB EST-MCNC: 169 MG/DL
FERRITIN SERPL-MCNC: 280 NG/ML (ref 8–388)
FOLATE SERPL-MCNC: >20 NG/ML (ref 3.1–17.5)
GFR SERPL CREATININE-BSD FRML MDRD: 84 ML/MIN/1.73SQ M
GLUCOSE P FAST SERPL-MCNC: 72 MG/DL (ref 65–99)
HBA1C MFR BLD: 7.5 %
HCT VFR BLD AUTO: 37 % (ref 34.8–46.1)
HDLC SERPL-MCNC: 88 MG/DL
HGB BLD-MCNC: 11.8 G/DL (ref 11.5–15.4)
IRON SATN MFR SERPL: 21 %
IRON SERPL-MCNC: 57 UG/DL (ref 50–170)
LDLC SERPL CALC-MCNC: 49 MG/DL (ref 0–100)
MCH RBC QN AUTO: 33.9 PG (ref 26.8–34.3)
MCHC RBC AUTO-ENTMCNC: 31.9 G/DL (ref 31.4–37.4)
MCV RBC AUTO: 106 FL (ref 82–98)
NONHDLC SERPL-MCNC: 64 MG/DL
PLATELET # BLD AUTO: 229 THOUSANDS/UL (ref 149–390)
PMV BLD AUTO: 12.4 FL (ref 8.9–12.7)
POTASSIUM SERPL-SCNC: 4.5 MMOL/L (ref 3.5–5.3)
PROT SERPL-MCNC: 7.1 G/DL (ref 6.4–8.2)
RBC # BLD AUTO: 3.48 MILLION/UL (ref 3.81–5.12)
RETICS # AUTO: NORMAL 10*3/UL (ref 14097–95744)
RETICS # CALC: 1.51 % (ref 0.37–1.87)
SODIUM SERPL-SCNC: 138 MMOL/L (ref 136–145)
T4 FREE SERPL-MCNC: 1.09 NG/DL (ref 0.76–1.46)
TIBC SERPL-MCNC: 276 UG/DL (ref 250–450)
TRIGL SERPL-MCNC: 74 MG/DL
TSH SERPL DL<=0.05 MIU/L-ACNC: 0.71 UIU/ML (ref 0.36–3.74)
VIT B12 SERPL-MCNC: 1787 PG/ML (ref 100–900)
WBC # BLD AUTO: 9.54 THOUSAND/UL (ref 4.31–10.16)

## 2021-07-12 PROCEDURE — 82607 VITAMIN B-12: CPT

## 2021-07-12 PROCEDURE — 82747 ASSAY OF FOLIC ACID RBC: CPT

## 2021-07-12 PROCEDURE — 83550 IRON BINDING TEST: CPT

## 2021-07-12 PROCEDURE — 82306 VITAMIN D 25 HYDROXY: CPT

## 2021-07-12 PROCEDURE — 82746 ASSAY OF FOLIC ACID SERUM: CPT

## 2021-07-12 PROCEDURE — 80053 COMPREHEN METABOLIC PANEL: CPT

## 2021-07-12 PROCEDURE — 82728 ASSAY OF FERRITIN: CPT

## 2021-07-12 PROCEDURE — 83036 HEMOGLOBIN GLYCOSYLATED A1C: CPT

## 2021-07-12 PROCEDURE — 80061 LIPID PANEL: CPT

## 2021-07-12 PROCEDURE — 84443 ASSAY THYROID STIM HORMONE: CPT

## 2021-07-12 PROCEDURE — 83540 ASSAY OF IRON: CPT

## 2021-07-12 PROCEDURE — 36415 COLL VENOUS BLD VENIPUNCTURE: CPT

## 2021-07-12 PROCEDURE — 85027 COMPLETE CBC AUTOMATED: CPT

## 2021-07-12 PROCEDURE — 85045 AUTOMATED RETICULOCYTE COUNT: CPT

## 2021-07-12 PROCEDURE — 84439 ASSAY OF FREE THYROXINE: CPT

## 2021-07-13 LAB
FOLATE BLD-MCNC: >620 NG/ML
FOLATE RBC-MCNC: >1808 NG/ML
HCT VFR BLD AUTO: 34.3 % (ref 34–46.6)

## 2021-07-23 ENCOUNTER — OFFICE VISIT (OUTPATIENT)
Dept: OBGYN CLINIC | Facility: CLINIC | Age: 81
End: 2021-07-23
Payer: MEDICARE

## 2021-07-23 VITALS — BODY MASS INDEX: 24.35 KG/M2 | HEIGHT: 61 IN | WEIGHT: 129 LBS

## 2021-07-23 DIAGNOSIS — M19.011 PRIMARY OSTEOARTHRITIS OF RIGHT SHOULDER: ICD-10-CM

## 2021-07-23 DIAGNOSIS — M17.0 PRIMARY OSTEOARTHRITIS OF BOTH KNEES: Primary | ICD-10-CM

## 2021-07-23 PROCEDURE — 99213 OFFICE O/P EST LOW 20 MIN: CPT | Performed by: ORTHOPAEDIC SURGERY

## 2021-07-23 PROCEDURE — 20610 DRAIN/INJ JOINT/BURSA W/O US: CPT | Performed by: ORTHOPAEDIC SURGERY

## 2021-07-23 RX ORDER — METHYLPREDNISOLONE ACETATE 40 MG/ML
2 INJECTION, SUSPENSION INTRA-ARTICULAR; INTRALESIONAL; INTRAMUSCULAR; SOFT TISSUE
Status: COMPLETED | OUTPATIENT
Start: 2021-07-23 | End: 2021-07-23

## 2021-07-23 RX ORDER — BUPIVACAINE HYDROCHLORIDE 2.5 MG/ML
4 INJECTION, SOLUTION INFILTRATION; PERINEURAL
Status: COMPLETED | OUTPATIENT
Start: 2021-07-23 | End: 2021-07-23

## 2021-07-23 RX ADMIN — METHYLPREDNISOLONE ACETATE 2 ML: 40 INJECTION, SUSPENSION INTRA-ARTICULAR; INTRALESIONAL; INTRAMUSCULAR; SOFT TISSUE at 11:54

## 2021-07-23 RX ADMIN — BUPIVACAINE HYDROCHLORIDE 4 ML: 2.5 INJECTION, SOLUTION INFILTRATION; PERINEURAL at 11:54

## 2021-07-23 NOTE — PROGRESS NOTES
ASSESSMENT/PLAN:    Diagnoses and all orders for this visit:    Primary osteoarthritis of both knees    Primary osteoarthritis of right shoulder    Other orders  -     Large joint arthrocentesis  -     Large joint arthrocentesis      Both of the patient's knees and her right shoulder were injected with Depo-Medrol and Marcaine  She tolerated the injections quite well  She will follow up with our office in 3 months  The patient is acceptable to this plan  Return in about 3 months (around 10/23/2021)  _____________________________________________________  CHIEF COMPLAINT:  Chief Complaint   Patient presents with    Right Knee - Follow-up    Right Shoulder - Follow-up         SUBJECTIVE:  Cyndi Burnett is a [de-identified] y o  female who presents   To our office with a history of primary osteoarthritis of both knees and her right shoulder  She complains of pain in all 3 structures  She would like corticosteroid injections today  She denies any numbness or tingling  She denies any fever or chills       The following portions of the patient's history were reviewed and updated as appropriate: allergies, current medications, past family history, past medical history, past social history, past surgical history and problem list     PAST MEDICAL HISTORY:  Past Medical History:   Diagnosis Date    Cancer [facility])     Controlled type 2 diabetes mellitus with diabetic polyneuropathy, with long-term current use of insulin ([address] ) 5/21/2008    Diabetes mellitus ([address] )     Diabetic polyneuropathy ([address] ) 5/21/2008    ICD10 clean up    Disease of thyroid gland     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    HL (hearing loss)     Hodgkin's disease ([address] ) 2008    Left neck, had radiation    Hypertension     Neuropathy     Osteoporosis        PAST SURGICAL HISTORY:  Past Surgical History:   Procedure Laterality Date    ADENOIDECTOMY      APPENDECTOMY      CATARACT EXTRACTION, BILATERAL      CHOLECYSTECTOMY      MOUTH SURGERY      oral cancer left lower lip    OVARY SURGERY      MT OPEN RX FEMUR FX+INTRAMED SHE Right 5/28/2020    Procedure: INSERTION NAIL IM FEMUR ANTEGRADE (TROCHANTERIC);   Surgeon: Lisa Pineda DO;  Location: [address] [place] [address] OR;  Service: Orthopedics    TONSILLECTOMY      TOTAL THYROIDECTOMY  2008    Performed after left neck dissection and left oral cancer diagnosis       FAMILY HISTORY:  Family History   Problem Relation Age of Onset    Cancer Mother     Diabetes Mother     Diabetes Father     Hypertension Father        SOCIAL HISTORY:  Social History     Tobacco Use    Smoking status: Never Smoker    Smokeless tobacco: Never Used   Vaping Use    Vaping Use: Never used   Substance Use Topics    Alcohol use: Not Currently     Alcohol/week: 0 0 standard drinks    Drug use: Never       MEDICATIONS:    Current Outpatient Medications:     Ascorbic Acid (vitamin C) 100 MG tablet, Take 500 mg by mouth 2 (two) times a day, Disp: , Rfl:     aspirin (ECOTRIN LOW STRENGTH) 81 mg EC tablet, Take 162 mg by mouth daily DO NOT RESUME UNTIL 7/10/20 (THE DAY AFTER YOU HAVE COMPLETED YOUR COURSE OF ASPIRIN 325 MG BY MOUTH DAILY THAT YOU HAVE BEEN PRESCRIBED) , Disp: , Rfl:     atorvastatin (LIPITOR) 20 mg tablet, Take 20 mg by mouth daily with dinner , Disp: , Rfl:     calcium carbonate (OS-FELICE) 600 MG tablet, Take 600 mg by mouth 2 (two) times a day with meals, Disp: , Rfl:     Insulin Aspart (NOVOLOG SC), Inject 5 Units under the skin 3 (three) times a day with meals, Disp: , Rfl:     Insulin Detemir (LEVEMIR SC), Inject 5 Units under the skin daily at bedtime, Disp: , Rfl:     ipratropium (ATROVENT) 0 03 % nasal spray, 2 sprays into each nostril every 12 (twelve) hours, Disp: 30 mL, Rfl: 5    ipratropium (ATROVENT) 0 03 % nasal spray, ipratropium bromide 0 03 % nasal spray, Disp: , Rfl:     levothyroxine 100 mcg tablet, Take 112 mcg by mouth every morning , Disp: , Rfl:     levothyroxine 112 mcg tablet, levothyroxine 112 mcg tablet  take 1 tablet by mouth daily, Disp: , Rfl:     levothyroxine 112 mcg tablet, Take 112 mcg by mouth daily, Disp: , Rfl:     lisinopril (ZESTRIL) 5 mg tablet, Take 1 tablet (5 mg total) by mouth daily, Disp: 30 tablet, Rfl: 0    lisinopril (ZESTRIL) 5 mg tablet, lisinopril 5 mg tablet, Disp: , Rfl:     methocarbamol (ROBAXIN) 500 mg tablet, Take 1 tablet (500 mg total) by mouth every 6 (six) hours as needed for muscle spasms, Disp: 20 tablet, Rfl: 0    methocarbamol (ROBAXIN) 500 mg tablet, methocarbamol 500 mg tablet, Disp: , Rfl:     traMADol (ULTRAM) 50 mg tablet, tramadol 50 mg tablet  take 1 tablet by mouth three times a day if needed, Disp: , Rfl:     ALLERGIES:  No Known Allergies    ROS:  Review of Systems     Constitutional: Negative for fatigue, fever or loss of appetite  HENT: Negative  Respiratory: Negative for shortness of breath, dyspnea  Cardiovascular: Negative for chest pain/tightness  Gastrointestinal: Negative for abdominal pain, N/V  Endocrine: Negative for cold/heat intolerance, unexplained weight loss/gain  Genitourinary: Negative for flank pain, dysuria, hematuria  Musculoskeletal: Positive for arthralgia   Skin: Negative for rash  Neurological: Negative for numbness or tingling  Psychiatric/Behavioral: Negative for agitation  _____________________________________________________  PHYSICAL EXAMINATION:    Height 5' 1" (1 549 m), weight 58 5 kg (129 lb)  Constitutional: Oriented to person, place, and time  Appears well-developed and well-nourished  No distress  HENT:   Head: Normocephalic  Eyes: Conjunctivae are normal  Right eye exhibits no discharge  Left eye exhibits no discharge  No scleral icterus  Cardiovascular: Normal rate  Pulmonary/Chest: Effort normal    Neurological: Alert and oriented to person, place, and time  Skin: Skin is warm and dry  No rash noted  Not diaphoretic  No erythema  No pallor     Psychiatric: Normal mood and affect  Behavior is normal  Judgment and thought content normal       MUSCULOSKELETAL EXAMINATION:   Physical Exam  Ortho Exam    Bilateral lower extremities are neurovascularly intact  Toes are pink and mobile   Compartments are soft  No warmth, erythema or ecchymosis  ROM of knees are from 5-115 degrees  Negative Lachman, drawer or pivot shift  No medial instability  Medial joint line tenderness, slight lateral joint line tenderness  Patellofemoral crepitation    Neck is soft and supple  Negative axial compression test of the neck  Right upper extremity is neurovascularly intact  Fingers are pink and mobile  Compartments are soft  Range of motion of the shoulder was 95° of forward flexion and abduction and internal rotation to L5  Minimal signs of impingement with no instability  Glenohumeral crepitation is present  Rotator cuff strength is 4/5  Pulses intact  Objective:  BP Readings from Last 1 Encounters:   04/16/21 166/90      Wt Readings from Last 1 Encounters:   07/23/21 58 5 kg (129 lb)        BMI:   Estimated body mass index is 24 37 kg/m² as calculated from the following:    Height as of this encounter: 5' 1" (1 549 m)  Weight as of this encounter: 58 5 kg (129 lb)  PROCEDURES PERFORMED:  Large joint arthrocentesis: R subacromial bursa  Universal Protocol:  Consent given by: patient  Time out: Immediately prior to procedure a "time out" was called to verify the correct patient, procedure, equipment, support staff and site/side marked as required    Site marked: the operative site was marked  Supporting Documentation  Indications: pain   Procedure Details  Location: shoulder - R subacromial bursa  Preparation: Patient was prepped and draped in the usual sterile fashion  Needle size: 22 G  Approach: lateral  Medications administered: 2 mL methylPREDNISolone acetate 40 mg/mL; 4 mL bupivacaine 0 25 %    Patient tolerance: patient tolerated the procedure well with no immediate complications  Dressing:  Sterile dressing applied    Large joint arthrocentesis: bilateral knee  Universal Protocol:  Consent given by: patient  Patient understanding: patient states understanding of the procedure being performed  Site marked: the operative site was marked  Supporting Documentation  Indications: pain   Procedure Details  Location: knee - bilateral knee  Preparation: Patient was prepped and draped in the usual sterile fashion  Needle size: 22 G  Ultrasound guidance: no  Approach: lateral    Medications (Right): 4 mL bupivacaine 0 25 %; 2 mL methylPREDNISolone acetate 40 mg/mLMedications (Left): 4 mL bupivacaine 0 25 %; 2 mL methylPREDNISolone acetate 40 mg/mL   Patient tolerance: patient tolerated the procedure well with no immediate complications  Dressing:  Sterile dressing applied            Amber Yoo PA-C

## 2021-11-05 ENCOUNTER — OFFICE VISIT (OUTPATIENT)
Dept: OBGYN CLINIC | Facility: CLINIC | Age: 81
End: 2021-11-05

## 2021-11-05 VITALS — BODY MASS INDEX: 25 KG/M2 | WEIGHT: 132.4 LBS | HEIGHT: 61 IN

## 2021-11-05 DIAGNOSIS — M17.0 PRIMARY OSTEOARTHRITIS OF BOTH KNEES: Primary | ICD-10-CM

## 2021-11-05 DIAGNOSIS — M19.011 PRIMARY OSTEOARTHRITIS OF RIGHT SHOULDER: ICD-10-CM

## 2021-11-05 RX ADMIN — TRIAMCINOLONE ACETONIDE 80 MG: 40 INJECTION, SUSPENSION INTRA-ARTICULAR; INTRAMUSCULAR at 11:54

## 2021-11-05 RX ADMIN — BUPIVACAINE HYDROCHLORIDE 4 ML: 2.5 INJECTION, SOLUTION INFILTRATION; PERINEURAL at 11:54

## 2021-11-08 RX ORDER — TRIAMCINOLONE ACETONIDE 40 MG/ML
80 INJECTION, SUSPENSION INTRA-ARTICULAR; INTRAMUSCULAR
Status: DISCONTINUED | OUTPATIENT
Start: 2021-11-05 | End: 2022-07-15

## 2021-11-08 RX ORDER — BUPIVACAINE HYDROCHLORIDE 2.5 MG/ML
4 INJECTION, SOLUTION INFILTRATION; PERINEURAL
Status: DISCONTINUED | OUTPATIENT
Start: 2021-11-05 | End: 2022-07-15

## 2022-02-07 ENCOUNTER — TELEPHONE (OUTPATIENT)
Dept: LAB | Facility: HOSPITAL | Age: 82
End: 2022-02-07

## 2022-02-11 ENCOUNTER — OFFICE VISIT (OUTPATIENT)
Dept: OBGYN CLINIC | Facility: CLINIC | Age: 82
End: 2022-02-11
Payer: MEDICARE

## 2022-02-11 VITALS
HEIGHT: 61 IN | HEART RATE: 87 BPM | SYSTOLIC BLOOD PRESSURE: 163 MMHG | DIASTOLIC BLOOD PRESSURE: 83 MMHG | BODY MASS INDEX: 24.94 KG/M2

## 2022-02-11 DIAGNOSIS — M19.012 PRIMARY OSTEOARTHRITIS OF LEFT SHOULDER: ICD-10-CM

## 2022-02-11 DIAGNOSIS — M17.0 PRIMARY OSTEOARTHRITIS OF BOTH KNEES: Primary | ICD-10-CM

## 2022-02-11 DIAGNOSIS — M19.011 PRIMARY OSTEOARTHRITIS OF RIGHT SHOULDER: ICD-10-CM

## 2022-02-11 PROCEDURE — 20610 DRAIN/INJ JOINT/BURSA W/O US: CPT | Performed by: ORTHOPAEDIC SURGERY

## 2022-02-11 PROCEDURE — 99213 OFFICE O/P EST LOW 20 MIN: CPT | Performed by: ORTHOPAEDIC SURGERY

## 2022-02-11 RX ORDER — TRIAMCINOLONE ACETONIDE 40 MG/ML
40 INJECTION, SUSPENSION INTRA-ARTICULAR; INTRAMUSCULAR
Status: COMPLETED | OUTPATIENT
Start: 2022-02-11 | End: 2022-02-11

## 2022-02-11 RX ORDER — BUPIVACAINE HYDROCHLORIDE 2.5 MG/ML
4 INJECTION, SOLUTION INFILTRATION; PERINEURAL
Status: COMPLETED | OUTPATIENT
Start: 2022-02-11 | End: 2022-02-11

## 2022-02-11 RX ADMIN — BUPIVACAINE HYDROCHLORIDE 4 ML: 2.5 INJECTION, SOLUTION INFILTRATION; PERINEURAL at 11:42

## 2022-02-11 RX ADMIN — TRIAMCINOLONE ACETONIDE 40 MG: 40 INJECTION, SUSPENSION INTRA-ARTICULAR; INTRAMUSCULAR at 11:42

## 2022-02-11 NOTE — PROGRESS NOTES
Assessment/Plan:   Diagnoses and all orders for this visit:    Primary osteoarthritis of both knees  -     Large joint arthrocentesis    Primary osteoarthritis of right shoulder  -     Large joint arthrocentesis    Primary osteoarthritis of left shoulder  -     Large joint arthrocentesis    Patient was offered, and accepted, Kenalog and Marcaine injection(s) to the bilateral shoulders and bilateral knees for relief of pain and inflammation  Patient tolerated treatment(s) well  She will be seen for follow-up in 3 months for re-evaluation and consideration for repeat injections as necessary  Patient expresses understanding and is in agreement with this treatment plan  Under aseptic technique, both knees and both shoulders were injected with Kenalog and Marcaine  She tolerated procedures quite well  Return back in 3 months evaluation  If her condition changes, she will not hesitate to let us know  Physical exam shows tenderness and crepitation throughout all 4 structures  No gross instability  Negative Homans  Subjective:   Patient ID: Quintin Pedro Luis  1940     HPI  Patient is a 80 y o  female who presents for follow-up evaluation of bilateral shoulder and bilateral knee osteoarthritis  She was last seen in regards to these issues on 11/5/2021, at which time she received corticosteroid injections for the bilateral knees and right shoulder  On today's presentation she reports that her symptoms began to return approximately 3 weeks ago  She also notes that she has begun to experience symptoms in the left shoulder similar to that of the right  She desires repeat cortisone injections today  She denies any recent onset bruising, swelling, numbness, tingling, feelings of instability      The following portions of the patient's history were reviewed and updated as appropriate:  Past medical history, past surgical history, Family history, social history, current medications and allergies    Past Medical History:   Diagnosis Date    Cancer [facility])     Controlled type 2 diabetes mellitus with diabetic polyneuropathy, with long-term current use of insulin ([facility] [address] ) 5/21/2008    Diabetes mellitus ([facility] [address] )     Diabetic polyneuropathy ([facility] [address] ) 5/21/2008    ICD10 clean up    Disease of thyroid gland     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    HL (hearing loss)     Hodgkin's disease ([facility] [address] ) 2008    Left neck, had radiation    Hypertension     Neuropathy     Osteoporosis        Past Surgical History:   Procedure Laterality Date    ADENOIDECTOMY      APPENDECTOMY      CATARACT EXTRACTION, BILATERAL      CHOLECYSTECTOMY      MOUTH SURGERY      oral cancer left lower lip    OVARY SURGERY      AR OPEN RX FEMUR FX+INTRAMED SHE Right 5/28/2020    Procedure: INSERTION NAIL IM FEMUR ANTEGRADE (TROCHANTERIC); Surgeon: Turner Dakin, DO;  [facility] MAIN OR;  Service: Orthopedics    TONSILLECTOMY      TOTAL THYROIDECTOMY  2008    Performed after left neck dissection and left oral cancer diagnosis       Family History   Problem Relation Age of Onset    Cancer Mother     Diabetes Mother     Diabetes Father     Hypertension Father        Social History     Socioeconomic History    Marital status:       Spouse name: None    Number of children: None    Years of education: None    Highest education level: None   Occupational History    None   Tobacco Use    Smoking status: Never Smoker    Smokeless tobacco: Never Used   Vaping Use    Vaping Use: Never used   Substance and Sexual Activity    Alcohol use: Not Currently     Alcohol/week: 0 0 standard drinks    Drug use: Never    Sexual activity: Not Currently   Other Topics Concern    None   Social History Narrative    None     Social Determinants of Health     Financial Resource Strain: Not on file   Food Insecurity: Not on file   Transportation Needs: Not on file   Physical Activity: Not on file   Stress: Not on file   Social Connections: Not on file Intimate Partner Violence: Not on file   Housing Stability: Not on file         Current Outpatient Medications:     Ascorbic Acid (vitamin C) 100 MG tablet, Take 500 mg by mouth 2 (two) times a day, Disp: , Rfl:     aspirin (ECOTRIN LOW STRENGTH) 81 mg EC tablet, Take 162 mg by mouth daily DO NOT RESUME UNTIL 7/10/20 (THE DAY AFTER YOU HAVE COMPLETED YOUR COURSE OF ASPIRIN 325 MG BY MOUTH DAILY THAT YOU HAVE BEEN PRESCRIBED) , Disp: , Rfl:     atorvastatin (LIPITOR) 20 mg tablet, Take 20 mg by mouth daily with dinner , Disp: , Rfl:     BD Insulin Syringe U/F 1/2Unit 31G X 5/16" 0 3 ML MISC, 4 (four) times a day, Disp: , Rfl:     calcium carbonate (OS-FELICE) 600 MG tablet, Take 600 mg by mouth 2 (two) times a day with meals, Disp: , Rfl:     Diclofenac Sodium (VOLTAREN) 1 %, Apply 2 g topically 4 (four) times a day, Disp: , Rfl:     Docusate Sodium (DSS) 250 MG CAPS, Take 300 mg by mouth daily, Disp: , Rfl:     furosemide (LASIX) 20 mg tablet, Take 20 mg by mouth 2 (two) times a day, Disp: , Rfl:     gabapentin (NEURONTIN) 300 mg capsule, Take 300 mg by mouth 3 (three) times a day, Disp: , Rfl:     Insulin Aspart (NOVOLOG SC), Inject 5 Units under the skin 3 (three) times a day with meals, Disp: , Rfl:     Insulin Detemir (LEVEMIR SC), Inject 5 Units under the skin daily at bedtime, Disp: , Rfl:     ipratropium (ATROVENT) 0 03 % nasal spray, 2 sprays into each nostril every 12 (twelve) hours, Disp: 30 mL, Rfl: 11    levothyroxine 100 mcg tablet, Take 112 mcg by mouth every morning , Disp: , Rfl:     methocarbamol (ROBAXIN) 500 mg tablet, Take 1 tablet (500 mg total) by mouth every 6 (six) hours as needed for muscle spasms, Disp: 20 tablet, Rfl: 0    Multiple Vitamin (multivitamin) capsule, Take 1 capsule by mouth daily, Disp: , Rfl:     mupirocin (BACTROBAN) 2 % ointment, Apply affected area at mastoid bid, Disp: 22 g, Rfl: 2    OneTouch Ultra test strip, USE TO TEST BLOOD SUGAR 6 TIMES A DAY, Disp: , Rfl:     traMADol (ULTRAM) 50 mg tablet, tramadol 50 mg tablet  take 1 tablet by mouth three times a day if needed, Disp: , Rfl:     levothyroxine 112 mcg tablet, levothyroxine 112 mcg tablet  take 1 tablet by mouth daily (Patient not taking: Reported on 8/20/2021), Disp: , Rfl:     levothyroxine 112 mcg tablet, Take 112 mcg by mouth daily (Patient not taking: Reported on 8/20/2021), Disp: , Rfl:     lisinopril (ZESTRIL) 5 mg tablet, Take 1 tablet (5 mg total) by mouth daily, Disp: 30 tablet, Rfl: 0    lisinopril (ZESTRIL) 5 mg tablet, lisinopril 5 mg tablet (Patient not taking: Reported on 8/20/2021), Disp: , Rfl:     methocarbamol (ROBAXIN) 500 mg tablet, methocarbamol 500 mg tablet (Patient not taking: Reported on 8/20/2021), Disp: , Rfl:     Current Facility-Administered Medications:     bupivacaine (MARCAINE) 0 25 % injection 4 mL, 4 mL, Injection, , Durward Severance Odierno, PA-C, 4 mL at 11/05/21 1154    bupivacaine (MARCAINE) 0 25 % injection 4 mL, 4 mL, Injection, , Durward Severance Odierno, PA-C, 4 mL at 11/05/21 1154    bupivacaine (MARCAINE) 0 25 % injection 4 mL, 4 mL, Injection, , Durward Severance Odierno, PA-C, 4 mL at 11/05/21 1154    triamcinolone acetonide (KENALOG-40) 40 mg/mL injection 80 mg, 80 mg, Intra-articular, , Durward Severance Odierno, PA-C, 80 mg at 11/05/21 1154    triamcinolone acetonide (KENALOG-40) 40 mg/mL injection 80 mg, 80 mg, Intra-articular, , Durward Severance Odierno, PA-C, 80 mg at 11/05/21 1154    triamcinolone acetonide (KENALOG-40) 40 mg/mL injection 80 mg, 80 mg, Intra-articular, , Durward Severance Odierno, PA-C, 80 mg at 11/05/21 1154    No Known Allergies    Review of Systems   Constitutional: Negative for chills, fever and unexpected weight change  HENT: Negative for hearing loss, nosebleeds and sore throat  Eyes: Negative for pain, redness and visual disturbance  Respiratory: Negative for cough, shortness of breath and wheezing      Cardiovascular: Negative for chest pain, palpitations and leg swelling  Gastrointestinal: Negative for abdominal pain, nausea and vomiting  Endocrine: Negative for polydipsia and polyuria  Genitourinary: Negative for dysuria and hematuria  Musculoskeletal:        As noted in HPI   Skin: Negative for rash and wound  Neurological: Negative for dizziness, numbness and headaches  Psychiatric/Behavioral: Negative for decreased concentration and suicidal ideas  The patient is not nervous/anxious           Objective:  /83 (BP Location: Left arm, Patient Position: Sitting, Cuff Size: Standard)   Pulse 87   Ht 5' 1" (1 549 m)   BMI 24 94 kg/m²     Ortho Exam  Bilateral shoulders -  No anatomical deformity  Skin is warm and dry to touch with no signs of erythema, ecchymosis, or infection  No soft tissue swelling or effusion noted  Mild palpable crepitus with passive motion  TTP over anterior acromion, mildly TTP over posterior capsule  ROM  FF 0°-95°, ABD 0°-95 degree, ER 0°-60 degree, IR L5  MMT 4+/5 throughout  - glenohumeral instability appreciated on exam  + Neer's, + Sandoval  - Speed's, - Yergason's  - empty can, - drop-arm, - belly press, - resisted external rotation, - lift-off  Demonstrates normal elbow, wrist, and finger motion  2+ distal radial pulse with brisk capillary refill to the fingers  Radial, median, and ulnar motor and sensory distributions intact  Sensation light touch intact distally    Bilateral knees -   Patient ambulates with antalgic gait pattern  Uses single-point cane as assistive device  No anatomical deformity  Skin is warm and dry to touch with no signs of erythema, ecchymosis, infection  No soft tissue swelling, no effusion noted  ROM 5°-115°  TTP over medial joint lines, mildly TTP over lateral joint lines  Flexor and extensor mechanisms intact  Knee is stable to varus and valgus stress  - Lachman's  - Anterior Drawer, - Posterior Drawer  - medial Ene's, - lateral Ene's  - Pivot Shift  Patella tracks centrally with palpable crepitus  Calf compartments are soft and supple  2+ TP and DP pulses with brisk capillary refill to the toes  Sural, saphenous, tibial, superficial and deep peroneal motor and sensory distributions intact  Sensation light touch intact distally    Physical Exam  Vitals and nursing note reviewed  Constitutional:       General: She is not in acute distress  Appearance: She is well-developed  HENT:      Head: Normocephalic and atraumatic  Eyes:      Conjunctiva/sclera: Conjunctivae normal    Cardiovascular:      Rate and Rhythm: Normal rate  Pulmonary:      Effort: Pulmonary effort is normal    Musculoskeletal:      Cervical back: Neck supple  Skin:     General: Skin is warm and dry  Capillary Refill: Capillary refill takes less than 2 seconds  Neurological:      Mental Status: She is alert and oriented to person, place, and time  Psychiatric:         Mood and Affect: Mood normal          Behavior: Behavior normal           Diagnostic Test Review:  No new imaging reviewed this visit    Large joint arthrocentesis: bilateral knee  Universal Protocol:  Consent: Verbal consent obtained  Risks and benefits: risks, benefits and alternatives were discussed  Consent given by: patient  Time out: Immediately prior to procedure a "time out" was called to verify the correct patient, procedure, equipment, support staff and site/side marked as required    Timeout called at: 2/11/2022 11:34 AM   Patient understanding: patient states understanding of the procedure being performed  Site marked: the operative site was marked  Patient identity confirmed: verbally with patient    Supporting Documentation  Indications: pain and joint swelling   Procedure Details  Location: knee - bilateral knee  Preparation: Patient was prepped and draped in the usual sterile fashion  Needle size: 22 G  Ultrasound guidance: no  Approach: anterolateral    Medications (Right): 4 mL bupivacaine 0 25 %; 40 mg triamcinolone acetonide 40 mg/mLMedications (Left): 4 mL bupivacaine 0 25 %; 40 mg triamcinolone acetonide 40 mg/mL   Patient tolerance: patient tolerated the procedure well with no immediate complications  Dressing:  Sterile dressing applied    Large joint arthrocentesis: bilateral subacromial bursa  Universal Protocol:  Consent: Verbal consent obtained  Risks and benefits: risks, benefits and alternatives were discussed  Consent given by: patient  Time out: Immediately prior to procedure a "time out" was called to verify the correct patient, procedure, equipment, support staff and site/side marked as required    Timeout called at: 2/11/2022 11:36 AM   Patient understanding: patient states understanding of the procedure being performed  Site marked: the operative site was marked  Patient identity confirmed: verbally with patient    Supporting Documentation  Indications: pain and joint swelling   Procedure Details  Location: shoulder - bilateral subacromial bursa  Preparation: Patient was prepped and draped in the usual sterile fashion  Needle size: 22 G  Ultrasound guidance: no  Approach: posterolateral    Medications (Right): 4 mL bupivacaine 0 25 %; 40 mg triamcinolone acetonide 40 mg/mLMedications (Left): 4 mL bupivacaine 0 25 %; 40 mg triamcinolone acetonide 40 mg/mL   Patient tolerance: patient tolerated the procedure well with no immediate complications  Dressing:  Sterile dressing applied        Scribe Attestation    I,:  Tierra Talavera am acting as a scribe while in the presence of the attending physician :       I,:  Thomas Reilly, DO personally performed the services described in this documentation    as scribed in my presence :

## 2022-02-18 ENCOUNTER — APPOINTMENT (OUTPATIENT)
Dept: RADIOLOGY | Facility: CLINIC | Age: 82
End: 2022-02-18
Payer: MEDICARE

## 2022-02-18 ENCOUNTER — OFFICE VISIT (OUTPATIENT)
Dept: WOUND CARE | Facility: CLINIC | Age: 82
End: 2022-02-18
Payer: MEDICARE

## 2022-02-18 ENCOUNTER — APPOINTMENT (OUTPATIENT)
Dept: LAB | Facility: CLINIC | Age: 82
End: 2022-02-18
Payer: MEDICARE

## 2022-02-18 VITALS
RESPIRATION RATE: 20 BRPM | BODY MASS INDEX: 23.41 KG/M2 | TEMPERATURE: 99.1 F | HEART RATE: 82 BPM | DIASTOLIC BLOOD PRESSURE: 84 MMHG | HEIGHT: 61 IN | SYSTOLIC BLOOD PRESSURE: 160 MMHG | WEIGHT: 124 LBS
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
## 2022-02-18 DIAGNOSIS — M87.38 OSTEORADIONECROSIS OF TEMPORAL BONE (HCC): ICD-10-CM

## 2022-02-18 DIAGNOSIS — Z79.4 TYPE 2 DIABETES MELLITUS WITH DIABETIC NEUROPATHY, WITH LONG-TERM CURRENT USE OF INSULIN (HCC): ICD-10-CM

## 2022-02-18 DIAGNOSIS — E87.1 HYPONATREMIA: ICD-10-CM

## 2022-02-18 DIAGNOSIS — E11.9 TYPE 2 DIABETES MELLITUS WITHOUT COMPLICATION, WITHOUT LONG-TERM CURRENT USE OF INSULIN (HCC): ICD-10-CM

## 2022-02-18 DIAGNOSIS — E11.40 TYPE 2 DIABETES MELLITUS WITH DIABETIC NEUROPATHY, WITH LONG-TERM CURRENT USE OF INSULIN (HCC): ICD-10-CM

## 2022-02-18 DIAGNOSIS — E03.8 OTHER SPECIFIED HYPOTHYROIDISM: ICD-10-CM

## 2022-02-18 DIAGNOSIS — L59.8 SOFT TISSUE RADIONECROSIS: Primary | ICD-10-CM

## 2022-02-18 DIAGNOSIS — E03.9 HYPOTHYROIDISM, UNSPECIFIED TYPE: ICD-10-CM

## 2022-02-18 DIAGNOSIS — E78.5 HYPERLIPIDEMIA, UNSPECIFIED HYPERLIPIDEMIA TYPE: ICD-10-CM
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
## 2022-02-18 DIAGNOSIS — Z79.899 ENCOUNTER FOR LONG-TERM (CURRENT) USE OF OTHER MEDICATIONS: ICD-10-CM

## 2022-02-18 DIAGNOSIS — I10 HYPERTENSION, UNSPECIFIED TYPE: ICD-10-CM

## 2022-02-18 DIAGNOSIS — E55.9 VITAMIN D DEFICIENCY: ICD-10-CM

## 2022-02-18 LAB
25(OH)D3 SERPL-MCNC: 44.1 NG/ML (ref 30–100)
ALBUMIN SERPL BCP-MCNC: 4.1 G/DL (ref 3.5–5)
ALP SERPL-CCNC: 60 U/L (ref 46–116)
ALT SERPL W P-5'-P-CCNC: 55 U/L (ref 12–78)
ANION GAP SERPL CALCULATED.3IONS-SCNC: 9 MMOL/L (ref 4–13)
AST SERPL W P-5'-P-CCNC: 24 U/L (ref 5–45)
BASOPHILS # BLD AUTO: 0.01 THOUSANDS/ΜL (ref 0–0.1)
BASOPHILS NFR BLD AUTO: 0 % (ref 0–1)
BILIRUB SERPL-MCNC: 0.97 MG/DL (ref 0.2–1)
BUN SERPL-MCNC: 30 MG/DL (ref 5–25)
CALCIUM SERPL-MCNC: 9.5 MG/DL (ref 8.3–10.1)
CHLORIDE SERPL-SCNC: 98 MMOL/L (ref 100–108)
CHOLEST SERPL-MCNC: 215 MG/DL
CO2 SERPL-SCNC: 24 MMOL/L (ref 21–32)
CREAT SERPL-MCNC: 1.01 MG/DL (ref 0.6–1.3)
EOSINOPHIL # BLD AUTO: 0 THOUSAND/ΜL (ref 0–0.61)
EOSINOPHIL NFR BLD AUTO: 0 % (ref 0–6)
ERYTHROCYTE [DISTWIDTH] IN BLOOD BY AUTOMATED COUNT: 12.1 % (ref 11.6–15.1)
EST. AVERAGE GLUCOSE BLD GHB EST-MCNC: 183 MG/DL
GFR SERPL CREATININE-BSD FRML MDRD: 52 ML/MIN/1.73SQ M
GLUCOSE P FAST SERPL-MCNC: 487 MG/DL (ref 65–99)
HBA1C MFR BLD: 8 %
HCT VFR BLD AUTO: 41.8 % (ref 34.8–46.1)
HDLC SERPL-MCNC: 90 MG/DL
HGB BLD-MCNC: 13.1 G/DL (ref 11.5–15.4)
IMM GRANULOCYTES # BLD AUTO: 0.07 THOUSAND/UL (ref 0–0.2)
IMM GRANULOCYTES NFR BLD AUTO: 1 % (ref 0–2)
LDLC SERPL CALC-MCNC: 93 MG/DL (ref 0–100)
LYMPHOCYTES # BLD AUTO: 1.12 THOUSANDS/ΜL (ref 0.6–4.47)
LYMPHOCYTES NFR BLD AUTO: 9 % (ref 14–44)
MCH RBC QN AUTO: 33.2 PG (ref 26.8–34.3)
MCHC RBC AUTO-ENTMCNC: 31.3 G/DL (ref 31.4–37.4)
MCV RBC AUTO: 106 FL (ref 82–98)
MONOCYTES # BLD AUTO: 0.82 THOUSAND/ΜL (ref 0.17–1.22)
MONOCYTES NFR BLD AUTO: 7 % (ref 4–12)
NEUTROPHILS # BLD AUTO: 9.84 THOUSANDS/ΜL (ref 1.85–7.62)
NEUTS SEG NFR BLD AUTO: 83 % (ref 43–75)
NONHDLC SERPL-MCNC: 125 MG/DL
NRBC BLD AUTO-RTO: 0 /100 WBCS
PLATELET # BLD AUTO: 240 THOUSANDS/UL (ref 149–390)
PMV BLD AUTO: 12.4 FL (ref 8.9–12.7)
POTASSIUM SERPL-SCNC: 5.3 MMOL/L (ref 3.5–5.3)
PROT SERPL-MCNC: 7.5 G/DL (ref 6.4–8.2)
RBC # BLD AUTO: 3.94 MILLION/UL (ref 3.81–5.12)
SODIUM SERPL-SCNC: 131 MMOL/L (ref 136–145)
T4 FREE SERPL-MCNC: 1.36 NG/DL (ref 0.76–1.46)
TRIGL SERPL-MCNC: 161 MG/DL
TSH SERPL DL<=0.05 MIU/L-ACNC: 0.46 UIU/ML (ref 0.36–3.74)
WBC # BLD AUTO: 11.86 THOUSAND/UL (ref 4.31–10.16)

## 2022-02-18 PROCEDURE — 71046 X-RAY EXAM CHEST 2 VIEWS: CPT

## 2022-02-18 PROCEDURE — 85025 COMPLETE CBC W/AUTO DIFF WBC: CPT

## 2022-02-18 PROCEDURE — 84439 ASSAY OF FREE THYROXINE: CPT

## 2022-02-18 PROCEDURE — 82306 VITAMIN D 25 HYDROXY: CPT

## 2022-02-18 PROCEDURE — 80053 COMPREHEN METABOLIC PANEL: CPT

## 2022-02-18 PROCEDURE — 99213 OFFICE O/P EST LOW 20 MIN: CPT | Performed by: FAMILY MEDICINE

## 2022-02-18 PROCEDURE — 99204 OFFICE O/P NEW MOD 45 MIN: CPT | Performed by: FAMILY MEDICINE

## 2022-02-18 PROCEDURE — 36415 COLL VENOUS BLD VENIPUNCTURE: CPT

## 2022-02-18 PROCEDURE — 84443 ASSAY THYROID STIM HORMONE: CPT

## 2022-02-18 PROCEDURE — 83036 HEMOGLOBIN GLYCOSYLATED A1C: CPT

## 2022-02-18 PROCEDURE — 80061 LIPID PANEL: CPT

## 2022-02-18 NOTE — PROGRESS NOTES
Patient ID: Clover Thomas is a 80 y o  female Date of Birth 1940       Chief Complaint   Patient presents with   [place]     Exposed bone behind left ear  Allergies:  Patient has no known allergies  Diagnosis:      Diagnosis ICD-10-CM Associated Orders   1  Soft tissue radionecrosis  L59 8     Y84 2    2  Osteoradionecrosis of temporal bone (HCC)  M87 38 XR chest pa & lateral    Y84 2 Comprehensive metabolic panel     CBC and differential     Hyperbaric oxygen thearpy   3  Type 2 diabetes mellitus with diabetic neuropathy, with long-term current use of insulin (HCC)  E11 40 Comprehensive metabolic panel    G11 0 CBC and differential     HEMOGLOBIN A1C W/ EAG ESTIMATION     POCT blood glucose     Basic metabolic panel   4  Other specified hypothyroidism  E03 8    5  Hyponatremia  I94 9 Basic metabolic panel           Assessment & Plan:    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 secondary to radiation therapy in the 1960s for Hodgkin's Disease disease  Currently, bone is exposed at the mastoid tip  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 and had a flap graft and left Neck dissection   Patient is a candidate for hyperbaric oxygen therapy in preparation for surgery removing portion of bone and covering with a rotational flap  She will have 20 treatments prior to surgery and 10 post surgery   There are no obvious contraindications to treatment  CBC, CMP and hemoglobin A1c will be obtained  Chest x-ray will be obtained  Subjective:   2/18/22: This is an 58-year-old female who was referred to the wound center for consultation for hyperbaric oxygen therapy  Patient had a history of left neck Hodgkin's disease which was treated with radiation therapy in the 1960s  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  She underwent resection with reconstruction with a radial forearm free flap in 2008    Patient also had a left neck dissection  She does have some discomfort occasionally in the left neck which she has found a cervical collar to be helpful  She did notice several months ago a scab behind her left ear  She was using some intermittent antibiotic ointment  Eventually, the skin broke down and mastoid tip is exposed and has not responded to mupiroicin  Her ENT, Dr Jimi Gtz, has diagnosed her with ORN and is scheduled to have surgery with rotational flap coverage  Past medical history is remarkable for type 2 diabetes, insulin dependent  Last A1c was approximately 8  She has no cardiac or pulmonary history  She has never smoked  No history of claustrophobia        The following portions of the patient's history were reviewed and updated as appropriate:   Patient Active Problem List   Diagnosis    Mixed hyperlipidemia    Essential hypertension    Other specified hypothyroidism    Type 2 diabetes mellitus with diabetic neuropathy, with long-term current use of insulin ([address] )    Closed intertrochanteric fracture of right femur (HCC)    Hyponatremia    Elevated vitamin B12 level    Anemia    Hypophosphatemia    Primary osteoarthritis of both knees    Acute hip pain, right    Ambulatory dysfunction    Right knee pain    Primary osteoarthritis of right shoulder     Past Medical History:   Diagnosis Date    Cancer ([address] )     Controlled type 2 diabetes mellitus with diabetic polyneuropathy, with long-term current use of insulin ([address] ) 5/21/2008    Diabetes mellitus ([address] )     Diabetic polyneuropathy ([address] ) 5/21/2008    ICD10 clean up    Disease of thyroid gland     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    HL (hearing loss)     Hodgkin's disease ([address] ) 2008    Left neck, had radiation    Hypertension     Neuropathy     Osteoporosis      Past Surgical History:   Procedure Laterality Date    ADENOIDECTOMY      APPENDECTOMY      CATARACT EXTRACTION, BILATERAL      CHOLECYSTECTOMY      MOUTH SURGERY oral cancer left lower lip    OVARY SURGERY      DE OPEN RX FEMUR FX+INTRAMED SHE Right 5/28/2020    Procedure: INSERTION NAIL IM FEMUR ANTEGRADE (TROCHANTERIC); Surgeon: Alhaji Fajardo DO;  Location: [address] [place] [address] MAIN OR;  Service: Orthopedics    TONSILLECTOMY      TOTAL THYROIDECTOMY  2008    Performed after left neck dissection and left oral cancer diagnosis     Family History   Problem Relation Age of Onset    Cancer Mother     Diabetes Mother     Diabetes Father     Hypertension Father       Social History     Socioeconomic History    Marital status:       Spouse name: None    Number of children: None    Years of education: None    Highest education level: None   Occupational History    None   Tobacco Use    Smoking status: Never Smoker    Smokeless tobacco: Never Used   Vaping Use    Vaping Use: Never used   Substance and Sexual Activity    Alcohol use: Not Currently     Alcohol/week: 0 0 standard drinks    Drug use: Never    Sexual activity: Not Currently   Other Topics Concern    None   Social History Narrative    None     Social Determinants of Health     Financial Resource Strain: Not on file   Food Insecurity: Not on file   Transportation Needs: Not on file   Physical Activity: Not on file   Stress: Not on file   Social Connections: Not on file   Intimate Partner Violence: Not on file   Housing Stability: Not on file        Current Outpatient Medications:     Ascorbic Acid (vitamin C) 100 MG tablet, Take 500 mg by mouth 2 (two) times a day, Disp: , Rfl:     aspirin (ECOTRIN LOW STRENGTH) 81 mg EC tablet, Take 162 mg by mouth daily DO NOT RESUME UNTIL 7/10/20 (THE DAY AFTER YOU HAVE COMPLETED YOUR COURSE OF ASPIRIN 325 MG BY MOUTH DAILY THAT YOU HAVE BEEN PRESCRIBED) , Disp: , Rfl:     atorvastatin (LIPITOR) 20 mg tablet, Take 20 mg by mouth daily with dinner , Disp: , Rfl:     BD Insulin Syringe U/F 1/2Unit 31G X 5/16" 0 3 ML MISC, 4 (four) times a day, Disp: , Rfl:     calcium carbonate (OS-FELICE) 600 MG tablet, Take 600 mg by mouth 2 (two) times a day with meals, Disp: , Rfl:     Diclofenac Sodium (VOLTAREN) 1 %, Apply 2 g topically 4 (four) times a day, Disp: , Rfl:     Docusate Sodium (DSS) 250 MG CAPS, Take 300 mg by mouth daily, Disp: , Rfl:     furosemide (LASIX) 20 mg tablet, Take 20 mg by mouth 2 (two) times a day, Disp: , Rfl:     gabapentin (NEURONTIN) 300 mg capsule, Take 300 mg by mouth 3 (three) times a day, Disp: , Rfl:     Insulin Aspart (NOVOLOG SC), Inject 5 Units under the skin 3 (three) times a day with meals, Disp: , Rfl:     Insulin Detemir (LEVEMIR SC), Inject 5 Units under the skin daily at bedtime, Disp: , Rfl:     ipratropium (ATROVENT) 0 03 % nasal spray, 2 sprays into each nostril every 12 (twelve) hours, Disp: 30 mL, Rfl: 11    levothyroxine 100 mcg tablet, Take 112 mcg by mouth every morning , Disp: , Rfl:     levothyroxine 112 mcg tablet, levothyroxine 112 mcg tablet  take 1 tablet by mouth daily (Patient not taking: Reported on 8/20/2021), Disp: , Rfl:     levothyroxine 112 mcg tablet, Take 112 mcg by mouth daily (Patient not taking: Reported on 8/20/2021), Disp: , Rfl:     lisinopril (ZESTRIL) 5 mg tablet, Take 1 tablet (5 mg total) by mouth daily, Disp: 30 tablet, Rfl: 0    lisinopril (ZESTRIL) 5 mg tablet, lisinopril 5 mg tablet (Patient not taking: Reported on 8/20/2021), Disp: , Rfl:     methocarbamol (ROBAXIN) 500 mg tablet, Take 1 tablet (500 mg total) by mouth every 6 (six) hours as needed for muscle spasms, Disp: 20 tablet, Rfl: 0    methocarbamol (ROBAXIN) 500 mg tablet, methocarbamol 500 mg tablet (Patient not taking: Reported on 8/20/2021), Disp: , Rfl:     Multiple Vitamin (multivitamin) capsule, Take 1 capsule by mouth daily, Disp: , Rfl:     mupirocin (BACTROBAN) 2 % ointment, Apply affected area at mastoid bid, Disp: 22 g, Rfl: 2    OneTouch Ultra test strip, USE TO TEST BLOOD SUGAR 6 TIMES A DAY, Disp: , Rfl:     traMADol (ULTRAM) 50 mg tablet, tramadol 50 mg tablet  take 1 tablet by mouth three times a day if needed, Disp: , Rfl:     Current Facility-Administered Medications:     bupivacaine (MARCAINE) 0 25 % injection 4 mL, 4 mL, Injection, , Starlet Gaudy Odierno, PA-C, 4 mL at 11/05/21 1154    bupivacaine (MARCAINE) 0 25 % injection 4 mL, 4 mL, Injection, , Starlet Gaudy Odierno, PA-C, 4 mL at 11/05/21 1154    bupivacaine (MARCAINE) 0 25 % injection 4 mL, 4 mL, Injection, , Starlet Gaudy Odierno, PA-C, 4 mL at 11/05/21 1154    triamcinolone acetonide (KENALOG-40) 40 mg/mL injection 80 mg, 80 mg, Intra-articular, , Starlet Gaudy Odierno, PA-C, 80 mg at 11/05/21 1154    triamcinolone acetonide (KENALOG-40) 40 mg/mL injection 80 mg, 80 mg, Intra-articular, , Starlet Gaudy Odierno, PA-C, 80 mg at 11/05/21 1154    triamcinolone acetonide (KENALOG-40) 40 mg/mL injection 80 mg, 80 mg, Intra-articular, , Starlet Gaudy Odierno, PA-C, 80 mg at 11/05/21 1154    Review of Systems   Constitutional: Negative for appetite change, chills, fatigue, fever and unexpected weight change  HENT: Positive for dental problem  Negative for congestion, hearing loss, postnasal drip and sinus pressure  Eyes: Negative for discharge and visual disturbance  Respiratory: Negative for cough and shortness of breath  Cardiovascular: Negative for chest pain, palpitations and leg swelling  Gastrointestinal: Negative for abdominal pain, blood in stool, constipation, diarrhea and nausea  Endocrine: Negative  Genitourinary: Negative for difficulty urinating, dysuria and urgency  Musculoskeletal: Positive for gait problem (Cane or walker), joint swelling (DJD) and neck pain  Negative for back pain  Skin: Positive for wound (Left mastoid tip)  Negative for rash  Allergic/Immunologic: Negative  Neurological: Negative for dizziness, tremors, seizures, weakness, numbness and headaches  Hematological: Does not bruise/bleed easily  Psychiatric/Behavioral: Negative  Negative for dysphoric mood  The patient is not nervous/anxious  Objective:  /84   Pulse 82   Temp 99 1 °F (37 3 °C)   Resp 20   Ht 5' 1" (1 549 m)   Wt 56 2 kg (124 lb)   BMI 23 43 kg/m²   Pain Score: 0-No pain     Physical Exam  Vitals and nursing note reviewed  Constitutional:       Appearance: Normal appearance  She is well-developed and normal weight  HENT:      Head: Normocephalic and atraumatic  Comments: Small area of exposed bone of the mastoid tip  Right Ear: External ear normal       Left Ear: External ear normal       Mouth/Throat:      Dentition: Abnormal dentition  Comments: Only nine teeth in total remaining  Eyes:      General: Lids are normal          Right eye: No discharge  Left eye: No discharge  Conjunctiva/sclera: Conjunctivae normal       Pupils: Pupils are equal, round, and reactive to light  Cardiovascular:      Rate and Rhythm: Normal rate and regular rhythm  Heart sounds: S1 normal and S2 normal  Murmur heard  Systolic murmur is present with a grade of 2/6  No diastolic murmur is present  No friction rub  No gallop  Pulmonary:      Effort: Pulmonary effort is normal       Breath sounds: Normal breath sounds and air entry  Abdominal:      General: Abdomen is flat  Palpations: Abdomen is soft  There is no hepatomegaly or splenomegaly  Tenderness: There is no abdominal tenderness  There is no guarding or rebound  Musculoskeletal:      Cervical back: Neck supple  Right lower leg: No edema  Left lower leg: No edema  Lymphadenopathy:      Cervical: No cervical adenopathy  Skin:     General: Skin is warm and dry  Findings: Wound present  Neurological:      Mental Status: She is alert and oriented to person, place, and time        Gait: Gait abnormal    Psychiatric:         Attention and Perception: Attention normal          Mood and Affect: Mood and affect normal          Speech: Speech normal          Behavior: Behavior is cooperative  Cognition and Memory: Cognition normal          Judgment: Judgment normal                  HBO Qualification & Assessment     Tila Blackwell presents today for a consultation for HBO treatment  HBO Indication    Soft Tissue Radionecrosis    Soft Tissue Radionecrosis diagnosis code  L59 9 Other specified disorders of the skin and sub Q tissue related to radiation    Anatomical site Other, left neck over mastoid tip    Date radiation treatments started and completed in the early 1960s  Actual radiation records unavailable  Radiation therapy treatments ended at least 6 months previous to consideration of HBO  Yes    Symptoms of STRN the patient is experiencing Open wound    Since the patient has radiation soft tissue necrosis as evidenced by above documentation HBO is medically necessary    Review of the [facility], sofie patient had cancer and received radiation  HBO is being used as an adjunct to conventional Rx  Based on the information included herein, it is my determination that the patient has a medical necessity for HBOT and meets the conditions for the adjunctive treatment to a comprehensive plan    Yes    Brief history of co-morbidities that may affect HBO treatment:    Previously treated with: No past history of Adriamycin, Bleomycin, Antabuse, Cis-platinum or Sulfamylon    Patient reports s/s of No acute infections    Eyes    Patient has  No history of Myopia, Cataracts or Optic Nerve    Ears    Patient has a history of No ear abnormalities or conditions    Ears assessed for tympanic membrane or middle ear abnormalities yes with otoscope    Patient instructed on how to clear ears and demonstrate proper technique: Yes    Right ear baseline TEEDS: Grade 0    Left ear baseline TEEDS: Grade 0    ENT consult for Myringotomy tube insertion due to No consult is needed    Cerumen removal performed:  N/A  ears clear of keke    Neurological    Patient whelan a history of seizures: No    Cardiovascular    Patient has a history of: No cardiac history requiring work-up prior to hyperbaric therapy    EKG ordered: No    Echocardiogram ordered: No    Cardiovascular consult ordered: No    Smoking  Social History     Tobacco Use   Smoking Status Never Smoker   Smokeless Tobacco Never Used       Respiratory    Patient has a history of pneumothorax: No    Patient has a history of: No respiratory conditions requiring further work-up prior to HBO    Lungs clear bilaterally: Yes    Chest x-ray ordered: Yes    Psychiatric    Patient has confinement anxiety: No    Patient education and consent    Informed Consent:  Tabitha Carter has no contraindications to hyperbaric oxygen therapy  We have discussed the risks (ear and sinus barotrauma, pneumothorax, visual refractory changes, oxygen-induced seizures, and claustrophobia) and potential benefits of hyperbaric oxygen therapy  Patient understands these risks along with the potential benefits and agrees to undergo hyperbaric oxygen therapy  I discussed with and educated the patient about the HBO procedure, smoking/drug/alcohol policy, and items not allowed in the hyperbaric chamber  Yes    Patient has been oriented to the HBO chamber  Clearing techniques have been reviewed  Written consent for HBO obtained: Yes    HBO treatment goal    Tabitha Carter is an excellent candidate for hyperbaric oxygen treatment as adjunctive therapy for the treatment of soft tissue radiation necrosis with underlying ORN  Radiation causes an obliterative endarteritis, subsequent hypoxic/hypovascular tissue and secondary fibrosis  Hyperbaric oxygen has been shown to be an effective treatment in radiation tissue injury by stimulating angiogenesis and inducing neovacularization in the hypoxic irradiated tissue   Serial hyperbaric oxygen treatments improve local host immune response and enhance the clearance of infection by improving the leukocyte oxidative killing of aerobic bacteria and the direct bactericidal activity against many anaerobic bacteria  Hyperbaric oxygen also stimulates tissue growth through fibroblast proliferation, collagen synthesis, stimulation of the release of vascular endothelial growth factors (VEGF), induction of the receptor appearance of platelet derived growth factors (PDGF) and angiogenesis  Angiogenesis and improvement of surrounding soft tissue in preparation for surgery  HBO treatment andreea Almonte is an excellent candidate for hyperbaric oxygen treatment as adjunctive therapy for the treatment of  ORN of the temporal bone  Radiation causes an obliterative endarteritis, subsequent hypoxic/hypovascular tissue and secondary fibrosis  Hyperbaric oxygen has been shown to be an effective treatment in radiation tissue injury by stimulating angiogenesis and inducing neovacularization in the hypoxic irradiated tissue  Serial hyperbaric oxygen treatments improve local host immune response and enhance the clearance of infection by improving the leukocyte oxidative killing of aerobic bacteria and the direct bactericidal activity against many anaerobic bacteria  Hyperbaric oxygen also stimulates tissue growth through fibroblast proliferation, collagen synthesis, stimulation of the release of vascular endothelial growth factors (VEGF), induction of the receptor appearance of platelet derived growth factors (PDGF) and angiogenesis  Total time spent today:  35 minutes  This includes reviewing the patient's chart, pertinent physician records including ENT office visits from January and February of 2022  Laboratory data was reviewed from July of 2021 and will need to have new studies ordered  Sloan Bullard MD, CHT, CWS    Portions of the record may have been created with voice recognition software   Occasional wrong word or "sound alike" substitutions may have occurred due to the inherent limitations of voice recognition software  Read the chart carefully and recognize, using context, where substitutions have occurred

## 2022-02-18 NOTE — LETTER
February 18, 2022     Maria D Jones MD  [address] [facility]  [address]  [place] [address]    Patient: Kam Conley   YOB: 1940   Date of Visit: 2/18/2022       Dear Dr Collin Goff: Thank you for referring Fely Porras to me for evaluation  Below are my notes for this consultation  If you have questions, please do not hesitate to call me  I look forward to following your patient along with you  Sincerely,        Earline Jones MD, CHT, CWS        CC: MD Earline Jovel MD  2/18/2022 11:53 AM  Incomplete  Patient ID: Kam Conley is a 80 y o  female Date of Birth 1940       Chief Complaint   Patient presents with   [place]     Exposed bone behind left ear  Allergies:  Patient has no known allergies  Diagnosis:      Diagnosis ICD-10-CM Associated Orders   1  Osteoradionecrosis of temporal bone (HCC)  M87 38 XR chest pa & lateral    Y84 2 Comprehensive metabolic panel     CBC and differential     Hyperbaric oxygen thearpy   2  Type 2 diabetes mellitus with diabetic neuropathy, with long-term current use of insulin (HCC)  E11 40 Comprehensive metabolic panel    O11 5 CBC and differential     HEMOGLOBIN A1C W/ EAG ESTIMATION     POCT blood glucose   3  Other specified hypothyroidism  E03 8            Assessment & Plan:   ORN of the temporal bone secondary to radiation therapy in the 1960s for Hodgkin's Disease disease  Currently, bone is exposed at the mastoid tip  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 and had a flap graft and left Neck dissection   Patient is a candidate for hyperbaric oxygen therapy in preparation for surgery removing portion of bone and covering with a rotational flap  She will have 20 treatments prior to surgery and 10 post surgery   There are no obvious contraindications to treatment  CBC, CMP and hemoglobin A1c will be obtained  Chest x-ray will be obtained              Subjective: 2/18/22: This is an 28-year-old female who was referred to the wound center for consultation for hyperbaric oxygen therapy  Patient had a history of left neck Hodgkin's disease which was treated with radiation therapy in the 1960s  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  She underwent resection with reconstruction with a radial forearm free flap in 2008  Patient also had a left neck dissection  She does have some discomfort occasionally in the left neck which she has found a cervical collar to be helpful  She did notice several months ago a scab behind her left ear  She was using some intermittent antibiotic ointment  Eventually, the skin broke down and mastoid tip is exposed and has not responded to mupiroicin  Her ENT, Dr Jared Lawton, has diagnosed her with ORN and is scheduled to have surgery with rotational flap coverage  Past medical history is remarkable for type 2 diabetes, insulin dependent  Last A1c was approximately 8  She has no cardiac or pulmonary history  She has never smoked  No history of claustrophobia        The following portions of the patient's history were reviewed and updated as appropriate:   Patient Active Problem List   Diagnosis    Mixed hyperlipidemia    Essential hypertension    Other specified hypothyroidism    Type 2 diabetes mellitus with diabetic neuropathy, with long-term current use of insulin ([address] )    Closed intertrochanteric fracture of right femur (HCC)    Hyponatremia    Elevated vitamin B12 level    Anemia    Hypophosphatemia    Primary osteoarthritis of both knees    Acute hip pain, right    Ambulatory dysfunction    Right knee pain    Primary osteoarthritis of right shoulder     Past Medical History:   Diagnosis Date    Cancer ([address] )     Controlled type 2 diabetes mellitus with diabetic polyneuropathy, with long-term current use of insulin ([address] ) 5/21/2008    Diabetes mellitus ([address] )     Diabetic polyneuropathy ([address] ) 5/21/2008 ICD10 clean up    Disease of thyroid gland     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    HL (hearing loss)     Hodgkin's disease ([facility] [address] ) 2008    Left neck, had radiation    Hypertension     Neuropathy     Osteoporosis      Past Surgical History:   Procedure Laterality Date    ADENOIDECTOMY      APPENDECTOMY      CATARACT EXTRACTION, BILATERAL      CHOLECYSTECTOMY      MOUTH SURGERY      oral cancer left lower lip    OVARY SURGERY      SD OPEN RX FEMUR FX+INTRAMED SHE Right 5/28/2020    Procedure: INSERTION NAIL IM FEMUR ANTEGRADE (TROCHANTERIC); Surgeon: Brianda Max DO;  Location: [address] [place] [address] MAIN OR;  Service: Orthopedics    TONSILLECTOMY      TOTAL THYROIDECTOMY  2008    Performed after left neck dissection and left oral cancer diagnosis     Family History   Problem Relation Age of Onset    Cancer Mother     Diabetes Mother     Diabetes Father     Hypertension Father       Social History     Socioeconomic History    Marital status:       Spouse name: None    Number of children: None    Years of education: None    Highest education level: None   Occupational History    None   Tobacco Use    Smoking status: Never Smoker    Smokeless tobacco: Never Used   Vaping Use    Vaping Use: Never used   Substance and Sexual Activity    Alcohol use: Not Currently     Alcohol/week: 0 0 standard drinks    Drug use: Never    Sexual activity: Not Currently   Other Topics Concern    None   Social History Narrative    None     Social Determinants of Health     Financial Resource Strain: Not on file   Food Insecurity: Not on file   Transportation Needs: Not on file   Physical Activity: Not on file   Stress: Not on file   Social Connections: Not on file   Intimate Partner Violence: Not on file   Housing Stability: Not on file        Current Outpatient Medications:     Ascorbic Acid (vitamin C) 100 MG tablet, Take 500 mg by mouth 2 (two) times a day, Disp: , Rfl:     aspirin (ECOTRIN LOW STRENGTH) 81 mg EC tablet, Take 162 mg by mouth daily DO NOT RESUME UNTIL 7/10/20 (THE DAY AFTER YOU HAVE COMPLETED YOUR COURSE OF ASPIRIN 325 MG BY MOUTH DAILY THAT YOU HAVE BEEN PRESCRIBED) , Disp: , Rfl:     atorvastatin (LIPITOR) 20 mg tablet, Take 20 mg by mouth daily with dinner , Disp: , Rfl:     BD Insulin Syringe U/F 1/2Unit 31G X 5/16" 0 3 ML MISC, 4 (four) times a day, Disp: , Rfl:     calcium carbonate (OS-FELICE) 600 MG tablet, Take 600 mg by mouth 2 (two) times a day with meals, Disp: , Rfl:     Diclofenac Sodium (VOLTAREN) 1 %, Apply 2 g topically 4 (four) times a day, Disp: , Rfl:     Docusate Sodium (DSS) 250 MG CAPS, Take 300 mg by mouth daily, Disp: , Rfl:     furosemide (LASIX) 20 mg tablet, Take 20 mg by mouth 2 (two) times a day, Disp: , Rfl:     gabapentin (NEURONTIN) 300 mg capsule, Take 300 mg by mouth 3 (three) times a day, Disp: , Rfl:     Insulin Aspart (NOVOLOG SC), Inject 5 Units under the skin 3 (three) times a day with meals, Disp: , Rfl:     Insulin Detemir (LEVEMIR SC), Inject 5 Units under the skin daily at bedtime, Disp: , Rfl:     ipratropium (ATROVENT) 0 03 % nasal spray, 2 sprays into each nostril every 12 (twelve) hours, Disp: 30 mL, Rfl: 11    levothyroxine 100 mcg tablet, Take 112 mcg by mouth every morning , Disp: , Rfl:     levothyroxine 112 mcg tablet, levothyroxine 112 mcg tablet  take 1 tablet by mouth daily (Patient not taking: Reported on 8/20/2021), Disp: , Rfl:     levothyroxine 112 mcg tablet, Take 112 mcg by mouth daily (Patient not taking: Reported on 8/20/2021), Disp: , Rfl:     lisinopril (ZESTRIL) 5 mg tablet, Take 1 tablet (5 mg total) by mouth daily, Disp: 30 tablet, Rfl: 0    lisinopril (ZESTRIL) 5 mg tablet, lisinopril 5 mg tablet (Patient not taking: Reported on 8/20/2021), Disp: , Rfl:     methocarbamol (ROBAXIN) 500 mg tablet, Take 1 tablet (500 mg total) by mouth every 6 (six) hours as needed for muscle spasms, Disp: 20 tablet, Rfl: 0    methocarbamol (ROBAXIN) 500 mg tablet, methocarbamol 500 mg tablet (Patient not taking: Reported on 8/20/2021), Disp: , Rfl:     Multiple Vitamin (multivitamin) capsule, Take 1 capsule by mouth daily, Disp: , Rfl:     mupirocin (BACTROBAN) 2 % ointment, Apply affected area at mastoid bid, Disp: 22 g, Rfl: 2    OneTouch Ultra test strip, USE TO TEST BLOOD SUGAR 6 TIMES A DAY, Disp: , Rfl:     traMADol (ULTRAM) 50 mg tablet, tramadol 50 mg tablet  take 1 tablet by mouth three times a day if needed, Disp: , Rfl:     Current Facility-Administered Medications:     bupivacaine (MARCAINE) 0 25 % injection 4 mL, 4 mL, Injection, , Sherrie Barthel Odierno, PA-C, 4 mL at 11/05/21 1154    bupivacaine (MARCAINE) 0 25 % injection 4 mL, 4 mL, Injection, , Sherrie Barthel Odierno, PA-C, 4 mL at 11/05/21 1154    bupivacaine (MARCAINE) 0 25 % injection 4 mL, 4 mL, Injection, , Sherrie Barthel Odierno, PA-C, 4 mL at 11/05/21 1154    triamcinolone acetonide (KENALOG-40) 40 mg/mL injection 80 mg, 80 mg, Intra-articular, , Sherrie Barthel Odierno, PA-C, 80 mg at 11/05/21 1154    triamcinolone acetonide (KENALOG-40) 40 mg/mL injection 80 mg, 80 mg, Intra-articular, , Sherrie Barthel Odierno, PA-C, 80 mg at 11/05/21 1154    triamcinolone acetonide (KENALOG-40) 40 mg/mL injection 80 mg, 80 mg, Intra-articular, , Sherrie Barthel Odierno, PA-C, 80 mg at 11/05/21 1154    Review of Systems   Constitutional: Negative for appetite change, chills, fatigue, fever and unexpected weight change  HENT: Positive for dental problem  Negative for congestion, hearing loss, postnasal drip and sinus pressure  Eyes: Negative for discharge and visual disturbance  Respiratory: Negative for cough and shortness of breath  Cardiovascular: Negative for chest pain, palpitations and leg swelling  Gastrointestinal: Negative for abdominal pain, blood in stool, constipation, diarrhea and nausea  Endocrine: Negative      Genitourinary: Negative for difficulty urinating, dysuria and urgency  Musculoskeletal: Positive for gait problem (Cane or walker), joint swelling (DJD) and neck pain  Negative for back pain  Skin: Positive for wound (Left mastoid tip)  Negative for rash  Allergic/Immunologic: Negative  Neurological: Negative for dizziness, tremors, seizures, weakness, numbness and headaches  Hematological: Does not bruise/bleed easily  Psychiatric/Behavioral: Negative  Negative for dysphoric mood  The patient is not nervous/anxious  Objective:  /84   Pulse 82   Temp 99 1 °F (37 3 °C)   Resp 20   Ht 5' 1" (1 549 m)   Wt 56 2 kg (124 lb)   BMI 23 43 kg/m²   Pain Score: 0-No pain     Physical Exam  Vitals and nursing note reviewed  Constitutional:       Appearance: Normal appearance  She is well-developed and normal weight  HENT:      Head: Normocephalic and atraumatic  Comments: Small area of exposed bone of the mastoid tip  Right Ear: External ear normal       Left Ear: External ear normal       Mouth/Throat:      Dentition: Abnormal dentition  Comments: Only nine teeth in total remaining  Eyes:      General: Lids are normal          Right eye: No discharge  Left eye: No discharge  Conjunctiva/sclera: Conjunctivae normal       Pupils: Pupils are equal, round, and reactive to light  Cardiovascular:      Rate and Rhythm: Normal rate and regular rhythm  Heart sounds: S1 normal and S2 normal  Murmur heard  Systolic murmur is present with a grade of 2/6  No diastolic murmur is present  No friction rub  No gallop  Pulmonary:      Effort: Pulmonary effort is normal       Breath sounds: Normal breath sounds and air entry  Abdominal:      General: Abdomen is flat  Palpations: Abdomen is soft  There is no hepatomegaly or splenomegaly  Tenderness: There is no abdominal tenderness  There is no guarding or rebound  Musculoskeletal:      Cervical back: Neck supple  Right lower leg: No edema  Left lower leg: No edema  Lymphadenopathy:      Cervical: No cervical adenopathy  Skin:     General: Skin is warm and dry  Findings: Wound present  Neurological:      Mental Status: She is alert and oriented to person, place, and time  Gait: Gait abnormal    Psychiatric:         Attention and Perception: Attention normal          Mood and Affect: Mood and affect normal          Speech: Speech normal          Behavior: Behavior is cooperative  Cognition and Memory: Cognition normal          Judgment: Judgment normal                    HBO Qualification & Assessment     Asad Little presents today for a consultation for HBO treatment  HBO Indication    Osteoradionecrosis    Osteoradionecrosis location Osteoradionecrosis of temporal bone    The patient has/had cancer and location  Yes oral    Radiation treatments were done in the 1960s and no records are available any longer  Radiation treatments ended at least 6 months previous to consideration of HBO   Yes    Anatomical site of radiation therapy left Neck and jaw    Radiation therapy records obtained No; no longer available  The patient has a degenerative process of Bone    HBO is being used  Pre-operatively `    There is a plan that includes pre and post-surgery HBO and HBO being used in adjunct to conventional Rx  Based on the information included herein, it is my determination that the patient has a medical necessity for HBOT and meets the conditions for the adjunctive RX wound care or comprehensive plan    Yes    Based on the patients diagnosis of Osteoradionecrosis HBO is medically necessary      Brief history of co-morbidities that may affect HBO treatment:    Previously treated with: No chemotherapy or medications which are contraindications to HBO    Patient reports s/s of No acute infections    Eyes    Patient has  No history of Myopia, Cataracts or Optic Nerve had cataract surgery IOL both eyes    Ears    Patient has a history of No ear abnormalities or conditions    Ears assessed for tympanic membrane or middle ear abnormalities yes    Patient instructed on how to clear ears and demonstrate proper technique: Yes    Right ear baseline TEEDS: Grade 0    Left ear baseline TEEDS: Grade 0    ENT consult for Myringotomy tube insertion due to No consult is needed    Cerumen removal performed:  N/A  ears clear of cerumen    Neurological    Patient whelan a history of seizures: No    Cardiovascular    Patient has a history of: No cardiac history requiring work-up prior to hyperbaric therapy    EKG ordered: No    Echocardiogram ordered: No    Cardiovascular consult ordered: No    Smoking  Social History     Tobacco Use   Smoking Status Never Smoker   Smokeless Tobacco Never Used       Respiratory    Patient has a history of pneumothorax: No    Patient has a history of: No respiratory conditions requiring further work-up prior to HBO    Lungs clear bilaterally: Yes    Chest x-ray ordered: Yes    Psychiatric    Patient has confinement anxiety: No    Patient education and consent    Informed Consent:  Devan Helms has no contraindications to hyperbaric oxygen therapy  We have discussed the risks (ear and sinus barotrauma, pneumothorax, visual refractory changes, oxygen-induced seizures, and claustrophobia) and potential benefits of hyperbaric oxygen therapy  Patient understands these risks along with the potential benefits and agrees to undergo hyperbaric oxygen therapy  I discussed with and educated the patient about the HBO procedure, smoking/drug/alcohol policy, and items not allowed in the hyperbaric chamber  Yes    Patient has been oriented to the HBO chamber  Clearing techniques have been reviewed  Written consent for HBO obtained: Yes      HBO treatment goal    Devan Helms is an excellent candidate for hyperbaric oxygen treatment as adjunctive therapy for the treatment of  ORN of the temporal bone   Radiation causes an obliterative endarteritis, subsequent hypoxic/hypovascular tissue and secondary fibrosis  Hyperbaric oxygen has been shown to be an effective treatment in radiation tissue injury by stimulating angiogenesis and inducing neovacularization in the hypoxic irradiated tissue  Serial hyperbaric oxygen treatments improve local host immune response and enhance the clearance of infection by improving the leukocyte oxidative killing of aerobic bacteria and the direct bactericidal activity against many anaerobic bacteria  Hyperbaric oxygen also stimulates tissue growth through fibroblast proliferation, collagen synthesis, stimulation of the release of vascular endothelial growth factors (VEGF), induction of the receptor appearance of platelet derived growth factors (PDGF) and angiogenesis  Osteogenesis                Total time spent today:  35 minutes  This includes reviewing the patient's chart, pertinent physician records including ENT office visits from January and February of 2022  Laboratory data was reviewed from July of 2021 and will need to have new studies ordered  Gracia Nicholas MD, CHT, CWS    Portions of the record may have been created with voice recognition software  Occasional wrong word or "sound alike" substitutions may have occurred due to the inherent limitations of voice recognition software  Read the chart carefully and recognize, using context, where substitutions have occurred

## 2022-02-25 ENCOUNTER — APPOINTMENT (OUTPATIENT)
Dept: LAB | Facility: CLINIC | Age: 82
End: 2022-02-25
Payer: MEDICARE

## 2022-02-25 DIAGNOSIS — Z79.4 TYPE 2 DIABETES MELLITUS WITH DIABETIC NEUROPATHY, WITH LONG-TERM CURRENT USE OF INSULIN (HCC): ICD-10-CM

## 2022-02-25 DIAGNOSIS — E87.1 HYPONATREMIA: ICD-10-CM

## 2022-02-25 DIAGNOSIS — E11.40 TYPE 2 DIABETES MELLITUS WITH DIABETIC NEUROPATHY, WITH LONG-TERM CURRENT USE OF INSULIN (HCC): ICD-10-CM

## 2022-02-25 LAB
ANION GAP SERPL CALCULATED.3IONS-SCNC: 8 MMOL/L (ref 4–13)
BUN SERPL-MCNC: 26 MG/DL (ref 5–25)
CALCIUM SERPL-MCNC: 9 MG/DL (ref 8.3–10.1)
CHLORIDE SERPL-SCNC: 101 MMOL/L (ref 100–108)
CO2 SERPL-SCNC: 25 MMOL/L (ref 21–32)
CREAT SERPL-MCNC: 0.86 MG/DL (ref 0.6–1.3)
GFR SERPL CREATININE-BSD FRML MDRD: 63 ML/MIN/1.73SQ M
GLUCOSE P FAST SERPL-MCNC: 271 MG/DL (ref 65–99)
POTASSIUM SERPL-SCNC: 4.4 MMOL/L (ref 3.5–5.3)
SODIUM SERPL-SCNC: 134 MMOL/L (ref 136–145)

## 2022-02-25 PROCEDURE — 80048 BASIC METABOLIC PNL TOTAL CA: CPT

## 2022-02-25 PROCEDURE — 36415 COLL VENOUS BLD VENIPUNCTURE: CPT

## 2022-02-28 ENCOUNTER — OFFICE VISIT (OUTPATIENT)
Dept: WOUND CARE | Facility: CLINIC | Age: 82
End: 2022-02-28
Payer: MEDICARE

## 2022-02-28 ENCOUNTER — APPOINTMENT (OUTPATIENT)
Dept: LAB | Facility: HOSPITAL | Age: 82
End: 2022-02-28
Payer: MEDICARE

## 2022-02-28 ENCOUNTER — HOSPITAL ENCOUNTER (OUTPATIENT)
Dept: CT IMAGING | Facility: HOSPITAL | Age: 82
Discharge: HOME | End: 2022-02-28
Payer: MEDICARE

## 2022-02-28 VITALS
DIASTOLIC BLOOD PRESSURE: 80 MMHG | HEART RATE: 84 BPM | TEMPERATURE: 98.4 F | RESPIRATION RATE: 18 BRPM | SYSTOLIC BLOOD PRESSURE: 148 MMHG
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
## 2022-02-28 DIAGNOSIS — M87.38 OSTEORADIONECROSIS OF TEMPORAL BONE (HCC): ICD-10-CM

## 2022-02-28 DIAGNOSIS — Z79.4 TYPE 2 DIABETES MELLITUS WITH DIABETIC NEUROPATHY, WITH LONG-TERM CURRENT USE OF INSULIN (HCC): ICD-10-CM
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
## 2022-02-28 DIAGNOSIS — E11.40 TYPE 2 DIABETES MELLITUS WITH DIABETIC NEUROPATHY, WITH LONG-TERM CURRENT USE OF INSULIN (HCC): ICD-10-CM

## 2022-02-28 DIAGNOSIS — L59.8 SOFT TISSUE RADIONECROSIS: Primary | ICD-10-CM

## 2022-02-28 LAB
GLUCOSE SERPL-MCNC: 388 MG/DL (ref 65–140)
GLUCOSE SERPL-MCNC: 494 MG/DL (ref 65–140)
GLUCOSE SERPL-MCNC: >500 MG/DL (ref 65–140)
SL AMB POCT GLUCOSE BLD: 494
SL AMB POCT GLUCOSE BLD: 500

## 2022-02-28 PROCEDURE — G0277 HBOT, FULL BODY CHAMBER, 30M: HCPCS | Performed by: FAMILY MEDICINE

## 2022-02-28 PROCEDURE — 82948 REAGENT STRIP/BLOOD GLUCOSE: CPT | Performed by: FAMILY MEDICINE

## 2022-02-28 PROCEDURE — G1004 CDSM NDSC: HCPCS

## 2022-02-28 PROCEDURE — 99183 HYPERBARIC OXYGEN THERAPY: CPT | Performed by: FAMILY MEDICINE

## 2022-02-28 PROCEDURE — 70480 CT ORBIT/EAR/FOSSA W/O DYE: CPT

## 2022-02-28 NOTE — PROGRESS NOTES
HBO Treatment Course Details       Treatment Notes: Treatment tolerated well  No complaints of pain or discomfort from the dive  Patient educated by MD to take her insulin prior to dive tomorrow  Treatment Course Number: 1  Total Treatments Ordered:  30     Diagnosis:   1  Soft tissue radionecrosis     2  Osteoradionecrosis of temporal bone (HCC)  Hyperbaric oxygen thearpy   3  Type 2 diabetes mellitus with diabetic neuropathy, with long-term current use of insulin (HCC)  POCT blood glucose    POCT blood glucose       HBO Treatment Details:  In-Patient Visit: no  Treatment Length:90 Minutes(Minutes)  Chamber #: Hard sided Monoplace Chamber    Pre-Treatment details:  Pre-treatment protocol Treatment Protocol: 2 5 MIKE X 90 minutes w/ 100% oxygen, two 5 minute air breaks  Left ear clear?: yes  Right ear clear?: yes  Left ear intact?: yes  Right ear intact?: yes                    Left ear TEED scale: Grade 0  Right ear TEED scale: Grade 0   Pretreatment heart and lung assessment: Pretreatment heart and lung auscltation unremarkable  Patient cleared for HBOT (Hyperglycemia noted  Plan to address is has been made )     Treatment details:  MIKE Rate: 2 5  Started Compression: 0839  Reached Compression: 0854  Total Compression Time: 15 (Minutes)  Total Holding Time: 100 (Minutes)  Started Decompression: 1034  Reached Surface: 1049  Total Decompression Time: 15 (Minutes)  Total Airbreaks: 10 (Minutes)  Total Time of Treatment: 120 (Minutes)  Symptoms Noted During Treatment: None (Minutes)    Post treatment details:  Left ear clear?: yes  Right ear clear?: yes  Left ears intact?: yes  Right ears intact?: yes                    Left ear TEED scale: Grade 0  Right ear TEED scale: Grade 0  Post treatment heart and lung assessment: Post treatment heart and lung auscltation unremarkable  Patient cleared for discharge  Tolerated treatment well  HBO Supervision: Hbo supervised  Tolerated well  No complaints            Vital Signs:  HBO Glucose Reference Range: 100-350 mg/dl   Pre-Treatment Post-Treatment   Time vitals are taken: 0810 Time vitals are taken: 1100   Blood Pressure: 148/80 Blood Pressure: 156/87   Pulse: 84 Pulse: 78   Resp: 18 Resp: 16   Temp: 98 4 °F (36 9 °C) Temp: 97 6 °F (36 4 °C)   Pre-Inspection Glucose reading: (!) 500 Post-Inspection Glucose readin       No Known Allergies  Patient Active Problem List    Diagnosis Date Noted    Primary osteoarthritis of right shoulder 2021    Right knee pain 2020    Acute hip pain, right 2020    Ambulatory dysfunction     Primary osteoarthritis of both knees 2020    Hypophosphatemia 2020    Elevated vitamin B12 level 2020    Anemia 2020    Hyponatremia 2020    Closed intertrochanteric fracture of right femur ([facility] [address] ) 2020    Other specified hypothyroidism 2015    Mixed hyperlipidemia 2014    Essential hypertension 10/10/2013    Type 2 diabetes mellitus with diabetic neuropathy, with long-term current use of insulin ([facility] [address] ) 2008     First HBO treatment today  Patient had hyperglycemia and we discussed her insulin usage and I also sent a note to her endocrinologist and PCP  Explained that blood sugar must be improving to continue HBO  She did not take her usual insulin this morning  which is on a sliding scale  She will restart her insulin as per her sliding scale orders

## 2022-02-28 NOTE — LETTER
2022    Dear Dr Nirali Pozo,    Your patient, Lucie Pan,  1940, is being seen in the [facility] in [place] for treatment of soft tissue radiation injury and osteoradionecrosis of the temporal bone on the left side  As you know she is on a sliding scale for insulin  Her most recent A1C was 8 0 on 2022  However, recent random glucose was 487 on 2022 and repeat fasting was 271  Today before starting her first HBO treatment, random glucose was 500 and repeat 497  Continuing HBO treatments with BS in these ranges will be ineffective until her control is improved  Therefore, I will suspend her treatments until things improve  I am asking if your office could contact her to try to further investigate what could be done to control her BS  Thank you for your assistance in her treatments  Pasha Yuliya Jaime MD, [facility], [facility]  [id number] [address], [address] [place] [address]    Ph:   Cell:      CC: Sarai Silvestre MD;  Moshe Corona MD

## 2022-03-01 ENCOUNTER — OFFICE VISIT (OUTPATIENT)
Dept: WOUND CARE | Facility: CLINIC | Age: 82
End: 2022-03-01
Payer: MEDICARE

## 2022-03-01 DIAGNOSIS — L59.8 SOFT TISSUE RADIONECROSIS: Primary | ICD-10-CM
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
## 2022-03-01 DIAGNOSIS — M87.38 OSTEORADIONECROSIS OF TEMPORAL BONE (HCC): ICD-10-CM

## 2022-03-01 LAB
GLUCOSE SERPL-MCNC: 135 MG/DL (ref 65–140)
GLUCOSE SERPL-MCNC: 137 MG/DL (ref 65–140)
GLUCOSE SERPL-MCNC: 154 MG/DL (ref 65–140)

## 2022-03-01 PROCEDURE — 82948 REAGENT STRIP/BLOOD GLUCOSE: CPT | Performed by: FAMILY MEDICINE

## 2022-03-01 PROCEDURE — 99183 HYPERBARIC OXYGEN THERAPY: CPT | Performed by: FAMILY MEDICINE

## 2022-03-01 PROCEDURE — G0277 HBOT, FULL BODY CHAMBER, 30M: HCPCS | Performed by: FAMILY MEDICINE

## 2022-03-01 NOTE — PROGRESS NOTES
HBO Treatment Course Details       Treatment Notes: Compression started at 0820, SN noted patient holding head and left ear  SN communicated with patient  She stated she was having pain and was unable to clear her left ear  Compression suspended at 7 6 PSI and provider notified  Patient instructed on several different techniques to clear ears  Patient stated that pain was gone and partially clear, compression resumed as per providers instruction  Compression suspended at 0831 at 10 PSI due to c/o of pain in left ear  Instructed patient how to clear left ear  Patient stated that pain was gone and partially clear  Compression resumed at 0833 as per provider  Patient started to complain of pain at 0835 at 12 PSI-treatment aborted  Provider made aware  Treatment Course Number: 2  Total Treatments Ordered:  30     Diagnosis:   1  Soft tissue radionecrosis     2  Osteoradionecrosis of temporal bone ([address] )         HBO Treatment Details:  In-Patient Visit: NO  Treatment Length:120 Minutes(Minutes)  Chamber #: Hard sided Monoplace Chamber    Pre-Treatment details:  Pre-treatment protocol Treatment Protocol: 2 5 MIKE X 90 minutes w/ 100% oxygen, two 5 minute air breaks  Left ear clear?: yes (Partial cerumen but able to see part of TM)  Right ear clear?: yes  Left ear intact?: yes  Right ear intact?: yes                    Left ear TEED scale: Grade 0  Right ear TEED scale: Grade 0   Pretreatment heart and lung assessment: Pretreatment heart and lung auscltation unremarkable   Patient cleared for HBOT     Treatment details:  MIKE Rate: 2 5  Started Compression: 0820  Reached Compression:    Total Compression Time:   (Minutes)  Total Holding Time:   (Minutes)  Started Decompression: 0835  Reached Surface: 0841  Total Decompression Time: 6 (Minutes)  Total Airbreaks:   (Minutes)  Total Time of Treatment:   (Minutes)  Symptoms Noted During Treatment: Other (Comment) (Pain in left ear, unable to clear) (Minutes)    Post treatment details:  Left ear clear?: yes (No erythema noted)  Right ear clear?: yes  Left ears intact?: yes  Right ears intact?: yes                    Left ear TEED scale: Grade 0  Right ear TEED scale: Grade 0  Post treatment heart and lung assessment: Post treatment heart and lung auscltation unremarkable  Patient cleared for discharge  Tolerated treatment well  HBO Supervision: Hbo supervised  Patient unable to clear her left ear despite various maneuvers to assist   Treatment terminated  Advise Afrin nasal spray one hour prior to treatment  Vital Signs:  [facility] Glucose Reference Range: 100-350 mg/dl   Pre-Treatment Post-Treatment   Time vitals are taken: 0755 Time vitals are taken: 0841   Blood Pressure: 160/85 Blood Pressure: 168/71   Pulse: 98 Pulse: 84   Resp: 20 Resp: 20   Temp: 97 7 °F (36 5 °C) Temp: 97 6 °F (36 4 °C)   Pre-Inspection Glucose readin Post-Inspection Glucose readin       No Known Allergies  Patient Active Problem List    Diagnosis Date Noted    Primary osteoarthritis of right shoulder 2021    Right knee pain 2020    Acute hip pain, right 2020    Ambulatory dysfunction     Primary osteoarthritis of both knees 2020    Hypophosphatemia 2020    Elevated vitamin B12 level 2020    Anemia 2020    Hyponatremia 2020    Closed intertrochanteric fracture of right femur ([address] ) 2020    Other specified hypothyroidism 2015    Mixed hyperlipidemia 2014    Essential hypertension 10/10/2013    Type 2 diabetes mellitus with diabetic neuropathy, with long-term current use of insulin ([address] ) 2008     Continue with current steroid nasal spray  Afrin nasal spray this afternoon and one hour prior to treatment tomorrow morning  If ineffective, patient may need to see her ENT for possible myringotomy tube

## 2022-03-02 ENCOUNTER — OFFICE VISIT (OUTPATIENT)
Dept: WOUND CARE | Facility: CLINIC | Age: 82
End: 2022-03-02
Payer: MEDICARE

## 2022-03-02 VITALS
HEART RATE: 86 BPM | RESPIRATION RATE: 18 BRPM | TEMPERATURE: 98.2 F | SYSTOLIC BLOOD PRESSURE: 147 MMHG | DIASTOLIC BLOOD PRESSURE: 83 MMHG
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
## 2022-03-02 DIAGNOSIS — Z79.4 TYPE 2 DIABETES MELLITUS WITH DIABETIC NEUROPATHY, WITH LONG-TERM CURRENT USE OF INSULIN (HCC): ICD-10-CM
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
## 2022-03-02 DIAGNOSIS — M87.38 OSTEORADIONECROSIS OF TEMPORAL BONE (HCC): ICD-10-CM

## 2022-03-02 DIAGNOSIS — E11.40 TYPE 2 DIABETES MELLITUS WITH DIABETIC NEUROPATHY, WITH LONG-TERM CURRENT USE OF INSULIN (HCC): ICD-10-CM

## 2022-03-02 DIAGNOSIS — L59.8 SOFT TISSUE RADIONECROSIS: Primary | ICD-10-CM

## 2022-03-02 LAB
GLUCOSE SERPL-MCNC: 262 MG/DL (ref 65–140)
GLUCOSE SERPL-MCNC: 310 MG/DL (ref 65–140)
SL AMB POCT GLUCOSE BLD: 262
SL AMB POCT GLUCOSE BLD: 310

## 2022-03-02 PROCEDURE — G0277 HBOT, FULL BODY CHAMBER, 30M: HCPCS | Performed by: FAMILY MEDICINE

## 2022-03-02 PROCEDURE — NC001 PR NO CHARGE: Performed by: FAMILY MEDICINE

## 2022-03-02 PROCEDURE — 82948 REAGENT STRIP/BLOOD GLUCOSE: CPT | Performed by: FAMILY MEDICINE

## 2022-03-02 PROCEDURE — 99183 HYPERBARIC OXYGEN THERAPY: CPT | Performed by: FAMILY MEDICINE

## 2022-03-02 NOTE — PROGRESS NOTES
HBO Treatment Course Details       Treatment Notes: Treatment tolerated well  No complaints of pain or discomfort from dive     Treatment Course Number: 2 (3/1/22 treatment was aborted)  Total Treatments Ordered:  30     Diagnosis:   1  Soft tissue radionecrosis     2  Osteoradionecrosis of temporal bone (HCC)  Hyperbaric oxygen thearpy   3  Type 2 diabetes mellitus with diabetic neuropathy, with long-term current use of insulin (HCC)  POCT blood glucose    POCT blood glucose       HBO Treatment Details:  In-Patient Visit: no  Treatment Length:90 Minutes(Minutes)  Chamber #: Hard sided Monoplace Chamber    Pre-Treatment details:  Pre-treatment protocol Treatment Protocol: 2 5 MIKE X 90 minutes w/ 100% oxygen, two 5 minute air breaks                                Left ear TEED scale: Grade 0  Right ear TEED scale: Grade 0   Pretreatment heart and lung assessment: Pretreatment heart and lung auscltation unremarkable  Patient cleared for HBOT     Treatment details:  MIKE Rate: 2 5  Started Compression: 0822  Reached Compression: 0838  Total Compression Time: 16 (Minutes)  Total Holding Time: 100 (Minutes)  Started Decompression: 1018  Reached Surface: 8047  Total Decompression Time: 16 (Minutes)  Total Airbreaks: 10 (Minutes)  Total Time of Treatment: 122 (Minutes)  Symptoms Noted During Treatment: None (Minutes)    Post treatment details:                                Left ear TEED scale: Grade 0  Right ear TEED scale: Grade I  Post treatment heart and lung assessment: Post treatment heart and lung auscltation unremarkable  Patient cleared for discharge  Tolerated treatment well               Vital Signs:  HBO Glucose Reference Range: 100-350 mg/dl   Pre-Treatment Post-Treatment   Time vitals are taken: 0800 Time vitals are taken: 1040   Blood Pressure: 147/83 Blood Pressure: 154/86   Pulse: 86 Pulse: 77   Resp: 18 Resp: 18   Temp: 98 2 °F (36 8 °C) Temp: 98 °F (36 7 °C)   Pre-Inspection Glucose readin Post-Inspection Glucose readin       No Known Allergies  Patient Active Problem List    Diagnosis Date Noted    Soft tissue radionecrosis 2022    Osteoradionecrosis of temporal bone [facility] ) 2022    Primary osteoarthritis of right shoulder 2021    Right knee pain 2020    Acute hip pain, right 2020    Ambulatory dysfunction     Primary osteoarthritis of both knees 2020    Hypophosphatemia 2020    Elevated vitamin B12 level 2020    Anemia 2020    Hyponatremia 2020    Closed intertrochanteric fracture of right femur ([facility] ) 2020    Other specified hypothyroidism 2015    Mixed hyperlipidemia 2014    Essential hypertension 10/10/2013    Type 2 diabetes mellitus with diabetic neuropathy, with long-term current use of insulin ([facility] [address] ) 2008     No orders of the defined types were placed in this encounter

## 2022-03-03 ENCOUNTER — OFFICE VISIT (OUTPATIENT)
Dept: WOUND CARE | Facility: CLINIC | Age: 82
End: 2022-03-03
Payer: MEDICARE

## 2022-03-03 VITALS
SYSTOLIC BLOOD PRESSURE: 150 MMHG | HEART RATE: 100 BPM | RESPIRATION RATE: 20 BRPM | TEMPERATURE: 98.6 F | DIASTOLIC BLOOD PRESSURE: 84 MMHG

## 2022-03-03 DIAGNOSIS — M87.38 OSTEORADIONECROSIS OF TEMPORAL BONE (HCC): ICD-10-CM
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
## 2022-03-03 DIAGNOSIS — Z79.4 TYPE 2 DIABETES MELLITUS WITH DIABETIC NEUROPATHY, WITH LONG-TERM CURRENT USE OF INSULIN (HCC): ICD-10-CM

## 2022-03-03 DIAGNOSIS — E11.40 TYPE 2 DIABETES MELLITUS WITH DIABETIC NEUROPATHY, WITH LONG-TERM CURRENT USE OF INSULIN (HCC): ICD-10-CM

## 2022-03-03 LAB
GLUCOSE SERPL-MCNC: 288 MG/DL (ref 65–140)
GLUCOSE SERPL-MCNC: 291 MG/DL (ref 65–140)
SL AMB POCT GLUCOSE BLD: 288
SL AMB POCT GLUCOSE BLD: 291

## 2022-03-03 PROCEDURE — 99183 HYPERBARIC OXYGEN THERAPY: CPT | Performed by: SPECIALIST

## 2022-03-03 PROCEDURE — G0277 HBOT, FULL BODY CHAMBER, 30M: HCPCS | Performed by: SPECIALIST

## 2022-03-03 PROCEDURE — NC001 PR NO CHARGE: Performed by: SPECIALIST

## 2022-03-03 PROCEDURE — 82948 REAGENT STRIP/BLOOD GLUCOSE: CPT | Performed by: SPECIALIST

## 2022-03-03 NOTE — PROGRESS NOTES
HBO Treatment Course Details       Treatment Notes: Treatment tolerated well  No complaints of pain or discomfort     Treatment Course Number: 3  Total Treatments Ordered:  30     Diagnosis:   1  Osteoradionecrosis of temporal bone (HCC)  Hyperbaric oxygen thearpy   2  Type 2 diabetes mellitus with diabetic neuropathy, with long-term current use of insulin (HCC)  POCT blood glucose    POCT blood glucose       HBO Treatment Details:  In-Patient Visit: no  Treatment Length:90 Minutes(Minutes)  Chamber #: Hard sided Monoplace Chamber    Pre-Treatment details:  Pre-treatment protocol Treatment Protocol: 2 5 MIKE X 90 minutes w/ 100% oxygen, two 5 minute air breaks  Left ear clear?: yes  Right ear clear?: yes  Left ear intact?: yes  Right ear intact?: yes  Left ear color: translucent  Right ear color: translucent  PE Tubes present, Left ear?: no  PE Tubes present, Right ear?: no  Left ear irrigated?: no  Right ear irrigated?: no  Left ear TEED scale: Grade 0  Right ear TEED scale: Grade 0   Pretreatment heart and lung assessment: Pretreatment heart and lung auscltation unremarkable  Patient cleared for HBOT     Treatment details:  MIKE Rate: 2 5  Started Compression: 1151  Reached Compression: 1205  Total Compression Time: 14 (Minutes)  Total Holding Time: 100 (Minutes)  Started Decompression: 1345  Reached Surface: 5557  Total Decompression Time: 14 (Minutes)  Total Airbreaks: 10 (Minutes)  Total Time of Treatment: 118 (Minutes)  Symptoms Noted During Treatment: None (Minutes)    Post treatment details:  Left ear clear?: yes  Right ear clear?: yes  Left ears intact?: yes  Right ears intact?: yes  Left ear color: translucent  Right ear color: translucent  PE Tubes present, Left ear?: no  PE Tubes present, Right ear?: no        Left ear TEED scale: Grade 0  Right ear TEED scale: Grade 0  Post treatment heart and lung assessment: Post treatment heart and lung auscltation unremarkable  Patient cleared for discharge  Tolerated treatment well  HBO Supervision: I supervised this treatment and was immediately available as needed          Vital Signs:  HBO Glucose Reference Range: 100-350 mg/dl   Pre-Treatment Post-Treatment   Time vitals are taken: 1145 Time vitals are taken: 1400   Blood Pressure: 150/84 Blood Pressure: 166/80   Pulse: 100 Pulse: 70   Resp: 20 Resp: 18   Temp: 98 6 °F (37 °C) Temp: 98 °F (36 7 °C)   Pre-Inspection Glucose readin Post-Inspection Glucose readin       No Known Allergies  Patient Active Problem List    Diagnosis Date Noted    Soft tissue radionecrosis 2022    Osteoradionecrosis of temporal bone ([address] ) 2022    Primary osteoarthritis of right shoulder 2021    Right knee pain 2020    Acute hip pain, right 2020    Ambulatory dysfunction     Primary osteoarthritis of both knees 2020    Hypophosphatemia 2020    Elevated vitamin B12 level 2020    Anemia 2020    Hyponatremia 2020    Closed intertrochanteric fracture of right femur ([address] ) 2020    Other specified hypothyroidism 2015    Mixed hyperlipidemia 2014    Essential hypertension 10/10/2013    Type 2 diabetes mellitus with diabetic neuropathy, with long-term current use of insulin ([address] ) 2008     No orders of the defined types were placed in this encounter

## 2022-03-04 ENCOUNTER — OFFICE VISIT (OUTPATIENT)
Dept: WOUND CARE | Facility: CLINIC | Age: 82
End: 2022-03-04
Payer: MEDICARE

## 2022-03-04 VITALS
DIASTOLIC BLOOD PRESSURE: 70 MMHG | TEMPERATURE: 97.5 F | RESPIRATION RATE: 20 BRPM | HEART RATE: 71 BPM | SYSTOLIC BLOOD PRESSURE: 152 MMHG

## 2022-03-04 DIAGNOSIS — L59.8 SOFT TISSUE RADIONECROSIS: Primary | ICD-10-CM
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
## 2022-03-04 DIAGNOSIS — M87.38 OSTEORADIONECROSIS OF TEMPORAL BONE (HCC): ICD-10-CM

## 2022-03-04 LAB
GLUCOSE SERPL-MCNC: 106 MG/DL (ref 65–140)
GLUCOSE SERPL-MCNC: 116 MG/DL (ref 65–140)
GLUCOSE SERPL-MCNC: 154 MG/DL (ref 65–140)
GLUCOSE SERPL-MCNC: 224 MG/DL (ref 65–140)

## 2022-03-04 PROCEDURE — 99183 HYPERBARIC OXYGEN THERAPY: CPT | Performed by: FAMILY MEDICINE

## 2022-03-04 PROCEDURE — G0277 HBOT, FULL BODY CHAMBER, 30M: HCPCS | Performed by: FAMILY MEDICINE

## 2022-03-04 PROCEDURE — 82948 REAGENT STRIP/BLOOD GLUCOSE: CPT | Performed by: FAMILY MEDICINE

## 2022-03-04 NOTE — PROGRESS NOTES
HBO Treatment Course Details       Treatment Notes: Treatment tolerated well  No complaints of pain or discomfort  Afrin used prior to arrival     Treatment Course Number: 4  Total Treatments Ordered:  30     Diagnosis:   1  Soft tissue radionecrosis     2  Osteoradionecrosis of temporal bone (HCC)  Hyperbaric oxygen thearpy       HBO Treatment Details:  In-Patient Visit: no  Treatment Length:90 Minutes(Minutes)  Chamber #: Hard sided Monoplace Chamber    Pre-Treatment details:  Pre-treatment protocol Treatment Protocol: 2 5 MIKE X 90 minutes w/ 100% oxygen, two 5 minute air breaks                                Left ear TEED scale: Grade 0  Right ear TEED scale: Grade 0   Pretreatment heart and lung assessment: Pretreatment heart and lung auscltation unremarkable  Patient cleared for HBOT     Treatment details:  MIKE Rate: 2 5  Started Compression: 0842  Reached Compression: 0857  Total Compression Time: 15 (Minutes)  Total Holding Time: 100 (Minutes)  Started Decompression: 1037  Reached Surface: 1052  Total Decompression Time: 15 (Minutes)  Total Airbreaks: 10 (Minutes)  Total Time of Treatment: 120 (Minutes)  Symptoms Noted During Treatment: None (Minutes)    Post treatment details:                                Left ear TEED scale: Grade 0  Right ear TEED scale: Grade 0  Post treatment heart and lung assessment: Post treatment heart and lung auscltation unremarkable  Patient cleared for discharge  Tolerated treatment well               Vital Signs:  HBO Glucose Reference Range: 100-350 mg/dl   Pre-Treatment Post-Treatment   Time vitals are taken: 0815 Time vitals are taken: 1100   Blood Pressure: 152/70 Blood Pressure: 147/78   Pulse: 71 Pulse: 74   Resp: 20 Resp: 16   Temp: 97 5 °F (36 4 °C) Temp: 97 5 °F (36 4 °C)   Pre-Inspection Glucose reading: (!) 106 (Orrange given) Post-Inspection Glucose readin       No Known Allergies  Patient Active Problem List    Diagnosis Date Noted    Soft tissue radionecrosis 03/02/2022    Osteoradionecrosis of temporal bone ([address] ) 03/02/2022    Primary osteoarthritis of right shoulder 04/16/2021    Right knee pain 11/06/2020    Acute hip pain, right 11/05/2020    Ambulatory dysfunction     Primary osteoarthritis of both knees 08/07/2020    Hypophosphatemia 06/04/2020    Elevated vitamin B12 level 06/01/2020    Anemia 06/01/2020    Hyponatremia 05/27/2020    Closed intertrochanteric fracture of right femur ([facility] [address] ) 05/26/2020    Other specified hypothyroidism 08/14/2015    Mixed hyperlipidemia 12/23/2014    Essential hypertension 10/10/2013    Type 2 diabetes mellitus with diabetic neuropathy, with long-term current use of insulin ([facility] [address] ) 05/21/2008     No orders of the defined types were placed in this encounter

## 2022-03-07 ENCOUNTER — OFFICE VISIT (OUTPATIENT)
Dept: WOUND CARE | Facility: CLINIC | Age: 82
End: 2022-03-07
Payer: MEDICARE

## 2022-03-07 VITALS
HEART RATE: 95 BPM | TEMPERATURE: 98.1 F | RESPIRATION RATE: 20 BRPM | DIASTOLIC BLOOD PRESSURE: 80 MMHG | SYSTOLIC BLOOD PRESSURE: 154 MMHG

## 2022-03-07 DIAGNOSIS — E11.65 TYPE 2 DIABETES MELLITUS WITH HYPERGLYCEMIA, WITH LONG-TERM CURRENT USE OF INSULIN (HCC): Primary | ICD-10-CM

## 2022-03-07 DIAGNOSIS — Z79.4 TYPE 2 DIABETES MELLITUS WITH HYPERGLYCEMIA, WITH LONG-TERM CURRENT USE OF INSULIN (HCC): Primary | ICD-10-CM

## 2022-03-07 LAB
GLUCOSE SERPL-MCNC: >500 MG/DL (ref 65–140)
GLUCOSE SERPL-MCNC: >500 MG/DL (ref 65–140)

## 2022-03-07 PROCEDURE — 82948 REAGENT STRIP/BLOOD GLUCOSE: CPT | Performed by: FAMILY MEDICINE

## 2022-03-07 PROCEDURE — NC001 PR NO CHARGE: Performed by: FAMILY MEDICINE

## 2022-03-07 PROCEDURE — 99212 OFFICE O/P EST SF 10 MIN: CPT | Performed by: FAMILY MEDICINE

## 2022-03-07 NOTE — PROGRESS NOTES
Patients sugar registered over 500  Md was made aware and patient stated she took he sugar when she woke up and it was over 440 then she ate and then she took 1U of insulin  MD did not want to dive patient today  She was told that she needed to contact her endocrinologist because her sugars are not being managed and she needs to take her insulin prior to dives  Patient went into locker room took her sugar with her glucometer and it came back as 575 and then she gave herself insulin  She has no complaints of not feeling well and is awaiting transport

## 2022-03-07 NOTE — PROGRESS NOTES
3/7/22:  Patient is here for HBO  Her blood sugar was> 500  She states that it was high this morning, ate normal breakfast and only gave herself 1 unit of insulin  Really did have a good explanation as to why only 1 unit  Repeat blood sugar here was> 500 again  I told her that she needs to contact her endocrinologist to try to get this in better control  Her endocrinologist is Dr Rochelle Urbina  Patient was not treated today and instructed to go home, recheck her blood sugar and give herself and appropriate dose of insulin that she calculates

## 2022-03-23 NOTE — PRE-PROCEDURE INSTRUCTIONS
Pre-Surgery Instructions:   Medication Instructions    Ascorbic Acid (vitamin C) 100 MG tablet Last dose 3/22    aspirin (ECOTRIN LOW STRENGTH) 81 mg EC tablet msg to office    calcium carbonate (OS-FELICE) 600 MG tablet last dose 3/22    Insulin Aspart (NOVOLOG SC) Hold DOP    Insulin Detemir (LEVEMIR SC) takes at night    ipratropium (ATROVENT) 0 03 % nasal spray ok to use DOP    levothyroxine 100 mcg tablet ok to take DOP    lisinopril (ZESTRIL) 5 mg tablet Hold DOP         Multiple Vitamin (multivitamin) capsule last dose 3/22    mupirocin (BACTROBAN) 2 % ointment hold DOP    traMADol (ULTRAM) 50 mg tablet ok to take DOP prn     Pt verbalizes understanding of the following:    - Bathing instructions  - No lotions, powders, sprays, deodorant, jewelry, or make-up    - NPO after MN  - Avoid OTC non-directed Vit/ Suppl/ Herbals 7 days prior to surgery to ensure no drug interactions with perioperative surgical/ anesthetic meds  - Avoid NSAIDs 3 days prior  - Avoid ASA containing products 5 days prior  - Continue statin medication up through and including the day of surgery  - Insulin Management: If on Insulin, advised to call PCP for explicit instructions    - Bring list of meds with last dose noted  - [employer] cards & photo id

## 2022-03-24 ENCOUNTER — ANESTHESIA EVENT (OUTPATIENT)
Dept: PERIOP | Facility: HOSPITAL | Age: 82
End: 2022-03-24
Payer: MEDICARE

## 2022-03-28 ENCOUNTER — HOSPITAL ENCOUNTER (OUTPATIENT)
Facility: HOSPITAL | Age: 82
Setting detail: OUTPATIENT SURGERY
Discharge: HOME | End: 2022-03-28
Attending: OTOLARYNGOLOGY | Admitting: OTOLARYNGOLOGY
Payer: MEDICARE

## 2022-03-28 ENCOUNTER — ANESTHESIA (OUTPATIENT)
Dept: PERIOP | Facility: HOSPITAL | Age: 82
End: 2022-03-28
Payer: MEDICARE

## 2022-03-28 VITALS
OXYGEN SATURATION: 97 % | TEMPERATURE: 97.5 F | RESPIRATION RATE: 16 BRPM | WEIGHT: 120.59 LBS | HEART RATE: 87 BPM | SYSTOLIC BLOOD PRESSURE: 114 MMHG | BODY MASS INDEX: 22.77 KG/M2 | HEIGHT: 61 IN | DIASTOLIC BLOOD PRESSURE: 60 MMHG

## 2022-03-28 LAB
GLUCOSE SERPL-MCNC: 205 MG/DL (ref 65–140)
GLUCOSE SERPL-MCNC: 221 MG/DL (ref 65–140)
GLUCOSE SERPL-MCNC: 266 MG/DL (ref 65–140)
GLUCOSE SERPL-MCNC: 285 MG/DL (ref 65–140)

## 2022-03-28 PROCEDURE — 69501 MASTOIDECTOMY: CPT | Performed by: OTOLARYNGOLOGY

## 2022-03-28 PROCEDURE — 82948 REAGENT STRIP/BLOOD GLUCOSE: CPT

## 2022-03-28 PROCEDURE — 14020 TIS TRNFR S/A/L 10 SQ CM/<: CPT | Performed by: OTOLARYNGOLOGY

## 2022-03-28 RX ORDER — MAGNESIUM HYDROXIDE 1200 MG/15ML
LIQUID ORAL AS NEEDED
Status: DISCONTINUED | OUTPATIENT
Start: 2022-03-28 | End: 2022-03-28 | Stop reason: HOSPADM

## 2022-03-28 RX ORDER — METHOCARBAMOL 500 MG/1
500 TABLET, FILM COATED ORAL DAILY
COMMUNITY

## 2022-03-28 RX ORDER — FENTANYL CITRATE 50 UG/ML
INJECTION, SOLUTION INTRAMUSCULAR; INTRAVENOUS AS NEEDED
Status: DISCONTINUED | OUTPATIENT
Start: 2022-03-28 | End: 2022-03-28

## 2022-03-28 RX ORDER — LIDOCAINE HYDROCHLORIDE 20 MG/ML
INJECTION, SOLUTION EPIDURAL; INFILTRATION; INTRACAUDAL; PERINEURAL AS NEEDED
Status: DISCONTINUED | OUTPATIENT
Start: 2022-03-28 | End: 2022-03-28

## 2022-03-28 RX ORDER — FENTANYL CITRATE/PF 50 MCG/ML
25 SYRINGE (ML) INJECTION
Status: DISCONTINUED | OUTPATIENT
Start: 2022-03-28 | End: 2022-03-28 | Stop reason: HOSPADM

## 2022-03-28 RX ORDER — ACETAMINOPHEN 325 MG/1
650 TABLET ORAL EVERY 6 HOURS PRN
Status: DISCONTINUED | OUTPATIENT
Start: 2022-03-28 | End: 2022-03-28 | Stop reason: HOSPADM

## 2022-03-28 RX ORDER — HYDROMORPHONE HCL/PF 1 MG/ML
0.5 SYRINGE (ML) INJECTION
Status: DISCONTINUED | OUTPATIENT
Start: 2022-03-28 | End: 2022-03-28 | Stop reason: HOSPADM

## 2022-03-28 RX ORDER — ONDANSETRON 2 MG/ML
INJECTION INTRAMUSCULAR; INTRAVENOUS AS NEEDED
Status: DISCONTINUED | OUTPATIENT
Start: 2022-03-28 | End: 2022-03-28

## 2022-03-28 RX ORDER — GINSENG 100 MG
CAPSULE ORAL AS NEEDED
Status: DISCONTINUED | OUTPATIENT
Start: 2022-03-28 | End: 2022-03-28 | Stop reason: HOSPADM

## 2022-03-28 RX ORDER — PROPOFOL 10 MG/ML
INJECTION, EMULSION INTRAVENOUS AS NEEDED
Status: DISCONTINUED | OUTPATIENT
Start: 2022-03-28 | End: 2022-03-28

## 2022-03-28 RX ORDER — GABAPENTIN 300 MG/1
300 CAPSULE ORAL 3 TIMES DAILY
COMMUNITY

## 2022-03-28 RX ORDER — LIDOCAINE HYDROCHLORIDE AND EPINEPHRINE 10; 10 MG/ML; UG/ML
INJECTION, SOLUTION INFILTRATION; PERINEURAL AS NEEDED
Status: DISCONTINUED | OUTPATIENT
Start: 2022-03-28 | End: 2022-03-28 | Stop reason: HOSPADM

## 2022-03-28 RX ORDER — ONDANSETRON 2 MG/ML
4 INJECTION INTRAMUSCULAR; INTRAVENOUS ONCE AS NEEDED
Status: DISCONTINUED | OUTPATIENT
Start: 2022-03-28 | End: 2022-03-28 | Stop reason: HOSPADM

## 2022-03-28 RX ORDER — SODIUM CHLORIDE, SODIUM LACTATE, POTASSIUM CHLORIDE, CALCIUM CHLORIDE 600; 310; 30; 20 MG/100ML; MG/100ML; MG/100ML; MG/100ML
125 INJECTION, SOLUTION INTRAVENOUS CONTINUOUS
Status: DISCONTINUED | OUTPATIENT
Start: 2022-03-28 | End: 2022-03-28 | Stop reason: HOSPADM

## 2022-03-28 RX ADMIN — FENTANYL CITRATE 25 MCG: 50 INJECTION INTRAMUSCULAR; INTRAVENOUS at 13:27

## 2022-03-28 RX ADMIN — INSULIN HUMAN 6 UNITS: 100 INJECTION, SOLUTION PARENTERAL at 15:17

## 2022-03-28 RX ADMIN — SODIUM CHLORIDE, SODIUM LACTATE, POTASSIUM CHLORIDE, AND CALCIUM CHLORIDE 125 ML/HR: .6; .31; .03; .02 INJECTION, SOLUTION INTRAVENOUS at 15:53

## 2022-03-28 RX ADMIN — SODIUM CHLORIDE, SODIUM LACTATE, POTASSIUM CHLORIDE, AND CALCIUM CHLORIDE 125 ML/HR: .6; .31; .03; .02 INJECTION, SOLUTION INTRAVENOUS at 12:29

## 2022-03-28 RX ADMIN — INSULIN HUMAN 6 UNITS: 100 INJECTION, SOLUTION PARENTERAL at 14:40

## 2022-03-28 RX ADMIN — ONDANSETRON 4 MG: 2 INJECTION INTRAMUSCULAR; INTRAVENOUS at 13:46

## 2022-03-28 RX ADMIN — FENTANYL CITRATE 25 MCG: 50 INJECTION INTRAMUSCULAR; INTRAVENOUS at 13:09

## 2022-03-28 RX ADMIN — FENTANYL CITRATE 25 MCG: 50 INJECTION INTRAMUSCULAR; INTRAVENOUS at 13:01

## 2022-03-28 RX ADMIN — LIDOCAINE HYDROCHLORIDE 60 MG: 20 INJECTION, SOLUTION EPIDURAL; INFILTRATION; INTRACAUDAL; PERINEURAL at 13:02

## 2022-03-28 RX ADMIN — FENTANYL CITRATE 25 MCG: 50 INJECTION INTRAMUSCULAR; INTRAVENOUS at 13:38

## 2022-03-28 RX ADMIN — PROPOFOL 150 MG: 10 INJECTION, EMULSION INTRAVENOUS at 13:02

## 2022-03-28 NOTE — H&P
Surgery Pre-op note/Updated History and Physical    Date of service: 3/28/650278:45 PM      No changes from most recent clinic H&P note  Patient to OR for Left wound revision  Vitals:    10/13/21 0845   BP: 131/91   Pulse:    Resp:    Temp:    SpO2:      ENT: Exposed bone  Chest: Breathing, unremarkable  Abd: Unremarkable  Ext: Unremarkable    The procedure was discussed with the patient, including risks, benefits, and alternatives and all questions were answered  Consent signed and in the chart      Delroy Munoz MD MPH  Otolaryngology--Head and Neck Surgery  [facility]  3/28/2022 12:45 PM

## 2022-03-28 NOTE — ANESTHESIA POSTPROCEDURE EVALUATION
Post-Op Assessment Note    CV Status:  Stable    Pain management: adequate     Mental Status:  Alert and awake   Hydration Status:  Euvolemic   PONV Controlled:  Controlled   Airway Patency:  Patent      Post Op Vitals Reviewed: Yes      Staff: Anesthesiologist         No complications documented    /77   Pulse 94   Temp 98 °F (36 7 °C) (Temporal)   Resp 20   Ht 5' 1" (1 549 m)   Wt 54 7 kg (120 lb 9 5 oz)   SpO2 96%   BMI 22 79 kg/m²

## 2022-03-28 NOTE — OP NOTE
OPERATIVE REPORT  PATIENT NAME: Loi Jeffrey    :  1940  MRN: [id number]  Pt Location: [facility] OR ROOM 05    SURGERY DATE: 3/28/2022    Surgeon(s) and Role:     * Kyle Acosta MD - Primary     * Jessica Jules MD - Assistant    Preop Diagnosis:  Osteoradionecrosis of temporal bone ([address] ) [M87 38, Y84 2]    Post-Op Diagnosis Codes:     * Osteoradionecrosis of temporal bone ([address] ) [M87 38, Y84 2]    Procedure(s) (LRB):  Adjacent tissue transfer face (N/A)  MASTOIDECTOMY (Left)    Specimen(s):  * No specimens in log *    Estimated Blood Loss:   Minimal    Drains:  * No LDAs found *    Anesthesia Type:   General/LMA    Operative Indications:  Osteoradionecrosis of temporal bone (HCC) [M87 38, Y84 2]      Operative Findings:  1) 1 5cm area of exposed mastoid tip, drilled down to healthy bone  2) Excision of 1x1 5cm of radiated unhealthy skin  3) 5cm incision with post-auricular advancement flap and complex wound closure    Complications:   None    Procedure and Technique:  The patient was met in holding at bedside and the risks, benefits, indications, and alternatives were discussed with the paitent, and informed consent was obtained  The proper side was marked  The patient was then brought back to the operating room where general endotracheal anesthesia was induced  The patient was then positioned in the supine position and prepped and draped in the usual fashion  A 5 cm post-auricular incision was planned and incised with a 2cm ellipse over the exposed bone and radiated skin  Wide undermining along the periosteum and skin was performed  A 3 coarse laurie bit was then used to drill the exposed bone down removing the inferior portion of the mastoid towards the digastric root and superiorly lateral to the lateral canal down to healthy bleeding bone  The periosteum was then advanced forward and closed over the defect with vicryl suture  Skin layers then closed with prolenes      The patient was then extubated and awaken from anesthesia without difficulty and transferred to the PACU  Patient tolerated the procedure well without any complications         I was present for the entire procedure and A co-surgeon was required because of skills and techniques relevant to speciality    Patient Disposition:  PACU       SIGNATURE: Steven Bush MD  DATE: March 28, 2022  TIME: 2:19 PM

## 2022-03-28 NOTE — ANESTHESIA PREPROCEDURE EVALUATION
Procedure:  Adjacent tissue transfer face (N/A Face)  MASTOIDECTOMY (Left Ear)    Relevant Problems   CARDIO   (+) Essential hypertension   (+) Mixed hyperlipidemia      ENDO   (+) Other specified hypothyroidism   (+) Type 2 diabetes mellitus with diabetic neuropathy, with long-term current use of insulin (HCC)      HEMATOLOGY   (+) Anemia      MUSCULOSKELETAL   (+) Primary osteoarthritis of both knees   (+) Primary osteoarthritis of right shoulder        Physical Exam    Airway  Comment: Prior L jaw resection  Mallampati score: III  TM Distance: >3 FB  Neck ROM: limited     Dental       Cardiovascular  Rhythm: regular, Rate: normal, Cardiovascular exam normal    Pulmonary  Pulmonary exam normal Breath sounds clear to auscultation,     Other Findings        Anesthesia Plan  ASA Score- 3     Anesthesia Type- general with ASA Monitors  Additional Monitors:   Airway Plan: ETT  Comment: Will need video larygoscope  Intubated for IM nail with VL-grade 1 view  POCT glucose noted  No treatment ordered at this time  [facility]                                                        Plan Factors-    Chart reviewed  Existing labs reviewed  Patient summary reviewed  Patient is not a current smoker  Patient instructed to abstain from smoking on day of procedure  Patient did not smoke on day of surgery  Induction- intravenous  Postoperative Plan-     Informed Consent- Anesthetic plan and risks discussed with patient and daughter Aleksandr Lyman

## 2022-03-28 NOTE — DISCHARGE INSTRUCTIONS
- Keep wound clean and dry  May apply dressing as needed but generally leave open to air  - Okay to shower but do not submerge in water for 2 weeks, pat incision dry (don't rub)  - Avoid lifting anything greater than 10 lbs (a gallon of milk) for 3 weeks  - Use your prescribed pain medications as directed  Do not drive or operate heavy machinery while using narcotics}  - Activity and diet as tolerated  - May use tylenol or Advil for pain  - Follow up with Dr Megan Trinh in 10 days  - Watch for signs of fever, chills, warmth, redness, or drainage  A slight amount is normal for a day or two following surgery  Seek medical attention for: fever >101 5 F, increasing warmth, reddness, swelling, bleeding, or anything that may concern you

## 2022-05-20 ENCOUNTER — OFFICE VISIT (OUTPATIENT)
Dept: OBGYN CLINIC | Facility: CLINIC | Age: 82
End: 2022-05-20
Payer: MEDICARE

## 2022-05-20 ENCOUNTER — APPOINTMENT (OUTPATIENT)
Dept: RADIOLOGY | Facility: CLINIC | Age: 82
End: 2022-05-20
Payer: MEDICARE

## 2022-05-20 VITALS
SYSTOLIC BLOOD PRESSURE: 174 MMHG | DIASTOLIC BLOOD PRESSURE: 94 MMHG | HEART RATE: 93 BPM | HEIGHT: 61 IN | BODY MASS INDEX: 22.67 KG/M2

## 2022-05-20 DIAGNOSIS — M19.011 PRIMARY OSTEOARTHRITIS OF RIGHT SHOULDER: Primary | ICD-10-CM

## 2022-05-20 DIAGNOSIS — M19.011 PRIMARY OSTEOARTHRITIS OF RIGHT SHOULDER: ICD-10-CM

## 2022-05-20 DIAGNOSIS — M19.012 PRIMARY OSTEOARTHRITIS OF LEFT SHOULDER: ICD-10-CM

## 2022-05-20 DIAGNOSIS — M17.0 PRIMARY OSTEOARTHRITIS OF BOTH KNEES: ICD-10-CM

## 2022-05-20 PROCEDURE — 73030 X-RAY EXAM OF SHOULDER: CPT

## 2022-05-20 PROCEDURE — 20610 DRAIN/INJ JOINT/BURSA W/O US: CPT | Performed by: ORTHOPAEDIC SURGERY

## 2022-05-20 PROCEDURE — 99213 OFFICE O/P EST LOW 20 MIN: CPT | Performed by: ORTHOPAEDIC SURGERY

## 2022-05-20 RX ADMIN — BUPIVACAINE HYDROCHLORIDE 4 ML: 2.5 INJECTION, SOLUTION INFILTRATION; PERINEURAL at 11:59

## 2022-05-20 RX ADMIN — METHYLPREDNISOLONE ACETATE 2 ML: 40 INJECTION, SUSPENSION INTRA-ARTICULAR; INTRALESIONAL; INTRAMUSCULAR; SOFT TISSUE at 11:59

## 2022-05-20 RX ADMIN — TRIAMCINOLONE ACETONIDE 80 MG: 40 INJECTION, SUSPENSION INTRA-ARTICULAR; INTRAMUSCULAR at 11:59

## 2022-05-20 NOTE — PROGRESS NOTES
Assessment/Plan:   Diagnoses and all orders for this visit:    Primary osteoarthritis of right shoulder  -     XR shoulder 2+ vw right; Future  -     Large joint arthrocentesis    Primary osteoarthritis of left shoulder  -     Large joint arthrocentesis    Primary osteoarthritis of both knees  -     Large joint arthrocentesis  -     Large joint arthrocentesis    Patient was offered, and accepted, Kenalog and Marcaine injection(s) to the bilateral knees and bilateral shoulders for relief of pain and inflammation  Patient tolerated treatment(s) well  She was provided a referral for x-ray of the right shoulder which can be performed at a local walk-in facility  She will be seen for follow-up in 3 month for re-evaluation and consideration for repeat injections as necessary  Patient expresses understanding and is in agreement with this treatment plan  Under aseptic technique, both knees and both shoulders were injected with Kenalog and Marcaine  She tolerated all the procedures well  Return back in 3 months evaluation  Physical examination shows diffuse medial and lateral joint tenderness along both knees  A varus deformity is present  Examination of shoulder shows flexion abduction to approximately 95° with gross glenohumeral crepitation  Subjective:   Patient ID: Liya Kinney  1940     HPI  Patient is a 80 y o  female who presents for follow-up evaluation of bilateral shoulder and bilateral knee osteoarthritis  She was last in regards to this issue on 2/11/2022, which time she received corticosteroid injections to both knees and both shoulders  She states that she got significant relief following the injections, unfortunately symptoms began to return approximately 2-3 weeks ago  Over the past week, she states that she has experienced significant pain in the right shoulder is particularly exacerbated with active motion  She denies any specific incident of injury or exacerbation    She denies any associated bruising, numbness, or tingling  The following portions of the patient's history were reviewed and updated as appropriate:  Past medical history, past surgical history, Family history, social history, current medications and allergies    Past Medical History:   Diagnosis Date    Ambulates with cane     Cancer [facility] )     Chronic pain disorder     knees/ shoulders (gets inj every 3 mos)    Controlled type 2 diabetes mellitus with diabetic polyneuropathy, with long-term current use of insulin ([facility] ) 5/21/2008    Diabetes mellitus ([facility] )     Diabetic polyneuropathy ([facility] ) 5/21/2008    ICD10 clean up    Disease of thyroid gland     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    HL (hearing loss)     Hodgkin's disease ([facility] [facility] ) 2008    Left neck, had radiation    Hypertension     Neuropathy     Osteoporosis     [facility]        Past Surgical History:   Procedure Laterality Date    ADENOIDECTOMY      APPENDECTOMY      CATARACT EXTRACTION      CATARACT EXTRACTION, BILATERAL      CHOLECYSTECTOMY      FRACTURE SURGERY Right     hip    MOUTH SURGERY      oral cancer left lower lip    OVARY SURGERY      WI ADJ TISS XFER HEAD,FAC,HAND <10 SQCM N/A 3/28/2022    Procedure: Adjacent tissue transfer face;  Surgeon: Aashish Camargo MD;  Location: [facility];  Service: ENT    WI MASTOIDECTOMY,SIMPLE Left 3/28/2022    Procedure: MASTOIDECTOMY;  Surgeon: Aashish Camargo MD;  Location[facility];  Service: ENT    WI OPEN RX FEMUR FX+INTRAMED SHE Right 5/28/2020    Procedure: INSERTION NAIL IM FEMUR ANTEGRADE (TROCHANTERIC);   Surgeon: Tiarra Johnson DO;  Location: [address] [place] [address] MAIN OR;  Service: Orthopedics    TONSILLECTOMY      TOTAL THYROIDECTOMY  2008    Performed after left neck dissection and left oral cancer diagnosis       Family History   Problem Relation Age of Onset    Cancer Mother     Diabetes Mother     Diabetes Father     Hypertension Father        Social History Socioeconomic History    Marital status:      Spouse name: None    Number of children: None    Years of education: None    Highest education level: None   Occupational History    None   Tobacco Use    Smoking status: Never Smoker    Smokeless tobacco: Never Used   Vaping Use    Vaping Use: Never used   Substance and Sexual Activity    Alcohol use: Not Currently     Alcohol/week: 0 0 standard drinks    Drug use: Never    Sexual activity: None   Other Topics Concern    None   Social History Narrative    None     Social Determinants of Health     Financial Resource Strain: Not on file   Food Insecurity: Not on file   Transportation Needs: Not on file   Physical Activity: Not on file   Stress: Not on file   Social Connections: Not on file   Intimate Partner Violence: Not on file   Housing Stability: Not on file         Current Outpatient Medications:     Ascorbic Acid (vitamin C) 100 MG tablet, Take 500 mg by mouth 2 (two) times a day, Disp: , Rfl:     aspirin (ECOTRIN LOW STRENGTH) 81 mg EC tablet, Take 81 mg by mouth daily  , Disp: , Rfl:     atorvastatin (LIPITOR) 20 mg tablet, Take 20 mg by mouth daily with dinner , Disp: , Rfl:     BD Insulin Syringe U/F 1/2Unit 31G X 5/16" 0 3 ML MISC, 4 (four) times a day, Disp: , Rfl:     calcium carbonate (OS-FELICE) 600 MG tablet, Take 600 mg by mouth 2 (two) times a day with meals, Disp: , Rfl:     cyanocobalamin (VITAMIN B-12) 1000 MCG tablet, Take 1,000 mcg by mouth daily, Disp: , Rfl:     gabapentin (NEURONTIN) 300 mg capsule, Take 300 mg by mouth 3 (three) times a day, Disp: , Rfl:     Insulin Aspart (NOVOLOG SC), Inject 5 Units under the skin 3 (three) times a day with meals Plus sliding scale   5 units @ 1730 , Disp: , Rfl:     Insulin Detemir (LEVEMIR SC), Inject 5 Units under the skin daily at bedtime, Disp: , Rfl:     ipratropium (ATROVENT) 0 03 % nasal spray, 2 sprays into each nostril every 12 (twelve) hours (Patient taking differently: 2 sprays into each nostril every 12 (twelve) hours As needed), Disp: 30 mL, Rfl: 11    levothyroxine 100 mcg tablet, Take 112 mcg by mouth every morning , Disp: , Rfl:     lisinopril (ZESTRIL) 5 mg tablet, Take 5 mg by mouth daily  , Disp: , Rfl:     methocarbamol (ROBAXIN) 500 mg tablet, Take 500 mg by mouth in the morning   Prn  , Disp: , Rfl:     Multiple Vitamin (multivitamin) capsule, Take 1 capsule by mouth daily, Disp: , Rfl:     OneTouch Ultra test strip, USE TO TEST BLOOD SUGAR 6 TIMES A DAY, Disp: , Rfl:     traMADol (ULTRAM) 50 mg tablet, tramadol 50 mg tablet  take 1 tablet by mouth three times a day if needed, Disp: , Rfl:     VITAMIN D PO, Take 125 mg by mouth in the morning, Disp: , Rfl:     Current Facility-Administered Medications:     bupivacaine (MARCAINE) 0 25 % injection 4 mL, 4 mL, Injection, , Olman Elias Odierno, PA-C, 4 mL at 11/05/21 1154    bupivacaine (MARCAINE) 0 25 % injection 4 mL, 4 mL, Injection, , Olman Elias Odierno, PA-C, 4 mL at 11/05/21 1154    bupivacaine (MARCAINE) 0 25 % injection 4 mL, 4 mL, Injection, , Olman Elias Odierno, PA-C, 4 mL at 11/05/21 1154    triamcinolone acetonide (KENALOG-40) 40 mg/mL injection 80 mg, 80 mg, Intra-articular, , Olman Elias Odierno, PA-C, 80 mg at 11/05/21 1154    triamcinolone acetonide (KENALOG-40) 40 mg/mL injection 80 mg, 80 mg, Intra-articular, , Olman Elias Odierno, PA-C, 80 mg at 11/05/21 1154    triamcinolone acetonide (KENALOG-40) 40 mg/mL injection 80 mg, 80 mg, Intra-articular, , Olman Elias Odierno, PA-C, 80 mg at 11/05/21 1154    No Known Allergies    Review of Systems     Objective:  BP (!) 174/94   Pulse 93   Ht 5' 1" (1 549 m)   BMI 22 67 kg/m²     Ortho Exam  Bilateral shoulders -   Now anatomical deformity  Skin is warm and dry to touch with no signs of erythema, ecchymosis, or infection  No soft tissue swelling, positive effusion noted  Positive palpable crepitus with passive motion  TTP over posterior capsule, TTP over subacromial space, TTP over proximal biceps tendon  ROM  FF 0 degrees-95 degree, ABD 0 degrees-95 degrees, ER 0 degrees-60 degrees, IR L5  MMT 4+/5 throughout  - glenohumeral instability appreciated on exam  + Neer's, + Sandoval  - Speed's, - Yergason's  - empty can, - drop-arm, - belly press, - resisted external rotation, - lift-off  Demonstrates normal elbow, wrist, and finger motion  2+ distal radial pulse with brisk capillary refill to the fingers  Radial, median, and ulnar motor and sensory distributions intact  Sensation light touch intact distally    Bilateral knees -   Patient ambulates with antalgic gait pattern  Uses single-point cane as assistive device  No anatomical deformity  Skin is warm and dry to touch with no signs of erythema, ecchymosis, infection  No soft tissue swelling, no effusion noted  ROM 5 degrees-115 degrees  TTP over medial joint lines, mildly TTP over lateral joint lines  Flexor and extensor mechanisms intact  Knee is stable to varus and valgus stress  - Lachman's  - Anterior Drawer, - Posterior Drawer  - medial Ene's, - lateral Ene's  - Pivot Shift  Patella tracks centrally with significant palpable crepitus  Calf compartments are soft and supple  2+ TP and DP pulses with brisk capillary refill to the toes  Sural, saphenous, tibial, superficial and deep peroneal motor and sensory distributions intact  Sensation light touch intact distally    Physical Exam  Vitals and nursing note reviewed  Constitutional:       General: She is not in acute distress  Appearance: She is well-developed  HENT:      Head: Normocephalic and atraumatic  Eyes:      Conjunctiva/sclera: Conjunctivae normal    Cardiovascular:      Rate and Rhythm: Normal rate  Pulmonary:      Effort: Pulmonary effort is normal    Musculoskeletal:      Cervical back: Neck supple  Skin:     General: Skin is warm and dry        Capillary Refill: Capillary refill takes less than 2 seconds  Neurological:      Mental Status: She is alert and oriented to person, place, and time  Psychiatric:         Mood and Affect: Mood normal          Behavior: Behavior normal           Diagnostic Test Review:  No new imaging reviewed this visit    Large joint arthrocentesis: R subacromial bursa  Universal Protocol:  Consent: Verbal consent obtained  Risks and benefits: risks, benefits and alternatives were discussed  Consent given by: patient  Time out: Immediately prior to procedure a "time out" was called to verify the correct patient, procedure, equipment, support staff and site/side marked as required  Timeout called at: 5/20/2022 11:48 AM   Patient understanding: patient states understanding of the procedure being performed  Site marked: the operative site was marked  Patient identity confirmed: verbally with patient    Supporting Documentation  Indications: pain and joint swelling   Procedure Details  Location: shoulder - R subacromial bursa  Preparation: Patient was prepped and draped in the usual sterile fashion  Needle size: 22 G  Ultrasound guidance: no  Approach: lateral  Medications administered: 4 mL bupivacaine 0 25 %; 2 mL methylPREDNISolone acetate 40 mg/mL    Patient tolerance: patient tolerated the procedure well with no immediate complications  Dressing:  Sterile dressing applied    Large joint arthrocentesis: L subacromial bursa  Universal Protocol:  Consent: Verbal consent obtained  Risks and benefits: risks, benefits and alternatives were discussed  Consent given by: patient  Time out: Immediately prior to procedure a "time out" was called to verify the correct patient, procedure, equipment, support staff and site/side marked as required    Timeout called at: 5/20/2022 11:48 AM   Patient understanding: patient states understanding of the procedure being performed  Site marked: the operative site was marked  Patient identity confirmed: verbally with patient    Supporting Documentation  Indications: pain and joint swelling   Procedure Details  Location: shoulder - L subacromial bursa  Preparation: Patient was prepped and draped in the usual sterile fashion  Needle size: 22 G  Ultrasound guidance: no  Approach: lateral  Medications administered: 4 mL bupivacaine 0 25 %; 80 mg triamcinolone acetonide 40 mg/mL    Patient tolerance: patient tolerated the procedure well with no immediate complications  Dressing:  Sterile dressing applied    Large joint arthrocentesis: R knee  Universal Protocol:  Consent: Verbal consent obtained  Risks and benefits: risks, benefits and alternatives were discussed  Consent given by: patient  Time out: Immediately prior to procedure a "time out" was called to verify the correct patient, procedure, equipment, support staff and site/side marked as required  Timeout called at: 5/20/2022 11:49 AM   Patient understanding: patient states understanding of the procedure being performed  Site marked: the operative site was marked  Patient identity confirmed: verbally with patient    Supporting Documentation  Indications: pain and joint swelling   Procedure Details  Location: knee - R knee  Preparation: Patient was prepped and draped in the usual sterile fashion  Needle size: 22 G  Ultrasound guidance: no  Approach: anterolateral  Medications administered: 4 mL bupivacaine 0 25 %; 80 mg triamcinolone acetonide 40 mg/mL    Patient tolerance: patient tolerated the procedure well with no immediate complications  Dressing:  Sterile dressing applied    Large joint arthrocentesis: L knee  Universal Protocol:  Consent: Verbal consent obtained  Risks and benefits: risks, benefits and alternatives were discussed  Consent given by: patient  Time out: Immediately prior to procedure a "time out" was called to verify the correct patient, procedure, equipment, support staff and site/side marked as required    Timeout called at: 5/20/2022 11:50 AM   Patient understanding: patient states understanding of the procedure being performed  Site marked: the operative site was marked  Patient identity confirmed: verbally with patient    Supporting Documentation  Indications: pain and joint swelling   Procedure Details  Location: knee - L knee  Preparation: Patient was prepped and draped in the usual sterile fashion  Needle size: 22 G  Ultrasound guidance: no  Approach: anterolateral  Medications administered: 4 mL bupivacaine 0 25 %; 80 mg triamcinolone acetonide 40 mg/mL    Patient tolerance: patient tolerated the procedure well with no immediate complications  Dressing:  Sterile dressing applied           Scribe Attestation    I,:  Cathie Colón am acting as a scribe while in the presence of the attending physician :       I,:  Bradley Richardson DO personally performed the services described in this documentation    as scribed in my presence :

## 2022-05-23 RX ORDER — BUPIVACAINE HYDROCHLORIDE 2.5 MG/ML
4 INJECTION, SOLUTION INFILTRATION; PERINEURAL
Status: COMPLETED | OUTPATIENT
Start: 2022-05-20 | End: 2022-05-20

## 2022-05-23 RX ORDER — TRIAMCINOLONE ACETONIDE 40 MG/ML
80 INJECTION, SUSPENSION INTRA-ARTICULAR; INTRAMUSCULAR
Status: COMPLETED | OUTPATIENT
Start: 2022-05-20 | End: 2022-05-20

## 2022-05-23 RX ORDER — METHYLPREDNISOLONE ACETATE 40 MG/ML
2 INJECTION, SUSPENSION INTRA-ARTICULAR; INTRALESIONAL; INTRAMUSCULAR; SOFT TISSUE
Status: COMPLETED | OUTPATIENT
Start: 2022-05-20 | End: 2022-05-20

## 2022-07-01 ENCOUNTER — APPOINTMENT (OUTPATIENT)
Dept: LAB | Facility: CLINIC | Age: 82
End: 2022-07-01
Payer: MEDICARE

## 2022-07-01 DIAGNOSIS — E55.9 VITAMIN D DEFICIENCY: ICD-10-CM

## 2022-07-01 DIAGNOSIS — E03.9 HYPOTHYROIDISM, UNSPECIFIED TYPE: ICD-10-CM

## 2022-07-01 DIAGNOSIS — Z79.4 TYPE 2 DIABETES MELLITUS WITHOUT COMPLICATION, WITH LONG-TERM CURRENT USE OF INSULIN (HCC): ICD-10-CM

## 2022-07-01 DIAGNOSIS — E78.5 HYPERLIPIDEMIA, UNSPECIFIED HYPERLIPIDEMIA TYPE: ICD-10-CM

## 2022-07-01 DIAGNOSIS — Z79.899 ENCOUNTER FOR LONG-TERM (CURRENT) USE OF OTHER MEDICATIONS: ICD-10-CM

## 2022-07-01 DIAGNOSIS — E11.9 TYPE 2 DIABETES MELLITUS WITHOUT COMPLICATION, WITH LONG-TERM CURRENT USE OF INSULIN (HCC): ICD-10-CM

## 2022-07-01 LAB
25(OH)D3 SERPL-MCNC: 44 NG/ML (ref 30–100)
ALBUMIN SERPL BCP-MCNC: 3.4 G/DL (ref 3.5–5)
ALP SERPL-CCNC: 85 U/L (ref 46–116)
ALT SERPL W P-5'-P-CCNC: 36 U/L (ref 12–78)
ANION GAP SERPL CALCULATED.3IONS-SCNC: 5 MMOL/L (ref 4–13)
AST SERPL W P-5'-P-CCNC: 28 U/L (ref 5–45)
BILIRUB SERPL-MCNC: 0.51 MG/DL (ref 0.2–1)
BUN SERPL-MCNC: 20 MG/DL (ref 5–25)
CALCIUM ALBUM COR SERPL-MCNC: 9.4 MG/DL (ref 8.3–10.1)
CALCIUM SERPL-MCNC: 8.9 MG/DL (ref 8.3–10.1)
CHLORIDE SERPL-SCNC: 104 MMOL/L (ref 100–108)
CHOLEST SERPL-MCNC: 223 MG/DL
CO2 SERPL-SCNC: 30 MMOL/L (ref 21–32)
CREAT SERPL-MCNC: 0.77 MG/DL (ref 0.6–1.3)
EST. AVERAGE GLUCOSE BLD GHB EST-MCNC: 246 MG/DL
GFR SERPL CREATININE-BSD FRML MDRD: 72 ML/MIN/1.73SQ M
GLUCOSE P FAST SERPL-MCNC: 266 MG/DL (ref 65–99)
HBA1C MFR BLD: 10.2 %
HDLC SERPL-MCNC: 104 MG/DL
LDLC SERPL CALC-MCNC: 100 MG/DL (ref 0–100)
NONHDLC SERPL-MCNC: 119 MG/DL
POTASSIUM SERPL-SCNC: 4.4 MMOL/L (ref 3.5–5.3)
PROT SERPL-MCNC: 6.7 G/DL (ref 6.4–8.2)
SODIUM SERPL-SCNC: 139 MMOL/L (ref 136–145)
T4 FREE SERPL-MCNC: 1.17 NG/DL (ref 0.76–1.46)
TRIGL SERPL-MCNC: 97 MG/DL
TSH SERPL DL<=0.05 MIU/L-ACNC: 0.96 UIU/ML (ref 0.45–4.5)

## 2022-07-01 PROCEDURE — 80053 COMPREHEN METABOLIC PANEL: CPT

## 2022-07-01 PROCEDURE — 82306 VITAMIN D 25 HYDROXY: CPT

## 2022-07-01 PROCEDURE — 80061 LIPID PANEL: CPT

## 2022-07-01 PROCEDURE — 84443 ASSAY THYROID STIM HORMONE: CPT

## 2022-07-01 PROCEDURE — 83036 HEMOGLOBIN GLYCOSYLATED A1C: CPT

## 2022-07-01 PROCEDURE — 84439 ASSAY OF FREE THYROXINE: CPT

## 2022-07-06 ENCOUNTER — TELEPHONE (OUTPATIENT)
Dept: OBGYN CLINIC | Facility: CLINIC | Age: 82
End: 2022-07-06

## 2022-07-10 ENCOUNTER — HOSPITAL ENCOUNTER (INPATIENT)
Facility: HOSPITAL | Age: 82
LOS: 5 days | Discharge: NURSING, REHAB OR LONG-TERM CARE | DRG: 637 | End: 2022-07-15
Attending: EMERGENCY MEDICINE | Admitting: INTERNAL MEDICINE
Payer: MEDICARE

## 2022-07-10 ENCOUNTER — APPOINTMENT (EMERGENCY)
Dept: CT IMAGING | Facility: HOSPITAL | Age: 82
DRG: 637 | End: 2022-07-10
Payer: MEDICARE

## 2022-07-10 ENCOUNTER — APPOINTMENT (EMERGENCY)
Dept: RADIOLOGY | Facility: HOSPITAL | Age: 82
DRG: 637 | End: 2022-07-10
Payer: MEDICARE

## 2022-07-10 DIAGNOSIS — K92.0 COFFEE GROUND EMESIS: ICD-10-CM

## 2022-07-10 DIAGNOSIS — E11.10 DKA (DIABETIC KETOACIDOSIS) (HCC): ICD-10-CM

## 2022-07-10 DIAGNOSIS — R93.5 ABNORMAL CT OF THE ABDOMEN: ICD-10-CM

## 2022-07-10 DIAGNOSIS — D49.0 GASTRIC TUMOR: ICD-10-CM

## 2022-07-10 DIAGNOSIS — K92.0 HEMATEMESIS: ICD-10-CM

## 2022-07-10 DIAGNOSIS — R29.90 STROKE-LIKE SYMPTOMS: Primary | ICD-10-CM

## 2022-07-10 DIAGNOSIS — A41.9 SEPSIS (HCC): ICD-10-CM

## 2022-07-10 PROBLEM — E87.29 HIGH ANION GAP METABOLIC ACIDOSIS: Status: ACTIVE | Noted: 2022-07-10

## 2022-07-10 PROBLEM — E87.2 HIGH ANION GAP METABOLIC ACIDOSIS: Status: ACTIVE | Noted: 2022-07-10

## 2022-07-10 PROBLEM — N17.9 AKI (ACUTE KIDNEY INJURY) (HCC): Status: ACTIVE | Noted: 2022-07-10

## 2022-07-10 LAB
ALBUMIN SERPL BCP-MCNC: 4.3 G/DL (ref 3.5–5)
ALP SERPL-CCNC: 93 U/L (ref 34–104)
ALT SERPL W P-5'-P-CCNC: 27 U/L (ref 7–52)
ANION GAP SERPL CALCULATED.3IONS-SCNC: 10 MMOL/L (ref 4–13)
ANION GAP SERPL CALCULATED.3IONS-SCNC: 17 MMOL/L (ref 4–13)
ANION GAP SERPL CALCULATED.3IONS-SCNC: 28 MMOL/L (ref 4–13)
ANION GAP SERPL CALCULATED.3IONS-SCNC: >36 MMOL/L (ref 4–13)
APTT PPP: 29 SECONDS (ref 23–37)
AST SERPL W P-5'-P-CCNC: 28 U/L (ref 13–39)
BACTERIA UR QL AUTO: NORMAL /HPF
BASE EX.OXY STD BLDV CALC-SCNC: 54.6 % (ref 60–80)
BASE EX.OXY STD BLDV CALC-SCNC: 88.6 % (ref 60–80)
BASE EXCESS BLDV CALC-SCNC: -17.9 MMOL/L
BASE EXCESS BLDV CALC-SCNC: -24.1 MMOL/L
BETA-HYDROXYBUTYRATE: 6.5 MMOL/L
BILIRUB SERPL-MCNC: 0.51 MG/DL (ref 0.2–1)
BILIRUB UR QL STRIP: NEGATIVE
BUN SERPL-MCNC: 24 MG/DL (ref 5–25)
BUN SERPL-MCNC: 28 MG/DL (ref 5–25)
BUN SERPL-MCNC: 33 MG/DL (ref 5–25)
BUN SERPL-MCNC: 37 MG/DL (ref 5–25)
CALCIUM SERPL-MCNC: 10.4 MG/DL (ref 8.4–10.2)
CALCIUM SERPL-MCNC: 8.2 MG/DL (ref 8.4–10.2)
CALCIUM SERPL-MCNC: 8.3 MG/DL (ref 8.4–10.2)
CALCIUM SERPL-MCNC: 8.4 MG/DL (ref 8.4–10.2)
CHLORIDE SERPL-SCNC: 100 MMOL/L (ref 96–108)
CHLORIDE SERPL-SCNC: 85 MMOL/L (ref 96–108)
CHLORIDE SERPL-SCNC: 96 MMOL/L (ref 96–108)
CHLORIDE SERPL-SCNC: 99 MMOL/L (ref 96–108)
CLARITY UR: CLEAR
CO2 SERPL-SCNC: 16 MMOL/L (ref 21–32)
CO2 SERPL-SCNC: 21 MMOL/L (ref 21–32)
CO2 SERPL-SCNC: 9 MMOL/L (ref 21–32)
CO2 SERPL-SCNC: <6 MMOL/L (ref 21–32)
COLOR UR: ABNORMAL
CREAT SERPL-MCNC: 0.85 MG/DL (ref 0.6–1.3)
CREAT SERPL-MCNC: 0.97 MG/DL (ref 0.6–1.3)
CREAT SERPL-MCNC: 1.09 MG/DL (ref 0.6–1.3)
CREAT SERPL-MCNC: 1.25 MG/DL (ref 0.6–1.3)
ERYTHROCYTE [DISTWIDTH] IN BLOOD BY AUTOMATED COUNT: 12.2 % (ref 11.6–15.1)
FLUAV RNA RESP QL NAA+PROBE: NEGATIVE
FLUBV RNA RESP QL NAA+PROBE: NEGATIVE
GFR SERPL CREATININE-BSD FRML MDRD: 40 ML/MIN/1.73SQ M
GFR SERPL CREATININE-BSD FRML MDRD: 47 ML/MIN/1.73SQ M
GFR SERPL CREATININE-BSD FRML MDRD: 54 ML/MIN/1.73SQ M
GFR SERPL CREATININE-BSD FRML MDRD: 64 ML/MIN/1.73SQ M
GLUCOSE SERPL-MCNC: 108 MG/DL (ref 65–140)
GLUCOSE SERPL-MCNC: 116 MG/DL (ref 65–140)
GLUCOSE SERPL-MCNC: 117 MG/DL (ref 65–140)
GLUCOSE SERPL-MCNC: 122 MG/DL (ref 65–140)
GLUCOSE SERPL-MCNC: 144 MG/DL (ref 65–140)
GLUCOSE SERPL-MCNC: 264 MG/DL (ref 65–140)
GLUCOSE SERPL-MCNC: 342 MG/DL (ref 65–140)
GLUCOSE SERPL-MCNC: 378 MG/DL (ref 65–140)
GLUCOSE SERPL-MCNC: 453 MG/DL (ref 65–140)
GLUCOSE SERPL-MCNC: 74 MG/DL (ref 65–140)
GLUCOSE SERPL-MCNC: 79 MG/DL (ref 65–140)
GLUCOSE SERPL-MCNC: 820 MG/DL (ref 65–140)
GLUCOSE SERPL-MCNC: 88 MG/DL (ref 65–140)
GLUCOSE SERPL-MCNC: 92 MG/DL (ref 65–140)
GLUCOSE SERPL-MCNC: 96 MG/DL (ref 65–140)
GLUCOSE SERPL-MCNC: >500 MG/DL (ref 65–140)
GLUCOSE UR STRIP-MCNC: ABNORMAL MG/DL
HCO3 BLDV-SCNC: 5.6 MMOL/L (ref 24–30)
HCO3 BLDV-SCNC: 8.7 MMOL/L (ref 24–30)
HCT VFR BLD AUTO: 37 % (ref 34.8–46.1)
HGB BLD-MCNC: 10 G/DL (ref 11.5–15.4)
HGB BLD-MCNC: 12.1 G/DL (ref 11.5–15.4)
HGB UR QL STRIP.AUTO: ABNORMAL
INR PPP: 1.01 (ref 0.84–1.19)
KETONES UR STRIP-MCNC: ABNORMAL MG/DL
LACTATE SERPL-SCNC: 2.5 MMOL/L (ref 0.5–2)
LACTATE SERPL-SCNC: 3 MMOL/L (ref 0.5–2)
LACTATE SERPL-SCNC: 3.4 MMOL/L (ref 0.5–2)
LACTATE SERPL-SCNC: 4.5 MMOL/L (ref 0.5–2)
LACTATE SERPL-SCNC: 4.6 MMOL/L (ref 0.5–2)
LACTATE SERPL-SCNC: 4.9 MMOL/L (ref 0.5–2)
LEUKOCYTE ESTERASE UR QL STRIP: NEGATIVE
LIPASE SERPL-CCNC: 10 U/L (ref 11–82)
LIPASE SERPL-CCNC: 16 U/L (ref 11–82)
MAGNESIUM SERPL-MCNC: 2 MG/DL (ref 1.9–2.7)
MAGNESIUM SERPL-MCNC: 2 MG/DL (ref 1.9–2.7)
MAGNESIUM SERPL-MCNC: 2.1 MG/DL (ref 1.9–2.7)
MAGNESIUM SERPL-MCNC: 2.2 MG/DL (ref 1.9–2.7)
MCH RBC QN AUTO: 33.1 PG (ref 26.8–34.3)
MCHC RBC AUTO-ENTMCNC: 32.7 G/DL (ref 31.4–37.4)
MCV RBC AUTO: 101 FL (ref 82–98)
NITRITE UR QL STRIP: NEGATIVE
NON-SQ EPI CELLS URNS QL MICRO: NORMAL /HPF
O2 CT BLDV-SCNC: 13.3 ML/DL
O2 CT BLDV-SCNC: 9.8 ML/DL
PCO2 BLDV: 23 MM HG (ref 42–50)
PCO2 BLDV: 23.8 MM HG (ref 42–50)
PH BLDV: 7.01 [PH] (ref 7.3–7.4)
PH BLDV: 7.18 [PH] (ref 7.3–7.4)
PH UR STRIP.AUTO: 5.5 [PH]
PHOSPHATE SERPL-MCNC: 2.2 MG/DL (ref 2.3–4.1)
PHOSPHATE SERPL-MCNC: 2.4 MG/DL (ref 2.3–4.1)
PHOSPHATE SERPL-MCNC: 3 MG/DL (ref 2.3–4.1)
PHOSPHATE SERPL-MCNC: 6.9 MG/DL (ref 2.3–4.1)
PLATELET # BLD AUTO: 257 THOUSANDS/UL (ref 149–390)
PMV BLD AUTO: 11.8 FL (ref 8.9–12.7)
PO2 BLDV: 36.2 MM HG (ref 35–45)
PO2 BLDV: 64.4 MM HG (ref 35–45)
POTASSIUM SERPL-SCNC: 3.7 MMOL/L (ref 3.5–5.3)
POTASSIUM SERPL-SCNC: 3.8 MMOL/L (ref 3.5–5.3)
POTASSIUM SERPL-SCNC: 4 MMOL/L (ref 3.5–5.3)
POTASSIUM SERPL-SCNC: 5.7 MMOL/L (ref 3.5–5.3)
PROCALCITONIN SERPL-MCNC: 0.18 NG/ML
PROT SERPL-MCNC: 7 G/DL (ref 6.4–8.4)
PROT UR STRIP-MCNC: NEGATIVE MG/DL
PROTHROMBIN TIME: 13.2 SECONDS (ref 11.6–14.5)
RBC # BLD AUTO: 3.66 MILLION/UL (ref 3.81–5.12)
RBC #/AREA URNS AUTO: NORMAL /HPF
RSV RNA RESP QL NAA+PROBE: NEGATIVE
SARS-COV-2 RNA RESP QL NAA+PROBE: NEGATIVE
SODIUM SERPL-SCNC: 127 MMOL/L (ref 135–147)
SODIUM SERPL-SCNC: 131 MMOL/L (ref 135–147)
SODIUM SERPL-SCNC: 132 MMOL/L (ref 135–147)
SODIUM SERPL-SCNC: 133 MMOL/L (ref 135–147)
SP GR UR STRIP.AUTO: 1.02 (ref 1–1.03)
UROBILINOGEN UR QL STRIP.AUTO: 0.2 E.U./DL
WBC # BLD AUTO: 19.95 THOUSAND/UL (ref 4.31–10.16)
WBC #/AREA URNS AUTO: NORMAL /HPF

## 2022-07-10 PROCEDURE — 83690 ASSAY OF LIPASE: CPT | Performed by: NURSE PRACTITIONER

## 2022-07-10 PROCEDURE — 82948 REAGENT STRIP/BLOOD GLUCOSE: CPT

## 2022-07-10 PROCEDURE — 85018 HEMOGLOBIN: CPT | Performed by: NURSE PRACTITIONER

## 2022-07-10 PROCEDURE — 83690 ASSAY OF LIPASE: CPT | Performed by: EMERGENCY MEDICINE

## 2022-07-10 PROCEDURE — 96374 THER/PROPH/DIAG INJ IV PUSH: CPT

## 2022-07-10 PROCEDURE — 84100 ASSAY OF PHOSPHORUS: CPT | Performed by: NURSE PRACTITIONER

## 2022-07-10 PROCEDURE — 70498 CT ANGIOGRAPHY NECK: CPT

## 2022-07-10 PROCEDURE — 84145 PROCALCITONIN (PCT): CPT | Performed by: NURSE PRACTITIONER

## 2022-07-10 PROCEDURE — 93005 ELECTROCARDIOGRAM TRACING: CPT

## 2022-07-10 PROCEDURE — 80053 COMPREHEN METABOLIC PANEL: CPT | Performed by: EMERGENCY MEDICINE

## 2022-07-10 PROCEDURE — 96365 THER/PROPH/DIAG IV INF INIT: CPT

## 2022-07-10 PROCEDURE — C9113 INJ PANTOPRAZOLE SODIUM, VIA: HCPCS | Performed by: NURSE PRACTITIONER

## 2022-07-10 PROCEDURE — 85730 THROMBOPLASTIN TIME PARTIAL: CPT | Performed by: EMERGENCY MEDICINE

## 2022-07-10 PROCEDURE — 83735 ASSAY OF MAGNESIUM: CPT | Performed by: EMERGENCY MEDICINE

## 2022-07-10 PROCEDURE — 71045 X-RAY EXAM CHEST 1 VIEW: CPT

## 2022-07-10 PROCEDURE — 1123F ACP DISCUSS/DSCN MKR DOCD: CPT | Performed by: INTERNAL MEDICINE

## 2022-07-10 PROCEDURE — 74176 CT ABD & PELVIS W/O CONTRAST: CPT

## 2022-07-10 PROCEDURE — 99291 CRITICAL CARE FIRST HOUR: CPT | Performed by: EMERGENCY MEDICINE

## 2022-07-10 PROCEDURE — 96375 TX/PRO/DX INJ NEW DRUG ADDON: CPT

## 2022-07-10 PROCEDURE — 81003 URINALYSIS AUTO W/O SCOPE: CPT | Performed by: EMERGENCY MEDICINE

## 2022-07-10 PROCEDURE — 85610 PROTHROMBIN TIME: CPT | Performed by: EMERGENCY MEDICINE

## 2022-07-10 PROCEDURE — 0241U HB NFCT DS VIR RESP RNA 4 TRGT: CPT | Performed by: EMERGENCY MEDICINE

## 2022-07-10 PROCEDURE — 99291 CRITICAL CARE FIRST HOUR: CPT | Performed by: INTERNAL MEDICINE

## 2022-07-10 PROCEDURE — 87040 BLOOD CULTURE FOR BACTERIA: CPT | Performed by: NURSE PRACTITIONER

## 2022-07-10 PROCEDURE — 36415 COLL VENOUS BLD VENIPUNCTURE: CPT

## 2022-07-10 PROCEDURE — 81001 URINALYSIS AUTO W/SCOPE: CPT | Performed by: EMERGENCY MEDICINE

## 2022-07-10 PROCEDURE — 96361 HYDRATE IV INFUSION ADD-ON: CPT

## 2022-07-10 PROCEDURE — 82805 BLOOD GASES W/O2 SATURATION: CPT | Performed by: NURSE PRACTITIONER

## 2022-07-10 PROCEDURE — 82805 BLOOD GASES W/O2 SATURATION: CPT | Performed by: EMERGENCY MEDICINE

## 2022-07-10 PROCEDURE — 82010 KETONE BODYS QUAN: CPT | Performed by: EMERGENCY MEDICINE

## 2022-07-10 PROCEDURE — 84100 ASSAY OF PHOSPHORUS: CPT | Performed by: EMERGENCY MEDICINE

## 2022-07-10 PROCEDURE — 83605 ASSAY OF LACTIC ACID: CPT | Performed by: NURSE PRACTITIONER

## 2022-07-10 PROCEDURE — 83605 ASSAY OF LACTIC ACID: CPT | Performed by: EMERGENCY MEDICINE

## 2022-07-10 PROCEDURE — 85027 COMPLETE CBC AUTOMATED: CPT | Performed by: EMERGENCY MEDICINE

## 2022-07-10 PROCEDURE — 70496 CT ANGIOGRAPHY HEAD: CPT

## 2022-07-10 PROCEDURE — 80048 BASIC METABOLIC PNL TOTAL CA: CPT | Performed by: NURSE PRACTITIONER

## 2022-07-10 PROCEDURE — 99285 EMERGENCY DEPT VISIT HI MDM: CPT

## 2022-07-10 PROCEDURE — 87081 CULTURE SCREEN ONLY: CPT | Performed by: NURSE PRACTITIONER

## 2022-07-10 PROCEDURE — 83735 ASSAY OF MAGNESIUM: CPT | Performed by: NURSE PRACTITIONER

## 2022-07-10 PROCEDURE — G1004 CDSM NDSC: HCPCS

## 2022-07-10 RX ORDER — SODIUM CHLORIDE, SODIUM GLUCONATE, SODIUM ACETATE, POTASSIUM CHLORIDE, MAGNESIUM CHLORIDE, SODIUM PHOSPHATE, DIBASIC, AND POTASSIUM PHOSPHATE .53; .5; .37; .037; .03; .012; .00082 G/100ML; G/100ML; G/100ML; G/100ML; G/100ML; G/100ML; G/100ML
250 INJECTION, SOLUTION INTRAVENOUS CONTINUOUS
Status: DISPENSED | OUTPATIENT
Start: 2022-07-10 | End: 2022-07-10

## 2022-07-10 RX ORDER — POTASSIUM CHLORIDE 14.9 MG/ML
20 INJECTION INTRAVENOUS ONCE
Status: COMPLETED | OUTPATIENT
Start: 2022-07-10 | End: 2022-07-10

## 2022-07-10 RX ORDER — DEXTROSE AND SODIUM CHLORIDE 5; .45 G/100ML; G/100ML
50 INJECTION, SOLUTION INTRAVENOUS CONTINUOUS
Status: DISCONTINUED | OUTPATIENT
Start: 2022-07-10 | End: 2022-07-11

## 2022-07-10 RX ORDER — CEFEPIME HYDROCHLORIDE 2 G/50ML
2000 INJECTION, SOLUTION INTRAVENOUS EVERY 12 HOURS
Status: DISCONTINUED | OUTPATIENT
Start: 2022-07-10 | End: 2022-07-10

## 2022-07-10 RX ORDER — PANTOPRAZOLE SODIUM 40 MG/10ML
40 INJECTION, POWDER, LYOPHILIZED, FOR SOLUTION INTRAVENOUS EVERY 12 HOURS SCHEDULED
Status: DISCONTINUED | OUTPATIENT
Start: 2022-07-10 | End: 2022-07-15 | Stop reason: HOSPADM

## 2022-07-10 RX ORDER — SODIUM CHLORIDE, SODIUM GLUCONATE, SODIUM ACETATE, POTASSIUM CHLORIDE, MAGNESIUM CHLORIDE, SODIUM PHOSPHATE, DIBASIC, AND POTASSIUM PHOSPHATE .53; .5; .37; .037; .03; .012; .00082 G/100ML; G/100ML; G/100ML; G/100ML; G/100ML; G/100ML; G/100ML
500 INJECTION, SOLUTION INTRAVENOUS CONTINUOUS
Status: DISPENSED | OUTPATIENT
Start: 2022-07-10 | End: 2022-07-10

## 2022-07-10 RX ORDER — SODIUM CHLORIDE, SODIUM GLUCONATE, SODIUM ACETATE, POTASSIUM CHLORIDE, MAGNESIUM CHLORIDE, SODIUM PHOSPHATE, DIBASIC, AND POTASSIUM PHOSPHATE .53; .5; .37; .037; .03; .012; .00082 G/100ML; G/100ML; G/100ML; G/100ML; G/100ML; G/100ML; G/100ML
1000 INJECTION, SOLUTION INTRAVENOUS ONCE
Status: COMPLETED | OUTPATIENT
Start: 2022-07-10 | End: 2022-07-10

## 2022-07-10 RX ORDER — ATORVASTATIN CALCIUM 20 MG/1
20 TABLET, FILM COATED ORAL
Status: DISCONTINUED | OUTPATIENT
Start: 2022-07-10 | End: 2022-07-15 | Stop reason: HOSPADM

## 2022-07-10 RX ORDER — LEVOTHYROXINE SODIUM 112 UG/1
112 TABLET ORAL EVERY MORNING
Status: DISCONTINUED | OUTPATIENT
Start: 2022-07-10 | End: 2022-07-15 | Stop reason: HOSPADM

## 2022-07-10 RX ORDER — VANCOMYCIN HYDROCHLORIDE 1 G/200ML
20 INJECTION, SOLUTION INTRAVENOUS ONCE
Status: COMPLETED | OUTPATIENT
Start: 2022-07-10 | End: 2022-07-10

## 2022-07-10 RX ORDER — GLYCOPYRROLATE 0.2 MG/ML
0.1 INJECTION INTRAMUSCULAR; INTRAVENOUS ONCE
Status: DISCONTINUED | OUTPATIENT
Start: 2022-07-10 | End: 2022-07-10

## 2022-07-10 RX ORDER — LIDOCAINE HYDROCHLORIDE 10 MG/ML
INJECTION, SOLUTION EPIDURAL; INFILTRATION; INTRACAUDAL; PERINEURAL
Status: COMPLETED
Start: 2022-07-10 | End: 2022-07-10

## 2022-07-10 RX ORDER — CEFEPIME HYDROCHLORIDE 1 G/50ML
1000 INJECTION, SOLUTION INTRAVENOUS EVERY 12 HOURS
Status: DISCONTINUED | OUTPATIENT
Start: 2022-07-10 | End: 2022-07-12

## 2022-07-10 RX ORDER — SODIUM CHLORIDE 9 MG/ML
3 INJECTION INTRAVENOUS
Status: DISCONTINUED | OUTPATIENT
Start: 2022-07-10 | End: 2022-07-15 | Stop reason: HOSPADM

## 2022-07-10 RX ADMIN — PANTOPRAZOLE SODIUM 40 MG: 40 INJECTION, POWDER, FOR SOLUTION INTRAVENOUS at 21:22

## 2022-07-10 RX ADMIN — SODIUM CHLORIDE 5.4 UNITS/HR: 9 INJECTION, SOLUTION INTRAVENOUS at 08:36

## 2022-07-10 RX ADMIN — DEXTROSE AND SODIUM CHLORIDE 250 ML/HR: 5; .45 INJECTION, SOLUTION INTRAVENOUS at 14:54

## 2022-07-10 RX ADMIN — INSULIN HUMAN 5 UNITS: 100 INJECTION, SOLUTION PARENTERAL at 08:32

## 2022-07-10 RX ADMIN — LIDOCAINE HYDROCHLORIDE 300 MG: 10 INJECTION, SOLUTION EPIDURAL; INFILTRATION; INTRACAUDAL; PERINEURAL at 11:16

## 2022-07-10 RX ADMIN — SODIUM CHLORIDE, SODIUM GLUCONATE, SODIUM ACETATE, POTASSIUM CHLORIDE, MAGNESIUM CHLORIDE, SODIUM PHOSPHATE, DIBASIC, AND POTASSIUM PHOSPHATE 500 ML/HR: .53; .5; .37; .037; .03; .012; .00082 INJECTION, SOLUTION INTRAVENOUS at 11:00

## 2022-07-10 RX ADMIN — SODIUM CHLORIDE, SODIUM GLUCONATE, SODIUM ACETATE, POTASSIUM CHLORIDE, MAGNESIUM CHLORIDE, SODIUM PHOSPHATE, DIBASIC, AND POTASSIUM PHOSPHATE 1000 ML: .53; .5; .37; .037; .03; .012; .00082 INJECTION, SOLUTION INTRAVENOUS at 12:46

## 2022-07-10 RX ADMIN — CEFEPIME HYDROCHLORIDE 1000 MG: 1 INJECTION, SOLUTION INTRAVENOUS at 11:11

## 2022-07-10 RX ADMIN — SODIUM PHOSPHATE, MONOBASIC, MONOHYDRATE 12 MMOL: 276; 142 INJECTION, SOLUTION INTRAVENOUS at 22:37

## 2022-07-10 RX ADMIN — SODIUM CHLORIDE 1.35 UNITS/HR: 9 INJECTION, SOLUTION INTRAVENOUS at 20:20

## 2022-07-10 RX ADMIN — VANCOMYCIN HYDROCHLORIDE 1000 MG: 1 INJECTION, SOLUTION INTRAVENOUS at 11:36

## 2022-07-10 RX ADMIN — SODIUM CHLORIDE 1000 ML: 0.9 INJECTION, SOLUTION INTRAVENOUS at 07:21

## 2022-07-10 RX ADMIN — SODIUM CHLORIDE 80 MG: 9 INJECTION, SOLUTION INTRAVENOUS at 10:54

## 2022-07-10 RX ADMIN — CEFEPIME HYDROCHLORIDE 1000 MG: 1 INJECTION, SOLUTION INTRAVENOUS at 21:30

## 2022-07-10 RX ADMIN — SODIUM CHLORIDE, SODIUM GLUCONATE, SODIUM ACETATE, POTASSIUM CHLORIDE, MAGNESIUM CHLORIDE, SODIUM PHOSPHATE, DIBASIC, AND POTASSIUM PHOSPHATE 1000 ML: .53; .5; .37; .037; .03; .012; .00082 INJECTION, SOLUTION INTRAVENOUS at 09:29

## 2022-07-10 RX ADMIN — POTASSIUM CHLORIDE 20 MEQ: 14.9 INJECTION, SOLUTION INTRAVENOUS at 16:36

## 2022-07-10 RX ADMIN — SODIUM CHLORIDE 8 MG/HR: 9 INJECTION, SOLUTION INTRAVENOUS at 11:48

## 2022-07-10 NOTE — PROGRESS NOTES
Vancomycin Assessment    Jeff Burnette is a 80 y o  female who is currently receiving vancomycin 1g once followed by 750 mg q24 for intra-abdominal infection     Relevant clinical data and objective history reviewed:  Creatinine   Date Value Ref Range Status   07/10/2022 1 25 0 60 - 1 30 mg/dL Final     Comment:     Standardized to IDMS reference method   07/01/2022 0 77 0 60 - 1 30 mg/dL Final     Comment:     Standardized to IDMS reference method   02/25/2022 0 86 0 60 - 1 30 mg/dL Final     Comment:     Standardized to IDMS reference method     /51   Pulse (!) 128   Temp 97 5 °F (36 4 °C) (Oral)   Resp (!) 24   Wt 53 6 kg (118 lb 2 7 oz)   SpO2 99%   BMI 22 33 kg/m²   No intake/output data recorded  Lab Results   Component Value Date/Time    BUN 37 (H) 07/10/2022 07:03 AM    WBC 19 95 (H) 07/10/2022 07:28 AM    HGB 12 1 07/10/2022 07:28 AM    HCT 37 0 07/10/2022 07:28 AM     (H) 07/10/2022 07:28 AM     07/10/2022 07:28 AM     Temp Readings from Last 3 Encounters:   07/10/22 97 5 °F (36 4 °C) (Oral)   03/28/22 97 5 °F (36 4 °C) (Temporal)   03/07/22 98 1 °F (36 7 °C) (Temporal)     Vancomycin Days of Therapy: 1    Assessment/Plan  The patient is currently on vancomycin utilizing scheduled dosing based on actual body weight  The patient is currently receiving 1g once followed by 750 mg q24 and is clinically appropriate and dose will be continued  Pharmacy will also follow closely for s/sx of nephrotoxicity, infusion reactions, and appropriateness of therapy  BMP and CBC will be ordered per protocol  Plan for trough as patient approaches steady state, prior to the 4th  dose at approximately 0830 7/13  Due to infection severity, will target a trough of 15-20 (appropriate for most indications)   Pharmacy will continue to follow the patients culture results and clinical progress daily      Jimmy Dawson, Pharmacist

## 2022-07-10 NOTE — PLAN OF CARE
Problem: Potential for Falls  Goal: Patient will remain free of falls  Description: INTERVENTIONS:  - Educate patient/family on patient safety including physical limitations  - Instruct patient to call for assistance with activity   - Consult OT/PT to assist with strengthening/mobility   - Keep Call bell within reach  - Keep bed low and locked with side rails adjusted as appropriate  - Keep care items and personal belongings within reach  - Initiate and maintain comfort rounds  - Make Fall Risk Sign visible to staff  - Offer Toileting every 2 Hours, in advance of need  - Initiate/Maintain bed alarm  - Obtain necessary fall risk management equipment: yellow socks, bed alarm  - Apply yellow socks and bracelet for high fall risk patients  - Consider moving patient to room near nurses station  Outcome: Progressing

## 2022-07-10 NOTE — ED PROVIDER NOTES
History  Chief Complaint   Patient presents with    Hyperglycemia - Symptomatic     Per EMS, possible DKA, BS reading > 48     80year-old female presents emergency room for hyperglycemia  Patient notes that she was in her normal state health as of yesterday evening  The patient went to sleep and when she woke up this morning she felt dizzy nauseated and had all over body pain  The patient notes that they took her sugar and it was off the charts high  Her neighbor offered to bring her to the clinic later this morning or possibly sent her to the hospital, so she was brought to the emergency department for evaluation  She complains of headache, vision loss which he noted this morning, dizziness which is difficult for her to describe other than and unsteadiness as well as vision changes, as well as abdominal pain nausea and vomiting of black emesis as well as body aches  Patient sugar on glucometer is noted to be over 500  Patient did not take her morning insulin because she did not know what dose to give as she is on a sliding scale  The patient notes to feel very dry as well  Patient currently lives alone  Prior to Admission Medications   Prescriptions Last Dose Informant Patient Reported? Taking? Ascorbic Acid (vitamin C) 100 MG tablet   Yes No   Sig: Take 500 mg by mouth 2 (two) times a day   BD Insulin Syringe U/F 1/2Unit 31G X 5/16" 0 3 ML MISC   Yes No   Si (four) times a day   Insulin Aspart (NOVOLOG SC)   Yes No   Sig: Inject 5 Units under the skin 3 (three) times a day with meals Plus sliding scale   5 units @ 1730    Insulin Detemir (LEVEMIR SC)   Yes No   Sig: Inject 5 Units under the skin daily at bedtime   Multiple Vitamin (multivitamin) capsule   Yes No   Sig: Take 1 capsule by mouth daily   OneTouch Ultra test strip   Yes No   Sig: USE TO TEST BLOOD SUGAR 6 TIMES A DAY   VITAMIN D PO   Yes No   Sig: Take 125 mg by mouth in the morning   aspirin (ECOTRIN LOW STRENGTH) 81 mg EC tablet  Self Yes No   Sig: Take 81 mg by mouth daily     atorvastatin (LIPITOR) 20 mg tablet   Yes No   Sig: Take 20 mg by mouth daily with dinner    calcium carbonate (OS-FELICE) 600 MG tablet   Yes No   Sig: Take 600 mg by mouth 2 (two) times a day with meals   cyanocobalamin (VITAMIN B-12) 1000 MCG tablet   Yes No   Sig: Take 1,000 mcg by mouth daily   gabapentin (NEURONTIN) 300 mg capsule   Yes No   Sig: Take 300 mg by mouth 3 (three) times a day   ipratropium (ATROVENT) 0 03 % nasal spray   No No   Si sprays into each nostril every 12 (twelve) hours   Patient taking differently: 2 sprays into each nostril every 12 (twelve) hours As needed   levothyroxine 100 mcg tablet  Self Yes No   Sig: Take 112 mcg by mouth every morning    lisinopril (ZESTRIL) 5 mg tablet   Yes No   Sig: Take 5 mg by mouth daily     methocarbamol (ROBAXIN) 500 mg tablet   Yes No   Sig: Take 500 mg by mouth in the morning   Prn     traMADol (ULTRAM) 50 mg tablet   Yes No   Sig: tramadol 50 mg tablet   take 1 tablet by mouth three times a day if needed      Facility-Administered Medications Last Administration Doses Remaining   bupivacaine (MARCAINE) 0 25 % injection 4 mL 2021 11:54 AM    bupivacaine (MARCAINE) 0 25 % injection 4 mL 2021 11:54 AM    bupivacaine (MARCAINE) 0 25 % injection 4 mL 2021 11:54 AM    triamcinolone acetonide (KENALOG-40) 40 mg/mL injection 80 mg 2021 11:54 AM    triamcinolone acetonide (KENALOG-40) 40 mg/mL injection 80 mg 2021 11:54 AM    triamcinolone acetonide (KENALOG-40) 40 mg/mL injection 80 mg 2021 11:54 AM           Past Medical History:   Diagnosis Date    Ambulates with cane     Cancer [facility] [address] [facility]     Chronic pain disorder     knees/ shoulders (gets inj every 3 mos)    Controlled type 2 diabetes mellitus with diabetic polyneuropathy, with long-term current use of insulin ([facility] [address] ) 2008    Diabetes mellitus [facility] [address] [facility]     Diabetic polyneuropathy ([facility] [address] [facility] 2008    ICD10 clean up    Disease of thyroid gland     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    HL (hearing loss)     Hodgkin's disease ([facility] [address] ) 2008    Left neck, had radiation    Hypertension     Neuropathy     Osteoporosis     RA (rheumatoid arthritis) (HCC)        Past Surgical History:   Procedure Laterality Date    ADENOIDECTOMY      APPENDECTOMY      CATARACT EXTRACTION      CATARACT EXTRACTION, BILATERAL      CHOLECYSTECTOMY      FRACTURE SURGERY Right     hip    MOUTH SURGERY      oral cancer left lower lip    OVARY SURGERY      OR ADJ TISS XFER HEAD,FAC,HAND <10 SQCM N/A 3/28/2022    Procedure: Adjacent tissue transfer face;  Surgeon: Mary Love MD;  [facility];  Service: ENT    OR MASTOIDECTOMY,SIMPLE Left 3/28/2022    Procedure: MASTOIDECTOMY;  Surgeon: Mary Love MD;  [facility];  Service: ENT    OR OPEN RX FEMUR FX+INTRAMED SHE Right 5/28/2020    Procedure: INSERTION NAIL IM FEMUR ANTEGRADE (TROCHANTERIC); Surgeon: Bobo Stern DO;  Location: [address] [place] [address] MAIN OR;  Service: Orthopedics    TONSILLECTOMY      TOTAL THYROIDECTOMY  2008    Performed after left neck dissection and left oral cancer diagnosis       Family History   Problem Relation Age of Onset    Cancer Mother     Diabetes Mother     Diabetes Father     Hypertension Father      I have reviewed and agree with the history as documented  E-Cigarette/Vaping    E-Cigarette Use Never User      E-Cigarette/Vaping Substances    Nicotine No     THC No     CBD No     Flavoring No     Other No     Unknown No      Social History     Tobacco Use    Smoking status: Never Smoker    Smokeless tobacco: Never Used   Vaping Use    Vaping Use: Never used   Substance Use Topics    Alcohol use: Not Currently     Alcohol/week: 0 0 standard drinks    Drug use: Never       Review of Systems   Constitutional: Positive for activity change  Negative for chills and fever  HENT: Negative for ear pain and sore throat  Eyes: Positive for visual disturbance  Negative for pain  Respiratory: Negative for cough and shortness of breath  Cardiovascular: Negative for chest pain and palpitations  Gastrointestinal: Positive for abdominal pain, nausea and vomiting  Genitourinary: Negative for dysuria and hematuria  Musculoskeletal: Negative for arthralgias and back pain  Skin: Positive for pallor  Negative for color change and rash  Neurological: Positive for light-headedness and headaches  Negative for seizures and syncope  All other systems reviewed and are negative  Physical Exam  Physical Exam  Vitals and nursing note reviewed  Constitutional:       General: She is in acute distress  Appearance: Normal appearance  She is well-developed  She is ill-appearing  HENT:      Head: Normocephalic and atraumatic  Comments: Patient has postoperative changes to the left maxilla, mouth and mastoid from previous surgery for squamous cell carcinoma  Right Ear: External ear normal       Left Ear: External ear normal       Nose: Nose normal       Mouth/Throat:      Mouth: Mucous membranes are dry  Eyes:      Conjunctiva/sclera: Conjunctivae normal    Cardiovascular:      Rate and Rhythm: Regular rhythm  Tachycardia present  Pulses: Normal pulses  Heart sounds: Normal heart sounds  No murmur heard  Pulmonary:      Effort: Pulmonary effort is normal  No respiratory distress  Breath sounds: Normal breath sounds  Abdominal:      General: Bowel sounds are normal  There is no distension  Palpations: Abdomen is soft  There is no mass  Tenderness: There is no abdominal tenderness  There is no guarding or rebound  Musculoskeletal:         General: No swelling or deformity  Cervical back: Neck supple  No rigidity or tenderness  Skin:     General: Skin is warm and dry  Capillary Refill: Capillary refill takes less than 2 seconds     Neurological:      General: No focal deficit present  Mental Status: She is alert and oriented to person, place, and time  Mental status is at baseline  Gait: Gait normal    Psychiatric:         Mood and Affect: Mood normal          Vital Signs  ED Triage Vitals   Temperature Pulse Respirations Blood Pressure SpO2   07/10/22 0658 07/10/22 0658 07/10/22 0658 07/10/22 0658 07/10/22 0701   97 5 °F (36 4 °C) 68 (!) 28 147/99 97 %      Temp Source Heart Rate Source Patient Position - Orthostatic VS BP Location FiO2 (%)   07/10/22 0658 07/10/22 0658 07/10/22 0658 07/10/22 0658 --   Oral Monitor Lying Left arm       Pain Score       07/10/22 0658       9           Vitals:    07/10/22 0658 07/10/22 0701   BP: 147/99    Pulse: 68 (!) 116   Patient Position - Orthostatic VS: Lying          Visual Acuity      ED Medications  Medications - No data to display    Diagnostic Studies  Results Reviewed     Procedure Component Value Units Date/Time    Comprehensive metabolic panel [[id number]] Collected: 07/10/22 0703    Lab Status: In process Specimen: Blood from Arm, Right Updated: 07/10/22 0707    Beta Hydroxybutyrate [[id number]] Collected: 07/10/22 0703    Lab Status: In process Specimen: Blood from Arm, Right Updated: 07/10/22 0707    Lactic acid, plasma [[id number]] Collected: 07/10/22 0703    Lab Status: In process Specimen: Blood from Arm, Right Updated: 07/10/22 0707    Fingerstick Glucose (POCT) [[id number]]  (Abnormal) Collected: 07/10/22 0700    Lab Status: Final result Updated: 07/10/22 0703     POC Glucose >500 mg/dl     Fingerstick Glucose (POCT) [[id number]]  (Abnormal) Collected: 07/10/22 0659    Lab Status: Final result Updated: 07/10/22 0703     POC Glucose >500 mg/dl                  No orders to display              Procedures  ECG 12 Lead Documentation Only    Date/Time: 7/10/2022 7:12 AM  Performed by: Jamie Nuñez DO  Authorized by:  Jamie Nuñez DO     ECG reviewed by me, the ED Provider: yes    Patient location: ED  Comments:      EKG shows sinus tachycardia 118 per with normal axis  There is otherwise no other definitive acute ST or T-wave changes appreciated  CriticalCare Time  Performed by: Radha Carreon DO  Authorized by: Radha Carreon DO     Critical care provider statement:     Critical care time (minutes):  55    Critical care was necessary to treat or prevent imminent or life-threatening deterioration of the following conditions:  Endocrine crisis    Critical care was time spent personally by me on the following activities:  Development of treatment plan with patient or surrogate, obtaining history from patient or surrogate, discussions with consultants, evaluation of patient's response to treatment, examination of patient, ordering and performing treatments and interventions, ordering and review of laboratory studies, ordering and review of radiographic studies, re-evaluation of patient's condition and review of old charts             ED Course  ED Course as of 07/10/22 1506   Sun Jul 10, 2022   4039 Discussed case with Dr Maya Baldwin  He has asked us to verify antiplatelet therapy, as well as a complete NIH stroke scale   0735 WBC(!): 19 95   0756 Patient's labs are most consistent with DKA  DKA protocol initiated  Case discussed with critical care who will be down to see the patient  3406 NIH stroke scale is 1   0829 pH, Jung(!): 7 007   0940 Dr Jeevan Ellison is has evaluated the patient at bedside -will place in ICU   0942 Will hold aspirin on this patient due to potential gastric tumor with bleeding  SBIRT 20yo+    Flowsheet Row Most Recent Value   SBIRT (23 yo +)    In order to provide better care to our patients, we are screening all of our patients for alcohol and drug use  Would it be okay to ask you these screening questions? Yes Filed at: 07/10/2022 3043   Initial Alcohol Screen: US AUDIT-C     1   How often do you have a drink containing alcohol? 0 Filed at: 07/10/2022 0703   2  How many drinks containing alcohol do you have on a typical day you are drinking? 0 Filed at: 07/10/2022 0703   3a  Male UNDER 65: How often do you have five or more drinks on one occasion? 0 Filed at: 07/10/2022 0703   3b  FEMALE Any Age, or MALE 65+: How often do you have 4 or more drinks on one occassion? 0 Filed at: 07/10/2022 0703   Audit-C Score 0 Filed at: 07/10/2022 4471   DOMINGA: How many times in the past year have you    Used an illegal drug or used a prescription medication for non-medical reasons? Never Filed at: 07/10/2022 0703                    MDM    Disposition  Final diagnoses:   None     ED Disposition     None      Follow-up Information    None         Patient's Medications   Discharge Prescriptions    No medications on file       No discharge procedures on file      PDMP Review       Value Time User    PDMP Reviewed  Yes 6/17/2020  2:17 PM Valente Juarez MD          ED Provider  Electronically Signed by           Maya Christianson DO  07/10/22 1502

## 2022-07-10 NOTE — NURSING NOTE
Pt , provider made aware, per provider ok to keep insulin gtt at 10 8 units at this time until next POCT glucose check at 1300

## 2022-07-10 NOTE — H&P
Consult Note - Pulmonary   Tila Blackwell 80 y o  female MRN: [id number]  Unit/Bed#: ED 28 Encounter: [id number]      Assessment:   1  SIRS/Sepsis - POA - HR/WBC/RR  2  Severe DKA - POA, baseline poor diabetes control  3  AGMA with elevated lactate  4  Suspected mild RAMIRO, hyperkalemia  5  Toxic/metabolic encephalopathy  6  Vision changes  7  Gastric mass with possible coffee ground emesis  8  Severe volume depletion  9  Stroke alert called on presentation    PLAN:  · NEURO - following stroke pathway but suspect neuro findings related to metabolic derangements, serial neuro exams, delirium precautions  · CARDIAC - monitor HR, continue volume resuscitation  · PULM - monitor respiratory status for fatigue, currently attempting to correct acidosis, avoid intubation if possible  · GI - NPO for now, start protonix bolus and gtt, consult GI for endoscopy, serial Hgb levels, check lipase  · RENAL - continue aggressive crystalloid resuscitation, replete electrolytes per protocol, repeat VBG and LA in 2 hours, follow UOP, bladder scans, may need ramos  · ENDO - DKA protocol insulin, replete electrolytes/volume resuscitation  · ID - check blood cx, UA, empiric cefepime/vanc, check MRSA swab, trend temp/WBC  · HEME - serial H/H, no clear evidence of active bleeding  · ICU Care - Follow up code status with family, SCDs alone, PPI gtt    Critical Care time excluding procedures, teaching and updates is 40 minutes    "Chief Complaint" weakness and vision changes  History of Present Illness   HPI:  Tila Blackwell is a 80 y o  female who DM2, reported RA not on steroids or biologic agents, HTN, HLP diabetic neuropathy, Hodgkin's lymphoma post neck radiation with resultant osteonecrosis post operative repair March 2022  Presented for 1 day nausea/vomitting and blurry vision, noted hyperglycemia at home  ED course reviewed - initially stroke alert called but found to be in severe DKA  Started IVF and insulin bolus/gtt    CT imaging with concern for gastric mass, also pt reported very dark emesis  Reports no chest pain, no dyspnea, no sputum production, no current abd pain, no dysuria  Reports vision is improving, denied focal arm/leg weakness, no recent OCS or steroid injections  Review of systems:  12 point review of systems was completed and was otherwise negative except as listed in HPI  Historical Information   Past Medical History:   Diagnosis Date    Ambulates with cane     Cancer ([facility] [address] )     Chronic pain disorder     knees/ shoulders (gets inj every 3 mos)    Controlled type 2 diabetes mellitus with diabetic polyneuropathy, with long-term current use of insulin ([facility] [address] ) 5/21/2008    [facility]     Diabetic [facility]uropathy [facility] 5/21/2008    ICD10 clean up    Disease of thyroid gland     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    HL (hearing loss)     Hodgkin's disease ([facility] [address] ) 2008    Left neck, had radiation    Hypertension     Neuropathy     Osteoporosis     RA [facility]rheumatoid arthritis) [facility]      Past Surgical History:   Procedure Laterality Date    ADENOIDECTOMY      APPENDECTOMY      CATARACT EXTRACTION      CATARACT EXTRACTION, BILATERAL      CHOLECYSTECTOMY      FRACTURE SURGERY Right     hip    MOUTH SURGERY      oral cancer left lower lip    OVARY SURGERY      CO ADJ TISS XFER HEAD,FAC,HAND <10 SQCM N/A 3/28/2022    Procedure: Adjacent tissue transfer face;  Surgeon: Jasen Ferreira MD;  [facility];  Service: ENT    CO MASTOIDECTOMY,SIMPLE Left 3/28/2022    Procedure: MASTOIDECTOMY;  Surgeon: Jasen Ferreira MD;  [facility];  Service: ENT    CO OPEN RX FEMUR FX+INTRAMED SHE Right 5/28/2020    Procedure: INSERTION NAIL IM FEMUR ANTEGRADE (TROCHANTERIC);   Surgeon: Ry Velasco DO;  Location: [address] [place] [address] MAIN OR;  Service: Orthopedics    TONSILLECTOMY      TOTAL THYROIDECTOMY  2008    Performed after left neck dissection and left oral cancer diagnosis     Family History   Problem Relation Age of Onset    Cancer Mother     Diabetes Mother     Diabetes Father     Hypertension Father        Social History: denied tobacco abuse, no ETOH use, lives alone    Meds/Allergies   Current Facility-Administered Medications   Medication Dose Route Frequency    bupivacaine (MARCAINE) 0 25 % injection 4 mL  4 mL Injection     bupivacaine (MARCAINE) 0 25 % injection 4 mL  4 mL Injection     bupivacaine (MARCAINE) 0 25 % injection 4 mL  4 mL Injection     cefepime (MAXIPIME) IVPB (premix in dextrose) 2,000 mg 50 mL  2,000 mg Intravenous Q12H    insulin regular (HumuLIN R,NovoLIN R) 1 Units/mL in sodium chloride 0 9 % 100 mL infusion  0 1-30 Units/hr Intravenous Continuous    multi-electrolyte (ISOLYTE-S PH 7 4) bolus 1,000 mL  1,000 mL Intravenous Once    sodium chloride (PF) 0 9 % injection 3 mL  3 mL Intravenous Q1H PRN    triamcinolone acetonide (KENALOG-40) 40 mg/mL injection 80 mg  80 mg Intra-articular     triamcinolone acetonide (KENALOG-40) 40 mg/mL injection 80 mg  80 mg Intra-articular     triamcinolone acetonide (KENALOG-40) 40 mg/mL injection 80 mg  80 mg Intra-articular     vancomycin (VANCOCIN) IVPB (premix in dextrose) 1,000 mg 200 mL  20 mg/kg Intravenous Once    [START ON 7/11/2022] vancomycin (VANCOCIN) IVPB (premix in dextrose) 750 mg 150 mL  12 5 mg/kg Intravenous Q24H     (Not in a hospital admission)    No Known Allergies    Vitals: Blood pressure 112/51, pulse (!) 128, temperature 97 5 °F (36 4 °C), temperature source Oral, resp  rate (!) 24, weight 53 6 kg (118 lb 2 7 oz), SpO2 99 % , RA, Body mass index is 22 33 kg/m²      No intake or output data in the 24 hours ending 07/10/22 0920    Physical Exam  General: Older frail woman, in bed, awake alert and oriented x 2 - not time, conversant without conversational dyspnea but noted tachypnea, NAD, tangential  HEENT:  PERRL, Sclera noninjected, nonicteric OU, Nares patent, no nasal flaring, no nasal drainage, Mucous membranes very dry, no oral lesions, poor dentition  NECK: Trachea midline, mild accessory muscle use, no stridor, no cervical or supraclavicular adenopathy, JVP not elevated  CARDIAC: Reg, tachy single s1/S2, no m/r/g  PULM: clear, no wheeze, no rales, no rhonchi, noted ketosis odor  CHEST: No gross deformities, equal chest expansion on inspiration bilaterally  ABD: Normoactive bowel sounds, soft nontender, nondistended, no rebound, no rigidity, no guarding  EXT: No cyanosis, no clubbing, no edema, normal capillary refill  SKIN:  No rashes, no lesions  NEURO: no focal neurologic deficits, tongue midline, no facial droop, normal speech, moving all extremities appropriately, no pronator drift    Labs: I have personally reviewed pertinent lab results  Laboratory and Diagnostics  Results from last 7 days   Lab Units 07/10/22  0728   WBC Thousand/uL 19 95*   HEMOGLOBIN g/dL 12 1   HEMATOCRIT % 37 0   PLATELETS Thousands/uL 257     Results from last 7 days   Lab Units 07/10/22  0703   SODIUM mmol/L 127*   POTASSIUM mmol/L 5 7*   CHLORIDE mmol/L 85*   CO2 mmol/L <6*   ANION GAP mmol/L >36*   BUN mg/dL 37*   CREATININE mg/dL 1 25   CALCIUM mg/dL 10 4*   GLUCOSE RANDOM mg/dL 820*   ALT U/L 27   AST U/L 28   ALK PHOS U/L 93   ALBUMIN g/dL 4 3   TOTAL BILIRUBIN mg/dL 0 51     Results from last 7 days   Lab Units 07/10/22  0703   MAGNESIUM mg/dL 2 2   PHOSPHORUS mg/dL 6 9*      Results from last 7 days   Lab Units 07/10/22  0728   INR  1 01   PTT seconds 29          Results from last 7 days   Lab Units 07/10/22  0703   LACTIC ACID mmol/L 4 6*     BHB 6 5    A1c 10 2 - 7/2022    Imaging and other studies: I have personally reviewed pertinent reports     and I have personally reviewed pertinent films in PACS  CXR 7/10 - no focal infiltrates, no effusions, no PTX    CT A/P - gastric distention with mass like mucosal thickening in gastric antrum, enlarged bladder    CT Stroke Alert - motion artifact, no large vessel occlusion, severe left atherosclerosis of left VA origin and SC artery, no acute IC hemorrhage/shift    Code Status: Prior      Codey Montiel DO, [facility]

## 2022-07-10 NOTE — QUICK NOTE
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  Woke at 5:30   147/99,   No thinners  Feels dry,     Vision reportedly coming back slowly, and when she returned from CT, she reported her vision was back to normal although there was some concern for a possible left eye lateral field cut, but she apparently had a hard time cooperating with that part of the exam     Found to be in DKA, elevated WBC  NIHSSS 1 (partial visual field deficit)    CT head wo: "No acute intracranial CT abnormality  No significant interval change "  CTA head/neck: No acute findings  No LVO  Poor quality images due to motion  Severe stenosis of distal left common carotid, 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, severe stenosis of left vert origin  IV tPA was not given due to low concern for stroke and concern for active GI bleeding (black emesis at home, and CT abdomen showed a gastric mass) Most likely that all symptoms are related to DKA/hyperglycemia, and associated metabolic derangements as well as likely infection given her WBC 19 95  That being said, she has multifocal significant atherosclerosis as well as 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

## 2022-07-10 NOTE — NURSING NOTE
Patient admitted to ICU 2 at this time, oriented to room and call bell system  Safety measures in place, Received patient on insulin gtt at 10 8 units  Hourly sugars completed per provider orders  Neuro checks and vital signs monitored and documented per orders  Additional IV access completed  Patient remains awake and alert, offers complaints of generalized pain  Skin checked with Benson Tsai RN, wound care consult placed

## 2022-07-11 ENCOUNTER — APPOINTMENT (INPATIENT)
Dept: MRI IMAGING | Facility: HOSPITAL | Age: 82
DRG: 637 | End: 2022-07-11
Payer: MEDICARE

## 2022-07-11 ENCOUNTER — APPOINTMENT (INPATIENT)
Dept: NON INVASIVE DIAGNOSTICS | Facility: HOSPITAL | Age: 82
DRG: 637 | End: 2022-07-11
Payer: MEDICARE

## 2022-07-11 PROBLEM — H53.9 VISUAL CHANGES: Status: ACTIVE | Noted: 2022-07-11

## 2022-07-11 LAB
ALBUMIN SERPL BCP-MCNC: 2.9 G/DL (ref 3.5–5)
ALP SERPL-CCNC: 65 U/L (ref 34–104)
ALT SERPL W P-5'-P-CCNC: 19 U/L (ref 7–52)
ANION GAP SERPL CALCULATED.3IONS-SCNC: 12 MMOL/L (ref 4–13)
ANION GAP SERPL CALCULATED.3IONS-SCNC: 12 MMOL/L (ref 4–13)
ANION GAP SERPL CALCULATED.3IONS-SCNC: 13 MMOL/L (ref 4–13)
ANION GAP SERPL CALCULATED.3IONS-SCNC: 18 MMOL/L (ref 4–13)
AORTIC ROOT: 3.2 CM
AORTIC VALVE MEAN VELOCITY: 8.9 M/S
APICAL FOUR CHAMBER EJECTION FRACTION: 54 %
ASCENDING AORTA: 3.1 CM
AST SERPL W P-5'-P-CCNC: 31 U/L (ref 13–39)
AV AREA BY CONTINUOUS VTI: 2.9 CM2
AV AREA PEAK VELOCITY: 3.1 CM2
AV LVOT MEAN GRADIENT: 4 MMHG
AV LVOT PEAK GRADIENT: 7 MMHG
AV MEAN GRADIENT: 3 MMHG
AV PEAK GRADIENT: 6 MMHG
AV VALVE AREA: 2.94 CM2
AV VELOCITY RATIO: 1.1
BASOPHILS # BLD AUTO: 0.03 THOUSANDS/ΜL (ref 0–0.1)
BASOPHILS NFR BLD AUTO: 0 % (ref 0–1)
BILIRUB SERPL-MCNC: 0.49 MG/DL (ref 0.2–1)
BUN SERPL-MCNC: 15 MG/DL (ref 5–25)
BUN SERPL-MCNC: 16 MG/DL (ref 5–25)
BUN SERPL-MCNC: 20 MG/DL (ref 5–25)
BUN SERPL-MCNC: 23 MG/DL (ref 5–25)
CA-I BLD-SCNC: 1.07 MMOL/L (ref 1.12–1.32)
CALCIUM ALBUM COR SERPL-MCNC: 9 MG/DL (ref 8.3–10.1)
CALCIUM SERPL-MCNC: 8 MG/DL (ref 8.4–10.2)
CALCIUM SERPL-MCNC: 8.1 MG/DL (ref 8.4–10.2)
CALCIUM SERPL-MCNC: 8.3 MG/DL (ref 8.4–10.2)
CALCIUM SERPL-MCNC: 8.4 MG/DL (ref 8.4–10.2)
CHLORIDE SERPL-SCNC: 100 MMOL/L (ref 96–108)
CHLORIDE SERPL-SCNC: 102 MMOL/L (ref 96–108)
CHLORIDE SERPL-SCNC: 103 MMOL/L (ref 96–108)
CHLORIDE SERPL-SCNC: 104 MMOL/L (ref 96–108)
CHOLEST SERPL-MCNC: 146 MG/DL
CO2 SERPL-SCNC: 13 MMOL/L (ref 21–32)
CO2 SERPL-SCNC: 17 MMOL/L (ref 21–32)
CO2 SERPL-SCNC: 18 MMOL/L (ref 21–32)
CO2 SERPL-SCNC: 21 MMOL/L (ref 21–32)
CREAT SERPL-MCNC: 0.72 MG/DL (ref 0.6–1.3)
CREAT SERPL-MCNC: 0.74 MG/DL (ref 0.6–1.3)
CREAT SERPL-MCNC: 0.77 MG/DL (ref 0.6–1.3)
CREAT SERPL-MCNC: 0.81 MG/DL (ref 0.6–1.3)
DOP CALC AO PEAK VEL: 1.24 M/S
DOP CALC AO VTI: 28.16 CM
DOP CALC LVOT AREA: 2.83 CM2
DOP CALC LVOT DIAMETER: 1.9 CM
DOP CALC LVOT PEAK VEL VTI: 29.17 CM
DOP CALC LVOT PEAK VEL: 1.36 M/S
DOP CALC LVOT STROKE INDEX: 52.6 ML/M2
DOP CALC LVOT STROKE VOLUME: 82.66
E WAVE DECELERATION TIME: 236 MS
EOSINOPHIL # BLD AUTO: 0.01 THOUSAND/ΜL (ref 0–0.61)
EOSINOPHIL NFR BLD AUTO: 0 % (ref 0–6)
ERYTHROCYTE [DISTWIDTH] IN BLOOD BY AUTOMATED COUNT: 12.3 % (ref 11.6–15.1)
FRACTIONAL SHORTENING: 29 (ref 28–44)
GFR SERPL CREATININE-BSD FRML MDRD: 68 ML/MIN/1.73SQ M
GFR SERPL CREATININE-BSD FRML MDRD: 72 ML/MIN/1.73SQ M
GFR SERPL CREATININE-BSD FRML MDRD: 76 ML/MIN/1.73SQ M
GFR SERPL CREATININE-BSD FRML MDRD: 78 ML/MIN/1.73SQ M
GLUCOSE SERPL-MCNC: 111 MG/DL (ref 65–140)
GLUCOSE SERPL-MCNC: 118 MG/DL (ref 65–140)
GLUCOSE SERPL-MCNC: 120 MG/DL (ref 65–140)
GLUCOSE SERPL-MCNC: 159 MG/DL (ref 65–140)
GLUCOSE SERPL-MCNC: 172 MG/DL (ref 65–140)
GLUCOSE SERPL-MCNC: 183 MG/DL (ref 65–140)
GLUCOSE SERPL-MCNC: 183 MG/DL (ref 65–140)
GLUCOSE SERPL-MCNC: 190 MG/DL (ref 65–140)
GLUCOSE SERPL-MCNC: 199 MG/DL (ref 65–140)
GLUCOSE SERPL-MCNC: 204 MG/DL (ref 65–140)
GLUCOSE SERPL-MCNC: 218 MG/DL (ref 65–140)
GLUCOSE SERPL-MCNC: 222 MG/DL (ref 65–140)
GLUCOSE SERPL-MCNC: 339 MG/DL (ref 65–140)
GLUCOSE SERPL-MCNC: 97 MG/DL (ref 65–140)
HCT VFR BLD AUTO: 29.2 % (ref 34.8–46.1)
HDLC SERPL-MCNC: 66 MG/DL
HGB BLD-MCNC: 9.7 G/DL (ref 11.5–15.4)
IMM GRANULOCYTES # BLD AUTO: 0.08 THOUSAND/UL (ref 0–0.2)
IMM GRANULOCYTES NFR BLD AUTO: 0 % (ref 0–2)
INTERVENTRICULAR SEPTUM IN DIASTOLE (PARASTERNAL SHORT AXIS VIEW): 1 CM
INTERVENTRICULAR SEPTUM: 1 CM (ref 0.6–1.1)
LAAS-AP2: 11.9 CM2
LAAS-AP4: 11 CM2
LACTATE SERPL-SCNC: 1.5 MMOL/L (ref 0.5–2)
LDLC SERPL CALC-MCNC: 62 MG/DL (ref 0–100)
LEFT ATRIUM AREA SYSTOLE SINGLE PLANE A4C: 11.5 CM2
LEFT ATRIUM SIZE: 2.7 CM
LEFT INTERNAL DIMENSION IN SYSTOLE: 2.4 CM (ref 2.1–4)
LEFT VENTRICULAR INTERNAL DIMENSION IN DIASTOLE: 3.4 CM (ref 3.5–6)
LEFT VENTRICULAR POSTERIOR WALL IN END DIASTOLE: 1 CM
LEFT VENTRICULAR STROKE VOLUME: 27 ML
LVSV (TEICH): 27 ML
LYMPHOCYTES # BLD AUTO: 1.24 THOUSANDS/ΜL (ref 0.6–4.47)
LYMPHOCYTES NFR BLD AUTO: 6 % (ref 14–44)
MAGNESIUM SERPL-MCNC: 1.7 MG/DL (ref 1.9–2.7)
MAGNESIUM SERPL-MCNC: 2 MG/DL (ref 1.9–2.7)
MCH RBC QN AUTO: 33.4 PG (ref 26.8–34.3)
MCHC RBC AUTO-ENTMCNC: 33.2 G/DL (ref 31.4–37.4)
MCV RBC AUTO: 101 FL (ref 82–98)
MONOCYTES # BLD AUTO: 1.22 THOUSAND/ΜL (ref 0.17–1.22)
MONOCYTES NFR BLD AUTO: 6 % (ref 4–12)
MV E'TISSUE VEL-SEP: 10 CM/S
MV PEAK A VEL: 1.25 M/S
MV PEAK E VEL: 85 CM/S
MV STENOSIS PRESSURE HALF TIME: 69 MS
MV VALVE AREA P 1/2 METHOD: 3.19
NEUTROPHILS # BLD AUTO: 16.93 THOUSANDS/ΜL (ref 1.85–7.62)
NEUTS SEG NFR BLD AUTO: 88 % (ref 43–75)
NRBC BLD AUTO-RTO: 0 /100 WBCS
PHOSPHATE SERPL-MCNC: 2.6 MG/DL (ref 2.3–4.1)
PHOSPHATE SERPL-MCNC: 3.1 MG/DL (ref 2.3–4.1)
PLATELET # BLD AUTO: 184 THOUSANDS/UL (ref 149–390)
PMV BLD AUTO: 10.3 FL (ref 8.9–12.7)
POTASSIUM SERPL-SCNC: 3.4 MMOL/L (ref 3.5–5.3)
POTASSIUM SERPL-SCNC: 3.8 MMOL/L (ref 3.5–5.3)
POTASSIUM SERPL-SCNC: 3.9 MMOL/L (ref 3.5–5.3)
POTASSIUM SERPL-SCNC: 4.4 MMOL/L (ref 3.5–5.3)
PROCALCITONIN SERPL-MCNC: 0.86 NG/ML
PROT SERPL-MCNC: 4.7 G/DL (ref 6.4–8.4)
RBC # BLD AUTO: 2.9 MILLION/UL (ref 3.81–5.12)
RIGHT ATRIUM AREA SYSTOLE A4C: 9.1 CM2
RIGHT VENTRICLE ID DIMENSION: 2.6 CM
SL CV LEFT ATRIUM LENGTH A2C: 4 CM
SL CV LV EF: 60
SL CV PED ECHO LEFT VENTRICLE DIASTOLIC VOLUME (MOD BIPLANE) 2D: 47 ML
SL CV PED ECHO LEFT VENTRICLE SYSTOLIC VOLUME (MOD BIPLANE) 2D: 20 ML
SODIUM SERPL-SCNC: 131 MMOL/L (ref 135–147)
SODIUM SERPL-SCNC: 133 MMOL/L (ref 135–147)
SODIUM SERPL-SCNC: 134 MMOL/L (ref 135–147)
SODIUM SERPL-SCNC: 135 MMOL/L (ref 135–147)
TR MAX PG: 20 MMHG
TR PEAK VELOCITY: 2.3 M/S
TRICUSPID VALVE PEAK REGURGITATION VELOCITY: 2.25 M/S
TRIGL SERPL-MCNC: 88 MG/DL
WBC # BLD AUTO: 19.51 THOUSAND/UL (ref 4.31–10.16)

## 2022-07-11 PROCEDURE — 84145 PROCALCITONIN (PCT): CPT | Performed by: NURSE PRACTITIONER

## 2022-07-11 PROCEDURE — 82330 ASSAY OF CALCIUM: CPT | Performed by: NURSE PRACTITIONER

## 2022-07-11 PROCEDURE — 80048 BASIC METABOLIC PNL TOTAL CA: CPT | Performed by: NURSE PRACTITIONER

## 2022-07-11 PROCEDURE — 82948 REAGENT STRIP/BLOOD GLUCOSE: CPT

## 2022-07-11 PROCEDURE — 99222 1ST HOSP IP/OBS MODERATE 55: CPT | Performed by: INTERNAL MEDICINE

## 2022-07-11 PROCEDURE — 84100 ASSAY OF PHOSPHORUS: CPT | Performed by: NURSE PRACTITIONER

## 2022-07-11 PROCEDURE — C9113 INJ PANTOPRAZOLE SODIUM, VIA: HCPCS | Performed by: NURSE PRACTITIONER

## 2022-07-11 PROCEDURE — 93306 TTE W/DOPPLER COMPLETE: CPT | Performed by: INTERNAL MEDICINE

## 2022-07-11 PROCEDURE — 99448 NTRPROF PH1/NTRNET/EHR 21-30: CPT | Performed by: PSYCHIATRY & NEUROLOGY

## 2022-07-11 PROCEDURE — 83735 ASSAY OF MAGNESIUM: CPT | Performed by: NURSE PRACTITIONER

## 2022-07-11 PROCEDURE — 99291 CRITICAL CARE FIRST HOUR: CPT | Performed by: NURSE PRACTITIONER

## 2022-07-11 PROCEDURE — G1004 CDSM NDSC: HCPCS

## 2022-07-11 PROCEDURE — 80061 LIPID PANEL: CPT | Performed by: NURSE PRACTITIONER

## 2022-07-11 PROCEDURE — 70551 MRI BRAIN STEM W/O DYE: CPT

## 2022-07-11 PROCEDURE — 93306 TTE W/DOPPLER COMPLETE: CPT

## 2022-07-11 PROCEDURE — 80053 COMPREHEN METABOLIC PANEL: CPT | Performed by: NURSE PRACTITIONER

## 2022-07-11 PROCEDURE — 83605 ASSAY OF LACTIC ACID: CPT | Performed by: NURSE PRACTITIONER

## 2022-07-11 PROCEDURE — 85025 COMPLETE CBC W/AUTO DIFF WBC: CPT | Performed by: NURSE PRACTITIONER

## 2022-07-11 PROCEDURE — 80048 BASIC METABOLIC PNL TOTAL CA: CPT | Performed by: PHYSICIAN ASSISTANT

## 2022-07-11 RX ORDER — CALCIUM GLUCONATE 20 MG/ML
1 INJECTION, SOLUTION INTRAVENOUS ONCE
Status: COMPLETED | OUTPATIENT
Start: 2022-07-11 | End: 2022-07-11

## 2022-07-11 RX ORDER — MAGNESIUM SULFATE HEPTAHYDRATE 40 MG/ML
2 INJECTION, SOLUTION INTRAVENOUS ONCE
Status: COMPLETED | OUTPATIENT
Start: 2022-07-11 | End: 2022-07-11

## 2022-07-11 RX ORDER — INSULIN LISPRO 100 [IU]/ML
1-5 INJECTION, SOLUTION INTRAVENOUS; SUBCUTANEOUS
Status: DISCONTINUED | OUTPATIENT
Start: 2022-07-11 | End: 2022-07-15 | Stop reason: HOSPADM

## 2022-07-11 RX ORDER — POTASSIUM CHLORIDE 14.9 MG/ML
20 INJECTION INTRAVENOUS ONCE
Status: COMPLETED | OUTPATIENT
Start: 2022-07-11 | End: 2022-07-11

## 2022-07-11 RX ORDER — SODIUM CHLORIDE, SODIUM GLUCONATE, SODIUM ACETATE, POTASSIUM CHLORIDE, MAGNESIUM CHLORIDE, SODIUM PHOSPHATE, DIBASIC, AND POTASSIUM PHOSPHATE .53; .5; .37; .037; .03; .012; .00082 G/100ML; G/100ML; G/100ML; G/100ML; G/100ML; G/100ML; G/100ML
75 INJECTION, SOLUTION INTRAVENOUS CONTINUOUS
Status: DISPENSED | OUTPATIENT
Start: 2022-07-11 | End: 2022-07-12

## 2022-07-11 RX ORDER — ACETAMINOPHEN 325 MG/1
650 TABLET ORAL 4 TIMES DAILY PRN
Status: DISCONTINUED | OUTPATIENT
Start: 2022-07-11 | End: 2022-07-15 | Stop reason: HOSPADM

## 2022-07-11 RX ORDER — INSULIN LISPRO 100 [IU]/ML
5 INJECTION, SOLUTION INTRAVENOUS; SUBCUTANEOUS
Status: DISCONTINUED | OUTPATIENT
Start: 2022-07-11 | End: 2022-07-15 | Stop reason: HOSPADM

## 2022-07-11 RX ADMIN — LEVOTHYROXINE SODIUM 112 MCG: 112 TABLET ORAL at 09:45

## 2022-07-11 RX ADMIN — ACETAMINOPHEN 650 MG: 325 TABLET ORAL at 16:34

## 2022-07-11 RX ADMIN — SODIUM CHLORIDE, SODIUM GLUCONATE, SODIUM ACETATE, POTASSIUM CHLORIDE, MAGNESIUM CHLORIDE, SODIUM PHOSPHATE, DIBASIC, AND POTASSIUM PHOSPHATE 75 ML/HR: .53; .5; .37; .037; .03; .012; .00082 INJECTION, SOLUTION INTRAVENOUS at 16:50

## 2022-07-11 RX ADMIN — INSULIN LISPRO 1 UNITS: 100 INJECTION, SOLUTION INTRAVENOUS; SUBCUTANEOUS at 16:41

## 2022-07-11 RX ADMIN — INSULIN LISPRO 3 UNITS: 100 INJECTION, SOLUTION INTRAVENOUS; SUBCUTANEOUS at 21:47

## 2022-07-11 RX ADMIN — ATORVASTATIN CALCIUM 20 MG: 20 TABLET, FILM COATED ORAL at 16:37

## 2022-07-11 RX ADMIN — SODIUM PHOSPHATE, MONOBASIC, MONOHYDRATE 12 MMOL: 276; 142 INJECTION, SOLUTION INTRAVENOUS at 13:59

## 2022-07-11 RX ADMIN — INSULIN DETEMIR 5 UNITS: 100 INJECTION, SOLUTION SUBCUTANEOUS at 21:44

## 2022-07-11 RX ADMIN — CEFEPIME HYDROCHLORIDE 1000 MG: 1 INJECTION, SOLUTION INTRAVENOUS at 11:33

## 2022-07-11 RX ADMIN — SODIUM CHLORIDE 3 UNITS/HR: 9 INJECTION, SOLUTION INTRAVENOUS at 00:40

## 2022-07-11 RX ADMIN — POTASSIUM CHLORIDE 20 MEQ: 14.9 INJECTION, SOLUTION INTRAVENOUS at 06:06

## 2022-07-11 RX ADMIN — MAGNESIUM SULFATE HEPTAHYDRATE 2 G: 40 INJECTION, SOLUTION INTRAVENOUS at 06:06

## 2022-07-11 RX ADMIN — PANTOPRAZOLE SODIUM 40 MG: 40 INJECTION, POWDER, FOR SOLUTION INTRAVENOUS at 21:44

## 2022-07-11 RX ADMIN — VANCOMYCIN HYDROCHLORIDE 750 MG: 750 INJECTION, SOLUTION INTRAVENOUS at 10:23

## 2022-07-11 RX ADMIN — CALCIUM GLUCONATE 1 G: 20 INJECTION, SOLUTION INTRAVENOUS at 13:04

## 2022-07-11 RX ADMIN — SODIUM CHLORIDE, SODIUM GLUCONATE, SODIUM ACETATE, POTASSIUM CHLORIDE, MAGNESIUM CHLORIDE, SODIUM PHOSPHATE, DIBASIC, AND POTASSIUM PHOSPHATE 75 ML/HR: .53; .5; .37; .037; .03; .012; .00082 INJECTION, SOLUTION INTRAVENOUS at 01:23

## 2022-07-11 RX ADMIN — CEFEPIME HYDROCHLORIDE 1000 MG: 1 INJECTION, SOLUTION INTRAVENOUS at 21:44

## 2022-07-11 RX ADMIN — PANTOPRAZOLE SODIUM 40 MG: 40 INJECTION, POWDER, FOR SOLUTION INTRAVENOUS at 09:45

## 2022-07-11 NOTE — CONSULTS
Consultation - [facility]  Tyler Babin 80 y o  female MRN: [id number]  Unit/Bed#: ICU 02-01 Encounter: [id number]        Inpatient consult to gastroenterology  Consult performed by: Nanda Mcgregor PA-C  Consult ordered by: Mala Wagoner          Reason for Consult / Principal Problem:     Hematemesis      ASSESSMENT AND PLAN:      Patient is a 80 y o  female with PMH significant for insulin-dependent DM 2, diabetic polyneuropathy, hypothyroidism, RA, history of Hodgkin's lymphoma s/p radiation with resultant osteonecrosis repaired in March 2022 admitted on 07/10 for nausea/vomiting and blurry vision in the setting of DKA  GI consulted for abnormal CTA and concern for possible CGE  CTA/P incidentally noted a masslike thickening of the gastric antrum with pronounced gastric distension and a dilated fluid-filled stomach  Labs notable for persistent leukocytosis (19 51), mildly down trending hemoglobin (9 7), improved lyte abnormalities, acidosis, and renal function, stable hepatic function  Patient chronically tachypneic with intermittent hypotension  Mildly down trending hemoglobin likely dilutional the setting of aggressive IV hydration - No further overt GI bleeding witnessed since admission  Given abnormal CT findings and reported dark emesis, recommend EGD for further evaluation of possible esophagitis, gastritis, PUD, or gastric mass/malignancy  Although presently, patient is not ideal candidate for endoscopic evaluation and would recommend continued optimization in regards to DKA prior to EGD  However, in discussion with patient she adamantly refuses endoscopic evaluation and/or all other forms of alternative testing with the knowledge that a gastric mass/malignancy cannot be entirely excluded - Patient's daughter, Alexey Lipoma, informed via telephone   Therefore, recommend to continue conservative therapies with twice daily PPI, monitor hemoglobin with transfusions as necessary, and monitor for further signs of overt GI bleeding   - OK diet  - Continue twice daily PPI  - Monitor hgb; Transfuse for hgb <7 0   - Monitor for further signs of overt GI bleeding    No further GI intervention indicated at this time  GI will sign off  Please contact with questions or concerns, or if patient wishes to proceed with endoscopy      ______________________________________________________________________    HPI: Patient is a 80 y o  female with PMH significant for insulin-dependent DM 2, diabetic polyneuropathy, hypothyroidism, RA, history of Hodgkin's lymphoma s/p radiation with resultant osteonecrosis repaired in March 2022 who presented to the ED on 07/10 for nausea/vomiting and blurry vision in the setting of hyperglycemia  Initially concern for stroke-like symptoms, however patient was found to be in severe DKA with labs including glucose >500, significant electrolyte abnormalities, significant metabolic acidosis, and elevated beta hydroxybutyrate  Patient initiated on DKA insulin protocol and aggressive IVF hydration  During evaluation CTA/P incidentally noted a masslike thickening of the gastric antrum with pronounced gastric distension and a dilated fluid-filled stomach  She also reports nausea and 3 episodes of dark appearing emesis prior to admission - no further nausea/vomiting since admission  Denies excessive NSAID or EtOH use  Admits to occasional reflux, uses OTC Tums  Denies fevers/chills, epigastric or other abdominal pain, noticing blood in her stool/black tarry stools  Reports an approximate 130 lb weight loss in the last 1-2 years - Per chart review, patient's weight remains stable since at least 2020  Denies any previous endoscopic evaluation  REVIEW OF SYSTEMS:    CONSTITUTIONAL: Denies any fever, chills, rigors, and weight loss  HEENT: No earache or tinnitus  Denies hearing loss or visual disturbances    CARDIOVASCULAR: No chest pain or palpitations  RESPIRATORY: Denies any cough, hemoptysis, shortness of breath or dyspnea on exertion  GASTROINTESTINAL: As noted in the History of Present Illness  GENITOURINARY: No problems with urination  Denies any hematuria or dysuria  NEUROLOGIC: No dizziness or vertigo, denies headaches  MUSCULOSKELETAL: Denies any muscle or joint pain  SKIN: Denies skin rashes or itching  ENDOCRINE: Denies excessive thirst  Denies intolerance to heat or cold  PSYCHOSOCIAL: Denies depression or anxiety  Denies any recent memory loss  Historical Information   Past Medical History:   Diagnosis Date    Ambulates with cane     Cancer ([facility]     Chronic pain disorder     knees/ shoulders (gets inj every 3 mos)    Controlled type 2 diabetes mellitus with diabetic polyneuropathy, with long-term current use of insulin ([facility] ) 5/21/2008    Diabetes mellitus ([facility] )     Diabetic polyneuropathy ([facility] ) 5/21/2008    ICD10 clean up    Disease of thyroid gland     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    HL (hearing loss)     Hodgkin's disease ([facility] ) 2008    Left neck, had radiation    Hypertension     Neuropathy     Osteoporosis     RA (rheumatoid arthritis) ([facility] )      Past Surgical History:   Procedure Laterality Date    ADENOIDECTOMY      APPENDECTOMY      CATARACT EXTRACTION      CATARACT EXTRACTION, BILATERAL      CHOLECYSTECTOMY      FRACTURE SURGERY Right     hip    MOUTH SURGERY      oral cancer left lower lip    OVARY SURGERY      VA ADJ TISS XFER HEAD,FAC,HAND <10 SQCM N/A 3/28/2022    Procedure: Adjacent tissue transfer face;  Surgeon: Tila Martínez MD;  Location: [facility];  Service: ENT    VA MASTOIDECTOMY,SIMPLE Left 3/28/2022    Procedure: MASTOIDECTOMY;  Surgeon: Tila Martínez MD;  Location: [facility];  Service: ENT    VA OPEN RX FEMUR FX+INTRAMED SHE Right 5/28/2020    Procedure: INSERTION NAIL IM FEMUR ANTEGRADE (TROCHANTERIC);   Surgeon: Elise Gomez DO;  Location: [facility] MA[facility] OR;  Service: Orthopedics    TONSILLECTOMY      TOTAL THYROIDECTOMY  2008    Performed after left neck dissection and left oral cancer diagnosis     Social History   Social History     Substance and Sexual Activity   Alcohol Use Not Currently    Alcohol/week: 0 0 standard drinks     Social History     Substance and Sexual Activity   Drug Use Never     Social History     Tobacco Use   Smoking Status Never Smoker   Smokeless Tobacco Never Used     Family History   Problem Relation Age of Onset    Cancer Mother     Diabetes Mother     Diabetes Father     Hypertension Father        Meds/Allergies     Facility-Administered Medications Prior to Admission   Medication    bupivacaine (MARCAINE) 0 25 % injection 4 mL    bupivacaine (MARCAINE) 0 25 % injection 4 mL    bupivacaine (MARCAINE) 0 25 % injection 4 mL    triamcinolone acetonide (KENALOG-40) 40 mg/mL injection 80 mg    triamcinolone acetonide (KENALOG-40) 40 mg/mL injection 80 mg    triamcinolone acetonide (KENALOG-40) 40 mg/mL injection 80 mg     Medications Prior to Admission   Medication    Ascorbic Acid (vitamin C) 100 MG tablet    aspirin (ECOTRIN LOW STRENGTH) 81 mg EC tablet    atorvastatin (LIPITOR) 20 mg tablet    BD Insulin Syringe U/F 1/2Unit 31G X 5/16" 0 3 ML MISC    calcium carbonate (OS-FELICE) 600 MG tablet    cyanocobalamin (VITAMIN B-12) 1000 MCG tablet    gabapentin (NEURONTIN) 300 mg capsule    Insulin Aspart (NOVOLOG SC)    Insulin Detemir (LEVEMIR SC)    ipratropium (ATROVENT) 0 03 % nasal spray    levothyroxine 100 mcg tablet    lisinopril (ZESTRIL) 5 mg tablet    methocarbamol (ROBAXIN) 500 mg tablet    Multiple Vitamin (multivitamin) capsule    OneTouch Ultra test strip    traMADol (ULTRAM) 50 mg tablet    VITAMIN D PO     Current Facility-Administered Medications   Medication Dose Route Frequency    atorvastatin (LIPITOR) tablet 20 mg  20 mg Oral Daily With Dinner    calcium gluconate 1 g in sodium chloride 0 9% 50 mL (premix)  1 g Intravenous Once    cefepime (MAXIPIME) IVPB (premix in dextrose) 1,000 mg 50 mL  1,000 mg Intravenous Q12H    insulin regular (HumuLIN R,NovoLIN R) 1 Units/mL in sodium chloride 0 9 % 100 mL infusion  0 3-21 Units/hr Intravenous Titrated    levothyroxine tablet 112 mcg  112 mcg Oral QAM    multi-electrolyte (PLASMALYTE-A/ISOLYTE-S PH 7 4) IV solution  75 mL/hr Intravenous Continuous    pantoprazole (PROTONIX) injection 40 mg  40 mg Intravenous Q12H [facility]    sodium chloride (PF) 0 9 % injection 3 mL  3 mL Intravenous Q1H PRN    sodium phosphate 12 mmol in sodium chloride 0 9 % 100 mL Infusion  12 mmol Intravenous Once    vancomycin (VANCOCIN) IVPB (premix in dextrose) 750 mg 150 mL  12 5 mg/kg Intravenous Q24H       No Known Allergies        Objective     Blood pressure 100/63, pulse 83, temperature 98 4 °F (36 9 °C), temperature source Tympanic, resp  rate 19, height 5' 1" (1 549 m), weight 55 8 kg (123 lb), SpO2 100 %  Body mass index is 23 24 kg/m²  Intake/Output Summary (Last 24 hours) at 7/11/2022 0837  Last data filed at 7/11/2022 0600  Gross per 24 hour   Intake 5055 27 ml   Output 1465 ml   Net 3590 27 ml         PHYSICAL EXAM:      General Appearance:   +Neck brace on and in place; Alert, cooperative, no distress   HEENT:   Normocephalic, atraumatic, anicteric  Neck:  Supple, symmetrical, trachea midline   Lungs:   Clear to auscultation bilaterally; no rales, rhonchi or wheezing; respirations unlabored    Heart[de-identified]   Regular rate and rhythm; no murmur, rub, or gallop     Abdomen:   Soft, non-tender, non-distended; normal bowel sounds; no masses, no organomegaly    Genitalia:   Deferred    Rectal:   Deferred    Extremities:  No cyanosis, clubbing or edema    Pulses:  2+ and symmetric all extremities    Skin:  No jaundice, rashes, or lesions    Lymph nodes:  No palpable cervical lymphadenopathy        Lab Results:   No results displayed because visit has over 200 results  Imaging Studies: I have personally reviewed pertinent imaging studies

## 2022-07-11 NOTE — CONSULTS
INTERPROFESSIONAL (PHONE) Le Hinkle 80 y o  female MRN: [id number]  Encounter Date: 07/11/22      Reason for Consult / Principal Problem: Stroke like symptoms     Consulting Provider: Dr Lambert Pratt  Requesting Provider: Dr Wooten [facility]     80year-old female with diabetes, hypertension, RA, neuropathy, prior Hodgkin's disease, and chronic pain, presented to the ED with multiple symptoms  First patient stated that she lost her vision earlier in the day  It was progressively improving while in the ED  After CT, it was noted to be back to normal, however there was still concern for possible left lateral field cut  She also noted diffuse pain and vomiting ("black" emesis)  She was having trouble walking due to dizziness/disequilibrium  Her glucose was greater than 500  A stroke alert was activated in the ED  CT unremarkable  CTA with 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 and 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  It also noted severe stenosis of the left vertebral artery at the origin  TPA was deferred due to concern for active GI bleeding given reports of black emesis at home and CT showing a gastric mass  There was also low concern for stroke  She was diagnosed with DKA and had multiple metabolic derangements  WBC elevated suggesting possible underlying infection  She was initiated on the stroke pathway regardless, but aspirin was not started due to GI workup  MRI brain was ultimately completed and did not reveal any acute pathology  ASSESSMENT/PLAN:  Symptomatic hyperglycemia in the setting of dka  Mri brain negative for acute cva      When cleared from GI standpoint restart baby aspirin given the atheroclerosis in cerebrovasculature    Pt should have outpt dilated eye exam to eval for diabetic retinopathy        Total time spent in review of data, discussion with requesting provider and rendering advice was 21-30 Mins

## 2022-07-11 NOTE — ASSESSMENT & PLAN NOTE
· Baseline creatinine appears to be 0 7-0 8, 1 25 on admission  · Likely prerenal in the setting of DKA  · Now resolved  · Continue to monitor renal indices and urine output

## 2022-07-11 NOTE — ASSESSMENT & PLAN NOTE
· Hold home antihypertensives, Lisinopril 5mg daily  · Currently normotensive  · Monitor BP and resume as medically appropriate

## 2022-07-11 NOTE — PLAN OF CARE
Problem: Potential for Falls  Goal: Patient will remain free of falls  Description: INTERVENTIONS:  - Educate patient/family on patient safety including physical limitations  - Instruct patient to call for assistance with activity   - Consult OT/PT to assist with strengthening/mobility   - Keep Call bell within reach  - Keep bed low and locked with side rails adjusted as appropriate  - Keep care items and personal belongings within reach  - Initiate and maintain comfort rounds  - Make Fall Risk Sign visible to staff  - Apply yellow socks and bracelet for high fall risk patients  - Consider moving patient to room near nurses station  Outcome: Progressing     Problem: MOBILITY - ADULT  Goal: Maintain or return to baseline ADL function  Description: INTERVENTIONS:  -  Assess patient's ability to carry out ADLs; assess patient's baseline for ADL function and identify physical deficits which impact ability to perform ADLs (bathing, care of mouth/teeth, toileting, grooming, dressing, etc )  - Assess/evaluate cause of self-care deficits   - Assess range of motion  - Assess patient's mobility; develop plan if impaired  - Assess patient's need for assistive devices and provide as appropriate  - Encourage maximum independence but intervene and supervise when necessary  - Involve family in performance of ADLs  - Assess for home care needs following discharge   - Consider OT consult to assist with ADL evaluation and planning for discharge  - Provide patient education as appropriate  Outcome: Progressing  Goal: Maintains/Returns to pre admission functional level  Description: INTERVENTIONS:  - Perform BMAT or MOVE assessment daily    - Set and communicate daily mobility goal to care team and patient/family/caregiver     - Collaborate with rehabilitation services on mobility goals if consulted  - Record patient progress and toleration of activity level   Outcome: Progressing     Problem: Prexisting or High Potential for Compromised Skin Integrity  Goal: Skin integrity is maintained or improved  Description: INTERVENTIONS:  - Identify patients at risk for skin breakdown  - Assess and monitor skin integrity  - Assess and monitor nutrition and hydration status  - Monitor labs   - Assess for incontinence   - Turn and reposition patient  - Assist with mobility/ambulation  - Relieve pressure over bony prominences  - Avoid friction and shearing  - Provide appropriate hygiene as needed including keeping skin clean and dry  - Evaluate need for skin moisturizer/barrier cream  - Collaborate with interdisciplinary team   - Patient/family teaching  - Consider wound care consult   Outcome: Progressing     Problem: SAFETY,RESTRAINT: NV/NON-SELF DESTRUCTIVE BEHAVIOR  Goal: Remains free of harm/injury (restraint for non violent/non self-detsructive behavior)  Description: INTERVENTIONS:  - Instruct patient/family regarding restraint use   - Assess and monitor physiologic and psychological status   - Provide interventions and comfort measures to meet assessed patient needs   - Identify and implement measures to help patient regain control  - Assess readiness for release of restraint   Outcome: Progressing  Goal: Returns to optimal restraint-free functioning  Description: INTERVENTIONS:  - Assess the patient's behavior and symptoms that indicate continued need for restraint  - Identify and implement measures to help patient regain control  - Assess readiness for release of restraint   Outcome: Progressing     Problem: Nutrition/Hydration-ADULT  Goal: Nutrient/Hydration intake appropriate for improving, restoring or maintaining nutritional needs  Description: Monitor and assess patient's nutrition/hydration status for malnutrition  Collaborate with interdisciplinary team and initiate plan and interventions as ordered  Monitor patient's weight and dietary intake as ordered or per policy  Utilize nutrition screening tool and intervene as necessary  Determine patient's food preferences and provide high-protein, high-caloric foods as appropriate       INTERVENTIONS:  - Monitor oral intake, urinary output, labs, and treatment plans  - Assess nutrition and hydration status and recommend course of action  - Evaluate amount of meals eaten  - Assist patient with eating if necessary   - Allow adequate time for meals  - Recommend/ encourage appropriate diets, oral nutritional supplements, and vitamin/mineral supplements  - Order, calculate, and assess calorie counts as needed  - Recommend, monitor, and adjust tube feedings and TPN/PPN based on assessed needs  - Assess need for intravenous fluids  - Provide specific nutrition/hydration education as appropriate  - Include patient/family/caregiver in decisions related to nutrition  Outcome: Progressing

## 2022-07-11 NOTE — ASSESSMENT & PLAN NOTE
Lab Results   Component Value Date    HGBA1C 10 2 (H) 07/01/2022       Recent Labs     07/10/22  2018 07/10/22  2114 07/10/22  2236 07/11/22  0039   POCGLU 88 79 96 222*       Blood Sugar Average: Last 72 hrs:  (P) 359 [id number]     · Presented with 1 day nausea/vomiting, lab workup consistent with DKA  · Received 4L IV fluid resuscitation  · DKA protocol initiated  · Now transitioned to regular insulin infusion  · Can likely transition to sliding scale coverage  · Monitor BMP  · Diabetic diet when tolerating PO intake  · Outpatient regimen consists of 5 unit Levemir HS and 5 units with meals TID as well as SSI

## 2022-07-11 NOTE — PROGRESS NOTES
[address]  Progress Note Silvio Ch 1940, 80 y o  female MRN: [id number]  Unit/Bed#: ICU 02-01 Encounter: [id number]  Primary Care Provider: Jessica Wright MD   Date and time admitted to hospital: 7/10/2022  6:53 AM    * Diabetic ketoacidosis associated with type 2 diabetes mellitus [facility])  Assessment & Plan  Lab Results   Component Value Date    HGBA1C 10 2 (H) 07/01/2022       Recent Labs     07/10/22  2018 07/10/22  2114 07/10/22  2236 07/11/22  0039   POCGLU 88 79 96 222*       Blood Sugar Average: Last 72 hrs:  (P) 446 [id number]     · Presented with 1 day nausea/vomiting, lab workup consistent with DKA  · Received 4L IV fluid resuscitation  · DKA protocol initiated  · Now transitioned to regular insulin infusion  · Can likely transition to sliding scale coverage  · Monitor BMP  · Diabetic diet when tolerating PO intake  · Outpatient regimen consists of 5 unit Levemir HS and 5 units with meals TID as well as SSI    Sepsis ([facility] )  Assessment & Plan  · POA AEB tachycardia, leukocytosis, RAMIRO and elevated lactic  · CTAP showed mass like thickening of the gastric antrum with pronounced gastric distention and dilated fluid-filled stomach  · Blood culture pending   · UA negative  · Procal negative, continue to trend  · Lactate now cleared  · Started on Cefepime/Vanco, now D2  · MRSA pending    RAMIRO (acute kidney injury) ([email or web address] )  Assessment & Plan  · Baseline creatinine appears to be 0 7-0 8, 1 25 on admission  · Likely prerenal in the setting of DKA  · Now resolved  · Continue to monitor renal indices and urine output    Abnormal CT of the abdomen  Assessment & Plan  · CTAP demonstrated masslike thickening of the gastric antrum with pronounced gastric distention and a dilated fluid filled stomach  Direct visualization recommended to exclude mass lesion  · GI consulted, plan for possible scope Tuesday?   · Continue BID PPI   · NPO     Coffee ground emesis  Assessment & Plan  · Patient reported, no events during this hospitalization  · PPI BID   · NPO   · Trend CBC    High anion gap metabolic acidosis  Assessment & Plan  · In the setting of DKA   · Now resolved  · Continue to monitor BMP    Osteoradionecrosis of temporal bone ([address] )  Assessment & Plan  · History of hodgkins lymphoma s/p neck radiation  · Developed osteonecrosis repaired March 2022     Other specified hypothyroidism  Assessment & Plan  · Continue home synthroid    HTN (hypertension)  Assessment & Plan  · Hold home antihypertensives, Lisinopril 5mg daily  · Currently normotensive  · Monitor BP and resume as medically appropriate    HLD (hyperlipidemia)  Assessment & Plan  · Continue statin therapy       ----------------------------------------------------------------------------------------  HPI/24hr events: Overnight gap closed x1, transitioned to regular insulin infusion  Repeat labs demonstrated open gap and metabolic acidosis, insulin infusion continued, started on continuous fluids  Patient appropriate for transfer out of the ICU today?: Patient does not meet criteria for referral to the ICU Follow-Up Clinic; referral has not been made  Disposition: Transfer to Med-Surg   Code Status: Level 3 - DNAR and DNI  ---------------------------------------------------------------------------------------  SUBJECTIVE  Intermittently shouting and moaning, but when questioned denies pain/discomfort, shortness of breath  Review of Systems   Constitutional: Negative for chills and fever  HENT: Negative  Eyes: Negative  Respiratory: Negative for cough and shortness of breath  Cardiovascular: Negative for chest pain  Gastrointestinal: Negative for abdominal pain and nausea  Genitourinary: Negative  Musculoskeletal: Negative  Skin: Negative  Neurological: Negative  All other systems reviewed and are negative      Review of systems was reviewed and negative unless stated above in HPI/24-hour events   ---------------------------------------------------------------------------------------  OBJECTIVE    Vitals   Vitals:    22 0100 22 0200 22 0300 22 0400   BP: 102/50 (!) 107/49 (!) 97/44 100/63   BP Location:    Right leg   Pulse: 85 97 85 95   Resp: (!) 30 (!) 28 (!) 36 (!) 31   Temp:    98 4 °F (36 9 °C)   TempSrc:    Tympanic   SpO2: 100% 100% 100% 98%   Weight:       Height:         Temp (24hrs), Av 4 °F (36 3 °C), Min:96 2 °F (35 7 °C), Max:98 4 °F (36 9 °C)  Current: Temperature: 98 4 °F (36 9 °C)          Respiratory:  SpO2: SpO2: 98 %, SpO2 Activity: SpO2 Activity: At Rest, SpO2 Device: O2 Device: None (Room air)       Invasive/non-invasive ventilation settings   Respiratory  Report   Lab Data (Last 4 hours)    None         O2/Vent Data (Last 4 hours)    None                Physical Exam  Vitals and nursing note reviewed  Constitutional:       Appearance: She is ill-appearing  HENT:      Head: Normocephalic  Mouth/Throat:      Mouth: Mucous membranes are dry  Pharynx: Oropharynx is clear  Eyes:      Pupils: Pupils are equal, round, and reactive to light  Cardiovascular:      Rate and Rhythm: Normal rate and regular rhythm  Pulses: Normal pulses  Heart sounds: Normal heart sounds  Pulmonary:      Effort: Pulmonary effort is normal       Breath sounds: Normal breath sounds  Abdominal:      General: Bowel sounds are normal       Palpations: Abdomen is soft  Musculoskeletal:         General: Swelling present  Normal range of motion  Cervical back: Normal range of motion  Skin:     General: Skin is warm and dry  Findings: Bruising present  Neurological:      General: No focal deficit present  Mental Status: She is oriented to person, place, and time               Laboratory and Diagnostics:  Results from last 7 days   Lab Units 22  0408 07/10/22  1422 07/10/22  0728   WBC Thousand/uL 19 51*  --  19 95*   HEMOGLOBIN g/dL 9 7* 10 0* 12 1   HEMATOCRIT % 29 2*  --  37 0   PLATELETS Thousands/uL 184  --  257   NEUTROS PCT % 88*  --   --    MONOS PCT % 6  --   --      Results from last 7 days   Lab Units 07/11/22  0408 07/11/22  0023 07/10/22  2016 07/10/22  1550 07/10/22  1133 07/10/22  0703   SODIUM mmol/L 134* 131* 131* 132* 133* 127*   POTASSIUM mmol/L 3 4* 3 8 3 7 3 8 4 0 5 7*   CHLORIDE mmol/L 104 100 100 99 96 85*   CO2 mmol/L 18* 13* 21 16* 9* <6*   ANION GAP mmol/L 12 18* 10 17* 28* >36*   BUN mg/dL 20 23 24 28* 33* 37*   CREATININE mg/dL 0 77 0 81 0 85 0 97 1 09 1 25   CALCIUM mg/dL 8 1* 8 3* 8 2* 8 3* 8 4 10 4*   GLUCOSE RANDOM mg/dL 97 199* 92 108 378* 820*   ALT U/L 19  --   --   --   --  27   AST U/L 31  --   --   --   --  28   ALK PHOS U/L 65  --   --   --   --  93   ALBUMIN g/dL 2 9*  --   --   --   --  4 3   TOTAL BILIRUBIN mg/dL 0 49  --   --   --   --  0 51     Results from last 7 days   Lab Units 07/11/22  0408 07/11/22  0023 07/10/22  2016 07/10/22  1550 07/10/22  1133 07/10/22  0703   MAGNESIUM mg/dL 1 7* 2 0 2 0 2 1 2 0 2 2   PHOSPHORUS mg/dL 2 6 3 1 2 2* 2 4 3 0 6 9*      Results from last 7 days   Lab Units 07/10/22  0728   INR  1 01   PTT seconds 29          Results from last 7 days   Lab Units 07/11/22  0023 07/10/22  2016 07/10/22  1550 07/10/22  1422 07/10/22  1215 07/10/22  0925 07/10/22  0703   LACTIC ACID mmol/L 1 5 2 5* 3 4* 4 5* 4 9* 3 0* 4 6*     ABG:    VBG:  Results from last 7 days   Lab Units 07/10/22  1133   PH SOCORRO  7 182*   PCO2 SOCORRO mm Hg 23 8*   PO2 SOCORRO mm Hg 64 4*   HCO3 SOCORRO mmol/L 8 7*   BASE EXC SOCORRO mmol/L -17 9     Results from last 7 days   Lab Units 07/11/22  0408 07/10/22  0703   PROCALCITONIN ng/ml 0 86* 0 18       Micro  Results from last 7 days   Lab Units 07/10/22  1231 07/10/22  1102   BLOOD CULTURE  Received in Microbiology Lab  Culture in Progress  Received in Microbiology Lab  Culture in Progress         EKG: normal sinus rhythm  Imaging: I have personally reviewed pertinent reports  and I have personally reviewed pertinent films in PACS    Intake and Output  I/O       07/09 0701  07/10 0700 07/10 0701  07/11 0700    P  O   0    I V  (mL/kg)  3014 9 (57 2)    IV Piggyback  1438 3    Total Intake(mL/kg)  4453 3 (84 5)    Urine (mL/kg/hr)  1165 (0 9)    Total Output  1165    Net  +3288 3                Height and Weights   Height: 5' 1" (154 9 cm)     Body mass index is 21 95 kg/m²  Weight (last 2 days)     Date/Time Weight    07/10/22 1145 52 7 (116 18)    07/10/22 0941 52 7 (116 18)    07/10/22 0658 53 6 (118 17)            Nutrition       Diet Orders   (From admission, onward)             Start     Ordered    07/10/22 0941  Diet NPO  Diet effective now        References:    Nutrtion Support Algorithm Enteral vs  Parenteral   Question Answer Comment   Diet Type NPO    RD to adjust diet per protocol?  Yes        07/10/22 0940                  Active Medications  Scheduled Meds:  Current Facility-Administered Medications   Medication Dose Route Frequency Provider Last Rate    atorvastatin  20 mg Oral Daily With Dinner ADRIAN Bailon      cefepime  1,000 mg Intravenous Q12H ADRIAN Bailon 1,000 mg (07/10/22 2130)    insulin regular (HumuLIN R,NovoLIN R) infusion  0 3-21 Units/hr Intravenous Titrated ADRIAN Swanson 0 5 Units/hr (07/11/22 0406)    lactated ringers  1,000 mL Intravenous Once ADRIAN Wong      levothyroxine  112 mcg Oral QAM ADRIAN Wong      magnesium sulfate  2 g Intravenous Once ADRIAN Swanson      multi-electrolyte  75 mL/hr Intravenous Continuous ADRIAN Swanson 75 mL/hr (07/11/22 0123)    pantoprazole  40 mg Intravenous Q12H [facility] ADRIAN Wong      potassium chloride  20 mEq Intravenous Once ADRIAN Swanson      sodium chloride (PF)  3 mL Intravenous Q1H PRN ADRIAN Bailon      vancomycin  12 5 mg/kg Intravenous Q24H ADRIAN Wong       Continuous Infusions:  insulin regular (HumuLIN R,NovoLIN R) infusion, 0 3-21 Units/hr, Last Rate: 0 5 Units/hr (07/11/22 0406)  multi-electrolyte, 75 mL/hr, Last Rate: 75 mL/hr (07/11/22 0123)      PRN Meds:   sodium chloride (PF), 3 mL, Q1H PRN        Invasive Devices Review  Invasive Devices  Report    Peripheral Intravenous Line  Duration           Peripheral IV 07/10/22 Left;Proximal;Ventral (anterior) Antecubital <1 day    Peripheral IV 07/10/22 Left;Ventral (anterior) Foot <1 day    Peripheral IV 07/11/22 Left;Ventral (anterior) Wrist <1 day          Drain  Duration           Urethral Catheter Straight-tip 16 Fr  <1 day                Rationale for remaining devices: I & O monitoring  ---------------------------------------------------------------------------------------  Advance Directive and Living Will: Yes    Power of : Yes  POLST:    ---------------------------------------------------------------------------------------  Care Time Delivered:   Upon my evaluation, this patient had a high probability of imminent or life-threatening deterioration due to DKA, which required my direct attention, intervention, and personal management  I have personally provided 45 minutes (0300 to 0345) of critical care time, exclusive of procedures, teaching, family meetings, and any prior time recorded by providers other than myself  [facility], CRNP      Portions of the record may have been created with voice recognition software  Occasional wrong word or "sound a like" substitutions may have occurred due to the inherent limitations of voice recognition software    Read the chart carefully and recognize, using context, where substitutions have occurred

## 2022-07-11 NOTE — ASSESSMENT & PLAN NOTE
Patient with dizziness and blurry vision  On exam, concern for a possible left eye lateral field cut  Vision now improved       Plan:  · Stroke pathway  · Appreciate neurology consult  · MRI brain pending  · ECHO pending  · PMR consult  · Statin  · Consider ASA if no concerns for bleeding- will hold for now

## 2022-07-11 NOTE — PROGRESS NOTES
[id number]: Received report from day shift RN  Initial assessment completed  Pt arouses to voice, opens eyes spontaneously or to command  Follows commands to best of ability  Hesitates to reply to questions; had to repeatedly ask until she would answer  Pt denies pain, but constantly moaning (with or without stimulation)  NSR on monitor  Vitals stable  Insulin gtt infusing via Left foot heplock; wrapped with kerlex for added protection  1930: Sierra Vistamarvin Ojeda placed #20g heplock into LAC with guidance of ultrasound machine; good blood return & flushed easily  Capped  Pt bathed, new sheets applied  Pt trying to pull at ramos; redirected & catheter repositioned  2015: No blood return from Left AC heplock, but flushes easily with no signs of infiltration  Obtained scheduled blood work by peripheral stick from left hand & sent to lab  Informed Clarisa Doyletunde of above information  Also informed her that pt's BG is now down to 88 without D51/2NSS IVF's infusing  Instructed to restart at 50 ml/hr until blood work results come back  2050: Received critical lactic acid of 2 5, which was lower than previous level, but still informed ADRIAN Abdi  2120: Pt attempting to pull at lines/tubes; b/l wrist restraints applied for safety due to impulsiveness  DKA insulin gtt stopped; transitioned to regular titratable gtt  Not started due to not meeting parameters  : Pt yelling out "Help me, help me," but would not answer me what was wrong when I entered the room  I asked if she was uncomfortable and she agreed  Pt repositioned with wedges & pillows  Pt fighting against staff during repositioning  Pt arguing that she wants to get up & that it's not midnight when oriented to time/situation  B/L wrist restraints remain in place for safety due to impulsiveness  Left arm edema now noted  LAC heplock flushes easily, but still no blood return   New #20g heplock inserted into Left wrist  Unable to draw blood from site, but flushes easily & no edema around site  Scheduled blood work collected via peripheral stick & sent to lab  Restarted insulin gtt due to ; initiated at 3 u/hr algorithm 1  IVF's d/c'd      0125: Isolyte started @ 75 ml/hr  0400: Pt slept on/off for a while, but now is awake & constantly moaning again with stimulation  Will sometimes give short answers with prodding  Pt cleaned of incontinence of large BM  Calazime ointment applied to buttock pressure injury  Repositioned with wedges & pillows for comfort  Pt said "thank you," after I was completely finished      0600: K+ and Mg replacements stared; K+ 3 4 and Mg 1 7 on morning blood work

## 2022-07-11 NOTE — QUICK NOTE
Verbal report received from the ICU team  Patient is now on SLIM service  Patient with symptomatic DKA  MRI brain is negative for acute finding and CVA ruled out  GI evaluated for "black" emesis and gastric mass  Patient declined EGD  Medical management per progress note from prior today

## 2022-07-11 NOTE — ASSESSMENT & PLAN NOTE
· POA AEB tachycardia, leukocytosis, RAMIRO and elevated lactic  · CTAP showed mass like thickening of the gastric antrum with pronounced gastric distention and dilated fluid-filled stomach  · Blood culture pending   · UA negative  · Procal negative, continue to trend  · Lactate now cleared  · Started on Cefepime/Vanco, now D2  · MRSA pending

## 2022-07-11 NOTE — PROGRESS NOTES
Vancomycin IV Pharmacy-to-Dose Consultation    Manas Addison is a 80 y o  female who is currently receiving Vancomycin IV with management by the Pharmacy Consult service  Assessment/Plan:  The patient was reviewed  Renal function is stable and no signs or symptoms of nephrotoxicity and/or infusion reactions were documented in the chart  Based on todays assessment, continue current vancomycin (day # 2) dosing of 750mg IV Q24hrs, with a plan for trough to be drawn at 0830 on 7/13/22  We will continue to follow the patients culture results and clinical progress daily      Sheila Foley, Pharmacist

## 2022-07-11 NOTE — ASSESSMENT & PLAN NOTE
· CTAP demonstrated masslike thickening of the gastric antrum with pronounced gastric distention and a dilated fluid filled stomach  Direct visualization recommended to exclude mass lesion  · GI consulted, plan for possible scope Tuesday?   · Continue BID PPI   · NPO

## 2022-07-11 NOTE — PLAN OF CARE
Problem: SAFETY,RESTRAINT: NV/NON-SELF DESTRUCTIVE BEHAVIOR  Goal: Remains free of harm/injury (restraint for non violent/non self-detsructive behavior)  Description: INTERVENTIONS:  - Instruct patient/family regarding restraint use   - Assess and monitor physiologic and psychological status   - Provide interventions and comfort measures to meet assessed patient needs   - Identify and implement measures to help patient regain control  - Assess readiness for release of restraint   Outcome: Not Progressing     Problem: SAFETY,RESTRAINT: NV/NON-SELF DESTRUCTIVE BEHAVIOR  Goal: Returns to optimal restraint-free functioning  Description: INTERVENTIONS:  - Assess the patient's behavior and symptoms that indicate continued need for restraint  - Identify and implement measures to help patient regain control  - Assess readiness for release of restraint   Outcome: Not Progressing      Restraints applied for agitation & pulling at tubes/lines

## 2022-07-11 NOTE — CASE MANAGEMENT
Case Management Assessment    Patient name Alpa Mahoney  Location ICU 02/ICU  MRN [id number]  : 1940 Date 2022       Current Admission Date: 7/10/2022  Current Admission Diagnosis:Diabetic ketoacidosis associated with type 2 diabetes mellitus [facility])   Patient Active Problem List    Diagnosis Date Noted    Visual changes 2022    Sepsis ([facility] ) 07/10/2022    Diabetic ketoacidosis associated with type 2 diabetes mellitus ([facility] ) 07/10/2022    High anion gap metabolic acidosis 10/46/0054    Coffee ground emesis 07/10/2022    RAMIRO (acute kidney injury) ([facility] ) 07/10/2022    Abnormal CT of the abdomen 07/10/2022    Soft tissue radionecrosis 2022    Osteoradionecrosis of temporal bone ([facility] ) 2022    Primary osteoarthritis of right shoulder 2021    Right knee pain 2020    Acute hip pain, right 2020    Ambulatory dysfunction     Primary osteoarthritis of both knees 2020    Hypophosphatemia 2020    Elevated vitamin B12 level 2020    Anemia 2020    Hyponatremia 2020    Closed intertrochanteric fracture of right femur ([facility] ) 2020    Other specified hypothyroidism 2015    HLD (hyperlipidemia) 2014    HTN (hypertension) 10/10/2013    Type 2 diabetes mellitus with diabetic neuropathy, with long-term current use of insulin ([facility] ) 2008      LOS (days): 1  Geometric Mean LOS (GMLOS) (days): 3 80  Days to GMLOS:2 7     OBJECTIVE:    Risk of Unplanned Readmission Score: 16 64     Current admission status: Inpatient  Preferred Pharmacy:   [address] [facility] [address]  [place], [address]  [address] [facility] [address]  Phone: [phone] Fax: [phone]    Primary Care Provider: Sarai Silvestre MD    Primary Insurance: MEDICARE  Secondary Insurance: AARP    ASSESSMENT:  Beth Berumen Proxies    There are no active Health Care Proxies on file         Advance Directives  Does patient have a [address]?: Yes  Does patient have Advance Directives?: Yes  Advance Directives: Living will, Power of  for health care  Primary Contact: Timothy Rangel dtr  Readmission Root Cause  30 Day Readmission: No    Patient Information  Admitted from[de-identified] Home  Mental Status: Alert  During Assessment patient was accompanied by: Not accompanied during assessment  Assessment information provided by[de-identified] Patient  Primary Caregiver: Self  Support Systems: Family members  [place] of Residence: [address] do you live in?: [address] entry access options   Select all that apply : Stairs  Number of steps to enter home : 3  Do the steps have railings?: Yes  Type of Current Residence: [place]  In the last 12 months, was there a time when you were not able to pay the mortgage or rent on time?: No  In the last 12 months, how many places have you lived?: 1  In the last 12 months, was there a time when you did not have a steady place to sleep or slept in a shelter (including now)?: No  Homeless/housing insecurity resource given?: N/A  Living Arrangements: Lives Alone  Is patient a ?: No    Activities of Daily Living Prior to Admission  Functional Status: Independent  Completes ADLs independently?: Yes  Ambulates independently?: Yes  Does patient use assisted devices?: Yes  Assisted Devices (DME) used: [employer]ieu [employer]  Does patient currently own DME?: Yes  What DME does the patient currently own?: Hieu Cruz  Does patient have a history of Outpatient Therapy (PT/OT)?: No  Does the patient have a history of Short-Term Rehab?: No  Does patient have a history of HHC?: No  Does patient currently have [facility]?: No  Patient Information Continued  Income Source: Pension/FPC  Does patient have prescription coverage?: Yes  Within the past 12 months, you worried that your food would run out before you got the money to buy more : Never true  Within the past 12 months, the food you bought just didn't last and you didn't have money to get more : Never true  Food insecurity resource given?: N/A  Does patient receive dialysis treatments?: No  Does patient have a history of substance abuse?: No  Does patient have a history of Mental Health Diagnosis?: No    PHQ 2/9 Screening   Reviewed PHQ 2/9 Depression Screening Score?: No    Means of Transportation  Means of Transport to Appts[de-identified] Drives Self  In the past 12 months, has lack of transportation kept you from medical appointments or from getting medications?: No  In the past 12 months, has lack of transportation kept you from meetings, work, or from getting things needed for daily living?: No  Was application for public transport provided?: N/A

## 2022-07-12 PROBLEM — R65.11 SIRS OF NON-INFECTIOUS ORIGIN W ACUTE ORGAN DYSFUNCTION (HCC): Status: ACTIVE | Noted: 2022-07-10

## 2022-07-12 LAB
GLUCOSE SERPL-MCNC: 125 MG/DL (ref 65–140)
GLUCOSE SERPL-MCNC: 177 MG/DL (ref 65–140)
GLUCOSE SERPL-MCNC: 198 MG/DL (ref 65–140)
GLUCOSE SERPL-MCNC: 255 MG/DL (ref 65–140)
MRSA NOSE QL CULT: NORMAL

## 2022-07-12 PROCEDURE — 97110 THERAPEUTIC EXERCISES: CPT

## 2022-07-12 PROCEDURE — 99222 1ST HOSP IP/OBS MODERATE 55: CPT | Performed by: INTERNAL MEDICINE

## 2022-07-12 PROCEDURE — 97163 PT EVAL HIGH COMPLEX 45 MIN: CPT

## 2022-07-12 PROCEDURE — C9113 INJ PANTOPRAZOLE SODIUM, VIA: HCPCS | Performed by: NURSE PRACTITIONER

## 2022-07-12 PROCEDURE — 82948 REAGENT STRIP/BLOOD GLUCOSE: CPT

## 2022-07-12 PROCEDURE — 99233 SBSQ HOSP IP/OBS HIGH 50: CPT

## 2022-07-12 RX ORDER — LANOLIN ALCOHOL/MO/W.PET/CERES
6 CREAM (GRAM) TOPICAL
Status: DISCONTINUED | OUTPATIENT
Start: 2022-07-12 | End: 2022-07-15 | Stop reason: HOSPADM

## 2022-07-12 RX ADMIN — INSULIN DETEMIR 6 UNITS: 100 INJECTION, SOLUTION SUBCUTANEOUS at 22:30

## 2022-07-12 RX ADMIN — INSULIN LISPRO 1 UNITS: 100 INJECTION, SOLUTION INTRAVENOUS; SUBCUTANEOUS at 11:39

## 2022-07-12 RX ADMIN — ATORVASTATIN CALCIUM 20 MG: 20 TABLET, FILM COATED ORAL at 18:03

## 2022-07-12 RX ADMIN — VANCOMYCIN HYDROCHLORIDE 750 MG: 750 INJECTION, SOLUTION INTRAVENOUS at 09:06

## 2022-07-12 RX ADMIN — Medication 6 MG: at 22:30

## 2022-07-12 RX ADMIN — PANTOPRAZOLE SODIUM 40 MG: 40 INJECTION, POWDER, FOR SOLUTION INTRAVENOUS at 22:30

## 2022-07-12 RX ADMIN — SODIUM CHLORIDE, SODIUM GLUCONATE, SODIUM ACETATE, POTASSIUM CHLORIDE, MAGNESIUM CHLORIDE, SODIUM PHOSPHATE, DIBASIC, AND POTASSIUM PHOSPHATE 75 ML/HR: .53; .5; .37; .037; .03; .012; .00082 INJECTION, SOLUTION INTRAVENOUS at 04:27

## 2022-07-12 RX ADMIN — INSULIN LISPRO 5 UNITS: 100 INJECTION, SOLUTION INTRAVENOUS; SUBCUTANEOUS at 18:02

## 2022-07-12 RX ADMIN — INSULIN LISPRO 2 UNITS: 100 INJECTION, SOLUTION INTRAVENOUS; SUBCUTANEOUS at 18:02

## 2022-07-12 RX ADMIN — INSULIN LISPRO 1 UNITS: 100 INJECTION, SOLUTION INTRAVENOUS; SUBCUTANEOUS at 22:31

## 2022-07-12 RX ADMIN — PANTOPRAZOLE SODIUM 40 MG: 40 INJECTION, POWDER, FOR SOLUTION INTRAVENOUS at 09:05

## 2022-07-12 RX ADMIN — LEVOTHYROXINE SODIUM 112 MCG: 112 TABLET ORAL at 09:44

## 2022-07-12 NOTE — CONSULTS
The patients Vancomycin therapy has been completed /discontinued  Thank you for this consult; Pharmacy will sign-off now      Leonardo Almeida PharmD

## 2022-07-12 NOTE — CONSULTS
Consultation - [facility] Endocrinology    Patient Information: Manas Addison 80 y o  female MRN: [id number]  Unit/Bed#: -01 Encounter: [id number]  Admitting Physician: Jayjay Keys MD  PCP: Duncan Nichols MD  Date of Consultation:  07/12/22    ASSESSMENT:      PLAN:    1  DKA  Improved  ? Etiology  2  Type 1 diabetes  A1c was 10%  Home regimen wa Levemir 5u qhs and humalog 5u tidac  She has late onset autoimmune diabetes  Typically these patients have hypoglycemia and hyperglycemia sometimes associated with insulin antibody syndrome  Overnight glucose shows mild hyperglycemia  Change to levemir 6u qhs and humalog 5u tidac plus scale  Use 3u tidac if eats <50% meal   Close follow up with Dr Shane Olguin  Should consider Humalog kwikpen chi that allows 0 5u insulin dose  Will benefit from an outpt glucose sensor for early warning of hyperglycemia or hypoglycemia  She does not want a pump  Total time spent was 50 min of which >50% was in coordination of care  Reason for consultation:   diabetes    History of Present Illness:    Manas Addison is a 80 y o  female with type 1 diabetes c positive antibodies, RA, HTN, HLD, DPN, hodgkins lymphoma, hypothyroidism and recurrent steroid injections, presented with symptomatic severe hyperglycemia, metabolic acidosis including ketoacidosis and lactic acidosis  Her home regimen was Levemir 5u qhs and novolog 5u tidac plus correction  She follows with Dr Shane Olguin from [place] [address] endocrinology  Review of Systems:    Review of Systems   Constitutional: Positive for fatigue  HENT: Negative  Eyes: Negative  Respiratory: Negative  Cardiovascular: Negative  Gastrointestinal: Negative  Endocrine: Negative  Musculoskeletal: Negative  Skin: Negative  Allergic/Immunologic: Negative  Neurological: Negative  Hematological: Negative  Psychiatric/Behavioral: Negative          Past Medical and Surgical History:     Past Medical History:   Diagnosis Date    Ambulates with cane     Cancer ([facility] )     Chronic pain disorder     knees/ shoulders (gets inj every 3 mos)    Controlled type 2 diabetes mellitus with diabetic polyneuropathy, with long-term current use of insulin ([facility] ) 2008    Diabetes mellitus ([facility] )     Diabetic polyneuropathy ([facility] ) 2008    ICD10 clean up    Disease of thyroid gland     H/O oral cancer     Left lower lip    HL (hearing loss)     Hodgkin's disease ([place] [address] )     Left neck, had radiation    Hypertension     Neuropathy     Osteoporosis     RA (rheumatoid arthritis) ([place] [address] )        Past Surgical History:   Procedure Laterality Date    ADENOIDECTOMY      APPENDECTOMY      CATARACT EXTRACTION      CATARACT EXTRACTION, BILATERAL      CHOLECYSTECTOMY      FRACTURE SURGERY Right     hip    MOUTH SURGERY      oral cancer left lower lip    OVARY SURGERY      HI ADJ TISS XFER HEAD,FAC,HAND <10 SQCM N/A 3/28/2022    Procedure: Adjacent tissue transfer face;  Surgeon: Michael Harrison MD;  Location: [facility];  Service: ENT    HI MASTOIDECTOMY,SIMPLE Left 3/28/2022    Procedure: Jewel Limes;  Surgeon: Michael Harrison MD;  Location[facility];  Service: ENT    HI OPEN RX FEMUR FX+INTRAMED SHE Right 2020    Procedure: INSERTION NAIL IM FEMUR ANTEGRADE (TROCHANTERIC); Surgeon: Zora Hilario DO;  Location: [address] [place] [address] MAIN OR;  Service: Orthopedics    TONSILLECTOMY      TOTAL THYROIDECTOMY      Performed after left neck dissection and left oral cancer diagnosis       Meds/Allergies:    PTA meds:   Prior to Admission Medications   Prescriptions Last Dose Informant Patient Reported? Taking?    Ascorbic Acid (vitamin C) 100 MG tablet   Yes No   Sig: Take 500 mg by mouth 2 (two) times a day   BD Insulin Syringe U/F Unit 31G X 5/16" 0 3 ML MISC   Yes No   Si (four) times a day   Insulin Aspart (NOVOLOG SC)   Yes No   Sig: Inject 5 Units under the skin 3 (three) times a day with meals Plus sliding scale  5 units @ 1730    Insulin Detemir (LEVEMIR SC)   Yes No   Sig: Inject 5 Units under the skin daily at bedtime   Multiple Vitamin (multivitamin) capsule   Yes No   Sig: Take 1 capsule by mouth daily   OneTouch Ultra test strip   Yes No   Sig: USE TO TEST BLOOD SUGAR 6 TIMES A DAY   VITAMIN D PO   Yes No   Sig: Take 125 mg by mouth in the morning   aspirin (ECOTRIN LOW STRENGTH) 81 mg EC tablet  Self Yes No   Sig: Take 81 mg by mouth daily     atorvastatin (LIPITOR) 20 mg tablet   Yes No   Sig: Take 20 mg by mouth daily with dinner    calcium carbonate (OS-FELICE) 600 MG tablet   Yes No   Sig: Take 600 mg by mouth 2 (two) times a day with meals   cyanocobalamin (VITAMIN B-12) 1000 MCG tablet   Yes No   Sig: Take 1,000 mcg by mouth daily   gabapentin (NEURONTIN) 300 mg capsule   Yes No   Sig: Take 300 mg by mouth 3 (three) times a day   ipratropium (ATROVENT) 0 03 % nasal spray   No No   Si sprays into each nostril every 12 (twelve) hours   Patient taking differently: 2 sprays into each nostril every 12 (twelve) hours As needed   levothyroxine 100 mcg tablet  Self Yes No   Sig: Take 112 mcg by mouth every morning    lisinopril (ZESTRIL) 5 mg tablet   Yes No   Sig: Take 5 mg by mouth daily     methocarbamol (ROBAXIN) 500 mg tablet   Yes No   Sig: Take 500 mg by mouth in the morning   Prn     traMADol (ULTRAM) 50 mg tablet   Yes No   Sig: tramadol 50 mg tablet   take 1 tablet by mouth three times a day if needed      Facility-Administered Medications Last Administration Doses Remaining   bupivacaine (MARCAINE) 0 25 % injection 4 mL 2021 11:54 AM    bupivacaine (MARCAINE) 0 25 % injection 4 mL 2021 11:54 AM    bupivacaine (MARCAINE) 0 25 % injection 4 mL 2021 11:54 AM    triamcinolone acetonide (KENALOG-40) 40 mg/mL injection 80 mg 2021 11:54 AM    triamcinolone acetonide (KENALOG-40) 40 mg/mL injection 80 mg 2021 11:54 AM    triamcinolone acetonide (KENALOG-40) 40 mg/mL injection 80 mg 11/5/2021 11:54 AM           Allergies: No Known Allergies  History:     Marital Status:    Occupation:   Substance Use History:   Social History     Substance and Sexual Activity   Alcohol Use Not Currently    Alcohol/week: 0 0 standard drinks     Social History     Tobacco Use   Smoking Status Never Smoker   Smokeless Tobacco Never Used     Social History     Substance and Sexual Activity   Drug Use Never       Family History:    Family History   Problem Relation Age of Onset    Cancer Mother     Diabetes Mother     Diabetes Father     Hypertension Father        Physical Exam:     Vitals:   Blood Pressure: 148/84 (07/12/22 1254)  Pulse: 74 (07/12/22 1254)  Temperature: 97 5 °F (36 4 °C) (07/12/22 1100)  Temp Source: Temporal (07/12/22 1100)  Respirations: 18 (07/12/22 1254)  Height: 5' 1" (154 9 cm) (07/11/22 1421)  Weight - Scale: 56 7 kg (125 lb) (07/12/22 0400)  SpO2: 93 % (07/12/22 1254)    Physical Exam  Constitutional:       Appearance: She is well-developed  HENT:      Head: Normocephalic  Eyes:      Pupils: Pupils are equal, round, and reactive to light  Neck:      Thyroid: No thyromegaly  Cardiovascular:      Rate and Rhythm: Normal rate  Heart sounds: Normal heart sounds  Pulmonary:      Effort: Pulmonary effort is normal       Breath sounds: Normal breath sounds  Abdominal:      General: Bowel sounds are normal       Palpations: Abdomen is soft  Musculoskeletal:         General: No deformity  Cervical back: Normal range of motion  Skin:     Capillary Refill: Capillary refill takes less than 2 seconds  Coloration: Skin is not pale  Findings: No rash  Neurological:      Mental Status: She is alert and oriented to person, place, and time             Lab and Imaging Results: I have personally reviewed pertinent films in PACS    Results from last 7 days   Lab Units 07/11/22  0408   WBC Thousand/uL 19 51*   HEMOGLOBIN g/dL 9 7*   HEMATOCRIT % 29 2*   PLATELETS Thousands/uL 184   NEUTROS PCT % 88*   LYMPHS PCT % 6*   MONOS PCT % 6   EOS PCT % 0     Results from last 7 days   Lab Units 07/11/22  1801 07/11/22  1227 07/11/22  0408   POTASSIUM mmol/L 4 4   < > 3 4*   CHLORIDE mmol/L 102   < > 104   CO2 mmol/L 21   < > 18*   BUN mg/dL 15   < > 20   CREATININE mg/dL 0 72   < > 0 77   CALCIUM mg/dL 8 4   < > 8 1*   ALK PHOS U/L  --   --  65   ALT U/L  --   --  19   AST U/L  --   --  31    < > = values in this interval not displayed  Results from last 7 days   Lab Units 07/10/22  0728   INR  1 01     POC Glucose (mg/dl)   Date Value   07/12/2022 177 (H)   07/12/2022 125   07/11/2022 339 (H)   07/11/2022 159 (H)   07/11/2022 172 (H)   07/11/2022 183 (H)   07/11/2022 183 (H)   07/11/2022 111   07/11/2022 120   07/11/2022 118         ** Please Note: Dragon 360 Dictation voice to text software may have been used in the creation of this document   **

## 2022-07-12 NOTE — DISCHARGE INSTR - OTHER ORDERS
Skin and Wound Care Plan:   1  Ehob offloading cushion to chair when OOB  2  Elevate heels off the bed with pillows   3  Turn and reposition every two hours  4  Hydraguard to bilateral heels BID and PRN  5  Skin nourishing cream daily  6  Cleanse buttocks area with Remedy foaming cleanser then apply Calazime to open area on the right buttock  7   Allevyn life silicone bordered dressing to the sacrum, arlette with P, date, peel back daily for skin assessment, reappy dressing, change every 3 days and PRN

## 2022-07-12 NOTE — ASSESSMENT & PLAN NOTE
· Reported by patient prior to admission  · No episodes of vomiting during this admission  · Hemoglobin slightly downtrended, although likely dilutional  · DVT prophylaxis on hold  Will continue to hold and repeat am labs  Consider restarting if Hb remains stable  · GI input appreciated  Of note, patient is refusing EGD    · Continue to monitor CBC

## 2022-07-12 NOTE — ASSESSMENT & PLAN NOTE
· POA   AEB tachycardia, leukocytosis, RAMIRO, elevated lactic acid  · RAMIRO, lactic acidosis resolved  · Infectious etiology effectively ruled out with negative UA, negative blood cultures, negative procalcitonin, negative imaging  · SIRS criteria likely met in the setting of DKA-induced inflammatory process  · MRSA swab still pending  · Leukocytosis persists, although patient refused morning labs today  · Received IV cefepime and vancomycin x2 days  Will discontinue and monitor off antibiotics

## 2022-07-12 NOTE — CASE MANAGEMENT
Case Management Discharge Planning Note    Patient name Ashli Bauer  Location /-01 MRN [id number]  : 1940 Date 2022       Current Admission Date: 7/10/2022  Current Admission Diagnosis:Diabetic ketoacidosis associated with type 2 diabetes mellitus [facility])   Patient Active Problem List    Diagnosis Date Noted    Visual changes 2022    SIRS of non-infectious origin w acute organ dysfunction ([facility] ) 07/10/2022    Diabetic ketoacidosis associated with type 2 diabetes mellitus ([facility] [facility] ) 07/10/2022    High anion gap metabolic acidosis     Coffee ground emesis 07/10/2022    RAMIRO (acute kidney injury) (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 ) 2020    Other specified hypothyroidism 2015    HLD (hyperlipidemia) 2014    HTN (hypertension) 10/10/2013    Type 2 diabetes mellitus with diabetic neuropathy, with long-term current use of insulin ([facility] [address] ) 2008      LOS (days): 2  Geometric Mean LOS (GMLOS) (days): 3 80  Days to GMLOS:1 6     OBJECTIVE:  Risk of Unplanned Readmission Score: 14 56     Current admission status: Inpatient   Preferred Pharmacy:   [address]   [place], [address]  [facility] [place] [address]  Phone: [phone] Fax: [phone]    Primary Care Provider: Simeon Florez MD    Primary Insurance: MEDICARE  Secondary Insurance: AARP    DISCHARGE DETAILS:    Discharge planning discussed with[de-identified] Spoke to daughter Mariusz Kay about STR  Freedom of Choice: Yes  Comments - Freedom of Choice: Daughter Mariusz Kay [facility]) agreeable to blanket referral and will choose from accepting facilities    Contacts  Patient Contacts: Chitra Chaidez dtr  Relationship to Patient[de-identified] Family  Contact Method: Phone  Phone Number: [phone]  Reason/Outcome: Discharge Planning  Would you like to participate in our [address] service program?  : No - Declined    Treatment Team Recommendation: Short Term Rehab  Discharge Destination Plan[de-identified] Short Term Rehab   Spoke to the pot at the bedside about STR  Pt was in agreement to call her dtr Mariusz Kay  Daughter is in agreement with STR and agreeable to a blanket referral being made  Referrals made

## 2022-07-12 NOTE — WOUND OSTOMY CARE
Consult Note - Wound   Kam Conley 80 y o  female MRN: [id number]  Unit/Bed#: ICU 02-01 Encounter: [id number]      History and Present Illness:  80year old female patient admitted with DKA associated with DDM2  Wound care consulted for toe and buttocks wounds  Patient history significant for HTN, DDM2, coffee ground emesis, anemia, soft tissue radionecrosis of temporal bone  Patient had been treated at the [address] [facility],[address]  Assessment Findings:   Patient in bed for assessment, she is awake, alert, confused at times as to place  She has bilateral soft wrist restraints in place due to repeated episodes of pulling IV lines  She has scattered areas of erythema to the arms from IV lines and infiltrates  She has scattered bruises to the right hip, right buttock and bilateral lower extremities  She has a ramos catheter for urinary management and is incontinent of stool  She has nutritional supplements  1  POA-right foot 5th digit callus  Patient states she has been treating the corn to her toe at home  No erythema, drainage or edema  Scabbed area noted to the 3rd digit on the right foot, no drainage, erythema or edema  2  POA-appearance of resolving pressure injury, unknown stage of original wound  Wound bed is pink, blanchable, no drainage located within area of hyperpigmented tissue    Skin and Wound Care Plan:   1  Ehob offloading cushion to chair when OOB  2  Elevate heels off the bed with pillows   3  Turn and reposition every two hours  4  Hydraguard to bilateral heels BID and PRN  5  Skin nourishing cream daily  6  Cleanse buttocks area with Remedy foaming cleanser then apply Calazime to open area on the right buttock  7   Allevyn life silicone bordered dressing to the sacrum, arlette with P, date, peel back daily for skin assessment, reappy dressing, change every 3 days and PRN    Wounds:  Wound 07/10/22 Pressure Injury Buttocks Right (Active)   Wound Image   07/11/22 1140   Wound Description Clean;Pink;Epithelialization 07/12/22 0400   Pressure Injury Stage 2 07/10/22 2000   Tess-wound Assessment Hyperpigmented; Intact 07/11/22 1140   Wound Length (cm) 0 5 cm 07/11/22 1140   Wound Width (cm) 0 7 cm 07/11/22 1140   Wound Depth (cm) 0 1 cm 07/11/22 1140   Wound Surface Area (cm^2) 0 35 cm^2 07/11/22 1140   Wound Volume (cm^3) 0 035 cm^3 07/11/22 1140   Calculated Wound Volume (cm^3) 0 04 cm^3 07/11/22 1140   Treatments Cleansed 07/11/22 2000   Dressing Protective barrier 07/11/22 1140   Patient Tolerance Tolerated well 07/11/22 1140       Wound 07/10/22 Toe (Comment  which one) Anterior;Right (Active)   Wound Image   07/11/22 1134   Wound Description Dry;Brown 07/12/22 0400   Tess-wound Assessment Callus 07/11/22 1134   Drainage Amount None 07/12/22 0400   Treatments Cleansed 07/11/22 2000   Dressing Open to air 07/12/22 0400   Patient Tolerance Tolerated well 07/11/22 1134     Reviewed plan of care with primary RN Ana  Recommendations written as orders  Wound care team to follow weekly while admitted  Questions or concerns Critical access hospital[facility] Nurse    Ning Carter BSN, RN, Camille Velez

## 2022-07-12 NOTE — PROGRESS NOTES
Vancomycin IV Pharmacy-to-Dose Consultation    Yosvany West is a 80 y o  female who is currently receiving Vancomycin IV with management by the Pharmacy Consult service  Assessment/Plan:  The patient was reviewed  Renal function is stable and no signs or symptoms of nephrotoxicity and/or infusion reactions were documented in the chart  Based on todays assessment, continue current vancomycin (day # 3) dosing of 750 mg q24, with a plan for trough to be drawn at 830 on 7/13  We will continue to follow the patients culture results and clinical progress daily      Colton Babcock, Pharmacist

## 2022-07-12 NOTE — PLAN OF CARE
Problem: Potential for Falls  Goal: Patient will remain free of falls  Description: INTERVENTIONS:  - Educate patient/family on patient safety including physical limitations  - Instruct patient to call for assistance with activity   - Consult OT/PT to assist with strengthening/mobility   - Keep Call bell within reach  - Keep bed low and locked with side rails adjusted as appropriate  - Keep care items and personal belongings within reach  - Initiate and maintain comfort rounds  - Make Fall Risk Sign visible to staff  - Apply yellow socks and bracelet for high fall risk patients  - Consider moving patient to room near nurses station  Outcome: Progressing     Problem: MOBILITY - ADULT  Goal: Maintain or return to baseline ADL function  Description: INTERVENTIONS:  -  Assess patient's ability to carry out ADLs; assess patient's baseline for ADL function and identify physical deficits which impact ability to perform ADLs (bathing, care of mouth/teeth, toileting, grooming, dressing, etc )  - Assess/evaluate cause of self-care deficits   - Assess range of motion  - Assess patient's mobility; develop plan if impaired  - Assess patient's need for assistive devices and provide as appropriate  - Encourage maximum independence but intervene and supervise when necessary  - Involve family in performance of ADLs  - Assess for home care needs following discharge   - Consider OT consult to assist with ADL evaluation and planning for discharge  - Provide patient education as appropriate  Outcome: Progressing  Goal: Maintains/Returns to pre admission functional level  Description: INTERVENTIONS:  - Perform BMAT or MOVE assessment daily    - Set and communicate daily mobility goal to care team and patient/family/caregiver     - Collaborate with rehabilitation services on mobility goals if consulted  - Out of bed for toileting  - Record patient progress and toleration of activity level   Outcome: Progressing     Problem: Prexisting or High Potential for Compromised Skin Integrity  Goal: Skin integrity is maintained or improved  Description: INTERVENTIONS:  - Identify patients at risk for skin breakdown  - Assess and monitor skin integrity  - Assess and monitor nutrition and hydration status  - Monitor labs   - Assess for incontinence   - Turn and reposition patient  - Assist with mobility/ambulation  - Relieve pressure over bony prominences  - Avoid friction and shearing  - Provide appropriate hygiene as needed including keeping skin clean and dry  - Evaluate need for skin moisturizer/barrier cream  - Collaborate with interdisciplinary team   - Patient/family teaching  - Consider wound care consult   Outcome: Progressing     Problem: SAFETY,RESTRAINT: NV/NON-SELF DESTRUCTIVE BEHAVIOR  Goal: Remains free of harm/injury (restraint for non violent/non self-detsructive behavior)  Description: INTERVENTIONS:  - Instruct patient/family regarding restraint use   - Assess and monitor physiologic and psychological status   - Provide interventions and comfort measures to meet assessed patient needs   - Identify and implement measures to help patient regain control  - Assess readiness for release of restraint   Outcome: Progressing  Goal: Returns to optimal restraint-free functioning  Description: INTERVENTIONS:  - Assess the patient's behavior and symptoms that indicate continued need for restraint  - Identify and implement measures to help patient regain control  - Assess readiness for release of restraint   Outcome: Progressing     Problem: Nutrition/Hydration-ADULT  Goal: Nutrient/Hydration intake appropriate for improving, restoring or maintaining nutritional needs  Description: Monitor and assess patient's nutrition/hydration status for malnutrition  Collaborate with interdisciplinary team and initiate plan and interventions as ordered  Monitor patient's weight and dietary intake as ordered or per policy   Utilize nutrition screening tool and intervene as necessary  Determine patient's food preferences and provide high-protein, high-caloric foods as appropriate       INTERVENTIONS:  - Monitor oral intake, urinary output, labs, and treatment plans  - Assess nutrition and hydration status and recommend course of action  - Evaluate amount of meals eaten  - Assist patient with eating if necessary   - Allow adequate time for meals  - Recommend/ encourage appropriate diets, oral nutritional supplements, and vitamin/mineral supplements  - Order, calculate, and assess calorie counts as needed  - Recommend, monitor, and adjust tube feedings and TPN/PPN based on assessed needs  - Assess need for intravenous fluids  - Provide specific nutrition/hydration education as appropriate  - Include patient/family/caregiver in decisions related to nutrition  Outcome: Progressing

## 2022-07-12 NOTE — PLAN OF CARE
Problem: Potential for Falls  Goal: Patient will remain free of falls  Description: INTERVENTIONS:  - Educate patient/family on patient safety including physical limitations  - Instruct patient to call for assistance with activity   - Consult OT/PT to assist with strengthening/mobility   - Keep Call bell within reach  - Keep bed low and locked with side rails adjusted as appropriate  - Keep care items and personal belongings within reach  - Initiate and maintain comfort rounds  - Make Fall Risk Sign visible to staff  - Offer Toileting every 3 Hours, in advance of need  - Initiate/Maintain bed alarm  - Obtain necessary fall risk management equipment: at bedside  - Apply yellow socks and bracelet for high fall risk patients  - Consider moving patient to room near nurses station  Outcome: Progressing     Problem: MOBILITY - ADULT  Goal: Maintain or return to baseline ADL function  Description: INTERVENTIONS:  -  Assess patient's ability to carry out ADLs; assess patient's baseline for ADL function and identify physical deficits which impact ability to perform ADLs (bathing, care of mouth/teeth, toileting, grooming, dressing, etc )  - Assess/evaluate cause of self-care deficits   - Assess range of motion  - Assess patient's mobility; develop plan if impaired  - Assess patient's need for assistive devices and provide as appropriate  - Encourage maximum independence but intervene and supervise when necessary  - Involve family in performance of ADLs  - Assess for home care needs following discharge   - Consider OT consult to assist with ADL evaluation and planning for discharge  - Provide patient education as appropriate  Outcome: Progressing

## 2022-07-12 NOTE — ASSESSMENT & PLAN NOTE
· Present on admission  Creatinine initially elevated at 1 25   · Likely prerenal in the setting of DKA  · Now resolved  Creatinine at baseline    · Continue to monitor renal function

## 2022-07-12 NOTE — ASSESSMENT & PLAN NOTE
Lab Results   Component Value Date    HGBA1C 10 2 (H) 07/01/2022       Recent Labs     07/11/22  1406 07/11/22  1616 07/11/22  2146 07/12/22  0704   POCGLU 172* 159* 339* 125       Blood Sugar Average: Last 72 hrs:  (P) 701 [id number]     · Initially admitted to the ICU for DKA protocol  DKA now resolved  · Has been transitioned to sliding scale insulin  · Home regimen consists of 5 units Levemir q h s  and 5 units t i d  with meals plus sliding scale insulin carb coverage  · Patient's daughter/POA reports blood sugars at home anywhere between 300-500, although states patient is compliant with her insulin  Requesting endocrinology consult  · Endocrinology input appreciated  · PT/OT recommending rehab  Patient is not agreeable, but patient's daughter/POA wants placement  Appreciate case management for coordination  · Patient is medically stable at this time for rehab

## 2022-07-12 NOTE — PLAN OF CARE
Problem: PHYSICAL THERAPY ADULT  Goal: Performs mobility at highest level of function for planned discharge setting  See evaluation for individualized goals  Description: Treatment/Interventions: Functional transfer training, LE strengthening/ROM, Therapeutic exercise, Endurance training, Patient/family training, Equipment eval/education, Bed mobility, Gait training, Spoke to nursing, OT  Equipment Recommended: Idalmis Palacios       See flowsheet documentation for full assessment, interventions and recommendations  7/12/2022 1414 by Annalise Yan  Note: Prognosis: Fair  Problem List: Decreased strength, Decreased range of motion, Decreased endurance, Impaired balance, Decreased mobility, Decreased safety awareness, Decreased skin integrity, Pain  Assessment: Pt  admitted to hospital on 7/10/99 for DKA  Pt  evaluated by PT in the ICU on 7/12/22 to assess functional mobility  Pt  was lying supine with HOB elevated at the beginning of the session  Pt  was cognitively oriented and responsive to evaluation questions  Pain scale on a 0-10 was a 4 in the pts  R LE and brought on by movement, pt  reported discomfort in R UE after movement that subsided with rest  During bed mobility pt  required moderate assistance  During transfers pt  required minimal assistance x2  Sit to stand transfer attempted twice  First stand pt  reported weakness and needed to rest, second attempt pt  was able to remain standing and was ready to intiate ambulation  Pt  ambulated 5 feet with RW minimal assistance x2  Pt  denied dizziness and lightheadedness with positional changes  Gait pattern during ambulation was decreased foot clearance, forward flexed, narrow JACQUE, antalgic, step to, excessively slow, and R knee valgus  Once sitting skin assessment completed of UE, R hand and forearm were red and had 2+ pitting edema, L hand and forearm were red but there was no edema at the time  After ambulation pt  received treatment  PCP emptied ramos per pt  request during therapy  At the end of the session pt  seated in recliner, pt  declined SCDs application  Pt  will benefit from skilled physical therapy to help increase B LE strength, improve balance, improve transfers, improve bed mobility, increase ambulation distance and endurance, preserve skin integrity and receive education on current health status  PT Discharge Recommendation: (S) Post acute rehabilitation services    See flowsheet documentation for full assessment  7/12/2022 1413 by Christopher Kemp  Note: Prognosis: Fair  Problem List: Decreased strength, Decreased range of motion, Decreased endurance, Impaired balance, Decreased mobility, Decreased safety awareness, Decreased skin integrity, Pain  Assessment: Pt  admitted to hospital on 7/10/99 for DKA  Pt  evaluated by PT in the ICU on 7/12/22 to assess functional mobility  Pt  was lying supine with HOB elevated at the beginning of the session  Pt  was cognitively oriented and responsive to evaluation questions  Pain scale on a 0-10 was a 4 in the pts  R LE and brought on by movement, pt  reported discomfort in R UE after movement that subsided with rest  During bed mobility pt  required moderate assistance  During transfers pt  required minimal assistance x2  Sit to stand transfer attempted twice  First stand pt  reported weakness and needed to rest, second attempt pt  was able to remain standing and was ready to intiate ambulation  Pt  ambulated 5 feet with RW minimal assistance x2  Pt  denied dizziness and lightheadedness with positional changes  Gait pattern during ambulation was decreased foot clearance, forward flexed, narrow JACQUE, antalgic, step to, excessively slow, and R knee valgus  Once sitting skin assessment completed of UE, R hand and forearm were red and had 2+ pitting edema, L hand and forearm were red but there was no edema at the time  After ambulation pt  received treatment  PCP emptied ramos per pt  request during therapy   At the end of the session pt  seated in recliner, pt  declined SCDs application  Pt  will benefit from skilled physical therapy to help increase B LE strength, improve balance, improve transfers, improve bed mobility, increase ambulation distance and endurance, preserve skin integrity and receive education on current health status  PT Discharge Recommendation: (S) Post acute rehabilitation services    See flowsheet documentation for full assessment

## 2022-07-12 NOTE — ASSESSMENT & PLAN NOTE
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

## 2022-07-12 NOTE — ASSESSMENT & PLAN NOTE
· CT abdomen/pelvis revealed masslike thickening of the gastric antrum with pronounced gastric distension and a dilated fluid-filled stomach; direct visualization recommended to exclude mass lesion  · GI consult appreciated for EGD; however, patient is refusing EGD  · Continue b i d   PPI

## 2022-07-12 NOTE — PHYSICAL THERAPY NOTE
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]  Stroke-like symptoms [R29 90]  Sepsis ([facility] ) [A41 9]    Problem List  Patient Active Problem List   Diagnosis    HLD (hyperlipidemia)    HTN (hypertension)    Other specified hypothyroidism    Type 2 diabetes mellitus with diabetic neuropathy, with long-term current use of insulin ([facility] )    Closed intertrochanteric fracture of right femur (HCC)    Hyponatremia    Elevated vitamin B12 level    Anemia    Hypophosphatemia    Primary osteoarthritis of both knees    Acute hip pain, right    Ambulatory dysfunction    Right knee pain    Primary osteoarthritis of right shoulder    Soft tissue radionecrosis    Osteoradionecrosis of temporal bone (HCC)    SIRS of non-infectious origin w acute organ dysfunction ([facility] )    Diabetic ketoacidosis associated with type 2 diabetes mellitus (HCC)    High anion gap metabolic acidosis    Coffee ground emesis    RAMIRO (acute kidney injury) ([facility] )    Abnormal CT of the abdomen    Visual changes       Past Medical History  Past Medical History:   Diagnosis Date    Ambulates with cane     Cancer ([facility] )     Chronic pain disorder     knees/ shoulders (gets inj every 3 mos)    Controlled type 2 diabetes mellitus with diabetic polyneuropathy, with long-term current use of insulin ([facility] ) 5/21/2008    Diabetes mellitus ([facility] )     Diabetic polyneuropathy ([facility] ) 5/21/2008    ICD10 clean up    Disease of thyroid gland     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    HL (hearing loss)     Hodgkin's disease ([facility] ) 2008    Left neck, had radiation    Hypertension     Neuropathy     Osteoporosis     RA (rheumatoid arthritis) ([facility] )        Past Surgical History  Past Surgical History:   Procedure Laterality Date    ADENOIDECTOMY      APPENDECTOMY      CATARACT EXTRACTION      CATARACT EXTRACTION, BILATERAL CHOLECYSTECTOMY      FRACTURE SURGERY Right     hip    MOUTH SURGERY      oral cancer left lower lip    OVARY SURGERY      MI ADJ TISS XFER HEAD,FAC,HAND <10 SQCM N/A 3/28/2022    Procedure: Adjacent tissue transfer face;  Surgeon: Renée Pope MD;  Location: [facility];  Service: ENT    MI MASTOIDECTOMY,SIMPLE Left 3/28/2022    Procedure: MASTOIDECTOMY;  Surgeon: Renée Pope MD;  Location: [facility];  Service: ENT    MI OPEN RX FEMUR FX+INTRAMED SHE Right 5/28/2020    Procedure: INSERTION NAIL IM FEMUR ANTEGRADE (TROCHANTERIC); Surgeon: Ansley Cm DO;  Location: [facility] MAIN OR;  Service: Orthopedics    TONSILLECTOMY      TOTAL THYROIDECTOMY  2008    Performed after left neck dissection and left oral cancer diagnosis        07/12/22 0800   PT Last Visit   PT Visit Date 07/12/22   Note Type   Note type Evaluation   Pain Assessment   Pain Assessment Tool 0-10   Pain Score No Pain  (denies at rest, increased with activity, 4/10 with activity )   Pain Location/Orientation Orientation: Right;Location: Leg  (lower leg)   Pain Onset/Description Onset: Ongoing;Frequency: Intermittent; Descriptor: Sore   Effect of Pain on Daily Activities yes   Patient's Stated Pain Goal No pain   Hospital Pain Intervention(s) Medication (See MAR); Emotional support; Environmental changes   Multiple Pain Sites No   Restrictions/Precautions   Braces or Orthoses C/S Collar  (Pt  reports, "wears cervical collar because of past surgeries")   Other Precautions Chair Alarm; Bed Alarm;Multiple lines; Fall Risk;Telemetry;Pain   Home Living   Type of [address] [place] [address] One level;Performs ADLs on one level; Able to live on main level with bedroom/bathroom;Stairs to enter with rails; Laundry in basement  (3 TEE unilateral railing  10 steps to laundry with bilateral railing )   Bathroom Shower/Tub Tub/shower unit   [employer] Raised   Bathroom Equipment Grab bars in shower;Grab bars around toilet; Shower chair;Commode  (does not use shower chair  not use commoade at baseline)   [address] Walker;Cane  (SPC at baseline)   Additional Comments Pt reports she sleeps in a recliner as her new bed is too high  Prior Function   Level of [place] Independent with ADLs and functional mobility   Lives With Alone   Receives Help From Family  (family lives across the street )   ADL Assistance Independent   IADLs Independent   Falls in the last 6 months 0   Vocational Retired   Comments + drive   Cognition   Overall Cognitive Status WFL   Arousal/Participation Responsive   Orientation Level Oriented X4   Memory Within functional limits   Following Commands Follows all commands and directions without difficulty   Subjective   Subjective Pt  reason for admittance, "something to do with diabetes "   RLE Assessment   RLE Assessment X  (3-/5 grossly assessed during transfers and ambulation)   LLE Assessment   LLE Assessment X  (3-/5 grossly assessed during transfers and ambulation)   Vision-Basic Assessment   Current Vision No visual deficits   Visual History Corrective eye surgery   Coordination   Sensation WFL   Bed Mobility   Supine to Sit 3  Moderate assistance   Additional items Assist x 2;HOB elevated;LE management;Verbal cues; Increased time required; Bedrails  (bed deflated)   Sit to Supine   (DNT  Pt  seated in recliner at end of session )   Additional Comments VCs to initiate UE and LE movement  Utilized PT and bed rails for support  Needed Vcs to mobilize feet to touch the floor before continuing therapy  Bed mobility ellicited pain in the R LE, pt  given increased time and extremity treated gently for symptoms to subside before continuing therapy  Transfers   Sit to Stand 4  Minimal assistance   Additional items Assist x 2;Armrests; Increased time required;Verbal cues  (RW)   Stand to Sit 4  Minimal assistance   Additional items Assist x 1; Armrests; Increased time required;Verbal cues   Additional Comments VCs to use one hand to push off from bed while keeping the other on the walker during sit to stand transfer  Pt  did not follow instructions and used B hands on RW to stand  Verbal cues to use hands to reach for armrests while descending from standing to sit  VCs to feel chair on back of legs before descending, needed assistance turning RW and stepping back to align hips before sitting down  Ambulation/Elevation   Gait pattern Decreased foot clearance; Forward Flexion;Narrow JACQUE; Antalgic; Step to;Excessively slow  (R knee valgus)   Gait Assistance 4  Minimal assist   Additional items Assist x 2   Assistive Device Rolling walker   Distance 5 feet  (from bed to chair)   Ambulation/Elevation Additional Comments Pt  given VCs on how to use the rolling walker  Balance   Static Sitting Fair   Dynamic Sitting Fair -   Static Standing Poor +   Dynamic Standing Poor   Ambulatory Poor   Endurance Deficit   Endurance Deficit Yes   Endurance Deficit Description Pt  reported pain with movement in R LE and R forearm  Based off observation pt  looked generaly weak and deconditioned  Activity Tolerance   Activity Tolerance Patient limited by pain  (Pain in R LE and forearm)   Medical Staff Made Aware JACQUES ramos; [facility] PA-C informed of d/c disposition recommendation  Nurse Made Aware PHAM Perez   Assessment   Prognosis Fair   Problem List Decreased strength;Decreased range of motion;Decreased endurance; Impaired balance;Decreased mobility; Decreased safety awareness;Decreased skin integrity;Pain   Assessment Pt  admitted to hospital on 7/10/99 for DKA  Pt  evaluated by PT in the ICU on 7/12/22 to assess functional mobility  Pt  was lying supine with HOB elevated at the beginning of the session  Pt  was cognitively oriented and responsive to evaluation questions  Pain scale on a 0-10 was a 4 in the pts   R LE and brought on by movement, pt  reported discomfort in R UE after movement that subsided with rest  During bed mobility pt  required moderate assistance  During transfers pt  required minimal assistance x2  Sit to stand transfer attempted twice  First stand pt  reported weakness and needed to rest, second attempt pt  was able to remain standing and was ready to intiate ambulation  Pt  ambulated 5 feet with RW minimal assistance x2  Pt  denied dizziness and lightheadedness with positional changes  Gait pattern during ambulation was decreased foot clearance, forward flexed, narrow JACQUE, antalgic, step to, excessively slow, and R knee valgus  Once sitting skin assessment completed of UE, R hand and forearm were red and had 2+ pitting edema, L hand and forearm were red but there was no edema at the time  After ambulation pt  received treatment  PCP emptied ramos per pt  request during therapy  At the end of the session pt  seated in recliner, pt  declined SCDs application  Pt  will benefit from skilled physical therapy to help increase B LE strength, improve balance, improve transfers, improve bed mobility, increase ambulation distance and endurance, preserve skin integrity and receive education on current health status  Goals   Patient Goals to get out of bed   LTG Expiration Date 07/22/22   Long Term Goal #1 1) Pt  Will increase gross B LE strength by a half a grade to be able to improve ambulation endurance, complete weight bearing activities, and decrease fall risk  2) Pt  Will increase ambulation to 50 ft RW CGA to be able walk within home environment and move bed to chair during duration of hospital stay  3) Pt  Will improve dynamic sitting balance to Good to be able to reach out of JACQUE  4) Pt  Will improve supine to sit bed mobility to minimal assistance x2 to be able to decrease burden of care and move bed to chair  5) Pt  Will move from bed to chair 2x a day to preserve skin integrity  6) Pt  Will move from [facility] to  to decrease fall risk and improve safety     Plan   Treatment/Interventions Functional transfer training;LE strengthening/ROM; Therapeutic exercise; Endurance training;Patient/family training;Equipment eval/education; Bed mobility;Gait training;Spoke to nursing;OT   PT Frequency 3-5x/wk   Recommendation   PT Discharge Recommendation (S)  Post acute rehabilitation services   Equipment Recommended [address] Basic Mobility Inpatient   Turning in Bed Without Bedrails 2   Lying on Back to Sitting on Edge of Flat Bed 2   Moving Bed to Chair 2   Standing Up From Chair 2   Walk in Room 2   Climb 3-5 Stairs 2   Basic Mobility Inpatient Raw Score 12   Basic Mobility Standardized Score 32 23   Highest Level Of Mobility   JH-HLM Goal 4: Move to chair/commode   JH-HLM Achieved 4: Move to chair/commode   Delirium Assessment-ICU CAM   Feature 1: Acute Onset or Fluctuating Course Negative   Feature 2: Inattention Negative   Feature 3: Altered Level of Consciousness Negative 0   Feature 4: Disorganized Thinking Negative   Overall CAM-ICU Negative   Additional Treatment Session   Start Time 4046   End Time 0852   Treatment Assessment Pt  treated for functional mobility in addition to evaluation  Pt  received 38 minutes of therapeutic exercise  Exercises completed in sitting and AROM  Exercises included 10 reps with 6 seconds of isometrics holds of glute sets, hamstrings sets, and quads sets  Additionally, pt  did 20 reps of 1 set of ankle pumps  Pt  was given an HEP and understood all the exercises  Pt  tolerated treatment well and will benefit from continuous physical therapy  Equipment Use walker   Exercises   Hamstring Sets Sitting;10 reps;AROM  (6 second holds)   [employer] reps; Sitting  (6 second holds)   [employer]; Sitting  (6 second holds)   Ankle Pumps Sitting;20 reps;AROM; Bilateral  (rest breaks provided)   End of Consult   Patient Position at End of Consult   (Pt  seated in recliner)     History: DKA, HTN, type II DM, RAMIRO, OA of B knees, sepsis, hyperglycemia      Body System Impairments: ambulation dysfunction, generalized weakness and deconditioning, increased risk of integumentary issues, increased risk of proprioceptive and sensation changes,     Clinical Presentation: cervical collar, pain, fatigue, decreased mobility, edema, decreased skin integrity, decreased activity tolerance, increased fall risk, poor balance  Based off pts  clinical presentation this pt  has a high complexity           Sarah Jc, SPT

## 2022-07-12 NOTE — PROGRESS NOTES
[address] 45  Progress Note Adams Andre 1940, 80 y o  female MRN: [id number]  Unit/Bed#: ICU 02-01 Encounter: [id number]  Primary Care Provider: Fiona Beavers MD   Date and time admitted to hospital: 7/10/2022  6:53 AM    * Diabetic ketoacidosis associated with type 2 diabetes mellitus [facility])  Assessment & Plan  Lab Results   Component Value Date    HGBA1C 10 2 (H) 07/01/2022       Recent Labs     07/11/22  1406 07/11/22  1616 07/11/22  2146 07/12/22  0704   POCGLU 172* 159* 339* 125       Blood Sugar Average: Last 72 hrs:  (P) 799 [id number]     · Initially admitted to the ICU for DKA protocol  DKA now resolved  · Has been transitioned to sliding scale insulin  · Home regimen consists of 5 units Levemir q h s  and 5 units t i d  with meals plus sliding scale insulin carb coverage  · Patient's daughter/POA reports blood sugars at home anywhere between 300-500, although states patient is compliant with her insulin  Requesting endocrinology consult  · Endocrinology input appreciated  · PT/OT recommending rehab  Patient is not agreeable, but patient's daughter/POA wants placement  Appreciate case management for coordination  · Patient is medically stable at this time for rehab  SIRS of non-infectious origin w acute organ dysfunction (HCC)  Assessment & Plan  · POA  AEB tachycardia, leukocytosis, RAMIRO, elevated lactic acid  · RAMIRO, lactic acidosis resolved  · Infectious etiology effectively ruled out with negative UA, negative blood cultures, negative procalcitonin, negative imaging  · SIRS criteria likely met in the setting of DKA-induced inflammatory process  · MRSA swab still pending  · Leukocytosis persists, although patient refused morning labs today  · Received IV cefepime and vancomycin x2 days  Will discontinue and monitor off antibiotics      Abnormal CT of the abdomen  Assessment & Plan  · CT abdomen/pelvis revealed masslike thickening of the gastric antrum with pronounced gastric distension and a dilated fluid-filled stomach; direct visualization recommended to exclude mass lesion  · GI consult appreciated for EGD; however, patient is refusing EGD  · Continue b i d  PPI    RAMIRO (acute kidney injury) ([facility] [address] )  Assessment & Plan  · Present on admission  Creatinine initially elevated at 1 25   · Likely prerenal in the setting of DKA  · Now resolved  Creatinine at baseline  · Continue to monitor renal function    Coffee ground emesis  Assessment & Plan  · Reported by patient prior to admission  · No episodes of vomiting during this admission  · Hemoglobin slightly downtrended, although likely dilutional  · DVT prophylaxis on hold  Will continue to hold and repeat am labs  Consider restarting if Hb remains stable  · GI input appreciated  Of note, patient is refusing EGD  · Continue to monitor CBC    Osteoradionecrosis of temporal bone [facility])  Assessment & Plan  Patient has a history of Hodgkin's lymphoma status post neck radiation  Developed osteonecrosis which was repaired in March of 2022  · Continue routine outpatient follow-up  Other specified hypothyroidism  Assessment & Plan  · Continue pre-admission Synthroid 112 mcg q a m  HTN (hypertension)  Assessment & Plan  · BP reviewed, well controlled  · Home lisinopril 5 mg daily on hold in the setting of RAMIRO  Will continue to hold as blood pressure is well controlled, and at times soft  · Monitor BP trend  HLD (hyperlipidemia)  Assessment & Plan  · Continue pre-admission Lipitor 20 mg with dinner      VTE Pharmacologic Prophylaxis:   Moderate Risk (Score 3-4) - Pharmacological DVT Prophylaxis Contraindicated  Sequential Compression Devices Ordered  Patient Centered Rounds: I performed bedside rounds with nursing staff today    Discussions with Specialists or Other Care Team Provider:  Case Management, nursing, gastroenterology, endocrinology    Education and Discussions with Family / Patient: Updated  (daughter) via phone  Time Spent for Care: 30 minutes  More than 50% of total time spent on counseling and coordination of care as described above  Current Length of Stay: 2 day(s)  Current Patient Status: Inpatient   Certification Statement: The patient will continue to require additional inpatient hospital stay due to Cm for placement  Discharge Plan: Pending rehab acceptance    Code Status: Level 3 - DNAR and DNI    Subjective:   Patient seen and examined resting comfortably in bedside chair, in no apparent distress  No acute events overnight  Patient reports no complaints, and is very eager to go home, despite the fact that she lives alone  Further states that she has plenty of support as her son lives across the street and she has very helpful neighbors  Objective:     Vitals:   Temp (24hrs), Av 5 °F (36 9 °C), Min:98 1 °F (36 7 °C), Max:99 °F (37 2 °C)    Temp:  [98 1 °F (36 7 °C)-99 °F (37 2 °C)] 98 1 °F (36 7 °C)  HR:  [73-83] 75  Resp:  [19-29] 20  BP: (114-160)/(54-75) 156/71  SpO2:  [95 %-97 %] 95 %  Body mass index is 23 62 kg/m²  Input and Output Summary (last 24 hours): Intake/Output Summary (Last 24 hours) at 2022 1020  Last data filed at 2022 0930  Gross per 24 hour   Intake 1580 37 ml   Output 2085 ml   Net -504 63 ml       Physical Exam:   Physical Exam  Vitals and nursing note reviewed  Constitutional:       General: She is not in acute distress  Appearance: She is normal weight  She is not toxic-appearing  HENT:      Head: Normocephalic and atraumatic  Mouth/Throat:      Mouth: Mucous membranes are moist    Eyes:      Conjunctiva/sclera: Conjunctivae normal    Cardiovascular:      Rate and Rhythm: Normal rate and regular rhythm  Pulses: Normal pulses  Heart sounds: Normal heart sounds  Pulmonary:      Effort: Pulmonary effort is normal  No respiratory distress  Breath sounds: Normal breath sounds   No wheezing, rhonchi or rales  Abdominal:      General: Bowel sounds are normal  There is no distension  Palpations: Abdomen is soft  Tenderness: There is no abdominal tenderness  Musculoskeletal:      Cervical back: Neck supple  Right lower leg: No edema  Left lower leg: No edema  Skin:     General: Skin is warm and dry  Neurological:      Mental Status: She is alert and oriented to person, place, and time  Cranial Nerves: No cranial nerve deficit  Sensory: No sensory deficit  Motor: Weakness (Generalized) present  Coordination: Coordination normal    Psychiatric:         Mood and Affect: Mood normal          Behavior: Behavior normal          Thought Content: Thought content normal           Additional Data:     Labs:  Results from last 7 days   Lab Units 07/11/22  0408   WBC Thousand/uL 19 51*   HEMOGLOBIN g/dL 9 7*   HEMATOCRIT % 29 2*   PLATELETS Thousands/uL 184   NEUTROS PCT % 88*   LYMPHS PCT % 6*   MONOS PCT % 6   EOS PCT % 0     Results from last 7 days   Lab Units 07/11/22  1801 07/11/22  1227 07/11/22  0408   SODIUM mmol/L 135   < > 134*   POTASSIUM mmol/L 4 4   < > 3 4*   CHLORIDE mmol/L 102   < > 104   CO2 mmol/L 21   < > 18*   BUN mg/dL 15   < > 20   CREATININE mg/dL 0 72   < > 0 77   ANION GAP mmol/L 12   < > 12   CALCIUM mg/dL 8 4   < > 8 1*   ALBUMIN g/dL  --   --  2 9*   TOTAL BILIRUBIN mg/dL  --   --  0 49   ALK PHOS U/L  --   --  65   ALT U/L  --   --  19   AST U/L  --   --  31   GLUCOSE RANDOM mg/dL 218*   < > 97    < > = values in this interval not displayed       Results from last 7 days   Lab Units 07/10/22  0728   INR  1 01     Results from last 7 days   Lab Units 07/12/22  0704 07/11/22  2146 07/11/22  1616 07/11/22  1406 07/11/22  1200 07/11/22  1019 07/11/22  0810 07/11/22  0605 07/11/22  0406 07/11/22  0212 07/11/22  0039 07/10/22  2236   POC GLUCOSE mg/dl 125 339* 159* 172* 183* 183* 111 120 118 204* 222* 96         Results from last 7 days   Lab Units 07/11/22  0408 07/11/22  0023 07/10/22  2016 07/10/22  1550 07/10/22  1422 07/10/22  0925 07/10/22  0703   LACTIC ACID mmol/L  --  1 5 2 5* 3 4* 4 5*   < > 4 6*   PROCALCITONIN ng/ml 0 86*  --   --   --   --   --  0 18    < > = values in this interval not displayed  Lines/Drains:  Invasive Devices  Report    Peripheral Intravenous Line  Duration           Peripheral IV 07/10/22 Left;Proximal;Ventral (anterior) Antecubital 1 day    Peripheral IV 07/10/22 Left;Ventral (anterior) Foot 1 day    Peripheral IV 07/11/22 Left;Ventral (anterior) Wrist 1 day          Drain  Duration           Urethral Catheter Straight-tip 16 Fr  2 days              Urinary Catheter:  Goal for removal: No longer needed  Will place order to discontinue               Imaging: Reviewed radiology reports from this admission including: abdominal/pelvic CT    Recent Cultures (last 7 days):   Results from last 7 days   Lab Units 07/10/22  1231 07/10/22  1102   BLOOD CULTURE  No Growth at 24 hrs  No Growth at 24 hrs  Last 24 Hours Medication List:   Current Facility-Administered Medications   Medication Dose Route Frequency Provider Last Rate    acetaminophen  650 mg Oral 4x Daily PRN ADRIAN Ball      atorvastatin  20 mg Oral Daily With Dinner Kennieth Duane, CRNP      insulin detemir  5 Units Subcutaneous HS Janny Altamirano PA-C      insulin lispro  1-5 Units Subcutaneous TID AC Janny Altamirano PA-C      insulin lispro  1-5 Units Subcutaneous HS Janny Altamirano PA-C      insulin lispro  5 Units Subcutaneous TID With Meals Janny Altamirano PA-C      levothyroxine  112 mcg Oral QAM ADRIAN Wong      pantoprazole  40 mg Intravenous Q12H [address] ADRIAN Wong      sodium chloride (PF)  3 mL Intravenous Q1H PRN Kennieth Duane, CRNP          Today, Patient Was Seen By: Candido Baldwin PA-C    **Please Note: This note may have been constructed using a voice recognition system  **

## 2022-07-12 NOTE — ASSESSMENT & PLAN NOTE
· BP reviewed, well controlled  · Home lisinopril 5 mg daily on hold in the setting of RAMIRO  Will continue to hold as blood pressure is well controlled, and at times soft  · Monitor BP trend

## 2022-07-13 LAB
ATRIAL RATE: 105 BPM
ATRIAL RATE: 118 BPM
ATRIAL RATE: 128 BPM
GLUCOSE SERPL-MCNC: 109 MG/DL (ref 65–140)
GLUCOSE SERPL-MCNC: 251 MG/DL (ref 65–140)
GLUCOSE SERPL-MCNC: 341 MG/DL (ref 65–140)
GLUCOSE SERPL-MCNC: 38 MG/DL (ref 65–140)
GLUCOSE SERPL-MCNC: 40 MG/DL (ref 65–140)
GLUCOSE SERPL-MCNC: 70 MG/DL (ref 65–140)
P AXIS: 63 DEGREES
P AXIS: 70 DEGREES
P AXIS: 74 DEGREES
PR INTERVAL: 134 MS
PR INTERVAL: 150 MS
PR INTERVAL: 154 MS
QRS AXIS: 41 DEGREES
QRS AXIS: 46 DEGREES
QRS AXIS: 54 DEGREES
QRSD INTERVAL: 70 MS
QRSD INTERVAL: 72 MS
QRSD INTERVAL: 84 MS
QT INTERVAL: 298 MS
QT INTERVAL: 326 MS
QT INTERVAL: 344 MS
QTC INTERVAL: 435 MS
QTC INTERVAL: 454 MS
QTC INTERVAL: 456 MS
T WAVE AXIS: 57 DEGREES
T WAVE AXIS: 59 DEGREES
T WAVE AXIS: 65 DEGREES
VENTRICULAR RATE: 105 BPM
VENTRICULAR RATE: 118 BPM
VENTRICULAR RATE: 128 BPM

## 2022-07-13 PROCEDURE — 99233 SBSQ HOSP IP/OBS HIGH 50: CPT

## 2022-07-13 PROCEDURE — 82948 REAGENT STRIP/BLOOD GLUCOSE: CPT

## 2022-07-13 PROCEDURE — 99232 SBSQ HOSP IP/OBS MODERATE 35: CPT | Performed by: INTERNAL MEDICINE

## 2022-07-13 PROCEDURE — C9113 INJ PANTOPRAZOLE SODIUM, VIA: HCPCS | Performed by: NURSE PRACTITIONER

## 2022-07-13 PROCEDURE — 93010 ELECTROCARDIOGRAM REPORT: CPT | Performed by: INTERNAL MEDICINE

## 2022-07-13 RX ORDER — HEPARIN SODIUM 5000 [USP'U]/ML
5000 INJECTION, SOLUTION INTRAVENOUS; SUBCUTANEOUS EVERY 8 HOURS SCHEDULED
Status: DISCONTINUED | OUTPATIENT
Start: 2022-07-13 | End: 2022-07-15 | Stop reason: HOSPADM

## 2022-07-13 RX ADMIN — ACETAMINOPHEN 650 MG: 325 TABLET ORAL at 19:51

## 2022-07-13 RX ADMIN — HEPARIN SODIUM 5000 UNITS: 5000 INJECTION INTRAVENOUS; SUBCUTANEOUS at 21:36

## 2022-07-13 RX ADMIN — HEPARIN SODIUM 5000 UNITS: 5000 INJECTION INTRAVENOUS; SUBCUTANEOUS at 15:24

## 2022-07-13 RX ADMIN — INSULIN LISPRO 5 UNITS: 100 INJECTION, SOLUTION INTRAVENOUS; SUBCUTANEOUS at 13:12

## 2022-07-13 RX ADMIN — INSULIN LISPRO 2 UNITS: 100 INJECTION, SOLUTION INTRAVENOUS; SUBCUTANEOUS at 13:12

## 2022-07-13 RX ADMIN — INSULIN DETEMIR 5 UNITS: 100 INJECTION, SOLUTION SUBCUTANEOUS at 22:31

## 2022-07-13 RX ADMIN — PANTOPRAZOLE SODIUM 40 MG: 40 INJECTION, POWDER, FOR SOLUTION INTRAVENOUS at 21:36

## 2022-07-13 RX ADMIN — ATORVASTATIN CALCIUM 20 MG: 20 TABLET, FILM COATED ORAL at 16:29

## 2022-07-13 RX ADMIN — PANTOPRAZOLE SODIUM 40 MG: 40 INJECTION, POWDER, FOR SOLUTION INTRAVENOUS at 09:14

## 2022-07-13 RX ADMIN — INSULIN LISPRO 3 UNITS: 100 INJECTION, SOLUTION INTRAVENOUS; SUBCUTANEOUS at 22:31

## 2022-07-13 RX ADMIN — Medication 6 MG: at 21:36

## 2022-07-13 RX ADMIN — LEVOTHYROXINE SODIUM 112 MCG: 112 TABLET ORAL at 09:13

## 2022-07-13 NOTE — PLAN OF CARE
Problem: Potential for Falls  Goal: Patient will remain free of falls  Description: INTERVENTIONS:  - Educate patient/family on patient safety including physical limitations  - Instruct patient to call for assistance with activity   - Consult OT/PT to assist with strengthening/mobility   - Keep Call bell within reach  - Keep bed low and locked with side rails adjusted as appropriate  - Keep care items and personal belongings within reach  - Initiate and maintain comfort rounds  - Make Fall Risk Sign visible to staff  - Offer Toileting every 2 Hours, in advance of need  - Initiate/Maintain bed alarm  - Obtain necessary fall risk management equipment:   - Apply yellow socks and bracelet for high fall risk patients  - Consider moving patient to room near nurses station  Outcome: Progressing     Problem: MOBILITY - ADULT  Goal: Maintain or return to baseline ADL function  Description: INTERVENTIONS:  -  Assess patient's ability to carry out ADLs; assess patient's baseline for ADL function and identify physical deficits which impact ability to perform ADLs (bathing, care of mouth/teeth, toileting, grooming, dressing, etc )  - Assess/evaluate cause of self-care deficits   - Assess range of motion  - Assess patient's mobility; develop plan if impaired  - Assess patient's need for assistive devices and provide as appropriate  - Encourage maximum independence but intervene and supervise when necessary  - Involve family in performance of ADLs  - Assess for home care needs following discharge   - Consider OT consult to assist with ADL evaluation and planning for discharge  - Provide patient education as appropriate  Outcome: Progressing  Goal: Maintains/Returns to pre admission functional level  Description: INTERVENTIONS:  - Perform BMAT or MOVE assessment daily    - Set and communicate daily mobility goal to care team and patient/family/caregiver     - Collaborate with rehabilitation services on mobility goals if consulted  - Out of bed for toileting  - Record patient progress and toleration of activity level   Outcome: Progressing     Problem: Prexisting or High Potential for Compromised Skin Integrity  Goal: Skin integrity is maintained or improved  Description: INTERVENTIONS:  - Identify patients at risk for skin breakdown  - Assess and monitor skin integrity  - Assess and monitor nutrition and hydration status  - Monitor labs   - Assess for incontinence   - Turn and reposition patient  - Assist with mobility/ambulation  - Relieve pressure over bony prominences  - Avoid friction and shearing  - Provide appropriate hygiene as needed including keeping skin clean and dry  - Evaluate need for skin moisturizer/barrier cream  - Collaborate with interdisciplinary team   - Patient/family teaching  - Consider wound care consult   Outcome: Progressing

## 2022-07-13 NOTE — ASSESSMENT & PLAN NOTE
Lab Results   Component Value Date    HGBA1C 10 2 (H) 07/01/2022       Recent Labs     07/12/22  2134 07/13/22  0610 07/13/22  0613 07/13/22  0634   POCGLU 198* 38* 40* 70       Blood Sugar Average: Last 72 hrs:  (P) 164 [id number]     · Initially admitted to the ICU for DKA protocol  DKA now resolved  · Has been transitioned to sliding scale insulin  · Home regimen consists of 5 units Levemir q h s  and 5 units t i d  with meals plus sliding scale insulin carb coverage  · Patient's daughter/POA reports blood sugars at home anywhere between 300-500, although states patient is compliant with her insulin  Requesting endocrinology consult  · Endocrinology input appreciated- change home regimen to Levemir 6 units q h s , Humalog 5 units t i d  a c  plus sliding scale, or 3 units Humalog if less than 50% of meal has been eaten  · PT/OT recommending rehab  Patient and family agreeable  · Patient is medically stable at this time for rehab pending placement

## 2022-07-13 NOTE — WOUND OSTOMY CARE
Consult Note - Wound   Glorya Men 80 y o  female MRN: [id number]  Unit/Bed#: -01 Encounter: [id number]        Wound consult was placed in error by nursing  Patient was assessed on 7/11/2022 and orders written at that time  Wounds:  Wound 07/10/22 Pressure Injury Buttocks Right (Active)   Wound Image   07/11/22 1140   Wound Description Clean;Pink 07/13/22 0900   Pressure Injury Stage 2 07/13/22 0900   Tess-wound Assessment Hyperpigmented; Intact 07/11/22 1140   Wound Length (cm) 0 5 cm 07/11/22 1140   Wound Width (cm) 0 7 cm 07/11/22 1140   Wound Depth (cm) 0 1 cm 07/11/22 1140   Wound Surface Area (cm^2) 0 35 cm^2 07/11/22 1140   Wound Volume (cm^3) 0 035 cm^3 07/11/22 1140   Calculated Wound Volume (cm^3) 0 04 cm^3 07/11/22 1140   Treatments Cleansed;Site care 07/13/22 0900   Dressing Protective barrier 07/13/22 0900   Patient Tolerance Tolerated well 07/13/22 0900       Wound 07/10/22 Toe (Comment  which one) Anterior;Right (Active)   Wound Image   07/11/22 1134   Wound Description Dry;Brown 07/13/22 0900   Tess-wound Assessment Callus 07/12/22 1930   Drainage Amount None 07/12/22 0400   Treatments Cleansed 07/11/22 2000   Dressing Open to air 07/13/22 0900   Patient Tolerance Tolerated well 07/11/22 1134     Recommendations written as orders  Wound care team to follow weekly while admitted  Questions or concerns [address] [facility] Nurse    Merrick CISNEROSN, RN, Alejandro Carlisle

## 2022-07-13 NOTE — PROGRESS NOTES
[address]  Progress Note Analy Lynn 1940, 80 y o  female MRN: [id number]  Unit/Bed#: -01 Encounter: [id number]  Primary Care Provider: Maggie Hightower MD   Date and time admitted to hospital: 7/10/2022  6:53 AM    * Diabetic ketoacidosis associated with type 2 diabetes mellitus [facility])  Assessment & Plan  Lab Results   Component Value Date    HGBA1C 10 2 (H) 07/01/2022       Recent Labs     07/12/22  2134 07/13/22  0610 07/13/22  0613 07/13/22  0634   POCGLU 198* 38* 40* 70       Blood Sugar Average: Last 72 hrs:  (P) 164 [id number]     · Initially admitted to the ICU for DKA protocol  DKA now resolved  · Has been transitioned to sliding scale insulin  · Home regimen consists of 5 units Levemir q h s  and 5 units t i d  with meals plus sliding scale insulin carb coverage  · Patient's daughter/POA reports blood sugars at home anywhere between 300-500, although states patient is compliant with her insulin  Requesting endocrinology consult  · Endocrinology input appreciated- change home regimen to Levemir 6 units q h s , Humalog 5 units t i d  a c  plus sliding scale, or 3 units Humalog if less than 50% of meal has been eaten  · PT/OT recommending rehab  Patient and family agreeable  · Patient is medically stable at this time for rehab pending placement  SIRS of non-infectious origin w acute organ dysfunction (HCC)  Assessment & Plan  · POA  AEB tachycardia, leukocytosis, RAMIRO, elevated lactic acid  · RAMIRO, lactic acidosis resolved  · Infectious etiology effectively ruled out with negative UA, negative blood cultures, negative procalcitonin, negative imaging  · SIRS criteria likely met in the setting of DKA-induced inflammatory process  · MRSA swab negative  · Leukocytosis persists, although patient refused morning labs today  · Received IV cefepime and vancomycin x2 days  Discontinued and monitored off antibiotics with no complications       Abnormal CT of the abdomen  Assessment & Plan  · CT abdomen/pelvis revealed masslike thickening of the gastric antrum with pronounced gastric distension and a dilated fluid-filled stomach; direct visualization recommended to exclude mass lesion  · GI consult appreciated for EGD; however, patient is refusing EGD  · Continue b i d  PPI    RAMIRO (acute kidney injury) ([facility] [address] )  Assessment & Plan  · Present on admission  Creatinine initially elevated at 1 25   · Likely prerenal in the setting of DKA  · Now resolved  Creatinine at baseline  · Continue to monitor renal function    Coffee ground emesis  Assessment & Plan  · Reported by patient prior to admission  · No episodes of vomiting during this admission, and no evidence of active bleeding  · Hemoglobin slightly downtrended, although likely dilutional  · GI input appreciated  Of note, patient is refusing EGD  · Will restart DVT prophylaxis with subQ heparin  · Recheck a m  labs, although patient has been refusing lab work      Osteoradionecrosis of temporal bone [facility])  Assessment & Plan  Patient has a history of Hodgkin's lymphoma status post neck radiation  Developed osteonecrosis which was repaired in March of 2022  · Continue routine outpatient follow-up  Other specified hypothyroidism  Assessment & Plan  · Continue pre-admission Synthroid 112 mcg q a m  HTN (hypertension)  Assessment & Plan  · BP reviewed, well controlled  · Home lisinopril 5 mg daily on hold in the setting of RAMIRO  Will continue to hold as blood pressure is well controlled, and at times soft  · Monitor BP trend  HLD (hyperlipidemia)  Assessment & Plan  · Continue pre-admission Lipitor 20 mg with dinner        VTE Pharmacologic Prophylaxis:   Heparin    Patient Centered Rounds: I performed bedside rounds with nursing staff today    Discussions with Specialists or Other Care Team Provider:  Case Management, nursing    Education and Discussions with Family / Patient: Updated  (daughter) via phone  Time Spent for Care: 30 minutes  More than 50% of total time spent on counseling and coordination of care as described above  Current Length of Stay: 3 day(s)  Current Patient Status: Inpatient   Certification Statement: The patient will continue to require additional inpatient hospital stay due to Cm for placement  Discharge Plan: Pending rehab acceptance    Code Status: Level 3 - DNAR and DNI    Subjective:   Patient seen and examined resting comfortably in bed, in no apparent distress  No acute events overnight  Had pleasant conversation with patient at the bedside, and assisted her with eating her breakfast   Patient states that she is fine with going to rehab, but ultimately she would like to go home and be with her cat  Objective:     Vitals:   Temp (24hrs), Av 4 °F (36 9 °C), Min:97 5 °F (36 4 °C), Max:99 3 °F (37 4 °C)    Temp:  [97 5 °F (36 4 °C)-99 3 °F (37 4 °C)] 97 6 °F (36 4 °C)  HR:  [72-82] 72  Resp:  [17-20] 18  BP: (101-148)/(61-84) 122/81  SpO2:  [93 %-98 %] 98 %  Body mass index is 23 16 kg/m²  Input and Output Summary (last 24 hours): Intake/Output Summary (Last 24 hours) at 2022 1049  Last data filed at 2022 0900  Gross per 24 hour   Intake 240 ml   Output --   Net 240 ml       Physical Exam:   Physical Exam  Vitals and nursing note reviewed  Constitutional:       General: She is not in acute distress  Appearance: She is normal weight  She is not toxic-appearing  HENT:      Head: Normocephalic and atraumatic  Mouth/Throat:      Mouth: Mucous membranes are moist    Eyes:      Conjunctiva/sclera: Conjunctivae normal    Cardiovascular:      Rate and Rhythm: Normal rate and regular rhythm  Pulses: Normal pulses  Heart sounds: Normal heart sounds  Pulmonary:      Effort: Pulmonary effort is normal  No respiratory distress  Breath sounds: Normal breath sounds  No wheezing, rhonchi or rales     Abdominal:      General: Bowel sounds are normal  There is no distension  Palpations: Abdomen is soft  Tenderness: There is no abdominal tenderness  Musculoskeletal:         General: Deformity (Chronic jaw deformity secondary to osteonecrosis status post repair) present  Cervical back: Neck supple  Right lower leg: No edema  Left lower leg: No edema  Skin:     General: Skin is warm and dry  Neurological:      Mental Status: She is alert and oriented to person, place, and time  Cranial Nerves: No cranial nerve deficit  Sensory: No sensory deficit  Motor: Weakness (Generalized) present  Coordination: Coordination normal    Psychiatric:         Mood and Affect: Mood normal          Behavior: Behavior normal          Thought Content: Thought content normal           Additional Data:     Labs:  Results from last 7 days   Lab Units 07/11/22  0408   WBC Thousand/uL 19 51*   HEMOGLOBIN g/dL 9 7*   HEMATOCRIT % 29 2*   PLATELETS Thousands/uL 184   NEUTROS PCT % 88*   LYMPHS PCT % 6*   MONOS PCT % 6   EOS PCT % 0     Results from last 7 days   Lab Units 07/11/22  1801 07/11/22  1227 07/11/22  0408   SODIUM mmol/L 135   < > 134*   POTASSIUM mmol/L 4 4   < > 3 4*   CHLORIDE mmol/L 102   < > 104   CO2 mmol/L 21   < > 18*   BUN mg/dL 15   < > 20   CREATININE mg/dL 0 72   < > 0 77   ANION GAP mmol/L 12   < > 12   CALCIUM mg/dL 8 4   < > 8 1*   ALBUMIN g/dL  --   --  2 9*   TOTAL BILIRUBIN mg/dL  --   --  0 49   ALK PHOS U/L  --   --  65   ALT U/L  --   --  19   AST U/L  --   --  31   GLUCOSE RANDOM mg/dL 218*   < > 97    < > = values in this interval not displayed       Results from last 7 days   Lab Units 07/10/22  0728   INR  1 01     Results from last 7 days   Lab Units 07/13/22  0634 07/13/22  0613 07/13/22  0610 07/12/22  2134 07/12/22  1541 07/12/22  1100 07/12/22  0704 07/11/22  2146 07/11/22  1616 07/11/22  1406 07/11/22  1200 07/11/22  1019   POC GLUCOSE mg/dl 70 40* 38* 198* 255* 177* 125 339* 159* 172* 183* 183*         Results from last 7 days   Lab Units 07/11/22  0408 07/11/22  0023 07/10/22  2016 07/10/22  1550 07/10/22  1422 07/10/22  0925 07/10/22  0703   LACTIC ACID mmol/L  --  1 5 2 5* 3 4* 4 5*   < > 4 6*   PROCALCITONIN ng/ml 0 86*  --   --   --   --   --  0 18    < > = values in this interval not displayed  Lines/Drains:  Invasive Devices  Report    Peripheral Intravenous Line  Duration           Peripheral IV 07/11/22 Left;Ventral (anterior) Foot 2 days    Peripheral IV 07/11/22 Left;Ventral (anterior) Wrist 2 days                      Imaging: No pertinent imaging reviewed  Recent Cultures (last 7 days):   Results from last 7 days   Lab Units 07/10/22  1231 07/10/22  1102   BLOOD CULTURE  No Growth at 48 hrs  No Growth at 48 hrs  Last 24 Hours Medication List:   Current Facility-Administered Medications   Medication Dose Route Frequency Provider Last Rate    acetaminophen  650 mg Oral 4x Daily PRN ADRIAN Quintana      atorvastatin  20 mg Oral Daily With Dinner ADRIAN Calixto      heparin (porcine)  5,000 Units Subcutaneous [facility] Diane Avila PA-C      insulin detemir  6 Units Subcutaneous HS Anita Sanchez MD      insulin lispro  1-5 Units Subcutaneous TID AC Janny Altamirano PA-C      insulin lispro  1-5 Units Subcutaneous HS Janny Altamirano PA-C      insulin lispro  5 Units Subcutaneous TID With Meals Janny Altamirano PA-C      levothyroxine  112 mcg Oral QAM ADRIAN Calixto      melatonin  6 mg Oral HS Tiffanie Ybarra PA-C      pantoprazole  40 mg Intravenous Q12H [address] ADRIAN Wong      sodium chloride (PF)  3 mL Intravenous Q1H PRN ADRIAN Calixto          Today, Patient Was Seen By: Diane Avila PA-C    **Please Note: This note may have been constructed using a voice recognition system  **

## 2022-07-13 NOTE — PROGRESS NOTES
The patient was identified by name and date of birth  Was informed that this is a virtual visit and that the visit is being conducted through [employer] and patient was informed that this may not be a secure, HIPAA-complaint platform  Patient agreed to proceed  Patient privacy was secured with closed door and no other individual was privy to conversation on my end  Patient acknowledged consent and understanding of privacy and security of the video platform  The patient has agreed to participate and understands they can discontinue the visit at any time  Patient is aware this is a billable service  Inpatient follow-up progress note        Assessment:      Plan:  1  ISIS treated as Type 1 diabetes  She is uncontrolled with risk of hyperglycemia and hypoglycemia  Did not tolerate Lantus dose increase  Revert to home regimen  May be discharged on same  Goal glucose is 120-200mg/dL  Will check insulin antibody  Called and d/w daughter about glycemic control and a personal CGM  S:  Eating and drinking ok  O:  Patient seen and examined personally  /81 (BP Location: Left arm)   Pulse 72   Temp 97 6 °F (36 4 °C) (Temporal)   Resp 18   Ht 5' 1" (1 549 m)   Wt 55 6 kg (122 lb 9 2 oz)   SpO2 98%   BMI 23 16 kg/m²     Physical Exam  Constitutional:       General: She is not in acute distress  Neurological:      Mental Status: She is alert and oriented to person, place, and time  Comments: Speech normal and appropriate   Psychiatric:         Thought Content:  Thought content normal          Current Facility-Administered Medications   Medication Dose Route Frequency Provider Last Rate    acetaminophen  650 mg Oral 4x Daily PRN ADRIAN Lake      atorvastatin  20 mg Oral Daily With Dinner ADRIAN Villagomez      heparin (porcine)  5,000 Units Subcutaneous [facility] Fiorella Ruiz PA-C      insulin detemir  5 Units Subcutaneous HS [place] MD      insulin lispro  1-5 Units Subcutaneous TID AC Janny Altamirano PA-C      insulin lispro  1-5 Units Subcutaneous HS Janny Altamirano PA-C      insulin lispro  5 Units Subcutaneous TID With Meals Janny Altamirano PA-C      levothyroxine  112 mcg Oral QAM ADRIAN Fernandez      melatonin  6 mg Oral HS Leighannpatricia Martin PA-C      pantoprazole  40 mg Intravenous Q12H [address] [place] L ADRIAN Matthew      sodium chloride (PF)  3 mL Intravenous Q1H PRN ADRIAN Fernandez          Lab, Imaging and other studies:   POC Glucose (mg/dl)   Date Value   07/13/2022 251 (H)   07/13/2022 70   07/13/2022 40 (L)   07/13/2022 38 (LL)   07/12/2022 198 (H)   07/12/2022 255 (H)   07/12/2022 177 (H)   07/12/2022 125   07/11/2022 339 (H)   07/11/2022 159 (H)

## 2022-07-13 NOTE — ASSESSMENT & PLAN NOTE
· POA   AEB tachycardia, leukocytosis, RAMIRO, elevated lactic acid  · RAMIRO, lactic acidosis resolved  · Infectious etiology effectively ruled out with negative UA, negative blood cultures, negative procalcitonin, negative imaging  · SIRS criteria likely met in the setting of DKA-induced inflammatory process  · MRSA swab negative  · Leukocytosis persists, although patient refused morning labs today  · Received IV cefepime and vancomycin x2 days  Discontinued and monitored off antibiotics with no complications

## 2022-07-13 NOTE — PLAN OF CARE
Problem: Prexisting or High Potential for Compromised Skin Integrity  Goal: Skin integrity is maintained or improved  Description: INTERVENTIONS:  - Identify patients at risk for skin breakdown  - Assess and monitor skin integrity  - Assess and monitor nutrition and hydration status  - Monitor labs   - Assess for incontinence   - Turn and reposition patient  - Assist with mobility/ambulation  - Relieve pressure over bony prominences  - Avoid friction and shearing  - Provide appropriate hygiene as needed including keeping skin clean and dry  - Evaluate need for skin moisturizer/barrier cream  - Collaborate with interdisciplinary team   - Patient/family teaching  - Consider wound care consult   Outcome: Progressing     Problem: Potential for Falls  Goal: Patient will remain free of falls  Description: INTERVENTIONS:  - Educate patient/family on patient safety including physical limitations  - Instruct patient to call for assistance with activity   - Consult OT/PT to assist with strengthening/mobility   - Keep Call bell within reach  - Keep bed low and locked with side rails adjusted as appropriate  - Keep care items and personal belongings within reach  - Initiate and maintain comfort rounds  - Make Fall Risk Sign visible to staff  - Offer Toileting every 2 Hours, in advance of need  - Initiate/Maintain alarms  - Obtain necessary fall risk management equipment:   - Apply yellow socks and bracelet for high fall risk patients  - Consider moving patient to room near nurses station  Outcome: Progressing

## 2022-07-13 NOTE — CASE MANAGEMENT
Case Management Discharge Planning Note    Patient name Karine Griffin  Location /-01 MRN [id number]  : 1940 Date 2022       Current Admission Date: 7/10/2022  Current Admission Diagnosis:Diabetic ketoacidosis associated with type 2 diabetes mellitus [facility])   Patient Active Problem List    Diagnosis Date Noted    Visual changes 2022    SIRS of non-infectious origin w acute organ dysfunction ([facility] [address] ) 07/10/2022    Diabetic ketoacidosis associated with type 2 diabetes mellitus ([facility] [address] ) 07/10/2022    High anion gap metabolic acidosis     Coffee ground emesis 07/10/2022    RAMIRO (acute kidney injury) ([facility] [address] ) 07/10/2022    Abnormal CT of the abdomen 07/10/2022    Soft tissue radionecrosis 2022    Osteoradionecrosis of temporal bone ([facility] [address] ) 2022    Primary osteoarthritis of right shoulder 2021    Right knee pain 2020    Acute hip pain, right 2020    Ambulatory dysfunction     Primary osteoarthritis of both knees 2020    Hypophosphatemia 2020    Elevated vitamin B12 level 2020    Anemia 2020    Hyponatremia 2020    Closed intertrochanteric fracture of right femur ([facility] [address] ) 2020    Other specified hypothyroidism 2015    HLD (hyperlipidemia) 2014    HTN (hypertension) 10/10/2013    Type 2 diabetes mellitus with diabetic neuropathy, with long-term current use of insulin ([facility] [address] ) 2008      LOS (days): 3  Geometric Mean LOS (GMLOS) (days): 3 80  Days to GMLOS:0 6     OBJECTIVE:  Risk of Unplanned Readmission Score: 16 14     Current admission status: Inpatient   Preferred Pharmacy:   [facility] [place] - [address]  [address] [place] [id number]  Phone: [phone] Fax: [phone]    Primary Care Provider: Jeremiah Baron MD    Primary Insurance: MEDICARE  Secondary Insurance: AARP    DISCHARGE DETAILS:  TC to pt daughter to discuss accepting facilities  Daughter would like to see if [facility] has accepted  TC to [facility], left a message with [facility] to review  Referral and call back

## 2022-07-13 NOTE — ASSESSMENT & PLAN NOTE
· Reported by patient prior to admission  · No episodes of vomiting during this admission, and no evidence of active bleeding  · Hemoglobin slightly downtrended, although likely dilutional  · GI input appreciated  Of note, patient is refusing EGD    · Will restart DVT prophylaxis with subQ heparin  · Recheck a m  labs, although patient has been refusing lab work

## 2022-07-14 LAB
ANION GAP SERPL CALCULATED.3IONS-SCNC: 7 MMOL/L (ref 4–13)
BUN SERPL-MCNC: 12 MG/DL (ref 5–25)
CALCIUM SERPL-MCNC: 7.6 MG/DL (ref 8.4–10.2)
CHLORIDE SERPL-SCNC: 106 MMOL/L (ref 96–108)
CO2 SERPL-SCNC: 26 MMOL/L (ref 21–32)
CREAT SERPL-MCNC: 0.53 MG/DL (ref 0.6–1.3)
ERYTHROCYTE [DISTWIDTH] IN BLOOD BY AUTOMATED COUNT: 12.7 % (ref 11.6–15.1)
FLUAV RNA RESP QL NAA+PROBE: NEGATIVE
FLUBV RNA RESP QL NAA+PROBE: NEGATIVE
GFR SERPL CREATININE-BSD FRML MDRD: 89 ML/MIN/1.73SQ M
GLUCOSE SERPL-MCNC: 102 MG/DL (ref 65–140)
GLUCOSE SERPL-MCNC: 110 MG/DL (ref 65–140)
GLUCOSE SERPL-MCNC: 143 MG/DL (ref 65–140)
GLUCOSE SERPL-MCNC: 221 MG/DL (ref 65–140)
GLUCOSE SERPL-MCNC: 265 MG/DL (ref 65–140)
HCT VFR BLD AUTO: 30 % (ref 34.8–46.1)
HGB BLD-MCNC: 9.7 G/DL (ref 11.5–15.4)
MCH RBC QN AUTO: 32.7 PG (ref 26.8–34.3)
MCHC RBC AUTO-ENTMCNC: 32.3 G/DL (ref 31.4–37.4)
MCV RBC AUTO: 101 FL (ref 82–98)
PLATELET # BLD AUTO: 152 THOUSANDS/UL (ref 149–390)
PMV BLD AUTO: 11.8 FL (ref 8.9–12.7)
POTASSIUM SERPL-SCNC: 3.4 MMOL/L (ref 3.5–5.3)
RBC # BLD AUTO: 2.97 MILLION/UL (ref 3.81–5.12)
RSV RNA RESP QL NAA+PROBE: NEGATIVE
SARS-COV-2 RNA RESP QL NAA+PROBE: NEGATIVE
SODIUM SERPL-SCNC: 139 MMOL/L (ref 135–147)
WBC # BLD AUTO: 6.13 THOUSAND/UL (ref 4.31–10.16)

## 2022-07-14 PROCEDURE — 82948 REAGENT STRIP/BLOOD GLUCOSE: CPT

## 2022-07-14 PROCEDURE — 80048 BASIC METABOLIC PNL TOTAL CA: CPT

## 2022-07-14 PROCEDURE — C9113 INJ PANTOPRAZOLE SODIUM, VIA: HCPCS | Performed by: NURSE PRACTITIONER

## 2022-07-14 PROCEDURE — 86337 INSULIN ANTIBODIES: CPT | Performed by: INTERNAL MEDICINE

## 2022-07-14 PROCEDURE — 85027 COMPLETE CBC AUTOMATED: CPT

## 2022-07-14 PROCEDURE — 97166 OT EVAL MOD COMPLEX 45 MIN: CPT

## 2022-07-14 PROCEDURE — 0241U HB NFCT DS VIR RESP RNA 4 TRGT: CPT

## 2022-07-14 PROCEDURE — 99233 SBSQ HOSP IP/OBS HIGH 50: CPT

## 2022-07-14 RX ORDER — CALCIUM CARBONATE 200(500)MG
1000 TABLET,CHEWABLE ORAL DAILY
Status: DISCONTINUED | OUTPATIENT
Start: 2022-07-14 | End: 2022-07-15 | Stop reason: HOSPADM

## 2022-07-14 RX ORDER — POTASSIUM CHLORIDE 20 MEQ/1
40 TABLET, EXTENDED RELEASE ORAL ONCE
Status: COMPLETED | OUTPATIENT
Start: 2022-07-14 | End: 2022-07-14

## 2022-07-14 RX ADMIN — HEPARIN SODIUM 5000 UNITS: 5000 INJECTION INTRAVENOUS; SUBCUTANEOUS at 22:04

## 2022-07-14 RX ADMIN — PANTOPRAZOLE SODIUM 40 MG: 40 INJECTION, POWDER, FOR SOLUTION INTRAVENOUS at 20:05

## 2022-07-14 RX ADMIN — INSULIN LISPRO 5 UNITS: 100 INJECTION, SOLUTION INTRAVENOUS; SUBCUTANEOUS at 12:18

## 2022-07-14 RX ADMIN — INSULIN LISPRO 5 UNITS: 100 INJECTION, SOLUTION INTRAVENOUS; SUBCUTANEOUS at 16:20

## 2022-07-14 RX ADMIN — INSULIN LISPRO 2 UNITS: 100 INJECTION, SOLUTION INTRAVENOUS; SUBCUTANEOUS at 12:17

## 2022-07-14 RX ADMIN — HEPARIN SODIUM 5000 UNITS: 5000 INJECTION INTRAVENOUS; SUBCUTANEOUS at 13:07

## 2022-07-14 RX ADMIN — CALCIUM CARBONATE (ANTACID) CHEW TAB 500 MG 1000 MG: 500 CHEW TAB at 08:17

## 2022-07-14 RX ADMIN — Medication 6 MG: at 22:04

## 2022-07-14 RX ADMIN — INSULIN DETEMIR 5 UNITS: 100 INJECTION, SOLUTION SUBCUTANEOUS at 22:03

## 2022-07-14 RX ADMIN — LEVOTHYROXINE SODIUM 112 MCG: 112 TABLET ORAL at 08:18

## 2022-07-14 RX ADMIN — POTASSIUM CHLORIDE 40 MEQ: 1500 TABLET, EXTENDED RELEASE ORAL at 08:18

## 2022-07-14 RX ADMIN — ACETAMINOPHEN 650 MG: 325 TABLET ORAL at 20:05

## 2022-07-14 RX ADMIN — INSULIN LISPRO 1 UNITS: 100 INJECTION, SOLUTION INTRAVENOUS; SUBCUTANEOUS at 22:05

## 2022-07-14 RX ADMIN — HEPARIN SODIUM 5000 UNITS: 5000 INJECTION INTRAVENOUS; SUBCUTANEOUS at 05:55

## 2022-07-14 RX ADMIN — ATORVASTATIN CALCIUM 20 MG: 20 TABLET, FILM COATED ORAL at 16:20

## 2022-07-14 RX ADMIN — PANTOPRAZOLE SODIUM 40 MG: 40 INJECTION, POWDER, FOR SOLUTION INTRAVENOUS at 08:18

## 2022-07-14 NOTE — PLAN OF CARE
Problem: OCCUPATIONAL THERAPY ADULT  Goal: Performs self-care activities at highest level of function for planned discharge setting  See evaluation for individualized goals  Description: Treatment Interventions: ADL retraining, Functional transfer training, UE strengthening/ROM, Endurance training, Compensatory technique education, Energy conservation, Activityengagement    See flowsheet documentation for full assessment, interventions and recommendations  Note: Limitation: Decreased ADL status, Decreased UE ROM, Decreased UE strength, Decreased Safe judgement during ADL, Decreased endurance, Decreased self-care trans, Decreased high-level ADLs  Prognosis: Good  Assessment: Pt is a 80 y o  female seen for OT evaluation s/p admit to [facility] on 7/10/2022 w/ Diabetic ketoacidosis associated with type 2 diabetes mellitus ([facility] [address] )  Comorbidities affecting pt's functional performance at time of assessment include: HLD, HTN, RAMIRO  Personal factors affecting pt at time of IE include:steps to enter environment, limited home support, difficulty performing ADLS and difficulty performing IADLS   Prior to admission, pt was Mod I with ADLs  Upon evaluation: the following deficits impact occupational performance: decreased ROM, decreased strength, decreased balance and decreased tolerance  Pt to benefit from continued skilled OT tx while in the hospital to address deficits as defined above and maximize level of functional independence w ADL's and functional mobility  Occupational Performance areas to address include: bathing/shower, toilet hygiene, dressing, functional mobility and clothing management  From OT standpoint, recommendation at time of d/c would be STR       OT Discharge Recommendation: Post acute rehabilitation services     [address], [place], OTR/L no

## 2022-07-14 NOTE — PROGRESS NOTES
[address]  Progress Note Star Finders 1940, 80 y o  female MRN: [id number]  Unit/Bed#: -[place] Encounter: [id number]  Primary Care Provider: Manish Velazquez MD   Date and time admitted to hospital: 7/10/2022  6:53 AM    * Diabetic ketoacidosis associated with type 2 diabetes mellitus [facility])  Assessment & Plan  Lab Results   Component Value Date    HGBA1C 10 2 (H) 07/01/2022       Recent Labs     07/13/22  1604 07/13/22  2153 07/14/22  0534 07/14/22  1159   POCGLU 109 341* 102 265*       Blood Sugar Average: Last 72 hrs:  (P) 171 [id number]     · Initially admitted to the ICU for DKA protocol  DKA now resolved  · Has been transitioned to sliding scale insulin  · Home regimen consists of 5 units Levemir q h s  and 5 units t i d  with meals plus sliding scale insulin carb coverage  · Patient's daughter/POA reports blood sugars at home anywhere between 300-500, although states patient is compliant with her insulin  Requesting endocrinology consult  · Endocrinology input appreciated- change home regimen to Levemir 6 units q h s , Humalog 5 units t i d  a c  plus sliding scale, or 3 units Humalog if less than 50% of meal has been eaten  · PT/OT recommending rehab  Patient and family agreeable  · Patient is medically stable at this time for rehab pending placement  SIRS of non-infectious origin w acute organ dysfunction (HCC)  Assessment & Plan  · POA  AEB tachycardia, leukocytosis, RAMIRO, elevated lactic acid  · RAMIRO, lactic acidosis resolved  · Infectious etiology effectively ruled out with negative UA, negative blood cultures, negative procalcitonin, negative imaging  · SIRS criteria likely met in the setting of DKA-induced inflammatory process  · MRSA swab negative  · Leukocytosis persists, although patient refused morning labs today  · Received IV cefepime and vancomycin x2 days  Discontinued and monitored off antibiotics with no complications       Abnormal CT of the abdomen  Assessment & Plan  · CT abdomen/pelvis revealed masslike thickening of the gastric antrum with pronounced gastric distension and a dilated fluid-filled stomach; direct visualization recommended to exclude mass lesion  · GI consult appreciated for EGD; however, patient is refusing EGD  · Continue b i d  PPI    RAMIRO (acute kidney injury) ([facility] [address] )  Assessment & Plan  · Present on admission  Creatinine initially elevated at 1 25   · Likely prerenal in the setting of DKA  · Now resolved  Creatinine at baseline  · Continue to monitor renal function    Coffee ground emesis  Assessment & Plan  · Reported by patient prior to admission  · No episodes of vomiting during this admission, and no evidence of active bleeding  · GI input appreciated  Of note, patient is refusing EGD  · DVT prophylaxis with subQ heparin  · Hb stable, continue to monitor  Osteoradionecrosis of temporal bone [facility])  Assessment & Plan  Patient has a history of Hodgkin's lymphoma status post neck radiation  Developed osteonecrosis which was repaired in March of 2022  · Continue routine outpatient follow-up  Other specified hypothyroidism  Assessment & Plan  · Continue pre-admission Synthroid 112 mcg q a m  HTN (hypertension)  Assessment & Plan  · BP reviewed, well controlled  · Home lisinopril 5 mg daily on hold in the setting of RAMIRO  Will continue to hold as blood pressure is well controlled, and at times soft  · Monitor BP trend  HLD (hyperlipidemia)  Assessment & Plan  · Continue pre-admission Lipitor 20 mg with dinner          VTE Pharmacologic Prophylaxis:   SQ heparin    Patient Centered Rounds: I performed bedside rounds with nursing staff today  Discussions with Specialists or Other Care Team Provider: case management, nursing    Education and Discussions with Family / Patient: Updated patient at the bedside  All questions/concerns addressed to her satisfaction   No medical updates to provide patient's daughter at this time        Time Spent for Care: 30 minutes  More than 50% of total time spent on counseling and coordination of care as described above  Current Length of Stay: 4 day(s)  Current Patient Status: Inpatient   Certification Statement: The patient will continue to require additional inpatient hospital stay due to CM for placement  Discharge Plan: pending rehab acceptance    Code Status: Level 3 - DNAR and DNI    Subjective:   Patient seen and examined resting comfortably at the bedside, pleasant, and eating lunch  No acute overnight events  Patient states she is feeling much better  Objective:     Vitals:   Temp (24hrs), Av 9 °F (37 2 °C), Min:98 6 °F (37 °C), Max:99 1 °F (37 3 °C)    Temp:  [98 6 °F (37 °C)-99 1 °F (37 3 °C)] 98 6 °F (37 °C)  HR:  [74-84] 74  Resp:  [16-17] 16  BP: (112-133)/(64-70) 117/70  SpO2:  [95 %-97 %] 96 %  Body mass index is 23 08 kg/m²  Input and Output Summary (last 24 hours): Intake/Output Summary (Last 24 hours) at 2022 1323  Last data filed at 2022 1100  Gross per 24 hour   Intake 120 ml   Output --   Net 120 ml       Physical Exam:   Physical Exam  Vitals and nursing note reviewed  Constitutional:       General: She is not in acute distress  Appearance: She is normal weight  She is not toxic-appearing  HENT:      Head: Normocephalic and atraumatic  Mouth/Throat:      Mouth: Mucous membranes are moist    Eyes:      Conjunctiva/sclera: Conjunctivae normal    Cardiovascular:      Rate and Rhythm: Normal rate and regular rhythm  Pulses: Normal pulses  Heart sounds: Normal heart sounds  Pulmonary:      Effort: Pulmonary effort is normal  No respiratory distress  Breath sounds: Normal breath sounds  No wheezing, rhonchi or rales  Abdominal:      General: Bowel sounds are normal  There is no distension  Palpations: Abdomen is soft  Tenderness: There is no abdominal tenderness     Musculoskeletal: General: Deformity (Jaw deformity 2/2 repaired osteonecrosis ) present  Cervical back: Neck supple  Right lower leg: No edema  Left lower leg: No edema  Skin:     General: Skin is warm and dry  Neurological:      Mental Status: She is alert and oriented to person, place, and time  Cranial Nerves: No cranial nerve deficit  Sensory: No sensory deficit  Motor: No weakness  Coordination: Coordination normal    Psychiatric:         Mood and Affect: Mood normal          Behavior: Behavior normal          Thought Content: Thought content normal           Additional Data:     Labs:  Results from last 7 days   Lab Units 07/14/22  0436 07/11/22  0408   WBC Thousand/uL 6 13 19 51*   HEMOGLOBIN g/dL 9 7* 9 7*   HEMATOCRIT % 30 0* 29 2*   PLATELETS Thousands/uL 152 184   NEUTROS PCT %  --  88*   LYMPHS PCT %  --  6*   MONOS PCT %  --  6   EOS PCT %  --  0     Results from last 7 days   Lab Units 07/14/22  0436 07/11/22  1227 07/11/22  0408   SODIUM mmol/L 139   < > 134*   POTASSIUM mmol/L 3 4*   < > 3 4*   CHLORIDE mmol/L 106   < > 104   CO2 mmol/L 26   < > 18*   BUN mg/dL 12   < > 20   CREATININE mg/dL 0 53*   < > 0 77   ANION GAP mmol/L 7   < > 12   CALCIUM mg/dL 7 6*   < > 8 1*   ALBUMIN g/dL  --   --  2 9*   TOTAL BILIRUBIN mg/dL  --   --  0 49   ALK PHOS U/L  --   --  65   ALT U/L  --   --  19   AST U/L  --   --  31   GLUCOSE RANDOM mg/dL 110   < > 97    < > = values in this interval not displayed       Results from last 7 days   Lab Units 07/10/22  0728   INR  1 01     Results from last 7 days   Lab Units 07/14/22  1159 07/14/22  0534 07/13/22  2153 07/13/22  1604 07/13/22  1039 07/13/22  0634 07/13/22  9919 07/13/22  0610 07/12/22  2134 07/12/22  1541 07/12/22  1100 07/12/22  0704   POC GLUCOSE mg/dl 265* 102 341* 109 251* 70 40* 38* 198* 255* 177* 125         Results from last 7 days   Lab Units 07/11/22  0408 07/11/22  0023 07/10/22  2016 07/10/22  1550 07/10/22  1422 07/10/22  4060 07/10/22  0703   LACTIC ACID mmol/L  --  1 5 2 5* 3 4* 4 5*   < > 4 6*   PROCALCITONIN ng/ml 0 86*  --   --   --   --   --  0 18    < > = values in this interval not displayed  Lines/Drains:  Invasive Devices  Report    Peripheral Intravenous Line  Duration           Peripheral IV 07/13/22 Dorsal (posterior); Right Hand <1 day                      Imaging: No pertinent imaging reviewed  Recent Cultures (last 7 days):   Results from last 7 days   Lab Units 07/10/22  1231 07/10/22  1102   BLOOD CULTURE  No Growth at 72 hrs  No Growth at 72 hrs  Last 24 Hours Medication List:   Current Facility-Administered Medications   Medication Dose Route Frequency Provider Last Rate    acetaminophen  650 mg Oral 4x Daily PRN ADRIAN Ross      atorvastatin  20 mg Oral Daily With Dinner ADRIAN Wagoner      calcium carbonate  1,000 mg Oral Daily Darío Hugo PA-C      heparin (porcine)  5,000 Units Subcutaneous [facility] Darío Hugo PA-C      insulin detemir  5 Units Subcutaneous HS [address], [place]      insulin lispro  1-5 Units Subcutaneous TID AC Janny Altamirano PA-C      insulin lispro  1-5 Units Subcutaneous HS Janny Altamirano PA-C      insulin lispro  5 Units Subcutaneous TID With Meals Janny Altamirano PA-C      levothyroxine  112 mcg Oral QAM ADRIAN Wagoner      melatonin  6 mg Oral HS Orionsonny Joseph PA-C      pantoprazole  40 mg Intravenous Q12H [facility] ADRIAN Wong      sodium chloride (PF)  3 mL Intravenous Q1H PRN ADRIAN Wagoner          Today, Patient Was Seen By: Darío Hugo PA-C    **Please Note: This note may have been constructed using a voice recognition system  **

## 2022-07-14 NOTE — OCCUPATIONAL THERAPY NOTE
Occupational Therapy Evaluation      Tyler Babin    7/14/2022    Principal Problem:    Diabetic ketoacidosis associated with type 2 diabetes mellitus ([address] )  Active Problems:    HLD (hyperlipidemia)    HTN (hypertension)    Other specified hypothyroidism    Osteoradionecrosis of temporal bone (HCC)    SIRS of non-infectious origin w acute organ dysfunction (HCC)    High anion gap metabolic acidosis    Coffee ground emesis    RAMIRO (acute kidney injury) ([address] )    Abnormal CT of the abdomen    Visual changes      Past Medical History:   Diagnosis Date    Ambulates with cane     Cancer ([address] )     Chronic pain disorder     knees/ shoulders (gets inj every 3 mos)    Controlled type 2 diabetes mellitus with diabetic polyneuropathy, with long-term current use of insulin ([facility] [address] ) 5/21/2008    Diabetes mellitus ([address] )     Diabetic polyneuropathy ([facility] [address] ) 5/21/2008    ICD10 clean up    Disease of thyroid gland     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    HL (hearing loss)     Hodgkin's disease ([facility] [address] ) 2008    Left neck, had radiation    Hypertension     Neuropathy     Osteoporosis     RA (rheumatoid arthritis) ([facility] [address] )        Past Surgical History:   Procedure Laterality Date    ADENOIDECTOMY      APPENDECTOMY      CATARACT EXTRACTION      CATARACT EXTRACTION, BILATERAL      CHOLECYSTECTOMY      FRACTURE SURGERY Right     hip    MOUTH SURGERY      oral cancer left lower lip    OVARY SURGERY      GA ADJ TISS XFER HEAD,FAC,HAND <10 SQCM N/A 3/28/2022    Procedure: Adjacent tissue transfer face;  Surgeon: Luis Chávez MD;  Location: [facility];  Service: ENT    GA MASTOIDECTOMY,SIMPLE Left 3/28/2022    Procedure: MASTOIDECTOMY;  Surgeon: Luis Chávez MD;  Location: [facility];  Service: ENT    GA OPEN RX FEMUR FX+INTRAMED SHE Right 5/28/2020    Procedure: INSERTION NAIL IM FEMUR ANTEGRADE (TROCHANTERIC);   Surgeon: Vineet Newberry DO;  Location: [address] [place] [address] MAIN OR;  Service: Orthopedics    TONSILLECTOMY      TOTAL THYROIDECTOMY 2008    Performed after left neck dissection and left oral cancer diagnosis        07/14/22 0854   OT Last Visit   OT Visit Date 07/14/22   Note Type   Note type Evaluation   Restrictions/Precautions   Weight Bearing Precautions Per Order No   Braces or Orthoses C/S Collar  (Pt  reports, "wears cervical collar because of past surgeries")   Other Precautions Chair Alarm; Bed Alarm;Multiple lines; Fall Risk;Telemetry   Pain Assessment   Pain Assessment Tool 0-10   Pain Score No Pain   Home Living   Type of [address] One level;Performs ADLs on one level; Able to live on main level with bedroom/bathroom;Stairs to enter with rails; Laundry in basement  (3 TEE unilateral railing  10 steps to laundry with bilateral railing )   Bathroom Shower/Tub Tub/shower unit   [employer] Raised   Bathroom Equipment Grab bars in shower;Grab bars around toilet; Shower chair;Commode  (does not use shower chair  not use commoade at baseline)   [address] [place];Cane  (SPC at baseline)   Additional Comments Pt reports she sleeps in a recliner as her new bed is too high  Prior Function   Level of [place] Independent with ADLs and functional mobility   Lives With Alone   Receives Help From Family  (family lives across the street)   ADL Assistance Independent   IADLs Independent   Falls in the last 6 months 0   Vocational Retired   Comments +   ADL   Eating Assistance 5  Supervision/Setup   Grooming Assistance 5  Supervision/Setup   [address] 4  Minimal Assistance   LB [address] 4  [address] 3  Moderate Assistance    [facility] 3  Moderate Assistance   Bed Mobility   Supine to Sit 3  Moderate assistance   Additional items Assist x 1;HOB elevated; Bedrails; Increased time required;Verbal cues   Additional Comments Pt remained OOB in recliner upon conclusion   Transfers   Sit to Stand 4  Minimal assistance Additional items Assist x 1; Increased time required;Verbal cues   Stand to Sit 4  Minimal assistance   Additional items Assist x 1; Armrests; Increased time required;Verbal cues   Stand pivot 4  Minimal assistance   Additional items Assist x 1; Increased time required;Verbal cues   Balance   Static Sitting Fair +   Dynamic Sitting Fair   Static Standing Fair -   Dynamic Standing Poor +   Ambulatory Poor +   Activity Tolerance   Activity Tolerance Patient limited by fatigue   RUE Assessment   RUE Assessment X   RUE Overall AROM   R Shoulder Flexion <90 degrees   LUE Assessment   LUE Assessment WFL   Vision-Basic Assessment   Current Vision No visual deficits   Visual History Cataracts; Corrective eye surgery   Cognition   Overall Cognitive Status [facility]   Arousal/Participation Alert; Cooperative   Attention Within functional limits   Orientation Level Oriented X4   Memory Within functional limits   Following Commands Follows all commands and directions without difficulty   Assessment   Limitation Decreased ADL status; Decreased UE ROM; Decreased UE strength;Decreased Safe judgement during ADL;Decreased endurance;Decreased self-care trans;Decreased high-level ADLs   Prognosis Good   Assessment Pt is a 80 y o  female seen for OT evaluation s/p admit to [address] [place] [address] on 7/10/2022 w/ Diabetic ketoacidosis associated with type 2 diabetes mellitus ([facility] [address] )  Comorbidities affecting pt's functional performance at time of assessment include: HLD, HTN, RAMIRO  Personal factors affecting pt at time of IE include:steps to enter environment, limited home support, difficulty performing ADLS and difficulty performing IADLS   Prior to admission, pt was Mod I with ADLs  Upon evaluation: the following deficits impact occupational performance: decreased ROM, decreased strength, decreased balance and decreased tolerance   Pt to benefit from continued skilled OT tx while in the hospital to address deficits as defined above and maximize level of functional independence w ADL's and functional mobility  Occupational Performance areas to address include: bathing/shower, toilet hygiene, dressing, functional mobility and clothing management  From OT standpoint, recommendation at time of d/c would be STR  Goals   Patient Goals To go home to cat   Plan   Treatment Interventions ADL retraining;Functional transfer training;UE strengthening/ROM; Endurance training; Compensatory technique education; Energy conservation; Activityengagement   Goal Expiration Date 07/24/22   OT Treatment Day 0   OT Frequency 3-5x/wk   Recommendation   OT Discharge Recommendation Post acute rehabilitation services   Additional Comments  The patient's raw score on the AM-PAC Daily Activity inpatient short form is 14, standardized score is 33 39, less than 39 4  Patients at this level are likely to benefit from discharge to post-acute rehabilitation services  Please refer to the recommendation of the Occupational Therapist for safe discharge planning     AM-PAC Daily Activity Inpatient   Lower Body Dressing 2   Bathing 2   Toileting 2   Upper Body Dressing 2   Grooming 3   Eating 3   Daily Activity Raw Score 14   Daily Activity Standardized Score (Calc for Raw Score >=11) 33 39   AM-[facility] Applied Cognition Inpatient   Following a Speech/Presentation 4   Understanding Ordinary Conversation 4   Taking Medications 4   Remembering Where Things Are Placed or Put Away 4   Remembering List of 4-5 Errands 3   Taking Care of Complicated Tasks 3   Applied Cognition Raw Score 22   Applied Cognition Standardized Score 47 83     GOALS:    Pt will achieve the following within specified time frame: STG  Pt will achieve the following goals within 5 days    *ADL transfers with CGA for inc'd independence with ADLs/purposeful tasks    *Self Feeding- (I) for inc'd independence with providing self nourishment    *UB ADL with Min (A) for inc'd independence with self cares    *LB ADL with Mod (A) using AE prn for inc'd independence with self cares    *Toileting with Mod (A) for clothing management and hygiene for return to PLOF with personal care    *Increase static stand balance to F and dyn stand balance to F- for inc'd safety with standing purposeful tasks    *Increase stand tolerance x3 m for inc'd tolerance with standing purposeful tasks    *Participate in 10m UE therex to increase overall stamina/activity tolerance for purposeful tasks    *Bed mobility- Min (A) for inc'd independence to manage own comfort and initiate EOB & OOB purposeful tasks    Pt will achieve the following within specified time frame: LTG  Pt will achieve the following goals within 10 days    *ADL transfers with (S) for inc'd independence with ADLs/purposeful tasks    *UB ADL with CGA for inc'd independence with self cares    *LB ADL with Min (A) using AE prn for inc'd independence with self cares    *Toileting with Min (A) for clothing management and hygiene for return to PLOF with personal care    *Increase static stand balance to F+ and dyn stand balance to F for inc'd safety with standing purposeful tasks    *Increase stand tolerance x5 m for inc'd tolerance with standing purposeful tasks    *Bed mobility- CGA for inc'd independence to manage own comfort and initiate EOB & OOB purposeful tasks      Pati Martínez, MS, OTR/L

## 2022-07-14 NOTE — PLAN OF CARE
Problem: Potential for Falls  Goal: Patient will remain free of falls  Description: INTERVENTIONS:  - Educate patient/family on patient safety including physical limitations  - Instruct patient to call for assistance with activity   - Consult OT/PT to assist with strengthening/mobility   - Keep Call bell within reach  - Keep bed low and locked with side rails adjusted as appropriate  - Keep care items and personal belongings within reach  - Initiate and maintain comfort rounds  - Make Fall Risk Sign visible to staff  - Offer Toileting every 1 Hours, in advance of need  - Initiate/Maintain bed alarm  - Obtain necessary fall risk management equipment: bed   - Apply yellow socks and bracelet for high fall risk patients  - Consider moving patient to room near nurses station  Outcome: Progressing     Problem: MOBILITY - ADULT  Goal: Maintain or return to baseline ADL function  Description: INTERVENTIONS:  -  Assess patient's ability to carry out ADLs; assess patient's baseline for ADL function and identify physical deficits which impact ability to perform ADLs (bathing, care of mouth/teeth, toileting, grooming, dressing, etc )  - Assess/evaluate cause of self-care deficits   - Assess range of motion  - Assess patient's mobility; develop plan if impaired  - Assess patient's need for assistive devices and provide as appropriate  - Encourage maximum independence but intervene and supervise when necessary  - Involve family in performance of ADLs  - Assess for home care needs following discharge   - Consider OT consult to assist with ADL evaluation and planning for discharge  - Provide patient education as appropriate  Outcome: Progressing  Goal: Maintains/Returns to pre admission functional level  Description: INTERVENTIONS:  - Perform BMAT or MOVE assessment daily    - Set and communicate daily mobility goal to care team and patient/family/caregiver     - Collaborate with rehabilitation services on mobility goals if consulted  - Perform Range of Motion 3 times a day  - Reposition patient every 2 hours    - Dangle patient 3 times a day  - Stand patient 3 times a day  - Ambulate patient 3 times a day  - Out of bed to chair 3 times a day   - Out of bed for meals 3 times a day  - Out of bed for toileting  - Record patient progress and toleration of activity level   Outcome: Progressing     Problem: Prexisting or High Potential for Compromised Skin Integrity  Goal: Skin integrity is maintained or improved  Description: INTERVENTIONS:  - Identify patients at risk for skin breakdown  - Assess and monitor skin integrity  - Assess and monitor nutrition and hydration status  - Monitor labs   - Assess for incontinence   - Turn and reposition patient  - Assist with mobility/ambulation  - Relieve pressure over bony prominences  - Avoid friction and shearing  - Provide appropriate hygiene as needed including keeping skin clean and dry  - Evaluate need for skin moisturizer/barrier cream  - Collaborate with interdisciplinary team   - Patient/family teaching  - Consider wound care consult   Outcome: Progressing     Problem: SAFETY,RESTRAINT: NV/NON-SELF DESTRUCTIVE BEHAVIOR  Goal: Remains free of harm/injury (restraint for non violent/non self-detsructive behavior)  Description: INTERVENTIONS:  - Instruct patient/family regarding restraint use   - Assess and monitor physiologic and psychological status   - Provide interventions and comfort measures to meet assessed patient needs   - Identify and implement measures to help patient regain control  - Assess readiness for release of restraint   Outcome: Progressing  Goal: Returns to optimal restraint-free functioning  Description: INTERVENTIONS:  - Assess the patient's behavior and symptoms that indicate continued need for restraint  - Identify and implement measures to help patient regain control  - Assess readiness for release of restraint   Outcome: Progressing     Problem: Nutrition/Hydration-ADULT  Goal: Nutrient/Hydration intake appropriate for improving, restoring or maintaining nutritional needs  Description: Monitor and assess patient's nutrition/hydration status for malnutrition  Collaborate with interdisciplinary team and initiate plan and interventions as ordered  Monitor patient's weight and dietary intake as ordered or per policy  Utilize nutrition screening tool and intervene as necessary  Determine patient's food preferences and provide high-protein, high-caloric foods as appropriate       INTERVENTIONS:  - Monitor oral intake, urinary output, labs, and treatment plans  - Assess nutrition and hydration status and recommend course of action  - Evaluate amount of meals eaten  - Assist patient with eating if necessary   - Allow adequate time for meals  - Recommend/ encourage appropriate diets, oral nutritional supplements, and vitamin/mineral supplements  - Order, calculate, and assess calorie counts as needed  - Recommend, monitor, and adjust tube feedings and TPN/PPN based on assessed needs  - Assess need for intravenous fluids  - Provide specific nutrition/hydration education as appropriate  - Include patient/family/caregiver in decisions related to nutrition  Outcome: Progressing

## 2022-07-14 NOTE — NURSING NOTE
covid swab sent to the lab, pt sitting at the side of the  bed at this time in no acute distress watching tv, bed alarm on

## 2022-07-14 NOTE — PLAN OF CARE
Problem: Potential for Falls  Goal: Patient will remain free of falls  Description: INTERVENTIONS:  - Educate patient/family on patient safety including physical limitations  - Instruct patient to call for assistance with activity   - Consult OT/PT to assist with strengthening/mobility   - Keep Call bell within reach  - Keep bed low and locked with side rails adjusted as appropriate  - Keep care items and personal belongings within reach  - Initiate and maintain comfort rounds  - Make Fall Risk Sign visible to staff  - Offer Toileting every 2 Hours, in advance of need  - Initiate/Maintain bed alarm  - Apply yellow socks and bracelet for high fall risk patients  - Consider moving patient to room near nurses station  Outcome: Progressing     Problem: MOBILITY - ADULT  Goal: Maintain or return to baseline ADL function  Description: INTERVENTIONS:  -  Assess patient's ability to carry out ADLs; assess patient's baseline for ADL function and identify physical deficits which impact ability to perform ADLs (bathing, care of mouth/teeth, toileting, grooming, dressing, etc )  - Assess/evaluate cause of self-care deficits   - Assess range of motion  - Assess patient's mobility; develop plan if impaired  - Assess patient's need for assistive devices and provide as appropriate  - Encourage maximum independence but intervene and supervise when necessary  - Involve family in performance of ADLs  - Assess for home care needs following discharge   - Consider OT consult to assist with ADL evaluation and planning for discharge  - Provide patient education as appropriate  Outcome: Progressing  Goal: Maintains/Returns to pre admission functional level  Description: INTERVENTIONS:  - Perform BMAT or MOVE assessment daily    - Set and communicate daily mobility goal to care team and patient/family/caregiver  - Collaborate with rehabilitation services on mobility goals if consulted  - Perform Range of Motion 3 times a day    - Reposition patient every 2 hours    - Dangle patient 3 times a day  - Stand patient 3 times a day  - Ambulate patient 3 times a day  - Out of bed to chair 3 times a day   - Out of bed for meals 3 times a day  - Out of bed for toileting  - Record patient progress and toleration of activity level   Outcome: Progressing     Problem: Prexisting or High Potential for Compromised Skin Integrity  Goal: Skin integrity is maintained or improved  Description: INTERVENTIONS:  - Identify patients at risk for skin breakdown  - Assess and monitor skin integrity  - Assess and monitor nutrition and hydration status  - Monitor labs   - Assess for incontinence   - Turn and reposition patient  - Assist with mobility/ambulation  - Relieve pressure over bony prominences  - Avoid friction and shearing  - Provide appropriate hygiene as needed including keeping skin clean and dry  - Evaluate need for skin moisturizer/barrier cream  - Collaborate with interdisciplinary team   - Patient/family teaching  - Consider wound care consult   Outcome: Progressing     Problem: SAFETY,RESTRAINT: NV/NON-SELF DESTRUCTIVE BEHAVIOR  Goal: Remains free of harm/injury (restraint for non violent/non self-detsructive behavior)  Description: INTERVENTIONS:  - Instruct patient/family regarding restraint use   - Assess and monitor physiologic and psychological status   - Provide interventions and comfort measures to meet assessed patient needs   - Identify and implement measures to help patient regain control  - Assess readiness for release of restraint   Outcome: Progressing  Goal: Returns to optimal restraint-free functioning  Description: INTERVENTIONS:  - Assess the patient's behavior and symptoms that indicate continued need for restraint  - Identify and implement measures to help patient regain control  - Assess readiness for release of restraint   Outcome: Progressing     Problem: Nutrition/Hydration-ADULT  Goal: Nutrient/Hydration intake appropriate for improving, restoring or maintaining nutritional needs  Description: Monitor and assess patient's nutrition/hydration status for malnutrition  Collaborate with interdisciplinary team and initiate plan and interventions as ordered  Monitor patient's weight and dietary intake as ordered or per policy  Utilize nutrition screening tool and intervene as necessary  Determine patient's food preferences and provide high-protein, high-caloric foods as appropriate       INTERVENTIONS:  - Monitor oral intake, urinary output, labs, and treatment plans  - Assess nutrition and hydration status and recommend course of action  - Evaluate amount of meals eaten  - Assist patient with eating if necessary   - Allow adequate time for meals  - Recommend/ encourage appropriate diets, oral nutritional supplements, and vitamin/mineral supplements  - Order, calculate, and assess calorie counts as needed  - Recommend, monitor, and adjust tube feedings and TPN/PPN based on assessed needs  - Assess need for intravenous fluids  - Provide specific nutrition/hydration education as appropriate  - Include patient/family/caregiver in decisions related to nutrition  Outcome: Progressing

## 2022-07-14 NOTE — ASSESSMENT & PLAN NOTE
Lab Results   Component Value Date    HGBA1C 10 2 (H) 07/01/2022       Recent Labs     07/13/22  1604 07/13/22  2153 07/14/22  0534 07/14/22  1159   POCGLU 109 341* 102 265*       Blood Sugar Average: Last 72 hrs:  (P) 171 [id number]     · Initially admitted to the ICU for DKA protocol  DKA now resolved  · Has been transitioned to sliding scale insulin  · Home regimen consists of 5 units Levemir q h s  and 5 units t i d  with meals plus sliding scale insulin carb coverage  · Patient's daughter/POA reports blood sugars at home anywhere between 300-500, although states patient is compliant with her insulin  Requesting endocrinology consult  · Endocrinology input appreciated- change home regimen to Levemir 6 units q h s , Humalog 5 units t i d  a c  plus sliding scale, or 3 units Humalog if less than 50% of meal has been eaten  · PT/OT recommending rehab  Patient and family agreeable  · Patient is medically stable at this time for rehab pending placement

## 2022-07-14 NOTE — ASSESSMENT & PLAN NOTE
· Reported by patient prior to admission  · No episodes of vomiting during this admission, and no evidence of active bleeding  · GI input appreciated  Of note, patient is refusing EGD  · DVT prophylaxis with subQ heparin  · Hb stable, continue to monitor

## 2022-07-14 NOTE — CASE MANAGEMENT
Case Management Discharge Planning Note    Patient name Tabitha Carter  Location /-01 MRN [id number]  : 1940 Date 2022       Current Admission Date: 7/10/2022  Current Admission Diagnosis:Diabetic ketoacidosis associated with type 2 diabetes mellitus [facility])   Patient Active Problem List    Diagnosis Date Noted    Visual changes 2022    SIRS of non-infectious origin w acute organ dysfunction ([facility] ) 07/10/2022    Diabetic ketoacidosis associated with type 2 diabetes mellitus ([facility] ) 07/10/2022    High anion gap metabolic acidosis     Coffee ground emesis 07/10/2022    RAMIRO (acute kidney injury) ([facility] ) 07/10/2022    Abnormal CT of the abdomen 07/10/2022    Soft tissue radionecrosis 2022    Osteoradionecrosis of temporal bone ([facility] ) 2022    Primary osteoarthritis of right shoulder 2021    Right knee pain 2020    Acute hip pain, right 2020    Ambulatory dysfunction     Primary osteoarthritis of both knees 2020    Hypophosphatemia 2020    Elevated vitamin B12 level 2020    Anemia 2020    Hyponatremia 2020    Closed intertrochanteric fracture of right femur ([facility] ) 2020    Other specified hypothyroidism 2015    HLD (hyperlipidemia) 2014    HTN (hypertension) 10/10/2013    Type 2 diabetes mellitus with diabetic neuropathy, with long-term current use of insulin ([facility] ) 2008      LOS (days): 4  Geometric Mean LOS (GMLOS) (days): 3 80  Days to GMLOS:-0 5     OBJECTIVE:  Risk of Unplanned Readmission Score: 17 4         Current admission status: Inpatient   Preferred Pharmacy:   [facility] [place] - [address]  [address] [address]  Phone: [phone] Fax: [phone]    Primary Care Provider: Heike Arredondo MD    Primary Insurance: MEDICARE  Secondary Insurance: AARP    DISCHARGE DETAILS:    Discharge planning discussed with[de-identified] pt and daughter Joelle Chinchilla at 12:25pm, 13:35pm & 16:12 pm  Freedom of Choice: Yes  Comments - Freedom of Choice: rehab recommended  pt was accepted to [address], [facility] did not have an open bed, family would like Javier MYRICK contacted family/caregiver?: Yes             Contacts  Patient Contacts: Mykel Baca dtr  Relationship to Patient[de-identified] Family (daughter)  Contact Method: Phone  Phone Number: [phone]  Reason/Outcome: Discharge Yen Lozano         Is the patient interested in [facility] at discharge?: No    DME Referral Provided  Referral made for DME?: No    Other Referral/Resources/Interventions Provided:  Interventions: Short Term Rehab  Referral Comments: pt has been accepted by carolina Herrera spoke alyx Vasquez # [phone] at 13:38pm they are able to accept the pt tomorrow  covid test was ordered  Hero Vasquez was made of the transport time 11 am    Would you like to participate in our [facility] service program?  : No - Declined    Treatment Team Recommendation: Short Term Rehab ([address] - 11 am bls)                                                 [address], [address] : [address]  Receiving Facility/Agency Phone Number: [phone]  Facility/Agency Fax Number: [phone]

## 2022-07-15 VITALS
OXYGEN SATURATION: 98 % | DIASTOLIC BLOOD PRESSURE: 80 MMHG | BODY MASS INDEX: 22.81 KG/M2 | HEART RATE: 87 BPM | HEIGHT: 61 IN | WEIGHT: 120.81 LBS | RESPIRATION RATE: 18 BRPM | SYSTOLIC BLOOD PRESSURE: 136 MMHG | TEMPERATURE: 98.5 F

## 2022-07-15 LAB
ANION GAP SERPL CALCULATED.3IONS-SCNC: 5 MMOL/L (ref 4–13)
BACTERIA BLD CULT: NORMAL
BACTERIA BLD CULT: NORMAL
BUN SERPL-MCNC: 9 MG/DL (ref 5–25)
CALCIUM SERPL-MCNC: 8.1 MG/DL (ref 8.4–10.2)
CHLORIDE SERPL-SCNC: 107 MMOL/L (ref 96–108)
CO2 SERPL-SCNC: 28 MMOL/L (ref 21–32)
CREAT SERPL-MCNC: 0.59 MG/DL (ref 0.6–1.3)
ERYTHROCYTE [DISTWIDTH] IN BLOOD BY AUTOMATED COUNT: 12.9 % (ref 11.6–15.1)
GFR SERPL CREATININE-BSD FRML MDRD: 86 ML/MIN/1.73SQ M
GLUCOSE SERPL-MCNC: 113 MG/DL (ref 65–140)
GLUCOSE SERPL-MCNC: 97 MG/DL (ref 65–140)
HCT VFR BLD AUTO: 31.6 % (ref 34.8–46.1)
HGB BLD-MCNC: 10.5 G/DL (ref 11.5–15.4)
MCH RBC QN AUTO: 34.1 PG (ref 26.8–34.3)
MCHC RBC AUTO-ENTMCNC: 33.2 G/DL (ref 31.4–37.4)
MCV RBC AUTO: 103 FL (ref 82–98)
PLATELET # BLD AUTO: 160 THOUSANDS/UL (ref 149–390)
PMV BLD AUTO: 11.5 FL (ref 8.9–12.7)
POTASSIUM SERPL-SCNC: 3.9 MMOL/L (ref 3.5–5.3)
RBC # BLD AUTO: 3.08 MILLION/UL (ref 3.81–5.12)
SODIUM SERPL-SCNC: 140 MMOL/L (ref 135–147)
WBC # BLD AUTO: 5.74 THOUSAND/UL (ref 4.31–10.16)

## 2022-07-15 PROCEDURE — 85027 COMPLETE CBC AUTOMATED: CPT

## 2022-07-15 PROCEDURE — C9113 INJ PANTOPRAZOLE SODIUM, VIA: HCPCS | Performed by: NURSE PRACTITIONER

## 2022-07-15 PROCEDURE — 80048 BASIC METABOLIC PNL TOTAL CA: CPT

## 2022-07-15 PROCEDURE — 82948 REAGENT STRIP/BLOOD GLUCOSE: CPT

## 2022-07-15 PROCEDURE — 99239 HOSP IP/OBS DSCHRG MGMT >30: CPT

## 2022-07-15 RX ORDER — LANOLIN ALCOHOL/MO/W.PET/CERES
6 CREAM (GRAM) TOPICAL
Refills: 0 | Status: CANCELLED
Start: 2022-07-15

## 2022-07-15 RX ORDER — PANTOPRAZOLE SODIUM 40 MG/1
40 TABLET, DELAYED RELEASE ORAL 2 TIMES DAILY
Qty: 60 TABLET | Refills: 0
Start: 2022-07-15

## 2022-07-15 RX ADMIN — CALCIUM CARBONATE (ANTACID) CHEW TAB 500 MG 1000 MG: 500 CHEW TAB at 08:50

## 2022-07-15 RX ADMIN — PANTOPRAZOLE SODIUM 40 MG: 40 INJECTION, POWDER, FOR SOLUTION INTRAVENOUS at 08:50

## 2022-07-15 RX ADMIN — LEVOTHYROXINE SODIUM 112 MCG: 112 TABLET ORAL at 08:50

## 2022-07-15 RX ADMIN — HEPARIN SODIUM 5000 UNITS: 5000 INJECTION INTRAVENOUS; SUBCUTANEOUS at 05:14

## 2022-07-15 NOTE — NURSING NOTE
Pt DC to [facility] in stable condition via EMS  Report called in and this Nurse was told the liaison would call back for report  All belongings packed and sent with patient

## 2022-07-15 NOTE — DISCHARGE SUMMARY
Moises 45  Discharge- Loi Jeffrey 1940, 80 y o  female MRN: [id number]  Unit/Bed#: -01 Encounter: [id number]  Primary Care Provider: Fiona Beavers MD   Date and time admitted to hospital: 7/10/2022  6:53 AM    * Diabetic ketoacidosis associated with type 2 diabetes mellitus [facility])  Assessment & Plan  Lab Results   Component Value Date    HGBA1C 10 2 (H) 07/01/2022       Recent Labs     07/14/22  1159 07/14/22  1537 07/14/22  2145 07/15/22  0527   POCGLU 265* 143* 221* 97       Blood Sugar Average: Last 72 hrs:  (P) 164 [id number]     · Initially admitted to the ICU for DKA protocol  DKA now resolved  · Home regimen:  Levemir 5 units q h s , Humalog 5 units t i d  a c  with sliding scale  · Endocrinology input appreciated- ultimately recommend patient's home insulin regimen with outpatient follow-up with patient's regular [facility] endocrinologist  · PT/OT recommending rehab  Patient and family agreeable  · Patient is medically stable at this time for rehab pending placement  SIRS of non-infectious origin w acute organ dysfunction (HCC)  Assessment & Plan  · POA  AEB tachycardia, leukocytosis, RAMIRO, elevated lactic acid  · RAMIRO, lactic acidosis resolved  · Infectious etiology effectively ruled out with negative UA, negative blood cultures, negative procalcitonin, negative imaging  · SIRS criteria likely met in the setting of DKA-induced inflammatory process  · MRSA swab negative  · Received IV cefepime and vancomycin x2 days  Discontinued and monitored off antibiotics with no complications  · Medically stable for discharge    Abnormal CT of the abdomen  Assessment & Plan  · CT abdomen/pelvis revealed masslike thickening of the gastric antrum with pronounced gastric distension and a dilated fluid-filled stomach; direct visualization recommended to exclude mass lesion  · GI consult appreciated for EGD; however, patient refused EGD  · Discharge on b i d   PPI until GI follow-up in the outpatient setting    RAMIRO (acute kidney injury) [facility])  Assessment & Plan  · Present on admission  Creatinine initially elevated at 1 25   · Likely prerenal in the setting of DKA  · Now resolved  Creatinine at baseline  · PCP follow-up with routine labs    Coffee ground emesis  Assessment & Plan  · Reported by patient prior to admission  · No episodes of vomiting during this admission, and no evidence of active bleeding  · DVT prophylaxis with subQ heparin, now discontinued as patient is being discharged  · Hb stable        Osteoradionecrosis of temporal bone [facility])  Assessment & Plan  Patient has a history of Hodgkin's lymphoma status post neck radiation  Developed osteonecrosis which was repaired in March of 2022  · Continue routine outpatient follow-up  Other specified hypothyroidism  Assessment & Plan  · Discharge on pre-admission Synthroid 112 mcg q a m  HTN (hypertension)  Assessment & Plan  · BP reviewed, well controlled  · Home lisinopril 5 mg daily on hold in the setting of RAMIRO  Will continue to hold as blood pressure is well controlled, and at times soft  · Outpatient PCP follow-up for further management      HLD (hyperlipidemia)  Assessment & Plan  · Discharge on pre-admission Lipitor 20 mg with dinner        Medical Problems             Resolved Problems  Date Reviewed: 7/15/2022   None               Discharging Physician / Practitioner: Diane Avila PA-C  PCP: Bronson Oneal MD  Admission Date:   Admission Orders (From admission, onward)     Ordered        07/10/22 0939  Inpatient Admission  Once                      Discharge Date: 07/15/22    Consultations During Hospital Stay:  · Endocrinology  · Neurology  · Gastroenterology    Procedures Performed:   · None    Significant Findings / Test Results:   · CTA head/neck:  1  Suboptimal vascular assessment due to distorted image quality by motion artifact    2   No intracranial large vessel occlusion or critical stenosis  No aneurysm  3   Severe atherosclerotic stenosis of distal left common carotid artery immediately proximal to the bifurcation  No hemodynamically significant stenosis of right cervical carotid artery  4   Redemonstrated beaded appearing irregularity of bilateral cervical ICAs suggesting fibromuscular dysplasia (FMD)  5   Severe atherosclerotic stenosis of left vertebral artery origin  6   Moderate to severe atherosclerotic stenosis of the origin and proximal left subclavian artery  · CT abdomen/pelvis: Masslike thickening of the gastric antrum with pronounced gastric distention and a dilated fluid-filled stomach  Direct visualization is recommended to exclude a mass lesion  · MRI brain:  No acute intracranial pathology  Chronic microangiopathy        Incidental Findings:   · Please see CT abdomen/pelvis above- patient aware findings and refused EGD; ambulatory referral to follow up with outpatient GI provided     Test Results Pending at Discharge (will require follow up): · None     Outpatient Tests Requested:  · None    Complications:  None    Reason for Admission:  Diabetic ketoacidosis associated with type 2 diabetes mellitus     Hospital Course: Shan Suero is a 80 y o  female patient with past medical history of type 2 diabetes, hypothyroidism, hypertension, hyperlipidemia, Hodgkin's lymphoma status post neck radiation with resultant osteonecrosis status post repair who originally presented to the hospital on 7/10/2022 due to nausea/vomiting, blurry vision, reported hyperglycemia at home  Please refer to the initial history and physical as outlined by Dr Antoni Ivy of the critical care service on 07/10/2022 for additional presenting features  In brief, patient was initially admitted to the ICU for further treatment of DKA and high anion gap metabolic acidosis  Patient was eventually downgraded to medical/surgical for further management of hyperglycemia    Patient was seen by endocrinology, and 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  Patient will be following up with her regular [facility] endocrinologist, and resume her home insulin regimen  Patient was also evaluated by Neurology for possible stroke-like symptoms; however, stroke workup was negative  Patient was additionally seen by Gastroenterology for coffee-ground emesis that was noted prior to patient's admission; however, patient refused an EGD and was without any vomiting or evidence of bleeding during admission  Of note, patient had a CT abdomen/pelvis with the findings noted above, and ambulatory referral for outpatient GI follow-up was provided at the time of discharge  Patient was seen by PT/OT who recommended short-term rehab, and patient was accepted at [facility]  Patient was medically stable for discharge on 07/15/2022  This is a brief discharge summary; please refer to the above assessment/plan as well as the remainder of the patient's medical record for further details  Please see above list of diagnoses and related plan for additional information  Condition at Discharge: fair    Discharge Day Visit / Exam:   Subjective:  Patient seen and examined resting at the bedside, pleasant, and feeling well  No acute overnight events  Vitals: Blood Pressure: 136/80 (07/15/22 0746)  Pulse: 87 (07/14/22 1535)  Temperature: 98 5 °F (36 9 °C) (07/15/22 0746)  Temp Source: Temporal (07/13/22 0727)  Respirations: 18 (07/15/22 0746)  Height: 5' 1" (154 9 cm) (07/11/22 1421)  Weight - Scale: 54 8 kg (120 lb 13 oz) (07/15/22 0600)  SpO2: 98 % (07/14/22 1535)  Exam:   Physical Exam     Discussion with Family: Patient's family aware of discharge  [place] [facility] Discharge instructions/Information to patient and family:   See after visit summary for information provided to patient and family        Provisions for Follow-Up Care:  See after visit summary for information related to follow-up care and any pertinent home health orders  Disposition:   Other [place] [facility] Readmission:  None     Discharge Statement:  I spent 70 minutes discharging the patient  This time was spent on the day of discharge  I had direct contact with the patient on the day of discharge  Greater than 50% of the total time was spent examining patient, answering all patient questions, arranging and discussing plan of care with patient as well as directly providing post-discharge instructions  Additional time then spent on discharge activities  Discharge Medications:  See after visit summary for reconciled discharge medications provided to patient and/or family        **Please Note: This note may have been constructed using a voice recognition system**

## 2022-07-15 NOTE — PLAN OF CARE
Problem: Potential for Falls  Goal: Patient will remain free of falls  Description: INTERVENTIONS:  - Educate patient/family on patient safety including physical limitations  - Instruct patient to call for assistance with activity   - Consult OT/PT to assist with strengthening/mobility   - Keep Call bell within reach  - Keep bed low and locked with side rails adjusted as appropriate  - Keep care items and personal belongings within reach  - Initiate and maintain comfort rounds  - Make Fall Risk Sign visible to staff  - Offer Toileting every 2 Hours, in advance of need  - Initiate/Maintain bed alarm  - Obtain necessary fall risk management equipment: bed alarm  - Apply yellow socks and bracelet for high fall risk patients  - Consider moving patient to room near nurses station  Outcome: Progressing     Problem: MOBILITY - ADULT  Goal: Maintain or return to baseline ADL function  Description: INTERVENTIONS:  -  Assess patient's ability to carry out ADLs; assess patient's baseline for ADL function and identify physical deficits which impact ability to perform ADLs (bathing, care of mouth/teeth, toileting, grooming, dressing, etc )  - Assess/evaluate cause of self-care deficits   - Assess range of motion  - Assess patient's mobility; develop plan if impaired  - Assess patient's need for assistive devices and provide as appropriate  - Encourage maximum independence but intervene and supervise when necessary  - Involve family in performance of ADLs  - Assess for home care needs following discharge   - Consider OT consult to assist with ADL evaluation and planning for discharge  - Provide patient education as appropriate  Outcome: Progressing  Goal: Maintains/Returns to pre admission functional level  Description: INTERVENTIONS:  - Perform BMAT or MOVE assessment daily    - Set and communicate daily mobility goal to care team and patient/family/caregiver     - Collaborate with rehabilitation services on mobility goals if consulted  - Perform Range of Motion 3 times a day  - Reposition patient every 2 hours    - Dangle patient 3 times a day  - Stand patient 3 times a day  - Ambulate patient 3 times a day  - Out of bed to chair 3 times a day   - Out of bed for meals 3 times a day  - Out of bed for toileting  - Record patient progress and toleration of activity level   Outcome: Progressing     Problem: Prexisting or High Potential for Compromised Skin Integrity  Goal: Skin integrity is maintained or improved  Description: INTERVENTIONS:  - Identify patients at risk for skin breakdown  - Assess and monitor skin integrity  - Assess and monitor nutrition and hydration status  - Monitor labs   - Assess for incontinence   - Turn and reposition patient  - Assist with mobility/ambulation  - Relieve pressure over bony prominences  - Avoid friction and shearing  - Provide appropriate hygiene as needed including keeping skin clean and dry  - Evaluate need for skin moisturizer/barrier cream  - Collaborate with interdisciplinary team   - Patient/family teaching  - Consider wound care consult   Outcome: Progressing     Problem: SAFETY,RESTRAINT: NV/NON-SELF DESTRUCTIVE BEHAVIOR  Goal: Remains free of harm/injury (restraint for non violent/non self-detsructive behavior)  Description: INTERVENTIONS:  - Instruct patient/family regarding restraint use   - Assess and monitor physiologic and psychological status   - Provide interventions and comfort measures to meet assessed patient needs   - Identify and implement measures to help patient regain control  - Assess readiness for release of restraint   Outcome: Progressing  Goal: Returns to optimal restraint-free functioning  Description: INTERVENTIONS:  - Assess the patient's behavior and symptoms that indicate continued need for restraint  - Identify and implement measures to help patient regain control  - Assess readiness for release of restraint   Outcome: Progressing     Problem: Nutrition/Hydration-ADULT  Goal: Nutrient/Hydration intake appropriate for improving, restoring or maintaining nutritional needs  Description: Monitor and assess patient's nutrition/hydration status for malnutrition  Collaborate with interdisciplinary team and initiate plan and interventions as ordered  Monitor patient's weight and dietary intake as ordered or per policy  Utilize nutrition screening tool and intervene as necessary  Determine patient's food preferences and provide high-protein, high-caloric foods as appropriate       INTERVENTIONS:  - Monitor oral intake, urinary output, labs, and treatment plans  - Assess nutrition and hydration status and recommend course of action  - Evaluate amount of meals eaten  - Assist patient with eating if necessary   - Allow adequate time for meals  - Recommend/ encourage appropriate diets, oral nutritional supplements, and vitamin/mineral supplements  - Order, calculate, and assess calorie counts as needed  - Recommend, monitor, and adjust tube feedings and TPN/PPN based on assessed needs  - Assess need for intravenous fluids  - Provide specific nutrition/hydration education as appropriate  - Include patient/family/caregiver in decisions related to nutrition  Outcome: Progressing

## 2022-07-15 NOTE — ASSESSMENT & PLAN NOTE
· CT abdomen/pelvis revealed masslike thickening of the gastric antrum with pronounced gastric distension and a dilated fluid-filled stomach; direct visualization recommended to exclude mass lesion  · GI consult appreciated for EGD; however, patient refused EGD  · Discharge on b i d   PPI until GI follow-up in the outpatient setting

## 2022-07-15 NOTE — WOUND OSTOMY CARE
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 digit  Patient in bed for assessment, she is awake and alert, cooperative with assessment  She has scattered areas of erythema to the arms from IV lines and infiltrates  She has scattered bruises to the right hip, right buttock and bilateral lower extremities  At this time she has no incontinence  Patient states she is tired of all that is wrong with her  Recommended to patient that she follow up with a podiatrist on discharge for the corn to her right foot 5th digit, she stated she is not interested at this time  The toe is tender, she states she cuts away the callus at home  She stated she does not want to follow up with a podiatrist, maybe when she feels better she will call for an appointment  She is to be discharged today to [facility]  Findings  1  Bilateral heels intact  2  POA-resolving pressure injury to the right buttock, at this time the wound appears as a stage 2, partial thickness with beefy red non-blanchable wound base, unknown stage of original wound  The periwound is hyperpigmented and scarred  she states she applies cream at home    Skin and Wound Care Plan:   1  Ehob offloading cushion to chair when OOB  2  Elevate heels off the bed with pillows   3  Turn and reposition every two hours  4  Hydraguard to bilateral heels BID and PRN  5  Skin nourishing cream daily  6  Cleanse buttocks area with Remedy foaming cleanser then apply Calazime to open area on the right buttock  7   Allevyn life silicone bordered dressing to the sacrum, arlette with P, date, peel back daily for skin assessment, reappy dressing, change every 3 days and PRN    Wound:  Wound 07/10/22 Pressure Injury Buttocks Right (Active)   Wound Image    07/15/22 0959   Wound Description Beefy red;Pink 07/15/22 0959   Pressure Injury Stage 2 07/15/22 0710   Tess-wound Assessment Hyperpigmented 07/15/22 0959   Wound Length (cm) 0 5 cm 07/15/22 0959   Wound Width (cm) 0 7 cm 07/15/22 0959   Wound Depth (cm) 0 1 cm 07/15/22 0959   Wound Surface Area (cm^2) 0 35 cm^2 07/15/22 0959   Wound Volume (cm^3) 0 035 cm^3 07/15/22 0959   Calculated Wound Volume (cm^3) 0 04 cm^3 07/15/22 0959   Change in Wound Size % 0 07/15/22 0959   Drainage Amount None 07/15/22 0959   Treatments Cleansed 07/15/22 0959   Dressing Protective barrier 07/15/22 0959   Wound packed?  No 07/15/22 0959   Patient Tolerance Tolerated well 07/15/22 0959       Wound 07/10/22 Toe (Comment  which one) Anterior;Right (Active)   Wound Image   07/11/22 1134   Wound Description Dry;Brown 07/15/22 0959   Tess-wound Assessment Callus 07/15/22 0959   Drainage Amount None 07/15/22 0959   Treatments Site care 07/15/22 0710   Dressing Open to air 07/15/22 0959   Patient Tolerance Tolerated well 07/15/22 0959     Reviewed plan of care with primary RN Riana Gregorio  Recommendations written as orders  Wound care team to follow weekly while admitted  Questions or concerns [facility] Nurse    Christina CISNEROSN, RN, Cait Galvan

## 2022-07-15 NOTE — ASSESSMENT & PLAN NOTE
· Reported by patient prior to admission  · No episodes of vomiting during this admission, and no evidence of active bleeding  · DVT prophylaxis with subQ heparin, now discontinued as patient is being discharged    · Hb stable

## 2022-07-15 NOTE — ASSESSMENT & PLAN NOTE
· Present on admission  Creatinine initially elevated at 1 25   · Likely prerenal in the setting of DKA  · Now resolved  Creatinine at baseline    · PCP follow-up with routine labs

## 2022-07-15 NOTE — PLAN OF CARE
Problem: Potential for Falls  Goal: Patient will remain free of falls  Description: INTERVENTIONS:  - Educate patient/family on patient safety including physical limitations  - Instruct patient to call for assistance with activity   - Consult OT/PT to assist with strengthening/mobility   - Keep Call bell within reach  - Keep bed low and locked with side rails adjusted as appropriate  - Keep care items and personal belongings within reach  - Initiate and maintain comfort rounds  - Make Fall Risk Sign visible to staff  - Offer Toileting every 1 Hours, in advance of need  - Initiate/Maintain bed alarm  - Obtain necessary fall risk management equipment: bed   - Apply yellow socks and bracelet for high fall risk patients  - Consider moving patient to room near nurses station  7/15/2022 0715 by Jose Robbins RN  Outcome: Progressing  7/15/2022 0714 by Jose Robbins RN  Outcome: Progressing     Problem: MOBILITY - ADULT  Goal: Maintain or return to baseline ADL function  Description: INTERVENTIONS:  -  Assess patient's ability to carry out ADLs; assess patient's baseline for ADL function and identify physical deficits which impact ability to perform ADLs (bathing, care of mouth/teeth, toileting, grooming, dressing, etc )  - Assess/evaluate cause of self-care deficits   - Assess range of motion  - Assess patient's mobility; develop plan if impaired  - Assess patient's need for assistive devices and provide as appropriate  - Encourage maximum independence but intervene and supervise when necessary  - Involve family in performance of ADLs  - Assess for home care needs following discharge   - Consider OT consult to assist with ADL evaluation and planning for discharge  - Provide patient education as appropriate  7/15/2022 0715 by Jose Robbins RN  Outcome: Progressing  7/15/2022 0714 by Jose Robbins RN  Outcome: Progressing  Goal: Maintains/Returns to pre admission functional level  Description: INTERVENTIONS:  - Perform BMAT or MOVE assessment daily    - Set and communicate daily mobility goal to care team and patient/family/caregiver  - Collaborate with rehabilitation services on mobility goals if consulted  - Perform Range of Motion 3 times a day  - Reposition patient every 2 hours    - Dangle patient 3 times a day  - Stand patient 3 times a day  - Ambulate patient 3 times a day  - Out of bed to chair 3 times a day   - Out of bed for meals 3 times a day  - Out of bed for toileting  - Record patient progress and toleration of activity level   7/15/2022 0715 by Gina Brizuela RN  Outcome: Progressing  7/15/2022 0714 by Gina Brizuela RN  Outcome: Progressing     Problem: Prexisting or High Potential for Compromised Skin Integrity  Goal: Skin integrity is maintained or improved  Description: INTERVENTIONS:  - Identify patients at risk for skin breakdown  - Assess and monitor skin integrity  - Assess and monitor nutrition and hydration status  - Monitor labs   - Assess for incontinence   - Turn and reposition patient  - Assist with mobility/ambulation  - Relieve pressure over bony prominences  - Avoid friction and shearing  - Provide appropriate hygiene as needed including keeping skin clean and dry  - Evaluate need for skin moisturizer/barrier cream  - Collaborate with interdisciplinary team   - Patient/family teaching  - Consider wound care consult   7/15/2022 0715 by Gina Brizuela RN  Outcome: Progressing  7/15/2022 0714 by Gina Brizuela RN  Outcome: Progressing     Problem: SAFETY,RESTRAINT: NV/NON-SELF DESTRUCTIVE BEHAVIOR  Goal: Remains free of harm/injury (restraint for non violent/non self-detsructive behavior)  Description: INTERVENTIONS:  - Instruct patient/family regarding restraint use   - Assess and monitor physiologic and psychological status   - Provide interventions and comfort measures to meet assessed patient needs   - Identify and implement measures to help patient regain control  - Assess readiness for release of restraint   7/15/2022 0715 by Abner Marie RN  Outcome: Progressing  7/15/2022 0714 by Abner Marie RN  Outcome: Progressing  Goal: Returns to optimal restraint-free functioning  Description: INTERVENTIONS:  - Assess the patient's behavior and symptoms that indicate continued need for restraint  - Identify and implement measures to help patient regain control  - Assess readiness for release of restraint   7/15/2022 0715 by Abner Marie RN  Outcome: Progressing  7/15/2022 0714 by Abner Marie RN  Outcome: Progressing     Problem: Nutrition/Hydration-ADULT  Goal: Nutrient/Hydration intake appropriate for improving, restoring or maintaining nutritional needs  Description: Monitor and assess patient's nutrition/hydration status for malnutrition  Collaborate with interdisciplinary team and initiate plan and interventions as ordered  Monitor patient's weight and dietary intake as ordered or per policy  Utilize nutrition screening tool and intervene as necessary  Determine patient's food preferences and provide high-protein, high-caloric foods as appropriate       INTERVENTIONS:  - Monitor oral intake, urinary output, labs, and treatment plans  - Assess nutrition and hydration status and recommend course of action  - Evaluate amount of meals eaten  - Assist patient with eating if necessary   - Allow adequate time for meals  - Recommend/ encourage appropriate diets, oral nutritional supplements, and vitamin/mineral supplements  - Order, calculate, and assess calorie counts as needed  - Recommend, monitor, and adjust tube feedings and TPN/PPN based on assessed needs  - Assess need for intravenous fluids  - Provide specific nutrition/hydration education as appropriate  - Include patient/family/caregiver in decisions related to nutrition  7/15/2022 0715 by Abner Marie RN  Outcome: Progressing  7/15/2022 0714 by Abner Marie RN  Outcome: Progressing

## 2022-07-15 NOTE — ASSESSMENT & PLAN NOTE
Lab Results   Component Value Date    HGBA1C 10 2 (H) 07/01/2022       Recent Labs     07/14/22  1159 07/14/22  1537 07/14/22  2145 07/15/22  0527   POCGLU 265* 143* 221* 97       Blood Sugar Average: Last 72 hrs:  (P) 887 [id number]     · Initially admitted to the ICU for DKA protocol  DKA now resolved  · Home regimen:  Levemir 5 units q h s , Humalog 5 units t i d  a c  with sliding scale  · Endocrinology input appreciated- ultimately recommend patient's home insulin regimen with outpatient follow-up with patient's regular [facility] endocrinologist  · PT/OT recommending rehab  Patient and family agreeable  · Patient is medically stable at this time for rehab pending placement

## 2022-07-15 NOTE — ASSESSMENT & PLAN NOTE
· POA   AEB tachycardia, leukocytosis, RAMIRO, elevated lactic acid  · RAMIRO, lactic acidosis resolved  · Infectious etiology effectively ruled out with negative UA, negative blood cultures, negative procalcitonin, negative imaging  · SIRS criteria likely met in the setting of DKA-induced inflammatory process  · MRSA swab negative  · Received IV cefepime and vancomycin x2 days  Discontinued and monitored off antibiotics with no complications     · Medically stable for discharge

## 2022-07-15 NOTE — ASSESSMENT & PLAN NOTE
· BP reviewed, well controlled  · Home lisinopril 5 mg daily on hold in the setting of RAMIRO  Will continue to hold as blood pressure is well controlled, and at times soft    · Outpatient PCP follow-up for further management

## 2022-07-15 NOTE — DISCHARGE INSTR - AVS FIRST PAGE
Dear Tila Blackwell,     It was our pleasure to care for you here at [facility],  Aida Curt  It is our hope that we were always able to exceed the expected standards for your care during your stay  You were hospitalized due to medical/surgical   You were cared for on the diabetic ketoacidosis floor by Alpa Silvestre PA-C under the service of Gilma Story MD with the [facility] who covers for your primary care physician (PCP), Qian Guzman MD, while you were hospitalized  If you have any questions or concerns related to this hospitalization, you may contact us at [phone]  For follow up as well as any medication refills, we recommend that you follow up with your primary care physician  A registered nurse will reach out to you by phone within a few days after your discharge to answer any additional questions that you may have after going home    However, at this time we provide for you here, the most important instructions / recommendations at discharge:     Notable Medication Adjustments -   Please discontinue lisinopril  Please continue Protonix 40 mg twice daily until follow-up with outpatient Gastroenterology office  Please continue with your current insulin regimen:  Levemir 5 units at night, Humalog 3 times daily with meals plus sliding scale (if you weight less than 50% of your meal you may adjust your Humalog to 3 units)  Testing Required after Discharge -   None  Important follow up information -   Please follow-up with your outpatient endocrinologist  Please follow-up with [facility] Gastroenterology office  Other Instructions -   Please follow-up with your PCP within 7-10 days  Please follow healthy low-sodium diet  Please review this entire after visit summary as additional general instructions including medication list, appointments, activity, diet, any pertinent wound care, and other additional recommendations from your care team that may be provided for you        Sincerely,     Lina Montes De Oca PA-C

## 2022-07-19 LAB — INSULIN AB SER-ACNC: <5 UU/ML

## 2022-07-22 ENCOUNTER — HOSPITAL ENCOUNTER (EMERGENCY)
Facility: HOSPITAL | Age: 82
Discharge: HOME | End: 2022-07-22
Attending: EMERGENCY MEDICINE
Payer: MEDICARE

## 2022-07-22 VITALS
SYSTOLIC BLOOD PRESSURE: 138 MMHG | RESPIRATION RATE: 16 BRPM | HEART RATE: 80 BPM | TEMPERATURE: 96.4 F | OXYGEN SATURATION: 100 % | DIASTOLIC BLOOD PRESSURE: 89 MMHG

## 2022-07-22 DIAGNOSIS — E16.2 HYPOGLYCEMIA: Primary | ICD-10-CM

## 2022-07-22 LAB
ANION GAP SERPL CALCULATED.3IONS-SCNC: 11 MMOL/L (ref 4–13)
BASOPHILS # BLD AUTO: 0.05 THOUSANDS/ΜL (ref 0–0.1)
BASOPHILS NFR BLD AUTO: 0 % (ref 0–1)
BILIRUB UR QL STRIP: NEGATIVE
BUN SERPL-MCNC: 18 MG/DL (ref 5–25)
CALCIUM SERPL-MCNC: 8.7 MG/DL (ref 8.4–10.2)
CHLORIDE SERPL-SCNC: 100 MMOL/L (ref 96–108)
CLARITY UR: CLEAR
CO2 SERPL-SCNC: 27 MMOL/L (ref 21–32)
COLOR UR: YELLOW
CREAT SERPL-MCNC: 0.66 MG/DL (ref 0.6–1.3)
EOSINOPHIL # BLD AUTO: 0.02 THOUSAND/ΜL (ref 0–0.61)
EOSINOPHIL NFR BLD AUTO: 0 % (ref 0–6)
ERYTHROCYTE [DISTWIDTH] IN BLOOD BY AUTOMATED COUNT: 14.3 % (ref 11.6–15.1)
GFR SERPL CREATININE-BSD FRML MDRD: 83 ML/MIN/1.73SQ M
GLUCOSE SERPL-MCNC: 48 MG/DL (ref 65–140)
GLUCOSE SERPL-MCNC: 71 MG/DL (ref 65–140)
GLUCOSE UR STRIP-MCNC: ABNORMAL MG/DL
HCT VFR BLD AUTO: 40 % (ref 34.8–46.1)
HGB BLD-MCNC: 12.5 G/DL (ref 11.5–15.4)
HGB UR QL STRIP.AUTO: NEGATIVE
IMM GRANULOCYTES # BLD AUTO: 0.13 THOUSAND/UL (ref 0–0.2)
IMM GRANULOCYTES NFR BLD AUTO: 1 % (ref 0–2)
KETONES UR STRIP-MCNC: NEGATIVE MG/DL
LEUKOCYTE ESTERASE UR QL STRIP: NEGATIVE
LYMPHOCYTES # BLD AUTO: 0.82 THOUSANDS/ΜL (ref 0.6–4.47)
LYMPHOCYTES NFR BLD AUTO: 5 % (ref 14–44)
MCH RBC QN AUTO: 33.5 PG (ref 26.8–34.3)
MCHC RBC AUTO-ENTMCNC: 31.3 G/DL (ref 31.4–37.4)
MCV RBC AUTO: 107 FL (ref 82–98)
MONOCYTES # BLD AUTO: 0.84 THOUSAND/ΜL (ref 0.17–1.22)
MONOCYTES NFR BLD AUTO: 5 % (ref 4–12)
NEUTROPHILS # BLD AUTO: 15.74 THOUSANDS/ΜL (ref 1.85–7.62)
NEUTS SEG NFR BLD AUTO: 89 % (ref 43–75)
NITRITE UR QL STRIP: NEGATIVE
NRBC BLD AUTO-RTO: 0 /100 WBCS
PH UR STRIP.AUTO: 7 [PH]
PLATELET # BLD AUTO: 295 THOUSANDS/UL (ref 149–390)
PMV BLD AUTO: 10.4 FL (ref 8.9–12.7)
POTASSIUM SERPL-SCNC: 4.8 MMOL/L (ref 3.5–5.3)
PROT UR STRIP-MCNC: NEGATIVE MG/DL
RBC # BLD AUTO: 3.73 MILLION/UL (ref 3.81–5.12)
SODIUM SERPL-SCNC: 138 MMOL/L (ref 135–147)
SP GR UR STRIP.AUTO: 1.01 (ref 1–1.03)
UROBILINOGEN UR QL STRIP.AUTO: 0.2 E.U./DL
WBC # BLD AUTO: 17.6 THOUSAND/UL (ref 4.31–10.16)

## 2022-07-22 PROCEDURE — 80048 BASIC METABOLIC PNL TOTAL CA: CPT | Performed by: PHYSICIAN ASSISTANT

## 2022-07-22 PROCEDURE — 81003 URINALYSIS AUTO W/O SCOPE: CPT | Performed by: PHYSICIAN ASSISTANT

## 2022-07-22 PROCEDURE — 36415 COLL VENOUS BLD VENIPUNCTURE: CPT | Performed by: PHYSICIAN ASSISTANT

## 2022-07-22 PROCEDURE — 99285 EMERGENCY DEPT VISIT HI MDM: CPT

## 2022-07-22 PROCEDURE — 85025 COMPLETE CBC W/AUTO DIFF WBC: CPT | Performed by: PHYSICIAN ASSISTANT

## 2022-07-22 PROCEDURE — 99284 EMERGENCY DEPT VISIT MOD MDM: CPT | Performed by: PHYSICIAN ASSISTANT

## 2022-07-22 PROCEDURE — 82948 REAGENT STRIP/BLOOD GLUCOSE: CPT

## 2022-07-22 NOTE — ED NOTES
Pt provided juice by provider for low glucose level     [facility]  07/22/22 0146

## 2022-07-22 NOTE — ED NOTES
Pt ambulated to the bathroom with an assist of two and provided ordered urine sample; back to bed without incident     Sabrina Kelly RN  07/22/22 5855

## 2022-07-22 NOTE — ED PROVIDER NOTES
History  Chief Complaint   Patient presents with    Hypoglycemia - Symptomatic     Pt reports from [facility] for rehab and has had low glucose levels      80year-old female presents to the emergency department from rehab facility with concern for low blood sugars  Patient was reportedly hospitalized recently for DKA and had her insulin dosing adjusted  She does take 5 units of Levemir insulin at night before bed  Patient presents with a low blood sugar of 48  She appears to be largely asymptomatic from this  Patient is accompanied by family  Patient reports that for last several days she has had low blood sugars in the morning  Patient provided with juice and snacks  Patient is otherwise complaint free  Allergies reviewed          Prior to Admission Medications   Prescriptions Last Dose Informant Patient Reported? Taking? Ascorbic Acid (vitamin C) 100 MG tablet   Yes No   Sig: Take 500 mg by mouth 2 (two) times a day   BD Insulin Syringe U/F /Unit 31G X 16" 0 3 ML MISC   Yes No   Si (four) times a day   Insulin Aspart (NOVOLOG SC)   Yes No   Sig: Inject 5 Units under the skin 3 (three) times a day with meals Plus sliding scale   5 units @ 1730    Insulin Detemir (LEVEMIR SC)   Yes No   Sig: Inject 5 Units under the skin daily at bedtime   Multiple Vitamin (multivitamin) capsule   Yes No   Sig: Take 1 capsule by mouth daily   OneTouch Ultra test strip   Yes No   Sig: USE TO TEST BLOOD SUGAR 6 TIMES A DAY   VITAMIN D PO   Yes No   Sig: Take 125 mg by mouth in the morning   aspirin (ECOTRIN LOW STRENGTH) 81 mg EC tablet  Self Yes No   Sig: Take 81 mg by mouth daily     atorvastatin (LIPITOR) 20 mg tablet   Yes No   Sig: Take 20 mg by mouth daily with dinner    calcium carbonate (OS-FELICE) 600 MG tablet   Yes No   Sig: Take 600 mg by mouth 2 (two) times a day with meals   cyanocobalamin (VITAMIN B-12) 1000 MCG tablet   Yes No   Sig: Take 1,000 mcg by mouth daily   gabapentin (NEURONTIN) 300 mg capsule Yes No   Sig: Take 300 mg by mouth 3 (three) times a day   ipratropium (ATROVENT) 0 03 % nasal spray   No No   Si sprays into each nostril every 12 (twelve) hours   Patient taking differently: 2 sprays into each nostril every 12 (twelve) hours As needed   levothyroxine 100 mcg tablet  Self Yes No   Sig: Take 112 mcg by mouth every morning    methocarbamol (ROBAXIN) 500 mg tablet   Yes No   Sig: Take 500 mg by mouth in the morning   Prn     pantoprazole (PROTONIX) 40 mg tablet   No No   Sig: Take 1 tablet (40 mg total) by mouth 2 (two) times a day   traMADol (ULTRAM) 50 mg tablet   Yes No   Sig: tramadol 50 mg tablet   take 1 tablet by mouth three times a day if needed      Facility-Administered Medications: None       Past Medical History:   Diagnosis Date    Ambulates with cane     [facility]     Chronic pain disorder     knees/ shoulders (gets inj every 3 mos)    Controlled type 2 diabetes mellitus with diabetic polyneuropathy, with long-term current use of insulin ([facility] ) 2008    [facility]     Diabetic polyneuropathy ([facility] ) 2008    ICD10 clean up    Disease of thyroid gland     H/O oral cancer     Left lower lip    HL (hearing loss)     Hodgkin's disease ([facility]     Left neck, had radiation    Hypertension     Neuropathy     Osteoporosis     RA (rheumatoid arthritis) ([facility] )        Past Surgical History:   Procedure Laterality Date    ADENOIDECTOMY      APPENDECTOMY      CATARACT EXTRACTION      CATARACT EXTRACTION, BILATERAL      CHOLECYSTECTOMY      FRACTURE SURGERY Right     hip    MOUTH SURGERY      oral cancer left lower lip    OVARY SURGERY      OR ADJ TISS XFER HEAD,FAC,HAND <10 SQCM N/A 3/28/2022    Procedure: Adjacent tissue transfer face;  Surgeon: Kalin Grant MD;  [facility];  Service: ENT    OR MASTOIDECTOMY,SIMPLE Left 3/28/2022    Procedure: MASTOIDECTOMY;  Surgeon: Kalin Grant MD;  [facility]; Service: ENT    AR OPEN RX FEMUR FX+INTRAMED SHE Right 5/28/2020    Procedure: INSERTION NAIL IM FEMUR ANTEGRADE (TROCHANTERIC); Surgeon: Sarah Solorio DO;  Location: [address] [place] [address] MAIN OR;  Service: Orthopedics    TONSILLECTOMY      TOTAL THYROIDECTOMY  2008    Performed after left neck dissection and left oral cancer diagnosis       Family History   Problem Relation Age of Onset    Cancer Mother     Diabetes Mother     Diabetes Father     Hypertension Father      I have reviewed and agree with the history as documented  E-Cigarette/Vaping    E-Cigarette Use Never User      E-Cigarette/Vaping Substances    Nicotine No     THC No     CBD No     Flavoring No     Other No     Unknown No      Social History     Tobacco Use    Smoking status: Never Smoker    Smokeless tobacco: Never Used   Vaping Use    Vaping Use: Never used   Substance Use Topics    Alcohol use: Not Currently     Alcohol/week: 0 0 standard drinks    Drug use: Never       Review of Systems   Constitutional: Negative for chills, fatigue and fever  HENT: Negative for congestion, ear pain, rhinorrhea, sinus pressure, sneezing, sore throat and trouble swallowing  Eyes: Negative for discharge and itching  Respiratory: Negative for cough, chest tightness, shortness of breath, wheezing and stridor  Cardiovascular: Negative for chest pain and palpitations  Gastrointestinal: Negative for abdominal pain, diarrhea, nausea and vomiting  Neurological: Negative for dizziness, syncope, numbness and headaches  All other systems reviewed and are negative  Physical Exam  Physical Exam  Vitals and nursing note reviewed  Constitutional:       General: She is not in acute distress  Appearance: She is well-developed  She is not diaphoretic  HENT:      Head: Normocephalic and atraumatic        Right Ear: External ear normal       Left Ear: External ear normal       Nose: Nose normal    Eyes:      Conjunctiva/sclera: Conjunctivae normal  Pupils: Pupils are equal, round, and reactive to light  Cardiovascular:      Rate and Rhythm: Normal rate and regular rhythm  Heart sounds: Normal heart sounds  No murmur heard  No friction rub  No gallop  Pulmonary:      Effort: Pulmonary effort is normal  No respiratory distress  Breath sounds: Normal breath sounds  No stridor  No wheezing or rales  Abdominal:      General: Bowel sounds are normal  There is no distension  Palpations: Abdomen is soft  Tenderness: There is no abdominal tenderness  There is no guarding  Musculoskeletal:         General: No tenderness  Normal range of motion  Cervical back: Normal range of motion  Skin:     General: Skin is warm  Capillary Refill: Capillary refill takes less than 2 seconds  Neurological:      Mental Status: She is alert and oriented to person, place, and time           Vital Signs  ED Triage Vitals   Temperature Pulse Respirations Blood Pressure SpO2   07/22/22 1118 07/22/22 0918 07/22/22 0918 07/22/22 0920 07/22/22 0918   (!) 96 4 °F (35 8 °C) 75 18 161/81 97 %      Temp Source Heart Rate Source Patient Position - Orthostatic VS BP Location FiO2 (%)   07/22/22 1118 07/22/22 0918 -- 07/22/22 1118 --   Tympanic Monitor  Left arm       Pain Score       --                  Vitals:    07/22/22 0918 07/22/22 0920 07/22/22 1118   BP:  161/81 138/89   Pulse: 75  80         Visual Acuity      ED Medications  Medications - No data to display    Diagnostic Studies  Results Reviewed     Procedure Component Value Units Date/Time    UA (URINE) with reflex to Scope [[id number]]  (Abnormal) Collected: 07/22/22 1106    Lab Status: Final result Specimen: Urine, Clean Catch Updated: 07/22/22 1112     Color, UA Yellow     Clarity, UA Clear     Specific Gravity, UA 1 010     pH, UA 7 0     Leukocytes, UA Negative     Nitrite, UA Negative     Protein, UA Negative mg/dl      Glucose, UA Trace mg/dl      Ketones, UA Negative mg/dl Urobilinogen, UA 0 2 E U /dl      Bilirubin, UA Negative     Occult Blood, UA Negative    Basic metabolic panel [[id number]] Collected: 07/22/22 0924    Lab Status: Final result Specimen: Blood from Arm, Right Updated: 07/22/22 0956     Sodium 138 mmol/L      Potassium 4 8 mmol/L      Chloride 100 mmol/L      CO2 27 mmol/L      ANION GAP 11 mmol/L      BUN 18 mg/dL      Creatinine 0 66 mg/dL      Glucose 71 mg/dL      Calcium 8 7 mg/dL      eGFR 83 ml/min/1 73sq m     Narrative:      [place] guidelines for Chronic Kidney Disease (CKD):     Stage 1 with normal or high GFR (GFR > 90 mL/min/1 73 square meters)    Stage 2 Mild CKD (GFR = 60-89 mL/min/1 73 square meters)    Stage 3A Moderate CKD (GFR = 45-59 mL/min/1 73 square meters)    Stage 3B Moderate CKD (GFR = 30-44 mL/min/1 73 square meters)    Stage 4 Severe CKD (GFR = 15-29 mL/min/1 73 square meters)    Stage 5 End Stage CKD (GFR <15 mL/min/1 73 square meters)  Note: GFR calculation is accurate only with a steady state creatinine    CBC and differential [[id number]]  (Abnormal) Collected: 07/22/22 0924    Lab Status: Final result Specimen: Blood from Arm, Right Updated: 07/22/22 0928     WBC 17 60 Thousand/uL      RBC 3 73 Million/uL      Hemoglobin 12 5 g/dL      Hematocrit 40 0 %       fL      MCH 33 5 pg      MCHC 31 3 g/dL      RDW 14 3 %      MPV 10 4 fL      Platelets 475 Thousands/uL      nRBC 0 /100 WBCs      Neutrophils Relative 89 %      Immat GRANS % 1 %      Lymphocytes Relative 5 %      Monocytes Relative 5 %      Eosinophils Relative 0 %      Basophils Relative 0 %      Neutrophils Absolute 15 74 Thousands/µL      Immature Grans Absolute 0 13 Thousand/uL      Lymphocytes Absolute 0 82 Thousands/µL      Monocytes Absolute 0 84 Thousand/µL      Eosinophils Absolute 0 02 Thousand/µL      Basophils Absolute 0 05 Thousands/µL     Fingerstick Glucose (POCT) [[id number]]  (Abnormal) Collected: 07/22/22 0906    Lab Status: Final result Updated: 07/22/22 0915     POC Glucose 48 mg/dl                  No orders to display              Procedures  Procedures         ED Course  ED Course as of 07/22/22 1315   Fri Jul 22, 2022   1124 Oral temp 97 8                                             MDM  Number of Diagnoses or Management Options  Hypoglycemia  Diagnosis management comments: Generally benign physical examination Case discussed with Genie August PA-C of [facility]  Will recommend holding Levemir at bed time for now and follow up with PCP and endocrine  Patient with leukocytosis however no clear source for infection  Possible stress reaction  Recommend close monitoring of symptoms at facility  Patient and family are in agreement this plan  Patient educated regarding their diagnosis and given return and follow-up instructions  Patient was advised to returned to the ED with worsening symptoms or concerns  Patient is understanding of and in agreement with the treatment plan  There are no questions at the time of discharge  Amount and/or Complexity of Data Reviewed  Clinical lab tests: ordered and reviewed  Tests in the radiology section of CPT®: ordered and reviewed    Risk of Complications, Morbidity, and/or Mortality  Presenting problems: low  Diagnostic procedures: low  Management options: low    Patient Progress  Patient progress: stable      Disposition  Final diagnoses:   Hypoglycemia     Time reflects when diagnosis was documented in both MDM as applicable and the Disposition within this note     Time User Action Codes Description Comment    7/22/2022 10:18 AM Valentino Rose Add [E16 2] Hypoglycemia       ED Disposition     ED Disposition   Discharge    Condition   Stable    Date/Time   Fri Jul 22, 2022 10:17 AM    Selene Hinkle discharge to home/self care                 Follow-up Information     Follow up With Specialties Details Why Contact Joellen Esposito MD Emergency Medicine, Internal [facility] [id number]  [phone]            Patient's Medications   Discharge Prescriptions    No medications on file       No discharge procedures on file      PDMP Review       Value Time User    PDMP Reviewed  Yes 6/17/2020  2:17 Anna Hennessy MD          ED Provider  Electronically Signed by           Valerie Olmedo PA-C  07/22/22 5325

## 2022-07-22 NOTE — ED NOTES
Contacted [facility] at this time to arrange transport back to [address] [place] [address]  07/22/22 9849

## 2022-08-25 ENCOUNTER — OFFICE VISIT (OUTPATIENT)
Dept: GASTROENTEROLOGY | Facility: CLINIC | Age: 82
End: 2022-08-25
Payer: MEDICARE

## 2022-08-25 VITALS
HEART RATE: 91 BPM | TEMPERATURE: 98.6 F | OXYGEN SATURATION: 98 % | RESPIRATION RATE: 20 BRPM | BODY MASS INDEX: 21.3 KG/M2 | SYSTOLIC BLOOD PRESSURE: 136 MMHG | HEIGHT: 61 IN | DIASTOLIC BLOOD PRESSURE: 82 MMHG | WEIGHT: 112.8 LBS

## 2022-08-25 DIAGNOSIS — R93.5 ABNORMAL CT OF THE ABDOMEN: ICD-10-CM

## 2022-08-25 DIAGNOSIS — R63.4 UNINTENTIONAL WEIGHT LOSS: ICD-10-CM

## 2022-08-25 DIAGNOSIS — Z12.11 SCREENING FOR COLORECTAL CANCER: ICD-10-CM

## 2022-08-25 DIAGNOSIS — R68.81 EARLY SATIETY: ICD-10-CM

## 2022-08-25 DIAGNOSIS — K92.0 DARK EMESIS: Primary | ICD-10-CM

## 2022-08-25 DIAGNOSIS — Z12.12 SCREENING FOR COLORECTAL CANCER: ICD-10-CM

## 2022-08-25 DIAGNOSIS — K92.0 COFFEE GROUND EMESIS: ICD-10-CM

## 2022-08-25 PROCEDURE — 99214 OFFICE O/P EST MOD 30 MIN: CPT | Performed by: FAMILY MEDICINE

## 2022-08-25 NOTE — H&P (VIEW-ONLY)
[facility] - Outpatient Follow-up Note  Stephie Mccloud 80 y o  female MRN: [id number]  Encounter: [id number]          ASSESSMENT AND PLAN:      1  Abnormal CT of the abdomen  2  Unintentional weight loss  3  Dark emesis  4  Early satiety  Patient admitted to [facility] for severe DKA, also noted to have 3 episodes of dark emesis prior to arrival as well as a history of unintentional weight loss and early satiety  CTA/P notable for a masslike thickening of the gastric antrum with pronounced gastric distension and dilated fluid-filled stomach concerning for primary gastric malignancy  Denies any further nausea/vomiting or development of rectal bleeding (hematochezia/melena) since discharge  Difficult to ascertain if emesis was truly coffee-ground emesis from UGIB - Especially given stable hgb and the absence of melena - Although still possible, particularly given abnormal findings on imaging  Patient offered and initially declined EGD inpatient, but is agreeable to endoscopic evaluation today  Patient has also been taking pantoprazole 40 mg twice daily since discharge (x6 weeks) - Advised she may decrease frequency to once daily until time of her EGD  Discussed risks of procedure with patient including, but not limited to, bleeding, infection, and perforation; Patient gave verbal understanding and is agreeable to proceed  Discussed and given instructions regarding EGD   - EGD; Future    5  Screening for colorectal cancer  Patient denies any previous endoscopic evaluation  Denies alarm features admits to regular bowel habits  Thoroughly discussed recommendations regarding CRC screening, alternative screening modalities, and the process and associated risks regarding a colonoscopy   Patient adamantly refuses any form of CRC screening with the knowledge that an advanced colonic polyp or colonic mass/malignancy may go undiagnosed - Given patient's age and comorbidities, this is not unreasonable  Clearances: Insulin-dependent DM2  Follow-up after procedure to discuss results  ______________________________________________________________________    SUBJECTIVE: Patient is a 80 y o  female with PMH significant for insulin-dependent DM 2, diabetic polyneuropathy, hypothyroidism, RA, history of Hodgkin's lymphoma s/p radiation with resultant osteonecrosis repaired in March 2022 and recent mastoidectomy who presents today for a hospital follow-up regarding an abnormal finding on CT of the abdomen  Patient's daughter present in office today  In short, patient was admitted to [facility] for severe DKA with significant electrolyte abnormalities  Patient also reported nausea and 3 episodes of dark appearing emesis prior to admission, as well as, an unintentional weight loss - Per chart review, patient has lost approx 17 lbs in the last year  Additionally, CTA/P incidentally noted a masslike thickening of the gastric antrum with pronounced gastric distension dilated fluid-filled stomach  Patient was offered an EGD while inpatient, but she adamantly refused  She also refused any other alternative testing  Therefore, recommended conservative therapy with twice daily PPI  Today, patient denies any further nausea or vomiting since prior to her hospitalization  She does admit to feeling full quickly, new within the last 2-3 months  Patient reports an extensive personal history of cancer including Hodgkin's lymphoma requiring radiation to include lymph node, oral cancer, a large benign ovarian cyst and a benign brain tumor that has since been removed  Denies dysphagia, odynophagia, nausea/vomiting, epigastric or other abdominal pain, constipation, diarrhea, or rectal bleeding (hematochezia/melena)  Denies family history of gastric cancer  Denies excessive NSAID or EtOH use  Patient is more agreeable to discussing upper endoscopy today  Denies any previous endoscopic evaluation         REVIEW OF SYSTEMS IS OTHERWISE NEGATIVE  Historical Information   Past Medical History:   Diagnosis Date    Ambulates with cane     [facility]     Chronic pain disorder     knees/ shoulders (gets inj every 3 mos)    Controlled type 2 diabetes mellitus with diabetic polyneuropathy, with long-term current use of insulin ([facility] ) 5/21/2008    Diabetes mellitus ([facility]     Diabetic polyneuropathy ([facility] ) 5/21/2008    ICD10 clean up    Disease of thyroid gland     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    HL (hearing loss)     Hodgkin's disease ([facility] ) 2008    Left neck, had radiation    Hypertension     Neuropathy     Osteoporosis     RA (rheumatoid arthritis) ([facility] )      Past Surgical History:   Procedure Laterality Date    ADENOIDECTOMY      APPENDECTOMY      CATARACT EXTRACTION      CATARACT EXTRACTION, BILATERAL      CHOLECYSTECTOMY      FRACTURE SURGERY Right     hip    MOUTH SURGERY      oral cancer left lower lip    OVARY SURGERY      MT ADJ TISS XFER HEAD,FAC,HAND <10 SQCM N/A 3/28/2022    Procedure: Adjacent tissue transfer face;  Surgeon: Jasen Ferreira MD;  [facility];  Service: ENT    MT MASTOIDECTOMY,SIMPLE Left 3/28/2022    Procedure: MASTOIDECTOMY;  Surgeon: Jasen Ferreira MD;  [facility];  Service: ENT    MT OPEN RX FEMUR FX+INTRAMED SHE Right 5/28/2020    Procedure: INSERTION NAIL IM FEMUR ANTEGRADE (TROCHANTERIC);   Surgeon: Ry Velasco DO;  [facility] MAIN OR;  Service: Orthopedics    TONSILLECTOMY      TOTAL THYROIDECTOMY  2008    Performed after left neck dissection and left oral cancer diagnosis     Social History   Social History     Substance and Sexual Activity   Alcohol Use Not Currently    Alcohol/week: 0 0 standard drinks     Social History     Substance and Sexual Activity   Drug Use Never     Social History     Tobacco Use   Smoking Status Never Smoker   Smokeless Tobacco Never Used     Family History   Problem Relation Age of Onset    Cancer Mother     Diabetes Mother     Diabetes Father     Hypertension Father        Meds/Allergies       Current Outpatient Medications:     Ascorbic Acid (vitamin C) 100 MG tablet    atorvastatin (LIPITOR) 20 mg tablet    BD Insulin Syringe U/F 1/2Unit 31G X 5/16" 0 3 ML MISC    calcium carbonate (OS-FELICE) 600 MG tablet    cyanocobalamin (VITAMIN B-12) 1000 MCG tablet    gabapentin (NEURONTIN) 300 mg capsule    Insulin Aspart (NOVOLOG SC)    Insulin Detemir (LEVEMIR SC)    ipratropium (ATROVENT) 0 03 % nasal spray    levothyroxine 100 mcg tablet    methocarbamol (ROBAXIN) 500 mg tablet    Multiple Vitamin (multivitamin) capsule    OneTouch Ultra test strip    pantoprazole (PROTONIX) 40 mg tablet    traMADol (ULTRAM) 50 mg tablet    VITAMIN D PO    aspirin (ECOTRIN LOW STRENGTH) 81 mg EC tablet    No Known Allergies        Objective     Blood pressure 136/82, pulse 91, temperature 98 6 °F (37 °C), resp  rate 20, height 5' 1" (1 549 m), weight 51 2 kg (112 lb 12 8 oz), SpO2 98 %  Body mass index is 21 31 kg/m²  PHYSICAL EXAM:      General Appearance:   +Neck brace on; Alert, cooperative, no distress   HEENT:   +Left lower mandible abnormality consistent with previous remote surgery; Normocephalic, atraumatic, anicteric  Neck:  Supple, symmetrical, trachea midline   Lungs:   Clear to auscultation bilaterally; no rales, rhonchi or wheezing; respirations unlabored    Heart[de-identified]   Regular rate and rhythm; no murmur, rub, or gallop  Abdomen:   Soft, non-tender, non-distended; normal bowel sounds; no masses, no organomegaly    Genitalia:   Deferred    Rectal:   Deferred    Extremities:  No cyanosis, clubbing or edema    Pulses:  2+ and symmetric    Skin:  No jaundice, rashes, or lesions    Lymph nodes:  No palpable cervical lymphadenopathy        Lab Results:   No visits with results within 1 Day(s) from this visit     Latest known visit with results is:   Admission on 07/22/2022, Discharged on 07/22/2022   Component Date Value    POC Glucose 07/22/2022 48 (A)    WBC 07/22/2022 17 60 (A)    RBC 07/22/2022 3 73 (A)    Hemoglobin 07/22/2022 12 5     Hematocrit 07/22/2022 40 0     MCV 07/22/2022 107 (A)    MCH 07/22/2022 33 5     MCHC 07/22/2022 31 3 (A)    RDW 07/22/2022 14 3     MPV 07/22/2022 10 4     Platelets 63/94/9047 295     nRBC 07/22/2022 0     Neutrophils Relative 07/22/2022 89 (A)    Immat GRANS % 07/22/2022 1     Lymphocytes Relative 07/22/2022 5 (A)    Monocytes Relative 07/22/2022 5     Eosinophils Relative 07/22/2022 0     Basophils Relative 07/22/2022 0     Neutrophils Absolute 07/22/2022 15 74 (A)    Immature Grans Absolute 07/22/2022 0 13     Lymphocytes Absolute 07/22/2022 0 82     Monocytes Absolute 07/22/2022 0 84     Eosinophils Absolute 07/22/2022 0 02     Basophils Absolute 07/22/2022 0 05     Sodium 07/22/2022 138     Potassium 07/22/2022 4 8     Chloride 07/22/2022 100     CO2 07/22/2022 27     ANION GAP 07/22/2022 11     BUN 07/22/2022 18     Creatinine 07/22/2022 0 66     Glucose 07/22/2022 71     Calcium 07/22/2022 8 7     eGFR 07/22/2022 83     Color, UA 07/22/2022 Yellow     Clarity, UA 07/22/2022 Clear     Specific Gravity, UA 07/22/2022 1 010     pH, UA 07/22/2022 7 0     Leukocytes, UA 07/22/2022 Negative     Nitrite, UA 07/22/2022 Negative     Protein, UA 07/22/2022 Negative     Glucose, UA 07/22/2022 Trace (A)    Ketones, UA 07/22/2022 Negative     Urobilinogen, UA 07/22/2022 0 2     Bilirubin, UA 07/22/2022 Negative     Occult Blood, UA 07/22/2022 Negative          Radiology Results:   No results found

## 2022-08-25 NOTE — PROGRESS NOTES
[facility] - Outpatient Follow-up Note  Ashli Bauer 80 y o  female MRN: [id number]  Encounter: [id number]          ASSESSMENT AND PLAN:      1  Abnormal CT of the abdomen  2  Unintentional weight loss  3  Dark emesis  4  Early satiety  Patient admitted to [facility] for severe DKA, also noted to have 3 episodes of dark emesis prior to arrival as well as a history of unintentional weight loss and early satiety  CTA/P notable for a masslike thickening of the gastric antrum with pronounced gastric distension and dilated fluid-filled stomach concerning for primary gastric malignancy  Denies any further nausea/vomiting or development of rectal bleeding (hematochezia/melena) since discharge  Difficult to ascertain if emesis was truly coffee-ground emesis from UGIB - Especially given stable hgb and the absence of melena - Although still possible, particularly given abnormal findings on imaging  Patient offered and initially declined EGD inpatient, but is agreeable to endoscopic evaluation today  Patient has also been taking pantoprazole 40 mg twice daily since discharge (x6 weeks) - Advised she may decrease frequency to once daily until time of her EGD  Discussed risks of procedure with patient including, but not limited to, bleeding, infection, and perforation; Patient gave verbal understanding and is agreeable to proceed  Discussed and given instructions regarding EGD   - EGD; Future    5  Screening for colorectal cancer  Patient denies any previous endoscopic evaluation  Denies alarm features admits to regular bowel habits  Thoroughly discussed recommendations regarding CRC screening, alternative screening modalities, and the process and associated risks regarding a colonoscopy   Patient adamantly refuses any form of CRC screening with the knowledge that an advanced colonic polyp or colonic mass/malignancy may go undiagnosed - Given patient's age and comorbidities, this is not unreasonable  Clearances: Insulin-dependent DM2  Follow-up after procedure to discuss results  ______________________________________________________________________    SUBJECTIVE: Patient is a 80 y o  female with PMH significant for insulin-dependent DM 2, diabetic polyneuropathy, hypothyroidism, RA, history of Hodgkin's lymphoma s/p radiation with resultant osteonecrosis repaired in March 2022 and recent mastoidectomy who presents today for a hospital follow-up regarding an abnormal finding on CT of the abdomen  Patient's daughter present in office today  In short, patient was admitted to [facility] for severe DKA with significant electrolyte abnormalities  Patient also reported nausea and 3 episodes of dark appearing emesis prior to admission, as well as, an unintentional weight loss - Per chart review, patient has lost approx 17 lbs in the last year  Additionally, CTA/P incidentally noted a masslike thickening of the gastric antrum with pronounced gastric distension dilated fluid-filled stomach  Patient was offered an EGD while inpatient, but she adamantly refused  She also refused any other alternative testing  Therefore, recommended conservative therapy with twice daily PPI  Today, patient denies any further nausea or vomiting since prior to her hospitalization  She does admit to feeling full quickly, new within the last 2-3 months  Patient reports an extensive personal history of cancer including Hodgkin's lymphoma requiring radiation to include lymph node, oral cancer, a large benign ovarian cyst and a benign brain tumor that has since been removed  Denies dysphagia, odynophagia, nausea/vomiting, epigastric or other abdominal pain, constipation, diarrhea, or rectal bleeding (hematochezia/melena)  Denies family history of gastric cancer  Denies excessive NSAID or EtOH use  Patient is more agreeable to discussing upper endoscopy today  Denies any previous endoscopic evaluation         REVIEW OF SYSTEMS IS OTHERWISE NEGATIVE  Historical Information   Past Medical History:   Diagnosis Date    Ambulates with cane     [facility]     Chronic pain disorder     knees/ shoulders (gets inj every 3 mos)    Controlled type 2 diabetes mellitus with diabetic polyneuropathy, with long-term current use of insulin ([facility] ) 5/21/2008    Diabetes mellitus ([facility] )     [facility] 5/21/2008    ICD10 clean up    Disease of thyroid gland     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    HL (hearing loss)     Hodgkin's disease ([facility] ) 2008    Left neck, had radiation    Hypertension     Neuropathy     Osteoporosis     RA [facility]rheumatoid arthritis) [facility]      Past Surgical History:   Procedure Laterality Date    ADENOIDECTOMY      APPENDECTOMY      CATARACT EXTRACTION      CATARACT EXTRACTION, BILATERAL      CHOLECYSTECTOMY      FRACTURE SURGERY Right     hip    MOUTH SURGERY      oral cancer left lower lip    OVARY SURGERY      SC ADJ TISS XFER HEAD,FAC,HAND <10 SQCM N/A 3/28/2022    Procedure: Adjacent tissue transfer face;  Surgeon: Brie Duarte MD;  [facility];  Service: ENT    SC MASTOIDECTOMY,SIMPLE Left 3/28/2022    Procedure: MASTOIDECTOMY;  Surgeon: Brie Duarte MD;  [facility];  Service: ENT    SC OPEN RX FEMUR FX+INTRAMED SHE Right 5/28/2020    Procedure: INSERTION NAIL IM FEMUR ANTEGRADE (TROCHANTERIC);   Surgeon: Emily Doss DO;  Location: [address] [place] [address] MAIN OR;  Service: Orthopedics    TONSILLECTOMY      TOTAL THYROIDECTOMY  2008    Performed after left neck dissection and left oral cancer diagnosis     Social History   Social History     Substance and Sexual Activity   Alcohol Use Not Currently    Alcohol/week: 0 0 standard drinks     Social History     Substance and Sexual Activity   Drug Use Never     Social History     Tobacco Use   Smoking Status Never Smoker   Smokeless Tobacco Never Used     Family History   Problem Relation Age of Onset    Cancer Mother     Diabetes Mother     Diabetes Father     Hypertension Father        Meds/Allergies       Current Outpatient Medications:     Ascorbic Acid (vitamin C) 100 MG tablet    atorvastatin (LIPITOR) 20 mg tablet    BD Insulin Syringe U/F 1/2Unit 31G X 5/16" 0 3 ML MISC    calcium carbonate (OS-FELICE) 600 MG tablet    cyanocobalamin (VITAMIN B-12) 1000 MCG tablet    gabapentin (NEURONTIN) 300 mg capsule    Insulin Aspart (NOVOLOG SC)    Insulin Detemir (LEVEMIR SC)    ipratropium (ATROVENT) 0 03 % nasal spray    levothyroxine 100 mcg tablet    methocarbamol (ROBAXIN) 500 mg tablet    Multiple Vitamin (multivitamin) capsule    OneTouch Ultra test strip    pantoprazole (PROTONIX) 40 mg tablet    traMADol (ULTRAM) 50 mg tablet    VITAMIN D PO    aspirin (ECOTRIN LOW STRENGTH) 81 mg EC tablet    No Known Allergies        Objective     Blood pressure 136/82, pulse 91, temperature 98 6 °F (37 °C), resp  rate 20, height 5' 1" (1 549 m), weight 51 2 kg (112 lb 12 8 oz), SpO2 98 %  Body mass index is 21 31 kg/m²  PHYSICAL EXAM:      General Appearance:   +Neck brace on; Alert, cooperative, no distress   HEENT:   +Left lower mandible abnormality consistent with previous remote surgery; Normocephalic, atraumatic, anicteric  Neck:  Supple, symmetrical, trachea midline   Lungs:   Clear to auscultation bilaterally; no rales, rhonchi or wheezing; respirations unlabored    Heart[de-identified]   Regular rate and rhythm; no murmur, rub, or gallop  Abdomen:   Soft, non-tender, non-distended; normal bowel sounds; no masses, no organomegaly    Genitalia:   Deferred    Rectal:   Deferred    Extremities:  No cyanosis, clubbing or edema    Pulses:  2+ and symmetric    Skin:  No jaundice, rashes, or lesions    Lymph nodes:  No palpable cervical lymphadenopathy        Lab Results:   No visits with results within 1 Day(s) from this visit     Latest known visit with results is:   Admission on 07/22/2022, Discharged on 07/22/2022   Component Date Value    POC Glucose 07/22/2022 48 (A)    WBC 07/22/2022 17 60 (A)    RBC 07/22/2022 3 73 (A)    Hemoglobin 07/22/2022 12 5     Hematocrit 07/22/2022 40 0     MCV 07/22/2022 107 (A)    MCH 07/22/2022 33 5     MCHC 07/22/2022 31 3 (A)    RDW 07/22/2022 14 3     MPV 07/22/2022 10 4     Platelets 36/21/7871 295     nRBC 07/22/2022 0     Neutrophils Relative 07/22/2022 89 (A)    Immat GRANS % 07/22/2022 1     Lymphocytes Relative 07/22/2022 5 (A)    Monocytes Relative 07/22/2022 5     Eosinophils Relative 07/22/2022 0     Basophils Relative 07/22/2022 0     Neutrophils Absolute 07/22/2022 15 74 (A)    Immature Grans Absolute 07/22/2022 0 13     Lymphocytes Absolute 07/22/2022 0 82     Monocytes Absolute 07/22/2022 0 84     Eosinophils Absolute 07/22/2022 0 02     Basophils Absolute 07/22/2022 0 05     Sodium 07/22/2022 138     Potassium 07/22/2022 4 8     Chloride 07/22/2022 100     CO2 07/22/2022 27     ANION GAP 07/22/2022 11     BUN 07/22/2022 18     Creatinine 07/22/2022 0 66     Glucose 07/22/2022 71     Calcium 07/22/2022 8 7     eGFR 07/22/2022 83     Color, UA 07/22/2022 Yellow     Clarity, UA 07/22/2022 Clear     Specific Gravity, UA 07/22/2022 1 010     pH, UA 07/22/2022 7 0     Leukocytes, UA 07/22/2022 Negative     Nitrite, UA 07/22/2022 Negative     Protein, UA 07/22/2022 Negative     Glucose, UA 07/22/2022 Trace (A)    Ketones, UA 07/22/2022 Negative     Urobilinogen, UA 07/22/2022 0 2     Bilirubin, UA 07/22/2022 Negative     Occult Blood, UA 07/22/2022 Negative          Radiology Results:   No results found

## 2022-08-29 ENCOUNTER — OFFICE VISIT (OUTPATIENT)
Dept: OBGYN CLINIC | Facility: CLINIC | Age: 82
End: 2022-08-29
Payer: MEDICARE

## 2022-08-29 VITALS
BODY MASS INDEX: 21.31 KG/M2 | HEART RATE: 99 BPM | DIASTOLIC BLOOD PRESSURE: 83 MMHG | SYSTOLIC BLOOD PRESSURE: 148 MMHG | HEIGHT: 61 IN

## 2022-08-29 DIAGNOSIS — M19.011 PRIMARY OSTEOARTHRITIS OF RIGHT SHOULDER: ICD-10-CM

## 2022-08-29 DIAGNOSIS — M19.012 PRIMARY OSTEOARTHRITIS OF LEFT SHOULDER: ICD-10-CM

## 2022-08-29 DIAGNOSIS — M17.0 PRIMARY OSTEOARTHRITIS OF BOTH KNEES: Primary | ICD-10-CM

## 2022-08-29 PROCEDURE — 99213 OFFICE O/P EST LOW 20 MIN: CPT | Performed by: ORTHOPAEDIC SURGERY

## 2022-08-29 PROCEDURE — 20610 DRAIN/INJ JOINT/BURSA W/O US: CPT | Performed by: ORTHOPAEDIC SURGERY

## 2022-08-29 RX ORDER — BUPIVACAINE HYDROCHLORIDE 2.5 MG/ML
4 INJECTION, SOLUTION INFILTRATION; PERINEURAL
Status: COMPLETED | OUTPATIENT
Start: 2022-08-29 | End: 2022-08-29

## 2022-08-29 RX ORDER — METHYLPREDNISOLONE ACETATE 40 MG/ML
2 INJECTION, SUSPENSION INTRA-ARTICULAR; INTRALESIONAL; INTRAMUSCULAR; SOFT TISSUE
Status: COMPLETED | OUTPATIENT
Start: 2022-08-29 | End: 2022-08-29

## 2022-08-29 RX ADMIN — METHYLPREDNISOLONE ACETATE 2 ML: 40 INJECTION, SUSPENSION INTRA-ARTICULAR; INTRALESIONAL; INTRAMUSCULAR; SOFT TISSUE at 11:36

## 2022-08-29 RX ADMIN — BUPIVACAINE HYDROCHLORIDE 4 ML: 2.5 INJECTION, SOLUTION INFILTRATION; PERINEURAL at 11:36

## 2022-08-29 NOTE — PROGRESS NOTES
ASSESSMENT/PLAN:    Diagnoses and all orders for this visit:    Primary osteoarthritis of both knees    Primary osteoarthritis of left shoulder    Primary osteoarthritis of right shoulder    Other orders  -     Large joint arthrocentesis  -     Large joint arthrocentesis        Both of the patient's knees and shoulders were injected with Depo-Medrol and Marcaine  She tolerated the injections quite well  She will follow up with our office in 3 months  The patient is acceptable to this plan  Return in about 3 months (around 11/29/2022)  Under aseptic technique, both shoulders and both knees were injected with Depo-Medrol and Marcaine  She tolerated the procedures quite well  Return back in 3 months evaluation  If her condition changes, she will not hesitate let us know      _____________________________________________________  CHIEF COMPLAINT:  Chief Complaint   Patient presents with    Left Knee - Follow-up    Right Knee - Follow-up    Left Shoulder - Follow-up    Right Shoulder - Follow-up         SUBJECTIVE:  Melita Freeman is a 80 y o  female who presents to our office complaining of bilateral knee and shoulder pain  The patient has history of primary osteoarthritis of all 4 structures  She would like corticosteroid injections today, as they provided her with relief of her symptoms in the past   She denies any numbness or tingling  She denies any fever or chills        The following portions of the patient's history were reviewed and updated as appropriate: allergies, current medications, past family history, past medical history, past social history, past surgical history and problem list     PAST MEDICAL HISTORY:  Past Medical History:   Diagnosis Date    Ambulates with cane     Cancer ([address] )     Chronic pain disorder     knees/ shoulders (gets inj every 3 mos)    Controlled type 2 diabetes mellitus with diabetic polyneuropathy, with long-term current use of insulin ([address] ) 5/21/2008    Diabetes mellitus [facility]     Di[facility] 5/21/2008    ICD10 clean up    Disease of thyroid gland     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    HL (hearing loss)     Hodgkin's disease ([facility] ) 2008    Left neck, had radiation    Hypertension     Neuropathy     Osteoporosis     RA [facility]rheumatoid arthritis) ([facility]        PAST SURGICAL HISTORY:  Past Surgical History:   Procedure Laterality Date    ADENOIDECTOMY      APPENDECTOMY      CATARACT EXTRACTION      CATARACT EXTRACTION, BILATERAL      CHOLECYSTECTOMY      FRACTURE SURGERY Right     hip    MOUTH SURGERY      oral cancer left lower lip    OVARY SURGERY      SC ADJ TISS XFER HEAD,FAC,HAND <10 SQCM N/A 3/28/2022    Procedure: Adjacent tissue transfer face;  Surgeon: Bisi David MD;  [facility];  Service: ENT    SC MASTOIDECTOMY,SIMPLE Left 3/28/2022    Procedure: MASTOIDECTOMY;  Surgeon: Bisi David MD;  [facility];  Service: ENT    SC OPEN RX FEMUR FX+INTRAMED SHE Right 5/28/2020    Procedure: INSERTION NAIL IM FEMUR ANTEGRADE (TROCHANTERIC);   Surgeon: Karrie Mendez DO;  Location: [address] [place] [address] MAIN OR;  Service: Orthopedics    TONSILLECTOMY      TOTAL THYROIDECTOMY  2008    Performed after left neck dissection and left oral cancer diagnosis       FAMILY HISTORY:  Family History   Problem Relation Age of Onset    Cancer Mother     Diabetes Mother     Diabetes Father     Hypertension Father        SOCIAL HISTORY:  Social History     Tobacco Use    Smoking status: Never Smoker    Smokeless tobacco: Never Used   Vaping Use    Vaping Use: Never used   Substance Use Topics    Alcohol use: Not Currently     Alcohol/week: 0 0 standard drinks    Drug use: Never       MEDICATIONS:    Current Outpatient Medications:     Ascorbic Acid (vitamin C) 100 MG tablet, Take 500 mg by mouth 2 (two) times a day, Disp: , Rfl:     atorvastatin (LIPITOR) 20 mg tablet, Take 20 mg by mouth daily with dinner , Disp: , Rfl:     BD Insulin Syringe U/F 1/2Unit 31G X 5/16" 0 3 ML MISC, 4 (four) times a day, Disp: , Rfl:     calcium carbonate (OS-FELICE) 600 MG tablet, Take 600 mg by mouth 2 (two) times a day with meals, Disp: , Rfl:     cyanocobalamin (VITAMIN B-12) 1000 MCG tablet, Take 1,000 mcg by mouth daily, Disp: , Rfl:     gabapentin (NEURONTIN) 300 mg capsule, Take 300 mg by mouth 3 (three) times a day, Disp: , Rfl:     Insulin Aspart (NOVOLOG SC), Inject 5 Units under the skin 3 (three) times a day with meals Plus sliding scale  5 units @ 1730 , Disp: , Rfl:     Insulin Detemir (LEVEMIR SC), Inject 5 Units under the skin daily at bedtime, Disp: , Rfl:     ipratropium (ATROVENT) 0 03 % nasal spray, 2 sprays into each nostril every 12 (twelve) hours (Patient taking differently: 2 sprays into each nostril every 12 (twelve) hours As needed), Disp: 30 mL, Rfl: 11    levothyroxine 100 mcg tablet, Take 112 mcg by mouth every morning , Disp: , Rfl:     methocarbamol (ROBAXIN) 500 mg tablet, Take 500 mg by mouth in the morning  Prn  , Disp: , Rfl:     Multiple Vitamin (multivitamin) capsule, Take 1 capsule by mouth daily, Disp: , Rfl:     OneTouch Ultra test strip, USE TO TEST BLOOD SUGAR 6 TIMES A DAY, Disp: , Rfl:     pantoprazole (PROTONIX) 40 mg tablet, Take 1 tablet (40 mg total) by mouth 2 (two) times a day, Disp: 60 tablet, Rfl: 0    traMADol (ULTRAM) 50 mg tablet, tramadol 50 mg tablet  take 1 tablet by mouth three times a day if needed, Disp: , Rfl:     VITAMIN D PO, Take 125 mg by mouth in the morning, Disp: , Rfl:     aspirin (ECOTRIN LOW STRENGTH) 81 mg EC tablet, Take 81 mg by mouth daily   (Patient not taking: No sig reported), Disp: , Rfl:     ALLERGIES:  No Known Allergies    ROS:  Review of Systems     Constitutional: Negative for fatigue, fever or loss of appetite  HENT: Negative  Respiratory: Negative for shortness of breath, dyspnea  Cardiovascular: Negative for chest pain/tightness  Gastrointestinal: Negative for abdominal pain, N/V  Endocrine: Negative for cold/heat intolerance, unexplained weight loss/gain  Genitourinary: Negative for flank pain, dysuria, hematuria  Musculoskeletal: Positive for arthralgia   Skin: Negative for rash  Neurological: Negative for numbness or tingling  Psychiatric/Behavioral: Negative for agitation  _____________________________________________________  PHYSICAL EXAMINATION:    Blood pressure 148/83, pulse 99, height 5' 1" (1 549 m)  Constitutional: Oriented to person, place, and time  Appears well-developed and well-nourished  No distress  HENT:   Head: Normocephalic  Eyes: Conjunctivae are normal  Right eye exhibits no discharge  Left eye exhibits no discharge  No scleral icterus  Cardiovascular: Normal rate  Pulmonary/Chest: Effort normal    Neurological: Alert and oriented to person, place, and time  Skin: Skin is warm and dry  No rash noted  Not diaphoretic  No erythema  No pallor  Psychiatric: Normal mood and affect   Behavior is normal  Judgment and thought content normal       MUSCULOSKELETAL EXAMINATION:   Physical Exam  Ortho Exam    Bilateral lower extremities are neurovascularly intact  Toes are pink and mobile   Compartments are soft  No warmth, erythema or ecchymosis  ROM of knees are from 5-115 degrees  Negative Lachman, drawer or pivot shift  No medial instability  Medial joint line tenderness, slight lateral joint line tenderness  Patellofemoral crepitation    Bilateral upper extremities are neurovascularly intact  Fingers are pink and mobile  Compartments are soft  Range of motion of both shoulders from 0-110 degrees of forward flexion and abduction  Rotator cuff strength 4/5  Brisk cap refill  Sensation intact  Mild signs of impingement  Objective:  BP Readings from Last 1 Encounters:   08/29/22 148/83      Wt Readings from Last 1 Encounters:   08/25/22 51 2 kg (112 lb 12 8 oz)        BMI:   Estimated body mass index is 21 31 kg/m² as calculated from the following:    Height as of this encounter: 5' 1" (1 549 m)  Weight as of 8/25/22: 51 2 kg (112 lb 12 8 oz)  PROCEDURES PERFORMED:  Large joint arthrocentesis: bilateral knee  Universal Protocol:  Consent: Verbal consent obtained  Risks and benefits: risks, benefits and alternatives were discussed  Consent given by: patient  Patient understanding: patient states understanding of the procedure being performed  Site marked: the operative site was marked  Supporting Documentation  Indications: pain   Procedure Details  Location: knee - bilateral knee  Preparation: Patient was prepped and draped in the usual sterile fashion  Needle size: 22 G  Ultrasound guidance: no  Approach: lateral    Medications (Right): 4 mL bupivacaine 0 25 %; 2 mL methylPREDNISolone acetate 40 mg/mLMedications (Left): 4 mL bupivacaine 0 25 %; 2 mL methylPREDNISolone acetate 40 mg/mL   Patient tolerance: patient tolerated the procedure well with no immediate complications  Dressing:  Sterile dressing applied    Large joint arthrocentesis: bilateral subacromial bursa  Universal Protocol:  Risks and benefits: risks, benefits and alternatives were discussed  Consent given by: patient  Time out: Immediately prior to procedure a "time out" was called to verify the correct patient, procedure, equipment, support staff and site/side marked as required    Patient understanding: patient states understanding of the procedure being performed  Site marked: the operative site was marked  Supporting Documentation  Indications: pain   Procedure Details  Location: shoulder - bilateral subacromial bursa  Preparation: Patient was prepped and draped in the usual sterile fashion  Needle size: 22 G  Ultrasound guidance: no  Approach: lateral    Medications (Right): 2 mL methylPREDNISolone acetate 40 mg/mL; 4 mL bupivacaine 0 25 %Medications (Left): 2 mL methylPREDNISolone acetate 40 mg/mL; 4 mL bupivacaine 0 25 %   Patient tolerance: patient tolerated the procedure well with no immediate complications  Dressing:  Sterile dressing applied            Scribe Attestation    I,:  Willie Hartmann PA-C am acting as a scribe while in the presence of the attending physician :       I,:  Poppy Gunn DO personally performed the services described in this documentation    as scribed in my presence :

## 2022-09-19 ENCOUNTER — TELEPHONE (OUTPATIENT)
Dept: GASTROENTEROLOGY | Facility: CLINIC | Age: 82
End: 2022-09-19

## 2022-09-19 RX ORDER — LIDOCAINE HYDROCHLORIDE 10 MG/ML
0.5 INJECTION, SOLUTION EPIDURAL; INFILTRATION; INTRACAUDAL; PERINEURAL ONCE AS NEEDED
Status: CANCELLED | OUTPATIENT
Start: 2022-09-19

## 2022-09-19 RX ORDER — SODIUM CHLORIDE, SODIUM LACTATE, POTASSIUM CHLORIDE, CALCIUM CHLORIDE 600; 310; 30; 20 MG/100ML; MG/100ML; MG/100ML; MG/100ML
125 INJECTION, SOLUTION INTRAVENOUS CONTINUOUS
Status: CANCELLED | OUTPATIENT
Start: 2022-09-19

## 2022-09-19 NOTE — TELEPHONE ENCOUNTER
Given that she is having an upper endoscopy, I have no objection to her using her full dose of long-acting insulin as long as she keeps a close eye on her blood sugars  She can have clear liquids up until 3 hours prior to arrival in that includes apple juice  This would be about 4 hours prior to her EGD    Thank you

## 2022-09-19 NOTE — TELEPHONE ENCOUNTER
Also see 9/19/22 Patient message encounter    Patient is scheduled for EGD tomorrow and called regarding her insulin instructions  She was contacted by [facility] and instructed to take half dose of Novolog and half dose of Levemir tonight  May have solid food until midnight and may have apple juice at 2:30 am       She has a call into her endocrinologist office to discuss her concerns but has not received return call  Patient states she does not feel comfortable decreasing insulin dose and not eating in the AM      I explained rational and reinforced importance of NPO status  I encouraged her to monitor BS with glucometer

## 2022-09-20 ENCOUNTER — ANESTHESIA (OUTPATIENT)
Dept: GASTROENTEROLOGY | Facility: HOSPITAL | Age: 82
End: 2022-09-20

## 2022-09-20 ENCOUNTER — HOSPITAL ENCOUNTER (OUTPATIENT)
Dept: GASTROENTEROLOGY | Facility: HOSPITAL | Age: 82
Setting detail: OUTPATIENT SURGERY
Discharge: HOME | End: 2022-09-20
Payer: MEDICARE

## 2022-09-20 ENCOUNTER — ANESTHESIA EVENT (OUTPATIENT)
Dept: GASTROENTEROLOGY | Facility: HOSPITAL | Age: 82
End: 2022-09-20

## 2022-09-20 VITALS
HEIGHT: 61 IN | TEMPERATURE: 97.8 F | RESPIRATION RATE: 16 BRPM | SYSTOLIC BLOOD PRESSURE: 154 MMHG | OXYGEN SATURATION: 99 % | BODY MASS INDEX: 21.14 KG/M2 | DIASTOLIC BLOOD PRESSURE: 79 MMHG | WEIGHT: 112 LBS | HEART RATE: 75 BPM

## 2022-09-20 DIAGNOSIS — R93.5 ABNORMAL CT OF THE ABDOMEN: ICD-10-CM

## 2022-09-20 DIAGNOSIS — R68.81 EARLY SATIETY: ICD-10-CM

## 2022-09-20 DIAGNOSIS — R63.4 UNINTENTIONAL WEIGHT LOSS: ICD-10-CM

## 2022-09-20 DIAGNOSIS — K92.0 DARK EMESIS: ICD-10-CM

## 2022-09-20 LAB — GLUCOSE SERPL-MCNC: 250 MG/DL (ref 65–140)

## 2022-09-20 PROCEDURE — 88342 IMHCHEM/IMCYTCHM 1ST ANTB: CPT | Performed by: PATHOLOGY

## 2022-09-20 PROCEDURE — 88305 TISSUE EXAM BY PATHOLOGIST: CPT | Performed by: PATHOLOGY

## 2022-09-20 PROCEDURE — 88341 IMHCHEM/IMCYTCHM EA ADD ANTB: CPT | Performed by: PATHOLOGY

## 2022-09-20 PROCEDURE — 82948 REAGENT STRIP/BLOOD GLUCOSE: CPT

## 2022-09-20 PROCEDURE — 43239 EGD BIOPSY SINGLE/MULTIPLE: CPT | Performed by: INTERNAL MEDICINE

## 2022-09-20 RX ORDER — LIDOCAINE HYDROCHLORIDE 10 MG/ML
INJECTION, SOLUTION EPIDURAL; INFILTRATION; INTRACAUDAL; PERINEURAL AS NEEDED
Status: DISCONTINUED | OUTPATIENT
Start: 2022-09-20 | End: 2022-09-20

## 2022-09-20 RX ORDER — LIDOCAINE HYDROCHLORIDE 20 MG/ML
15 SOLUTION OROPHARYNGEAL ONCE
Status: DISCONTINUED | OUTPATIENT
Start: 2022-09-20 | End: 2022-09-24 | Stop reason: HOSPADM

## 2022-09-20 RX ORDER — LIDOCAINE HYDROCHLORIDE 20 MG/ML
SOLUTION OROPHARYNGEAL
Status: DISPENSED
Start: 2022-09-20 | End: 2022-09-20

## 2022-09-20 RX ORDER — SODIUM CHLORIDE, SODIUM LACTATE, POTASSIUM CHLORIDE, CALCIUM CHLORIDE 600; 310; 30; 20 MG/100ML; MG/100ML; MG/100ML; MG/100ML
125 INJECTION, SOLUTION INTRAVENOUS CONTINUOUS
Status: DISCONTINUED | OUTPATIENT
Start: 2022-09-20 | End: 2022-09-24 | Stop reason: HOSPADM

## 2022-09-20 RX ORDER — LIDOCAINE HYDROCHLORIDE 20 MG/ML
SOLUTION OROPHARYNGEAL CODE/TRAUMA/SEDATION MEDICATION
Status: COMPLETED | OUTPATIENT
Start: 2022-09-20 | End: 2022-09-20

## 2022-09-20 RX ORDER — LIDOCAINE HYDROCHLORIDE 10 MG/ML
0.5 INJECTION, SOLUTION EPIDURAL; INFILTRATION; INTRACAUDAL; PERINEURAL ONCE AS NEEDED
Status: DISCONTINUED | OUTPATIENT
Start: 2022-09-20 | End: 2022-09-24 | Stop reason: HOSPADM

## 2022-09-20 RX ORDER — PROPOFOL 10 MG/ML
INJECTION, EMULSION INTRAVENOUS AS NEEDED
Status: DISCONTINUED | OUTPATIENT
Start: 2022-09-20 | End: 2022-09-20

## 2022-09-20 RX ADMIN — PROPOFOL 30 MG: 10 INJECTION, EMULSION INTRAVENOUS at 08:51

## 2022-09-20 RX ADMIN — LIDOCAINE HYDROCHLORIDE 40 MG: 10 INJECTION, SOLUTION EPIDURAL; INFILTRATION; INTRACAUDAL; PERINEURAL at 08:47

## 2022-09-20 RX ADMIN — PROPOFOL 90 MG: 10 INJECTION, EMULSION INTRAVENOUS at 08:47

## 2022-09-20 RX ADMIN — LIDOCAINE HYDROCHLORIDE 15 ML: 20 SOLUTION ORAL; TOPICAL at 08:46

## 2022-09-20 RX ADMIN — SODIUM CHLORIDE, SODIUM LACTATE, POTASSIUM CHLORIDE, AND CALCIUM CHLORIDE 125 ML/HR: .6; .31; .03; .02 INJECTION, SOLUTION INTRAVENOUS at 07:48

## 2022-09-20 NOTE — ANESTHESIA POSTPROCEDURE EVALUATION
Post-Op Assessment Note    CV Status:  Stable    Pain management: adequate     Mental Status:  Alert and awake   Hydration Status:  Euvolemic   PONV Controlled:  Controlled   Airway Patency:  Patent      Post Op Vitals Reviewed: Yes      Comments: recovvery RN aware of oral topical        No complications documented      BP   157/69   Temp   97 5   Pulse  61   Resp   18   SpO2   99

## 2022-09-20 NOTE — INTERVAL H&P NOTE
H&P reviewed  After examining the patient I find no changes in the patients condition since the H&P had been written  Patient reports that she feels better than when she was hospitalized with DKA  She denies any early satiety however daughter states that patient reports frequently that she can not finish her meals  She does use ibuprofen she was taking it 2 to 3 times a day prior to her hospitalization  Now she takes it intermittently and she did take it this weekend  Her bowel habits have been fairly regular  Denies any melena  She denies any nausea vomiting  She has been losing weight  She does have some deformity in the region left jaw and neck  She does have a history of radiation therapy for Hodgkin's lymphoma this region many years ago  She then also had oral cancer required extensive surgery and reconstruction in this region  Bowel sounds are present  Abdomen is soft and nontender  There is no succussion splash  CT scan did reveal masslike thickening in the antrum  Patient was seen by Anesthesia and is stable for her procedure      Vitals:    09/20/22 0735   BP: 131/73   Pulse: 78   Resp: 18   Temp: 98 3 °F (36 8 °C)   SpO2: 98%

## 2022-09-20 NOTE — ANESTHESIA PREPROCEDURE EVALUATION
Procedure:  EGD    Echo 2022  EF 60%, DD1    Uncontrolled IDDM2    Relevant Problems   CARDIO   (+) HLD (hyperlipidemia)   (+) HTN (hypertension)      ENDO   (+) Other specified hypothyroidism   (+) Type 2 diabetes mellitus with diabetic neuropathy, with long-term current use of insulin (HCC)      GI/HEPATIC   (+) Coffee ground emesis      /RENAL   (+) RAMIRO (acute kidney injury) ([facility] [address] )      HEMATOLOGY   (+) Anemia      MUSCULOSKELETAL   (+) Primary osteoarthritis of both knees   (+) Primary osteoarthritis of right shoulder        Physical Exam    Airway    Mallampati score: III  TM Distance: >3 FB  Neck ROM: full     Dental       Cardiovascular  Rhythm: regular, Rate: normal, Cardiovascular exam normal    Pulmonary  Pulmonary exam normal Breath sounds clear to auscultation,     Other Findings  Multiple missing teeth, surgical changes of the throat and mouth  Tracheal deviation to the left  Anesthesia Plan  ASA Score- 3     Anesthesia Type- IV sedation with anesthesia with ASA Monitors  Additional Monitors:   Airway Plan:     Comment: Discussed risks/benefits, including medication reactions, awareness, aspiration, and serious/life threatening complications  Plan to maintain native airway with IVGA, monitored with EtCO2  Plan Factors-Exercise tolerance (METS): >4 METS  Patient summary reviewed  Patient instructed to abstain from smoking on day of procedure  Patient did not smoke on day of surgery  Induction- intravenous  Postoperative Plan-     Informed Consent- Anesthetic plan and risks discussed with patient  I personally reviewed this patient with the CRNA  Discussed and agreed on the Anesthesia Plan with the CRNA  Matthew Haile

## 2022-09-27 PROCEDURE — 88342 IMHCHEM/IMCYTCHM 1ST ANTB: CPT | Performed by: PATHOLOGY

## 2022-09-27 PROCEDURE — 88305 TISSUE EXAM BY PATHOLOGIST: CPT | Performed by: PATHOLOGY

## 2022-09-27 PROCEDURE — 88341 IMHCHEM/IMCYTCHM EA ADD ANTB: CPT | Performed by: PATHOLOGY

## 2022-09-27 NOTE — RESULT ENCOUNTER NOTE
Please inform patient that biopsies from the duodenum were benign and negative for celiac disease  Biopsies from the stomach revealed mild active chronic gastritis but were negative for a bacteria called H pylori  The gastric polyp is benign    Once again Rachel Oglesby should try to avoid all NSAIDs including Motrin, Advil, Aleve, ibuprofen, Excedrin, etc   Thank you

## 2022-09-29 NOTE — LETTER
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  She will be scheduled for surgery after HBO  However, with screening lab work that I ordered, her BS random was 487 and A1c was 8 0  Her sodium was 131  Other minor abnormalities were also noted most likely related to her ORN    Could you please review her labs and optimize where possible before starting her HBO  Thank you for your help  If you have any questions, please call me        Rudolph Gallegos MD, CHT, CWS Emergency Visit Note      Patient : Celeste Harmon Age: 22 month old Sex: female   MRN: [id number] Encounter Date: 9/29/2022    Time of patient being seen: 10:45 AM  History source: Mom  Arrival mode: private car    History     Chief Complaint   Patient presents with   • Fever 9 Weeks to 74 years     HPI: 22 month old female presents with fever and rash x 2 days. Patient attends , mom was informed about hand-foot-mouth outbreak at . Tmax 102, mom gave Tylenol last at 3 am. Vomiting yesterday none today. Tolerating PO today. No diarrhea. Mom noticed bumps around mouth and inside 2 days ago, has spread to rest of body.     No Known Allergies    Medications: Tylenol      PMH: Reviewed Healthy  Past Medical History:   Diagnosis Date   • Acute suppurative otitis media of right ear without spontaneous rupture of tympanic membrane 5/1/2022   • Childhood overweight, BMI 85-94.9 percentile 9/6/2021   • Diarrhea 12/3/2021   • Hepatitis B vaccination declined 2020   • Immunization not carried out for other reason 3/19/2021   • Maternal group B streptococcal infection 2020         PSH:  Reviewed   History reviewed. No pertinent surgical history.    FMH: Reviewed   Patient attends     Social hx:  Lives at home with mom    Vaccines: UTD       Review of Systems   Constitutional: Positive for fever.   HENT: Positive for congestion.    Skin: Positive for rash.   All other systems reviewed and are negative.      Physical Exam     ED Triage Vitals [09/29/22 0914]   ED Triage Vitals Group      Temp 97.9 °F (36.6 °C)      Heart Rate 114      Resp 26      BP (!) 119/63      SpO2 99 %      EtCO2 mmHg       Height       Weight 29 lb 1.6 oz (13.2 kg)      Weight Scale Used Standing scale      BMI (Calculated)       IBW/kg (Calculated)        Physical Exam  Vitals reviewed.   Constitutional:       General: She is active.      Appearance: Normal appearance. She is well-developed.   HENT:      Head:  Normocephalic.      Right Ear: Tympanic membrane normal.      Left Ear: Tympanic membrane normal.      Nose: Congestion present.      Mouth/Throat:      Mouth: Mucous membranes are moist.      Comments: erythematous papules to soft palate and tongue     Neck: Normal range of motion and neck supple.   Eyes:      Conjunctiva/sclera: Conjunctivae normal.      Pupils: Pupils are equal, round, and reactive to light.   Cardiovascular:      Rate and Rhythm: Normal rate and regular rhythm.      Pulses: Normal pulses.      Heart sounds: Normal heart sounds.   Pulmonary:      Effort: Pulmonary effort is normal.      Breath sounds: Normal breath sounds.   Abdominal:      General: Abdomen is flat. Bowel sounds are normal.      Palpations: Abdomen is soft.   Musculoskeletal:         General: Normal range of motion.   Skin:     General: Skin is warm and dry.      Capillary Refill: Capillary refill takes less than 2 seconds.      Comments: Erythematous papules to hands and soles of feet.    Neurological:      General: No focal deficit present.      Mental Status: She is alert and oriented for age.         ED Course     MDM: 22 month old female presents with fever and rash x 2 days. Patient overall well appearing. Lungs clear, no concern for pneumonia. Erythematous papules inside mouth, around mouth, and to hands and feet. Likely hand-foot-mouth vs viral exanthem. Will give magic mouthwash for home. Discussed with mom supportive care at home and return to ED precautions.     Differential diagnoses: viral uri, viral exanthem, hand-foot-mouth  Vitals:    09/29/22 0914 09/29/22 1054   BP: (!) 119/63 (!) 121/69   Pulse: 114 120   Resp: 26 26   Temp: 97.9 °F (36.6 °C) 97.5 °F (36.4 °C)   TempSrc: Rectal Axillary   SpO2: 99% 97%       Initial plan: Discharge home with Magic Mouthwash, supportive care, and return to ED precautions.         Clinical Impression     ED Diagnosis   1. Hand, foot and mouth disease           Disposition       Discharge home with parent/guardian.  Child appears well, non toxic and without acute distress. Tolerating PO.  Discussed with family regarding patient's diagnosis, all results, and course progression    Discharge Instructions Below:    Thank you for allowing me to care for your child. Please follow the below instructions. Always make a follow-up appt with your regular provider for follow-up and routine preventive care.     - Please use magic mouth wash as directed.   - Tylenol and/or Motrin for pain per dosing guidelines  - Please push oral fluids.   - Return to ED if pt is with fevers for 5 days total, not tolerating oral fluids even after trying to manage pain, no wet diapers in greater then 8 hours, concerns for dehydration, new or concerning symptoms.     Discharge 9/29/2022 10:51 AM  Celeste Harmon discharge to home/self care.            Manuel Rose CNP  9/29/2022 10:45 AM     During this entire encounter this provider was wearing appropriate PPE including surgical mask, gloves and face shield/goggles             Manuel Rose CNP  09/29/22 1007

## 2022-10-08 ENCOUNTER — HOSPITAL ENCOUNTER (EMERGENCY)
Facility: HOSPITAL | Age: 82
Discharge: HOME | End: 2022-10-08
Attending: EMERGENCY MEDICINE
Payer: MEDICARE

## 2022-10-08 VITALS
RESPIRATION RATE: 21 BRPM | BODY MASS INDEX: 21.67 KG/M2 | WEIGHT: 114.8 LBS | HEIGHT: 61 IN | TEMPERATURE: 98.7 F | OXYGEN SATURATION: 98 % | HEART RATE: 94 BPM | SYSTOLIC BLOOD PRESSURE: 130 MMHG | DIASTOLIC BLOOD PRESSURE: 69 MMHG

## 2022-10-08 DIAGNOSIS — R73.9 HYPERGLYCEMIA: Primary | ICD-10-CM

## 2022-10-08 LAB
ALBUMIN SERPL BCP-MCNC: 4 G/DL (ref 3.5–5)
ALP SERPL-CCNC: 76 U/L (ref 34–104)
ALT SERPL W P-5'-P-CCNC: 39 U/L (ref 7–52)
ANION GAP SERPL CALCULATED.3IONS-SCNC: 10 MMOL/L (ref 4–13)
AST SERPL W P-5'-P-CCNC: 46 U/L (ref 13–39)
ATRIAL RATE: 81 BPM
BASE EX.OXY STD BLDV CALC-SCNC: 41.3 % (ref 60–80)
BASE EXCESS BLDV CALC-SCNC: 3.7 MMOL/L
BETA-HYDROXYBUTYRATE: 0.8 MMOL/L
BILIRUB SERPL-MCNC: 0.52 MG/DL (ref 0.2–1)
BUN SERPL-MCNC: 17 MG/DL (ref 5–25)
CALCIUM SERPL-MCNC: 8.8 MG/DL (ref 8.4–10.2)
CHLORIDE SERPL-SCNC: 95 MMOL/L (ref 96–108)
CO2 SERPL-SCNC: 28 MMOL/L (ref 21–32)
CREAT SERPL-MCNC: 0.85 MG/DL (ref 0.6–1.3)
ERYTHROCYTE [DISTWIDTH] IN BLOOD BY AUTOMATED COUNT: 11.9 % (ref 11.6–15.1)
GFR SERPL CREATININE-BSD FRML MDRD: 64 ML/MIN/1.73SQ M
GLUCOSE SERPL-MCNC: 219 MG/DL (ref 65–140)
GLUCOSE SERPL-MCNC: 318 MG/DL (ref 65–140)
GLUCOSE SERPL-MCNC: 368 MG/DL (ref 65–140)
GLUCOSE SERPL-MCNC: 405 MG/DL (ref 65–140)
GLUCOSE SERPL-MCNC: 491 MG/DL (ref 65–140)
GLUCOSE SERPL-MCNC: >500 MG/DL (ref 65–140)
HCO3 BLDV-SCNC: 28.8 MMOL/L (ref 24–30)
HCT VFR BLD AUTO: 37.7 % (ref 34.8–46.1)
HGB BLD-MCNC: 12.5 G/DL (ref 11.5–15.4)
MAGNESIUM SERPL-MCNC: 1.7 MG/DL (ref 1.9–2.7)
MCH RBC QN AUTO: 33.4 PG (ref 26.8–34.3)
MCHC RBC AUTO-ENTMCNC: 33.2 G/DL (ref 31.4–37.4)
MCV RBC AUTO: 101 FL (ref 82–98)
O2 CT BLDV-SCNC: 7.9 ML/DL
P AXIS: 63 DEGREES
PCO2 BLDV: 45.4 MM HG (ref 42–50)
PH BLDV: 7.42 [PH] (ref 7.3–7.4)
PLATELET # BLD AUTO: 230 THOUSANDS/UL (ref 149–390)
PMV BLD AUTO: 10.3 FL (ref 8.9–12.7)
PO2 BLDV: 22.9 MM HG (ref 35–45)
POTASSIUM SERPL-SCNC: 5.3 MMOL/L (ref 3.5–5.3)
PR INTERVAL: 144 MS
PROT SERPL-MCNC: 6.6 G/DL (ref 6.4–8.4)
QRS AXIS: 33 DEGREES
QRSD INTERVAL: 84 MS
QT INTERVAL: 378 MS
QTC INTERVAL: 439 MS
RBC # BLD AUTO: 3.74 MILLION/UL (ref 3.81–5.12)
SODIUM SERPL-SCNC: 133 MMOL/L (ref 135–147)
T WAVE AXIS: 40 DEGREES
VENTRICULAR RATE: 81 BPM
WBC # BLD AUTO: 5.92 THOUSAND/UL (ref 4.31–10.16)

## 2022-10-08 PROCEDURE — 82948 REAGENT STRIP/BLOOD GLUCOSE: CPT

## 2022-10-08 PROCEDURE — 96368 THER/DIAG CONCURRENT INF: CPT

## 2022-10-08 PROCEDURE — 82010 KETONE BODYS QUAN: CPT

## 2022-10-08 PROCEDURE — 93010 ELECTROCARDIOGRAM REPORT: CPT | Performed by: INTERNAL MEDICINE

## 2022-10-08 PROCEDURE — 96366 THER/PROPH/DIAG IV INF ADDON: CPT

## 2022-10-08 PROCEDURE — 96361 HYDRATE IV INFUSION ADD-ON: CPT

## 2022-10-08 PROCEDURE — 36415 COLL VENOUS BLD VENIPUNCTURE: CPT

## 2022-10-08 PROCEDURE — 85027 COMPLETE CBC AUTOMATED: CPT

## 2022-10-08 PROCEDURE — 80053 COMPREHEN METABOLIC PANEL: CPT

## 2022-10-08 PROCEDURE — 99285 EMERGENCY DEPT VISIT HI MDM: CPT

## 2022-10-08 PROCEDURE — 96372 THER/PROPH/DIAG INJ SC/IM: CPT

## 2022-10-08 PROCEDURE — 83735 ASSAY OF MAGNESIUM: CPT

## 2022-10-08 PROCEDURE — 82805 BLOOD GASES W/O2 SATURATION: CPT

## 2022-10-08 PROCEDURE — 96365 THER/PROPH/DIAG IV INF INIT: CPT

## 2022-10-08 PROCEDURE — 93005 ELECTROCARDIOGRAM TRACING: CPT

## 2022-10-08 RX ORDER — SODIUM CHLORIDE 9 MG/ML
3 INJECTION INTRAVENOUS
Status: DISCONTINUED | OUTPATIENT
Start: 2022-10-08 | End: 2022-10-08 | Stop reason: HOSPADM

## 2022-10-08 RX ORDER — SODIUM CHLORIDE, SODIUM GLUCONATE, SODIUM ACETATE, POTASSIUM CHLORIDE, MAGNESIUM CHLORIDE, SODIUM PHOSPHATE, DIBASIC, AND POTASSIUM PHOSPHATE .53; .5; .37; .037; .03; .012; .00082 G/100ML; G/100ML; G/100ML; G/100ML; G/100ML; G/100ML; G/100ML
1000 INJECTION, SOLUTION INTRAVENOUS ONCE
Status: COMPLETED | OUTPATIENT
Start: 2022-10-08 | End: 2022-10-08

## 2022-10-08 RX ORDER — ACETAMINOPHEN 325 MG/1
650 TABLET ORAL ONCE
Status: COMPLETED | OUTPATIENT
Start: 2022-10-08 | End: 2022-10-08

## 2022-10-08 RX ORDER — MAGNESIUM SULFATE 1 G/100ML
1 INJECTION INTRAVENOUS ONCE
Status: COMPLETED | OUTPATIENT
Start: 2022-10-08 | End: 2022-10-08

## 2022-10-08 RX ADMIN — SODIUM CHLORIDE 1000 ML: 0.9 INJECTION, SOLUTION INTRAVENOUS at 09:01

## 2022-10-08 RX ADMIN — MAGNESIUM SULFATE IN DEXTROSE 1 G: 10 INJECTION, SOLUTION INTRAVENOUS at 11:20

## 2022-10-08 RX ADMIN — SODIUM CHLORIDE, SODIUM GLUCONATE, SODIUM ACETATE, POTASSIUM CHLORIDE, MAGNESIUM CHLORIDE, SODIUM PHOSPHATE, DIBASIC, AND POTASSIUM PHOSPHATE 1000 ML: .53; .5; .37; .037; .03; .012; .00082 INJECTION, SOLUTION INTRAVENOUS at 10:52

## 2022-10-08 RX ADMIN — INSULIN HUMAN 10 UNITS: 100 INJECTION, SOLUTION PARENTERAL at 11:24

## 2022-10-08 RX ADMIN — ACETAMINOPHEN 650 MG: 325 TABLET ORAL at 13:30

## 2022-10-08 NOTE — ED PROVIDER NOTES
History  Chief Complaint   Patient presents with   • Hyperglycemia - Symptomatic     Arrives via EMS from home reports BG 2am  was low 40mg/dl  took of glucose  tablet total of 30 tans from 2am to 4am , woke up this morning sweaty and dizzy, BG checked g>499  In ER > 500  Pt AAOx4      Patient is an 70-year-old female with past medical history of high cholesterol and type 1 diabetes arriving for evaluation of high blood sugar  Patient reports that she woke up this morning and her sugar was in the 40s  Patient takes 5 units of Levemir every night  Patient states that she then started eating, and her sugar was not going fast enough for her, so she took sugar tablets as well  Patient states that she did not take any insulin coverage with her meals  Patient reports that she had in English muffin with jelly on it for food  Patient arrives awake alert oriented x4, without any complaints  Patient offers no complaints of chest pain, shortness of breath, abdominal pain, nausea, vomiting, recent illness  Prior to Admission Medications   Prescriptions Last Dose Informant Patient Reported? Taking? Ascorbic Acid (vitamin C) 100 MG tablet   Yes No   Sig: Take 500 mg by mouth 2 (two) times a day   BD Insulin Syringe U/F /2Unit 31G X 5/16" 0 3 ML MISC   Yes No   Si (four) times a day   Insulin Aspart (NOVOLOG SC)   Yes No   Sig: Inject 5 Units under the skin 3 (three) times a day with meals Plus sliding scale   5 units @ 1730    Insulin Detemir (LEVEMIR SC)   Yes No   Sig: Inject 5 Units under the skin daily at bedtime   Multiple Vitamin (multivitamin) capsule   Yes No   Sig: Take 1 capsule by mouth daily   OneTouch Ultra test strip   Yes No   Sig: USE TO TEST BLOOD SUGAR 6 TIMES A DAY   VITAMIN D PO   Yes No   Sig: Take 125 mg by mouth in the morning   atorvastatin (LIPITOR) 20 mg tablet   Yes No   Sig: Take 20 mg by mouth daily with dinner    calcium carbonate (OS-FELICE) 600 MG tablet   Yes No   Sig: Take 600 mg by mouth 2 (two) times a day with meals   cyanocobalamin (VITAMIN B-12) 1000 MCG tablet   Yes No   Sig: Take 1,000 mcg by mouth daily   gabapentin (NEURONTIN) 300 mg capsule   Yes No   Sig: Take 300 mg by mouth 3 (three) times a day   ipratropium (ATROVENT) 0 03 % nasal spray   No No   Si sprays into each nostril every 12 (twelve) hours   Patient taking differently: 2 sprays into each nostril every 12 (twelve) hours As needed   levothyroxine 100 mcg tablet  Self Yes No   Sig: Take 112 mcg by mouth every morning    methocarbamol (ROBAXIN) 500 mg tablet   Yes No   Sig: Take 500 mg by mouth in the morning   Prn     mupirocin (BACTROBAN) 2 % ointment   No No   Sig: Apply topically daily To affected area   pantoprazole (PROTONIX) 40 mg tablet   No No   Sig: Take 1 tablet (40 mg total) by mouth 2 (two) times a day   traMADol (ULTRAM) 50 mg tablet   Yes No   Sig: tramadol 50 mg tablet   take 1 tablet by mouth three times a day if needed      Facility-Administered Medications: None       Past Medical History:   Diagnosis Date   • Ambulates with cane    • Cancer (HCC)    • Chronic pain disorder     knees/ shoulders (gets inj every 3 mos)   • Controlled type 2 diabetes mellitus with diabetic polyneuropathy, with long-term current use of insulin ([address] ) 2008   • Diabetes mellitus ([address] )    • Diabetic polyneuropathy ([facility] ) 2008    ICD10 clean up   • Disease of thyroid gland    • H/O oral cancer     Left lower lip   • HL (hearing loss)    • Hodgkin's disease ([address] )     Left neck, had radiation   • Hypertension    • Neuropathy    • Osteoporosis    • RA (rheumatoid arthritis) ([address] )        Past Surgical History:   Procedure Laterality Date   • ADENOIDECTOMY     • APPENDECTOMY     • CATARACT EXTRACTION     • CATARACT EXTRACTION, BILATERAL     • CHOLECYSTECTOMY     • FRACTURE SURGERY Right     hip   • MOUTH SURGERY      oral cancer left lower lip   • OVARY SURGERY     • WA ADJ TISS XFER HEAD,FAC,HAND <10 SQCM N/A 3/28/2022    Procedure: Adjacent tissue transfer face;  Surgeon: Destiny Maher MD;  Location: [facility];  Service: ENT   • [place] MASTOIDECTOMY,SIMPLE Left 3/28/2022    Procedure: Debra Frame;  Surgeon: Destiny Maher MD;  Location: [facility];  Service: ENT   • NY OPEN RX FEMUR FX+INTRAMED SHE Right 5/28/2020    Procedure: INSERTION NAIL IM FEMUR ANTEGRADE (TROCHANTERIC); Surgeon: Ann Vasquez DO;  Location: [facility] MAIN OR;  Service: Orthopedics   • TONSILLECTOMY     • TOTAL THYROIDECTOMY  2008    Performed after left neck dissection and left oral cancer diagnosis       Family History   Problem Relation Age of Onset   • Cancer Mother    • Diabetes Mother    • Diabetes Father    • Hypertension Father      I have reviewed and agree with the history as documented  E-Cigarette/Vaping   • E-Cigarette Use Never User      E-Cigarette/Vaping Substances   • Nicotine No    • THC No    • CBD No    • Flavoring No    • Other No    • Unknown No      Social History     Tobacco Use   • Smoking status: Never Smoker   • Smokeless tobacco: Never Used   Vaping Use   • Vaping Use: Never used   Substance Use Topics   • Alcohol use: Not Currently     Alcohol/week: 0 0 standard drinks   • Drug use: Never       Review of Systems   Constitutional: Negative  HENT: Negative  Eyes: Negative  Respiratory: Negative  Cardiovascular: Negative  Gastrointestinal: Negative  Endocrine: Negative  Genitourinary: Negative  Musculoskeletal: Negative  Skin: Negative  Allergic/Immunologic: Negative  Neurological: Negative  Hematological: Negative  Psychiatric/Behavioral: Negative  All other systems reviewed and are negative  Physical Exam  Physical Exam  Vitals and nursing note reviewed  Constitutional:       General: She is not in acute distress  Appearance: Normal appearance  She is normal weight  She is not ill-appearing or toxic-appearing     HENT:      Right Ear: External ear normal  Left Ear: External ear normal       Nose: Nose normal       Mouth/Throat:      Mouth: Mucous membranes are moist       Pharynx: Oropharynx is clear  Eyes:      Extraocular Movements: Extraocular movements intact  Pupils: Pupils are equal, round, and reactive to light  Cardiovascular:      Rate and Rhythm: Normal rate and regular rhythm  Pulses: Normal pulses  Heart sounds: Normal heart sounds  Pulmonary:      Effort: Pulmonary effort is normal  No respiratory distress  Breath sounds: Normal breath sounds  Abdominal:      General: Abdomen is flat  Bowel sounds are normal       Palpations: Abdomen is soft  Musculoskeletal:         General: Normal range of motion  Cervical back: Normal range of motion  Skin:     General: Skin is warm  Capillary Refill: Capillary refill takes less than 2 seconds  Neurological:      General: No focal deficit present  Mental Status: She is alert and oriented to person, place, and time  Mental status is at baseline  Cranial Nerves: No cranial nerve deficit  Motor: No weakness     Psychiatric:         Mood and Affect: Mood normal          Vital Signs  ED Triage Vitals [10/08/22 0841]   Temperature Pulse Respirations Blood Pressure SpO2   98 7 °F (37 1 °C) 81 21 163/78 99 %      Temp Source Heart Rate Source Patient Position - Orthostatic VS BP Location FiO2 (%)   Oral Monitor Lying Left arm --      Pain Score       No Pain           Vitals:    10/08/22 1100 10/08/22 1200 10/08/22 1300 10/08/22 1400   BP: 162/69 131/60 137/77 130/69   Pulse: 75 85 96 94   Patient Position - Orthostatic VS: Lying Lying Lying          Visual Acuity      ED Medications  Medications   sodium chloride 0 9 % bolus 1,000 mL (0 mL Intravenous Stopped 10/8/22 1001)   magnesium sulfate IVPB (premix) SOLN 1 g (0 g Intravenous Stopped 10/8/22 1226)   multi-electrolyte (ISOLYTE-S PH 7 4) bolus 1,000 mL (0 mL Intravenous Stopped 10/8/22 1226)   insulin regular (HumuLIN R,NovoLIN R) injection 10 Units (10 Units Subcutaneous Given 10/8/22 1124)   acetaminophen (TYLENOL) tablet 650 mg (650 mg Oral Given 10/8/22 1330)       Diagnostic Studies  Results Reviewed     Procedure Component Value Units Date/Time    Fingerstick Glucose (POCT) [[id number]]  (Abnormal) Collected: 10/08/22 1337    Lab Status: Final result Updated: 10/08/22 1339     POC Glucose 219 mg/dl     Fingerstick Glucose (POCT) [[id number]]  (Abnormal) Collected: 10/08/22 1304    Lab Status: Final result Updated: 10/08/22 1305     POC Glucose 318 mg/dl     Fingerstick Glucose (POCT) [[id number]]  (Abnormal) Collected: 10/08/22 1225    Lab Status: Final result Updated: 10/08/22 1230     POC Glucose 368 mg/dl     Fingerstick Glucose (POCT) [[id number]]  (Abnormal) Collected: 10/08/22 1121    Lab Status: Final result Updated: 10/08/22 1125     POC Glucose 405 mg/dl     Comprehensive metabolic panel [[id number]]  (Abnormal) Collected: 10/08/22 0900    Lab Status: Final result Specimen: Blood from Arm, Right Updated: 10/08/22 0930     Sodium 133 mmol/L      Potassium 5 3 mmol/L      Chloride 95 mmol/L      CO2 28 mmol/L      ANION GAP 10 mmol/L      BUN 17 mg/dL      Creatinine 0 85 mg/dL      Glucose 491 mg/dL      Calcium 8 8 mg/dL      AST 46 U/L      ALT 39 U/L      Alkaline Phosphatase 76 U/L      Total Protein 6 6 g/dL      Albumin 4 0 g/dL      Total Bilirubin 0 52 mg/dL      eGFR 64 ml/min/1 73sq m     Narrative:      Meg[facility]harath guidelines for Chronic Kidney Disease (CKD):   •  Stage 1 with normal or high GFR (GFR > 90 mL/min/1 73 square meters)  •  Stage 2 Mild CKD (GFR = 60-89 mL/min/1 73 square meters)  •  Stage 3A Moderate CKD (GFR = 45-59 mL/min/1 73 square meters)  •  Stage 3B Moderate CKD (GFR = 30-44 mL/min/1 73 square meters)  •  Stage 4 Severe CKD (GFR = 15-29 mL/min/1 73 square meters)  •  Stage 5 End Stage CKD (GFR <15 mL/min/1 73 square meters)  Note: GFR calculation is accurate only with a steady state creatinine    Magnesium [[id number]]  (Abnormal) Collected: 10/08/22 0900    Lab Status: Final result Specimen: Blood from Arm, Right Updated: 10/08/22 0930     Magnesium 1 7 mg/dL     Beta Hydroxybutyrate [[id number]]  (Abnormal) Collected: 10/08/22 0900    Lab Status: Final result Specimen: Blood from Arm, Right Updated: 10/08/22 0918     BETA-HYDROXYBUTYRATE 0 8 mmol/L     Blood gas, venous [[id number]]  (Abnormal) Collected: 10/08/22 0900    Lab Status: Final result Specimen: Blood from Arm, Right Updated: 10/08/22 0908     pH, Jung 7 420     pCO2, Jung 45 4 mm Hg      pO2, Jung 22 9 mm Hg      HCO3, Jung 28 8 mmol/L      Base Excess, Jung 3 7 mmol/L      O2 Content, Jung 7 9 ml/dL      O2 HGB, VENOUS 41 3 %     CBC [[id number]]  (Abnormal) Collected: 10/08/22 0900    Lab Status: Final result Specimen: Blood from Arm, Right Updated: 10/08/22 0908     WBC 5 92 Thousand/uL      RBC 3 74 Million/uL      Hemoglobin 12 5 g/dL      Hematocrit 37 7 %       fL      MCH 33 4 pg      MCHC 33 2 g/dL      RDW 11 9 %      Platelets 455 Thousands/uL      MPV 10 3 fL     Fingerstick Glucose (POCT) [[id number]]  (Abnormal) Collected: 10/08/22 0833    Lab Status: Final result Updated: 10/08/22 0850     POC Glucose >500 mg/dl                  No orders to display              Procedures  ECG 12 Lead Documentation Only    Date/Time: 10/8/2022 8:51 AM  Performed by: ADRIAN Teague  Authorized by: ADRIAN Teague     Indications / Diagnosis:  Hyperglycemia  ECG reviewed by me, the ED Provider: yes    Patient location:  ED  Interpretation:     Interpretation: normal    Rate:     ECG rate:  81    ECG rate assessment: normal    Rhythm:     Rhythm: sinus rhythm    Ectopy:     Ectopy: none    QRS:     QRS axis:  Normal  Conduction:     Conduction: normal    ST segments:     ST segments:  Normal  T waves:     T waves: normal               ED Course  ED Course as of 10/10/22 1304   Sat Oct 08, 2022 0909 pH, Jung(!): 7 420   0909 WBC: 5 92   1129 POC Glucose(!): 405  Prior to insulin  1231 POC Glucose(!): 368  Pt's blood glucose continues to improve  Will wait another 30 minutes and recheck  1310 POC Glucose(!): 318  Will check in another 30 minutes  MDM  Number of Diagnoses or Management Options  Hyperglycemia  Diagnosis management comments: DDx: Hyperglycemia, DKA  Pt did not take any insulin coverage with her food as she was hypoglycemia  Endorses her blood glucose did not go up fast enough so she took glucose tablets  Will check DKA labs as pt has a hx of DKA  Pt initial POC glucose was >500  Patient's hyperglycemia is likely an result of her not taking any insulin this morning, and continuing to eat, and take glucose tablets  Will provide fluids  DKA labs were unremarkable for abnormality  Patient found to be hyperglycemic 491  Patient has not had any insulin today  Provided patient with 2000 mL of fluids, and 10 units of insulin  Replace patient's magnesium  Patient provided with Tylenol as she reports sitting in the bed is causing her "butt hurt "  Patient's glucose is trending downward  Patient is under 300 mg/dL  Patient is aware that she needs to continue to eat today  Patient is aware to continue taking her insulin as directed by her endocrinologist   Patient reports that her daughter is a nurse, and will make sure she is adherent to regimen  Discussed with patient that if she is hypoglycemic in the morning again she should take a glucose tablet, and check her sugar, but not to continue eating carbs filled breakfast and additional glucose tablets  Discussed to follow up with PCP  Aware to continue monitoring glucose every hour throughout today  Discussed return precautions with pt            Amount and/or Complexity of Data Reviewed  Clinical lab tests: ordered and reviewed  Tests in the radiology section of CPT®: ordered and reviewed    Risk of Complications, Morbidity, and/or Mortality  General comments: Pt arrived for evaluation of hyperglycemia, and found NOT to be in DKA  Pt provided with fluids and insulin and discharged with glucose under 300 mg/dL  Aware to follow up with PCP  Aware to continue monitoring her blood glucose today, and needs to eat with appropriate coverage  Reviewed reasons to return to ed  Patient verbalized understanding of diagnosis and agreement with discharge plan of care as well as understanding of reasons to return to ed  Disposition  Final diagnoses:   Hyperglycemia     Time reflects when diagnosis was documented in both MDM as applicable and the Disposition within this note     Time User Action Codes Description Comment    10/8/2022  1:45 PM Cirilo Yost Add [R73 9] Hyperglycemia       ED Disposition     ED Disposition   Discharge    Condition   Stable    Date/Time   Sat Oct 8, 2022  1:45 PM    Comment   Juaquin Hinkle discharge to home/self care                 Follow-up Information     Follow up With Specialties Details Why Contact Info Additional Information    Qian Guzman MD Emergency Medicine, Internal Medicine Schedule an appointment as soon as possible for a visit in 2 days For further evaluation of symptoms [place] [id number] [address] [facility] Emergency Department Emergency Medicine Go to  For further evaluation of symptoms [address]  [place] [id number] [phone] [facility] Emergency Department, [address], [address] [facility], [address] [facility]          Discharge Medication List as of 10/8/2022  1:47 PM      CONTINUE these medications which have NOT CHANGED    Details   Ascorbic Acid (vitamin C) 100 MG tablet Take 500 mg by mouth 2 (two) times a day, Historical Med      atorvastatin (LIPITOR) 20 mg tablet Take 20 mg by mouth daily with dinner , Historical Med      BD Insulin Syringe U/F 1/2Unit 31G X 5/16" 0 3 ML MISC 4 (four) times a day, Starting Mon 11/29/2021, Historical Med      calcium carbonate (OS-FELICE) 600 MG tablet Take 600 mg by mouth 2 (two) times a day with meals, Historical Med      cyanocobalamin (VITAMIN B-12) 1000 MCG tablet Take 1,000 mcg by mouth daily, Historical Med      gabapentin (NEURONTIN) 300 mg capsule Take 300 mg by mouth 3 (three) times a day, Historical Med      Insulin Aspart (NOVOLOG SC) Inject 5 Units under the skin 3 (three) times a day with meals Plus sliding scale  5 units @ 1730 , Historical Med      Insulin Detemir (LEVEMIR SC) Inject 5 Units under the skin daily at bedtime, Historical Med      ipratropium (ATROVENT) 0 03 % nasal spray 2 sprays into each nostril every 12 (twelve) hours, Starting Sun 8/22/2021, Normal      levothyroxine 100 mcg tablet Take 112 mcg by mouth every morning , Historical Med      methocarbamol (ROBAXIN) 500 mg tablet Take 500 mg by mouth in the morning  Prn  , Historical Med      Multiple Vitamin (multivitamin) capsule Take 1 capsule by mouth daily, Historical Med      mupirocin (BACTROBAN) 2 % ointment Apply topically daily To affected area, Starting Wed 10/5/2022, Normal      OneTouch Ultra test strip USE TO TEST BLOOD SUGAR 6 TIMES A DAY, Historical Med      pantoprazole (PROTONIX) 40 mg tablet Take 1 tablet (40 mg total) by mouth 2 (two) times a day, Starting Fri 7/15/2022, No Print      traMADol (ULTRAM) 50 mg tablet tramadol 50 mg tablet   take 1 tablet by mouth three times a day if needed, Historical Med      VITAMIN D PO Take 125 mg by mouth in the morning, Historical Med             No discharge procedures on file      PDMP Review       Value Time User    PDMP Reviewed  Yes 6/17/2020  2:17 PM Yahir Vega MD          ED Provider  Electronically Signed by           ADRIAN Issa  10/10/22 3416

## 2022-10-08 NOTE — DISCHARGE INSTRUCTIONS
Please continue to monitor your blood sugar throughout the day  Please monitor hourly  Please take your insulin as directed per your sliding scale  Please eat when you go home  In the future if you wake up and your blood sugar is low, please titrate your diet slowly and recheck your sugar frequently

## 2022-10-11 PROBLEM — E11.10 DIABETIC KETOACIDOSIS ASSOCIATED WITH TYPE 2 DIABETES MELLITUS (HCC): Status: RESOLVED | Noted: 2022-07-10 | Resolved: 2022-10-11

## 2022-10-17 ENCOUNTER — APPOINTMENT (EMERGENCY)
Dept: CT IMAGING | Facility: HOSPITAL | Age: 82
DRG: 565 | End: 2022-10-17
Payer: MEDICARE

## 2022-10-17 ENCOUNTER — APPOINTMENT (INPATIENT)
Dept: RADIOLOGY | Facility: HOSPITAL | Age: 82
DRG: 565 | End: 2022-10-17
Payer: MEDICARE

## 2022-10-17 ENCOUNTER — APPOINTMENT (EMERGENCY)
Dept: RADIOLOGY | Facility: HOSPITAL | Age: 82
DRG: 565 | End: 2022-10-17
Payer: MEDICARE

## 2022-10-17 ENCOUNTER — HOSPITAL ENCOUNTER (INPATIENT)
Facility: HOSPITAL | Age: 82
LOS: 8 days | Discharge: HOME HEALTH | DRG: 565 | End: 2022-10-25
Attending: EMERGENCY MEDICINE | Admitting: HOSPITALIST
Payer: MEDICARE

## 2022-10-17 DIAGNOSIS — D72.829 LEUKOCYTOSIS: ICD-10-CM

## 2022-10-17 DIAGNOSIS — M62.82 RHABDOMYOLYSIS: ICD-10-CM

## 2022-10-17 DIAGNOSIS — E11.42 TYPE 2 DIABETES MELLITUS WITH DIABETIC POLYNEUROPATHY, WITH LONG-TERM CURRENT USE OF INSULIN (HCC): ICD-10-CM

## 2022-10-17 DIAGNOSIS — H53.9 VISUAL CHANGES: ICD-10-CM
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
## 2022-10-17 DIAGNOSIS — Z79.4 TYPE 2 DIABETES MELLITUS WITH DIABETIC POLYNEUROPATHY, WITH LONG-TERM CURRENT USE OF INSULIN (HCC): ICD-10-CM

## 2022-10-17 DIAGNOSIS — N39.0 UTI (URINARY TRACT INFECTION): ICD-10-CM

## 2022-10-17 DIAGNOSIS — S00.03XA SCALP CONTUSION: ICD-10-CM

## 2022-10-17 PROBLEM — K21.9 GASTROESOPHAGEAL REFLUX DISEASE WITHOUT ESOPHAGITIS: Status: ACTIVE | Noted: 2022-10-17

## 2022-10-17 PROBLEM — T79.6XXA TRAUMATIC RHABDOMYOLYSIS (HCC): Status: ACTIVE | Noted: 2022-10-17

## 2022-10-17 PROBLEM — D72.823 LEUKEMOID REACTION: Status: ACTIVE | Noted: 2022-10-17

## 2022-10-17 LAB
2HR DELTA HS TROPONIN: 5 NG/L
4HR DELTA HS TROPONIN: 3 NG/L
ALBUMIN SERPL BCP-MCNC: 4.2 G/DL (ref 3.5–5)
ALP SERPL-CCNC: 82 U/L (ref 34–104)
ALT SERPL W P-5'-P-CCNC: 32 U/L (ref 7–52)
ANION GAP SERPL CALCULATED.3IONS-SCNC: 9 MMOL/L (ref 4–13)
APTT PPP: 28 SECONDS (ref 23–37)
AST SERPL W P-5'-P-CCNC: 55 U/L (ref 13–39)
ATRIAL RATE: 82 BPM
ATRIAL RATE: 89 BPM
BACTERIA UR QL AUTO: ABNORMAL /HPF
BASOPHILS # BLD AUTO: 0.06 THOUSANDS/ÂΜL (ref 0–0.1)
BASOPHILS NFR BLD AUTO: 0 % (ref 0–1)
BILIRUB DIRECT SERPL-MCNC: 0.09 MG/DL (ref 0–0.2)
BILIRUB SERPL-MCNC: 0.44 MG/DL (ref 0.2–1)
BILIRUB UR QL STRIP: NEGATIVE
BUN SERPL-MCNC: 15 MG/DL (ref 5–25)
CALCIUM SERPL-MCNC: 9.3 MG/DL (ref 8.4–10.2)
CARDIAC TROPONIN I PNL SERPL HS: 16 NG/L
CARDIAC TROPONIN I PNL SERPL HS: 19 NG/L
CARDIAC TROPONIN I PNL SERPL HS: 21 NG/L
CHLORIDE SERPL-SCNC: 96 MMOL/L (ref 96–108)
CK MB SERPL-MCNC: 3 % (ref 0–2.5)
CK MB SERPL-MCNC: 41.7 NG/ML (ref 0.6–6.3)
CK SERPL-CCNC: 1372 U/L (ref 26–192)
CLARITY UR: ABNORMAL
CO2 SERPL-SCNC: 32 MMOL/L (ref 21–32)
COLOR UR: ABNORMAL
CREAT SERPL-MCNC: 0.61 MG/DL (ref 0.6–1.3)
EOSINOPHIL # BLD AUTO: 0.02 THOUSAND/ÂΜL (ref 0–0.61)
EOSINOPHIL NFR BLD AUTO: 0 % (ref 0–6)
ERYTHROCYTE [DISTWIDTH] IN BLOOD BY AUTOMATED COUNT: 11.9 % (ref 11.6–15.1)
FLUAV RNA RESP QL NAA+PROBE: NEGATIVE
FLUBV RNA RESP QL NAA+PROBE: NEGATIVE
GFR SERPL CREATININE-BSD FRML MDRD: 85 ML/MIN/1.73SQ M
GLUCOSE SERPL-MCNC: 111 MG/DL (ref 65–140)
GLUCOSE SERPL-MCNC: 286 MG/DL (ref 65–140)
GLUCOSE SERPL-MCNC: 361 MG/DL (ref 65–140)
GLUCOSE UR STRIP-MCNC: NEGATIVE MG/DL
HCT VFR BLD AUTO: 41.2 % (ref 34.8–46.1)
HGB BLD-MCNC: 13.3 G/DL (ref 11.5–15.4)
HGB UR QL STRIP.AUTO: ABNORMAL
IMM GRANULOCYTES # BLD AUTO: 0.08 THOUSAND/UL (ref 0–0.2)
IMM GRANULOCYTES NFR BLD AUTO: 1 % (ref 0–2)
INR PPP: 0.91 (ref 0.84–1.19)
KETONES UR STRIP-MCNC: NEGATIVE MG/DL
LACTATE SERPL-SCNC: 1.6 MMOL/L (ref 0.5–2)
LEUKOCYTE ESTERASE UR QL STRIP: NEGATIVE
LYMPHOCYTES # BLD AUTO: 0.93 THOUSANDS/ÂΜL (ref 0.6–4.47)
LYMPHOCYTES NFR BLD AUTO: 6 % (ref 14–44)
MCH RBC QN AUTO: 33.3 PG (ref 26.8–34.3)
MCHC RBC AUTO-ENTMCNC: 32.3 G/DL (ref 31.4–37.4)
MCV RBC AUTO: 103 FL (ref 82–98)
MONOCYTES # BLD AUTO: 1.51 THOUSAND/ÂΜL (ref 0.17–1.22)
MONOCYTES NFR BLD AUTO: 9 % (ref 4–12)
NEUTROPHILS # BLD AUTO: 14.13 THOUSANDS/ÂΜL (ref 1.85–7.62)
NEUTS SEG NFR BLD AUTO: 84 % (ref 43–75)
NITRITE UR QL STRIP: POSITIVE
NON-SQ EPI CELLS URNS QL MICRO: ABNORMAL /HPF
NRBC BLD AUTO-RTO: 0 /100 WBCS
P AXIS: 66 DEGREES
P AXIS: 73 DEGREES
PH UR STRIP.AUTO: 7 [PH]
PLATELET # BLD AUTO: 252 THOUSANDS/UL (ref 149–390)
PMV BLD AUTO: 10.1 FL (ref 8.9–12.7)
POTASSIUM SERPL-SCNC: 4.8 MMOL/L (ref 3.5–5.3)
PR INTERVAL: 144 MS
PR INTERVAL: 146 MS
PROCALCITONIN SERPL-MCNC: <0.05 NG/ML
PROT SERPL-MCNC: 6.8 G/DL (ref 6.4–8.4)
PROT UR STRIP-MCNC: NEGATIVE MG/DL
PROTHROMBIN TIME: 12.3 SECONDS (ref 11.6–14.5)
QRS AXIS: 40 DEGREES
QRS AXIS: 45 DEGREES
QRSD INTERVAL: 72 MS
QRSD INTERVAL: 74 MS
QT INTERVAL: 378 MS
QT INTERVAL: 396 MS
QTC INTERVAL: 459 MS
QTC INTERVAL: 462 MS
RBC # BLD AUTO: 4 MILLION/UL (ref 3.81–5.12)
RBC #/AREA URNS AUTO: ABNORMAL /HPF
RSV RNA RESP QL NAA+PROBE: NEGATIVE
SARS-COV-2 RNA RESP QL NAA+PROBE: NEGATIVE
SODIUM SERPL-SCNC: 137 MMOL/L (ref 135–147)
SP GR UR STRIP.AUTO: 1.01 (ref 1–1.03)
T WAVE AXIS: 50 DEGREES
T WAVE AXIS: 52 DEGREES
UROBILINOGEN UR QL STRIP.AUTO: 0.2 E.U./DL
VENTRICULAR RATE: 82 BPM
VENTRICULAR RATE: 89 BPM
WBC # BLD AUTO: 16.73 THOUSAND/UL (ref 4.31–10.16)
WBC #/AREA URNS AUTO: ABNORMAL /HPF

## 2022-10-17 PROCEDURE — 81001 URINALYSIS AUTO W/SCOPE: CPT | Performed by: EMERGENCY MEDICINE

## 2022-10-17 PROCEDURE — 82553 CREATINE MB FRACTION: CPT | Performed by: EMERGENCY MEDICINE

## 2022-10-17 PROCEDURE — 82948 REAGENT STRIP/BLOOD GLUCOSE: CPT

## 2022-10-17 PROCEDURE — 74177 CT ABD & PELVIS W/CONTRAST: CPT

## 2022-10-17 PROCEDURE — 87040 BLOOD CULTURE FOR BACTERIA: CPT | Performed by: EMERGENCY MEDICINE

## 2022-10-17 PROCEDURE — 82550 ASSAY OF CK (CPK): CPT | Performed by: EMERGENCY MEDICINE

## 2022-10-17 PROCEDURE — 0241U HB NFCT DS VIR RESP RNA 4 TRGT: CPT | Performed by: HOSPITALIST

## 2022-10-17 PROCEDURE — 73564 X-RAY EXAM KNEE 4 OR MORE: CPT

## 2022-10-17 PROCEDURE — 85610 PROTHROMBIN TIME: CPT | Performed by: EMERGENCY MEDICINE

## 2022-10-17 PROCEDURE — 93005 ELECTROCARDIOGRAM TRACING: CPT

## 2022-10-17 PROCEDURE — 73060 X-RAY EXAM OF HUMERUS: CPT

## 2022-10-17 PROCEDURE — 70450 CT HEAD/BRAIN W/O DYE: CPT

## 2022-10-17 PROCEDURE — 85025 COMPLETE CBC W/AUTO DIFF WBC: CPT | Performed by: EMERGENCY MEDICINE

## 2022-10-17 PROCEDURE — 80048 BASIC METABOLIC PNL TOTAL CA: CPT | Performed by: EMERGENCY MEDICINE

## 2022-10-17 PROCEDURE — 71045 X-RAY EXAM CHEST 1 VIEW: CPT

## 2022-10-17 PROCEDURE — 85730 THROMBOPLASTIN TIME PARTIAL: CPT | Performed by: EMERGENCY MEDICINE

## 2022-10-17 PROCEDURE — 71260 CT THORAX DX C+: CPT

## 2022-10-17 PROCEDURE — 99223 1ST HOSP IP/OBS HIGH 75: CPT | Performed by: HOSPITALIST

## 2022-10-17 PROCEDURE — 73502 X-RAY EXAM HIP UNI 2-3 VIEWS: CPT

## 2022-10-17 PROCEDURE — 70486 CT MAXILLOFACIAL W/O DYE: CPT

## 2022-10-17 PROCEDURE — 93010 ELECTROCARDIOGRAM REPORT: CPT | Performed by: INTERNAL MEDICINE

## 2022-10-17 PROCEDURE — 72125 CT NECK SPINE W/O DYE: CPT

## 2022-10-17 PROCEDURE — 84484 ASSAY OF TROPONIN QUANT: CPT | Performed by: EMERGENCY MEDICINE

## 2022-10-17 PROCEDURE — 83605 ASSAY OF LACTIC ACID: CPT | Performed by: EMERGENCY MEDICINE

## 2022-10-17 PROCEDURE — 36415 COLL VENOUS BLD VENIPUNCTURE: CPT | Performed by: EMERGENCY MEDICINE

## 2022-10-17 PROCEDURE — 99285 EMERGENCY DEPT VISIT HI MDM: CPT

## 2022-10-17 PROCEDURE — 84145 PROCALCITONIN (PCT): CPT | Performed by: EMERGENCY MEDICINE

## 2022-10-17 PROCEDURE — 80076 HEPATIC FUNCTION PANEL: CPT | Performed by: EMERGENCY MEDICINE

## 2022-10-17 PROCEDURE — 73030 X-RAY EXAM OF SHOULDER: CPT

## 2022-10-17 PROCEDURE — 99285 EMERGENCY DEPT VISIT HI MDM: CPT | Performed by: EMERGENCY MEDICINE

## 2022-10-17 RX ORDER — INSULIN LISPRO 100 [IU]/ML
1-5 INJECTION, SOLUTION INTRAVENOUS; SUBCUTANEOUS
Status: DISCONTINUED | OUTPATIENT
Start: 2022-10-17 | End: 2022-10-25 | Stop reason: HOSPADM

## 2022-10-17 RX ORDER — LEVOTHYROXINE SODIUM 112 UG/1
112 TABLET ORAL EVERY MORNING
Status: DISCONTINUED | OUTPATIENT
Start: 2022-10-18 | End: 2022-10-25 | Stop reason: HOSPADM

## 2022-10-17 RX ORDER — HYDROCODONE BITARTRATE AND ACETAMINOPHEN 5; 325 MG/1; MG/1
1 TABLET ORAL EVERY 6 HOURS PRN
Status: DISCONTINUED | OUTPATIENT
Start: 2022-10-17 | End: 2022-10-20

## 2022-10-17 RX ORDER — ONDANSETRON 2 MG/ML
4 INJECTION INTRAMUSCULAR; INTRAVENOUS EVERY 6 HOURS PRN
Status: DISCONTINUED | OUTPATIENT
Start: 2022-10-17 | End: 2022-10-25 | Stop reason: HOSPADM

## 2022-10-17 RX ORDER — ACETAMINOPHEN 325 MG/1
650 TABLET ORAL EVERY 6 HOURS PRN
Status: DISCONTINUED | OUTPATIENT
Start: 2022-10-17 | End: 2022-10-21

## 2022-10-17 RX ORDER — SODIUM CHLORIDE 9 MG/ML
125 INJECTION, SOLUTION INTRAVENOUS CONTINUOUS
Status: DISCONTINUED | OUTPATIENT
Start: 2022-10-17 | End: 2022-10-17

## 2022-10-17 RX ORDER — DOCUSATE SODIUM 100 MG/1
100 CAPSULE, LIQUID FILLED ORAL 2 TIMES DAILY
Status: DISCONTINUED | OUTPATIENT
Start: 2022-10-17 | End: 2022-10-25 | Stop reason: HOSPADM

## 2022-10-17 RX ORDER — CEFTRIAXONE 1 G/50ML
1000 INJECTION, SOLUTION INTRAVENOUS EVERY 24 HOURS
Status: COMPLETED | OUTPATIENT
Start: 2022-10-17 | End: 2022-10-19

## 2022-10-17 RX ORDER — ASCORBIC ACID 500 MG
500 TABLET ORAL 2 TIMES DAILY
Status: DISCONTINUED | OUTPATIENT
Start: 2022-10-17 | End: 2022-10-25 | Stop reason: HOSPADM

## 2022-10-17 RX ORDER — ATORVASTATIN CALCIUM 10 MG/1
20 TABLET, FILM COATED ORAL
Status: DISCONTINUED | OUTPATIENT
Start: 2022-10-17 | End: 2022-10-17

## 2022-10-17 RX ORDER — FENTANYL CITRATE 50 UG/ML
25 INJECTION, SOLUTION INTRAMUSCULAR; INTRAVENOUS ONCE
Status: COMPLETED | OUTPATIENT
Start: 2022-10-17 | End: 2022-10-17

## 2022-10-17 RX ORDER — HEPARIN SODIUM 5000 [USP'U]/ML
5000 INJECTION, SOLUTION INTRAVENOUS; SUBCUTANEOUS EVERY 8 HOURS SCHEDULED
Status: DISCONTINUED | OUTPATIENT
Start: 2022-10-17 | End: 2022-10-25 | Stop reason: HOSPADM

## 2022-10-17 RX ORDER — GABAPENTIN 300 MG/1
300 CAPSULE ORAL 3 TIMES DAILY
Status: DISCONTINUED | OUTPATIENT
Start: 2022-10-17 | End: 2022-10-25 | Stop reason: HOSPADM

## 2022-10-17 RX ORDER — CEFTRIAXONE 1 G/50ML
1000 INJECTION, SOLUTION INTRAVENOUS ONCE
Status: DISCONTINUED | OUTPATIENT
Start: 2022-10-17 | End: 2022-10-17

## 2022-10-17 RX ORDER — PANTOPRAZOLE SODIUM 40 MG/1
40 TABLET, DELAYED RELEASE ORAL 2 TIMES DAILY
Status: DISCONTINUED | OUTPATIENT
Start: 2022-10-17 | End: 2022-10-25 | Stop reason: HOSPADM

## 2022-10-17 RX ORDER — SODIUM CHLORIDE, SODIUM GLUCONATE, SODIUM ACETATE, POTASSIUM CHLORIDE, MAGNESIUM CHLORIDE, SODIUM PHOSPHATE, DIBASIC, AND POTASSIUM PHOSPHATE .53; .5; .37; .037; .03; .012; .00082 G/100ML; G/100ML; G/100ML; G/100ML; G/100ML; G/100ML; G/100ML
75 INJECTION, SOLUTION INTRAVENOUS CONTINUOUS
Status: DISCONTINUED | OUTPATIENT
Start: 2022-10-17 | End: 2022-10-19

## 2022-10-17 RX ADMIN — SODIUM CHLORIDE, SODIUM GLUCONATE, SODIUM ACETATE, POTASSIUM CHLORIDE, MAGNESIUM CHLORIDE, SODIUM PHOSPHATE, DIBASIC, AND POTASSIUM PHOSPHATE 75 ML/HR: .53; .5; .37; .037; .03; .012; .00082 INJECTION, SOLUTION INTRAVENOUS at 18:06

## 2022-10-17 RX ADMIN — MORPHINE SULFATE 2 MG: 2 INJECTION, SOLUTION INTRAMUSCULAR; INTRAVENOUS at 21:16

## 2022-10-17 RX ADMIN — CEFTRIAXONE 1000 MG: 1 INJECTION, SOLUTION INTRAVENOUS at 17:17

## 2022-10-17 RX ADMIN — HEPARIN SODIUM 5000 UNITS: 5000 INJECTION INTRAVENOUS; SUBCUTANEOUS at 21:41

## 2022-10-17 RX ADMIN — IOHEXOL 100 ML: 350 INJECTION, SOLUTION INTRAVENOUS at 13:36

## 2022-10-17 RX ADMIN — GABAPENTIN 300 MG: 300 CAPSULE ORAL at 21:15

## 2022-10-17 RX ADMIN — INSULIN DETEMIR 5 UNITS: 100 INJECTION, SOLUTION SUBCUTANEOUS at 21:15

## 2022-10-17 RX ADMIN — PANTOPRAZOLE SODIUM 40 MG: 40 TABLET, DELAYED RELEASE ORAL at 21:15

## 2022-10-17 RX ADMIN — INSULIN LISPRO 3 UNITS: 100 INJECTION, SOLUTION INTRAVENOUS; SUBCUTANEOUS at 23:00

## 2022-10-17 RX ADMIN — FENTANYL CITRATE 25 MCG: 50 INJECTION, SOLUTION INTRAMUSCULAR; INTRAVENOUS at 16:15

## 2022-10-17 NOTE — ASSESSMENT & PLAN NOTE
· Possibly reactive but cannot rule out a UTI in its entirety  · Will start with empiric ceftriaxone  · Optimize antibiotics based on urine culture results  · Procalcitonin level testing is pending  · Repeat CBC testing in the a m

## 2022-10-17 NOTE — ASSESSMENT & PLAN NOTE
· In the setting of having a fall prior to arrival  · Arrival CPK 1372  · Will treat with Plasmalyte  · Monitor closely for any signs of renal insufficiency  · Repeat CPK testing in the a m

## 2022-10-17 NOTE — ED PROVIDER NOTES
History  Chief Complaint   Patient presents with   • Fall     HPI      This is a very pleasant, nontoxic, 26-year-old female presents the emergency department status post unwitnessed fall at home, was on her bathroom floor for an extended period time  Patient went to bed around 9:00 p m  which is last time she remembers she woke up this morning on the bathroom floor  Patient has a chief complaint of left shoulder pain right knee pain and mild headache  Prior to Admission Medications   Prescriptions Last Dose Informant Patient Reported? Taking? Ascorbic Acid (vitamin C) 100 MG tablet   Yes No   Sig: Take 500 mg by mouth 2 (two) times a day   BD Insulin Syringe U/F /2Unit 31G X 5/16" 0 3 ML MISC   Yes No   Si (four) times a day   Insulin Aspart (NOVOLOG SC)   Yes No   Sig: Inject 5 Units under the skin 3 (three) times a day with meals Plus sliding scale   5 units @ 1730    Insulin Detemir (LEVEMIR SC)   Yes No   Sig: Inject 5 Units under the skin daily at bedtime   Multiple Vitamin (multivitamin) capsule   Yes No   Sig: Take 1 capsule by mouth daily   OneTouch Ultra test strip   Yes No   Sig: USE TO TEST BLOOD SUGAR 6 TIMES A DAY   VITAMIN D PO   Yes No   Sig: Take 125 mg by mouth in the morning   atorvastatin (LIPITOR) 20 mg tablet   Yes No   Sig: Take 20 mg by mouth daily with dinner    calcium carbonate (OS-FELICE) 600 MG tablet   Yes No   Sig: Take 600 mg by mouth 2 (two) times a day with meals   cyanocobalamin (VITAMIN B-12) 1000 MCG tablet   Yes No   Sig: Take 1,000 mcg by mouth daily   gabapentin (NEURONTIN) 300 mg capsule   Yes No   Sig: Take 300 mg by mouth 3 (three) times a day   ipratropium (ATROVENT) 0 03 % nasal spray   No No   Si sprays into each nostril every 12 (twelve) hours   Patient taking differently: 2 sprays into each nostril every 12 (twelve) hours As needed   levothyroxine 100 mcg tablet  Self Yes No   Sig: Take 112 mcg by mouth every morning    methocarbamol (ROBAXIN) 500 mg tablet Yes No   Sig: Take 500 mg by mouth in the morning  Prn     mupirocin (BACTROBAN) 2 % ointment   No No   Sig: Apply topically daily To affected area   pantoprazole (PROTONIX) 40 mg tablet   No No   Sig: Take 1 tablet (40 mg total) by mouth 2 (two) times a day   traMADol (ULTRAM) 50 mg tablet   Yes No   Sig: tramadol 50 mg tablet   take 1 tablet by mouth three times a day if needed      Facility-Administered Medications: None       Past Medical History:   Diagnosis Date   • Ambulates with cane    • Cancer (HCC)    • Chronic pain disorder     knees/ shoulders (gets inj every 3 mos)   • Controlled type 2 diabetes mellitus with diabetic polyneuropathy, with long-term current use of insulin ([facility] [facility] ) 5/21/2008   • Diabetes mellitus ([facility] )    • Diabetic polyneuropathy ([facility] ) 5/21/2008    ICD10 clean up   • Disease of thyroid gland    • 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   • HL (hearing loss)    • Hodgkin's disease ([facility] [address] ) 2008    Left neck, had radiation   • Hypertension    • Neuropathy    • Osteoporosis    • RA (rheumatoid arthritis) ([facility] [address] )        Past Surgical History:   Procedure Laterality Date   • ADENOIDECTOMY     • APPENDECTOMY     • CATARACT EXTRACTION     • CATARACT EXTRACTION, BILATERAL     • CHOLECYSTECTOMY     • FRACTURE SURGERY Right     hip   • MOUTH SURGERY      oral cancer left lower lip   • OVARY SURGERY     • ME ADJ TISS XFER HEAD,FAC,HAND <10 SQCM N/A 3/28/2022    Procedure: Adjacent tissue transfer face;  Surgeon: Marleen Bravo MD;  Location: [facility];  Service: ENT   • ME MASTOIDECTOMY,SIMPLE Left 3/28/2022    Procedure: MASTOIDECTOMY;  Surgeon: Marleen Bravo MD;  Location: [facility];  Service: ENT   • ME OPEN RX FEMUR FX+INTRAMED SHE Right 5/28/2020    Procedure: INSERTION NAIL IM FEMUR ANTEGRADE (TROCHANTERIC);   Surgeon: Jhonny Hayes DO;  Location: [facility] MAIN OR;  Service: Orthopedics   • TONSILLECTOMY     • TOTAL THYROIDECTOMY  2008    Performed after left neck dissection and left oral cancer diagnosis       Family History   Problem Relation Age of Onset   • Cancer Mother    • Diabetes Mother    • Diabetes Father    • Hypertension Father      I have reviewed and agree with the history as documented  E-Cigarette/Vaping   • E-Cigarette Use Never User      E-Cigarette/Vaping Substances   • Nicotine No    • THC No    • CBD No    • Flavoring No    • Other No    • Unknown No      Social History     Tobacco Use   • Smoking status: Never Smoker   • Smokeless tobacco: Never Used   Vaping Use   • Vaping Use: Never used   Substance Use Topics   • Alcohol use: Not Currently     Alcohol/week: 0 0 standard drinks   • Drug use: Never       Review of Systems   Constitutional: Negative  Negative for chills and fever  HENT: Negative  Negative for ear pain  Eyes: Negative  Respiratory: Negative  Negative for chest tightness and shortness of breath  Cardiovascular: Negative  Negative for chest pain  Gastrointestinal: Negative  Negative for constipation  Endocrine: Negative  Genitourinary: Negative  Musculoskeletal: Negative  Skin: Negative  Allergic/Immunologic: Negative  Neurological: Negative  Hematological: Negative  Psychiatric/Behavioral: Negative  Physical Exam  Physical Exam  Vitals and nursing note reviewed  Constitutional:       Appearance: Normal appearance  She is normal weight  HENT:      Head: Normocephalic  Comments: AIRWAY: Patient maintaining airway maintaining secretions  No stridor  No brawniness under the tongue  Uvula midline without edema  Phonation normal, trachea midline, no trismus, no tenderness along the sternocleidomastoid muscle group, patient is appropriately masked  No tenderness along the platysmas muscle group, no injuries noted in the zone 1, 2, 3 of the neck  Large Left temporal contusion       Right Ear: Tympanic membrane, ear canal and external ear normal       Left Ear: Tympanic membrane, ear canal and external ear normal       Nose: Nose normal       Mouth/Throat:      Mouth: Mucous membranes are moist       Pharynx: Oropharynx is clear  Eyes:      Extraocular Movements: Extraocular movements intact  Conjunctiva/sclera: Conjunctivae normal       Pupils: Pupils are equal, round, and reactive to light  Neck:      Comments: C collar in place  Cardiovascular:      Rate and Rhythm: Normal rate and regular rhythm  Pulses: Normal pulses  Heart sounds: Normal heart sounds  Comments: CIRCULATION / CARDIOVASCULAR:  Peripheral pulses noted be intact upper lower extremity  Pulmonary:      Comments: BREATHING:  No flail segments noted on exam, equal breath sounds in the posterior and anterior lung fields, no tenderness upon palpation of the anterior and posterior ribcage's / thoracic chest wall  No bruising, no contusions, no outward signs of trauma or swelling noted  Abdominal:      General: Abdomen is flat  Bowel sounds are normal    Musculoskeletal:         General: Normal range of motion  Cervical back: Neck supple  Comments: DEFORMITY / DEFICIT: GCS: 15, HALL x 3  EXPOSURE:  Full body exposure  Skin:     General: Skin is warm  Capillary Refill: Capillary refill takes less than 2 seconds  Neurological:      General: No focal deficit present  Mental Status: She is alert and oriented to person, place, and time  Mental status is at baseline  Psychiatric:         Mood and Affect: Mood normal          Behavior: Behavior normal          Thought Content:  Thought content normal          Judgment: Judgment normal          Vital Signs  ED Triage Vitals   Temperature Pulse Respirations Blood Pressure SpO2   10/17/22 1300 10/17/22 1300 10/17/22 1300 10/17/22 1300 10/17/22 1300   (S) (!) 96 2 °F (35 7 °C) 72 19 135/94 95 %      Temp Source Heart Rate Source Patient Position - Orthostatic VS BP Location FiO2 (%)   10/17/22 1300 10/17/22 1300 10/17/22 1300 -- --   Tympanic Monitor Sitting        Pain Score       10/17/22 1615       9           Vitals:    10/17/22 1300 10/17/22 1400 10/17/22 1500   BP: 135/94 154/86 127/76   Pulse: 72 82 77   Patient Position - Orthostatic VS: Sitting           Visual Acuity  Visual Acuity    Flowsheet Row Most Recent Value   L Pupil Size (mm) 3   R Pupil Size (mm) 3          ED Medications  Medications   calcium carbonate-vitamin D (OSCAL-D) 500 mg-200 units per tablet 2 tablet (has no administration in time range)   cyanocobalamin (VITAMIN B-12) tablet 1,000 mcg (1,000 mcg Oral Not Given 10/17/22 1617)   gabapentin (NEURONTIN) capsule 300 mg (has no administration in time range)   insulin detemir (LEVEMIR) subcutaneous injection 5 Units (has no administration in time range)   levothyroxine tablet 112 mcg (has no administration in time range)   pantoprazole (PROTONIX) EC tablet 40 mg (has no administration in time range)   ascorbic acid (VITAMIN C) tablet 500 mg (has no administration in time range)   atorvastatin (LIPITOR) tablet 20 mg (has no administration in time range)   multi-electrolyte (PLASMALYTE-A/ISOLYTE-S PH 7 4) IV solution (has no administration in time range)   acetaminophen (TYLENOL) tablet 650 mg (has no administration in time range)   docusate sodium (COLACE) capsule 100 mg (has no administration in time range)   ondansetron (ZOFRAN) injection 4 mg (has no administration in time range)   heparin (porcine) subcutaneous injection 5,000 Units (has no administration in time range)   insulin lispro (HumaLOG) 100 units/mL subcutaneous injection 1-5 Units (has no administration in time range)   insulin lispro (HumaLOG) 100 units/mL subcutaneous injection 1-5 Units (has no administration in time range)   cefTRIAXone (ROCEPHIN) IVPB (premix in dextrose) 1,000 mg 50 mL (has no administration in time range)   iohexol (OMNIPAQUE) 350 MG/ML injection (SINGLE-DOSE) 100 mL (100 mL Intravenous Given 10/17/22 1336)   fentanyl citrate (PF) 100 MCG/2ML 25 mcg (25 mcg Intravenous Given 10/17/22 1615)       Diagnostic Studies  Results Reviewed     Procedure Component Value Units Date/Time    Lactic acid [[id number]]  (Normal) Collected: 10/17/22 1548    Lab Status: Final result Specimen: Blood from Arm, Left Updated: 10/17/22 1614     LACTIC ACID 1 6 mmol/L     Narrative:      Result may be elevated if tourniquet was used during collection  HS Troponin I 2hr [[id number]]  (Normal) Collected: 10/17/22 1535    Lab Status: Final result Specimen: Blood from Arm, Left Updated: 10/17/22 1605     hs TnI 2hr 21 ng/L      Delta 2hr hsTnI 5 ng/L     Urine Microscopic [[id number]]  (Abnormal) Collected: 10/17/22 1528    Lab Status: Final result Specimen: Urine, Straight Cath Updated: 10/17/22 1604     RBC, UA 2-4 /hpf      WBC, UA 4-10 /hpf      Epithelial Cells Occasional /hpf      Bacteria, UA Innumerable /hpf     Procalcitonin [[id number]]  (Normal) Collected: 10/17/22 1319    Lab Status: Final result Specimen: Blood from Arm, Left Updated: 10/17/22 1603     Procalcitonin <0 05 ng/ml     Blood culture #2 [[id number]] Collected: 10/17/22 1548    Lab Status:  In process Specimen: Blood from Arm, Left Updated: 10/17/22 1554    UA w Reflex to Microscopic w Reflex to Culture [[id number]]  (Abnormal) Collected: 10/17/22 1528    Lab Status: Final result Specimen: Urine, Straight Cath Updated: 10/17/22 1545     Color, UA Straw     Clarity, UA Slightly Cloudy     Specific [place], UA 1 010     pH, UA 7 0     Leukocytes, UA Negative     Nitrite, UA Positive     Protein, UA Negative mg/dl      Glucose, UA Negative mg/dl      Ketones, UA Negative mg/dl      Urobilinogen, UA 0 2 E U /dl      Bilirubin, UA Negative     Occult Blood, UA 2+    Blood culture #1 [[id number]]     Lab Status: No result Specimen: Blood     HS Troponin I 4hr [[id number]]     Lab Status: No result Specimen: Blood     CKMB [[id number]]  (Abnormal) Collected: 10/17/22 1319    Lab Status: Final result Specimen: Blood from Arm, Left Updated: 10/17/22 1421     CK-MB Index 3 0 %      CK-MB 41 7 ng/mL     HS Troponin 0hr (reflex protocol) [[id number]]  (Normal) Collected: 10/17/22 1319    Lab Status: Final result Specimen: Blood from Arm, Left Updated: 10/17/22 1350     hs TnI 0hr 16 ng/L     Basic metabolic panel [[id number]] Collected: 10/17/22 1319    Lab Status: Final result Specimen: Blood from Arm, Left Updated: 10/17/22 1344     Sodium 137 mmol/L      Potassium 4 8 mmol/L      Chloride 96 mmol/L      CO2 32 mmol/L      ANION GAP 9 mmol/L      BUN 15 mg/dL      Creatinine 0 61 mg/dL      Glucose 111 mg/dL      Calcium 9 3 mg/dL      eGFR 85 ml/min/1 73sq m     Narrative:      [facility] guidelines for Chronic Kidney Disease (CKD):   •  Stage 1 with normal or high GFR (GFR > 90 mL/min/1 73 square meters)  •  Stage 2 Mild CKD (GFR = 60-89 mL/min/1 73 square meters)  •  Stage 3A Moderate CKD (GFR = 45-59 mL/min/1 73 square meters)  •  Stage 3B Moderate CKD (GFR = 30-44 mL/min/1 73 square meters)  •  Stage 4 Severe CKD (GFR = 15-29 mL/min/1 73 square meters)  •  Stage 5 End Stage CKD (GFR <15 mL/min/1 73 square meters)  Note: GFR calculation is accurate only with a steady state creatinine    Hepatic function panel [[id number]]  (Abnormal) Collected: 10/17/22 1319    Lab Status: Final result Specimen: Blood from Arm, Left Updated: 10/17/22 1344     Total Bilirubin 0 44 mg/dL      Bilirubin, Direct 0 09 mg/dL      Alkaline Phosphatase 82 U/L      AST 55 U/L      ALT 32 U/L      Total Protein 6 8 g/dL      Albumin 4 2 g/dL     CK (with reflex to MB) [[id number]]  (Abnormal) Collected: 10/17/22 1319    Lab Status: Final result Specimen: Blood from Arm, Left Updated: 10/17/22 1343     Total CK 1,372 U/L     Protime-INR [[id number]]  (Normal) Collected: 10/17/22 1319    Lab Status: Final result Specimen: Blood from Arm, Left Updated: 10/17/22 1341     Protime 12 3 seconds      INR 0 91    APTT [[id number]]  (Normal) Collected: 10/17/22 1319    Lab Status: Final result Specimen: Blood from Arm, Left Updated: 10/17/22 1341     PTT 28 seconds     CBC and differential [[id number]]  (Abnormal) Collected: 10/17/22 1319    Lab Status: Final result Specimen: Blood from Arm, Left Updated: 10/17/22 1333     WBC 16 73 Thousand/uL      RBC 4 00 Million/uL      Hemoglobin 13 3 g/dL      Hematocrit 41 2 %       fL      MCH 33 3 pg      MCHC 32 3 g/dL      RDW 11 9 %      MPV 10 1 fL      Platelets 934 Thousands/uL      nRBC 0 /100 WBCs      Neutrophils Relative 84 %      Immat GRANS % 1 %      Lymphocytes Relative 6 %      Monocytes Relative 9 %      Eosinophils Relative 0 %      Basophils Relative 0 %      Neutrophils Absolute 14 13 Thousands/µL      Immature Grans Absolute 0 08 Thousand/uL      Lymphocytes Absolute 0 93 Thousands/µL      Monocytes Absolute 1 51 Thousand/µL      Eosinophils Absolute 0 02 Thousand/µL      Basophils Absolute 0 06 Thousands/µL                  XR shoulder 2+ views LEFT   Final Result by Colton Rudolph MD (10/17 1430)   Degenerative changes      No acute osseous abnormality  Workstation performed: [id number]         XR humerus LEFT   Final Result by Colton Rudolph MD (10/17 1430)      No acute osseous abnormality  Workstation performed: [id number]         XR knee 4+ views Right injury   Final Result by Colton Rudolph MD (10/17 1432)   Persistent tricompartmental degenerative arthritis      No acute osseous abnormality  Workstation performed: [id number]         CT recon only thoracolumbar (no charge)   Final Result by Porsche Meadows DO (10/17 1442)   No fracture or traumatic subluxation  Because this was designated a trauma evaluation: The study was marked in EPIC for immediate notification  CT head, facial bones, chest abdomen pelvis and cervical spine are already dictated           Workstation performed: [id number]         TRAUMA - CT chest abdomen pelvis w contrast   Final Result by Amber Roman DO (10/17 1430)   No acute visceral or osseous injury identified  Workstation performed: [id number]         TRAUMA - CT spine cervical wo contrast   Final Result by Amber Roman DO (10/17 1445)   Addendum 1 of 1 by Amber Roman DO (10/17 1445)   ADDENDUM:   Extensive postoperative changes left lower neck  Final   No cervical spine fracture or traumatic malalignment  Workstation performed: [id number]         TRAUMA - CT head wo contrast   Final Result by Amber Roman DO (10/17 1443)   Addendum 1 of 1 by Amber Roman DO (10/17 1443)   ADDENDUM:   Prior mastoid surgery noted  No current mastoiditis  Final      No acute intracranial abnormality  Workstation performed: [id number]         TRAUMA - CT facial bones wo contrast   Final Result by Amber Roman DO (10/17 1407)   Normal noncontrast CT of the facial bones  Workstation performed: [id number]         XR Trauma chest portable   ED Interpretation by Javier Anthony III, DO (10/17 1326)   No obvious acute fractures, pneumothorax or osseous abnormalities noted  Calcification of the aortic knob noted  Final Result by Amber Roman DO (10/17 1454)   No acute cardiopulmonary disease  Please see subsequent CT                 Workstation performed: [id number]                    Procedures  POC FAST US    Date/Time: 10/17/2022 1:07 PM  Performed by: Lisy Magaña DO  Authorized by: Javier Anthony III, DO     Patient location:  ED  Procedure details:     Exam Type:  Diagnostic    Indications: blunt abdominal trauma and blunt chest trauma      Assess for:  Intra-abdominal fluid and pericardial effusion    Technique: FAST      Views obtained:  Heart - Pericardial sac, RUQ - [place]'s Pouch and LUQ - Splenorenal space    Image quality: diagnostic Image availability:  Not saved  FAST Findings:     RUQ (Hepatorenal) free fluid: absent      LUQ (Splenorenal) free fluid: absent      Suprapubic free fluid: absent      Cardiac wall motion: identified      Pericardial effusion: absent    Interpretation:     Impressions: negative    ECG 12 Lead Documentation Only    Date/Time: 10/17/2022 2:22 PM  Performed by: Tha Jacobo DO  Authorized by: [employer]Rooms III, DO     Indications / Diagnosis:  Weakness, possible syncope  ECG reviewed by me, the ED Provider: yes    Patient location:  ED  Comments:      I personally reviewed this EKG is performed the patient October 17, 2022, EKG was completed at 2:21 p m  interpreted by me at 2:22 p m , baseline artifact noted ventricular rate of 89 beats per minute, normal sinus rhythm no acute ST abnormalities  No diffuse elevations to indicate pericarditis  No coved ST elevations greater than 2mm with negative T waves in V1-3 to indicate concern for brugada  No biphasic T waves in V2, V3 to indicate Wellens (critical stenosis of LAD)  No elevation in aVR or deviation when compared to V1 (can be associated with ST depression in I,II, V4-6 when left main occlusion is present)  ECG 12 Lead Documentation Only    Date/Time: 10/17/2022 3:39 PM  Performed by: Tha Jacobo DO  Authorized by: [employer]Ro[employer] III, DO     Indications / Diagnosis:  Repeat for troponin level  ECG reviewed by me, the ED Provider: yes    Patient location:  ED  Comments:      I personally reviewed this EKG is performed the patient October 17, 2022, EKG was completed at 3:36 p m  and interpreted by me at 3:39 p m  Donna Combs Reticular rate of 82 beats per minute, normal sinus rhythm, reports mid level's with normal limits  No diffuse elevations to indicate pericarditis  No coved ST elevations greater than 2mm with negative T waves in V1-3 to indicate concern for brugada    No biphasic T waves in V2, V3 to indicate Wellens (critical stenosis of LAD)  No elevation in aVR or deviation when compared to V1 (can be associated with ST depression in I,II, V4-6 when left main occlusion is present)  ED Course  ED Course as of 10/17/22 1629   Mon Oct 17, 2022   1304 Trauma evaluation imitated, no blood thinners, has a large hematoma over the left side of her scalp, unclear how the patient fell, resume syncope, possibly been on the floor of the bathroom for over 18 hours  FAST scan negative  PELVIS STABLE    65 Spoke with the daughter regarding patient's condition  [facility] [address] 1196 contacted for admission  [address]  with Dr Thai Vergara, will admit to [place] service  [address]  with Zev Usama @ [phone], patient's daughter, gave her update regarding what is going on with the patient terms of need for admission to the hospital                                              MDM  Number of Diagnoses or Management Options  Fall  Leukocytosis  Rhabdomyolysis  Scalp contusion  UTI (urinary tract infection)  Diagnosis management comments: 26-year-old female presents the emergency department unclear whether the patient had a syncopal episode, unwitnessed fall, appears to have mild rhabdomyolysis, mild leukocytosis secondary to UTI, antibiotics ordered, IV fluids ordered, patient lives alone, case discussed times with patient's daughter aware need for admission IV fluids and antibiotics, patient admitted to the hospitalist service  - Patient is able to range neck to greater than 45 degrees to LEFT and RIGHT  Patient is able to touch chin to chest and hyper-extend without eliciting pain  Patient has no midline tenderness   5/5  No numbness/tingling in the arms  2+ radials  No carotid bruit  Palpable carotid pulses that are equal      Portions of the record may have been created with voice recognition software   Occasional wrong word or "sound a like" substitutions may have occurred due to the inherent limitations of voice recognition software  Read the chart carefully and recognize, using context, where substitutions have occurred  This patient was examined during the Covid-19 pandemic, and appropriate PPE was employed as defined by OSHA to minimize exposure to the patient and to avoid spread in the event that I am an asymptomatic carrier  All efforts were made to avoid direct contact with the patient per CDC guidelines ("social distancing") unless otherwise necessary to rule out a medical emergency and/or to provide life-saving interventions  Donning and doffing of PPE was performed per recommended guidelines, and personal PPE was employed if /when institutional PPE was not readily available or was deemed to be less than the recommended as defined by OSHA  Amount and/or Complexity of Data Reviewed  Clinical lab tests: ordered and reviewed  Tests in the radiology section of CPT®: ordered and reviewed  Tests in the medicine section of CPT®: ordered and reviewed  Discussion of test results with the performing providers: yes  Decide to obtain previous medical records or to obtain history from someone other than the patient: yes  Obtain history from someone other than the patient: yes (Daughter on phone x 2 )  Review and summarize past medical records: yes  Discuss the patient with other providers: yes  Independent visualization of images, tracings, or specimens: yes        Disposition  Final diagnoses:   Fall   Rhabdomyolysis   Leukocytosis   UTI (urinary tract infection)   Scalp contusion     Time reflects when diagnosis was documented in both MDM as applicable and the Disposition within this note     Time User Action Codes Description Comment    10/17/2022  3:32 PM Georgianne Naegeli Add Alyse Sink  CAVH] Fall     10/17/2022  3:33 PM Georgianne Naegeli Add [M62 82] Rhabdomyolysis     10/17/2022  3:33 PM Georgianne Naegeli Add [N63 882] Leukocytosis     10/17/2022  4:27 PM Georgianne Naegeli Add [N39 0] UTI (urinary tract infection) 10/17/2022  4:28 PM Rahel Hernández Add [S00 03XA] Scalp contusion       ED Disposition     ED Disposition   Admit    Condition   Stable    Date/Time   Mon Oct 17, 2022  3:33 PM    Comment   Case was discussed with Dr BEREKET Galvan and the patient's admission status was agreed to be Admission Status: inpatient status to the service of Dr Titi Galvan   Follow-up Information    None         Patient's Medications   Discharge Prescriptions    No medications on file       No discharge procedures on file      PDMP Review       Value Time User    PDMP Reviewed  Yes 6/17/2020  2:17 Myles Mcgowan MD          ED Provider  Electronically Signed by           Victoria Hunter III, DO  10/17/22 [id number]

## 2022-10-17 NOTE — ASSESSMENT & PLAN NOTE
· Admit inpatient to [facility] surge  · Most likely a vasovagal fall in the setting of using the bathroom  · All trauma workup is negative  · Monitor on telemetry for arrhythmias  · Obtain a PT and OT evaluation

## 2022-10-17 NOTE — ASSESSMENT & PLAN NOTE
Lab Results   Component Value Date    HGBA1C 10 2 (H) 07/01/2022       No results for input(s): POCGLU in the last 72 hours      Blood Sugar Average: Last 72 hrs:     · Target blood sugar for the hospital 140-180  · Continue Levemir  · Implement Accu-Cheks AC and HS with sliding scale coverage  · Diabetic neuropathy-continue gabapentin

## 2022-10-17 NOTE — ED NOTES
Dr Mccarthy Shows at bedside     Saludrach Becker, [address] [place] [address]  10/17/22 2736

## 2022-10-18 LAB
ALBUMIN SERPL BCP-MCNC: 3.3 G/DL (ref 3.5–5)
ALP SERPL-CCNC: 70 U/L (ref 34–104)
ALT SERPL W P-5'-P-CCNC: 26 U/L (ref 7–52)
ANION GAP SERPL CALCULATED.3IONS-SCNC: 8 MMOL/L (ref 4–13)
AST SERPL W P-5'-P-CCNC: 46 U/L (ref 13–39)
ATRIAL RATE: 86 BPM
BASOPHILS # BLD AUTO: 0.05 THOUSANDS/ÂΜL (ref 0–0.1)
BASOPHILS NFR BLD AUTO: 1 % (ref 0–1)
BILIRUB SERPL-MCNC: 0.64 MG/DL (ref 0.2–1)
BUN SERPL-MCNC: 12 MG/DL (ref 5–25)
CALCIUM ALBUM COR SERPL-MCNC: 8.9 MG/DL (ref 8.3–10.1)
CALCIUM SERPL-MCNC: 8.3 MG/DL (ref 8.4–10.2)
CHLORIDE SERPL-SCNC: 99 MMOL/L (ref 96–108)
CK MB SERPL-MCNC: 1.5 % (ref 0–2.5)
CK MB SERPL-MCNC: 14.9 NG/ML (ref 0.6–6.3)
CK SERPL-CCNC: 1002 U/L (ref 26–192)
CO2 SERPL-SCNC: 30 MMOL/L (ref 21–32)
CREAT SERPL-MCNC: 0.61 MG/DL (ref 0.6–1.3)
EOSINOPHIL # BLD AUTO: 0.03 THOUSAND/ÂΜL (ref 0–0.61)
EOSINOPHIL NFR BLD AUTO: 0 % (ref 0–6)
ERYTHROCYTE [DISTWIDTH] IN BLOOD BY AUTOMATED COUNT: 12.2 % (ref 11.6–15.1)
GFR SERPL CREATININE-BSD FRML MDRD: 85 ML/MIN/1.73SQ M
GLUCOSE SERPL-MCNC: 117 MG/DL (ref 65–140)
GLUCOSE SERPL-MCNC: 149 MG/DL (ref 65–140)
GLUCOSE SERPL-MCNC: 178 MG/DL (ref 65–140)
GLUCOSE SERPL-MCNC: 372 MG/DL (ref 65–140)
GLUCOSE SERPL-MCNC: 414 MG/DL (ref 65–140)
GLUCOSE SERPL-MCNC: 68 MG/DL (ref 65–140)
HCT VFR BLD AUTO: 34.5 % (ref 34.8–46.1)
HGB BLD-MCNC: 11.5 G/DL (ref 11.5–15.4)
IMM GRANULOCYTES # BLD AUTO: 0.01 THOUSAND/UL (ref 0–0.2)
IMM GRANULOCYTES NFR BLD AUTO: 0 % (ref 0–2)
LYMPHOCYTES # BLD AUTO: 1.43 THOUSANDS/ÂΜL (ref 0.6–4.47)
LYMPHOCYTES NFR BLD AUTO: 17 % (ref 14–44)
MAGNESIUM SERPL-MCNC: 1.7 MG/DL (ref 1.9–2.7)
MCH RBC QN AUTO: 32.9 PG (ref 26.8–34.3)
MCHC RBC AUTO-ENTMCNC: 33.3 G/DL (ref 31.4–37.4)
MCV RBC AUTO: 99 FL (ref 82–98)
MONOCYTES # BLD AUTO: 0.94 THOUSAND/ÂΜL (ref 0.17–1.22)
MONOCYTES NFR BLD AUTO: 11 % (ref 4–12)
NEUTROPHILS # BLD AUTO: 5.81 THOUSANDS/ÂΜL (ref 1.85–7.62)
NEUTS SEG NFR BLD AUTO: 71 % (ref 43–75)
NRBC BLD AUTO-RTO: 0 /100 WBCS
P AXIS: 60 DEGREES
PHOSPHATE SERPL-MCNC: 3.7 MG/DL (ref 2.3–4.1)
PLATELET # BLD AUTO: 247 THOUSANDS/UL (ref 149–390)
PMV BLD AUTO: 11.2 FL (ref 8.9–12.7)
POTASSIUM SERPL-SCNC: 3.9 MMOL/L (ref 3.5–5.3)
PR INTERVAL: 146 MS
PROT SERPL-MCNC: 5.4 G/DL (ref 6.4–8.4)
QRS AXIS: 21 DEGREES
QRSD INTERVAL: 74 MS
QT INTERVAL: 386 MS
QTC INTERVAL: 461 MS
RBC # BLD AUTO: 3.5 MILLION/UL (ref 3.81–5.12)
SODIUM SERPL-SCNC: 137 MMOL/L (ref 135–147)
T WAVE AXIS: 34 DEGREES
VENTRICULAR RATE: 86 BPM
WBC # BLD AUTO: 8.27 THOUSAND/UL (ref 4.31–10.16)

## 2022-10-18 PROCEDURE — 97166 OT EVAL MOD COMPLEX 45 MIN: CPT

## 2022-10-18 PROCEDURE — 97163 PT EVAL HIGH COMPLEX 45 MIN: CPT

## 2022-10-18 PROCEDURE — 82550 ASSAY OF CK (CPK): CPT | Performed by: HOSPITALIST

## 2022-10-18 PROCEDURE — 80053 COMPREHEN METABOLIC PANEL: CPT | Performed by: HOSPITALIST

## 2022-10-18 PROCEDURE — 82948 REAGENT STRIP/BLOOD GLUCOSE: CPT

## 2022-10-18 PROCEDURE — 93010 ELECTROCARDIOGRAM REPORT: CPT | Performed by: INTERNAL MEDICINE

## 2022-10-18 PROCEDURE — 83735 ASSAY OF MAGNESIUM: CPT | Performed by: HOSPITALIST

## 2022-10-18 PROCEDURE — 82553 CREATINE MB FRACTION: CPT | Performed by: HOSPITALIST

## 2022-10-18 PROCEDURE — 99232 SBSQ HOSP IP/OBS MODERATE 35: CPT | Performed by: NURSE PRACTITIONER

## 2022-10-18 PROCEDURE — 84100 ASSAY OF PHOSPHORUS: CPT | Performed by: HOSPITALIST

## 2022-10-18 PROCEDURE — 85025 COMPLETE CBC W/AUTO DIFF WBC: CPT | Performed by: HOSPITALIST

## 2022-10-18 RX ORDER — MAGNESIUM SULFATE HEPTAHYDRATE 40 MG/ML
2 INJECTION, SOLUTION INTRAVENOUS ONCE
Status: COMPLETED | OUTPATIENT
Start: 2022-10-18 | End: 2022-10-18

## 2022-10-18 RX ORDER — METHOCARBAMOL 500 MG/1
500 TABLET, FILM COATED ORAL EVERY 8 HOURS PRN
Status: DISCONTINUED | OUTPATIENT
Start: 2022-10-18 | End: 2022-10-25 | Stop reason: HOSPADM

## 2022-10-18 RX ORDER — MECLIZINE HCL 12.5 MG/1
25 TABLET ORAL EVERY 8 HOURS SCHEDULED
Status: DISCONTINUED | OUTPATIENT
Start: 2022-10-18 | End: 2022-10-25 | Stop reason: HOSPADM

## 2022-10-18 RX ADMIN — GABAPENTIN 300 MG: 300 CAPSULE ORAL at 20:28

## 2022-10-18 RX ADMIN — MECLIZINE 25 MG: 12.5 TABLET ORAL at 13:37

## 2022-10-18 RX ADMIN — PANTOPRAZOLE SODIUM 40 MG: 40 TABLET, DELAYED RELEASE ORAL at 09:40

## 2022-10-18 RX ADMIN — PANTOPRAZOLE SODIUM 40 MG: 40 TABLET, DELAYED RELEASE ORAL at 20:28

## 2022-10-18 RX ADMIN — DOCUSATE SODIUM 100 MG: 100 CAPSULE, LIQUID FILLED ORAL at 09:40

## 2022-10-18 RX ADMIN — OXYCODONE HYDROCHLORIDE AND ACETAMINOPHEN 500 MG: 500 TABLET ORAL at 17:38

## 2022-10-18 RX ADMIN — MAGNESIUM SULFATE HEPTAHYDRATE 2 G: 40 INJECTION, SOLUTION INTRAVENOUS at 11:56

## 2022-10-18 RX ADMIN — INSULIN DETEMIR 5 UNITS: 100 INJECTION, SOLUTION SUBCUTANEOUS at 21:59

## 2022-10-18 RX ADMIN — LEVOTHYROXINE SODIUM 112 MCG: 112 TABLET ORAL at 05:08

## 2022-10-18 RX ADMIN — Medication 2 TABLET: at 09:40

## 2022-10-18 RX ADMIN — DOCUSATE SODIUM 100 MG: 100 CAPSULE, LIQUID FILLED ORAL at 17:38

## 2022-10-18 RX ADMIN — MECLIZINE 25 MG: 12.5 TABLET ORAL at 21:58

## 2022-10-18 RX ADMIN — CYANOCOBALAMIN TAB 500 MCG 1000 MCG: 500 TAB at 09:40

## 2022-10-18 RX ADMIN — HEPARIN SODIUM 5000 UNITS: 5000 INJECTION INTRAVENOUS; SUBCUTANEOUS at 13:38

## 2022-10-18 RX ADMIN — HEPARIN SODIUM 5000 UNITS: 5000 INJECTION INTRAVENOUS; SUBCUTANEOUS at 05:08

## 2022-10-18 RX ADMIN — HEPARIN SODIUM 5000 UNITS: 5000 INJECTION INTRAVENOUS; SUBCUTANEOUS at 21:58

## 2022-10-18 RX ADMIN — INSULIN LISPRO 3 UNITS: 100 INJECTION, SOLUTION INTRAVENOUS; SUBCUTANEOUS at 16:24

## 2022-10-18 RX ADMIN — GABAPENTIN 300 MG: 300 CAPSULE ORAL at 16:23

## 2022-10-18 RX ADMIN — MORPHINE SULFATE 2 MG: 2 INJECTION, SOLUTION INTRAMUSCULAR; INTRAVENOUS at 03:24

## 2022-10-18 RX ADMIN — METHOCARBAMOL 500 MG: 500 TABLET ORAL at 17:38

## 2022-10-18 RX ADMIN — OXYCODONE HYDROCHLORIDE AND ACETAMINOPHEN 500 MG: 500 TABLET ORAL at 09:40

## 2022-10-18 RX ADMIN — INSULIN LISPRO 1 UNITS: 100 INJECTION, SOLUTION INTRAVENOUS; SUBCUTANEOUS at 11:30

## 2022-10-18 RX ADMIN — Medication 2 TABLET: at 16:24

## 2022-10-18 RX ADMIN — ACETAMINOPHEN 650 MG: 325 TABLET ORAL at 09:45

## 2022-10-18 RX ADMIN — GABAPENTIN 300 MG: 300 CAPSULE ORAL at 09:40

## 2022-10-18 RX ADMIN — INSULIN LISPRO 4 UNITS: 100 INJECTION, SOLUTION INTRAVENOUS; SUBCUTANEOUS at 21:58

## 2022-10-18 RX ADMIN — HYDROCODONE BITARTRATE AND ACETAMINOPHEN 1 TABLET: 5; 325 TABLET ORAL at 05:08

## 2022-10-18 RX ADMIN — CEFTRIAXONE 1000 MG: 1 INJECTION, SOLUTION INTRAVENOUS at 16:23

## 2022-10-18 NOTE — PLAN OF CARE
Problem: Potential for Falls  Goal: Patient will remain free of falls  Description: INTERVENTIONS:  - Educate patient/family on patient safety including physical limitations  - Instruct patient to call for assistance with activity   - Consult OT/PT to assist with strengthening/mobility   - Keep Call bell within reach  - Keep bed low and locked with side rails adjusted as appropriate  - Keep care items and personal belongings within reach  - Initiate and maintain comfort rounds  - Make Fall Risk Sign visible to staff  - Offer Toileting in advance of need  - Initiate/Maintain bed alarm  - Obtain necessary fall risk management equipment:   - Apply yellow socks and bracelet for high fall risk patients  - Consider moving patient to room near nurses station  Outcome: Progressing     Problem: Prexisting or High Potential for Compromised Skin Integrity  Goal: Skin integrity is maintained or improved  Description: INTERVENTIONS:  - Identify patients at risk for skin breakdown  - Assess and monitor skin integrity  - Assess and monitor nutrition and hydration status  - Monitor labs   - Assess for incontinence   - Turn and reposition patient  - Assist with mobility/ambulation  - Relieve pressure over bony prominences  - Avoid friction and shearing  - Provide appropriate hygiene as needed including keeping skin clean and dry  - Evaluate need for skin moisturizer/barrier cream  - Collaborate with interdisciplinary team   - Patient/family teaching  - Consider wound care consult   Outcome: Progressing     Problem: Nutrition/Hydration-ADULT  Goal: Nutrient/Hydration intake appropriate for improving, restoring or maintaining nutritional needs  Description: Monitor and assess patient's nutrition/hydration status for malnutrition  Collaborate with interdisciplinary team and initiate plan and interventions as ordered  Monitor patient's weight and dietary intake as ordered or per policy   Utilize nutrition screening tool and intervene as necessary  Determine patient's food preferences and provide high-protein, high-caloric foods as appropriate  INTERVENTIONS:  - Monitor oral intake, urinary output, labs, and treatment plans  - Assess nutrition and hydration status and recommend course of action  - Evaluate amount of meals eaten  - Assist patient with eating if necessary   - Allow adequate time for meals  - Recommend/ encourage appropriate diets, oral nutritional supplements, and vitamin/mineral supplements  - Order, calculate, and assess calorie counts as needed  - Recommend, monitor, and adjust tube feedings and TPN/PPN based on assessed needs  - Assess need for intravenous fluids  - Provide specific nutrition/hydration education as appropriate  - Include patient/family/caregiver in decisions related to nutrition  Outcome: Progressing     Problem: MOBILITY - ADULT  Goal: Maintain or return to baseline ADL function  Description: INTERVENTIONS:  -  Assess patient's ability to carry out ADLs; assess patient's baseline for ADL function and identify physical deficits which impact ability to perform ADLs (bathing, care of mouth/teeth, toileting, grooming, dressing, etc )  - Assess/evaluate cause of self-care deficits   - Assess range of motion  - Assess patient's mobility; develop plan if impaired  - Assess patient's need for assistive devices and provide as appropriate  - Encourage maximum independence but intervene and supervise when necessary  - Involve family in performance of ADLs  - Assess for home care needs following discharge   - Consider OT consult to assist with ADL evaluation and planning for discharge  - Provide patient education as appropriate  Outcome: Progressing  Goal: Maintains/Returns to pre admission functional level  Description: INTERVENTIONS:  - Perform BMAT or MOVE assessment daily    - Set and communicate daily mobility goal to care team and patient/family/caregiver     - Collaborate with rehabilitation services on mobility goals if consulted  - Record patient progress and toleration of activity level   Outcome: Progressing     Problem: PAIN - ADULT  Goal: Verbalizes/displays adequate comfort level or baseline comfort level  Description: Interventions:  - Encourage patient to monitor pain and request assistance  - Assess pain using appropriate pain scale  - Administer analgesics based on type and severity of pain and evaluate response  - Implement non-pharmacological measures as appropriate and evaluate response  - Consider cultural and social influences on pain and pain management  - Notify physician/advanced practitioner if interventions unsuccessful or patient reports new pain  Outcome: Progressing     Problem: INFECTION - ADULT  Goal: Absence or prevention of progression during hospitalization  Description: INTERVENTIONS:  - Assess and monitor for signs and symptoms of infection  - Monitor lab/diagnostic results  - Monitor all insertion sites, i e  indwelling lines, tubes, and drains  - Monitor endotracheal if appropriate and nasal secretions for changes in amount and color  - [place] appropriate cooling/warming therapies per order  - Administer medications as ordered  - Instruct and encourage patient and family to use good hand hygiene technique  - Identify and instruct in appropriate isolation precautions for identified infection/condition  Outcome: Progressing     Problem: DISCHARGE PLANNING  Goal: Discharge to home or other facility with appropriate resources  Description: INTERVENTIONS:  - Identify barriers to discharge w/patient and caregiver  - Arrange for needed discharge resources and transportation as appropriate  - Identify discharge learning needs (meds, wound care, etc )  - Arrange for interpretive services to assist at discharge as needed  - Refer to Case Management Department for coordinating discharge planning if the patient needs post-hospital services based on physician/advanced practitioner order or complex needs related to functional status, cognitive ability, or social support system  Outcome: Progressing     Problem: Knowledge Deficit  Goal: Patient/family/caregiver demonstrates understanding of disease process, treatment plan, medications, and discharge instructions  Description: Complete learning assessment and assess knowledge base    Interventions:  - Provide teaching at level of understanding  - Provide teaching via preferred learning methods  Outcome: Progressing     Problem: GENITOURINARY - ADULT  Goal: Maintains or returns to baseline urinary function  Description: INTERVENTIONS:  - Assess urinary function  - Encourage oral fluids to ensure adequate hydration if ordered  - Administer IV fluids as ordered to ensure adequate hydration  - Administer ordered medications as needed  - Offer frequent toileting  - Follow urinary retention protocol if ordered  Outcome: Progressing     Problem: METABOLIC, FLUID AND ELECTROLYTES - ADULT  Goal: Electrolytes maintained within normal limits  Description: INTERVENTIONS:  - Monitor labs and assess patient for signs and symptoms of electrolyte imbalances  - Administer electrolyte replacement as ordered  - Monitor response to electrolyte replacements, including repeat lab results as appropriate  - Instruct patient on fluid and nutrition as appropriate  Outcome: Progressing  Goal: Fluid balance maintained  Description: INTERVENTIONS:  - Monitor labs   - Monitor I/O and WT  - Instruct patient on fluid and nutrition as appropriate  - Assess for signs & symptoms of volume excess or deficit  Outcome: Progressing  Goal: Glucose maintained within target range  Description: INTERVENTIONS:  - Monitor Blood Glucose as ordered  - Assess for signs and symptoms of hyperglycemia and hypoglycemia  - Administer ordered medications to maintain glucose within target range  - Assess nutritional intake and initiate nutrition service referral as needed  Outcome: Progressing

## 2022-10-18 NOTE — PLAN OF CARE
Problem: Potential for Falls  Goal: Patient will remain free of falls  Description: INTERVENTIONS:  - Educate patient/family on patient safety including physical limitations  - Instruct patient to call for assistance with activity   - Consult OT/PT to assist with strengthening/mobility   - Keep Call bell within reach  - Keep bed low and locked with side rails adjusted as appropriate  - Keep care items and personal belongings within reach  - Initiate and maintain comfort rounds  - Make Fall Risk Sign visible to staff   - Offer Toileting in advance of need  - Initiate/Maintain bed alarm  - Obtain necessary fall risk management equipment:   - Apply yellow socks and bracelet for high fall risk patients  - Consider moving patient to room near nurses station  Outcome: Progressing     Problem: Prexisting or High Potential for Compromised Skin Integrity  Goal: Skin integrity is maintained or improved  Description: INTERVENTIONS:  - Identify patients at risk for skin breakdown  - Assess and monitor skin integrity  - Assess and monitor nutrition and hydration status  - Monitor labs   - Assess for incontinence   - Turn and reposition patient  - Assist with mobility/ambulation  - Relieve pressure over bony prominences  - Avoid friction and shearing  - Provide appropriate hygiene as needed including keeping skin clean and dry  - Evaluate need for skin moisturizer/barrier cream  - Collaborate with interdisciplinary team   - Patient/family teaching  - Consider wound care consult   Outcome: Progressing     Problem: Nutrition/Hydration-ADULT  Goal: Nutrient/Hydration intake appropriate for improving, restoring or maintaining nutritional needs  Description: Monitor and assess patient's nutrition/hydration status for malnutrition  Collaborate with interdisciplinary team and initiate plan and interventions as ordered  Monitor patient's weight and dietary intake as ordered or per policy   Utilize nutrition screening tool and intervene as necessary  Determine patient's food preferences and provide high-protein, high-caloric foods as appropriate  INTERVENTIONS:  - Monitor oral intake, urinary output, labs, and treatment plans  - Assess nutrition and hydration status and recommend course of action  - Evaluate amount of meals eaten  - Assist patient with eating if necessary   - Allow adequate time for meals  - Recommend/ encourage appropriate diets, oral nutritional supplements, and vitamin/mineral supplements  - Order, calculate, and assess calorie counts as needed  - Recommend, monitor, and adjust tube feedings and TPN/PPN based on assessed needs  - Assess need for intravenous fluids  - Provide specific nutrition/hydration education as appropriate  - Include patient/family/caregiver in decisions related to nutrition  Outcome: Progressing     Problem: MOBILITY - ADULT  Goal: Maintain or return to baseline ADL function  Description: INTERVENTIONS:  -  Assess patient's ability to carry out ADLs; assess patient's baseline for ADL function and identify physical deficits which impact ability to perform ADLs (bathing, care of mouth/teeth, toileting, grooming, dressing, etc )  - Assess/evaluate cause of self-care deficits   - Assess range of motion  - Assess patient's mobility; develop plan if impaired  - Assess patient's need for assistive devices and provide as appropriate  - Encourage maximum independence but intervene and supervise when necessary  - Involve family in performance of ADLs  - Assess for home care needs following discharge   - Consider OT consult to assist with ADL evaluation and planning for discharge  - Provide patient education as appropriate  Outcome: Progressing  Goal: Maintains/Returns to pre admission functional level  Description: INTERVENTIONS:  - Perform BMAT or MOVE assessment daily    - Set and communicate daily mobility goal to care team and patient/family/caregiver     - Collaborate with rehabilitation services on mobility goals if consulted  - Record patient progress and toleration of activity level   Outcome: Progressing     Problem: PAIN - ADULT  Goal: Verbalizes/displays adequate comfort level or baseline comfort level  Description: Interventions:  - Encourage patient to monitor pain and request assistance  - Assess pain using appropriate pain scale  - Administer analgesics based on type and severity of pain and evaluate response  - Implement non-pharmacological measures as appropriate and evaluate response  - Consider cultural and social influences on pain and pain management  - Notify physician/advanced practitioner if interventions unsuccessful or patient reports new pain  Outcome: Progressing     Problem: INFECTION - ADULT  Goal: Absence or prevention of progression during hospitalization  Description: INTERVENTIONS:  - Assess and monitor for signs and symptoms of infection  - Monitor lab/diagnostic results  - Monitor all insertion sites, i e  indwelling lines, tubes, and drains  - Monitor endotracheal if appropriate and nasal secretions for changes in amount and color  - [place] appropriate cooling/warming therapies per order  - Administer medications as ordered  - Instruct and encourage patient and family to use good hand hygiene technique  - Identify and instruct in appropriate isolation precautions for identified infection/condition  Outcome: Progressing     Problem: DISCHARGE PLANNING  Goal: Discharge to home or other facility with appropriate resources  Description: INTERVENTIONS:  - Identify barriers to discharge w/patient and caregiver  - Arrange for needed discharge resources and transportation as appropriate  - Identify discharge learning needs (meds, wound care, etc )  - Arrange for interpretive services to assist at discharge as needed  - Refer to Case Management Department for coordinating discharge planning if the patient needs post-hospital services based on physician/advanced practitioner order or complex needs related to functional status, cognitive ability, or social support system  Outcome: Progressing     Problem: Knowledge Deficit  Goal: Patient/family/caregiver demonstrates understanding of disease process, treatment plan, medications, and discharge instructions  Description: Complete learning assessment and assess knowledge base    Interventions:  - Provide teaching at level of understanding  - Provide teaching via preferred learning methods  Outcome: Progressing     Problem: GENITOURINARY - ADULT  Goal: Maintains or returns to baseline urinary function  Description: INTERVENTIONS:  - Assess urinary function  - Encourage oral fluids to ensure adequate hydration if ordered  - Administer IV fluids as ordered to ensure adequate hydration  - Administer ordered medications as needed  - Offer frequent toileting  - Follow urinary retention protocol if ordered  Outcome: Progressing     Problem: METABOLIC, FLUID AND ELECTROLYTES - ADULT  Goal: Electrolytes maintained within normal limits  Description: INTERVENTIONS:  - Monitor labs and assess patient for signs and symptoms of electrolyte imbalances  - Administer electrolyte replacement as ordered  - Monitor response to electrolyte replacements, including repeat lab results as appropriate  - Instruct patient on fluid and nutrition as appropriate  Outcome: Progressing  Goal: Fluid balance maintained  Description: INTERVENTIONS:  - Monitor labs   - Monitor I/O and WT  - Instruct patient on fluid and nutrition as appropriate  - Assess for signs & symptoms of volume excess or deficit  Outcome: Progressing  Goal: Glucose maintained within target range  Description: INTERVENTIONS:  - Monitor Blood Glucose as ordered  - Assess for signs and symptoms of hyperglycemia and hypoglycemia  - Administer ordered medications to maintain glucose within target range  - Assess nutritional intake and initiate nutrition service referral as needed  Outcome: Progressing

## 2022-10-18 NOTE — CASE MANAGEMENT
Case Management Assessment & Discharge Planning Note    Patient name Renée Power  [facility] /-02 MRN [id number]  : 1940 Date 10/18/2022       Current Admission Date: 10/17/2022  Current Admission Diagnosis:Traumatic rhabdomyolysis [facility])   Patient Active Problem List    Diagnosis Date Noted   • Fall 10/17/2022   • Traumatic rhabdomyolysis ([facility] [address] ) 10/17/2022   • Leukemoid reaction 10/17/2022   • Gastroesophageal reflux disease without esophagitis 10/17/2022   • Visual changes 2022   • SIRS of non-infectious origin w acute organ dysfunction ([address] ) 07/10/2022   • High anion gap metabolic acidosis    • Coffee ground emesis 07/10/2022   • RAMIRO (acute kidney injury) ([facility] [address] ) 07/10/2022   • Abnormal CT of the abdomen 07/10/2022   • Soft tissue radionecrosis 2022   • Osteoradionecrosis of temporal bone ([address] ) 2022   • Primary osteoarthritis of right shoulder 2021   • Right knee pain 2020   • Acute hip pain, right 2020   • Ambulatory dysfunction    • Primary osteoarthritis of both knees 2020   • Hypophosphatemia 2020   • Elevated vitamin B12 level 2020   • Anemia 2020   • Hyponatremia 2020   • Closed intertrochanteric fracture of right femur ([address] ) 2020   • Acquired hypothyroidism 2015   • HLD (hyperlipidemia) 2014   • HTN (hypertension) 10/10/2013   • Type 2 diabetes mellitus with diabetic polyneuropathy, with long-term current use of insulin ([facility] [address] ) 2008      LOS (days): 1  Geometric Mean LOS (GMLOS) (days): 2 50  Days to GMLOS:1 4     OBJECTIVE:    Risk of Unplanned Readmission Score: 23 69         Current admission status: Inpatient  Referral Reason: Other (d/c planning)    Preferred Pharmacy:   [facility] [address] [place] [address] [address] [place] [address]  [address] [place] [id number]  Phone: [phone] Fax: [phone]    Primary Care Provider: Edgard Owen MD    Primary Insurance: MEDICARE  Secondary Insurance: AARP    ASSESSMENT:  Active Health Care Proxies     [facility] Representative - Daughter   Primary Phone: [phone] (Mobile)  Home Phone: [phone]               Advance Directives  Does patient have a [address]?: Yes (family stated they did bring the paperwork to the hospital)  Does patient have Advance Directives?: Yes (family stated they did bring paperwork to the hospital)         Readmission Root Cause  30 Day Readmission: No    Patient Information  Admitted from[de-identified] Home  Mental Status: Alert  During Assessment patient was accompanied by: Not accompanied during assessment  Assessment information provided by[de-identified] Patient, Daughter, Son  Primary Caregiver: Family  Caregiver's Name[de-identified] Celina Mahoney Relationship to Patient[de-identified] Family Member (son)  Caregiver's Telephone Number[de-identified] [phone]  Support Systems: Son, Daughter  [place] of Residence: [address] do you live in?: [address] entry access options   Select all that apply : Stairs  Number of steps to enter home : 2  Do the steps have railings?: Yes  Type of Current Residence: [place]  In the last 12 months, was there a time when you were not able to pay the mortgage or rent on time?: No  In the last 12 months, how many places have you lived?: 1  In the last 12 months, was there a time when you did not have a steady place to sleep or slept in a shelter (including now)?: No  Homeless/housing insecurity resource given?: N/A  Living Arrangements: Lives Alone  Is patient a ?: No (her  was in the [facility] but she does not receive benefits)    Activities of Daily Living Prior to Admission  Functional Status: Assistance (driving, shopping, son over sees her insulin and glucose checks, she takes her other medications on her own,)  Completes ADLs independently?: Yes  Ambulates independently?: Yes  Does patient use assisted devices?: Yes  Assisted Devices (DME) used: Eva Mccoy  Does patient currently own DME?: Yes  What DME does the patient currently own?: Louis Schmidt, Shower Chair, Other (Comment) (raised toiet seat, glucometer)  Does patient have a history of Outpatient Therapy (PT/OT)?: Yes (" in your home therapy"   with ED)  Does the patient have a history of Short-Term Rehab?: No  Does patient have a history of HHC?: No  Does patient currently have [place] ?: No         Patient Information Continued  Income Source: Pension/prison  Does patient have prescription coverage?: Yes (mail order and St[employer])  Within the past 12 months, you worried that your food would run out before you got the money to buy more : Never true  Within the past 12 months, the food you bought just didn't last and you didn't have money to get more : Never true  Food insecurity resource given?: N/A  Does patient receive dialysis treatments?: No  Does patient have a history of substance abuse?: No  Does patient have a history of Mental Health Diagnosis? :  (no)         Means of Transportation  Means of Transport to Appts[de-identified] Family transport  In the past 12 months, has lack of transportation kept you from medical appointments or from getting medications?: No  In the past 12 months, has lack of transportation kept you from meetings, work, or from getting things needed for daily living?: No  Was application for public transport provided?: N/A        DISCHARGE DETAILS:    Discharge planning discussed with[de-identified] patient  and son was called @ 13:27 pm and Favian Arnett was called at 13:26pm LM and she called cm back at 13:36pm  Freedom of Choice: Yes  Comments - Freedom of Choice: recommendation is for rehab- family and pt do not want SNF rehab,, family and pt are in agreement wi ARU or home with [facility]  permission given to place referrals  CM contacted family/caregiver?: Yes             Contacts  Patient Contacts: Antione Wayne dtr  Relationship to Patient[de-identified] Family (daughter)  Contact Method: Phone  Phone Number: [phone]  Reason/Outcome: Discharge [address]         Is the patient interested in [facility] [place] at discharge?: Yes  [address] requested[de-identified] Occupational Therapy, Physical Therapy, [address] Name[de-identified] Other  [address] [facility] Provider[de-identified] PCP  Home Health Services Needed[de-identified] Diabetes Management, Strengthening/Theraputic Exercises to Improve Function  Homebound Criteria Met[de-identified] Uses an Assist Device (i e  cane, walker, etc), Requires the Assistance of Another Person for Safe Ambulation or to Leave the Home  Supporting Clincal Findings[de-identified] Limited Endurance         Other Referral/Resources/Interventions Provided:  Interventions: Acute Rehab, HHC  Referral Comments: aru vs hhc   referrals were sent with permission , cm will continue to follow and work on a safe d/c plan    Would you like to participate in our [address] service program?  : No - Declined    Treatment Team Recommendation:  (d/c plan tbd- transport tbd)

## 2022-10-18 NOTE — PLAN OF CARE
Problem: PHYSICAL THERAPY ADULT  Goal: Performs mobility at highest level of function for planned discharge setting  See evaluation for individualized goals  Description: Treatment/Interventions: Functional transfer training, LE strengthening/ROM, Elevations, Therapeutic exercise, Endurance training, Patient/family training, Equipment eval/education, Bed mobility, Gait training, Spoke to nursing, Spoke to advanced practitioner          See flowsheet documentation for full assessment, interventions and recommendations  10/18/2022 1129 by Charles Ovalle PT  Note: Prognosis: Fair  Problem List: Decreased strength, Impaired balance, Decreased endurance, Decreased mobility, Decreased coordination, Pain  Assessment: Pt is 80 y o  female seen for PT evaluation s/p admit to [facility] on 10/17/2022 w/ Traumatic rhabdomyolysis ([facility] [address] )  PT consulted to assess pt's functional mobility and d/c needs  Order placed for PT eval and tx, w/ up and OOB as tolerated order  Comorbidities affecting pt's physical performance at time of assessment include: weakness,traumatic rhabdomyolysis, fall, leukemoid reaction,type 2 DM w/neurologic complication,anemia  PTA, pt was independent w/ all functional mobility w/ AD usage  Personal factors affecting pt at time of IE include: stairs to enter home, inability to ambulate household distances, inability to navigate community distances, inability to navigate level surfaces w/o external assistance, unable to perform dynamic tasks in community, positive fall history, unable to perform physical activity, limited insight into impairments, inability to perform IADLs, inability to perform ADLs and inability to live alone   Please find objective findings from PT assessment regarding body systems outlined above with impairments and limitations including weakness, impaired balance, decreased endurance, impaired coordination, gait deviations, pain, decreased activity tolerance, decreased functional mobility tolerance and fall risk  From PT/mobility standpoint, recommendation at time of d/c would be post acute rehabilitation services pending progress in order to facilitate return to PLOF  Barriers to Discharge: Inaccessible home environment, Decreased caregiver support     PT Discharge Recommendation: (S) Post acute rehabilitation services    See flowsheet documentation for full assessment

## 2022-10-18 NOTE — PHYSICAL THERAPY NOTE
Physical Therapy Evaluation     Patient's Name: Amber Fox    Admitting Diagnosis  Rhabdomyolysis [M62 82]  Leukocytosis [D72 829]  UTI (urinary tract infection) [N39 0]  Head injury [S09 90XA]  Fall [W19  XXXA]  Scalp contusion [S00 03XA]    Problem List  Patient Active Problem List   Diagnosis    HLD (hyperlipidemia)    HTN (hypertension)    Acquired hypothyroidism    Type 2 diabetes mellitus with neurologic complication, with long-term current use of insulin (HCC)    Closed intertrochanteric fracture of right femur (HCC)    Hyponatremia    Elevated vitamin B12 level    Anemia    Hypophosphatemia    Primary osteoarthritis of both knees    Acute hip pain, right    Ambulatory dysfunction    Right knee pain    Primary osteoarthritis of right shoulder    Soft tissue radionecrosis    Osteoradionecrosis of temporal bone (HCC)    SIRS of non-infectious origin w acute organ dysfunction (HCC)    High anion gap metabolic acidosis    Coffee ground emesis    RAMIRO (acute kidney injury) ([address] )    Abnormal CT of the abdomen    Visual changes    Fall    Traumatic rhabdomyolysis ([address] )    Leukemoid reaction    Gastroesophageal reflux disease without esophagitis       Past Medical History  Past Medical History:   Diagnosis Date    Ambulates with cane     Cancer ([address] )     Chronic pain disorder     knees/ shoulders (gets inj every 3 mos)    Controlled type 2 diabetes mellitus with diabetic polyneuropathy, with long-term current use of insulin ([address] ) 5/21/2008    Diabetes mellitus ([address] )     Diabetic polyneuropathy ([address] ) 5/21/2008    ICD10 clean up    Disease of thyroid gland     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    HL (hearing loss)     Hodgkin's disease ([address] ) 2008    Left neck, had radiation    Hypertension     Neuropathy     Osteoporosis     RA (rheumatoid arthritis) ([address] )        Past Surgical History  Past Surgical History:   Procedure Laterality Date    ADENOIDECTOMY      APPENDECTOMY      CATARACT EXTRACTION      CATARACT EXTRACTION, BILATERAL      CHOLECYSTECTOMY      FRACTURE SURGERY Right     hip    MOUTH SURGERY      oral cancer left lower lip    OVARY SURGERY      DE ADJ TISS XFER HEAD,FAC,HAND <10 SQCM N/A 3/28/2022    Procedure: Adjacent tissue transfer face;  Surgeon: Zoraida López MD;  Location: [facility];  Service: ENT    DE MASTOIDECTOMY,SIMPLE Left 3/28/2022    Procedure: MASTOIDECTOMY;  Surgeon: Zoraida López MD;  Location: [facility];  Service: ENT    DE OPEN RX FEMUR FX+INTRAMED SHE Right 5/28/2020    Procedure: INSERTION NAIL IM FEMUR ANTEGRADE (TROCHANTERIC); Surgeon: Mariajose Manzanares DO;  Location: [facility] MAIN OR;  Service: Orthopedics    TONSILLECTOMY      TOTAL THYROIDECTOMY  2008    Performed after left neck dissection and left oral cancer diagnosis        10/18/22 0836   PT Last Visit   PT Visit Date 10/18/22   Note Type   Note type Evaluation   Pain Assessment   Pain Assessment Tool 0-10   Pain Score 8   Pain Location/Orientation Orientation: Bilateral;Location: Shoulder   Pain Onset/Description Onset: Ongoing;Frequency: Constant/Continuous; Descriptor: Sore   Effect of Pain on Daily Activities yes   Patient's Stated Pain Goal No pain   Hospital Pain Intervention(s) Medication (See MAR); Repositioned; Ambulation/increased activity; Emotional support; Environmental changes; Elevated   Multiple Pain Sites Yes   Pain 2   Pain Score 2 9   Pain Location/Orientation 2 Orientation: Bilateral;Location: Foot   Pain Onset/Description 2 Onset: Ongoing;Frequency: Constant/Continuous; Descriptor: Burning   Patient's Stated Pain Goal 2 No pain   Hospital Pain Intervention(s) 2 Medication (See MAR); Repositioned;Elevated; Emotional support   Restrictions/Precautions   Weight Bearing Precautions Per Order No   Braces or Orthoses C/S Collar  (soft collar utilized at baseline)   Other Precautions Fall Risk;Pain;Multiple lines; Bed Alarm   Home Living   Type of [address] [place] [address] One level;Performs ADLs on one level;Able to live on main level with bedroom/bathroom;Stairs to enter with rails  (2 TEE)   Bathroom Shower/Tub Tub/shower unit   [employer] Raised  (via toilet riser)   Bathroom Equipment Grab bars in shower;Grab bars around toilet;Commode; Shower chair   [address]  (alternates usage between rollator and RW)   Prior Function   Level of [place] Independent with ADLs   Lives With Alone   Receives Help From Family  (son lives across the street)   IADLs Independent with driving; Independent with meal prep; Independent with medication management  (reports son only helps with insulin at night "makes him feel good')   Falls in the last 6 months 1 to 4  (x 2, one prior to arrival)   Vocational Retired   [employer]   Additional Pertinent History Myrisa OT present for co-assessment due to medical complexity, required skilled interventions of 2 clinicians for care delivery  Family/Caregiver Present No   Cognition   Overall Cognitive Status WFL   Arousal/Participation Alert   Orientation Level Oriented X4   Memory Within functional limits   Following Commands Follows one step commands with increased time or repetition   Comments Pt  agreeable to PT assessment, pleasant  RLE Assessment   RLE Assessment X  (3/5 gross musculature)   LLE Assessment   LLE Assessment X  (3/5 gross musculature)   Vision-Basic Assessment   Current Vision Other (Comment)  (has lense implants B)   Vestibular   Spontaneous Nystagmus (-) no evidence of nystagmus at rest in room light   Coordination   Movements are Fluid and Coordinated 0   Coordination and Movement Description Incremental, antalgic mobilization requiring significant physical assistance of 2 for all phases of mobility  Sharp/Dull   RLE Sharp/Dull Grossly intact   LLE Sharp/Dull Grossly intact   Bed Mobility   Supine to Sit 2  Maximal assistance   Additional items Assist x 2;HOB elevated; Bedrails; Increased time required;Verbal cues;LE management   Sit to Supine 2  Maximal assistance   Additional items Assist x 2;Bedrails; Increased time required;Verbal cues;LE management   Additional Comments Upon sitting edge of bed less than one minute, Dariana Martinez reported feeling severely dizzy, "bed was moving"  Increased time provided to allow symptomatic resolution  However patient's symptoms appeared to intensify and safety concerns required return to bed as her sitting balance was very poor  Nystagmus could not be observed  Patient was assisted back to bed with resolution within 2 minutes  Transfers   Sit to Stand Unable to assess  (could not safely advance at this time)   Ambulation/Elevation   Gait pattern Not tested   Balance   Static Sitting Poor   Dynamic Sitting Poor -   Static Standing   (unable to safely assess)   Endurance Deficit   Endurance Deficit Yes   Activity Tolerance   Activity Tolerance Patient limited by fatigue;Patient limited by pain;Treatment limited secondary to medical complications (Comment)  (dizziness, spinning sensation)   Medical Staff Made Aware Yes, Martni CAMPBELL aware of assessment outcome  Nurse Made Aware yes, Raine NATH   Assessment   Prognosis Fair   Problem List Decreased strength; Impaired balance;Decreased endurance;Decreased mobility; Decreased coordination;Pain   Assessment Pt is 80 y o  female seen for PT evaluation s/p admit to [facility] on 10/17/2022 w/ Traumatic rhabdomyolysis ([facility] [address] )  PT consulted to assess pt's functional mobility and d/c needs  Order placed for PT eval and tx, w/ up and OOB as tolerated order  Comorbidities affecting pt's physical performance at time of assessment include: weakness,traumatic rhabdomyolysis, fall, leukemoid reaction,type 2 DM w/neurologic complication,anemia  PTA, pt was independent w/ all functional mobility w/ AD usage   Personal factors affecting pt at time of IE include: stairs to enter home, inability to ambulate household distances, inability to navigate community distances, inability to navigate level surfaces w/o external assistance, unable to perform dynamic tasks in community, positive fall history, unable to perform physical activity, limited insight into impairments, inability to perform IADLs, inability to perform ADLs and inability to live alone  Please find objective findings from PT assessment regarding body systems outlined above with impairments and limitations including weakness, impaired balance, decreased endurance, impaired coordination, gait deviations, pain, decreased activity tolerance, decreased functional mobility tolerance and fall risk  From PT/mobility standpoint, recommendation at time of d/c would be post acute rehabilitation services pending progress in order to facilitate return to PLOF  Barriers to Discharge Inaccessible home environment;Decreased caregiver support   Goals   Patient Goals to feel better soon   LTG Expiration Date 10/28/22   Long Term Goal #1 1 )Patient will complete bed mobility mod A of 1 for decrease need for caregiver assistance, decrease burden of care  2 ) Patient will complete transfers mod A of 1 to decrease risk of falls, facilitate upright standing posture  3 ) BLE strength to greater than/equal to 3+/5 gross musculature to increase ability to safely transfer, control descent to chair  4 ) Patient will exhibit increase dynamic standing to Fair 2-3 minutes without LOB mod A of 1 to improve activity tolerance  5 ) Patient will exhibit increase dynamic ambulatory balance to Fair 25-50 feet w/AD mod A of 1 to improve ability to mobilize to toilet, chair and decrease risk for additional medical complications  6 ) Patient will exhibit good self monitoring and ability to follow 2 step commands to increase complexity of tasks and resume ADL's without LOB  PT Treatment Day 0   Plan   Treatment/Interventions Functional transfer training;LE strengthening/ROM; Elevations; Therapeutic exercise; Endurance training;Patient/family training;Equipment eval/education; Bed mobility;Gait training;Spoke to nursing;Spoke to advanced practitioner   PT Frequency 3-5x/wk   Recommendation   PT Discharge Recommendation (S)  Post acute rehabilitation services   Additional Comments Upon conclusion,pt  was resting in bed with all needs within reach  Bed alarm engaged     AM-PAC Basic Mobility Inpatient   Turning in Bed Without Bedrails 2   Lying on Back to Sitting on Edge of Flat Bed 2   Moving Bed to Chair 1   Standing Up From Chair 1   Walk in Room 1   Climb 3-5 Stairs 1   Basic Mobility Inpatient Raw Score 8   Turning Head Towards Sound 4   Follow Simple Instructions 4   Low Function Basic Mobility Raw Score 16   Low Function Basic Mobility Standardized Score 25 72   Highest Level Of Mobility   -HLM Goal 3: Sit at edge of bed   JH-HLM Achieved 3: Sit at edge of bed     History/Personal Factors/Comorbidities: weakness,traumatic rhabdomyolysis, fall, leukemoid reaction,type 2 DM w/neurologic complication,anemia    # of body structures/limitations: muscle weakness, activity intolerance,decreased endurance, impaired balance,pain, impaired coordination    Clinical presentation: unstable as seen in pain severity in more than one body region, high fall risk with fall prior to arrival, dizziness, immobility with increased risk of medical complications      Initial Assessment Time: 3612-0063      Parish Aldridge

## 2022-10-18 NOTE — ASSESSMENT & PLAN NOTE
Lab Results   Component Value Date    HGBA1C 10 2 (H) 07/01/2022       Recent Labs     10/17/22  1813 10/17/22  2106 10/18/22  0807 10/18/22  0905   POCGLU 286* 361* 68 149*       Blood Sugar Average: Last 72 hrs:  (P) 216   · Target blood sugar for the hospital 140-180  · Continue Levemir  · Implement Accu-Cheks AC and HS with sliding scale coverage  · Diabetic neuropathy-continue gabapentin

## 2022-10-18 NOTE — NURSING NOTE
Pt yelling out to this RN and other staff working with roommate c/o "Late insulin " This RN met patient, pt yelling, "I am soaking wet with sweat, my sugar is so high and I haven't gotten my insulin!" Explained to pt that pts blood glucose is 149 and does not meet parameters for insulin administration according to doctor's orders  Pt yells, "Should I just go home and get the scale that I am supposed to use?" RN attempting to explain ordered sliding scale and AM medications and side effects pt yells, "DONT EXPLAIN ANYTHING, I DON'T WANT TO HEAR IT JUST GIVE ME MY INSULIN " Insulin not administered RT order parameters not met  Pt c/o headache, tylenol administered as ordered  Pt ate 100% breakfast  RN Offering pt to wash, pt yells, " NO!   Man Lucio   YES!" pt assisted to wash in bed by PCA, Pt c/o delayed dizziness after working with PT, hospitalist aware

## 2022-10-18 NOTE — ASSESSMENT & PLAN NOTE
· In the setting of having a fall prior to arrival  · Arrival CPK 1372 -> 1002  · Continue with Plasmalyte  · Monitor closely for any signs of renal insufficiency  · Follow up with CPK level

## 2022-10-18 NOTE — PROGRESS NOTES
[address]  Progress Note Maritza Galloway 1940, 80 y o  female MRN: [id number]  Unit/Bed#: -02 Encounter: [id number]  Primary Care Provider: Amelia Sargent MD   Date and time admitted to hospital: 10/17/2022 12:53 PM    * Traumatic rhabdomyolysis [facility])  Assessment & Plan  · In the setting of having a fall prior to arrival  · Arrival CPK 1372 -> 1002  · Continue with Plasmalyte  · Monitor closely for any signs of renal insufficiency  · Follow up with CPK level     Gastroesophageal reflux disease without esophagitis  Assessment & Plan  · Continue Protonix      Leukemoid reaction  Assessment & Plan  · Likely reactive due to fall with possible UTI  · Continue with ceftriaxone day#2  · Leukocytosis has resolved  · Follow-up with urine culture  · Procalcitonin- negative       Fall  Assessment & Plan  · Most likely a vasovagal fall in the setting of using the bathroom  · All trauma workup is negative  · Monitor on telemetry for arrhythmias  TTE 7/11/22 LVEF 60%  · The patient with symptoms of vertigo during PT/OT eval- recent MRI brain in July was unremarkable  Chronic microangiopathy     · Continue with IVF, trial of meclizine   · Obtain orthostatic BP   · PT and OT input appreciated     Type 2 diabetes mellitus with neurologic complication, with long-term current use of insulin [facility])  Assessment & Plan  Lab Results   Component Value Date    HGBA1C 10 2 (H) 07/01/2022       Recent Labs     10/17/22  1813 10/17/22  2106 10/18/22  0807 10/18/22  0905   POCGLU 286* 361* 68 149*       Blood Sugar Average: Last 72 hrs:  (P) 216   · Target blood sugar for the hospital 140-180  · Continue Levemir  · Implement Accu-Cheks AC and HS with sliding scale coverage  · Diabetic neuropathy-continue gabapentin     Acquired hypothyroidism  Assessment & Plan  · Continue levothyroxine         VTE Prophylaxis:  Heparin    Patient Centered Rounds: I have performed bedside rounds with nursing staff today     Discussions with Specialists or Other Care Team Provider: n/a   Education and Discussions with Family / Patient: patient  Offered to contact family however the patient would like to do so herself  Current Length of Stay: 1 day(s)    Current Patient Status: Inpatient   Certification Statement: The patient will continue to require additional inpatient hospital stay due to traumatic rhabdomyolysis     Discharge Plan: pending hospital course     Code Status: Level 3 - DNAR and DNI    Subjective:   Feeling okay  C/o dizziness with head movement during PT/OT eval      Objective:     Vitals:   Temp (24hrs), Av 9 °F (36 6 °C), Min:96 2 °F (35 7 °C), Max:98 7 °F (37 1 °C)    Temp:  [96 2 °F (35 7 °C)-98 7 °F (37 1 °C)] 98 7 °F (37 1 °C)  HR:  [72-95] 86  Resp:  [15-21] 16  BP: ()/() 85/50  SpO2:  [92 %-99 %] 97 %  Body mass index is 23 41 kg/m²  Input and Output Summary (last 24 hours): Intake/Output Summary (Last 24 hours) at 10/18/2022 1128  Last data filed at 10/17/2022 1747  Gross per 24 hour   Intake 50 ml   Output 100 ml   Net -50 ml       Physical Exam:   Physical Exam  Vitals and nursing note reviewed  Constitutional:       General: She is not in acute distress  Appearance: Normal appearance  She is cachectic  HENT:      Head: Normocephalic and atraumatic  Right Ear: External ear normal       Left Ear: External ear normal       Nose: Nose normal  No rhinorrhea  Mouth/Throat:      Mouth: Mucous membranes are moist       Pharynx: Oropharynx is clear  Eyes:      General:         Right eye: No discharge  Left eye: No discharge  Pupils: Pupils are equal, round, and reactive to light  Cardiovascular:      Rate and Rhythm: Normal rate and regular rhythm  Pulses: Normal pulses  Heart sounds: Normal heart sounds  No murmur heard  Pulmonary:      Effort: Pulmonary effort is normal  No respiratory distress        Breath sounds: Normal breath sounds  Abdominal:      General: Bowel sounds are normal  There is no distension  Palpations: Abdomen is soft  There is no mass  Tenderness: There is no abdominal tenderness  Musculoskeletal:         General: No swelling or tenderness  Normal range of motion  Cervical back: Normal range of motion and neck supple  No muscular tenderness  Skin:     General: Skin is warm and dry  Capillary Refill: Capillary refill takes less than 2 seconds  Findings: No erythema or rash  Comments: Left forehead with ecchymotic area    Neurological:      General: No focal deficit present  Mental Status: She is alert and oriented to person, place, and time  Mental status is at baseline  Psychiatric:         Mood and Affect: Mood normal          Behavior: Behavior normal          Thought Content: Thought content normal          Judgment: Judgment normal          Additional Data:     Labs:    Results from last 7 days   Lab Units 10/18/22  0446   WBC Thousand/uL 8 27   HEMOGLOBIN g/dL 11 5   HEMATOCRIT % 34 5*   PLATELETS Thousands/uL 247   NEUTROS PCT % 71   LYMPHS PCT % 17   MONOS PCT % 11   EOS PCT % 0     Results from last 7 days   Lab Units 10/18/22  0446   SODIUM mmol/L 137   POTASSIUM mmol/L 3 9   CHLORIDE mmol/L 99   CO2 mmol/L 30   BUN mg/dL 12   CREATININE mg/dL 0 61   CALCIUM mg/dL 8 3*   ALK PHOS U/L 70   ALT U/L 26   AST U/L 46*     Results from last 7 days   Lab Units 10/17/22  1319   INR  0 91     Results from last 7 days   Lab Units 10/18/22  0905 10/18/22  0807 10/17/22  2106 10/17/22  1813   POC GLUCOSE mg/dl 149* 68 361* 286*           * I Have Reviewed All Lab Data Listed Above  * Additional Pertinent Lab Tests Reviewed:  [address] Admission  Reviewed    Imaging:  Imaging Reports Reviewed Today Include: CTs, xrays    Recent Cultures (last 7 days):     Results from last 7 days   Lab Units 10/17/22  1658 10/17/22  1548   BLOOD CULTURE  Received in Microbiology Lab  Culture in Progress  Received in Microbiology Lab  Culture in Progress  Last 24 Hours Medication List:   Current Facility-Administered Medications   Medication Dose Route Frequency Provider Last Rate   • acetaminophen  650 mg Oral Q6H PRN Hilary Jo MD     • vitamin C  500 mg Oral BID Hilary Jo MD     • calcium carbonate-vitamin D  2 tablet Oral BID With Meals Hilary Jo MD     • cefTRIAXone  1,000 mg Intravenous Q24H Hilary Jo MD Stopped (10/17/22 5349)   • cyanocobalamin  1,000 mcg Oral Daily Hilary Jo MD     • docusate sodium  100 mg Oral BID Hilary Jo MD     • gabapentin  300 mg Oral TID Hilary Jo MD     • heparin (porcine)  5,000 Units Subcutaneous [facility] Hilary Jo MD     • HYDROcodone-acetaminophen  1 tablet Oral Q6H PRN Hilary Jo MD     • insulin detemir  5 Units Subcutaneous HS Hilary Jo MD     • insulin lispro  1-5 Units Subcutaneous TID [facility] [place] Hilary Jo MD     • insulin lispro  1-5 Units Subcutaneous HS Hilary Jo MD     • levothyroxine  112 mcg Oral QAM Hilary Jo MD     • magnesium sulfate  2 g Intravenous Once ADRIAN Miguel     • meclizine  25 mg Oral [facility] ADRIAN Miguel     • morphine injection  2 mg Intravenous Q6H PRN Hilary Jo MD     • multi-electrolyte  75 mL/hr Intravenous Continuous Hilary Jo MD 75 mL/hr (10/17/22 4566)   • ondansetron  4 mg Intravenous Q6H PRN Hilary Jo MD     • pantoprazole  40 mg Oral BID Hilary Jo MD          Today, Patient Was Seen By: Sandee Mccallum    ** Please Note: Dictation voice to text software may have been used in the creation of this document   **

## 2022-10-18 NOTE — ASSESSMENT & PLAN NOTE
· Likely reactive due to fall with possible UTI  · Continue with ceftriaxone day#2  · Leukocytosis has resolved  · Follow-up with urine culture  · Procalcitonin- negative

## 2022-10-18 NOTE — ASSESSMENT & PLAN NOTE
· Most likely a vasovagal fall in the setting of using the bathroom  · All trauma workup is negative  · Monitor on telemetry for arrhythmias  TTE 7/11/22 LVEF 60%  · The patient with symptoms of vertigo during PT/OT eval- recent MRI brain in July was unremarkable  Chronic microangiopathy     · Continue with IVF, trial of meclizine   · Obtain orthostatic BP   · PT and OT input appreciated

## 2022-10-18 NOTE — PLAN OF CARE
Problem: OCCUPATIONAL THERAPY ADULT  Goal: Performs self-care activities at highest level of function for planned discharge setting  See evaluation for individualized goals  Description: Treatment Interventions: ADL retraining, Functional transfer training, UE strengthening/ROM, Endurance training, Equipment evaluation/education, Compensatory technique education, Energy conservation, Activityengagement    See flowsheet documentation for full assessment, interventions and recommendations  Note: Limitation: Decreased ADL status, Decreased UE ROM, Decreased UE strength, Decreased Safe judgement during ADL, Decreased endurance, Decreased self-care trans, Decreased high-level ADLs  Prognosis: Good  Assessment: Pt is a 80 y o  female seen for OT evaluation s/p admit to [facility] on 10/17/2022 w/ Traumatic rhabdomyolysis ([facility] [address] )  Comorbidities affecting pt's functional performance at time of assessment include: CA, Chronic pain disorder, DM II, Hodgkin's disease, HTN, RA  Personal factors affecting pt at time of IE include:steps to enter environment, limited home support and difficulty performing ADLS  Prior to admission, pt was Mod I with ADLs  Upon evaluation: the following deficits impact occupational performance: decreased ROM, decreased strength, decreased balance and decreased tolerance  Pt to benefit from continued skilled OT tx while in the hospital to address deficits as defined above and maximize level of functional independence w ADL's and functional mobility  Occupational Performance areas to address include: bathing/shower, toilet hygiene, dressing, functional mobility and clothing management  From OT standpoint, recommendation at time of d/c would be STR       OT Discharge Recommendation: Post acute rehabilitation services     [address] [place] [address], MS, OTR/L

## 2022-10-18 NOTE — OCCUPATIONAL THERAPY NOTE
Occupational Therapy Evaluation      Dwain Aguirre    10/18/2022    Principal Problem:    Traumatic rhabdomyolysis ([facility] [address] )  Active Problems:    Acquired hypothyroidism    Type 2 diabetes mellitus with diabetic polyneuropathy, with long-term current use of insulin ([address] )    Fall    Leukemoid reaction    Gastroesophageal reflux disease without esophagitis      Past Medical History:   Diagnosis Date    Ambulates with cane     Cancer ([facility] [address] )     Chronic pain disorder     knees/ shoulders (gets inj every 3 mos)    Controlled type 2 diabetes mellitus with diabetic polyneuropathy, with long-term current use of insulin ([address] ) 5/21/2008    Diabetes mellitus ([facility] [address] )     Diabetic polyneuropathy ([facility] [address] ) 5/21/2008    ICD10 clean up    Disease of thyroid gland     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    HL (hearing loss)     Hodgkin's disease ([facility] [address] ) 2008    Left neck, had radiation    Hypertension     Neuropathy     Osteoporosis     RA (rheumatoid arthritis) ([facility] [address] )        Past Surgical History:   Procedure Laterality Date    ADENOIDECTOMY      APPENDECTOMY      CATARACT EXTRACTION      CATARACT EXTRACTION, BILATERAL      CHOLECYSTECTOMY      FRACTURE SURGERY Right     hip    MOUTH SURGERY      oral cancer left lower lip    OVARY SURGERY      TN ADJ TISS XFER HEAD,FAC,HAND <10 SQCM N/A 3/28/2022    Procedure: Adjacent tissue transfer face;  Surgeon: Hawa Sommer MD;  [facility];  Service: ENT    TN MASTOIDECTOMY,SIMPLE Left 3/28/2022    Procedure: Monica Migel;  Surgeon: Hawa Sommer MD;  [facility];  Service: ENT    TN OPEN RX FEMUR FX+INTRAMED SHE Right 5/28/2020    Procedure: INSERTION NAIL IM FEMUR ANTEGRADE (TROCHANTERIC);   Surgeon: Reanna Leon DO;  Location: [address] [place] [address] MAIN OR;  Service: Orthopedics    TONSILLECTOMY      TOTAL THYROIDECTOMY  2008    Performed after left neck dissection and left oral cancer diagnosis        10/18/22 0856   OT Last Visit   OT Visit Date 10/18/22   Note Type   Note type Evaluation   Pain Assessment   Pain Assessment Tool 0-10   Pain Score 8   Pain Location/Orientation Orientation: Bilateral;Location: Shoulder   Pain Onset/Description Onset: Ongoing;Frequency: Constant/Continuous; Descriptor: Sore   Effect of Pain on Daily Activities yes   Patient's Stated Pain Goal No pain   Hospital Pain Intervention(s) Repositioned; Ambulation/increased activity; Emotional support; Environmental changes; Elevated   Multiple Pain Sites Yes   Pain 2   Pain Score 2 9   Pain Location/Orientation 2 Orientation: Bilateral;Location: Foot   Pain Onset/Description 2 Onset: Ongoing;Frequency: Constant/Continuous; Descriptor: Burning   Patient's Stated Pain Goal 2 No pain   Hospital Pain Intervention(s) 2 Repositioned;Elevated; Emotional support   Restrictions/Precautions   Weight Bearing Precautions Per Order No   Braces or Orthoses C/S Collar  (soft collar utilized at baseline)   Other Precautions Fall Risk;Pain;Multiple lines; Bed Alarm   Home Living   Type of [address] [place] [address] One level;Performs ADLs on one level; Able to live on main level with bedroom/bathroom;Stairs to enter with rails  (2 TEE)   Bathroom Shower/Tub Tub/shower unit   [employer] Raised  (via toilet riser)   Bathroom Equipment Grab bars in shower;Grab bars around toilet;Commode; Shower chair   [facility]  (alternates usage between rollator and RW)   Prior Function   Level of [place] Independent with ADLs   Lives With Alone   Receives Help From Family  (son lives across the street)   IADLs Independent with driving; Independent with meal prep; Independent with medication management  (reports son only helps with insulin at night "makes him feel good')   Falls in the last 6 months 1 to 4  (x 2, one prior to arrival)   Vocational Retired   ADL   UB Bathing Assistance 4  [address] [place] [address] 3  [address] [facility] 3  Moderate Assistance LB Dressing Assistance 1  Total Assistance   Bed Mobility   Supine to Sit 2  Maximal assistance   Additional items Assist x 2;HOB elevated; Bedrails; Increased time required;Verbal cues;LE management   Sit to Supine 2  Maximal assistance   Additional items Assist x 2;Bedrails; Increased time required;Verbal cues;LE management   Additional Comments Upon sitting edge of bed less than one minute, Betzaida Harmon reported feeling severely dizzy, "bed was moving"  Increased time provided to allow symptomatic resolution  However patient's symptoms appeared to intensify and safety concerns required return to bed as her sitting balance was very poor  Nystagmus could not be observed  Patient was assisted back to bed with resolution within 2 minutes  Transfers   Sit to Stand Unable to assess  (could not safely advance at this time)   Balance   Static Sitting Poor   Dynamic Sitting Poor -   Static Standing   (unable to safely assess)   Activity Tolerance   Activity Tolerance Patient limited by fatigue;Patient limited by pain;Treatment limited secondary to medical complications (Comment)  (dizziness, spinning sensation)   Medical Staff Made Aware CRNP D[address]ri [address]   Nurse Made Aware yes, Raine NATH   RUE Assessment   RUE Assessment X   RUE Overall AROM   R Shoulder Flexion <90 degrees   LUE Assessment   LUE Assessment X  (AROM WFL)   LUE Strength   LUE Overall Strength Deficits  (3+/5)   Vision-Basic Assessment   Current Vision Other (Comment)  (has lense implants B)   Cognition   Overall Cognitive Status WFL   Arousal/Participation Alert; Cooperative   Attention Attends with cues to redirect   Orientation Level Oriented X4   Memory Within functional limits   Following Commands Follows one step commands with increased time or repetition   Assessment   Limitation Decreased ADL status; Decreased UE ROM; Decreased UE strength;Decreased Safe judgement during ADL;Decreased endurance;Decreased self-care trans;Decreased high-level ADLs   Prognosis Good   Assessment Pt is a 80 y o  female seen for OT evaluation s/p admit to [facility] on 10/17/2022 w/ Traumatic rhabdomyolysis ([address] )  Comorbidities affecting pt's functional performance at time of assessment include: CA, Chronic pain disorder, DM II, Hodgkin's disease, HTN, RA  Personal factors affecting pt at time of IE include:steps to enter environment, limited home support and difficulty performing ADLS  Prior to admission, pt was Mod I with ADLs  Upon evaluation: the following deficits impact occupational performance: decreased ROM, decreased strength, decreased balance and decreased tolerance  Pt to benefit from continued skilled OT tx while in the hospital to address deficits as defined above and maximize level of functional independence w ADL's and functional mobility  Occupational Performance areas to address include: bathing/shower, toilet hygiene, dressing, functional mobility and clothing management  From OT standpoint, recommendation at time of d/c would be STR  Goals   Patient Goals to feel better soon   Plan   Treatment Interventions ADL retraining;Functional transfer training;UE strengthening/ROM; Endurance training;Equipment evaluation/education; Compensatory technique education; Energy conservation; Activityengagement   Goal Expiration Date 10/28/22   OT Treatment Day 0   OT Frequency 3-5x/wk   Recommendation   OT Discharge Recommendation Post acute rehabilitation services   Additional Comments  Pt seen as a co-eval with PT due to the patient's co-morbidities, clinically unstable presentation, and present impairments which are a regression from the patient's baseline  Additional Comments 2 The patient's raw score on the AM-PAC Daily Activity inpatient short form is 13, standardized score is 32 03, less than 39 4  Patients at this level are likely to benefit from discharge to post-acute rehabilitation services   Please refer to the recommendation of the Occupational Therapist for safe discharge planning     AM-PAC Daily Activity Inpatient   Lower Body Dressing 2   Bathing 2   Toileting 1   Upper Body Dressing 2   Grooming 3   Eating 3   Daily Activity Raw Score 13   Daily Activity Standardized Score (Calc for Raw Score >=11) 32 03   AM-PAC Applied Cognition Inpatient   Following a Speech/Presentation 4   Understanding Ordinary Conversation 4   Taking Medications 4   Remembering Where Things Are Placed or Put Away 4   Remembering List of 4-5 Errands 3   Taking Care of Complicated Tasks 3   Applied Cognition Raw Score 22   Applied Cognition Standardized Score 47 83     GOALS:    Pt will achieve the following within specified time frame: STG  Pt will achieve the following goals within 5 days    *ADL transfers with Max (A) for inc'd independence with ADLs/purposeful tasks    *Self Feeding- CGA for inc'd independence with providing self nourishment    *UB ADL with Min (A) for inc'd independence with self cares    *LB ADL with Mod (A) using AE prn for inc'd independence with self cares    *Toileting with Max (A) for clothing management and hygiene for return to PLOF with personal care    *Increase static sitting balance to P+ and dyn sitting balance to P for inc'd safety with sitting purposeful tasks    *Increase sitting tolerance x3 m for inc'd tolerance with sitting purposeful tasks    *Participate in 10m UE therex to increase overall stamina/activity tolerance for purposeful tasks    *Bed mobility- Max (A) for inc'd independence to manage own comfort and initiate EOB & OOB purposeful tasks    Pt will achieve the following within specified time frame: LTG  Pt will achieve the following goals within 10 days    *ADL transfers with Mod (A) for inc'd independence with ADLs/purposeful tasks    *Self Feeding- (S) for inc'd independence with providing self nourishment    *UB ADL with CGA for inc'd independence with self cares    *LB ADL with Min (A) using AE prn for inc'd independence with self cares    *Toileting with Mod (A) for clothing management and hygiene for return to PLOF with personal care    *Increase static stand balance to P and dyn stand balance to P- for inc'd safety with standing purposeful tasks    *Increase stand tolerance x1 m for inc'd tolerance with standing purposeful tasks    *Bed mobility- Mod (A) for inc'd independence to manage own comfort and initiate EOB & OOB purposeful tasks      Pati Mc MS, OTR/L

## 2022-10-19 LAB
ANION GAP SERPL CALCULATED.3IONS-SCNC: 7 MMOL/L (ref 4–13)
BUN SERPL-MCNC: 13 MG/DL (ref 5–25)
CALCIUM SERPL-MCNC: 8.8 MG/DL (ref 8.4–10.2)
CHLORIDE SERPL-SCNC: 101 MMOL/L (ref 96–108)
CK MB SERPL-MCNC: 0.6 % (ref 0–2.5)
CK MB SERPL-MCNC: 4.2 NG/ML (ref 0.6–6.3)
CK SERPL-CCNC: 712 U/L (ref 26–192)
CO2 SERPL-SCNC: 32 MMOL/L (ref 21–32)
CREAT SERPL-MCNC: 0.76 MG/DL (ref 0.6–1.3)
ERYTHROCYTE [DISTWIDTH] IN BLOOD BY AUTOMATED COUNT: 12.5 % (ref 11.6–15.1)
GFR SERPL CREATININE-BSD FRML MDRD: 73 ML/MIN/1.73SQ M
GLUCOSE SERPL-MCNC: 206 MG/DL (ref 65–140)
GLUCOSE SERPL-MCNC: 228 MG/DL (ref 65–140)
GLUCOSE SERPL-MCNC: 310 MG/DL (ref 65–140)
GLUCOSE SERPL-MCNC: 313 MG/DL (ref 65–140)
GLUCOSE SERPL-MCNC: 446 MG/DL (ref 65–140)
HCT VFR BLD AUTO: 35.4 % (ref 34.8–46.1)
HGB BLD-MCNC: 11.6 G/DL (ref 11.5–15.4)
MAGNESIUM SERPL-MCNC: 1.9 MG/DL (ref 1.9–2.7)
MCH RBC QN AUTO: 33 PG (ref 26.8–34.3)
MCHC RBC AUTO-ENTMCNC: 32.8 G/DL (ref 31.4–37.4)
MCV RBC AUTO: 101 FL (ref 82–98)
PLATELET # BLD AUTO: 243 THOUSANDS/UL (ref 149–390)
PMV BLD AUTO: 10.4 FL (ref 8.9–12.7)
POTASSIUM SERPL-SCNC: 4 MMOL/L (ref 3.5–5.3)
RBC # BLD AUTO: 3.51 MILLION/UL (ref 3.81–5.12)
SODIUM SERPL-SCNC: 140 MMOL/L (ref 135–147)
WBC # BLD AUTO: 7.6 THOUSAND/UL (ref 4.31–10.16)

## 2022-10-19 PROCEDURE — 97530 THERAPEUTIC ACTIVITIES: CPT

## 2022-10-19 PROCEDURE — 82553 CREATINE MB FRACTION: CPT | Performed by: NURSE PRACTITIONER

## 2022-10-19 PROCEDURE — 83735 ASSAY OF MAGNESIUM: CPT | Performed by: NURSE PRACTITIONER

## 2022-10-19 PROCEDURE — 97110 THERAPEUTIC EXERCISES: CPT

## 2022-10-19 PROCEDURE — 82550 ASSAY OF CK (CPK): CPT | Performed by: NURSE PRACTITIONER

## 2022-10-19 PROCEDURE — 85027 COMPLETE CBC AUTOMATED: CPT | Performed by: NURSE PRACTITIONER

## 2022-10-19 PROCEDURE — 80048 BASIC METABOLIC PNL TOTAL CA: CPT | Performed by: NURSE PRACTITIONER

## 2022-10-19 PROCEDURE — 99232 SBSQ HOSP IP/OBS MODERATE 35: CPT | Performed by: NURSE PRACTITIONER

## 2022-10-19 PROCEDURE — 82948 REAGENT STRIP/BLOOD GLUCOSE: CPT

## 2022-10-19 RX ORDER — SODIUM CHLORIDE, SODIUM GLUCONATE, SODIUM ACETATE, POTASSIUM CHLORIDE, MAGNESIUM CHLORIDE, SODIUM PHOSPHATE, DIBASIC, AND POTASSIUM PHOSPHATE .53; .5; .37; .037; .03; .012; .00082 G/100ML; G/100ML; G/100ML; G/100ML; G/100ML; G/100ML; G/100ML
75 INJECTION, SOLUTION INTRAVENOUS CONTINUOUS
Status: DISCONTINUED | OUTPATIENT
Start: 2022-10-19 | End: 2022-10-20

## 2022-10-19 RX ADMIN — SODIUM CHLORIDE, SODIUM GLUCONATE, SODIUM ACETATE, POTASSIUM CHLORIDE, MAGNESIUM CHLORIDE, SODIUM PHOSPHATE, DIBASIC, AND POTASSIUM PHOSPHATE 75 ML/HR: .53; .5; .37; .037; .03; .012; .00082 INJECTION, SOLUTION INTRAVENOUS at 17:43

## 2022-10-19 RX ADMIN — MECLIZINE 25 MG: 12.5 TABLET ORAL at 05:53

## 2022-10-19 RX ADMIN — Medication 2 TABLET: at 16:36

## 2022-10-19 RX ADMIN — INSULIN LISPRO 2 UNITS: 100 INJECTION, SOLUTION INTRAVENOUS; SUBCUTANEOUS at 16:37

## 2022-10-19 RX ADMIN — INSULIN LISPRO 3 UNITS: 100 INJECTION, SOLUTION INTRAVENOUS; SUBCUTANEOUS at 07:35

## 2022-10-19 RX ADMIN — GABAPENTIN 300 MG: 300 CAPSULE ORAL at 09:56

## 2022-10-19 RX ADMIN — DOCUSATE SODIUM 100 MG: 100 CAPSULE, LIQUID FILLED ORAL at 09:56

## 2022-10-19 RX ADMIN — INSULIN DETEMIR 5 UNITS: 100 INJECTION, SOLUTION SUBCUTANEOUS at 21:49

## 2022-10-19 RX ADMIN — HEPARIN SODIUM 5000 UNITS: 5000 INJECTION INTRAVENOUS; SUBCUTANEOUS at 05:52

## 2022-10-19 RX ADMIN — LEVOTHYROXINE SODIUM 112 MCG: 112 TABLET ORAL at 05:52

## 2022-10-19 RX ADMIN — HEPARIN SODIUM 5000 UNITS: 5000 INJECTION INTRAVENOUS; SUBCUTANEOUS at 13:00

## 2022-10-19 RX ADMIN — OXYCODONE HYDROCHLORIDE AND ACETAMINOPHEN 500 MG: 500 TABLET ORAL at 09:56

## 2022-10-19 RX ADMIN — DOCUSATE SODIUM 100 MG: 100 CAPSULE, LIQUID FILLED ORAL at 17:47

## 2022-10-19 RX ADMIN — OXYCODONE HYDROCHLORIDE AND ACETAMINOPHEN 500 MG: 500 TABLET ORAL at 17:47

## 2022-10-19 RX ADMIN — INSULIN LISPRO 4 UNITS: 100 INJECTION, SOLUTION INTRAVENOUS; SUBCUTANEOUS at 21:49

## 2022-10-19 RX ADMIN — HYDROCODONE BITARTRATE AND ACETAMINOPHEN 1 TABLET: 5; 325 TABLET ORAL at 12:58

## 2022-10-19 RX ADMIN — INSULIN LISPRO 3 UNITS: 100 INJECTION, SOLUTION INTRAVENOUS; SUBCUTANEOUS at 12:03

## 2022-10-19 RX ADMIN — Medication 2 TABLET: at 07:35

## 2022-10-19 RX ADMIN — SODIUM CHLORIDE, SODIUM GLUCONATE, SODIUM ACETATE, POTASSIUM CHLORIDE, MAGNESIUM CHLORIDE, SODIUM PHOSPHATE, DIBASIC, AND POTASSIUM PHOSPHATE 75 ML/HR: .53; .5; .37; .037; .03; .012; .00082 INJECTION, SOLUTION INTRAVENOUS at 02:18

## 2022-10-19 RX ADMIN — GABAPENTIN 300 MG: 300 CAPSULE ORAL at 16:36

## 2022-10-19 RX ADMIN — MECLIZINE 25 MG: 12.5 TABLET ORAL at 13:00

## 2022-10-19 RX ADMIN — CYANOCOBALAMIN TAB 500 MCG 1000 MCG: 500 TAB at 09:56

## 2022-10-19 RX ADMIN — PANTOPRAZOLE SODIUM 40 MG: 40 TABLET, DELAYED RELEASE ORAL at 09:56

## 2022-10-19 RX ADMIN — CEFTRIAXONE 1000 MG: 1 INJECTION, SOLUTION INTRAVENOUS at 16:36

## 2022-10-19 NOTE — ASSESSMENT & PLAN NOTE
· Likely reactive due to fall with possible UTI  · Continue with ceftriaxone day#3  · Leukocytosis has resolved  · Procalcitonin- negative

## 2022-10-19 NOTE — OCCUPATIONAL THERAPY NOTE
Occupational Therapy Treatment Note      Radha Ron    10/19/2022    Principal Problem:    Traumatic rhabdomyolysis ([facility] [address] )  Active Problems:    Acquired hypothyroidism    Type 2 diabetes mellitus with diabetic polyneuropathy, with long-term current use of insulin ([facility] [address] )    Fall    Leukemoid reaction    Gastroesophageal reflux disease without esophagitis      Past Medical History:   Diagnosis Date    Ambulates with cane     Cancer ([facility] [address] )     Chronic pain disorder     knees/ shoulders (gets inj every 3 mos)    Controlled type 2 diabetes mellitus with diabetic polyneuropathy, with long-term current use of insulin ([facility] [address] ) 5/21/2008    Diabetes mellitus ([facility] [address] )     Diabetic polyneuropathy ([facility] [address] ) 5/21/2008    ICD10 clean up    Disease of thyroid gland     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    HL (hearing loss)     Hodgkin's disease ([facility] [address] ) 2008    Left neck, had radiation    Hypertension     Neuropathy     Osteoporosis     RA (rheumatoid arthritis) ([facility] [address] )        Past Surgical History:   Procedure Laterality Date    ADENOIDECTOMY      APPENDECTOMY      CATARACT EXTRACTION      CATARACT EXTRACTION, BILATERAL      CHOLECYSTECTOMY      FRACTURE SURGERY Right     hip    MOUTH SURGERY      oral cancer left lower lip    OVARY SURGERY      [place] ADJ TISS XFER HEAD,FAC,HAND <10 SQCM N/A 3/28/2022    Procedure: Adjacent tissue transfer face;  Surgeon: Marycarmen Simons MD;  [facility];  Service: ENT    [place] MASTOIDECTOMY,SIMPLE Left 3/28/2022    Procedure: Javan Irwin;  Surgeon: Marycarmen Simons MD;  [facility];  Service: ENT    [place] OPEN RX FEMUR FX+INTRAMED SHE Right 5/28/2020    Procedure: INSERTION NAIL IM FEMUR ANTEGRADE (TROCHANTERIC);   Surgeon: Sultana Flores DO;  Location: [address] [place] [address] MAIN OR;  Service: Orthopedics    TONSILLECTOMY      TOTAL THYROIDECTOMY  2008    Performed after left neck dissection and left oral cancer diagnosis        10/19/22 0953   OT Last Visit   OT Visit Date 10/19/22   Note Type Note Type Treatment   Pain Assessment   Pain Assessment Tool 0-10   Pain Score 8   Pain Location/Orientation Orientation: Bilateral;Location: Foot   Pain Onset/Description Onset: Ongoing;Frequency: Constant/Continuous; Descriptor: Burning   Patient's Stated Pain Goal No pain   Hospital Pain Intervention(s) Repositioned;Elevated; Emotional support   Restrictions/Precautions   Weight Bearing Precautions Per Order No   Braces or Orthoses C/S Collar   Other Precautions Bed Alarm;Multiple lines; Fall Risk;Pain   Bed Mobility   Supine to Sit 3  Moderate assistance   Additional items Assist x 2;HOB elevated; Bedrails; Increased time required;Verbal cues;LE management   Sit to Supine   (Not assessed, pt was seated in bedside chair upon conclusion of session)   Additional Comments Pt denies any dizziness/lightheadedness, does not state that she feels the bed is moving or that the room is spinning upon sitting upright  Transfers   Sit to Stand 3  Moderate assistance   Additional items Assist x 2; Increased time required;Verbal cues; Bedrails   Stand to Sit 3  Moderate assistance   Additional items Assist x 2;Armrests; Increased time required;Verbal cues   Cognition   Overall Cognitive Status [facility]   Arousal/Participation Alert; Cooperative   Attention Attends with cues to redirect   Orientation Level Oriented X4   Memory Within functional limits   Following Commands Follows one step commands with increased time or repetition   Activity Tolerance   Activity Tolerance Patient limited by fatigue;Patient limited by pain  (Pt is very verbose throughout session)   Medical Staff Made Aware PHAM Ni   Assessment   Assessment Patient participated in Skilled OT session this date with interventions consisting of  therapeutic activities to: increase activity tolerance   Patient agreeable to OT treatment session, upon arrival patient was found supine in bed  In comparison to previous session, patient with improvements in functional transfers  Patient requiring verbal cues for safety and frequent rest periods  Patient continues to be functioning below baseline level, occupational performance remains limited secondary to factors listed above and increased risk for falls and injury  From OT standpoint, recommendation at time of d/c would be Short Term Rehab  Patient to benefit from continued Occupational Therapy treatment while in the hospital to address deficits as defined above and maximize level of functional independence with ADLs and functional mobility  Plan   Treatment Interventions ADL retraining;Functional transfer training;UE strengthening/ROM; Endurance training;Equipment evaluation/education; Compensatory technique education; Energy conservation; Activityengagement   Goal Expiration Date 10/28/22   OT Treatment Day 1   OT Frequency 3-5x/wk   Recommendation   OT Discharge Recommendation Post acute rehabilitation services   Additional Comments  Co treatment with PT completed secondary to complex medical condition of pt, Mod A of 2 required to achieve and maintain transitional movements, requiring the need of skilled therapeutic intervention of 2 therapists to achieve delivery of services  Additional Comments 2 The patient's raw score on the AM-PAC Daily Activity inpatient short form is 13, standardized score is 32 03, less than 39 4  Patients at this level are likely to benefit from discharge to post-acute rehabilitation services  Please refer to the recommendation of the Occupational Therapist for safe discharge planning     AM-PAC Daily Activity Inpatient   Lower Body Dressing 2   Bathing 2   Toileting 1   Upper Body Dressing 2   Grooming 3   Eating 3   Daily Activity Raw Score 13   Daily Activity Standardized Score (Calc for Raw Score >=11) 32 03   AM-PAC Applied Cognition Inpatient   Following a Speech/Presentation 4   Understanding Ordinary Conversation 4   Taking Medications 4   Remembering Where Things Are Placed or Put Away 4   Remembering List of 4-5 Errands 3   Taking Care of Complicated Tasks 3   Applied Cognition Raw Score 22   Applied Cognition Standardized Score 47 83     Pati Santos MS, OTR/L

## 2022-10-19 NOTE — CASE MANAGEMENT
Case Management Discharge Planning Note    Patient name Desi Prescott  Location /-02 MRN [id number]  : 1940 Date 10/19/2022       Current Admission Date: 10/17/2022  Current Admission Diagnosis:Traumatic rhabdomyolysis [facility])   Patient Active Problem List    Diagnosis Date Noted   • Fall 10/17/2022   • Traumatic rhabdomyolysis ([facility] [address] ) 10/17/2022   • Leukemoid reaction 10/17/2022   • Gastroesophageal reflux disease without esophagitis 10/17/2022   • Visual changes 2022   • SIRS of non-infectious origin w acute organ dysfunction ([facility] [address] ) 07/10/2022   • High anion gap metabolic acidosis    • Coffee ground emesis 07/10/2022   • RAMIRO (acute kidney injury) ([facility] [address] ) 07/10/2022   • Abnormal CT of the abdomen 07/10/2022   • Soft tissue radionecrosis 2022   • Osteoradionecrosis of temporal bone ([facility] [address] ) 2022   • Primary osteoarthritis of right shoulder 2021   • Right knee pain 2020   • Acute hip pain, right 2020   • Ambulatory dysfunction    • Primary osteoarthritis of both knees 2020   • Hypophosphatemia 2020   • Elevated vitamin B12 level 2020   • Anemia 2020   • Hyponatremia 2020   • Closed intertrochanteric fracture of right femur ([facility] [address] ) 2020   • Acquired hypothyroidism 2015   • HLD (hyperlipidemia) 2014   • HTN (hypertension) 10/10/2013   • Type 2 diabetes mellitus with diabetic polyneuropathy, with long-term current use of insulin ([facility] [address] ) 2008      LOS (days): 2  Geometric Mean LOS (GMLOS) (days): 2 50  Days to GMLOS:0 4     OBJECTIVE:  Risk of Unplanned Readmission Score: 21 31         Current admission status: Inpatient   Preferred Pharmacy:   [address] [place] [id number]  Phone: [phone] Fax: [phone]    Primary Care Provider: Amelia Sargent MD    Primary Insurance: MEDICARE  Secondary Insurance: [facility] DETAILS:    Discharge planning discussed with[de-identified] patient and  Ikeralonso Black 13:26 pm  [place] of Choice: Yes  Comments - Freedom of Choice: rehab is recommended- family wants aru or home with [facility]- they do not want snf rehab  CM contacted family/caregiver?: Yes             Contacts  Patient Contacts: Cher Helton dtr  Relationship to Patient[de-identified] Family (daughter)  Contact Method: Phone  Phone Number: [phone]  Reason/Outcome: Discharge [address]         Is the patient interested in [facility] at discharge?: Yes    DME Referral Provided  Referral made for DME?: No    Other Referral/Resources/Interventions Provided:  Interventions: HHC  Referral Comments: pt was not accepted to ARU,, cm made the pt aware of the [facility] that accepted her- she told me to call her daughter- cm spoke with Iker Black and I made her aware for the accepting [facility] agencies and jessica patiño,  all care, Rueben Kanner-  she rquested Malachi Juarez    Would you like to participate in our [address] service program?  : No - Declined    Treatment Team Recommendation: Home with [address] [employer] (home with family support &Sl[place]& outpt rehab- family)

## 2022-10-19 NOTE — ASSESSMENT & PLAN NOTE
· In the setting of having a fall prior to arrival  · Arrival CPK 1372 -> 1002 -> 700  · Continue with Plasmalyte for another 10 hours   · Monitor closely for any signs of renal insufficiency  · Follow up with CPK level

## 2022-10-19 NOTE — PLAN OF CARE
Problem: Potential for Falls  Goal: Patient will remain free of falls  Description: INTERVENTIONS:  - Educate patient/family on patient safety including physical limitations  - Instruct patient to call for assistance with activity   - Consult OT/PT to assist with strengthening/mobility   - Keep Call bell within reach  - Keep bed low and locked with side rails adjusted as appropriate  - Keep care items and personal belongings within reach  - Initiate and maintain comfort rounds  - Make Fall Risk Sign visible to staff  - Offer Toileting in advance of need  - Initiate/Maintain bed alarm  - Obtain necessary fall risk management equipment:   - Apply yellow socks and bracelet for high fall risk patients  - Consider moving patient to room near nurses station  Outcome: Progressing     Problem: Prexisting or High Potential for Compromised Skin Integrity  Goal: Skin integrity is maintained or improved  Description: INTERVENTIONS:  - Identify patients at risk for skin breakdown  - Assess and monitor skin integrity  - Assess and monitor nutrition and hydration status  - Monitor labs   - Assess for incontinence   - Turn and reposition patient  - Assist with mobility/ambulation  - Relieve pressure over bony prominences  - Avoid friction and shearing  - Provide appropriate hygiene as needed including keeping skin clean and dry  - Evaluate need for skin moisturizer/barrier cream  - Collaborate with interdisciplinary team   - Patient/family teaching  - Consider wound care consult   Outcome: Progressing     Problem: Nutrition/Hydration-ADULT  Goal: Nutrient/Hydration intake appropriate for improving, restoring or maintaining nutritional needs  Description: Monitor and assess patient's nutrition/hydration status for malnutrition  Collaborate with interdisciplinary team and initiate plan and interventions as ordered  Monitor patient's weight and dietary intake as ordered or per policy   Utilize nutrition screening tool and intervene as necessary  Determine patient's food preferences and provide high-protein, high-caloric foods as appropriate  INTERVENTIONS:  - Monitor oral intake, urinary output, labs, and treatment plans  - Assess nutrition and hydration status and recommend course of action  - Evaluate amount of meals eaten  - Assist patient with eating if necessary   - Allow adequate time for meals  - Recommend/ encourage appropriate diets, oral nutritional supplements, and vitamin/mineral supplements  - Order, calculate, and assess calorie counts as needed  - Recommend, monitor, and adjust tube feedings and TPN/PPN based on assessed needs  - Assess need for intravenous fluids  - Provide specific nutrition/hydration education as appropriate  - Include patient/family/caregiver in decisions related to nutrition  Outcome: Progressing     Problem: MOBILITY - ADULT  Goal: Maintain or return to baseline ADL function  Description: INTERVENTIONS:  -  Assess patient's ability to carry out ADLs; assess patient's baseline for ADL function and identify physical deficits which impact ability to perform ADLs (bathing, care of mouth/teeth, toileting, grooming, dressing, etc )  - Assess/evaluate cause of self-care deficits   - Assess range of motion  - Assess patient's mobility; develop plan if impaired  - Assess patient's need for assistive devices and provide as appropriate  - Encourage maximum independence but intervene and supervise when necessary  - Involve family in performance of ADLs  - Assess for home care needs following discharge   - Consider OT consult to assist with ADL evaluation and planning for discharge  - Provide patient education as appropriate  Outcome: Progressing  Goal: Maintains/Returns to pre admission functional level  Description: INTERVENTIONS:  - Perform BMAT or MOVE assessment daily    - Set and communicate daily mobility goal to care team and patient/family/caregiver     - Collaborate with rehabilitation services on mobility goals if consulted  - Record patient progress and toleration of activity level   Outcome: Progressing     Problem: PAIN - ADULT  Goal: Verbalizes/displays adequate comfort level or baseline comfort level  Description: Interventions:  - Encourage patient to monitor pain and request assistance  - Assess pain using appropriate pain scale  - Administer analgesics based on type and severity of pain and evaluate response  - Implement non-pharmacological measures as appropriate and evaluate response  - Consider cultural and social influences on pain and pain management  - Notify physician/advanced practitioner if interventions unsuccessful or patient reports new pain  Outcome: Progressing     Problem: INFECTION - ADULT  Goal: Absence or prevention of progression during hospitalization  Description: INTERVENTIONS:  - Assess and monitor for signs and symptoms of infection  - Monitor lab/diagnostic results  - Monitor all insertion sites, i e  indwelling lines, tubes, and drains  - Monitor endotracheal if appropriate and nasal secretions for changes in amount and color  - [place] appropriate cooling/warming therapies per order  - Administer medications as ordered  - Instruct and encourage patient and family to use good hand hygiene technique  - Identify and instruct in appropriate isolation precautions for identified infection/condition  Outcome: Progressing     Problem: DISCHARGE PLANNING  Goal: Discharge to home or other facility with appropriate resources  Description: INTERVENTIONS:  - Identify barriers to discharge w/patient and caregiver  - Arrange for needed discharge resources and transportation as appropriate  - Identify discharge learning needs (meds, wound care, etc )  - Arrange for interpretive services to assist at discharge as needed  - Refer to Case Management Department for coordinating discharge planning if the patient needs post-hospital services based on physician/advanced practitioner order or complex needs related to functional status, cognitive ability, or social support system  Outcome: Progressing     Problem: Knowledge Deficit  Goal: Patient/family/caregiver demonstrates understanding of disease process, treatment plan, medications, and discharge instructions  Description: Complete learning assessment and assess knowledge base    Interventions:  - Provide teaching at level of understanding  - Provide teaching via preferred learning methods  Outcome: Progressing     Problem: GENITOURINARY - ADULT  Goal: Maintains or returns to baseline urinary function  Description: INTERVENTIONS:  - Assess urinary function  - Encourage oral fluids to ensure adequate hydration if ordered  - Administer IV fluids as ordered to ensure adequate hydration  - Administer ordered medications as needed  - Offer frequent toileting  - Follow urinary retention protocol if ordered  Outcome: Progressing     Problem: METABOLIC, FLUID AND ELECTROLYTES - ADULT  Goal: Electrolytes maintained within normal limits  Description: INTERVENTIONS:  - Monitor labs and assess patient for signs and symptoms of electrolyte imbalances  - Administer electrolyte replacement as ordered  - Monitor response to electrolyte replacements, including repeat lab results as appropriate  - Instruct patient on fluid and nutrition as appropriate  Outcome: Progressing  Goal: Fluid balance maintained  Description: INTERVENTIONS:  - Monitor labs   - Monitor I/O and WT  - Instruct patient on fluid and nutrition as appropriate  - Assess for signs & symptoms of volume excess or deficit  Outcome: Progressing  Goal: Glucose maintained within target range  Description: INTERVENTIONS:  - Monitor Blood Glucose as ordered  - Assess for signs and symptoms of hyperglycemia and hypoglycemia  - Administer ordered medications to maintain glucose within target range  - Assess nutritional intake and initiate nutrition service referral as needed  Outcome: Progressing

## 2022-10-19 NOTE — PHYSICAL THERAPY NOTE
Physical Therapy Treatment Session Note    Patient's Name: Shamika Boyd    Admitting Diagnosis  Rhabdomyolysis [M62 82]  Leukocytosis [D72 829]  UTI (urinary tract infection) [N39 0]  Head injury [S09 90XA]  Fall [W19  XXXA]  Scalp contusion [S00 03XA]    Problem List  Patient Active Problem List   Diagnosis    HLD (hyperlipidemia)    HTN (hypertension)    Acquired hypothyroidism    Type 2 diabetes mellitus with diabetic polyneuropathy, with long-term current use of insulin (HCC)    Closed intertrochanteric fracture of right femur (HCC)    Hyponatremia    Elevated vitamin B12 level    Anemia    Hypophosphatemia    Primary osteoarthritis of both knees    Acute hip pain, right    Ambulatory dysfunction    Right knee pain    Primary osteoarthritis of right shoulder    Soft tissue radionecrosis    Osteoradionecrosis of temporal bone (HCC)    SIRS of non-infectious origin w acute organ dysfunction (HCC)    High anion gap metabolic acidosis    Coffee ground emesis    RAMIRO (acute kidney injury) ([address] )    Abnormal CT of the abdomen    Visual changes    Fall    Traumatic rhabdomyolysis ([address] )    Leukemoid reaction    Gastroesophageal reflux disease without esophagitis       Past Medical History  Past Medical History:   Diagnosis Date    Ambulates with cane     Cancer ([address] )     Chronic pain disorder     knees/ shoulders (gets inj every 3 mos)    Controlled type 2 diabetes mellitus with diabetic polyneuropathy, with long-term current use of insulin ([address] ) 5/21/2008    Diabetes mellitus ([address] )     Diabetic polyneuropathy ([address] ) 5/21/2008    ICD10 clean up    Disease of thyroid gland     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    HL (hearing loss)     Hodgkin's disease ([address] ) 2008    Left neck, had radiation    Hypertension     Neuropathy     Osteoporosis     RA (rheumatoid arthritis) ([address] )        Past Surgical History  Past Surgical History:   Procedure Laterality Date    ADENOIDECTOMY      APPENDECTOMY      CATARACT EXTRACTION CATARACT EXTRACTION, BILATERAL      CHOLECYSTECTOMY      FRACTURE SURGERY Right     hip    MOUTH SURGERY      oral cancer left lower lip    OVARY SURGERY      TN ADJ TISS XFER HEAD,FAC,HAND <10 SQCM N/A 3/28/2022    Procedure: Adjacent tissue transfer face;  Surgeon: Namrata Trinh MD;  Location: [facility];  Service: ENT    TN MASTOIDECTOMY,SIMPLE Left 3/28/2022    Procedure: MASTOIDECTOMY;  Surgeon: Namrata Trinh MD;  Location: [facility];  Service: ENT    TN OPEN RX FEMUR FX+INTRAMED SHE Right 5/28/2020    Procedure: INSERTION NAIL IM FEMUR ANTEGRADE (TROCHANTERIC); Surgeon: Colby Lesch, DO;  Location: [facility] MAIN OR;  Service: Orthopedics    TONSILLECTOMY      TOTAL THYROIDECTOMY  2008    Performed after left neck dissection and left oral cancer diagnosis        10/19/22 0956   PT Last Visit   PT Visit Date 10/19/22   Note Type   Note Type Treatment   Pain Assessment   Pain Assessment Tool 0-10   Pain Score 8   Pain Location/Orientation Orientation: Bilateral;Location: Foot  (Pt reports pain in heel)   Pain Onset/Description Onset: Ongoing;Frequency: Constant/Continuous; Descriptor: Burning   Patient's Stated Pain Goal No pain   Hospital Pain Intervention(s) Repositioned;Elevated; Emotional support   Restrictions/Precautions   Weight Bearing Precautions Per Order No   Braces or Orthoses C/S Collar   Other Precautions Bed Alarm;Multiple lines; Fall Risk;Pain   General   Chart Reviewed Yes   Additional Pertinent History Coordination of care with OT Patsy Okeefe due to medical complexity, required skilled interventions of 2 clinicians for care delivery   Family/Caregiver Present No   Cognition   Overall Cognitive Status [facility]   Arousal/Participation Alert; Cooperative   Attention Attends with cues to redirect   Orientation Level Oriented X4   Memory Within functional limits   Following Commands Follows one step commands with increased time or repetition   Comments Pt agreeable to PT session   Subjective Subjective "I will try to stand"   Bed Mobility   Supine to Sit 3  Moderate assistance   Additional items Assist x 2;HOB elevated; Bedrails; Increased time required;Verbal cues;LE management   Sit to Supine   (Not assessed, pt was seated in bedside chair upon conclusion of session)   Additional Comments Pt denies any dizziness/lightheadedness, does not state that she feels the bed is moving or that the room is spinning upon sitting upright  Transfers   Sit to Stand 3  Moderate assistance   Additional items Assist x 2; Increased time required;Verbal cues; Bedrails  (VC for hand placement)   Stand to Sit 3  Moderate assistance   Additional items Assist x 2;Armrests; Increased time required;Verbal cues  (VC for hand placement)   Additional Comments Pt able to ambulate 4 ft using RW mod A x 2 to transfer to chair  Denies any dizziness/lightheadedness, or sensation of room spinning   Ambulation/Elevation   Gait pattern Improper Weight shift; Shuffling; Foward flexed; Short stride; Step to;Excessively slow; Antalgic   Gait Assistance 3  Moderate assist   Additional items Assist x 2   Assistive Device Rolling walker   Distance 4 ft   Stair Management Assistance Not tested   Ambulation/Elevation Additional Comments Pt denies lightheadedness/dizziness, or sensation of room spinning with ambulation   Balance   Static Sitting Fair -   Dynamic Sitting Fair -   Static Standing Poor +   Dynamic Standing Poor +   Ambulatory Poor +   Endurance Deficit   Endurance Deficit Yes   Activity Tolerance   Activity Tolerance Patient limited by fatigue;Patient limited by pain   Nurse Made Aware Yes, nursing made aware of treatment outcomes   Exercises   Quad Sets Sitting;10 reps;AROM; Right;Left  (Seated in bedside chair w/ legs elevated)   Glute Sets Sitting;10 reps;AROM; Bilateral   Hip Abduction Sitting;10 reps;AROM; Right;Left  (Seated in bedside chair w/ legs elevated)   Hip Adduction Sitting;10 reps;AROM; Bilateral  (Seated in bedside chair w/ legs elevated)   Knee AROM Long Arc Quad Sitting;10 reps;AROM; Right;Left   Ankle Pumps Sitting;10 reps;Right;Left   Marching Sitting;10 reps;AROM; Right;Left   Assessment   Prognosis Fair   Problem List Decreased strength; Impaired balance;Decreased endurance;Decreased mobility; Decreased coordination;Pain   Assessment Pt seen for PT treatment session this date with interventions consisting of Therapeutic exercise to improve strength and endurance consisting of: AROM 10 reps B LE in sitting position and therapeutic activity to improve independence with functional mobility and decrease fall risk consisting of training: supine<>sit transfers, sit<>stand transfers, static sitting tolerance at EOB for 5 minutes w/ bilateral UE support, static standing tolerance for 2 minutes w/ bilateral UE support and mod A x2  Pt agreeable to PT treatment session upon arrival, pt found supine in bed w/ HOB elevated, A&O x 4  In comparison to previous session, pt with improvements in mobility w/ absent dizziness  Post session: pt returned back to recliner, chair alarm engaged, all needs in reach and RN notified of session findings/recommendations  Continue to recommend post acute rehabilitation services at time of d/c in order to maximize pt's functional independence and safety w/ mobility  Pt continues to be functioning below baseline level, and remains limited 2* factors listed above and including episodes of dizziness, decreased functional mobility, and pain  PT will continue to see pt during current hospitalization in order to address the deficits listed above and provide interventions consistent w/ POC in effort to achieve LTGs  Barriers to Discharge Inaccessible home environment   Goals   Patient Goals "To get better"   LTG Expiration Date 10/28/22   Long Term Goal #1 LTGs remain appropriate   PT Treatment Day 1   Plan   Treatment/Interventions Functional transfer training;LE strengthening/ROM; Elevations; Therapeutic exercise; Endurance training;Patient/family training;Equipment eval/education; Bed mobility;Gait training;Spoke to nursing   Progress Progressing toward goals   PT Frequency 3-5x/wk   Recommendation   PT Discharge Recommendation Post acute rehabilitation services   Additional Comments Pt's raw score on the [facility] Basic Mobility inpatient short form is 11/24, standardized score is 31 06  Patients at this level are likely to benefit from DC to post-acute rehabilitation services, however, please refer to therapist recommendation for safe DC planning  [facility] Basic Mobility Inpatient   Turning in Bed Without Bedrails 2   Lying on Back to Sitting on Edge of Flat Bed 2   Moving Bed to Chair 2   Standing Up From Chair 2   Walk in Room 2   Climb 3-5 Stairs 1   Basic Mobility Inpatient Raw Score 11   Basic Mobility Standardized Score 30 25   Turning Head Towards Sound 4   Follow Simple Instructions 4   Low Function Basic Mobility Raw Score 19   Low Function Basic Mobility Standardized Score 31 06   Highest Level Of Mobility   -HLM Goal 4: Move to chair/commode   [facility]-HLM Achieved 5: Stand (1 or more minutes)   End of Consult   Patient Position at End of Consult Bedside chair;Bed/Chair alarm activated; All needs within reach  (Seated in bedside chair with legs elevated)     Treatment Time: 5255-0178    Delon John

## 2022-10-19 NOTE — PLAN OF CARE
Problem: OCCUPATIONAL THERAPY ADULT  Goal: Performs self-care activities at highest level of function for planned discharge setting  See evaluation for individualized goals  Description: Treatment Interventions: ADL retraining, Functional transfer training, UE strengthening/ROM, Endurance training, Equipment evaluation/education, Compensatory technique education, Energy conservation, Activityengagement    See flowsheet documentation for full assessment, interventions and recommendations  Outcome: Progressing  Note: Limitation: Decreased ADL status, Decreased UE ROM, Decreased UE strength, Decreased Safe judgement during ADL, Decreased endurance, Decreased self-care trans, Decreased high-level ADLs  Prognosis: Good  Assessment: Patient participated in Skilled OT session this date with interventions consisting of  therapeutic activities to: increase activity tolerance   Patient agreeable to OT treatment session, upon arrival patient was found supine in bed  In comparison to previous session, patient with improvements in functional transfers  Patient requiring verbal cues for safety and frequent rest periods  Patient continues to be functioning below baseline level, occupational performance remains limited secondary to factors listed above and increased risk for falls and injury  From OT standpoint, recommendation at time of d/c would be Short Term Rehab  Patient to benefit from continued Occupational Therapy treatment while in the hospital to address deficits as defined above and maximize level of functional independence with ADLs and functional mobility       OT Discharge Recommendation: Post acute rehabilitation services     [id number] [facility], [place], OTR/L

## 2022-10-19 NOTE — PLAN OF CARE
Problem: PHYSICAL THERAPY ADULT  Goal: Performs mobility at highest level of function for planned discharge setting  See evaluation for individualized goals  Description: Treatment/Interventions: Functional transfer training, LE strengthening/ROM, Elevations, Therapeutic exercise, Endurance training, Patient/family training, Equipment eval/education, Bed mobility, Gait training, Spoke to nursing, Spoke to advanced practitioner          See flowsheet documentation for full assessment, interventions and recommendations  Outcome: Progressing  Note: Prognosis: Fair  Problem List: Decreased strength, Impaired balance, Decreased endurance, Decreased mobility, Decreased coordination, Pain  Assessment: Pt seen for PT treatment session this date with interventions consisting of Therapeutic exercise to improve strength and endurance consisting of: AROM 10 reps B LE in sitting position and therapeutic activity to improve independence with functional mobility and decrease fall risk consisting of training: supine<>sit transfers, sit<>stand transfers, static sitting tolerance at EOB for 5 minutes w/ bilateral UE support, static standing tolerance for 2 minutes w/ bilateral UE support and mod A x2  Pt agreeable to PT treatment session upon arrival, pt found supine in bed w/ HOB elevated, A&O x 4  In comparison to previous session, pt with improvements in mobility w/ absent dizziness  Post session: pt returned back to recliner, chair alarm engaged, all needs in reach and RN notified of session findings/recommendations  Continue to recommend post acute rehabilitation services at time of d/c in order to maximize pt's functional independence and safety w/ mobility  Pt continues to be functioning below baseline level, and remains limited 2* factors listed above and including episodes of dizziness, decreased functional mobility, and pain   PT will continue to see pt during current hospitalization in order to address the deficits listed above and provide interventions consistent w/ POC in effort to achieve LTGs  Barriers to Discharge: Inaccessible home environment     PT Discharge Recommendation: Post acute rehabilitation services    See flowsheet documentation for full assessment

## 2022-10-19 NOTE — ASSESSMENT & PLAN NOTE
· Most likely a vasovagal fall in the setting of using the bathroom  · All trauma workup is negative  · Monitor on telemetry for arrhythmias  TTE 7/11/22 LVEF 60%  · The patient with symptoms of vertigo during PT/OT eval- recent MRI brain in July was unremarkable  Chronic microangiopathy      · Continue with IVF, trial of meclizine   · Orthostatic hypotension has been ruled out  · PT and OT input appreciated

## 2022-10-19 NOTE — PROGRESS NOTES
Assessment/Plan:    Traumatic Rhabdomyolysis:  Patient had a fall prior to admission  ·  10/19  CPK was 1372 on admission and 1002 on 10/18  · Continue Plasmalyte  · Monitor CPK level  Fall:  · Due to possible vasovagal episode in bathroom  · Trauma work-up negative  · PT and OT consulted  · Patient reports vertigo symptoms since admission with standing  Leukemoid Reaction:  · WBC 16 73 upon admission, 7 60 today  · Leukocytosis has resolved  GERD:  · Continue Protonix 40 mg  Type 2 Diabetes Mellitus:  · Continue Levemir at night  · Continue Accu-Cheks ACHS and sliding scale coverage  · Continue gabapentin for diabetic neuropathy  Hypothyroidism:  · Continue levothyroxine 112 mcg  VTE Pharmacologic Prophylaxis: VTE Score: 5 {VTE Risk:74428}    Patient Centered Rounds: {Pt Centered Care Rounds:65680}  Discussions with Specialists or Other Care Team Provider: ***    Education and Discussions with Family / Patient: {Family Communication:46148}    Time Spent for Care: {Note Time:19102}  More than 50% of total time spent on counseling and coordination of care as described above  Current Length of Stay: 2 day(s)  Current Patient Status: Inpatient   Certification Statement: {Certification [id number]:40621}  Discharge Plan: {Discharge IOEF:41369}    Code Status: Level 3 - DNAR and DNI    Subjective:   No acute overnight events  Patient has been eating and drinking with no complications  Patient states she has been feeling well, but is experiencing left leg and heel pain which she attributes to her fall  Patient also complains of dizziness upon standing and feeling like the bed is spinning, patient reports that she has never experienced this before her admission  Dizziness has been interfering with ability to ambulate       Objective:     Vitals:   Temp (24hrs), Av 3 °F (36 8 °C), Min:98 1 °F (36 7 °C), Max:98 5 °F (36 9 °C)    Temp:  [98 1 °F (36 7 °C)-98 5 °F (36 9 °C)] 98 5 °F (36 9 °C)  HR:  [80-88] 80  Resp:  [18-20] 19  BP: (153-164)/(82-85) 164/82  SpO2:  [96 %] 96 %  Body mass index is 23 41 kg/m²  Input and Output Summary (last 24 hours): Intake/Output Summary (Last 24 hours) at 10/19/2022 1309  Last data filed at 10/19/2022 0926  Gross per 24 hour   Intake 2801 ml   Output 1850 ml   Net 951 ml       Physical Exam:   General: In no acute distress  Alert and oriented  Cardiovascular: Regular rate and rhythm, no murmurs, rubs, or gallops  Pulmonary: Normal respiratory effort  Lungs clear to auscultation bilaterally  No wheezes, rales, or rhonchi  Peripheral Vascular: DP pulses 2+ bilaterally  No lower extremity edema  Skin: Warm and dry  Ecchymosis of the left forehead       Additional Data:     Labs:  Results from last 7 days   Lab Units 10/19/22  0445 10/18/22  0446   WBC Thousand/uL 7 60 8 27   HEMOGLOBIN g/dL 11 6 11 5   HEMATOCRIT % 35 4 34 5*   PLATELETS Thousands/uL 243 247   NEUTROS PCT %  --  71   LYMPHS PCT %  --  17   MONOS PCT %  --  11   EOS PCT %  --  0     Results from last 7 days   Lab Units 10/19/22  0445 10/18/22  0446   SODIUM mmol/L 140 137   POTASSIUM mmol/L 4 0 3 9   CHLORIDE mmol/L 101 99   CO2 mmol/L 32 30   BUN mg/dL 13 12   CREATININE mg/dL 0 76 0 61   ANION GAP mmol/L 7 8   CALCIUM mg/dL 8 8 8 3*   ALBUMIN g/dL  --  3 3*   TOTAL BILIRUBIN mg/dL  --  0 64   ALK PHOS U/L  --  70   ALT U/L  --  26   AST U/L  --  46*   GLUCOSE RANDOM mg/dL 206* 117     Results from last 7 days   Lab Units 10/17/22  1319   INR  0 91     Results from last 7 days   Lab Units 10/19/22  1120 10/19/22  0732 10/18/22  2154 10/18/22  1553 10/18/22  1129 10/18/22  0905 10/18/22  0807 10/17/22  2106 10/17/22  1813   POC GLUCOSE mg/dl 310* 313* 414* 372* 178* 149* 68 361* 286*         Results from last 7 days   Lab Units 10/17/22  1548 10/17/22  1319   LACTIC ACID mmol/L 1 6  --    PROCALCITONIN ng/ml  --  <0 05       Lines/Drains:  Invasive Devices  Report    Peripheral Intravenous Line  Duration           Peripheral IV 10/17/22 Left Forearm 1 day          Drain  Duration           External Urinary Catheter <1 day                      Imaging: {Radiology Review:83883}    Recent Cultures (last 7 days):   Results from last 7 days   Lab Units 10/17/22  1658 10/17/22  1548   BLOOD CULTURE  No Growth at 24 hrs  No Growth at 24 hrs         Last 24 Hours Medication List:   Current Facility-Administered Medications   Medication Dose Route Frequency Provider Last Rate   • acetaminophen  650 mg Oral Q6H PRN Alcira Velasco MD     • vitamin C  500 mg Oral BID Alcira Velasco MD     • calcium carbonate-vitamin D  2 tablet Oral BID With Meals Alcira Velasco MD     • cefTRIAXone  1,000 mg Intravenous Q24H Alcira Velasco MD 1,000 mg (10/18/22 1623)   • cyanocobalamin  1,000 mcg Oral Daily Alcira Velasco MD     • docusate sodium  100 mg Oral BID Alcira Velasco MD     • gabapentin  300 mg Oral TID Alcira Velasco MD     • heparin (porcine)  5,000 Units Subcutaneous Pending sale to [facility] Alcira Velasco MD     • HYDROcodone-acetaminophen  1 tablet Oral Q6H PRN Alcira Velasco MD     • insulin detemir  5 Units Subcutaneous HS Alcira Velasco MD     • insulin lispro  1-5 Units Subcutaneous TID [facility] Alcira Velasco MD     • insulin lispro  1-5 Units Subcutaneous HS Alcira Velasco MD     • levothyroxine  112 mcg Oral QAM Alcira Velasco MD     • meclizine  25 mg Oral Pending sale to [facility] ADRIAN Sams     • methocarbamol  500 mg Oral Q8H PRN ADRIAN Sams     • morphine injection  2 mg Intravenous Q6H PRN Alcira Velasco MD     • multi-electrolyte  75 mL/hr Intravenous Continuous Alcira Velasco MD 75 mL/hr (10/19/22 4762)   • ondansetron  4 mg Intravenous Q6H PRN Alcira Velasco MD     • pantoprazole  40 mg Oral BID Alcira Velasco MD          Today, Patient Was Seen By: Duy Mazariegos    **Please Note: This note may have been constructed using a voice recognition system  **

## 2022-10-19 NOTE — PLAN OF CARE
Problem: MOBILITY - ADULT  Goal: Maintain or return to baseline ADL function  Description: INTERVENTIONS:  -  Assess patient's ability to carry out ADLs; assess patient's baseline for ADL function and identify physical deficits which impact ability to perform ADLs (bathing, care of mouth/teeth, toileting, grooming, dressing, etc )  - Assess/evaluate cause of self-care deficits   - Assess range of motion  - Assess patient's mobility; develop plan if impaired  - Assess patient's need for assistive devices and provide as appropriate  - Encourage maximum independence but intervene and supervise when necessary  - Involve family in performance of ADLs  - Assess for home care needs following discharge   - Consider OT consult to assist with ADL evaluation and planning for discharge  - Provide patient education as appropriate  Outcome: Progressing  Ax1 RW able to ambulate 3 ft with c/o dizziness

## 2022-10-19 NOTE — ASSESSMENT & PLAN NOTE
Lab Results   Component Value Date    HGBA1C 10 2 (H) 07/01/2022       Recent Labs     10/18/22  1553 10/18/22  2154 10/19/22  0732 10/19/22  1120   POCGLU 372* 414* 313* 310*       Blood Sugar Average: Last 72 hrs:  (P) 127 [id number]   · Target blood sugar for the hospital 140-180  · Continue Levemir, increase dose to 8 units at HS  · Implement Accu-Cheks AC and HS with sliding scale coverage  · Diabetic neuropathy-continue gabapentin

## 2022-10-19 NOTE — PROGRESS NOTES
Jonny U  66   Progress Note Thereasa Age 1940, 80 y o  female MRN: [id number]  Unit/Bed#: -02 Encounter: [id number]  Primary Care Provider: Sona Morris MD   Date and time admitted to hospital: 10/17/2022 12:53 PM    * Traumatic rhabdomyolysis [facility])  Assessment & Plan  · In the setting of having a fall prior to arrival  · Arrival CPK 1372 -> 1002 -> 700  · Continue with Plasmalyte for another 10 hours   · Monitor closely for any signs of renal insufficiency  · Follow up with CPK level     Gastroesophageal reflux disease without esophagitis  Assessment & Plan  · Continue Protonix       Leukemoid reaction  Assessment & Plan  · Likely reactive due to fall with possible UTI  · Continue with ceftriaxone day#3  · Leukocytosis has resolved  · Procalcitonin- negative       Fall  Assessment & Plan  · Most likely a vasovagal fall in the setting of using the bathroom  · All trauma workup is negative  · Monitor on telemetry for arrhythmias  TTE 7/11/22 LVEF 60%  · The patient with symptoms of vertigo during PT/OT eval- recent MRI brain in July was unremarkable  Chronic microangiopathy      · Continue with IVF, trial of meclizine   · Orthostatic hypotension has been ruled out  · PT and OT input appreciated     Type 2 diabetes mellitus with diabetic polyneuropathy, with long-term current use of insulin [facility])  Assessment & Plan  Lab Results   Component Value Date    HGBA1C 10 2 (H) 07/01/2022       Recent Labs     10/18/22  1553 10/18/22  2154 10/19/22  0732 10/19/22  1120   POCGLU 372* 414* 313* 310*       Blood Sugar Average: Last 72 hrs:  (P) 218 [id number]   · Target blood sugar for the hospital 140-180  · Continue Levemir, increase dose to 8 units at HS  · Implement Accu-Cheks AC and HS with sliding scale coverage  · Diabetic neuropathy-continue gabapentin     Acquired hypothyroidism  Assessment & Plan  · Continue levothyroxine        VTE Prophylaxis:  Heparin    Patient Centered Rounds: I have performed bedside rounds with nursing staff today  Discussions with Specialists or Other Care Team Provider:  Case management  Education and Discussions with Family / Patient:  Patient and daughter    Current Length of Stay: 2 day(s)    Current Patient Status: Inpatient   Certification Statement: The patient will continue to require additional inpatient hospital stay due to Traumatic rhabdomyolysis    Discharge Plan:  Pending hospital course  Would like to follow-up with CK level in the a m  Elnor  If this is less than 500 in the a   the patient will be medically cleared to be discharged to short-term rehab  Code Status: Level 3 - DNAR and DNI    Subjective:   Feeling better  Denies any dizziness and lightheadedness  Objective:     Vitals:   Temp (24hrs), Av 3 °F (36 8 °C), Min:98 1 °F (36 7 °C), Max:98 5 °F (36 9 °C)    Temp:  [98 1 °F (36 7 °C)-98 5 °F (36 9 °C)] 98 5 °F (36 9 °C)  HR:  [80-88] 80  Resp:  [18-20] 19  BP: (153-164)/(82-85) 164/82  SpO2:  [96 %] 96 %  Body mass index is 23 41 kg/m²  Input and Output Summary (last 24 hours): Intake/Output Summary (Last 24 hours) at 10/19/2022 1337  Last data filed at 10/19/2022 0926  Gross per 24 hour   Intake 2801 ml   Output 1850 ml   Net 951 ml       Physical Exam:   Physical Exam  Vitals and nursing note reviewed  Constitutional:       General: She is not in acute distress  Appearance: Normal appearance  Comments: Frail and elderly      HENT:      Head: Normocephalic and atraumatic  Right Ear: External ear normal       Left Ear: External ear normal       Nose: Nose normal  No rhinorrhea  Mouth/Throat:      Mouth: Mucous membranes are moist       Pharynx: Oropharynx is clear  Eyes:      General:         Right eye: No discharge  Left eye: No discharge  Pupils: Pupils are equal, round, and reactive to light  Cardiovascular:      Rate and Rhythm: Normal rate and regular rhythm  Pulses: Normal pulses  Heart sounds: Normal heart sounds  No murmur heard  Pulmonary:      Effort: Pulmonary effort is normal  No respiratory distress  Breath sounds: Normal breath sounds  Abdominal:      General: Bowel sounds are normal  There is no distension  Palpations: Abdomen is soft  There is no mass  Tenderness: There is no abdominal tenderness  Musculoskeletal:         General: No swelling or tenderness  Normal range of motion  Cervical back: Normal range of motion and neck supple  No muscular tenderness  Skin:     General: Skin is warm and dry  Capillary Refill: Capillary refill takes less than 2 seconds  Findings: No erythema or rash  Neurological:      General: No focal deficit present  Mental Status: She is alert and oriented to person, place, and time  Mental status is at baseline  Psychiatric:         Mood and Affect: Mood normal          Behavior: Behavior normal          Thought Content:  Thought content normal          Judgment: Judgment normal          Additional Data:     Labs:    Results from last 7 days   Lab Units 10/19/22  0445 10/18/22  0446   WBC Thousand/uL 7 60 8 27   HEMOGLOBIN g/dL 11 6 11 5   HEMATOCRIT % 35 4 34 5*   PLATELETS Thousands/uL 243 247   NEUTROS PCT %  --  71   LYMPHS PCT %  --  17   MONOS PCT %  --  11   EOS PCT %  --  0     Results from last 7 days   Lab Units 10/19/22  0445 10/18/22  0446   SODIUM mmol/L 140 137   POTASSIUM mmol/L 4 0 3 9   CHLORIDE mmol/L 101 99   CO2 mmol/L 32 30   BUN mg/dL 13 12   CREATININE mg/dL 0 76 0 61   CALCIUM mg/dL 8 8 8 3*   ALK PHOS U/L  --  70   ALT U/L  --  26   AST U/L  --  46*     Results from last 7 days   Lab Units 10/17/22  1319   INR  0 91     Results from last 7 days   Lab Units 10/19/22  1120 10/19/22  0732 10/18/22  2154 10/18/22  1553 10/18/22  1129 10/18/22  0905 10/18/22  0807 10/17/22  2106 10/17/22  1813   POC GLUCOSE mg/dl 310* 313* 414* 372* 178* 149* 68 361* 286* * I Have Reviewed All Lab Data Listed Above  * Additional Pertinent Lab Tests Reviewed: Javier 66 Admission  Reviewed    Imaging:  Imaging Reports Reviewed Today Include: n/a     Recent Cultures (last 7 days):     Results from last 7 days   Lab Units 10/17/22  1658 10/17/22  1548   BLOOD CULTURE  No Growth at 24 hrs  No Growth at 24 hrs         Last 24 Hours Medication List:   Current Facility-Administered Medications   Medication Dose Route Frequency Provider Last Rate   • acetaminophen  650 mg Oral Q6H PRN Sharyn Andersen MD     • vitamin C  500 mg Oral BID Sharyn Andersen MD     • calcium carbonate-vitamin D  2 tablet Oral BID With Meals Sharyn Andersen MD     • cefTRIAXone  1,000 mg Intravenous Q24H Sharyn Andersen MD 1,000 mg (10/18/22 1623)   • cyanocobalamin  1,000 mcg Oral Daily Sharyn Andersen MD     • docusate sodium  100 mg Oral BID Sharyn Andersen MD     • gabapentin  300 mg Oral TID Sharyn Andersen MD     • heparin (porcine)  5,000 Units Subcutaneous [facility] Sharyn Andersen MD     • HYDROcodone-acetaminophen  1 tablet Oral Q6H PRN Sharyn Andersen MD     • insulin detemir  8 Units Subcutaneous HS ADRIAN Herr     • insulin lispro  1-5 Units Subcutaneous TID [facility] Sharyn Andersen MD     • insulin lispro  1-5 Units Subcutaneous HS Sharyn Andersen MD     • levothyroxine  112 mcg Oral QAM Sharyn Andersen MD     • meclizine  25 mg Oral [facility] ADRIAN Herr     • methocarbamol  500 mg Oral Q8H PRN ADRIAN Herr     • morphine injection  2 mg Intravenous Q6H PRN Sharyn Andersen MD     • multi-electrolyte  75 mL/hr Intravenous Continuous ADRIAN Herr     • ondansetron  4 mg Intravenous Q6H PRN Sharyn Andersen MD     • pantoprazole  40 mg Oral BID Sharyn Andersen MD          Today, Patient Was Seen By: Salvador Martin    ** Please Note: Dictation voice to text software may have been used in the creation of this document   **

## 2022-10-20 ENCOUNTER — HOME HEALTH ADMISSION (OUTPATIENT)
Dept: HOME HEALTH SERVICES | Facility: HOME HEALTHCARE | Age: 82
End: 2022-10-20

## 2022-10-20 LAB
ANION GAP SERPL CALCULATED.3IONS-SCNC: 8 MMOL/L (ref 4–13)
BUN SERPL-MCNC: 14 MG/DL (ref 5–25)
CALCIUM SERPL-MCNC: 9.5 MG/DL (ref 8.4–10.2)
CHLORIDE SERPL-SCNC: 102 MMOL/L (ref 96–108)
CK MB SERPL-MCNC: 0.7 % (ref 0–2.5)
CK MB SERPL-MCNC: 3.8 NG/ML (ref 0.6–6.3)
CK SERPL-CCNC: 522 U/L (ref 26–192)
CO2 SERPL-SCNC: 33 MMOL/L (ref 21–32)
CREAT SERPL-MCNC: 0.72 MG/DL (ref 0.6–1.3)
GFR SERPL CREATININE-BSD FRML MDRD: 78 ML/MIN/1.73SQ M
GLUCOSE SERPL-MCNC: 290 MG/DL (ref 65–140)
GLUCOSE SERPL-MCNC: 308 MG/DL (ref 65–140)
GLUCOSE SERPL-MCNC: 397 MG/DL (ref 65–140)
GLUCOSE SERPL-MCNC: 40 MG/DL (ref 65–140)
GLUCOSE SERPL-MCNC: 46 MG/DL (ref 65–140)
GLUCOSE SERPL-MCNC: 94 MG/DL (ref 65–140)
GLUCOSE SERPL-MCNC: 99 MG/DL (ref 65–140)
POTASSIUM SERPL-SCNC: 3.7 MMOL/L (ref 3.5–5.3)
SODIUM SERPL-SCNC: 143 MMOL/L (ref 135–147)

## 2022-10-20 PROCEDURE — 82948 REAGENT STRIP/BLOOD GLUCOSE: CPT

## 2022-10-20 PROCEDURE — 82550 ASSAY OF CK (CPK): CPT | Performed by: NURSE PRACTITIONER

## 2022-10-20 PROCEDURE — 97535 SELF CARE MNGMENT TRAINING: CPT

## 2022-10-20 PROCEDURE — 82553 CREATINE MB FRACTION: CPT | Performed by: NURSE PRACTITIONER

## 2022-10-20 PROCEDURE — 97530 THERAPEUTIC ACTIVITIES: CPT

## 2022-10-20 PROCEDURE — 80048 BASIC METABOLIC PNL TOTAL CA: CPT | Performed by: NURSE PRACTITIONER

## 2022-10-20 PROCEDURE — 99232 SBSQ HOSP IP/OBS MODERATE 35: CPT | Performed by: NURSE PRACTITIONER

## 2022-10-20 RX ORDER — OLANZAPINE 10 MG/1
5 INJECTION, POWDER, LYOPHILIZED, FOR SOLUTION INTRAMUSCULAR ONCE
Status: COMPLETED | OUTPATIENT
Start: 2022-10-20 | End: 2022-10-20

## 2022-10-20 RX ORDER — TRAMADOL HYDROCHLORIDE 50 MG/1
50 TABLET ORAL EVERY 6 HOURS PRN
Status: DISCONTINUED | OUTPATIENT
Start: 2022-10-20 | End: 2022-10-21

## 2022-10-20 RX ADMIN — GABAPENTIN 300 MG: 300 CAPSULE ORAL at 21:10

## 2022-10-20 RX ADMIN — INSULIN LISPRO 2 UNITS: 100 INJECTION, SOLUTION INTRAVENOUS; SUBCUTANEOUS at 17:24

## 2022-10-20 RX ADMIN — GABAPENTIN 300 MG: 300 CAPSULE ORAL at 10:29

## 2022-10-20 RX ADMIN — CYANOCOBALAMIN TAB 500 MCG 1000 MCG: 500 TAB at 10:29

## 2022-10-20 RX ADMIN — DOCUSATE SODIUM 100 MG: 100 CAPSULE, LIQUID FILLED ORAL at 18:15

## 2022-10-20 RX ADMIN — LEVOTHYROXINE SODIUM 112 MCG: 112 TABLET ORAL at 05:00

## 2022-10-20 RX ADMIN — MECLIZINE 25 MG: 12.5 TABLET ORAL at 14:52

## 2022-10-20 RX ADMIN — Medication 2 TABLET: at 10:29

## 2022-10-20 RX ADMIN — OXYCODONE HYDROCHLORIDE AND ACETAMINOPHEN 500 MG: 500 TABLET ORAL at 10:29

## 2022-10-20 RX ADMIN — PANTOPRAZOLE SODIUM 40 MG: 40 TABLET, DELAYED RELEASE ORAL at 21:10

## 2022-10-20 RX ADMIN — PANTOPRAZOLE SODIUM 40 MG: 40 TABLET, DELAYED RELEASE ORAL at 10:29

## 2022-10-20 RX ADMIN — HYDROCODONE BITARTRATE AND ACETAMINOPHEN 1 TABLET: 5; 325 TABLET ORAL at 11:47

## 2022-10-20 RX ADMIN — Medication 2 TABLET: at 16:58

## 2022-10-20 RX ADMIN — INSULIN LISPRO 4 UNITS: 100 INJECTION, SOLUTION INTRAVENOUS; SUBCUTANEOUS at 21:10

## 2022-10-20 RX ADMIN — HEPARIN SODIUM 5000 UNITS: 5000 INJECTION INTRAVENOUS; SUBCUTANEOUS at 05:00

## 2022-10-20 RX ADMIN — MORPHINE SULFATE 2 MG: 2 INJECTION, SOLUTION INTRAMUSCULAR; INTRAVENOUS at 00:17

## 2022-10-20 RX ADMIN — Medication 16 G: at 08:33

## 2022-10-20 RX ADMIN — MECLIZINE 25 MG: 12.5 TABLET ORAL at 21:10

## 2022-10-20 RX ADMIN — DOCUSATE SODIUM 100 MG: 100 CAPSULE, LIQUID FILLED ORAL at 10:29

## 2022-10-20 RX ADMIN — GABAPENTIN 300 MG: 300 CAPSULE ORAL at 16:58

## 2022-10-20 RX ADMIN — OXYCODONE HYDROCHLORIDE AND ACETAMINOPHEN 500 MG: 500 TABLET ORAL at 18:16

## 2022-10-20 RX ADMIN — HEPARIN SODIUM 5000 UNITS: 5000 INJECTION INTRAVENOUS; SUBCUTANEOUS at 14:52

## 2022-10-20 RX ADMIN — TRAMADOL HYDROCHLORIDE 50 MG: 50 TABLET, COATED ORAL at 21:10

## 2022-10-20 RX ADMIN — INSULIN LISPRO 3 UNITS: 100 INJECTION, SOLUTION INTRAVENOUS; SUBCUTANEOUS at 11:48

## 2022-10-20 RX ADMIN — INSULIN DETEMIR 5 UNITS: 100 INJECTION, SOLUTION SUBCUTANEOUS at 21:11

## 2022-10-20 RX ADMIN — MECLIZINE 25 MG: 12.5 TABLET ORAL at 05:00

## 2022-10-20 RX ADMIN — HEPARIN SODIUM 5000 UNITS: 5000 INJECTION INTRAVENOUS; SUBCUTANEOUS at 21:10

## 2022-10-20 RX ADMIN — OLANZAPINE 5 MG: 10 INJECTION, POWDER, FOR SOLUTION INTRAMUSCULAR at 02:10

## 2022-10-20 NOTE — CASE MANAGEMENT
Case Management Discharge Planning Note    Patient name Shamika Boyd  Location /-02 MRN [id number]  : 1940 Date 10/20/2022       Current Admission Date: 10/17/2022  Current Admission Diagnosis:Traumatic rhabdomyolysis [facility])   Patient Active Problem List    Diagnosis Date Noted   • Fall 10/17/2022   • Traumatic rhabdomyolysis ([address] ) 10/17/2022   • Leukemoid reaction 10/17/2022   • Gastroesophageal reflux disease without esophagitis 10/17/2022   • Visual changes 2022   • SIRS of non-infectious origin w acute organ dysfunction ([address] ) 07/10/2022   • High anion gap metabolic acidosis    • Coffee ground emesis 07/10/2022   • RAMIRO (acute kidney injury) ([address] ) 07/10/2022   • Abnormal CT of the abdomen 07/10/2022   • Soft tissue radionecrosis 2022   • 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   • Primary osteoarthritis of right shoulder 2021   • Right knee pain 2020   • Acute hip pain, right 2020   • Ambulatory dysfunction    • Primary osteoarthritis of both knees 2020   • Hypophosphatemia 2020   • Elevated vitamin B12 level 2020   • Anemia 2020   • Hyponatremia 2020   • Closed intertrochanteric fracture of right femur ([address] ) 2020   • Acquired hypothyroidism 2015   • HLD (hyperlipidemia) 2014   • HTN (hypertension) 10/10/2013   • Type 2 diabetes mellitus with diabetic polyneuropathy, with long-term current use of insulin ([address] ) 2008      LOS (days): 3  Geometric Mean LOS (GMLOS) (days): 2 50  Days to GMLOS:-0 6     OBJECTIVE:  Risk of Unplanned Readmission Score: 21 19         Current admission status: Inpatient   Preferred Pharmacy:   [address] [place], [place] [id number]  Phone: [phone] Fax: [phone]    Primary Care Provider: Glenna Evans MD    Primary Insurance: MEDICARE  Secondary Insurance: [facility] DETAILS:    Discharge planning discussed with[de-identified] patient  and Bibiana Marina at the bedside  Freedom of Choice: Yes  Comments - Freedom of Choice: rehab recommended- pt was not accept to [facility]U as the pt wanted,  pt has been accepted by [facility]- pt and family have declined snf rehab-  -pt is confused today on her pain medications- family may change their mind on snf rehab if celestine pt's mental status does not change after being off the pain medication  CM contacted family/caregiver?: Yes             Contacts  Patient Contacts: Vicky Ennis dtr  Relationship to Patient[de-identified] Family (daughter)  Contact Method:  In Person  Reason/Outcome: Discharge Planning              Other Referral/Resources/Interventions Provided:  Referral Comments: family may change their mind on snf rehab, daughter will give cm a decision tomorrow    Would you like to participate in our [address] service program?  : No - Declined

## 2022-10-20 NOTE — PROGRESS NOTES
Jonny U  66   Progress Note Yogi Jacinto 1940, 80 y o  female MRN: [id number]  Unit/Bed#: -02 Encounter: [id number]  Primary Care Provider: Edith Bamberger, MD   Date and time admitted to hospital: 10/17/2022 12:53 PM    * Traumatic rhabdomyolysis [facility])  Assessment & Plan  · In the setting of having a fall prior to arrival  · Arrival CPK 1372 -> 1002 -> 700 -> 522  · Will monitor off IVF for now   · Monitor closely for any signs of renal insufficiency  · Follow up with CPK level     Gastroesophageal reflux disease without esophagitis  Assessment & Plan  · Continue Protonix       Leukemoid reaction  Assessment & Plan  · Likely reactive due to fall with possible UTI  · Received 3 days of ceftriaxone  · Leukocytosis has resolved  · Procalcitonin- negative       Fall  Assessment & Plan  · Most likely a vasovagal fall in the setting of using the bathroom  · All trauma workup is negative  · Monitor on telemetry for arrhythmias  TTE 7/11/22 LVEF 60%  · The patient with symptoms of vertigo during PT/OT eval- recent MRI brain in July was unremarkable  Chronic microangiopathy  · Dizziness/vertigo- improving  Continue on meclizine  · Orthostatic hypotension has been ruled out  · PT and OT recommends STR; family would like the patient to go home with [facility]    Type 2 diabetes mellitus with diabetic polyneuropathy, with long-term current use of insulin [facility])  Assessment & Plan  Lab Results   Component Value Date    HGBA1C 10 2 (H) 07/01/2022       Recent Labs     10/20/22  0801 10/20/22  0827 10/20/22  0905 10/20/22  1145   POCGLU 40* 46* 99 308*       Blood Sugar Average: Last 72 hrs:  (P) 241 2   · Target blood sugar for the hospital 140-180  · Continue Levemir (increased to 8 units at HS on 10/19) but with AM hypoglycemia   Decreased dose back to 5 units at HS  · Implement Accu-Cheks AC and HS with sliding scale coverage  · Diabetic neuropathy-continue gabapentin      Acquired hypothyroidism  Assessment & Plan  · Continue levothyroxine           VTE Prophylaxis:  Heparin    Patient Centered Rounds: I have performed bedside rounds with nursing staff today  Discussions with Specialists or Other Care Team Provider: CM   Education and Discussions with Family / Patient: patient and left message with daughter     Current Length of Stay: 3 day(s)    Current Patient Status: Inpatient   Certification Statement: The patient will continue to require additional inpatient hospital stay due to traumatic rhabdomyolysis    Discharge Plan: pending hospital course  hopeful discharge 24 hrs  Code Status: Level 3 - DNAR and DNI    Subjective:   Feeling better  Denies any chest pain or dyspnea  Objective:     Vitals:   Temp (24hrs), Av °F (36 7 °C), Min:97 9 °F (36 6 °C), Max:98 1 °F (36 7 °C)    Temp:  [97 9 °F (36 6 °C)-98 1 °F (36 7 °C)] 98 1 °F (36 7 °C)  HR:  [75-84] 78  Resp:  [18-20] 18  BP: (116-130)/(58-67) 130/64  SpO2:  [94 %-98 %] 98 %  Body mass index is 23 41 kg/m²  Input and Output Summary (last 24 hours): Intake/Output Summary (Last 24 hours) at 10/20/2022 1304  Last data filed at 10/20/2022 1244  Gross per 24 hour   Intake 240 ml   Output 800 ml   Net -560 ml       Physical Exam:   Physical Exam  Vitals and nursing note reviewed  Constitutional:       General: She is not in acute distress  Appearance: Normal appearance  Comments: Frail and elderly      HENT:      Head: Normocephalic and atraumatic  Right Ear: External ear normal       Left Ear: External ear normal       Nose: Nose normal  No rhinorrhea  Mouth/Throat:      Mouth: Mucous membranes are moist       Pharynx: Oropharynx is clear  Eyes:      General:         Right eye: No discharge  Left eye: No discharge  Pupils: Pupils are equal, round, and reactive to light  Cardiovascular:      Rate and Rhythm: Normal rate and regular rhythm  Pulses: Normal pulses        Heart sounds: Normal heart sounds  No murmur heard  Pulmonary:      Effort: Pulmonary effort is normal  No respiratory distress  Breath sounds: Normal breath sounds  Abdominal:      General: Bowel sounds are normal  There is no distension  Palpations: Abdomen is soft  There is no mass  Tenderness: There is no abdominal tenderness  Musculoskeletal:         General: No swelling or tenderness  Normal range of motion  Cervical back: Normal range of motion and neck supple  No muscular tenderness  Skin:     General: Skin is warm and dry  Capillary Refill: Capillary refill takes less than 2 seconds  Findings: No erythema or rash  Neurological:      General: No focal deficit present  Mental Status: She is alert and oriented to person, place, and time  Mental status is at baseline  Psychiatric:         Mood and Affect: Mood normal          Behavior: Behavior normal          Thought Content: Thought content normal          Judgment: Judgment normal          Additional Data:     Labs:    Results from last 7 days   Lab Units 10/19/22  0445 10/18/22  0446   WBC Thousand/uL 7 60 8 27   HEMOGLOBIN g/dL 11 6 11 5   HEMATOCRIT % 35 4 34 5*   PLATELETS Thousands/uL 243 247   NEUTROS PCT %  --  71   LYMPHS PCT %  --  17   MONOS PCT %  --  11   EOS PCT %  --  0     Results from last 7 days   Lab Units 10/20/22  0458 10/19/22  0445 10/18/22  0446   SODIUM mmol/L 143   < > 137   POTASSIUM mmol/L 3 7   < > 3 9   CHLORIDE mmol/L 102   < > 99   CO2 mmol/L 33*   < > 30   BUN mg/dL 14   < > 12   CREATININE mg/dL 0 72   < > 0 61   CALCIUM mg/dL 9 5   < > 8 3*   ALK PHOS U/L  --   --  70   ALT U/L  --   --  26   AST U/L  --   --  46*    < > = values in this interval not displayed       Results from last 7 days   Lab Units 10/17/22  1319   INR  0 91     Results from last 7 days   Lab Units 10/20/22  1145 10/20/22  0905 10/20/22  0827 10/20/22  0801 10/19/22  2100 10/19/22  1631 10/19/22  1120 10/19/22  0732 10/18/22  2154 10/18/22  1553 10/18/22  1129 10/18/22  0905   POC GLUCOSE mg/dl 308* 99 46* 40* 446* 228* 310* 313* 414* 372* 178* 149*           * I Have Reviewed All Lab Data Listed Above  * Additional Pertinent Lab Tests Reviewed: Javier 66 Admission  Reviewed    Imaging:  Imaging Reports Reviewed Today Include: n/a     Recent Cultures (last 7 days):     Results from last 7 days   Lab Units 10/17/22  1658 10/17/22  1548   BLOOD CULTURE  No Growth at 48 hrs  No Growth at 48 hrs         Last 24 Hours Medication List:   Current Facility-Administered Medications   Medication Dose Route Frequency Provider Last Rate   • acetaminophen  650 mg Oral Q6H PRN Bacilio Martin MD     • vitamin C  500 mg Oral BID Bacilio Martin MD     • calcium carbonate-vitamin D  2 tablet Oral BID With Meals Bacilio Martin MD     • cyanocobalamin  1,000 mcg Oral Daily Bacilio Martin MD     • docusate sodium  100 mg Oral BID Bacilio Martin MD     • gabapentin  300 mg Oral TID Bacilio Martin MD     • heparin (porcine)  5,000 Units Subcutaneous formerly [facility] Bacilio Martin MD     • HYDROcodone-acetaminophen  1 tablet Oral Q6H PRN Bacilio Martin MD     • insulin detemir  8 Units Subcutaneous HS Sandoval Dose, CRNP     • insulin lispro  1-5 Units Subcutaneous TID [facility] Bacilio Martin MD     • insulin lispro  1-5 Units Subcutaneous HS Bacilio Martin MD     • levothyroxine  112 mcg Oral QAM Bacilio Martin MD     • meclizine  25 mg Oral formerly [facility] Sandoval Dose, CRNP     • methocarbamol  500 mg Oral Q8H PRN Sandoval Dose, CRNP     • morphine injection  2 mg Intravenous Q6H PRN Bacilio Martin MD     • ondansetron  4 mg Intravenous Q6H PRN Bacilio Martin MD     • pantoprazole  40 mg Oral BID Bacilio Martin MD          Today, Patient Was Seen By: Sandoval John    ** Please Note: Dictation voice to text software may have been used in the creation of this document   **

## 2022-10-20 NOTE — OCCUPATIONAL THERAPY NOTE
10/20/22 1012   OT Last Visit   OT Visit Date 10/20/22   Note Type   Note Type Treatment   Pain Assessment   Pain Assessment Tool 0-10   Pain Score 6   Pain Location/Orientation Location: Buttocks;Orientation: Bilateral;Location: Knee   Pain Onset/Description Onset: Ongoing;Frequency: Constant/Continuous; Descriptor: Aching;Descriptor: Burning   Patient's Stated Pain Goal No pain   Hospital Pain Intervention(s) Medication (See MAR); Repositioned; Ambulation/increased activity   Restrictions/Precautions   Weight Bearing Precautions Per Order No   Braces or Orthoses C/S Collar   Other Precautions Bed Alarm;Multiple lines; Fall Risk;Pain   ADL   Where Assessed Edge of bed   Grooming Assistance   (Declined when offered; fixated on finding her shoes)   UB Bathing Assistance 5  Supervision/Setup   UB Bathing Deficit Setup;Verbal cueing;Supervision/safety; Increased time to complete   LB Bathing Assistance 4  Minimal Assistance   LB Bathing Deficit Setup;Verbal cueing;Supervision/safety; Increased time to complete   UB Dressing Assistance 4  Minimal Assistance   UB Dressing Deficit Setup;Verbal cueing;Supervision/safety; Increased time to complete; Thread RUE; Thread LUE; Fasteners   LB Dressing Deficit Don/doff R sock; Don/doff L sock   LB Dressing Comments Dep to doff/don socks   Toileting Assistance  4  Minimal Assistance   Toileting Deficit Setup;Verbal cueing;Supervison/safety; Increased time to complete; Bedside commode;Perineal hygiene   Toileting Comments Min A while standing for saefty to complete pericare  Bed Mobility   Supine to Sit 4  Minimal assistance   Additional items Assist x 1;HOB elevated; Bedrails; Increased time required;Verbal cues;LE management   Sit to Supine 3  Moderate assistance   Additional items Assist x 1;HOB elevated; Bedrails; Increased time required;Verbal cues;LE management   Additional Comments Pt declined dizziness upon sitting EOB     Transfers   Sit to Stand 4  Minimal assistance   Additional items Assist x 1;Bedrails; Increased time required;Verbal cues   Stand to Sit 4  Minimal assistance   Additional items Assist x 1;Bedrails; Increased time required;Verbal cues   Stand pivot 4  Minimal assistance   Additional items Assist x 1; Increased time required;Verbal cues   Toilet transfer 4  Minimal assistance   Additional items Assist x 1; Armrests; Increased time required;Commode;Verbal cues   Functional Mobility   Functional Mobility 4  Minimal assistance   Additional Comments Pt ambulates 30 feet x 4 with on seated rest break/   Additional items Rolling walker   Toilet Transfers   Toilet Transfer From Bed   Toilet Transfer Type To and from   Toilet Transfer to Standard bedside commode   Toilet Transfer Technique Stand pivot; To right; To left   Toilet Transfers Minimal assistance   Cognition   Overall Cognitive Status [facility]   Arousal/Participation Alert; Responsive; Cooperative   Attention Attends with cues to redirect   Orientation Level Oriented to person;Disoriented to place; Disoriented to time;Disoriented to situation   Memory Within functional limits   Following Commands Follows one step commands with increased time or repetition   Comments Pt agreeable to OT treatment  Activity Tolerance   Activity Tolerance Patient tolerated treatment well   Assessment   Assessment Patient participated in Skilled OT session this date with interventions consisting of functional transfer training, ADL re training with the use of correct body mechnaics, Energy Conservation techniques and increase dynamic sit/ stand balance during functional activity    Patient agreeable to OT treatment session, upon arrival patient was found supine in bed, alert, responsive  and in no apparent distress  Patient transfers supine to sit EOB with Min A x 1  Patient then sat EOB x 20+ mins to complete ADL as detailed in flow sheet  Patient had no c/o of dizziness upon sitting EOB   Patient utilized commode x 2 during session, transferring sit to stand and pivots commode <> bed with Min A x 1 and VCs for safety  Patient completes toilet hygiene with Min A for safety while standing to complete margarette care  Patient ambulates 30 feet x 4 with Min A x 1 one seated rest break on commode  Patient notably confused as she wanted to "go into the other room under her tv for her black shoes " During mobility, patient ambulates to door in hallway and stated her shoes were in there  Therapist oriented patient to place and situation, however, patient required frequent reminders throughout session  Session ended with patient returned to supine in bed with Mod A x 1, all needs met, and call bell within reach  In comparison to previous session, patient with improvements in functional transfers/mobility, endurance, and self care ADL performance  Patient requiring frequent re direction, verbal cues for safety, verbal cues for correct technique, verbal cues for pacing thru activity steps and frequent rest periods  Patient performance  demonstrated good carryover of learned techniques and strategies to facilitate safety during functional tasks  Patient continues to be functioning below baseline level, occupational performance remains limited secondary to factors listed above and increased risk for falls and injury  From OT standpoint, recommendation at time of d/c would be Short Term Rehab  Patient to benefit from continued Occupational Therapy treatment while in the hospital to address deficits as defined above and maximize level of functional independence with ADLs and functional mobility in order to return to OF     Plan   Treatment Interventions ADL retraining;Functional transfer training;Energy conservation   Goal Expiration Date 10/28/22   OT Treatment Day 2   OT Frequency 3-5x/wk   Recommendation   OT Discharge Recommendation Post acute rehabilitation services   AM-PAC Daily Activity Inpatient   Lower Body Dressing 2   Bathing 3   Toileting 3   Upper Body Dressing 3 Grooming 3   Eating 3   Daily Activity Raw Score 17   Daily Activity Standardized Score (Calc for Raw Score >=11) 37 26   AM-PAC Applied Cognition Inpatient   Following a Speech/Presentation 4   Understanding Ordinary Conversation 4   Taking Medications 4   Remembering Where Things Are Placed or Put Away 4   Remembering List of 4-5 Errands 3   Taking Care of Complicated Tasks 3   Applied Cognition Raw Score 22   Applied Cognition Standardized Score 47 83   Ry Dubose

## 2022-10-20 NOTE — NURSING NOTE
Pt refusing to wear Masimo device threatening staff that she will take it off and throw it out the window  Masimo removed at this time due to patient agitation

## 2022-10-20 NOTE — ASSESSMENT & PLAN NOTE
· Likely reactive due to fall with possible UTI  · Received 3 days of ceftriaxone  · Leukocytosis has resolved  · Procalcitonin- negative

## 2022-10-20 NOTE — ASSESSMENT & PLAN NOTE
Lab Results   Component Value Date    HGBA1C 10 2 (H) 07/01/2022       Recent Labs     10/20/22  0801 10/20/22  0827 10/20/22  0905 10/20/22  1145   POCGLU 40* 46* 99 308*       Blood Sugar Average: Last 72 hrs:  (P) 241 2   · Target blood sugar for the hospital 140-180  · Continue Levemir (increased to 8 units at HS on 10/19) but with AM hypoglycemia   Decreased dose back to 5 units at HS  · Implement Accu-Cheks AC and HS with sliding scale coverage  · Diabetic neuropathy-continue gabapentin

## 2022-10-20 NOTE — ASSESSMENT & PLAN NOTE
· In the setting of having a fall prior to arrival  · Arrival CPK 1372 -> 1002 -> 700 -> 522  · Will monitor off IVF for now   · Monitor closely for any signs of renal insufficiency  · Follow up with CPK level

## 2022-10-20 NOTE — NURSING NOTE
Patient will not allow this PCA to take vitals at this time  Will try again at a later time  RN made aware

## 2022-10-20 NOTE — ASSESSMENT & PLAN NOTE
· Most likely a vasovagal fall in the setting of using the bathroom  · All trauma workup is negative  · Monitor on telemetry for arrhythmias  TTE 7/11/22 LVEF 60%  · The patient with symptoms of vertigo during PT/OT eval- recent MRI brain in July was unremarkable  Chronic microangiopathy  · Dizziness/vertigo- improving   Continue on meclizine  · Orthostatic hypotension has been ruled out  · PT and OT recommends STR; family would like the patient to go home with [place] [phone]

## 2022-10-20 NOTE — NURSING NOTE
Blood glucose at 0800 40, no s/s hypoglycemia, orange juice given  Recheck at 0825, blood glucose 46, 4 glucose tablets given  Blood glucose rechecked at 0905, 99   Patient cooperative, call bell in reach

## 2022-10-20 NOTE — PLAN OF CARE
Problem: OCCUPATIONAL THERAPY ADULT  Goal: Performs self-care activities at highest level of function for planned discharge setting  See evaluation for individualized goals  Description: Treatment Interventions: ADL retraining, Functional transfer training, UE strengthening/ROM, Endurance training, Equipment evaluation/education, Compensatory technique education, Energy conservation, Activityengagement    See flowsheet documentation for full assessment, interventions and recommendations  Outcome: Progressing  Note: Limitation: Decreased ADL status, Decreased UE ROM, Decreased UE strength, Decreased Safe judgement during ADL, Decreased endurance, Decreased self-care trans, Decreased high-level ADLs  Prognosis: Good  Assessment: Patient participated in Skilled OT session this date with interventions consisting of functional transfer training, ADL re training with the use of correct body mechnaics, Energy Conservation techniques and increase dynamic sit/ stand balance during functional activity    Patient agreeable to OT treatment session, upon arrival patient was found supine in bed, alert, responsive  and in no apparent distress  Patient transfers supine to sit EOB with Min A x 1  Patient then sat EOB x 20+ mins to complete ADL as detailed in flow sheet  Patient had no c/o of dizziness upon sitting EOB  Patient utilized commode x 2 during session, transferring sit to stand and pivots commode <> bed with Min A x 1 and VCs for safety  Patient completes toilet hygiene with Min A for safety while standing to complete margarette care  Patient ambulates 30 feet x 4 with Min A x 1 one seated rest break on commode  Patient notably confused as she wanted to "go into the other room under her tv for her black shoes " During mobility, patient ambulates to door in hallway and stated her shoes were in there  Therapist oriented patient to place and situation, however, patient required frequent reminders throughout session   Session ended with patient returned to supine in bed with Mod A x 1, all needs met, and call bell within reach  In comparison to previous session, patient with improvements in functional transfers/mobility, endurance, and self care ADL performance  Patient requiring frequent re direction, verbal cues for safety, verbal cues for correct technique, verbal cues for pacing thru activity steps and frequent rest periods  Patient performance  demonstrated good carryover of learned techniques and strategies to facilitate safety during functional tasks  Patient continues to be functioning below baseline level, occupational performance remains limited secondary to factors listed above and increased risk for falls and injury  From OT standpoint, recommendation at time of d/c would be Short Term Rehab  Patient to benefit from continued Occupational Therapy treatment while in the hospital to address deficits as defined above and maximize level of functional independence with ADLs and functional mobility in order to return to OF       OT Discharge Recommendation: Post acute rehabilitation services

## 2022-10-21 LAB
CK MB SERPL-MCNC: 1.7 % (ref 0–2.5)
CK MB SERPL-MCNC: 2.5 NG/ML (ref 0.6–6.3)
CK SERPL-CCNC: 149 U/L (ref 26–192)
GLUCOSE SERPL-MCNC: 109 MG/DL (ref 65–140)
GLUCOSE SERPL-MCNC: 144 MG/DL (ref 65–140)
GLUCOSE SERPL-MCNC: 375 MG/DL (ref 65–140)
GLUCOSE SERPL-MCNC: 442 MG/DL (ref 65–140)
GLUCOSE SERPL-MCNC: 68 MG/DL (ref 65–140)

## 2022-10-21 PROCEDURE — 82550 ASSAY OF CK (CPK): CPT | Performed by: NURSE PRACTITIONER

## 2022-10-21 PROCEDURE — 99232 SBSQ HOSP IP/OBS MODERATE 35: CPT | Performed by: NURSE PRACTITIONER

## 2022-10-21 PROCEDURE — 82553 CREATINE MB FRACTION: CPT | Performed by: NURSE PRACTITIONER

## 2022-10-21 PROCEDURE — 97530 THERAPEUTIC ACTIVITIES: CPT

## 2022-10-21 PROCEDURE — 82948 REAGENT STRIP/BLOOD GLUCOSE: CPT

## 2022-10-21 PROCEDURE — 97110 THERAPEUTIC EXERCISES: CPT

## 2022-10-21 RX ORDER — ACETAMINOPHEN 325 MG/1
650 TABLET ORAL EVERY 8 HOURS SCHEDULED
Status: DISCONTINUED | OUTPATIENT
Start: 2022-10-21 | End: 2022-10-25 | Stop reason: HOSPADM

## 2022-10-21 RX ORDER — ACETAMINOPHEN 325 MG/1
975 TABLET ORAL EVERY 12 HOURS SCHEDULED
Status: DISCONTINUED | OUTPATIENT
Start: 2022-10-21 | End: 2022-10-21

## 2022-10-21 RX ADMIN — ACETAMINOPHEN 650 MG: 325 TABLET ORAL at 17:08

## 2022-10-21 RX ADMIN — INSULIN DETEMIR 5 UNITS: 100 INJECTION, SOLUTION SUBCUTANEOUS at 21:42

## 2022-10-21 RX ADMIN — GABAPENTIN 300 MG: 300 CAPSULE ORAL at 21:42

## 2022-10-21 RX ADMIN — ACETAMINOPHEN 650 MG: 325 TABLET ORAL at 08:27

## 2022-10-21 RX ADMIN — HEPARIN SODIUM 5000 UNITS: 5000 INJECTION INTRAVENOUS; SUBCUTANEOUS at 21:42

## 2022-10-21 RX ADMIN — MECLIZINE 25 MG: 12.5 TABLET ORAL at 21:42

## 2022-10-21 RX ADMIN — OXYCODONE HYDROCHLORIDE AND ACETAMINOPHEN 500 MG: 500 TABLET ORAL at 08:27

## 2022-10-21 RX ADMIN — DOCUSATE SODIUM 100 MG: 100 CAPSULE, LIQUID FILLED ORAL at 08:27

## 2022-10-21 RX ADMIN — PANTOPRAZOLE SODIUM 40 MG: 40 TABLET, DELAYED RELEASE ORAL at 08:27

## 2022-10-21 RX ADMIN — INSULIN LISPRO 4 UNITS: 100 INJECTION, SOLUTION INTRAVENOUS; SUBCUTANEOUS at 17:11

## 2022-10-21 RX ADMIN — PANTOPRAZOLE SODIUM 40 MG: 40 TABLET, DELAYED RELEASE ORAL at 21:42

## 2022-10-21 RX ADMIN — INSULIN LISPRO 4 UNITS: 100 INJECTION, SOLUTION INTRAVENOUS; SUBCUTANEOUS at 21:43

## 2022-10-21 RX ADMIN — ACETAMINOPHEN 650 MG: 325 TABLET ORAL at 21:42

## 2022-10-21 RX ADMIN — METHOCARBAMOL 500 MG: 500 TABLET ORAL at 19:40

## 2022-10-21 RX ADMIN — GABAPENTIN 300 MG: 300 CAPSULE ORAL at 08:27

## 2022-10-21 RX ADMIN — CYANOCOBALAMIN TAB 500 MCG 1000 MCG: 500 TAB at 08:27

## 2022-10-21 RX ADMIN — Medication 2 TABLET: at 08:27

## 2022-10-21 NOTE — ASSESSMENT & PLAN NOTE
Lab Results   Component Value Date    HGBA1C 10 2 (H) 07/01/2022       Recent Labs     10/20/22  2105 10/21/22  0754 10/21/22  0927 10/21/22  1132   POCGLU 397* 68 109 144*       Blood Sugar Average: Last 72 hrs:  (P) 443 [id number]   · Target blood sugar for the hospital 140-180   · Continue Levemir (increased to 8 units at HS on 10/19) but with AM hypoglycemia   Decreased dose back to 5 units at HS  · Implement Accu-Cheks AC and HS with sliding scale coverage  · Diabetic neuropathy-continue gabapentin

## 2022-10-21 NOTE — PROGRESS NOTES
[address]  Progress Note Waldemar Epley 1940, 80 y o  female MRN: [id number]  Unit/Bed#: -02 Encounter: [id number]  Primary Care Provider: Bryan Castro MD   Date and time admitted to hospital: 10/17/2022 12:53 PM    * Traumatic rhabdomyolysis [facility])  Assessment & Plan  · In the setting of having a fall prior to arrival  · Arrival CPK 1372 -> 1002 -> 700 -> 522  · Will monitor off IVF for now   · Monitor closely for any signs of renal insufficiency  · Follow up with CPK level      Gastroesophageal reflux disease without esophagitis  Assessment & Plan  · Continue Protonix        Leukemoid reaction  Assessment & Plan  · Likely reactive due to fall with possible UTI  · Received 3 days of ceftriaxone  · Leukocytosis has resolved  · Procalcitonin- negative        Fall  Assessment & Plan  · Most likely a vasovagal fall in the setting of using the bathroom  · All trauma workup is negative  · Monitor on telemetry for arrhythmias  TTE 7/11/22 LVEF 60%  · The patient with symptoms of vertigo during PT/OT eval- recent MRI brain in July was unremarkable  Chronic microangiopathy  · Dizziness/vertigo- improving  Continue on meclizine  · Orthostatic hypotension has been ruled out  · PT and OT recommends STR; family would like the patient to [facility]    Type 2 diabetes mellitus with diabetic polyneuropathy, with long-term current use of insulin [facility])  Assessment & Plan  Lab Results   Component Value Date    HGBA1C 10 2 (H) 07/01/2022       Recent Labs     10/20/22  2105 10/21/22  0754 10/21/22  0927 10/21/22  1132   POCGLU 397* 68 109 144*       Blood Sugar Average: Last 72 hrs:  (P) 318 [id number]   · Target blood sugar for the hospital 140-180   · Continue Levemir (increased to 8 units at HS on 10/19) but with AM hypoglycemia   Decreased dose back to 5 units at HS  · Implement Accu-Cheks AC and HS with sliding scale coverage  · Diabetic neuropathy-continue gabapentin Acquired hypothyroidism  Assessment & Plan  · Continue levothyroxine            VTE Prophylaxis:  Heparin    Patient Centered Rounds: I have performed bedside rounds with nursing staff today  Discussions with Specialists or Other Care Team Provider: CM   Education and Discussions with Family / Patient: patient and daughter     Current Length of Stay: 4 day(s)    Current Patient Status: Inpatient   Certification Statement: The patient will continue to require additional inpatient hospital stay due to traumatic rhabdomyolysis     Discharge Plan: pending hospital course and awaiting for discharge disposition     Code Status: Level 3 - DNAR and DNI    Subjective:   Feeling okay  Nursing staff reports that the patient took some pain medication- morphine and tramadol overnight and noticed to be more lethargic and confuse this AM  The patient is easily arousable with verbal commands  Objective:     Vitals:   Temp (24hrs), Av 9 °F (37 2 °C), Min:98 4 °F (36 9 °C), Max:99 4 °F (37 4 °C)    Temp:  [98 4 °F (36 9 °C)-99 4 °F (37 4 °C)] 99 4 °F (37 4 °C)  HR:  [87-89] 89  Resp:  [16-18] 16  BP: (141-172)/(64-96) 154/96  SpO2:  [98 %] 98 %  Body mass index is 23 41 kg/m²  Input and Output Summary (last 24 hours): Intake/Output Summary (Last 24 hours) at 10/21/2022 1343  Last data filed at 10/21/2022 0846  Gross per 24 hour   Intake 60 ml   Output --   Net 60 ml       Physical Exam:   Physical Exam  Vitals and nursing note reviewed  Constitutional:       General: She is not in acute distress  Appearance: Normal appearance  She is ill-appearing  HENT:      Head: Normocephalic and atraumatic  Right Ear: External ear normal       Left Ear: External ear normal       Nose: Nose normal  No rhinorrhea  Mouth/Throat:      Mouth: Mucous membranes are moist       Pharynx: Oropharynx is clear  Eyes:      General:         Right eye: No discharge  Left eye: No discharge        Pupils: Pupils are equal, round, and reactive to light  Cardiovascular:      Rate and Rhythm: Normal rate and regular rhythm  Pulses: Normal pulses  Heart sounds: Normal heart sounds  No murmur heard  Pulmonary:      Effort: Pulmonary effort is normal  No respiratory distress  Breath sounds: Normal breath sounds  Abdominal:      General: Bowel sounds are normal  There is no distension  Palpations: Abdomen is soft  There is no mass  Tenderness: There is no abdominal tenderness  Musculoskeletal:         General: No swelling or tenderness  Normal range of motion  Cervical back: Normal range of motion and neck supple  No muscular tenderness  Skin:     General: Skin is warm and dry  Capillary Refill: Capillary refill takes less than 2 seconds  Findings: No erythema or rash  Neurological:      General: No focal deficit present  Mental Status: She is alert  Mental status is at baseline  Comments: Some confusion today     Psychiatric:         Mood and Affect: Mood normal          Behavior: Behavior normal          Thought Content: Thought content normal          Judgment: Judgment normal          Additional Data:     Labs:    Results from last 7 days   Lab Units 10/19/22  0445 10/18/22  0446   WBC Thousand/uL 7 60 8 27   HEMOGLOBIN g/dL 11 6 11 5   HEMATOCRIT % 35 4 34 5*   PLATELETS Thousands/uL 243 247   NEUTROS PCT %  --  71   LYMPHS PCT %  --  17   MONOS PCT %  --  11   EOS PCT %  --  0     Results from last 7 days   Lab Units 10/20/22  0458 10/19/22  0445 10/18/22  0446   SODIUM mmol/L 143   < > 137   POTASSIUM mmol/L 3 7   < > 3 9   CHLORIDE mmol/L 102   < > 99   CO2 mmol/L 33*   < > 30   BUN mg/dL 14   < > 12   CREATININE mg/dL 0 72   < > 0 61   CALCIUM mg/dL 9 5   < > 8 3*   ALK PHOS U/L  --   --  70   ALT U/L  --   --  26   AST U/L  --   --  46*    < > = values in this interval not displayed       Results from last 7 days   Lab Units 10/17/22  1319   INR  0 91 Results from last 7 days   Lab Units 10/21/22  1132 10/21/22  0927 10/21/22  0754 10/20/22  2105 10/20/22  1717 10/20/22  1145 10/20/22  0905 10/20/22  0827 10/20/22  0801 10/19/22  2100 10/19/22  1631 10/19/22  1120   POC GLUCOSE mg/dl 144* 109 68 397* 290* 308* 99 46* 40* 446* 228* 310*           * I Have Reviewed All Lab Data Listed Above  * Additional Pertinent Lab Tests Reviewed: [facility] Admission  Reviewed    Imaging:  Imaging Reports Reviewed Today Include: n/a     Recent Cultures (last 7 days):     Results from last 7 days   Lab Units 10/17/22  1658 10/17/22  1548   BLOOD CULTURE  No Growth at 72 hrs  No Growth at 72 hrs         Last 24 Hours Medication List:   Current Facility-Administered Medications   Medication Dose Route Frequency Provider Last Rate   • acetaminophen  975 mg Oral Q12H [facility] ADRIAN Chase     • vitamin C  500 mg Oral BID Tye Cantu MD     • calcium carbonate-vitamin D  2 tablet Oral BID With Meals Tye Cantu MD     • cyanocobalamin  1,000 mcg Oral Daily Tye Cantu MD     • docusate sodium  100 mg Oral BID Tye Cantu MD     • gabapentin  300 mg Oral TID Tye Cantu MD     • heparin (porcine)  5,000 Units Subcutaneous [facility] Tye Cantu MD     • insulin detemir  5 Units Subcutaneous HS ADRIAN Chase     • insulin lispro  1-5 Units Subcutaneous TID [facility] Tye Cantu MD     • insulin lispro  1-5 Units Subcutaneous HS Tye Cantu MD     • levothyroxine  112 mcg Oral QAM Tye Cantu MD     • meclizine  25 mg Oral [facility] ADRIAN Chase     • methocarbamol  500 mg Oral Q8H PRN ADRIAN Chase     • ondansetron  4 mg Intravenous Q6H PRN Tye Cantu MD     • pantoprazole  40 mg Oral BID Tye Cantu MD          Today, Patient Was Seen By: Caridad Puentes    ** Please Note: Dictation voice to text software may have been used in the creation of this document   **

## 2022-10-21 NOTE — PHYSICAL THERAPY NOTE
Physical Therapy Treatment Session Note    Admitting Diagnosis  Rhabdomyolysis [M62 82]  Leukocytosis [D72 829]  UTI (urinary tract infection) [N39 0]  Head injury [S09 90XA]  Fall [W19  XXXA]  Scalp contusion [S00 03XA]    Problem List  Patient Active Problem List   Diagnosis    HLD (hyperlipidemia)    HTN (hypertension)    Acquired hypothyroidism    Type 2 diabetes mellitus with diabetic polyneuropathy, with long-term current use of insulin (HCC)    Closed intertrochanteric fracture of right femur (HCC)    Hyponatremia    Elevated vitamin B12 level    Anemia    Hypophosphatemia    Primary osteoarthritis of both knees    Acute hip pain, right    Ambulatory dysfunction    Right knee pain    Primary osteoarthritis of right shoulder    Soft tissue radionecrosis    Osteoradionecrosis of temporal bone (HCC)    SIRS of non-infectious origin w acute organ dysfunction (HCC)    High anion gap metabolic acidosis    Coffee ground emesis    RAMIRO (acute kidney injury) ([address] )    Abnormal CT of the abdomen    Visual changes    Fall    Traumatic rhabdomyolysis ([address] )    Leukemoid reaction    Gastroesophageal reflux disease without esophagitis       Past Medical History  Past Medical History:   Diagnosis Date    Ambulates with cane     Cancer ([address] )     Chronic pain disorder     knees/ shoulders (gets inj every 3 mos)    Controlled type 2 diabetes mellitus with diabetic polyneuropathy, with long-term current use of insulin ([address] ) 5/21/2008    Diabetes mellitus ([address] )     Diabetic polyneuropathy ([address] ) 5/21/2008    ICD10 clean up    Disease of thyroid gland     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    HL (hearing loss)     Hodgkin's disease ([address] ) 2008    Left neck, had radiation    Hypertension     Neuropathy     Osteoporosis     RA (rheumatoid arthritis) ([address] )        Past Surgical History  Past Surgical History:   Procedure Laterality Date    ADENOIDECTOMY      APPENDECTOMY      CATARACT EXTRACTION      CATARACT EXTRACTION, BILATERAL CHOLECYSTECTOMY      FRACTURE SURGERY Right     hip    MOUTH SURGERY      oral cancer left lower lip    OVARY SURGERY      SD ADJ TISS XFER HEAD,FAC,HAND <10 SQCM N/A 3/28/2022    Procedure: Adjacent tissue transfer face;  Surgeon: Osbaldo Gross MD;  Location: [facility];  Service: ENT    SD MASTOIDECTOMY,SIMPLE Left 3/28/2022    Procedure: MASTOIDECTOMY;  Surgeon: Osbaldo Gross MD;  Location: [facility];  Service: ENT    SD OPEN RX FEMUR FX+INTRAMED SHE Right 5/28/2020    Procedure: INSERTION NAIL IM FEMUR ANTEGRADE (TROCHANTERIC); Surgeon: Trev Veliz DO;  Location: [facility] MAIN OR;  Service: Orthopedics    TONSILLECTOMY      TOTAL THYROIDECTOMY  2008    Performed after left neck dissection and left oral cancer diagnosis      10/21/22 0846   PT Last Visit   PT Visit Date 10/21/22   Note Type   Note Type Treatment   Pain Assessment   Pain Assessment Tool 0-10   Pain Score 10 - Worst Possible Pain   Pain Location/Orientation Other (Comment)  (neck, "top of my throat")   Pain Onset/Description Onset: Ongoing;Frequency: Constant/Continuous; Descriptor: Pressure   Effect of Pain on Daily Activities yes   Patient's Stated Pain Goal No pain   Hospital Pain Intervention(s) Repositioned; Emotional support; Environmental changes   Multiple Pain Sites Yes   Pain 2   Pain Score 2 Worst Possible Pain   Pain Location/Orientation 2 Orientation: Bilateral;Location: Generalized   Pain Onset/Description 2 Onset: Ongoing;Frequency: Constant/Continuous; Descriptor: Aching   Patient's Stated Pain Goal 2 No pain   Hospital Pain Intervention(s) 2 Emotional support;Elevated   Restrictions/Precautions   Weight Bearing Precautions Per Order No   Braces or Orthoses C/S Collar  (baseline soft collar usage, majority of time)   Other Precautions Bed Alarm;Pain; Fall Risk   General   Chart Reviewed Yes   Response to Previous Treatment Patient with no complaints from previous session     Family/Caregiver Present No   Cognition Overall Cognitive Status WFL   Arousal/Participation Responsive   Attention Difficulty attending to directions   Orientation Level Oriented to person;Oriented to place;Oriented to time   Memory Within functional limits   Following Commands Follows one step commands inconsistently   Comments Verbal cues and tactile cues for task participation  Pt  reiterated complaint of throat pain  Shailesh Duarte RN present and aware  Subjective   Subjective "I can't do anything, my throat hurts"   Bed Mobility   Rolling R 3  Moderate assistance   Additional items Assist x 1;Bedrails; Increased time required;Verbal cues;LE management   Rolling L 3  Moderate assistance   Additional items Assist x 1;Bedrails; Increased time required;Verbal cues;LE management   Supine to Sit Unable to assess  (Initiated, however could not complete ROM due to pain severity, patient declination )   Additional Comments Long sitting with mod A of 2 due to coughing, neck pain  Repositioning complete with PT and RN  Further functional tasks could not be safely assessed  Transfers   Sit to Stand Unable to assess   Ambulation/Elevation   Gait pattern Not tested   Balance   Static Sitting   (unable to accurately assess at this time)   Endurance Deficit   Endurance Deficit Yes   Activity Tolerance   Activity Tolerance Patient limited by fatigue;Patient limited by pain; Other (Comment)  (decreased engagement)   Nurse Made Aware Yes, Shailesh Duarte RN assisted w/tasks  PT appreciated the care coordination  Assessment   Prognosis Poor   Problem List Decreased strength;Decreased endurance; Impaired balance;Decreased mobility; Decreased coordination;Pain   Assessment Pt seen for PT treatment session this date with interventions consisting of therapeutic activity to reduce fall risk and reduce the risk of medical complications consisting of training: bed mobility and long sitting in bed, initiating of supine<->sit mobilization   Pt agreeable to PT treatment session upon arrival, pt found supine in bed w/ HOB elevated, A&O x 3  In comparison to previous session, pt with no improvements as evidenced by inability to advance functional tasks due to pain, decreased engagement  Post session: pt returned BTB, chair alarm engaged, all needs in reach and RN notified of session findings/recommendations  Continue to recommend post acute rehabilitation services at time of d/c in order to maximize pt's functional independence and safety w/ mobility  Pt continues to be functioning below baseline level, and remains limited 2* factors listed above and including weakness, pain, impaired balance, activity intolerance, decreased endurance,decreased engagement  PT will continue to see pt during current hospitalization in order to address the deficits listed above and provide interventions consistent w/ POC in effort to achieve LTGs  Goals   Patient Goals to rest   LTG Expiration Date 10/28/22   Long Term Goal #1 LTGs remain appropriate   PT Treatment Day 2   Plan   Treatment/Interventions Functional transfer training; Endurance training;Patient/family training;Bed mobility; Compensatory technique education;Spoke to nursing   Progress No functional improvements   PT Frequency 3-5x/wk   Recommendation   PT Discharge Recommendation Post acute rehabilitation services  (maintained recommendation)   Additional Comments Upon conclusion, pt  was resting in bed with all needs within reach  Bed alarm was activated     AM-PAC Basic Mobility Inpatient   Turning in Bed Without Bedrails 2   Lying on Back to Sitting on Edge of Flat Bed 2   Moving Bed to Chair 1   Standing Up From Chair 1   Walk in Room 1   Climb 3-5 Stairs 1   Basic Mobility Inpatient Raw Score 8   Highest Level Of Mobility   -HLM Goal 3: Sit at edge of bed   JH-HLM Achieved 2: Bed activities/Dependent transfer     Treatment Time: 2528-0194    Delon John

## 2022-10-21 NOTE — CASE MANAGEMENT
Case Management Discharge Planning Note    [facility]  Location /-02 MRN [id number]  : 1940 Date 10/21/2022       Current Admission Date: 10/17/2022  Current Admission Diagnosis:Traumatic rhabdomyolysis [facility])   Patient Active Problem List    Diagnosis Date Noted   • Fall 10/17/2022   • Traumatic rhabdomyolysis ([facility] ) 10/17/2022   • Leukemoid reaction 10/17/2022   • Gastroesophageal reflux disease without esophagitis 10/17/2022   • Visual changes 2022   • SIRS of non-infectious origin w acute organ dysfunction ([facility] ) 07/10/2022   • High anion gap metabolic acidosis    • Coffee ground emesis 07/10/2022   • RAMIRO (acute kidney injury) ([facility] ) 07/10/2022   • Abnormal CT of the abdomen 07/10/2022   • Soft tissue radionecrosis 2022   • Osteoradionecrosis of temporal bone ([facility] ) 2022   • Primary osteoarthritis of right shoulder 2021   • Right knee pain 2020   • Acute hip pain, right 2020   • Ambulatory dysfunction    • Primary osteoarthritis of both knees 2020   • Hypophosphatemia 2020   • Elevated vitamin B12 level 2020   • Anemia 2020   • Hyponatremia 2020   • Closed intertrochanteric fracture of right femur ([facility] ) 2020   • Acquired hypothyroidism 2015   • HLD (hyperlipidemia) 2014   • HTN (hypertension) 10/10/2013   • Type 2 diabetes mellitus with diabetic polyneuropathy, with long-term current use of insulin ([facility] ) 2008      LOS (days): 4  Geometric Mean LOS (GMLOS) (days): 2 50  Days to GMLOS:-1 5     OBJECTIVE:  Risk of Unplanned Readmission Score: 19 32         Current admission status: Inpatient   Preferred Pharmacy:   [address] [place] [address]  Phone: [phone] Fax: [phone]    Primary Care Provider: Tamir Lowery MD    Primary Insurance: MEDICARE  Secondary Insurance: [facility] DETAILS:    Discharge planning discussed with[de-identified] patient and Dorian Fried was called at 12:32  Freedom of Choice: Yes  Comments - Freedom of Choice: rehab has been recommended-  pt was not accepted [email or web address]  family was made aware pt would not be safe at home alone at  this time- permission was given to place a blanket referral she wants a snf that will give her rehab  CM contacted family/caregiver?: Yes             Contacts  Patient Contacts: Tania Rogers dtr  Relationship to Patient[de-identified] Family (daughter)  Contact Method: Phone  Phone Number: [phone]  Reason/Outcome: Discharge [address]         [facility] Yes         Other Referral/Resources/Interventions Provided:  Interventions: [address], [address]  Referral Comments: blanket referrals was placed with permission- if pt improves she would like her home with [facility]- pt has been accepted by [facility]-  [facility]  she does not want [facility]    Would you like to participate in our [address] service program?  : No - Declined    Treatment Team Recommendation: Home with [address] (home with hh vs snf rehab- transport needed)

## 2022-10-21 NOTE — PLAN OF CARE
Problem: OCCUPATIONAL THERAPY ADULT  Goal: Performs self-care activities at highest level of function for planned discharge setting  See evaluation for individualized goals  Description: Treatment Interventions: ADL retraining, Functional transfer training, UE strengthening/ROM, Endurance training, Equipment evaluation/education, Compensatory technique education, Energy conservation, Activityengagement    See flowsheet documentation for full assessment, interventions and recommendations  Outcome: Progressing  Note: Limitation: Decreased ADL status, Decreased UE ROM, Decreased UE strength, Decreased Safe judgement during ADL, Decreased endurance, Decreased self-care trans, Decreased high-level ADLs  Prognosis: Good  Assessment: Patient participated in Skilled OT session this date with interventions consisting of functional transfer training, Energy Conservation techniques, therapeutic exercise to: increase functional use of BUEs, increase BUE muscle strength  and increase dynamic sit/ stand balance during functional activity    Patient agreeable to OT treatment session, upon arrival patient was found seated OOB to Recliner, alert, responsive  and in no apparent distress  Patient performs UB TE as detailed in flow sheet  Following TE, patient transfers sit to stand and completes functional mobility in hallway x 300 feet x 2 with Min A x 1  Patient reports no fatigue or pain  Session ended with patient seated OOB to recliner, all needs met, and call bell within reach  In comparison to previous session, patient with improvements in UB strength, endurance, and functional transfers/mobility  Patient requiring frequent re direction, verbal cues for safety, verbal cues for correct technique, verbal cues for pacing thru activity steps and frequent rest periods  Patient performance  demonstrated good carryover of learned techniques and strategies to facilitate safety during functional tasks   Patient continues to be functioning below baseline level, occupational performance remains limited secondary to factors listed above and increased risk for falls and injury  From OT standpoint, recommendation at time of d/c would be Short Term Rehab  Patient to benefit from continued Occupational Therapy treatment while in the hospital to address deficits as defined above and maximize level of functional independence with ADLs and functional mobility in order to return to PLOF       OT Discharge Recommendation: Post acute rehabilitation services

## 2022-10-21 NOTE — ASSESSMENT & PLAN NOTE
· Most likely a vasovagal fall in the setting of using the bathroom  · All trauma workup is negative  · Monitor on telemetry for arrhythmias  TTE 7/11/22 LVEF 60%  · The patient with symptoms of vertigo during PT/OT eval- recent MRI brain in July was unremarkable  Chronic microangiopathy  · Dizziness/vertigo- improving   Continue on meclizine  · Orthostatic hypotension has been ruled out  · PT and OT recommends STR; family would like the patient to go home with [place] [id number]

## 2022-10-21 NOTE — OCCUPATIONAL THERAPY NOTE
10/21/22 1114   OT Last Visit   OT Visit Date 10/21/22   Note Type   Note Type Treatment   Pain Assessment   Pain Assessment Tool 0-10   Pain Score No Pain   Restrictions/Precautions   Weight Bearing Precautions Per Order No   Braces or Orthoses C/S Collar   Other Precautions Bed Alarm;Pain; Fall Risk   Transfers   Sit to Stand 4  Minimal assistance   Additional items Assist x 1; Armrests; Increased time required;Verbal cues   Stand to Sit 4  Minimal assistance   Additional items Assist x 1; Armrests; Increased time required;Verbal cues   Stand pivot 4  Minimal assistance   Additional items Assist x 1; Increased time required   Functional Mobility   Functional Mobility 4  Minimal assistance   Additional Comments Pt ambulated 300 feet x 2 with no c/o of fatigue   Additional items Rolling walker   Therapeutic Exercise - ROM   UE-ROM Yes   ROM- Right Upper Extremities   R Shoulder AROM; Flexion; Extension;Horizontal ABduction  (horiz  add, scapular pro/ret)   R Elbow AROM;Elbow flexion;Elbow extension   R Wrist AROM  (wrist rotation)   R Weight/Reps/Sets No weight x 15 reps   Therapeutic Excerise-Strength   UE Strength Yes   Left Upper Extremity-Strength   L Shoulder Flexion; Extension;Horizontal ABduction  (horiz  add, scapular pro/ret)   L Elbow Elbow flexion;Elbow extension   L Wrist   (wrist rotation)   L Weights/Reps/Sets 1# weight x 15 reps   Cognition   Overall Cognitive Status WFL   Arousal/Participation Alert; Cooperative   Attention Difficulty attending to directions   Orientation Level Oriented to person;Oriented to place;Oriented to time   Memory Within functional limits   Following Commands Follows one step commands with increased time or repetition   Comments Pt agreeable to OT treatment     Activity Tolerance   Activity Tolerance Patient tolerated treatment well   Assessment   Assessment Patient participated in Skilled OT session this date with interventions consisting of functional transfer training, Energy Conservation techniques, therapeutic exercise to: increase functional use of BUEs, increase BUE muscle strength  and increase dynamic sit/ stand balance during functional activity    Patient agreeable to OT treatment session, upon arrival patient was found seated OOB to Recliner, alert, responsive  and in no apparent distress  Patient performs UB TE as detailed in flow sheet  Following TE, patient transfers sit to stand and completes functional mobility in hallway x 300 feet x 2 with Min A x 1  Patient reports no fatigue or pain  Session ended with patient seated OOB to recliner, all needs met, and call bell within reach  In comparison to previous session, patient with improvements in UB strength, endurance, and functional transfers/mobility  Patient requiring frequent re direction, verbal cues for safety, verbal cues for correct technique, verbal cues for pacing thru activity steps and frequent rest periods  Patient performance  demonstrated good carryover of learned techniques and strategies to facilitate safety during functional tasks  Patient continues to be functioning below baseline level, occupational performance remains limited secondary to factors listed above and increased risk for falls and injury  From OT standpoint, recommendation at time of d/c would be Short Term Rehab  Patient to benefit from continued Occupational Therapy treatment while in the hospital to address deficits as defined above and maximize level of functional independence with ADLs and functional mobility in order to return to [facility]  Plan   Treatment Interventions Functional transfer training;UE strengthening/ROM; Energy conservation   Goal Expiration Date 10/28/22   OT Treatment Day 3   OT Frequency 3-5x/wk   Recommendation   OT Discharge Recommendation Post acute rehabilitation services   AM-PAC Daily Activity Inpatient   Lower Body Dressing 2   Bathing 3   Toileting 3   Upper Body Dressing 3   Grooming 3   Eating 3   Daily Activity Raw Score 17   Daily Activity Standardized Score (Calc for Raw Score >=11) 37 26   AM-PAC Applied Cognition Inpatient   Following a Speech/Presentation 4   Understanding Ordinary Conversation 4   Taking Medications 4   Remembering Where Things Are Placed or Put Away 4   Remembering List of 4-5 Errands 3   Taking Care of Complicated Tasks 3   Applied Cognition Raw Score 22   Applied Cognition Standardized Score 47 83   Sherri Dubose

## 2022-10-21 NOTE — PLAN OF CARE
Problem: Potential for Falls  Goal: Patient will remain free of falls  Description: INTERVENTIONS:  - Educate patient/family on patient safety including physical limitations  - Instruct patient to call for assistance with activity   - Consult OT/PT to assist with strengthening/mobility   - Keep Call bell within reach  - Keep bed low and locked with side rails adjusted as appropriate  - Keep care items and personal belongings within reach  - Initiate and maintain comfort rounds  - Make Fall Risk Sign visible to staff  - Offer Toileting in advance of need  - Initiate/Maintain bed alarm  - Obtain necessary fall risk management equipment:   - Apply yellow socks and bracelet for high fall risk patients  - Consider moving patient to room near nurses station  Outcome: Progressing     Problem: Prexisting or High Potential for Compromised Skin Integrity  Goal: Skin integrity is maintained or improved  Description: INTERVENTIONS:  - Identify patients at risk for skin breakdown  - Assess and monitor skin integrity  - Assess and monitor nutrition and hydration status  - Monitor labs   - Assess for incontinence   - Turn and reposition patient  - Assist with mobility/ambulation  - Relieve pressure over bony prominences  - Avoid friction and shearing  - Provide appropriate hygiene as needed including keeping skin clean and dry  - Evaluate need for skin moisturizer/barrier cream  - Collaborate with interdisciplinary team   - Patient/family teaching  - Consider wound care consult   Outcome: Progressing     Problem: Nutrition/Hydration-ADULT  Goal: Nutrient/Hydration intake appropriate for improving, restoring or maintaining nutritional needs  Description: Monitor and assess patient's nutrition/hydration status for malnutrition  Collaborate with interdisciplinary team and initiate plan and interventions as ordered  Monitor patient's weight and dietary intake as ordered or per policy   Utilize nutrition screening tool and intervene as necessary  Determine patient's food preferences and provide high-protein, high-caloric foods as appropriate  INTERVENTIONS:  - Monitor oral intake, urinary output, labs, and treatment plans  - Assess nutrition and hydration status and recommend course of action  - Evaluate amount of meals eaten  - Assist patient with eating if necessary   - Allow adequate time for meals  - Recommend/ encourage appropriate diets, oral nutritional supplements, and vitamin/mineral supplements  - Order, calculate, and assess calorie counts as needed  - Recommend, monitor, and adjust tube feedings and TPN/PPN based on assessed needs  - Assess need for intravenous fluids  - Provide specific nutrition/hydration education as appropriate  - Include patient/family/caregiver in decisions related to nutrition  Outcome: Progressing     Problem: MOBILITY - ADULT  Goal: Maintain or return to baseline ADL function  Description: INTERVENTIONS:  -  Assess patient's ability to carry out ADLs; assess patient's baseline for ADL function and identify physical deficits which impact ability to perform ADLs (bathing, care of mouth/teeth, toileting, grooming, dressing, etc )  - Assess/evaluate cause of self-care deficits   - Assess range of motion  - Assess patient's mobility; develop plan if impaired  - Assess patient's need for assistive devices and provide as appropriate  - Encourage maximum independence but intervene and supervise when necessary  - Involve family in performance of ADLs  - Assess for home care needs following discharge   - Consider OT consult to assist with ADL evaluation and planning for discharge  - Provide patient education as appropriate  Outcome: Progressing  Goal: Maintains/Returns to pre admission functional level  Description: INTERVENTIONS:  - Perform BMAT or MOVE assessment daily    - Set and communicate daily mobility goal to care team and patient/family/caregiver     - Collaborate with rehabilitation services on mobility goals if consulted  - Record patient progress and toleration of activity level   Outcome: Progressing     Problem: PAIN - ADULT  Goal: Verbalizes/displays adequate comfort level or baseline comfort level  Description: Interventions:  - Encourage patient to monitor pain and request assistance  - Assess pain using appropriate pain scale  - Administer analgesics based on type and severity of pain and evaluate response  - Implement non-pharmacological measures as appropriate and evaluate response  - Consider cultural and social influences on pain and pain management  - Notify physician/advanced practitioner if interventions unsuccessful or patient reports new pain  Outcome: Progressing     Problem: INFECTION - ADULT  Goal: Absence or prevention of progression during hospitalization  Description: INTERVENTIONS:  - Assess and monitor for signs and symptoms of infection  - Monitor lab/diagnostic results  - Monitor all insertion sites, i e  indwelling lines, tubes, and drains  - Monitor endotracheal if appropriate and nasal secretions for changes in amount and color  - [place] appropriate cooling/warming therapies per order  - Administer medications as ordered  - Instruct and encourage patient and family to use good hand hygiene technique  - Identify and instruct in appropriate isolation precautions for identified infection/condition  Outcome: Progressing     Problem: DISCHARGE PLANNING  Goal: Discharge to home or other facility with appropriate resources  Description: INTERVENTIONS:  - Identify barriers to discharge w/patient and caregiver  - Arrange for needed discharge resources and transportation as appropriate  - Identify discharge learning needs (meds, wound care, etc )  - Arrange for interpretive services to assist at discharge as needed  - Refer to Case Management Department for coordinating discharge planning if the patient needs post-hospital services based on physician/advanced practitioner order or complex needs related to functional status, cognitive ability, or social support system  Outcome: Progressing     Problem: Knowledge Deficit  Goal: Patient/family/caregiver demonstrates understanding of disease process, treatment plan, medications, and discharge instructions  Description: Complete learning assessment and assess knowledge base    Interventions:  - Provide teaching at level of understanding  - Provide teaching via preferred learning methods  Outcome: Progressing     Problem: GENITOURINARY - ADULT  Goal: Maintains or returns to baseline urinary function  Description: INTERVENTIONS:  - Assess urinary function  - Encourage oral fluids to ensure adequate hydration if ordered  - Administer IV fluids as ordered to ensure adequate hydration  - Administer ordered medications as needed  - Offer frequent toileting  - Follow urinary retention protocol if ordered  Outcome: Progressing     Problem: METABOLIC, FLUID AND ELECTROLYTES - ADULT  Goal: Electrolytes maintained within normal limits  Description: INTERVENTIONS:  - Monitor labs and assess patient for signs and symptoms of electrolyte imbalances  - Administer electrolyte replacement as ordered  - Monitor response to electrolyte replacements, including repeat lab results as appropriate  - Instruct patient on fluid and nutrition as appropriate  Outcome: Progressing  Goal: Fluid balance maintained  Description: INTERVENTIONS:  - Monitor labs   - Monitor I/O and WT  - Instruct patient on fluid and nutrition as appropriate  - Assess for signs & symptoms of volume excess or deficit  Outcome: Progressing  Goal: Glucose maintained within target range  Description: INTERVENTIONS:  - Monitor Blood Glucose as ordered  - Assess for signs and symptoms of hyperglycemia and hypoglycemia  - Administer ordered medications to maintain glucose within target range  - Assess nutritional intake and initiate nutrition service referral as needed  Outcome: Progressing

## 2022-10-22 LAB
ANION GAP SERPL CALCULATED.3IONS-SCNC: 8 MMOL/L (ref 4–13)
BACTERIA BLD CULT: NORMAL
BACTERIA BLD CULT: NORMAL
BUN SERPL-MCNC: 15 MG/DL (ref 5–25)
CALCIUM SERPL-MCNC: 9.1 MG/DL (ref 8.4–10.2)
CHLORIDE SERPL-SCNC: 99 MMOL/L (ref 96–108)
CO2 SERPL-SCNC: 33 MMOL/L (ref 21–32)
CREAT SERPL-MCNC: 0.85 MG/DL (ref 0.6–1.3)
ERYTHROCYTE [DISTWIDTH] IN BLOOD BY AUTOMATED COUNT: 12.8 % (ref 11.6–15.1)
GFR SERPL CREATININE-BSD FRML MDRD: 64 ML/MIN/1.73SQ M
GLUCOSE SERPL-MCNC: 104 MG/DL (ref 65–140)
GLUCOSE SERPL-MCNC: 105 MG/DL (ref 65–140)
GLUCOSE SERPL-MCNC: 128 MG/DL (ref 65–140)
GLUCOSE SERPL-MCNC: 324 MG/DL (ref 65–140)
GLUCOSE SERPL-MCNC: 386 MG/DL (ref 65–140)
HCT VFR BLD AUTO: 38.4 % (ref 34.8–46.1)
HGB BLD-MCNC: 12.3 G/DL (ref 11.5–15.4)
MCH RBC QN AUTO: 32.6 PG (ref 26.8–34.3)
MCHC RBC AUTO-ENTMCNC: 32 G/DL (ref 31.4–37.4)
MCV RBC AUTO: 102 FL (ref 82–98)
PLATELET # BLD AUTO: 261 THOUSANDS/UL (ref 149–390)
PMV BLD AUTO: 10.1 FL (ref 8.9–12.7)
POTASSIUM SERPL-SCNC: 3.9 MMOL/L (ref 3.5–5.3)
RBC # BLD AUTO: 3.77 MILLION/UL (ref 3.81–5.12)
SODIUM SERPL-SCNC: 140 MMOL/L (ref 135–147)
WBC # BLD AUTO: 10.51 THOUSAND/UL (ref 4.31–10.16)

## 2022-10-22 PROCEDURE — 85027 COMPLETE CBC AUTOMATED: CPT | Performed by: NURSE PRACTITIONER

## 2022-10-22 PROCEDURE — 80048 BASIC METABOLIC PNL TOTAL CA: CPT | Performed by: NURSE PRACTITIONER

## 2022-10-22 PROCEDURE — 99232 SBSQ HOSP IP/OBS MODERATE 35: CPT | Performed by: NURSE PRACTITIONER

## 2022-10-22 PROCEDURE — 82948 REAGENT STRIP/BLOOD GLUCOSE: CPT

## 2022-10-22 RX ORDER — TRAMADOL HYDROCHLORIDE 50 MG/1
50 TABLET ORAL EVERY 6 HOURS PRN
Status: DISCONTINUED | OUTPATIENT
Start: 2022-10-22 | End: 2022-10-25 | Stop reason: HOSPADM

## 2022-10-22 RX ADMIN — DOCUSATE SODIUM 100 MG: 100 CAPSULE, LIQUID FILLED ORAL at 17:09

## 2022-10-22 RX ADMIN — CYANOCOBALAMIN TAB 500 MCG 1000 MCG: 500 TAB at 10:14

## 2022-10-22 RX ADMIN — GABAPENTIN 300 MG: 300 CAPSULE ORAL at 22:30

## 2022-10-22 RX ADMIN — OXYCODONE HYDROCHLORIDE AND ACETAMINOPHEN 500 MG: 500 TABLET ORAL at 10:14

## 2022-10-22 RX ADMIN — ACETAMINOPHEN 650 MG: 325 TABLET ORAL at 05:57

## 2022-10-22 RX ADMIN — GABAPENTIN 300 MG: 300 CAPSULE ORAL at 10:14

## 2022-10-22 RX ADMIN — DOCUSATE SODIUM 100 MG: 100 CAPSULE, LIQUID FILLED ORAL at 10:14

## 2022-10-22 RX ADMIN — Medication 2 TABLET: at 10:14

## 2022-10-22 RX ADMIN — ACETAMINOPHEN 650 MG: 325 TABLET ORAL at 22:30

## 2022-10-22 RX ADMIN — GABAPENTIN 300 MG: 300 CAPSULE ORAL at 17:16

## 2022-10-22 RX ADMIN — HEPARIN SODIUM 5000 UNITS: 5000 INJECTION INTRAVENOUS; SUBCUTANEOUS at 22:30

## 2022-10-22 RX ADMIN — INSULIN DETEMIR 5 UNITS: 100 INJECTION, SOLUTION SUBCUTANEOUS at 12:56

## 2022-10-22 RX ADMIN — PANTOPRAZOLE SODIUM 40 MG: 40 TABLET, DELAYED RELEASE ORAL at 22:30

## 2022-10-22 RX ADMIN — HEPARIN SODIUM 5000 UNITS: 5000 INJECTION INTRAVENOUS; SUBCUTANEOUS at 05:56

## 2022-10-22 RX ADMIN — INSULIN LISPRO 4 UNITS: 100 INJECTION, SOLUTION INTRAVENOUS; SUBCUTANEOUS at 17:08

## 2022-10-22 RX ADMIN — OXYCODONE HYDROCHLORIDE AND ACETAMINOPHEN 500 MG: 500 TABLET ORAL at 17:09

## 2022-10-22 RX ADMIN — HEPARIN SODIUM 5000 UNITS: 5000 INJECTION INTRAVENOUS; SUBCUTANEOUS at 13:02

## 2022-10-22 RX ADMIN — TOPICAL ANESTHETIC 1 SPRAY: 200 SPRAY DENTAL; PERIODONTAL at 05:54

## 2022-10-22 RX ADMIN — ACETAMINOPHEN 650 MG: 325 TABLET ORAL at 13:02

## 2022-10-22 RX ADMIN — MECLIZINE 25 MG: 12.5 TABLET ORAL at 13:02

## 2022-10-22 RX ADMIN — PANTOPRAZOLE SODIUM 40 MG: 40 TABLET, DELAYED RELEASE ORAL at 10:14

## 2022-10-22 RX ADMIN — MECLIZINE 25 MG: 12.5 TABLET ORAL at 22:30

## 2022-10-22 RX ADMIN — LEVOTHYROXINE SODIUM 112 MCG: 112 TABLET ORAL at 05:57

## 2022-10-22 RX ADMIN — Medication 2 TABLET: at 17:09

## 2022-10-22 NOTE — PROGRESS NOTES
[facility] 45  Progress Note Anibal Elizabeth 1940, 80 y o  female MRN: [id number]  Unit/Bed#: -02 Encounter: [id number]  Primary Care Provider: Dannie Hamm MD   Date and time admitted to hospital: 10/17/2022 12:53 PM    * Traumatic rhabdomyolysis [facility])  Assessment & Plan  · In the setting of having a fall prior to arrival  · Arrival CPK 1372 -> 1002 -> 700 -> 522 ->149  · Will monitor off IVF for now   · Monitor closely for any signs of renal insufficiency      Gastroesophageal reflux disease without esophagitis  Assessment & Plan  · Continue Protonix         Leukemoid reaction  Assessment & Plan  · Likely reactive due to fall with possible UTI  · Received 3 days of ceftriaxone  · Leukocytosis has resolved  · Procalcitonin- negative         Fall  Assessment & Plan  · Most likely a vasovagal fall in the setting of using the bathroom  · All trauma workup is negative  · Monitor on telemetry for arrhythmias  TTE 7/11/22 LVEF 60%  · The patient with symptoms of vertigo during PT/OT eval- recent MRI brain in July was unremarkable  Chronic microangiopathy  · Dizziness/vertigo- improving  Continue on meclizine     · Orthostatic hypotension has been ruled out  · PT and OT recommends STR; family would like the patient to [facility]     Type 2 diabetes mellitus with diabetic polyneuropathy, with long-term current use of insulin [facility])  Assessment & Plan  Lab Results   Component Value Date    HGBA1C 10 2 (H) 07/01/2022       Recent Labs     10/21/22  1617 10/21/22  2110 10/22/22  0658 10/22/22  1133   POCGLU 442* 375* 105 324*       Blood Sugar Average: Last 72 hrs:  (P) 100 [id number]   · Target blood sugar for the hospital 140-180   · Continue Levemir (increased to 8 units at HS on 10/19) but with AM hypoglycemia; decreased dose back to 5 units, changed to AM   · Implement Accu-Cheks AC and HS with sliding scale coverage  · Diabetic neuropathy-continue gabapentin Acquired hypothyroidism  Assessment & Plan  · Continue levothyroxine             VTE Prophylaxis:  Heparin    Patient Centered Rounds: I have performed bedside rounds with nursing staff today  Discussions with Specialists or Other Care Team Provider: CM   Education and Discussions with Family / Patient: patient and daughter via phone     Current Length of Stay: 5 day(s)    Current Patient Status: Inpatient   Certification Statement: The patient will continue to require additional inpatient hospital stay due to traumatic rhabdomyolysis     Discharge Plan: pending hospital course     Code Status: Level 3 - DNAR and DNI    Subjective:   Feeling better  Denies any dizziness currently but does have some episode of dizziness with movement especially at nighttime  Objective:     Vitals:   Temp (24hrs), Av 8 °F (37 1 °C), Min:98 2 °F (36 8 °C), Max:99 9 °F (37 7 °C)    Temp:  [98 2 °F (36 8 °C)-99 9 °F (37 7 °C)] 98 3 °F (36 8 °C)  HR:  [78-85] 85  Resp:  [16] 16  BP: (114-142)/(59-81) 132/59  SpO2:  [90 %] 90 %  Body mass index is 23 41 kg/m²  Input and Output Summary (last 24 hours): Intake/Output Summary (Last 24 hours) at 10/22/2022 1424  Last data filed at 10/22/2022 0800  Gross per 24 hour   Intake 240 ml   Output --   Net 240 ml       Physical Exam:   Physical Exam  Vitals and nursing note reviewed  Constitutional:       General: She is not in acute distress  Appearance: Normal appearance  She is underweight  Comments: Frail and elderly    HENT:      Head: Normocephalic and atraumatic  Right Ear: External ear normal       Left Ear: External ear normal       Nose: Nose normal  No rhinorrhea  Mouth/Throat:      Mouth: Mucous membranes are moist       Pharynx: Oropharynx is clear  Eyes:      General:         Right eye: No discharge  Left eye: No discharge  Pupils: Pupils are equal, round, and reactive to light     Cardiovascular:      Rate and Rhythm: Normal rate and regular rhythm  Pulses: Normal pulses  Heart sounds: Normal heart sounds  No murmur heard  Pulmonary:      Effort: Pulmonary effort is normal  No respiratory distress  Breath sounds: Normal breath sounds  Abdominal:      General: Bowel sounds are normal  There is no distension  Palpations: Abdomen is soft  There is no mass  Tenderness: There is no abdominal tenderness  Musculoskeletal:         General: No swelling or tenderness  Normal range of motion  Cervical back: Normal range of motion and neck supple  No muscular tenderness  Skin:     General: Skin is warm and dry  Capillary Refill: Capillary refill takes less than 2 seconds  Findings: No erythema or rash  Neurological:      General: No focal deficit present  Mental Status: She is alert and oriented to person, place, and time  Mental status is at baseline  Psychiatric:         Mood and Affect: Mood normal          Behavior: Behavior normal          Thought Content: Thought content normal          Additional Data:     Labs:    Results from last 7 days   Lab Units 10/22/22  0652 10/19/22  0445 10/18/22  0446   WBC Thousand/uL 10 51*   < > 8 27   HEMOGLOBIN g/dL 12 3   < > 11 5   HEMATOCRIT % 38 4   < > 34 5*   PLATELETS Thousands/uL 261   < > 247   NEUTROS PCT %  --   --  71   LYMPHS PCT %  --   --  17   MONOS PCT %  --   --  11   EOS PCT %  --   --  0    < > = values in this interval not displayed  Results from last 7 days   Lab Units 10/22/22  0652 10/19/22  0445 10/18/22  0446   SODIUM mmol/L 140   < > 137   POTASSIUM mmol/L 3 9   < > 3 9   CHLORIDE mmol/L 99   < > 99   CO2 mmol/L 33*   < > 30   BUN mg/dL 15   < > 12   CREATININE mg/dL 0 85   < > 0 61   CALCIUM mg/dL 9 1   < > 8 3*   ALK PHOS U/L  --   --  70   ALT U/L  --   --  26   AST U/L  --   --  46*    < > = values in this interval not displayed       Results from last 7 days   Lab Units 10/17/22  1319   INR  0 91     Results from last 7 days   Lab Units 10/22/22  1133 10/22/22  0658 10/21/22  2110 10/21/22  1617 10/21/22  1132 10/21/22  0927 10/21/22  0754 10/20/22  2105 10/20/22  1717 10/20/22  1145 10/20/22  0905 10/20/22  0827   POC GLUCOSE mg/dl 324* 105 375* 442* 144* 109 68 397* 290* 308* 99 46*           * I Have Reviewed All Lab Data Listed Above  * Additional Pertinent Lab Tests Reviewed: Javier 66 Admission  Reviewed    Imaging:  Imaging Reports Reviewed Today Include: n/a     Recent Cultures (last 7 days):     Results from last 7 days   Lab Units 10/17/22  1658 10/17/22  1548   BLOOD CULTURE  No Growth After 4 Days  No Growth After 4 Days         Last 24 Hours Medication List:   Current Facility-Administered Medications   Medication Dose Route Frequency Provider Last Rate   • acetaminophen  650 mg Oral [facility] ADRIAN Rodriguez     • vitamin C  500 mg Oral BID Teodora Preston MD     • calcium carbonate-vitamin D  2 tablet Oral BID With Meals Teodora Preston MD     • cyanocobalamin  1,000 mcg Oral Daily Teodora Preston MD     • docusate sodium  100 mg Oral BID Teodora Preston MD     • gabapentin  300 mg Oral TID Teodora Preston MD     • heparin (porcine)  5,000 Units Subcutaneous [facility] Teodora Preston MD     • insulin detemir  5 Units Subcutaneous [facility] ADRIAN Rodriguez     • insulin lispro  1-5 Units Subcutaneous TID [facility] Teodora Preston MD     • insulin lispro  1-5 Units Subcutaneous HS Teodora Preston MD     • levothyroxine  112 mcg Oral QAM Teodora Preston MD     • meclizine  25 mg Oral [facility] ADRIAN Rodriguez     • methocarbamol  500 mg Oral Q8H PRN ADRIAN Rodriguez     • ondansetron  4 mg Intravenous Q6H PRN Teodora Preston MD     • pantoprazole  40 mg Oral BID Teodora Preston MD     • traMADol  50 mg Oral Q6H PRN ADRIAN Rodriguez          Today, Patient Was Seen By: Christiano Stroud    ** Please Note: Dictation voice to text software may have been used in the creation of this document   **

## 2022-10-22 NOTE — PLAN OF CARE
Problem: Potential for Falls  Goal: Patient will remain free of falls  Description: INTERVENTIONS:  - Educate patient/family on patient safety including physical limitations  - Instruct patient to call for assistance with activity   - Consult OT/PT to assist with strengthening/mobility   - Keep Call bell within reach  - Keep bed low and locked with side rails adjusted as appropriate  - Keep care items and personal belongings within reach  - Initiate and maintain comfort rounds  - Make Fall Risk Sign visible to staff  - Offer Toileting in advance of need  - Initiate/Maintain bed alarm  - Obtain necessary fall risk management equipment:   - Apply yellow socks and bracelet for high fall risk patients  - Consider moving patient to room near nurses station  Outcome: Progressing     Problem: Prexisting or High Potential for Compromised Skin Integrity  Goal: Skin integrity is maintained or improved  Description: INTERVENTIONS:  - Identify patients at risk for skin breakdown  - Assess and monitor skin integrity  - Assess and monitor nutrition and hydration status  - Monitor labs   - Assess for incontinence   - Turn and reposition patient  - Assist with mobility/ambulation  - Relieve pressure over bony prominences  - Avoid friction and shearing  - Provide appropriate hygiene as needed including keeping skin clean and dry  - Evaluate need for skin moisturizer/barrier cream  - Collaborate with interdisciplinary team   - Patient/family teaching  - Consider wound care consult   Outcome: Progressing     Problem: Nutrition/Hydration-ADULT  Goal: Nutrient/Hydration intake appropriate for improving, restoring or maintaining nutritional needs  Description: Monitor and assess patient's nutrition/hydration status for malnutrition  Collaborate with interdisciplinary team and initiate plan and interventions as ordered  Monitor patient's weight and dietary intake as ordered or per policy   Utilize nutrition screening tool and intervene as necessary  Determine patient's food preferences and provide high-protein, high-caloric foods as appropriate  INTERVENTIONS:  - Monitor oral intake, urinary output, labs, and treatment plans  - Assess nutrition and hydration status and recommend course of action  - Evaluate amount of meals eaten  - Assist patient with eating if necessary   - Allow adequate time for meals  - Recommend/ encourage appropriate diets, oral nutritional supplements, and vitamin/mineral supplements  - Order, calculate, and assess calorie counts as needed  - Recommend, monitor, and adjust tube feedings and TPN/PPN based on assessed needs  - Assess need for intravenous fluids  - Provide specific nutrition/hydration education as appropriate  - Include patient/family/caregiver in decisions related to nutrition  Outcome: Progressing     Problem: MOBILITY - ADULT  Goal: Maintain or return to baseline ADL function  Description: INTERVENTIONS:  -  Assess patient's ability to carry out ADLs; assess patient's baseline for ADL function and identify physical deficits which impact ability to perform ADLs (bathing, care of mouth/teeth, toileting, grooming, dressing, etc )  - Assess/evaluate cause of self-care deficits   - Assess range of motion  - Assess patient's mobility; develop plan if impaired  - Assess patient's need for assistive devices and provide as appropriate  - Encourage maximum independence but intervene and supervise when necessary  - Involve family in performance of ADLs  - Assess for home care needs following discharge   - Consider OT consult to assist with ADL evaluation and planning for discharge  - Provide patient education as appropriate  Outcome: Progressing  Goal: Maintains/Returns to pre admission functional level  Description: INTERVENTIONS:  - Perform BMAT or MOVE assessment daily    - Set and communicate daily mobility goal to care team and patient/family/caregiver     - Collaborate with rehabilitation services on mobility goals if consulted  - Record patient progress and toleration of activity level   Outcome: Progressing     Problem: PAIN - ADULT  Goal: Verbalizes/displays adequate comfort level or baseline comfort level  Description: Interventions:  - Encourage patient to monitor pain and request assistance  - Assess pain using appropriate pain scale  - Administer analgesics based on type and severity of pain and evaluate response  - Implement non-pharmacological measures as appropriate and evaluate response  - Consider cultural and social influences on pain and pain management  - Notify physician/advanced practitioner if interventions unsuccessful or patient reports new pain  Outcome: Progressing     Problem: INFECTION - ADULT  Goal: Absence or prevention of progression during hospitalization  Description: INTERVENTIONS:  - Assess and monitor for signs and symptoms of infection  - Monitor lab/diagnostic results  - Monitor all insertion sites, i e  indwelling lines, tubes, and drains  - Monitor endotracheal if appropriate and nasal secretions for changes in amount and color  - [place] appropriate cooling/warming therapies per order  - Administer medications as ordered  - Instruct and encourage patient and family to use good hand hygiene technique  - Identify and instruct in appropriate isolation precautions for identified infection/condition  Outcome: Progressing     Problem: DISCHARGE PLANNING  Goal: Discharge to home or other facility with appropriate resources  Description: INTERVENTIONS:  - Identify barriers to discharge w/patient and caregiver  - Arrange for needed discharge resources and transportation as appropriate  - Identify discharge learning needs (meds, wound care, etc )  - Arrange for interpretive services to assist at discharge as needed  - Refer to Case Management Department for coordinating discharge planning if the patient needs post-hospital services based on physician/advanced practitioner order or complex needs related to functional status, cognitive ability, or social support system  Outcome: Progressing     Problem: Knowledge Deficit  Goal: Patient/family/caregiver demonstrates understanding of disease process, treatment plan, medications, and discharge instructions  Description: Complete learning assessment and assess knowledge base  Interventions:  - Provide teaching at level of understanding  - Provide teaching via preferred learning methods  Outcome: Progressing     Problem: GENITOURINARY - ADULT  Goal: Maintains or returns to baseline urinary function  Description: INTERVENTIONS:  - Assess urinary function  - Encourage oral fluids to ensure adequate hydration if ordered  - Administer IV fluids as ordered to ensure adequate hydration  - Administer ordered medications as needed  - Offer frequent toileting  - Follow urinary retention protocol if ordered  Outcome: Progressing     Problem: METABOLIC, FLUID AND ELECTROLYTES - ADULT  Goal: Electrolytes maintained within normal limits  Description: INTERVENTIONS:  - Monitor labs and assess patient for signs and symptoms of electrolyte imbalances  - Administer electrolyte replacement as ordered  - Monitor response to electrolyte replacements, including repeat lab results as appropriate  - Instruct patient on fluid and nutrition as appropriate  Outcome: Progressing  Goal: Fluid balance maintained  Description: INTERVENTIONS:  - Monitor labs   - Monitor I/O and WT  - Instruct patient on fluid and nutrition as appropriate  - Assess for signs & symptoms of volume excess or deficit  Outcome: Progressing  Goal: Glucose maintained within target range  Description: INTERVENTIONS:  - Monitor Blood Glucose as ordered  - Assess for signs and symptoms of hyperglycemia and hypoglycemia  - Administer ordered medications to maintain glucose within target range  - Assess nutritional intake and initiate nutrition service referral as needed    Outcome: Progressing

## 2022-10-22 NOTE — ASSESSMENT & PLAN NOTE
· In the setting of having a fall prior to arrival  · Arrival CPK 1372 -> 1002 -> 700 -> 522 ->149  · Will monitor off IVF for now   · Monitor closely for any signs of renal insufficiency

## 2022-10-22 NOTE — PLAN OF CARE
Problem: Potential for Falls  Goal: Patient will remain free of falls  Description: INTERVENTIONS:  - Educate patient/family on patient safety including physical limitations  - Instruct patient to call for assistance with activity   - Consult OT/PT to assist with strengthening/mobility   - Keep Call bell within reach  - Keep bed low and locked with side rails adjusted as appropriate  - Keep care items and personal belongings within reach  - Initiate and maintain comfort rounds  - Make Fall Risk Sign visible to staff  - Offer Toileting in advance of need  - Initiate/Maintain bed alarm  - Obtain necessary fall risk management equipment:   - Apply yellow socks and bracelet for high fall risk patients  - Consider moving patient to room near nurses station  Outcome: Progressing     Problem: Prexisting or High Potential for Compromised Skin Integrity  Goal: Skin integrity is maintained or improved  Description: INTERVENTIONS:  - Identify patients at risk for skin breakdown  - Assess and monitor skin integrity  - Assess and monitor nutrition and hydration status  - Monitor labs   - Assess for incontinence   - Turn and reposition patient  - Assist with mobility/ambulation  - Relieve pressure over bony prominences  - Avoid friction and shearing  - Provide appropriate hygiene as needed including keeping skin clean and dry  - Evaluate need for skin moisturizer/barrier cream  - Collaborate with interdisciplinary team   - Patient/family teaching  - Consider wound care consult   Outcome: Progressing     Problem: Nutrition/Hydration-ADULT  Goal: Nutrient/Hydration intake appropriate for improving, restoring or maintaining nutritional needs  Description: Monitor and assess patient's nutrition/hydration status for malnutrition  Collaborate with interdisciplinary team and initiate plan and interventions as ordered  Monitor patient's weight and dietary intake as ordered or per policy   Utilize nutrition screening tool and intervene as necessary  Determine patient's food preferences and provide high-protein, high-caloric foods as appropriate  INTERVENTIONS:  - Monitor oral intake, urinary output, labs, and treatment plans  - Assess nutrition and hydration status and recommend course of action  - Evaluate amount of meals eaten  - Assist patient with eating if necessary   - Allow adequate time for meals  - Recommend/ encourage appropriate diets, oral nutritional supplements, and vitamin/mineral supplements  - Order, calculate, and assess calorie counts as needed  - Recommend, monitor, and adjust tube feedings and TPN/PPN based on assessed needs  - Assess need for intravenous fluids  - Provide specific nutrition/hydration education as appropriate  - Include patient/family/caregiver in decisions related to nutrition  Outcome: Progressing     Problem: MOBILITY - ADULT  Goal: Maintain or return to baseline ADL function  Description: INTERVENTIONS:  -  Assess patient's ability to carry out ADLs; assess patient's baseline for ADL function and identify physical deficits which impact ability to perform ADLs (bathing, care of mouth/teeth, toileting, grooming, dressing, etc )  - Assess/evaluate cause of self-care deficits   - Assess range of motion  - Assess patient's mobility; develop plan if impaired  - Assess patient's need for assistive devices and provide as appropriate  - Encourage maximum independence but intervene and supervise when necessary  - Involve family in performance of ADLs  - Assess for home care needs following discharge   - Consider OT consult to assist with ADL evaluation and planning for discharge  - Provide patient education as appropriate  Outcome: Progressing  Goal: Maintains/Returns to pre admission functional level  Description: INTERVENTIONS:  - Perform BMAT or MOVE assessment daily    - Set and communicate daily mobility goal to care team and patient/family/caregiver     - Collaborate with rehabilitation services on mobility goals if consulted  - Record patient progress and toleration of activity level   Outcome: Progressing     Problem: PAIN - ADULT  Goal: Verbalizes/displays adequate comfort level or baseline comfort level  Description: Interventions:  - Encourage patient to monitor pain and request assistance  - Assess pain using appropriate pain scale  - Administer analgesics based on type and severity of pain and evaluate response  - Implement non-pharmacological measures as appropriate and evaluate response  - Consider cultural and social influences on pain and pain management  - Notify physician/advanced practitioner if interventions unsuccessful or patient reports new pain  Outcome: Progressing     Problem: INFECTION - ADULT  Goal: Absence or prevention of progression during hospitalization  Description: INTERVENTIONS:  - Assess and monitor for signs and symptoms of infection  - Monitor lab/diagnostic results  - Monitor all insertion sites, i e  indwelling lines, tubes, and drains  - Monitor endotracheal if appropriate and nasal secretions for changes in amount and color  - [place] appropriate cooling/warming therapies per order  - Administer medications as ordered  - Instruct and encourage patient and family to use good hand hygiene technique  - Identify and instruct in appropriate isolation precautions for identified infection/condition  Outcome: Progressing     Problem: DISCHARGE PLANNING  Goal: Discharge to home or other facility with appropriate resources  Description: INTERVENTIONS:  - Identify barriers to discharge w/patient and caregiver  - Arrange for needed discharge resources and transportation as appropriate  - Identify discharge learning needs (meds, wound care, etc )  - Arrange for interpretive services to assist at discharge as needed  - Refer to Case Management Department for coordinating discharge planning if the patient needs post-hospital services based on physician/advanced practitioner order or complex needs related to functional status, cognitive ability, or social support system  Outcome: Progressing     Problem: Knowledge Deficit  Goal: Patient/family/caregiver demonstrates understanding of disease process, treatment plan, medications, and discharge instructions  Description: Complete learning assessment and assess knowledge base    Interventions:  - Provide teaching at level of understanding  - Provide teaching via preferred learning methods  Outcome: Progressing     Problem: GENITOURINARY - ADULT  Goal: Maintains or returns to baseline urinary function  Description: INTERVENTIONS:  - Assess urinary function  - Encourage oral fluids to ensure adequate hydration if ordered  - Administer IV fluids as ordered to ensure adequate hydration  - Administer ordered medications as needed  - Offer frequent toileting  - Follow urinary retention protocol if ordered  Outcome: Progressing     Problem: METABOLIC, FLUID AND ELECTROLYTES - ADULT  Goal: Electrolytes maintained within normal limits  Description: INTERVENTIONS:  - Monitor labs and assess patient for signs and symptoms of electrolyte imbalances  - Administer electrolyte replacement as ordered  - Monitor response to electrolyte replacements, including repeat lab results as appropriate  - Instruct patient on fluid and nutrition as appropriate  Outcome: Progressing  Goal: Fluid balance maintained  Description: INTERVENTIONS:  - Monitor labs   - Monitor I/O and WT  - Instruct patient on fluid and nutrition as appropriate  - Assess for signs & symptoms of volume excess or deficit  Outcome: Progressing  Goal: Glucose maintained within target range  Description: INTERVENTIONS:  - Monitor Blood Glucose as ordered  - Assess for signs and symptoms of hyperglycemia and hypoglycemia  - Administer ordered medications to maintain glucose within target range  - Assess nutritional intake and initiate nutrition service referral as needed  Outcome: Progressing

## 2022-10-22 NOTE — NURSING NOTE
Pt c/o difficulty swallowing and 'Hurricaine" ordered but not administered 2/2 patient sleeping  When she woke, she had thick, frothy mucus in bucal cavity and draining on pillowcase  Patient coughed up copious amounts of mucus and was assisted with using the yankouer to suction excess mucus  Patient was able to swallow a small amount of applesauce with her am meds

## 2022-10-22 NOTE — ASSESSMENT & PLAN NOTE
Lab Results   Component Value Date    HGBA1C 10 2 (H) 07/01/2022       Recent Labs     10/21/22  1617 10/21/22  2110 10/22/22  0658 10/22/22  1133   POCGLU 442* 375* 105 324*       Blood Sugar Average: Last 72 hrs:  (P) 991 [id number]   · Target blood sugar for the hospital 140-180   · Continue Levemir (increased to 8 units at HS on 10/19) but with AM hypoglycemia; decreased dose back to 5 units, changed to AM   · Implement Accu-Cheks AC and HS with sliding scale coverage  · Diabetic neuropathy-continue gabapentin

## 2022-10-22 NOTE — ASSESSMENT & PLAN NOTE
· Most likely a vasovagal fall in the setting of using the bathroom  · All trauma workup is negative  · Monitor on telemetry for arrhythmias  TTE 7/11/22 LVEF 60%  · The patient with symptoms of vertigo during PT/OT eval- recent MRI brain in July was unremarkable  Chronic microangiopathy  · Dizziness/vertigo- improving  Continue on meclizine     · Orthostatic hypotension has been ruled out  · PT and OT recommends STR; family would like the patient to go home with [place] [id number]

## 2022-10-23 ENCOUNTER — APPOINTMENT (INPATIENT)
Dept: RADIOLOGY | Facility: HOSPITAL | Age: 82
DRG: 565 | End: 2022-10-23
Payer: MEDICARE

## 2022-10-23 LAB
BACTERIA UR QL AUTO: ABNORMAL /HPF
BILIRUB UR QL STRIP: NEGATIVE
CLARITY UR: CLEAR
COLOR UR: YELLOW
ERYTHROCYTE [DISTWIDTH] IN BLOOD BY AUTOMATED COUNT: 12.7 % (ref 11.6–15.1)
GLUCOSE SERPL-MCNC: 262 MG/DL (ref 65–140)
GLUCOSE SERPL-MCNC: 264 MG/DL (ref 65–140)
GLUCOSE SERPL-MCNC: 413 MG/DL (ref 65–140)
GLUCOSE SERPL-MCNC: 471 MG/DL (ref 65–140)
GLUCOSE UR STRIP-MCNC: ABNORMAL MG/DL
HCT VFR BLD AUTO: 38.9 % (ref 34.8–46.1)
HGB BLD-MCNC: 12.5 G/DL (ref 11.5–15.4)
HGB UR QL STRIP.AUTO: NEGATIVE
KETONES UR STRIP-MCNC: ABNORMAL MG/DL
LEUKOCYTE ESTERASE UR QL STRIP: NEGATIVE
MCH RBC QN AUTO: 33.2 PG (ref 26.8–34.3)
MCHC RBC AUTO-ENTMCNC: 32.1 G/DL (ref 31.4–37.4)
MCV RBC AUTO: 104 FL (ref 82–98)
NITRITE UR QL STRIP: NEGATIVE
NON-SQ EPI CELLS URNS QL MICRO: ABNORMAL /HPF
PH UR STRIP.AUTO: 6 [PH]
PLATELET # BLD AUTO: 287 THOUSANDS/UL (ref 149–390)
PMV BLD AUTO: 10.4 FL (ref 8.9–12.7)
PROCALCITONIN SERPL-MCNC: 0.13 NG/ML
PROT UR STRIP-MCNC: NEGATIVE MG/DL
RBC # BLD AUTO: 3.76 MILLION/UL (ref 3.81–5.12)
RBC #/AREA URNS AUTO: ABNORMAL /HPF
SP GR UR STRIP.AUTO: 1.01 (ref 1–1.03)
UROBILINOGEN UR QL STRIP.AUTO: 0.2 E.U./DL
WBC # BLD AUTO: 17.99 THOUSAND/UL (ref 4.31–10.16)
WBC #/AREA URNS AUTO: ABNORMAL /HPF

## 2022-10-23 PROCEDURE — 85027 COMPLETE CBC AUTOMATED: CPT | Performed by: NURSE PRACTITIONER

## 2022-10-23 PROCEDURE — 99232 SBSQ HOSP IP/OBS MODERATE 35: CPT | Performed by: NURSE PRACTITIONER

## 2022-10-23 PROCEDURE — 97116 GAIT TRAINING THERAPY: CPT

## 2022-10-23 PROCEDURE — 84145 PROCALCITONIN (PCT): CPT | Performed by: NURSE PRACTITIONER

## 2022-10-23 PROCEDURE — 82948 REAGENT STRIP/BLOOD GLUCOSE: CPT

## 2022-10-23 PROCEDURE — 97110 THERAPEUTIC EXERCISES: CPT

## 2022-10-23 PROCEDURE — 71045 X-RAY EXAM CHEST 1 VIEW: CPT

## 2022-10-23 PROCEDURE — 81001 URINALYSIS AUTO W/SCOPE: CPT | Performed by: NURSE PRACTITIONER

## 2022-10-23 RX ADMIN — GABAPENTIN 300 MG: 300 CAPSULE ORAL at 16:54

## 2022-10-23 RX ADMIN — INSULIN LISPRO 5 UNITS: 100 INJECTION, SOLUTION INTRAVENOUS; SUBCUTANEOUS at 11:15

## 2022-10-23 RX ADMIN — ACETAMINOPHEN 650 MG: 325 TABLET ORAL at 14:06

## 2022-10-23 RX ADMIN — INSULIN DETEMIR 3 UNITS: 100 INJECTION, SOLUTION SUBCUTANEOUS at 21:32

## 2022-10-23 RX ADMIN — LEVOTHYROXINE SODIUM 112 MCG: 112 TABLET ORAL at 06:12

## 2022-10-23 RX ADMIN — INSULIN DETEMIR 5 UNITS: 100 INJECTION, SOLUTION SUBCUTANEOUS at 08:51

## 2022-10-23 RX ADMIN — ACETAMINOPHEN 650 MG: 325 TABLET ORAL at 21:32

## 2022-10-23 RX ADMIN — MECLIZINE 25 MG: 12.5 TABLET ORAL at 06:12

## 2022-10-23 RX ADMIN — DOCUSATE SODIUM 100 MG: 100 CAPSULE, LIQUID FILLED ORAL at 17:45

## 2022-10-23 RX ADMIN — CYANOCOBALAMIN TAB 500 MCG 1000 MCG: 500 TAB at 08:51

## 2022-10-23 RX ADMIN — MECLIZINE 25 MG: 12.5 TABLET ORAL at 14:06

## 2022-10-23 RX ADMIN — HEPARIN SODIUM 5000 UNITS: 5000 INJECTION INTRAVENOUS; SUBCUTANEOUS at 21:32

## 2022-10-23 RX ADMIN — GABAPENTIN 300 MG: 300 CAPSULE ORAL at 21:32

## 2022-10-23 RX ADMIN — OXYCODONE HYDROCHLORIDE AND ACETAMINOPHEN 500 MG: 500 TABLET ORAL at 08:51

## 2022-10-23 RX ADMIN — PANTOPRAZOLE SODIUM 40 MG: 40 TABLET, DELAYED RELEASE ORAL at 21:32

## 2022-10-23 RX ADMIN — HEPARIN SODIUM 5000 UNITS: 5000 INJECTION INTRAVENOUS; SUBCUTANEOUS at 06:12

## 2022-10-23 RX ADMIN — INSULIN LISPRO 4 UNITS: 100 INJECTION, SOLUTION INTRAVENOUS; SUBCUTANEOUS at 16:54

## 2022-10-23 RX ADMIN — Medication 2 TABLET: at 08:51

## 2022-10-23 RX ADMIN — ACETAMINOPHEN 650 MG: 325 TABLET ORAL at 06:12

## 2022-10-23 RX ADMIN — PANTOPRAZOLE SODIUM 40 MG: 40 TABLET, DELAYED RELEASE ORAL at 08:51

## 2022-10-23 RX ADMIN — GABAPENTIN 300 MG: 300 CAPSULE ORAL at 08:51

## 2022-10-23 RX ADMIN — OXYCODONE HYDROCHLORIDE AND ACETAMINOPHEN 500 MG: 500 TABLET ORAL at 17:45

## 2022-10-23 RX ADMIN — Medication 2 TABLET: at 16:54

## 2022-10-23 RX ADMIN — MECLIZINE 25 MG: 12.5 TABLET ORAL at 21:32

## 2022-10-23 RX ADMIN — INSULIN LISPRO 2 UNITS: 100 INJECTION, SOLUTION INTRAVENOUS; SUBCUTANEOUS at 21:33

## 2022-10-23 RX ADMIN — INSULIN LISPRO 2 UNITS: 100 INJECTION, SOLUTION INTRAVENOUS; SUBCUTANEOUS at 08:49

## 2022-10-23 RX ADMIN — HEPARIN SODIUM 5000 UNITS: 5000 INJECTION INTRAVENOUS; SUBCUTANEOUS at 14:06

## 2022-10-23 RX ADMIN — DOCUSATE SODIUM 100 MG: 100 CAPSULE, LIQUID FILLED ORAL at 08:51

## 2022-10-23 NOTE — PHYSICAL THERAPY NOTE
PT Treatment Note    NAME:  Ariel Rocha  DATE: 10/23/22    AGE:   80 y o  Mrn:   [id number]  ADMIT DX:  Rhabdomyolysis [M62 82]  Leukocytosis [D72 829]  UTI (urinary tract infection) [N39 0]  Head injury [S09 90XA]  Fall [W19  XXXA]  Scalp contusion [S00 03XA]  Performed at least 2 patient identifiers during session: Name and Tim Desai         10/23/22 0932   PT Last Visit   PT Visit Date 10/23/22   Note Type   Note Type Treatment   Pain Assessment   Pain Assessment Tool 0-10   Pain Score No Pain   Restrictions/Precautions   Weight Bearing Precautions Per Order No   Braces or Orthoses C/S Collar   Other Precautions Fall Risk;Hard of hearing   General   Chart Reviewed Yes   Response to Previous Treatment Patient with no complaints from previous session  Family/Caregiver Present No   Cognition   Overall Cognitive Status WFL   Arousal/Participation Alert; Cooperative   Attention Attends with cues to redirect   Orientation Level Oriented X4   Memory Within functional limits   Following Commands Follows one step commands without difficulty   Subjective   Subjective pt reports to feeling much better and plans to return home with her daughter who has no steps   Bed Mobility   Supine to Sit 6  Modified independent   Additional items Increased time required;HOB elevated   Additional Comments pt able to sit EOB Indep; denied dizziness   Transfers   Sit to Stand 5  Supervision   Additional items Verbal cues   Stand to Sit 5  Supervision   Additional items Verbal cues   Ambulation/Elevation   Gait pattern Improper Weight shift;Decreased foot clearance   Gait Assistance 5  Supervision   Additional items Verbal cues   Assistive Device Rolling walker   Distance 250 ft with multiple turns   Ambulation/Elevation Additional Comments pt veered to the L with RW   Balance   Static Sitting Good   Dynamic Sitting Fair +   Static Standing Fair   Dynamic Standing Fair -   Ambulatory Fair -   Endurance Deficit   Endurance Deficit No Activity Tolerance   Activity Tolerance Patient tolerated treatment well   Medical Staff Made Aware [facility]   Exercises   Glute Sets Sitting;20 reps;AROM; Bilateral   Hip Flexion Sitting;20 reps;AROM; Bilateral   Hip Abduction Sitting;20 reps;AROM; Bilateral   Hip Adduction Sitting;20 reps;AROM; Bilateral   Ankle Pumps Sitting;20 reps;AROM; Bilateral  (ankle inv/ev)   Assessment   Prognosis Good   Assessment Pt seen for PT treatment session this date with interventions consisting of gait training to normalize gait pattern to decrease fall risk and therapeutic exercise to improve strength to improve functional mobility  Pt agreeable to PT treatment session upon arrival, pt found supine in bed, in no apparent distress  Since previous session, pt has made good progress as evidenced by increased distance ambulated, decreased assistance required with mobility, increased activity tolerance and decreased pain  Barriers during this session include none  Pt continues to be functioning below baseline level, and remains limited 2* factors listed above and including decreased balance  Pt prognosis for achieving goals is good, pending pt progress with hospitalization/medical status improvements, and indicated by motivated to participate in therapy, ability to follow cues, supportive family and improvement with mobility status  PT will continue to see pt during current hospitalization in order to address the deficits listed above and provide interventions consistent w/ POC in effort to achieve goals  Current goals and POC remain appropriate, pt continues to have rehab potential  Continue to recommend home with home health rehabilitation at time of d/c in order to maximize pt's functional independence and safety w/ mobility  Upon conclusion pt seated OOB in recliner  The patient's AM-PAC Basic Mobility Inpatient Short Form Raw Score is 23  A Raw score of greater than 16 suggests the patient may benefit from discharge to home  Please also refer to the recommendation of the Physical Therapist for safe discharge planning  Barriers to Discharge None   Goals   Patient Goals to go home with her daughter   PT Treatment Day 3   Plan   Progress Progressing toward goals   Recommendation   PT Discharge Recommendation Home with home health rehabilitation   Robin mercado   Change/add to [employer]?  No   AM-PAC Basic Mobility Inpatient   Turning in Bed Without Bedrails 4   Lying on Back to Sitting on Edge of Flat Bed 4   Moving Bed to Chair 4   Standing Up From Chair 4   Walk in Room 4   Climb 3-5 Stairs 3   Basic Mobility Inpatient Raw Score 23   Basic Mobility Standardized Score 50 88   Highest Level Of Mobility   JH-HLM Goal 7: Walk 25 feet or more   JH-HLM Achieved 8: Walk 250 feet ot more   End of Consult   Patient Position at End of Consult All needs within reach     Time In: 0907  Time Out: 0932  Total Treatment Minutes: 202 [facility]

## 2022-10-23 NOTE — PLAN OF CARE
Problem: Potential for Falls  Goal: Patient will remain free of falls  Description: INTERVENTIONS:  - Educate patient/family on patient safety including physical limitations  - Instruct patient to call for assistance with activity   - Consult OT/PT to assist with strengthening/mobility   - Keep Call bell within reach  - Keep bed low and locked with side rails adjusted as appropriate  - Keep care items and personal belongings within reach  - Initiate and maintain comfort rounds  - Make Fall Risk Sign visible to staff  - Offer Toileting in advance of need  - Initiate/Maintain bed alarm  - Obtain necessary fall risk management equipment:   - Apply yellow socks and bracelet for high fall risk patients  - Consider moving patient to room near nurses station  Outcome: Progressing     Problem: Prexisting or High Potential for Compromised Skin Integrity  Goal: Skin integrity is maintained or improved  Description: INTERVENTIONS:  - Identify patients at risk for skin breakdown  - Assess and monitor skin integrity  - Assess and monitor nutrition and hydration status  - Monitor labs   - Assess for incontinence   - Turn and reposition patient  - Assist with mobility/ambulation  - Relieve pressure over bony prominences  - Avoid friction and shearing  - Provide appropriate hygiene as needed including keeping skin clean and dry  - Evaluate need for skin moisturizer/barrier cream  - Collaborate with interdisciplinary team   - Patient/family teaching  - Consider wound care consult   Outcome: Progressing     Problem: Nutrition/Hydration-ADULT  Goal: Nutrient/Hydration intake appropriate for improving, restoring or maintaining nutritional needs  Description: Monitor and assess patient's nutrition/hydration status for malnutrition  Collaborate with interdisciplinary team and initiate plan and interventions as ordered  Monitor patient's weight and dietary intake as ordered or per policy   Utilize nutrition screening tool and intervene as necessary  Determine patient's food preferences and provide high-protein, high-caloric foods as appropriate  INTERVENTIONS:  - Monitor oral intake, urinary output, labs, and treatment plans  - Assess nutrition and hydration status and recommend course of action  - Evaluate amount of meals eaten  - Assist patient with eating if necessary   - Allow adequate time for meals  - Recommend/ encourage appropriate diets, oral nutritional supplements, and vitamin/mineral supplements  - Order, calculate, and assess calorie counts as needed  - Recommend, monitor, and adjust tube feedings and TPN/PPN based on assessed needs  - Assess need for intravenous fluids  - Provide specific nutrition/hydration education as appropriate  - Include patient/family/caregiver in decisions related to nutrition  Outcome: Progressing     Problem: MOBILITY - ADULT  Goal: Maintain or return to baseline ADL function  Description: INTERVENTIONS:  -  Assess patient's ability to carry out ADLs; assess patient's baseline for ADL function and identify physical deficits which impact ability to perform ADLs (bathing, care of mouth/teeth, toileting, grooming, dressing, etc )  - Assess/evaluate cause of self-care deficits   - Assess range of motion  - Assess patient's mobility; develop plan if impaired  - Assess patient's need for assistive devices and provide as appropriate  - Encourage maximum independence but intervene and supervise when necessary  - Involve family in performance of ADLs  - Assess for home care needs following discharge   - Consider OT consult to assist with ADL evaluation and planning for discharge  - Provide patient education as appropriate  Outcome: Progressing  Goal: Maintains/Returns to pre admission functional level  Description: INTERVENTIONS:  - Perform BMAT or MOVE assessment daily    - Set and communicate daily mobility goal to care team and patient/family/caregiver     - Collaborate with rehabilitation services on mobility goals if consulted  - Record patient progress and toleration of activity level   Outcome: Progressing     Problem: PAIN - ADULT  Goal: Verbalizes/displays adequate comfort level or baseline comfort level  Description: Interventions:  - Encourage patient to monitor pain and request assistance  - Assess pain using appropriate pain scale  - Administer analgesics based on type and severity of pain and evaluate response  - Implement non-pharmacological measures as appropriate and evaluate response  - Consider cultural and social influences on pain and pain management  - Notify physician/advanced practitioner if interventions unsuccessful or patient reports new pain  Outcome: Progressing     Problem: INFECTION - ADULT  Goal: Absence or prevention of progression during hospitalization  Description: INTERVENTIONS:  - Assess and monitor for signs and symptoms of infection  - Monitor lab/diagnostic results  - Monitor all insertion sites, i e  indwelling lines, tubes, and drains  - Monitor endotracheal if appropriate and nasal secretions for changes in amount and color  - [place] appropriate cooling/warming therapies per order  - Administer medications as ordered  - Instruct and encourage patient and family to use good hand hygiene technique  - Identify and instruct in appropriate isolation precautions for identified infection/condition  Outcome: Progressing     Problem: DISCHARGE PLANNING  Goal: Discharge to home or other facility with appropriate resources  Description: INTERVENTIONS:  - Identify barriers to discharge w/patient and caregiver  - Arrange for needed discharge resources and transportation as appropriate  - Identify discharge learning needs (meds, wound care, etc )  - Arrange for interpretive services to assist at discharge as needed  - Refer to Case Management Department for coordinating discharge planning if the patient needs post-hospital services based on physician/advanced practitioner order or complex needs related to functional status, cognitive ability, or social support system  Outcome: Progressing     Problem: Knowledge Deficit  Goal: Patient/family/caregiver demonstrates understanding of disease process, treatment plan, medications, and discharge instructions  Description: Complete learning assessment and assess knowledge base    Interventions:  - Provide teaching at level of understanding  - Provide teaching via preferred learning methods  Outcome: Progressing     Problem: GENITOURINARY - ADULT  Goal: Maintains or returns to baseline urinary function  Description: INTERVENTIONS:  - Assess urinary function  - Encourage oral fluids to ensure adequate hydration if ordered  - Administer IV fluids as ordered to ensure adequate hydration  - Administer ordered medications as needed  - Offer frequent toileting  - Follow urinary retention protocol if ordered  Outcome: Progressing     Problem: METABOLIC, FLUID AND ELECTROLYTES - ADULT  Goal: Electrolytes maintained within normal limits  Description: INTERVENTIONS:  - Monitor labs and assess patient for signs and symptoms of electrolyte imbalances  - Administer electrolyte replacement as ordered  - Monitor response to electrolyte replacements, including repeat lab results as appropriate  - Instruct patient on fluid and nutrition as appropriate  Outcome: Progressing  Goal: Fluid balance maintained  Description: INTERVENTIONS:  - Monitor labs   - Monitor I/O and WT  - Instruct patient on fluid and nutrition as appropriate  - Assess for signs & symptoms of volume excess or deficit  Outcome: Progressing  Goal: Glucose maintained within target range  Description: INTERVENTIONS:  - Monitor Blood Glucose as ordered  - Assess for signs and symptoms of hyperglycemia and hypoglycemia  - Administer ordered medications to maintain glucose within target range  - Assess nutritional intake and initiate nutrition service referral as needed  Outcome: Progressing

## 2022-10-23 NOTE — PROGRESS NOTES
[address] [address]  Progress Note Jimmy Miss 1940, 80 y o  female MRN: [id number]  Unit/Bed#: -02 Encounter: [id number]  Primary Care Provider: Ashwin Spencer MD   Date and time admitted to hospital: 10/17/2022 12:53 PM    * Traumatic rhabdomyolysis [facility])  Assessment & Plan  · In the setting of having a fall prior to arrival  · Arrival CPK 1372 -> 1002 -> 700 -> 522 ->149  · Will monitor off IVF for now   · Monitor closely for any signs of renal insufficiency       Gastroesophageal reflux disease without esophagitis  Assessment & Plan  · Continue Protonix          Leukemoid reaction  Assessment & Plan  · Likely reactive due to fall with possible UTI  · Received 3 days of ceftriaxone  · Leukocytosis initially resolved however WBC 17 99 today, procalcitonin 0 13   · Suspects that this may be due to possible bacterial bronchitis  · Will start on empiric azithromycin for total of 5 days  · CXR - negative for acute finding   · UA is pending         Fall  Assessment & Plan  · Most likely a vasovagal fall in the setting of using the bathroom  · All trauma workup is negative  · Monitor on telemetry for arrhythmias  TTE 7/11/22 LVEF 60%  · The patient with symptoms of vertigo during PT/OT eval- recent MRI brain in July was unremarkable  Chronic microangiopathy  · Dizziness/vertigo- improving  Continue on meclizine     · Orthostatic hypotension has been ruled out  · PT and OT input appreciated initially recommended short-term rehab however as clinically is improving PT is okay for the patient to be discharged home with [place] [id number]    Type 2 diabetes mellitus with diabetic polyneuropathy, with long-term current use of insulin [facility])  Assessment & Plan  Lab Results   Component Value Date    HGBA1C 10 2 (H) 07/01/2022       Recent Labs     10/22/22  1703 10/22/22  2110 10/23/22  0754 10/23/22  1114   POCGLU 386* 128 264* 471*       Blood Sugar Average: Last 72 hrs:  (P) 437 [id number] · Target blood sugar for the hospital 140-180   · Continue Levemir increased dosing to 8u in AM, adding 3u in PM   · Implement Accu-Cheks AC and HS with sliding scale coverage  · Diabetic neuropathy-continue gabapentin      Acquired hypothyroidism  Assessment & Plan  · Continue levothyroxine              VTE Prophylaxis:  Heparin    Patient Centered Rounds: I have performed bedside rounds with nursing staff today  Discussions with Specialists or Other Care Team Provider: n/a   Education and Discussions with Family / Patient: patient and daughter at bedside     Current Length of Stay: 6 day(s)    Current Patient Status: Inpatient   Certification Statement: The patient will continue to require additional inpatient hospital stay due to Traumatic rhabdomyolysis     Discharge Plan: pending hospital course  If WBC and blood glucose improve tomorrow, patient can be discharged home  Code Status: Level 3 - DNAR and DNI    Subjective:   Feeling better  Denies any dizziness  Objective:     Vitals:   Temp (24hrs), Av 3 °F (37 4 °C), Min:98 9 °F (37 2 °C), Max:100 1 °F (37 8 °C)    Temp:  [98 9 °F (37 2 °C)-100 1 °F (37 8 °C)] 98 9 °F (37 2 °C)  HR:  [85-90] 85  Resp:  [18-23] 23  BP: (108-119)/(42-71) 108/60  SpO2:  [90 %-93 %] 93 %  Body mass index is 23 41 kg/m²  Input and Output Summary (last 24 hours): Intake/Output Summary (Last 24 hours) at 10/23/2022 1436  Last data filed at 10/23/2022 0845  Gross per 24 hour   Intake 240 ml   Output --   Net 240 ml       Physical Exam:   Physical Exam  Vitals and nursing note reviewed  Constitutional:       General: She is not in acute distress  Appearance: Normal appearance  Comments: Frail     HENT:      Head: Normocephalic and atraumatic  Right Ear: External ear normal       Left Ear: External ear normal       Nose: Nose normal  No rhinorrhea  Mouth/Throat:      Mouth: Mucous membranes are moist       Pharynx: Oropharynx is clear  Eyes:      General:         Right eye: No discharge  Left eye: No discharge  Pupils: Pupils are equal, round, and reactive to light  Cardiovascular:      Rate and Rhythm: Normal rate and regular rhythm  Pulses: Normal pulses  Heart sounds: Normal heart sounds  No murmur heard  Pulmonary:      Effort: Pulmonary effort is normal  No respiratory distress  Breath sounds: Normal breath sounds  Abdominal:      General: Bowel sounds are normal  There is no distension  Palpations: Abdomen is soft  There is no mass  Tenderness: There is no abdominal tenderness  Musculoskeletal:         General: No swelling or tenderness  Normal range of motion  Cervical back: Normal range of motion and neck supple  No muscular tenderness  Skin:     General: Skin is warm and dry  Capillary Refill: Capillary refill takes less than 2 seconds  Findings: No erythema or rash  Neurological:      General: No focal deficit present  Mental Status: She is alert and oriented to person, place, and time  Mental status is at baseline  Psychiatric:         Mood and Affect: Mood normal          Behavior: Behavior normal          Thought Content: Thought content normal          Judgment: Judgment normal          Additional Data:     Labs:    Results from last 7 days   Lab Units 10/23/22  0627 10/19/22  0445 10/18/22  0446   WBC Thousand/uL 17 99*   < > 8 27   HEMOGLOBIN g/dL 12 5   < > 11 5   HEMATOCRIT % 38 9   < > 34 5*   PLATELETS Thousands/uL 287   < > 247   NEUTROS PCT %  --   --  71   LYMPHS PCT %  --   --  17   MONOS PCT %  --   --  11   EOS PCT %  --   --  0    < > = values in this interval not displayed       Results from last 7 days   Lab Units 10/22/22  0652 10/19/22  0445 10/18/22  0446   SODIUM mmol/L 140   < > 137   POTASSIUM mmol/L 3 9   < > 3 9   CHLORIDE mmol/L 99   < > 99   CO2 mmol/L 33*   < > 30   BUN mg/dL 15   < > 12   CREATININE mg/dL 0 85   < > 0 61   CALCIUM mg/dL 9 1   < > 8 3*   ALK PHOS U/L  --   --  70   ALT U/L  --   --  26   AST U/L  --   --  46*    < > = values in this interval not displayed  Results from last 7 days   Lab Units 10/17/22  1319   INR  0 91     Results from last 7 days   Lab Units 10/23/22  1114 10/23/22  0754 10/22/22  2110 10/22/22  1703 10/22/22  1133 10/22/22  0658 10/21/22  2110 10/21/22  1617 10/21/22  1132 10/21/22  0927 10/21/22  0754 10/20/22  2105   POC GLUCOSE mg/dl 471* 264* 128 386* 324* 105 375* 442* 144* 109 68 397*           * I Have Reviewed All Lab Data Listed Above  * Additional Pertinent Lab Tests Reviewed: Javier 66 Admission  Reviewed    Imaging:  Imaging Reports Reviewed Today Include: CXR     Recent Cultures (last 7 days):     Results from last 7 days   Lab Units 10/17/22  1658 10/17/22  1548   BLOOD CULTURE  No Growth After 5 Days  No Growth After 5 Days         Last 24 Hours Medication List:   Current Facility-Administered Medications   Medication Dose Route Frequency Provider Last Rate   • acetaminophen  650 mg Oral [facility] ADRIAN Atwood     • vitamin C  500 mg Oral BID Robina Alfaro MD     • calcium carbonate-vitamin D  2 tablet Oral BID With Meals Robina Alfaro MD     • cyanocobalamin  1,000 mcg Oral Daily Robina Alfaro MD     • docusate sodium  100 mg Oral BID Robina Alfaro MD     • gabapentin  300 mg Oral TID Robina Alfaro MD     • heparin (porcine)  5,000 Units Subcutaneous [facility] Robina Alfaro MD     • insulin detemir  3 Units Subcutaneous HS ADRIAN Atwood     • [START ON 10/24/2022] insulin detemir  8 Units Subcutaneous QAM ADRIAN Atwood     • insulin lispro  1-5 Units Subcutaneous TID [facility] Robina Alfaro MD     • insulin lispro  1-5 Units Subcutaneous HS Robina Alfaro MD     • levothyroxine  112 mcg Oral QAM Robina Alfaro MD     • meclizine  25 mg Oral [facility] ADRIAN Atwood     • methocarbamol  500 mg Oral Q8H PRN ADRIAN Roldan     • ondansetron  4 mg Intravenous Q6H PRN Iram Uribe MD     • pantoprazole  40 mg Oral BID Iram Uribe MD     • traMADol  50 mg Oral Q6H PRN ADRIAN Roldan          Today, Patient Was Seen By: Theresa Law    ** Please Note: Dictation voice to text software may have been used in the creation of this document   **

## 2022-10-23 NOTE — ASSESSMENT & PLAN NOTE
· Likely reactive due to fall with possible UTI  · Received 3 days of ceftriaxone  · Leukocytosis initially resolved however WBC 17 99 today, procalcitonin 0 13   · Suspects that this may be due to possible bacterial bronchitis  · Will start on empiric azithromycin for total of 5 days  · CXR - negative for acute finding   · UA is pending

## 2022-10-23 NOTE — PLAN OF CARE
Problem: PHYSICAL THERAPY ADULT  Goal: Performs mobility at highest level of function for planned discharge setting  See evaluation for individualized goals  Description: Treatment/Interventions: Functional transfer training, Endurance training, Patient/family training, Bed mobility, Compensatory technique education, Spoke to nursing  Equipment Recommended: Sherrie Kenyon       See flowsheet documentation for full assessment, interventions and recommendations  Outcome: Progressing  Note: Prognosis: Good  Problem List: Decreased strength, Decreased endurance, Impaired balance, Decreased mobility, Decreased coordination, Pain  Assessment: Pt seen for PT treatment session this date with interventions consisting of gait training to normalize gait pattern to decrease fall risk and therapeutic exercise to improve strength to improve functional mobility  Pt agreeable to PT treatment session upon arrival, pt found supine in bed, in no apparent distress  Since previous session, pt has made good progress as evidenced by increased distance ambulated, decreased assistance required with mobility, increased activity tolerance and decreased pain  Barriers during this session include none  Pt continues to be functioning below baseline level, and remains limited 2* factors listed above and including decreased balance  Pt prognosis for achieving goals is good, pending pt progress with hospitalization/medical status improvements, and indicated by motivated to participate in therapy, ability to follow cues, supportive family and improvement with mobility status  PT will continue to see pt during current hospitalization in order to address the deficits listed above and provide interventions consistent w/ POC in effort to achieve goals   Current goals and POC remain appropriate, pt continues to have rehab potential  Continue to recommend home with home health rehabilitation at time of d/c in order to maximize pt's functional independence and safety w/ mobility  Upon conclusion pt seated OOB in recliner  The patient's AM-PAC Basic Mobility Inpatient Short Form Raw Score is 23  A Raw score of greater than 16 suggests the patient may benefit from discharge to home  Please also refer to the recommendation of the Physical Therapist for safe discharge planning  Barriers to Discharge: None     PT Discharge Recommendation: Home with home health rehabilitation    See flowsheet documentation for full assessment

## 2022-10-23 NOTE — PLAN OF CARE
Problem: Potential for Falls  Goal: Patient will remain free of falls  Description: INTERVENTIONS:  - Educate patient/family on patient safety including physical limitations  - Instruct patient to call for assistance with activity   - Consult OT/PT to assist with strengthening/mobility   - Keep Call bell within reach  - Keep bed low and locked with side rails adjusted as appropriate  - Keep care items and personal belongings within reach  - Initiate and maintain comfort rounds  - Make Fall Risk Sign visible to staff  - Offer Toileting in advance of need  - Initiate/Maintain bed alarm  - Obtain necessary fall risk management equipment:   - Apply yellow socks and bracelet for high fall risk patients  - Consider moving patient to room near nurses station  Outcome: Progressing     Problem: Prexisting or High Potential for Compromised Skin Integrity  Goal: Skin integrity is maintained or improved  Description: INTERVENTIONS:  - Identify patients at risk for skin breakdown  - Assess and monitor skin integrity  - Assess and monitor nutrition and hydration status  - Monitor labs   - Assess for incontinence   - Turn and reposition patient  - Assist with mobility/ambulation  - Relieve pressure over bony prominences  - Avoid friction and shearing  - Provide appropriate hygiene as needed including keeping skin clean and dry  - Evaluate need for skin moisturizer/barrier cream  - Collaborate with interdisciplinary team   - Patient/family teaching  - Consider wound care consult   Outcome: Progressing     Problem: Nutrition/Hydration-ADULT  Goal: Nutrient/Hydration intake appropriate for improving, restoring or maintaining nutritional needs  Description: Monitor and assess patient's nutrition/hydration status for malnutrition  Collaborate with interdisciplinary team and initiate plan and interventions as ordered  Monitor patient's weight and dietary intake as ordered or per policy   Utilize nutrition screening tool and intervene as necessary  Determine patient's food preferences and provide high-protein, high-caloric foods as appropriate  INTERVENTIONS:  - Monitor oral intake, urinary output, labs, and treatment plans  - Assess nutrition and hydration status and recommend course of action  - Evaluate amount of meals eaten  - Assist patient with eating if necessary   - Allow adequate time for meals  - Recommend/ encourage appropriate diets, oral nutritional supplements, and vitamin/mineral supplements  - Order, calculate, and assess calorie counts as needed  - Recommend, monitor, and adjust tube feedings and TPN/PPN based on assessed needs  - Assess need for intravenous fluids  - Provide specific nutrition/hydration education as appropriate  - Include patient/family/caregiver in decisions related to nutrition  Outcome: Progressing     Problem: MOBILITY - ADULT  Goal: Maintain or return to baseline ADL function  Description: INTERVENTIONS:  -  Assess patient's ability to carry out ADLs; assess patient's baseline for ADL function and identify physical deficits which impact ability to perform ADLs (bathing, care of mouth/teeth, toileting, grooming, dressing, etc )  - Assess/evaluate cause of self-care deficits   - Assess range of motion  - Assess patient's mobility; develop plan if impaired  - Assess patient's need for assistive devices and provide as appropriate  - Encourage maximum independence but intervene and supervise when necessary  - Involve family in performance of ADLs  - Assess for home care needs following discharge   - Consider OT consult to assist with ADL evaluation and planning for discharge  - Provide patient education as appropriate  Outcome: Progressing  Goal: Maintains/Returns to pre admission functional level  Description: INTERVENTIONS:  - Perform BMAT or MOVE assessment daily    - Set and communicate daily mobility goal to care team and patient/family/caregiver     - Collaborate with rehabilitation services on mobility goals if consulted  - Record patient progress and toleration of activity level   Outcome: Progressing     Problem: PAIN - ADULT  Goal: Verbalizes/displays adequate comfort level or baseline comfort level  Description: Interventions:  - Encourage patient to monitor pain and request assistance  - Assess pain using appropriate pain scale  - Administer analgesics based on type and severity of pain and evaluate response  - Implement non-pharmacological measures as appropriate and evaluate response  - Consider cultural and social influences on pain and pain management  - Notify physician/advanced practitioner if interventions unsuccessful or patient reports new pain  Outcome: Progressing     Problem: INFECTION - ADULT  Goal: Absence or prevention of progression during hospitalization  Description: INTERVENTIONS:  - Assess and monitor for signs and symptoms of infection  - Monitor lab/diagnostic results  - Monitor all insertion sites, i e  indwelling lines, tubes, and drains  - Monitor endotracheal if appropriate and nasal secretions for changes in amount and color  - [place] appropriate cooling/warming therapies per order  - Administer medications as ordered  - Instruct and encourage patient and family to use good hand hygiene technique  - Identify and instruct in appropriate isolation precautions for identified infection/condition  Outcome: Progressing     Problem: DISCHARGE PLANNING  Goal: Discharge to home or other facility with appropriate resources  Description: INTERVENTIONS:  - Identify barriers to discharge w/patient and caregiver  - Arrange for needed discharge resources and transportation as appropriate  - Identify discharge learning needs (meds, wound care, etc )  - Arrange for interpretive services to assist at discharge as needed  - Refer to Case Management Department for coordinating discharge planning if the patient needs post-hospital services based on physician/advanced practitioner order or complex needs related to functional status, cognitive ability, or social support system  Outcome: Progressing     Problem: Knowledge Deficit  Goal: Patient/family/caregiver demonstrates understanding of disease process, treatment plan, medications, and discharge instructions  Description: Complete learning assessment and assess knowledge base    Interventions:  - Provide teaching at level of understanding  - Provide teaching via preferred learning methods  Outcome: Progressing     Problem: GENITOURINARY - ADULT  Goal: Maintains or returns to baseline urinary function  Description: INTERVENTIONS:  - Assess urinary function  - Encourage oral fluids to ensure adequate hydration if ordered  - Administer IV fluids as ordered to ensure adequate hydration  - Administer ordered medications as needed  - Offer frequent toileting  - Follow urinary retention protocol if ordered  Outcome: Progressing     Problem: METABOLIC, FLUID AND ELECTROLYTES - ADULT  Goal: Electrolytes maintained within normal limits  Description: INTERVENTIONS:  - Monitor labs and assess patient for signs and symptoms of electrolyte imbalances  - Administer electrolyte replacement as ordered  - Monitor response to electrolyte replacements, including repeat lab results as appropriate  - Instruct patient on fluid and nutrition as appropriate  Outcome: Progressing  Goal: Fluid balance maintained  Description: INTERVENTIONS:  - Monitor labs   - Monitor I/O and WT  - Instruct patient on fluid and nutrition as appropriate  - Assess for signs & symptoms of volume excess or deficit  Outcome: Progressing  Goal: Glucose maintained within target range  Description: INTERVENTIONS:  - Monitor Blood Glucose as ordered  - Assess for signs and symptoms of hyperglycemia and hypoglycemia  - Administer ordered medications to maintain glucose within target range  - Assess nutritional intake and initiate nutrition service referral as needed  Outcome: Progressing

## 2022-10-23 NOTE — ASSESSMENT & PLAN NOTE
· Most likely a vasovagal fall in the setting of using the bathroom  · All trauma workup is negative  · Monitor on telemetry for arrhythmias  TTE 7/11/22 LVEF 60%  · The patient with symptoms of vertigo during PT/OT eval- recent MRI brain in July was unremarkable  Chronic microangiopathy  · Dizziness/vertigo- improving  Continue on meclizine     · Orthostatic hypotension has been ruled out  · PT and OT input appreciated initially recommended short-term rehab however as clinically is improving PT is okay for the patient to be discharged home with [facility]

## 2022-10-24 LAB
ANION GAP SERPL CALCULATED.3IONS-SCNC: 7 MMOL/L (ref 4–13)
BUN SERPL-MCNC: 16 MG/DL (ref 5–25)
CALCIUM SERPL-MCNC: 8.8 MG/DL (ref 8.4–10.2)
CHLORIDE SERPL-SCNC: 101 MMOL/L (ref 96–108)
CO2 SERPL-SCNC: 30 MMOL/L (ref 21–32)
CREAT SERPL-MCNC: 0.75 MG/DL (ref 0.6–1.3)
ERYTHROCYTE [DISTWIDTH] IN BLOOD BY AUTOMATED COUNT: 12.4 % (ref 11.6–15.1)
GFR SERPL CREATININE-BSD FRML MDRD: 74 ML/MIN/1.73SQ M
GLUCOSE SERPL-MCNC: 171 MG/DL (ref 65–140)
GLUCOSE SERPL-MCNC: 174 MG/DL (ref 65–140)
GLUCOSE SERPL-MCNC: 260 MG/DL (ref 65–140)
GLUCOSE SERPL-MCNC: 270 MG/DL (ref 65–140)
GLUCOSE SERPL-MCNC: 330 MG/DL (ref 65–140)
GLUCOSE SERPL-MCNC: 348 MG/DL (ref 65–140)
HCT VFR BLD AUTO: 38 % (ref 34.8–46.1)
HGB BLD-MCNC: 12.3 G/DL (ref 11.5–15.4)
MCH RBC QN AUTO: 32.8 PG (ref 26.8–34.3)
MCHC RBC AUTO-ENTMCNC: 32.4 G/DL (ref 31.4–37.4)
MCV RBC AUTO: 101 FL (ref 82–98)
PLATELET # BLD AUTO: 302 THOUSANDS/UL (ref 149–390)
PMV BLD AUTO: 11 FL (ref 8.9–12.7)
POTASSIUM SERPL-SCNC: 3.9 MMOL/L (ref 3.5–5.3)
RBC # BLD AUTO: 3.75 MILLION/UL (ref 3.81–5.12)
SODIUM SERPL-SCNC: 138 MMOL/L (ref 135–147)
WBC # BLD AUTO: 7.22 THOUSAND/UL (ref 4.31–10.16)

## 2022-10-24 PROCEDURE — 82948 REAGENT STRIP/BLOOD GLUCOSE: CPT

## 2022-10-24 PROCEDURE — 99232 SBSQ HOSP IP/OBS MODERATE 35: CPT | Performed by: INTERNAL MEDICINE

## 2022-10-24 PROCEDURE — 97110 THERAPEUTIC EXERCISES: CPT

## 2022-10-24 PROCEDURE — 85027 COMPLETE CBC AUTOMATED: CPT | Performed by: NURSE PRACTITIONER

## 2022-10-24 PROCEDURE — 80048 BASIC METABOLIC PNL TOTAL CA: CPT | Performed by: NURSE PRACTITIONER

## 2022-10-24 RX ORDER — INSULIN GLARGINE 100 [IU]/ML
12 INJECTION, SOLUTION SUBCUTANEOUS EVERY MORNING
Status: DISCONTINUED | OUTPATIENT
Start: 2022-10-24 | End: 2022-10-24

## 2022-10-24 RX ORDER — INSULIN GLARGINE 100 [IU]/ML
15 INJECTION, SOLUTION SUBCUTANEOUS EVERY MORNING
Status: DISCONTINUED | OUTPATIENT
Start: 2022-10-25 | End: 2022-10-25 | Stop reason: HOSPADM

## 2022-10-24 RX ORDER — INSULIN GLARGINE 100 [IU]/ML
15 INJECTION, SOLUTION SUBCUTANEOUS
Status: DISCONTINUED | OUTPATIENT
Start: 2022-10-25 | End: 2022-10-24

## 2022-10-24 RX ORDER — INSULIN LISPRO 100 [IU]/ML
5 INJECTION, SOLUTION INTRAVENOUS; SUBCUTANEOUS
Status: DISCONTINUED | OUTPATIENT
Start: 2022-10-24 | End: 2022-10-25 | Stop reason: HOSPADM

## 2022-10-24 RX ADMIN — PANTOPRAZOLE SODIUM 40 MG: 40 TABLET, DELAYED RELEASE ORAL at 08:43

## 2022-10-24 RX ADMIN — OXYCODONE HYDROCHLORIDE AND ACETAMINOPHEN 500 MG: 500 TABLET ORAL at 08:43

## 2022-10-24 RX ADMIN — INSULIN GLARGINE 4 UNITS: 100 INJECTION, SOLUTION SUBCUTANEOUS at 10:40

## 2022-10-24 RX ADMIN — HEPARIN SODIUM 5000 UNITS: 5000 INJECTION INTRAVENOUS; SUBCUTANEOUS at 06:07

## 2022-10-24 RX ADMIN — ACETAMINOPHEN 650 MG: 325 TABLET ORAL at 16:23

## 2022-10-24 RX ADMIN — DOCUSATE SODIUM 100 MG: 100 CAPSULE, LIQUID FILLED ORAL at 08:43

## 2022-10-24 RX ADMIN — OXYCODONE HYDROCHLORIDE AND ACETAMINOPHEN 500 MG: 500 TABLET ORAL at 18:09

## 2022-10-24 RX ADMIN — CYANOCOBALAMIN TAB 500 MCG 1000 MCG: 500 TAB at 08:42

## 2022-10-24 RX ADMIN — MECLIZINE 25 MG: 12.5 TABLET ORAL at 06:07

## 2022-10-24 RX ADMIN — INSULIN LISPRO 3 UNITS: 100 INJECTION, SOLUTION INTRAVENOUS; SUBCUTANEOUS at 12:36

## 2022-10-24 RX ADMIN — ACETAMINOPHEN 650 MG: 325 TABLET ORAL at 21:50

## 2022-10-24 RX ADMIN — INSULIN LISPRO 1 UNITS: 100 INJECTION, SOLUTION INTRAVENOUS; SUBCUTANEOUS at 16:25

## 2022-10-24 RX ADMIN — Medication 2 TABLET: at 16:23

## 2022-10-24 RX ADMIN — ACETAMINOPHEN 650 MG: 325 TABLET ORAL at 06:07

## 2022-10-24 RX ADMIN — MECLIZINE 25 MG: 12.5 TABLET ORAL at 21:50

## 2022-10-24 RX ADMIN — GABAPENTIN 300 MG: 300 CAPSULE ORAL at 20:53

## 2022-10-24 RX ADMIN — GABAPENTIN 300 MG: 300 CAPSULE ORAL at 16:23

## 2022-10-24 RX ADMIN — INSULIN DETEMIR 8 UNITS: 100 INJECTION, SOLUTION SUBCUTANEOUS at 08:42

## 2022-10-24 RX ADMIN — INSULIN LISPRO 5 UNITS: 100 INJECTION, SOLUTION INTRAVENOUS; SUBCUTANEOUS at 16:25

## 2022-10-24 RX ADMIN — GABAPENTIN 300 MG: 300 CAPSULE ORAL at 08:43

## 2022-10-24 RX ADMIN — HEPARIN SODIUM 5000 UNITS: 5000 INJECTION INTRAVENOUS; SUBCUTANEOUS at 21:49

## 2022-10-24 RX ADMIN — PANTOPRAZOLE SODIUM 40 MG: 40 TABLET, DELAYED RELEASE ORAL at 20:53

## 2022-10-24 RX ADMIN — HEPARIN SODIUM 5000 UNITS: 5000 INJECTION INTRAVENOUS; SUBCUTANEOUS at 16:23

## 2022-10-24 RX ADMIN — MECLIZINE 25 MG: 12.5 TABLET ORAL at 16:23

## 2022-10-24 RX ADMIN — LEVOTHYROXINE SODIUM 112 MCG: 112 TABLET ORAL at 06:07

## 2022-10-24 RX ADMIN — Medication 2 TABLET: at 08:43

## 2022-10-24 RX ADMIN — DOCUSATE SODIUM 100 MG: 100 CAPSULE, LIQUID FILLED ORAL at 18:09

## 2022-10-24 RX ADMIN — INSULIN LISPRO 4 UNITS: 100 INJECTION, SOLUTION INTRAVENOUS; SUBCUTANEOUS at 08:46

## 2022-10-24 RX ADMIN — INSULIN LISPRO 1 UNITS: 100 INJECTION, SOLUTION INTRAVENOUS; SUBCUTANEOUS at 21:49

## 2022-10-24 NOTE — OCCUPATIONAL THERAPY NOTE
10/24/22 0951   OT Last Visit   OT Visit Date 10/24/22   Note Type   Note Type Treatment   Pain Assessment   Pain Assessment Tool 0-10   Pain Score No Pain   Lifestyle   Reciprocal Relationships spoke of daughter being of assistance to her   Intrinsic Gratification enjoys housework and decorating the house (inside, but no longer does outside decorating per fall risks)  (patient had other interests (art/painting and word games to mention 2) which she no longer plans to do > states "I need to just do some relaxing now"  )   ADL   Where Assessed   (reports that she already bathed this morning)   Grooming Comments declined brushing teeth > reporting she has few teeth left  (also declined brushing hair (though it needed to be))   Transfers   Sit to Stand 5  Supervision   Additional items Armrests; Increased time required;Verbal cues   Stand to Sit 5  Supervision   Additional items Armrests; Increased time required;Verbal cues   Additional items   (RW support while in stand)   Additional Comments patient stood for approx  1 5 minutes before feeling shaky and wanting to sit down  (Had No dizziness or lightheadedness at any time in session)   Therapeutic Excerise-Strength   UE Strength Yes   Right Upper Extremity- Strength   R Shoulder Other (Comment)  (* pro/re-traction, only, tolerated)   R Elbow Elbow flexion;Elbow extension  (in forward and reverse;  also: supin/pron-ation)   R Weight/Reps/Sets shoulder exers  done Actively X 15 reps each;  Elbow exers  done with 1 pound weight X 20 reps each   Left Upper Extremity-Strength   L Shoulder Other (Comment); Flexion; Horizontal ABduction  (pro/re-traction; Horizontal Adduction)   L Elbow Elbow flexion;Elbow extension  (in forward and reverse;  also: supin/pron-ation)   L Weights/Reps/Sets shoulder exers  done Actively X 15 reps each;  Elbow exers   done with 1 pound weight X 20 reps each   Coordination   Gross Motor impaired   Dexterity appears [facility]   Cognition   Overall Cognitive Status WFL   Arousal/Participation Alert; Cooperative   Attention Attends with cues to redirect   Following Commands Follows one step commands without difficulty   Activity Tolerance   Activity Tolerance Patient limited by fatigue  (Limited by crepitus/joint issue of R shoulder)   Assessment   Assessment Patient participated in Skilled OT session this date with interventions consisting of Energy Conservation techniques, safety awareness and fall prevention techniques, therapeutic exercise to: increase functional use of BUEs, increase BUE muscle strength ,  therapeutic activities to: increase activity tolerance and increase OOB/ sitting tolerance   Patient agreeable to OT treatment session, upon arrival patient was found seated OOB to Recliner - exercise completed with patient in recliner  Patient requiring verbal cues for correct technique, one step directives, frequent rest periods and ocassional safety reminders  (Patient did not participate in self-care this session > anticipating discharge to home later today  Patient continues to be functioning below baseline level, occupational performance remains limited secondary to factors listed above and increased risk for falls and injury  From OT standpoint, recommendation at time of d/c would be home with home health rehabilitation  Patient to benefit from continued Occupational Therapy treatment while in the hospital to address deficits as defined above and maximize level of functional independence with ADLs and functional mobility  Plan   Treatment Interventions Functional transfer training;UE strengthening/ROM; Patient/family training;Energy conservation; Activityengagement   Goal Expiration Date 10/28/22   OT Treatment Day 4   Recommendation   OT Discharge Recommendation Home with home health rehabilitation   Additional Comments 2 The patient's raw score on the AM-PAC Daily Activity inpatient short form is 17, standardized score is 37 26, less than 39 4  Patients at this level are likely to benefit from discharge to post-acute rehabilitation services  Please refer to the recommendation of the Occupational Therapist for safe discharge planning     AM-PAC Daily Activity Inpatient   Lower Body Dressing 2   Bathing 3   Toileting 3   Upper Body Dressing 3   Grooming 3   Eating 3   Daily Activity Raw Score 17   Daily Activity Standardized Score (Calc for Raw Score >=11) 37 26   AM-PAC Applied Cognition Inpatient   Following a Speech/Presentation 4   Understanding Ordinary Conversation 4   Taking Medications 4   Remembering Where Things Are Placed or Put Away 4   Remembering List of 4-5 Errands 3   Taking Care of Complicated Tasks 3   Applied Cognition Raw Score 22   Applied Cognition Standardized Score 47 83   Javier Trice, QUIJANO/L

## 2022-10-24 NOTE — PROGRESS NOTES
[facility] 45  Progress Note Yogi Jacinto 1940, 80 y o  female MRN: [id number]  Unit/Bed#: -02 Encounter: [id number]  Primary Care Provider: Edith Bamberger, MD   Date and time admitted to hospital: 10/17/2022 12:53 PM    * Traumatic rhabdomyolysis [facility])  Assessment & Plan  · In the setting of fall prior to arrival  · CPK:[id number] on arrival which has improved to 149  · Remains stable off IVFs       Type 2 diabetes mellitus with diabetic polyneuropathy, with long-term current use of insulin [facility])  Assessment & Plan  Lab Results   Component Value Date    HGBA1C 10 2 (H) 07/01/2022       Recent Labs     10/23/22  2122 10/24/22  0449 10/24/22  0730 10/24/22  1106   POCGLU 262* 270* 348* 330*       Blood Sugar Average: Last 72 hrs:  (P) 277 4375   · Target blood sugar for the hospital 140-180   · Increase Lantus to 15U QHS and begin Lispro 5U TID with meals  · Continue Accuchecks, corrective sliding scale insulin, and hypoglycemic protocol  · Carb-controlled diet    Leukemoid reaction  Assessment & Plan  · Treated for UTI wit  3 days of ceftriaxone  · WBC increased to 17 99 yesterday  · No further infectious etiology identified and leukocytosis resolved this AM  · Continue to monitor off abx and follow-up AM labs      Fall  Assessment & Plan  · Most likely a vasovagal as this occurred while she was using the bathroom  · All trauma workup is negative  · PT/OT recommending home with [facility]      VTE Pharmacologic Prophylaxis: VTE Score: 5 High Risk (Score >/= 5) - Pharmacological DVT Prophylaxis Ordered: heparin  Sequential Compression Devices Ordered  Patient Centered Rounds: I performed bedside rounds with nursing staff today  Discussions with Specialists or Other Care Team Provider: Nursing and CM    Education and Discussions with Family / Patient: Updated  (daughter) via phone  Time Spent for Care: 30 minutes   More than 50% of total time spent on counseling and coordination of care as described above  Current Length of Stay: 7 day(s)  Current Patient Status: Inpatient   Certification Statement: The patient will continue to require additional inpatient hospital stay due to hyperglycemia  Discharge Plan: Anticipate discharge in 24-48 hrs to home with home services  Code Status: Level 3 - DNAR and DNI    Subjective:   Patient resting comfortably in bed without any acute complaints  No significant over night events reported by nursing  Objective:     Vitals:   Temp (24hrs), Av 9 °F (36 6 °C), Min:97 6 °F (36 4 °C), Max:98 2 °F (36 8 °C)    Temp:  [97 6 °F (36 4 °C)-98 2 °F (36 8 °C)] 97 6 °F (36 4 °C)  HR:  [74] 74  Resp:  [18-21] 21  BP: (108-118)/(50-75) 112/50  SpO2:  [95 %-96 %] 96 %  Body mass index is 23 41 kg/m²  Input and Output Summary (last 24 hours): Intake/Output Summary (Last 24 hours) at 10/24/2022 1624  Last data filed at 10/24/2022 0731  Gross per 24 hour   Intake 480 ml   Output 1000 ml   Net -520 ml       Physical Exam:   Physical Exam  Vitals and nursing note reviewed  Constitutional:       General: She is not in acute distress  Appearance: Normal appearance  She is well-developed  HENT:      Head: Normocephalic and atraumatic  Mouth/Throat:      Mouth: Mucous membranes are moist       Pharynx: Oropharynx is clear  Eyes:      Conjunctiva/sclera: Conjunctivae normal    Cardiovascular:      Rate and Rhythm: Normal rate and regular rhythm  Pulses: Normal pulses  Heart sounds: Normal heart sounds  No murmur heard  Pulmonary:      Effort: Pulmonary effort is normal  No respiratory distress  Breath sounds: Normal breath sounds  Abdominal:      General: Bowel sounds are normal  There is no distension  Palpations: Abdomen is soft  Tenderness: There is no abdominal tenderness  Musculoskeletal:         General: Normal range of motion  Cervical back: Normal range of motion and neck supple  Skin:     General: Skin is warm and dry  Neurological:      General: No focal deficit present  Mental Status: She is alert and oriented to person, place, and time  Psychiatric:         Mood and Affect: Mood normal          Behavior: Behavior normal           Labs:  Results from last 7 days   Lab Units 10/24/22  1041 10/19/22  0445 10/18/22  0446   WBC Thousand/uL 7 22   < > 8 27   HEMOGLOBIN g/dL 12 3   < > 11 5   HEMATOCRIT % 38 0   < > 34 5*   PLATELETS Thousands/uL 302   < > 247   NEUTROS PCT %  --   --  71   LYMPHS PCT %  --   --  17   MONOS PCT %  --   --  11   EOS PCT %  --   --  0    < > = values in this interval not displayed  Results from last 7 days   Lab Units 10/24/22  0458 10/19/22  0445 10/18/22  0446   SODIUM mmol/L 138   < > 137   POTASSIUM mmol/L 3 9   < > 3 9   CHLORIDE mmol/L 101   < > 99   CO2 mmol/L 30   < > 30   BUN mg/dL 16   < > 12   CREATININE mg/dL 0 75   < > 0 61   ANION GAP mmol/L 7   < > 8   CALCIUM mg/dL 8 8   < > 8 3*   ALBUMIN g/dL  --   --  3 3*   TOTAL BILIRUBIN mg/dL  --   --  0 64   ALK PHOS U/L  --   --  70   ALT U/L  --   --  26   AST U/L  --   --  46*   GLUCOSE RANDOM mg/dL 260*   < > 117    < > = values in this interval not displayed  Results from last 7 days   Lab Units 10/24/22  1621 10/24/22  1106 10/24/22  0730 10/24/22  0449 10/23/22  2122 10/23/22  1649 10/23/22  1114 10/23/22  0754 10/22/22  2110 10/22/22  1703 10/22/22  1133 10/22/22  0658   POC GLUCOSE mg/dl 174* 330* 348* 270* 262* 413* 471* 264* 128 386* 324* 105         Results from last 7 days   Lab Units 10/23/22  0851   PROCALCITONIN ng/ml 0 13       Lines/Drains:  Invasive Devices  Report    Peripheral Intravenous Line  Duration           Peripheral IV 10/23/22 Right;Upper Arm 1 day                      Imaging: No new imaging  Recent Cultures (last 7 days):   Results from last 7 days   Lab Units 10/17/22  7948   BLOOD CULTURE  No Growth After 5 Days         Last 24 Hours Medication List:   Current Facility-Administered Medications   Medication Dose Route Frequency Provider Last Rate   • acetaminophen  650 mg Oral [facility] ADRIAN Hernandez     • vitamin C  500 mg Oral BID Imtiaz Tellez MD     • calcium carbonate-vitamin D  2 tablet Oral BID With Meals Imtiaz Tellez MD     • cyanocobalamin  1,000 mcg Oral Daily Imtiaz Tellez MD     • docusate sodium  100 mg Oral BID Imtiaz Tellez MD     • gabapentin  300 mg Oral TID Imtiaz Tellez MD     • heparin (porcine)  5,000 Units Subcutaneous [facility] Imtiaz Tellez MD     • [START ON 10/25/2022] insulin glargine  15 Units Subcutaneous HS Bing Rose DO     • insulin lispro  1-5 Units Subcutaneous TID [facility] Imtiaz Tellez MD     • insulin lispro  1-5 Units Subcutaneous HS Imtiaz Tellez MD     • insulin lispro  5 Units Subcutaneous TID With Meals Bing Rose DO     • levothyroxine  112 mcg Oral QAM Imtiaz Tellez MD     • meclizine  25 mg Oral [facility] ADRIAN Hernandez     • methocarbamol  500 mg Oral Q8H PRN ADRIAN Hernandez     • ondansetron  4 mg Intravenous Q6H PRN Imtiaz Tellez MD     • pantoprazole  40 mg Oral BID Imtiaz Tellez MD     • traMADol  50 mg Oral Q6H PRN ADRIAN Hernandez          Today, Patient Was Seen By: Bing Rose    **Please Note: This note may have been constructed using a voice recognition system  **

## 2022-10-24 NOTE — PLAN OF CARE
Problem: OCCUPATIONAL THERAPY ADULT  Goal: Performs self-care activities at highest level of function for planned discharge setting  See evaluation for individualized goals  Description: Treatment Interventions: ADL retraining, Functional transfer training, UE strengthening/ROM, Endurance training, Equipment evaluation/education, Compensatory technique education, Energy conservation, Activityengagement    See flowsheet documentation for full assessment, interventions and recommendations  Outcome: Progressing  Note: Limitation: Decreased ADL status, Decreased UE ROM, Decreased UE strength, Decreased Safe judgement during ADL, Decreased endurance, Decreased self-care trans, Decreased high-level ADLs  Prognosis: Good  Assessment: Patient participated in Skilled OT session this date with interventions consisting of Energy Conservation techniques, safety awareness and fall prevention techniques, therapeutic exercise to: increase functional use of BUEs, increase BUE muscle strength ,  therapeutic activities to: increase activity tolerance and increase OOB/ sitting tolerance   Patient agreeable to OT treatment session, upon arrival patient was found seated OOB to Recliner - exercise completed with patient in recliner  Patient requiring verbal cues for correct technique, one step directives, frequent rest periods and ocassional safety reminders  (Patient did not participate in self-care this session > anticipating discharge to home later today  Patient continues to be functioning below baseline level, occupational performance remains limited secondary to factors listed above and increased risk for falls and injury  From OT standpoint, recommendation at time of d/c would be home with home health rehabilitation     Patient to benefit from continued Occupational Therapy treatment while in the hospital to address deficits as defined above and maximize level of functional independence with ADLs and functional mobility       OT Discharge Recommendation: Home with home health rehabilitation        SAMM Cox/DAI

## 2022-10-24 NOTE — CASE MANAGEMENT
Case Management Discharge Planning Note    Patient name Anna Betancourt  Location /-02 MRN [id number]  : 1940 Date 10/24/2022       Current Admission Date: 10/17/2022  Current Admission Diagnosis:Traumatic rhabdomyolysis [facility])   Patient Active Problem List    Diagnosis Date Noted   • Fall 10/17/2022   • Traumatic rhabdomyolysis ([facility] ) 10/17/2022   • Leukemoid reaction 10/17/2022   • Gastroesophageal reflux disease without esophagitis 10/17/2022   • Visual changes 2022   • SIRS of non-infectious origin w acute organ dysfunction ([facility] ) 07/10/2022   • High anion gap metabolic acidosis    • Coffee ground emesis 07/10/2022   • RAMIRO (acute kidney injury) ([facility] ) 07/10/2022   • Abnormal CT of the abdomen 07/10/2022   • Soft tissue radionecrosis 2022   • Osteoradionecrosis of temporal bone ([facility] ) 2022   • Primary osteoarthritis of right shoulder 2021   • Right knee pain 2020   • Acute hip pain, right 2020   • Ambulatory dysfunction    • Primary osteoarthritis of both knees 2020   • Hypophosphatemia 2020   • Elevated vitamin B12 level 2020   • Anemia 2020   • Hyponatremia 2020   • Closed intertrochanteric fracture of right femur ([facility] ) 2020   • Acquired hypothyroidism 2015   • HLD (hyperlipidemia) 2014   • HTN (hypertension) 10/10/2013   • Type 2 diabetes mellitus with diabetic polyneuropathy, with long-term current use of insulin ([facility] ) 2008      LOS (days): 7  Geometric Mean LOS (GMLOS) (days): 2 50  Days to GMLOS:-4 2     OBJECTIVE:  Risk of Unplanned Readmission Score: 20 46         Current admission status: Inpatient   Preferred Pharmacy:   [facility] [address] [address]  Phone: [phone] Fax: [phone]    Primary Care Provider: Shelia Hardwick MD    Primary Insurance: MEDICARE  Secondary Insurance: [facility] DETAILS:    Discharge planning discussed with[de-identified] patient and Aria Jernigan was called 8:28 am  Freedom of Choice: Yes  Comments - Freedom of Choice: recommendeation is now hh- family was made aware she had a bed for rehab- family does not want rehab at snf  CM contacted family/caregiver?: Yes             Contacts  Patient Contacts: Yolanda Manzanares dtr  Relationship to Patient[de-identified] Family (daughter)  Contact Method: Phone  Phone Number: [phone]  Reason/Outcome: Discharge [address]         Is the patient interested in [facility] at discharge?: Yes    DME Referral Provided  Referral made for DME?: No    Other Referral/Resources/Interventions Provided:  Interventions: HHC  Referral Comments: pt was accepted to [facility], pt's recommendation was changed to [facility]- pt was accepted to [address] [place] [address]- family does not want snf rehab-  HARRISVNA was made aware of the planned d/c fpr today,    Would you like to participate in our [address] service program?  : No - Declined    Treatment Team Recommendation: Home with [address] [employer] (home with family support & vna& outpt follow up- family will transport after work)  Discharge Destination Plan[de-identified] Home with [address] [employer] (home with family support & slvna & outpt follow up)  Transport at Discharge : Automobile         pt 's son lives across the street from her, daughter will transport the pt home after work, pt and family are in agreement with the d/c and  D/c plan                      IMM Given (Date):: 10/24/22  IMM Given to[de-identified] Patient (daughter was called at 8:28am to review IMM she stated her  mother would understand and she is able to sign)  Family notified[de-identified] daughter was called

## 2022-10-24 NOTE — PLAN OF CARE
Problem: Potential for Falls  Goal: Patient will remain free of falls  Description: INTERVENTIONS:  - Educate patient/family on patient safety including physical limitations  - Instruct patient to call for assistance with activity   - Consult OT/PT to assist with strengthening/mobility   - Keep Call bell within reach  - Keep bed low and locked with side rails adjusted as appropriate  - Keep care items and personal belongings within reach  - Initiate and maintain comfort rounds  - Make Fall Risk Sign visible to staff  - Offer Toileting in advance of need  - Initiate/Maintain bed alarm  - Obtain necessary fall risk management equipment:   - Apply yellow socks and bracelet for high fall risk patients  - Consider moving patient to room near nurses station  Outcome: Adequate for Discharge     Problem: Prexisting or High Potential for Compromised Skin Integrity  Goal: Skin integrity is maintained or improved  Description: INTERVENTIONS:  - Identify patients at risk for skin breakdown  - Assess and monitor skin integrity  - Assess and monitor nutrition and hydration status  - Monitor labs   - Assess for incontinence   - Turn and reposition patient  - Assist with mobility/ambulation  - Relieve pressure over bony prominences  - Avoid friction and shearing  - Provide appropriate hygiene as needed including keeping skin clean and dry  - Evaluate need for skin moisturizer/barrier cream  - Collaborate with interdisciplinary team   - Patient/family teaching  - Consider wound care consult   Outcome: Adequate for Discharge     Problem: Nutrition/Hydration-ADULT  Goal: Nutrient/Hydration intake appropriate for improving, restoring or maintaining nutritional needs  Description: Monitor and assess patient's nutrition/hydration status for malnutrition  Collaborate with interdisciplinary team and initiate plan and interventions as ordered  Monitor patient's weight and dietary intake as ordered or per policy   Utilize nutrition screening tool and intervene as necessary  Determine patient's food preferences and provide high-protein, high-caloric foods as appropriate  INTERVENTIONS:  - Monitor oral intake, urinary output, labs, and treatment plans  - Assess nutrition and hydration status and recommend course of action  - Evaluate amount of meals eaten  - Assist patient with eating if necessary   - Allow adequate time for meals  - Recommend/ encourage appropriate diets, oral nutritional supplements, and vitamin/mineral supplements  - Order, calculate, and assess calorie counts as needed  - Recommend, monitor, and adjust tube feedings and TPN/PPN based on assessed needs  - Assess need for intravenous fluids  - Provide specific nutrition/hydration education as appropriate  - Include patient/family/caregiver in decisions related to nutrition  Outcome: Adequate for Discharge     Problem: MOBILITY - ADULT  Goal: Maintain or return to baseline ADL function  Description: INTERVENTIONS:  -  Assess patient's ability to carry out ADLs; assess patient's baseline for ADL function and identify physical deficits which impact ability to perform ADLs (bathing, care of mouth/teeth, toileting, grooming, dressing, etc )  - Assess/evaluate cause of self-care deficits   - Assess range of motion  - Assess patient's mobility; develop plan if impaired  - Assess patient's need for assistive devices and provide as appropriate  - Encourage maximum independence but intervene and supervise when necessary  - Involve family in performance of ADLs  - Assess for home care needs following discharge   - Consider OT consult to assist with ADL evaluation and planning for discharge  - Provide patient education as appropriate  Outcome: Adequate for Discharge  Goal: Maintains/Returns to pre admission functional level  Description: INTERVENTIONS:  - Perform BMAT or MOVE assessment daily    - Set and communicate daily mobility goal to care team and patient/family/caregiver     - Collaborate with rehabilitation services on mobility goals if consulted  - Record patient progress and toleration of activity level   Outcome: Adequate for Discharge     Problem: PAIN - ADULT  Goal: Verbalizes/displays adequate comfort level or baseline comfort level  Description: Interventions:  - Encourage patient to monitor pain and request assistance  - Assess pain using appropriate pain scale  - Administer analgesics based on type and severity of pain and evaluate response  - Implement non-pharmacological measures as appropriate and evaluate response  - Consider cultural and social influences on pain and pain management  - Notify physician/advanced practitioner if interventions unsuccessful or patient reports new pain  Outcome: Adequate for Discharge     Problem: INFECTION - ADULT  Goal: Absence or prevention of progression during hospitalization  Description: INTERVENTIONS:  - Assess and monitor for signs and symptoms of infection  - Monitor lab/diagnostic results  - Monitor all insertion sites, i e  indwelling lines, tubes, and drains  - Monitor endotracheal if appropriate and nasal secretions for changes in amount and color  - [place] appropriate cooling/warming therapies per order  - Administer medications as ordered  - Instruct and encourage patient and family to use good hand hygiene technique  - Identify and instruct in appropriate isolation precautions for identified infection/condition  Outcome: Adequate for Discharge     Problem: DISCHARGE PLANNING  Goal: Discharge to home or other facility with appropriate resources  Description: INTERVENTIONS:  - Identify barriers to discharge w/patient and caregiver  - Arrange for needed discharge resources and transportation as appropriate  - Identify discharge learning needs (meds, wound care, etc )  - Arrange for interpretive services to assist at discharge as needed  - Refer to Case Management Department for coordinating discharge planning if the patient needs post-hospital services based on physician/advanced practitioner order or complex needs related to functional status, cognitive ability, or social support system  Outcome: Adequate for Discharge     Problem: Knowledge Deficit  Goal: Patient/family/caregiver demonstrates understanding of disease process, treatment plan, medications, and discharge instructions  Description: Complete learning assessment and assess knowledge base    Interventions:  - Provide teaching at level of understanding  - Provide teaching via preferred learning methods  Outcome: Adequate for Discharge     Problem: GENITOURINARY - ADULT  Goal: Maintains or returns to baseline urinary function  Description: INTERVENTIONS:  - Assess urinary function  - Encourage oral fluids to ensure adequate hydration if ordered  - Administer IV fluids as ordered to ensure adequate hydration  - Administer ordered medications as needed  - Offer frequent toileting  - Follow urinary retention protocol if ordered  Outcome: Adequate for Discharge     Problem: METABOLIC, FLUID AND ELECTROLYTES - ADULT  Goal: Electrolytes maintained within normal limits  Description: INTERVENTIONS:  - Monitor labs and assess patient for signs and symptoms of electrolyte imbalances  - Administer electrolyte replacement as ordered  - Monitor response to electrolyte replacements, including repeat lab results as appropriate  - Instruct patient on fluid and nutrition as appropriate  Outcome: Adequate for Discharge  Goal: Fluid balance maintained  Description: INTERVENTIONS:  - Monitor labs   - Monitor I/O and WT  - Instruct patient on fluid and nutrition as appropriate  - Assess for signs & symptoms of volume excess or deficit  Outcome: Adequate for Discharge  Goal: Glucose maintained within target range  Description: INTERVENTIONS:  - Monitor Blood Glucose as ordered  - Assess for signs and symptoms of hyperglycemia and hypoglycemia  - Administer ordered medications to maintain glucose within target range  - Assess nutritional intake and initiate nutrition service referral as needed  Outcome: Adequate for Discharge

## 2022-10-24 NOTE — PLAN OF CARE
Problem: Potential for Falls  Goal: Patient will remain free of falls  Description: INTERVENTIONS:  - Educate patient/family on patient safety including physical limitations  - Instruct patient to call for assistance with activity   - Consult OT/PT to assist with strengthening/mobility   - Keep Call bell within reach  - Keep bed low and locked with side rails adjusted as appropriate  - Keep care items and personal belongings within reach  - Initiate and maintain comfort rounds  - Make Fall Risk Sign visible to staff  - Offer Toileting in advance of need  - Initiate/Maintain bed alarm  - Obtain necessary fall risk management equipment:   - Apply yellow socks and bracelet for high fall risk patients  - Consider moving patient to room near nurses station  Outcome: Progressing     Problem: Prexisting or High Potential for Compromised Skin Integrity  Goal: Skin integrity is maintained or improved  Description: INTERVENTIONS:  - Identify patients at risk for skin breakdown  - Assess and monitor skin integrity  - Assess and monitor nutrition and hydration status  - Monitor labs   - Assess for incontinence   - Turn and reposition patient  - Assist with mobility/ambulation  - Relieve pressure over bony prominences  - Avoid friction and shearing  - Provide appropriate hygiene as needed including keeping skin clean and dry  - Evaluate need for skin moisturizer/barrier cream  - Collaborate with interdisciplinary team   - Patient/family teaching  - Consider wound care consult   Outcome: Progressing     Problem: Nutrition/Hydration-ADULT  Goal: Nutrient/Hydration intake appropriate for improving, restoring or maintaining nutritional needs  Description: Monitor and assess patient's nutrition/hydration status for malnutrition  Collaborate with interdisciplinary team and initiate plan and interventions as ordered  Monitor patient's weight and dietary intake as ordered or per policy   Utilize nutrition screening tool and intervene as necessary  Determine patient's food preferences and provide high-protein, high-caloric foods as appropriate  INTERVENTIONS:  - Monitor oral intake, urinary output, labs, and treatment plans  - Assess nutrition and hydration status and recommend course of action  - Evaluate amount of meals eaten  - Assist patient with eating if necessary   - Allow adequate time for meals  - Recommend/ encourage appropriate diets, oral nutritional supplements, and vitamin/mineral supplements  - Order, calculate, and assess calorie counts as needed  - Recommend, monitor, and adjust tube feedings and TPN/PPN based on assessed needs  - Assess need for intravenous fluids  - Provide specific nutrition/hydration education as appropriate  - Include patient/family/caregiver in decisions related to nutrition  Outcome: Progressing     Problem: MOBILITY - ADULT  Goal: Maintain or return to baseline ADL function  Description: INTERVENTIONS:  -  Assess patient's ability to carry out ADLs; assess patient's baseline for ADL function and identify physical deficits which impact ability to perform ADLs (bathing, care of mouth/teeth, toileting, grooming, dressing, etc )  - Assess/evaluate cause of self-care deficits   - Assess range of motion  - Assess patient's mobility; develop plan if impaired  - Assess patient's need for assistive devices and provide as appropriate  - Encourage maximum independence but intervene and supervise when necessary  - Involve family in performance of ADLs  - Assess for home care needs following discharge   - Consider OT consult to assist with ADL evaluation and planning for discharge  - Provide patient education as appropriate  Outcome: Progressing  Goal: Maintains/Returns to pre admission functional level  Description: INTERVENTIONS:  - Perform BMAT or MOVE assessment daily    - Set and communicate daily mobility goal to care team and patient/family/caregiver     - Collaborate with rehabilitation services on mobility goals if consulted  - Record patient progress and toleration of activity level   Outcome: Progressing     Problem: PAIN - ADULT  Goal: Verbalizes/displays adequate comfort level or baseline comfort level  Description: Interventions:  - Encourage patient to monitor pain and request assistance  - Assess pain using appropriate pain scale  - Administer analgesics based on type and severity of pain and evaluate response  - Implement non-pharmacological measures as appropriate and evaluate response  - Consider cultural and social influences on pain and pain management  - Notify physician/advanced practitioner if interventions unsuccessful or patient reports new pain  Outcome: Progressing     Problem: INFECTION - ADULT  Goal: Absence or prevention of progression during hospitalization  Description: INTERVENTIONS:  - Assess and monitor for signs and symptoms of infection  - Monitor lab/diagnostic results  - Monitor all insertion sites, i e  indwelling lines, tubes, and drains  - Monitor endotracheal if appropriate and nasal secretions for changes in amount and color  - [place] appropriate cooling/warming therapies per order  - Administer medications as ordered  - Instruct and encourage patient and family to use good hand hygiene technique  - Identify and instruct in appropriate isolation precautions for identified infection/condition  Outcome: Progressing     Problem: DISCHARGE PLANNING  Goal: Discharge to home or other facility with appropriate resources  Description: INTERVENTIONS:  - Identify barriers to discharge w/patient and caregiver  - Arrange for needed discharge resources and transportation as appropriate  - Identify discharge learning needs (meds, wound care, etc )  - Arrange for interpretive services to assist at discharge as needed  - Refer to Case Management Department for coordinating discharge planning if the patient needs post-hospital services based on physician/advanced practitioner order or complex needs related to functional status, cognitive ability, or social support system  Outcome: Progressing     Problem: Knowledge Deficit  Goal: Patient/family/caregiver demonstrates understanding of disease process, treatment plan, medications, and discharge instructions  Description: Complete learning assessment and assess knowledge base    Interventions:  - Provide teaching at level of understanding  - Provide teaching via preferred learning methods  Outcome: Progressing     Problem: GENITOURINARY - ADULT  Goal: Maintains or returns to baseline urinary function  Description: INTERVENTIONS:  - Assess urinary function  - Encourage oral fluids to ensure adequate hydration if ordered  - Administer IV fluids as ordered to ensure adequate hydration  - Administer ordered medications as needed  - Offer frequent toileting  - Follow urinary retention protocol if ordered  Outcome: Progressing     Problem: METABOLIC, FLUID AND ELECTROLYTES - ADULT  Goal: Electrolytes maintained within normal limits  Description: INTERVENTIONS:  - Monitor labs and assess patient for signs and symptoms of electrolyte imbalances  - Administer electrolyte replacement as ordered  - Monitor response to electrolyte replacements, including repeat lab results as appropriate  - Instruct patient on fluid and nutrition as appropriate  Outcome: Progressing  Goal: Fluid balance maintained  Description: INTERVENTIONS:  - Monitor labs   - Monitor I/O and WT  - Instruct patient on fluid and nutrition as appropriate  - Assess for signs & symptoms of volume excess or deficit  Outcome: Progressing  Goal: Glucose maintained within target range  Description: INTERVENTIONS:  - Monitor Blood Glucose as ordered  - Assess for signs and symptoms of hyperglycemia and hypoglycemia  - Administer ordered medications to maintain glucose within target range  - Assess nutritional intake and initiate nutrition service referral as needed  Outcome: Progressing

## 2022-10-24 NOTE — ASSESSMENT & PLAN NOTE
· Treated for UTI wit  3 days of ceftriaxone  · WBC increased to 17 99 yesterday  · No further infectious etiology identified and leukocytosis resolved this AM  · Continue to monitor off abx and follow-up AM labs

## 2022-10-24 NOTE — ASSESSMENT & PLAN NOTE
· In the setting of fall prior to arrival  · CPK:1372 on arrival which has improved to 149  · Remains stable off IVFs

## 2022-10-24 NOTE — ASSESSMENT & PLAN NOTE
· Most likely a vasovagal as this occurred while she was using the bathroom  · All trauma workup is negative  · PT/OT recommending home with [facility]

## 2022-10-24 NOTE — ASSESSMENT & PLAN NOTE
Lab Results   Component Value Date    HGBA1C 10 2 (H) 07/01/2022       Recent Labs     10/23/22  2122 10/24/22  0449 10/24/22  0730 10/24/22  1106   POCGLU 262* 270* 348* 330*       Blood Sugar Average: Last 72 hrs:  (P) 277 4375   · Target blood sugar for the hospital 140-180   · Increase Lantus to 15U QHS and begin Lispro 5U TID with meals  · Continue Accuchecks, corrective sliding scale insulin, and hypoglycemic protocol  · Carb-controlled diet

## 2022-10-25 ENCOUNTER — HOME CARE VISIT (OUTPATIENT)
Dept: HOME HEALTH SERVICES | Facility: HOME HEALTHCARE | Age: 82
End: 2022-10-25

## 2022-10-25 VITALS
DIASTOLIC BLOOD PRESSURE: 71 MMHG | BODY MASS INDEX: 23.39 KG/M2 | SYSTOLIC BLOOD PRESSURE: 105 MMHG | TEMPERATURE: 97.8 F | RESPIRATION RATE: 21 BRPM | HEIGHT: 61 IN | OXYGEN SATURATION: 93 % | HEART RATE: 69 BPM | WEIGHT: 123.9 LBS

## 2022-10-25 LAB
ANION GAP SERPL CALCULATED.3IONS-SCNC: 9 MMOL/L (ref 4–13)
BUN SERPL-MCNC: 16 MG/DL (ref 5–25)
CALCIUM SERPL-MCNC: 8.9 MG/DL (ref 8.4–10.2)
CHLORIDE SERPL-SCNC: 102 MMOL/L (ref 96–108)
CO2 SERPL-SCNC: 29 MMOL/L (ref 21–32)
CREAT SERPL-MCNC: 0.7 MG/DL (ref 0.6–1.3)
ERYTHROCYTE [DISTWIDTH] IN BLOOD BY AUTOMATED COUNT: 12.6 % (ref 11.6–15.1)
GFR SERPL CREATININE-BSD FRML MDRD: 81 ML/MIN/1.73SQ M
GLUCOSE SERPL-MCNC: 221 MG/DL (ref 65–140)
GLUCOSE SERPL-MCNC: 249 MG/DL (ref 65–140)
GLUCOSE SERPL-MCNC: 345 MG/DL (ref 65–140)
GLUCOSE SERPL-MCNC: 89 MG/DL (ref 65–140)
HCT VFR BLD AUTO: 34.5 % (ref 34.8–46.1)
HGB BLD-MCNC: 11.2 G/DL (ref 11.5–15.4)
MCH RBC QN AUTO: 33.2 PG (ref 26.8–34.3)
MCHC RBC AUTO-ENTMCNC: 32.5 G/DL (ref 31.4–37.4)
MCV RBC AUTO: 102 FL (ref 82–98)
PLATELET # BLD AUTO: 284 THOUSANDS/UL (ref 149–390)
PMV BLD AUTO: 10.4 FL (ref 8.9–12.7)
POTASSIUM SERPL-SCNC: 4 MMOL/L (ref 3.5–5.3)
RBC # BLD AUTO: 3.37 MILLION/UL (ref 3.81–5.12)
SODIUM SERPL-SCNC: 140 MMOL/L (ref 135–147)
WBC # BLD AUTO: 5.96 THOUSAND/UL (ref 4.31–10.16)

## 2022-10-25 PROCEDURE — 80048 BASIC METABOLIC PNL TOTAL CA: CPT | Performed by: INTERNAL MEDICINE

## 2022-10-25 PROCEDURE — 97110 THERAPEUTIC EXERCISES: CPT

## 2022-10-25 PROCEDURE — 85027 COMPLETE CBC AUTOMATED: CPT | Performed by: INTERNAL MEDICINE

## 2022-10-25 PROCEDURE — 99239 HOSP IP/OBS DSCHRG MGMT >30: CPT | Performed by: INTERNAL MEDICINE

## 2022-10-25 PROCEDURE — 82948 REAGENT STRIP/BLOOD GLUCOSE: CPT

## 2022-10-25 RX ORDER — INSULIN GLARGINE 100 [IU]/ML
15 INJECTION, SOLUTION SUBCUTANEOUS EVERY MORNING
Qty: 10 ML | Refills: 0 | Status: SHIPPED | OUTPATIENT
Start: 2022-10-25

## 2022-10-25 RX ORDER — INSULIN LISPRO 100 [IU]/ML
5 INJECTION, SOLUTION INTRAVENOUS; SUBCUTANEOUS
Qty: 4.5 ML | Refills: 0 | Status: SHIPPED | OUTPATIENT
Start: 2022-10-25 | End: 2022-11-24

## 2022-10-25 RX ORDER — INSULIN LISPRO 100 [IU]/ML
1-5 INJECTION, SOLUTION INTRAVENOUS; SUBCUTANEOUS
Qty: 4.5 ML | Refills: 0 | Status: SHIPPED | OUTPATIENT
Start: 2022-10-25 | End: 2022-11-24

## 2022-10-25 RX ORDER — MECLIZINE HYDROCHLORIDE 25 MG/1
25 TABLET ORAL EVERY 8 HOURS PRN
Qty: 30 TABLET | Refills: 0 | Status: SHIPPED | OUTPATIENT
Start: 2022-10-25

## 2022-10-25 RX ADMIN — ACETAMINOPHEN 650 MG: 325 TABLET ORAL at 13:52

## 2022-10-25 RX ADMIN — HEPARIN SODIUM 5000 UNITS: 5000 INJECTION INTRAVENOUS; SUBCUTANEOUS at 13:53

## 2022-10-25 RX ADMIN — CYANOCOBALAMIN TAB 500 MCG 1000 MCG: 500 TAB at 10:35

## 2022-10-25 RX ADMIN — INSULIN LISPRO 5 UNITS: 100 INJECTION, SOLUTION INTRAVENOUS; SUBCUTANEOUS at 12:47

## 2022-10-25 RX ADMIN — INSULIN LISPRO 1 UNITS: 100 INJECTION, SOLUTION INTRAVENOUS; SUBCUTANEOUS at 12:48

## 2022-10-25 RX ADMIN — OXYCODONE HYDROCHLORIDE AND ACETAMINOPHEN 500 MG: 500 TABLET ORAL at 10:34

## 2022-10-25 RX ADMIN — ACETAMINOPHEN 650 MG: 325 TABLET ORAL at 05:23

## 2022-10-25 RX ADMIN — GABAPENTIN 300 MG: 300 CAPSULE ORAL at 10:35

## 2022-10-25 RX ADMIN — INSULIN LISPRO 3 UNITS: 100 INJECTION, SOLUTION INTRAVENOUS; SUBCUTANEOUS at 08:10

## 2022-10-25 RX ADMIN — INSULIN LISPRO 5 UNITS: 100 INJECTION, SOLUTION INTRAVENOUS; SUBCUTANEOUS at 08:09

## 2022-10-25 RX ADMIN — Medication 2 TABLET: at 10:34

## 2022-10-25 RX ADMIN — OXYCODONE HYDROCHLORIDE AND ACETAMINOPHEN 500 MG: 500 TABLET ORAL at 17:07

## 2022-10-25 RX ADMIN — INSULIN GLARGINE 15 UNITS: 100 INJECTION, SOLUTION SUBCUTANEOUS at 10:32

## 2022-10-25 RX ADMIN — PANTOPRAZOLE SODIUM 40 MG: 40 TABLET, DELAYED RELEASE ORAL at 10:35

## 2022-10-25 RX ADMIN — GABAPENTIN 300 MG: 300 CAPSULE ORAL at 17:07

## 2022-10-25 RX ADMIN — DOCUSATE SODIUM 100 MG: 100 CAPSULE, LIQUID FILLED ORAL at 17:07

## 2022-10-25 RX ADMIN — LEVOTHYROXINE SODIUM 112 MCG: 112 TABLET ORAL at 05:23

## 2022-10-25 RX ADMIN — Medication 2 TABLET: at 17:06

## 2022-10-25 RX ADMIN — MECLIZINE 25 MG: 12.5 TABLET ORAL at 13:52

## 2022-10-25 RX ADMIN — DOCUSATE SODIUM 100 MG: 100 CAPSULE, LIQUID FILLED ORAL at 10:35

## 2022-10-25 RX ADMIN — HEPARIN SODIUM 5000 UNITS: 5000 INJECTION INTRAVENOUS; SUBCUTANEOUS at 05:23

## 2022-10-25 RX ADMIN — MECLIZINE 25 MG: 12.5 TABLET ORAL at 05:23

## 2022-10-25 NOTE — ASSESSMENT & PLAN NOTE
· Most likely a vasovagal as this occurred while she was using the bathroom  · All trauma workup is negative  · PT/OT recommending home with [facility]

## 2022-10-25 NOTE — OCCUPATIONAL THERAPY NOTE
10/25/22 1108   OT Last Visit   OT Visit Date 10/25/22   Note Type   Note Type Treatment  (completed [id number])   Pain Assessment   Pain Assessment Tool 0-10   Pain Score No Pain   ADL   Where Assessed   (patient had already been assisted with bathing and dressing this morning in preparation for discharge today to home)   Therapeutic Excerise-Strength   UE Strength Yes   Right Upper Extremity- Strength   R Shoulder Other (Comment)  (pro/re-traction)   R Elbow Elbow flexion;Elbow extension  (in forward and reverse;  also: supin/pron-ation)   R Weight/Reps/Sets shoulder exercises done actively X 15 reps ; Elbow exercises done with 1 pound weight X 20 reps   Left Upper Extremity-Strength   L Shoulder Flexion; Horizontal ABduction; Other (Comment)  (Horizontal adduction; Pro/re-traction)   L Elbow Elbow flexion;Elbow extension  (in forward and reverse;  also: supin/pron-ation)   L Weights/Reps/Sets shoulder exercises done actively X 15 reps ; Elbow exercises done with 1 pound weight X 20 reps   Coordination   Gross Motor patient reports she has an appt  with Dr Cooper Veloz soon - has been getting shots to her R shoulder  (Impaired of RUE)   Subjective   Subjective "I want to get back into my bed" (instead of always sleeping in her lift chair)   Cognition   Overall Cognitive Status [facility]   Arousal/Participation Alert; Cooperative   Attention Within functional limits   Orientation Level Oriented X4   Following Commands Follows one step commands without difficulty   Activity Tolerance   Activity Tolerance Patient limited by pain  (discomfort of R shoulder)   Assessment   Assessment Patient participated in Skilled OT session this date with interventions consisting of therapeutic exercise to: increase functional use of BUEs, increase BUE muscle strength    Patient agreeable to OT treatment session, upon arrival patient was found seated OOB to Recliner    In comparison to previous session, patient with improvements in UE activity/exercise tolerance  Patient requiring verbal cues for correct technique, verbal cues for pacing thru activity steps, one step directives and frequent rest periods  Patient continues to be functioning below baseline level, occupational performance remains limited secondary to factors listed above and increased risk for falls and injury  From OT standpoint, recommendation at time of d/c would be home with home health rehabilitation (and family support)  Patient to benefit from continued Occupational Therapy treatment while in the hospital to address deficits as defined above and maximize level of functional independence with ADLs and functional mobility  Plan   Treatment Interventions UE strengthening/ROM; Patient/family training; Activityengagement; Compensatory technique education   Goal Expiration Date 10/28/22   OT Treatment Day 5   Recommendation   OT Discharge Recommendation Home with home health rehabilitation   Additional Comments 2 The patient's raw score on the AM-PAC Daily Activity inpatient short form is 18, standardized score is 38 66, less than 39 4  Patients at this level are likely to benefit from discharge to post-acute rehabilitation services  Please refer to the recommendation of the Occupational Therapist for safe discharge planning  AM-PAC Daily Activity Inpatient   Lower Body Dressing 2   Bathing 3   Toileting 3   Upper Body Dressing 3   Grooming 3   Eating 4   Daily Activity Raw Score 18   Daily Activity Standardized Score (Calc for Raw Score >=11) 38 66   AM-[facility] Applied Cognition Inpatient   Following a Speech/Presentation 4   Understanding Ordinary Conversation 4   Taking Medications 4   Remembering Where Things Are Placed or Put Away 4   Remembering List of 4-5 Errands 3   Taking Care of Complicated Tasks 3   Applied Cognition Raw Score 22   Applied Cognition Standardized Score 47 83   SUNITA Hernandez  *also provided instruction with demos   In gentle/ mostly passive ROM exercises (2) for R shoulder that patient could follow-through with at home

## 2022-10-25 NOTE — ASSESSMENT & PLAN NOTE
· Treated for UTI with 3 days of ceftriaxone  · WBC remains within normal limits   · Remains stable off abx

## 2022-10-25 NOTE — PLAN OF CARE
Problem: Potential for Falls  Goal: Patient will remain free of falls  Description: INTERVENTIONS:  - Educate patient/family on patient safety including physical limitations  - Instruct patient to call for assistance with activity   - Consult OT/PT to assist with strengthening/mobility   - Keep Call bell within reach  - Keep bed low and locked with side rails adjusted as appropriate  - Keep care items and personal belongings within reach  - Initiate and maintain comfort rounds  - Make Fall Risk Sign visible to staff  - Offer Toileting in advance of need  - Initiate/Maintain bed alarm  - Obtain necessary fall risk management equipment:   - Apply yellow socks and bracelet for high fall risk patients  - Consider moving patient to room near nurses station  Outcome: Adequate for Discharge     Problem: Prexisting or High Potential for Compromised Skin Integrity  Goal: Skin integrity is maintained or improved  Description: INTERVENTIONS:  - Identify patients at risk for skin breakdown  - Assess and monitor skin integrity  - Assess and monitor nutrition and hydration status  - Monitor labs   - Assess for incontinence   - Turn and reposition patient  - Assist with mobility/ambulation  - Relieve pressure over bony prominences  - Avoid friction and shearing  - Provide appropriate hygiene as needed including keeping skin clean and dry  - Evaluate need for skin moisturizer/barrier cream  - Collaborate with interdisciplinary team   - Patient/family teaching  - Consider wound care consult   Outcome: Adequate for Discharge     Problem: Nutrition/Hydration-ADULT  Goal: Nutrient/Hydration intake appropriate for improving, restoring or maintaining nutritional needs  Description: Monitor and assess patient's nutrition/hydration status for malnutrition  Collaborate with interdisciplinary team and initiate plan and interventions as ordered  Monitor patient's weight and dietary intake as ordered or per policy   Utilize nutrition screening tool and intervene as necessary  Determine patient's food preferences and provide high-protein, high-caloric foods as appropriate  INTERVENTIONS:  - Monitor oral intake, urinary output, labs, and treatment plans  - Assess nutrition and hydration status and recommend course of action  - Evaluate amount of meals eaten  - Assist patient with eating if necessary   - Allow adequate time for meals  - Recommend/ encourage appropriate diets, oral nutritional supplements, and vitamin/mineral supplements  - Order, calculate, and assess calorie counts as needed  - Recommend, monitor, and adjust tube feedings and TPN/PPN based on assessed needs  - Assess need for intravenous fluids  - Provide specific nutrition/hydration education as appropriate  - Include patient/family/caregiver in decisions related to nutrition  Outcome: Adequate for Discharge     Problem: MOBILITY - ADULT  Goal: Maintain or return to baseline ADL function  Description: INTERVENTIONS:  -  Assess patient's ability to carry out ADLs; assess patient's baseline for ADL function and identify physical deficits which impact ability to perform ADLs (bathing, care of mouth/teeth, toileting, grooming, dressing, etc )  - Assess/evaluate cause of self-care deficits   - Assess range of motion  - Assess patient's mobility; develop plan if impaired  - Assess patient's need for assistive devices and provide as appropriate  - Encourage maximum independence but intervene and supervise when necessary  - Involve family in performance of ADLs  - Assess for home care needs following discharge   - Consider OT consult to assist with ADL evaluation and planning for discharge  - Provide patient education as appropriate  Outcome: Adequate for Discharge  Goal: Maintains/Returns to pre admission functional level  Description: INTERVENTIONS:  - Perform BMAT or MOVE assessment daily    - Set and communicate daily mobility goal to care team and patient/family/caregiver     - Collaborate with rehabilitation services on mobility goals if consulted  - Record patient progress and toleration of activity level   Outcome: Adequate for Discharge     Problem: PAIN - ADULT  Goal: Verbalizes/displays adequate comfort level or baseline comfort level  Description: Interventions:  - Encourage patient to monitor pain and request assistance  - Assess pain using appropriate pain scale  - Administer analgesics based on type and severity of pain and evaluate response  - Implement non-pharmacological measures as appropriate and evaluate response  - Consider cultural and social influences on pain and pain management  - Notify physician/advanced practitioner if interventions unsuccessful or patient reports new pain  Outcome: Adequate for Discharge     Problem: INFECTION - ADULT  Goal: Absence or prevention of progression during hospitalization  Description: INTERVENTIONS:  - Assess and monitor for signs and symptoms of infection  - Monitor lab/diagnostic results  - Monitor all insertion sites, i e  indwelling lines, tubes, and drains  - Monitor endotracheal if appropriate and nasal secretions for changes in amount and color  - [place] appropriate cooling/warming therapies per order  - Administer medications as ordered  - Instruct and encourage patient and family to use good hand hygiene technique  - Identify and instruct in appropriate isolation precautions for identified infection/condition  Outcome: Adequate for Discharge     Problem: DISCHARGE PLANNING  Goal: Discharge to home or other facility with appropriate resources  Description: INTERVENTIONS:  - Identify barriers to discharge w/patient and caregiver  - Arrange for needed discharge resources and transportation as appropriate  - Identify discharge learning needs (meds, wound care, etc )  - Arrange for interpretive services to assist at discharge as needed  - Refer to Case Management Department for coordinating discharge planning if the patient needs post-hospital services based on physician/advanced practitioner order or complex needs related to functional status, cognitive ability, or social support system  Outcome: Adequate for Discharge     Problem: Knowledge Deficit  Goal: Patient/family/caregiver demonstrates understanding of disease process, treatment plan, medications, and discharge instructions  Description: Complete learning assessment and assess knowledge base    Interventions:  - Provide teaching at level of understanding  - Provide teaching via preferred learning methods  Outcome: Adequate for Discharge     Problem: GENITOURINARY - ADULT  Goal: Maintains or returns to baseline urinary function  Description: INTERVENTIONS:  - Assess urinary function  - Encourage oral fluids to ensure adequate hydration if ordered  - Administer IV fluids as ordered to ensure adequate hydration  - Administer ordered medications as needed  - Offer frequent toileting  - Follow urinary retention protocol if ordered  Outcome: Adequate for Discharge     Problem: METABOLIC, FLUID AND ELECTROLYTES - ADULT  Goal: Electrolytes maintained within normal limits  Description: INTERVENTIONS:  - Monitor labs and assess patient for signs and symptoms of electrolyte imbalances  - Administer electrolyte replacement as ordered  - Monitor response to electrolyte replacements, including repeat lab results as appropriate  - Instruct patient on fluid and nutrition as appropriate  Outcome: Adequate for Discharge  Goal: Fluid balance maintained  Description: INTERVENTIONS:  - Monitor labs   - Monitor I/O and WT  - Instruct patient on fluid and nutrition as appropriate  - Assess for signs & symptoms of volume excess or deficit  Outcome: Adequate for Discharge  Goal: Glucose maintained within target range  Description: INTERVENTIONS:  - Monitor Blood Glucose as ordered  - Assess for signs and symptoms of hyperglycemia and hypoglycemia  - Administer ordered medications to maintain glucose within target range  - Assess nutritional intake and initiate nutrition service referral as needed  Outcome: Adequate for Discharge

## 2022-10-25 NOTE — CASE MANAGEMENT
Case Management Discharge Planning Note    Patient name Pilo Lennon  Location /-02 MRN [id number]  : 1940 Date 10/25/2022       Current Admission Date: 10/17/2022  Current Admission Diagnosis:Traumatic rhabdomyolysis [facility])   Patient Active Problem List    Diagnosis Date Noted   • Fall 10/17/2022   • Traumatic rhabdomyolysis ([facility] [address] ) 10/17/2022   • Leukemoid reaction 10/17/2022   • Gastroesophageal reflux disease without esophagitis 10/17/2022   • Visual changes 2022   • SIRS of non-infectious origin w acute organ dysfunction ([facility] [address] ) 07/10/2022   • High anion gap metabolic acidosis    • Coffee ground emesis 07/10/2022   • RAMIRO (acute kidney injury) ([facility] [address] ) 07/10/2022   • Abnormal CT of the abdomen 07/10/2022   • Soft tissue radionecrosis 2022   • Osteoradionecrosis of temporal bone ([facility] [address] ) 2022   • Primary osteoarthritis of right shoulder 2021   • Right knee pain 2020   • Acute hip pain, right 2020   • Ambulatory dysfunction    • Primary osteoarthritis of both knees 2020   • Hypophosphatemia 2020   • Elevated vitamin B12 level 2020   • Anemia 2020   • Hyponatremia 2020   • Closed intertrochanteric fracture of right femur ([facility] [address] ) 2020   • Acquired hypothyroidism 2015   • HLD (hyperlipidemia) 2014   • HTN (hypertension) 10/10/2013   • Type 2 diabetes mellitus with diabetic polyneuropathy, with long-term current use of insulin ([facility] [address] ) 2008      LOS (days): 8  Geometric Mean LOS (GMLOS) (days): 2 50  Days to GMLOS:-5 4     OBJECTIVE:  Risk of Unplanned Readmission Score: 23 31         Current admission status: Inpatient   Preferred Pharmacy:   [address] [place] [id number]  Phone: [phone] Fax: [phone]    Primary Care Provider: Moses Christy MD    Primary Insurance: MEDICARE  Secondary Insurance: [facility] DETAILS:    Discharge planning discussed with[de-identified] patient and daughter at 10:41 am  Freedom of Choice: Yes  Comments - Freedom of Choice: recommendation is for c-  [facility] have accepted  CM contacted family/caregiver?: Yes  Were Treatment Team discharge recommendations reviewed with patient/caregiver?: Yes  Did patient/caregiver verbalize understanding of patient care needs?: Yes  Were patient/caregiver advised of the risks associated with not following Treatment Team discharge recommendations?: Yes    Contacts  Patient Contacts: Kimi Bonilla dtr  Relationship to Patient[de-identified] Family (daughter)  Contact Method: Phone  Phone Number: [phone]  Reason/Outcome: Discharge [address]         Is the patient interested in [address] at discharge?: Yes    DME Referral Provided  Referral made for DME?: No    Other Referral/Resources/Interventions Provided:  Interventions: HHC  Referral Comments: pt has been accepted by [facility]    Would you like to participate in our [facility] service program?  : No - Declined    Treatment Team Recommendation: Home with [employer] (home with family support & vna& outpt follow up- daughter)  Discharge Destination Plan[de-identified] Home with [employer] (home with family support & slvna& outpt follow up- family)         pt and family are in agreement with the d/c and d/c plan                             Family notified[de-identified] daughter was called

## 2022-10-25 NOTE — DISCHARGE INSTR - OTHER ORDERS
Sliding Scale Insulin HumaLOG    Blood glucose 150-224: 1 unit of Insulin  Blood glucose 225-299: 2 units of Insulin  Blood glucose 300-374: 3 units of Insulin  Blood glucose 375-449: 4 units of Insulin  Blood glucose greater than or equal to 450: 5 units of Insulin

## 2022-10-25 NOTE — NURSING NOTE
Report received from [facility]  Pt to be picked up at 1700 by daughter for d/c  Pt resting comfortably in bed  Call bell within reach

## 2022-10-25 NOTE — DISCHARGE SUMMARY
[address]  Discharge- Norm Hilts 1940, 80 y o  female MRN: [id number]  Unit/Bed#: -02 Encounter: [id number]  Primary Care Provider: Tamir Lowery MD   Date and time admitted to hospital: 10/17/2022 12:53 PM    * Traumatic rhabdomyolysis [facility])  Assessment & Plan  · In the setting of fall prior to arrival  · CPK:1372 on arrival which has improved to 149  · Remains stable off IVFs     Type 2 diabetes mellitus with diabetic polyneuropathy, with long-term current use of insulin [facility])  Assessment & Plan  Lab Results   Component Value Date    HGBA1C 10 2 (H) 07/01/2022       Recent Labs     10/24/22  1621 10/24/22  2106 10/25/22  0757 10/25/22  1112   POCGLU 174* 171* 345* 221*       Blood Sugar Average: Last 72 hrs:  (P) 280 8   · Target blood sugar for the hospital 140-180   · Continue Lantus to 15U QHS and Lispro 5U TID with meals  · Continue Accuchecks and corrective sliding scale insulin  · Carb-controlled diet    Leukemoid reaction  Assessment & Plan  · Treated for UTI with 3 days of ceftriaxone  · WBC remains within normal limits   · Remains stable off abx    Fall  Assessment & Plan  · Most likely a vasovagal as this occurred while she was using the bathroom  · All trauma workup is negative  · PT/OT recommending home with [facility]  Medical Problems             Resolved Problems  Date Reviewed: 10/23/2022   None               Discharging Physician / Practitioner: Farhat Phillips  PCP: Tamir Lowery MD  Admission Date:   Admission Orders (From admission, onward)     Ordered        10/17/22 [facility]  Once                      Discharge Date: 10/25/22    Consultations During Hospital Stay:  · None    Procedures Performed:   · None    Significant Findings / Test Results:   · Elevated CPK    Incidental Findings:   · None     Test Results Pending at Discharge (will require follow up):    · None      Outpatient Tests Requested:  · To be determined upon follow-up with PCP and Endocrinology    Complications:  None    Reason for Admission: Syncope    Hospital Course: Petra Richmond is a 80 y o  female patient who originally presented to the hospital on 10/17/2022 due to syncope  Please see H&P as documented by Dr Fabio Bernal for complete details regarding history of presenting illness  In brief, the patient has a PMHx of DMII and hypothyroidism who presented after a syncopal episode  The last thing she remembers was sitting down the use the bathroom and then later awoke on the bathroom floor  Upon arrival to the ED she was found to elevated CPK and was admitted for treatment for acute rhabdomyolysis  Trauma imaging during admission was negative and syncopal event was deemed like vasovagal  She was treated with IVF with return of CPK to normal  Additionally, she was evaluated by PT/OT who recommended home with home health care  She was ultimately discharged home in stable condition and all of her and her daughter's questions were answered prior to departure  This is a brief discharge summary  Please see full medical record for more details  Please see above list of diagnoses and related plan for additional information  Condition at Discharge: stable    Discharge Day Visit / Exam:   Subjective:  Patient resting comfortably in bed without any acute complaints  No significant over night events reported by nursing  Vitals: Blood Pressure: 117/66 (10/25/22 0725)  Pulse: 78 (10/25/22 0725)  Temperature: 98 3 °F (36 8 °C) (10/25/22 0725)  Temp Source: Axillary (10/24/22 1502)  Respirations: 21 (10/25/22 0725)  Height: 5' 1" (154 9 cm) (10/17/22 2235)  Weight - Scale: 56 2 kg (123 lb 14 4 oz) (10/17/22 2106)  SpO2: 93 % (10/25/22 0725)     Exam:   Physical Exam  Vitals and nursing note reviewed  Constitutional:       General: She is not in acute distress  Appearance: She is well-developed  HENT:      Head: Normocephalic and atraumatic        Mouth/Throat:      Mouth: Mucous membranes are moist       Pharynx: Oropharynx is clear  Eyes:      Extraocular Movements: Extraocular movements intact  Conjunctiva/sclera: Conjunctivae normal    Cardiovascular:      Rate and Rhythm: Normal rate and regular rhythm  Pulses: Normal pulses  Heart sounds: Normal heart sounds  No murmur heard  Pulmonary:      Effort: Pulmonary effort is normal  No respiratory distress  Breath sounds: Normal breath sounds  Abdominal:      General: Bowel sounds are normal  There is no distension  Palpations: Abdomen is soft  Tenderness: There is no abdominal tenderness  Musculoskeletal:         General: Normal range of motion  Cervical back: Normal range of motion and neck supple  Skin:     General: Skin is warm and dry  Neurological:      General: No focal deficit present  Mental Status: She is alert and oriented to person, place, and time  Psychiatric:         Mood and Affect: Mood normal          Behavior: Behavior normal         Discussion with Family: Updated  (daughter) via phone  Discharge instructions/Information to patient and family:   See after visit summary for information provided to patient and family  Provisions for Follow-Up Care:  See after visit summary for information related to follow-up care and any pertinent home health orders  Disposition:   Home with VNA Services (Reminder: Complete face to face encounter)    Planned Readmission: None     Discharge Statement:  I spent > 35 minutes discharging the patient  This time was spent on the day of discharge  I had direct contact with the patient on the day of discharge  Greater than 50% of the total time was spent examining patient, answering all patient questions, arranging and discussing plan of care with patient as well as directly providing post-discharge instructions  Additional time then spent on discharge activities      Discharge Medications:  See after visit summary for reconciled discharge medications provided to patient and/or family        **Please Note: This note may have been constructed using a voice recognition system**

## 2022-10-25 NOTE — PLAN OF CARE
Problem: Potential for Falls  Goal: Patient will remain free of falls  Description: INTERVENTIONS:  - Educate patient/family on patient safety including physical limitations  - Instruct patient to call for assistance with activity   - Consult OT/PT to assist with strengthening/mobility   - Keep Call bell within reach  - Keep bed low and locked with side rails adjusted as appropriate  - Keep care items and personal belongings within reach  - Initiate and maintain comfort rounds  - Make Fall Risk Sign visible to staff  - Offer Toileting in advance of need  - Initiate/Maintain bed alarm  - Obtain necessary fall risk management equipment:   - Apply yellow socks and bracelet for high fall risk patients  - Consider moving patient to room near nurses station  Outcome: Progressing     Problem: Prexisting or High Potential for Compromised Skin Integrity  Goal: Skin integrity is maintained or improved  Description: INTERVENTIONS:  - Identify patients at risk for skin breakdown  - Assess and monitor skin integrity  - Assess and monitor nutrition and hydration status  - Monitor labs   - Assess for incontinence   - Turn and reposition patient  - Assist with mobility/ambulation  - Relieve pressure over bony prominences  - Avoid friction and shearing  - Provide appropriate hygiene as needed including keeping skin clean and dry  - Evaluate need for skin moisturizer/barrier cream  - Collaborate with interdisciplinary team   - Patient/family teaching  - Consider wound care consult   Outcome: Progressing     Problem: Nutrition/Hydration-ADULT  Goal: Nutrient/Hydration intake appropriate for improving, restoring or maintaining nutritional needs  Description: Monitor and assess patient's nutrition/hydration status for malnutrition  Collaborate with interdisciplinary team and initiate plan and interventions as ordered  Monitor patient's weight and dietary intake as ordered or per policy   Utilize nutrition screening tool and intervene as necessary  Determine patient's food preferences and provide high-protein, high-caloric foods as appropriate  INTERVENTIONS:  - Monitor oral intake, urinary output, labs, and treatment plans  - Assess nutrition and hydration status and recommend course of action  - Evaluate amount of meals eaten  - Assist patient with eating if necessary   - Allow adequate time for meals  - Recommend/ encourage appropriate diets, oral nutritional supplements, and vitamin/mineral supplements  - Order, calculate, and assess calorie counts as needed  - Recommend, monitor, and adjust tube feedings and TPN/PPN based on assessed needs  - Assess need for intravenous fluids  - Provide specific nutrition/hydration education as appropriate  - Include patient/family/caregiver in decisions related to nutrition  Outcome: Progressing     Problem: MOBILITY - ADULT  Goal: Maintain or return to baseline ADL function  Description: INTERVENTIONS:  -  Assess patient's ability to carry out ADLs; assess patient's baseline for ADL function and identify physical deficits which impact ability to perform ADLs (bathing, care of mouth/teeth, toileting, grooming, dressing, etc )  - Assess/evaluate cause of self-care deficits   - Assess range of motion  - Assess patient's mobility; develop plan if impaired  - Assess patient's need for assistive devices and provide as appropriate  - Encourage maximum independence but intervene and supervise when necessary  - Involve family in performance of ADLs  - Assess for home care needs following discharge   - Consider OT consult to assist with ADL evaluation and planning for discharge  - Provide patient education as appropriate  Outcome: Progressing  Goal: Maintains/Returns to pre admission functional level  Description: INTERVENTIONS:  - Perform BMAT or MOVE assessment daily    - Set and communicate daily mobility goal to care team and patient/family/caregiver     - Collaborate with rehabilitation services on mobility goals if consulted  - Record patient progress and toleration of activity level   Outcome: Progressing     Problem: PAIN - ADULT  Goal: Verbalizes/displays adequate comfort level or baseline comfort level  Description: Interventions:  - Encourage patient to monitor pain and request assistance  - Assess pain using appropriate pain scale  - Administer analgesics based on type and severity of pain and evaluate response  - Implement non-pharmacological measures as appropriate and evaluate response  - Consider cultural and social influences on pain and pain management  - Notify physician/advanced practitioner if interventions unsuccessful or patient reports new pain  Outcome: Progressing     Problem: INFECTION - ADULT  Goal: Absence or prevention of progression during hospitalization  Description: INTERVENTIONS:  - Assess and monitor for signs and symptoms of infection  - Monitor lab/diagnostic results  - Monitor all insertion sites, i e  indwelling lines, tubes, and drains  - Monitor endotracheal if appropriate and nasal secretions for changes in amount and color  - [place] appropriate cooling/warming therapies per order  - Administer medications as ordered  - Instruct and encourage patient and family to use good hand hygiene technique  - Identify and instruct in appropriate isolation precautions for identified infection/condition  Outcome: Progressing     Problem: DISCHARGE PLANNING  Goal: Discharge to home or other facility with appropriate resources  Description: INTERVENTIONS:  - Identify barriers to discharge w/patient and caregiver  - Arrange for needed discharge resources and transportation as appropriate  - Identify discharge learning needs (meds, wound care, etc )  - Arrange for interpretive services to assist at discharge as needed  - Refer to Case Management Department for coordinating discharge planning if the patient needs post-hospital services based on physician/advanced practitioner order or complex needs related to functional status, cognitive ability, or social support system  Outcome: Progressing     Problem: Knowledge Deficit  Goal: Patient/family/caregiver demonstrates understanding of disease process, treatment plan, medications, and discharge instructions  Description: Complete learning assessment and assess knowledge base    Interventions:  - Provide teaching at level of understanding  - Provide teaching via preferred learning methods  Outcome: Progressing     Problem: GENITOURINARY - ADULT  Goal: Maintains or returns to baseline urinary function  Description: INTERVENTIONS:  - Assess urinary function  - Encourage oral fluids to ensure adequate hydration if ordered  - Administer IV fluids as ordered to ensure adequate hydration  - Administer ordered medications as needed  - Offer frequent toileting  - Follow urinary retention protocol if ordered  Outcome: Progressing     Problem: METABOLIC, FLUID AND ELECTROLYTES - ADULT  Goal: Electrolytes maintained within normal limits  Description: INTERVENTIONS:  - Monitor labs and assess patient for signs and symptoms of electrolyte imbalances  - Administer electrolyte replacement as ordered  - Monitor response to electrolyte replacements, including repeat lab results as appropriate  - Instruct patient on fluid and nutrition as appropriate  Outcome: Progressing  Goal: Fluid balance maintained  Description: INTERVENTIONS:  - Monitor labs   - Monitor I/O and WT  - Instruct patient on fluid and nutrition as appropriate  - Assess for signs & symptoms of volume excess or deficit  Outcome: Progressing  Goal: Glucose maintained within target range  Description: INTERVENTIONS:  - Monitor Blood Glucose as ordered  - Assess for signs and symptoms of hyperglycemia and hypoglycemia  - Administer ordered medications to maintain glucose within target range  - Assess nutritional intake and initiate nutrition service referral as needed  Outcome: Progressing

## 2022-10-25 NOTE — ASSESSMENT & PLAN NOTE
Lab Results   Component Value Date    HGBA1C 10 2 (H) 07/01/2022       Recent Labs     10/24/22  1621 10/24/22  2106 10/25/22  0757 10/25/22  1112   POCGLU 174* 171* 345* 221*       Blood Sugar Average: Last 72 hrs:  (P) 280 8   · Target blood sugar for the hospital 140-180   · Continue Lantus to 15U QHS and Lispro 5U TID with meals  · Continue Accuchecks and corrective sliding scale insulin  · Carb-controlled diet

## 2022-10-25 NOTE — CASE MANAGEMENT
Case Management Discharge Planning Note    Patient name Kristi Plunkett  Location /-02 MRN [id number]  : 1940 Date 10/25/2022       Current Admission Date: 10/17/2022  Current Admission Diagnosis:Traumatic rhabdomyolysis [facility])   Patient Active Problem List    Diagnosis Date Noted   • Fall 10/17/2022   • Traumatic rhabdomyolysis ([facility] [address] ) 10/17/2022   • Leukemoid reaction 10/17/2022   • Gastroesophageal reflux disease without esophagitis 10/17/2022   • Visual changes 2022   • SIRS of non-infectious origin w acute organ dysfunction ([facility] [address] ) 07/10/2022   • High anion gap metabolic acidosis    • Coffee ground emesis 07/10/2022   • RAMIRO (acute kidney injury) ([facility] [address] ) 07/10/2022   • Abnormal CT of the abdomen 07/10/2022   • Soft tissue radionecrosis 2022   • Osteoradionecrosis of temporal bone ([facility] [address] ) 2022   • Primary osteoarthritis of right shoulder 2021   • Right knee pain 2020   • Acute hip pain, right 2020   • Ambulatory dysfunction    • Primary osteoarthritis of both knees 2020   • Hypophosphatemia 2020   • Elevated vitamin B12 level 2020   • Anemia 2020   • Hyponatremia 2020   • Closed intertrochanteric fracture of right femur ([facility] [address] ) 2020   • Acquired hypothyroidism 2015   • HLD (hyperlipidemia) 2014   • HTN (hypertension) 10/10/2013   • Type 2 diabetes mellitus with diabetic polyneuropathy, with long-term current use of insulin ([facility] [address] ) 2008      LOS (days): 8  Geometric Mean LOS (GMLOS) (days): 2 50  Days to GMLOS:-5 2     OBJECTIVE:  Risk of Unplanned Readmission Score: 20 7         Current admission status: Inpatient   Preferred Pharmacy:   [address] [place] [id number]  Phone: [phone] Fax: [phone]    Primary Care Provider: Byran Castro MD    Primary Insurance: MEDICARE  Secondary Insurance: [facility] DETAILS:          Comments - Freedom of Choice: recommendation is for [facility]- pt has been accepted by [facility]                               Other Referral/Resources/Interventions Provided:  Interventions: HHC  Referral Comments: pt has been accepted by [facility]    Would you like to participate in our [address] service program?  : No - Declined    Treatment Team Recommendation: Home with [address] [facility] (home with family support& outpt follow up- family)                                            Additional Comments: pt was not able to be d/c yesterday as planned due to elevated glucose levels, pt will be d/c when her glucose levels are stable, cm will continue to follow and assess for any additional d/c needs

## 2022-10-25 NOTE — NURSING NOTE
Pt D/C to home with [facility]  AVS reviewed with pt and pt daughter  All questions and concerns addressed  IV removed  D/S/D and pressure applied

## 2022-10-26 ENCOUNTER — HOME CARE VISIT (OUTPATIENT)
Dept: HOME HEALTH SERVICES | Facility: HOME HEALTHCARE | Age: 82
End: 2022-10-26
Payer: MEDICARE

## 2022-10-26 VITALS
DIASTOLIC BLOOD PRESSURE: 74 MMHG | RESPIRATION RATE: 18 BRPM | HEART RATE: 95 BPM | OXYGEN SATURATION: 96 % | TEMPERATURE: 97.6 F | SYSTOLIC BLOOD PRESSURE: 140 MMHG

## 2022-10-26 PROCEDURE — G0299 HHS/HOSPICE OF RN EA 15 MIN: HCPCS

## 2022-10-27 ENCOUNTER — HOME CARE VISIT (OUTPATIENT)
Dept: HOME HEALTH SERVICES | Facility: HOME HEALTHCARE | Age: 82
End: 2022-10-27
Payer: MEDICARE

## 2022-10-27 ENCOUNTER — HOME CARE VISIT (OUTPATIENT)
Dept: HOME HEALTH SERVICES | Facility: HOME HEALTHCARE | Age: 82
End: 2022-10-27

## 2022-10-27 VITALS — OXYGEN SATURATION: 100 % | DIASTOLIC BLOOD PRESSURE: 80 MMHG | HEART RATE: 82 BPM | SYSTOLIC BLOOD PRESSURE: 124 MMHG

## 2022-10-27 NOTE — CASE COMMUNICATION
Notice of change in projected home health aid visit pattern for this week  Pt scheduled to be seen today, but visit not made as anticipated  CMS requires MD be notified of changes in ordered visit pattern

## 2022-10-28 ENCOUNTER — TELEPHONE (OUTPATIENT)
Dept: LAB | Facility: HOSPITAL | Age: 82
End: 2022-10-28

## 2022-10-28 ENCOUNTER — HOME CARE VISIT (OUTPATIENT)
Dept: HOME HEALTH SERVICES | Facility: HOME HEALTHCARE | Age: 82
End: 2022-10-28
Payer: MEDICARE

## 2022-10-28 ENCOUNTER — HOME CARE VISIT (OUTPATIENT)
Dept: HOME HEALTH SERVICES | Facility: HOME HEALTHCARE | Age: 82
End: 2022-10-28

## 2022-10-28 PROCEDURE — G0299 HHS/HOSPICE OF RN EA 15 MIN: HCPCS

## 2022-10-30 ENCOUNTER — TELEPHONE (OUTPATIENT)
Dept: LAB | Facility: HOSPITAL | Age: 82
End: 2022-10-30

## 2022-10-30 VITALS — DIASTOLIC BLOOD PRESSURE: 104 MMHG | SYSTOLIC BLOOD PRESSURE: 156 MMHG

## 2022-10-30 VITALS
HEART RATE: 76 BPM | RESPIRATION RATE: 18 BRPM | OXYGEN SATURATION: 98 % | BODY MASS INDEX: 21.73 KG/M2 | SYSTOLIC BLOOD PRESSURE: 112 MMHG | TEMPERATURE: 98.9 F | DIASTOLIC BLOOD PRESSURE: 66 MMHG | WEIGHT: 115 LBS

## 2022-10-30 NOTE — CASE COMMUNICATION
[facility] Physical Therapy evaluation completed  During initial conversation, patient experienced dizziness and she explained it was due to therapist sitting on her right and her turning her head to the right  She describes this is new since fall  Therapist positioned directly in front of her  Patient rested and vitals taken  /104, SpO2 97percent and HR 79  Blood sugar 40  Patient drank approximately 6 oz of orange juice and ate 1 pean utbutter cracker as she describes is her normal routine for low BS  Monitored for approximately 3 min and dizziness resolved  BS rechecked at 53  Patient ate 2nd cracker and drank another 6oz OJ  Requested more  Therapist advised to try to wait to allow BS to stabilize and prevent spike  /90  Patients self charting in kitchen indicates  prior to breakfast this am  Reports she took 5 units insulin as ordered with her meal    Patient sees Dr Ashely Lincoln, endocrinologist November 4  As far at PT eval patient displayed SBA for level home transfers and SBA for walking with RW  No LOB or instability noted  Priscila 18/28 with use of RW does indicates elevated risk of falls  [facility] to address mechanical component of safety issues  Will begin next week 2x week for 2 weeks then reassess for further need

## 2022-10-31 ENCOUNTER — HOME CARE VISIT (OUTPATIENT)
Dept: HOME HEALTH SERVICES | Facility: HOME HEALTHCARE | Age: 82
End: 2022-10-31

## 2022-10-31 ENCOUNTER — TELEPHONE (OUTPATIENT)
Dept: LAB | Facility: HOSPITAL | Age: 82
End: 2022-10-31

## 2022-10-31 ENCOUNTER — APPOINTMENT (OUTPATIENT)
Dept: LAB | Facility: HOSPITAL | Age: 82
End: 2022-10-31

## 2022-10-31 VITALS
HEART RATE: 84 BPM | SYSTOLIC BLOOD PRESSURE: 142 MMHG | TEMPERATURE: 96.4 F | OXYGEN SATURATION: 98 % | DIASTOLIC BLOOD PRESSURE: 84 MMHG | RESPIRATION RATE: 20 BRPM

## 2022-10-31 VITALS — HEART RATE: 79 BPM | DIASTOLIC BLOOD PRESSURE: 90 MMHG | OXYGEN SATURATION: 100 % | SYSTOLIC BLOOD PRESSURE: 156 MMHG

## 2022-10-31 DIAGNOSIS — I51.9 MYXEDEMA HEART DISEASE: ICD-10-CM

## 2022-10-31 DIAGNOSIS — Z79.899 ENCOUNTER FOR LONG-TERM (CURRENT) USE OF OTHER MEDICATIONS: ICD-10-CM

## 2022-10-31 DIAGNOSIS — E13.9 DIABETES MELLITUS OF OTHER TYPE WITHOUT COMPLICATION, UNSPECIFIED WHETHER LONG TERM INSULIN USE (HCC): ICD-10-CM

## 2022-10-31 DIAGNOSIS — Z79.82 ENCOUNTER FOR LONG-TERM (CURRENT) USE OF ASPIRIN: Primary | ICD-10-CM

## 2022-10-31 DIAGNOSIS — E03.9 MYXEDEMA HEART DISEASE: ICD-10-CM

## 2022-10-31 DIAGNOSIS — I10 HYPERTENSION, UNSPECIFIED TYPE: ICD-10-CM

## 2022-10-31 LAB
ALBUMIN SERPL BCP-MCNC: 3.9 G/DL (ref 3.5–5)
ALP SERPL-CCNC: 91 U/L (ref 46–116)
ALT SERPL W P-5'-P-CCNC: 27 U/L (ref 12–78)
ANION GAP SERPL CALCULATED.3IONS-SCNC: 3 MMOL/L (ref 4–13)
AST SERPL W P-5'-P-CCNC: 23 U/L (ref 5–45)
BILIRUB SERPL-MCNC: 0.34 MG/DL (ref 0.2–1)
BUN SERPL-MCNC: 19 MG/DL (ref 5–25)
CALCIUM SERPL-MCNC: 9.9 MG/DL (ref 8.3–10.1)
CHLORIDE SERPL-SCNC: 102 MMOL/L (ref 96–108)
CO2 SERPL-SCNC: 33 MMOL/L (ref 21–32)
CREAT SERPL-MCNC: 0.81 MG/DL (ref 0.6–1.3)
ERYTHROCYTE [DISTWIDTH] IN BLOOD BY AUTOMATED COUNT: 12.5 % (ref 11.6–15.1)
GFR SERPL CREATININE-BSD FRML MDRD: 68 ML/MIN/1.73SQ M
GLUCOSE P FAST SERPL-MCNC: 63 MG/DL (ref 65–99)
HCT VFR BLD AUTO: 40.6 % (ref 34.8–46.1)
HGB BLD-MCNC: 12.8 G/DL (ref 11.5–15.4)
MCH RBC QN AUTO: 32.2 PG (ref 26.8–34.3)
MCHC RBC AUTO-ENTMCNC: 31.5 G/DL (ref 31.4–37.4)
MCV RBC AUTO: 102 FL (ref 82–98)
PLATELET # BLD AUTO: 412 THOUSANDS/UL (ref 149–390)
PMV BLD AUTO: 11.1 FL (ref 8.9–12.7)
POTASSIUM SERPL-SCNC: 4.5 MMOL/L (ref 3.5–5.3)
PROT SERPL-MCNC: 7.9 G/DL (ref 6.4–8.4)
RBC # BLD AUTO: 3.97 MILLION/UL (ref 3.81–5.12)
SODIUM SERPL-SCNC: 138 MMOL/L (ref 135–147)
T4 FREE SERPL-MCNC: 1.29 NG/DL (ref 0.76–1.46)
TSH SERPL DL<=0.05 MIU/L-ACNC: 9.19 UIU/ML (ref 0.45–4.5)
WBC # BLD AUTO: 9.62 THOUSAND/UL (ref 4.31–10.16)

## 2022-10-31 NOTE — CASE COMMUNICATION
OT evaluation performed 10/27/22  Patient demonstrates decreased right shoulder motion and decreased upper extremity strength for use with dressing bathing, grooming and simple meal preparation  Patient requires SBA with bathing for sponge bathing in her bathroom  Patient denies interest in working on tub transfers for showering in the tub at this time  OT  plans  to  continue services 2 x per week x 2 weeks for therapeutic  exercise,  ADL training,  review of safety with transfers/mobility and recommendations for DME/AE as appropriate  Patient is an agreement with OT plan of care

## 2022-10-31 NOTE — CASE COMMUNICATION
Tub transfer done with stabilization/Cole to step over tub wall  I recommended a grab bar be considered at control side of tub wall so she can enter facing controls  Also recommended she use the shower chair she already owns and if grab bar was placed as above, it would be correctly positioned

## 2022-11-01 ENCOUNTER — HOME CARE VISIT (OUTPATIENT)
Dept: HOME HEALTH SERVICES | Facility: HOME HEALTHCARE | Age: 82
End: 2022-11-01

## 2022-11-01 VITALS — SYSTOLIC BLOOD PRESSURE: 110 MMHG | OXYGEN SATURATION: 97 % | DIASTOLIC BLOOD PRESSURE: 70 MMHG | HEART RATE: 88 BPM

## 2022-11-01 LAB
EST. AVERAGE GLUCOSE BLD GHB EST-MCNC: 203 MG/DL
HBA1C MFR BLD: 8.7 %

## 2022-11-02 ENCOUNTER — HOME CARE VISIT (OUTPATIENT)
Dept: HOME HEALTH SERVICES | Facility: HOME HEALTHCARE | Age: 82
End: 2022-11-02

## 2022-11-02 NOTE — CASE COMMUNICATION
Received message saying patient called office to cancel [address] PT visit for today reported she isnt feeling well  Attempt to reschedule for Friday 8 am but patient declined due to other appointments  Visit frequency change to 1 x this week

## 2022-11-03 ENCOUNTER — HOME CARE VISIT (OUTPATIENT)
Dept: HOME HEALTH SERVICES | Facility: HOME HEALTHCARE | Age: 82
End: 2022-11-03

## 2022-11-03 VITALS — SYSTOLIC BLOOD PRESSURE: 130 MMHG | HEART RATE: 88 BPM | OXYGEN SATURATION: 98 % | DIASTOLIC BLOOD PRESSURE: 90 MMHG

## 2022-11-03 VITALS
HEART RATE: 92 BPM | SYSTOLIC BLOOD PRESSURE: 110 MMHG | TEMPERATURE: 97.4 F | DIASTOLIC BLOOD PRESSURE: 72 MMHG | OXYGEN SATURATION: 99 % | RESPIRATION RATE: 18 BRPM

## 2022-11-03 NOTE — CASE COMMUNICATION
Notice of change in projected HHA visit pattern for this week  Pt scheduled to be seen today, but visit not made as anticipated  CMS requires MD be notified of changes in ordered visit pattern

## 2022-11-04 ENCOUNTER — OFFICE VISIT (OUTPATIENT)
Dept: OBGYN CLINIC | Facility: CLINIC | Age: 82
End: 2022-11-04

## 2022-11-04 VITALS — BODY MASS INDEX: 21.73 KG/M2 | HEIGHT: 61 IN

## 2022-11-04 DIAGNOSIS — M17.0 PRIMARY OSTEOARTHRITIS OF BOTH KNEES: ICD-10-CM

## 2022-11-04 DIAGNOSIS — M19.011 PRIMARY OSTEOARTHRITIS OF RIGHT SHOULDER: Primary | ICD-10-CM

## 2022-11-04 DIAGNOSIS — M19.012 PRIMARY OSTEOARTHRITIS OF LEFT SHOULDER: ICD-10-CM

## 2022-11-04 RX ORDER — BUPIVACAINE HYDROCHLORIDE 2.5 MG/ML
4 INJECTION, SOLUTION INFILTRATION; PERINEURAL
Status: COMPLETED | OUTPATIENT
Start: 2022-11-04 | End: 2022-11-04

## 2022-11-04 RX ORDER — TRIAMCINOLONE ACETONIDE 40 MG/ML
80 INJECTION, SUSPENSION INTRA-ARTICULAR; INTRAMUSCULAR
Status: COMPLETED | OUTPATIENT
Start: 2022-11-04 | End: 2022-11-04

## 2022-11-04 RX ADMIN — BUPIVACAINE HYDROCHLORIDE 4 ML: 2.5 INJECTION, SOLUTION INFILTRATION; PERINEURAL at 11:22

## 2022-11-04 RX ADMIN — TRIAMCINOLONE ACETONIDE 80 MG: 40 INJECTION, SUSPENSION INTRA-ARTICULAR; INTRAMUSCULAR at 11:22

## 2022-11-04 NOTE — PROGRESS NOTES
Assessment/Plan:   Diagnoses and all orders for this visit:    Primary osteoarthritis of right shoulder  -     Large joint arthrocentesis    Primary osteoarthritis of left shoulder  -     Large joint arthrocentesis    Primary osteoarthritis of both knees  -     Large joint arthrocentesis    Discussed with patient that today's physical exam is consistent with flare-up of primary osteoarthritis of the bilateral knees and primary osteoarthritis of the bilateral shoulders  Patient was offered, and accepted, Kenalog and Marcaine injection(s) to the bilateral subacromial bursae and bilateral knees for relief of pain and inflammation  Patient tolerated treatment(s) well  She will be seen in 3 months for re-evaluation and consideration for repeat injections as necessary  Patient expresses understanding and is in agreement with this treatment plan  The patient has degenerative joint disease of her bilateral knees and bilateral shoulders  All 4 structures were injected with Kenalog and Marcaine  She tolerated procedures quite well  Return back in 3 months evaluation  If her condition changes, she will not hesitate to let us know    Subjective:   Patient ID: Dima Jerome  1940     HPI  Patient is a 80 y o  female who presents for follow-up evaluation of primary osteoarthritis of the bilateral knees and bilateral shoulders  She was last in regards to this issue on 8/29/2022, at which time she received cortisone injections  She states that she did get significant relief following the injections, unfortunately her symptoms began to return approximately 2-3 weeks ago  On today's presentation she states that she has soreness in the bilateral shoulders and bilateral knees and desires repeat injections      The following portions of the patient's history were reviewed and updated as appropriate:  Past medical history, past surgical history, Family history, social history, current medications and allergies    Past Medical History:   Diagnosis Date   • Ambulates with cane    • Cancer ([facility] [address] )    • Chronic pain disorder     knees/ shoulders (gets inj every 3 mos)   • Controlled type 2 diabetes mellitus with diabetic polyneuropathy, with long-term current use of insulin ([facility] [address] ) 5/21/2008   • Diabetes mellitus ([facility] [address] )    • Diabetic polyneuropathy ([facility] [address] ) 5/21/2008    ICD10 clean up   • Disease of thyroid gland    • 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   • HL (hearing loss)    • Hodgkin's disease ([facility] [address] ) 2008    Left neck, had radiation   • Hypertension    • Neuropathy    • Osteoporosis    • RA (rheumatoid arthritis) ([facility] [address] )        Past Surgical History:   Procedure Laterality Date   • ADENOIDECTOMY     • APPENDECTOMY     • CATARACT EXTRACTION     • CATARACT EXTRACTION, BILATERAL     • CHOLECYSTECTOMY     • FRACTURE SURGERY Right     hip   • MOUTH SURGERY      oral cancer left lower lip   • OVARY SURGERY     • ID ADJ TISS XFER HEAD,FAC,HAND <10 SQCM N/A 3/28/2022    Procedure: Adjacent tissue transfer face;  Surgeon: Wil Watson MD;  Location: [facility];  Service: ENT   • ID MASTOIDECTOMY,SIMPLE Left 3/28/2022    Procedure: Andrea Garvin;  Surgeon: Wil Watson MD;  Location: [facility];  Service: ENT   • ID OPEN RX FEMUR FX+INTRAMED SHE Right 5/28/2020    Procedure: INSERTION NAIL IM FEMUR ANTEGRADE (TROCHANTERIC); Surgeon: John Shine DO;  Location: [facility] MAIN OR;  Service: Orthopedics   • TONSILLECTOMY     • TOTAL THYROIDECTOMY  2008    Performed after left neck dissection and left oral cancer diagnosis       Family History   Problem Relation Age of Onset   • Cancer Mother    • Diabetes Mother    • Diabetes Father    • Hypertension Father        Social History     Socioeconomic History   • Marital status:       Spouse name: None   • Number of children: None   • Years of education: None   • Highest education level: None   Occupational History   • None   Tobacco Use   • Smoking status: Never Smoker   • Smokeless tobacco: Never Used   Vaping Use   • Vaping Use: Never used   Substance and Sexual Activity   • Alcohol use: Not Currently     Alcohol/week: 0 0 standard drinks   • Drug use: Never   • Sexual activity: None   Other Topics Concern   • None   Social History Narrative   • None     Social Determinants of Health     Financial Resource Strain: Not on file   Food Insecurity: No Food Insecurity   • Worried About Running Out of Food in the Last Year: Never true   • Ran Out of Food in the Last Year: Never true   Transportation Needs: No Transportation Needs   • Lack of Transportation (Medical): No   • Lack of Transportation (Non-Medical):  No   Physical Activity: Not on file   Stress: Not on file   Social Connections: Not on file   Intimate Partner Violence: Not on file   Housing Stability: Low Risk    • Unable to Pay for Housing in the Last Year: No   • Number of Places Lived in the Last Year: 1   • Unstable Housing in the Last Year: No         Current Outpatient Medications:   •  Ascorbic Acid (vitamin C) 100 MG tablet, Take 500 mg by mouth 2 (two) times a day, Disp: , Rfl:   •  atorvastatin (LIPITOR) 20 mg tablet, Take 20 mg by mouth daily with dinner , Disp: , Rfl:   •  BD Insulin Syringe U/F 1/2Unit 31G X 5/16" 0 3 ML MISC, 4 (four) times a day, Disp: , Rfl:   •  calcium carbonate (OS-FELICE) 600 MG tablet, Take 600 mg by mouth 2 (two) times a day with meals, Disp: , Rfl:   •  cyanocobalamin (VITAMIN B-12) 1000 MCG tablet, Take 1,000 mcg by mouth daily, Disp: , Rfl:   •  gabapentin (NEURONTIN) 300 mg capsule, Take 300 mg by mouth 3 (three) times a day, Disp: , Rfl:   •  insulin glargine (LANTUS) 100 units/mL subcutaneous injection, Inject 15 Units under the skin every morning, Disp: 10 mL, Rfl: 0  •  insulin lispro (HumaLOG) 100 units/mL injection, Inject 5 Units under the skin 3 (three) times a day with meals, Disp: 4 5 mL, Rfl: 0  •  ipratropium (ATROVENT) 0 03 % nasal spray, 2 sprays into each nostril every 12 (twelve) hours (Patient taking differently: 2 sprays into each nostril every 12 (twelve) hours As needed), Disp: 30 mL, Rfl: 11  •  levothyroxine 100 mcg tablet, Take 112 mcg by mouth every morning , Disp: , Rfl:   •  meclizine (ANTIVERT) 25 mg tablet, Take 1 tablet (25 mg total) by mouth every 8 (eight) hours as needed for dizziness, Disp: 30 tablet, Rfl: 0  •  methocarbamol (ROBAXIN) 500 mg tablet, Take 500 mg by mouth in the morning  Prn  , Disp: , Rfl:   •  Multiple Vitamin (multivitamin) capsule, Take 1 capsule by mouth daily, Disp: , Rfl:   •  mupirocin (BACTROBAN) 2 % ointment, Apply topically daily To affected area, Disp: 22 g, Rfl: 2  •  OneTouch Ultra test strip, USE TO TEST BLOOD SUGAR 6 TIMES A DAY, Disp: , Rfl:   •  pantoprazole (PROTONIX) 40 mg tablet, Take 1 tablet (40 mg total) by mouth 2 (two) times a day, Disp: 60 tablet, Rfl: 0  •  traMADol (ULTRAM) 50 mg tablet, tramadol 50 mg tablet  take 1 tablet by mouth three times a day if needed, Disp: , Rfl:   •  VITAMIN D PO, Take 125 mg by mouth in the morning, Disp: , Rfl:   •  insulin lispro (HumaLOG) 100 units/mL injection, Inject 1-5 Units under the skin 3 (three) times a day before meals, Disp: 4 5 mL, Rfl: 0    No Known Allergies    Review of Systems   Constitutional: Negative for chills, fever and unexpected weight change  HENT: Negative for hearing loss, nosebleeds and sore throat  Eyes: Negative for pain, redness and visual disturbance  Respiratory: Negative for cough, shortness of breath and wheezing  Cardiovascular: Negative for chest pain, palpitations and leg swelling  Gastrointestinal: Negative for abdominal pain, nausea and vomiting  Endocrine: Negative for polydipsia and polyuria  Genitourinary: Negative for dysuria and hematuria  Musculoskeletal:        As noted in HPI   Skin: Negative for rash and wound  Neurological: Negative for dizziness, numbness and headaches     Psychiatric/Behavioral: Negative for decreased concentration and suicidal ideas  The patient is not nervous/anxious  Objective:  Ht 5' 1" (1 549 m)   BMI 21 73 kg/m²     Ortho Exam  Bilateral knees -   Skin is warm and dry to touch with no signs of erythema, ecchymosis, infection  No soft tissue swelling, no effusion noted  ROM 5°-115°  TTP over medial joint lines, mildly TTP over lateral joint lines  Flexor and extensor mechanisms intact  Knee is stable to varus and valgus stress  - Lachman's  - Anterior Drawer, - Posterior Drawer  - medial Ene's, - lateral Ene's  - Pivot Shift  Patella tracks centrally with significant palpable crepitus  Calf compartments are soft and supple  2+ TP and DP pulses with brisk capillary refill to the toes  Sural, saphenous, tibial, superficial and deep peroneal motor and sensory distributions intact  Sensation light touch intact distally    Bilateral shoulders -   No anatomical deformity  Skin is warm and dry to touch with no signs of erythema, ecchymosis, or infection  Trace soft tissue swelling, minimal effusion noted  Positive palpable crepitus with passive motion  TTP AC joint, TTP over posterior capsule  ROM  FF 0°-110°, ABD 0°-110°, ER 0°-30°  MMT 4/5 rotator cuff  No glenohumeral instability appreciated on exam  Demonstrates normal elbow, wrist, and finger motion  2+ distal radial pulse with brisk capillary refill to the fingers  Radial, median, and ulnar motor and sensory distributions intact  Sensation to light touch intact distally      Physical Exam  Vitals and nursing note reviewed  Constitutional:       General: She is not in acute distress  Appearance: She is well-developed  HENT:      Head: Normocephalic and atraumatic  Eyes:      Conjunctiva/sclera: Conjunctivae normal    Cardiovascular:      Rate and Rhythm: Normal rate  Pulmonary:      Effort: Pulmonary effort is normal    Musculoskeletal:      Cervical back: Neck supple  Skin:     General: Skin is warm and dry  Capillary Refill: Capillary refill takes less than 2 seconds  Neurological:      Mental Status: She is alert and oriented to person, place, and time  Psychiatric:         Mood and Affect: Mood normal          Behavior: Behavior normal           Diagnostic Test Review:  No new imaging reviewed this visit    Large joint arthrocentesis: bilateral knee  Universal Protocol:  Consent: Verbal consent obtained  Risks and benefits: risks, benefits and alternatives were discussed  Consent given by: patient  Time out: Immediately prior to procedure a "time out" was called to verify the correct patient, procedure, equipment, support staff and site/side marked as required  Timeout called at: 11/4/2022 11:04 AM   Patient understanding: patient states understanding of the procedure being performed  Site marked: the operative site was marked  Patient identity confirmed: verbally with patient    Supporting Documentation  Indications: pain and joint swelling   Procedure Details  Location: knee - bilateral knee  Preparation: Patient was prepped and draped in the usual sterile fashion  Needle size: 22 G  Ultrasound guidance: no  Approach: anterolateral    Medications (Right): 4 mL bupivacaine 0 25 %; 80 mg triamcinolone acetonide 40 mg/mLMedications (Left): 4 mL bupivacaine 0 25 %; 80 mg triamcinolone acetonide 40 mg/mL   Patient tolerance: patient tolerated the procedure well with no immediate complications  Dressing:  Sterile dressing applied    Large joint arthrocentesis: bilateral subacromial bursa  Universal Protocol:  Consent: Verbal consent obtained  Risks and benefits: risks, benefits and alternatives were discussed  Consent given by: patient  Time out: Immediately prior to procedure a "time out" was called to verify the correct patient, procedure, equipment, support staff and site/side marked as required    Timeout called at: 11/4/2022 11:06 AM   Patient understanding: patient states understanding of the procedure being performed  Site marked: the operative site was marked  Patient identity confirmed: verbally with patient    Supporting Documentation  Indications: pain and joint swelling   Procedure Details  Location: shoulder - bilateral subacromial bursa  Preparation: Patient was prepped and draped in the usual sterile fashion  Needle size: 22 G  Ultrasound guidance: no  Approach: lateral    Medications (Right): 4 mL bupivacaine 0 25 %; 80 mg triamcinolone acetonide 40 mg/mLMedications (Left): 4 mL bupivacaine 0 25 %; 80 mg triamcinolone acetonide 40 mg/mL   Patient tolerance: patient tolerated the procedure well with no immediate complications  Dressing:  Sterile dressing applied          Scribe Attestation    I,:  Sheldon Gale am acting as a scribe while in the presence of the attending physician :       I,:  Mili Netwon DO personally performed the services described in this documentation    as scribed in my presence :

## 2022-11-07 ENCOUNTER — HOME CARE VISIT (OUTPATIENT)
Dept: HOME HEALTH SERVICES | Facility: HOME HEALTHCARE | Age: 82
End: 2022-11-07

## 2022-11-07 VITALS — SYSTOLIC BLOOD PRESSURE: 112 MMHG | DIASTOLIC BLOOD PRESSURE: 56 MMHG

## 2022-11-08 ENCOUNTER — HOME CARE VISIT (OUTPATIENT)
Dept: HOME HEALTH SERVICES | Facility: HOME HEALTHCARE | Age: 82
End: 2022-11-08

## 2022-11-08 NOTE — CASE COMMUNICATION
pt called concerning a call she received from [address] [place] [address], pt asking if she can reschedule thursdays visit to another day as this day she can not do

## 2022-11-09 ENCOUNTER — HOME CARE VISIT (OUTPATIENT)
Dept: HOME HEALTH SERVICES | Facility: HOME HEALTHCARE | Age: 82
End: 2022-11-09

## 2022-11-09 VITALS — SYSTOLIC BLOOD PRESSURE: 156 MMHG | DIASTOLIC BLOOD PRESSURE: 86 MMHG

## 2022-11-09 NOTE — CASE COMMUNICATION
Patient is discharged from [place] [id number] PT at this time  She demonstrates indep with level transfers and gait in home using RW and accomplished home egress and short community mobility with SBA and RW  Family provides assist  Patient is indep in HEP and demo and vocalizes understanding of fall prevention  [place] increased from 18/28 to 20/28 with RW  No further [facility] PT at this time  Patient plans to have Ed from [place] [address] [place] [address] PT to begin PT ag ain in January

## 2022-11-10 ENCOUNTER — HOME CARE VISIT (OUTPATIENT)
Dept: HOME HEALTH SERVICES | Facility: HOME HEALTHCARE | Age: 82
End: 2022-11-10

## 2022-11-10 VITALS
TEMPERATURE: 98.2 F | HEART RATE: 82 BPM | OXYGEN SATURATION: 95 % | SYSTOLIC BLOOD PRESSURE: 120 MMHG | DIASTOLIC BLOOD PRESSURE: 78 MMHG | RESPIRATION RATE: 18 BRPM

## 2022-11-11 ENCOUNTER — HOME CARE VISIT (OUTPATIENT)
Dept: HOME HEALTH SERVICES | Facility: HOME HEALTHCARE | Age: 82
End: 2022-11-11

## 2022-11-14 VITALS — OXYGEN SATURATION: 97 % | DIASTOLIC BLOOD PRESSURE: 78 MMHG | HEART RATE: 76 BPM | SYSTOLIC BLOOD PRESSURE: 122 MMHG

## 2022-11-14 NOTE — CASE COMMUNICATION
OT discharge performed 11/11/22  Patient discharged from [facility] OT services per plan of care with goals met

## 2022-11-16 ENCOUNTER — HOME CARE VISIT (OUTPATIENT)
Dept: HOME HEALTH SERVICES | Facility: HOME HEALTHCARE | Age: 82
End: 2022-11-16

## 2022-11-16 VITALS
HEART RATE: 76 BPM | TEMPERATURE: 98.7 F | SYSTOLIC BLOOD PRESSURE: 128 MMHG | OXYGEN SATURATION: 98 % | RESPIRATION RATE: 18 BRPM | DIASTOLIC BLOOD PRESSURE: 70 MMHG

## 2022-11-22 ENCOUNTER — HOME CARE VISIT (OUTPATIENT)
Dept: HOME HEALTH SERVICES | Facility: HOME HEALTHCARE | Age: 82
End: 2022-11-22

## 2022-11-22 VITALS
DIASTOLIC BLOOD PRESSURE: 82 MMHG | TEMPERATURE: 98.2 F | RESPIRATION RATE: 18 BRPM | HEART RATE: 82 BPM | SYSTOLIC BLOOD PRESSURE: 138 MMHG | OXYGEN SATURATION: 98 %

## 2023-02-03 ENCOUNTER — OFFICE VISIT (OUTPATIENT)
Dept: OBGYN CLINIC | Facility: CLINIC | Age: 83
End: 2023-02-03

## 2023-02-03 VITALS
HEART RATE: 79 BPM | HEIGHT: 61 IN | SYSTOLIC BLOOD PRESSURE: 150 MMHG | BODY MASS INDEX: 23.05 KG/M2 | DIASTOLIC BLOOD PRESSURE: 82 MMHG

## 2023-02-03 DIAGNOSIS — M17.0 PRIMARY OSTEOARTHRITIS OF BOTH KNEES: ICD-10-CM

## 2023-02-03 DIAGNOSIS — M19.012 PRIMARY OSTEOARTHRITIS OF LEFT SHOULDER: ICD-10-CM

## 2023-02-03 DIAGNOSIS — M19.011 PRIMARY OSTEOARTHRITIS OF RIGHT SHOULDER: Primary | ICD-10-CM

## 2023-02-03 RX ORDER — BUPIVACAINE HYDROCHLORIDE 2.5 MG/ML
4 INJECTION, SOLUTION INFILTRATION; PERINEURAL
Status: COMPLETED | OUTPATIENT
Start: 2023-02-03 | End: 2023-02-03

## 2023-02-03 RX ORDER — TRIAMCINOLONE ACETONIDE 40 MG/ML
80 INJECTION, SUSPENSION INTRA-ARTICULAR; INTRAMUSCULAR
Status: COMPLETED | OUTPATIENT
Start: 2023-02-03 | End: 2023-02-03

## 2023-02-03 RX ADMIN — TRIAMCINOLONE ACETONIDE 80 MG: 40 INJECTION, SUSPENSION INTRA-ARTICULAR; INTRAMUSCULAR at 11:22

## 2023-02-03 RX ADMIN — BUPIVACAINE HYDROCHLORIDE 4 ML: 2.5 INJECTION, SOLUTION INFILTRATION; PERINEURAL at 11:22

## 2023-02-03 NOTE — PROGRESS NOTES
Assessment/Plan:   Diagnoses and all orders for this visit:    Primary osteoarthritis of right shoulder  -     Large joint arthrocentesis: bilateral subacromial bursa    Primary osteoarthritis of left shoulder  -     Large joint arthrocentesis: bilateral subacromial bursa    Primary osteoarthritis of both knees  -     Large joint arthrocentesis: bilateral knee  Discussed with patient, and her daughter, today's physical exam is consistent with flareup of primary osteoarthritis of the bilateral knees and bilateral shoulders  Patient was offered, and accepted, Kenalog and Marcaine injection(s) to the bilateral knees and bilateral subacromial bursae for relief of pain and inflammation  Patient tolerated treatment(s) well  She will be seen in 3 months for re-evaluation and consideration for repeat injections as necessary  Patient expresses understanding and is in agreement with this treatment plan  Under aseptic technique, both shoulders and both knees were injected with Kenalog and Marcaine  She tolerated procedures quite well  Return back in 3 months evaluation  If her condition changes, she would not hesitate to let us know    Subjective:   Patient ID: [employer]  1940     HPI  Patient is a 80 y o  female who presents for follow-up evaluation of primary osteoarthritis of the bilateral shoulders and bilateral knees  She was last seen regards his issue on 11/4/2022, which time she received corticosteroid injections to the aforementioned locations  She states that she did get relief following those injections, unfortunately symptoms returned approximately 2 to 3 weeks ago  On today's presentation she reports achy pain affecting both shoulders and both knees  's repeat injections today      The following portions of the patient's history were reviewed and updated as appropriate:  Past medical history, past surgical history, Family history, social history, current medications and allergies    Past Medical History:   Diagnosis Date   • Ambulates with cane    • Cancer ([facility] )    • Chronic pain disorder     knees/ shoulders (gets inj every 3 mos)   • Controlled type 2 diabetes mellitus with diabetic polyneuropathy, with long-term current use of insulin ([email or web address] ) 5/21/2008   • Diabetes mellitus ([facility] )    • Diabetic polyneuropathy ([email or web address] ) 5/21/2008    ICD10 clean up   • Disease of thyroid gland    • 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   • HL (hearing loss)    • Hodgkin's disease ([email or web address] ) 2008    Left neck, had radiation   • Hypertension    • Neuropathy    • Osteoporosis    • RA (rheumatoid arthritis) ([place] [address] )        Past Surgical History:   Procedure Laterality Date   • ADENOIDECTOMY     • APPENDECTOMY     • CATARACT EXTRACTION     • CATARACT EXTRACTION, BILATERAL     • CHOLECYSTECTOMY     • FRACTURE SURGERY Right     hip   • MOUTH SURGERY      oral cancer left lower lip   • OVARY SURGERY     • OK ADJT TIS TRNS/REARGMT F/C/C/M/N/A/G/H/F 10SQCM/< N/A 3/28/2022    Procedure: Adjacent tissue transfer face;  Surgeon: Shay Sales MD;  Location: [facility];  Service: ENT   • OK OPTX FEM SHFT FX W/INSJ IMED IMPLT W/WO SCREW Right 5/28/2020    Procedure: INSERTION NAIL IM FEMUR ANTEGRADE (TROCHANTERIC); Surgeon: Naresh Ewing DO;  Location: [facility] MAIN OR;  Service: Orthopedics   • OK TRANSMASTOID ANTROTOMY Left 3/28/2022    Procedure: MASTOIDECTOMY;  Surgeon: Shay Sales MD;  Location: [facility];  Service: ENT   • TONSILLECTOMY     • TOTAL THYROIDECTOMY  2008    Performed after left neck dissection and left oral cancer diagnosis       Family History   Problem Relation Age of Onset   • Cancer Mother    • Diabetes Mother    • Diabetes Father    • Hypertension Father        Social History     Socioeconomic History   • Marital status:       Spouse name: None   • Number of children: None   • Years of education: None   • Highest education level: None   Occupational History   • None   Tobacco Use   • Smoking status: Never   • Smokeless tobacco: Never   Vaping Use   • Vaping Use: Never used   Substance and Sexual Activity   • Alcohol use: Not Currently     Alcohol/week: 0 0 standard drinks   • Drug use: Never   • Sexual activity: None   Other Topics Concern   • None   Social History Narrative   • None     Social Determinants of Health     Financial Resource Strain: Not on file   Food Insecurity: No Food Insecurity   • Worried About Running Out of Food in the Last Year: Never true   • Ran Out of Food in the Last Year: Never true   Transportation Needs: No Transportation Needs   • Lack of Transportation (Medical): No   • Lack of Transportation (Non-Medical):  No   Physical Activity: Not on file   Stress: Not on file   Social Connections: Not on file   Intimate Partner Violence: Not on file   Housing Stability: Low Risk    • Unable to Pay for Housing in the Last Year: No   • Number of Places Lived in the Last Year: 1   • Unstable Housing in the Last Year: No         Current Outpatient Medications:   •  Ascorbic Acid (vitamin C) 100 MG tablet, Take 500 mg by mouth 2 (two) times a day, Disp: , Rfl:   •  atorvastatin (LIPITOR) 20 mg tablet, Take 20 mg by mouth daily with dinner , Disp: , Rfl:   •  BD Insulin Syringe U/F 1/2Unit 31G X 5/16" 0 3 ML MISC, 4 (four) times a day, Disp: , Rfl:   •  calcium carbonate (OS-FELICE) 600 MG tablet, Take 600 mg by mouth 2 (two) times a day with meals, Disp: , Rfl:   •  cyanocobalamin (VITAMIN B-12) 1000 MCG tablet, Take 1,000 mcg by mouth daily, Disp: , Rfl:   •  gabapentin (NEURONTIN) 300 mg capsule, Take 300 mg by mouth 3 (three) times a day, Disp: , Rfl:   •  insulin glargine (LANTUS) 100 units/mL subcutaneous injection, Inject 15 Units under the skin every morning, Disp: 10 mL, Rfl: 0  •  ipratropium (ATROVENT) 0 03 % nasal spray, 2 sprays into each nostril every 12 (twelve) hours (Patient taking differently: 2 sprays into each nostril every 12 (twelve) hours As needed), Disp: 30 mL, Rfl: 11  • levothyroxine 100 mcg tablet, Take 112 mcg by mouth every morning , Disp: , Rfl:   •  meclizine (ANTIVERT) 25 mg tablet, Take 1 tablet (25 mg total) by mouth every 8 (eight) hours as needed for dizziness, Disp: 30 tablet, Rfl: 0  •  methocarbamol (ROBAXIN) 500 mg tablet, Take 500 mg by mouth in the morning  Prn  , Disp: , Rfl:   •  Multiple Vitamin (multivitamin) capsule, Take 1 capsule by mouth daily, Disp: , Rfl:   •  mupirocin (BACTROBAN) 2 % ointment, Apply topically daily To affected area, Disp: 22 g, Rfl: 2  •  OneTouch Ultra test strip, USE TO TEST BLOOD SUGAR 6 TIMES A DAY, Disp: , Rfl:   •  pantoprazole (PROTONIX) 40 mg tablet, Take 1 tablet (40 mg total) by mouth 2 (two) times a day, Disp: 60 tablet, Rfl: 0  •  traMADol (ULTRAM) 50 mg tablet, tramadol 50 mg tablet  take 1 tablet by mouth three times a day if needed, Disp: , Rfl:   •  VITAMIN D PO, Take 125 mg by mouth in the morning, Disp: , Rfl:   •  insulin lispro (HumaLOG) 100 units/mL injection, Inject 5 Units under the skin 3 (three) times a day with meals, Disp: 4 5 mL, Rfl: 0  •  insulin lispro (HumaLOG) 100 units/mL injection, Inject 1-5 Units under the skin 3 (three) times a day before meals, Disp: 4 5 mL, Rfl: 0    No Known Allergies    Review of Systems   Constitutional: Negative for chills, fever and unexpected weight change  HENT: Negative for hearing loss, nosebleeds and sore throat  Eyes: Negative for pain, redness and visual disturbance  Respiratory: Negative for cough, shortness of breath and wheezing  Cardiovascular: Negative for chest pain, palpitations and leg swelling  Gastrointestinal: Negative for abdominal pain, nausea and vomiting  Endocrine: Negative for polydipsia and polyuria  Genitourinary: Negative for dysuria and hematuria  Musculoskeletal:        As noted in HPI   Skin: Negative for rash and wound  Neurological: Negative for dizziness, numbness and headaches     Psychiatric/Behavioral: Negative for decreased concentration and suicidal ideas  The patient is not nervous/anxious  Objective:  /82 (BP Location: Left arm, Patient Position: Sitting, Cuff Size: Standard)   Pulse 79   Ht 5' 1" (1 549 m)   BMI 23 05 kg/m²     Ortho Exam  Bilateral knees -   Skin is warm and dry to touch with no signs of erythema, ecchymosis, infection  No soft tissue swelling, no effusion noted  ROM 5°-115°  TTP over medial joint lines, mildly TTP over lateral joint lines  Flexor and extensor mechanisms intact  Knee is stable to varus and valgus stress  - Lachman's  - Anterior Drawer, - Posterior Drawer  - medial Ene's, - lateral Ene's  - Pivot Shift  Patella tracks centrally with significant palpable crepitus  Calf compartments are soft and supple  2+ TP and DP pulses with brisk capillary refill to the toes  Sural, saphenous, tibial, superficial and deep peroneal motor and sensory distributions intact  Sensation light touch intact distally     Bilateral shoulders -   No anatomical deformity  Skin is warm and dry to touch with no signs of erythema, ecchymosis, or infection  Trace soft tissue swelling, minimal effusion noted  Positive palpable crepitus with passive motion  TTP AC joint, TTP over posterior capsule  ROM  FF 0°-110°, ABD 0°-110°, ER 0°-30°  MMT 4/5 rotator cuff throughout  No glenohumeral instability appreciated on exam  Demonstrates normal elbow, wrist, and finger motion  2+ distal radial pulse with brisk capillary refill to the fingers  Radial, median, and ulnar motor and sensory distributions intact  Sensation to light touch intact distally    Physical Exam  Vitals and nursing note reviewed  Constitutional:       General: She is not in acute distress  Appearance: She is well-developed  HENT:      Head: Normocephalic and atraumatic  Eyes:      Conjunctiva/sclera: Conjunctivae normal    Cardiovascular:      Rate and Rhythm: Normal rate     Pulmonary:      Effort: Pulmonary effort is normal    Musculoskeletal:      Cervical back: Neck supple  Skin:     General: Skin is warm and dry  Capillary Refill: Capillary refill takes less than 2 seconds  Neurological:      Mental Status: She is alert and oriented to person, place, and time  Psychiatric:         Mood and Affect: Mood normal          Behavior: Behavior normal           Diagnostic Test Review:  No new imaging reviewed this visit    Large joint arthrocentesis: bilateral subacromial bursa  Universal Protocol:  Consent: Verbal consent obtained  Risks and benefits: risks, benefits and alternatives were discussed  Consent given by: patient  Time out: Immediately prior to procedure a "time out" was called to verify the correct patient, procedure, equipment, support staff and site/side marked as required  Timeout called at: 2/3/2023 11:18 AM   Patient understanding: patient states understanding of the procedure being performed  Site marked: the operative site was marked  Patient identity confirmed: verbally with patient    Supporting Documentation  Indications: pain and joint swelling   Procedure Details  Location: shoulder - bilateral subacromial bursa  Preparation: Patient was prepped and draped in the usual sterile fashion  Needle size: 22 G  Ultrasound guidance: no  Approach: lateral    Medications (Right): 4 mL bupivacaine 0 25 %; 80 mg triamcinolone acetonide 40 mg/mLMedications (Left): 4 mL bupivacaine 0 25 %; 80 mg triamcinolone acetonide 40 mg/mL   Patient tolerance: patient tolerated the procedure well with no immediate complications  Dressing:  Sterile dressing applied    Large joint arthrocentesis: bilateral knee  Universal Protocol:  Consent: Verbal consent obtained    Risks and benefits: risks, benefits and alternatives were discussed  Consent given by: patient  Time out: Immediately prior to procedure a "time out" was called to verify the correct patient, procedure, equipment, support staff and site/side marked as required    Timeout called at: 2/3/2023 11:19 AM   Patient understanding: patient states understanding of the procedure being performed  Site marked: the operative site was marked  Patient identity confirmed: verbally with patient    Supporting Documentation  Indications: pain and joint swelling   Procedure Details  Location: knee - bilateral knee  Preparation: Patient was prepped and draped in the usual sterile fashion  Needle size: 22 G  Ultrasound guidance: no  Approach: anterolateral    Medications (Right): 4 mL bupivacaine 0 25 %; 80 mg triamcinolone acetonide 40 mg/mLMedications (Left): 4 mL bupivacaine 0 25 %; 80 mg triamcinolone acetonide 40 mg/mL   Patient tolerance: patient tolerated the procedure well with no immediate complications  Dressing:  Sterile dressing applied        Scribe Attestation    I,:  Christina Rodríguez am acting as a scribe while in the presence of the attending physician :       I,:  Larissa Escobar DO personally performed the services described in this documentation    as scribed in my presence :

## 2023-05-12 ENCOUNTER — OFFICE VISIT (OUTPATIENT)
Dept: OBGYN CLINIC | Facility: CLINIC | Age: 83
End: 2023-05-12

## 2023-05-12 VITALS
DIASTOLIC BLOOD PRESSURE: 74 MMHG | BODY MASS INDEX: 23.05 KG/M2 | HEART RATE: 85 BPM | SYSTOLIC BLOOD PRESSURE: 121 MMHG | HEIGHT: 61 IN

## 2023-05-12 DIAGNOSIS — M19.011 PRIMARY OSTEOARTHRITIS OF RIGHT SHOULDER: Primary | ICD-10-CM

## 2023-05-12 DIAGNOSIS — M17.0 PRIMARY OSTEOARTHRITIS OF BOTH KNEES: ICD-10-CM

## 2023-05-12 DIAGNOSIS — M19.012 PRIMARY OSTEOARTHRITIS OF LEFT SHOULDER: ICD-10-CM

## 2023-05-12 RX ORDER — INSULIN ASPART 100 [IU]/ML
INJECTION, SUSPENSION SUBCUTANEOUS
Status: ON HOLD | COMMUNITY
End: 2023-05-24

## 2023-05-12 RX ADMIN — TRIAMCINOLONE ACETONIDE 80 MG: 40 INJECTION, SUSPENSION INTRA-ARTICULAR; INTRAMUSCULAR at 11:58

## 2023-05-12 RX ADMIN — BUPIVACAINE HYDROCHLORIDE 4 ML: 2.5 INJECTION, SOLUTION INFILTRATION; PERINEURAL at 11:58

## 2023-05-12 NOTE — PROGRESS NOTES
Assessment/Plan:   Diagnoses and all orders for this visit:    Primary osteoarthritis of right shoulder  -     Large joint arthrocentesis: bilateral subacromial bursa    Primary osteoarthritis of left shoulder  -     Large joint arthrocentesis: bilateral subacromial bursa    Primary osteoarthritis of both knees  -     Large joint arthrocentesis: bilateral knee    Other orders  -     insulin aspart protamine-insulin aspart (NovoLOG 70/30) 100 units/mL injection; Inject under the skin 2 (two) times a day before meals  Discussed with patient that today's physical exam is consistent with flareup of primary osteoarthritis of the bilateral shoulders and bilateral knees  Patient was offered, and accepted, Kenalog and Marcaine injection(s) to the bilateral knees and bilateral subacromial bursae for relief of pain and inflammation  Patient tolerated treatment(s) well  She will be seen in 3 months for re-evaluation and consideration for repeat injections as necessary  Patient expresses understanding and is in agreement with this treatment plan  Under aseptic technique, both knees and both shoulders were injected with Kenalog and Marcaine  She tolerated the procedures quite well  Return back in 3 months evaluation  If her condition changes, she would not hesitate to let us know    Subjective:   Patient ID: Suellen Rivas  1940     HPI  Patient is a 80 y o  female who presents for follow-up evaluation of osteoarthritis of bilateral shoulders and knees  She was last seen in regards to this issue on 2/3/2023 at which time she received corticosteroid injections  She states that she got significant relief following those injections, unfortunately her symptoms returned approximately 3 weeks ago  On today's presentation she reports pain in both shoulders, and medial knee pain in both knees  Symptoms are exacerbated with weightbearing or overhead motion  Denies any associated numbness or tingling      The following portions of the patient's history were reviewed and updated as appropriate:  Past medical history, past surgical history, Family history, social history, current medications and allergies    Past Medical History:   Diagnosis Date   • Ambulates with cane    • Cancer ([facility] )    • Chronic pain disorder     knees/ shoulders (gets inj every 3 mos)   • Controlled type 2 diabetes mellitus with diabetic polyneuropathy, with long-term current use of insulin ([facility] ) 5/21/2008   • Diabetes mellitus ([facility] )    • Diabetic poly[facility]uropathy ([facility] ) 5/21/2008    ICD10 clean up   • Disease of thyroid gland    • 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   • HL (hearing loss)    • Hodgkin's disease ([facility] ) 2008    Left neck, had radiation   • Hypertension    • Neuropathy    • Osteoporosis    • RA (rheumatoid arthritis) ([facility] )        Past Surgical History:   Procedure Laterality Date   • ADENOIDECTOMY     • APPENDECTOMY     • CATARACT EXTRACTION     • CATARACT EXTRACTION, BILATERAL     • CHOLECYSTECTOMY     • FRACTURE SURGERY Right     hip   • MOUTH SURGERY      oral cancer left lower lip   • OVARY SURGERY     • NV ADJT TIS TRNS/REARGMT F/C/C/M/N/A/G/H/F 10SQCM/< N/A 3/28/2022    Procedure: Adjacent tissue transfer face;  Surgeon: Aman Quach MD;  Location: [facility];  Service: ENT   • NV OPTX FEM SHFT FX W/INSJ IMED IMPLT W/WO SCREW Right 5/28/2020    Procedure: INSERTION NAIL IM FEMUR ANTEGRADE (TROCHANTERIC);   Surgeon: Nimisha Stapleton DO;  Location: [facility] MAIN OR;  Service: Orthopedics   • NV TRANSMASTOID ANTROTOMY Left 3/28/2022    Procedure: MASTOIDECTOMY;  Surgeon: Aman Quach MD;  Location: [facility];  Service: ENT   • TONSILLECTOMY     • TOTAL THYROIDECTOMY  2008    Performed after left neck dissection and left oral cancer diagnosis       Family History   Problem Relation Age of Onset   • Cancer Mother    • Diabetes Mother    • Diabetes Father    • Hypertension Father        Social History     Socioeconomic History   • "Marital status:      Spouse name: Not on file   • Number of children: Not on file   • Years of education: Not on file   • Highest education level: Not on file   Occupational History   • Not on file   Tobacco Use   • Smoking status: Never   • Smokeless tobacco: Never   Vaping Use   • Vaping Use: Never used   Substance and Sexual Activity   • Alcohol use: Not Currently     Alcohol/week: 0 0 standard drinks   • Drug use: Never   • Sexual activity: Not on file   Other Topics Concern   • Not on file   Social History Narrative   • Not on file     Social Determinants of Health     Financial Resource Strain: Not on file   Food Insecurity: No Food Insecurity   • Worried About Running Out of Food in the Last Year: Never true   • Ran Out of Food in the Last Year: Never true   Transportation Needs: No Transportation Needs   • Lack of Transportation (Medical): No   • Lack of Transportation (Non-Medical):  No   Physical Activity: Not on file   Stress: Not on file   Social Connections: Not on file   Intimate Partner Violence: Not on file   Housing Stability: Low Risk    • Unable to Pay for Housing in the Last Year: No   • Number of Places Lived in the Last Year: 1   • Unstable Housing in the Last Year: No         Current Outpatient Medications:   •  Ascorbic Acid (vitamin C) 100 MG tablet, Take 500 mg by mouth 2 (two) times a day, Disp: , Rfl:   •  atorvastatin (LIPITOR) 20 mg tablet, Take 20 mg by mouth daily with dinner , Disp: , Rfl:   •  BD Insulin Syringe U/F 1/2Unit 31G X 5/16\" 0 3 ML MISC, 4 (four) times a day, Disp: , Rfl:   •  calcium carbonate (OS-FELICE) 600 MG tablet, Take 600 mg by mouth 2 (two) times a day with meals, Disp: , Rfl:   •  cyanocobalamin (VITAMIN B-12) 1000 MCG tablet, Take 1,000 mcg by mouth daily, Disp: , Rfl:   •  gabapentin (NEURONTIN) 300 mg capsule, Take 300 mg by mouth 3 (three) times a day, Disp: , Rfl:   •  insulin aspart protamine-insulin aspart (NovoLOG 70/30) 100 units/mL injection, Inject " under the skin 2 (two) times a day before meals, Disp: , Rfl:   •  insulin glargine (LANTUS) 100 units/mL subcutaneous injection, Inject 15 Units under the skin every morning, Disp: 10 mL, Rfl: 0  •  ipratropium (ATROVENT) 0 03 % nasal spray, 2 sprays into each nostril every 12 (twelve) hours (Patient taking differently: 2 sprays into each nostril every 12 (twelve) hours As needed), Disp: 30 mL, Rfl: 11  •  levothyroxine 100 mcg tablet, Take 112 mcg by mouth every morning , Disp: , Rfl:   •  meclizine (ANTIVERT) 25 mg tablet, Take 1 tablet (25 mg total) by mouth every 8 (eight) hours as needed for dizziness, Disp: 30 tablet, Rfl: 0  •  methocarbamol (ROBAXIN) 500 mg tablet, Take 500 mg by mouth in the morning  Prn  , Disp: , Rfl:   •  Multiple Vitamin (multivitamin) capsule, Take 1 capsule by mouth daily, Disp: , Rfl:   •  mupirocin (BACTROBAN) 2 % ointment, Apply topically daily To affected area, Disp: 22 g, Rfl: 2  •  OneTouch Ultra test strip, USE TO TEST BLOOD SUGAR 6 TIMES A DAY, Disp: , Rfl:   •  pantoprazole (PROTONIX) 40 mg tablet, Take 1 tablet (40 mg total) by mouth 2 (two) times a day, Disp: 60 tablet, Rfl: 0  •  traMADol (ULTRAM) 50 mg tablet, tramadol 50 mg tablet  take 1 tablet by mouth three times a day if needed, Disp: , Rfl:   •  VITAMIN D PO, Take 125 mg by mouth in the morning, Disp: , Rfl:   •  insulin lispro (HumaLOG) 100 units/mL injection, Inject 5 Units under the skin 3 (three) times a day with meals, Disp: 4 5 mL, Rfl: 0  •  insulin lispro (HumaLOG) 100 units/mL injection, Inject 1-5 Units under the skin 3 (three) times a day before meals, Disp: 4 5 mL, Rfl: 0    No Known Allergies    Review of Systems   Constitutional: Negative for chills, fever and unexpected weight change  HENT: Negative for hearing loss, nosebleeds and sore throat  Eyes: Negative for pain, redness and visual disturbance  Respiratory: Negative for cough, shortness of breath and wheezing      Cardiovascular: Negative "for chest pain, palpitations and leg swelling  Gastrointestinal: Negative for abdominal pain, nausea and vomiting  Endocrine: Negative for polydipsia and polyuria  Genitourinary: Negative for dysuria and hematuria  Musculoskeletal:        As noted in HPI   Skin: Negative for rash and wound  Neurological: Negative for dizziness, numbness and headaches  Psychiatric/Behavioral: Negative for decreased concentration and suicidal ideas  The patient is not nervous/anxious           Objective:  /74 (BP Location: Left arm, Patient Position: Sitting, Cuff Size: Standard)   Pulse 85   Ht 5' 1\" (1 549 m)   BMI 23 05 kg/m²     Ortho Exam  Bilateral knees -   Patient presents in wheelchair  Skin is warm and dry to touch with no signs of erythema, ecchymosis, infection  Mild soft tissue swelling, mild effusion noted  ROM 5° - 115°  TTP over medial joint lines, mildly TTP over lateral joint lines  Flexor and extensor mechanisms intact  Knee is stable to varus and valgus stress  - Lachman's  - Anterior Drawer, - Posterior Drawer  - medial nEe's, - lateral Ene's  - Pivot Shift  Patella tracks centrally with palpable crepitus  Calf compartments are soft and supple  2+ TP and DP pulses with brisk capillary refill to the toes  Sural, saphenous, tibial, superficial and deep peroneal motor and sensory distributions intact  Sensation light touch intact distally    Bilateral shoulder -   No anatomical deformity  Skin is warm and dry to touch with no signs of erythema, ecchymosis, or infection  No soft tissue swelling or effusion noted  Positive palpable crepitus with passive motion  TTP over AC joint, TTP over posterior capsules, TTP over anterior acromion  ROM FF 0° - 110°, ABD 0° - 110°, ER 0° - 30°  MMT 4/5 rotator cuff  - glenohumeral instability appreciated on exam  Demonstrates normal elbow, wrist, and finger motion  2+ distal radial pulse with brisk capillary refill to the fingers  Radial, median, and " ulnar motor and sensory distributions intact  Sensation to light touch intact distally      Physical Exam  Vitals and nursing note reviewed  Constitutional:       General: She is not in acute distress  Appearance: She is well-developed  HENT:      Head: Normocephalic and atraumatic  Eyes:      Conjunctiva/sclera: Conjunctivae normal    Cardiovascular:      Rate and Rhythm: Normal rate  Pulmonary:      Effort: Pulmonary effort is normal    Musculoskeletal:      Cervical back: Neck supple  Skin:     General: Skin is warm and dry  Capillary Refill: Capillary refill takes less than 2 seconds  Neurological:      Mental Status: She is alert and oriented to person, place, and time  Psychiatric:         Mood and Affect: Mood normal          Behavior: Behavior normal           Diagnostic Test Review:  No new imaging reviewed this visit    Large joint arthrocentesis: bilateral subacromial bursa  Universal Protocol:  Consent: Verbal consent obtained  Risks and benefits: risks, benefits and alternatives were discussed  Consent given by: patient  Timeout called at: 5/12/2023 11:50 AM   Patient understanding: patient states understanding of the procedure being performed  Site marked: the operative site was marked  Patient identity confirmed: verbally with patient    Supporting Documentation  Indications: pain and joint swelling   Procedure Details  Location: shoulder - bilateral subacromial bursa  Needle gauge: 21G  Approach: posterolateral    Medications (Right): 4 mL bupivacaine 0 25 %; 80 mg triamcinolone acetonide 40 mg/mLMedications (Left): 4 mL bupivacaine 0 25 %; 80 mg triamcinolone acetonide 40 mg/mL   Patient tolerance: patient tolerated the procedure well with no immediate complications  Dressing:  Sterile dressing applied    Large joint arthrocentesis: bilateral knee  Universal Protocol:  Consent: Verbal consent obtained    Risks and benefits: risks, benefits and alternatives were "discussed  Consent given by: patient  Time out: Immediately prior to procedure a \"time out\" was called to verify the correct patient, procedure, equipment, support staff and site/side marked as required  Timeout called at: 5/12/2023 11:51 AM   Patient understanding: patient states understanding of the procedure being performed  Site marked: the operative site was marked  Patient identity confirmed: verbally with patient    Supporting Documentation  Indications: pain and joint swelling   Procedure Details  Location: knee - bilateral knee  Preparation: Patient was prepped and draped in the usual sterile fashion  Needle gauge: 21G  Ultrasound guidance: no  Approach: anterolateral    Medications (Right): 4 mL bupivacaine 0 25 %; 80 mg triamcinolone acetonide 40 mg/mLMedications (Left): 4 mL bupivacaine 0 25 %; 80 mg triamcinolone acetonide 40 mg/mL   Patient tolerance: patient tolerated the procedure well with no immediate complications  Dressing:  Sterile dressing applied             Scribe Attestation    I,:  Nery Naranjo am acting as a scribe while in the presence of the attending physician :       I,:  Gonsalo Bah DO personally performed the services described in this documentation    as scribed in my presence  :            "

## 2023-05-15 RX ORDER — TRIAMCINOLONE ACETONIDE 40 MG/ML
80 INJECTION, SUSPENSION INTRA-ARTICULAR; INTRAMUSCULAR
Status: COMPLETED | OUTPATIENT
Start: 2023-05-12 | End: 2023-05-12

## 2023-05-15 RX ORDER — BUPIVACAINE HYDROCHLORIDE 2.5 MG/ML
4 INJECTION, SOLUTION INFILTRATION; PERINEURAL
Status: COMPLETED | OUTPATIENT
Start: 2023-05-12 | End: 2023-05-12

## 2023-05-24 ENCOUNTER — APPOINTMENT (EMERGENCY)
Dept: CT IMAGING | Facility: HOSPITAL | Age: 83
End: 2023-05-24

## 2023-05-24 ENCOUNTER — APPOINTMENT (EMERGENCY)
Dept: RADIOLOGY | Facility: HOSPITAL | Age: 83
End: 2023-05-24

## 2023-05-24 ENCOUNTER — HOSPITAL ENCOUNTER (INPATIENT)
Facility: HOSPITAL | Age: 83
LOS: 3 days | Discharge: NURSING, REHAB OR LONG-TERM CARE | End: 2023-05-27
Attending: EMERGENCY MEDICINE | Admitting: INTERNAL MEDICINE

## 2023-05-24 DIAGNOSIS — J30.0 VASOMOTOR RHINITIS: ICD-10-CM

## 2023-05-24 DIAGNOSIS — R26.2 AMBULATORY DYSFUNCTION: ICD-10-CM
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
## 2023-05-24 DIAGNOSIS — M25.561 RIGHT KNEE PAIN: Primary | ICD-10-CM

## 2023-05-24 PROBLEM — R65.10 SIRS (SYSTEMIC INFLAMMATORY RESPONSE SYNDROME) (HCC): Status: ACTIVE | Noted: 2023-05-24

## 2023-05-24 LAB
ALBUMIN SERPL BCP-MCNC: 3.9 G/DL (ref 3.5–5)
ALP SERPL-CCNC: 57 U/L (ref 34–104)
ALT SERPL W P-5'-P-CCNC: 27 U/L (ref 7–52)
ANION GAP SERPL CALCULATED.3IONS-SCNC: 12 MMOL/L (ref 4–13)
APTT PPP: 26 SECONDS (ref 23–37)
AST SERPL W P-5'-P-CCNC: 33 U/L (ref 13–39)
BASOPHILS # BLD AUTO: 0.03 THOUSANDS/ÂΜL (ref 0–0.1)
BASOPHILS NFR BLD AUTO: 0 % (ref 0–1)
BILIRUB SERPL-MCNC: 0.85 MG/DL (ref 0.2–1)
BILIRUB UR QL STRIP: NEGATIVE
BUN SERPL-MCNC: 25 MG/DL (ref 5–25)
CALCIUM SERPL-MCNC: 8.3 MG/DL (ref 8.4–10.2)
CARDIAC TROPONIN I PNL SERPL HS: 21 NG/L
CHLORIDE SERPL-SCNC: 102 MMOL/L (ref 96–108)
CLARITY UR: CLEAR
CO2 SERPL-SCNC: 23 MMOL/L (ref 21–32)
COLOR UR: YELLOW
CREAT SERPL-MCNC: 0.75 MG/DL (ref 0.6–1.3)
EOSINOPHIL # BLD AUTO: 0.01 THOUSAND/ÂΜL (ref 0–0.61)
EOSINOPHIL NFR BLD AUTO: 0 % (ref 0–6)
ERYTHROCYTE [DISTWIDTH] IN BLOOD BY AUTOMATED COUNT: 11.8 % (ref 11.6–15.1)
GFR SERPL CREATININE-BSD FRML MDRD: 74 ML/MIN/1.73SQ M
GLUCOSE SERPL-MCNC: 182 MG/DL (ref 65–140)
GLUCOSE SERPL-MCNC: 190 MG/DL (ref 65–140)
GLUCOSE SERPL-MCNC: 212 MG/DL (ref 65–140)
GLUCOSE SERPL-MCNC: 337 MG/DL (ref 65–140)
GLUCOSE UR STRIP-MCNC: NEGATIVE MG/DL
HCT VFR BLD AUTO: 38.9 % (ref 34.8–46.1)
HGB BLD-MCNC: 12.5 G/DL (ref 11.5–15.4)
HGB UR QL STRIP.AUTO: NEGATIVE
IMM GRANULOCYTES # BLD AUTO: 0.23 THOUSAND/UL (ref 0–0.2)
IMM GRANULOCYTES NFR BLD AUTO: 1 % (ref 0–2)
INR PPP: 0.9 (ref 0.84–1.19)
KETONES UR STRIP-MCNC: ABNORMAL MG/DL
LACTATE SERPL-SCNC: 1.6 MMOL/L (ref 0.5–2)
LEUKOCYTE ESTERASE UR QL STRIP: NEGATIVE
LYMPHOCYTES # BLD AUTO: 1.06 THOUSANDS/ÂΜL (ref 0.6–4.47)
LYMPHOCYTES NFR BLD AUTO: 4 % (ref 14–44)
MCH RBC QN AUTO: 33.6 PG (ref 26.8–34.3)
MCHC RBC AUTO-ENTMCNC: 32.1 G/DL (ref 31.4–37.4)
MCV RBC AUTO: 105 FL (ref 82–98)
MONOCYTES # BLD AUTO: 1.64 THOUSAND/ÂΜL (ref 0.17–1.22)
MONOCYTES NFR BLD AUTO: 6 % (ref 4–12)
NEUTROPHILS # BLD AUTO: 23.24 THOUSANDS/ÂΜL (ref 1.85–7.62)
NEUTS SEG NFR BLD AUTO: 89 % (ref 43–75)
NITRITE UR QL STRIP: NEGATIVE
NRBC BLD AUTO-RTO: 0 /100 WBCS
PH UR STRIP.AUTO: 5 [PH]
PLATELET # BLD AUTO: 201 THOUSANDS/UL (ref 149–390)
PMV BLD AUTO: 12.3 FL (ref 8.9–12.7)
POTASSIUM SERPL-SCNC: 4.8 MMOL/L (ref 3.5–5.3)
PROCALCITONIN SERPL-MCNC: 0.08 NG/ML
PROT SERPL-MCNC: 6.2 G/DL (ref 6.4–8.4)
PROT UR STRIP-MCNC: NEGATIVE MG/DL
PROTHROMBIN TIME: 12.2 SECONDS (ref 11.6–14.5)
RBC # BLD AUTO: 3.72 MILLION/UL (ref 3.81–5.12)
SODIUM SERPL-SCNC: 137 MMOL/L (ref 135–147)
SP GR UR STRIP.AUTO: 1.01
UROBILINOGEN UR QL STRIP.AUTO: 0.2 E.U./DL
WBC # BLD AUTO: 26.21 THOUSAND/UL (ref 4.31–10.16)

## 2023-05-24 RX ORDER — ENOXAPARIN SODIUM 100 MG/ML
40 INJECTION SUBCUTANEOUS DAILY
Status: DISCONTINUED | OUTPATIENT
Start: 2023-05-25 | End: 2023-05-24

## 2023-05-24 RX ORDER — INSULIN GLARGINE 100 [IU]/ML
9 INJECTION, SOLUTION SUBCUTANEOUS EVERY MORNING
Status: DISCONTINUED | OUTPATIENT
Start: 2023-05-25 | End: 2023-05-25

## 2023-05-24 RX ORDER — GABAPENTIN 300 MG/1
300 CAPSULE ORAL 3 TIMES DAILY
Status: DISCONTINUED | OUTPATIENT
Start: 2023-05-24 | End: 2023-05-27 | Stop reason: HOSPADM

## 2023-05-24 RX ORDER — METHOCARBAMOL 500 MG/1
500 TABLET, FILM COATED ORAL EVERY 6 HOURS PRN
Status: DISCONTINUED | OUTPATIENT
Start: 2023-05-24 | End: 2023-05-27 | Stop reason: HOSPADM

## 2023-05-24 RX ORDER — ACETAMINOPHEN 325 MG/1
650 TABLET ORAL EVERY 4 HOURS PRN
Status: DISCONTINUED | OUTPATIENT
Start: 2023-05-24 | End: 2023-05-27 | Stop reason: HOSPADM

## 2023-05-24 RX ORDER — MORPHINE SULFATE 4 MG/ML
4 INJECTION, SOLUTION INTRAMUSCULAR; INTRAVENOUS ONCE
Status: COMPLETED | OUTPATIENT
Start: 2023-05-24 | End: 2023-05-24

## 2023-05-24 RX ORDER — TRAMADOL HYDROCHLORIDE 50 MG/1
50 TABLET ORAL EVERY 4 HOURS PRN
Status: DISCONTINUED | OUTPATIENT
Start: 2023-05-24 | End: 2023-05-27 | Stop reason: HOSPADM

## 2023-05-24 RX ORDER — ACETAMINOPHEN 325 MG/1
650 TABLET ORAL EVERY 4 HOURS PRN
Status: DISCONTINUED | OUTPATIENT
Start: 2023-05-24 | End: 2023-05-24

## 2023-05-24 RX ORDER — INSULIN GLARGINE 100 [IU]/ML
6 INJECTION, SOLUTION SUBCUTANEOUS
Status: ON HOLD | COMMUNITY

## 2023-05-24 RX ORDER — ONDANSETRON 2 MG/ML
4 INJECTION INTRAMUSCULAR; INTRAVENOUS EVERY 6 HOURS PRN
Status: DISCONTINUED | OUTPATIENT
Start: 2023-05-24 | End: 2023-05-27 | Stop reason: HOSPADM

## 2023-05-24 RX ORDER — PANTOPRAZOLE SODIUM 40 MG/1
40 TABLET, DELAYED RELEASE ORAL 2 TIMES DAILY
Status: DISCONTINUED | OUTPATIENT
Start: 2023-05-24 | End: 2023-05-27 | Stop reason: HOSPADM

## 2023-05-24 RX ORDER — ENOXAPARIN SODIUM 100 MG/ML
40 INJECTION SUBCUTANEOUS
Status: DISCONTINUED | OUTPATIENT
Start: 2023-05-25 | End: 2023-05-27 | Stop reason: HOSPADM

## 2023-05-24 RX ORDER — LEVOTHYROXINE SODIUM 112 UG/1
112 TABLET ORAL EVERY MORNING
Status: DISCONTINUED | OUTPATIENT
Start: 2023-05-25 | End: 2023-05-27 | Stop reason: HOSPADM

## 2023-05-24 RX ORDER — ATORVASTATIN CALCIUM 20 MG/1
20 TABLET, FILM COATED ORAL
Status: DISCONTINUED | OUTPATIENT
Start: 2023-05-24 | End: 2023-05-27 | Stop reason: HOSPADM

## 2023-05-24 RX ORDER — INSULIN GLARGINE 100 [IU]/ML
6 INJECTION, SOLUTION SUBCUTANEOUS
Status: DISCONTINUED | OUTPATIENT
Start: 2023-05-24 | End: 2023-05-25

## 2023-05-24 RX ORDER — INSULIN GLARGINE 100 [IU]/ML
9 INJECTION, SOLUTION SUBCUTANEOUS
Status: ON HOLD | COMMUNITY

## 2023-05-24 RX ORDER — ONDANSETRON 2 MG/ML
4 INJECTION INTRAMUSCULAR; INTRAVENOUS EVERY 6 HOURS PRN
Status: DISCONTINUED | OUTPATIENT
Start: 2023-05-24 | End: 2023-05-24

## 2023-05-24 RX ORDER — CALCIUM CARBONATE 500(1250)
1 TABLET ORAL
Status: DISCONTINUED | OUTPATIENT
Start: 2023-05-25 | End: 2023-05-27 | Stop reason: HOSPADM

## 2023-05-24 RX ORDER — CEFTRIAXONE 1 G/50ML
1000 INJECTION, SOLUTION INTRAVENOUS ONCE
Status: COMPLETED | OUTPATIENT
Start: 2023-05-24 | End: 2023-05-24

## 2023-05-24 RX ADMIN — MORPHINE SULFATE 4 MG: 4 INJECTION, SOLUTION INTRAMUSCULAR; INTRAVENOUS at 14:39

## 2023-05-24 RX ADMIN — INSULIN GLARGINE 6 UNITS: 100 INJECTION, SOLUTION SUBCUTANEOUS at 21:07

## 2023-05-24 RX ADMIN — GABAPENTIN 300 MG: 300 CAPSULE ORAL at 21:07

## 2023-05-24 RX ADMIN — PANTOPRAZOLE SODIUM 40 MG: 40 TABLET, DELAYED RELEASE ORAL at 21:07

## 2023-05-24 RX ADMIN — CEFTRIAXONE 1000 MG: 1 INJECTION, SOLUTION INTRAVENOUS at 21:01

## 2023-05-24 RX ADMIN — ATORVASTATIN CALCIUM 20 MG: 20 TABLET, FILM COATED ORAL at 21:07

## 2023-05-24 NOTE — ED PROVIDER NOTES
"History  Chief Complaint   Patient presents with   • Knee Pain     R knee pain; fall x2 this AM; no thinners, no head strike     HPI    This is a very pleasant, nontoxic appearing, 66-year-old female who lives by herself presents emergency department with after a mechanical fall landing on her right knee  Occurred approximately 930, no head strike  Patient lives alone, EMS was activated, assessed by EMS, patient declined to go to the hospital initially and then got worse so she took 1 tramadol and 1 Tylenol at approximately 11 AM and activated 911 again  She denies feeling dizzy, nauseated, chest pain, shortness of breath prior to falling  She just felt that she lost her balance when she fell  Patient is having quite significant amount of pain in the medial aspect of her right knee  Medical history significant for 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 Taking?    Ascorbic Acid (vitamin C) 100 MG tablet 2023  Yes Yes   Sig: Take 500 mg by mouth 2 (two) times a day   BD Insulin Syringe U/F Unit 31G X \" 0 3 ML MISC Unknown  Yes No   Si (four) times a day   Insulin Glargine Solostar (Basaglar KwikPen) 100 UNIT/ML SOPN 2023  Yes Yes   Sig: Inject 9 Units under the skin daily with breakfast   Insulin Glargine Solostar (Basaglar KwikPen) 100 UNIT/ML SOPN 2023  Yes Yes   Sig: Inject 6 Units under the skin daily at bedtime   Multiple Vitamin (multivitamin) capsule 2023  Yes Yes   Sig: Take 1 capsule by mouth daily   OneTouch Ultra test strip Unknown  Yes No   Sig: USE TO TEST BLOOD SUGAR 6 TIMES A DAY   VITAMIN D PO 2023  Yes Yes   Sig: Take 125 mg by mouth in " the morning   atorvastatin (LIPITOR) 20 mg tablet 2023  Yes Yes   Sig: Take 20 mg by mouth daily with dinner    calcium carbonate (OS-FELICE) 600 MG tablet 2023  Yes Yes   Sig: Take 600 mg by mouth 2 (two) times a day with meals   cyanocobalamin (VITAMIN B-12) 1000 MCG tablet 2023  Yes Yes   Sig: Take 1,000 mcg by mouth daily   gabapentin (NEURONTIN) 300 mg capsule 2023  Yes Yes   Sig: Take 300 mg by mouth 3 (three) times a day   insulin lispro (HumaLOG) 100 units/mL injection   No No   Sig: Inject 5 Units under the skin 3 (three) times a day with meals   insulin lispro (HumaLOG) 100 units/mL injection   No No   Sig: Inject 1-5 Units under the skin 3 (three) times a day before meals   ipratropium (ATROVENT) 0 03 % nasal spray   No No   Si sprays into each nostril every 12 (twelve) hours   Patient taking differently: 2 sprays into each nostril every 12 (twelve) hours As needed   levothyroxine 100 mcg tablet 2023 Self Yes Yes   Sig: Take 112 mcg by mouth every morning    meclizine (ANTIVERT) 25 mg tablet Unknown  No No   Sig: Take 1 tablet (25 mg total) by mouth every 8 (eight) hours as needed for dizziness   methocarbamol (ROBAXIN) 500 mg tablet Unknown  Yes No   Sig: Take 500 mg by mouth in the morning   Prn     mupirocin (BACTROBAN) 2 % ointment Unknown  No No   Sig: Apply topically daily To affected area   pantoprazole (PROTONIX) 40 mg tablet 2023  No Yes   Sig: Take 1 tablet (40 mg total) by mouth 2 (two) times a day   traMADol (ULTRAM) 50 mg tablet Unknown  Yes No   Sig: tramadol 50 mg tablet   take 1 tablet by mouth three times a day if needed      Facility-Administered Medications: None       Past Medical History:   Diagnosis Date   • Ambulates with cane    • Cancer (HCC)    • Chronic pain disorder     knees/ shoulders (gets inj every 3 mos)   • Controlled type 2 diabetes mellitus with diabetic polyneuropathy, with long-term current use of insulin ([facility] ) 2008   • Diabetes mellitus ([facility] )    • Diabetic polyneuropathy ([facility] ) 5/21/2008    ICD10 clean up   • Disease of thyroid gland    • 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   • HL (hearing loss)    • Hodgkin's disease ([facility] ) 2008    Left neck, had radiation   • Hypertension    • Neuropathy    • Osteoporosis    • RA (rheumatoid arthritis) ([facility] )        Past Surgical History:   Procedure Laterality Date   • ADENOIDECTOMY     • APPENDECTOMY     • CATARACT EXTRACTION     • CATARACT EXTRACTION, BILATERAL     • CHOLECYSTECTOMY     • FRACTURE SURGERY Right     hip   • MOUTH SURGERY      oral cancer left lower lip   • OVARY SURGERY     • MN ADJT TIS TRNS/REARGMT F/C/C/M/N/A/G/H/F 10SQCM/< N/A 3/28/2022    Procedure: Adjacent tissue transfer face;  Surgeon: Glenny Ackerman MD;  Location: [facility];  Service: ENT   • MN OPTX FEM SHFT FX W/INSJ IMED IMPLT W/WO SCREW Right 5/28/2020    Procedure: INSERTION NAIL IM FEMUR ANTEGRADE (TROCHANTERIC); Surgeon: Leonid Robbins DO;  Location: [facility] OR;  Service: Orthopedics   • MN TRANSMASTOID ANTROTOMY Left 3/28/2022    Procedure: MASTOIDECTOMY;  Surgeon: Glenny Ackerman MD;  Location: [facility];  Service: ENT   • TONSILLECTOMY     • TOTAL THYROIDECTOMY  2008    Performed after left neck dissection and left oral cancer diagnosis       Family History   Problem Relation Age of Onset   • Cancer Mother    • Diabetes Mother    • Diabetes Father    • Hypertension Father      I have reviewed and agree with the history as documented      E-Cigarette/Vaping   • E-Cigarette Use Never User      E-Cigarette/Vaping Substances   • Nicotine No    • THC No    • CBD No    • Flavoring No    • Other No    • Unknown No      Social History     Tobacco Use   • Smoking status: Never   • Smokeless tobacco: Never   Vaping Use   • Vaping Use: Never used   Substance Use Topics   • Alcohol use: Not Currently     Alcohol/week: 0 0 standard drinks of alcohol   • Drug use: Never       Review of Systems   Constitutional: Negative  HENT: Negative  Eyes: Negative  Respiratory: Negative  Cardiovascular: Negative  Gastrointestinal: Negative  Endocrine: Negative  Genitourinary: Negative  Musculoskeletal: Positive for joint swelling  R knee swelling  Skin: Negative  Negative for pallor, rash and wound  Allergic/Immunologic: Negative  Neurological: Negative  Hematological: Negative  Psychiatric/Behavioral: Negative  Physical Exam  Physical Exam  Vitals and nursing note reviewed  Constitutional:       Appearance: Normal appearance  She is normal weight  HENT:      Head: Normocephalic and atraumatic  Right Ear: External ear normal       Left Ear: External ear normal       Nose: Nose normal       Mouth/Throat:      Mouth: Mucous membranes are moist       Pharynx: Oropharynx is clear  Eyes:      Extraocular Movements: Extraocular movements intact  Conjunctiva/sclera: Conjunctivae normal       Pupils: Pupils are equal, round, and reactive to light  Cardiovascular:      Rate and Rhythm: Normal rate and regular rhythm  Pulses: Normal pulses  Heart sounds: Normal heart sounds  Pulmonary:      Effort: Pulmonary effort is normal       Breath sounds: Normal breath sounds  Abdominal:      General: Abdomen is flat  Bowel sounds are normal    Musculoskeletal:         General: Swelling and tenderness present  No signs of injury  Cervical back: Normal range of motion  Legs:       Comments: Full ROM of R hip: Flexion / extension / adduction / abduction, R ankle, R foot, pain with flexion and extension of R knee with mild swelling over medial aspect of knee joint along the tibial plataue  Skin:     General: Skin is warm  Capillary Refill: Capillary refill takes less than 2 seconds  Neurological:      General: No focal deficit present  Mental Status: She is alert and oriented to person, place, and time  Mental status is at baseline     Psychiatric: Mood and Affect: Mood normal          Behavior: Behavior normal          Thought Content:  Thought content normal          Judgment: Judgment normal          Vital Signs  ED Triage Vitals   Temperature Pulse Respirations Blood Pressure SpO2   05/24/23 1237 05/24/23 1237 05/24/23 1237 05/24/23 1237 05/24/23 1237   98 9 °F (37 2 °C) 96 18 (!) 171/85 99 %      Temp Source Heart Rate Source Patient Position - Orthostatic VS BP Location FiO2 (%)   05/24/23 1237 05/24/23 1246 05/24/23 1237 05/24/23 1237 --   Tympanic Monitor Lying Right arm       Pain Score       05/24/23 1237       9           Vitals:    05/24/23 1600 05/24/23 1630 05/24/23 1719 05/24/23 1759   BP: 132/69 144/65 135/78 (!) 140/102   Pulse: 93 95 91 92   Patient Position - Orthostatic VS: Lying Lying Lying          Visual Acuity  Visual Acuity    Flowsheet Row Most Recent Value   L Pupil Size (mm) 3   R Pupil Size (mm) 3          ED Medications  Medications   atorvastatin (LIPITOR) tablet 20 mg (has no administration in time range)   calcium carbonate (OYSTER SHELL,OSCAL) 500 mg tablet 1 tablet (has no administration in time range)   gabapentin (NEURONTIN) capsule 300 mg (has no administration in time range)   insulin glargine (LANTUS) subcutaneous injection 9 Units 0 09 mL (has no administration in time range)   insulin glargine (LANTUS) subcutaneous injection 6 Units 0 06 mL (has no administration in time range)   levothyroxine tablet 112 mcg (has no administration in time range)   methocarbamol (ROBAXIN) tablet 500 mg (has no administration in time range)   pantoprazole (PROTONIX) EC tablet 40 mg (has no administration in time range)   traMADol (ULTRAM) tablet 50 mg (has no administration in time range)   acetaminophen (TYLENOL) tablet 650 mg (has no administration in time range)   ondansetron (ZOFRAN) injection 4 mg (has no administration in time range)   morphine injection 4 mg (4 mg Intravenous Given 5/24/23 1439)       Diagnostic Studies  Results Reviewed     Procedure Component Value Units Date/Time    HS Troponin 0hr (reflex protocol) [[id number]]  (Normal) Collected: 05/24/23 1714    Lab Status: Final result Specimen: Blood from Arm, Left Updated: 05/24/23 1754     hs TnI 0hr 21 ng/L     Comprehensive metabolic panel [[id number]]  (Abnormal) Collected: 05/24/23 1714    Lab Status: Final result Specimen: Blood from Arm, Left Updated: 05/24/23 1750     Sodium 137 mmol/L      Potassium 4 8 mmol/L      Chloride 102 mmol/L      CO2 23 mmol/L      ANION GAP 12 mmol/L      BUN 25 mg/dL      Creatinine 0 75 mg/dL      Glucose 182 mg/dL      Calcium 8 3 mg/dL      AST 33 U/L      ALT 27 U/L      Alkaline Phosphatase 57 U/L      Total Protein 6 2 g/dL      Albumin 3 9 g/dL      Total Bilirubin 0 85 mg/dL      eGFR 74 ml/min/1 73sq m     Narrative:      [place] guidelines for Chronic Kidney Disease (CKD):   •  Stage 1 with normal or high GFR (GFR > 90 mL/min/1 73 square meters)  •  Stage 2 Mild CKD (GFR = 60-89 mL/min/1 73 square meters)  •  Stage 3A Moderate CKD (GFR = 45-59 mL/min/1 73 square meters)  •  Stage 3B Moderate CKD (GFR = 30-44 mL/min/1 73 square meters)  •  Stage 4 Severe CKD (GFR = 15-29 mL/min/1 73 square meters)  •  Stage 5 End Stage CKD (GFR <15 mL/min/1 73 square meters)  Note: GFR calculation is accurate only with a steady state creatinine    Protime-INR [[id number]]  (Normal) Collected: 05/24/23 1714    Lab Status: Final result Specimen: Blood from Arm, Left Updated: 05/24/23 1745     Protime 12 2 seconds      INR 0 90    APTT [[id number]]  (Normal) Collected: 05/24/23 1714    Lab Status: Final result Specimen: Blood from Arm, Left Updated: 05/24/23 1745     PTT 26 seconds     UA w Reflex to Microscopic w Reflex to Culture [[id number]]  (Abnormal) Collected: 05/24/23 1715    Lab Status: Final result Specimen: Urine, Clean Catch Updated: 05/24/23 1732     Color, UA Yellow     Clarity, UA Clear     Specific [place], UA 1 015     pH, UA 5 0     Leukocytes, UA Negative     Nitrite, UA Negative     Protein, UA Negative mg/dl      Glucose, UA Negative mg/dl      Ketones, UA Trace mg/dl      Urobilinogen, UA 0 2 E U /dl      Bilirubin, UA Negative     Occult Blood, UA Negative    CBC and differential [[id number]]  (Abnormal) Collected: 05/24/23 1714    Lab Status: Final result Specimen: Blood from Arm, Left Updated: 05/24/23 1723     WBC 26 21 Thousand/uL      RBC 3 72 Million/uL      Hemoglobin 12 5 g/dL      Hematocrit 38 9 %       fL      MCH 33 6 pg      MCHC 32 1 g/dL      RDW 11 8 %      MPV 12 3 fL      Platelets 250 Thousands/uL      nRBC 0 /100 WBCs      Neutrophils Relative 89 %      Immat GRANS % 1 %      Lymphocytes Relative 4 %      Monocytes Relative 6 %      Eosinophils Relative 0 %      Basophils Relative 0 %      Neutrophils Absolute 23 24 Thousands/µL      Immature Grans Absolute 0 23 Thousand/uL      Lymphocytes Absolute 1 06 Thousands/µL      Monocytes Absolute 1 64 Thousand/µL      Eosinophils Absolute 0 01 Thousand/µL      Basophils Absolute 0 03 Thousands/µL     Fingerstick Glucose (POCT) [[id number]]  (Abnormal) Collected: 05/24/23 1638    Lab Status: Final result Updated: 05/24/23 1640     POC Glucose 190 mg/dl                  CT lower extremity wo contrast right   Final Result by Delilah Ortiz MD (05/24 1533)      No acute osseous abnormality  Workstation performed: [id number]         CT head without contrast   Final Result by Bhumika Kyle MD (05/24 1548)      Microangiopathic changes  No acute intracranial abnormality                  Workstation performed: [id number]         CT spine cervical without contrast   Final Result by Bhumika Kyle MD (05/24 1603)      No cervical spine fracture or traumatic malalignment                    Workstation performed: [id number]         XR knee 4+ views Right injury   ED Interpretation by Travis Martinez III, DO (05/24 5849) Medial aspect of the tibial plateau there might be a subtle fracture, otherwise severe osteoarthritis in this 4 view x-ray of the right knee  XR tibia fibula 2 views RIGHT   ED Interpretation by Betty Higuera III, DO (05/24 4742)   2 view x-ray of the right fibular tubular shows no obvious acute fractures or dislocations      XR femur 2 views RIGHT   ED Interpretation by Betty Higuera III, DO (05/24 4603)   Review x-ray of the right femur shows no acute fractures or dislocations  Procedures  ECG 12 Lead Documentation Only    Date/Time: 5/24/2023 5:26 PM    Performed by: Akash Trejo DO  Authorized by: Betty Higuera III, DO    Indications / Diagnosis:  Fall  ECG reviewed by me, the ED Provider: yes    Patient location:  ED  Previous ECG:     Previous ECG:  Compared to current  Comments:      I personally reviewed this EKG that performed the patient May 24, 2023, EKG was completed at 5:20 PM and interpreted by me at 5:22 PM, normal sinus rhythm with no acute ST abnormalities, ventricular rate of 87 bpm, remaining portion intervals within normal limits  No diffuse elevations to indicate pericarditis  No coved ST elevations greater than 2mm with negative T waves in V1-3 to indicate concern for brugada  No biphasic T waves in V2, V3 to indicate Wellens (critical stenosis of LAD)  No elevation in aVR or deviation when compared to V1 (can be associated with ST depression in I,II, V4-6 when left main occlusion is present)  ED Course  ED Course as of 05/24/23 1833   Wed May 24, 2023   1250 Patient seen and examined, Onikul fall, right knee pain  Is a very pleasant, nontoxic-appearing, 29-year-old female presents to emergency department with head strike  Only over the medial aspect of the knee, Diff diagnosis of this patient is as follows: Strain versus sprain versus fracture     1429 Patient is having pain out of proportion from what the x-ray reveals where she can have a very mild tibial plateau fracture, patient is also screaming out at the RN, prior to this pt stated, no LOC, no head strike  1652 Attempted to ambulate the patient, was unable to ambulate, will add on labs for patient  1655 Unable to obtain an PT consult, pt walks at baseline, admit [facility]   [facility] to [address] [place] [address], under the care of Dr Jennie Adamson  1723 Patient already admitted to [address] [place] [address], [facility], advised of high WBC  [address] provider aware of elevated WBC  Initial Sepsis Screening     Row Name 05/24/23 1805                Is the patient's history suggestive of a new or worsening infection? No  -ONEYDA              User Key  (r) = Recorded By, (t) = Taken By, (c) = Cosigned By    [address] Name Provider Type    ONEYDA Viveros PA-C Physician Assistant                SBIRT 20yo+    Flowsheet Row Most Recent Value   Initial Alcohol Screen: US AUDIT-C     1  How often do you have a drink containing alcohol? 0 Filed at: 05/24/2023 1246   2  How many drinks containing alcohol do you have on a typical day you are drinking? 0 Filed at: 05/24/2023 1246   3a  Male UNDER 65: How often do you have five or more drinks on one occasion? 0 Filed at: 05/24/2023 1246   3b  FEMALE Any Age, or MALE 65+: How often do you have 4 or more drinks on one occassion? 0 Filed at: 05/24/2023 1246   Audit-C Score 0 Filed at: 05/24/2023 1246   DOMINGA: How many times in the past year have you    Used an illegal drug or used a prescription medication for non-medical reasons?  Never Filed at: 05/24/2023 1246                    Medical Decision Making  Mechanical fall, no prodrome prior to falling, patient had difficult pain over the medial aspect of the right knee, x-rays unremarkable, full range of motion at the hip joint, femur x-rays and tibial x-rays negative, patient underwent CT imaging the head, cervical spine "negative, CT of the right lower extremity showed no acute fractures or injuries, admit to [facility]  Portions of the record may have been created with voice recognition software  Occasional wrong word or \"sound a like\" substitutions may have occurred due to the inherent limitations of voice recognition software  Read the chart carefully and recognize, using context, where substitutions have occurred  Amount and/or Complexity of Data Reviewed  Labs: ordered  Radiology: ordered and independent interpretation performed  Risk  Prescription drug management  Disposition  Final diagnoses:   Right knee pain   Fall, initial encounter   Ambulatory dysfunction     Time reflects when diagnosis was documented in both MDM as applicable and the Disposition within this note     Time User Action Codes Description Comment    5/24/2023  5:00 PM Raj Leung [B43 [id number]] Right knee pain     5/24/2023  5:00 PM Raj Leung [I84  SYFU] Fall, initial encounter     5/24/2023  5:00 PM Raj Leung [R26 [id number]] Ambulatory dysfunction       ED Disposition     ED Disposition   Discharge    Condition   Stable    Date/Time   Wed May 24, 2023  5:00 PM    Comment   Vanessa Hinkle discharge to home/self care  Follow-up Information    None         Current Discharge Medication List      CONTINUE these medications which have NOT CHANGED    Details   Ascorbic Acid (vitamin C) 100 MG tablet Take 500 mg by mouth 2 (two) times a day      atorvastatin (LIPITOR) 20 mg tablet Take 20 mg by mouth daily with dinner       calcium carbonate (OS-FELICE) 600 MG tablet Take 600 mg by mouth 2 (two) times a day with meals      cyanocobalamin (VITAMIN B-12) 1000 MCG tablet Take 1,000 mcg by mouth daily      gabapentin (NEURONTIN) 300 mg capsule Take 300 mg by mouth 3 (three) times a day      !! Insulin Glargine Solostar (Basaglar KwikPen) 100 UNIT/ML SOPN Inject 9 Units under the skin daily with breakfast      !!  Insulin " "Glargine Solostar (Basaglar KwikPen) 100 UNIT/ML SOPN Inject 6 Units under the skin daily at bedtime      levothyroxine 100 mcg tablet Take 112 mcg by mouth every morning       Multiple Vitamin (multivitamin) capsule Take 1 capsule by mouth daily      pantoprazole (PROTONIX) 40 mg tablet Take 1 tablet (40 mg total) by mouth 2 (two) times a day  Qty: 60 tablet, Refills: 0    Associated Diagnoses: Abnormal CT of the abdomen      VITAMIN D PO Take 125 mg by mouth in the morning      BD Insulin Syringe U/F 1/2Unit 31G X 5/16\" 0 3 ML MISC 4 (four) times a day      insulin lispro (HumaLOG) 100 units/mL injection Inject 5 Units under the skin 3 (three) times a day with meals  Qty: 4 5 mL, Refills: 0    Associated Diagnoses: Type 2 diabetes mellitus with diabetic polyneuropathy, with long-term current use of insulin ([facility])      insulin lispro (HumaLOG) 100 units/mL injection Inject 1-5 Units under the skin 3 (three) times a day before meals  Qty: 4 5 mL, Refills: 0    Associated Diagnoses: Type 2 diabetes mellitus with diabetic polyneuropathy, with long-term current use of insulin ([facility])      ipratropium (ATROVENT) 0 03 % nasal spray 2 sprays into each nostril every 12 (twelve) hours  Qty: 30 mL, Refills: 11    Associated Diagnoses: Vasomotor rhinitis      meclizine (ANTIVERT) 25 mg tablet Take 1 tablet (25 mg total) by mouth every 8 (eight) hours as needed for dizziness  Qty: 30 tablet, Refills: 0    Associated Diagnoses: Visual changes      methocarbamol (ROBAXIN) 500 mg tablet Take 500 mg by mouth in the morning  Prn        mupirocin (BACTROBAN) 2 % ointment Apply topically daily To affected area  Qty: 22 g, Refills: 2    Associated Diagnoses: Osteoradionecrosis of temporal bone ([facility])      OneTouch Ultra test strip USE TO TEST BLOOD SUGAR 6 TIMES A DAY      traMADol (ULTRAM) 50 mg tablet tramadol 50 mg tablet   take 1 tablet by mouth three times a day if needed       !! - Potential duplicate medications found   Please " discuss with provider  No discharge procedures on file      PDMP Review       Value Time User    PDMP Reviewed  Yes 6/17/2020  2:17 PM Kinsey Polanco MD          ED Provider  Electronically Signed by           Radha Griffith III,   05/24/23 774 Richard Chan, DO  05/24/23 7897

## 2023-05-24 NOTE — ASSESSMENT & PLAN NOTE
· Worsening right knee pain since fall earlier today  · CT right lower extremity: No acute osseous abnormality    · Patient unable to walk on right leg  · PT/OT  · Pain control

## 2023-05-24 NOTE — H&P
[facility]  H&P  Name: Hernando Nicholson 80 y o  female I MRN: [id number]  Unit/Bed#: -[facility] I Date of Admission: 5/24/2023   Date of Service: 5/24/2023 I Hospital Day: 0      Assessment/Plan   * Right knee pain  Assessment & Plan  · Worsening right knee pain since fall earlier today  · CT right lower extremity: No acute osseous abnormality  · Patient unable to walk on right leg  · PT/OT  · Pain control    SIRS (systemic inflammatory response syndrome) ([facility] [address] )  Assessment & Plan  · Present on admission with leukocytosis and tachycardia  No source of infection at this time  · Likely reactive in the setting of fall with right knee pain  · Will not give IV antibiotics or IV fluid resuscitation per sepsis protocol as there is no source of infection  · Check lactic acid now  · Blood cultures x 2 now  · UA not suggestive of UTI  · Patient reports feeling well prior to fall  Only complaint is right knee pain since fall  · Repeat labs in am    Type 2 diabetes mellitus with diabetic polyneuropathy, with long-term current use of insulin [facility])  Assessment & Plan  Lab Results   Component Value Date    HGBA1C 8 7 (H) 10/31/2022       Recent Labs     05/24/23  1638   POCGLU 190*       Blood Sugar Average: Last 72 hrs:  (P) 190     · Continue home regimen of Lantus 9 U qam and Lantus 6 U qhs  · ISS  · Diabetic diet  · Monitor blood glucose trends    Fall  Assessment & Plan  · Patient reports fall at home earlier today while moving containers in her bedroom  She states she attempted to stop herself from falling and twisted her knee while falling  · She denies hitting her head or LOC  · CT head: Microangiopathic changes  No acute intracranial abnormality  · CT C spine: No cervical spine fracture or traumatic malalignment    · PT/OT    HLD (hyperlipidemia)  Assessment & Plan  · Continue statin          VTE Pharmacologic Prophylaxis: VTE Score: 3 Moderate Risk (Score 3-4) - Pharmacological DVT Prophylaxis Ordered: enoxaparin (Lovenox)  Code Status: Level 3 - DNAR and DNI   Discussion with family: Patient declined call to   Anticipated Length of Stay: Patient will be admitted on an observation basis with an anticipated length of stay of less than 2 midnights secondary to right leg pain, PT/OT eval     Total Time Spent on Date of Encounter in care of patient: 65 minutes This time was spent on one or more of the following: performing physical exam; counseling and coordination of care; obtaining or reviewing history; documenting in the medical record; reviewing/ordering tests, medications or procedures; communicating with other healthcare professionals and discussing with patient's family/caregivers  Chief Complaint: right leg pain    History of Present Illness:  Marcela Swanson is a 80 y o  female with a PMH of HLD, DM who presents with a complaint of right knee pain  She states she was in one of her bedrooms moving containers when she lost her balance  She state she tried to stop herself from fall and ended up twisting her body and falling on her right side  She then used her life alert and they helped her off the floor  She opted not to go to the ED at that time  However as the day progressed her right leg pain worsened and she was having difficulty walking which prompted her to call her daughter her urged her to go to the ED  Imaging is negative however given the pain the patient is unable to walk  Patient admitted for PT/OT eval  The patient denies hitting her head or LOC  She denies chest pain, sob, abdominal pain, nausea/vomiting, or diarrhea  She denies fevers/chills  Review of Systems:  Review of Systems   Musculoskeletal: Positive for arthralgias and gait problem  All other systems reviewed and are negative        Past Medical and Surgical History:   Past Medical History:   Diagnosis Date   • Ambulates with cane    • Cancer (HCC)    • Chronic pain disorder     knees/ shoulders (gets inj every 3 mos)   • Controlled type 2 diabetes mellitus with diabetic polyneuropathy, with long-term current use of insulin ([facility] [address] ) 5/21/2008   • Diabetes mellitus ([facility] [address] )    • Diabetic polyneuropathy ([facility] [address] ) 5/21/2008    ICD10 clean up   • Disease of thyroid gland    • 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   • HL (hearing loss)    • Hodgkin's disease ([facility] [address] ) 2008    Left neck, had radiation   • Hypertension    • Neuropathy    • Osteoporosis    • [facility]        Past Surgical History:   Procedure Laterality Date   • ADENOIDECTOMY     • APPENDECTOMY     • CATARACT EXTRACTION     • CATARACT EXTRACTION, BILATERAL     • CHOLECYSTECTOMY     • FRACTURE SURGERY Right     hip   • MOUTH SURGERY      oral cancer left lower lip   • OVARY SURGERY     • AR ADJT TIS TRNS/REARGMT F/C/C/M/N/A/G/H/F 10SQCM/< N/A 3/28/2022    Procedure: Adjacent tissue transfer face;  Surgeon: Cecy Mclain MD;  Location: [facility];  Service: ENT   • AR OPTX FEM SHFT FX W/INSJ IMED IMPLT W/WO SCREW Right 5/28/2020    Procedure: INSERTION NAIL IM FEMUR ANTEGRADE (TROCHANTERIC); Surgeon: Zita Truong DO;  Location: [facility] MAIN OR;  Service: Orthopedics   • AR TRANSMASTOID ANTROTOMY Left 3/28/2022    Procedure: MASTOIDECTOMY;  Surgeon: Cecy Mclain MD;  Location: [facility];  Service: ENT   • TONSILLECTOMY     • TOTAL THYROIDECTOMY  2008    Performed after left neck dissection and left oral cancer diagnosis       Meds/Allergies:  Prior to Admission medications    Medication Sig Start Date End Date Taking?  Authorizing Provider   Ascorbic Acid (vitamin C) 100 MG tablet Take 500 mg by mouth 2 (two) times a day   Yes Historical Provider, MD   atorvastatin (LIPITOR) 20 mg tablet Take 20 mg by mouth daily with dinner    Yes Historical Provider, MD   calcium carbonate (OS-FELICE) 600 MG tablet Take 600 mg by mouth 2 (two) times a day with meals   Yes Historical Provider, MD   cyanocobalamin (VITAMIN B-12) 1000 "MCG tablet Take 1,000 mcg by mouth daily   Yes Historical Provider, MD   gabapentin (NEURONTIN) 300 mg capsule Take 300 mg by mouth 3 (three) times a day   Yes Historical Provider, MD   Insulin Glargine Solostar (Basaglar KwikPen) 100 UNIT/ML SOPN Inject 9 Units under the skin daily with breakfast   Yes Historical Provider, MD   Insulin Glargine Solostar (Basaglar KwikPen) 100 UNIT/ML SOPN Inject 6 Units under the skin daily at bedtime   Yes Historical Provider, MD   levothyroxine 100 mcg tablet Take 112 mcg by mouth every morning    Yes Historical Provider, MD   Multiple Vitamin (multivitamin) capsule Take 1 capsule by mouth daily   Yes Historical Provider, MD   pantoprazole (PROTONIX) 40 mg tablet Take 1 tablet (40 mg total) by mouth 2 (two) times a day 7/15/22  Yes Lorezna Epperson PA-C   VITAMIN D PO Take 125 mg by mouth in the morning   Yes Historical Provider, MD   BD Insulin Syringe U/F 1/2Unit 31G X 5/16\" 0 3 ML MISC 4 (four) times a day 11/29/21   Historical Provider, MD   insulin lispro (HumaLOG) 100 units/mL injection Inject 5 Units under the skin 3 (three) times a day with meals 10/25/22 11/24/22  [facility] [place], DO   insulin lispro (HumaLOG) 100 units/mL injection Inject 1-5 Units under the skin 3 (three) times a day before meals 10/25/22 11/24/22  [facility], DO   ipratropium (ATROVENT) 0 03 % nasal spray 2 sprays into each nostril every 12 (twelve) hours  Patient taking differently: 2 sprays into each nostril every 12 (twelve) hours As needed 8/22/21   Mohan Varela MD   meclizine (ANTIVERT) 25 mg tablet Take 1 tablet (25 mg total) by mouth every 8 (eight) hours as needed for dizziness 10/25/22   [facility] [place], DO   methocarbamol (ROBAXIN) 500 mg tablet Take 500 mg by mouth in the morning  Prn       Historical Provider, MD jaylon Jones) 2 % ointment Apply topically daily To affected area 10/5/22   Mohan Varela MD   OneTouch Ultra test strip USE TO TEST BLOOD SUGAR 6 TIMES A DAY " "9/27/21   Historical Provider, MD   traMADol (ULTRAM) 50 mg tablet tramadol 50 mg tablet   take 1 tablet by mouth three times a day if needed    Historical Provider, MD   insulin aspart protamine-insulin aspart (NovoLOG 70/30) 100 units/mL injection Inject under the skin 2 (two) times a day before meals  Patient not taking: Reported on 5/24/2023 5/24/23  Historical Provider, MD   insulin glargine (LANTUS) 100 units/mL subcutaneous injection Inject 15 Units under the skin every morning  Patient not taking: Reported on 5/24/2023 10/25/22 5/24/23  [facility], DO     I have reviewed home medications with patient personally  Allergies: No Known Allergies    Social History:  Marital Status:    Occupation: retired   Patient Pre-hospital Living Situation: Home  Patient Pre-hospital Level of Mobility: walks  Patient Pre-hospital Diet Restrictions: DM  Substance Use History:   Social History     Substance and Sexual Activity   Alcohol Use Not Currently   • Alcohol/week: 0 0 standard drinks of alcohol     Social History     Tobacco Use   Smoking Status Never   Smokeless Tobacco Never     Social History     Substance and Sexual Activity   Drug Use Never       Family History:  Family History   Problem Relation Age of Onset   • Cancer Mother    • Diabetes Mother    • Diabetes Father    • Hypertension Father        Physical Exam:     Vitals:   Blood Pressure: (!) 140/102 (05/24/23 1759)  Pulse: 92 (05/24/23 1759)  Temperature: 98 9 °F (37 2 °C) (05/24/23 1759)  Temp Source: Tympanic (05/24/23 1237)  Respirations: 20 (05/24/23 1759)  Height: 5' 1\" (154 9 cm) (05/24/23 1237)  Weight - Scale: 55 3 kg (121 lb 14 6 oz) (05/24/23 1237)  SpO2: 99 % (05/24/23 1759)    Physical Exam  Vitals and nursing note reviewed  Constitutional:       General: She is awake  Appearance: Normal appearance  HENT:      Head: Normocephalic and atraumatic  Cardiovascular:      Rate and Rhythm: Normal rate and regular rhythm       " Heart sounds: Normal heart sounds  Pulmonary:      Effort: Pulmonary effort is normal       Breath sounds: Normal breath sounds  Abdominal:      Palpations: Abdomen is soft  Tenderness: There is no abdominal tenderness  Musculoskeletal:      Right knee: Tenderness present  Skin:     General: Skin is warm and dry  Neurological:      General: No focal deficit present  Mental Status: She is alert and oriented to person, place, and time  Psychiatric:         Attention and Perception: Attention normal          Mood and Affect: Mood normal          Speech: Speech normal          Behavior: Behavior is cooperative  Additional Data:     Lab Results:  Results from last 7 days   Lab Units 05/24/23  1714   EOS PCT % 0   HEMATOCRIT % 38 9   HEMOGLOBIN g/dL 12 5   LYMPHS PCT % 4*   MONOS PCT % 6   NEUTROS PCT % 89*   PLATELETS Thousands/uL 201   WBC Thousand/uL 26 21*     Results from last 7 days   Lab Units 05/24/23  1714   ANION GAP mmol/L 12   ALBUMIN g/dL 3 9   ALK PHOS U/L 57   ALT U/L 27   AST U/L 33   BUN mg/dL 25   CALCIUM mg/dL 8 3*   CHLORIDE mmol/L 102   CO2 mmol/L 23   CREATININE mg/dL 0 75   GLUCOSE RANDOM mg/dL 182*   POTASSIUM mmol/L 4 8   SODIUM mmol/L 137   TOTAL BILIRUBIN mg/dL 0 85     Results from last 7 days   Lab Units 05/24/23  1714   INR  0 90     Results from last 7 days   Lab Units 05/24/23  1638   POC GLUCOSE mg/dl 190*               Lines/Drains:  Invasive Devices     Peripheral Intravenous Line  Duration           Peripheral IV 05/24/23 Left Antecubital <1 day                    Imaging: Reviewed radiology reports from this admission including: CT head and CT right LE, CT C spine  CT lower extremity wo contrast right   Final Result by Edel Rodriguez MD (05/24 4621)      No acute osseous abnormality  Workstation performed: [id number]         CT head without contrast   Final Result by Sylvia Vogel MD (05/24 7453)      Microangiopathic changes   No acute intracranial abnormality                  Workstation performed: [id number]         CT spine cervical without contrast   Final Result by Rivka Gayle MD (05/24 3006)      No cervical spine fracture or traumatic malalignment  Workstation performed: [id number]         XR knee 4+ views Right injury   ED Interpretation by Lacey Rivera III, DO (05/24 7240)   Medial aspect of the tibial plateau there might be a subtle fracture, otherwise severe osteoarthritis in this 4 view x-ray of the right knee  XR tibia fibula 2 views RIGHT   ED Interpretation by Lacey Rivera III, DO (05/24 2336)   2 view x-ray of the right fibular tubular shows no obvious acute fractures or dislocations      XR femur 2 views RIGHT   ED Interpretation by Lacey Rivera III, DO (05/24 5667)   Review x-ray of the right femur shows no acute fractures or dislocations  EKG and Other Studies Reviewed on Admission:   · EKG: NSR  HR 87     ** Please Note: This note has been constructed using a voice recognition system   **

## 2023-05-24 NOTE — ED NOTES
Patient transported to [facility], [address] [place] [address]  05/24/23 3241

## 2023-05-24 NOTE — ASSESSMENT & PLAN NOTE
Lab Results   Component Value Date    HGBA1C 8 7 (H) 10/31/2022       Recent Labs     05/24/23  1638   POCGLU 190*       Blood Sugar Average: Last 72 hrs:  (P) 190     · Continue home regimen of Lantus 9 U qam and Lantus 6 U qhs  · ISS  · Diabetic diet  · Monitor blood glucose trends

## 2023-05-24 NOTE — SEPSIS NOTE
Sepsis Note   Danilo Rodrigues 80 y o  female MRN: [id number]  Unit/Bed#: -01 Encounter: [id number]       Initial Sepsis Screening     Row Name 05/24/23 7742                Is the patient's history suggestive of a new or worsening infection? No  -JM              User Key  (r) = Recorded By, (t) = Taken By, (c) = Cosigned By    [address] Name Provider Type    JM Francis Rinne, PA-C Physician Assistant                    Body mass index is 23 04 kg/m²    Wt Readings from Last 1 Encounters:   05/24/23 55 3 kg (121 lb 14 6 oz)     IBW (Ideal Body Weight): 47 8 kg    Ideal body weight: 47 8 kg (105 lb 6 1 oz)  Adjusted ideal body weight: 50 8 kg (111 lb 15 9 oz)

## 2023-05-24 NOTE — LETTER
May 26, 2023       No Recipients    Patient: Rambo Moscoso   YOB: 1940   Date of Visit: 5/24/2023       Dear Dr Tu Meza:    Thank you for referring Mio Schuster to me for evaluation  Below are my notes for this consultation  If you have questions, please do not hesitate to call me  I look forward to following your patient along with you  Sincerely,        No name on file        CC:   No Recipients    [address] [facility], DO  5/26/2023 12:46 PM  Signed  [address]  66   Progress Note  Name: Lizbeth David  MRN: [id number]  Unit/Bed#: -85 I Date of Admission: 5/24/2023   Date of Service: 5/26/2023 I Hospital Day: 2    Assessment/Plan   * Right knee pain  Assessment & Plan  Pain following GLF at home  CT RLE (5/24): Small knee joint effusion  No acute osseous abnormality  Continue supportive care  PT/OT recommending STR placement  CM following to aide in discharge planning    SIRS (systemic inflammatory response syndrome) ([facility] [address] )  Assessment & Plan  POA as evidenced by leukocytosis and tachycardia  Leukocytosis has improved  No source of infection at this time- Likely reactive in the setting of fall with right knee pain  BClx (5/24): NGTD  Remains stable off antibiotics     Fall  Assessment & Plan  Patient reports fall at home prior to arrival while moving containers in her bedroom  CT Brain (5/24): Microangiopathic changes  No acute intracranial abnormality  CT C Spine (5/24): No cervical spine fracture or traumatic malalignment    PT/OT recommendations as above    Type 2 diabetes mellitus with diabetic polyneuropathy, with long-term current use of insulin [facility])  Assessment & Plan  Lab Results   Component Value Date    HGBA1C 8 9 (H) 05/26/2023       Recent Labs     05/25/23  1640 05/25/23  2112 05/26/23  0750 05/26/23  1112   POCGLU 81 242* 254* 224*       Blood Sugar Average: Last 72 hrs:  (P) 229 2     Continue Lantus 9U QAM and 6U QPM  Continue corrective sliding scale insulin, accuchecks, and hypoglycemic protocol  Carb-controlled diet    HLD (hyperlipidemia)  Assessment & Plan  Continue Atorvastatin 20mg daily       VTE Pharmacologic Prophylaxis: VTE Score: 3 Moderate Risk (Score 3-4) - Pharmacological DVT Prophylaxis Ordered: enoxaparin (Lovenox)  Patient Centered Rounds: I performed bedside rounds with nursing staff today  Discussions with Specialists or Other Care Team Provider: Nursing and CM    Education and Discussions with Family / Patient: Updated  (daughter) via phone  Total Time Spent on Date of Encounter in care of patient: 35 minutes This time was spent on one or more of the following: performing physical exam; counseling and coordination of care; obtaining or reviewing history; documenting in the medical record; reviewing/ordering tests, medications or procedures; communicating with other healthcare professionals and discussing with patient's family/caregivers  Current Length of Stay: 2 day(s)  Current Patient Status: Inpatient   Certification Statement: The patient will continue to require additional inpatient hospital stay due to awaiting placement  Discharge Plan: Medically cleared for discharge once placement is arranged  Code Status: Level 3 - DNAR and DNI    Subjective:   Patient continues to report knee pain but offers no other acute complaints  No significant over night events reported by nursing  Objective:     Vitals:   Temp (24hrs), Av 4 °F (36 9 °C), Min:98 1 °F (36 7 °C), Max:98 5 °F (36 9 °C)    Temp:  [98 1 °F (36 7 °C)-98 5 °F (36 9 °C)] 98 5 °F (36 9 °C)  HR:  [70-92] 92  Resp:  [16-20] 20  BP: ()/(49-96) 142/96  SpO2:  [96 %-97 %] 97 %  Body mass index is 23 04 kg/m²  Input and Output Summary (last 24 hours):      Intake/Output Summary (Last 24 hours) at 2023 1244  Last data filed at 2023 0900  Gross per 24 hour   Intake 480 ml   Output 700 ml   Net -220 ml       Physical Exam: Physical Exam  Vitals and nursing note reviewed  Constitutional:       General: She is not in acute distress  Appearance: She is well-developed and normal weight  HENT:      Head: Normocephalic and atraumatic  Mouth/Throat:      Mouth: Mucous membranes are moist       Pharynx: Oropharynx is clear  Eyes:      Extraocular Movements: Extraocular movements intact  Conjunctiva/sclera: Conjunctivae normal    Cardiovascular:      Rate and Rhythm: Normal rate and regular rhythm  Pulses: Normal pulses  Heart sounds: Normal heart sounds  No murmur heard  Pulmonary:      Effort: Pulmonary effort is normal  No respiratory distress  Breath sounds: Normal breath sounds  Abdominal:      General: Bowel sounds are normal  There is no distension  Palpations: Abdomen is soft  Tenderness: There is no abdominal tenderness  Musculoskeletal:         General: No swelling  Cervical back: Normal range of motion and neck supple  Comments: R knee tenderness to palpation   Skin:     General: Skin is warm and dry  Capillary Refill: Capillary refill takes less than 2 seconds  Neurological:      General: No focal deficit present  Mental Status: She is alert and oriented to person, place, and time  Mental status is at baseline  Psychiatric:         Mood and Affect: Mood normal          Behavior: Behavior normal          Labs:  Results from last 7 days   Lab Units 05/26/23 0423 05/25/23  0505 05/24/23  1714   EOS PCT %  --   --  0   HEMATOCRIT % 40 2   < > 38 9   HEMOGLOBIN g/dL 12 8   < > 12 5   LYMPHS PCT %  --   --  4*   MONOS PCT %  --   --  6   NEUTROS PCT %  --   --  89*   PLATELETS Thousands/uL 183   < > 201   WBC Thousand/uL 13 43*   < > 26 21*    < > = values in this interval not displayed       Results from last 7 days   Lab Units 05/26/23 0423 05/25/23  0505   ANION GAP mmol/L 8 9   ALBUMIN g/dL  --  3 9   ALK PHOS U/L  --  63   ALT U/L  --  26   AST U/L  --  25 BUN mg/dL 17 16   CALCIUM mg/dL 8 3* 8 5   CHLORIDE mmol/L 103 101   CO2 mmol/L 27 27   CREATININE mg/dL 0 67 0 71   GLUCOSE RANDOM mg/dL 202* 232*   POTASSIUM mmol/L 4 3 4 5   SODIUM mmol/L 138 137   TOTAL BILIRUBIN mg/dL  --  0 70     Results from last 7 days   Lab Units 05/24/23  1714   INR  0 90     Results from last 7 days   Lab Units 05/26/23  1112 05/26/23  0750 05/25/23  2112 05/25/23  1640 05/25/23  1210 05/25/23  0907 05/25/23  0642 05/24/23  2102 05/24/23  1958 05/24/23  1638   POC GLUCOSE mg/dl 224* 254* 242* 81 330* 211* 211* 337* 212* 190*     Results from last 7 days   Lab Units 05/26/23  0423   HEMOGLOBIN A1C % 8 9*     Results from last 7 days   Lab Units 05/25/23  0505 05/24/23  1900   LACTIC ACID mmol/L  --  1 6   PROCALCITONIN ng/ml 0 14 0 08       Lines/Drains:  Invasive Devices       Peripheral Intravenous Line  Duration             Peripheral IV 05/24/23 Left Antecubital 1 day                    Imaging: No new imaging  Recent Cultures (last 7 days):   Results from last 7 days   Lab Units 05/24/23 2053 05/24/23  1900   BLOOD CULTURE  No Growth at 24 hrs  No Growth at 24 hrs         Last 24 Hours Medication List:   Current Facility-Administered Medications   Medication Dose Route Frequency Provider Last Rate   • acetaminophen  650 mg Oral Q4H PRN Lobo Bauer PA-C     • atorvastatin  20 mg Oral Daily With Kim Boss PA-C     • calcium carbonate  1 tablet Oral Daily With Breakfast Lobo Bauer PA-C     • enoxaparin  40 mg Subcutaneous Q24H [address] Osman Alexander PA-C     • gabapentin  300 mg Oral TID Lobo Bauer PA-C     • insulin glargine  6 Units Subcutaneous HS [facility] Palacio, DO     • insulin glargine  9 Units Subcutaneous QAM [facility] Palacio, DO     • insulin lispro  1-5 Units Subcutaneous HS [facility] Palacio, DO     • insulin lispro  1-6 Units Subcutaneous TID [facility] Palacio, DO     • levothyroxine  112 mcg Oral QAM Lobo Bauer PA-C     • methocarbamol  500 mg Oral Q6H PRN Dannis Marker, PA-C     • ondansetron  4 mg Intravenous Q6H PRN Dannis Marker, PA-C     • pantoprazole  40 mg Oral BID Dannis Marker, PA-C     • traMADol  50 mg Oral Q4H PRN Dannis Marker, PA-C          Today, Patient Was Seen By: Rony Chong DO    **Please Note: This note may have been constructed using a voice recognition system  **    Rony Chong DO  5/25/2023  4:05 PM  Signed  [address]  Progress Note  Name: Willy Gabriel  MRN: [id number]  Unit/Bed#: -21 I Date of Admission: 5/24/2023   Date of Service: 5/25/2023 I Hospital Day: 1    Assessment/Plan   * Right knee pain  Assessment & Plan  Pain following GLF at home  CT RLE (5/24): Small knee joint effusion  No acute osseous abnormality  Continue supportive care  PT/OT recommending STR placement    SIRS (systemic inflammatory response syndrome) (H[facility])  Assessment & Plan  POA as evidenced by leukocytosis and tachycardia  No source of infection at this time- Likely reactive in the setting of fall with right knee pain  BClx (5/24): NGTD  Continue to monitor off abx    Fall  Assessment & Plan  Patient reports fall at home prior to arrival while moving containers in her bedroom  CT Brain (5/24): Microangiopathic changes  No acute intracranial abnormality  CT C Spine (5/24): No cervical spine fracture or traumatic malalignment    PT/OT recommendations as above    Type 2 diabetes mellitus with diabetic polyneuropathy, with long-term current use of insulin [facility])  Assessment & Plan  Lab Results   Component Value Date    HGBA1C 8 7 (H) 10/31/2022       Recent Labs     05/24/23  2102 05/25/23  0642 05/25/23  0907 05/25/23  1210   POCGLU 337* 211* 211* 330*       Blood Sugar Average: Last 72 hrs:  (P) 248 5     Increase insulin to Lantus 9U BID  Continue corrective sliding scale insulin, accuchecks, and hypoglycemic protocol  Carb-controlled diet    HLD (hyperlipidemia)  Assessment & Plan  Continue Atorvastatin 20mg daily         VTE Pharmacologic Prophylaxis: VTE Score: 3 Moderate Risk (Score 3-4) - Pharmacological DVT Prophylaxis Ordered: enoxaparin (Lovenox)  Patient Centered Rounds: I performed bedside rounds with nursing staff today  Discussions with Specialists or Other Care Team Provider: PT/OT and CM    Education and Discussions with Family / Patient: Patient declined call to   Total Time Spent on Date of Encounter in care of patient: 35 minutes This time was spent on one or more of the following: performing physical exam; counseling and coordination of care; obtaining or reviewing history; documenting in the medical record; reviewing/ordering tests, medications or procedures; communicating with other healthcare professionals and discussing with patient's family/caregivers  Current Length of Stay: 1 day(s)  Current Patient Status: Inpatient   Certification Statement: The patient will continue to require additional inpatient hospital stay due to awaiting placement  Discharge Plan: Medically cleared for discharge once placement is arranged  Code Status: Level 3 - DNAR and DNI    Subjective:   Patient resting in bed  She noted frustration with not getting dinner over night  She continues to have right knee pain  No significant over night events reported  Objective:     Vitals:   Temp (24hrs), Av 4 °F (36 9 °C), Min:97 7 °F (36 5 °C), Max:99 °F (37 2 °C)    Temp:  [97 7 °F (36 5 °C)-99 °F (37 2 °C)] 98 1 °F (36 7 °C)  HR:  [73-95] 77  Resp:  [18-20] 18  BP: ()/() 109/57  SpO2:  [90 %-99 %] 96 %  Body mass index is 23 04 kg/m²  Input and Output Summary (last 24 hours): Intake/Output Summary (Last 24 hours) at 2023 1601  Last data filed at 2023 1128  Gross per 24 hour   Intake 240 ml   Output --   Net 240 ml       Physical Exam:   Physical Exam  Vitals and nursing note reviewed     Constitutional:       General: She is not in acute distress  Appearance: Normal appearance  She is well-developed  HENT:      Head: Normocephalic and atraumatic  Mouth/Throat:      Mouth: Mucous membranes are moist       Pharynx: Oropharynx is clear  Eyes:      Extraocular Movements: Extraocular movements intact  Conjunctiva/sclera: Conjunctivae normal    Cardiovascular:      Rate and Rhythm: Normal rate and regular rhythm  Pulses: Normal pulses  Heart sounds: Normal heart sounds  No murmur heard  Pulmonary:      Effort: Pulmonary effort is normal  No respiratory distress  Breath sounds: Normal breath sounds  Abdominal:      General: Bowel sounds are normal  There is no distension  Palpations: Abdomen is soft  Tenderness: There is no abdominal tenderness  Musculoskeletal:         General: No swelling  Normal range of motion  Cervical back: Normal range of motion and neck supple  Comments: Right knee tenderness to palpation    Skin:     General: Skin is warm and dry  Capillary Refill: Capillary refill takes less than 2 seconds  Neurological:      General: No focal deficit present  Mental Status: She is alert and oriented to person, place, and time  Mental status is at baseline     Psychiatric:         Mood and Affect: Mood normal          Behavior: Behavior normal          Judgment: Judgment normal           Additional Data:     Labs:  Results from last 7 days   Lab Units 05/25/23  0505 05/24/23  1714   EOS PCT %  --  0   HEMATOCRIT % 41 5 38 9   HEMOGLOBIN g/dL 13 7 12 5   LYMPHS PCT %  --  4*   MONOS PCT %  --  6   NEUTROS PCT %  --  89*   PLATELETS Thousands/uL 209 201   WBC Thousand/uL 17 93* 26 21*     Results from last 7 days   Lab Units 05/25/23  0505   ANION GAP mmol/L 9   ALBUMIN g/dL 3 9   ALK PHOS U/L 63   ALT U/L 26   AST U/L 25   BUN mg/dL 16   CALCIUM mg/dL 8 5   CHLORIDE mmol/L 101   CO2 mmol/L 27   CREATININE mg/dL 0 71   GLUCOSE RANDOM mg/dL 232*   POTASSIUM mmol/L 4 5 SODIUM mmol/L 137   TOTAL BILIRUBIN mg/dL 0 70     Results from last 7 days   Lab Units 05/24/23  1714   INR  0 90     Results from last 7 days   Lab Units 05/25/23  1210 05/25/23  0907 05/25/23  0642 05/24/23  2102 05/24/23  1958 05/24/23  1638   POC GLUCOSE mg/dl 330* 211* 211* 337* 212* 190*         Results from last 7 days   Lab Units 05/25/23  0505 05/24/23  1900   LACTIC ACID mmol/L  --  1 6   PROCALCITONIN ng/ml 0 14 0 08       Lines/Drains:  Invasive Devices       Peripheral Intravenous Line  Duration             Peripheral IV 05/24/23 Left Antecubital 1 day                    Imaging: Reviewed radiology reports from this admission including: ST LE    Recent Cultures (last 7 days):   Results from last 7 days   Lab Units 05/24/23 2053 05/24/23  1900   BLOOD CULTURE  Received in Microbiology Lab  Culture in Progress  Received in Microbiology Lab  Culture in Progress         Last 24 Hours Medication List:   Current Facility-Administered Medications   Medication Dose Route Frequency Provider Last Rate   • acetaminophen  650 mg Oral Q4H PRN Harper Schwab, PA-C     • atorvastatin  20 mg Oral Daily With [place] PA-C     • calcium carbonate  1 tablet Oral Daily With Breakfast Harper Schwab, PA-C     • enoxaparin  40 mg Subcutaneous Q24H [address] Osman Alexander PA-C     • gabapentin  300 mg Oral TID Harper Schwab, PA-C     • insulin glargine  9 Units Subcutaneous Q12H [place],      • insulin lispro  1-5 Units Subcutaneous HS [facility], DO     • insulin lispro  1-6 Units Subcutaneous TID [facility], DO     • levothyroxine  112 mcg Oral QAM Harper Schwab, PA-C     • methocarbamol  500 mg Oral Q6H PRN Harper Schwab, PA-C     • ondansetron  4 mg Intravenous Q6H PRN Harper Schwab, PA-C     • pantoprazole  40 mg Oral BID Harper Schwab, PA-C     • traMADol  50 mg Oral Q4H PRN Harper Schwab, PA-C          Today, Patient Was Seen By: Lazarus Pascal, DO    **Please Note: This note may have been constructed using a voice recognition system  **

## 2023-05-24 NOTE — PLAN OF CARE
Problem: Potential for Falls  Goal: Patient will remain free of falls  Description: INTERVENTIONS:  - Educate patient/family on patient safety including physical limitations  - Instruct patient to call for assistance with activity   - Consult OT/PT to assist with strengthening/mobility   - Keep Call bell within reach  - Keep bed low and locked with side rails adjusted as appropriate  - Keep care items and personal belongings within reach  - Initiate and maintain comfort rounds  - Make Fall Risk Sign visible to staff  - Offer Toileting every 2 Hours, in advance of need  - Initiate/Maintain bed alarm  - Apply yellow socks and bracelet for high fall risk patients  - Consider moving patient to room near nurses station  Outcome: Progressing     Problem: MOBILITY - ADULT  Goal: Maintain or return to baseline ADL function  Description: INTERVENTIONS:  -  Assess patient's ability to carry out ADLs; assess patient's baseline for ADL function and identify physical deficits which impact ability to perform ADLs (bathing, care of mouth/teeth, toileting, grooming, dressing, etc )  - Assess/evaluate cause of self-care deficits   - Assess range of motion  - Assess patient's mobility; develop plan if impaired  - Assess patient's need for assistive devices and provide as appropriate  - Encourage maximum independence but intervene and supervise when necessary  - Involve family in performance of ADLs  - Assess for home care needs following discharge   - Consider OT consult to assist with ADL evaluation and planning for discharge  - Provide patient education as appropriate  Outcome: Progressing  Goal: Maintains/Returns to pre admission functional level  Description: INTERVENTIONS:  - Perform BMAT or MOVE assessment daily    - Set and communicate daily mobility goal to care team and patient/family/caregiver  - Collaborate with rehabilitation services on mobility goals if consulted  - Reposition patient every 2 hours    - Out of bed for toileting  - Record patient progress and toleration of activity level   Outcome: Progressing     Problem: Prexisting or High Potential for Compromised Skin Integrity  Goal: Skin integrity is maintained or improved  Description: INTERVENTIONS:  - Identify patients at risk for skin breakdown  - Assess and monitor skin integrity  - Assess and monitor nutrition and hydration status  - Monitor labs   - Assess for incontinence   - Turn and reposition patient  - Assist with mobility/ambulation  - Relieve pressure over bony prominences  - Avoid friction and shearing  - Provide appropriate hygiene as needed including keeping skin clean and dry  - Evaluate need for skin moisturizer/barrier cream  - Collaborate with interdisciplinary team   - Patient/family teaching  - Consider wound care consult   Outcome: Progressing

## 2023-05-24 NOTE — ASSESSMENT & PLAN NOTE
Refill  dexmethylpheniDATE (FOCALIN) 5 MG tablet  Send to [facility]     Questions call [phone]. · Present on admission with leukocytosis and tachycardia  No source of infection at this time  · Likely reactive in the setting of fall with right knee pain  · Will not give IV antibiotics or IV fluid resuscitation per sepsis protocol as there is no source of infection  · Check lactic acid now  · Blood cultures x 2 now  · UA not suggestive of UTI  · Patient reports feeling well prior to fall  Only complaint is right knee pain since fall     · Repeat labs in am

## 2023-05-24 NOTE — ASSESSMENT & PLAN NOTE
· Patient reports fall at home earlier today while moving containers in her bedroom  She states she attempted to stop herself from falling and twisted her knee while falling  · She denies hitting her head or LOC  · CT head: Microangiopathic changes  No acute intracranial abnormality  · CT C spine: No cervical spine fracture or traumatic malalignment    · PT/OT

## 2023-05-24 NOTE — ED NOTES
"Patient hearing screaming from hallway; this RN to patient's bedside to assist  Patient screaming at this RN regarding pain and needing to use restroom  Patient refusing to ambulated  Patient offered bed pan; placed on bed pan as requested  Patient pulling out bed pan with this RN at bedside, screaming \" get me the hell out of here \" Patient offered repositioning at this time, patient yelling for pain medication   Provider made aware     Dianne Edge RN  05/24/23 9896    "

## 2023-05-25 LAB
ALBUMIN SERPL BCP-MCNC: 3.9 G/DL (ref 3.5–5)
ALP SERPL-CCNC: 63 U/L (ref 34–104)
ALT SERPL W P-5'-P-CCNC: 26 U/L (ref 7–52)
ANION GAP SERPL CALCULATED.3IONS-SCNC: 9 MMOL/L (ref 4–13)
AST SERPL W P-5'-P-CCNC: 25 U/L (ref 13–39)
ATRIAL RATE: 87 BPM
BILIRUB SERPL-MCNC: 0.7 MG/DL (ref 0.2–1)
BUN SERPL-MCNC: 16 MG/DL (ref 5–25)
CALCIUM SERPL-MCNC: 8.5 MG/DL (ref 8.4–10.2)
CHLORIDE SERPL-SCNC: 101 MMOL/L (ref 96–108)
CO2 SERPL-SCNC: 27 MMOL/L (ref 21–32)
CREAT SERPL-MCNC: 0.71 MG/DL (ref 0.6–1.3)
ERYTHROCYTE [DISTWIDTH] IN BLOOD BY AUTOMATED COUNT: 11.6 % (ref 11.6–15.1)
GFR SERPL CREATININE-BSD FRML MDRD: 79 ML/MIN/1.73SQ M
GLUCOSE SERPL-MCNC: 211 MG/DL (ref 65–140)
GLUCOSE SERPL-MCNC: 211 MG/DL (ref 65–140)
GLUCOSE SERPL-MCNC: 232 MG/DL (ref 65–140)
GLUCOSE SERPL-MCNC: 242 MG/DL (ref 65–140)
GLUCOSE SERPL-MCNC: 330 MG/DL (ref 65–140)
GLUCOSE SERPL-MCNC: 81 MG/DL (ref 65–140)
HCT VFR BLD AUTO: 41.5 % (ref 34.8–46.1)
HGB BLD-MCNC: 13.7 G/DL (ref 11.5–15.4)
MAGNESIUM SERPL-MCNC: 2 MG/DL (ref 1.9–2.7)
MCH RBC QN AUTO: 34 PG (ref 26.8–34.3)
MCHC RBC AUTO-ENTMCNC: 33 G/DL (ref 31.4–37.4)
MCV RBC AUTO: 103 FL (ref 82–98)
P AXIS: 63 DEGREES
PLATELET # BLD AUTO: 209 THOUSANDS/UL (ref 149–390)
PMV BLD AUTO: 10.6 FL (ref 8.9–12.7)
POTASSIUM SERPL-SCNC: 4.5 MMOL/L (ref 3.5–5.3)
PR INTERVAL: 124 MS
PROCALCITONIN SERPL-MCNC: 0.14 NG/ML
PROT SERPL-MCNC: 6.5 G/DL (ref 6.4–8.4)
QRS AXIS: 24 DEGREES
QRSD INTERVAL: 76 MS
QT INTERVAL: 372 MS
QTC INTERVAL: 447 MS
RBC # BLD AUTO: 4.03 MILLION/UL (ref 3.81–5.12)
SODIUM SERPL-SCNC: 137 MMOL/L (ref 135–147)
T WAVE AXIS: 35 DEGREES
VENTRICULAR RATE: 87 BPM
WBC # BLD AUTO: 17.93 THOUSAND/UL (ref 4.31–10.16)

## 2023-05-25 RX ORDER — INSULIN GLARGINE 100 [IU]/ML
9 INJECTION, SOLUTION SUBCUTANEOUS EVERY 12 HOURS SCHEDULED
Status: DISCONTINUED | OUTPATIENT
Start: 2023-05-25 | End: 2023-05-25

## 2023-05-25 RX ORDER — INSULIN GLARGINE 100 [IU]/ML
6 INJECTION, SOLUTION SUBCUTANEOUS
Status: DISCONTINUED | OUTPATIENT
Start: 2023-05-25 | End: 2023-05-27 | Stop reason: HOSPADM

## 2023-05-25 RX ORDER — INSULIN LISPRO 100 [IU]/ML
1-5 INJECTION, SOLUTION INTRAVENOUS; SUBCUTANEOUS
Status: DISCONTINUED | OUTPATIENT
Start: 2023-05-25 | End: 2023-05-27 | Stop reason: HOSPADM

## 2023-05-25 RX ORDER — INSULIN GLARGINE 100 [IU]/ML
9 INJECTION, SOLUTION SUBCUTANEOUS EVERY MORNING
Status: DISCONTINUED | OUTPATIENT
Start: 2023-05-26 | End: 2023-05-27 | Stop reason: HOSPADM

## 2023-05-25 RX ORDER — INSULIN LISPRO 100 [IU]/ML
1-6 INJECTION, SOLUTION INTRAVENOUS; SUBCUTANEOUS
Status: DISCONTINUED | OUTPATIENT
Start: 2023-05-25 | End: 2023-05-27 | Stop reason: HOSPADM

## 2023-05-25 RX ADMIN — TRAMADOL HYDROCHLORIDE 50 MG: 50 TABLET, COATED ORAL at 02:37

## 2023-05-25 RX ADMIN — Medication 2.5 MG: at 12:16

## 2023-05-25 RX ADMIN — GABAPENTIN 300 MG: 300 CAPSULE ORAL at 08:19

## 2023-05-25 RX ADMIN — TRAMADOL HYDROCHLORIDE 50 MG: 50 TABLET, COATED ORAL at 08:19

## 2023-05-25 RX ADMIN — LEVOTHYROXINE SODIUM 112 MCG: 112 TABLET ORAL at 05:12

## 2023-05-25 RX ADMIN — GABAPENTIN 300 MG: 300 CAPSULE ORAL at 21:17

## 2023-05-25 RX ADMIN — PANTOPRAZOLE SODIUM 40 MG: 40 TABLET, DELAYED RELEASE ORAL at 21:17

## 2023-05-25 RX ADMIN — INSULIN GLARGINE 6 UNITS: 100 INJECTION, SOLUTION SUBCUTANEOUS at 21:22

## 2023-05-25 RX ADMIN — CALCIUM 1 TABLET: 500 TABLET ORAL at 08:19

## 2023-05-25 RX ADMIN — GABAPENTIN 300 MG: 300 CAPSULE ORAL at 16:50

## 2023-05-25 RX ADMIN — ATORVASTATIN CALCIUM 20 MG: 20 TABLET, FILM COATED ORAL at 16:50

## 2023-05-25 RX ADMIN — INSULIN LISPRO 2 UNITS: 100 INJECTION, SOLUTION INTRAVENOUS; SUBCUTANEOUS at 21:21

## 2023-05-25 RX ADMIN — INSULIN GLARGINE 9 UNITS: 100 INJECTION, SOLUTION SUBCUTANEOUS at 08:19

## 2023-05-25 RX ADMIN — ENOXAPARIN SODIUM 40 MG: 40 INJECTION SUBCUTANEOUS at 08:19

## 2023-05-25 RX ADMIN — INSULIN LISPRO 5 UNITS: 100 INJECTION, SOLUTION INTRAVENOUS; SUBCUTANEOUS at 12:13

## 2023-05-25 RX ADMIN — PANTOPRAZOLE SODIUM 40 MG: 40 TABLET, DELAYED RELEASE ORAL at 08:19

## 2023-05-25 RX ADMIN — TRAMADOL HYDROCHLORIDE 50 MG: 50 TABLET, COATED ORAL at 16:52

## 2023-05-25 NOTE — ASSESSMENT & PLAN NOTE
Lab Results   Component Value Date    HGBA1C 8 7 (H) 10/31/2022       Recent Labs     05/24/23  2102 05/25/23  0642 05/25/23  0907 05/25/23  1210   POCGLU 337* 211* 211* 330*       Blood Sugar Average: Last 72 hrs:  (P) 248 5     · Increase insulin to Lantus 9U BID  · Continue corrective sliding scale insulin, accuchecks, and hypoglycemic protocol  · Carb-controlled diet

## 2023-05-25 NOTE — PLAN OF CARE
Problem: OCCUPATIONAL THERAPY ADULT  Goal: Performs self-care activities at highest level of function for planned discharge setting  See evaluation for individualized goals  Description: Treatment Interventions: ADL retraining, Functional transfer training, UE strengthening/ROM, Endurance training, Patient/family training, Equipment evaluation/education, Compensatory technique education, Energy conservation, Activityengagement    See flowsheet documentation for full assessment, interventions and recommendations  Note: Limitation: Decreased ADL status, Decreased UE ROM, Decreased UE strength, Decreased Safe judgement during ADL, Decreased endurance, Decreased self-care trans, Decreased high-level ADLs  Prognosis: Good  Assessment: Pt is a 80 y o  female seen for OT evaluation s/p admit to [place] [id number] on 5/24/2023 w/ Right knee pain  Comorbidities affecting pt's functional performance at time of assessment include: Chronic pain, DM II, Hdgkin's disease, HTN, RA, Osteoporosis  Personal factors affecting pt at time of IE include:steps to enter environment, limited home support, difficulty performing ADLS and decreased initiation and engagement   Prior to admission, pt was Mod I with ADLs  Upon evaluation: the following deficits impact occupational performance: decreased ROM, decreased strength, decreased balance, decreased tolerance, decreased safety awareness and increased pain  Pt to benefit from continued skilled OT tx while in the hospital to address deficits as defined above and maximize level of functional independence w ADL's and functional mobility  Occupational Performance areas to address include: bathing/shower, toilet hygiene, dressing, functional mobility and clothing management  From OT standpoint, recommendation at time of d/c would be STR       OT Discharge Recommendation: Post acute rehabilitation services     [address] [place] [address], [place], OTR/L

## 2023-05-25 NOTE — PHYSICAL THERAPY NOTE
Physical Therapy Evaluation   Time in: 4794  Time out: 1128  Total evaluation time: 16 minutes    Patient's Name: Rambo Moscoso    Admitting Diagnosis  Knee pain [M25 569]  Right knee pain [M25 561]  Ambulatory dysfunction [R26 2]    Problem List  Patient Active Problem List   Diagnosis    HLD (hyperlipidemia)    HTN (hypertension)    Acquired hypothyroidism    Type 2 diabetes mellitus with diabetic polyneuropathy, with long-term current use of insulin (HCC)    Closed intertrochanteric fracture of right femur (HCC)    Hyponatremia    Elevated vitamin B12 level    Anemia    Hypophosphatemia    Primary osteoarthritis of both knees    Acute hip pain, right    Ambulatory dysfunction    Right knee pain    Primary osteoarthritis of right shoulder    Soft tissue radionecrosis    Osteoradionecrosis of temporal bone (HCC)    SIRS of non-infectious origin w acute organ dysfunction (HCC)    High anion gap metabolic acidosis    Coffee ground emesis    RAMIRO (acute kidney injury) ([address] )    Abnormal CT of the abdomen    Visual changes    Fall    Traumatic rhabdomyolysis ([address] )    Leukemoid reaction    Gastroesophageal reflux disease without esophagitis    SIRS (systemic inflammatory response syndrome) ([address] )       Past Medical History  Past Medical History:   Diagnosis Date    Ambulates with cane     Cancer ([address] )     Chronic pain disorder     knees/ shoulders (gets inj every 3 mos)    Controlled type 2 diabetes mellitus with diabetic polyneuropathy, with long-term current use of insulin ([address] ) 5/21/2008    Diabetes mellitus ([address] )     Diabetic polyneuropathy ([address] ) 5/21/2008    ICD10 clean up    Disease of thyroid gland     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    HL (hearing loss)     Hodgkin's disease ([address] ) 2008    Left neck, had radiation    Hypertension     Neuropathy     Osteoporosis     RA (rheumatoid arthritis) ([address] )        Past Surgical History  Past Surgical History:   Procedure Laterality Date    ADENOIDECTOMY APPENDECTOMY      CATARACT EXTRACTION      CATARACT EXTRACTION, BILATERAL      CHOLECYSTECTOMY      FRACTURE SURGERY Right     hip    MOUTH SURGERY      oral cancer left lower lip    OVARY SURGERY      MT ADJT TIS TRNS/REARGMT F/C/C/M/N/A/G/H/F 10SQCM/< N/A 3/28/2022    Procedure: Adjacent tissue transfer face;  Surgeon: Boyd Crawford MD;  Location: [facility];  Service: ENT    MT OPTX FEM SHFT FX W/INSJ IMED IMPLT W/WO SCREW Right 5/28/2020    Procedure: INSERTION NAIL IM FEMUR ANTEGRADE (TROCHANTERIC); Surgeon: Flaquito Florez DO;  Location: [facility] MAIN OR;  Service: Orthopedics    MT TRANSMASTOID ANTROTOMY Left 3/28/2022    Procedure: MASTOIDECTOMY;  Surgeon: Boyd Crawford MD;  Location: [facility];  Service: ENT    TONSILLECTOMY      TOTAL THYROIDECTOMY  2008    Performed after left neck dissection and left oral cancer diagnosis       PT performed at least 2 patient identifiers during session: Name and wristband  05/25/23 1128   PT Last Visit   PT Visit Date 05/25/23   Note Type   Note type Evaluation   Pain Assessment   Pain Assessment Tool 0-10   Pain Score 8   Pain Location/Orientation Orientation: Right;Location: Knee   Pain Onset/Description Onset: Ongoing   Patient's Stated Pain Goal No pain   Hospital Pain Intervention(s) Repositioned;Elevated; Emotional support   Restrictions/Precautions   Weight Bearing Precautions Per Order No   Other Precautions Bed Alarm; Fall Risk;Pain   Home Living   Type of [address] [place] [address] One level;Performs ADLs on one level; Able to live on main level with bedroom/bathroom;Stairs to enter with rails  (2 TEE)   Bathroom Shower/Tub Tub/shower unit   [employer] Raised   Bathroom Equipment Grab bars in shower; Toilet raiser;Grab bars around toilet   [facility]; Other (Comment)  (RW vs rollator)   Prior Function   Level of [place] Independent with ADLs; Independent with functional mobility   Lives With "Alone   Receives Help From [facility] in the last 6 months 1 to 4  (1 fall)   Vocational Retired   General   Family/Caregiver Present No   Cognition   Arousal/Participation Alert   Attention Within functional limits   Orientation Level Oriented X4   Memory Decreased recall of precautions   Following Commands Follows one step commands without difficulty   Comments pt agreeable to PT session   Subjective   Subjective \"I can try to get up for a walk\"   RLE Assessment   RLE Assessment X   Strength RLE   RLE Overall Strength 3/5   LLE Assessment   LLE Assessment X   Strength LLE   LLE Overall Strength 3/5   Vision-Basic Assessment   Current Vision Does not wear glasses   Coordination   Movements are Fluid and Coordinated 0   Coordination and Movement Description Incremental, antalgic mobilization requiring increased time for completion   Bed Mobility   Supine to Sit 5  Supervision   Additional items Assist x 1; Increased time required;LE management   Sit to Supine 4  Minimal assistance   Additional items Assist x 2; Increased time required;LE management   Transfers   Sit to Stand 4  Minimal assistance   Additional items Assist x 2; Increased time required;Verbal cues   Stand to Sit 4  Minimal assistance   Additional items Assist x 2; Increased time required;Verbal cues   Ambulation/Elevation   Gait pattern Improper Weight shift; Antalgic;Decreased R stance;R Knee Britni; Step to;Excessively slow   Gait Assistance   (CGA/min Ax2)   Additional items Assist x 2;Verbal cues   Assistive Device Rolling walker   Distance 20 ft   Stair Management Assistance Not tested   Balance   Static Sitting Good   Dynamic Sitting Fair   Static Standing Fair -   Dynamic Standing Poor +   Ambulatory Poor +   Endurance Deficit   Endurance Deficit Yes   Activity Tolerance   Activity Tolerance Patient limited by fatigue;Patient limited by pain   Medical Staff Made Aware coordination of care provided with OT Pati   Nurse Made Aware Yes, PHAM Key " made aware of outcomes/recs   Assessment   Prognosis Fair   Problem List Decreased strength;Decreased endurance; Impaired balance;Decreased mobility; Decreased safety awareness;Pain   Assessment Pt is 80 y o  female seen for high-complexity PT evaluation on 5/25/2023 s/p admit to [facility] on 5/24/2023 w/ Right knee pain s/p fall d/t LOB  PT was consulted to assess pt's functional mobility and d/c needs  Order placed for PT eval and tx, w/ up and OOB as tolerated order  PTA, pt lives alone in 1 SH, amb c RW vs rollator at baseline  At time of eval, pt requires SUP for supine>sit, sit>supine min Ax2, STS min Ax2, amb trial x 20 ft with RW use = CGA/min Ax2  Upon evaluation, pt presenting with impaired functional mobility d/t decreased strength, decreased endurance, impaired balance, decreased mobility, decreased safety awareness and pain  Pertinent PMHx and current co-morbidities affecting pt's physical performance at time of assessment include: SIRS, DM type 2, s/p fall, HLD  Personal factors affecting pt at time of eval include: limited home support, positive fall history, decreased initiation and engagement and limited insight into impairments  The following objective measures performed on IE also reveal limitations: AM-PAC 6-Clicks: 07/49  Pt's clinical presentation is currently unstable/unpredictable seen in pt's presentation of need for input for task focus and mobility technique, severe R knee pain impacting overall mobility status and ongoing medical assessment  Overall, pt's rehab potential and prognosis to return to PLOF is fair as impacted by objective findings, warranting pt to receive further skilled PT interventions to address identified impairments, activity limitation(s), and participation restriction(s)   Pt to benefit from continued PT tx to address deficits as defined above and maximize level of functional independent mobility and consistency in order for pt to improve activity "tolerance  From PT/mobility standpoint, recommendation at time of d/c would be post acute rehabilitation services pending progress in order to facilitate return to PLOF  Barriers to Discharge Inaccessible home environment;Decreased caregiver support   Goals   Patient Goals \"to have less knee pain\"   [facility] Expiration Date 06/04/23   Short Term Goal #1 In 7-10 days: Increase bilateral LE strength 1/2 grade to facilitate independent mobility, Perform all bed mobility tasks modified independent to decrease caregiver burden, Perform all transfers with close S to improve independence, Ambulate > 50 ft  with least restrictive assistive device with close S w/o LOB and w/ normalized gait pattern 100% of the time, Navigate 2 stairs with min A of 1 with unilateral handrail to facilitate return to previous living environment, Increase all balance 1/2 grade to decrease risk for falls, Complete exercise program independently and Tolerate 4 hr OOB to faciliate upright tolerance   PT Treatment Day 0   Plan   Treatment/Interventions Functional transfer training;LE strengthening/ROM; Elevations; Therapeutic exercise; Endurance training;Patient/family training;Equipment eval/education; Bed mobility; Compensatory technique education;Spoke to nursing;Spoke to case management   PT Frequency 3-5x/wk   Recommendation   PT Discharge Recommendation Post acute rehabilitation services   Equipment Recommended   (continue RW use)   Additional Comments Pt's raw score on the AM-[facility] Basic Mobility inpatient short form is 14, standardized score is 35 55  Patients at this level are likely to benefit from DC to [address], however, please refer to therapist recommendation for safe DC planning     AM-PAC Basic Mobility Inpatient   Turning in Flat Bed Without Bedrails 3   Lying on Back to Sitting on Edge of Flat Bed Without Bedrails 3   Moving Bed to Chair 2   Standing Up From Chair Using Arms 2   Walk in Room 2   Climb 3-5 Stairs With Railing 2   Basic Mobility " Inpatient Raw Score 14   Basic Mobility Standardized Score 35 55   Highest Level Of Mobility   -[facility] Goal 4: Move to chair/commode   -HLM Achieved 6: Walk 10 steps or more   End of Consult   Patient Position at End of Consult Supine;Bed/Chair alarm activated; All needs within reach       [place] Room, PT, DPT

## 2023-05-25 NOTE — ASSESSMENT & PLAN NOTE
· POA as evidenced by leukocytosis and tachycardia  · No source of infection at this time- Likely reactive in the setting of fall with right knee pain     · BClx (5/24): NGTD  · Continue to monitor off abx

## 2023-05-25 NOTE — ASSESSMENT & PLAN NOTE
· Patient reports fall at home prior to arrival while moving containers in her bedroom  · CT Brain (5/24): Microangiopathic changes  No acute intracranial abnormality  · CT C Spine (5/24): No cervical spine fracture or traumatic malalignment    · PT/OT recommendations as above

## 2023-05-25 NOTE — CASE MANAGEMENT
Case Management Assessment & Discharge Planning Note    Patient name Reinaldo Davis /-63 MRN [id number]  : 1940 Date 2023       Current Admission Date: 2023  Current Admission Diagnosis:Right knee pain   Patient Active Problem List    Diagnosis Date Noted   • SIRS (systemic inflammatory response syndrome) ([facility] ) 2023   • Fall 10/17/2022   • Traumatic rhabdomyolysis ([facility] ) 10/17/2022   • Leukemoid reaction 10/17/2022   • Gastroesophageal reflux disease without esophagitis 10/17/2022   • Visual changes 2022   • SIRS of non-infectious origin w acute organ dysfunction ([facility] ) 07/10/2022   • High anion gap metabolic acidosis    • Coffee ground emesis 07/10/2022   • RAMIRO (acute kidney injury) ([facility] ) 07/10/2022   • Abnormal CT of the abdomen 07/10/2022   • Soft tissue radionecrosis 2022   • Osteoradionecrosis of temporal bone ([facility] ) 2022   • Primary osteoarthritis of right shoulder 2021   • Right knee pain 2020   • Acute hip pain, right 2020   • Ambulatory dysfunction    • Primary osteoarthritis of both knees 2020   • Hypophosphatemia 2020   • Elevated vitamin B12 level 2020   • Anemia 2020   • Hyponatremia 2020   • Closed intertrochanteric fracture of right femur ([facility] ) 2020   • Acquired hypothyroidism 2015   • HLD (hyperlipidemia) 2014   • HTN (hypertension) 10/10/2013   • Type 2 diabetes mellitus with diabetic polyneuropathy, with long-term current use of insulin ([facility] ) 2008      LOS (days): 1  Geometric Mean LOS (GMLOS) (days):   Days to GMLOS:     OBJECTIVE:    Risk of Unplanned Readmission Score: 13 34      Current admission status: Inpatient  Referral Reason: Other (Assist with discharge planning)    Preferred Pharmacy:   [address] [place] [address], [address] [facility] [id number] [facility] [address]  Phone: [phone] Fax: [phone] primary Care Provider: Jabari Conte MD    Primary Insurance: MEDICARE  Secondary Insurance: AARP    ASSESSMENT:  Active Health Care Proxies     [facility] Representative - Daughter   Primary Phone: [phone] [facility]  Home Phone: [phone]               Advance Directives  Does patient have a [address]?: Yes  Does patient have Advance Directives?: Yes  Advance Directives: Living will, Power of  for health care  Primary Contact: Cindy Osborne (Daughter)   [phone] (Mobile)    Readmission Root Cause  30 Day Readmission: No    Patient Information  Admitted from[de-identified] Home  Mental Status: Alert  During Assessment patient was accompanied by: Not accompanied during assessment  Assessment information provided by[de-identified] Patient, Daughter  Primary Caregiver: Self  Support Systems: Son, Daughter, Self, Friend, Family members  [place] of Residence: [address] do you live in?: [address] entry access options   Select all that apply : Stairs  Number of steps to enter home : 2  Do the steps have railings?: Yes  Type of Current Residence: [place]  In the last 12 months, was there a time when you were not able to pay the mortgage or rent on time?: No  In the last 12 months, how many places have you lived?: 1  In the last 12 months, was there a time when you did not have a steady place to sleep or slept in a shelter (including now)?: No  Homeless/housing insecurity resource given?: N/A  Living Arrangements: Lives Alone  Is patient a ?: No    Activities of Daily Living Prior to Admission  Functional Status: Independent  Completes ADLs independently?: Yes  Ambulates independently?: Yes  Does patient use assisted devices?: Yes  Assisted Devices (DME) used: María Paulino  Does patient currently own DME?: Yes  What DME does the patient currently own?: Straight Davee Lent, Walker, Wheelchair, Other (Comment), Shower Chair (Life Alert Raised toliet seat Glucometer)  Does patient have a history of Outpatient Therapy (PT/OT)?: No  Does the patient have a history of Short-Term Rehab?: Yes ([facility])  Does patient have a history of HHC?: Yes ([facility],  In Home Therapy)  Does patient currently have [facility]?: Yes    Current Home Health Care  Type of Current Home Care Services: [address][de-identified] Other (please enter name in comment) (In-Home Therapy)  [address] Provider[de-identified] PCP    Patient Information Continued  Income Source: Pension/snf  Does patient have prescription coverage?: Yes  Within the past 12 months, you worried that your food would run out before you got the money to buy more : Never true  Within the past 12 months, the food you bought just didn't last and you didn't have money to get more : Never true  Food insecurity resource given?: N/A  Does patient receive dialysis treatments?: No  Does patient have a history of substance abuse?: No  Does patient have a history of Mental Health Diagnosis?: No    Means of Transportation  Means of Transport to Appts[de-identified] Family transport  In the past 12 months, has lack of transportation kept you from medical appointments or from getting medications?: No  In the past 12 months, has lack of transportation kept you from meetings, work, or from getting things needed for daily living?: No  Was application for public transport provided?: N/A    DISCHARGE DETAILS:    Discharge planning discussed with[de-identified] Patient and dtr [address] of Choice: Yes  Comments - Freedom of Choice: Pending therapy brandon  CM contacted family/caregiver?: Yes  Were Treatment Team discharge recommendations reviewed with patient/caregiver?: Yes  Did patient/caregiver verbalize understanding of patient care needs?: Yes  Were patient/caregiver advised of the risks associated with not following Treatment Team discharge recommendations?: Yes    Contacts  Patient Contacts: Maggie Pan dtselin  Relationship to Patient[de-identified] Family  Contact Method: Phone  Phone Number: [phone]  Reason/Outcome: Discharge Planning, Emergency Contact     Additional Comments: Met with patient at bedside to discuss discharge planning  Current with ED from In Home Therapy  Call to dtr Lang Mcdaniel to discuss discharge therapy  PT/OT evals pending  Dtr reports she is going out of town for weekend and was going to hire [facility] to check on patient  Discussed PT/OT evals for discharge recs

## 2023-05-25 NOTE — OCCUPATIONAL THERAPY NOTE
Occupational Therapy Evaluation      Mary Rao    5/25/2023    Principal Problem:    Right knee pain  Active Problems:    HLD (hyperlipidemia)    Type 2 diabetes mellitus with diabetic polyneuropathy, with long-term current use of insulin ([facility] )    Fall    SIRS (systemic inflammatory response syndrome) ([facility] )      Past Medical History:   Diagnosis Date    Ambulates with cane     Cancer ([facility] )     Chronic pain disorder     knees/ shoulders (gets inj every 3 mos)    Controlled type 2 diabetes mellitus with diabetic polyneuropathy, with long-term current use of insulin ([facility] ) 5/21/2008    Diabetes mellitus ([facility] )     Diabetic polyneuropathy ([facility] ) 5/21/2008    ICD10 clean up    Disease of thyroid gland     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    HL (hearing loss)     Hodgkin's disease ([facility] ) 2008    Left neck, had radiation    Hypertension     Neuropathy     Osteoporosis     RA (rheumatoid arthritis) ([facility] )        Past Surgical History:   Procedure Laterality Date    ADENOIDECTOMY      APPENDECTOMY      CATARACT EXTRACTION      CATARACT EXTRACTION, BILATERAL      CHOLECYSTECTOMY      FRACTURE SURGERY Right     hip    MOUTH SURGERY      oral cancer left lower lip    OVARY SURGERY      CA ADJT TIS TRNS/REARGMT F/C/C/M/N/A/G/H/F 10SQCM/< N/A 3/28/2022    Procedure: Adjacent tissue transfer face;  Surgeon: Alma Delia Noe MD;  Location: [facility];  Service: ENT    CA OPTX FEM SHFT FX W/INSJ IMED IMPLT W/WO SCREW Right 5/28/2020    Procedure: INSERTION NAIL IM FEMUR ANTEGRADE (TROCHANTERIC);   Surgeon: Bobbi Diggs DO;  Location: [facility] MAIN OR;  Service: Orthopedics    CA TRANSMASTOID ANTROTOMY Left 3/28/2022    Procedure: MASTOIDECTOMY;  Surgeon: Alma Delia Noe MD;  Location: [facility];  Service: ENT    TONSILLECTOMY      TOTAL THYROIDECTOMY  2008    Performed after left neck dissection and left oral cancer diagnosis        05/25/23 1116   OT Last Visit   OT Visit Date 05/25/23   Note Type   Note type Evaluation   Pain Assessment   Pain Assessment Tool 0-10   Pain Score 8   Pain Location/Orientation Orientation: Right;Location: Knee   Restrictions/Precautions   Weight Bearing Precautions Per Order No   Other Precautions Bed Alarm; Fall Risk;Pain   Home Living   Type of [address] One level;Performs ADLs on one level; Able to live on main level with bedroom/bathroom;Stairs to enter with rails  (2 TEE)   Bathroom Shower/Tub Tub/shower unit   [employer] Raised   Bathroom Equipment Grab bars in shower; Toilet raiser;Grab bars around toilet   [facility]; Other (Comment)  (RW vs rollator)   Prior Function   Level of [place] Independent with ADLs; Independent with functional mobility   Lives With [address] in the last 6 months 1 to 4  (1 fall)   Vocational Retired   ADL   UB Bathing Assistance 5  Supervision/Setup   LB [address] 3  Moderate Assistance   [address] 4  [address] 2  Maximal Assistance   Bed Mobility   Supine to Sit 5  Supervision   Additional items Assist x 1; Increased time required;LE management   Sit to Supine 4  Minimal assistance   Additional items Assist x 2; Increased time required;LE management   Transfers   Sit to Stand 4  Minimal assistance   Additional items Assist x 2; Increased time required;Verbal cues   Stand to Sit 4  Minimal assistance   Additional items Assist x 2; Increased time required;Verbal cues   Balance   Static Sitting Good   Dynamic Sitting Fair   Static Standing Fair -   Dynamic Standing Poor +   Ambulatory Poor +   Activity Tolerance   Activity Tolerance Patient limited by fatigue;Patient limited by pain   Medical Staff Made Aware ELEN Nguyễn   Nurse Made Aware RN Yesi   RUE Assessment   RUE Assessment X  (AROM shoulder flexion ~90 degrees, elbow distally WFL)   RUE Strength   RUE Overall Strength Deficits  (3/5)   LUE Assessment   LUE "Assessment X  (AROM WFL)   LUE Strength   LUE Overall Strength Deficits  (3+/5)   Vision-Basic Assessment   Current Vision Does not wear glasses   Cognition   Overall Cognitive Status WFL   Arousal/Participation Alert; Cooperative   Attention Within functional limits   Orientation Level Oriented X4   Memory Decreased recall of precautions   Following Commands Follows one step commands without difficulty   Assessment   Limitation Decreased ADL status; Decreased UE ROM; Decreased UE strength;Decreased Safe judgement during ADL;Decreased endurance;Decreased self-care trans;Decreased high-level ADLs   Prognosis Good   Assessment Pt is a 80 y o  female seen for OT evaluation s/p admit to [place] [address] on 5/24/2023 w/ Right knee pain  Comorbidities affecting pt's functional performance at time of assessment include: Chronic pain, DM II, Hdgkin's disease, HTN, RA, Osteoporosis  Personal factors affecting pt at time of IE include:steps to enter environment, limited home support, difficulty performing ADLS and decreased initiation and engagement   Prior to admission, pt was Mod I with ADLs  Upon evaluation: the following deficits impact occupational performance: decreased ROM, decreased strength, decreased balance, decreased tolerance, decreased safety awareness and increased pain  Pt to benefit from continued skilled OT tx while in the hospital to address deficits as defined above and maximize level of functional independence w ADL's and functional mobility  Occupational Performance areas to address include: bathing/shower, toilet hygiene, dressing, functional mobility and clothing management  From OT standpoint, recommendation at time of d/c would be STR  Goals   Patient Goals \"to have less knee pain\"   Plan   Treatment Interventions ADL retraining;Functional transfer training;UE strengthening/ROM; Endurance training;Patient/family training;Equipment evaluation/education; Compensatory technique education; Energy " conservation; Activityengagement   Goal Expiration Date 06/04/23   OT Treatment Day 0   OT Frequency 3-5x/wk   Recommendation   OT Discharge Recommendation Post acute rehabilitation services   Additional Comments  Pt seen as a co-eval with PT due to the patient's co-morbidities, clinically unstable presentation, and present impairments which are a regression from the patient's baseline  Additional Comments 2 The patient's raw score on the AM-PAC Daily Activity Inpatient Short Form is 15  A raw score of less than 19 suggests the patient may benefit from discharge to post-acute rehabilitation services  Please refer to the recommendation of the Occupational Therapist for safe discharge planning     AM-PAC Daily Activity Inpatient   Lower Body Dressing 1   Bathing 2   Toileting 1   Upper Body Dressing 3   Grooming 4   Eating 4   Daily Activity Raw Score 15   Daily Activity Standardized Score (Calc for Raw Score >=11) 34 69   AM-PAC Applied Cognition Inpatient   Following a Speech/Presentation 4   Understanding Ordinary Conversation 4   Taking Medications 4   Remembering Where Things Are Placed or Put Away 4   Remembering List of 4-5 Errands 4   Taking Care of Complicated Tasks 4   Applied Cognition Raw Score 24   Applied Cognition Standardized Score 62 21     GOALS:    Pt will achieve the following within specified time frame: STG  Pt will achieve the following goals within 5 days    *ADL transfers with Min (A) for inc'd independence with ADLs/purposeful tasks    *UB ADL with (S) for inc'd independence with self cares    *LB ADL with Max (A) using AE prn for inc'd independence with self cares    *Toileting with Mod (A) for clothing management and hygiene for return to PLOF with personal care    *Increase static stand balance to F and dyn stand balance to F- for inc'd safety with standing purposeful tasks    *Increase stand tolerance x3 m for inc'd tolerance with standing purposeful tasks    *Participate in 10m UE therex to increase overall stamina/activity tolerance for purposeful tasks    *Bed mobility- (S) for inc'd independence to manage own comfort and initiate EOB & OOB purposeful tasks    Pt will achieve the following within specified time frame: LTG  Pt will achieve the following goals within 10 days    *ADL transfers with CGA for inc'd independence with ADLs/purposeful tasks    *UB ADL with (I) for inc'd independence with self cares    *LB ADL with Mod (A) using AE prn for inc'd independence with self cares    *Toileting with Min (A) for clothing management and hygiene for return to PLOF with personal care    *Increase static stand balance to F+ and dyn stand balance to F for inc'd safety with standing purposeful tasks    *Increase stand tolerance x5 m for inc'd tolerance with standing purposeful tasks    *Bed mobility- (I) for inc'd independence to manage own comfort and initiate EOB & OOB purposeful tasks      Pati Lopez MS, OTR/L

## 2023-05-25 NOTE — ASSESSMENT & PLAN NOTE
· Pain following GLF at home  · CT RLE (5/24): Small knee joint effusion  No acute osseous abnormality     · Continue supportive care  · PT/OT recommending STR placement

## 2023-05-25 NOTE — PROGRESS NOTES
[facility]  Progress Note  Name: Richard Steen I  MRN: [id number]  Unit/Bed#: -17 I Date of Admission: 5/24/2023   Date of Service: 5/25/2023 I Hospital Day: 1    Assessment/Plan   * Right knee pain  Assessment & Plan  · Pain following GLF at home  · CT RLE (5/24): Small knee joint effusion  No acute osseous abnormality  · Continue supportive care  · PT/OT recommending STR placement    SIRS (systemic inflammatory response syndrome) (HCC)  Assessment & Plan  · POA as evidenced by leukocytosis and tachycardia  · No source of infection at this time- Likely reactive in the setting of fall with right knee pain  · BClx (5/24): NGTD  · Continue to monitor off abx    [address]  · Patient reports fall at home prior to arrival while moving containers in her bedroom  · CT Brain (5/24): Microangiopathic changes  No acute intracranial abnormality  · CT C Spine (5/24): No cervical spine fracture or traumatic malalignment  · PT/OT recommendations as above    Type 2 diabetes mellitus with diabetic polyneuropathy, with long-term current use of insulin [facility])  Assessment & Plan  Lab Results   Component Value Date    HGBA1C 8 7 (H) 10/31/2022       Recent Labs     05/24/23  2102 05/25/23  0642 05/25/23  0907 05/25/23  1210   POCGLU 337* 211* 211* 330*       Blood Sugar Average: Last 72 hrs:  (P) 248 5     · Increase insulin to Lantus 9U BID  · Continue corrective sliding scale insulin, accuchecks, and hypoglycemic protocol  · Carb-controlled diet    HLD (hyperlipidemia)  Assessment & Plan  · Continue Atorvastatin 20mg daily          VTE Pharmacologic Prophylaxis: VTE Score: 3 Moderate Risk (Score 3-4) - Pharmacological DVT Prophylaxis Ordered: enoxaparin (Lovenox)  Patient Centered Rounds: I performed bedside rounds with nursing staff today    Discussions with Specialists or Other Care Team Provider: PT/OT and CM    Education and Discussions with Family / Patient: Patient declined call to   Total Time Spent on Date of Encounter in care of patient: 35 minutes This time was spent on one or more of the following: performing physical exam; counseling and coordination of care; obtaining or reviewing history; documenting in the medical record; reviewing/ordering tests, medications or procedures; communicating with other healthcare professionals and discussing with patient's family/caregivers  Current Length of Stay: 1 day(s)  Current Patient Status: Inpatient   Certification Statement: The patient will continue to require additional inpatient hospital stay due to awaiting placement  Discharge Plan: Medically cleared for discharge once placement is arranged  Code Status: Level 3 - DNAR and DNI    Subjective:   Patient resting in bed  She noted frustration with not getting dinner over night  She continues to have right knee pain  No significant over night events reported  Objective:     Vitals:   Temp (24hrs), Av 4 °F (36 9 °C), Min:97 7 °F (36 5 °C), Max:99 °F (37 2 °C)    Temp:  [97 7 °F (36 5 °C)-99 °F (37 2 °C)] 98 1 °F (36 7 °C)  HR:  [73-95] 77  Resp:  [18-20] 18  BP: ()/() 109/57  SpO2:  [90 %-99 %] 96 %  Body mass index is 23 04 kg/m²  Input and Output Summary (last 24 hours): Intake/Output Summary (Last 24 hours) at 2023 1601  Last data filed at 2023 1128  Gross per 24 hour   Intake 240 ml   Output --   Net 240 ml       Physical Exam:   Physical Exam  Vitals and nursing note reviewed  Constitutional:       General: She is not in acute distress  Appearance: Normal appearance  She is well-developed  HENT:      Head: Normocephalic and atraumatic  Mouth/Throat:      Mouth: Mucous membranes are moist       Pharynx: Oropharynx is clear  Eyes:      Extraocular Movements: Extraocular movements intact  Conjunctiva/sclera: Conjunctivae normal    Cardiovascular:      Rate and Rhythm: Normal rate and regular rhythm  Pulses: Normal pulses  Heart sounds: Normal heart sounds  No murmur heard  Pulmonary:      Effort: Pulmonary effort is normal  No respiratory distress  Breath sounds: Normal breath sounds  Abdominal:      General: Bowel sounds are normal  There is no distension  Palpations: Abdomen is soft  Tenderness: There is no abdominal tenderness  Musculoskeletal:         General: No swelling  Normal range of motion  Cervical back: Normal range of motion and neck supple  Comments: Right knee tenderness to palpation    Skin:     General: Skin is warm and dry  Capillary Refill: Capillary refill takes less than 2 seconds  Neurological:      General: No focal deficit present  Mental Status: She is alert and oriented to person, place, and time  Mental status is at baseline     Psychiatric:         Mood and Affect: Mood normal          Behavior: Behavior normal          Judgment: Judgment normal           Additional Data:     Labs:  Results from last 7 days   Lab Units 05/25/23  0505 05/24/23  1714   EOS PCT %  --  0   HEMATOCRIT % 41 5 38 9   HEMOGLOBIN g/dL 13 7 12 5   LYMPHS PCT %  --  4*   MONOS PCT %  --  6   NEUTROS PCT %  --  89*   PLATELETS Thousands/uL 209 201   WBC Thousand/uL 17 93* 26 21*     Results from last 7 days   Lab Units 05/25/23  0505   ANION GAP mmol/L 9   ALBUMIN g/dL 3 9   ALK PHOS U/L 63   ALT U/L 26   AST U/L 25   BUN mg/dL 16   CALCIUM mg/dL 8 5   CHLORIDE mmol/L 101   CO2 mmol/L 27   CREATININE mg/dL 0 71   GLUCOSE RANDOM mg/dL 232*   POTASSIUM mmol/L 4 5   SODIUM mmol/L 137   TOTAL BILIRUBIN mg/dL 0 70     Results from last 7 days   Lab Units 05/24/23  1714   INR  0 90     Results from last 7 days   Lab Units 05/25/23  1210 05/25/23  0907 05/25/23  0642 05/24/23  2102 05/24/23  1958 05/24/23  1638   POC GLUCOSE mg/dl 330* 211* 211* 337* 212* 190*         Results from last 7 days   Lab Units 05/25/23  0505 05/24/23  1900   LACTIC ACID mmol/L  --  1 6 PROCALCITONIN ng/ml 0 14 0 08       Lines/Drains:  Invasive Devices     Peripheral Intravenous Line  Duration           Peripheral IV 05/24/23 Left Antecubital 1 day                Imaging: Reviewed radiology reports from this admission including: ST LE    Recent Cultures (last 7 days):   Results from last 7 days   Lab Units 05/24/23 2053 05/24/23  1900   BLOOD CULTURE  Received in Microbiology Lab  Culture in Progress  Received in Microbiology Lab  Culture in Progress  Last 24 Hours Medication List:   Current Facility-Administered Medications   Medication Dose Route Frequency Provider Last Rate   • acetaminophen  650 mg Oral Q4H PRN Francis Rinne, PA-C     • atorvastatin  20 mg Oral Daily With Alexandra Schilling PA-C     • calcium carbonate  1 tablet Oral Daily With Breakfast Francis Rinne, PA-C     • enoxaparin  40 mg Subcutaneous Q24H [facility] & assisted Osman Alexander PA-C     • gabapentin  300 mg Oral TID Francis Rinne, PA-C     • insulin glargine  9 Units Subcutaneous Q12H [place], DO     • insulin lispro  1-5 Units Subcutaneous HS [facility], DO     • insulin lispro  1-6 Units Subcutaneous TID [facility] [place] Palacio, DO     • levothyroxine  112 mcg Oral QAM Francis Rinne, PA-C     • methocarbamol  500 mg Oral Q6H PRN Francis Rinne, PA-C     • ondansetron  4 mg Intravenous Q6H PRN Francis Rinne, PA-C     • pantoprazole  40 mg Oral BID Francis Rinne, PA-C     • traMADol  50 mg Oral Q4H PRN Francis Rinne, PA-C          Today, Patient Was Seen By: Mitchell Rose DO    **Please Note: This note may have been constructed using a voice recognition system  **

## 2023-05-25 NOTE — PLAN OF CARE
Problem: PHYSICAL THERAPY ADULT  Goal: Performs mobility at highest level of function for planned discharge setting  See evaluation for individualized goals  Description: Treatment/Interventions: Functional transfer training, LE strengthening/ROM, Elevations, Therapeutic exercise, Endurance training, Patient/family training, Equipment eval/education, Bed mobility, Compensatory technique education, Spoke to nursing, Spoke to case management  Equipment Recommended:  (continue RW use)       See flowsheet documentation for full assessment, interventions and recommendations  Note: Prognosis: Fair  Problem List: Decreased strength, Decreased endurance, Impaired balance, Decreased mobility, Decreased safety awareness, Pain  Assessment: Pt is 80 y o  female seen for high-complexity PT evaluation on 5/25/2023 s/p admit to [facility] on 5/24/2023 w/ Right knee pain s/p fall d/t LOB  PT was consulted to assess pt's functional mobility and d/c needs  Order placed for PT eval and tx, w/ up and OOB as tolerated order  PTA, pt lives alone in 1 , amb c RW vs rollator at baseline  At time of eval, pt requires SUP for supine>sit, sit>supine min Ax2, STS min Ax2, amb trial x 20 ft with RW use = CGA/min Ax2  Upon evaluation, pt presenting with impaired functional mobility d/t decreased strength, decreased endurance, impaired balance, decreased mobility, decreased safety awareness and pain  Pertinent PMHx and current co-morbidities affecting pt's physical performance at time of assessment include: SIRS, DM type 2, s/p fall, HLD  Personal factors affecting pt at time of eval include: limited home support, positive fall history, decreased initiation and engagement and limited insight into impairments  The following objective measures performed on IE also reveal limitations: AM-PAC 6-Clicks: 73/77   Pt's clinical presentation is currently unstable/unpredictable seen in pt's presentation of need for input for task focus and mobility technique, severe R knee pain impacting overall mobility status and ongoing medical assessment  Overall, pt's rehab potential and prognosis to return to PLOF is fair as impacted by objective findings, warranting pt to receive further skilled PT interventions to address identified impairments, activity limitation(s), and participation restriction(s)  Pt to benefit from continued PT tx to address deficits as defined above and maximize level of functional independent mobility and consistency in order for pt to improve activity tolerance  From PT/mobility standpoint, recommendation at time of d/c would be post acute rehabilitation services pending progress in order to facilitate return to PLOF  Barriers to Discharge: Inaccessible home environment, Decreased caregiver support     PT Discharge Recommendation: Post acute rehabilitation services    See flowsheet documentation for full assessment

## 2023-05-25 NOTE — CASE MANAGEMENT
Case Management Discharge Planning Note    Patient name Hernando Nicholson  Location /-00 MRN [id number]  : 1940 Date 2023       Current Admission Date: 2023  Current Admission Diagnosis:Right knee pain   Patient Active Problem List    Diagnosis Date Noted   • SIRS (systemic inflammatory response syndrome) ([facility] ) 2023   • Fall 10/17/2022   • Traumatic rhabdomyolysis ([facility] ) 10/17/2022   • Leukemoid reaction 10/17/2022   • Gastroesophageal reflux disease without esophagitis 10/17/2022   • Visual changes 2022   • SIRS of non-infectious origin w acute organ dysfunction ([facility] ) 07/10/2022   • High anion gap metabolic acidosis    • Coffee ground emesis 07/10/2022   • RAMIRO (acute kidney injury) ([facility] ) 07/10/2022   • Abnormal CT of the abdomen 07/10/2022   • Soft tissue radionecrosis 2022   • Osteoradionecrosis of temporal bone ([facility] ) 2022   • Primary osteoarthritis of right shoulder 2021   • Right knee pain 2020   • Acute hip pain, right 2020   • Ambulatory dysfunction    • Primary osteoarthritis of both knees 2020   • Hypophosphatemia 2020   • Elevated vitamin B12 level 2020   • Anemia 2020   • Hyponatremia 2020   • Closed intertrochanteric fracture of right femur ([facility] ) 2020   • Acquired hypothyroidism 2015   • HLD (hyperlipidemia) 2014   • HTN (hypertension) 10/10/2013   • Type 2 diabetes mellitus with diabetic polyneuropathy, with long-term current use of insulin ([facility] ) 2008      LOS (days): 1  Geometric Mean LOS (GMLOS) (days): 2 50  Days to GMLOS:1 7     OBJECTIVE:  Risk of Unplanned Readmission Score: 13 82      Current admission status: Inpatient   Preferred Pharmacy:   [address] [place] [address] [id number] [facility]  [address]  [address] [place] [id number]  Phone: [phone] Fax: [phone]    Primary Care Provider: Markell Law MD    Primary Insurance: MEDICARE  Secondary Insurance: AARP    DISCHARGE DETAILS:    Discharge planning discussed with[de-identified] Patient and dtr Elliott Valdes of Choice: Yes  Comments - Freedom of Choice: Discussed Therapy evals and recs for rehab  Benedicta referrals made to ARU and STR    CM contacted family/caregiver?: Yes  Were Treatment Team discharge recommendations reviewed with patient/caregiver?: Yes  Did patient/caregiver verbalize understanding of patient care needs?: Yes  Were patient/caregiver advised of the risks associated with not following Treatment Team discharge recommendations?: Yes    Contacts  Patient Contacts: Tameka Orosco dtr  Relationship to Patient[de-identified] Family  Contact Method: Phone  Phone Number: [phone]  Reason/Outcome: Discharge Planning, Emergency Contact    Other Referral/Resources/Interventions Provided:  Interventions: Acute Rehab, Short Term Rehab    Treatment Team Recommendation: Acute Rehab, Short Term Rehab

## 2023-05-26 LAB
ANION GAP SERPL CALCULATED.3IONS-SCNC: 8 MMOL/L (ref 4–13)
BUN SERPL-MCNC: 17 MG/DL (ref 5–25)
CALCIUM SERPL-MCNC: 8.3 MG/DL (ref 8.4–10.2)
CHLORIDE SERPL-SCNC: 103 MMOL/L (ref 96–108)
CO2 SERPL-SCNC: 27 MMOL/L (ref 21–32)
CREAT SERPL-MCNC: 0.67 MG/DL (ref 0.6–1.3)
ERYTHROCYTE [DISTWIDTH] IN BLOOD BY AUTOMATED COUNT: 11.8 % (ref 11.6–15.1)
EST. AVERAGE GLUCOSE BLD GHB EST-MCNC: 209 MG/DL
FLUAV RNA RESP QL NAA+PROBE: NEGATIVE
FLUBV RNA RESP QL NAA+PROBE: NEGATIVE
GFR SERPL CREATININE-BSD FRML MDRD: 82 ML/MIN/1.73SQ M
GLUCOSE SERPL-MCNC: 105 MG/DL (ref 65–140)
GLUCOSE SERPL-MCNC: 202 MG/DL (ref 65–140)
GLUCOSE SERPL-MCNC: 224 MG/DL (ref 65–140)
GLUCOSE SERPL-MCNC: 254 MG/DL (ref 65–140)
GLUCOSE SERPL-MCNC: 310 MG/DL (ref 65–140)
HBA1C MFR BLD: 8.9 %
HCT VFR BLD AUTO: 40.2 % (ref 34.8–46.1)
HGB BLD-MCNC: 12.8 G/DL (ref 11.5–15.4)
MCH RBC QN AUTO: 33.2 PG (ref 26.8–34.3)
MCHC RBC AUTO-ENTMCNC: 31.8 G/DL (ref 31.4–37.4)
MCV RBC AUTO: 104 FL (ref 82–98)
PLATELET # BLD AUTO: 183 THOUSANDS/UL (ref 149–390)
PMV BLD AUTO: 10.5 FL (ref 8.9–12.7)
POTASSIUM SERPL-SCNC: 4.3 MMOL/L (ref 3.5–5.3)
RBC # BLD AUTO: 3.86 MILLION/UL (ref 3.81–5.12)
RSV RNA RESP QL NAA+PROBE: NEGATIVE
SARS-COV-2 RNA RESP QL NAA+PROBE: NEGATIVE
SODIUM SERPL-SCNC: 138 MMOL/L (ref 135–147)
WBC # BLD AUTO: 13.43 THOUSAND/UL (ref 4.31–10.16)

## 2023-05-26 RX ADMIN — INSULIN LISPRO 3 UNITS: 100 INJECTION, SOLUTION INTRAVENOUS; SUBCUTANEOUS at 21:36

## 2023-05-26 RX ADMIN — LEVOTHYROXINE SODIUM 112 MCG: 112 TABLET ORAL at 05:49

## 2023-05-26 RX ADMIN — PANTOPRAZOLE SODIUM 40 MG: 40 TABLET, DELAYED RELEASE ORAL at 08:34

## 2023-05-26 RX ADMIN — INSULIN GLARGINE 6 UNITS: 100 INJECTION, SOLUTION SUBCUTANEOUS at 21:36

## 2023-05-26 RX ADMIN — PANTOPRAZOLE SODIUM 40 MG: 40 TABLET, DELAYED RELEASE ORAL at 21:37

## 2023-05-26 RX ADMIN — INSULIN LISPRO 2 UNITS: 100 INJECTION, SOLUTION INTRAVENOUS; SUBCUTANEOUS at 13:11

## 2023-05-26 RX ADMIN — GABAPENTIN 300 MG: 300 CAPSULE ORAL at 08:34

## 2023-05-26 RX ADMIN — GABAPENTIN 300 MG: 300 CAPSULE ORAL at 21:37

## 2023-05-26 RX ADMIN — ENOXAPARIN SODIUM 40 MG: 40 INJECTION SUBCUTANEOUS at 08:35

## 2023-05-26 RX ADMIN — GABAPENTIN 300 MG: 300 CAPSULE ORAL at 17:40

## 2023-05-26 RX ADMIN — INSULIN LISPRO 3 UNITS: 100 INJECTION, SOLUTION INTRAVENOUS; SUBCUTANEOUS at 08:36

## 2023-05-26 RX ADMIN — ATORVASTATIN CALCIUM 20 MG: 20 TABLET, FILM COATED ORAL at 17:40

## 2023-05-26 RX ADMIN — INSULIN GLARGINE 9 UNITS: 100 INJECTION, SOLUTION SUBCUTANEOUS at 08:35

## 2023-05-26 RX ADMIN — CALCIUM 1 TABLET: 500 TABLET ORAL at 08:52

## 2023-05-26 RX ADMIN — TRAMADOL HYDROCHLORIDE 50 MG: 50 TABLET, COATED ORAL at 11:05

## 2023-05-26 NOTE — ASSESSMENT & PLAN NOTE
· Pain following GLF at home  · CT RLE (5/24): Small knee joint effusion  No acute osseous abnormality     · Continue supportive care  · PT/OT recommending STR placement  · CM following to aide in discharge planning

## 2023-05-26 NOTE — CASE MANAGEMENT
Case Management Discharge Planning Note    Patient name Jesika Ruggiero  Location /-64 MRN [id number]  : 1940 Date 2023       Current Admission Date: 2023  Current Admission Diagnosis:Right knee pain   Patient Active Problem List    Diagnosis Date Noted   • SIRS (systemic inflammatory response syndrome) ([facility] ) 2023   • Fall 10/17/2022   • Traumatic rhabdomyolysis ([facility] ) 10/17/2022   • Leukemoid reaction 10/17/2022   • Gastroesophageal reflux disease without esophagitis 10/17/2022   • Visual changes 2022   • SIRS of non-infectious origin w acute organ dysfunction ([facility] ) 07/10/2022   • High anion gap metabolic acidosis    • Coffee ground emesis 07/10/2022   • RAMIRO (acute kidney injury) ([facility] ) 07/10/2022   • Abnormal CT of the abdomen 07/10/2022   • Soft tissue radionecrosis 2022   • 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   • Primary osteoarthritis of right shoulder 2021   • Right knee pain 2020   • Acute hip pain, right 2020   • Ambulatory dysfunction    • Primary osteoarthritis of both knees 2020   • Hypophosphatemia 2020   • Elevated vitamin B12 level 2020   • Anemia 2020   • Hyponatremia 2020   • Closed intertrochanteric fracture of right femur ([facility] ) 2020   • Acquired hypothyroidism 2015   • HLD (hyperlipidemia) 2014   • HTN (hypertension) 10/10/2013   • Type 2 diabetes mellitus with diabetic polyneuropathy, with long-term current use of insulin ([facility] ) 2008      LOS (days): 2  Geometric Mean LOS (GMLOS) (days): 2 50  Days to GMLOS:0 6     OBJECTIVE:  Risk of Unplanned Readmission Score: 15 65         Current admission status: Inpatient   Preferred Pharmacy:   [address] [place] [id number]  Phone: [phone] Fax: [phone]    Primary Care Provider: Daljit Thomson MD    Primary Insurance: MEDICARE  Secondary Insurance: AARP    DISCHARGE DETAILS:    Discharge planning discussed with[de-identified] patient and dtr Monica  [place] of Choice: Yes  Comments - Freedom of Choice: Discussed choice and dtr Monica and patient want [facility]  Bed reserved  Per Ana parisi patient can admit tomorrow  Updated patient MD Dr Claudia Tamayo and nurse Ariana Gimenez CM contacted family/caregiver?: Yes  Were Treatment Team discharge recommendations reviewed with patient/caregiver?: Yes  Did patient/caregiver verbalize understanding of patient care needs?: Yes  Were patient/caregiver advised of the risks associated with not following Treatment Team discharge recommendations?: Yes    Contacts  Patient Contacts: Demetris Gottlieb dtr  Relationship to Patient[de-identified] Family  Contact Method: Phone  Phone Number: [phone]  Reason/Outcome: Discharge Planning, Emergency Contact    Other Referral/Resources/Interventions Provided:  Interventions: Short Term Rehab    Treatment Team Recommendation: Short Term Rehab  Discharge Destination Plan[de-identified] Short Term Rehab  Transport at Discharge : Wheelchair van  Dispatcher Contacted: Yes  Number/Name of Dispatcher: Via [employer]  Transported by [employer] and Unit #):  [place]  ETA of Transport (Date): 05/26/23  ETA of Transport (Time): 1230     IMM Given (Date):: 05/26/23 (Copy provided to patient and media)    Accepting Facility Name, [facility] [address] : [facility]  Receiving Facility/Agency Phone Number: [phone]  Facility/Agency Fax Number: [phone]

## 2023-05-26 NOTE — ASSESSMENT & PLAN NOTE
Lab Results   Component Value Date    HGBA1C 8 9 (H) 05/26/2023       Recent Labs     05/25/23  1640 05/25/23  2112 05/26/23  0750 05/26/23  1112   POCGLU 81 242* 254* 224*       Blood Sugar Average: Last 72 hrs:  (P) 229 2     · Continue Lantus 9U QAM and 6U QPM  · Continue corrective sliding scale insulin, accuchecks, and hypoglycemic protocol  · Carb-controlled diet

## 2023-05-26 NOTE — CASE MANAGEMENT
Case Management Discharge Planning Note    Patient name Ana Kumari  Location /-80 MRN [id number]  : 1940 Date 2023       Current Admission Date: 2023  Current Admission Diagnosis:Right knee pain   Patient Active Problem List    Diagnosis Date Noted   • SIRS (systemic inflammatory response syndrome) ([facility] ) 2023   • Fall 10/17/2022   • Traumatic rhabdomyolysis ([facility] ) 10/17/2022   • Leukemoid reaction 10/17/2022   • Gastroesophageal reflux disease without esophagitis 10/17/2022   • Visual changes 2022   • SIRS of non-infectious origin w acute organ dysfunction ([facility] ) 07/10/2022   • High anion gap metabolic acidosis    • Coffee ground emesis 07/10/2022   • RAMIRO (acute kidney injury) ([facility] ) 07/10/2022   • Abnormal CT of the abdomen 07/10/2022   • Soft tissue radionecrosis 2022   • 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   • Primary osteoarthritis of right shoulder 2021   • Right knee pain 2020   • Acute hip pain, right 2020   • Ambulatory dysfunction    • Primary osteoarthritis of both knees 2020   • Hypophosphatemia 2020   • Elevated vitamin B12 level 2020   • Anemia 2020   • Hyponatremia 2020   • Closed intertrochanteric fracture of right femur ([facility] ) 2020   • Acquired hypothyroidism 2015   • HLD (hyperlipidemia) 2014   • HTN (hypertension) 10/10/2013   • Type 2 diabetes mellitus with diabetic polyneuropathy, with long-term current use of insulin ([facility] ) 2008      LOS (days): 2  Geometric Mean LOS (GMLOS) (days): 2 50  Days to GMLOS:0 5     OBJECTIVE:  Risk of Unplanned Readmission Score: 15 65         Current admission status: Inpatient   Preferred Pharmacy:   [facility] [facility]  [address]  [address] [place] [address]  Phone: [phone] Fax: [phone]    Primary Care Provider: Reed Romano MD    Primary Insurance: MEDICARE  Secondary Insurance: AARP    DISCHARGE DETAILS:    Discharge planning discussed with[de-identified] patient and dtr Monica  [place] of Choice: Yes  Comments - Freedom of Choice: Discussed choice and dtr Monica and patient want [facility]  Bed reserved  Per Ana parisi patient can admit tomorrow  Updated patient MD Dr Prema Mcrae and nurse Kyler Moreau CM contacted family/caregiver?: Yes  Were Treatment Team discharge recommendations reviewed with patient/caregiver?: Yes  Did patient/caregiver verbalize understanding of patient care needs?: Yes  Were patient/caregiver advised of the risks associated with not following Treatment Team discharge recommendations?: Yes    Contacts  Patient Contacts: Zamzam Aragon dtr  Relationship to Patient[de-identified] Family  Contact Method: Phone  Phone Number: [phone]  Reason/Outcome: Discharge Planning, Emergency Contact    Other Referral/Resources/Interventions Provided:  Interventions: Short Term Rehab    Treatment Team Recommendation: Short Term Rehab  Discharge Destination Plan[de-identified] Short Term Rehab  Transport at Discharge : Wheelchair van  Dispatcher Contacted: Yes  Number/Name of Dispatcher: [employer]  Transported by [employer] and Unit #):  [place]  ETA of Transport (Date): 05/26/23  ETA of Transport (Time): 1230     IMM Given (Date):: 05/26/23 (Copy provided to patient and media)    Accepting Facility Name, [facility] [address] : [facility]  Receiving Facility/Agency Phone Number: [phone]  Facility/Agency Fax Number: [phone]

## 2023-05-26 NOTE — PROGRESS NOTES
[facility]  Progress Note  Name: Nessa Bedolla I  MRN: [id number]  Unit/Bed#: -98 I Date of Admission: 5/24/2023   Date of Service: 5/26/2023 I Hospital Day: 2    Assessment/Plan   * Right knee pain  Assessment & Plan  · Pain following GLF at home  · CT RLE (5/24): Small knee joint effusion  No acute osseous abnormality  · Continue supportive care  · PT/OT recommending STR placement  · CM following to aide in discharge planning    SIRS (systemic inflammatory response syndrome) ([facility] )  Assessment & Plan  · POA as evidenced by leukocytosis and tachycardia  · Leukocytosis has improved  · No source of infection at this time- Likely reactive in the setting of fall with right knee pain  · BClx (5/24): NGTD  · Remains stable off antibiotics     Fall  Assessment & Plan  · Patient reports fall at home prior to arrival while moving containers in her bedroom  · CT Brain (5/24): Microangiopathic changes  No acute intracranial abnormality  · CT C Spine (5/24): No cervical spine fracture or traumatic malalignment  · PT/OT recommendations as above    Type 2 diabetes mellitus with diabetic polyneuropathy, with long-term current use of insulin [facility])  Assessment & Plan  Lab Results   Component Value Date    HGBA1C 8 9 (H) 05/26/2023       Recent Labs     05/25/23  1640 05/25/23  2112 05/26/23  0750 05/26/23  1112   POCGLU 81 242* 254* 224*       Blood Sugar Average: Last 72 hrs:  (P) 229 2     · Continue Lantus 9U QAM and 6U QPM  · Continue corrective sliding scale insulin, accuchecks, and hypoglycemic protocol  · Carb-controlled diet    HLD (hyperlipidemia)  Assessment & Plan  · Continue Atorvastatin 20mg daily        VTE Pharmacologic Prophylaxis: VTE Score: 3 Moderate Risk (Score 3-4) - Pharmacological DVT Prophylaxis Ordered: enoxaparin (Lovenox)  Patient Centered Rounds: I performed bedside rounds with nursing staff today    Discussions with Specialists or Other Care Team Provider: Nursing and CM    Education and Discussions with Family / Patient: Updated  (daughter) via phone  Total Time Spent on Date of Encounter in care of patient: 35 minutes This time was spent on one or more of the following: performing physical exam; counseling and coordination of care; obtaining or reviewing history; documenting in the medical record; reviewing/ordering tests, medications or procedures; communicating with other healthcare professionals and discussing with patient's family/caregivers  Current Length of Stay: 2 day(s)  Current Patient Status: Inpatient   Certification Statement: The patient will continue to require additional inpatient hospital stay due to awaiting placement  Discharge Plan: Medically cleared for discharge once placement is arranged  Code Status: Level 3 - DNAR and DNI    Subjective:   Patient continues to report knee pain but offers no other acute complaints  No significant over night events reported by nursing  Objective:     Vitals:   Temp (24hrs), Av 4 °F (36 9 °C), Min:98 1 °F (36 7 °C), Max:98 5 °F (36 9 °C)    Temp:  [98 1 °F (36 7 °C)-98 5 °F (36 9 °C)] 98 5 °F (36 9 °C)  HR:  [70-92] 92  Resp:  [16-20] 20  BP: ()/(49-96) 142/96  SpO2:  [96 %-97 %] 97 %  Body mass index is 23 04 kg/m²  Input and Output Summary (last 24 hours): Intake/Output Summary (Last 24 hours) at 2023 1244  Last data filed at 2023 0900  Gross per 24 hour   Intake 480 ml   Output 700 ml   Net -220 ml       Physical Exam:   Physical Exam  Vitals and nursing note reviewed  Constitutional:       General: She is not in acute distress  Appearance: She is well-developed and normal weight  HENT:      Head: Normocephalic and atraumatic  Mouth/Throat:      Mouth: Mucous membranes are moist       Pharynx: Oropharynx is clear  Eyes:      Extraocular Movements: Extraocular movements intact        Conjunctiva/sclera: Conjunctivae normal    Cardiovascular: Rate and Rhythm: Normal rate and regular rhythm  Pulses: Normal pulses  Heart sounds: Normal heart sounds  No murmur heard  Pulmonary:      Effort: Pulmonary effort is normal  No respiratory distress  Breath sounds: Normal breath sounds  Abdominal:      General: Bowel sounds are normal  There is no distension  Palpations: Abdomen is soft  Tenderness: There is no abdominal tenderness  Musculoskeletal:         General: No swelling  Cervical back: Normal range of motion and neck supple  Comments: R knee tenderness to palpation   Skin:     General: Skin is warm and dry  Capillary Refill: Capillary refill takes less than 2 seconds  Neurological:      General: No focal deficit present  Mental Status: She is alert and oriented to person, place, and time  Mental status is at baseline  Psychiatric:         Mood and Affect: Mood normal          Behavior: Behavior normal          Labs:  Results from last 7 days   Lab Units 05/26/23  0423 05/25/23  0505 05/24/23  1714   EOS PCT %  --   --  0   HEMATOCRIT % 40 2   < > 38 9   HEMOGLOBIN g/dL 12 8   < > 12 5   LYMPHS PCT %  --   --  4*   MONOS PCT %  --   --  6   NEUTROS PCT %  --   --  89*   PLATELETS Thousands/uL 183   < > 201   WBC Thousand/uL 13 43*   < > 26 21*    < > = values in this interval not displayed       Results from last 7 days   Lab Units 05/26/23  0423 05/25/23  0505   ANION GAP mmol/L 8 9   ALBUMIN g/dL  --  3 9   ALK PHOS U/L  --  63   ALT U/L  --  26   AST U/L  --  25   BUN mg/dL 17 16   CALCIUM mg/dL 8 3* 8 5   CHLORIDE mmol/L 103 101   CO2 mmol/L 27 27   CREATININE mg/dL 0 67 0 71   GLUCOSE RANDOM mg/dL 202* 232*   POTASSIUM mmol/L 4 3 4 5   SODIUM mmol/L 138 137   TOTAL BILIRUBIN mg/dL  --  0 70     Results from last 7 days   Lab Units 05/24/23  1714   INR  0 90     Results from last 7 days   Lab Units 05/26/23  1112 05/26/23  0750 05/25/23  2112 05/25/23  1640 05/25/23  1210 05/25/23  2853 05/25/23  3166 05/24/23  2102 05/24/23  1958 05/24/23  1638   POC GLUCOSE mg/dl 224* 254* 242* 81 330* 211* 211* 337* 212* 190*     Results from last 7 days   Lab Units 05/26/23  0423   HEMOGLOBIN A1C % 8 9*     Results from last 7 days   Lab Units 05/25/23  0505 05/24/23  1900   LACTIC ACID mmol/L  --  1 6   PROCALCITONIN ng/ml 0 14 0 08       Lines/Drains:  Invasive Devices     Peripheral Intravenous Line  Duration           Peripheral IV 05/24/23 Left Antecubital 1 day                Imaging: No new imaging  Recent Cultures (last 7 days):   Results from last 7 days   Lab Units 05/24/23  2053 05/24/23  1900   BLOOD CULTURE  No Growth at 24 hrs  No Growth at 24 hrs  Last 24 Hours Medication List:   Current Facility-Administered Medications   Medication Dose Route Frequency Provider Last Rate   • acetaminophen  650 mg Oral Q4H PRN Robina Aldridge PA-C     • atorvastatin  20 mg Oral Daily With Roni Buchanan PA-C     • calcium carbonate  1 tablet Oral Daily With Breakfast Robina Aldridge PA-C     • enoxaparin  40 mg Subcutaneous Q24H [facility] & halfway Osman Alexander PA-C     • gabapentin  300 mg Oral TID Robina Aldridge PA-C     • insulin glargine  6 Units Subcutaneous HS [facility] Palacio, DO     • insulin glargine  9 Units Subcutaneous QAM [facility], DO     • insulin lispro  1-5 Units Subcutaneous HS [facility], DO     • insulin lispro  1-6 Units Subcutaneous TID [facility] Jeevan Numbers Palacio, DO     • levothyroxine  112 mcg Oral QAM Robina Aldridge PA-C     • methocarbamol  500 mg Oral Q6H PRN Robina Aldridge PA-C     • ondansetron  4 mg Intravenous Q6H PRN Robina Aldridge PA-C     • pantoprazole  40 mg Oral BID Robina Aldridge PA-C     • traMADol  50 mg Oral Q4H PRN Robina Aldridge PA-C          Today, Patient Was Seen By: Edward Gregory DO    **Please Note: This note may have been constructed using a voice recognition system  **

## 2023-05-26 NOTE — ASSESSMENT & PLAN NOTE
· POA as evidenced by leukocytosis and tachycardia  · Leukocytosis has improved  · No source of infection at this time- Likely reactive in the setting of fall with right knee pain     · BClx (5/24): NGTD  · Remains stable off antibiotics

## 2023-05-27 VITALS
HEART RATE: 90 BPM | RESPIRATION RATE: 20 BRPM | SYSTOLIC BLOOD PRESSURE: 150 MMHG | OXYGEN SATURATION: 98 % | BODY MASS INDEX: 23.02 KG/M2 | WEIGHT: 121.91 LBS | HEIGHT: 61 IN | TEMPERATURE: 98.2 F | DIASTOLIC BLOOD PRESSURE: 93 MMHG

## 2023-05-27 LAB
ANION GAP SERPL CALCULATED.3IONS-SCNC: 8 MMOL/L (ref 4–13)
BUN SERPL-MCNC: 22 MG/DL (ref 5–25)
CALCIUM SERPL-MCNC: 8.1 MG/DL (ref 8.4–10.2)
CHLORIDE SERPL-SCNC: 103 MMOL/L (ref 96–108)
CO2 SERPL-SCNC: 25 MMOL/L (ref 21–32)
CREAT SERPL-MCNC: 0.73 MG/DL (ref 0.6–1.3)
ERYTHROCYTE [DISTWIDTH] IN BLOOD BY AUTOMATED COUNT: 11.6 % (ref 11.6–15.1)
GFR SERPL CREATININE-BSD FRML MDRD: 76 ML/MIN/1.73SQ M
GLUCOSE SERPL-MCNC: 223 MG/DL (ref 65–140)
GLUCOSE SERPL-MCNC: 243 MG/DL (ref 65–140)
GLUCOSE SERPL-MCNC: 261 MG/DL (ref 65–140)
HCT VFR BLD AUTO: 38.3 % (ref 34.8–46.1)
HGB BLD-MCNC: 12.4 G/DL (ref 11.5–15.4)
MCH RBC QN AUTO: 33.5 PG (ref 26.8–34.3)
MCHC RBC AUTO-ENTMCNC: 32.4 G/DL (ref 31.4–37.4)
MCV RBC AUTO: 104 FL (ref 82–98)
PLATELET # BLD AUTO: 188 THOUSANDS/UL (ref 149–390)
PMV BLD AUTO: 10.5 FL (ref 8.9–12.7)
POTASSIUM SERPL-SCNC: 4.2 MMOL/L (ref 3.5–5.3)
RBC # BLD AUTO: 3.7 MILLION/UL (ref 3.81–5.12)
SODIUM SERPL-SCNC: 136 MMOL/L (ref 135–147)
WBC # BLD AUTO: 13.97 THOUSAND/UL (ref 4.31–10.16)

## 2023-05-27 RX ORDER — TRAMADOL HYDROCHLORIDE 50 MG/1
50 TABLET ORAL EVERY 6 HOURS PRN
Qty: 28 TABLET | Refills: 0 | Status: ON HOLD | OUTPATIENT
Start: 2023-05-27 | End: 2023-06-03

## 2023-05-27 RX ADMIN — INSULIN LISPRO 2 UNITS: 100 INJECTION, SOLUTION INTRAVENOUS; SUBCUTANEOUS at 11:43

## 2023-05-27 RX ADMIN — CALCIUM 1 TABLET: 500 TABLET ORAL at 08:29

## 2023-05-27 RX ADMIN — INSULIN GLARGINE 9 UNITS: 100 INJECTION, SOLUTION SUBCUTANEOUS at 08:28

## 2023-05-27 RX ADMIN — INSULIN LISPRO 3 UNITS: 100 INJECTION, SOLUTION INTRAVENOUS; SUBCUTANEOUS at 08:29

## 2023-05-27 RX ADMIN — LEVOTHYROXINE SODIUM 112 MCG: 112 TABLET ORAL at 05:35

## 2023-05-27 RX ADMIN — PANTOPRAZOLE SODIUM 40 MG: 40 TABLET, DELAYED RELEASE ORAL at 08:30

## 2023-05-27 RX ADMIN — ENOXAPARIN SODIUM 40 MG: 40 INJECTION SUBCUTANEOUS at 08:30

## 2023-05-27 RX ADMIN — GABAPENTIN 300 MG: 300 CAPSULE ORAL at 08:30

## 2023-05-27 RX ADMIN — TRAMADOL HYDROCHLORIDE 50 MG: 50 TABLET, COATED ORAL at 08:30

## 2023-05-27 NOTE — ASSESSMENT & PLAN NOTE
· POA as evidenced by leukocytosis and tachycardia  · Leukocytosis has stabilized  · No source of infection at this time- Likely reactive in the setting of fall with right knee pain     · BClx (5/24): NGTD  · Remains stable off antibiotics

## 2023-05-27 NOTE — ASSESSMENT & PLAN NOTE
Lab Results   Component Value Date    HGBA1C 8 9 (H) 05/26/2023       Recent Labs     05/26/23  1112 05/26/23  1557 05/26/23  2100 05/27/23  0802   POCGLU 224* 105 310* 243*       Blood Sugar Average: Last 72 hrs:  (P) 226 [id number]     · Continue Lantus 9U QAM and 6U QPM  · Continue corrective sliding scale insulin, accuchecks, and hypoglycemic protocol  · Carb-controlled diet

## 2023-05-27 NOTE — DISCHARGE SUMMARY
[facility] A Vicentes 1940, 80 y o  female MRN: [id number]  Unit/Bed#: -01 Encounter: [id number]  Primary Care Provider: Bridgette Mendez MD   Date and time admitted to hospital: 5/24/2023 12:35 PM    * Right knee pain  Assessment & Plan  · Pain following GLF at home  · CT RLE (5/24): Small knee joint effusion  No acute osseous abnormality  · Continue supportive care  · PT/OT recommending STR placement    SIRS (systemic inflammatory response syndrome) (HCC)  Assessment & Plan  · POA as evidenced by leukocytosis and tachycardia  · Leukocytosis has stabilized  · No source of infection at this time- Likely reactive in the setting of fall with right knee pain  · BClx (5/24): NGTD  · Remains stable off antibiotics     Fall  Assessment & Plan  · Patient reports fall at home prior to arrival while moving containers in her bedroom  · CT Brain (5/24): Microangiopathic changes  No acute intracranial abnormality  · CT C Spine (5/24): No cervical spine fracture or traumatic malalignment    · PT/OT recommendations as above    Type 2 diabetes mellitus with diabetic polyneuropathy, with long-term current use of insulin [facility])  Assessment & Plan  Lab Results   Component Value Date    HGBA1C 8 9 (H) 05/26/2023       Recent Labs     05/26/23  1112 05/26/23  1557 05/26/23  2100 05/27/23  0802   POCGLU 224* 105 310* 243*       Blood Sugar Average: Last 72 hrs:  (P) 226 [id number]     · Continue Lantus 9U QAM and 6U QPM  · Continue corrective sliding scale insulin, accuchecks, and hypoglycemic protocol  · Carb-controlled diet    HLD (hyperlipidemia)  Assessment & Plan  · Continue Atorvastatin 20mg daily      Medical Problems     Resolved Problems  Date Reviewed: 5/24/2023   None       Discharging Physician / Practitioner: Franko Lee DO  PCP: Bridgette Mendez MD  Admission Date:   Admission Orders (From admission, onward)     Ordered        05/24/23 1700  INPATIENT ADMISSION  Once                      Discharge Date: 05/27/23    Consultations During Hospital Stay:  · PT/OT    Procedures Performed:   · None    Significant Findings / Test Results:   · CT RLE (5/24): Small knee joint effusion  No acute osseous abnormality  Incidental Findings:   · None     Test Results Pending at Discharge (will require follow up): · None     Outpatient Tests Requested:  · To be determined upon outpatient follow-up with primary care physician    Complications: None    Reason for Admission: Right knee pain    Hospital Course: Roberth Matthews is a 80 y o  female patient who originally presented to the hospital on 5/24/2023 due to right knee pain  Please see H&P as documented by Barbara Viveros for complete details regarding history of presenting illness  In brief, the patient has a past medical history of hyperlipidemia, and type 2 diabetes who presented for evaluation of pain following a ground-level fall  The patient noted that she was moving containers in her bedroom when she lost balance and fell on her right side  Upon arrival to the emergency room, CT of the right lower extremity was performed and no osseous abnormality was seen  There was evidence of a small joint effusion  Other than that, patient had mild leukocytosis that was likely reactive to her fall and joint effusion  There was no evidence of infection and the patient remained stable off antibiotics during her hospitalization  She was evaluated by PT/OT who recommended postacute rehab placement  Patient was ultimately discharged to [facility]  She was discharged in stable condition and all of her questions were answered prior to departure  This is a brief discharge summary please see full medical record for more details  Please see above list of diagnoses and related plan for additional information       Condition at Discharge: stable    Discharge Day Visit / Exam:   Subjective: Patient eating "breakfast without any acute complaints  No significant overnight events reported by nursing  Vitals: Blood Pressure: 150/93 (05/27/23 0704)  Pulse: 90 (05/27/23 0704)  Temperature: 98 2 °F (36 8 °C) (05/27/23 0704)  Temp Source: Tympanic (05/24/23 1237)  Respirations: 20 (05/27/23 0704)  Height: 5' 1\" (154 9 cm) (05/24/23 1237)  Weight - Scale: 55 3 kg (121 lb 14 6 oz) (05/24/23 1237)  SpO2: 97 % (05/27/23 0704)     Exam:   Physical Exam  Vitals and nursing note reviewed  Constitutional:       General: She is not in acute distress  Appearance: She is well-developed and normal weight  HENT:      Head: Normocephalic and atraumatic  Mouth/Throat:      Mouth: Mucous membranes are moist       Pharynx: Oropharynx is clear  Eyes:      Extraocular Movements: Extraocular movements intact  Conjunctiva/sclera: Conjunctivae normal    Cardiovascular:      Rate and Rhythm: Normal rate and regular rhythm  Pulses: Normal pulses  Heart sounds: Normal heart sounds  No murmur heard  Pulmonary:      Effort: Pulmonary effort is normal  No respiratory distress  Breath sounds: Normal breath sounds  Abdominal:      General: Bowel sounds are normal  There is no distension  Palpations: Abdomen is soft  Tenderness: There is no abdominal tenderness  Musculoskeletal:      Cervical back: Normal range of motion and neck supple  Comments: Right knee tenderness   Skin:     General: Skin is warm and dry  Capillary Refill: Capillary refill takes less than 2 seconds  Neurological:      General: No focal deficit present  Mental Status: She is alert and oriented to person, place, and time  Mental status is at baseline  Psychiatric:         Mood and Affect: Mood normal          Behavior: Behavior normal          Judgment: Judgment normal           Discussion with Family: Patient updated on plan of care       Discharge instructions/Information to patient and family:   See after visit " summary for information provided to patient and family  Provisions for Follow-Up Care:  See after visit summary for information related to follow-up care and any pertinent home health orders  Disposition:   [facility]: [address] [facility]  Case management discussed with admitting team     Planned Readmission: None     Discharge Statement:  I spent > 35 minutes discharging the patient  This time was spent on the day of discharge  I had direct contact with the patient on the day of discharge  Greater than 50% of the total time was spent examining patient, answering all patient questions, arranging and discussing plan of care with patient as well as directly providing post-discharge instructions  Additional time then spent on discharge activities  Discharge Medications:  See after visit summary for reconciled discharge medications provided to patient and/or family        **Please Note: This note may have been constructed using a voice recognition system**

## 2023-05-27 NOTE — NURSING NOTE
Patient IV was removed with tip in tact  Patient was transported by BLS to [facility] via wheelchair  Report was called in to facility

## 2023-05-30 ENCOUNTER — NURSING HOME VISIT (OUTPATIENT)
Dept: GERIATRICS | Facility: OTHER | Age: 83
End: 2023-05-30

## 2023-05-30 DIAGNOSIS — E11.42 TYPE 2 DIABETES MELLITUS WITH DIABETIC POLYNEUROPATHY, WITH LONG-TERM CURRENT USE OF INSULIN (HCC): ICD-10-CM

## 2023-05-30 DIAGNOSIS — M25.561 RIGHT KNEE PAIN, UNSPECIFIED CHRONICITY: Primary | ICD-10-CM

## 2023-05-30 DIAGNOSIS — E03.9 ACQUIRED HYPOTHYROIDISM: ICD-10-CM

## 2023-05-30 DIAGNOSIS — E78.5 HYPERLIPIDEMIA, UNSPECIFIED HYPERLIPIDEMIA TYPE: ICD-10-CM

## 2023-05-30 DIAGNOSIS — Z79.4 TYPE 2 DIABETES MELLITUS WITH DIABETIC POLYNEUROPATHY, WITH LONG-TERM CURRENT USE OF INSULIN (HCC): ICD-10-CM

## 2023-05-30 DIAGNOSIS — R26.2 AMBULATORY DYSFUNCTION: ICD-10-CM

## 2023-05-30 DIAGNOSIS — I10 HYPERTENSION, UNSPECIFIED TYPE: ICD-10-CM

## 2023-05-30 DIAGNOSIS — K21.9 GASTROESOPHAGEAL REFLUX DISEASE WITHOUT ESOPHAGITIS: ICD-10-CM

## 2023-05-30 LAB
BACTERIA BLD CULT: NORMAL
BACTERIA BLD CULT: NORMAL

## 2023-05-30 NOTE — PROGRESS NOTES
[facility]  [address]    Nursing Home Admission    NAME: Ara Flores  AGE: 80 y o  SEX: female [id number]    DATE OF ENCOUNTER: 5/30/2023    Assessment/Plan:   Patient’s care was coordinated with nursing facility staff  Recent vitals, labs and updated medications [facility]  Past Medical, surgical, social, medication and allergy history and patient’s previous records reviewed  1  Right knee pain, unspecified chronicity        2  Hyperlipidemia, unspecified hyperlipidemia type        3  Acquired hypothyroidism        4  Gastroesophageal reflux disease without esophagitis        5  Type 2 diabetes mellitus with diabetic polyneuropathy, with long-term current use of insulin ([facility] )        6  Hypertension, unspecified type        7   Ambulatory dysfunction             Right knee pain  Pt had sustained a ground level fall  Pt hit her knee  5/24 ct Rt LE negative for acute abnormaility  Cont prn analgesics  PT/OT to eval/tx  Pt at high risk for falls given recent fall & due to polypharmacy  Pt on tramadol 50 mg 1 tab po q6 hours prn pain  Cont gabapentin 300 mg 1 tab po TID    HLD (hyperlipidemia)  Stable  Cont atorvastatin 20 mg 1 tab po QHS    Acquired hypothyroidism  Stable  Cont levothyroxine 112 mcg 1 tab po qam    Gastroesophageal reflux disease without esophagitis  Stable  Cont pantoprazole 40 mg 1 tab po BID    Type 2 diabetes mellitus with diabetic polyneuropathy, with long-term current use of insulin ([facility])    Lab Results   Component Value Date    HGBA1C 8 9 (H) 05/26/2023     135 0 mg/dL 5/30/2023 17:34    5/30/2023 11:56 122 0 mg/dL    5/30/2023 06:31 373 0 mg/dL    5/29/2023 21:27 278 0 mg/dL    5/29/2023 17:01 109 0 mg/dL    5/29/2023 11:14 172 0 mg/dL    Cont insulin glargine 9 units sq with breakfast  Cont insulin glargine 6 units sq at bedtime  Cont lispro sliding scale    Cont gabapentin 300 mg 1 tab po TID for neuropathy    HTN (hypertension)  163 / 87 mmHg 5/30/2023 18:02    5/30/2023 08:01 155 / 75 mmHg    BP labile; goe to 160s then 99 sbp? Pt not on any BP meds  BP elevated at times likely due to pain  Monitor BP closely      Ambulatory dysfunction  Pt with knee pain  S/p fall  Fall precaution  Pt at risk for fall  Consult multidisciplinary rehabilitation team re: assist pt to prior level of baseline function          Chief Complaint      Here for STR    HPI   Patient is a 80 y o  female DMII, GERD, Hypothyroidism, HTN, RA, Osteoporosis, ostearthritis, hyperlipidemia, hodgkin's disease, and anemia  Pt admitted to [address] [place] [address] from 5/24/2023-5/27/2023 for right knee pain after sustaining a fall  CT Rt lower ext just revealed no osseous abnormality  Pt then discharged to [address] [place] [address] on 5/27/2023  Pt seen and examined  Pt complains of occasional Rt knee pain  No bruise noted  Pt A&Ox3         Past Medical History:   Diagnosis Date   • Ambulates with cane    • Cancer ([address] )    • Chronic pain disorder     knees/ shoulders (gets inj every 3 mos)   • Controlled type 2 diabetes mellitus with diabetic polyneuropathy, with long-term current use of insulin ([address] ) 5/21/2008   • Diabetes mellitus ([address] )    • Diabetic polyneuropathy ([address] ) 5/21/2008    ICD10 clean up   • Disease of thyroid gland    • 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   • HL (hearing loss)    • Hodgkin's disease ([address] ) 2008    Left neck, had radiation   • Hypertension    • Neuropathy    • Osteoporosis    • RA (rheumatoid arthritis) ([address] )        Past Surgical History:   Procedure Laterality Date   • ADENOIDECTOMY     • APPENDECTOMY     • CATARACT EXTRACTION     • CATARACT EXTRACTION, BILATERAL     • CHOLECYSTECTOMY     • FRACTURE SURGERY Right     hip   • MOUTH SURGERY      oral cancer left lower lip   • OVARY SURGERY     • CT ADJT TIS TRNS/REARGMT F/C/C/M/N/A/G/H/F 10SQCM/< N/A 3/28/2022 Procedure: Adjacent tissue transfer face;  Surgeon: Hunter Lynne MD;  Location: [facility];  Service: ENT   • NC OPTX FEM SHFT FX W/INSJ IMED IMPLT W/WO SCREW Right 5/28/2020    Procedure: INSERTION NAIL IM FEMUR ANTEGRADE (TROCHANTERIC);   Surgeon: Melita Ward DO;  Location: [facility] MAIN OR;  Service: Orthopedics   • NC TRANSMASTOID ANTROTOMY Left 3/28/2022    Procedure: MASTOIDECTOMY;  Surgeon: Hunter Lynne MD;  Location: [facility];  Service: ENT   • TONSILLECTOMY     • TOTAL THYROIDECTOMY  2008    Performed after left neck dissection and left oral cancer diagnosis       Social History     Tobacco Use   Smoking Status Never   Smokeless Tobacco Never          Family History   Problem Relation Age of Onset   • Cancer Mother    • Diabetes Mother    • Diabetes Father    • Hypertension Father         No Known Allergies    Levothyroxine Sodium Tablet 112 MCG  5/27/2023       HumaLOG Injection Solution 100 UNIT/ML (Insulin Lispro) Inject as per sliding scale: if 0 - 70 = 0 units For BG <70 notify provider and DO NOT administer next scheduled Insulin dose without MD order; 200 - 250 = 2 units; 251 - 300 = 3 units; 301 - 350 = 4 units; 351 - 400 = 5 units; 401 - 450 = 6 units for Blood sugar > 450 notify provider, subcutaneously before meals related to TYPE 2 DIABETES MELLITUS WITH DIABETIC POLYNEUROPATHY (E11 42) Pharmacy Active 5/27/2023 16:30  5/27/2023   Methocarbamol Oral Tablet 500 MG (Methocarbamol) Give 1 tablet by mouth every 24 hours as needed for muscle spasms Pharmacy Active 5/27/2023 11:45  5/27/2023   Meclizine HCl Tablet 25 MG Give 1 tablet by mouth every 8 hours as needed for dizziness Pharmacy Active 5/27/2023 11:38  5/27/2023   HumaLOG Injection Solution 100 UNIT/ML (Insulin Lispro) Inject 5 unit subcutaneously with meals related to TYPE 2 DIABETES MELLITUS WITH DIABETIC POLYNEUROPATHY (E11 42) Pharmacy Active 5/27/2023 17:00  5/27/2023   Multiple Vitamin Tablet Give 1 tablet by mouth one time a day for Supplement Pharmacy Active 5/28/2023 09:00  5/27/2023   Vitamin D-3 Oral Tablet 125 MCG (5000 UT) (Cholecalciferol) Give 1 tablet by mouth one time a day for Supplement Pharmacy Active 5/28/2023 09:00  5/27/2023   Basaglar KwikPen Subcutaneous Solution Pen-injector 100 UNIT/ML (Insulin Glargine) Inject 6 unit subcutaneously at bedtime related to TYPE 2 DIABETES MELLITUS WITH DIABETIC POLYNEUROPATHY (E11 42) Pharmacy Active 5/27/2023 21:00  5/27/2023   Cyanocobalamin Tablet 1000 MCG Give 1 tablet by mouth one time a day related to ANEMIA, UNSPECIFIED (D64 9) Pharmacy Active 5/28/2023 09:00  5/27/2023   Mupirocin External Ointment 2 % (Mupirocin) Apply to affected area topically every day shift for Dry Skin Pharmacy Active 5/28/2023 07:00  5/27/2023   Nystatin Powder 717896 UNIT/GM Apply to under abdominal folds topically two times a day related to SYSTEMIC INFLAMMATORY RESPONSE SYNDROME (SIRS) OF NON-INFECTIOUS ORIGIN WITH ACUTE ORGAN DYSFUNCTION (R65 11) Pharmacy Active 5/27/2023 21:00  5/27/2023   Atorvastatin Calcium Tablet 20 MG Give 1 tablet by mouth at bedtime related to HYPERLIPIDEMIA, UNSPECIFIED (E78 5) Pharmacy Active 5/27/2023 21:00  5/27/2023   Pantoprazole Sodium Tablet Delayed Release 40 MG Give 1 tablet by mouth two times a day related to [address] (K21 9) DO NOT CRUSH Pharmacy Active 5/27/2023 17:00  5/27/2023   TraMADol HCl Tablet 50 MG Give 1 tablet by mouth every 6 hours as needed for moderate and severe pain Pharmacy Active 5/27/2023 11:41  5/27/2023   Ascorbic Acid Tablet 500 MG Give 1 tablet by mouth two times a day related to ANEMIA, UNSPECIFIED (D64 9) Pharmacy Active 5/27/2023 17:00  5/27/2023   Calcium Carbonate Tablet 600 MG Give 1 tablet by mouth two times a day related to BILATERAL PRIMARY OSTEOARTHRITIS OF KNEE (M17 0) Pharmacy Active 5/27/2023 17:00  5/27/2023   Basaglar KwikPen Subcutaneous Solution Pen-injector 100 UNIT/ML (Insulin Glargine) Inject 9 unit subcutaneously one time a day related to TYPE 2 DIABETES MELLITUS WITH DIABETIC POLYNEUROPATHY (E11 42) Pharmacy Active        Updated list was reviewed in Levine, Susan. \Hospital Has a New Name and Outlook.\"" system of facility  Vital signs were reviewed in [facility] care    Review of Systems   Constitutional: Negative for chills and fever  Respiratory: Negative for shortness of breath  Gastrointestinal: Negative for nausea and vomiting  Neurological: Negative for dizziness and headaches  All other systems reviewed and are negative  Physical Exam  Constitutional:       General: She is not in acute distress  Appearance: She is not ill-appearing  HENT:      Head: Normocephalic and atraumatic  Comments: Chronic deformity in left jaw area  Cardiovascular:      Rate and Rhythm: Normal rate and regular rhythm  Heart sounds: Normal heart sounds  No murmur heard  Pulmonary:      Effort: No respiratory distress  Breath sounds: Normal breath sounds  No wheezing  Abdominal:      General: Bowel sounds are normal  There is no distension  Palpations: Abdomen is soft  Tenderness: There is no abdominal tenderness  Skin:     Findings: Bruising and ecchymosis present  Comments: Pt with easy bruisability; b/l arms with ecchymosis at iv sites   Neurological:      Mental Status: She is alert and oriented to person, place, and time  Motor: No tremor  Coordination: Finger-Nose-Finger Test normal       Comments: Pt with jaw deformity; otherwise CN II-XII intact; pt able to shrug her shoulders, EOMIB   Psychiatric:         Mood and Affect: Mood normal          Behavior: Behavior normal          Thought Content: Thought content normal          Judgment: Judgment normal        Diagnostic Data     Recent labs and imaging studies were reviewed  Code Status:    DNR     Additional notes:     Total time spent on H&P 47 minutes in reviewing discharge paperwork from the hospital, interpreting test results from hospital medical records, and documenting information in the medical record  In addition, I provided counseling to the patient and encouraged them to work with physical therapy    Ordered lidocaine patch for left knee & adjusted lispro by dc ssi & writing for lispro 5 units before meals hold if sugar <250    This note was electronically signed by Dr Thirza Primrose

## 2023-06-01 NOTE — ASSESSMENT & PLAN NOTE
Pt had sustained a ground level fall  Pt hit her knee  5/24 ct Rt LE negative for acute abnormaility  Cont prn analgesics  PT/OT to eval/tx  Pt at high risk for falls given recent fall & due to polypharmacy  Pt on tramadol 50 mg 1 tab po q6 hours prn pain  Cont gabapentin 300 mg 1 tab po TID

## 2023-06-01 NOTE — ASSESSMENT & PLAN NOTE
163 / 87 mmHg 5/30/2023 18:02    5/30/2023 08:01 155 / 75 mmHg    BP labile; goe to 160s then 99 sbp?   Pt not on any BP meds  BP elevated at times likely due to pain  Monitor BP closely

## 2023-06-01 NOTE — ASSESSMENT & PLAN NOTE
Pt with knee pain  S/p fall  Fall precaution  Pt at risk for fall  Consult multidisciplinary rehabilitation team re: assist pt to prior level of baseline function

## 2023-06-01 NOTE — ASSESSMENT & PLAN NOTE
Lab Results   Component Value Date    HGBA1C 8 9 (H) 05/26/2023     135 0 mg/dL 5/30/2023 17:34    5/30/2023 11:56 122 0 mg/dL    5/30/2023 06:31 373 0 mg/dL    5/29/2023 21:27 278 0 mg/dL    5/29/2023 17:01 109 0 mg/dL    5/29/2023 11:14 172 0 mg/dL    Cont insulin glargine 9 units sq with breakfast  Cont insulin glargine 6 units sq at bedtime  Cont lispro sliding scale    Cont gabapentin 300 mg 1 tab po TID for neuropathy

## 2023-06-05 ENCOUNTER — NURSING HOME VISIT (OUTPATIENT)
Dept: GERIATRICS | Facility: OTHER | Age: 83
End: 2023-06-05
Payer: MEDICARE

## 2023-06-05 DIAGNOSIS — M17.11 OSTEOARTHRITIS OF RIGHT KNEE, UNSPECIFIED OSTEOARTHRITIS TYPE: Primary | ICD-10-CM

## 2023-06-05 DIAGNOSIS — Z79.4 TYPE 2 DIABETES MELLITUS WITH DIABETIC POLYNEUROPATHY, WITH LONG-TERM CURRENT USE OF INSULIN (HCC): ICD-10-CM

## 2023-06-05 DIAGNOSIS — E11.42 TYPE 2 DIABETES MELLITUS WITH DIABETIC POLYNEUROPATHY, WITH LONG-TERM CURRENT USE OF INSULIN (HCC): ICD-10-CM

## 2023-06-05 DIAGNOSIS — E03.9 ACQUIRED HYPOTHYROIDISM: ICD-10-CM

## 2023-06-05 PROCEDURE — 99309 SBSQ NF CARE MODERATE MDM 30: CPT | Performed by: FAMILY MEDICINE

## 2023-06-05 NOTE — ASSESSMENT & PLAN NOTE
Lab Results   Component Value Date    HGBA1C 8 9 (H) 05/26/2023     Continue glargine 8 units at bedtime and 7 units in the morning  Continue Humalog 3 units with meals as well as insulin sliding scale with meals  Avoid hypoglycemia  Encourage diabetic diet  Continue gabapentin for neuropathy  We will check CBC CMP in the morning

## 2023-06-05 NOTE — PROGRESS NOTES
[address]  [phone]  [facility] of Service: nursing home place of service: POS [facility] A Coverage      NAME: Adrien Tong  AGE: 80 y o  SEX: female [id number]    DATE OF ENCOUNTER: 6/5/2023    Assessment and Plan     Problem List Items Addressed This Visit        Endocrine    Acquired hypothyroidism     Continue levothyroxine  Recheck TSH in the morning, adjust dose as needed         Type 2 diabetes mellitus with diabetic polyneuropathy, with long-term current use of insulin ([facility])       Lab Results   Component Value Date    HGBA1C 8 9 (H) 05/26/2023     Continue glargine 8 units at bedtime and 7 units in the morning  Continue Humalog 3 units with meals as well as insulin sliding scale with meals  Avoid hypoglycemia  Encourage diabetic diet  Continue gabapentin for neuropathy  We will check CBC CMP in the morning            Musculoskeletal and Integument    Osteoarthritis of right knee - Primary     Chronic osteoarthritis , pain right knee aggravated status post fall   CT showed no fracture, dislocation or destructive osseous lesion    Small knee joint effusion    Moderate tricompartmental osteoarthritis  Continue pain management with tramadol as needed, start Tylenol scheduled  We will add Biofreeze to her regimen  Continue PT OT  Follow-up with orthopedic surgery as outpatient                Chief Complaint     Follow up     History of Present Illness     Adrien Tong is a 80 y o  female who was seen today for follow up  Patient seen and examined at bedside  States that pain in right knee is controlled with current regimen  Able to work with physical therapy  Denies any acute complaints at the time of encounter            The following portions of the patient's history were reviewed and updated as appropriate: allergies, current medications, past family history, past medical history, past social history, past surgical history and problem list     Review of Systems     Review of Systems   Constitutional: Negative for chills and fever  HENT: Negative for congestion and rhinorrhea  Respiratory: Negative for cough, shortness of breath and wheezing  Cardiovascular: Negative for chest pain, palpitations and leg swelling  Gastrointestinal: Negative for abdominal pain and constipation  Endocrine: Negative for cold intolerance  Genitourinary: Negative for difficulty urinating, dysuria and hematuria  Musculoskeletal: Positive for arthralgias and gait problem  Skin: Negative for wound  Allergic/Immunologic: Negative for environmental allergies  Neurological: Positive for dizziness (intermittent)  Negative for seizures  Hematological: Does not bruise/bleed easily  Psychiatric/Behavioral: Negative for behavioral problems and sleep disturbance         Active Problem List     Patient Active Problem List   Diagnosis   • HLD (hyperlipidemia)   • HTN (hypertension)   • Acquired hypothyroidism   • Type 2 diabetes mellitus with diabetic polyneuropathy, with long-term current use of insulin ([facility] )   • Closed intertrochanteric fracture of right femur (HCC)   • Hyponatremia   • Elevated vitamin B12 level   • Anemia   • Hypophosphatemia   • Primary osteoarthritis of both knees   • Acute hip pain, right   • Ambulatory dysfunction   • Right knee pain   • Primary osteoarthritis of right shoulder   • Soft tissue radionecrosis   • Osteoradionecrosis of temporal bone (HCC)   • SIRS of non-infectious origin w acute organ dysfunction (HCC)   • High anion gap metabolic acidosis   • Coffee ground emesis   • RAMIRO (acute kidney injury) ([facility] )   • Abnormal CT of the abdomen   • Visual changes   • Fall   • Traumatic rhabdomyolysis (HCC)   • Leukemoid reaction   • Gastroesophageal reflux disease without esophagitis   • SIRS (systemic inflammatory response syndrome) ([facility])   • Osteoarthritis of right knee       Objective     Vital Signs: Blood pressure 156/70 Heart Rate: 69 Respiratory Rate 18   Temperature 97 2 Oxygen Saturation 99% Weight 106 6lbs    Physical Exam  Vitals and nursing note reviewed  Constitutional:       General: She is not in acute distress  Appearance: She is not diaphoretic  Comments: Frail looking   HENT:      Head: Normocephalic and atraumatic  Mouth/Throat:      Mouth: Mucous membranes are dry  Comments: Poor dentition  Eyes:      General:         Right eye: No discharge  Left eye: No discharge  Pupils: Pupils are equal, round, and reactive to light  Cardiovascular:      Rate and Rhythm: Normal rate and regular rhythm  Heart sounds: Normal heart sounds  No murmur heard  Pulmonary:      Effort: Pulmonary effort is normal  No respiratory distress  Breath sounds: Normal breath sounds  No wheezing  Abdominal:      Palpations: Abdomen is soft  Tenderness: There is no abdominal tenderness  There is no guarding or rebound  Musculoskeletal:         General: Tenderness present  Cervical back: Normal range of motion and neck supple  Right lower leg: No edema  Left lower leg: No edema  Skin:     General: Skin is warm and dry  Neurological:      Mental Status: She is alert and oriented to person, place, and time  Mental status is at baseline  Psychiatric:         Behavior: Behavior normal          Pertinent Laboratory/Diagnostic Studies:  Laboratory and Imaging studies reviewed  Full report in the paper chart  Current Medications   Medications reviewed and updated in facility chart      Name: Hi Felix  : 1940  MRN: Kacie Acevedo MD  Geriatric Medicine  2023 3:08 PM

## 2023-06-05 NOTE — ASSESSMENT & PLAN NOTE
Chronic osteoarthritis , pain right knee aggravated status post fall   CT showed no fracture, dislocation or destructive osseous lesion    Small knee joint effusion    Moderate tricompartmental osteoarthritis    Continue pain management with tramadol as needed, start Tylenol scheduled  We will add Biofreeze to her regimen  Continue PT OT  Follow-up with orthopedic surgery as outpatient

## 2023-06-06 ENCOUNTER — NURSING HOME VISIT (OUTPATIENT)
Dept: GERIATRICS | Facility: OTHER | Age: 83
End: 2023-06-06
Payer: MEDICARE

## 2023-06-06 DIAGNOSIS — E03.9 ACQUIRED HYPOTHYROIDISM: ICD-10-CM

## 2023-06-06 DIAGNOSIS — R26.2 AMBULATORY DYSFUNCTION: ICD-10-CM

## 2023-06-06 DIAGNOSIS — M17.11 OSTEOARTHRITIS OF RIGHT KNEE, UNSPECIFIED OSTEOARTHRITIS TYPE: Primary | ICD-10-CM

## 2023-06-06 DIAGNOSIS — K59.00 CONSTIPATION, UNSPECIFIED CONSTIPATION TYPE: ICD-10-CM

## 2023-06-06 DIAGNOSIS — E11.42 TYPE 2 DIABETES MELLITUS WITH DIABETIC POLYNEUROPATHY, WITH LONG-TERM CURRENT USE OF INSULIN (HCC): ICD-10-CM

## 2023-06-06 DIAGNOSIS — Z79.4 TYPE 2 DIABETES MELLITUS WITH DIABETIC POLYNEUROPATHY, WITH LONG-TERM CURRENT USE OF INSULIN (HCC): ICD-10-CM

## 2023-06-06 DIAGNOSIS — R74.8 ELEVATED VITAMIN B12 LEVEL: ICD-10-CM

## 2023-06-06 PROCEDURE — 99309 SBSQ NF CARE MODERATE MDM 30: CPT | Performed by: FAMILY MEDICINE

## 2023-06-06 NOTE — ASSESSMENT & PLAN NOTE
Patient has right knee pain due to osteoarthritis  Keep patient on fall precautions  Continue to work with PT and OT

## 2023-06-06 NOTE — PROGRESS NOTES
[address]  [phone]  [facility] 79 of Service: nursing home place of service: POS 31 Skilled Care-Part A Coverage      NAME: Rebeca Barillas  AGE: 80 y o  SEX: female [id number]    DATE OF ENCOUNTER: 6/6/2023    Assessment and Plan     1  Osteoarthritis of right knee, unspecified osteoarthritis type  Assessment & Plan:  Patient has chronic osteoarthritis that worsened after a fall  CT completed on admission did not show fracture, dislocation, or osseous lesions  There was a small knee joint effusion  Moderate tricompartmental osteoarthritis was noted on CT  Patient rated her pain a 8/10 today and worsens with movement  She states her pain is controlled at rest   Continue pain regimen with scheduled tylenol, tramadol, gabapentin, lidocaine patch, and topical biofreeze  Continue to work with PT and OT  Follow up with orthopedic surgery outpatient  2  Type 2 diabetes mellitus with diabetic polyneuropathy, with long-term current use of insulin ([facility])  Assessment & Plan:    Lab Results   Component Value Date    HGBA1C 8 9 (H) 05/26/2023   Blood sugar this morning was 438  Increase glargine to 10 units BID  Continue 3 units of humalog and sliding scale with meals  Continue accu-cheks with meals  Encourage diabetic diet  Avoid hypoglycemia  3  Acquired hypothyroidism  Assessment & Plan:  TSH this morning was 0 39  Decrease levothyroxine to 75 mcg daily  4  Elevated vitamin B12 level  Assessment & Plan:  B12 level today was 2,990  Discontinue B12 supplement  5  Ambulatory dysfunction  Assessment & Plan:  Patient has right knee pain due to osteoarthritis  Keep patient on fall precautions  Continue to work with PT and OT  6  Constipation, unspecified constipation type  Assessment & Plan:  Patient states she has constipation today  Add senna 2 tablets daily                Chief Complaint     Follow up for right knee pain  History of Present Illness     Tiffany Rust is a 80 y o  female who was seen today for follow up  She states she has 8/10 right knee pain at rest that increases with movement  Her pain takes a while to decrease after movement  She does feel that her pain is controlled on her current pain regimen and with a lidocaine patch when she is at rest  Denies nausea, vomiting, headache, dizziness, or syncope  Denies shortness of breath, dyspnea, chest pain, palpitations, or cough  She was able to have a bowel movement today with some difficulty  Denies abdominal pain, diarrhea, dysuria, or hematuria  She states her appetite has been good and she is sleeping well  Will continue to work with PT and OT  The following portions of the patient's history were reviewed and updated as appropriate: allergies, current medications, past family history, past medical history, past social history, past surgical history and problem list     Review of Systems     Review of Systems   Constitutional: Positive for activity change  Negative for appetite change, chills and fever  HENT: Negative for congestion, hearing loss, rhinorrhea, tinnitus and trouble swallowing  Eyes: Negative for pain and visual disturbance  Respiratory: Negative for cough, shortness of breath and wheezing  Cardiovascular: Negative for chest pain, palpitations and leg swelling  Gastrointestinal: Negative for abdominal pain, constipation, diarrhea, nausea and vomiting  Endocrine: Negative for cold intolerance and heat intolerance  Genitourinary: Negative for difficulty urinating, dysuria and hematuria  Musculoskeletal: Positive for arthralgias, gait problem and joint swelling  Negative for back pain  Right knee pain  Skin: Negative for wound  Allergic/Immunologic: Negative for environmental allergies  Neurological: Negative for dizziness, seizures, syncope, light-headedness, numbness and headaches  Hematological: Does not bruise/bleed easily  Psychiatric/Behavioral: Negative for behavioral problems and sleep disturbance  Active Problem List     Patient Active Problem List   Diagnosis   • HLD (hyperlipidemia)   • HTN (hypertension)   • Acquired hypothyroidism   • Type 2 diabetes mellitus with diabetic polyneuropathy, with long-term current use of insulin ([facility] [address] )   • Closed intertrochanteric fracture of right femur (HCC)   • Hyponatremia   • Elevated vitamin B12 level   • Anemia   • Hypophosphatemia   • Primary osteoarthritis of both knees   • Acute hip pain, right   • Ambulatory dysfunction   • Right knee pain   • Primary osteoarthritis of right shoulder   • Soft tissue radionecrosis   • Osteoradionecrosis of temporal bone (HCC)   • SIRS of non-infectious origin w acute organ dysfunction (HCC)   • High anion gap metabolic acidosis   • Coffee ground emesis   • RAMIRO (acute kidney injury) ([facility] [address] )   • Abnormal CT of the abdomen   • Visual changes   • Fall   • Traumatic rhabdomyolysis (HCC)   • Leukemoid reaction   • Gastroesophageal reflux disease without esophagitis   • SIRS (systemic inflammatory response syndrome) ([facility])   • Osteoarthritis of right knee   • Constipation       Objective     Vital Signs:     Blood pressure 141/67 Heart Rate: 70 Respiratory Rate 18   Temperature 98 4 degrees F Oxygen Saturation 93% Weight 107 6 lbs    Physical Exam  Vitals and nursing note reviewed  Constitutional:       General: She is not in acute distress  Appearance: She is not diaphoretic  HENT:      Head: Normocephalic and atraumatic  Nose: Nose normal       Mouth/Throat:      Mouth: Mucous membranes are moist       Pharynx: Oropharynx is clear  Eyes:      General: No scleral icterus  Right eye: No discharge  Left eye: No discharge  Pupils: Pupils are equal, round, and reactive to light  Cardiovascular:      Rate and Rhythm: Normal rate and regular rhythm        Heart sounds: Normal heart sounds  No murmur heard  Pulmonary:      Effort: Pulmonary effort is normal  No respiratory distress  Breath sounds: Normal breath sounds  No wheezing  Abdominal:      General: Bowel sounds are normal  There is no distension  Palpations: Abdomen is soft  Tenderness: There is no abdominal tenderness  There is no guarding or rebound  Musculoskeletal:         General: Swelling and tenderness present  Cervical back: Normal range of motion and neck supple  Right lower leg: No edema  Left lower leg: No edema  Comments: Mild right knee swelling and tenderness on palpation  Skin:     General: Skin is warm and dry  Capillary Refill: Capillary refill takes less than 2 seconds  Comments: Lidocaine patch on right knee  Neurological:      Mental Status: She is alert and oriented to person, place, and time  Mental status is at baseline  Sensory: No sensory deficit  Psychiatric:         Mood and Affect: Mood normal          Behavior: Behavior normal          Pertinent Laboratory/Diagnostic Studies:  Laboratory and Imaging studies reviewed  Full report in the paper chart  Current Medications   Medications reviewed and updated in facility chart      Name: Gabbie Solis  : 1940  MRN: [id number]        Leana Crockett MD  Geriatric Medicine  2023 1:56 PM

## 2023-06-06 NOTE — ASSESSMENT & PLAN NOTE
Lab Results   Component Value Date    HGBA1C 8 9 (H) 05/26/2023   Blood sugar this morning was 438  Increase glargine to 10 units BID  Continue 3 units of humalog and sliding scale with meals  Continue accu-cheks with meals  Encourage diabetic diet  Avoid hypoglycemia

## 2023-06-06 NOTE — ASSESSMENT & PLAN NOTE
Patient has chronic osteoarthritis that worsened after a fall  CT completed on admission did not show fracture, dislocation, or osseous lesions  There was a small knee joint effusion  Moderate tricompartmental osteoarthritis was noted on CT  Patient rated her pain a 8/10 today and worsens with movement  She states her pain is controlled at rest   Continue pain regimen with scheduled tylenol, tramadol, gabapentin, lidocaine patch, and topical biofreeze  Continue to work with PT and OT  Follow up with orthopedic surgery outpatient  none

## 2023-06-08 ENCOUNTER — NURSING HOME VISIT (OUTPATIENT)
Dept: GERIATRICS | Facility: OTHER | Age: 83
End: 2023-06-08
Payer: MEDICARE

## 2023-06-08 DIAGNOSIS — E11.42 TYPE 2 DIABETES MELLITUS WITH DIABETIC POLYNEUROPATHY, WITH LONG-TERM CURRENT USE OF INSULIN (HCC): ICD-10-CM

## 2023-06-08 DIAGNOSIS — Z79.4 TYPE 2 DIABETES MELLITUS WITH DIABETIC POLYNEUROPATHY, WITH LONG-TERM CURRENT USE OF INSULIN (HCC): ICD-10-CM

## 2023-06-08 DIAGNOSIS — I10 HYPERTENSION, UNSPECIFIED TYPE: ICD-10-CM

## 2023-06-08 DIAGNOSIS — M17.11 OSTEOARTHRITIS OF RIGHT KNEE, UNSPECIFIED OSTEOARTHRITIS TYPE: Primary | ICD-10-CM

## 2023-06-08 PROCEDURE — 99309 SBSQ NF CARE MODERATE MDM 30: CPT | Performed by: FAMILY MEDICINE

## 2023-06-08 NOTE — ASSESSMENT & PLAN NOTE
Patient has chronic osteoarthritis that worsened after a fall  CT completed on admission did not show fracture, dislocation, or osseous lesions  There was a small knee joint effusion  Moderate tricompartmental osteoarthritis was noted on CT  Patient rated her pain a 8/10 today and worsens with movement  She states her pain is controlled at rest   Continue pain regimen with scheduled tylenol, tramadol, gabapentin, lidocaine patch, and topical biofreeze  Continue to work with PT and OT  Follow up with orthopedic surgery outpatient

## 2023-06-08 NOTE — PROGRESS NOTES
[address]  [phone]  [facility] 79 of Service: nursing home place of service: POS 31 Skilled Care-Part A Coverage      NAME: Gabbie Solis  AGE: 80 y o  SEX: female [id number]    DATE OF ENCOUNTER: 6/8/2023    Assessment and Plan     Problem List Items Addressed This Visit        Endocrine    Type 2 diabetes mellitus with diabetic polyneuropathy, with long-term current use of insulin ([address] )       Lab Results   Component Value Date    HGBA1C 8 9 (H) 05/26/2023     Fasting blood glucose continues to be elevated today at 304  We will increase Lantus at a time to 12 units, continue 10 units in the morning  Continue Humalog 3 units with meals and sliding scale with meals  Courage diabetic diet  Continue to monitor Accu-Cheks and avoid hypoglycemia            Cardiovascular and Mediastinum    HTN (hypertension)     Blood pressure not at goal  Start lisinopril 2 5 mg daily and monitor closely  Monitor CMP            Musculoskeletal and Integument    Osteoarthritis of right knee - Primary     Patient has chronic osteoarthritis that worsened after a fall  CT completed on admission did not show fracture, dislocation, or osseous lesions  There was a small knee joint effusion  Moderate tricompartmental osteoarthritis was noted on CT  Patient rated her pain a 8/10 today and worsens with movement  She states her pain is controlled at rest   Continue pain regimen with scheduled tylenol, tramadol, gabapentin, lidocaine patch, and topical biofreeze  Continue to work with PT and OT  Follow up with orthopedic surgery outpatient  Chief Complaint     Follow up     History of Present Illness     Gabbie Solis is a 80 y o  female who was seen today for follow up             The following portions of the patient's history were reviewed and updated as appropriate: allergies, current medications, past family history, past medical history, past social history, past surgical history and problem list     Review of Systems     Review of Systems   Constitutional: Negative for chills and fever  HENT: Negative for congestion and rhinorrhea  Respiratory: Negative for cough, shortness of breath and wheezing  Cardiovascular: Negative for chest pain, palpitations and leg swelling  Gastrointestinal: Negative for abdominal pain and constipation  Endocrine: Negative for cold intolerance  Genitourinary: Negative for difficulty urinating, dysuria and hematuria  Musculoskeletal: Positive for arthralgias and gait problem  Skin: Negative for wound  Allergic/Immunologic: Negative for environmental allergies  Neurological: Negative for dizziness and seizures  Hematological: Does not bruise/bleed easily  Psychiatric/Behavioral: Negative for behavioral problems and sleep disturbance         Active Problem List     Patient Active Problem List   Diagnosis   • HLD (hyperlipidemia)   • HTN (hypertension)   • Acquired hypothyroidism   • Type 2 diabetes mellitus with diabetic polyneuropathy, with long-term current use of insulin ([facility] [address] )   • Closed intertrochanteric fracture of right femur (HCC)   • Hyponatremia   • Elevated vitamin B12 level   • Anemia   • Hypophosphatemia   • Primary osteoarthritis of both knees   • Acute hip pain, right   • Ambulatory dysfunction   • Right knee pain   • Primary osteoarthritis of right shoulder   • Soft tissue radionecrosis   • Osteoradionecrosis of temporal bone (HCC)   • SIRS of non-infectious origin w acute organ dysfunction ([facility])   • High anion gap metabolic acidosis   • Coffee ground emesis   • RAMIRO (acute kidney injury) ([facility] [address] )   • Abnormal CT of the abdomen   • Visual changes   • Fall   • Traumatic rhabdomyolysis ([facility])   • Leukemoid reaction   • Gastroesophageal reflux disease without esophagitis   • SIRS (systemic inflammatory response syndrome) ([facility])   • Osteoarthritis of right knee   • Constipation       Objective Vital Signs:     Blood pressure 167/73 Heart Rate: 67 Respiratory Rate 18   Temperature 97 5 Oxygen Saturation 97% Weight 107 8lbs    Physical Exam  Vitals and nursing note reviewed  Constitutional:       General: She is not in acute distress  Appearance: She is not diaphoretic  Comments: Frail looking   HENT:      Head: Normocephalic and atraumatic  Mouth/Throat:      Mouth: Mucous membranes are moist    Eyes:      General:         Right eye: No discharge  Left eye: No discharge  Pupils: Pupils are equal, round, and reactive to light  Cardiovascular:      Rate and Rhythm: Normal rate and regular rhythm  Heart sounds: Normal heart sounds  No murmur heard  Pulmonary:      Effort: Pulmonary effort is normal  No respiratory distress  Breath sounds: Normal breath sounds  No wheezing  Abdominal:      Palpations: Abdomen is soft  Tenderness: There is no abdominal tenderness  There is no guarding or rebound  Musculoskeletal:         General: Tenderness (R knee) present  Cervical back: Normal range of motion and neck supple  Right lower leg: No edema  Left lower leg: No edema  Skin:     General: Skin is warm and dry  Findings: No bruising  Neurological:      Mental Status: She is alert and oriented to person, place, and time  Mental status is at baseline  Psychiatric:         Behavior: Behavior normal          Pertinent Laboratory/Diagnostic Studies:  Laboratory and Imaging studies reviewed  Full report in the paper chart  Current Medications   Medications reviewed and updated in facility chart      Name: Giorgiralph Mariekimberly  : 1940  MRN: Kacie Acevedo MD  Geriatric Medicine  2023 3:45 PM

## 2023-06-08 NOTE — ASSESSMENT & PLAN NOTE
Lab Results   Component Value Date    HGBA1C 8 9 (H) 05/26/2023     Fasting blood glucose continues to be elevated today at 304  We will increase Lantus at a time to 12 units, continue 10 units in the morning  Continue Humalog 3 units with meals and sliding scale with meals  Courage diabetic diet  Continue to monitor Accu-Cheks and avoid hypoglycemia

## 2023-06-12 ENCOUNTER — NURSING HOME VISIT (OUTPATIENT)
Dept: GERIATRICS | Facility: OTHER | Age: 83
End: 2023-06-12
Payer: MEDICARE

## 2023-06-12 DIAGNOSIS — M17.11 OSTEOARTHRITIS OF RIGHT KNEE, UNSPECIFIED OSTEOARTHRITIS TYPE: Primary | ICD-10-CM

## 2023-06-12 DIAGNOSIS — Z79.4 TYPE 2 DIABETES MELLITUS WITH DIABETIC POLYNEUROPATHY, WITH LONG-TERM CURRENT USE OF INSULIN (HCC): ICD-10-CM

## 2023-06-12 DIAGNOSIS — R26.2 AMBULATORY DYSFUNCTION: ICD-10-CM

## 2023-06-12 DIAGNOSIS — E03.9 ACQUIRED HYPOTHYROIDISM: ICD-10-CM

## 2023-06-12 DIAGNOSIS — E78.5 HYPERLIPIDEMIA, UNSPECIFIED HYPERLIPIDEMIA TYPE: ICD-10-CM

## 2023-06-12 DIAGNOSIS — I10 HYPERTENSION, UNSPECIFIED TYPE: ICD-10-CM

## 2023-06-12 DIAGNOSIS — E11.42 TYPE 2 DIABETES MELLITUS WITH DIABETIC POLYNEUROPATHY, WITH LONG-TERM CURRENT USE OF INSULIN (HCC): ICD-10-CM

## 2023-06-12 PROCEDURE — 99316 NF DSCHRG MGMT 30 MIN+: CPT | Performed by: FAMILY MEDICINE

## 2023-06-12 RX ORDER — LISINOPRIL 2.5 MG/1
2.5 TABLET ORAL DAILY
COMMUNITY

## 2023-06-12 NOTE — ASSESSMENT & PLAN NOTE
Lab Results   Component Value Date    HGBA1C 8 9 (H) 05/26/2023     Fasting blood glucose continues to be elevated today  We will increase Lantus at bedtime to 15 units, continue 10 units in the morning  Continue Humalog 3 units with meals   Encourage diabetic diet  Continue to monitor Accu-Cheks and avoid hypoglycemia  Follow-up with PCP as outpatient

## 2023-06-12 NOTE — PROGRESS NOTES
[address] [place] [address] Drive: [facility]itional [facility]  POS: 31: SNF/Short Term Rehab    NAME: Anabel Hinkle  AGE: 80 y o  SEX: female  DATE OF ADMISSION: 5/27/23   DATE OF DISCHARGE:6/13/23   DISCHARGE DISPOSITION: home  Today's Visit: 6/12/20232:33 PM    Reason for admission: Patient was admitted from [place] [address] for rehabilitation after hospitalization for right knee osteoarthritis and status post fall  Course of stay: Patient was admitted to  [address] [place] [address] for rehabilitation due to  physical deconditioning and right knee osteoarthritis  Significant events during the stay include uncontrolled diabetes  The patient participated in PT/OT  Assessment/Plan:    Osteoarthritis of right knee  Patient has chronic osteoarthritis that worsened after a fall  CT completed on admission did not show fracture, dislocation, or osseous lesions  There was a small knee joint effusion  Moderate tricompartmental osteoarthritis was noted on CT  She states her pain is currently controlled  Continue pain regimen with scheduled tylenol, tramadol, gabapentin, lidocaine patch, and topical biofreeze  Continue home PT  Follow up with orthopedic surgery outpatient      Type 2 diabetes mellitus with diabetic polyneuropathy, with long-term current use of insulin ([facility])    Lab Results   Component Value Date    HGBA1C 8 9 (H) 05/26/2023     Fasting blood glucose continues to be elevated today  We will increase Lantus at bedtime to 15 units, continue 10 units in the morning  Continue Humalog 3 units with meals   Encourage diabetic diet  Continue to monitor Accu-Cheks and avoid hypoglycemia  Follow-up with PCP as outpatient    Acquired hypothyroidism  Continue levothyroxine 75 mcg daily  We will need repeat TSH in 4 to 5 weeks  Follow-up with PCP    HTN (hypertension)  Improved  We will continue lisinopril 2 5 mg daily  Monitor CMP  Follow-up with PCP and adjust regimen as needed    Ambulatory dysfunction  We will continue home PT  Discussed about fall precaution         Discharge Medications: See discharge medication list which was reviewed and signed  Status at time of discharge: Stable    Subjective: Patient seen and examined at bedside denies any acute complaints at the time of encounter  Pain right knee is controlled with current regimen  Patient states she feels well, make progress with physical therapy  Medically stable to return home    Review of Systems   Constitutional: Negative for chills and fever  HENT: Negative for congestion and rhinorrhea  Respiratory: Negative for cough, shortness of breath and wheezing  Cardiovascular: Negative for chest pain, palpitations and leg swelling  Gastrointestinal: Negative for abdominal pain and constipation  Endocrine: Negative for cold intolerance  Genitourinary: Negative for difficulty urinating, dysuria and hematuria  Musculoskeletal: Positive for arthralgias and gait problem  Skin: Negative for wound  Allergic/Immunologic: Negative for environmental allergies  Neurological: Negative for dizziness and seizures  Hematological: Does not bruise/bleed easily  Psychiatric/Behavioral: Negative for behavioral problems and sleep disturbance  Vital Signs:     Blood pressure 150/65 Heart Rate: 69 Respiratory Rate 19   Temperature 98 2 Oxygen Saturation 98% Weight 105 6lbs    Exam:     Physical Exam  Vitals and nursing note reviewed  Constitutional:       General: She is not in acute distress  Appearance: She is not diaphoretic  Comments: Frail looking   HENT:      Head: Normocephalic and atraumatic  Mouth/Throat:      Mouth: Mucous membranes are moist    Eyes:      General:         Right eye: No discharge  Left eye: No discharge  Pupils: Pupils are equal, round, and reactive to light     Cardiovascular:      Rate and Rhythm: Normal rate and regular rhythm  Heart sounds: Normal heart sounds  No murmur heard  Pulmonary:      Effort: Pulmonary effort is normal  No respiratory distress  Breath sounds: Normal breath sounds  No wheezing  Abdominal:      Palpations: Abdomen is soft  Tenderness: There is no abdominal tenderness  There is no guarding or rebound  Musculoskeletal:         General: Tenderness present  Cervical back: Normal range of motion and neck supple  Right lower leg: No edema  Left lower leg: No edema  Skin:     General: Skin is warm and dry  Neurological:      Mental Status: She is alert and oriented to person, place, and time  Mental status is at baseline  Psychiatric:         Behavior: Behavior normal          Discussion with patient/family and further instructions:  -Fall precautions  -Aspiration precautions  -Bleeding precautions  -Monitor for signs/symptoms of infection  -Medication list was reviewed and signed  -DME form was completed    Follow-up Recommendations: Please follow-up with your primary care physician within 7-10 days of discharge to review medication changes and current status       Problem List Follow-up Recommendations:  Continue home PT  Follow-up with Ortho as outpatient    Swati Blackwell MD  Geriatric Medicine  0/84/38496:22 PM

## 2023-06-12 NOTE — ASSESSMENT & PLAN NOTE
Patient has chronic osteoarthritis that worsened after a fall  CT completed on admission did not show fracture, dislocation, or osseous lesions  There was a small knee joint effusion  Moderate tricompartmental osteoarthritis was noted on CT  She states her pain is currently controlled  Continue pain regimen with scheduled tylenol, tramadol, gabapentin, lidocaine patch, and topical biofreeze  Continue home PT  Follow up with orthopedic surgery outpatient

## 2023-06-12 NOTE — ASSESSMENT & PLAN NOTE
Improved  We will continue lisinopril 2 5 mg daily  Monitor CMP  Follow-up with PCP and adjust regimen as needed

## 2023-06-17 ENCOUNTER — HOME CARE VISIT (OUTPATIENT)
Dept: HOME HEALTH SERVICES | Facility: HOME HEALTHCARE | Age: 83
End: 2023-06-17

## 2023-06-17 NOTE — CASE COMMUNICATION
6 17  at 1130   Daughter Gilberto Ibarra called to enquire about [facility] services  States her mother was discharge tuesday 6 13 and they have not heard anything from [facility] yet  I spoke with Luis Enrique Ken in Intake and we did not receive referral     Daughter states she spoke with Jaclyn Slaughter from [address] at [address] from intake gave her a call      As per Jaclyn Slaughter she needs to check with her  regarding referral

## 2023-06-29 ENCOUNTER — HOSPITAL ENCOUNTER (INPATIENT)
Facility: HOSPITAL | Age: 83
LOS: 1 days | Discharge: HOME HEALTH | End: 2023-07-01
Attending: EMERGENCY MEDICINE | Admitting: INTERNAL MEDICINE
Payer: MEDICARE

## 2023-06-29 DIAGNOSIS — E11.649 TYPE 2 DIABETES MELLITUS WITH HYPOGLYCEMIA WITHOUT COMA, WITH LONG-TERM CURRENT USE OF INSULIN (HCC): Primary | ICD-10-CM

## 2023-06-29 DIAGNOSIS — E11.42 TYPE 2 DIABETES MELLITUS WITH DIABETIC POLYNEUROPATHY, WITH LONG-TERM CURRENT USE OF INSULIN (HCC): ICD-10-CM

## 2023-06-29 DIAGNOSIS — Z79.4 TYPE 2 DIABETES MELLITUS WITH HYPOGLYCEMIA WITHOUT COMA, WITH LONG-TERM CURRENT USE OF INSULIN (HCC): Primary | ICD-10-CM

## 2023-06-29 DIAGNOSIS — T38.3X5A HYPOGLYCEMIA DUE TO INSULIN: ICD-10-CM

## 2023-06-29 DIAGNOSIS — E16.0 HYPOGLYCEMIA DUE TO INSULIN: ICD-10-CM

## 2023-06-29 DIAGNOSIS — E16.2 HYPOGLYCEMIA: ICD-10-CM

## 2023-06-29 DIAGNOSIS — Z79.4 TYPE 2 DIABETES MELLITUS WITH DIABETIC POLYNEUROPATHY, WITH LONG-TERM CURRENT USE OF INSULIN (HCC): ICD-10-CM

## 2023-06-29 PROBLEM — K92.0 COFFEE GROUND EMESIS: Status: RESOLVED | Noted: 2022-07-10 | Resolved: 2023-06-29

## 2023-06-29 PROBLEM — E87.1 HYPONATREMIA: Status: RESOLVED | Noted: 2020-05-27 | Resolved: 2023-06-29

## 2023-06-29 PROBLEM — K21.9 GASTROESOPHAGEAL REFLUX DISEASE WITHOUT ESOPHAGITIS: Chronic | Status: ACTIVE | Noted: 2022-10-17

## 2023-06-29 PROBLEM — D64.9 ANEMIA: Status: RESOLVED | Noted: 2020-06-01 | Resolved: 2023-06-29

## 2023-06-29 PROBLEM — H53.9 VISUAL CHANGES: Status: RESOLVED | Noted: 2022-07-11 | Resolved: 2023-06-29

## 2023-06-29 PROBLEM — G93.41 ACUTE METABOLIC ENCEPHALOPATHY DUE TO HYPOGLYCEMIA: Status: ACTIVE | Noted: 2023-06-29

## 2023-06-29 PROBLEM — D72.823 LEUKEMOID REACTION: Status: RESOLVED | Noted: 2022-10-17 | Resolved: 2023-06-29

## 2023-06-29 PROBLEM — E87.29 HIGH ANION GAP METABOLIC ACIDOSIS: Status: RESOLVED | Noted: 2022-07-10 | Resolved: 2023-06-29

## 2023-06-29 PROBLEM — R65.10 SIRS (SYSTEMIC INFLAMMATORY RESPONSE SYNDROME) (HCC): Status: RESOLVED | Noted: 2023-05-24 | Resolved: 2023-06-29

## 2023-06-29 PROBLEM — R74.8 ELEVATED VITAMIN B12 LEVEL: Status: RESOLVED | Noted: 2020-06-01 | Resolved: 2023-06-29

## 2023-06-29 PROBLEM — M17.11 OSTEOARTHRITIS OF RIGHT KNEE: Status: RESOLVED | Noted: 2023-06-05 | Resolved: 2023-06-29

## 2023-06-29 PROBLEM — T79.6XXA TRAUMATIC RHABDOMYOLYSIS (HCC): Status: RESOLVED | Noted: 2022-10-17 | Resolved: 2023-06-29

## 2023-06-29 PROBLEM — E83.39 HYPOPHOSPHATEMIA: Status: RESOLVED | Noted: 2020-06-04 | Resolved: 2023-06-29

## 2023-06-29 PROBLEM — K59.00 CONSTIPATION: Status: RESOLVED | Noted: 2023-06-06 | Resolved: 2023-06-29

## 2023-06-29 PROBLEM — N17.9 AKI (ACUTE KIDNEY INJURY) (HCC): Status: RESOLVED | Noted: 2022-07-10 | Resolved: 2023-06-29
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
## 2023-06-29 PROBLEM — M25.551 ACUTE HIP PAIN, RIGHT: Status: RESOLVED | Noted: 2020-11-05 | Resolved: 2023-06-29

## 2023-06-29 PROBLEM — S72.141A CLOSED INTERTROCHANTERIC FRACTURE OF RIGHT FEMUR (HCC): Status: RESOLVED | Noted: 2020-05-26 | Resolved: 2023-06-29

## 2023-06-29 PROBLEM — R65.11 SIRS OF NON-INFECTIOUS ORIGIN W ACUTE ORGAN DYSFUNCTION (HCC): Status: RESOLVED | Noted: 2022-07-10 | Resolved: 2023-06-29

## 2023-06-29 PROBLEM — R79.89 ELEVATED VITAMIN B12 LEVEL: Status: RESOLVED | Noted: 2020-06-01 | Resolved: 2023-06-29

## 2023-06-29 PROBLEM — M25.561 RIGHT KNEE PAIN: Status: RESOLVED | Noted: 2020-11-06 | Resolved: 2023-06-29

## 2023-06-29 LAB
ALBUMIN SERPL BCP-MCNC: 4.2 G/DL (ref 3.5–5)
ALP SERPL-CCNC: 58 U/L (ref 34–104)
ALT SERPL W P-5'-P-CCNC: 32 U/L (ref 7–52)
ANION GAP SERPL CALCULATED.3IONS-SCNC: 7 MMOL/L
AST SERPL W P-5'-P-CCNC: 30 U/L (ref 13–39)
BASOPHILS # BLD AUTO: 0.04 THOUSANDS/ÂΜL (ref 0–0.1)
BASOPHILS NFR BLD AUTO: 1 % (ref 0–1)
BILIRUB SERPL-MCNC: 0.47 MG/DL (ref 0.2–1)
BILIRUB UR QL STRIP: NEGATIVE
BUN SERPL-MCNC: 10 MG/DL (ref 5–25)
CALCIUM SERPL-MCNC: 9 MG/DL (ref 8.4–10.2)
CHLORIDE SERPL-SCNC: 102 MMOL/L (ref 96–108)
CLARITY UR: CLEAR
CO2 SERPL-SCNC: 31 MMOL/L (ref 21–32)
COLOR UR: ABNORMAL
CREAT SERPL-MCNC: 0.66 MG/DL (ref 0.6–1.3)
EOSINOPHIL # BLD AUTO: 0.07 THOUSAND/ÂΜL (ref 0–0.61)
EOSINOPHIL NFR BLD AUTO: 1 % (ref 0–6)
ERYTHROCYTE [DISTWIDTH] IN BLOOD BY AUTOMATED COUNT: 13.1 % (ref 11.6–15.1)
GFR SERPL CREATININE-BSD FRML MDRD: 82 ML/MIN/1.73SQ M
GLUCOSE SERPL-MCNC: 133 MG/DL (ref 65–140)
GLUCOSE SERPL-MCNC: 141 MG/DL (ref 65–140)
GLUCOSE SERPL-MCNC: 227 MG/DL (ref 65–140)
GLUCOSE SERPL-MCNC: 238 MG/DL (ref 65–140)
GLUCOSE SERPL-MCNC: 245 MG/DL (ref 65–140)
GLUCOSE SERPL-MCNC: 30 MG/DL (ref 65–140)
GLUCOSE SERPL-MCNC: 31 MG/DL (ref 65–140)
GLUCOSE SERPL-MCNC: 44 MG/DL (ref 65–140)
GLUCOSE SERPL-MCNC: 95 MG/DL (ref 65–140)
GLUCOSE SERPL-MCNC: 97 MG/DL (ref 65–140)
GLUCOSE UR STRIP-MCNC: ABNORMAL MG/DL
HCT VFR BLD AUTO: 40.7 % (ref 34.8–46.1)
HGB BLD-MCNC: 12.8 G/DL (ref 11.5–15.4)
HGB UR QL STRIP.AUTO: NEGATIVE
IMM GRANULOCYTES # BLD AUTO: 0.03 THOUSAND/UL (ref 0–0.2)
IMM GRANULOCYTES NFR BLD AUTO: 0 % (ref 0–2)
KETONES UR STRIP-MCNC: NEGATIVE MG/DL
LEUKOCYTE ESTERASE UR QL STRIP: NEGATIVE
LYMPHOCYTES # BLD AUTO: 1.05 THOUSANDS/ÂΜL (ref 0.6–4.47)
LYMPHOCYTES NFR BLD AUTO: 15 % (ref 14–44)
MCH RBC QN AUTO: 32.8 PG (ref 26.8–34.3)
MCHC RBC AUTO-ENTMCNC: 31.4 G/DL (ref 31.4–37.4)
MCV RBC AUTO: 104 FL (ref 82–98)
MONOCYTES # BLD AUTO: 0.75 THOUSAND/ÂΜL (ref 0.17–1.22)
MONOCYTES NFR BLD AUTO: 11 % (ref 4–12)
NEUTROPHILS # BLD AUTO: 5.2 THOUSANDS/ÂΜL (ref 1.85–7.62)
NEUTS SEG NFR BLD AUTO: 72 % (ref 43–75)
NITRITE UR QL STRIP: NEGATIVE
NRBC BLD AUTO-RTO: 0 /100 WBCS
PH UR STRIP.AUTO: 7.5 [PH]
PLATELET # BLD AUTO: 210 THOUSANDS/UL (ref 149–390)
PMV BLD AUTO: 10.2 FL (ref 8.9–12.7)
POTASSIUM SERPL-SCNC: 4 MMOL/L (ref 3.5–5.3)
PROT SERPL-MCNC: 6.8 G/DL (ref 6.4–8.4)
PROT UR STRIP-MCNC: NEGATIVE MG/DL
RBC # BLD AUTO: 3.9 MILLION/UL (ref 3.81–5.12)
SODIUM SERPL-SCNC: 140 MMOL/L (ref 135–147)
SP GR UR STRIP.AUTO: 1.01
UROBILINOGEN UR QL STRIP.AUTO: 0.2 E.U./DL
WBC # BLD AUTO: 7.14 THOUSAND/UL (ref 4.31–10.16)

## 2023-06-29 PROCEDURE — 96361 HYDRATE IV INFUSION ADD-ON: CPT

## 2023-06-29 PROCEDURE — 82948 REAGENT STRIP/BLOOD GLUCOSE: CPT

## 2023-06-29 PROCEDURE — 85025 COMPLETE CBC W/AUTO DIFF WBC: CPT | Performed by: EMERGENCY MEDICINE

## 2023-06-29 PROCEDURE — 96376 TX/PRO/DX INJ SAME DRUG ADON: CPT

## 2023-06-29 PROCEDURE — 81003 URINALYSIS AUTO W/O SCOPE: CPT | Performed by: EMERGENCY MEDICINE

## 2023-06-29 PROCEDURE — 99284 EMERGENCY DEPT VISIT MOD MDM: CPT

## 2023-06-29 PROCEDURE — 80053 COMPREHEN METABOLIC PANEL: CPT | Performed by: EMERGENCY MEDICINE

## 2023-06-29 PROCEDURE — 36415 COLL VENOUS BLD VENIPUNCTURE: CPT | Performed by: EMERGENCY MEDICINE

## 2023-06-29 PROCEDURE — 99223 1ST HOSP IP/OBS HIGH 75: CPT | Performed by: PHYSICIAN ASSISTANT

## 2023-06-29 PROCEDURE — 99285 EMERGENCY DEPT VISIT HI MDM: CPT | Performed by: EMERGENCY MEDICINE

## 2023-06-29 PROCEDURE — 96374 THER/PROPH/DIAG INJ IV PUSH: CPT

## 2023-06-29 PROCEDURE — 93005 ELECTROCARDIOGRAM TRACING: CPT

## 2023-06-29 RX ORDER — MECLIZINE HCL 12.5 MG/1
25 TABLET ORAL EVERY 8 HOURS PRN
Status: DISCONTINUED | OUTPATIENT
Start: 2023-06-29 | End: 2023-07-01 | Stop reason: HOSPADM

## 2023-06-29 RX ORDER — DEXTROSE MONOHYDRATE 25 G/50ML
50 INJECTION, SOLUTION INTRAVENOUS ONCE
Status: COMPLETED | OUTPATIENT
Start: 2023-06-29 | End: 2023-06-29

## 2023-06-29 RX ORDER — INSULIN LISPRO 100 [IU]/ML
3 INJECTION, SOLUTION INTRAVENOUS; SUBCUTANEOUS
Qty: 4.5 ML | Refills: 0 | Status: SHIPPED | OUTPATIENT
Start: 2023-06-29 | End: 2023-06-29

## 2023-06-29 RX ORDER — ATORVASTATIN CALCIUM 20 MG/1
20 TABLET, FILM COATED ORAL
Status: DISCONTINUED | OUTPATIENT
Start: 2023-06-30 | End: 2023-07-01 | Stop reason: HOSPADM

## 2023-06-29 RX ORDER — LISINOPRIL 2.5 MG/1
2.5 TABLET ORAL DAILY
Status: DISCONTINUED | OUTPATIENT
Start: 2023-06-30 | End: 2023-07-01 | Stop reason: HOSPADM

## 2023-06-29 RX ORDER — GABAPENTIN 300 MG/1
300 CAPSULE ORAL 3 TIMES DAILY
Status: DISCONTINUED | OUTPATIENT
Start: 2023-06-29 | End: 2023-07-01 | Stop reason: HOSPADM

## 2023-06-29 RX ORDER — ACETAMINOPHEN 325 MG/1
650 TABLET ORAL EVERY 4 HOURS PRN
Status: DISCONTINUED | OUTPATIENT
Start: 2023-06-29 | End: 2023-07-01 | Stop reason: HOSPADM

## 2023-06-29 RX ORDER — LEVOTHYROXINE SODIUM 0.07 MG/1
75 TABLET ORAL EVERY MORNING
Status: DISCONTINUED | OUTPATIENT
Start: 2023-06-30 | End: 2023-07-01 | Stop reason: HOSPADM

## 2023-06-29 RX ORDER — ONDANSETRON 2 MG/ML
4 INJECTION INTRAMUSCULAR; INTRAVENOUS EVERY 6 HOURS PRN
Status: DISCONTINUED | OUTPATIENT
Start: 2023-06-29 | End: 2023-07-01 | Stop reason: HOSPADM

## 2023-06-29 RX ORDER — PANTOPRAZOLE SODIUM 20 MG/1
20 TABLET, DELAYED RELEASE ORAL
Status: DISCONTINUED | OUTPATIENT
Start: 2023-06-30 | End: 2023-07-01 | Stop reason: HOSPADM

## 2023-06-29 RX ORDER — ENOXAPARIN SODIUM 100 MG/ML
40 INJECTION SUBCUTANEOUS DAILY
Status: DISCONTINUED | OUTPATIENT
Start: 2023-06-30 | End: 2023-07-01 | Stop reason: HOSPADM

## 2023-06-29 RX ORDER — DEXTROSE AND SODIUM CHLORIDE 5; .9 G/100ML; G/100ML
50 INJECTION, SOLUTION INTRAVENOUS CONTINUOUS
Status: DISCONTINUED | OUTPATIENT
Start: 2023-06-29 | End: 2023-06-30

## 2023-06-29 RX ADMIN — DEXTROSE MONOHYDRATE 50 ML: 25 INJECTION, SOLUTION INTRAVENOUS at 18:37

## 2023-06-29 RX ADMIN — SODIUM CHLORIDE 500 ML: 0.9 INJECTION, SOLUTION INTRAVENOUS at 12:46

## 2023-06-29 RX ADMIN — DEXTROSE AND SODIUM CHLORIDE 50 ML/HR: 5; .9 INJECTION, SOLUTION INTRAVENOUS at 20:29

## 2023-06-29 RX ADMIN — DEXTROSE MONOHYDRATE 50 ML: 25 INJECTION, SOLUTION INTRAVENOUS at 12:46

## 2023-06-29 NOTE — LETTER
July 1, 2023     [facility]  [address] [facility]  [address] [facility]    Patient: Nick Bradley   YOB: 1940   Date of Visit: 6/29/2023       Dear Dr. Porter Espinoza: Thank you for referring Rebeca Lake to me for evaluation. Below are my notes for this consultation. If you have questions, please do not hesitate to call me. I look forward to following your patient along with you. Sincerely,        No name on file        CC: No Recipients    Megha Boss, [address] [place] [address]  6/30/2023  3:53 PM  Attested  [address]  Progress Note  Name: Stacey Keene  MRN: [id number]  Unit/Bed#: -01 I Date of Admission: 6/29/2023   Date of Service: 6/30/2023 I Hospital Day: 0    Assessment/Plan   * Acute metabolic encephalopathy due to hypoglycemia  Assessment & Plan  Patient found by home health aide altered with undetectable blood sugar. No resolved     Gastroesophageal reflux disease without esophagitis  Assessment & Plan  Continue PPI     Type 2 diabetes mellitus with hypoglycemia, with long-term current use of insulin [facility])  Assessment & Plan  Lab Results   Component Value Date    HGBA1C 8.9 (H) 05/26/2023       Recent Labs     06/30/23  0608 06/30/23  0815 06/30/23  1001 06/30/23  1236   POCGLU 84 79 227* 357*       Blood Sugar Average: Last 72 hrs:  (P) 145.4375   Found to have undetectable blood sugar at home with metabolic encephalopathy. Encephalopathy has resolved. Reports last insulin was in the morning (6/29),  Home regimen- long acting/Basalog pen 10 units in AM and 15 units at bedtime and also uses sliding scale (regular insulin). BS improved and then dropped again in the ED. Received IV D5.   Much improved currently. Initiated on SSI.    Pending formal endocrinology evaluation       Postoperative hypothyroidism  Assessment & Plan  Continue levothyroxine     Primary hypertension  Assessment & Plan  Continue lisinopril with hold parameters Mixed hyperlipidemia  Assessment & Plan  Continue statin           VTE Prophylaxis:  Enoxaparin (Lovenox)    Patient Centered Rounds: I have performed bedside rounds with nursing staff today. Discussions with Specialists or Other Care Team Provider: endocrinology   Education and Discussions with Family / Patient: patient     Current Length of Stay: 0 day(s)    Current Patient Status: Inpatient   Certification Statement: The patient will continue to require additional inpatient hospital stay due to Acute encephalopathy    Discharge Plan: Currently is pending endocrinology evaluation. The patient will continue to be monitored closely for recurrent hypoglycemia subsequently is changed to inpatient status. Code Status: Level 3 - DNAR and DNI    Subjective:   Feeling better. However she is very concerned about her hypoglycemia as this has happened before in the past.    Objective:     Vitals:   Temp (24hrs), Av.5 °F (36.9 °C), Min:98.5 °F (36.9 °C), Max:98.5 °F (36.9 °C)    Temp:  [98.5 °F (36.9 °C)] 98.5 °F (36.9 °C)  HR:  [75] 75  Resp:  [16-20] 20  BP: ()/(57-87) 135/87  SpO2:  [95 %] 95 %  Body mass index is 21.12 kg/m². Input and Output Summary (last 24 hours): Intake/Output Summary (Last 24 hours) at 2023 1553  Last data filed at 2023 0207  Gross per 24 hour   Intake 281.67 ml   Output --   Net 281.67 ml       Physical Exam:   Physical Exam  Vitals and nursing note reviewed. Constitutional:       General: She is not in acute distress. Appearance: Normal appearance. Comments: Elderly      HENT:      Head: Normocephalic and atraumatic. Right Ear: External ear normal.      Left Ear: External ear normal.      Nose: Nose normal. No rhinorrhea. Mouth/Throat:      Mouth: Mucous membranes are moist.      Pharynx: Oropharynx is clear. Eyes:      General:         Right eye: No discharge. Left eye: No discharge.       Pupils: Pupils are equal, round, and reactive to light. Cardiovascular:      Rate and Rhythm: Normal rate and regular rhythm. Pulses: Normal pulses. Heart sounds: Normal heart sounds. No murmur heard. Pulmonary:      Effort: Pulmonary effort is normal. No respiratory distress. Breath sounds: Normal breath sounds. Abdominal:      General: Bowel sounds are normal. There is no distension. Palpations: Abdomen is soft. There is no mass. Tenderness: There is no abdominal tenderness. Musculoskeletal:         General: No swelling or tenderness. Normal range of motion. Cervical back: Normal range of motion and neck supple. No muscular tenderness. Skin:     General: Skin is warm and dry. Capillary Refill: Capillary refill takes less than 2 seconds. Findings: No erythema or rash. Neurological:      General: No focal deficit present. Mental Status: She is alert and oriented to person, place, and time. Mental status is at baseline. Psychiatric:         Mood and Affect: Mood normal.         Behavior: Behavior normal.         Thought Content:  Thought content normal.         Judgment: Judgment normal.         Additional Data:     Labs:    Results from last 7 days   Lab Units 06/30/23  0410 06/29/23  1221   WBC Thousand/uL 7.07 7.14   HEMOGLOBIN g/dL 11.0* 12.8   HEMATOCRIT % 34.4* 40.7   PLATELETS Thousands/uL 178 210   NEUTROS PCT %  --  72   LYMPHS PCT %  --  15   MONOS PCT %  --  11   EOS PCT %  --  1     Results from last 7 days   Lab Units 06/30/23  0410 06/29/23  1221   SODIUM mmol/L 141 140   POTASSIUM mmol/L 4.5 4.0   CHLORIDE mmol/L 108 102   CO2 mmol/L 29 31   BUN mg/dL 8 10   CREATININE mg/dL 0.57* 0.66   CALCIUM mg/dL 8.0* 9.0   ALK PHOS U/L  --  58   ALT U/L  --  32   AST U/L  --  30         Results from last 7 days   Lab Units 06/30/23  1236 06/30/23  1001 06/30/23  0815 06/30/23  2804 06/30/23  0500 06/30/23  0255 06/30/23  0204 06/29/23  2308 06/29/23  2035 06/29/23  1918 06/29/23  1836 06/29/23  1814   POC GLUCOSE mg/dl 357* 227* 79 84 91 140 163* 238* 133 141* 31* 30*           * I Have Reviewed All Lab Data Listed Above. * Additional Pertinent Lab Tests Reviewed: [address] Admission  Reviewed    Imaging:  Imaging Reports Reviewed Today Include: n/a     Recent Cultures (last 7 days):           Last 24 Hours Medication List:   Current Facility-Administered Medications   Medication Dose Route Frequency Provider Last Rate   • acetaminophen  650 mg Oral Q4H PRN Mazin Hartmann PA-C     • atorvastatin  20 mg Oral Daily With [employer] PEEWEE Santizo     • enoxaparin  40 mg Subcutaneous Daily Mazin Hartmann PA-C     • gabapentin  300 mg Oral TID Mazin Hartmann PA-C     • insulin lispro  1-5 Units Subcutaneous TID AC ADRIAN Wilson     • insulin lispro  1-5 Units Subcutaneous HS ADRIAN Wilson     • levothyroxine  75 mcg Oral QAM Mazin Hartmann PA-C     • lisinopril  2.5 mg Oral Daily Mazin Hartmann PA-C     • meclizine  25 mg Oral Q8H PRN Mazin Hartmann PA-C     • ondansetron  4 mg Intravenous Q6H PRN Mazin Hartmann PA-C     • pantoprazole  20 mg Oral BID AC Avelina Guevara PA-C          Today, Patient Was Seen By: ADRIAN Wilson    ** Please Note: Dictation voice to text software may have been used in the creation of this document. **  Attestation signed by Yoandy Godwin MD at 6/30/2023  4:11 PM:  Supervising Attending Physician Comment:   Patient was seen and managed independently by the advanced practitioner. Physician Berta Garcia is for administrative purposes only. I was available by phone/text, however, for assistance if necessary.

## 2023-06-29 NOTE — H&P
[id number] [facility]  H&P  Name: Cecilia Loyola 80 y.o. female I MRN: [id number]  Unit/Bed#: ED 23 I Date of Admission: 6/29/2023   Date of Service: 6/29/2023 I Hospital Day: 0      Assessment/Plan   * Acute metabolic encephalopathy due to hypoglycemia  Assessment & Plan  · Patient found by home health aide altered with undetectable blood sugar. · Monitor mentation - currently improved    Type 2 diabetes mellitus with hypoglycemia, with long-term current use of insulin [facility])  Assessment & Plan  Lab Results   Component Value Date    HGBA1C 8.9 (H) 05/26/2023       Recent Labs     06/29/23  1333 06/29/23  1443 06/29/23  1814 06/29/23  1836   POCGLU 245* 227* 30* 31*       Blood Sugar Average: Last 72 hrs:  (P) 157.7735704185471433   · Found to have undetectable blood sugar at home with metabolic encephalopathy  · Reports last insulin was this morning, uses long acting 10 units in AM and 15 units at bedtime and also uses sliding scale. BS improved and then dropped again in the ED  · Patient on D5 w/ NS  · Monitor blood sugar q 2h  · Endo consult      Gastroesophageal reflux disease without esophagitis  Assessment & Plan  · Continue PPI    Postoperative hypothyroidism  Assessment & Plan  · Continue levothyroxine    Primary hypertension  Assessment & Plan  · Continue lisinopril with hold parameters    HLD (hyperlipidemia)  Assessment & Plan  · Continue statin        VTE Pharmacologic Prophylaxis: VTE Score: 5 High Risk (Score >/= 5) - Pharmacological DVT Prophylaxis Ordered: enoxaparin (Lovenox). Sequential Compression Devices Ordered. Code Status: DNR/DNI  Discussion with patient    Anticipated Length of Stay: Patient will be admitted on an observation basis with an anticipated length of stay of less than 2 midnights secondary to blood sugar monitoring, specialist input.     Total Time Spent on Date of Encounter in care of patient: 75 minutes This time was spent on one or more of the following: performing physical exam; counseling and coordination of care; obtaining or reviewing history; documenting in the medical record; reviewing/ordering tests, medications or procedures; communicating with other healthcare professionals and discussing with patient's family/caregivers. Chief Complaint: altered mental status    History of Present Illness:  Mona Lindo is a 80 y.o. female with a PMH of rheumatoid arthritis, hypothyroidism, Hodgkin's disease, diabetes mellitus type 2 on insulin with neuropathy and hypertension, who presents with altered mental status and undetectable blood sugar. Patient found by her caretaker with altered mental status and undetectable blood sugar. Patient's son provides her insulin unclear if she took any additional medication. In the ED her blood sugar was noted to be 44 she was given D50 and blood sugar improved and then later dropped again into the 30s. She did not have any oral intake while in the emergency room. She was placed on D5 and currently blood sugars are in the 140s. Patient notes her blood sugar went low last night. She takes long acting insulin 10 units in am and 15 units in PM and sliding only. Feels much better now. Review of Systems:  Review of Systems   Constitutional: Negative for chills and fever. HENT: Negative for rhinorrhea, sore throat and trouble swallowing. Eyes: Negative for discharge and visual disturbance. Respiratory: Negative for cough and shortness of breath. Cardiovascular: Negative for chest pain and leg swelling. Gastrointestinal: Negative for abdominal pain, diarrhea, nausea and vomiting. Genitourinary: Negative for dysuria and hematuria. Musculoskeletal: Negative for back pain and neck pain. Skin: Negative for rash and wound. Neurological: Negative for dizziness and headaches. Psychiatric/Behavioral: Positive for confusion. Negative for agitation.        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   • HL (hearing loss)    • Hodgkin's disease ([address]) 2008    Left neck, had radiation   • Hypertension    • Neuropathy    • Osteoporosis    • RA (rheumatoid arthritis) ([address])    • Traumatic rhabdomyolysis ([address]) 10/17/2022       Past Surgical History:   Procedure Laterality Date   • ADENOIDECTOMY     • APPENDECTOMY     • CATARACT EXTRACTION     • CATARACT EXTRACTION, BILATERAL     • CHOLECYSTECTOMY     • FRACTURE SURGERY Right     hip   • MOUTH SURGERY      oral cancer left lower lip   • OVARY SURGERY     • RI ADJT TIS TRNS/REARGMT F/C/C/M/N/A/G/H/F 10SQCM/< N/A 3/28/2022    Procedure: Adjacent tissue transfer face;  Surgeon: Kezia Kelley MD;  Location: [facility];  Service: ENT   • RI OPTX FEM SHFT FX W/INSJ IMED IMPLT W/WO SCREW Right 5/28/2020    Procedure: INSERTION NAIL IM FEMUR ANTEGRADE (TROCHANTERIC); Surgeon: Jayla Romero DO;  Location: [address] [place] [address] MAIN OR;  Service: Orthopedics   • RI TRANSMASTOID ANTROTOMY Left 3/28/2022    Procedure: MASTOIDECTOMY;  Surgeon: Kezia Kelley MD;  Location: [facility];  Service: ENT   • TONSILLECTOMY     • TOTAL THYROIDECTOMY  2008    Performed after left neck dissection and left oral cancer diagnosis       Meds/Allergies:  Prior to Admission medications    Medication Sig Start Date End Date Taking?  Authorizing Provider   insulin regular (HumuLIN R,NovoLIN R) 100 units/mL injection Inject 1-5 Units under the skin 2 (two) times a day with meals Sliding scale   Yes Alvenia Lights MD Tatiana   Ascorbic Acid (vitamin C) 100 MG tablet Take 500 mg by mouth 2 (two) times a day    Historical Provider, MD   atorvastatin (LIPITOR) 20 mg tablet Take 20 mg by mouth daily with dinner     Historical Provider, MD   BD Insulin Syringe U/F 1/2Unit 31G X 5/16" 0.3 ML MISC 4 (four) times a day 11/29/21   Historical Provider, MD   calcium carbonate (OS-FELICE) 600 MG tablet Take 600 mg by mouth 2 (two) times a day with meals    Historical Provider, MD   cyanocobalamin (VITAMIN B-12) 1000 MCG tablet Take 1,000 mcg by mouth daily    Historical Provider, MD   gabapentin (NEURONTIN) 300 mg capsule Take 300 mg by mouth 3 (three) times a day    Historical Provider, MD   Insulin Glargine Solostar (Basaglar KwikPen) 100 UNIT/ML SOPN Inject 10 Units under the skin daily with breakfast  Patient not taking: Reported on 6/29/2023    Historical Provider, MD   Insulin Glargine Solostar (Basaglar KwikPen) 100 UNIT/ML SOPN Inject 15 Units under the skin daily at bedtime  Patient not taking: Reported on 6/29/2023    Historical Provider, MD   insulin lispro (HumaLOG) 100 units/mL injection Inject 1-5 Units under the skin 3 (three) times a day before meals 10/25/22 11/24/22  Nocona Sakshi Gamboa DO   insulin lispro (HumaLOG) 100 units/mL injection Inject 3 Units under the skin 3 (three) times a day with meals  Patient not taking: Reported on 6/29/2023 6/29/23 7/29/23  Jose Alfredo Ko MD   ipratropium (ATROVENT) 0.03 % nasal spray 2 sprays into each nostril every 12 (twelve) hours  Patient taking differently: 2 sprays into each nostril every 12 (twelve) hours As needed 8/22/21   Alek Aguirre MD   levothyroxine 100 mcg tablet Take 75 mcg by mouth every morning    Historical Provider, MD   lisinopril (ZESTRIL) 2.5 mg tablet Take 2.5 mg by mouth daily    Historical Provider, MD   meclizine (ANTIVERT) 25 mg tablet Take 1 tablet (25 mg total) by mouth every 8 (eight) hours as needed for dizziness 10/25/22   Sofya Smith, DO methocarbamol (ROBAXIN) 500 mg tablet Take 500 mg by mouth in the morning. Prn . Historical Provider, MD   Multiple Vitamin (multivitamin) capsule Take 1 capsule by mouth daily    Historical Provider, MD   mupirocin (BACTROBAN) 2 % ointment Apply topically daily To affected area 10/5/22   Lisa Dodge MD   OneTouch Ultra test strip USE TO TEST BLOOD SUGAR 6 TIMES A DAY 9/27/21   Historical Provider, MD   pantoprazole (PROTONIX) 40 mg tablet Take 1 tablet (40 mg total) by mouth 2 (two) times a day 7/15/22   Jarvis Londono PA-C   VITAMIN D PO Take 125 mg by mouth in the morning    Historical Provider, MD   insulin lispro (HumaLOG) 100 units/mL injection Inject 5 Units under the skin 3 (three) times a day with meals 10/25/22 6/29/23  [address], DO     I have reviewed home medications with patient personally. Allergies: No Known Allergies    Social History:  Marital Status:    Occupation: retired  Patient Pre-hospital Living Situation: Home  Patient Pre-hospital Level of Mobility: walks with cane and walker  Patient Pre-hospital Diet Restrictions: diabetic  Substance Use History:   Social History     Substance and Sexual Activity   Alcohol Use Not Currently   • Alcohol/week: 0.0 standard drinks of alcohol     Social History     Tobacco Use   Smoking Status Never   Smokeless Tobacco Never     Social History     Substance and Sexual Activity   Drug Use Never       Family History:  Family History   Problem Relation Age of Onset   • Cancer Mother    • Diabetes Mother    • Diabetes Father    • Hypertension Father        Physical Exam:     Vitals:   Blood Pressure: 117/72 (06/29/23 1500)  Pulse: 79 (06/29/23 1500)  Temperature: (!) 97 °F (36.1 °C) (06/29/23 1201)  Temp Source: Temporal (06/29/23 1201)  Respirations: 18 (06/29/23 1500)  Height: 5' 1" (154.9 cm) (06/29/23 1201)  Weight - Scale: 54.9 kg (121 lb) (06/29/23 1201)  SpO2: 97 % (06/29/23 1500)    Physical Exam  Vitals reviewed.    Constitutional: Appearance: Normal appearance. Comments: Frail elderly  female   HENT:      Head: Normocephalic and atraumatic. Nose: Nose normal.      Mouth/Throat:      Comments: Postsurgical changes left side of mouth and chin  Eyes:      General:         Right eye: No discharge. Left eye: No discharge. Extraocular Movements: Extraocular movements intact. Conjunctiva/sclera: Conjunctivae normal.   Cardiovascular:      Rate and Rhythm: Normal rate and regular rhythm. Pulmonary:      Effort: Pulmonary effort is normal. No respiratory distress. Breath sounds: Normal breath sounds. No wheezing. Abdominal:      General: Bowel sounds are normal. There is no distension. Palpations: Abdomen is soft. Tenderness: There is no abdominal tenderness. There is no guarding. Musculoskeletal:         General: No swelling or tenderness. Normal range of motion. Cervical back: Normal range of motion. Right lower leg: No edema. Left lower leg: No edema. Skin:     General: Skin is warm and dry. Capillary Refill: Capillary refill takes less than 2 seconds. Coloration: Skin is pale. Neurological:      General: No focal deficit present. Mental Status: She is alert. Mental status is at baseline.    Psychiatric:         Mood and Affect: Mood normal.         Behavior: Behavior normal.        Additional Data:     Lab Results:  Results from last 7 days   Lab Units 06/29/23  1221   WBC Thousand/uL 7.14   HEMOGLOBIN g/dL 12.8   HEMATOCRIT % 40.7   PLATELETS Thousands/uL 210   NEUTROS PCT % 72   LYMPHS PCT % 15   MONOS PCT % 11   EOS PCT % 1     Results from last 7 days   Lab Units 06/29/23  1221   SODIUM mmol/L 140   POTASSIUM mmol/L 4.0   CHLORIDE mmol/L 102   CO2 mmol/L 31   BUN mg/dL 10   CREATININE mg/dL 0.66   ANION GAP mmol/L 7   CALCIUM mg/dL 9.0   ALBUMIN g/dL 4.2   TOTAL BILIRUBIN mg/dL 0.47   ALK PHOS U/L 58   ALT U/L 32   AST U/L 30   GLUCOSE RANDOM mg/dL 95         Results from last 7 days   Lab Units 06/29/23  2035 06/29/23  1918 06/29/23  1836 06/29/23  1814 06/29/23  1443 06/29/23  1333 06/29/23  1245 06/29/23  1200   POC GLUCOSE mg/dl 133 141* 31* 30* 227* 245* 97 44*       Lines/Drains:  Invasive Devices     Peripheral Intravenous Line  Duration           Peripheral IV 06/29/23 Right Wrist <1 day    Peripheral IV 06/29/23 Right;Upper Arm <1 day              EKG and Other Studies Reviewed on Admission:   · EKG: NSR. HR 79, reviewed and muse personally. ** Please Note: This note has been constructed using a voice recognition system.  **

## 2023-06-29 NOTE — ASSESSMENT & PLAN NOTE
Lab Results   Component Value Date    HGBA1C 8.9 (H) 05/26/2023       Recent Labs     06/29/23  1333 06/29/23  1443 06/29/23  1814 06/29/23  1836   POCGLU 245* 227* 30* 31*       Blood Sugar Average: Last 72 hrs:  (P) 939.0123188416534434   · Found to have undetectable blood sugar at home with metabolic encephalopathy  · Reports last insulin was this morning, uses long acting 10 units in AM and 15 units at bedtime and also uses sliding scale.  BS improved and then dropped again in the ED  · Patient on D5 w/ NS  · Monitor blood sugar q 2h  · Endo consult

## 2023-06-29 NOTE — ED PROVIDER NOTES
History  Chief Complaint   Patient presents with   • Hypoglycemia - Symptomatic     Patient arrives via ems from home where patient lives alone. Patients home health care nurse noticed patient was lethargic and so he checked patients blood sugar and it was 32 patient was given orange juice and ems was called. Ems gave patient two doses of oral glucose and sugar came up to 64. Patient alert and orientated. Patient is an 77-year-old female with history of insulin-dependent diabetes mellitus who presents for evaluation of hypoglycemia and confusion. Patient says that she had low blood sugars last evening. Her son helped her and gave her orange juice and she felt better. She awoke this morning and took her typical 10 units long-acting insulin and then took sliding scale insulin after breakfast.  She was found apparently by home health aide confused with undetectable blood sugar. Oral glucose given otherwise and the patient is feeling better. Initial blood sugar here in the 40s. Ultrasound-guided IV placed by myself with D50 given and complete resolution of symptoms. After the dextrose, patient denies headache nausea vomiting chest pain dyspnea abdominal pain and is a GCS 15. She denies taking extra insulin or having an extra dose. She has had issues with both hypo and hyperglycemia in the past.          Prior to Admission Medications   Prescriptions Last Dose Informant Patient Reported? Taking?    Ascorbic Acid (vitamin C) 100 MG tablet   Yes No   Sig: Take 500 mg by mouth 2 (two) times a day   BD Insulin Syringe U/F /2Unit 31G X " 0.3 ML MISC   Yes No   Si (four) times a day   Insulin Glargine Solostar (Basaglar KwikPen) 100 UNIT/ML SOPN Not Taking  Yes No   Sig: Inject 10 Units under the skin daily with breakfast   Patient not taking: Reported on 2023   Insulin Glargine Solostar (Basaglar KwikPen) 100 UNIT/ML SOPN Not Taking  Yes No   Sig: Inject 15 Units under the skin daily at bedtime Patient not taking: Reported on 2023   Multiple Vitamin (multivitamin) capsule   Yes No   Sig: Take 1 capsule by mouth daily   OneTouch Ultra test strip   Yes No   Sig: USE TO TEST BLOOD SUGAR 6 TIMES A DAY   VITAMIN D PO   Yes No   Sig: Take 125 mg by mouth in the morning   atorvastatin (LIPITOR) 20 mg tablet   Yes No   Sig: Take 20 mg by mouth daily with dinner    calcium carbonate (OS-FELICE) 600 MG tablet   Yes No   Sig: Take 600 mg by mouth 2 (two) times a day with meals   cyanocobalamin (VITAMIN B-12) 1000 MCG tablet   Yes No   Sig: Take 1,000 mcg by mouth daily   gabapentin (NEURONTIN) 300 mg capsule   Yes No   Sig: Take 300 mg by mouth 3 (three) times a day   insulin lispro (HumaLOG) 100 units/mL injection   No No   Sig: Inject 5 Units under the skin 3 (three) times a day with meals   insulin lispro (HumaLOG) 100 units/mL injection   No No   Sig: Inject 1-5 Units under the skin 3 (three) times a day before meals   insulin lispro (HumaLOG) 100 units/mL injection Not Taking  No No   Sig: Inject 3 Units under the skin 3 (three) times a day with meals   Patient not taking: Reported on 2023   insulin regular (HumuLIN R,NovoLIN R) 100 units/mL injection  Self Yes Yes   Sig: Inject 1-5 Units under the skin 2 (two) times a day with meals Sliding scale   ipratropium (ATROVENT) 0.03 % nasal spray   No No   Si sprays into each nostril every 12 (twelve) hours   Patient taking differently: 2 sprays into each nostril every 12 (twelve) hours As needed   levothyroxine 100 mcg tablet  Self Yes No   Sig: Take 75 mcg by mouth every morning   lisinopril (ZESTRIL) 2.5 mg tablet   Yes No   Sig: Take 2.5 mg by mouth daily   meclizine (ANTIVERT) 25 mg tablet   No No   Sig: Take 1 tablet (25 mg total) by mouth every 8 (eight) hours as needed for dizziness   methocarbamol (ROBAXIN) 500 mg tablet   Yes No   Sig: Take 500 mg by mouth in the morning.  Prn .   mupirocin (BACTROBAN) 2 % ointment   No No   Sig: Apply topically daily To affected area   pantoprazole (PROTONIX) 40 mg tablet   No No   Sig: Take 1 tablet (40 mg total) by mouth 2 (two) times a day      Facility-Administered Medications: None       Past Medical History:   Diagnosis Date   • Ambulates with cane    • Cancer ([address])    • Chronic pain disorder     knees/ shoulders (gets inj every 3 mos)   • Closed intertrochanteric fracture of right femur ([address]) 5/26/2020   • Controlled type 2 diabetes mellitus with diabetic polyneuropathy, with long-term current use of insulin ([address]) 5/21/2008   • Diabetes mellitus ([address])    • Diabetic polyneuropathy ([address]) 5/21/2008    ICD10 clean up   • Disease of thyroid gland    • 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   • HL (hearing loss)    • Hodgkin's disease ([address]) 2008    Left neck, had radiation   • Hypertension    • Neuropathy    • Osteoporosis    • RA (rheumatoid arthritis) ([address])    • Traumatic rhabdomyolysis ([address]) 10/17/2022       Past Surgical History:   Procedure Laterality Date   • ADENOIDECTOMY     • APPENDECTOMY     • CATARACT EXTRACTION     • CATARACT EXTRACTION, BILATERAL     • CHOLECYSTECTOMY     • FRACTURE SURGERY Right     hip   • MOUTH SURGERY      oral cancer left lower lip   • OVARY SURGERY     • MT ADJT TIS TRNS/REARGMT F/C/C/M/N/A/G/H/F 10SQCM/< N/A 3/28/2022    Procedure: Adjacent tissue transfer face;  Surgeon: Kezia Kelley MD;  Location: [facility];  Service: ENT   • MT OPTX FEM SHFT FX W/INSJ IMED IMPLT W/WO SCREW Right 5/28/2020    Procedure: INSERTION NAIL IM FEMUR ANTEGRADE (TROCHANTERIC);   Surgeon: Jayla Romero DO;  Location: [address] [place] [address] MAIN OR;  Service: Orthopedics   • MT TRANSMASTOID ANTROTOMY Left 3/28/2022    Procedure: MASTOIDECTOMY;  Surgeon: Kezia Kelley MD;  Location: [facility];  Service: ENT   • TONSILLECTOMY     • TOTAL THYROIDECTOMY  2008    Performed after left neck dissection and left oral cancer diagnosis       Family History   Problem Relation Age of Onset   • Cancer Mother    • Diabetes Mother    • Diabetes Father    • Hypertension Father      I have reviewed and agree with the history as documented. E-Cigarette/Vaping   • E-Cigarette Use Never User      E-Cigarette/Vaping Substances   • Nicotine No    • THC No    • CBD No    • Flavoring No    • Other No    • Unknown No      Social History     Tobacco Use   • Smoking status: Never   • Smokeless tobacco: Never   Vaping Use   • Vaping Use: Never used   Substance Use Topics   • Alcohol use: Not Currently     Alcohol/week: 0.0 standard drinks of alcohol   • Drug use: Never       Review of Systems   Constitutional: Negative for fever. HENT: Negative for sore throat. Respiratory: Negative for shortness of breath. Cardiovascular: Negative for chest pain. Gastrointestinal: Negative for abdominal pain. Genitourinary: Negative for dysuria. Musculoskeletal: Negative for back pain. Skin: Negative for rash. Neurological: Negative for light-headedness. Psychiatric/Behavioral: Negative for agitation. All other systems reviewed and are negative. Physical Exam  Physical Exam  Vitals reviewed. Constitutional:       General: She is not in acute distress. Appearance: She is well-developed. HENT:      Head: Normocephalic. Eyes:      Pupils: Pupils are equal, round, and reactive to light. Cardiovascular:      Rate and Rhythm: Normal rate and regular rhythm. Heart sounds: Normal heart sounds. Pulmonary:      Effort: Pulmonary effort is normal.      Breath sounds: Normal breath sounds. Abdominal:      General: Bowel sounds are normal. There is no distension. Palpations: Abdomen is soft. Tenderness: There is no abdominal tenderness. There is no guarding. Musculoskeletal:         General: No tenderness or deformity. Normal range of motion. Cervical back: Normal range of motion and neck supple. Skin:     General: Skin is warm and dry. Capillary Refill: Capillary refill takes less than 2 seconds. Neurological:      Mental Status: She is alert and oriented to person, place, and time. Cranial Nerves: No cranial nerve deficit. Sensory: No sensory deficit. Comments: Alert oriented x3. GCS 15. Cranial nerves 2-12 intact. Strength 5/5 in all 4 extremities. Sensation intact throughout. Psychiatric:         Behavior: Behavior normal.         Thought Content:  Thought content normal.         Judgment: Judgment normal.         Vital Signs  ED Triage Vitals   Temperature Pulse Respirations Blood Pressure SpO2   06/29/23 1201 06/29/23 1201 06/29/23 1201 06/29/23 1207 06/29/23 1207   (!) 97 °F (36.1 °C) 77 18 151/70 97 %      Temp Source Heart Rate Source Patient Position - Orthostatic VS BP Location FiO2 (%)   06/29/23 1201 06/29/23 1201 06/29/23 1201 06/29/23 1201 --   Temporal Monitor Sitting Left arm       Pain Score       --                  Vitals:    06/29/23 1201 06/29/23 1207 06/29/23 1500   BP:  151/70 117/72   Pulse: 77  79   Patient Position - Orthostatic VS: Sitting           Visual Acuity      ED Medications  Medications   dextrose 5 % and sodium chloride 0.9 % infusion (has no administration in time range)   acetaminophen (TYLENOL) tablet 650 mg (has no administration in time range)   ondansetron (ZOFRAN) injection 4 mg (has no administration in time range)   enoxaparin (LOVENOX) subcutaneous injection 40 mg (has no administration in time range)   dextrose 50 % IV solution 50 mL (50 mL Intravenous Given 6/29/23 1246)   sodium chloride 0.9 % bolus 500 mL (0 mL Intravenous Stopped 6/29/23 1446)   dextrose 50 % IV solution 50 mL (50 mL Intravenous Given 6/29/23 1837)       Diagnostic Studies  Results Reviewed     Procedure Component Value Units Date/Time    Fingerstick Glucose (POCT) [[id number]]  (Abnormal) Collected: 06/29/23 1918    Lab Status: Final result Updated: 06/29/23 1919     POC Glucose 141 mg/dl     Fingerstick Glucose (POCT) [[id number]]  (Abnormal) Collected: 06/29/23 1836 Lab Status: Final result Updated: 06/29/23 1837     POC Glucose 31 mg/dl     Fingerstick Glucose (POCT) [[id number]]  (Abnormal) Collected: 06/29/23 1814    Lab Status: Final result Updated: 06/29/23 1836     POC Glucose 30 mg/dl     UA w Reflex to Microscopic w Reflex to Culture [[id number]]  (Abnormal) Collected: 06/29/23 1517    Lab Status: Final result Specimen: Urine, Clean Catch Updated: 06/29/23 1525     Color, UA Straw     Clarity, UA Clear     Specific Gravity, UA 1.010     pH, UA 7.5     Leukocytes, UA Negative     Nitrite, UA Negative     Protein, UA Negative mg/dl      Glucose, UA 1+ mg/dl      Ketones, UA Negative mg/dl      Urobilinogen, UA 0.2 E.U./dl      Bilirubin, UA Negative     Occult Blood, UA Negative    Fingerstick Glucose (POCT) [[id number]]  (Abnormal) Collected: 06/29/23 1443    Lab Status: Final result Updated: 06/29/23 1444     POC Glucose 227 mg/dl     Fingerstick Glucose (POCT) [[id number]]  (Abnormal) Collected: 06/29/23 1333    Lab Status: Final result Updated: 06/29/23 1334     POC Glucose 245 mg/dl     Comprehensive metabolic panel [[id number]] Collected: 06/29/23 1221    Lab Status: Final result Specimen: Blood from Arm, Right Updated: 06/29/23 1248     Sodium 140 mmol/L      Potassium 4.0 mmol/L      Chloride 102 mmol/L      CO2 31 mmol/L      ANION GAP 7 mmol/L      BUN 10 mg/dL      Creatinine 0.66 mg/dL      Glucose 95 mg/dL      Calcium 9.0 mg/dL      AST 30 U/L      ALT 32 U/L      Alkaline Phosphatase 58 U/L      Total Protein 6.8 g/dL      Albumin 4.2 g/dL      Total Bilirubin 0.47 mg/dL      eGFR 82 ml/min/1.73sq m     Narrative:      [place] guidelines for Chronic Kidney Disease (CKD):   •  Stage 1 with normal or high GFR (GFR > 90 mL/min/1.73 square meters)  •  Stage 2 Mild CKD (GFR = 60-89 mL/min/1.73 square meters)  •  Stage 3A Moderate CKD (GFR = 45-59 mL/min/1.73 square meters)  •  Stage 3B Moderate CKD (GFR = 30-44 mL/min/1.73 square meters)  •  Stage 4 Severe CKD (GFR = 15-29 mL/min/1.73 square meters)  •  Stage 5 End Stage CKD (GFR <15 mL/min/1.73 square meters)  Note: GFR calculation is accurate only with a steady state creatinine    Fingerstick Glucose (POCT) [[id number]]  (Normal) Collected: 06/29/23 1245    Lab Status: Final result Updated: 06/29/23 1246     POC Glucose 97 mg/dl     CBC and differential [[id number]]  (Abnormal) Collected: 06/29/23 1221    Lab Status: Final result Specimen: Blood from Arm, Right Updated: 06/29/23 1231     WBC 7.14 Thousand/uL      RBC 3.90 Million/uL      Hemoglobin 12.8 g/dL      Hematocrit 40.7 %       fL      MCH 32.8 pg      MCHC 31.4 g/dL      RDW 13.1 %      MPV 10.2 fL      Platelets 943 Thousands/uL      nRBC 0 /100 WBCs      Neutrophils Relative 72 %      Immat GRANS % 0 %      Lymphocytes Relative 15 %      Monocytes Relative 11 %      Eosinophils Relative 1 %      Basophils Relative 1 %      Neutrophils Absolute 5.20 Thousands/µL      Immature Grans Absolute 0.03 Thousand/uL      Lymphocytes Absolute 1.05 Thousands/µL      Monocytes Absolute 0.75 Thousand/µL      Eosinophils Absolute 0.07 Thousand/µL      Basophils Absolute 0.04 Thousands/µL     Fingerstick Glucose (POCT) [[id number]]  (Abnormal) Collected: 06/29/23 1200    Lab Status: Final result Updated: 06/29/23 1203     POC Glucose 44 mg/dl                  No orders to display              Procedures  Procedures         ED Course                               SBIRT 22yo+    Flowsheet Row Most Recent Value   Initial Alcohol Screen: US AUDIT-C     1. How often do you have a drink containing alcohol? 0 Filed at: 06/29/2023 1200   2. How many drinks containing alcohol do you have on a typical day you are drinking? 0 Filed at: 06/29/2023 1200   3a. Male UNDER 65: How often do you have five or more drinks on one occasion? 0 Filed at: 06/29/2023 1200   3b. FEMALE Any Age, or MALE 65+:  How often do you have 4 or more drinks on one occassion? 0 Filed at: 06/29/2023 1200   Audit-C Score 0 Filed at: 06/29/2023 1200   DOMINGA: How many times in the past year have you. .. Used an illegal drug or used a prescription medication for non-medical reasons? Never Filed at: 06/29/2023 1200                    Medical Decision Making  Patient is an 80-year-old female who presents for evaluation of confusion and hypoglycemia. Observed here for several hours and then became recurrently hypoglycemic. Unclear etiology at this time as the patient has not had insulin for over 12 hours. Patient given second bolus of D50 and started on D5 normal saline. Will admit to the hospital for further insulin management hypoglycemic monitoring. Amount and/or Complexity of Data Reviewed  Labs: ordered. Risk  Prescription drug management. Decision regarding hospitalization.           Disposition  Final diagnoses:   Hypoglycemia   Hypoglycemia due to insulin     Time reflects when diagnosis was documented in both MDM as applicable and the Disposition within this note     Time User Action Codes Description Comment    6/29/2023  4:33 PM Chapin Ede Add [E16.2] Hypoglycemia     6/29/2023  4:34 PM Anton Simpson [E11.42,  Z79.4] Type 2 diabetes mellitus with diabetic polyneuropathy, with long-term current use of insulin ([address])     6/29/2023  6:59 PM Chapin Ede Add [E16.0,  T38.3X5A] Hypoglycemia due to insulin     6/29/2023  7:30 PM Alyssa Angeline D Add [E11.649,  Z79.4] Type 2 diabetes mellitus with hypoglycemia without coma, with long-term current use of insulin ([address])     6/29/2023  7:31 PM Alyssa Angeline D Modify [E16.2] Hypoglycemia     6/29/2023  7:31 PM Alyssa Angeline D Modify [A19.19,  Z79.4] Type 2 diabetes mellitus with diabetic polyneuropathy, with long-term current use of insulin ([address])     6/29/2023  7:31 PM Alyssa Angeline D Modify [E16.0,  T38.3X5A] Hypoglycemia due to insulin     6/29/2023  7:31 PM Alyssa Angeline D Modify [A06.801,  Z79.4] Type 2 diabetes mellitus with hypoglycemia without coma, with long-term current use of insulin [facility])       ED Disposition     ED Disposition   Admit    Condition   Stable    Date/Time   Thu Jun 29, 2023 9143    Comment   Case was discussed with CANDACE and the patient's admission status was agreed to be Admission Status: observation status to the service of Dr. Marcos Castaneda .            Follow-up Information     Follow up With Specialties Details Why Contact Info Additional [address] [facility] Emergency Department Emergency Medicine  If symptoms worsen [address] [place] [id number]  [address] [place] [address] Emergency Department, [address] [address], [address]    Fredricka Mcburney, MD Endocrinology Schedule an appointment as soon as possible for a visit   [address] [place] [address]  [phone]             Patient's Medications   Discharge Prescriptions    No medications on file           PDMP Review       Value Time User    PDMP Reviewed  Yes 6/17/2020  2:17 Cammie Richardson MD          ED Provider  Electronically Signed by           Paul Dumont MD  06/29/23 1956

## 2023-06-29 NOTE — ASSESSMENT & PLAN NOTE
· Patient found by home health aide altered with undetectable blood sugar.    · Monitor mentation - currently improved

## 2023-06-29 NOTE — ED NOTES
Patientswas here to take patient home. Patient stood up to walk to wheelchair and felt dizzy. RN rechecked patients blood sugar and it had dropped significantly again. Patient was given orange juice and meal provided. Patients daughter at  bedside.       Lynette Munoz RN  06/29/23 1947

## 2023-06-30 ENCOUNTER — TELEPHONE (OUTPATIENT)
Dept: ENDOCRINOLOGY | Facility: CLINIC | Age: 83
End: 2023-06-30

## 2023-06-30 LAB
ANION GAP SERPL CALCULATED.3IONS-SCNC: 4 MMOL/L
ATRIAL RATE: 79 BPM
BUN SERPL-MCNC: 8 MG/DL (ref 5–25)
CALCIUM SERPL-MCNC: 8 MG/DL (ref 8.4–10.2)
CHLORIDE SERPL-SCNC: 108 MMOL/L (ref 96–108)
CO2 SERPL-SCNC: 29 MMOL/L (ref 21–32)
CREAT SERPL-MCNC: 0.57 MG/DL (ref 0.6–1.3)
ERYTHROCYTE [DISTWIDTH] IN BLOOD BY AUTOMATED COUNT: 13 % (ref 11.6–15.1)
GFR SERPL CREATININE-BSD FRML MDRD: 86 ML/MIN/1.73SQ M
GLUCOSE P FAST SERPL-MCNC: 106 MG/DL (ref 65–99)
GLUCOSE SERPL-MCNC: 106 MG/DL (ref 65–140)
GLUCOSE SERPL-MCNC: 140 MG/DL (ref 65–140)
GLUCOSE SERPL-MCNC: 163 MG/DL (ref 65–140)
GLUCOSE SERPL-MCNC: 227 MG/DL (ref 65–140)
GLUCOSE SERPL-MCNC: 344 MG/DL (ref 65–140)
GLUCOSE SERPL-MCNC: 357 MG/DL (ref 65–140)
GLUCOSE SERPL-MCNC: 79 MG/DL (ref 65–140)
GLUCOSE SERPL-MCNC: 84 MG/DL (ref 65–140)
GLUCOSE SERPL-MCNC: 89 MG/DL (ref 65–140)
GLUCOSE SERPL-MCNC: 91 MG/DL (ref 65–140)
HCT VFR BLD AUTO: 34.4 % (ref 34.8–46.1)
HGB BLD-MCNC: 11 G/DL (ref 11.5–15.4)
MCH RBC QN AUTO: 33.2 PG (ref 26.8–34.3)
MCHC RBC AUTO-ENTMCNC: 32 G/DL (ref 31.4–37.4)
MCV RBC AUTO: 104 FL (ref 82–98)
P AXIS: 75 DEGREES
PLATELET # BLD AUTO: 178 THOUSANDS/UL (ref 149–390)
PMV BLD AUTO: 10.2 FL (ref 8.9–12.7)
POTASSIUM SERPL-SCNC: 4.5 MMOL/L (ref 3.5–5.3)
PR INTERVAL: 142 MS
QRS AXIS: 46 DEGREES
QRSD INTERVAL: 72 MS
QT INTERVAL: 394 MS
QTC INTERVAL: 451 MS
RBC # BLD AUTO: 3.31 MILLION/UL (ref 3.81–5.12)
SODIUM SERPL-SCNC: 141 MMOL/L (ref 135–147)
T WAVE AXIS: 34 DEGREES
VENTRICULAR RATE: 79 BPM
WBC # BLD AUTO: 7.07 THOUSAND/UL (ref 4.31–10.16)

## 2023-06-30 PROCEDURE — 99232 SBSQ HOSP IP/OBS MODERATE 35: CPT | Performed by: NURSE PRACTITIONER

## 2023-06-30 PROCEDURE — 93010 ELECTROCARDIOGRAM REPORT: CPT | Performed by: INTERNAL MEDICINE

## 2023-06-30 PROCEDURE — 36415 COLL VENOUS BLD VENIPUNCTURE: CPT | Performed by: PHYSICIAN ASSISTANT

## 2023-06-30 PROCEDURE — 99223 1ST HOSP IP/OBS HIGH 75: CPT | Performed by: STUDENT IN AN ORGANIZED HEALTH CARE EDUCATION/TRAINING PROGRAM

## 2023-06-30 PROCEDURE — 85027 COMPLETE CBC AUTOMATED: CPT | Performed by: PHYSICIAN ASSISTANT

## 2023-06-30 PROCEDURE — 97166 OT EVAL MOD COMPLEX 45 MIN: CPT

## 2023-06-30 PROCEDURE — 80048 BASIC METABOLIC PNL TOTAL CA: CPT | Performed by: PHYSICIAN ASSISTANT

## 2023-06-30 PROCEDURE — 97163 PT EVAL HIGH COMPLEX 45 MIN: CPT

## 2023-06-30 PROCEDURE — 82948 REAGENT STRIP/BLOOD GLUCOSE: CPT

## 2023-06-30 RX ORDER — INSULIN LISPRO 100 [IU]/ML
1-5 INJECTION, SOLUTION INTRAVENOUS; SUBCUTANEOUS
Status: DISCONTINUED | OUTPATIENT
Start: 2023-06-30 | End: 2023-06-30

## 2023-06-30 RX ORDER — INSULIN LISPRO 100 [IU]/ML
1-5 INJECTION, SOLUTION INTRAVENOUS; SUBCUTANEOUS
Status: DISCONTINUED | OUTPATIENT
Start: 2023-06-30 | End: 2023-07-01 | Stop reason: HOSPADM

## 2023-06-30 RX ORDER — INSULIN GLARGINE 100 [IU]/ML
8 INJECTION, SOLUTION SUBCUTANEOUS ONCE
Status: COMPLETED | OUTPATIENT
Start: 2023-06-30 | End: 2023-06-30

## 2023-06-30 RX ORDER — INSULIN GLARGINE 100 [IU]/ML
8 INJECTION, SOLUTION SUBCUTANEOUS
Status: DISCONTINUED | OUTPATIENT
Start: 2023-07-01 | End: 2023-07-01 | Stop reason: HOSPADM

## 2023-06-30 RX ORDER — INSULIN LISPRO 100 [IU]/ML
2 INJECTION, SOLUTION INTRAVENOUS; SUBCUTANEOUS
Status: DISCONTINUED | OUTPATIENT
Start: 2023-06-30 | End: 2023-07-01 | Stop reason: HOSPADM

## 2023-06-30 RX ADMIN — PANTOPRAZOLE SODIUM 20 MG: 20 TABLET, DELAYED RELEASE ORAL at 06:07

## 2023-06-30 RX ADMIN — LEVOTHYROXINE SODIUM 75 MCG: 75 TABLET ORAL at 06:07

## 2023-06-30 RX ADMIN — GABAPENTIN 300 MG: 300 CAPSULE ORAL at 00:23

## 2023-06-30 RX ADMIN — GABAPENTIN 300 MG: 300 CAPSULE ORAL at 08:19

## 2023-06-30 RX ADMIN — INSULIN LISPRO 2 UNITS: 100 INJECTION, SOLUTION INTRAVENOUS; SUBCUTANEOUS at 16:51

## 2023-06-30 RX ADMIN — INSULIN LISPRO 3 UNITS: 100 INJECTION, SOLUTION INTRAVENOUS; SUBCUTANEOUS at 16:51

## 2023-06-30 RX ADMIN — GABAPENTIN 300 MG: 300 CAPSULE ORAL at 16:52

## 2023-06-30 RX ADMIN — ENOXAPARIN SODIUM 40 MG: 40 INJECTION SUBCUTANEOUS at 08:19

## 2023-06-30 RX ADMIN — GABAPENTIN 300 MG: 300 CAPSULE ORAL at 20:59

## 2023-06-30 RX ADMIN — PANTOPRAZOLE SODIUM 20 MG: 20 TABLET, DELAYED RELEASE ORAL at 16:52

## 2023-06-30 RX ADMIN — ATORVASTATIN CALCIUM 20 MG: 20 TABLET, FILM COATED ORAL at 16:52

## 2023-06-30 RX ADMIN — INSULIN GLARGINE 8 UNITS: 100 INJECTION, SOLUTION SUBCUTANEOUS at 16:52

## 2023-06-30 NOTE — OCCUPATIONAL THERAPY NOTE
Occupational Therapy Evaluation     Patient Name: Merna Buchanan Date: 6/30/2023  Problem List  Principal Problem:    Acute metabolic encephalopathy due to hypoglycemia  Active Problems:    HLD (hyperlipidemia)    Primary hypertension    Postoperative hypothyroidism    Type 2 diabetes mellitus with hypoglycemia, with long-term current use of insulin (HCC)    Gastroesophageal reflux disease without esophagitis    Past Medical History  Past Medical History:   Diagnosis Date    Ambulates with cane     Cancer ([address])     Chronic pain disorder     knees/ shoulders (gets inj every 3 mos)    Closed intertrochanteric fracture of right femur ([address]) 5/26/2020    Controlled type 2 diabetes mellitus with diabetic polyneuropathy, with long-term current use of insulin ([address]) 5/21/2008    Diabetes mellitus ([address])     Diabetic polyneuropathy ([address]) 5/21/2008    ICD10 clean up    Disease of thyroid gland     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    HL (hearing loss)     Hodgkin's disease ([address]) 2008    Left neck, had radiation    Hypertension     Neuropathy     Osteoporosis     RA (rheumatoid arthritis) ([address])     Traumatic rhabdomyolysis ([address]) 10/17/2022     Past Surgical History  Past Surgical History:   Procedure Laterality Date    ADENOIDECTOMY      APPENDECTOMY      CATARACT EXTRACTION      CATARACT EXTRACTION, BILATERAL      CHOLECYSTECTOMY      FRACTURE SURGERY Right     hip    MOUTH SURGERY      oral cancer left lower lip    OVARY SURGERY      PA ADJT TIS TRNS/REARGMT F/C/C/M/N/A/G/H/F 10SQCM/< N/A 3/28/2022    Procedure: Adjacent tissue transfer face;  Surgeon: Casi Bright MD;  Location: [facility];  Service: ENT    PA OPTX FEM SHFT FX W/INSJ IMED IMPLT W/WO SCREW Right 5/28/2020    Procedure: INSERTION NAIL IM FEMUR ANTEGRADE (TROCHANTERIC);   Surgeon: Aneudy Tovar DO;  Location: [facility] MAIN OR;  Service: Orthopedics    PA TRANSMASTOID ANTROTOMY Left 3/28/2022    Procedure: MASTOIDECTOMY;  Surgeon: Imelda Cornelius Douglas De La Rosa MD;  [facility];  Service: ENT    TONSILLECTOMY      TOTAL THYROIDECTOMY  2008    Performed after left neck dissection and left oral cancer diagnosis        06/30/23 0747   OT Last Visit   OT Visit Date 06/30/23   Note Type   Note type Evaluation   Additional Comments Pt seen as a co-eval with PT Grayson Wells due to the patient's co-morbidities, clinically unstable presentation, and present impairments which are a regression from the patient's baseline. Pain Assessment   Pain Assessment Tool 0-10   Pain Score No Pain   Restrictions/Precautions   Weight Bearing Precautions Per Order No   Other Precautions Fall Risk;Pain   Home Living   Type of [address] [place] [address] Dr One level;Performs ADLs on one level;Stairs to enter with rails  (2 TEE)   Bathroom Shower/Tub Tub/shower unit   [employer] Raised   Bathroom Equipment Grab bars in shower;Grab bars around toilet; Toilet raiser   Bathroom Accessibility Accessible   Home Equipment Walker  (Pt reports having RW and rollator at baseline)   Prior Function   Level of [place] Independent with ADLs; Independent with functional mobility; Needs assistance with IADLS   Lives With Alone   Receives Help From Family   IADLs Family/Friend/Other provides transportation; Family/Friend/Other provides medication management; Independent with meal prep  (Pt reports being (I) with cooking and cleaning but daughter assists with medication management)   Falls in the last 6 months 0  (pt denies)   Vocational Retired   Comments Pt reports currently receiving home health therapy   Subjective   Subjective "I like to be sitting up when I eat"   ADL   Where Assessed Edge of bed   UB Bathing Assistance 5  Supervision/Setup   LB Bathing Assistance 5  Supervision/Setup   UB [address] Final 5  Supervision/Setup   LB [address] Final 5  Supervision/Setup   LB Dressing Deficit Don/doff R shoe;Don/doff L shoe  (w/ increased time and effort to complete)   Bed Mobility Supine to Sit 5  Supervision   Additional items HOB elevated; Increased time required   Sit to Supine   (DNT; pt seated at EOB upon conclusion of session)   Transfers   Sit to Stand 5  Supervision   Additional items Assist x 1; Increased time required;Verbal cues   Stand to Sit 5  Supervision   Additional items Assist x 1; Increased time required;Verbal cues   Stand pivot 5  Supervision   Additional items Assist x 1; Increased time required   Additional Comments RW used; VC for safe hand placement during transfers   Functional Mobility   Functional Mobility 5  Supervision   Additional Comments Pt completed long distance ADL mobility w/ RW at (S) level. No significant LOB observed, mild instability   Balance   Static Sitting Good   Dynamic Sitting Fair +   Static Standing Fair   Dynamic Standing Fair -   Ambulatory Fair -   Activity Tolerance   Activity Tolerance Patient limited by fatigue   Medical Staff Made Aware Yes, CM made aware of recs   Nurse Made Aware Yes, RN made aware of session outcomes   RUE Assessment   RUE Assessment WFL   RUE Strength   RUE Overall Strength   (4-/5)   LUE Assessment   LUE Assessment WFL   LUE Strength   LUE Overall Strength   (4-/5)   Cognition   Overall Cognitive Status WFL   Arousal/Participation Responsive; Alert; Cooperative   Attention Within functional limits   Orientation Level Oriented X4   Memory Within functional limits   Following Commands Follows one step commands without difficulty   Comments Pt agreeable to OT evaluation, pleasant   Assessment   Limitation Decreased ADL status; Decreased UE strength;Decreased Safe judgement during ADL;Decreased endurance   Prognosis Good   Assessment Pt is a 80 y.o. female seen for OT evaluation s/p admit to [facility] [place]. Lu's on 2/96/8676 w/ Acute metabolic encephalopathy due to hypoglycemia. Comorbidities affecting pt's functional performance at time of assessment include: HLD, HTN, type 2 diabetes, GERD, OA of both knee, OA of right shoulder. Personal factors affecting pt at time of IE include:behavioral pattern, difficulty performing ADLS, difficulty performing IADLS , limited insight into deficits, decreased initiation and engagement  and health management . Prior to admission, pt was (I) with ADLs and required (A) for some IADLs. Upon evaluation: the following deficits impact occupational performance: weakness, decreased strength, decreased balance, decreased tolerance, impaired problem solving and decreased safety awareness. Pt to benefit from continued skilled OT tx while in the hospital to address deficits as defined above and maximize level of functional independence w ADL's and functional mobility. Occupational Performance areas to address include: grooming, bathing/shower, toilet hygiene, dressing, functional mobility, community mobility and clothing management. From OT standpoint, recommendation at time of d/c would be home with home health OT. Goals   Patient Goals to go home   Plan   Treatment Interventions ADL retraining;Functional transfer training;UE strengthening/ROM; Endurance training;Patient/family training; Compensatory technique education; Energy conservation; Activityengagement   Goal Expiration Date 07/10/23   OT Treatment Day 0   OT Frequency 3-5x/wk   Recommendation   OT Discharge Recommendation Home with home health rehabilitation   Additional Comments  The patient's raw score on the -PAC Daily Activity inpatient short form is 21, standardized score is 44.27, greater than 39.4. Patients at this level are likely to benefit from discharge to home. Please refer to the recommendation of the Occupational Therapist for safe discharge planning.    AM-PAC Daily Activity Inpatient   Lower Body Dressing 3   Bathing 3   Toileting 3   Upper Body Dressing 4   Grooming 4   Eating 4   Daily Activity Raw Score 21   Daily Activity Standardized Score (Calc for Raw Score >=11) 44.27   AM-PAC Applied Cognition Inpatient   Following a Speech/Presentation 3   Understanding Ordinary Conversation 4   Taking Medications 3   Remembering Where Things Are Placed or Put Away 3   Remembering List of 4-5 Errands 3   Taking Care of Complicated Tasks 3   Applied Cognition Raw Score 19   Applied Cognition Standardized Score 39.77     GOALS:    Pt will achieve the following within specified time frame: LTG  Pt will achieve the following goals within 10 days    *ADL transfers with (I) for inc'd independence with ADLs/purposeful tasks    *UB ADL with (I) for inc'd independence with self cares    *LB ADL with (I) using AE prn for inc'd independence with self cares    *Toileting with (I) for clothing management and hygiene for return to PLOF with personal care    *Increase static stand balance and dyn stand balance to G for inc'd safety with standing purposeful tasks    *Increase stand tolerance x7 m for inc'd tolerance with standing purposeful tasks    *Bed mobility- (I) for inc'd independence to manage own comfort and initiate EOB & OOB purposeful tasks    *Participate in 10-15m UE therex to increase overall stamina/activity tolerance for purposeful tasks    Beulah Burgos MS, OTR/L

## 2023-06-30 NOTE — ASSESSMENT & PLAN NOTE
Lab Results   Component Value Date    HGBA1C 8.9 (H) 05/26/2023       Recent Labs     06/30/23  0815 06/30/23  1001 06/30/23  1236 06/30/23  1607   POCGLU 79 227* 357* 344*     Longstanding history of type 1 diabetes, Home regimen: Basaglar 10 units every morning and 6 units every afternoon plus NovoLog with sliding scale (on average 5-6 units), as per last [facility] endocrinology notes, however patient states that she was taking Basaglar 10 in the morning and 15 at night,  Patient had altered mental status due to hypoglycemia likely due to wrong dose of insulin. Hypoglycemia resolved, patient's blood sugars are rising. I do recommend to start lantus 8 units daily and humalog 2 units TIDac plus scale,  Continue to monitor blood sugar over time and make adjustments to the regimen if necessary  She is a good candidate for continuous glucose monitoring which will need to talk to her endocrinologist, in the meantime she will need to continue performing home glucose monitoring 4 times a day and keep a log of her blood sugars. We discussed hypoglycemia symptoms, rule of 15's to treat hypoglycemia. Outpatient endocrinology follow-up.

## 2023-06-30 NOTE — ASSESSMENT & PLAN NOTE
Lab Results   Component Value Date    HGBA1C 8.9 (H) 05/26/2023       Recent Labs     06/30/23  0608 06/30/23  0815 06/30/23  1001 06/30/23  1236   POCGLU 84 79 227* 357*       Blood Sugar Average: Last 72 hrs:  (P) 145.4375   · Found to have undetectable blood sugar at home with metabolic encephalopathy. Encephalopathy has resolved. · Reports last insulin was in the morning (6/29),  · Home regimen- long acting/Basalog pen 10 units in AM and 15 units at bedtime and also uses sliding scale (regular insulin). BS improved and then dropped again in the ED. Received IV D5.   · Much improved currently. Initiated on SSI.    · Pending formal endocrinology evaluation

## 2023-06-30 NOTE — PROGRESS NOTES
[address]  Progress Note  Name: Guicho Madera I  MRN: [id number]  Unit/Bed#: -01 I Date of Admission: 6/29/2023   Date of Service: 6/30/2023 I Hospital Day: 0    Assessment/Plan   * Acute metabolic encephalopathy due to hypoglycemia  Assessment & Plan  · Patient found by home health aide altered with undetectable blood sugar. · No resolved     Gastroesophageal reflux disease without esophagitis  Assessment & Plan  · Continue PPI     Type 2 diabetes mellitus with hypoglycemia, with long-term current use of insulin [facility])  Assessment & Plan  Lab Results   Component Value Date    HGBA1C 8.9 (H) 05/26/2023       Recent Labs     06/30/23  0608 06/30/23  0815 06/30/23  1001 06/30/23  1236   POCGLU 84 79 227* 357*       Blood Sugar Average: Last 72 hrs:  (P) 145.4375   · Found to have undetectable blood sugar at home with metabolic encephalopathy. Encephalopathy has resolved. · Reports last insulin was in the morning (6/29),  · Home regimen- long acting/Basalog pen 10 units in AM and 15 units at bedtime and also uses sliding scale (regular insulin). BS improved and then dropped again in the ED. Received IV D5.   · Much improved currently. Initiated on SSI. · Pending formal endocrinology evaluation       Postoperative hypothyroidism  Assessment & Plan  · Continue levothyroxine     Primary hypertension  Assessment & Plan  · Continue lisinopril with hold parameters     Mixed hyperlipidemia  Assessment & Plan  · Continue statin            VTE Prophylaxis:  Enoxaparin (Lovenox)    Patient Centered Rounds: I have performed bedside rounds with nursing staff today.     Discussions with Specialists or Other Care Team Provider: endocrinology   Education and Discussions with Family / Patient: patient     Current Length of Stay: 0 day(s)    Current Patient Status: Inpatient   Certification Statement: The patient will continue to require additional inpatient hospital stay due to Acute encephalopathy    Discharge Plan: Currently is pending endocrinology evaluation. The patient will continue to be monitored closely for recurrent hypoglycemia subsequently is changed to inpatient status. Code Status: Level 3 - DNAR and DNI    Subjective:   Feeling better. However she is very concerned about her hypoglycemia as this has happened before in the past.    Objective:     Vitals:   Temp (24hrs), Av.5 °F (36.9 °C), Min:98.5 °F (36.9 °C), Max:98.5 °F (36.9 °C)    Temp:  [98.5 °F (36.9 °C)] 98.5 °F (36.9 °C)  HR:  [75] 75  Resp:  [16-20] 20  BP: ()/(57-87) 135/87  SpO2:  [95 %] 95 %  Body mass index is 21.12 kg/m². Input and Output Summary (last 24 hours): Intake/Output Summary (Last 24 hours) at 2023 1553  Last data filed at 2023 0207  Gross per 24 hour   Intake 281.67 ml   Output --   Net 281.67 ml       Physical Exam:   Physical Exam  Vitals and nursing note reviewed. Constitutional:       General: She is not in acute distress. Appearance: Normal appearance. Comments: Elderly      HENT:      Head: Normocephalic and atraumatic. Right Ear: External ear normal.      Left Ear: External ear normal.      Nose: Nose normal. No rhinorrhea. Mouth/Throat:      Mouth: Mucous membranes are moist.      Pharynx: Oropharynx is clear. Eyes:      General:         Right eye: No discharge. Left eye: No discharge. Pupils: Pupils are equal, round, and reactive to light. Cardiovascular:      Rate and Rhythm: Normal rate and regular rhythm. Pulses: Normal pulses. Heart sounds: Normal heart sounds. No murmur heard. Pulmonary:      Effort: Pulmonary effort is normal. No respiratory distress. Breath sounds: Normal breath sounds. Abdominal:      General: Bowel sounds are normal. There is no distension. Palpations: Abdomen is soft. There is no mass. Tenderness: There is no abdominal tenderness.    Musculoskeletal:         General: No swelling or tenderness. Normal range of motion. Cervical back: Normal range of motion and neck supple. No muscular tenderness. Skin:     General: Skin is warm and dry. Capillary Refill: Capillary refill takes less than 2 seconds. Findings: No erythema or rash. Neurological:      General: No focal deficit present. Mental Status: She is alert and oriented to person, place, and time. Mental status is at baseline. Psychiatric:         Mood and Affect: Mood normal.         Behavior: Behavior normal.         Thought Content: Thought content normal.         Judgment: Judgment normal.         Additional Data:     Labs:    Results from last 7 days   Lab Units 06/30/23  0410 06/29/23  1221   WBC Thousand/uL 7.07 7.14   HEMOGLOBIN g/dL 11.0* 12.8   HEMATOCRIT % 34.4* 40.7   PLATELETS Thousands/uL 178 210   NEUTROS PCT %  --  72   LYMPHS PCT %  --  15   MONOS PCT %  --  11   EOS PCT %  --  1     Results from last 7 days   Lab Units 06/30/23  0410 06/29/23  1221   SODIUM mmol/L 141 140   POTASSIUM mmol/L 4.5 4.0   CHLORIDE mmol/L 108 102   CO2 mmol/L 29 31   BUN mg/dL 8 10   CREATININE mg/dL 0.57* 0.66   CALCIUM mg/dL 8.0* 9.0   ALK PHOS U/L  --  58   ALT U/L  --  32   AST U/L  --  30         Results from last 7 days   Lab Units 06/30/23  1236 06/30/23  1001 06/30/23  0815 06/30/23  0608 06/30/23  0500 06/30/23  0255 06/30/23  0204 06/29/23  2308 06/29/23  2035 06/29/23  1918 06/29/23  1836 06/29/23  1814   POC GLUCOSE mg/dl 357* 227* 79 84 91 140 163* 238* 133 141* 31* 30*           * I Have Reviewed All Lab Data Listed Above. * Additional Pertinent Lab Tests Reviewed:  [address] Admission  Reviewed    Imaging:  Imaging Reports Reviewed Today Include: n/a     Recent Cultures (last 7 days):           Last 24 Hours Medication List:   Current Facility-Administered Medications   Medication Dose Route Frequency Provider Last Rate   • acetaminophen  650 mg Oral Q4H PRN Rah Boggs PEEWEE Woods     • atorvastatin  20 mg Oral Daily With Yael Nance PA-C     • enoxaparin  40 mg Subcutaneous Daily Anabella Cantu     • gabapentin  300 mg Oral TID Ila Leigh PA-C     • insulin lispro  1-5 Units Subcutaneous TID AC ADRIAN Liriano     • insulin lispro  1-5 Units Subcutaneous HS ADRIAN Liriano     • levothyroxine  75 mcg Oral QAM Ila Leigh PA-C     • lisinopril  2.5 mg Oral Daily Ila Leigh PA-C     • meclizine  25 mg Oral Q8H PRN Ila Leigh PA-C     • ondansetron  4 mg Intravenous Q6H PRN Ila Leigh PA-C     • pantoprazole  20 mg Oral BID AC Avelina Dominguez PA-C          Today, Patient Was Seen By: ADRIAN Liriano    ** Please Note: Dictation voice to text software may have been used in the creation of this document.  **

## 2023-06-30 NOTE — PLAN OF CARE
Problem: METABOLIC, FLUID AND ELECTROLYTES - ADULT  Goal: Glucose maintained within target range  Description: INTERVENTIONS:  - Monitor Blood Glucose as ordered  - Assess for signs and symptoms of hyperglycemia and hypoglycemia  - Administer ordered medications to maintain glucose within target range  - Assess nutritional intake and initiate nutrition service referral as needed  Outcome: Progressing     Problem: Potential for Falls  Goal: Patient will remain free of falls  Description: INTERVENTIONS:  - Educate patient/family on patient safety including physical limitations  - Instruct patient to call for assistance with activity   - Consult OT/PT to assist with strengthening/mobility   - Keep Call bell within reach  - Keep bed low and locked with side rails adjusted as appropriate  - Keep care items and personal belongings within reach  - Initiate and maintain comfort rounds  - Apply yellow socks and bracelet for high fall risk patients  - Consider moving patient to room near nurses station  Outcome: Progressing     Problem: Knowledge Deficit  Goal: Patient/family/caregiver demonstrates understanding of disease process, treatment plan, medications, and discharge instructions  Description: Complete learning assessment and assess knowledge base.   Interventions:  - Provide teaching at level of understanding  - Provide teaching via preferred learning methods  Outcome: Progressing     Problem: DISCHARGE PLANNING  Goal: Discharge to home or other facility with appropriate resources  Description: INTERVENTIONS:  - Identify barriers to discharge w/patient and caregiver  - Arrange for needed discharge resources and transportation as appropriate  - Identify discharge learning needs (meds, wound care, etc.)  - Arrange for interpretive services to assist at discharge as needed  - Refer to Case Management Department for coordinating discharge planning if the patient needs post-hospital services based on physician/advanced practitioner order or complex needs related to functional status, cognitive ability, or social support system  Outcome: Progressing

## 2023-06-30 NOTE — PLAN OF CARE
Problem: PHYSICAL THERAPY ADULT  Goal: Performs mobility at highest level of function for planned discharge setting. See evaluation for individualized goals. Description: Treatment/Interventions: Functional transfer training, LE strengthening/ROM, Therapeutic exercise, Endurance training, Patient/family training, Equipment eval/education, Gait training, Elevations, Spoke to nursing, Spoke to case management  Equipment Recommended:  (continue RW use)       See flowsheet documentation for full assessment, interventions and recommendations. Note: Prognosis: Good  Problem List: Decreased endurance, Impaired balance, Decreased mobility, Decreased safety awareness  Assessment: Pt is 80 y.o. female seen for high-complexity PT evaluation on 6/30/2023 s/p admit to [address] on 9/08/4528 w/ Acute metabolic encephalopathy due to hypoglycemia. PT was consulted to assess pt's functional mobility and d/c needs. Order placed for PT eval and tx, w/ up and OOB as tolerated order. PTA, pt lives alone in 1 SH, amb c RW vs rollator at baseline. At time of eval, pt requires MOD I for supine>sit, SUP for transfers & mobility x 120 ft c RW. Upon evaluation, pt presenting with impaired functional mobility d/t decreased endurance, impaired balance, decreased mobility and decreased safety awareness. Pertinent PMHx and current co-morbidities affecting pt's physical performance at time of assessment include: DM type 2, GERD, hypothyroidism, HTN, HLD. Personal factors affecting pt at time of eval include: positive fall history and increased age. The following objective measures performed on IE also reveal limitations: AM-PAC 6-Clicks: 35/74. Pt's clinical presentation is currently unstable/unpredictable seen in pt's presentation of poor blood sugar control and ongoing medical assessment.  Overall, pt's rehab potential and prognosis to return to PLOF is good3 as impacted by objective findings, warranting pt to receive further skilled PT interventions to address identified impairments, activity limitation(s), and participation restriction(s). Goal for patient is to return home. Pt to benefit from continued PT tx to address deficits as defined above and maximize level of functional independent mobility and consistency in order for pt to improve activity tolerance. From PT/mobility standpoint, recommendation at time of d/c would be home with home health rehabilitation pending progress in order to facilitate return to PLOF. Barriers to Discharge: Decreased caregiver support     PT Discharge Recommendation: Home with home health rehabilitation    See flowsheet documentation for full assessment.

## 2023-06-30 NOTE — PLAN OF CARE
Problem: OCCUPATIONAL THERAPY ADULT  Goal: Performs self-care activities at highest level of function for planned discharge setting. See evaluation for individualized goals. Description: Treatment Interventions: ADL retraining, Functional transfer training, UE strengthening/ROM, Endurance training, Patient/family training, Compensatory technique education, Energy conservation, Activityengagement       See flowsheet documentation for full assessment, interventions and recommendations. Note: Limitation: Decreased ADL status, Decreased UE strength, Decreased Safe judgement during ADL, Decreased endurance  Prognosis: Good  Assessment: Pt is a 80 y.o. female seen for OT evaluation s/p admit to [facility]'s on 7/38/5657 w/ Acute metabolic encephalopathy due to hypoglycemia. Comorbidities affecting pt's functional performance at time of assessment include: HLD, HTN, type 2 diabetes, GERD, OA of both knee, OA of right shoulder. Personal factors affecting pt at time of IE include:behavioral pattern, difficulty performing ADLS, difficulty performing IADLS , limited insight into deficits, decreased initiation and engagement  and health management . Prior to admission, pt was (I) with ADLs and required (A) for some IADLs. Upon evaluation: the following deficits impact occupational performance: weakness, decreased strength, decreased balance, decreased tolerance, impaired problem solving and decreased safety awareness. Pt to benefit from continued skilled OT tx while in the hospital to address deficits as defined above and maximize level of functional independence w ADL's and functional mobility. Occupational Performance areas to address include: grooming, bathing/shower, toilet hygiene, dressing, functional mobility, community mobility and clothing management. From OT standpoint, recommendation at time of d/c would be home with home health OT.      OT Discharge Recommendation: Home with home health rehabilitation Leonidas Regan MS, OTR/L

## 2023-06-30 NOTE — PHYSICAL THERAPY NOTE
Physical Therapy Evaluation   Time in: 0747  Time out: 0759  Total evaluation time: 12 minutes    Patient's Name: Bijan Clements    Admitting Diagnosis  Hypoglycemia [E16.2]    Problem List  Patient Active Problem List   Diagnosis    HLD (hyperlipidemia)    Primary hypertension    Postoperative hypothyroidism    Type 2 diabetes mellitus with hypoglycemia, with long-term current use of insulin (HCC)    Primary osteoarthritis of both knees    Primary osteoarthritis of right shoulder    Soft tissue radionecrosis    Osteoradionecrosis of temporal bone (HCC)    Abnormal CT of the abdomen    Gastroesophageal reflux disease without esophagitis    Acute metabolic encephalopathy due to hypoglycemia       Past Medical History  Past Medical History:   Diagnosis Date    Ambulates with cane     Cancer ([address])     Chronic pain disorder     knees/ shoulders (gets inj every 3 mos)    Closed intertrochanteric fracture of right femur ([address]) 5/26/2020    Controlled type 2 diabetes mellitus with diabetic polyneuropathy, with long-term current use of insulin ([address]) 5/21/2008    Diabetes mellitus ([address])     Diabetic polyneuropathy ([address]) 5/21/2008    ICD10 clean up    Disease of thyroid gland     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    HL (hearing loss)     Hodgkin's disease ([address]) 2008    Left neck, had radiation    Hypertension     Neuropathy     Osteoporosis     RA (rheumatoid arthritis) ([address])     Traumatic rhabdomyolysis ([address]) 10/17/2022       Past Surgical History  Past Surgical History:   Procedure Laterality Date    ADENOIDECTOMY      APPENDECTOMY      CATARACT EXTRACTION      CATARACT EXTRACTION, BILATERAL      CHOLECYSTECTOMY      FRACTURE SURGERY Right     hip    MOUTH SURGERY      oral cancer left lower lip    OVARY SURGERY      AR ADJT TIS TRNS/REARGMT F/C/C/M/N/A/G/H/F 10SQCM/< N/A 3/28/2022    Procedure: Adjacent tissue transfer face;  Surgeon: Pj Bahena MD;  [facility];  Service: ENT    AR OPTX FEM SHFT FX W/INSJ IMED IMPLT W/WO SCREW Right 5/28/2020    Procedure: INSERTION NAIL IM FEMUR ANTEGRADE (TROCHANTERIC); Surgeon: Getachew Norris DO;  Location: [address] [place] [address] MAIN OR;  Service: Orthopedics    RI TRANSMASTOID ANTROTOMY Left 3/28/2022    Procedure: MASTOIDECTOMY;  Surgeon: Stephon Caraballo MD;  [facility];  Service: ENT    TONSILLECTOMY      TOTAL THYROIDECTOMY  2008    Performed after left neck dissection and left oral cancer diagnosis       PT performed at least 2 patient identifiers during session: Name and wristband. 06/30/23 0759   PT Last Visit   PT Visit Date 06/30/23   Note Type   Note type Evaluation   Pain Assessment   Pain Assessment Tool 0-10   Pain Score No Pain   Restrictions/Precautions   Weight Bearing Precautions Per Order No   Other Precautions Fall Risk   Home Living   Type of [address] One level;Performs ADLs on one level; Able to live on main level with bedroom/bathroom;Stairs to enter with rails  (2 TEE)   Bathroom Shower/Tub Tub/shower unit   [employer] Raised   Bathroom Equipment Grab bars in shower; Toilet raiser;Grab bars around toilet   [address]  (RW vs rollator)   Prior Function   Level of [place] Independent with ADLs; Independent with functional mobility   Lives With [facility] in the last 6 months 1 to 4   General   Family/Caregiver Present No   Cognition   Arousal/Participation Alert   Orientation Level Oriented X4   Memory Decreased recall of precautions   Following Commands Follows one step commands without difficulty   Comments pt pleasant, agreeable to PT session   Subjective   Subjective "I was just up and walked to the bathroom"   RLE Assessment   RLE Assessment X   Strength RLE   RLE Overall Strength 4-/5   LLE Assessment   LLE Assessment X   Strength LLE   LLE Overall Strength 4-/5   Coordination   Movements are Fluid and Coordinated 1   Sensation [facility] Bed Mobility   Supine to Sit 6  Modified independent   Additional items Assist x 1;HOB elevated   Additional Comments pt reports + lightheadedness initially upon reaching sitting posture, subsided with sitting rest period   Transfers   Sit to Stand 5  Supervision   Additional items Assist x 1; Increased time required   Stand to Sit 5  Supervision   Additional items Assist x 1;Bedrails; Increased time required   Ambulation/Elevation   Gait pattern Improper Weight shift; Shuffling; Short stride   Gait Assistance 5  Supervision   Additional items Assist x 1   Assistive Device Rolling walker   Distance 120 ft   Stair Management Assistance Not tested   Balance   Static Sitting Good   Dynamic Sitting Fair +   Static Standing Fair   Dynamic Standing Fair -   Ambulatory Fair -   Endurance Deficit   Endurance Deficit Yes   Activity Tolerance   Activity Tolerance Patient limited by fatigue   Medical Staff Made Aware coordination of care provided with  [facility]   Nurse Made Aware Yes, RN Refugio Gallegos made aware of outcomes/recs   Assessment   Prognosis Good   Problem List Decreased endurance; Impaired balance;Decreased mobility; Decreased safety awareness   Assessment Pt is 80 y.o. female seen for high-complexity PT evaluation on 6/30/2023 s/p admit to [address] [place] [address] [facility] [place] on 1/58/1615 w/ Acute metabolic encephalopathy due to hypoglycemia. PT was consulted to assess pt's functional mobility and d/c needs. Order placed for PT eval and tx, w/ up and OOB as tolerated order. PTA, pt lives alone in 1 SH, amb c RW vs rollator at baseline. At time of eval, pt requires MOD I for supine>sit, SUP for transfers & mobility x 120 ft c RW. Upon evaluation, pt presenting with impaired functional mobility d/t decreased endurance, impaired balance, decreased mobility and decreased safety awareness. Pertinent PMHx and current co-morbidities affecting pt's physical performance at time of assessment include: DM type 2, GERD, hypothyroidism, HTN, HLD. Personal factors affecting pt at time of eval include: positive fall history and increased age. The following objective measures performed on IE also reveal limitations: AM-PAC 6-Clicks: 80/23. Pt's clinical presentation is currently unstable/unpredictable seen in pt's presentation of poor blood sugar control and ongoing medical assessment. Overall, pt's rehab potential and prognosis to return to PLOF is good3 as impacted by objective findings, warranting pt to receive further skilled PT interventions to address identified impairments, activity limitation(s), and participation restriction(s). Goal for patient is to return home. Pt to benefit from continued PT tx to address deficits as defined above and maximize level of functional independent mobility and consistency in order for pt to improve activity tolerance. From PT/mobility standpoint, recommendation at time of d/c would be home with home health rehabilitation pending progress in order to facilitate return to PLOF. Barriers to Discharge Decreased caregiver support   Goals   Patient Goals "to go home"   STG Expiration Date 07/10/23   Short Term Goal #1 In 7-10 days: Increase bilateral LE strength 1/2 grade to facilitate independent mobility, Perform all bed mobility tasks modified independent to decrease caregiver burden, Perform all transfers modified independent to improve independence, Ambulate > 200 ft. with RW modified independent w/o LOB and w/ normalized gait pattern 100% of the time, Navigate 2 stairs with distant S with unilateral handrail to facilitate return to previous living environment, Increase all balance 1/2 grade to decrease risk for falls, Complete exercise program independently and Tolerate 4 hr OOB to faciliate upright tolerance   PT Treatment Day 0   Plan   Treatment/Interventions Functional transfer training;LE strengthening/ROM; Therapeutic exercise; Endurance training;Patient/family training;Equipment eval/education;Gait training;Elevations; Spoke to nursing;Spoke to case management   PT Frequency 3-5x/wk   Recommendation   PT Discharge Recommendation Home with home health rehabilitation   Equipment Recommended   (continue RW use)   Additional Comments Pt's raw score on the AM-PAC Basic Mobility inpatient short form is 20, standardized score is 43.99. Patients at this level are likely to benefit from DC to home with no services, however, please refer to therapist recommendation for safe DC planning. AM-PAC Basic Mobility Inpatient   Turning in Flat Bed Without Bedrails 4   Lying on Back to Sitting on Edge of Flat Bed Without Bedrails 4   Moving Bed to Chair 3   Standing Up From Chair Using Arms 3   Walk in Room 3   Climb 3-5 Stairs With Railing 3   Basic Mobility Inpatient Raw Score 20   Basic Mobility Standardized Score 43.99   Highest Level Of Mobility   JH-HLM Goal 6: Walk 10 steps or more   JH-HLM Achieved 7: Walk 25 feet or more   End of Consult   Patient Position at End of Consult Seated edge of bed; All needs within reach       Marcela Rodriguez, PT, DPT

## 2023-06-30 NOTE — CONSULTS
Consultation - Jesse Whiteside 80 y.o. female MRN: [id number]    Unit/Bed#: -01 Encounter: [id number]      Assessment/Plan     Type 1 diabetes mellitus with hypoglycemia [facility])  Assessment & Plan  Lab Results   Component Value Date    HGBA1C 8.9 (H) 05/26/2023       Recent Labs     06/30/23  0815 06/30/23  1001 06/30/23  1236 06/30/23  1607   POCGLU 79 227* 357* 344*     Longstanding history of type 1 diabetes, Home regimen: Basaglar 10 units every morning and 6 units every afternoon plus NovoLog with sliding scale (on average 5-6 units), as per last [facility] endocrinology notes, however patient states that she was taking Basaglar 10 in the morning and 15 at night,  Patient had altered mental status due to hypoglycemia likely due to wrong dose of insulin. Hypoglycemia resolved, patient's blood sugars are rising. I do recommend to start lantus 8 units daily and humalog 2 units TIDac plus scale,  Continue to monitor blood sugar over time and make adjustments to the regimen if necessary  She is a good candidate for continuous glucose monitoring which will need to talk to her endocrinologist, in the meantime she will need to continue performing home glucose monitoring 4 times a day and keep a log of her blood sugars. We discussed hypoglycemia symptoms, rule of 15's to treat hypoglycemia. Outpatient endocrinology follow-up. Postoperative hypothyroidism  Assessment & Plan  Continue levothyroxine at current dose. Outpatient ecology follow-up. * Acute metabolic encephalopathy due to hypoglycemia  Assessment & Plan  Resolved. CC: Diabetes Consult    History of Present Illness     HPI: Jesse Whiteside is a 80y.o. year old female with type 1 diabetes, hypothyroidism and hypertension who was brought in for altered mental status. Patient was seen and examined at bedside.   Patient reports that on day of admission, her caretaker found her with altered mental status, was not able to check her blood sugar, in ED blood sugar was 44, D50 was given, patient improved, subsequently she had another episode of hypoglycemia, however no hypoglycemia since last night. Patient has longstanding history of type 1 diabetes, is following with [facility] endocrinology, as per last note, her insulin was increased because of hypoglycemia and she supposed to be on Basaglar 10 units in the morning and 6 units nightly and NovoLog with a sliding scale (patient takes on average 5 to 6 units), however patient states that she was told to take 15 units Basaglar at night which she took night before admission. She performs home glucose monitoring 4-5 times a day, most recent A1c 8.9%. Inpatient consult to Endocrinology  Consult performed by: Wade Santiago MD  Consult ordered by: Lola Zhang PA-C          Review of Systems   Constitutional: Positive for appetite change and fatigue. Negative for unexpected weight change. HENT: Negative for trouble swallowing and voice change. Eyes: Negative for visual disturbance. Respiratory: Negative for cough, shortness of breath and wheezing. Cardiovascular: Negative for palpitations and leg swelling. Gastrointestinal: Negative for abdominal pain, constipation, diarrhea, nausea and vomiting. Endocrine: Negative for cold intolerance, heat intolerance, polyphagia and polyuria. Musculoskeletal: Negative for arthralgias. Skin: Negative for color change, rash and wound. Neurological: Negative for dizziness, tremors, weakness, light-headedness, numbness and headaches. Psychiatric/Behavioral: Negative for agitation and sleep disturbance. The patient is not nervous/anxious.         Historical Information   Past Medical History:   Diagnosis Date   • Ambulates with cane    • Cancer (HCC)    • Chronic pain disorder     knees/ shoulders (gets inj every 3 mos)   • Closed intertrochanteric fracture of right femur ([address]) 5/26/2020   • Controlled type 2 diabetes mellitus with diabetic polyneuropathy, with long-term current use of insulin ([address]) 5/21/2008   • Diabetes mellitus ([address])    • Diabetic polyneuropathy ([address]) 5/21/2008    ICD10 clean up   • Disease of thyroid gland    • 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   • HL (hearing loss)    • Hodgkin's disease ([address]) 2008    Left neck, had radiation   • Hypertension    • Neuropathy    • Osteoporosis    • RA (rheumatoid arthritis) ([address])    • Traumatic rhabdomyolysis ([address]) 10/17/2022     Past Surgical History:   Procedure Laterality Date   • ADENOIDECTOMY     • APPENDECTOMY     • CATARACT EXTRACTION     • CATARACT EXTRACTION, BILATERAL     • CHOLECYSTECTOMY     • FRACTURE SURGERY Right     hip   • MOUTH SURGERY      oral cancer left lower lip   • OVARY SURGERY     • NM ADJT TIS TRNS/REARGMT F/C/C/M/N/A/G/H/F 10SQCM/< N/A 3/28/2022    Procedure: Adjacent tissue transfer face;  Surgeon: Ash Kirk MD;  [facility];  Service: ENT   • NM OPTX FEM SHFT FX W/INSJ IMED IMPLT W/WO SCREW Right 5/28/2020    Procedure: INSERTION NAIL IM FEMUR ANTEGRADE (TROCHANTERIC);   Surgeon: Stefanie Gomez DO;  Location: [facility] MAIN OR;  Service: Orthopedics   • NM TRANSMASTOID ANTROTOMY Left 3/28/2022    Procedure: MASTOIDECTOMY;  Surgeon: Ash Kirk MD;  [facility];  Service: ENT   • TONSILLECTOMY     • TOTAL THYROIDECTOMY  2008    Performed after left neck dissection and left oral cancer diagnosis     Social History   Social History     Substance and Sexual Activity   Alcohol Use Not Currently   • Alcohol/week: 0.0 standard drinks of alcohol     Social History     Substance and Sexual Activity   Drug Use Never     Social History     Tobacco Use   Smoking Status Never   Smokeless Tobacco Never     Family History:   Family History   Problem Relation Age of Onset   • Cancer Mother    • Diabetes Mother    • Diabetes Father    • Hypertension Father        Meds/Allergies   Current Facility-Administered Medications   Medication Dose Route Frequency Provider Last Rate Last Admin   • acetaminophen (TYLENOL) tablet 650 mg  650 mg Oral Q4H PRN Rose Martin PA-C       • atorvastatin (LIPITOR) tablet 20 mg  20 mg Oral Daily With Dewayne Santizo PA-C       • enoxaparin (LOVENOX) subcutaneous injection 40 mg  40 mg Subcutaneous Daily Rose Martin PA-C   40 mg at 06/30/23 8635   • gabapentin (NEURONTIN) capsule 300 mg  300 mg Oral TID Rose Martin PA-C   300 mg at 06/30/23 6777   • insulin lispro (HumaLOG) 100 units/mL subcutaneous injection 1-5 Units  1-5 Units Subcutaneous TID AC ADRIAN Crowley       • insulin lispro (HumaLOG) 100 units/mL subcutaneous injection 1-5 Units  1-5 Units Subcutaneous HS ADRIAN Crowley       • levothyroxine tablet 75 mcg  75 mcg Oral QAM Rose Martin PA-C   75 mcg at 06/30/23 5675   • lisinopril (ZESTRIL) tablet 2.5 mg  2.5 mg Oral Daily Rose Martin PA-C       • meclizine (ANTIVERT) tablet 25 mg  25 mg Oral Q8H PRN Rose Martin PA-C       • [facility] injection 4 mg  4 mg Intravenous Q6H PRN Rose Martin PA-C       • pantoprazole (PROTONIX) EC tablet 20 mg  20 mg Oral BID AC Avelina Woods PA-C   20 mg at 06/30/23 0607     No Known Allergies    Objective   Vitals: Blood pressure 135/87, pulse 75, temperature 98.5 °F (36.9 °C), resp. rate 20, height 5' 1" (1.549 m), weight 50.7 kg (111 lb 12.4 oz), SpO2 95 %. Intake/Output Summary (Last 24 hours) at 6/30/2023 1606  Last data filed at 6/30/2023 0207  Gross per 24 hour   Intake 281.67 ml   Output --   Net 281.67 ml     Invasive Devices     Peripheral Intravenous Line  Duration           Peripheral IV 06/30/23 Dorsal (posterior); Right Forearm <1 day                Physical Exam  Constitutional:       General: She is not in acute distress. Appearance: She is not ill-appearing or toxic-appearing.    Cardiovascular:      Rate and Rhythm: Normal rate and regular rhythm. Pulmonary:      Effort: Pulmonary effort is normal. No respiratory distress. Abdominal:      General: There is no distension. Tenderness: There is no abdominal tenderness. Neurological:      Mental Status: She is oriented to person, place, and time. The history was obtained from the review of the chart, patient. Lab Results:       Lab Results   Component Value Date    WBC 7.07 06/30/2023    HGB 11.0 (L) 06/30/2023    HCT 34.4 (L) 06/30/2023     (H) 06/30/2023     06/30/2023     Lab Results   Component Value Date/Time    BUN 8 06/30/2023 04:10 AM    K 4.5 06/30/2023 04:10 AM     06/30/2023 04:10 AM    CO2 29 06/30/2023 04:10 AM    CREATININE 0.57 (L) 06/30/2023 04:10 AM    AST 30 06/29/2023 12:21 PM    ALT 32 06/29/2023 12:21 PM    TP 6.8 06/29/2023 12:21 PM    ALB 4.2 06/29/2023 12:21 PM     No results for input(s): "CHOL", "HDL", "LDL", "TRIG", "VLDL" in the last 72 hours. No results found for: "Thurl Quiver", "[id number]"  POC Glucose (mg/dl)   Date Value   06/30/2023 357 (H)   06/30/2023 227 (H)   06/30/2023 79   06/30/2023 84   06/30/2023 91   06/30/2023 140   06/30/2023 163 (H)   06/29/2023 238 (H)   06/29/2023 133   06/29/2023 141 (H)       Imaging Studies: I have personally reviewed pertinent reports. Portions of the record may have been created with voice recognition software.

## 2023-07-01 VITALS
DIASTOLIC BLOOD PRESSURE: 70 MMHG | OXYGEN SATURATION: 98 % | WEIGHT: 111.77 LBS | TEMPERATURE: 98.5 F | HEIGHT: 61 IN | SYSTOLIC BLOOD PRESSURE: 115 MMHG | RESPIRATION RATE: 18 BRPM | BODY MASS INDEX: 21.1 KG/M2 | HEART RATE: 81 BPM

## 2023-07-01 LAB
ANION GAP SERPL CALCULATED.3IONS-SCNC: 7 MMOL/L
BUN SERPL-MCNC: 15 MG/DL (ref 5–25)
CALCIUM SERPL-MCNC: 8.3 MG/DL (ref 8.4–10.2)
CHLORIDE SERPL-SCNC: 106 MMOL/L (ref 96–108)
CO2 SERPL-SCNC: 28 MMOL/L (ref 21–32)
CREAT SERPL-MCNC: 0.71 MG/DL (ref 0.6–1.3)
GFR SERPL CREATININE-BSD FRML MDRD: 79 ML/MIN/1.73SQ M
GLUCOSE SERPL-MCNC: 114 MG/DL (ref 65–140)
GLUCOSE SERPL-MCNC: 119 MG/DL (ref 65–140)
GLUCOSE SERPL-MCNC: 130 MG/DL (ref 65–140)
GLUCOSE SERPL-MCNC: 164 MG/DL (ref 65–140)
POTASSIUM SERPL-SCNC: 4.2 MMOL/L (ref 3.5–5.3)
SODIUM SERPL-SCNC: 141 MMOL/L (ref 135–147)

## 2023-07-01 PROCEDURE — 82948 REAGENT STRIP/BLOOD GLUCOSE: CPT

## 2023-07-01 PROCEDURE — 99239 HOSP IP/OBS DSCHRG MGMT >30: CPT | Performed by: NURSE PRACTITIONER

## 2023-07-01 PROCEDURE — 80048 BASIC METABOLIC PNL TOTAL CA: CPT | Performed by: NURSE PRACTITIONER

## 2023-07-01 RX ORDER — INSULIN GLARGINE 100 [IU]/ML
INJECTION, SOLUTION SUBCUTANEOUS
COMMUNITY

## 2023-07-01 RX ADMIN — INSULIN LISPRO 2 UNITS: 100 INJECTION, SOLUTION INTRAVENOUS; SUBCUTANEOUS at 12:09

## 2023-07-01 RX ADMIN — PANTOPRAZOLE SODIUM 20 MG: 20 TABLET, DELAYED RELEASE ORAL at 09:11

## 2023-07-01 RX ADMIN — GABAPENTIN 300 MG: 300 CAPSULE ORAL at 09:11

## 2023-07-01 RX ADMIN — INSULIN LISPRO 1 UNITS: 100 INJECTION, SOLUTION INTRAVENOUS; SUBCUTANEOUS at 12:08

## 2023-07-01 RX ADMIN — LEVOTHYROXINE SODIUM 75 MCG: 75 TABLET ORAL at 05:06

## 2023-07-01 RX ADMIN — ENOXAPARIN SODIUM 40 MG: 40 INJECTION SUBCUTANEOUS at 09:12

## 2023-07-01 RX ADMIN — INSULIN LISPRO 2 UNITS: 100 INJECTION, SOLUTION INTRAVENOUS; SUBCUTANEOUS at 09:13

## 2023-07-01 RX ADMIN — LISINOPRIL 2.5 MG: 2.5 TABLET ORAL at 09:11

## 2023-07-01 NOTE — DISCHARGE SUMMARY
[address] [facility]  Discharge- Anay Stiles 1940, 80 y.o. female MRN: [id number]  Unit/Bed#: -[place] Encounter: [id number]  Primary Care Provider: Sagar Moreno MD   Date and time admitted to hospital: 6/29/2023 11:53 AM    * Acute metabolic encephalopathy due to hypoglycemia  Assessment & Plan  · Patient found by home health aide altered with undetectable blood sugar. · No resolved      Gastroesophageal reflux disease without esophagitis  Assessment & Plan  · Continue PPI      Type 1 diabetes mellitus with hypoglycemia [facility])  Assessment & Plan  Lab Results   Component Value Date    HGBA1C 8.9 (H) 05/26/2023       Recent Labs     06/30/23  1607 06/30/23  2057 07/01/23  0620 07/01/23  0700   POCGLU 344* 89 119 130       Blood Sugar Average: Last 72 hrs:  (P) 150.45   · Found to have undetectable blood sugar at home with metabolic encephalopathy. Encephalopathy has resolved. · Reports last insulin was in the morning (6/29)  · Home regimen- long acting/Basaglar pen 10 units in AM and 5 units HS (the patient reports taking 15 units at bedtime) and also uses sliding scale Novolog (avg. 5-6 units). · Suspect that her acute encephalopathy and hypoglycemia is due to wrong dose of insulin  · BS improved and then dropped again in the ED. Received IV D5.   · Much improved currently. · Endocrinology input appreciated-she is a good candidate for continuous glucose monitoring  · Will need outpatient endocrinology follow-up  · Of note, the patient is voicing that she "had enough of the confusion being told by many providers about taking her insulin". She was requesting for insulin to be given after HS BG last night was 89. She states that "I was always told to take insulin no matter what". Lengthy discussion/education done with the patient. Also spoke with the daughter via phone.  The daughter states that this has been a difficult process as the patient at times gets confused regarding insulin regimen which they have been trying to just/closely monitor. Daughter confirms that the patient takes Basaglar 10 units in a.m. and 5 units at at bedtime. However, at times is confused about the short acting and often take more than the sliding scale instructions. · Novolog using sliding scale per outpatient records:    BG <120 - none  121 to 160 - 3 units  161 to 200 - 4 units  201 to 250 - 5 units  251 to 300 - 6 units  301 to 350 - 7 units  351 to 400 - 8 units   >400 ------> 10 units        Postoperative hypothyroidism  Assessment & Plan  · Continue levothyroxine      Primary hypertension  Assessment & Plan  · BP is well controlled   · Continue lisinopril    Mixed hyperlipidemia  Assessment & Plan  · Continue statin        Discharging Physician / Practitioner: ADRIAN Keene  PCP: Collins Mccoy MD  Admission Date:   Admission Orders (From admission, onward)     Ordered        06/30/23 1537  Inpatient Admission  Once            06/29/23 1859  Place in Observation  Once                      Discharge Date: 07/01/23    Medical Problems     Resolved Problems  Date Reviewed: 7/1/2023   None         Consultations During Hospital Stay:  IP CONSULT TO ENDOCRINOLOGY  IP CONSULT TO CASE MANAGEMENT      Procedures Performed:   • No results found. Significant Findings / Test Results:   · N/A     Incidental Findings:   · N/A      Test Results Pending at Discharge (will require follow up): · None      Outpatient Tests Requested:  · Follow up with PCP and endocrinology     Complications:  None     Reason for Admission: Hypoglycemia - Symptomatic (Patient arrives via ems from home where patient lives alone. Patients home health care nurse noticed patient was lethargic and so he checked patients blood sugar and it was 32 patient was given orange juice and ems was called. Ems gave patient two doses of oral glucose and sugar came up to 64./Patient alert and orientated. )        Hospital Course:      Tabitha Pa Arin is a 80 y.o. female patient who originally presented to the hospital on 6/29/2023 due to acute encephalopathy and hypoglycemia. The patient was evaluated by endocrinology. It is felt that her hypoglycemia is due to taking wrong dose of insulin. A lot of education and discussion done with the patient as well as her daughter via phone. She will need to follow-up with outpatient endocrinology. Please see above list of diagnoses and related plan for additional information. Condition at Discharge: good     Discharge Day Visit / Exam:     Subjective:  Feeling much better and would like to go home  Vitals: Blood Pressure: 115/70 (07/01/23 0700)  Pulse: 81 (07/01/23 0700)  Temperature: 98.5 °F (36.9 °C) (07/01/23 0700)  Temp Source: Temporal (06/29/23 1201)  Respirations: 18 (07/01/23 0700)  Height: 5' 1" (154.9 cm) (06/30/23 1400)  Weight - Scale: 50.7 kg (111 lb 12.4 oz) (06/30/23 1400)  SpO2: 98 % (07/01/23 0700)  Exam:   Physical Exam  Vitals and nursing note reviewed. Constitutional:       General: She is not in acute distress. Appearance: Normal appearance. Comments: Frail and elderly    HENT:      Head: Normocephalic and atraumatic. Right Ear: External ear normal.      Left Ear: External ear normal.      Nose: Nose normal. No rhinorrhea. Mouth/Throat:      Mouth: Mucous membranes are moist.      Pharynx: Oropharynx is clear. Eyes:      General:         Right eye: No discharge. Left eye: No discharge. Pupils: Pupils are equal, round, and reactive to light. Cardiovascular:      Rate and Rhythm: Normal rate and regular rhythm. Pulses: Normal pulses. Heart sounds: Normal heart sounds. No murmur heard. Pulmonary:      Effort: Pulmonary effort is normal. No respiratory distress. Breath sounds: Normal breath sounds. Abdominal:      General: Bowel sounds are normal. There is no distension. Palpations: Abdomen is soft. There is no mass. Tenderness: There is no abdominal tenderness. Musculoskeletal:         General: No swelling or tenderness. Normal range of motion. Cervical back: Normal range of motion and neck supple. No muscular tenderness. Skin:     General: Skin is warm and dry. Capillary Refill: Capillary refill takes less than 2 seconds. Findings: No erythema or rash. Neurological:      General: No focal deficit present. Mental Status: She is alert and oriented to person, place, and time. Mental status is at baseline. Psychiatric:         Mood and Affect: Mood normal.         Behavior: Behavior normal.         Thought Content: Thought content normal.         Judgment: Judgment normal.       Discussion with Family: daughter via phone     Discharge instructions/Information to patient and family:   See after visit summary for information provided to patient and family. Provisions for Follow-Up Care:  See after visit summary for information related to follow-up care and any pertinent home health orders. Disposition:     Home    Planned Readmission: no      Discharge Statement:  I spent 37 minutes discharging the patient. This time was spent on the day of discharge. I had direct contact with the patient on the day of discharge. Greater than 50% of the total time was spent examining patient, answering all patient questions, arranging and discussing plan of care with patient as well as directly providing post-discharge instructions. Additional time then spent on discharge activities. Discharge Medications:  See after visit summary for reconciled discharge medications provided to patient and family.       ** Please Note: This note has been constructed using a voice recognition system **

## 2023-07-01 NOTE — CASE MANAGEMENT
Case Management Assessment & Discharge Planning Note    Patient name Ruth Macias  Location /-71 MRN [id number]  : 1940 Date 2023       Current Admission Date: 2023  Current Admission Diagnosis:Acute metabolic encephalopathy due to hypoglycemia   Patient Active Problem List    Diagnosis Date Noted   • Acute metabolic encephalopathy due to hypoglycemia 2023   • Gastroesophageal reflux disease without esophagitis 10/17/2022   • Abnormal CT of the abdomen 07/10/2022   • Soft tissue radionecrosis 2022   • 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   • Primary osteoarthritis of right shoulder 2021   • Primary osteoarthritis of both knees 2020   • Postoperative hypothyroidism 2015   • Mixed hyperlipidemia 2014   • Primary hypertension 10/10/2013   • Type 1 diabetes mellitus with hypoglycemia ([address]) 2008      LOS (days): 1  Geometric Mean LOS (GMLOS) (days): 3.90  Days to GMLOS:3     OBJECTIVE:    Risk of Unplanned Readmission Score: 19.79       Current admission status: Inpatient  Referral Reason: VNA    Preferred Pharmacy:   [facility] #[id number] [address]  [address] [place] [id number]  Phone: [phone] Fax: [address] [facility], [address] [place] [address]  Phone: [phone] Fax: [phone]    Primary Care Provider: Bird Barr MD    Primary Insurance: MEDICARE  Secondary Insurance: AARP    ASSESSMENT:  Active Health Care Proxies     SAI Osborne [facility] Representative - Daughter   Primary Phone: [phone] (Mobile)  Home Phone: [phone]               Advance Directives  Does patient have a [address]?: Yes  Does patient have Advance Directives?: Yes  Advance Directives: Living will, Power of  for health care  Primary Contact: Cindy Osborne (Daughter)   [phone] (Mobile)    Readmission Root Cause  30 Day Readmission: No    Patient Information  Admitted from[de-identified] Home  Mental Status: Alert  During Assessment patient was accompanied by: Not accompanied during assessment  Assessment information provided by[de-identified] Patient, Daughter  Primary Caregiver: Self  Support Systems: Son, Daughter, Self, Friend, Family members  [facility]: [facility] do you live in?: [address] entry access options.  Select all that apply.: Stairs  Number of steps to enter home.: 2  Do the steps have railings?: Yes  Type of Current Residence: [place]  In the last 12 months, was there a time when you were not able to pay the mortgage or rent on time?: No  In the last 12 months, how many places have you lived?: 1  In the last 12 months, was there a time when you did not have a steady place to sleep or slept in a shelter (including now)?: No  Homeless/housing insecurity resource given?: N/A  Living Arrangements: Lives Alone  Is patient a ?: No    Activities of Daily Living Prior to Admission  Functional Status: Independent  Completes ADLs independently?: Yes  Ambulates independently?: Yes  Does patient use assisted devices?: Yes  Assisted Devices (DME) used: [employer]  Does patient currently own DME?: Yes  What DME does the patient currently own?: Roxanne Fenmelita, Wheelchair, Other (Comment) (Glucometer)  Does patient have a history of Outpatient Therapy (PT/OT)?: No  Does the patient have a history of Short-Term Rehab?: Yes ([place]/ TCU)  Does patient have a history of HHC?: Yes (In-home therapy and [facility])  Does patient currently have [address] [facility]?: Yes    Current Home Health Care  Type of Current Home Care Services: Home PT, Home OT, Nurse visit  [address][de-identified] Other (please enter name in comment) Sadie Dudley [address])  [address] Provider[de-identified] PCP    Patient Information Continued  Income Source: Pension/longterm  Does patient have prescription coverage?: Yes  Within the past 12 months, you worried that your food would run out before you got the money to buy more.: Never true  Within the past 12 months, the food you bought just didn't last and you didn't have money to get more.: Never true  Food insecurity resource given?: N/A  Does patient receive dialysis treatments?: No  Does patient have a history of substance abuse?: No  Does patient have a history of Mental Health Diagnosis?: No    Means of Transportation  Means of Transport to Appts[de-identified] Family transport  In the past 12 months, has lack of transportation kept you from medical appointments or from getting medications?: No  In the past 12 months, has lack of transportation kept you from meetings, work, or from getting things needed for daily living?: No  Was application for public transport provided?: N/A    DISCHARGE DETAILS:    Discharge planning discussed with[de-identified] Patient and daughter Scar Falling of Choice: Yes  Comments - Freedom of Choice: Current with [facility] referral sent for [facility] contacted family/caregiver?: Yes  Were Treatment Team discharge recommendations reviewed with patient/caregiver?: Yes  Did patient/caregiver verbalize understanding of patient care needs?: Yes  Were patient/caregiver advised of the risks associated with not following Treatment Team discharge recommendations?: Yes    Contacts  Patient Contacts: Nilay Stewart dtr  Relationship to Patient[de-identified] Family  Contact Method: Phone  Phone Number: [phone]  Reason/Outcome: Discharge Planning, Emergency Contact    Requested [address] [facility]         Is the patient interested in [facility] [address] at discharge?: Yes  [facility] requested[de-identified] Nursing, Occupational Therapy, Physical [address] Name[de-identified] [place] ([place]) [address] External Referral Reason (only applicable if external HHA name selected): Patient has established relationship with provider  [address] Provider[de-identified] PCP  Home Health Services Needed[de-identified] Evaluate Functional Status and Safety, Diabetes Management, Gait/ADL Training, Strengthening/Theraputic Exercises to Improve Function  Homebound Criteria Met[de-identified] Uses an Assist Device (i.e. cane, walker, etc)  Supporting Clincal Findings[de-identified] Fatigues Easliy in [employer], Limited Endurance    DME Referral Provided  Referral made for DME?: No    Other Referral/Resources/Interventions Provided:  Interventions: [facility]    Treatment Team Recommendation: Home with [address]  Discharge Destination Plan[de-identified] Home with [address]        Additional Comments: Met with patient at bedside to discuss discharge planning. Current with [facility] referral made for MAL. Call to dtr [facility] and discussed discharge [facility] has appointment with [employer] for assessment for waiver services.

## 2023-07-01 NOTE — DISCHARGE INSTR - AVS FIRST PAGE
Take Basaglar (long acting insulin)   10 units in AM   5 units at bedtime     Take sliding scale (short acting insulin) with meals. Please note that if your blood sugar is less than 120 DO NOT TAKE the short acting insulin.  The following is a sliding scale from Dr. Deniz Childers:   BG <120 - none  121 to 160 - 3 units  161 to 200 - 4 units  201 to 250 - 5 units  251 to 300 - 6 units  301 to 350 - 7 units  351 to 400 - 8 units   >400 ------> 10 units

## 2023-07-01 NOTE — ASSESSMENT & PLAN NOTE
Lab Results   Component Value Date    HGBA1C 8.9 (H) 05/26/2023       Recent Labs     06/30/23  1607 06/30/23 2057 07/01/23  0620 07/01/23  0700   POCGLU 344* 89 119 130       Blood Sugar Average: Last 72 hrs:  (P) 150.45   · Found to have undetectable blood sugar at home with metabolic encephalopathy. Encephalopathy has resolved. · Reports last insulin was in the morning (6/29)  · Home regimen- long acting/Basaglar pen 10 units in AM and 5 units HS (the patient reports taking 15 units at bedtime) and also uses sliding scale Novolog (avg. 5-6 units). · Suspect that her acute encephalopathy and hypoglycemia is due to wrong dose of insulin  · BS improved and then dropped again in the ED. Received IV D5.   · Much improved currently. · Endocrinology input appreciated-she is a good candidate for continuous glucose monitoring  · Will need outpatient endocrinology follow-up  · Of note, the patient is voicing that she "had enough of the confusion being told by many providers about taking her insulin". She was requesting for insulin to be given after HS BG last night was 89. She states that "I was always told to take insulin no matter what". Lengthy discussion/education done with the patient. Also spoke with the daughter via phone. The daughter states that this has been a difficult process as the patient at times gets confused regarding insulin regimen which they have been trying to just/closely monitor. Daughter confirms that the patient takes Basaglar 10 units in a.m. and 5 units at at bedtime. However, at times is confused about the short acting and often take more than the sliding scale instructions.    · Novolog using sliding scale per outpatient records:    BG <120 - none  121 to 160 - 3 units  161 to 200 - 4 units  201 to 250 - 5 units  251 to 300 - 6 units  301 to 350 - 7 units  351 to 400 - 8 units   >400 ------> 10 units

## 2023-07-01 NOTE — NURSING NOTE
Patient discharged home with [facility]. IV removed with catheter tip intact, DSD and pressure applied. All belongings returned to patient upon discharge. Patient and grandson, Que Treviño verbalized understanding of discharge instructions and insulin/sliding scale instructions.

## 2023-07-01 NOTE — PLAN OF CARE
Problem: PAIN - ADULT  Goal: Verbalizes/displays adequate comfort level or baseline comfort level  Description: Interventions:  - Encourage patient to monitor pain and request assistance  - Assess pain using appropriate pain scale  - Administer analgesics based on type and severity of pain and evaluate response  - Implement non-pharmacological measures as appropriate and evaluate response  - Consider cultural and social influences on pain and pain management  - Notify physician/advanced practitioner if interventions unsuccessful or patient reports new pain  Outcome: Progressing     Problem: MOBILITY - ADULT  Goal: Maintain or return to baseline ADL function  Description: INTERVENTIONS:  -  Assess patient's ability to carry out ADLs; assess patient's baseline for ADL function and identify physical deficits which impact ability to perform ADLs (bathing, care of mouth/teeth, toileting, grooming, dressing, etc.)  - Assess/evaluate cause of self-care deficits   - Assess range of motion  - Assess patient's mobility; develop plan if impaired  - Assess patient's need for assistive devices and provide as appropriate  - Encourage maximum independence but intervene and supervise when necessary  - Involve family in performance of ADLs  - Assess for home care needs following discharge   - Consider OT consult to assist with ADL evaluation and planning for discharge  - Provide patient education as appropriate  Outcome: Progressing  Goal: Maintains/Returns to pre admission functional level  Description: INTERVENTIONS:  - Perform BMAT or MOVE assessment daily.   - Set and communicate daily mobility goal to care team and patient/family/caregiver. - Collaborate with rehabilitation services on mobility goals if consulted  - Perform Range of Motion 3 times a day. - Reposition patient every 2 hours.   - Dangle patient 3 times a day  - Stand patient 3 times a day  - Ambulate patient 3 times a day  - Out of bed to chair 3 times a day - Out of bed for meals 3 times a day  - Out of bed for toileting  - Record patient progress and toleration of activity level   Outcome: Progressing

## 2023-07-01 NOTE — PLAN OF CARE
Problem: Potential for Falls  Goal: Patient will remain free of falls  Description: INTERVENTIONS:  - Educate patient/family on patient safety including physical limitations  - Instruct patient to call for assistance with activity   - Consult OT/PT to assist with strengthening/mobility   - Keep Call bell within reach  - Keep bed low and locked with side rails adjusted as appropriate  - Keep care items and personal belongings within reach  - Initiate and maintain comfort rounds  - Make Fall Risk Sign visible to staff  - Offer Toileting every  Hours, in advance of need  - Initiate/Maintain alarm  - Obtain necessary fall risk management equipment:   - Apply yellow socks and bracelet for high fall risk patients  - Consider moving patient to room near nurses station  7/1/2023 1246 by Malcolm Christy RN  Outcome: Adequate for Discharge  7/1/2023 0856 by Malcolm Christy RN  Outcome: Progressing     Problem: MOBILITY - ADULT  Goal: Maintain or return to baseline ADL function  Description: INTERVENTIONS:  -  Assess patient's ability to carry out ADLs; assess patient's baseline for ADL function and identify physical deficits which impact ability to perform ADLs (bathing, care of mouth/teeth, toileting, grooming, dressing, etc.)  - Assess/evaluate cause of self-care deficits   - Assess range of motion  - Assess patient's mobility; develop plan if impaired  - Assess patient's need for assistive devices and provide as appropriate  - Encourage maximum independence but intervene and supervise when necessary  - Involve family in performance of ADLs  - Assess for home care needs following discharge   - Consider OT consult to assist with ADL evaluation and planning for discharge  - Provide patient education as appropriate  7/1/2023 1246 by Malcolm Christy RN  Outcome: Adequate for Discharge  7/1/2023 0856 by Malcolm Christy RN  Outcome: Progressing  Goal: Maintains/Returns to pre admission functional level  Description: INTERVENTIONS:  - Perform BMAT or MOVE assessment daily.   - Set and communicate daily mobility goal to care team and patient/family/caregiver. - Collaborate with rehabilitation services on mobility goals if consulted  - Perform Range of Motion  times a day. - Reposition patient every  hours.   - Dangle patient  times a day  - Stand patient  times a day  - Ambulate patient  times a day  - Out of bed to chair  times a day   - Out of bed for meals  times a day  - Out of bed for toileting  - Record patient progress and toleration of activity level   7/1/2023 1246 by Vinny Watson RN  Outcome: Adequate for Discharge  7/1/2023 0856 by Vinny Watson RN  Outcome: Progressing     Problem: PAIN - ADULT  Goal: Verbalizes/displays adequate comfort level or baseline comfort level  Description: Interventions:  - Encourage patient to monitor pain and request assistance  - Assess pain using appropriate pain scale  - Administer analgesics based on type and severity of pain and evaluate response  - Implement non-pharmacological measures as appropriate and evaluate response  - Consider cultural and social influences on pain and pain management  - Notify physician/advanced practitioner if interventions unsuccessful or patient reports new pain  7/1/2023 1246 by Vinny Watson RN  Outcome: Adequate for Discharge  7/1/2023 0856 by Vinny Watson RN  Outcome: Progressing     Problem: INFECTION - ADULT  Goal: Absence or prevention of progression during hospitalization  Description: INTERVENTIONS:  - Assess and monitor for signs and symptoms of infection  - Monitor lab/diagnostic results  - Monitor all insertion sites, i.e. indwelling lines, tubes, and drains  - Monitor endotracheal if appropriate and nasal secretions for changes in amount and color  - [place] appropriate cooling/warming therapies per order  - Administer medications as ordered  - Instruct and encourage patient and family to use good hand hygiene technique  - Identify and instruct in appropriate isolation precautions for identified infection/condition  7/1/2023 1246 by Angle Ng RN  Outcome: Adequate for Discharge  7/1/2023 0856 by Angle Ng RN  Outcome: Progressing     Problem: SAFETY ADULT  Goal: Patient will remain free of falls  Description: INTERVENTIONS:  - Educate patient/family on patient safety including physical limitations  - Instruct patient to call for assistance with activity   - Consult OT/PT to assist with strengthening/mobility   - Keep Call bell within reach  - Keep bed low and locked with side rails adjusted as appropriate  - Keep care items and personal belongings within reach  - Initiate and maintain comfort rounds  - Make Fall Risk Sign visible to staff  - Offer Toileting every  Hours, in advance of need  - Initiate/Maintain alarm  - Obtain necessary fall risk management equipment:   - Apply yellow socks and bracelet for high fall risk patients  - Consider moving patient to room near nurses station  7/1/2023 1246 by Angle Ng RN  Outcome: Adequate for Discharge  7/1/2023 0856 by Angle Ng RN  Outcome: Progressing  Goal: Maintain or return to baseline ADL function  Description: INTERVENTIONS:  -  Assess patient's ability to carry out ADLs; assess patient's baseline for ADL function and identify physical deficits which impact ability to perform ADLs (bathing, care of mouth/teeth, toileting, grooming, dressing, etc.)  - Assess/evaluate cause of self-care deficits   - Assess range of motion  - Assess patient's mobility; develop plan if impaired  - Assess patient's need for assistive devices and provide as appropriate  - Encourage maximum independence but intervene and supervise when necessary  - Involve family in performance of ADLs  - Assess for home care needs following discharge   - Consider OT consult to assist with ADL evaluation and planning for discharge  - Provide patient education as appropriate  7/1/2023 1246 by Angle Ng RN  Outcome: Adequate for Discharge  7/1/2023 0856 by Lorri Catalan RN  Outcome: Progressing  Goal: Maintains/Returns to pre admission functional level  Description: INTERVENTIONS:  - Perform BMAT or MOVE assessment daily.   - Set and communicate daily mobility goal to care team and patient/family/caregiver. - Collaborate with rehabilitation services on mobility goals if consulted  - Perform Range of Motion  times a day. - Reposition patient every  hours. - Dangle patient  times a day  - Stand patient  times a day  - Ambulate patient  times a day  - Out of bed to chair times a day   - Out of bed for meals times a day  - Out of bed for toileting  - Record patient progress and toleration of activity level   7/1/2023 1246 by Lorri Catalan RN  Outcome: Adequate for Discharge  7/1/2023 0856 by Lorri Catalan RN  Outcome: Progressing     Problem: DISCHARGE PLANNING  Goal: Discharge to home or other facility with appropriate resources  Description: INTERVENTIONS:  - Identify barriers to discharge w/patient and caregiver  - Arrange for needed discharge resources and transportation as appropriate  - Identify discharge learning needs (meds, wound care, etc.)  - Arrange for interpretive services to assist at discharge as needed  - Refer to Case Management Department for coordinating discharge planning if the patient needs post-hospital services based on physician/advanced practitioner order or complex needs related to functional status, cognitive ability, or social support system  7/1/2023 1246 by Lorri Catalan RN  Outcome: Adequate for Discharge  7/1/2023 0856 by Lorri Catalan RN  Outcome: Progressing     Problem: Knowledge Deficit  Goal: Patient/family/caregiver demonstrates understanding of disease process, treatment plan, medications, and discharge instructions  Description: Complete learning assessment and assess knowledge base.   Interventions:  - Provide teaching at level of understanding  - Provide teaching via preferred learning methods  7/1/2023 1246 by Sander Gray RN  Outcome: Adequate for Discharge  7/1/2023 7733 by Sander Gray RN  Outcome: Progressing     Problem: METABOLIC, FLUID AND ELECTROLYTES - ADULT  Goal: Glucose maintained within target range  Description: INTERVENTIONS:  - Monitor Blood Glucose as ordered  - Assess for signs and symptoms of hyperglycemia and hypoglycemia  - Administer ordered medications to maintain glucose within target range  - Assess nutritional intake and initiate nutrition service referral as needed  7/1/2023 1246 by Sander Gray RN  Outcome: Adequate for Discharge  7/1/2023 0856 by Sander Gray RN  Outcome: Progressing

## 2023-07-01 NOTE — PLAN OF CARE
Problem: Potential for Falls  Goal: Patient will remain free of falls  Description: INTERVENTIONS:  - Educate patient/family on patient safety including physical limitations  - Instruct patient to call for assistance with activity   - Consult OT/PT to assist with strengthening/mobility   - Keep Call bell within reach  - Keep bed low and locked with side rails adjusted as appropriate  - Keep care items and personal belongings within reach  - Initiate and maintain comfort rounds  - Make Fall Risk Sign visible to staff  - Offer Toileting every 2 Hours, in advance of need  - Initiate/Maintain bed alarm  - Obtain necessary fall risk management equipment  - Apply yellow socks and bracelet for high fall risk patients  - Consider moving patient to room near nurses station  Outcome: Progressing     Problem: MOBILITY - ADULT  Goal: Maintain or return to baseline ADL function  Description: INTERVENTIONS:  -  Assess patient's ability to carry out ADLs; assess patient's baseline for ADL function and identify physical deficits which impact ability to perform ADLs (bathing, care of mouth/teeth, toileting, grooming, dressing, etc.)  - Assess/evaluate cause of self-care deficits   - Assess range of motion  - Assess patient's mobility; develop plan if impaired  - Assess patient's need for assistive devices and provide as appropriate  - Encourage maximum independence but intervene and supervise when necessary  - Involve family in performance of ADLs  - Assess for home care needs following discharge   - Consider OT consult to assist with ADL evaluation and planning for discharge  - Provide patient education as appropriate  Outcome: Progressing  Goal: Maintains/Returns to pre admission functional level  Description: INTERVENTIONS:  - Perform BMAT or MOVE assessment daily.   - Set and communicate daily mobility goal to care team and patient/family/caregiver.    - Collaborate with rehabilitation services on mobility goals if consulted  - Perform Range of Motion 3 times a day. - Reposition patient every 2 hours.   - Dangle patient 3 times a day  - Stand patient 3 times a day  - Ambulate patient 3 times a day  - Out of bed to chair 3 times a day   - Out of bed for meals 3 times a day  - Out of bed for toileting  - Record patient progress and toleration of activity level   Outcome: Progressing     Problem: PAIN - ADULT  Goal: Verbalizes/displays adequate comfort level or baseline comfort level  Description: Interventions:  - Encourage patient to monitor pain and request assistance  - Assess pain using appropriate pain scale  - Administer analgesics based on type and severity of pain and evaluate response  - Implement non-pharmacological measures as appropriate and evaluate response  - Consider cultural and social influences on pain and pain management  - Notify physician/advanced practitioner if interventions unsuccessful or patient reports new pain  Outcome: Progressing     Problem: INFECTION - ADULT  Goal: Absence or prevention of progression during hospitalization  Description: INTERVENTIONS:  - Assess and monitor for signs and symptoms of infection  - Monitor lab/diagnostic results  - Monitor all insertion sites, i.e. indwelling lines, tubes, and drains  - Monitor endotracheal if appropriate and nasal secretions for changes in amount and color  - [place] appropriate cooling/warming therapies per order  - Administer medications as ordered  - Instruct and encourage patient and family to use good hand hygiene technique  - Identify and instruct in appropriate isolation precautions for identified infection/condition  Outcome: Progressing     Goal: Maintain or return to baseline ADL function  Description: INTERVENTIONS:  -  Assess patient's ability to carry out ADLs; assess patient's baseline for ADL function and identify physical deficits which impact ability to perform ADLs (bathing, care of mouth/teeth, toileting, grooming, dressing, etc.)  - Assess/evaluate cause of self-care deficits   - Assess range of motion  - Assess patient's mobility; develop plan if impaired  - Assess patient's need for assistive devices and provide as appropriate  - Encourage maximum independence but intervene and supervise when necessary  - Involve family in performance of ADLs  - Assess for home care needs following discharge   - Consider OT consult to assist with ADL evaluation and planning for discharge  - Provide patient education as appropriate  Outcome: Progressing     Problem: DISCHARGE PLANNING  Goal: Discharge to home or other facility with appropriate resources  Description: INTERVENTIONS:  - Identify barriers to discharge w/patient and caregiver  - Arrange for needed discharge resources and transportation as appropriate  - Identify discharge learning needs (meds, wound care, etc.)  - Arrange for interpretive services to assist at discharge as needed  - Refer to Case Management Department for coordinating discharge planning if the patient needs post-hospital services based on physician/advanced practitioner order or complex needs related to functional status, cognitive ability, or social support system  Outcome: Progressing     Problem: Knowledge Deficit  Goal: Patient/family/caregiver demonstrates understanding of disease process, treatment plan, medications, and discharge instructions  Description: Complete learning assessment and assess knowledge base.   Interventions:  - Provide teaching at level of understanding  - Provide teaching via preferred learning methods  Outcome: Progressing     Problem: METABOLIC, FLUID AND ELECTROLYTES - ADULT  Goal: Glucose maintained within target range  Description: INTERVENTIONS:  - Monitor Blood Glucose as ordered  - Assess for signs and symptoms of hyperglycemia and hypoglycemia  - Administer ordered medications to maintain glucose within target range  - Assess nutritional intake and initiate nutrition service referral as needed  Outcome: Progressing

## 2023-07-01 NOTE — PROGRESS NOTES
[address]  Progress Note  Name: Alix Burciaga I  MRN: [id number]  Unit/Bed#: -01 I Date of Admission: 6/29/2023   Date of Service: 7/1/2023  Hospital Day: 1    Assessment/Plan   * Acute metabolic encephalopathy due to hypoglycemia  Assessment & Plan  · Patient found by home health aide altered with undetectable blood sugar. · No resolved      Gastroesophageal reflux disease without esophagitis  Assessment & Plan  · Continue PPI      Type 1 diabetes mellitus with hypoglycemia [facility])  Assessment & Plan  Lab Results   Component Value Date    HGBA1C 8.9 (H) 05/26/2023       Recent Labs     06/30/23  1607 06/30/23  2057 07/01/23  0620 07/01/23  0700   POCGLU 344* 89 119 130       Blood Sugar Average: Last 72 hrs:  (P) 150.45   · Found to have undetectable blood sugar at home with metabolic encephalopathy. Encephalopathy has resolved. · Reports last insulin was in the morning (6/29)  · Home regimen- long acting/Basaglar pen 10 units in AM and 5 units HS (the patient reports taking 15 units at bedtime) and also uses sliding scale Novolog (avg. 5-6 units). · Suspect that her acute encephalopathy and hypoglycemia is due to wrong dose of insulin  · BS improved and then dropped again in the ED. Received IV D5.   · Much improved currently. · Endocrinology input appreciated-she is a good candidate for continuous glucose monitoring  · Will need outpatient endocrinology follow-up  · Of note, the patient is voicing that she "had enough of the confusion being told by many providers about taking her insulin". She was requesting for insulin to be given after HS BG last night was 89. She states that "I was always told to take insulin no matter what". Lengthy discussion/education done with the patient. Also spoke with the daughter via phone.  The daughter states that this has been a difficult process as the patient at times gets confused regarding insulin regimen which they have been trying to just/closely monitor. Daughter confirms that the patient takes Basaglar 10 units in a.m. and 5 units at at bedtime. However, at times is confused about the short acting and often take more than the sliding scale instructions. · Novolog using sliding scale per outpatient records:    BG <120 - none  121 to 160 - 3 units  161 to 200 - 4 units  201 to 250 - 5 units  251 to 300 - 6 units  301 to 350 - 7 units  351 to 400 - 8 units   >400 ------> 10 units        Postoperative hypothyroidism  Assessment & Plan  · Continue levothyroxine      Primary hypertension  Assessment & Plan  · BP is well controlled   · Continue lisinopril    Mixed hyperlipidemia  Assessment & Plan  · Continue statin             Discharging Physician / Practitioner: ADRIAN Goodman  PCP: Marguerite Maldonado MD  Admission Date:   Admission Orders (From admission, onward)     Ordered        06/30/23 1537  Inpatient Admission  Once            06/29/23 1859  Place in Observation  Once                      Discharge Date: 07/01/23    Medical Problems     Resolved Problems  Date Reviewed: 7/1/2023   None         Consultations During Hospital Stay:  IP CONSULT TO ENDOCRINOLOGY  IP CONSULT TO CASE MANAGEMENT      Procedures Performed:   • No results found. Significant Findings / Test Results:   · N/A     Incidental Findings:   · N/A      Test Results Pending at Discharge (will require follow up): · None      Outpatient Tests Requested:  · Follow up with PCP and endocrinology     Complications:  None     Reason for Admission: Hypoglycemia - Symptomatic (Patient arrives via ems from home where patient lives alone. Patients home health care nurse noticed patient was lethargic and so he checked patients blood sugar and it was 32 patient was given orange juice and ems was called. Ems gave patient two doses of oral glucose and sugar came up to 64./Patient alert and orientated. )        Hospital Course:      Florencio Villasenor is a 80 y.o. female patient who originally presented to the hospital on 6/29/2023 due to acute encephalopathy and hypoglycemia. The patient was evaluated by endocrinology. It is felt that her hypoglycemia is due to taking wrong dose of insulin. A lot of education and discussion done with the patient as well as her daughter via phone. She will need to follow-up with outpatient endocrinology. Please see above list of diagnoses and related plan for additional information. Condition at Discharge: good     Discharge Day Visit / Exam:     Subjective:  Feeling much better and would like to go home  Vitals: Blood Pressure: 115/70 (07/01/23 0700)  Pulse: 81 (07/01/23 0700)  Temperature: 98.5 °F (36.9 °C) (07/01/23 0700)  Temp Source: Temporal (06/29/23 1201)  Respirations: 18 (07/01/23 0700)  Height: 5' 1" (154.9 cm) (06/30/23 1400)  Weight - Scale: 50.7 kg (111 lb 12.4 oz) (06/30/23 1400)  SpO2: 98 % (07/01/23 0700)  Exam:   Physical Exam  Vitals and nursing note reviewed. Constitutional:       General: She is not in acute distress. Appearance: Normal appearance. Comments: Frail and elderly    HENT:      Head: Normocephalic and atraumatic. Right Ear: External ear normal.      Left Ear: External ear normal.      Nose: Nose normal. No rhinorrhea. Mouth/Throat:      Mouth: Mucous membranes are moist.      Pharynx: Oropharynx is clear. Eyes:      General:         Right eye: No discharge. Left eye: No discharge. Pupils: Pupils are equal, round, and reactive to light. Cardiovascular:      Rate and Rhythm: Normal rate and regular rhythm. Pulses: Normal pulses. Heart sounds: Normal heart sounds. No murmur heard. Pulmonary:      Effort: Pulmonary effort is normal. No respiratory distress. Breath sounds: Normal breath sounds. Abdominal:      General: Bowel sounds are normal. There is no distension. Palpations: Abdomen is soft. There is no mass. Tenderness:  There is no abdominal tenderness. Musculoskeletal:         General: No swelling or tenderness. Normal range of motion. Cervical back: Normal range of motion and neck supple. No muscular tenderness. Skin:     General: Skin is warm and dry. Capillary Refill: Capillary refill takes less than 2 seconds. Findings: No erythema or rash. Neurological:      General: No focal deficit present. Mental Status: She is alert and oriented to person, place, and time. Mental status is at baseline. Psychiatric:         Mood and Affect: Mood normal.         Behavior: Behavior normal.         Thought Content: Thought content normal.         Judgment: Judgment normal.       Discussion with Family: daughter via phone     Discharge instructions/Information to patient and family:   See after visit summary for information provided to patient and family. Provisions for Follow-Up Care:  See after visit summary for information related to follow-up care and any pertinent home health orders. Disposition:     Home    Planned Readmission: no      Discharge Statement:  I spent 37 minutes discharging the patient. This time was spent on the day of discharge. I had direct contact with the patient on the day of discharge. Greater than 50% of the total time was spent examining patient, answering all patient questions, arranging and discussing plan of care with patient as well as directly providing post-discharge instructions. Additional time then spent on discharge activities. Discharge Medications:  See after visit summary for reconciled discharge medications provided to patient and family.       ** Please Note: This note has been constructed using a voice recognition system **

## 2023-08-11 ENCOUNTER — OFFICE VISIT (OUTPATIENT)
Dept: OBGYN CLINIC | Facility: CLINIC | Age: 83
End: 2023-08-11
Payer: MEDICARE

## 2023-08-11 VITALS
HEIGHT: 61 IN | HEART RATE: 82 BPM | DIASTOLIC BLOOD PRESSURE: 76 MMHG | SYSTOLIC BLOOD PRESSURE: 153 MMHG | BODY MASS INDEX: 21.12 KG/M2

## 2023-08-11 DIAGNOSIS — M19.011 PRIMARY OSTEOARTHRITIS OF RIGHT SHOULDER: ICD-10-CM

## 2023-08-11 DIAGNOSIS — M17.0 PRIMARY OSTEOARTHRITIS OF BOTH KNEES: Primary | ICD-10-CM

## 2023-08-11 DIAGNOSIS — M19.012 PRIMARY OSTEOARTHRITIS OF LEFT SHOULDER: ICD-10-CM

## 2023-08-11 PROCEDURE — 99213 OFFICE O/P EST LOW 20 MIN: CPT | Performed by: ORTHOPAEDIC SURGERY

## 2023-08-11 PROCEDURE — 20610 DRAIN/INJ JOINT/BURSA W/O US: CPT | Performed by: ORTHOPAEDIC SURGERY

## 2023-08-11 RX ORDER — TRIAMCINOLONE ACETONIDE 40 MG/ML
80 INJECTION, SUSPENSION INTRA-ARTICULAR; INTRAMUSCULAR
Status: COMPLETED | OUTPATIENT
Start: 2023-08-11 | End: 2023-08-11

## 2023-08-11 RX ORDER — BUPIVACAINE HYDROCHLORIDE 2.5 MG/ML
4 INJECTION, SOLUTION INFILTRATION; PERINEURAL
Status: COMPLETED | OUTPATIENT
Start: 2023-08-11 | End: 2023-08-11

## 2023-08-11 RX ADMIN — TRIAMCINOLONE ACETONIDE 80 MG: 40 INJECTION, SUSPENSION INTRA-ARTICULAR; INTRAMUSCULAR at 10:30

## 2023-08-11 RX ADMIN — BUPIVACAINE HYDROCHLORIDE 4 ML: 2.5 INJECTION, SOLUTION INFILTRATION; PERINEURAL at 10:30

## 2023-08-31 ENCOUNTER — TELEPHONE (OUTPATIENT)
Dept: LAB | Facility: HOSPITAL | Age: 83
End: 2023-08-31

## 2023-09-19 ENCOUNTER — HOSPITAL ENCOUNTER (INPATIENT)
Facility: HOSPITAL | Age: 83
LOS: 5 days | Discharge: NURSING, REHAB OR LONG-TERM CARE | DRG: 637 | End: 2023-09-24
Attending: FAMILY MEDICINE | Admitting: INTERNAL MEDICINE
Payer: MEDICARE

## 2023-09-19 ENCOUNTER — APPOINTMENT (EMERGENCY)
Dept: CT IMAGING | Facility: HOSPITAL | Age: 83
DRG: 637 | End: 2023-09-19
Payer: MEDICARE

## 2023-09-19 ENCOUNTER — APPOINTMENT (EMERGENCY)
Dept: RADIOLOGY | Facility: HOSPITAL | Age: 83
DRG: 637 | End: 2023-09-19
Payer: MEDICARE

## 2023-09-19 DIAGNOSIS — E10.649 TYPE 1 DIABETES MELLITUS WITH HYPOGLYCEMIA AND WITHOUT COMA (HCC): ICD-10-CM

## 2023-09-19 DIAGNOSIS — I95.9 HYPOTENSION: ICD-10-CM

## 2023-09-19 DIAGNOSIS — E16.2 ACUTE METABOLIC ENCEPHALOPATHY DUE TO HYPOGLYCEMIA: Primary | ICD-10-CM

## 2023-09-19 DIAGNOSIS — E16.2 HYPOGLYCEMIA: ICD-10-CM

## 2023-09-19 DIAGNOSIS — G93.41 ACUTE METABOLIC ENCEPHALOPATHY DUE TO HYPOGLYCEMIA: Primary | ICD-10-CM

## 2023-09-19 DIAGNOSIS — E87.6 HYPOKALEMIA: ICD-10-CM

## 2023-09-19 PROBLEM — E03.9 HYPOTHYROIDISM: Status: ACTIVE | Noted: 2023-09-19

## 2023-09-19 PROBLEM — Z85.71 HISTORY OF HODGKIN'S LYMPHOMA: Status: ACTIVE | Noted: 2023-09-19

## 2023-09-19 PROBLEM — T68.XXXA HYPOTHERMIA: Status: ACTIVE | Noted: 2023-09-19

## 2023-09-19 LAB
ALBUMIN SERPL BCP-MCNC: 4 G/DL (ref 3.5–5)
ALP SERPL-CCNC: 57 U/L (ref 34–104)
ALT SERPL W P-5'-P-CCNC: 20 U/L (ref 7–52)
ANION GAP SERPL CALCULATED.3IONS-SCNC: 8 MMOL/L
APTT PPP: 26 SECONDS (ref 23–37)
AST SERPL W P-5'-P-CCNC: 23 U/L (ref 13–39)
ATRIAL RATE: 77 BPM
BASOPHILS # BLD AUTO: 0.04 THOUSANDS/ÂΜL (ref 0–0.1)
BASOPHILS NFR BLD AUTO: 1 % (ref 0–1)
BILIRUB SERPL-MCNC: 0.42 MG/DL (ref 0.2–1)
BILIRUB UR QL STRIP: NEGATIVE
BUN SERPL-MCNC: 17 MG/DL (ref 5–25)
CALCIUM SERPL-MCNC: 8.4 MG/DL (ref 8.4–10.2)
CHLORIDE SERPL-SCNC: 99 MMOL/L (ref 96–108)
CK SERPL-CCNC: 125 U/L (ref 26–192)
CLARITY UR: CLEAR
CO2 SERPL-SCNC: 29 MMOL/L (ref 21–32)
COLOR UR: ABNORMAL
CREAT SERPL-MCNC: 0.59 MG/DL (ref 0.6–1.3)
EOSINOPHIL # BLD AUTO: 0.07 THOUSAND/ÂΜL (ref 0–0.61)
EOSINOPHIL NFR BLD AUTO: 1 % (ref 0–6)
ERYTHROCYTE [DISTWIDTH] IN BLOOD BY AUTOMATED COUNT: 12.3 % (ref 11.6–15.1)
FLUAV RNA RESP QL NAA+PROBE: NEGATIVE
FLUBV RNA RESP QL NAA+PROBE: NEGATIVE
GFR SERPL CREATININE-BSD FRML MDRD: 85 ML/MIN/1.73SQ M
GLUCOSE SERPL-MCNC: 104 MG/DL (ref 65–140)
GLUCOSE SERPL-MCNC: 107 MG/DL (ref 65–140)
GLUCOSE SERPL-MCNC: 121 MG/DL (ref 65–140)
GLUCOSE SERPL-MCNC: 121 MG/DL (ref 65–140)
GLUCOSE SERPL-MCNC: 131 MG/DL (ref 65–140)
GLUCOSE SERPL-MCNC: 137 MG/DL (ref 65–140)
GLUCOSE SERPL-MCNC: 192 MG/DL (ref 65–140)
GLUCOSE UR STRIP-MCNC: ABNORMAL MG/DL
HCT VFR BLD AUTO: 37.4 % (ref 34.8–46.1)
HGB BLD-MCNC: 12 G/DL (ref 11.5–15.4)
HGB UR QL STRIP.AUTO: NEGATIVE
IMM GRANULOCYTES # BLD AUTO: 0.05 THOUSAND/UL (ref 0–0.2)
IMM GRANULOCYTES NFR BLD AUTO: 1 % (ref 0–2)
INR PPP: 0.94 (ref 0.84–1.19)
KETONES UR STRIP-MCNC: NEGATIVE MG/DL
LACTATE SERPL-SCNC: 0.9 MMOL/L (ref 0.5–2)
LACTATE SERPL-SCNC: 2.6 MMOL/L (ref 0.5–2)
LEUKOCYTE ESTERASE UR QL STRIP: NEGATIVE
LYMPHOCYTES # BLD AUTO: 1.22 THOUSANDS/ÂΜL (ref 0.6–4.47)
LYMPHOCYTES NFR BLD AUTO: 17 % (ref 14–44)
MCH RBC QN AUTO: 33.3 PG (ref 26.8–34.3)
MCHC RBC AUTO-ENTMCNC: 32.1 G/DL (ref 31.4–37.4)
MCV RBC AUTO: 104 FL (ref 82–98)
MONOCYTES # BLD AUTO: 0.81 THOUSAND/ÂΜL (ref 0.17–1.22)
MONOCYTES NFR BLD AUTO: 11 % (ref 4–12)
NEUTROPHILS # BLD AUTO: 5.06 THOUSANDS/ÂΜL (ref 1.85–7.62)
NEUTS SEG NFR BLD AUTO: 69 % (ref 43–75)
NITRITE UR QL STRIP: NEGATIVE
NRBC BLD AUTO-RTO: 0 /100 WBCS
P AXIS: 70 DEGREES
PH UR STRIP.AUTO: 6.5 [PH]
PLATELET # BLD AUTO: 228 THOUSANDS/UL (ref 149–390)
PMV BLD AUTO: 10.5 FL (ref 8.9–12.7)
POTASSIUM SERPL-SCNC: 3.4 MMOL/L (ref 3.5–5.3)
PR INTERVAL: 118 MS
PROCALCITONIN SERPL-MCNC: 0.06 NG/ML
PROT SERPL-MCNC: 6.2 G/DL (ref 6.4–8.4)
PROT UR STRIP-MCNC: NEGATIVE MG/DL
PROTHROMBIN TIME: 12.7 SECONDS (ref 11.6–14.5)
QRS AXIS: 49 DEGREES
QRSD INTERVAL: 76 MS
QT INTERVAL: 430 MS
QTC INTERVAL: 486 MS
RBC # BLD AUTO: 3.6 MILLION/UL (ref 3.81–5.12)
RSV RNA RESP QL NAA+PROBE: NEGATIVE
SARS-COV-2 RNA RESP QL NAA+PROBE: NEGATIVE
SODIUM SERPL-SCNC: 136 MMOL/L (ref 135–147)
SP GR UR STRIP.AUTO: 1.01
T WAVE AXIS: 41 DEGREES
TSH SERPL DL<=0.05 MIU/L-ACNC: 1.03 UIU/ML (ref 0.45–4.5)
UROBILINOGEN UR QL STRIP.AUTO: 0.2 E.U./DL
VENTRICULAR RATE: 77 BPM
WBC # BLD AUTO: 7.25 THOUSAND/UL (ref 4.31–10.16)

## 2023-09-19 PROCEDURE — 96365 THER/PROPH/DIAG IV INF INIT: CPT

## 2023-09-19 PROCEDURE — 71260 CT THORAX DX C+: CPT

## 2023-09-19 PROCEDURE — 82948 REAGENT STRIP/BLOOD GLUCOSE: CPT

## 2023-09-19 PROCEDURE — 0241U HB NFCT DS VIR RESP RNA 4 TRGT: CPT | Performed by: FAMILY MEDICINE

## 2023-09-19 PROCEDURE — 74177 CT ABD & PELVIS W/CONTRAST: CPT

## 2023-09-19 PROCEDURE — 85025 COMPLETE CBC W/AUTO DIFF WBC: CPT | Performed by: FAMILY MEDICINE

## 2023-09-19 PROCEDURE — 93010 ELECTROCARDIOGRAM REPORT: CPT | Performed by: INTERNAL MEDICINE

## 2023-09-19 PROCEDURE — G1004 CDSM NDSC: HCPCS

## 2023-09-19 PROCEDURE — 84443 ASSAY THYROID STIM HORMONE: CPT | Performed by: FAMILY MEDICINE

## 2023-09-19 PROCEDURE — 71045 X-RAY EXAM CHEST 1 VIEW: CPT

## 2023-09-19 PROCEDURE — 81003 URINALYSIS AUTO W/O SCOPE: CPT | Performed by: FAMILY MEDICINE

## 2023-09-19 PROCEDURE — 99223 1ST HOSP IP/OBS HIGH 75: CPT | Performed by: INTERNAL MEDICINE

## 2023-09-19 PROCEDURE — 93005 ELECTROCARDIOGRAM TRACING: CPT

## 2023-09-19 PROCEDURE — 87040 BLOOD CULTURE FOR BACTERIA: CPT | Performed by: FAMILY MEDICINE

## 2023-09-19 PROCEDURE — 84145 PROCALCITONIN (PCT): CPT | Performed by: FAMILY MEDICINE

## 2023-09-19 PROCEDURE — 85610 PROTHROMBIN TIME: CPT | Performed by: FAMILY MEDICINE

## 2023-09-19 PROCEDURE — 82550 ASSAY OF CK (CPK): CPT | Performed by: INTERNAL MEDICINE

## 2023-09-19 PROCEDURE — 99291 CRITICAL CARE FIRST HOUR: CPT | Performed by: FAMILY MEDICINE

## 2023-09-19 PROCEDURE — 70450 CT HEAD/BRAIN W/O DYE: CPT

## 2023-09-19 PROCEDURE — 80053 COMPREHEN METABOLIC PANEL: CPT | Performed by: FAMILY MEDICINE

## 2023-09-19 PROCEDURE — 99292 CRITICAL CARE ADDL 30 MIN: CPT | Performed by: FAMILY MEDICINE

## 2023-09-19 PROCEDURE — 83605 ASSAY OF LACTIC ACID: CPT | Performed by: FAMILY MEDICINE

## 2023-09-19 PROCEDURE — 36415 COLL VENOUS BLD VENIPUNCTURE: CPT | Performed by: FAMILY MEDICINE

## 2023-09-19 PROCEDURE — 99291 CRITICAL CARE FIRST HOUR: CPT

## 2023-09-19 PROCEDURE — 85730 THROMBOPLASTIN TIME PARTIAL: CPT | Performed by: FAMILY MEDICINE

## 2023-09-19 RX ORDER — MECLIZINE HCL 12.5 MG/1
25 TABLET ORAL EVERY 8 HOURS PRN
Status: DISCONTINUED | OUTPATIENT
Start: 2023-09-19 | End: 2023-09-24 | Stop reason: HOSPADM

## 2023-09-19 RX ORDER — ASCORBIC ACID 500 MG
500 TABLET ORAL 2 TIMES DAILY
Status: DISCONTINUED | OUTPATIENT
Start: 2023-09-19 | End: 2023-09-24 | Stop reason: HOSPADM

## 2023-09-19 RX ORDER — HEPARIN SODIUM 5000 [USP'U]/ML
5000 INJECTION, SOLUTION INTRAVENOUS; SUBCUTANEOUS EVERY 8 HOURS SCHEDULED
Status: DISCONTINUED | OUTPATIENT
Start: 2023-09-19 | End: 2023-09-24 | Stop reason: HOSPADM

## 2023-09-19 RX ORDER — LEVOTHYROXINE SODIUM 0.07 MG/1
75 TABLET ORAL
Status: DISCONTINUED | OUTPATIENT
Start: 2023-09-20 | End: 2023-09-24 | Stop reason: HOSPADM

## 2023-09-19 RX ORDER — ATORVASTATIN CALCIUM 20 MG/1
20 TABLET, FILM COATED ORAL
Status: DISCONTINUED | OUTPATIENT
Start: 2023-09-19 | End: 2023-09-24 | Stop reason: HOSPADM

## 2023-09-19 RX ORDER — HYDRALAZINE HYDROCHLORIDE 20 MG/ML
5 INJECTION INTRAMUSCULAR; INTRAVENOUS EVERY 6 HOURS PRN
Status: DISCONTINUED | OUTPATIENT
Start: 2023-09-19 | End: 2023-09-24 | Stop reason: HOSPADM

## 2023-09-19 RX ORDER — ONDANSETRON 2 MG/ML
4 INJECTION INTRAMUSCULAR; INTRAVENOUS EVERY 4 HOURS PRN
Status: DISCONTINUED | OUTPATIENT
Start: 2023-09-19 | End: 2023-09-24 | Stop reason: HOSPADM

## 2023-09-19 RX ORDER — CALCIUM CARBONATE 500(1250)
1 TABLET ORAL 2 TIMES DAILY WITH MEALS
Status: DISCONTINUED | OUTPATIENT
Start: 2023-09-19 | End: 2023-09-24 | Stop reason: HOSPADM

## 2023-09-19 RX ORDER — PANTOPRAZOLE SODIUM 40 MG/1
40 TABLET, DELAYED RELEASE ORAL
Status: DISCONTINUED | OUTPATIENT
Start: 2023-09-19 | End: 2023-09-24 | Stop reason: HOSPADM

## 2023-09-19 RX ORDER — GABAPENTIN 300 MG/1
300 CAPSULE ORAL 3 TIMES DAILY
Status: DISCONTINUED | OUTPATIENT
Start: 2023-09-19 | End: 2023-09-24 | Stop reason: HOSPADM

## 2023-09-19 RX ORDER — SODIUM CHLORIDE AND POTASSIUM CHLORIDE 300; 900 MG/100ML; MG/100ML
75 INJECTION, SOLUTION INTRAVENOUS CONTINUOUS
Status: DISCONTINUED | OUTPATIENT
Start: 2023-09-19 | End: 2023-09-20

## 2023-09-19 RX ORDER — CEFEPIME HYDROCHLORIDE 2 G/50ML
2000 INJECTION, SOLUTION INTRAVENOUS ONCE
Status: COMPLETED | OUTPATIENT
Start: 2023-09-19 | End: 2023-09-19

## 2023-09-19 RX ORDER — ACETAMINOPHEN 325 MG/1
650 TABLET ORAL EVERY 6 HOURS PRN
Status: DISCONTINUED | OUTPATIENT
Start: 2023-09-19 | End: 2023-09-24 | Stop reason: HOSPADM

## 2023-09-19 RX ORDER — INSULIN LISPRO 100 [IU]/ML
1-5 INJECTION, SOLUTION INTRAVENOUS; SUBCUTANEOUS
Status: DISCONTINUED | OUTPATIENT
Start: 2023-09-19 | End: 2023-09-20

## 2023-09-19 RX ADMIN — HEPARIN SODIUM 5000 UNITS: 5000 INJECTION INTRAVENOUS; SUBCUTANEOUS at 16:19

## 2023-09-19 RX ADMIN — POTASSIUM CHLORIDE AND SODIUM CHLORIDE 75 ML/HR: 900; 300 INJECTION, SOLUTION INTRAVENOUS at 16:52

## 2023-09-19 RX ADMIN — OXYCODONE HYDROCHLORIDE AND ACETAMINOPHEN 500 MG: 500 TABLET ORAL at 17:44

## 2023-09-19 RX ADMIN — B-COMPLEX W/ C & FOLIC ACID TAB 1 TABLET: TAB at 16:18

## 2023-09-19 RX ADMIN — HEPARIN SODIUM 5000 UNITS: 5000 INJECTION INTRAVENOUS; SUBCUTANEOUS at 21:13

## 2023-09-19 RX ADMIN — CALCIUM 1 TABLET: 500 TABLET ORAL at 16:18

## 2023-09-19 RX ADMIN — GABAPENTIN 300 MG: 300 CAPSULE ORAL at 16:18

## 2023-09-19 RX ADMIN — ATORVASTATIN CALCIUM 20 MG: 20 TABLET, FILM COATED ORAL at 17:44

## 2023-09-19 RX ADMIN — PANTOPRAZOLE SODIUM 40 MG: 40 TABLET, DELAYED RELEASE ORAL at 16:19

## 2023-09-19 RX ADMIN — SODIUM CHLORIDE 1000 ML: 0.9 INJECTION, SOLUTION INTRAVENOUS at 11:28

## 2023-09-19 RX ADMIN — CEFEPIME HYDROCHLORIDE 2000 MG: 2 INJECTION, SOLUTION INTRAVENOUS at 11:27

## 2023-09-19 RX ADMIN — IOHEXOL 85 ML: 350 INJECTION, SOLUTION INTRAVENOUS at 12:18

## 2023-09-19 NOTE — LETTER
September 23, 2023       No Recipients    Patient: Camacho Lambert   YOB: 1940   Date of Visit: 9/19/2023       Dear Dr. HOFF [facility]: Thank you for referring Brandt Sams to me for evaluation. Below are my notes for this consultation. If you have questions, please do not hesitate to call me. I look forward to following your patient along with you. Sincerely,        No name on file        CC:   No Recipients    Rafita Cooney MD  9/23/2023  1:06 PM  [place]  Progress Note  Name: Shan Patrick  MRN: [id number]  Unit/Bed#: -01 I Date of Admission: 9/19/2023   Date of Service: 9/23/2023 I Hospital Day: 4      Assessment & Plan:    * Acute metabolic encephalopathy  Assessment & Plan  Suspect secondary to transient hypoglycemia and hypothermia (see below)  CT head negative for acute intracranial etiology  Monitor/replete electrolytes  CK normal  Urinalysis unremarkable for infectious process  CT of chest/abdomen/pelvis negative  Continue neurochecks -> mentation waxing/waning but relatively improving towards baseline  PT/OT recommending short-term rehab - tentative plan for discharge to skilled rehab tomorrow    Hypoglycemia  Assessment & Plan  In the setting of in the setting of known insulin-dependent diabetes mellitus  Initially held basal insulin with solo PRN SSI coverage -> now resumed basal insulin today with fixed/prandial dosing as mentation improved with increased oral intake and intermittent episodes of paradoxical hyperglycemia  Blood sugars waxing/waning -> titrate insulin accordingly per endocrinology    Hypothermia  Assessment & Plan  Transient course resolved status post Damaris hugger therapy in the ED  Possibly associated with hypoglycemia? ?    WBC count normal - no tachycardia/tachypnea in the ED thus far - hypotension resolved, hence, will discontinue IV fluids  Lactic acidosis normalized status post IV fluids   Procalcitonin normal -> less likely suspicion of acute infection at this time (observe off further antibiotics)  CT of chest/abdomen/pelvis unremarkable  Body temperatures now normothermic    Hypokalemia  Assessment & Plan  Normalized status post repletion    Hypomagnesemia  Assessment & Plan  Monitor/replete serum magnesium    History of Hodgkin's lymphoma  Assessment & Plan  Status post mastoidectomy/left neck dissection and subsequent radiation    Essential hypertension  Assessment & Plan  Continue Zestril - additional PRN IV Hydralazine on board  Low-sodium restriction    Hypothyroidism  Assessment & Plan  Continue Synthroid - acquired state status post prior thyroidectomy    Hyperlipidemia  Assessment & Plan  Continue statin      DVT Prophylaxis:  Heparin SC       Patient Centered Rounds:  I have performed bedside rounds and discussed plan of care with nursing today. Discussions with Specialists or Other Care Team Provider:  see above assessments if applicable    Education and Discussions with Family / Patient:  Patient at bedside - discussed with daughter, Yarelis Whitehead, over the phone today    Time Spent for Care:  35 minutes. More than 50% of total time spent on counseling and coordination of care, on one or more of the following: performing physical exam; counseling and coordination of care, obtaining or reviewing history, documenting in the medical record, reviewing/ordering tests/medications/procedures, and communicating with other healthcare professionals. Current Length of Stay: 4 day(s)  Current Patient Status: Inpatient   Certification Statement:  Patient will continue to require additional hospital stay due to assessments as noted above. Code Status: Level 3 - DNAR and DNI        Subjective:     Encountered earlier in the morning. Remains weak/fatigued however, in relatively pleasant spirits.         Objective:     Vitals:   Temp (24hrs), Av °F (36.7 °C), Min:97.9 °F (36.6 °C), Max:98.1 °F (36.7 °C)    Temp: [97.9 °F (36.6 °C)-98.1 °F (36.7 °C)] 97.9 °F (36.6 °C)  HR:  [] 82  Resp:  [16-19] 19  BP: ()/(50-66) 119/62  SpO2:  [94 %-97 %] 96 %  Body mass index is 20.97 kg/m². Input and Output Summary (last 24 hours): Intake/Output Summary (Last 24 hours) at 9/23/2023 1304  Last data filed at 9/23/2023 0732  Gross per 24 hour   Intake 285.83 ml   Output 300 ml   Net -14.17 ml       Physical Exam:     GENERAL  remains weak/fatigued   HEAD   Normocephalic - atraumatic   EYES   PERRL - EOMI    MOUTH   Mucosa moist   NECK   Supple - full range of motion   CARDIAC  rate controlled currently - S1/S2 positive   PULMONARY  fairly clear to auscultation - nonlabored respirations   ABDOMEN   Soft - nontender/nondistended - active bowel sounds   MUSCULOSKELETAL   Motor strength/range of motion deconditioned   NEUROLOGIC  awake/alert with baseline cognitive impairment   SKIN   Chronic wrinkles/blemishes    PSYCHIATRIC   Mood/affect stable         Additional Data:     Labs & Recent Cultures:    Results from last 7 days   Lab Units 09/23/23  0601   WBC Thousand/uL 6.96   HEMOGLOBIN g/dL 12.8   HEMATOCRIT % 40.1   PLATELETS Thousands/uL 247   NEUTROS PCT % 67   LYMPHS PCT % 21   MONOS PCT % 10   EOS PCT % 1     Results from last 7 days   Lab Units 09/23/23  0601 09/20/23  0517 09/19/23  1050   POTASSIUM mmol/L 4.1   < > 3.4*   CHLORIDE mmol/L 101   < > 99   CO2 mmol/L 31   < > 29   BUN mg/dL 13   < > 17   CREATININE mg/dL 0.74   < > 0.59*   CALCIUM mg/dL 8.8   < > 8.4   ALK PHOS U/L  --   --  57   ALT U/L  --   --  20   AST U/L  --   --  23    < > = values in this interval not displayed.      Results from last 7 days   Lab Units 09/19/23  1050   INR  0.94     Results from last 7 days   Lab Units 09/23/23  1145 09/23/23  0917 09/23/23  0728 09/22/23  2109 09/22/23  1817 09/22/23  1648 09/22/23  1626 09/22/23  1236 09/22/23  1122 09/22/23  0827 09/21/23  2137 09/21/23  1929   POC GLUCOSE mg/dl 213* 278* 293* 304* 110 254* 61* 87 113 382* 316* 294*         Results from last 7 days   Lab Units 09/20/23  0517 09/19/23  1406 09/19/23  1050   LACTIC ACID mmol/L  --  0.9 2.6*   PROCALCITONIN ng/ml 0.07  --  0.06         Results from last 7 days   Lab Units 09/19/23  1050   BLOOD CULTURE  No Growth at 72 hrs. No Growth at 72 hrs.          Lines/Drains:  Invasive Devices       Peripheral Intravenous Line  Duration             Peripheral IV 09/21/23 Distal;Left;Ventral (anterior) Forearm 2 days                      Last 24 Hours Medication List:   Current Facility-Administered Medications   Medication Dose Route Frequency Provider Last Rate   • acetaminophen  650 mg Oral Q6H PRN Ayo Bond MD     • vitamin C  500 mg Oral BID Ayo Bond MD     • atorvastatin  20 mg Oral Daily With Jessica Le MD     • calcium carbonate  1 tablet Oral BID With Meals Ayo Bond MD     • cyanocobalamin  1,000 mcg Oral Daily Ayo Bond MD     • gabapentin  300 mg Oral TID Ayo Bond MD     • heparin (porcine)  5,000 Units Subcutaneous Q8H [facility] Ayo Bond MD     • hydrALAZINE  5 mg Intravenous Q6H PRN Ayo Bond MD     • insulin glargine  5 Units Subcutaneous HS Ayo Bond MD     • insulin glargine  7 Units Subcutaneous QAM Zack Mckeon MD     • insulin lispro  1-5 Units Subcutaneous TID [facility] Zack Mckeon MD     • insulin lispro  2 Units Subcutaneous Daily Before Lunch Zack Mckeon MD     • insulin lispro  4 Units Subcutaneous Before Hipolito Segovia MD     • insulin lispro  4 Units Subcutaneous Daily Before Breakfast Zack Mckeon MD     • levothyroxine  75 mcg Oral Early Morning Ayo Bond MD     • lisinopril  5 mg Oral Daily Ayo Bond MD     • meclizine  25 mg Oral Q8H PRN Ayo Bond MD     • multivitamin stress formula  1 tablet Oral Daily Ayo Bond MD     • mupirocin   Topical Daily Ayo Bond MD     • ondansetron  4 mg Intravenous Q4H PRN Ayo Bond MD     • pantoprazole  40 mg Oral BID AC Ayo Bond MD ** Please Note: This note is constructed using a voice recognition dictation system. An occasional wrong word/phrase or “sound-a-like” substitution may have been picked up by dictation device due to the inherent limitations of voice recognition software. Read the chart carefully and recognize, using reasonable context, where substitutions may have occurred. Mary Grace Hawkins MD  9/22/2023  3:20 PM  Signed  [address]  Progress Note  Name: Ale Rodriguez I  MRN: [id number]  Unit/Bed#: -01 I Date of Admission: 9/19/2023   Date of Service: 9/22/2023 I Hospital Day: 3      Assessment & Plan:    * Acute metabolic encephalopathy  Assessment & Plan  Suspect secondary to transient hypoglycemia and hypothermia (see below)  CT head negative for acute intracranial etiology  Monitor/replete electrolytes  CK normal  Urinalysis unremarkable for infectious process  CT of chest/abdomen/pelvis negative  Continue neurochecks -> mentation improving towards baseline  PT/OT recommending short-term rehab - awaiting placement    Hypoglycemia  Assessment & Plan  In the setting of in the setting of known insulin-dependent diabetes mellitus  Initially held basal insulin with solo PRN SSI coverage -> now resumed basal insulin today with fixed/prandial dosing as mentation improved with increased oral intake and intermittent episodes of paradoxical hyperglycemia  Blood sugars have stabilized an now are intermittently elevated -> titrate insulin accordingly  Endocrinology following    Hypothermia  Assessment & Plan  Transient course resolved status post Damaris hugger therapy in the ED  Possibly associated with hypoglycemia? ?    WBC count normal - no tachycardia/tachypnea in the ED thus far - hypotension resolved, hence, will discontinue IV fluids  Lactic acidosis normalized status post IV fluids   Procalcitonin normal -> less likely suspicion of acute infection at this time (observe off further antibiotics)  CT of chest/abdomen/pelvis unremarkable    Hypokalemia  Assessment & Plan  Normalized status post repletion    Hypomagnesemia  Assessment & Plan  Monitor/replete serum magnesium    History of Hodgkin's lymphoma  Assessment & Plan  Status post mastoidectomy/left neck dissection and subsequent radiation    Essential hypertension  Assessment & Plan  Continue Zestril - additional PRN IV Hydralazine on board  Low-sodium restriction    Hypothyroidism  Assessment & Plan  Continue Synthroid - acquired state status post prior thyroidectomy    Hyperlipidemia  Assessment & Plan  Continue statin      DVT Prophylaxis:  Heparin SC       Patient Centered Rounds:  I have performed bedside rounds and discussed plan of care with nursing today. Discussions with Specialists or Other Care Team Provider:  see above assessments if applicable    Education and Discussions with Family / Patient: Patient at bedside - daughter, Piero Mallory, has given recommendations for rehab facilities    Time Spent for Care:  35 minutes. More than 50% of total time spent on counseling and coordination of care, on one or more of the following: performing physical exam; counseling and coordination of care, obtaining or reviewing history, documenting in the medical record, reviewing/ordering tests/medications/procedures, and communicating with other healthcare professionals. Current Length of Stay: 3 day(s)  Current Patient Status: Inpatient   Certification Statement:  Patient will continue to require additional hospital stay due to assessments as noted above. Code Status: Level 3 - DNAR and DNI        Subjective:     Seen and examined earlier today. Awake and alert at baseline, and denies new complaints. Remains weak/fatigued.         Objective:     Vitals:   Temp (24hrs), Av °F (36.7 °C), Min:97.9 °F (36.6 °C), Max:98.1 °F (36.7 °C)    Temp:  [97.9 °F (36.6 °C)-98.1 °F (36.7 °C)] 97.9 °F (36.6 °C)  HR:  [] 110  Resp: [18-20] 20  BP: ()/(54-75) 118/75  SpO2:  [94 %-95 %] 95 %  Body mass index is 20.97 kg/m². Input and Output Summary (last 24 hours): Intake/Output Summary (Last 24 hours) at 9/22/2023 1520  Last data filed at 9/22/2023 1315  Gross per 24 hour   Intake 165.83 ml   Output 2050 ml   Net -1884.17 ml       Physical Exam:     GENERAL  remains weak/fatigued   HEAD   Normocephalic - atraumatic   EYES   PERRL - EOMI    MOUTH   Mucosa moist   NECK   Supple - full range of motion   CARDIAC  rate controlled - S1/S2 positive   PULMONARY    Clear breath sounds bilaterally - nonlabored respirations   ABDOMEN   Soft - nontender/nondistended - active bowel sounds   MUSCULOSKELETAL   Motor strength/range of motion deconditioned   NEUROLOGIC  awake/alert with baseline cognitive impairment   SKIN   Chronic wrinkles/blemishes    PSYCHIATRIC   Mood/affect pleasant currently         Additional Data:     Labs & Recent Cultures:    Results from last 7 days   Lab Units 09/22/23  0544   WBC Thousand/uL 7.76   HEMOGLOBIN g/dL 12.0   HEMATOCRIT % 37.0   PLATELETS Thousands/uL 228   NEUTROS PCT % 77*   LYMPHS PCT % 13*   MONOS PCT % 9   EOS PCT % 0     Results from last 7 days   Lab Units 09/22/23  0544 09/20/23  0517 09/19/23  1050   POTASSIUM mmol/L 4.1   < > 3.4*   CHLORIDE mmol/L 101   < > 99   CO2 mmol/L 27   < > 29   BUN mg/dL 12   < > 17   CREATININE mg/dL 0.70   < > 0.59*   CALCIUM mg/dL 8.5   < > 8.4   ALK PHOS U/L  --   --  57   ALT U/L  --   --  20   AST U/L  --   --  23    < > = values in this interval not displayed.      Results from last 7 days   Lab Units 09/19/23  1050   INR  0.94     Results from last 7 days   Lab Units 09/22/23  1236 09/22/23  1122 09/22/23  0827 09/21/23  2137 09/21/23  1929 09/21/23  1639 09/21/23  1059 09/21/23  0705 09/20/23  2121 09/20/23  1617 09/20/23  1110 09/20/23  1108   POC GLUCOSE mg/dl 87 113 382* 316* 294* 64* 252* 214* 233* 100 350* 354*         Results from last 7 days   Lab Units 09/20/23  0517 09/19/23  1406 09/19/23  1050   LACTIC ACID mmol/L  --  0.9 2.6*   PROCALCITONIN ng/ml 0.07  --  0.06         Results from last 7 days   Lab Units 09/19/23  1050   BLOOD CULTURE  No Growth at 48 hrs. No Growth at 48 hrs. Lines/Drains:  Invasive Devices       Peripheral Intravenous Line  Duration             Peripheral IV 09/21/23 Distal;Left;Ventral (anterior) Forearm 1 day                      Last 24 Hours Medication List:   Current Facility-Administered Medications   Medication Dose Route Frequency Provider Last Rate   • acetaminophen  650 mg Oral Q6H PRN Mary Grace Hawkins MD     • vitamin C  500 mg Oral BID Mary Grace Hawkins MD     • atorvastatin  20 mg Oral Daily With Jessica Rangel MD     • calcium carbonate  1 tablet Oral BID With Meals Mary Grace Hawkins MD     • cyanocobalamin  1,000 mcg Oral Daily Mary Grace Hawkins MD     • gabapentin  300 mg Oral TID Mary Grace Hawkins MD     • heparin (porcine)  5,000 Units Subcutaneous Q8H [address] Mary Grace Hawkins MD     • hydrALAZINE  5 mg Intravenous Q6H PRN Mary Grace Hawkins MD     • [START ON 9/23/2023] insulin glargine  10 Units Subcutaneous QAM Mary Grace Hawkins MD     • insulin glargine  5 Units Subcutaneous HS Mary Grace Hawkins MD     • insulin lispro  1-5 Units Subcutaneous TID [facility] Bobbi Bui MD     • insulin lispro  4 Units Subcutaneous TID With Meals Mary Grace Hawkins MD     • levothyroxine  75 mcg Oral Early Morning Mary Grace Hawkins MD     • lisinopril  5 mg Oral Daily Mary Grace Hawkins MD     • meclizine  25 mg Oral Q8H PRN Mary Grace Hawkins MD     • multivitamin stress formula  1 tablet Oral Daily Mary Grace Hawkins MD     • mupirocin   Topical Daily Mary Grace Hawkins MD     • ondansetron  4 mg Intravenous Q4H PRN Mary Grace Hawkins MD     • pantoprazole  40 mg Oral BID AC Mary Grace Hawkins MD                    ** Please Note: This note is constructed using a voice recognition dictation system.   An occasional wrong word/phrase or “sound-a-like” substitution may have been picked up by dictation device due to the inherent limitations of voice recognition software. Read the chart carefully and recognize, using reasonable context, where substitutions may have occurred. **    Amedeo Sandhoff Prator, PA-C  9/21/2023  4:12 PM  Attested  [address]  Progress Note  Name: Doretha Quevedo I  MRN: [id number]  Unit/Bed#: -01 I Date of Admission: 9/19/2023   Date of Service: 9/21/2023 I Hospital Day: 2    Assessment/Plan   * Acute metabolic encephalopathy  Assessment & Plan  Suspect secondary to transient hypoglycemia and hypothermia (see below)  CT head negative for acute intracranial etiology  Monitor/replete electrolytes  CK normal  Urinalysis unremarkable for infectious process  CT of chest/abdomen/pelvis negative  Continue neurochecks -> mentation improved today  PT/OT recommending short-term rehab    Hypothyroidism  Assessment & Plan  Continue Synthroid - acquired state status post prior thyroidectomy  In the setting of altered mentation, pending free T4 (updated TSH low)    Hypoglycemia  Assessment & Plan  In the setting of in the setting of known insulin-dependent diabetes mellitus  Initially held basal insulin with solo PRN SSI coverage -> will resume basal insulin today with fixed/prandial dosing as mentation improved with increased oral intake and intermittent episodes of paradoxical hyperglycemia  Blood sugars now stable in the 100-130s s/p dextrose containing fluids en route to hospital  Endocrinology to follow    Essential hypertension  Assessment & Plan  Continue Zestril - additional PRN IV Hydralazine on board  Low-sodium restriction    Hyperlipidemia  Assessment & Plan  Continue statin          VTE Pharmacologic Prophylaxis:   Moderate Risk (Score 3-4) - Pharmacological DVT Prophylaxis Ordered: heparin. Patient Centered Rounds: I performed bedside rounds with nursing staff today.   Discussions with Specialists or Other Care Team Provider: Cm    Education and Discussions with Family / Patient: Attempted to update  (daughter) via phone. Left voicemail. Total Time Spent on Date of Encounter in care of patient: 30 mins. This time was spent on one or more of the following: performing physical exam; counseling and coordination of care; obtaining or reviewing history; documenting in the medical record; reviewing/ordering tests, medications or procedures; communicating with other healthcare professionals and discussing with patient's family/caregivers. Current Length of Stay: 2 day(s)  Current Patient Status: Inpatient   Certification Statement: The patient will continue to require additional inpatient hospital stay due to Awaiting placement  Discharge Plan: Anticipate discharge in 24-48 hrs to rehab facility. Code Status: Level 3 - DNAR and DNI    Subjective:   Patient reports feeling significantly improved. She continues to have generalized weakness. She denies chest pain/pressure, palpitations, numbness, nausea, shortness of breath, or chills. Objective:     Vitals:   Temp (24hrs), Av.3 °F (36.8 °C), Min:98.1 °F (36.7 °C), Max:98.5 °F (36.9 °C)    Temp:  [98.1 °F (36.7 °C)-98.5 °F (36.9 °C)] 98.1 °F (36.7 °C)  HR:  [82-91] 82  Resp:  [18-20] 18  BP: ()/(52-90) 154/75  SpO2:  [94 %-99 %] 97 %  Body mass index is 20.97 kg/m². Input and Output Summary (last 24 hours): Intake/Output Summary (Last 24 hours) at 2023 1609  Last data filed at 2023 1507  Gross per 24 hour   Intake --   Output 726 ml   Net -726 ml       Physical Exam:   Physical Exam  Vitals and nursing note reviewed. Constitutional:       Appearance: She is normal weight. HENT:      Head: Normocephalic. Nose: Nose normal.      Mouth/Throat:      Mouth: Mucous membranes are moist.      Pharynx: Oropharynx is clear. Eyes:      General: No scleral icterus.      Conjunctiva/sclera: Conjunctivae normal.      Pupils: Pupils are equal, round, and reactive to light. Cardiovascular:      Rate and Rhythm: Normal rate and regular rhythm. Heart sounds: Murmur heard. No friction rub. No gallop. Pulmonary:      Effort: Pulmonary effort is normal. No respiratory distress. Breath sounds: Normal breath sounds. No stridor. No wheezing, rhonchi or rales. Abdominal:      General: Abdomen is flat. Palpations: Abdomen is soft. Musculoskeletal:         General: Normal range of motion. Cervical back: Normal range of motion and neck supple. Right lower leg: No edema. Left lower leg: No edema. Lymphadenopathy:      Cervical: No cervical adenopathy. Skin:     General: Skin is warm. Coloration: Skin is not jaundiced or pale. Findings: No bruising, erythema or lesion. Neurological:      General: No focal deficit present. Mental Status: She is alert and oriented to person, place, and time. Mental status is at baseline. Cranial Nerves: No cranial nerve deficit. Motor: No weakness. Psychiatric:         Mood and Affect: Mood normal.         Behavior: Behavior normal.         Thought Content: Thought content normal.          Additional Data:     Labs:  Results from last 7 days   Lab Units 09/21/23  0501   WBC Thousand/uL 7.44   HEMOGLOBIN g/dL 11.8   HEMATOCRIT % 37.3   PLATELETS Thousands/uL 236   NEUTROS PCT % 70   LYMPHS PCT % 20   MONOS PCT % 8   EOS PCT % 1     Results from last 7 days   Lab Units 09/21/23  0501 09/20/23  0517 09/19/23  1050   SODIUM mmol/L 138   < > 136   POTASSIUM mmol/L 4.0   < > 3.4*   CHLORIDE mmol/L 104   < > 99   CO2 mmol/L 26   < > 29   BUN mg/dL 11   < > 17   CREATININE mg/dL 0.68   < > 0.59*   ANION GAP mmol/L 8   < > 8   CALCIUM mg/dL 8.3*   < > 8.4   ALBUMIN g/dL  --   --  4.0   TOTAL BILIRUBIN mg/dL  --   --  0.42   ALK PHOS U/L  --   --  57   ALT U/L  --   --  20   AST U/L  --   --  23   GLUCOSE RANDOM mg/dL 220*   < > 192*    < > = values in this interval not displayed.      Results from last 7 days   Lab Units 09/19/23  1050   INR  0.94     Results from last 7 days   Lab Units 09/21/23  1059 09/21/23  0705 09/20/23  2121 09/20/23  1617 09/20/23  1110 09/20/23  1108 09/20/23  0747 09/20/23  0320 09/20/23  0032 09/19/23  2052 09/19/23  1858 09/19/23  1558   POC GLUCOSE mg/dl 252* 214* 233* 100 350* 354* 70 80 87 121 137 107         Results from last 7 days   Lab Units 09/20/23  0517 09/19/23  1406 09/19/23  1050   LACTIC ACID mmol/L  --  0.9 2.6*   PROCALCITONIN ng/ml 0.07  --  0.06       Lines/Drains:  Invasive Devices       Peripheral Intravenous Line  Duration             Peripheral IV 09/21/23 Distal;Left;Ventral (anterior) Forearm <1 day                          Imaging: No pertinent imaging reviewed. Recent Cultures (last 7 days):   Results from last 7 days   Lab Units 09/19/23  1050   BLOOD CULTURE  No Growth at 24 hrs. No Growth at 24 hrs.        Last 24 Hours Medication List:   Current Facility-Administered Medications   Medication Dose Route Frequency Provider Last Rate   • acetaminophen  650 mg Oral Q6H PRN Ayo Bond MD     • vitamin C  500 mg Oral BID Ayo Bond MD     • atorvastatin  20 mg Oral Daily With Jessica Le MD     • calcium carbonate  1 tablet Oral BID With Meals Ayo Bond MD     • cyanocobalamin  1,000 mcg Oral Daily Ayo Bond MD     • gabapentin  300 mg Oral TID Ayo Bond MD     • heparin (porcine)  5,000 Units Subcutaneous Q8H [address] Ayo Bond MD     • hydrALAZINE  5 mg Intravenous Q6H PRN Ayo Bond MD     • insulin glargine  4 Units Subcutaneous Q12H [address] Zack Mckeon MD     • insulin lispro  1-5 Units Subcutaneous TID [facility] Zack Mckeon MD     • insulin lispro  2 Units Subcutaneous TID With Meals Zack Mckeon MD     • levothyroxine  75 mcg Oral Early Morning Ayo Bond MD     • lisinopril  5 mg Oral Daily Ayo Bond MD     • meclizine  25 mg Oral Q8H PRN Ayo Bond MD     • multivitamin stress formula  1 tablet Oral Daily [facility] [place] Kayce Fajardo MD     • mupirocin   Topical Daily Ana Cain MD     • ondansetron  4 mg Intravenous Q4H PRN Ana Cain MD     • pantoprazole  40 mg Oral BID AC Ana Cain MD          Today, Patient Was Seen By: Destiny Hamilton PA-C    **Please Note: This note may have been constructed using a voice recognition system. **      Attestation signed by Onita Goldberg, MD at 9/22/2023  9:09 AM:     I was the supervising/collaborating physician. I acknowledge the AP's documentation and services provided. I was available by phone, if needed, for consultation. Onita Goldberg, MD 09/22/23    Ana Cain MD  9/20/2023  1:41 PM  Signed  [address]  Progress Note  Name: Dorsey Spatz  MRN: [id number]  Unit/Bed#: ICU 02-01 I Date of Admission: 9/19/2023   Date of Service: 9/20/2023 I Hospital Day: 1      Assessment & Plan:    * Acute metabolic encephalopathy  Assessment & Plan  Suspect secondary to transient hypoglycemia and hypothermia (see below)  CT head negative for acute intracranial etiology  Monitor/replete electrolytes  CK normal  Urinalysis unremarkable for infectious process  CT of chest/abdomen/pelvis negative  Continue neurochecks -> mentation improved today  Await PT/OT input    Hypoglycemia  Assessment & Plan  In the setting of in the setting of known insulin-dependent diabetes mellitus  Initially held basal insulin with solo PRN SSI coverage -> will resume basal insulin today with fixed/prandial dosing as mentation improved with increased oral intake and intermittent episodes of paradoxical hyperglycemia  Blood sugars now stable in the 100-130s s/p dextrose containing fluids en route to hospital  Endocrinology to follow    Hypothermia  Assessment & Plan  Transient course resolved status post Damaris hugger therapy in the ED  Possibly associated with hypoglycemia? ?    WBC count normal - no tachycardia/tachypnea in the ED thus far - hypotension resolved, hence, will discontinue IV fluids  Lactic acidosis normalized status post IV fluids   Procalcitonin normal -> less likely suspicion of acute infection at this time (observe off further antibiotics)  CT of chest/abdomen/pelvis unremarkable    Hypokalemia  Assessment & Plan  Normalized status post repletion    Hypomagnesemia  Assessment & Plan  Monitor/replete serum magnesium    History of Hodgkin's lymphoma  Assessment & Plan  Status post mastoidectomy/left neck dissection and subsequent radiation    Essential hypertension  Assessment & Plan  Continue Zestril - additional PRN IV Hydralazine on board  Low-sodium restriction    Hypothyroidism  Assessment & Plan  Continue Synthroid - acquired state status post prior thyroidectomy  In the setting of altered mentation, pending free T4 (updated TSH low)    Hyperlipidemia  Assessment & Plan  Continue statin      DVT Prophylaxis:  Heparin SC       Patient Centered Rounds:  I have performed bedside rounds and discussed plan of care with nursing today. Discussions with Specialists or Other Care Team Provider:  see above assessments if applicable    Education and Discussions with Family / Patient: Patient at bedside - left voicemail for daughter, Daxa Paulino, yesterday - unable to contact today on both listed numbers    Time Spent for Care:  35 minutes. More than 50% of total time spent on counseling and coordination of care, on one or more of the following: performing physical exam; counseling and coordination of care, obtaining or reviewing history, documenting in the medical record, reviewing/ordering tests/medications/procedures, and communicating with other healthcare professionals. Current Length of Stay: 1 day(s)  Current Patient Status: Inpatient   Certification Statement:  Patient will continue to require additional hospital stay due to assessments as noted above. Code Status: Level 3 - DNAR and DNI        Subjective:     Encountered earlier in the day.   Awake and alert during my encounter. Still complains of some weakness and fatigue, however. Objective:     Vitals:   Temp (24hrs), Av.5 °F (36.9 °C), Min:96.3 °F (35.7 °C), Max:99.3 °F (37.4 °C)    Temp:  [96.3 °F (35.7 °C)-99.3 °F (37.4 °C)] 99.3 °F (37.4 °C)  HR:  [68-83] 79  Resp:  [16-39] 39  BP: ()/(50-88) 173/74  SpO2:  [96 %-99 %] 96 %  Body mass index is 20.97 kg/m². Input and Output Summary (last 24 hours):        Intake/Output Summary (Last 24 hours) at 2023 1336  Last data filed at 2023 1300  Gross per 24 hour   Intake 300 ml   Output 2555 ml   Net -2255 ml       Physical Exam:     GENERAL  weak/fatigued   HEAD   Normocephalic - atraumatic   EYES   PERRL - EOMI    MOUTH   Mucosa moist   NECK   Supple - full range of motion   CARDIAC  rate controlled - S1/S2 positive   PULMONARY    Clear breath sounds bilaterally - nonlabored respirations   ABDOMEN   Soft - nontender/nondistended - active bowel sounds   MUSCULOSKELETAL   Motor strength/range of motion deconditioned   NEUROLOGIC  awake/alert with baseline cognitive impairment   SKIN   Chronic wrinkles/blemishes    PSYCHIATRIC   Mood/affect pleasant currently         Additional Data:     Labs & Recent Cultures:    Results from last 7 days   Lab Units 23  0517   WBC Thousand/uL 6.19   HEMOGLOBIN g/dL 10.7*   HEMATOCRIT % 34.0*   PLATELETS Thousands/uL 219   NEUTROS PCT % 65   LYMPHS PCT % 24   MONOS PCT % 9   EOS PCT % 1     Results from last 7 days   Lab Units 23  0517 23  1050   POTASSIUM mmol/L 4.4 3.4*   CHLORIDE mmol/L 109* 99   CO2 mmol/L 28 29   BUN mg/dL 10 17   CREATININE mg/dL 0.62 0.59*   CALCIUM mg/dL 7.9* 8.4   ALK PHOS U/L  --  57   ALT U/L  --  20   AST U/L  --  23     Results from last 7 days   Lab Units 23  1050   INR  0.94     Results from last 7 days   Lab Units 23  1110 23  1108 23  0747 23  0320 23  0032 23  2052 23  1858 23  1558 23  1416 09/19/23  1120 09/19/23  1026   POC GLUCOSE mg/dl 350* 354* 70 80 87 121 137 107 104 131 121         Results from last 7 days   Lab Units 09/20/23  0517 09/19/23  1406 09/19/23  1050   LACTIC ACID mmol/L  --  0.9 2.6*   PROCALCITONIN ng/ml 0.07  --  0.06         Results from last 7 days   Lab Units 09/19/23  1050   BLOOD CULTURE  Received in Microbiology Lab. Culture in Progress. Received in Microbiology Lab. Culture in Progress.          Lines/Drains:  Invasive Devices       Peripheral Intravenous Line  Duration             Peripheral IV 09/19/23 Right Antecubital 1 day    Peripheral IV 09/19/23 Distal;Right;Ventral (anterior) Forearm <1 day              Drain  Duration             Urethral Catheter Temperature probe 18 Fr. 1 day                      Last 24 Hours Medication List:   Current Facility-Administered Medications   Medication Dose Route Frequency Provider Last Rate   • acetaminophen  650 mg Oral Q6H PRN Brayan Garcia MD     • vitamin C  500 mg Oral BID Brayan Garcia MD     • atorvastatin  20 mg Oral Daily With Franko Felix MD     • calcium carbonate  1 tablet Oral BID With Meals Brayan Garcia MD     • cyanocobalamin  1,000 mcg Oral Daily Brayan Garcia MD     • gabapentin  300 mg Oral TID Brayan Garcia MD     • heparin (porcine)  5,000 Units Subcutaneous Q8H [facility] Brayan Garcia MD     • hydrALAZINE  5 mg Intravenous Q6H PRN Brayan Garcia MD     • insulin glargine  5 Units Subcutaneous HS Brayan Garcia MD     • insulin lispro  1-5 Units Subcutaneous 4x Daily (AC & HS) Brayan Garcia MD     • insulin lispro  3 Units Subcutaneous TID With Meals Brayan Garcia MD     • levothyroxine  75 mcg Oral Early Morning Brayan Garcia MD     • [START ON 9/21/2023] lisinopril  5 mg Oral Daily Brayan Garcia MD     • meclizine  25 mg Oral Q8H PRN Brayan Garcia MD     • multivitamin stress formula  1 tablet Oral Daily Brayan Garcia MD     • mupirocin   Topical Daily Brayan Garcia MD     • ondansetron  4 mg Intravenous Q4H PRN Rolinda Gowers, MD     • pantoprazole  40 mg Oral BID AC Rolinda Gowers, MD                    ** Please Note: This note is constructed using a voice recognition dictation system. An occasional wrong word/phrase or “sound-a-like” substitution may have been picked up by dictation device due to the inherent limitations of voice recognition software. Read the chart carefully and recognize, using reasonable context, where substitutions may have occurred. **

## 2023-09-19 NOTE — ASSESSMENT & PLAN NOTE
Suspect secondary to transient hypoglycemia and hypothermia (see below)  CT head negative for acute intracranial etiology  Monitor/replete electrolytes  CK normal  Urinalysis unremarkable for infectious process  CT of chest/abdomen/pelvis negative  Continue neurochecks

## 2023-09-19 NOTE — ED PROVIDER NOTES
History  Chief Complaint   Patient presents with   • Loss of Consciousness     Patient brought in by ems for period of unresponsiveness. Found to have critical low BS. Given IV D10 ml bolus enroute. HPI  59-year-old female diabetic with a history of multiple episode of hypoglycemia was admitted for encephalopathy secondary to hypoglycemia presented to ED via EMS with an episode of hypoglycemia at home. As per EMS patient typically called for hypoglycemia gets glucose and recover however today was not at baseline was slightly altered. Patient initial blood sugar was 30 patient was given amp of D50 blood sugar did not improve patient was started on D10 and was brought to the ED. On arrival patient was awake alert easily arousable rectal temperature was 95 initial blood pressure was 138 then dropped to 115 and later 60 systolic. Denying any chest pain shortness of breath denies any sick contact. Patient is complaining of feeling cold. Prior to Admission Medications   Prescriptions Last Dose Informant Patient Reported? Taking?    Ascorbic Acid (vitamin C) 100 MG tablet   Yes No   Sig: Take 500 mg by mouth 2 (two) times a day   BD Insulin Syringe U/F Unit 31G X " 0.3 ML MISC   Yes No   Si (four) times a day   Insulin Glargine Solostar (Basaglar KwikPen) 100 UNIT/ML SOPN  Outside Facility (Specify) Yes No   Sig: Inject under the skin 10 units AM  5 units PM   Multiple Vitamin (multivitamin) capsule   Yes No   Sig: Take 1 capsule by mouth daily   OneTouch Ultra test strip   Yes No   Sig: USE TO TEST BLOOD SUGAR 6 TIMES A DAY   VITAMIN D PO   Yes No   Sig: Take 125 mg by mouth in the morning   atorvastatin (LIPITOR) 20 mg tablet   Yes No   Sig: Take 20 mg by mouth daily with dinner    calcium carbonate (OS-FELICE) 600 MG tablet   Yes No   Sig: Take 600 mg by mouth 2 (two) times a day with meals   cyanocobalamin (VITAMIN B-12) 1000 MCG tablet   Yes No   Sig: Take 1,000 mcg by mouth daily   gabapentin (NEURONTIN) 300 mg capsule   Yes No   Sig: Take 300 mg by mouth 3 (three) times a day   ipratropium (ATROVENT) 0.03 % nasal spray   No No   Si sprays into each nostril every 12 (twelve) hours   Patient taking differently: 2 sprays into each nostril every 12 (twelve) hours As needed   levothyroxine 100 mcg tablet  Self Yes No   Sig: Take 75 mcg by mouth every morning   lisinopril (ZESTRIL) 2.5 mg tablet   Yes No   Sig: Take 2.5 mg by mouth daily   meclizine (ANTIVERT) 25 mg tablet   No No   Sig: Take 1 tablet (25 mg total) by mouth every 8 (eight) hours as needed for dizziness   methocarbamol (ROBAXIN) 500 mg tablet   Yes No   Sig: Take 500 mg by mouth in the morning.  Prn .   mupirocin (BACTROBAN) 2 % ointment   No No   Sig: Apply topically daily To affected area   pantoprazole (PROTONIX) 40 mg tablet   No No   Sig: Take 1 tablet (40 mg total) by mouth 2 (two) times a day      Facility-Administered Medications: None       Past Medical History:   Diagnosis Date   • Ambulates with cane    • Cancer (HCC)    • Chronic pain disorder     knees/ shoulders (gets inj every 3 mos)   • Closed intertrochanteric fracture of right femur ([address]) 2020   • Controlled type 2 diabetes mellitus with diabetic polyneuropathy, with long-term current use of insulin ([address]) 2008   • Diabetes mellitus ([address])    • Diabetic polyneuropathy ([address]) 2008    ICD10 clean up   • Disease of thyroid gland    • H/O oral cancer     Left lower lip   • HL (hearing loss)    • Hodgkin's disease ([address])     Left neck, had radiation   • Hypertension    • Neuropathy    • Osteoporosis    • RA (rheumatoid arthritis) ([address])    • Traumatic rhabdomyolysis ([address]) 10/17/2022       Past Surgical History:   Procedure Laterality Date   • ADENOIDECTOMY     • APPENDECTOMY     • CATARACT EXTRACTION     • CATARACT EXTRACTION, BILATERAL     • CHOLECYSTECTOMY     • FRACTURE SURGERY Right     hip   • MOUTH SURGERY      oral cancer left lower lip   • OVARY SURGERY     • NV ADJT TIS TRNS/REARGMT F/C/C/M/N/A/G/H/F 10SQCM/< N/A 3/28/2022    Procedure: Adjacent tissue transfer face;  Surgeon: Darya Day MD;  Location: [facility];  Service: ENT   • NV OPTX FEM SHFT FX W/INSJ IMED IMPLT W/WO SCREW Right 5/28/2020    Procedure: INSERTION NAIL IM FEMUR ANTEGRADE (TROCHANTERIC); Surgeon: Stephania Moran DO;  Location: [facility] MAIN OR;  Service: Orthopedics   • NV TRANSMASTOID ANTROTOMY Left 3/28/2022    Procedure: MASTOIDECTOMY;  Surgeon: Darya Day MD;  Location: [facility];  Service: ENT   • TONSILLECTOMY     • TOTAL THYROIDECTOMY  2008    Performed after left neck dissection and left oral cancer diagnosis       Family History   Problem Relation Age of Onset   • Cancer Mother    • Diabetes Mother    • Diabetes Father    • Hypertension Father      I have reviewed and agree with the history as documented. E-Cigarette/Vaping   • E-Cigarette Use Never User      E-Cigarette/Vaping Substances   • Nicotine No    • THC No    • CBD No    • Flavoring No    • Other No    • Unknown No      Social History     Tobacco Use   • Smoking status: Never   • Smokeless tobacco: Never   Vaping Use   • Vaping Use: Never used   Substance Use Topics   • Alcohol use: Not Currently     Alcohol/week: 0.0 standard drinks of alcohol   • Drug use: Never       Review of Systems   Constitutional: Positive for chills. Negative for fever. HENT: Negative for rhinorrhea and sore throat. Eyes: Negative for visual disturbance. Respiratory: Negative for cough and shortness of breath. Cardiovascular: Negative for chest pain and leg swelling. Gastrointestinal: Negative for abdominal pain, diarrhea, nausea and vomiting. Genitourinary: Negative for dysuria. Musculoskeletal: Negative for back pain and myalgias. Skin: Negative for rash. Neurological: Negative for dizziness and headaches. Psychiatric/Behavioral: Negative for confusion.    All other systems reviewed and are negative. Physical Exam  Physical Exam  Vitals and nursing note reviewed. Constitutional:       Appearance: She is well-developed. HENT:      Head: Normocephalic and atraumatic. Right Ear: External ear normal.      Left Ear: External ear normal.      Nose: Nose normal.      Mouth/Throat:      Mouth: Mucous membranes are moist.      Pharynx: No oropharyngeal exudate. Eyes:      General: No scleral icterus. Right eye: No discharge. Left eye: No discharge. Conjunctiva/sclera: Conjunctivae normal.      Pupils: Pupils are equal, round, and reactive to light. Cardiovascular:      Rate and Rhythm: Normal rate and regular rhythm. Pulses: Normal pulses. Heart sounds: Normal heart sounds. Pulmonary:      Effort: Pulmonary effort is normal. No respiratory distress. Breath sounds: Normal breath sounds. No wheezing. Abdominal:      General: Bowel sounds are normal.      Palpations: Abdomen is soft. Genitourinary:     Comments: Sacral wound does not appear to be infected clean  Musculoskeletal:         General: No swelling or tenderness. Normal range of motion. Cervical back: Normal range of motion and neck supple. Lymphadenopathy:      Cervical: No cervical adenopathy. Skin:     General: Skin is warm and dry. Capillary Refill: Capillary refill takes less than 2 seconds. Neurological:      General: No focal deficit present. Mental Status: She is alert and oriented to person, place, and time. Psychiatric:         Mood and Affect: Mood is anxious.          Vital Signs  ED Triage Vitals   Temperature Pulse Respirations Blood Pressure SpO2   09/19/23 1036 09/19/23 1029 09/19/23 1026 09/19/23 1026 09/19/23 1036   (!) 95 °F (35 °C) 76 16 113/59 100 %      Temp Source Heart Rate Source Patient Position - Orthostatic VS BP Location FiO2 (%)   09/19/23 1036 09/19/23 1029 09/19/23 1026 09/19/23 1026 --   Rectal Monitor Lying Left arm       Pain Score 09/19/23 1026       No Pain           Vitals:    09/19/23 1315 09/19/23 1345 09/19/23 1430 09/19/23 1445   BP: (!) 94/49 97/52 116/54 103/53   Pulse: 75 75 79 80   Patient Position - Orthostatic VS: Lying            Visual Acuity      ED Medications  Medications   sodium chloride 0.9 % with KCl 40 mEq/L infusion (premix) (has no administration in time range)   acetaminophen (TYLENOL) tablet 650 mg (has no administration in time range)   ondansetron (ZOFRAN) injection 4 mg (has no administration in time range)   levothyroxine tablet 75 mcg (has no administration in time range)   ascorbic acid (VITAMIN C) tablet 500 mg (has no administration in time range)   atorvastatin (LIPITOR) tablet 20 mg (has no administration in time range)   calcium carbonate (OYSTER SHELL,OSCAL) 500 mg tablet 1 tablet (has no administration in time range)   gabapentin (NEURONTIN) capsule 300 mg (has no administration in time range)   meclizine (ANTIVERT) tablet 25 mg (has no administration in time range)   multivitamin stress formula tablet 1 tablet (has no administration in time range)   mupirocin (BACTROBAN) 2 % ointment (has no administration in time range)   pantoprazole (PROTONIX) EC tablet 40 mg (has no administration in time range)   cyanocobalamin (VITAMIN B-12) tablet 1,000 mcg (has no administration in time range)   insulin lispro (HumaLOG) 100 units/mL subcutaneous injection 1-5 Units (has no administration in time range)   heparin (porcine) subcutaneous injection 5,000 Units (has no administration in time range)   cefepime (MAXIPIME) IVPB (premix in dextrose) 2,000 mg 50 mL (0 mg Intravenous Stopped 9/19/23 1157)   sodium chloride 0.9 % bolus 1,000 mL (0 mL Intravenous Stopped 9/19/23 1156)   iohexol (OMNIPAQUE) 350 MG/ML injection (MULTI-DOSE) 85 mL (85 mL Intravenous Given 9/19/23 1218)       Diagnostic Studies  Results Reviewed     Procedure Component Value Units Date/Time    CK [[id number]]     Lab Status: No result Specimen: Blood     Lactic acid 2 Hours [[id number]]  (Normal) Collected: 09/19/23 1406    Lab Status: Final result Specimen: Blood from Arm, Right Updated: 09/19/23 1507     LACTIC ACID 0.9 mmol/L     Narrative:      Result may be elevated if tourniquet was used during collection. Fingerstick Glucose (POCT) [[id number]]  (Normal) Collected: 09/19/23 1416    Lab Status: Final result Updated: 09/19/23 1417     POC Glucose 104 mg/dl     UA w Reflex to Microscopic w Reflex to Culture [[id number]]  (Abnormal) Collected: 09/19/23 1239    Lab Status: Final result Specimen: Urine, Indwelling Kinney Catheter Updated: 09/19/23 1248     Color, UA Straw     Clarity, UA Clear     Specific Gravity, UA 1.010     pH, UA 6.5     Leukocytes, UA Negative     Nitrite, UA Negative     Protein, UA Negative mg/dl      Glucose, UA Trace mg/dl      Ketones, UA Negative mg/dl      Urobilinogen, UA 0.2 E.U./dl      Bilirubin, UA Negative     Occult Blood, UA Negative    TSH [[id number]] Collected: 09/19/23 1050    Lab Status:  In process Specimen: Blood from Arm, Right Updated: 09/19/23 1247    Procalcitonin [[id number]]  (Normal) Collected: 09/19/23 1050    Lab Status: Final result Specimen: Blood from Arm, Right Updated: 09/19/23 1224     Procalcitonin 0.06 ng/ml     Comprehensive metabolic panel [[id number]]  (Abnormal) Collected: 09/19/23 1050    Lab Status: Final result Specimen: Blood from Arm, Right Updated: 09/19/23 1218     Sodium 136 mmol/L      Potassium 3.4 mmol/L      Chloride 99 mmol/L      CO2 29 mmol/L      ANION GAP 8 mmol/L      BUN 17 mg/dL      Creatinine 0.59 mg/dL      Glucose 192 mg/dL      Calcium 8.4 mg/dL      AST 23 U/L      ALT 20 U/L      Alkaline Phosphatase 57 U/L      Total Protein 6.2 g/dL      Albumin 4.0 g/dL      Total Bilirubin 0.42 mg/dL      eGFR 85 ml/min/1.73sq m     Narrative:      [place] guidelines for Chronic Kidney Disease (CKD):   •  Stage 1 with normal or high GFR (GFR > 90 mL/min/1.73 square meters)  •  Stage 2 Mild CKD (GFR = 60-89 mL/min/1.73 square meters)  •  Stage 3A Moderate CKD (GFR = 45-59 mL/min/1.73 square meters)  •  Stage 3B Moderate CKD (GFR = 30-44 mL/min/1.73 square meters)  •  Stage 4 Severe CKD (GFR = 15-29 mL/min/1.73 square meters)  •  Stage 5 End Stage CKD (GFR <15 mL/min/1.73 square meters)  Note: GFR calculation is accurate only with a steady state creatinine    Lactic acid [[id number]]  (Abnormal) Collected: 09/19/23 1050    Lab Status: Final result Specimen: Blood from Arm, Right Updated: 09/19/23 1210     LACTIC ACID 2.6 mmol/L     Narrative:      Result may be elevated if tourniquet was used during collection. Fingerstick Glucose (POCT) [[id number]]  (Normal) Collected: 09/19/23 1120    Lab Status: Final result Updated: 09/19/23 1154     POC Glucose 131 mg/dl     FLU/RSV/COVID - if FLU/RSV clinically relevant [[id number]]  (Normal) Collected: 09/19/23 1050    Lab Status: Final result Specimen: Nares from Nasopharyngeal Swab Updated: 09/19/23 1136     SARS-CoV-2 Negative     INFLUENZA A PCR Negative     INFLUENZA B PCR Negative     RSV PCR Negative    Narrative:      FOR PEDIATRIC PATIENTS - copy/paste COVID Guidelines URL to browser: [email or web address]    SARS-CoV-2 assay is a Nucleic Acid Amplification assay intended for the  qualitative detection of nucleic acid from SARS-CoV-2 in nasopharyngeal  swabs. Results are for the presumptive identification of SARS-CoV-2 RNA. Positive results are indicative of infection with SARS-CoV-2, the virus  causing COVID-19, but do not rule out bacterial infection or co-infection  with other viruses. Laboratories within the [facility] and its  territories are required to report all positive results to the appropriate  public health authorities.  Negative results do not preclude SARS-CoV-2  infection and should not be used as the sole basis for treatment or other  patient management decisions. Negative results must be combined with  clinical observations, patient history, and epidemiological information. This test has not been FDA cleared or approved. This test has been authorized by FDA under an Emergency Use Authorization  (EUA). This test is only authorized for the duration of time the  declaration that circumstances exist justifying the authorization of the  emergency use of an in vitro diagnostic tests for detection of SARS-CoV-2  virus and/or diagnosis of COVID-19 infection under section 564(b)(1) of  the Act, 21 U. S.C. 930SGF-2(Y)(2), unless the authorization is terminated  or revoked sooner. The test has been validated but independent review by FDA  and CLIA is pending. Test performed using [employer]: This RT-PCR assay targets N2,  a region unique to SARS-CoV-2. A conserved region in the E-gene was chosen  for pan-Sarbecovirus detection which includes SARS-CoV-2. According to CMS-2020-01-R, this platform meets the definition of high-throughput technology.     Protime-INR [[id number]]  (Normal) Collected: 09/19/23 1050    Lab Status: Final result Specimen: Blood from Arm, Right Updated: 09/19/23 1113     Protime 12.7 seconds      INR 0.94    APTT [[id number]]  (Normal) Collected: 09/19/23 1050    Lab Status: Final result Specimen: Blood from Arm, Right Updated: 09/19/23 1113     PTT 26 seconds     CBC and differential [[id number]]  (Abnormal) Collected: 09/19/23 1050    Lab Status: Final result Specimen: Blood from Arm, Right Updated: 09/19/23 1058     WBC 7.25 Thousand/uL      RBC 3.60 Million/uL      Hemoglobin 12.0 g/dL      Hematocrit 37.4 %       fL      MCH 33.3 pg      MCHC 32.1 g/dL      RDW 12.3 %      MPV 10.5 fL      Platelets 541 Thousands/uL      nRBC 0 /100 WBCs      Neutrophils Relative 69 %      Immat GRANS % 1 %      Lymphocytes Relative 17 %      Monocytes Relative 11 %      Eosinophils Relative 1 %      Basophils Relative 1 %      Neutrophils Absolute 5.06 Thousands/µL      Immature Grans Absolute 0.05 Thousand/uL      Lymphocytes Absolute 1.22 Thousands/µL      Monocytes Absolute 0.81 Thousand/µL      Eosinophils Absolute 0.07 Thousand/µL      Basophils Absolute 0.04 Thousands/µL     Blood culture #1 [[id number]] Collected: 09/19/23 1050    Lab Status: In process Specimen: Blood from Arm, Right Updated: 09/19/23 1058    Blood culture #2 [[id number]] Collected: 09/19/23 1050    Lab Status: In process Specimen: Blood from Arm, Right Updated: 09/19/23 1057    Fingerstick Glucose (POCT) [[id number]]  (Normal) Collected: 09/19/23 1026    Lab Status: Final result Updated: 09/19/23 1029     POC Glucose 121 mg/dl                  CT chest abdomen pelvis w contrast   Final Result by Suellen Barrera MD (09/19 1403)      1. No acute findings in the chest, abdomen and pelvis. Workstation performed: [id number]         CT head wo contrast   Final Result by Suellen Barrera MD (09/19 1338)      No acute intracranial abnormality. Chronic microangiopathic changes. Workstation performed: [id number]         XR chest portable   Final Result by Shira Gandhi DO (09/19 1440)      No acute cardiopulmonary disease. Resident: Bryan Manrique, the attending radiologist, have reviewed the images and agree with the final report above.       Workstation performed: [id number]                    Procedures  CriticalCare Time    Date/Time: 9/19/2023 3:21 PM    Performed by: Mary Clark MD  Authorized by: Mary Clark MD    Critical care provider statement:     Critical care time (minutes):  75    Critical care start time:  9/19/2023 10:30 AM    Critical care end time:  9/19/2023 3:21 PM    Critical care time was exclusive of:  Separately billable procedures and treating other patients and teaching time    Critical care was necessary to treat or prevent imminent or life-threatening deterioration of the following conditions:  Sepsis, dehydration and metabolic crisis    Critical care was time spent personally by me on the following activities:  Blood draw for specimens, obtaining history from patient or surrogate, development of treatment plan with patient or surrogate, discussions with consultants, discussions with primary provider, evaluation of patient's response to treatment, examination of patient, review of old charts, re-evaluation of patient's condition, ordering and review of radiographic studies, ordering and review of laboratory studies, ordering and performing treatments and interventions and interpretation of cardiac output measurements    I assumed direction of critical care for this patient from another provider in my specialty: no               ED Course  ED Course as of 09/19/23 1522   Tue Sep 19, 2023   1127 Point of contact daughter was called left a voicemail   1127 Blood pressure dropped to 69/44 patient started on IV fluid at this time we will continue to monitor her blood sugar   1158 Continue with Aspirin and Brilinta. Keep blood pressure between 1 60-1 80 repeat CT in 24 hours   1350 No acute intracranial abnormality.     Chronic microangiopathic changes. 1412 1. No acute findings in the chest, abdomen and pelvis. [address] Day Attending (Dmitry Glynn([facility]))  [address]  I have reviewed the chart and events since admission tot the ED. It seems she became hypoglycemic from the insulin that she is 4 times a day. I do not if the dose was changed or she was given insulin and she did not eat.  I do not think she needs either CC or SD2 as she is responsive appropriately to the treatment in the ED  2:36 PM  20 days left  ok thank you  2:37 PM  20 days left  Read     [address] CANDACE (Ml Goldstein([facility]))  [address]  2:58 PM  20 days left                               SBIRT 20yo+    Flowsheet Row Most Recent Value   Initial Alcohol Screen: US AUDIT-C 1. How often do you have a drink containing alcohol? 0 Filed at: 09/19/2023 1318   2. How many drinks containing alcohol do you have on a typical day you are drinking? 0 Filed at: 09/19/2023 1318   3b. FEMALE Any Age, or MALE 65+: How often do you have 4 or more drinks on one occassion? 0 Filed at: 09/19/2023 1318   Audit-C Score 0 Filed at: 09/19/2023 1318   DOMINGA: How many times in the past year have you. .. Used an illegal drug or used a prescription medication for non-medical reasons? Never Filed at: 09/19/2023 1318                    Medical Decision Making  27-year-old female diabetic with a history of multiple episode of hypoglycemia was admitted for encephalopathy secondary to hypoglycemia presented to ED via EMS with an episode of hypoglycemia at home. History is taken from patient and EMS. Patient stated that she has these episodes of hypoglycemia but usually recover at home. Patient is nontoxic-appearing hypothermic episode of hypotension. Differential diagnoses include: Encephalopathy secondary to hypoglycemia/stroke/risk of sepsis/pneumonia/overdose on insulin  Plan: we will obtain sepsis labs start patient on IV antibiotics and fluid and is placed on Bear hugger for hypothermia. Patient daughter was called left a voicemail did not receive a call back. At this time patient is full code. Multiple episode of hypotension patient started to be fluid responsive at this time hypothermia improved to 97. Kinney was ordered to monitor urine output as well is a temperature. He was discussed with the critical care Dr. Mart Sep states that patient is appropriate for the floor. Case discussed with the internal medicine team excepted admission to stepdown 2. Amount and/or Complexity of Data Reviewed  Labs: ordered. Radiology: ordered. Risk  Prescription drug management. Decision regarding hospitalization.           Disposition  Final diagnoses:   Acute metabolic encephalopathy due to hypoglycemia   Hypotension   Hypoglycemia   Hypokalemia     Time reflects when diagnosis was documented in both MDM as applicable and the Disposition within this note     Time User Action Codes Description Comment    9/19/2023 12:46 PM Jeffy Rosales Add [G93.41,  O28.5] Acute metabolic encephalopathy due to hypoglycemia     9/19/2023  3:00 PM Randy Call [I95.9] Hypotension     9/19/2023  3:00 PM Clraya Rosales Add [E16.2] Hypoglycemia     9/19/2023  3:22 PM Jeffy Rosales Add [E87.6] Hypokalemia       ED Disposition     ED Disposition   Admit    Condition   Stable    Date/Time   Tue Sep 19, 2023  3:00 PM    Comment   Case was discussed with  and the patient's admission status was agreed to be Admission Status: inpatient status to the service of Dr. Renea Tellez    None         Patient's Medications   Discharge Prescriptions    No medications on file       No discharge procedures on file.     PDMP Review       Value Time User    PDMP Reviewed  Yes 6/17/2020  2:17 Khoa Porras MD          ED Provider  Electronically Signed by           Jeffy Rosales MD  09/19/23 0638

## 2023-09-19 NOTE — ASSESSMENT & PLAN NOTE
Continue Synthroid - acquired state status post prior thyroidectomy  In the setting of altered mentation, check updated thyroid panel

## 2023-09-19 NOTE — H&P
[id number] [facility]  H&P  Name: Kelsey Grimm 80 y.o. female I MRN: [id number]  Unit/Bed#: ICU 02-01 I Date of Admission: 9/19/2023   Date of Service: 9/19/2023 I Hospital Day: 0      Assessment & Plan:    * Acute metabolic encephalopathy  Assessment & Plan  Suspect secondary to transient hypoglycemia and hypothermia (see below)  CT head negative for acute intracranial etiology  Monitor/replete electrolytes  CK normal  Urinalysis unremarkable for infectious process  CT of chest/abdomen/pelvis negative  Continue neurochecks    Hypoglycemia  Assessment & Plan  In the setting of in the setting of known insulin-dependent diabetes mellitus  Hold basal insulin - PRN SSI coverage only for now   Blood sugars now stable in the 100-130s s/p dextrose containing fluids en route to hospital  Will await endocrinology input    Hypothermia  Assessment & Plan  Transient course now resolved status post Damaris hugger therapy in the ED  Possibly associated with hypoglycemia? ?    WBC count normal - no tachycardia/tachypnea in the ED thus far - intermittent hypotension noted (c/w IV fluids)   Lactic acidosis normalized status post IV fluids   Procalcitonin normal -> less likely suspicion of acute infection at this time (observe off further antibiotics)  CT of chest/abdomen/pelvis unremarkable    Hypokalemia  Assessment & Plan  Replete serum potassium (via additive in IV fluids)  Check serum magnesium    Essential hypertension  Assessment & Plan  Continue Zestril - additional PRN IV Hydralazine on board  Low-sodium restriction    History of Hodgkin's lymphoma  Assessment & Plan  Status post mastoidectomy/left neck dissection and subsequent radiation    Hypothyroidism  Assessment & Plan  Continue Synthroid - acquired state status post prior thyroidectomy  In the setting of altered mentation, check updated thyroid panel    Hyperlipidemia  Assessment & Plan  Continue statin      DVT Prophylaxis:  Heparin SC    Code Status: DNR/DNI    Discussion with:  Patient at bedside - left voicemail with daughter, Danay Menendez, at [phone]    Anticipated Length of Stay:  Patient will be admitted on an Inpatient basis with an anticipated length of stay of greater than 2 midnights. Justification for Hospital Stay: Altered mentation with transient hypothermia and hypoglycemia requiring neurologic and hemodynamic monitoring, along with further work-up. Total Time for Visit, including Counseling / Coordination of Care: 75 minutes. More than 50% of total time spent on counseling and coordination of care, on one or more of the following: performing physical exam; counseling and coordination of care; obtaining or reviewing history; documenting in the medical record; reviewing/ordering tests, medications or procedures; communicating with other healthcare professionals and discussing with patient's family/caregivers. Chief Complaint:  Altered mentation      History of Present Illness:    Vee Choudhary is a 80 y.o. female who presents with altered mentation and a transient period of "unresponsiveness", with findings of low blood sugars, brought in by the ambulance. She was given an ampule of dextrose containing fluid without significant improvement, therefore, was initiated on a continuous dextrose infusion. Initially on arrival to the ED, she was noted to be hypothermic pressures in the 94-95 range, and was placed on a Damaris hugger. Fortunately, her temperatures progressively pato and normalized in the ED to the 98-99 range. She was empirically given a dose of IV Cefepime in the ED, due to possibility of infection triggering these events. At the time my encounter, she is awake and alert with orientation to name/place, but not time. She denies any current pain, but does acknowledge some weakness/fatigue. She currently cannot recall the sequence of events led to her transient altered mentation.       Review of Systems:    Review of Systems - A thorough 12 point review systems was conducted. Pertinent positives and negatives are mentioned in the history of present illness. Past Medical and Surgical History:     Past Medical History:   Diagnosis Date   • Ambulates with cane    • Cancer ([address])    • Chronic pain disorder     knees/ shoulders (gets inj every 3 mos)   • Closed intertrochanteric fracture of right femur ([address]) 5/26/2020   • Controlled type 2 diabetes mellitus with diabetic polyneuropathy, with long-term current use of insulin ([address]) 5/21/2008   • Diabetes mellitus ([address])    • Diabetic polyneuropathy ([address]) 5/21/2008    ICD10 clean up   • Disease of thyroid gland    • 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   • HL (hearing loss)    • Hodgkin's disease ([address]) 2008    Left neck, had radiation   • Hypertension    • Neuropathy    • Osteoporosis    • RA (rheumatoid arthritis) ([address])    • Traumatic rhabdomyolysis ([address]) 10/17/2022       Past Surgical History:   Procedure Laterality Date   • ADENOIDECTOMY     • APPENDECTOMY     • CATARACT EXTRACTION     • CATARACT EXTRACTION, BILATERAL     • CHOLECYSTECTOMY     • FRACTURE SURGERY Right     hip   • MOUTH SURGERY      oral cancer left lower lip   • OVARY SURGERY     • MO ADJT TIS TRNS/REARGMT F/C/C/M/N/A/G/H/F 10SQCM/< N/A 3/28/2022    Procedure: Adjacent tissue transfer face;  Surgeon: Nataliia Sharp MD;  Location: [facility];  Service: ENT   • MO OPTX FEM SHFT FX W/INSJ IMED IMPLT W/WO SCREW Right 5/28/2020    Procedure: INSERTION NAIL IM FEMUR ANTEGRADE (TROCHANTERIC);   Surgeon: Sherin Olivarez DO;  Location: [address] [place] [address] MAIN OR;  Service: Orthopedics   • MO TRANSMASTOID ANTROTOMY Left 3/28/2022    Procedure: MASTOIDECTOMY;  Surgeon: Nataliia Sharp MD;  Location: [facility];  Service: ENT   • TONSILLECTOMY     • TOTAL THYROIDECTOMY  2008    Performed after left neck dissection and left oral cancer diagnosis         Medications & Allergies:    Prior to Admission medications    Medication Sig Start Date End Date Taking? Authorizing Provider   Ascorbic Acid (vitamin C) 100 MG tablet Take 500 mg by mouth 2 (two) times a day    Historical Provider, MD   atorvastatin (LIPITOR) 20 mg tablet Take 20 mg by mouth daily with dinner     Historical Provider, MD   BD Insulin Syringe U/F 1/2Unit 31G X 5/16" 0.3 ML MISC 4 (four) times a day 11/29/21   Historical Provider, MD   calcium carbonate (OS-FELICE) 600 MG tablet Take 600 mg by mouth 2 (two) times a day with meals    Historical Provider, MD   cyanocobalamin (VITAMIN B-12) 1000 MCG tablet Take 1,000 mcg by mouth daily    Historical Provider, MD   gabapentin (NEURONTIN) 300 mg capsule Take 300 mg by mouth 3 (three) times a day    Historical Provider, MD   Insulin Glargine Solostar (Basaglar KwikPen) 100 UNIT/ML SOPN Inject under the skin 10 units AM  5 units PM    Historical Provider, MD   ipratropium (ATROVENT) 0.03 % nasal spray 2 sprays into each nostril every 12 (twelve) hours  Patient taking differently: 2 sprays into each nostril every 12 (twelve) hours As needed 8/22/21   Aline Kim MD   levothyroxine 100 mcg tablet Take 75 mcg by mouth every morning    Historical Provider, MD   lisinopril (ZESTRIL) 2.5 mg tablet Take 2.5 mg by mouth daily    Historical Provider, MD   meclizine (ANTIVERT) 25 mg tablet Take 1 tablet (25 mg total) by mouth every 8 (eight) hours as needed for dizziness 10/25/22   Lise Singh DO   methocarbamol (ROBAXIN) 500 mg tablet Take 500 mg by mouth in the morning. Prn .     Historical Provider, MD   Multiple Vitamin (multivitamin) capsule Take 1 capsule by mouth daily    Historical Provider, MD   mupirocin (BACTROBAN) 2 % ointment Apply topically daily To affected area 10/5/22   Aline Kim MD   OneTouch Ultra test strip USE TO TEST BLOOD SUGAR 6 TIMES A DAY 9/27/21   Historical Provider, MD   pantoprazole (PROTONIX) 40 mg tablet Take 1 tablet (40 mg total) by mouth 2 (two) times a day 7/15/22   Xiang Malhotra PA-C   VITAMIN D PO Take 125 mg by mouth in the morning    Historical Provider, MD         Allergies: No Known Allergies      Social History:     Substance Use History:   Social History     Substance and Sexual Activity   Alcohol Use Not Currently   • Alcohol/week: 0.0 standard drinks of alcohol     Social History     Tobacco Use   Smoking Status Never   Smokeless Tobacco Never     Social History     Substance and Sexual Activity   Drug Use Never         Family History:    Per medical chart, significant for an unspecified cancer in mother, for hypertension in father, and for diabetes mellitus in both parents.       Physical Exam:     Vitals:   Blood Pressure: 147/88 (09/19/23 1700)  Pulse: 80 (09/19/23 1700)  Temperature: (!) 96.3 °F (35.7 °C) (09/19/23 1657)  Temp Source: Tympanic (09/19/23 1657)  Respirations: (!) 35 (09/19/23 1700)  Height: 5' 1" (154.9 cm) (09/19/23 1026)  Weight - Scale: 50.3 kg (111 lb) (09/19/23 1026)  SpO2: 98 % (09/19/23 1700)      GENERAL  weak/fatigued   HEAD   Normocephalic - atraumatic   EYES   PERRL - EOMI    MOUTH   Mucosa moist   NECK   Supple - full range of motion   CARDIAC  rate controlled - S1/S2 positive   PULMONARY  clear to auscultation but mildly diminished respiratory effort - nonlabored respirations at rest   ABDOMEN   Soft - nontender/nondistended - active bowel sounds   MUSCULOSKELETAL   Motor strength/range of motion quite deconditioned   NEUROLOGIC  intermittently confused but oriented to name/place currently   SKIN   Chronic wrinkles/blemishes    PSYCHIATRIC   Mood/affect somewhat flat         Additional Data:     Labs & Recent Cultures:    Results from last 7 days   Lab Units 09/19/23  1050   WBC Thousand/uL 7.25   HEMOGLOBIN g/dL 12.0   HEMATOCRIT % 37.4   PLATELETS Thousands/uL 228   NEUTROS PCT % 69   LYMPHS PCT % 17   MONOS PCT % 11   EOS PCT % 1     Results from last 7 days   Lab Units 09/19/23  1050   SODIUM mmol/L 136   POTASSIUM mmol/L 3.4*   CHLORIDE mmol/L 99   CO2 mmol/L 29   BUN mg/dL 17   CREATININE mg/dL 0.59*   ANION GAP mmol/L 8   CALCIUM mg/dL 8.4   ALBUMIN g/dL 4.0   TOTAL BILIRUBIN mg/dL 0.42   ALK PHOS U/L 57   ALT U/L 20   AST U/L 23   GLUCOSE RANDOM mg/dL 192*     Results from last 7 days   Lab Units 09/19/23  1050   INR  0.94     Results from last 7 days   Lab Units 09/19/23  1858 09/19/23  1558 09/19/23  1416 09/19/23  1120 09/19/23  1026   POC GLUCOSE mg/dl 137 107 104 131 121         Results from last 7 days   Lab Units 09/19/23  1406 09/19/23  1050   LACTIC ACID mmol/L 0.9 2.6*   PROCALCITONIN ng/ml  --  0.06                 Lines/Drains:  Invasive Devices     Peripheral Intravenous Line  Duration           Peripheral IV 09/19/23 Distal;Right;Ventral (anterior) Forearm <1 day    Peripheral IV 09/19/23 Right Antecubital <1 day          Drain  Duration           Urethral Catheter Temperature probe 18 Fr. <1 day                  Imaging:     XR chest portable    Result Date: 9/19/2023  Narrative: CHEST INDICATION:  Patient brought in by EMS for period of unresponsiveness, found to have critical low blood sugar. COMPARISON: chest radiograph 10/23/2022, CT chest abdomen pelvis 10/17/2022 EXAM PERFORMED/VIEWS:  XR CHEST PORTABLE FINDINGS:  Patient is rotated to the left. Monitoring leads and clips project over the chest. Cardiomediastinal silhouette appears unremarkable. Atherosclerotic vascular calcifications. The lungs are clear. No pneumothorax or pleural effusion. Osseous structures unremarkable for patient's age. Mild S-shaped thoracolumbar scoliosis. Postoperative changes left neck. Impression: No acute cardiopulmonary disease. Resident: Brittany Millard, the attending radiologist, have reviewed the images and agree with the final report above. Workstation performed: [id number]       CT chest abdomen pelvis w contrast    Result Date: 9/19/2023  Narrative: CT CHEST, ABDOMEN AND PELVIS WITH IV CONTRAST INDICATION:   hypotension.  COMPARISON: CT chest abdomen pelvis 10/17/2022. TECHNIQUE: CT examination of the chest, abdomen and pelvis was performed. Multiplanar 2D reformatted images were created from the source data. This examination, like all CT scans performed in the [facility], was performed utilizing techniques to minimize radiation dose exposure, including the use of iterative reconstruction and automated exposure control. Radiation dose length product (DLP) for this visit:  592.12 mGy-cm IV Contrast:  85 mL of iohexol (OMNIPAQUE) Enteric Contrast: Enteric contrast was not administered. FINDINGS: CHEST LUNGS: Scattered atelectatic changes in the bilateral lower lobes. No focal infiltrate. A few calcified granuloma. Tracheobronchial tree is patent. PLEURA: Possible small pleural lipoma along the right lateral pleural margin, stable. HEART/GREAT VESSELS: Scattered atherosclerotic coronary artery calcification is noted. Heart is otherwise unremarkable. No thoracic aortic aneurysm. MEDIASTINUM AND EMELINA:  Unremarkable. CHEST WALL AND LOWER NECK: Stable postsurgical changes of the left neck. ABDOMEN LIVER/BILIARY TREE: Stable intrahepatic biliary ductal dilatation. This can be seen as part of the postcholecystectomy state. Otherwise liver is unremarkable. Adama Spine GALLBLADDER:  Gallbladder is surgically absent. SPLEEN:  Unremarkable. PANCREAS:  Unremarkable. ADRENAL GLANDS:  Unremarkable. KIDNEYS/URETERS: Mild prominent left extrarenal pelvis. No hydronephrosis. STOMACH AND BOWEL: Limited assessment without enteric contrast. Moderate fecal retention in the colon. No obstruction. APPENDIX:  No findings to suggest appendicitis. ABDOMINOPELVIC CAVITY:  No ascites. No pneumoperitoneum. No lymphadenopathy. VESSELS:  Atherosclerotic changes are present. No evidence of aneurysm. PELVIS REPRODUCTIVE ORGANS: Suggestion of a small calcified uterine fibroid on the right. URINARY BLADDER:  Unremarkable. ABDOMINAL WALL/INGUINAL REGIONS:  Unremarkable.  OSSEOUS STRUCTURES:  No acute fracture or destructive osseous lesion. Old healed left-sided rib fractures. Old healed fractures at the right pubic rami. Stable moderate compression deformity at L5. Curvature of the thoracolumbar spine. Advanced degenerative changes at the right shoulder. Partially visualized right hip ORIF. Impression: 1. No acute findings in the chest, abdomen and pelvis. Workstation performed: [id number]       CT head wo contrast    Result Date: 9/19/2023  Narrative: CT BRAIN - WITHOUT CONTRAST INDICATION:   AMS. COMPARISON: Head CT 5/24/2023. TECHNIQUE:  CT examination of the brain was performed. Multiplanar 2D reformatted images were created from the source data. Radiation dose length product (DLP) for this visit:  825 mGy-cm . This examination, like all CT scans performed in the [facility], was performed utilizing techniques to minimize radiation dose exposure, including the use of iterative reconstruction and automated exposure control. IMAGE QUALITY:  Diagnostic. FINDINGS: PARENCHYMA: Decreased attenuation is noted in periventricular and subcortical white matter demonstrating an appearance that is statistically most likely to represent moderate microangiopathic change; this appearance is similar when compared to most recent prior examination. Chronic appearing lacunar infarct at the left basal ganglia. Stable linear calcification at the left cerebellum centrally. No CT signs of acute infarction. No intracranial mass, mass effect or midline shift. No acute parenchymal hemorrhage. Atherosclerotic vascular calcifications in carotid and vertebral arteries. VENTRICLES AND EXTRA-AXIAL SPACES:  Normal for the patient's age. VISUALIZED ORBITS: Bilateral lens replacement. PARANASAL SINUSES: Normal visualized paranasal sinuses. CALVARIUM AND EXTRACRANIAL SOFT TISSUES:  Normal.     Impression: No acute intracranial abnormality. Chronic microangiopathic changes.  Workstation performed: [id number] ** Please Note: This note is constructed using a voice recognition dictation system. An occasional wrong word/phrase or “sound-a-like” substitution may have been picked up by dictation device due to the inherent limitations of voice recognition software. Read the chart carefully and recognize, using reasonable context, where substitutions may have occurred. **

## 2023-09-19 NOTE — ASSESSMENT & PLAN NOTE
In the setting of in the setting of known insulin-dependent diabetes mellitus  Hold basal insulin - PRN SSI coverage only for now   Blood sugars now stable in the 100-130s s/p dextrose containing fluids en route to hospital  Will await endocrinology input

## 2023-09-19 NOTE — ASSESSMENT & PLAN NOTE
Transient course now resolved status post Damaris hugger therapy in the ED  Possibly associated with hypoglycemia? ?    WBC count normal - no tachycardia/tachypnea in the ED thus far - intermittent hypotension noted (c/w IV fluids)   Lactic acidosis normalized status post IV fluids   Procalcitonin normal -> less likely suspicion of acute infection at this time (observe off further antibiotics)  CT of chest/abdomen/pelvis unremarkable

## 2023-09-19 NOTE — PLAN OF CARE
Problem: Potential for Falls  Goal: Patient will remain free of falls  Description: INTERVENTIONS:  - Educate patient/family on patient safety including physical limitations  - Instruct patient to call for assistance with activity   - Consult OT/PT to assist with strengthening/mobility   - Keep Call bell within reach  - Keep bed low and locked with side rails adjusted as appropriate  - Keep care items and personal belongings within reach  - Initiate and maintain comfort rounds  - Make Fall Risk Sign visible to staff  - Offer Toileting every  Hours, in advance of need  - Initiate/Maintain alarm  - Obtain necessary fall risk management equipment:   - Apply yellow socks and bracelet for high fall risk patients  - Consider moving patient to room near nurses station  Outcome: Progressing     Problem: MOBILITY - ADULT  Goal: Maintains/Returns to pre admission functional level  Description: INTERVENTIONS:  - Perform BMAT or MOVE assessment daily.   - Set and communicate daily mobility goal to care team and patient/family/caregiver. - Collaborate with rehabilitation services on mobility goals if consulted  - Perform Range of Motion  times a day. - Reposition patient every  hours.   - Dangle patient  times a day  - Stand patient  times a day  - Ambulate patient  times a day  - Out of bed to chair  times a day   - Out of bed for meals  times a day  - Out of bed for toileting  - Record patient progress and toleration of activity level   Outcome: Progressing     Problem: SKIN/TISSUE INTEGRITY - ADULT  Goal: Skin Integrity remains intact(Skin Breakdown Prevention)  Description: Assess:  -Perform Nate assessment every   -Clean and moisturize skin every   -Inspect skin when repositioning, toileting, and assisting with ADLS  -Assess under medical devices such as  every   -Assess extremities for adequate circulation and sensation     Bed Management:  -Have minimal linens on bed & keep smooth, unwrinkled  -Change linens as needed when moist or perspiring  -Avoid sitting or lying in one position for more than  hours while in bed  -Keep HOB at degrees     Toileting:  -Offer bedside commode  -Assess for incontinence every   -Use incontinent care products after each incontinent episode such as     Activity:  -Mobilize patient  times a day  -Encourage activity and walks on unit  -Encourage or provide ROM exercises   -Turn and reposition patient every  Hours  -Use appropriate equipment to lift or move patient in bed  -Instruct/ Assist with weight shifting every  when out of bed in chair  -Consider limitation of chair time  hour intervals    Skin Care:  -Avoid use of baby powder, tape, friction and shearing, hot water or constrictive clothing  -Relieve pressure over bony prominences using   -Do not massage red bony areas    Next Steps:  -Teach patient strategies to minimize risks such as    -Consider consults to  interdisciplinary teams such as   Outcome: Progressing

## 2023-09-20 PROBLEM — E83.42 HYPOMAGNESEMIA: Status: ACTIVE | Noted: 2023-09-20

## 2023-09-20 LAB
ANION GAP SERPL CALCULATED.3IONS-SCNC: 4 MMOL/L
BASOPHILS # BLD AUTO: 0.05 THOUSANDS/ÂΜL (ref 0–0.1)
BASOPHILS NFR BLD AUTO: 1 % (ref 0–1)
BUN SERPL-MCNC: 10 MG/DL (ref 5–25)
CALCIUM SERPL-MCNC: 7.9 MG/DL (ref 8.4–10.2)
CHLORIDE SERPL-SCNC: 109 MMOL/L (ref 96–108)
CO2 SERPL-SCNC: 28 MMOL/L (ref 21–32)
CREAT SERPL-MCNC: 0.62 MG/DL (ref 0.6–1.3)
EOSINOPHIL # BLD AUTO: 0.07 THOUSAND/ÂΜL (ref 0–0.61)
EOSINOPHIL NFR BLD AUTO: 1 % (ref 0–6)
ERYTHROCYTE [DISTWIDTH] IN BLOOD BY AUTOMATED COUNT: 12.9 % (ref 11.6–15.1)
GFR SERPL CREATININE-BSD FRML MDRD: 84 ML/MIN/1.73SQ M
GLUCOSE SERPL-MCNC: 100 MG/DL (ref 65–140)
GLUCOSE SERPL-MCNC: 233 MG/DL (ref 65–140)
GLUCOSE SERPL-MCNC: 350 MG/DL (ref 65–140)
GLUCOSE SERPL-MCNC: 354 MG/DL (ref 65–140)
GLUCOSE SERPL-MCNC: 70 MG/DL (ref 65–140)
GLUCOSE SERPL-MCNC: 75 MG/DL (ref 65–140)
GLUCOSE SERPL-MCNC: 80 MG/DL (ref 65–140)
GLUCOSE SERPL-MCNC: 87 MG/DL (ref 65–140)
HCT VFR BLD AUTO: 34 % (ref 34.8–46.1)
HGB BLD-MCNC: 10.7 G/DL (ref 11.5–15.4)
IMM GRANULOCYTES # BLD AUTO: 0.02 THOUSAND/UL (ref 0–0.2)
IMM GRANULOCYTES NFR BLD AUTO: 0 % (ref 0–2)
LYMPHOCYTES # BLD AUTO: 1.47 THOUSANDS/ÂΜL (ref 0.6–4.47)
LYMPHOCYTES NFR BLD AUTO: 24 % (ref 14–44)
MAGNESIUM SERPL-MCNC: 1.6 MG/DL (ref 1.9–2.7)
MCH RBC QN AUTO: 33.2 PG (ref 26.8–34.3)
MCHC RBC AUTO-ENTMCNC: 31.5 G/DL (ref 31.4–37.4)
MCV RBC AUTO: 106 FL (ref 82–98)
MONOCYTES # BLD AUTO: 0.57 THOUSAND/ÂΜL (ref 0.17–1.22)
MONOCYTES NFR BLD AUTO: 9 % (ref 4–12)
NEUTROPHILS # BLD AUTO: 4.01 THOUSANDS/ÂΜL (ref 1.85–7.62)
NEUTS SEG NFR BLD AUTO: 65 % (ref 43–75)
NRBC BLD AUTO-RTO: 0 /100 WBCS
PHOSPHATE SERPL-MCNC: 3.2 MG/DL (ref 2.3–4.1)
PLATELET # BLD AUTO: 219 THOUSANDS/UL (ref 149–390)
PMV BLD AUTO: 10.2 FL (ref 8.9–12.7)
POTASSIUM SERPL-SCNC: 4.4 MMOL/L (ref 3.5–5.3)
PROCALCITONIN SERPL-MCNC: 0.07 NG/ML
RBC # BLD AUTO: 3.22 MILLION/UL (ref 3.81–5.12)
SODIUM SERPL-SCNC: 141 MMOL/L (ref 135–147)
T4 FREE SERPL-MCNC: 1.39 NG/DL (ref 0.61–1.12)
TSH SERPL DL<=0.05 MIU/L-ACNC: 0.28 UIU/ML (ref 0.45–4.5)
WBC # BLD AUTO: 6.19 THOUSAND/UL (ref 4.31–10.16)

## 2023-09-20 PROCEDURE — 84443 ASSAY THYROID STIM HORMONE: CPT | Performed by: INTERNAL MEDICINE

## 2023-09-20 PROCEDURE — 99232 SBSQ HOSP IP/OBS MODERATE 35: CPT | Performed by: INTERNAL MEDICINE

## 2023-09-20 PROCEDURE — 85025 COMPLETE CBC W/AUTO DIFF WBC: CPT | Performed by: INTERNAL MEDICINE

## 2023-09-20 PROCEDURE — 99222 1ST HOSP IP/OBS MODERATE 55: CPT | Performed by: STUDENT IN AN ORGANIZED HEALTH CARE EDUCATION/TRAINING PROGRAM

## 2023-09-20 PROCEDURE — 83735 ASSAY OF MAGNESIUM: CPT | Performed by: INTERNAL MEDICINE

## 2023-09-20 PROCEDURE — 82948 REAGENT STRIP/BLOOD GLUCOSE: CPT

## 2023-09-20 PROCEDURE — 84100 ASSAY OF PHOSPHORUS: CPT | Performed by: INTERNAL MEDICINE

## 2023-09-20 PROCEDURE — 84439 ASSAY OF FREE THYROXINE: CPT | Performed by: INTERNAL MEDICINE

## 2023-09-20 PROCEDURE — 84145 PROCALCITONIN (PCT): CPT | Performed by: INTERNAL MEDICINE

## 2023-09-20 PROCEDURE — 80048 BASIC METABOLIC PNL TOTAL CA: CPT | Performed by: INTERNAL MEDICINE

## 2023-09-20 RX ORDER — INSULIN LISPRO 100 [IU]/ML
2 INJECTION, SOLUTION INTRAVENOUS; SUBCUTANEOUS
Status: DISCONTINUED | OUTPATIENT
Start: 2023-09-20 | End: 2023-09-22

## 2023-09-20 RX ORDER — INSULIN LISPRO 100 [IU]/ML
3 INJECTION, SOLUTION INTRAVENOUS; SUBCUTANEOUS
Status: DISCONTINUED | OUTPATIENT
Start: 2023-09-20 | End: 2023-09-20

## 2023-09-20 RX ORDER — INSULIN GLARGINE 100 [IU]/ML
4 INJECTION, SOLUTION SUBCUTANEOUS EVERY 12 HOURS SCHEDULED
Status: DISCONTINUED | OUTPATIENT
Start: 2023-09-20 | End: 2023-09-22

## 2023-09-20 RX ORDER — INSULIN GLARGINE 100 [IU]/ML
5 INJECTION, SOLUTION SUBCUTANEOUS
Status: DISCONTINUED | OUTPATIENT
Start: 2023-09-20 | End: 2023-09-20

## 2023-09-20 RX ORDER — LISINOPRIL 5 MG/1
5 TABLET ORAL DAILY
Status: DISCONTINUED | OUTPATIENT
Start: 2023-09-21 | End: 2023-09-24 | Stop reason: HOSPADM

## 2023-09-20 RX ORDER — MAGNESIUM SULFATE HEPTAHYDRATE 40 MG/ML
2 INJECTION, SOLUTION INTRAVENOUS ONCE
Status: COMPLETED | OUTPATIENT
Start: 2023-09-20 | End: 2023-09-20

## 2023-09-20 RX ORDER — INSULIN LISPRO 100 [IU]/ML
1-5 INJECTION, SOLUTION INTRAVENOUS; SUBCUTANEOUS
Status: DISCONTINUED | OUTPATIENT
Start: 2023-09-20 | End: 2023-09-24 | Stop reason: HOSPADM

## 2023-09-20 RX ADMIN — B-COMPLEX W/ C & FOLIC ACID TAB 1 TABLET: TAB at 08:05

## 2023-09-20 RX ADMIN — GABAPENTIN 300 MG: 300 CAPSULE ORAL at 08:05

## 2023-09-20 RX ADMIN — OXYCODONE HYDROCHLORIDE AND ACETAMINOPHEN 500 MG: 500 TABLET ORAL at 08:05

## 2023-09-20 RX ADMIN — HEPARIN SODIUM 5000 UNITS: 5000 INJECTION INTRAVENOUS; SUBCUTANEOUS at 13:59

## 2023-09-20 RX ADMIN — GABAPENTIN 300 MG: 300 CAPSULE ORAL at 16:36

## 2023-09-20 RX ADMIN — INSULIN LISPRO 3 UNITS: 100 INJECTION, SOLUTION INTRAVENOUS; SUBCUTANEOUS at 11:12

## 2023-09-20 RX ADMIN — GABAPENTIN 300 MG: 300 CAPSULE ORAL at 20:36

## 2023-09-20 RX ADMIN — HYDRALAZINE HYDROCHLORIDE 5 MG: 20 INJECTION INTRAMUSCULAR; INTRAVENOUS at 08:05

## 2023-09-20 RX ADMIN — ATORVASTATIN CALCIUM 20 MG: 20 TABLET, FILM COATED ORAL at 16:36

## 2023-09-20 RX ADMIN — MAGNESIUM SULFATE HEPTAHYDRATE 2 G: 40 INJECTION, SOLUTION INTRAVENOUS at 11:05

## 2023-09-20 RX ADMIN — CALCIUM 1 TABLET: 500 TABLET ORAL at 16:36

## 2023-09-20 RX ADMIN — PANTOPRAZOLE SODIUM 40 MG: 40 TABLET, DELAYED RELEASE ORAL at 07:49

## 2023-09-20 RX ADMIN — MUPIROCIN: 20 OINTMENT TOPICAL at 08:06

## 2023-09-20 RX ADMIN — POTASSIUM CHLORIDE AND SODIUM CHLORIDE 75 ML/HR: 900; 300 INJECTION, SOLUTION INTRAVENOUS at 05:01

## 2023-09-20 RX ADMIN — INSULIN LISPRO 3 UNITS: 100 INJECTION, SOLUTION INTRAVENOUS; SUBCUTANEOUS at 14:00

## 2023-09-20 RX ADMIN — OXYCODONE HYDROCHLORIDE AND ACETAMINOPHEN 500 MG: 500 TABLET ORAL at 16:36

## 2023-09-20 RX ADMIN — CALCIUM 1 TABLET: 500 TABLET ORAL at 07:48

## 2023-09-20 RX ADMIN — CYANOCOBALAMIN TAB 500 MCG 1000 MCG: 500 TAB at 08:05

## 2023-09-20 RX ADMIN — INSULIN GLARGINE 4 UNITS: 100 INJECTION, SOLUTION SUBCUTANEOUS at 21:32

## 2023-09-20 RX ADMIN — PANTOPRAZOLE SODIUM 40 MG: 40 TABLET, DELAYED RELEASE ORAL at 16:36

## 2023-09-20 RX ADMIN — HEPARIN SODIUM 5000 UNITS: 5000 INJECTION INTRAVENOUS; SUBCUTANEOUS at 05:18

## 2023-09-20 RX ADMIN — HEPARIN SODIUM 5000 UNITS: 5000 INJECTION INTRAVENOUS; SUBCUTANEOUS at 21:32

## 2023-09-20 RX ADMIN — LEVOTHYROXINE SODIUM 75 MCG: 75 TABLET ORAL at 05:18

## 2023-09-20 NOTE — PLAN OF CARE
Problem: Potential for Falls  Goal: Patient will remain free of falls  Description: INTERVENTIONS:  - Educate patient/family on patient safety including physical limitations  - Instruct patient to call for assistance with activity   - Consult OT/PT to assist with strengthening/mobility   - Keep Call bell within reach  - Keep bed low and locked with side rails adjusted as appropriate  - Keep care items and personal belongings within reach  - Initiate and maintain comfort rounds  - Make Fall Risk Sign visible to staff  - Apply yellow socks and bracelet for high fall risk patients  - Consider moving patient to room near nurses station  Outcome: Progressing     Problem: MOBILITY - ADULT  Goal: Maintain or return to baseline ADL function  Description: INTERVENTIONS:  -  Assess patient's ability to carry out ADLs; assess patient's baseline for ADL function and identify physical deficits which impact ability to perform ADLs (bathing, care of mouth/teeth, toileting, grooming, dressing, etc.)  - Assess/evaluate cause of self-care deficits   - Assess range of motion  - Assess patient's mobility; develop plan if impaired  - Assess patient's need for assistive devices and provide as appropriate  - Encourage maximum independence but intervene and supervise when necessary  - Involve family in performance of ADLs  - Assess for home care needs following discharge   - Consider OT consult to assist with ADL evaluation and planning for discharge  - Provide patient education as appropriate  Outcome: Progressing  Goal: Maintains/Returns to pre admission functional level  Description: INTERVENTIONS:  - Perform BMAT or MOVE assessment daily.   - Set and communicate daily mobility goal to care team and patient/family/caregiver. - Collaborate with rehabilitation services on mobility goals if consulted  - Perform Range of Motion 2 times a day. - Reposition patient every 3 hours.   - Dangle patient 3 times a day  - Stand patient 3 times a day  - Ambulate patient 3 times a day  - Out of bed to chair 3 times a day   - Out of bed for meals 3 times a day  - Out of bed for toileting  - Record patient progress and toleration of activity level   Outcome: Progressing     Problem: CARDIOVASCULAR - ADULT  Goal: Maintains optimal cardiac output and hemodynamic stability  Description: INTERVENTIONS:  - Monitor I/O, vital signs and rhythm  - Monitor for S/S and trends of decreased cardiac output  - Administer and titrate ordered vasoactive medications to optimize hemodynamic stability  - Assess quality of pulses, skin color and temperature  - Assess for signs of decreased coronary artery perfusion  - Instruct patient to report change in severity of symptoms  Outcome: Progressing     Problem: SKIN/TISSUE INTEGRITY - ADULT  Goal: Skin Integrity remains intact(Skin Breakdown Prevention)  Description: Assess:  -Inspect skin when repositioning, toileting, and assisting with ADLS  -Assess extremities for adequate circulation and sensation     Bed Management:  -Have minimal linens on bed & keep smooth, unwrinkled  -Change linens as needed when moist or perspiring    Toileting:  -Offer bedside commode    Activity:  -Encourage activity and walks on unit  -Encourage or provide ROM exercises   -Use appropriate equipment to lift or move patient in bed    Skin Care:  -Avoid use of baby powder, tape, friction and shearing, hot water or constrictive clothing  -Do not massage red bony areas  Outcome: Progressing     Problem: MUSCULOSKELETAL - ADULT  Goal: Maintain or return mobility to safest level of function  Description: INTERVENTIONS:  - Assess patient's ability to carry out ADLs; assess patient's baseline for ADL function and identify physical deficits which impact ability to perform ADLs (bathing, care of mouth/teeth, toileting, grooming, dressing, etc.)  - Assess/evaluate cause of self-care deficits   - Assess range of motion  - Assess patient's mobility  - Assess patient's need for assistive devices and provide as appropriate  - Encourage maximum independence but intervene and supervise when necessary  - Involve family in performance of ADLs  - Assess for home care needs following discharge   - Consider OT consult to assist with ADL evaluation and planning for discharge  - Provide patient education as appropriate  Outcome: Progressing     Problem: PAIN - ADULT  Goal: Verbalizes/displays adequate comfort level or baseline comfort level  Description: Interventions:  - Encourage patient to monitor pain and request assistance  - Assess pain using appropriate pain scale  - Administer analgesics based on type and severity of pain and evaluate response  - Implement non-pharmacological measures as appropriate and evaluate response  - Consider cultural and social influences on pain and pain management  - Notify physician/advanced practitioner if interventions unsuccessful or patient reports new pain  Outcome: Progressing     Problem: INFECTION - ADULT  Goal: Absence or prevention of progression during hospitalization  Description: INTERVENTIONS:  - Assess and monitor for signs and symptoms of infection  - Monitor lab/diagnostic results  - Monitor all insertion sites, i.e. indwelling lines, tubes, and drains  - Monitor endotracheal if appropriate and nasal secretions for changes in amount and color  - Sizerock appropriate cooling/warming therapies per order  - Administer medications as ordered  - Instruct and encourage patient and family to use good hand hygiene technique  - Identify and instruct in appropriate isolation precautions for identified infection/condition  Outcome: Progressing     Problem: SAFETY ADULT  Goal: Patient will remain free of falls  Description: INTERVENTIONS:  - Educate patient/family on patient safety including physical limitations  - Instruct patient to call for assistance with activity   - Consult OT/PT to assist with strengthening/mobility   - Keep Call bell within reach  - Keep bed low and locked with side rails adjusted as appropriate  - Keep care items and personal belongings within reach  - Initiate and maintain comfort rounds  - Make Fall Risk Sign visible to staff  - Apply yellow socks and bracelet for high fall risk patients  - Consider moving patient to room near nurses station  Outcome: Progressing  Goal: Maintain or return to baseline ADL function  Description: INTERVENTIONS:  -  Assess patient's ability to carry out ADLs; assess patient's baseline for ADL function and identify physical deficits which impact ability to perform ADLs (bathing, care of mouth/teeth, toileting, grooming, dressing, etc.)  - Assess/evaluate cause of self-care deficits   - Assess range of motion  - Assess patient's mobility; develop plan if impaired  - Assess patient's need for assistive devices and provide as appropriate  - Encourage maximum independence but intervene and supervise when necessary  - Involve family in performance of ADLs  - Assess for home care needs following discharge   - Consider OT consult to assist with ADL evaluation and planning for discharge  - Provide patient education as appropriate  Outcome: Progressing  Goal: Maintains/Returns to pre admission functional level  Description: INTERVENTIONS:  - Perform BMAT or MOVE assessment daily.   - Set and communicate daily mobility goal to care team and patient/family/caregiver. - Collaborate with rehabilitation services on mobility goals if consulted  - Perform Range of Motion 3 times a day. - Reposition patient every 2 hours.   - Dangle patient 3 times a day  - Stand patient 3 times a day  - Ambulate patient 3 times a day  - Out of bed to chair 3 times a day   - Out of bed for meals 3 times a day  - Out of bed for toileting  - Record patient progress and toleration of activity level   Outcome: Progressing     Problem: DISCHARGE PLANNING  Goal: Discharge to home or other facility with appropriate resources  Description: INTERVENTIONS:  - Identify barriers to discharge w/patient and caregiver  - Arrange for needed discharge resources and transportation as appropriate  - Identify discharge learning needs (meds, wound care, etc.)  - Arrange for interpretive services to assist at discharge as needed  - Refer to Case Management Department for coordinating discharge planning if the patient needs post-hospital services based on physician/advanced practitioner order or complex needs related to functional status, cognitive ability, or social support system  Outcome: Progressing     Problem: Knowledge Deficit  Goal: Patient/family/caregiver demonstrates understanding of disease process, treatment plan, medications, and discharge instructions  Description: Complete learning assessment and assess knowledge base.   Interventions:  - Provide teaching at level of understanding  - Provide teaching via preferred learning methods  Outcome: Progressing     Problem: Prexisting or High Potential for Compromised Skin Integrity  Goal: Skin integrity is maintained or improved  Description: INTERVENTIONS:  - Identify patients at risk for skin breakdown  - Assess and monitor skin integrity  - Assess and monitor nutrition and hydration status  - Monitor labs   - Assess for incontinence   - Turn and reposition patient  - Assist with mobility/ambulation  - Relieve pressure over bony prominences  - Avoid friction and shearing  - Provide appropriate hygiene as needed including keeping skin clean and dry  - Evaluate need for skin moisturizer/barrier cream  - Collaborate with interdisciplinary team   - Patient/family teaching  - Consider wound care consult   Outcome: Progressing

## 2023-09-20 NOTE — DISCHARGE INSTR - OTHER ORDERS
Skin care Plan:  1-Protect sacrum w/Allevyn foam, arlette with T for treatment, change q3d and PRN, check skin q-shift. 2-Turn/reposition q2h or when medically stable for pressure re-distribution on skin . 3-Elevate heels to offload pressure. 4-Moisturize skin daily with skin nourishing cream  5-Ehob cushion in chair when out of bed. 6-Hydraguard to bilateral heels BID and PRN.

## 2023-09-20 NOTE — CONSULTS
Consultation - Angelina Sparrow 80 y.o. female MRN: [id number]    Unit/Bed#: ICU 02-01 Encounter: [id number]      Assessment/Plan     Type 1 diabetes mellitus with hypoglycemia [facility])  Assessment & Plan  Lab Results   Component Value Date    HGBA1C 8.9 (H) 05/26/2023       Recent Labs     09/20/23  0320 09/20/23  0747 09/20/23  1108 09/20/23  1110   POCGLU 80 70 354* 350*     Diabetes is suboptimally controlled with recurrent hypoglycemia, needed admissions. Following with [facility] nephrology, home regimen is Basaglar 8 units twice a day and correctional insulin, she denies any change in her appetite, she did not miss her meals, and she was taking insulin as directed. Currently hypoglycemia resolved and patient now has hyperglycemia. Primary team resolved basal/bolus insulin, with Lantus 5 units nightly and Humalog 3 units 3 times daily AC. I changed Lantus to 4 units twice a day and decrease Humalog to 2 units 3 times daily AC plus correctional insulin. Continue to monitor blood sugar over time and make adjustments to the regimen if necessary. She will benefit from continuous glucose monitoring, which will be discussed as outpatient. * Acute metabolic encephalopathy  Assessment & Plan  Likely due to hypoglycemia which has resolved. CC: Diabetes Consult    History of Present Illness     HPI: Angelina Sparrow is a 80y.o. year old female with type 1 diabetes who was brought in by EMS for altered mental status and period of unresponsiveness  Was found to be hypoglycemic, patient's initial blood sugar was 30, she received D50, and was continue with D10 infusion. Hypoglycemia resolved and she currently has hyperglycemia of 354 and 350 mg/dl. Patient is known to me from her last visit due to hypoglycemia. She is following with [facility] endocrinology, and last visit was in August 2023. Her home regimen is as below:  Basaglar 8 units q am and 8 units q pm    Continue sliding scale.    BG <120 - none  121 to 160 - 2 units  161 to 200 - 3 units  210 to 250 - 4 units   251 to 300 - 5 units   301 to 350 - 6 units  351 to 400 - 7 units  >400 - 8 units   Patient was seen and evaluated bedside. She reports yesterday morning after breakfast she went to bathroom, unable to get up from the toilet, was found to have low blood sugars. She took her insulin as directed, no change in appetite, no missing meals reported. Inpatient consult to Endocrinology  Consult performed by: Remedios Prasad MD  Consult ordered by: Mookie Watkins MD          Review of Systems   Constitutional: Positive for fatigue. Negative for appetite change and unexpected weight change. HENT: Negative for trouble swallowing and voice change. Eyes: Negative for visual disturbance. Respiratory: Negative for cough, shortness of breath and wheezing. Cardiovascular: Negative for palpitations and leg swelling. Gastrointestinal: Negative for abdominal pain, constipation, diarrhea, nausea and vomiting. Endocrine: Negative for cold intolerance, heat intolerance, polyphagia and polyuria. Musculoskeletal: Negative for arthralgias. Skin: Negative for color change, rash and wound. Neurological: Negative for dizziness, tremors, weakness, light-headedness, numbness and headaches. Psychiatric/Behavioral: Negative for agitation and sleep disturbance. The patient is not nervous/anxious.         Historical Information   Past Medical History:   Diagnosis Date   • Ambulates with cane    • Cancer ([address])    • Chronic pain disorder     knees/ shoulders (gets inj every 3 mos)   • Closed intertrochanteric fracture of right femur ([address]) 5/26/2020   • Controlled type 2 diabetes mellitus with diabetic polyneuropathy, with long-term current use of insulin ([address]) 5/21/2008   • Diabetes mellitus ([address])    • Diabetic polyneuropathy ([address]) 5/21/2008    ICD10 clean up   • Disease of thyroid gland    • 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   • HL (hearing loss)    • Hodgkin's disease ([address]) 2008    Left neck, had radiation   • Hypertension    • Neuropathy    • Osteoporosis    • RA (rheumatoid arthritis) ([address])    • Traumatic rhabdomyolysis ([address]) 10/17/2022     Past Surgical History:   Procedure Laterality Date   • ADENOIDECTOMY     • APPENDECTOMY     • CATARACT EXTRACTION     • CATARACT EXTRACTION, BILATERAL     • CHOLECYSTECTOMY     • FRACTURE SURGERY Right     hip   • MOUTH SURGERY      oral cancer left lower lip   • OVARY SURGERY     • IN ADJT TIS TRNS/REARGMT F/C/C/M/N/A/G/H/F 10SQCM/< N/A 3/28/2022    Procedure: Adjacent tissue transfer face;  Surgeon: Mark Anthony Burk MD;  Location: [facility];  Service: ENT   • IN OPTX FEM SHFT FX W/INSJ IMED IMPLT W/WO SCREW Right 5/28/2020    Procedure: INSERTION NAIL IM FEMUR ANTEGRADE (TROCHANTERIC);   Surgeon: Ancelmo Zimmerman DO;  Location: [address] [place] [address] MAIN OR;  Service: Orthopedics   • IN TRANSMASTOID ANTROTOMY Left 3/28/2022    Procedure: MASTOIDECTOMY;  Surgeon: Mark Anthony Burk MD;  Location: [facility];  Service: ENT   • TONSILLECTOMY     • TOTAL THYROIDECTOMY  2008    Performed after left neck dissection and left oral cancer diagnosis     Social History   Social History     Substance and Sexual Activity   Alcohol Use Not Currently   • Alcohol/week: 0.0 standard drinks of alcohol     Social History     Substance and Sexual Activity   Drug Use Never     Social History     Tobacco Use   Smoking Status Never   Smokeless Tobacco Never     Family History:   Family History   Problem Relation Age of Onset   • Cancer Mother    • Diabetes Mother    • Diabetes Father    • Hypertension Father        Meds/Allergies   Current Facility-Administered Medications   Medication Dose Route Frequency Provider Last Rate Last Admin   • acetaminophen (TYLENOL) tablet 650 mg  650 mg Oral Q6H PRN Morenita Moreau MD       • ascorbic acid (VITAMIN C) tablet 500 mg  500 mg Oral BID Morenita Moreau MD   500 mg at 09/20/23 0805   • atorvastatin (LIPITOR) tablet 20 mg  20 mg Oral Daily With Peggy Richter MD   20 mg at 09/19/23 1744   • calcium carbonate (OYSTER SHELL,OSCAL) 500 mg tablet 1 tablet  1 tablet Oral BID With Meals Janna Estrella MD   1 tablet at 09/20/23 0748   • cyanocobalamin (VITAMIN B-12) tablet 1,000 mcg  1,000 mcg Oral Daily Janna Estrella MD   1,000 mcg at 09/20/23 0805   • gabapentin (NEURONTIN) capsule 300 mg  300 mg Oral TID Janna Estrella MD   300 mg at 09/20/23 0805   • heparin (porcine) subcutaneous injection 5,000 Units  5,000 Units Subcutaneous [facility] Janna Estrella MD   5,000 Units at 09/20/23 0518   • hydrALAZINE (APRESOLINE) injection 5 mg  5 mg Intravenous Q6H PRN Janna Estrella MD   5 mg at 09/20/23 0805   • insulin lispro (HumaLOG) 100 units/mL subcutaneous injection 1-5 Units  1-5 Units Subcutaneous 4x Daily (AC & HS) Janna Estrella MD   3 Units at 09/20/23 1112   • levothyroxine tablet 75 mcg  75 mcg Oral Early Morning Janna Estrella MD   75 mcg at 09/20/23 0518   • meclizine (ANTIVERT) tablet 25 mg  25 mg Oral Q8H PRN Janna Estrella MD       • multivitamin stress formula tablet 1 tablet  1 tablet Oral Daily Janna Estrella MD   1 tablet at 09/20/23 0805   • mupirocin (BACTROBAN) 2 % ointment   Topical Daily Janna Estrella MD   Given at 09/20/23 0806   • ondansetron (ZOFRAN) injection 4 mg  4 mg Intravenous Q4H PRN Janna Estrella MD       • pantoprazole (PROTONIX) EC tablet 40 mg  40 mg Oral BID AC Janna Estrella MD   40 mg at 09/20/23 0749   • sodium chloride 0.9 % with KCl 40 mEq/L infusion (premix)  75 mL/hr Intravenous Continuous Janna Estrella MD 75 mL/hr at 09/20/23 0501 75 mL/hr at 09/20/23 0501     No Known Allergies    Objective   Vitals: Blood pressure (!) 173/74, pulse 79, temperature 99.3 °F (37.4 °C), temperature source Bladder, resp. rate (!) 39, height 5' 1" (1.549 m), weight 50.3 kg (111 lb), SpO2 96 %.     Intake/Output Summary (Last 24 hours) at 9/20/2023 1316  Last data filed at 9/20/2023 1300  Gross per 24 hour   Intake 300 ml   Output 2555 ml   Net -2255 ml     Invasive Devices     Peripheral Intravenous Line  Duration           Peripheral IV 09/19/23 Right Antecubital 1 day    Peripheral IV 09/19/23 Distal;Right;Ventral (anterior) Forearm <1 day          Drain  Duration           Urethral Catheter Temperature probe 18 Fr. 1 day                Physical Exam  Constitutional:       Appearance: She is well-developed. HENT:      Head: Normocephalic and atraumatic. Nose: Nose normal.   Eyes:      Pupils: Pupils are equal, round, and reactive to light. Neck:      Thyroid: No thyromegaly. Vascular: No JVD. Cardiovascular:      Rate and Rhythm: Normal rate and regular rhythm. Heart sounds: Normal heart sounds. No murmur heard. No friction rub. No gallop. Pulmonary:      Effort: Pulmonary effort is normal. No respiratory distress. Breath sounds: Normal breath sounds. No stridor. No wheezing or rales. Chest:      Chest wall: No tenderness. Abdominal:      General: Bowel sounds are normal. There is no distension. Palpations: Abdomen is soft. There is no mass. Tenderness: There is no abdominal tenderness. There is no guarding. Musculoskeletal:         General: No deformity. Normal range of motion. Cervical back: Normal range of motion. Skin:     General: Skin is warm. Neurological:      Mental Status: She is alert and oriented to person, place, and time. The history was obtained from the review of the chart, patient.     Lab Results:       Lab Results   Component Value Date    WBC 6.19 09/20/2023    HGB 10.7 (L) 09/20/2023    HCT 34.0 (L) 09/20/2023     (H) 09/20/2023     09/20/2023     Lab Results   Component Value Date/Time    BUN 10 09/20/2023 05:17 AM    K 4.4 09/20/2023 05:17 AM     (H) 09/20/2023 05:17 AM    CO2 28 09/20/2023 05:17 AM    CREATININE 0.62 09/20/2023 05:17 AM    AST 23 09/19/2023 10:50 AM    ALT 20 09/19/2023 10:50 AM    TP 6.2 (L) 09/19/2023 10:50 AM ALB 4.0 09/19/2023 10:50 AM     No results for input(s): "CHOL", "HDL", "LDL", "TRIG", "VLDL" in the last 72 hours. No results found for: "Drea Skates", "[id number]"  POC Glucose (mg/dl)   Date Value   09/20/2023 350 (H)   09/20/2023 354 (H)   09/20/2023 70   09/20/2023 80   09/20/2023 87   09/19/2023 121   09/19/2023 137   09/19/2023 107   09/19/2023 104   09/19/2023 131       Imaging Studies: I have personally reviewed pertinent reports. Portions of the record may have been created with voice recognition software.

## 2023-09-20 NOTE — PLAN OF CARE
Problem: METABOLIC, FLUID AND ELECTROLYTES - ADULT  Goal: Electrolytes maintained within normal limits  Description: INTERVENTIONS:  - Monitor labs and assess patient for signs and symptoms of electrolyte imbalances  - Administer electrolyte replacement as ordered  - Monitor response to electrolyte replacements, including repeat lab results as appropriate  - Instruct patient on fluid and nutrition as appropriate  Outcome: Progressing  Goal: Glucose maintained within target range  Description: INTERVENTIONS:  - Monitor Blood Glucose as ordered  - Assess for signs and symptoms of hyperglycemia and hypoglycemia  - Administer ordered medications to maintain glucose within target range  - Assess nutritional intake and initiate nutrition service referral as needed  Outcome: Progressing

## 2023-09-20 NOTE — CASE MANAGEMENT
Case Management Assessment & Discharge Planning Note    Patient name Roz Mann  Location ICU 02/ICU  MRN [id number]  : 1940 Date 2023       Current Admission Date: 2023  Current Admission Diagnosis:Acute metabolic encephalopathy   Patient Active Problem List    Diagnosis Date Noted   • Hypoglycemia 2023   • Hypothermia 2023   • Hypokalemia 2023   • History of Hodgkin's lymphoma 2023   • Hypothyroidism 2023   • Acute metabolic encephalopathy 68/10/3129   • Gastroesophageal reflux disease without esophagitis 10/17/2022   • Abnormal CT of the abdomen 07/10/2022   • Soft tissue radionecrosis 2022   • 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   • Primary osteoarthritis of right shoulder 2021   • Primary osteoarthritis of both knees 2020   • Postoperative hypothyroidism 2015   • Hyperlipidemia 2014   • Essential hypertension 10/10/2013   • Type 1 diabetes mellitus with hypoglycemia ([address]) 2008      LOS (days): 1  Geometric Mean LOS (GMLOS) (days): 3.90  Days to GMLOS:3     OBJECTIVE:    Risk of Unplanned Readmission Score: 26.6     Current admission status: Inpatient    Preferred Pharmacy:   [facility] [address] [place] [address]  Phone: [phone] Fax: [phone]    Primary Care Provider: Nika Goodwin MD    Primary Insurance: MEDICARE  Secondary Insurance: AARP    ASSESSMENT:  Active Health Care Proxies     [facility] Representative - Daughter   Primary Phone: [phone] (Mobile)  Home Phone: [phone]               Advance Directives  Does patient have a [address]?: Yes  Does patient have Advance Directives?: Yes  Advance Directives: Living will, Power of  for health care  Primary Contact: Frandy Jang dtr    Readmission Root Cause  30 Day Readmission: No    Patient Information  Admitted from[de-identified] Home  Mental Status: Alert  During Assessment patient was accompanied by: Not accompanied during assessment  Assessment information provided by[de-identified] Patient  Primary Caregiver: Self  Support Systems: Daughter  [place] of Residence: [place] do you live in?: [address] [facility] entry access options.  Select all that apply.: Stairs  Number of steps to enter home.: 2  Do the steps have railings?: Yes  Type of Current Residence: [place]  In the last 12 months, was there a time when you were not able to pay the mortgage or rent on time?: No  In the last 12 months, how many places have you lived?: 1  In the last 12 months, was there a time when you did not have a steady place to sleep or slept in a shelter (including now)?: No  Homeless/housing insecurity resource given?: N/A  Living Arrangements: Lives Alone  Is patient a ?: No    Activities of Daily Living Prior to Admission  Functional Status: Independent  Completes ADLs independently?: Yes  Ambulates independently?: Yes  Does patient use assisted devices?: Yes  Assisted Devices (DME) used: Hieu Vazquez  Does patient currently own DME?: Yes  What DME does the patient currently own?: Hieu Vazquez  Does patient have a history of Outpatient Therapy (PT/OT)?: No  Does the patient have a history of Short-Term Rehab?: Yes ([facility]he[facility] Karen)  Does patient have a history of HHC?: Yes  Does patient currently have [facility]?: Yes [facility]    Current Home Health Care  Type of Current Home Care Services: Home OT, Nurse visit, Home PT  [address][de-identified] Other (please enter name in comment) [facility])  [address] [facility] Provider[de-identified] PCP    Patient Information Continued  Income Source: Pension/correction  Does patient have prescription coverage?: Yes  Within the past 12 months, you worried that your food would run out before you got the money to buy more.: Never true  Within the past 12 months, the food you bought just didn't last and you didn't have money to get more.: Never true  Food insecurity resource given?: N/A  Does patient receive dialysis treatments?: No  Does patient have a history of substance abuse?: No    PHQ 2/9 Screening   Reviewed PHQ 2/9 Depression Screening Score?: No    Means of Transportation  Means of Transport to Appts[de-identified] Family transport  In the past 12 months, has lack of transportation kept you from medical appointments or from getting medications?: No  In the past 12 months, has lack of transportation kept you from meetings, work, or from getting things needed for daily living?: No  Was application for public transport provided?: N/A  DISCHARGE DETAILS:    Discharge planning discussed with[de-identified] Pt  Freedom of Choice: Yes  Comments - Freedom of Choice: Pt states she is active with [facility] and would like to resume same.   A referral was made via [address] [place] [address]  Contacts  Patient Contacts: Phill Bales dtr  Relationship to Patient[de-identified] Family  Contact Method: Phone  Phone Number: [phone]  Reason/Outcome: Emergency [address]         Is the patient interested in [address] [facility] at discharge?: Yes  [facility] requested[de-identified] Nursing, Occupational Therapy, Physical [address] [facility] Name[de-identified] [facility] External Referral Reason (only applicable if external HHA name selected): Patient has established relationship with provider  [address] [facility] Provider[de-identified] PCP  Home Health Services Needed[de-identified] Diabetes Management, Strengthening/Theraputic Exercises to Improve Function, Gait/ADL Training, Evaluate Functional Status and Safety  Homebound Criteria Met[de-identified] Uses an Assist Device (i.e. cane, walker, etc), Requires the Assistance of Another Person for Safe Ambulation or to Leave the Home  Supporting Clincal Findings[de-identified] Fatigues Easliy in [employer], Limited Endurance    DME Referral Provided  Referral made for DME?: No  Would you like to participate [facility]?  : No - Declined    Treatment Team Recommendation: Home with [address]  Discharge Destination Plan[de-identified] Home with [address] [facility] [address] [facility] [address] to resume care.

## 2023-09-20 NOTE — ASSESSMENT & PLAN NOTE
Suspect secondary to transient hypoglycemia and hypothermia (see below)  CT head negative for acute intracranial etiology  Monitor/replete electrolytes  CK normal  Urinalysis unremarkable for infectious process  CT of chest/abdomen/pelvis negative  Continue neurochecks -> mentation improved today  Await PT/OT input

## 2023-09-20 NOTE — ASSESSMENT & PLAN NOTE
Continue Synthroid - acquired state status post prior thyroidectomy  In the setting of altered mentation, pending free T4 (updated TSH low)

## 2023-09-20 NOTE — WOUND OSTOMY CARE
Consult Note - Wound   Carlos Lewis 80 y.o. female MRN: [id number]  Unit/Bed#: ICU 02-01 Encounter: [id number]        History and Present Illness:  80year old female admitted with Acute metabolic encephalopathy, PMH: Hypoglycemia,hypokalemia,hypothermia,history of Hodgkin's Lymphoma. Assessment Findings:   1)POA posterior right heel 0.5x 0.8 non blanchable erythema. 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 Left buttocks hyperpigmented dry intact  3)Right buttocks hyperpigmentation with 1. 1.cm dry intact brown scab.allevyn foam applied as protective measure, pt is on an alternating air mattress. Seen today for initial wound consult. Supine in bed, alert oriented. Kinney catheter for bladder  Management. Max assist to turn and reposition. No induration, fluctuance, odor, warmth/temperature differences, redness, or purulence noted to the above noted wounds and skin areas assessed. New dressings applied per orders listed below. Patient tolerated well- no s/s of non-verbal pain or discomfort observed during the encounter. Bedside nurse aware of plan of care. See flow sheets for more detailed assessment findings. Wound care will continue to follow. Skin care Plan:  1-Protect sacrum w/Allevyn foam, arlette with T for treatment, change q3d and PRN, check skin q-shift. 2-Turn/reposition q2h or when medically stable for pressure re-distribution on skin . 3-Elevate heels to offload pressure. 4-Moisturize skin daily with skin nourishing cream  5-Ehob cushion in chair when out of bed. 6-Hydraguard to bilateral heels BID and PRN. Wounds:  Wound 05/24/23 Pressure Injury Buttocks Bilateral (Active)   Wound Image   09/20/23 1543   Wound Description Clean; Intact; Brown 09/20/23 1549   Pressure Injury Stage 2 09/20/23 1549   Tess-wound Assessment Dry; Intact; Brown;Hyperpigmented 09/20/23 1549   Wound Length (cm) 1 cm 09/20/23 1837 Wound Width (cm) 1 cm 09/20/23 1549   Wound Surface Area (cm^2) 1 cm^2 09/20/23 1549   Drainage Amount None 09/20/23 1549   Treatments Site care 09/20/23 1549   Dressing Foam, Silicon (eg. Allevyn, etc) 09/20/23 1549   Dressing Changed Reinforced 09/20/23 1549   Patient Tolerance Tolerated well 09/20/23 1549   Dressing Status Clean;Dry; Intact 09/20/23 1549       Wound 09/20/23 Pressure Injury Heel Right;Posterior (Active)   Wound Image    09/20/23 1546   Wound Description Non-blanchable erythema; Intact;Dry 09/20/23 1546   Pressure Injury Stage 1 09/20/23 1546   Tess-wound Assessment Dry; Intact 09/20/23 1546   Wound Length (cm) 0.5 cm 09/20/23 1546   Wound Width (cm) 0.8 cm 09/20/23 1546   Wound Surface Area (cm^2) 0.4 cm^2 09/20/23 1546   Drainage Amount None 09/20/23 1546   Treatments Site care;Elevated 09/20/23 1546   Dressing Protective barrier 09/20/23 1546   Dressing Changed Other (Comment) 09/20/23 1546   Patient Tolerance Tolerated well 09/20/23 1546   Dressing Status Remoistened 09/20/23 1546     Call or tigertext with any questions  Wound Care will continue to follow    [facility] Center[facility]les Ceballos BSN RN [facility], [facility].

## 2023-09-20 NOTE — ASSESSMENT & PLAN NOTE
Transient course resolved status post Damaris hugger therapy in the ED  Possibly associated with hypoglycemia? ?    WBC count normal - no tachycardia/tachypnea in the ED thus far - hypotension resolved, hence, will discontinue IV fluids  Lactic acidosis normalized status post IV fluids   Procalcitonin normal -> less likely suspicion of acute infection at this time (observe off further antibiotics)  CT of chest/abdomen/pelvis unremarkable

## 2023-09-20 NOTE — ASSESSMENT & PLAN NOTE
In the setting of in the setting of known insulin-dependent diabetes mellitus  Initially held basal insulin with solo PRN SSI coverage -> will resume basal insulin today with fixed/prandial dosing as mentation improved with increased oral intake and intermittent episodes of paradoxical hyperglycemia  Blood sugars now stable in the 100-130s s/p dextrose containing fluids en route to hospital  Endocrinology to follow

## 2023-09-20 NOTE — ASSESSMENT & PLAN NOTE
Lab Results   Component Value Date    HGBA1C 8.9 (H) 05/26/2023       Recent Labs     09/20/23  0320 09/20/23  0747 09/20/23  1108 09/20/23  1110   POCGLU 80 70 354* 350*     Diabetes is suboptimally controlled with recurrent hypoglycemia, needed admissions. Following with [facility] nephrology, home regimen is Basaglar 8 units twice a day and correctional insulin, she denies any change in her appetite, she did not miss her meals, and she was taking insulin as directed. Currently hypoglycemia resolved and patient now has hyperglycemia. Primary team resolved basal/bolus insulin, with Lantus 5 units nightly and Humalog 3 units 3 times daily AC. I changed Lantus to 4 units twice a day and decrease Humalog to 2 units 3 times daily AC plus correctional insulin. Continue to monitor blood sugar over time and make adjustments to the regimen if necessary. She will benefit from continuous glucose monitoring, which will be discussed as outpatient.

## 2023-09-20 NOTE — PROGRESS NOTES
[address]  Progress Note  Name: Anay Barr I  MRN: [id number]  Unit/Bed#: ICU 02-01 I Date of Admission: 9/19/2023   Date of Service: 9/20/2023 I Hospital Day: 1      Assessment & Plan:    * Acute metabolic encephalopathy  Assessment & Plan  Suspect secondary to transient hypoglycemia and hypothermia (see below)  CT head negative for acute intracranial etiology  Monitor/replete electrolytes  CK normal  Urinalysis unremarkable for infectious process  CT of chest/abdomen/pelvis negative  Continue neurochecks -> mentation improved today  Await PT/OT input    Hypoglycemia  Assessment & Plan  In the setting of in the setting of known insulin-dependent diabetes mellitus  Initially held basal insulin with solo PRN SSI coverage -> will resume basal insulin today with fixed/prandial dosing as mentation improved with increased oral intake and intermittent episodes of paradoxical hyperglycemia  Blood sugars now stable in the 100-130s s/p dextrose containing fluids en route to hospital  Endocrinology to follow    Hypothermia  Assessment & Plan  Transient course resolved status post Damaris hugger therapy in the ED  Possibly associated with hypoglycemia? ?    WBC count normal - no tachycardia/tachypnea in the ED thus far - hypotension resolved, hence, will discontinue IV fluids  Lactic acidosis normalized status post IV fluids   Procalcitonin normal -> less likely suspicion of acute infection at this time (observe off further antibiotics)  CT of chest/abdomen/pelvis unremarkable    Hypokalemia  Assessment & Plan  Normalized status post repletion    Hypomagnesemia  Assessment & Plan  Monitor/replete serum magnesium    History of Hodgkin's lymphoma  Assessment & Plan  Status post mastoidectomy/left neck dissection and subsequent radiation    Essential hypertension  Assessment & Plan  Continue Zestril - additional PRN IV Hydralazine on board  Low-sodium restriction    Hypothyroidism  Assessment & Plan  Continue Synthroid - acquired state status post prior thyroidectomy  In the setting of altered mentation, pending free T4 (updated TSH low)    Hyperlipidemia  Assessment & Plan  Continue statin      DVT Prophylaxis:  Heparin SC       Patient Centered Rounds:  I have performed bedside rounds and discussed plan of care with nursing today. Discussions with Specialists or Other Care Team Provider:  see above assessments if applicable    Education and Discussions with Family / Patient: Patient at bedside - left voicemail for daughter, Avril Mackey, yesterday - unable to contact today on both listed numbers    Time Spent for Care:  35 minutes. More than 50% of total time spent on counseling and coordination of care, on one or more of the following: performing physical exam; counseling and coordination of care, obtaining or reviewing history, documenting in the medical record, reviewing/ordering tests/medications/procedures, and communicating with other healthcare professionals. Current Length of Stay: 1 day(s)  Current Patient Status: Inpatient   Certification Statement:  Patient will continue to require additional hospital stay due to assessments as noted above. Code Status: Level 3 - DNAR and DNI        Subjective:     Encountered earlier in the day. Awake and alert during my encounter. Still complains of some weakness and fatigue, however. Objective:     Vitals:   Temp (24hrs), Av.5 °F (36.9 °C), Min:96.3 °F (35.7 °C), Max:99.3 °F (37.4 °C)    Temp:  [96.3 °F (35.7 °C)-99.3 °F (37.4 °C)] 99.3 °F (37.4 °C)  HR:  [68-83] 79  Resp:  [16-39] 39  BP: ()/(50-88) 173/74  SpO2:  [96 %-99 %] 96 %  Body mass index is 20.97 kg/m². Input and Output Summary (last 24 hours):        Intake/Output Summary (Last 24 hours) at 2023 1336  Last data filed at 2023 1300  Gross per 24 hour   Intake 300 ml   Output 2555 ml   Net -2255 ml       Physical Exam:     GENERAL  weak/fatigued   HEAD Normocephalic - atraumatic   EYES   PERRL - EOMI    MOUTH   Mucosa moist   NECK   Supple - full range of motion   CARDIAC  rate controlled - S1/S2 positive   PULMONARY    Clear breath sounds bilaterally - nonlabored respirations   ABDOMEN   Soft - nontender/nondistended - active bowel sounds   MUSCULOSKELETAL   Motor strength/range of motion deconditioned   NEUROLOGIC  awake/alert with baseline cognitive impairment   SKIN   Chronic wrinkles/blemishes    PSYCHIATRIC   Mood/affect pleasant currently         Additional Data:     Labs & Recent Cultures:    Results from last 7 days   Lab Units 09/20/23  0517   WBC Thousand/uL 6.19   HEMOGLOBIN g/dL 10.7*   HEMATOCRIT % 34.0*   PLATELETS Thousands/uL 219   NEUTROS PCT % 65   LYMPHS PCT % 24   MONOS PCT % 9   EOS PCT % 1     Results from last 7 days   Lab Units 09/20/23  0517 09/19/23  1050   POTASSIUM mmol/L 4.4 3.4*   CHLORIDE mmol/L 109* 99   CO2 mmol/L 28 29   BUN mg/dL 10 17   CREATININE mg/dL 0.62 0.59*   CALCIUM mg/dL 7.9* 8.4   ALK PHOS U/L  --  57   ALT U/L  --  20   AST U/L  --  23     Results from last 7 days   Lab Units 09/19/23  1050   INR  0.94     Results from last 7 days   Lab Units 09/20/23  1110 09/20/23  1108 09/20/23  0747 09/20/23  0320 09/20/23  0032 09/19/23  2052 09/19/23  1858 09/19/23  1558 09/19/23  1416 09/19/23  1120 09/19/23  1026   POC GLUCOSE mg/dl 350* 354* 70 80 87 121 137 107 104 131 121         Results from last 7 days   Lab Units 09/20/23  0517 09/19/23  1406 09/19/23  1050   LACTIC ACID mmol/L  --  0.9 2.6*   PROCALCITONIN ng/ml 0.07  --  0.06         Results from last 7 days   Lab Units 09/19/23  1050   BLOOD CULTURE  Received in Microbiology Lab. Culture in Progress. Received in Microbiology Lab. Culture in Progress.          Lines/Drains:  Invasive Devices     Peripheral Intravenous Line  Duration           Peripheral IV 09/19/23 Right Antecubital 1 day    Peripheral IV 09/19/23 Distal;Right;Ventral (anterior) Forearm <1 day Drain  Duration           Urethral Catheter Temperature probe 18 Fr. 1 day                  Last 24 Hours Medication List:   Current Facility-Administered Medications   Medication Dose Route Frequency Provider Last Rate   • acetaminophen  650 mg Oral Q6H PRN Omari Hardin MD     • vitamin C  500 mg Oral BID Omari Hardin MD     • atorvastatin  20 mg Oral Daily With Jeronimo Hutson MD     • calcium carbonate  1 tablet Oral BID With Meals Omari Hardin MD     • cyanocobalamin  1,000 mcg Oral Daily Omari Hardin MD     • gabapentin  300 mg Oral TID Omari Hardin MD     • heparin (porcine)  5,000 Units Subcutaneous Q8H [address] Omari Hardin MD     • hydrALAZINE  5 mg Intravenous Q6H PRN Omari Hardin MD     • insulin glargine  5 Units Subcutaneous HS Omari Hardin MD     • insulin lispro  1-5 Units Subcutaneous 4x Daily (AC & HS) Omari Hardin MD     • insulin lispro  3 Units Subcutaneous TID With Meals Omari Hardin MD     • levothyroxine  75 mcg Oral Early Morning Omari Hardin MD     • [START ON 9/21/2023] lisinopril  5 mg Oral Daily Omari Hardin MD     • meclizine  25 mg Oral Q8H PRN Omari Hardin MD     • multivitamin stress formula  1 tablet Oral Daily Omari Hadrin MD     • mupirocin   Topical Daily Omari Hardin MD     • ondansetron  4 mg Intravenous Q4H PRN Omari Hardin MD     • pantoprazole  40 mg Oral BID AC Omari Hardin MD                    ** Please Note: This note is constructed using a voice recognition dictation system. An occasional wrong word/phrase or “sound-a-like” substitution may have been picked up by dictation device due to the inherent limitations of voice recognition software. Read the chart carefully and recognize, using reasonable context, where substitutions may have occurred. **

## 2023-09-21 LAB
ANION GAP SERPL CALCULATED.3IONS-SCNC: 8 MMOL/L
BASOPHILS # BLD AUTO: 0.08 THOUSANDS/ÂΜL (ref 0–0.1)
BASOPHILS NFR BLD AUTO: 1 % (ref 0–1)
BUN SERPL-MCNC: 11 MG/DL (ref 5–25)
CALCIUM SERPL-MCNC: 8.3 MG/DL (ref 8.4–10.2)
CHLORIDE SERPL-SCNC: 104 MMOL/L (ref 96–108)
CO2 SERPL-SCNC: 26 MMOL/L (ref 21–32)
CREAT SERPL-MCNC: 0.68 MG/DL (ref 0.6–1.3)
EOSINOPHIL # BLD AUTO: 0.07 THOUSAND/ÂΜL (ref 0–0.61)
EOSINOPHIL NFR BLD AUTO: 1 % (ref 0–6)
ERYTHROCYTE [DISTWIDTH] IN BLOOD BY AUTOMATED COUNT: 12.7 % (ref 11.6–15.1)
GFR SERPL CREATININE-BSD FRML MDRD: 81 ML/MIN/1.73SQ M
GLUCOSE SERPL-MCNC: 214 MG/DL (ref 65–140)
GLUCOSE SERPL-MCNC: 220 MG/DL (ref 65–140)
GLUCOSE SERPL-MCNC: 252 MG/DL (ref 65–140)
GLUCOSE SERPL-MCNC: 294 MG/DL (ref 65–140)
GLUCOSE SERPL-MCNC: 316 MG/DL (ref 65–140)
GLUCOSE SERPL-MCNC: 64 MG/DL (ref 65–140)
HCT VFR BLD AUTO: 37.3 % (ref 34.8–46.1)
HGB BLD-MCNC: 11.8 G/DL (ref 11.5–15.4)
IMM GRANULOCYTES # BLD AUTO: 0.03 THOUSAND/UL (ref 0–0.2)
IMM GRANULOCYTES NFR BLD AUTO: 0 % (ref 0–2)
LYMPHOCYTES # BLD AUTO: 1.5 THOUSANDS/ÂΜL (ref 0.6–4.47)
LYMPHOCYTES NFR BLD AUTO: 20 % (ref 14–44)
MAGNESIUM SERPL-MCNC: 1.9 MG/DL (ref 1.9–2.7)
MCH RBC QN AUTO: 33.1 PG (ref 26.8–34.3)
MCHC RBC AUTO-ENTMCNC: 31.6 G/DL (ref 31.4–37.4)
MCV RBC AUTO: 105 FL (ref 82–98)
MONOCYTES # BLD AUTO: 0.61 THOUSAND/ÂΜL (ref 0.17–1.22)
MONOCYTES NFR BLD AUTO: 8 % (ref 4–12)
NEUTROPHILS # BLD AUTO: 5.15 THOUSANDS/ÂΜL (ref 1.85–7.62)
NEUTS SEG NFR BLD AUTO: 70 % (ref 43–75)
NRBC BLD AUTO-RTO: 0 /100 WBCS
PHOSPHATE SERPL-MCNC: 3.1 MG/DL (ref 2.3–4.1)
PLATELET # BLD AUTO: 236 THOUSANDS/UL (ref 149–390)
PMV BLD AUTO: 10.8 FL (ref 8.9–12.7)
POTASSIUM SERPL-SCNC: 4 MMOL/L (ref 3.5–5.3)
RBC # BLD AUTO: 3.57 MILLION/UL (ref 3.81–5.12)
SODIUM SERPL-SCNC: 138 MMOL/L (ref 135–147)
WBC # BLD AUTO: 7.44 THOUSAND/UL (ref 4.31–10.16)

## 2023-09-21 PROCEDURE — 97167 OT EVAL HIGH COMPLEX 60 MIN: CPT

## 2023-09-21 PROCEDURE — 83735 ASSAY OF MAGNESIUM: CPT | Performed by: INTERNAL MEDICINE

## 2023-09-21 PROCEDURE — 82948 REAGENT STRIP/BLOOD GLUCOSE: CPT

## 2023-09-21 PROCEDURE — 97163 PT EVAL HIGH COMPLEX 45 MIN: CPT

## 2023-09-21 PROCEDURE — 99232 SBSQ HOSP IP/OBS MODERATE 35: CPT

## 2023-09-21 PROCEDURE — 80048 BASIC METABOLIC PNL TOTAL CA: CPT | Performed by: INTERNAL MEDICINE

## 2023-09-21 PROCEDURE — 85025 COMPLETE CBC W/AUTO DIFF WBC: CPT | Performed by: INTERNAL MEDICINE

## 2023-09-21 PROCEDURE — 84100 ASSAY OF PHOSPHORUS: CPT | Performed by: INTERNAL MEDICINE

## 2023-09-21 RX ADMIN — HEPARIN SODIUM 5000 UNITS: 5000 INJECTION INTRAVENOUS; SUBCUTANEOUS at 14:02

## 2023-09-21 RX ADMIN — CALCIUM 1 TABLET: 500 TABLET ORAL at 09:08

## 2023-09-21 RX ADMIN — INSULIN LISPRO 1 UNITS: 100 INJECTION, SOLUTION INTRAVENOUS; SUBCUTANEOUS at 09:07

## 2023-09-21 RX ADMIN — LEVOTHYROXINE SODIUM 75 MCG: 75 TABLET ORAL at 05:38

## 2023-09-21 RX ADMIN — B-COMPLEX W/ C & FOLIC ACID TAB 1 TABLET: TAB at 09:08

## 2023-09-21 RX ADMIN — HEPARIN SODIUM 5000 UNITS: 5000 INJECTION INTRAVENOUS; SUBCUTANEOUS at 05:38

## 2023-09-21 RX ADMIN — INSULIN GLARGINE 4 UNITS: 100 INJECTION, SOLUTION SUBCUTANEOUS at 21:52

## 2023-09-21 RX ADMIN — GABAPENTIN 300 MG: 300 CAPSULE ORAL at 21:52

## 2023-09-21 RX ADMIN — OXYCODONE HYDROCHLORIDE AND ACETAMINOPHEN 500 MG: 500 TABLET ORAL at 09:09

## 2023-09-21 RX ADMIN — CALCIUM 1 TABLET: 500 TABLET ORAL at 17:04

## 2023-09-21 RX ADMIN — INSULIN LISPRO 2 UNITS: 100 INJECTION, SOLUTION INTRAVENOUS; SUBCUTANEOUS at 11:59

## 2023-09-21 RX ADMIN — ACETAMINOPHEN 650 MG: 325 TABLET ORAL at 01:08

## 2023-09-21 RX ADMIN — HYDRALAZINE HYDROCHLORIDE 5 MG: 20 INJECTION INTRAMUSCULAR; INTRAVENOUS at 09:14

## 2023-09-21 RX ADMIN — LISINOPRIL 5 MG: 5 TABLET ORAL at 09:09

## 2023-09-21 RX ADMIN — OXYCODONE HYDROCHLORIDE AND ACETAMINOPHEN 500 MG: 500 TABLET ORAL at 17:04

## 2023-09-21 RX ADMIN — GABAPENTIN 300 MG: 300 CAPSULE ORAL at 17:04

## 2023-09-21 RX ADMIN — HEPARIN SODIUM 5000 UNITS: 5000 INJECTION INTRAVENOUS; SUBCUTANEOUS at 21:52

## 2023-09-21 RX ADMIN — GABAPENTIN 300 MG: 300 CAPSULE ORAL at 09:08

## 2023-09-21 RX ADMIN — PANTOPRAZOLE SODIUM 40 MG: 40 TABLET, DELAYED RELEASE ORAL at 17:06

## 2023-09-21 RX ADMIN — PANTOPRAZOLE SODIUM 40 MG: 40 TABLET, DELAYED RELEASE ORAL at 09:08

## 2023-09-21 RX ADMIN — INSULIN LISPRO 2 UNITS: 100 INJECTION, SOLUTION INTRAVENOUS; SUBCUTANEOUS at 09:07

## 2023-09-21 RX ADMIN — INSULIN GLARGINE 4 UNITS: 100 INJECTION, SOLUTION SUBCUTANEOUS at 09:06

## 2023-09-21 RX ADMIN — CYANOCOBALAMIN TAB 500 MCG 1000 MCG: 500 TAB at 09:08

## 2023-09-21 RX ADMIN — ATORVASTATIN CALCIUM 20 MG: 20 TABLET, FILM COATED ORAL at 17:04

## 2023-09-21 NOTE — PLAN OF CARE
Problem: Potential for Falls  Goal: Patient will remain free of falls  Description: INTERVENTIONS:  - Educate patient/family on patient safety including physical limitations  - Instruct patient to call for assistance with activity   - Consult OT/PT to assist with strengthening/mobility   - Keep Call bell within reach  - Keep bed low and locked with side rails adjusted as appropriate  - Keep care items and personal belongings within reach  - Initiate and maintain comfort rounds  - Make Fall Risk Sign visible to staff  - Offer Toileting every *** Hours, in advance of need  - Initiate/Maintain ***alarm  - Obtain necessary fall risk management equipment: ***  - Apply yellow socks and bracelet for high fall risk patients  - Consider moving patient to room near nurses station  Outcome: Progressing     Problem: MOBILITY - ADULT  Goal: Maintain or return to baseline ADL function  Description: INTERVENTIONS:  -  Assess patient's ability to carry out ADLs; assess patient's baseline for ADL function and identify physical deficits which impact ability to perform ADLs (bathing, care of mouth/teeth, toileting, grooming, dressing, etc.)  - Assess/evaluate cause of self-care deficits   - Assess range of motion  - Assess patient's mobility; develop plan if impaired  - Assess patient's need for assistive devices and provide as appropriate  - Encourage maximum independence but intervene and supervise when necessary  - Involve family in performance of ADLs  - Assess for home care needs following discharge   - Consider OT consult to assist with ADL evaluation and planning for discharge  - Provide patient education as appropriate  Outcome: Progressing  Goal: Maintains/Returns to pre admission functional level  Description: INTERVENTIONS:  - Perform BMAT or MOVE assessment daily.   - Set and communicate daily mobility goal to care team and patient/family/caregiver.    - Collaborate with rehabilitation services on mobility goals if consulted  - Perform Range of Motion *** times a day. - Reposition patient every *** hours.   - Dangle patient *** times a day  - Stand patient *** times a day  - Ambulate patient *** times a day  - Out of bed to chair *** times a day   - Out of bed for meals *** times a day  - Out of bed for toileting  - Record patient progress and toleration of activity level   Outcome: Progressing     Problem: CARDIOVASCULAR - ADULT  Goal: Maintains optimal cardiac output and hemodynamic stability  Description: INTERVENTIONS:  - Monitor I/O, vital signs and rhythm  - Monitor for S/S and trends of decreased cardiac output  - Administer and titrate ordered vasoactive medications to optimize hemodynamic stability  - Assess quality of pulses, skin color and temperature  - Assess for signs of decreased coronary artery perfusion  - Instruct patient to report change in severity of symptoms  Outcome: Progressing

## 2023-09-21 NOTE — ASSESSMENT & PLAN NOTE
Suspect secondary to transient hypoglycemia and hypothermia (see below)  CT head negative for acute intracranial etiology  Monitor/replete electrolytes  CK normal  Urinalysis unremarkable for infectious process  CT of chest/abdomen/pelvis negative  Continue neurochecks -> mentation improved today  PT/OT recommending short-term rehab

## 2023-09-21 NOTE — PLAN OF CARE
Problem: OCCUPATIONAL THERAPY ADULT  Goal: Performs self-care activities at highest level of function for planned discharge setting. See evaluation for individualized goals. Description: Treatment Interventions: ADL retraining, Functional transfer training, UE strengthening/ROM, Cognitive reorientation, Patient/family training, Compensatory technique education, Energy conservation, Activityengagement, Endurance training       See flowsheet documentation for full assessment, interventions and recommendations. Note: Limitation: Decreased ADL status, Decreased UE strength, Decreased Safe judgement during ADL, Decreased endurance, Decreased high-level ADLs, Decreased self-care trans  Prognosis: Good  Assessment: Pt is a 80 y.o. female seen for OT evaluation s/p admit to [facility] on [facility]/04/[facility]2 w/ Acute metabolic encephalopathy. Comorbidities affecting pt's functional performance at time of assessment include: HTN, type 1 diabetes, hypoglycemia, hypothermia, hx of Hodgkin's lymphoma, OA of both knees, OA of R shoulder. Personal factors affecting pt at time of IE include:limited home support, difficulty performing ADLS, difficulty performing IADLS , limited insight into deficits, decreased initiation and engagement  and health management . Prior to admission, pt was (I) with ADLs and required (A) in IADLs. Upon evaluation: the following deficits impact occupational performance: weakness, decreased strength, decreased balance, decreased tolerance, impaired problem solving, decreased safety awareness and increased pain. Pt to benefit from continued skilled OT tx while in the hospital to address deficits as defined above and maximize level of functional independence w ADL's and functional mobility. Occupational Performance areas to address include: grooming, bathing/shower, toilet hygiene, dressing, functional mobility, community mobility and clothing management.  From OT standpoint, recommendation at time of d/c would be STR.      OT Discharge Recommendation: Post acute rehabilitation services     Hayes Allen MS, OTR/L

## 2023-09-21 NOTE — PLAN OF CARE
Problem: Potential for Falls  Goal: Patient will remain free of falls  Description: INTERVENTIONS:  - Educate patient/family on patient safety including physical limitations  - Instruct patient to call for assistance with activity   - Consult OT/PT to assist with strengthening/mobility   - Keep Call bell within reach  - Keep bed low and locked with side rails adjusted as appropriate  - Keep care items and personal belongings within reach  - Initiate and maintain comfort rounds  - Make Fall Risk Sign visible to staff  - Offer Toileting every 2 Hours, in advance of need  - Initiate/Maintain bed alarm  - Apply yellow socks and bracelet for high fall risk patients  - Consider moving patient to room near nurses station  Outcome: Progressing     Problem: MOBILITY - ADULT  Goal: Maintain or return to baseline ADL function  Description: INTERVENTIONS:  -  Assess patient's ability to carry out ADLs; assess patient's baseline for ADL function and identify physical deficits which impact ability to perform ADLs (bathing, care of mouth/teeth, toileting, grooming, dressing, etc.)  - Assess/evaluate cause of self-care deficits   - Assess range of motion  - Assess patient's mobility; develop plan if impaired  - Assess patient's need for assistive devices and provide as appropriate  - Encourage maximum independence but intervene and supervise when necessary  - Involve family in performance of ADLs  - Assess for home care needs following discharge   - Consider OT consult to assist with ADL evaluation and planning for discharge  - Provide patient education as appropriate  Outcome: Progressing  Goal: Maintains/Returns to pre admission functional level  Description: INTERVENTIONS:  - Perform BMAT or MOVE assessment daily.   - Set and communicate daily mobility goal to care team and patient/family/caregiver. - Collaborate with rehabilitation services on mobility goals if consulted  - Perform Range of Motion 3 times a day.   - Reposition patient every 2 hours.   - Dangle patient 3 times a day  - Stand patient 3 times a day  - Ambulate patient 3 times a day  - Out of bed to chair 3 times a day   - Out of bed for meals 3 times a day  - Out of bed for toileting  - Record patient progress and toleration of activity level   Outcome: Progressing     Problem: CARDIOVASCULAR - ADULT  Goal: Maintains optimal cardiac output and hemodynamic stability  Description: INTERVENTIONS:  - Monitor I/O, vital signs and rhythm  - Monitor for S/S and trends of decreased cardiac output  - Administer and titrate ordered vasoactive medications to optimize hemodynamic stability  - Assess quality of pulses, skin color and temperature  - Assess for signs of decreased coronary artery perfusion  - Instruct patient to report change in severity of symptoms  Outcome: Progressing     Problem: METABOLIC, FLUID AND ELECTROLYTES - ADULT  Goal: Electrolytes maintained within normal limits  Description: INTERVENTIONS:  - Monitor labs and assess patient for signs and symptoms of electrolyte imbalances  - Administer electrolyte replacement as ordered  - Monitor response to electrolyte replacements, including repeat lab results as appropriate  - Instruct patient on fluid and nutrition as appropriate  Outcome: Progressing  Goal: Glucose maintained within target range  Description: INTERVENTIONS:  - Monitor Blood Glucose as ordered  - Assess for signs and symptoms of hyperglycemia and hypoglycemia  - Administer ordered medications to maintain glucose within target range  - Assess nutritional intake and initiate nutrition service referral as needed  Outcome: Progressing     Problem: SKIN/TISSUE INTEGRITY - ADULT  Goal: Skin Integrity remains intact(Skin Breakdown Prevention)  Description: Assess:  -Perform Nate assessment every shift  -Clean and moisturize skin every shift  -Inspect skin when repositioning, toileting, and assisting with ADLS  -Assess extremities for adequate circulation and sensation     Bed Management:  -Have minimal linens on bed & keep smooth, unwrinkled  -Change linens as needed when moist or perspiring  -Avoid sitting or lying in one position for more than 2 hours while in bed  -Keep HOB at 30 degrees     Toileting:  -Offer bedside commode  -Assess for incontinence every 4 hours  -Use incontinent care products after each incontinent episode such as pads    Activity:  -Mobilize patient 3 times a day  -Encourage activity and walks on unit  -Encourage or provide ROM exercises   -Turn and reposition patient every 2 Hours  -Use appropriate equipment to lift or move patient in bed  -Instruct/ Assist with weight shifting every 2 when out of bed in chair  -Consider limitation of chair time 2 hour intervals    Skin Care:  -Avoid use of baby powder, tape, friction and shearing, hot water or constrictive clothing  -Do not massage red bony areas    Outcome: Progressing     Problem: MUSCULOSKELETAL - ADULT  Goal: Maintain or return mobility to safest level of function  Description: INTERVENTIONS:  - Assess patient's ability to carry out ADLs; assess patient's baseline for ADL function and identify physical deficits which impact ability to perform ADLs (bathing, care of mouth/teeth, toileting, grooming, dressing, etc.)  - Assess/evaluate cause of self-care deficits   - Assess range of motion  - Assess patient's mobility  - Assess patient's need for assistive devices and provide as appropriate  - Encourage maximum independence but intervene and supervise when necessary  - Involve family in performance of ADLs  - Assess for home care needs following discharge   - Consider OT consult to assist with ADL evaluation and planning for discharge  - Provide patient education as appropriate  Outcome: Progressing     Problem: PAIN - ADULT  Goal: Verbalizes/displays adequate comfort level or baseline comfort level  Description: Interventions:  - Encourage patient to monitor pain and request assistance  - Assess pain using appropriate pain scale  - Administer analgesics based on type and severity of pain and evaluate response  - Implement non-pharmacological measures as appropriate and evaluate response  - Consider cultural and social influences on pain and pain management  - Notify physician/advanced practitioner if interventions unsuccessful or patient reports new pain  Outcome: Progressing     Problem: INFECTION - ADULT  Goal: Absence or prevention of progression during hospitalization  Description: INTERVENTIONS:  - Assess and monitor for signs and symptoms of infection  - Monitor lab/diagnostic results  - Monitor all insertion sites, i.e. indwelling lines, tubes, and drains  - Monitor endotracheal if appropriate and nasal secretions for changes in amount and color  - [place] appropriate cooling/warming therapies per order  - Administer medications as ordered  - Instruct and encourage patient and family to use good hand hygiene technique  - Identify and instruct in appropriate isolation precautions for identified infection/condition  Outcome: Progressing     Problem: SAFETY ADULT  Goal: Patient will remain free of falls  Description: INTERVENTIONS:  - Educate patient/family on patient safety including physical limitations  - Instruct patient to call for assistance with activity   - Consult OT/PT to assist with strengthening/mobility   - Keep Call bell within reach  - Keep bed low and locked with side rails adjusted as appropriate  - Keep care items and personal belongings within reach  - Initiate and maintain comfort rounds  - Make Fall Risk Sign visible to staff  - Offer Toileting every 2 Hours, in advance of need  - Initiate/Maintain bed alarm  - Apply yellow socks and bracelet for high fall risk patients  - Consider moving patient to room near nurses station  Outcome: Progressing  Goal: Maintain or return to baseline ADL function  Description: INTERVENTIONS:  -  Assess patient's ability to carry out ADLs; assess patient's baseline for ADL function and identify physical deficits which impact ability to perform ADLs (bathing, care of mouth/teeth, toileting, grooming, dressing, etc.)  - Assess/evaluate cause of self-care deficits   - Assess range of motion  - Assess patient's mobility; develop plan if impaired  - Assess patient's need for assistive devices and provide as appropriate  - Encourage maximum independence but intervene and supervise when necessary  - Involve family in performance of ADLs  - Assess for home care needs following discharge   - Consider OT consult to assist with ADL evaluation and planning for discharge  - Provide patient education as appropriate  Outcome: Progressing  Goal: Maintains/Returns to pre admission functional level  Description: INTERVENTIONS:  - Perform BMAT or MOVE assessment daily.   - Set and communicate daily mobility goal to care team and patient/family/caregiver. - Collaborate with rehabilitation services on mobility goals if consulted  - Perform Range of Motion 3 times a day. - Reposition patient every 2 hours.   - Dangle patient 3 times a day  - Stand patient 3 times a day  - Ambulate patient 3 times a day  - Out of bed to chair 3 times a day   - Out of bed for meals 3 times a day  - Out of bed for toileting  - Record patient progress and toleration of activity level   Outcome: Progressing     Problem: DISCHARGE PLANNING  Goal: Discharge to home or other facility with appropriate resources  Description: INTERVENTIONS:  - Identify barriers to discharge w/patient and caregiver  - Arrange for needed discharge resources and transportation as appropriate  - Identify discharge learning needs (meds, wound care, etc.)  - Arrange for interpretive services to assist at discharge as needed  - Refer to Case Management Department for coordinating discharge planning if the patient needs post-hospital services based on physician/advanced practitioner order or complex needs related to functional status, cognitive ability, or social support system  Outcome: Progressing     Problem: Knowledge Deficit  Goal: Patient/family/caregiver demonstrates understanding of disease process, treatment plan, medications, and discharge instructions  Description: Complete learning assessment and assess knowledge base.   Interventions:  - Provide teaching at level of understanding  - Provide teaching via preferred learning methods  Outcome: Progressing     Problem: Prexisting or High Potential for Compromised Skin Integrity  Goal: Skin integrity is maintained or improved  Description: INTERVENTIONS:  - Identify patients at risk for skin breakdown  - Assess and monitor skin integrity  - Assess and monitor nutrition and hydration status  - Monitor labs   - Assess for incontinence   - Turn and reposition patient  - Assist with mobility/ambulation  - Relieve pressure over bony prominences  - Avoid friction and shearing  - Provide appropriate hygiene as needed including keeping skin clean and dry  - Evaluate need for skin moisturizer/barrier cream  - Collaborate with interdisciplinary team   - Patient/family teaching  - Consider wound care consult   Outcome: Progressing

## 2023-09-21 NOTE — PLAN OF CARE
Problem: PHYSICAL THERAPY ADULT  Goal: Performs mobility at highest level of function for planned discharge setting. See evaluation for individualized goals. Description: Treatment/Interventions: Functional transfer training, LE strengthening/ROM, Elevations, Therapeutic exercise, Endurance training, Patient/family training, Equipment eval/education, Gait training, Compensatory technique education, Spoke to nursing, Spoke to case management  Equipment Recommended: Irma Hinkle       See flowsheet documentation for full assessment, interventions and recommendations. Note: Prognosis: Fair  Problem List: Decreased strength, Decreased endurance, Impaired balance, Decreased mobility, Decreased coordination, Impaired judgement, Decreased safety awareness  Assessment: Pt is 80 y.o. female seen for PT evaluation s/p admit to [address] [place] [address] [facility] [place] on 1/57/3597 w/ Acute metabolic encephalopathy. PT consulted to assess pt's functional mobility and d/c needs. Order placed for PT eval and tx, w/ activity as tolerated order. Comorbidities affecting pt's physical performance at time of assessment include: weakness, acute metabolic encephalopathy,hypoglycemia, hypomagnesemia,type 1 DM, HTN. PTA, pt was independent w/ all functional mobility w/ AD utilization. Personal factors affecting pt at time of IE include: stairs to enter home, inability to ambulate household distances, inability to navigate community distances, inability to navigate level surfaces w/o external assistance, unable to perform dynamic tasks in community, positive fall history, impulsivity, limited insight into impairments and inability to perform ADLs.  Please find objective findings from PT assessment regarding body systems outlined above with impairments and limitations including weakness, impaired balance, decreased endurance, impaired coordination, gait deviations, decreased activity tolerance, decreased functional mobility tolerance, decreased safety awareness, impaired judgement and fall risk. From PT/mobility standpoint, recommendation at time of d/c would be post acute rehabilitation services pending progress in order to facilitate return to PLOF. PT Discharge Recommendation: Post acute rehabilitation services    See flowsheet documentation for full assessment.

## 2023-09-21 NOTE — OCCUPATIONAL THERAPY NOTE
Occupational Therapy Evaluation     Patient Name: Jocelyn Reyes Date: 9/21/2023  Problem List  Principal Problem:    Acute metabolic encephalopathy  Active Problems:    Hyperlipidemia    Essential hypertension    Type 1 diabetes mellitus with hypoglycemia ([address])    Hypoglycemia    Hypothermia    Hypokalemia    History of Hodgkin's lymphoma    Hypothyroidism    Hypomagnesemia    Past Medical History  Past Medical History:   Diagnosis Date    Ambulates with cane     Cancer ([address])     Chronic pain disorder     knees/ shoulders (gets inj every 3 mos)    Closed intertrochanteric fracture of right femur ([address]) 5/26/2020    Controlled type 2 diabetes mellitus with diabetic polyneuropathy, with long-term current use of insulin ([address]) 5/21/2008    Diabetes mellitus ([address])     Diabetic polyneuropathy ([address]) 5/21/2008    ICD10 clean up    Disease of thyroid gland     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    HL (hearing loss)     Hodgkin's disease ([address]) 2008    Left neck, had radiation    Hypertension     Neuropathy     Osteoporosis     RA (rheumatoid arthritis) ([address])     Traumatic rhabdomyolysis ([address]) 10/17/2022     Past Surgical History  Past Surgical History:   Procedure Laterality Date    ADENOIDECTOMY      APPENDECTOMY      CATARACT EXTRACTION      CATARACT EXTRACTION, BILATERAL      CHOLECYSTECTOMY      FRACTURE SURGERY Right     hip    MOUTH SURGERY      oral cancer left lower lip    OVARY SURGERY      MO ADJT TIS TRNS/REARGMT F/C/C/M/N/A/G/H/F 10SQCM/< N/A 3/28/2022    Procedure: Adjacent tissue transfer face;  Surgeon: Charu Solorio MD;  [facility];  Service: ENT    MO OPTX FEM SHFT FX W/INSJ IMED IMPLT W/WO SCREW Right 5/28/2020    Procedure: INSERTION NAIL IM FEMUR ANTEGRADE (TROCHANTERIC);   Surgeon: Mandy Daniel DO;  Location: [address] [place] [address] MAIN OR;  Service: Orthopedics    MO TRANSMASTOID ANTROTOMY Left 3/28/2022    Procedure: MASTOIDECTOMY;  Surgeon: Charu Solroio MD;  [facility];  Service: ENT    TONSILLECTOMY      TOTAL THYROIDECTOMY  2008    Performed after left neck dissection and left oral cancer diagnosis        09/21/23 0840   OT Last Visit   OT Visit Date 09/21/23   Note Type   Note type Evaluation   Additional Comments Pt seen as a co-eval with PT due to the patient's co-morbidities, clinically unstable presentation, and present impairments which are a regression from the patient's baseline. Pain Assessment   Pain Assessment Tool 0-10   Pain Score 7   Pain Location/Orientation Orientation: Left; Location: Leg   Pain Onset/Description Onset: Ongoing;Frequency: Constant/Continuous; Descriptor: Aching;Descriptor: Sore   Effect of Pain on Daily Activities yes   Patient's Stated Pain Goal No pain   Hospital Pain Intervention(s) Repositioned; Ambulation/increased activity; Emotional support; Environmental changes; Elevated   Multiple Pain Sites No   Restrictions/Precautions   Weight Bearing Precautions Per Order No   Other Precautions Chair Alarm; Bed Alarm; Fall Risk;Pain   Home Living   Type of [address] [place] [address] Dr One level;Performs ADLs on one level; Able to live on main level with bedroom/bathroom;Stairs to enter with rails  (2 TEE)   Bathroom Shower/Tub Tub/shower unit  (prior sponge bathing, washing hair in sink)   Bathroom Toilet Raised   Bathroom Equipment Grab bars in shower; Toilet raiser;Grab bars around toilet; Other (Comment)  (lift chair recliner, reports she does sleep in lift chair)   [address]  (prior rollator usage)   Additional Comments Pt reports she may have had her main bathroom remodeled while hospitalized; they were planning for some modification prior to arrival   Prior Function   Level of [place] Independent with ADLs; Independent with functional mobility; Needs assistance with IADLS   Lives With Alone   Receives Help From Family  (son lives across the street, daughter "close by")   IADLs Independent with meal prep;Independent with medication management; Family/Friend/Other provides transportation   Falls in the last 6 months 1 to 4  (4 reported, including day of hospitalization)   Vocational Retired   Subjective   Subjective "Don't call me honey, I don't like that"   ADL   Where Assessed Chair   UB Bathing Assistance 5  Supervision/Setup   LB Bathing Assistance 3  Moderate Assistance   UB Dressing Assistance 5  Supervision/Setup   LB Dressing Assistance 3  Moderate Assistance   LB Dressing Deficit Don/doff R sock; Don/doff L sock   Toileting Assistance  2  Maximal Assistance   Toileting Deficit Increased time to complete;Perineal hygiene; Bedside commode  (+perianal hygiene; max (A) to complete perianal hygiene while in stance d/t balance. Pt declined briefs at this time)   Bed Mobility   Additional Comments DNT; pt seated on commode upon arrival and recliner upon conclusion   Transfers   Sit to Stand 3  Moderate assistance   Additional items Assist x 1; Armrests; Increased time required;Verbal cues   Stand to Sit 3  Moderate assistance   Additional items Assist x 1; Armrests; Increased time required;Verbal cues   Stand pivot 3  Moderate assistance   Additional items Assist x 1; Impulsive; Increased time required;Verbal cues   Toilet transfer 3  Moderate assistance   Additional items Assist x 1; Armrests; Increased time required;Verbal cues; Commode   Additional Comments RW used; VC for safe hand placement and proper body mechanics with safety awareness   Balance   Static Sitting Fair   Dynamic Sitting Fair -   Static Standing Fair -   Dynamic Standing Poor +   Ambulatory Poor +   Activity Tolerance   Activity Tolerance Patient limited by fatigue   Medical Staff Made Aware Yes, CM made aware of d/c recs   Nurse Made Aware Yes, RN Chago   RUE Assessment   RUE Assessment WFL   RUE Strength   RUE Overall Strength   (3+/5)   LUE Assessment   LUE Assessment WFL   LUE Strength   LUE Overall Strength   (3+/5)   Vision-Basic Assessment Current Vision No visual deficits   Visual History Corrective eye surgery  (B cataract removal)   Psychosocial   Psychosocial (WDL) WDL   Cognition   Overall Cognitive Status Impaired   Arousal/Participation Alert; Responsive; Cooperative   Attention Attends with cues to redirect   Orientation Level Oriented to person;Oriented to place; Disoriented to time;Oriented to situation  ("I fell off the toilet")   Memory Decreased short term memory;Decreased recall of precautions   Following Commands Follows one step commands with increased time or repetition   Comments Pt agreeable to OT evaluation, pleasant   Assessment   Limitation Decreased ADL status; Decreased UE strength;Decreased Safe judgement during ADL;Decreased endurance;Decreased high-level ADLs; Decreased self-care trans   Prognosis Good   Assessment Pt is a 80 y.o. female seen for OT evaluation s/p admit to [facility] on 2/25/0310 w/ Acute metabolic encephalopathy. Comorbidities affecting pt's functional performance at time of assessment include: HTN, type 1 diabetes, hypoglycemia, hypothermia, hx of Hodgkin's lymphoma, OA of both knees, OA of R shoulder. Personal factors affecting pt at time of IE include:limited home support, difficulty performing ADLS, difficulty performing IADLS , limited insight into deficits, decreased initiation and engagement  and health management . Prior to admission, pt was (I) with ADLs and required (A) in IADLs. Upon evaluation: the following deficits impact occupational performance: weakness, decreased strength, decreased balance, decreased tolerance, impaired problem solving, decreased safety awareness and increased pain. Pt to benefit from continued skilled OT tx while in the hospital to address deficits as defined above and maximize level of functional independence w ADL's and functional mobility.  Occupational Performance areas to address include: grooming, bathing/shower, toilet hygiene, dressing, functional mobility, community mobility and clothing management. From OT standpoint, recommendation at time of d/c would be STR. Goals   Patient Goals to go home soon   Plan   Treatment Interventions ADL retraining;Functional transfer training;UE strengthening/ROM; Cognitive reorientation;Patient/family training; Compensatory technique education; Energy conservation; Activityengagement; Endurance training   Goal Expiration Date 10/01/23   OT Treatment Day 0   OT Frequency 3-5x/wk   Recommendation   OT Discharge Recommendation Post acute rehabilitation services   Additional Comments  The patient's raw score on the AM-PAC Daily Activity inpatient short form is 18, standardized score is 38.66, less than 39.4. Patients at this level are likely to benefit from discharge to post-acute rehabilitation services. Please refer to the recommendation of the Occupational Therapist for safe discharge planning.    AM-PAC Daily Activity Inpatient   Lower Body Dressing 2   Bathing 2   Toileting 3   Upper Body Dressing 3   Grooming 4   Eating 4   Daily Activity Raw Score 18   Daily Activity Standardized Score (Calc for Raw Score >=11) 38.66   AM-PAC Applied Cognition Inpatient   Following a Speech/Presentation 3   Understanding Ordinary Conversation 4   Taking Medications 3   Remembering Where Things Are Placed or Put Away 3   Remembering List of 4-5 Errands 3   Taking Care of Complicated Tasks 3   Applied Cognition Raw Score 19   Applied Cognition Standardized Score 39.77      GOALS:    Pt will achieve the following within specified time frame: LTG  Pt will achieve the following goals within 10 days    *ADL transfers with (S) for inc'd independence with ADLs/purposeful tasks    *UB ADL with (I) for inc'd independence with self cares    *LB ADL with (S) using AE prn for inc'd independence with self cares    *Toileting with (S) for clothing management and hygiene for return to PLOF with personal care    *Increase static stand balance and dyn stand balance to F+ for inc'd safety with standing purposeful tasks    *Increase stand tolerance x5 m for inc'd tolerance with standing purposeful tasks    *Bed mobility- (I) for inc'd independence to manage own comfort and initiate EOB & OOB purposeful tasks    *Participate in 10-15m UE therex to increase overall stamina/activity tolerance for purposeful tasks      Tahmina Sin, MS, OTR/L

## 2023-09-21 NOTE — PHYSICAL THERAPY NOTE
Physical Therapy Evaluation     Patient's Name: Vee Choudhary    Admitting Diagnosis  Hypokalemia [E87.6]  Loss of consciousness ([address]) [R40.20]  Hypoglycemia [E16.2]  Hypotension [N65.3]  Acute metabolic encephalopathy due to hypoglycemia [G93.41, E16.2]    Problem List  Patient Active Problem List   Diagnosis    Hyperlipidemia    Essential hypertension    Postoperative hypothyroidism    Type 1 diabetes mellitus with hypoglycemia ([address])    Primary osteoarthritis of both knees    Primary osteoarthritis of right shoulder    Soft tissue radionecrosis    Osteoradionecrosis of temporal bone (HCC)    Abnormal CT of the abdomen    Gastroesophageal reflux disease without esophagitis    Acute metabolic encephalopathy    Hypoglycemia    Hypothermia    Hypokalemia    History of Hodgkin's lymphoma    Hypothyroidism    Hypomagnesemia       Past Medical History  Past Medical History:   Diagnosis Date    Ambulates with cane     Cancer ([address])     Chronic pain disorder     knees/ shoulders (gets inj every 3 mos)    Closed intertrochanteric fracture of right femur ([address]) 5/26/2020    Controlled type 2 diabetes mellitus with diabetic polyneuropathy, with long-term current use of insulin ([address]) 5/21/2008    Diabetes mellitus ([address])     Diabetic polyneuropathy ([address]) 5/21/2008    ICD10 clean up    Disease of thyroid gland     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    HL (hearing loss)     Hodgkin's disease ([address]) 2008    Left neck, had radiation    Hypertension     Neuropathy     Osteoporosis     RA (rheumatoid arthritis) ([address])     Traumatic rhabdomyolysis ([address]) 10/17/2022       Past Surgical History  Past Surgical History:   Procedure Laterality Date    ADENOIDECTOMY      APPENDECTOMY      CATARACT EXTRACTION      CATARACT EXTRACTION, BILATERAL      CHOLECYSTECTOMY      FRACTURE SURGERY Right     hip    MOUTH SURGERY      oral cancer left lower lip    OVARY SURGERY      [place] ADJT TIS TRNS/REARGMT F/C/C/M/N/A/G/H/F 10SQCM/< N/A 3/28/2022 Procedure: Adjacent tissue transfer face;  Surgeon: Chay Lund MD;  [facility];  Service: ENT    SD OPTX FEM SHFT FX W/INSJ IMED IMPLT W/WO SCREW Right 5/28/2020    Procedure: INSERTION NAIL IM FEMUR ANTEGRADE (TROCHANTERIC); Surgeon: Ashli Lauren DO;  Location: [address] [place] [address] MAIN OR;  Service: Orthopedics    SD TRANSMASTOID ANTROTOMY Left 3/28/2022    Procedure: MASTOIDECTOMY;  Surgeon: Chay Lund MD;  [facility];  Service: ENT    TONSILLECTOMY      TOTAL THYROIDECTOMY  2008    Performed after left neck dissection and left oral cancer diagnosis        09/21/23 0829   PT Last Visit   PT Visit Date 09/21/23   Note Type   Note type Evaluation   Pain Assessment   Pain Assessment Tool 0-10   Pain Score 7   Pain Location/Orientation Orientation: Left; Location: Leg   Pain Onset/Description Onset: Ongoing;Frequency: Constant/Continuous; Descriptor: Aching;Descriptor: Sore   Effect of Pain on Daily Activities yes   Patient's Stated Pain Goal No pain   Hospital Pain Intervention(s) Repositioned; Ambulation/increased activity; Emotional support; Environmental changes; Elevated   Multiple Pain Sites No   Restrictions/Precautions   Weight Bearing Precautions Per Order No   Other Precautions Chair Alarm; Bed Alarm; Fall Risk;Pain   Home Living   Type of [address] [place] [address] Dr One level;Performs ADLs on one level; Able to live on main level with bedroom/bathroom;Stairs to enter with rails  (2 TEE)   Bathroom Shower/Tub Tub/shower unit  (prior sponge bathing, washing hair in sink)   Bathroom Toilet Raised   Bathroom Equipment Grab bars in shower; Toilet raiser;Grab bars around toilet; Other (Comment)  (lift chair recliner, reports she sleeps there)   [address]  (prior rollator usage)   Additional Comments Jailene Rojas reports she may have had her bathroom remodeled while hospitalized, they were planning for some modifications prior to arrival.   Prior Function Level of [place] Independent with ADLs; Independent with functional mobility; Needs assistance with IADLS   Lives With Alone   Receives Help From Family  (son lives across the street, daughter "close by")   IADLs Independent with meal prep; Independent with medication management; Family/Friend/Other provides transportation   Falls in the last 6 months 1 to 4  (4 reported, including the day of hospitalization)   Vocational Retired   General   Additional Pertinent History Sheila DUNLAP present for co-assessment due to medical complexity, required skilled interventions of 2 clinicians for care delivery. Family/Caregiver Present No   Cognition   Overall Cognitive Status Impaired   Arousal/Participation Alert   Orientation Level Oriented to person;Oriented to place; Disoriented to time;Oriented to situation  ("I fell off the toilet")   Memory Decreased short term memory;Decreased recall of precautions   Following Commands Follows one step commands with increased time or repetition   Comments Jon Johnston was agreeable to PT assessment, pleasant. RLE Assessment   RLE Assessment X  (3/5 gross musculature)   LLE Assessment   LLE Assessment X  (3/5 gross musculature)   Vision-Basic Assessment   Current Vision No visual deficits   Visual History Corrective eye surgery  (B cataract removal)   Vestibular   Spontaneous Nystagmus (-) no evidence of nystagmus at rest in room light   Gaze Holding Nystagmus (-) no evidence of nystagmus   Coordination   Movements are Fluid and Coordinated 0   Coordination and Movement Description Incremental mobility requiring increased time. Sharp/Dull   RLE Sharp/Dull Impaired  ('they don't feel right")   LLE Sharp/Dull Impaired   Bed Mobility   Additional Comments Bed mobility was not tested as Jon Johnston was sitting on the commode upon arrival and out of bed on the recliner upon conclusion. Transfers   Sit to Stand 3  Moderate assistance   Additional items Assist x 1; Armrests; Increased time required;Verbal cues  (RW utilization)   Stand to Sit 3  Moderate assistance   Additional items Assist x 1; Armrests; Impulsive;Verbal cues   Stand pivot 3  Moderate assistance   Additional items Assist x 1; Impulsive; Increased time required;Verbal cues   Toilet transfer 3  Moderate assistance   Additional items Assist x 1; Armrests; Increased time required;Verbal cues; Commode   Additional Comments Verbal cues for proper BUE placement with transitional movements, safety awareness with directional changes. Ambulation/Elevation   Gait pattern Improper Weight shift;Narrow JACQUE; Forward Flexion;Decreased foot clearance; Short stride; Step to;Excessively slow   Gait Assistance 3  Moderate assist   Additional items Assist x 1;Verbal cues; Tactile cues   Assistive Device Rolling walker   Distance 3 feet  (commode->recliner, additional distance could not be safely negotiated)   Stair Management Assistance Not tested   Ambulation/Elevation Additional Comments Verbal cues for base of support widening, proper AD utilization, and environmental awareness. Balance   Static Sitting Fair   Dynamic Sitting Fair -   Static Standing Fair -   Dynamic Standing Poor +   Ambulatory Poor +   Endurance Deficit   Endurance Deficit Yes   Activity Tolerance   Activity Tolerance Patient limited by fatigue   Medical Staff Made Aware Yes, CM was informed of d/c disposition recommendation. Nurse Made Aware yes, Chago RN   Assessment   Prognosis Fair   Problem List Decreased strength;Decreased endurance; Impaired balance;Decreased mobility; Decreased coordination; Impaired judgement;Decreased safety awareness   Assessment Pt is 80 y.o. female seen for PT evaluation s/p admit to [address] [address] on 5/97/0945 w/ Acute metabolic encephalopathy. PT consulted to assess pt's functional mobility and d/c needs. Order placed for PT eval and tx, w/ activity as tolerated order.  Comorbidities affecting pt's physical performance at time of assessment include: weakness, acute metabolic encephalopathy,hypoglycemia, hypomagnesemia,type 1 DM, HTN. PTA, pt was independent w/ all functional mobility w/ AD utilization. Personal factors affecting pt at time of IE include: stairs to enter home, inability to ambulate household distances, inability to navigate community distances, inability to navigate level surfaces w/o external assistance, unable to perform dynamic tasks in community, positive fall history, impulsivity, limited insight into impairments and inability to perform ADLs. Please find objective findings from PT assessment regarding body systems outlined above with impairments and limitations including weakness, impaired balance, decreased endurance, impaired coordination, gait deviations, decreased activity tolerance, decreased functional mobility tolerance, decreased safety awareness, impaired judgement and fall risk. From PT/mobility standpoint, recommendation at time of d/c would be post acute rehabilitation services pending progress in order to facilitate return to PLOF. Goals   Patient Goals to go home soon   LTG Expiration Date 10/01/23   Long Term Goal #1 Patient will complete transfers supervision of 1 to decrease risk of falls, facilitate upright standing posture. BLE strength to greater than/equal to 4/5 gross musculature to increase ability to safely transfer, control descent to chair. Patient will exhibit increase dynamic standing to Fair 2-4 minutes without LOB CGA of 1 to improve activity tolerance. Patient will exhibit increase dynamic ambulatory balance to Fair 40-50 feet w/AD CGA of 1 to improve ability to mobilize to toilet, chair and decrease risk for additional medical complications. Patient will exhibit good self monitoring and ability to follow 2 step commands to increase complexity of tasks and resume ADL's without LOB. PT Treatment Day 0   Plan   Treatment/Interventions Functional transfer training;LE strengthening/ROM; Elevations; Therapeutic exercise; Endurance training;Patient/family training;Equipment eval/education;Gait training; Compensatory technique education;Spoke to nursing;Spoke to case management   PT Frequency 3-5x/wk   Recommendation   PT Discharge Recommendation Post acute rehabilitation services   Equipment Recommended [facility] Recommended Wheeled walker   Change/add to [place]? No   Additional Comments Upon conclusion, Emilee Gray was sitting out of bed on the recliner. The chair alarm was engaged and all needs within reach.    AM-PAC Basic Mobility Inpatient   Turning in Flat Bed Without Bedrails 3   Lying on Back to Sitting on Edge of Flat Bed Without Bedrails 3   Moving Bed to Chair 3   Standing Up From Chair Using Arms 2   Walk in Room 2   Climb 3-5 Stairs With Railing 2   Basic Mobility Inpatient Raw Score 15   Basic Mobility Standardized Score 36.97   Highest Level Of Mobility   JH-HLM Goal 4: Move to chair/commode   JH-HLM Achieved 4: Move to chair/commode     History/Personal Factors/Comorbidities: weakness, acute metabolic encephalopathy,hypoglycemia, hypomagnesemia,type 1 DM, HTN    # of body structures/limitations: muscle weakness, activity intolerance,decreased endurance, impaired balance, gait deviations,pain    Clinical presentation: unstable as seen in progressive symptoms prior to hospitalization, high fall risk with positive fall history, impulsivity,constant pain    Initial Assessment Time: [id number]    Renata Ache, PT

## 2023-09-21 NOTE — PROGRESS NOTES
[address]  Progress Note  Name: Arun Estrella  MRN: [id number]  Unit/Bed#: -01 I Date of Admission: 9/19/2023   Date of Service: 9/21/2023 I Hospital Day: 2    Assessment/Plan   * Acute metabolic encephalopathy  Assessment & Plan  Suspect secondary to transient hypoglycemia and hypothermia (see below)  CT head negative for acute intracranial etiology  Monitor/replete electrolytes  CK normal  Urinalysis unremarkable for infectious process  CT of chest/abdomen/pelvis negative  Continue neurochecks -> mentation improved today  PT/OT recommending short-term rehab    Hypothyroidism  Assessment & Plan  Continue Synthroid - acquired state status post prior thyroidectomy  In the setting of altered mentation, pending free T4 (updated TSH low)    Hypoglycemia  Assessment & Plan  In the setting of in the setting of known insulin-dependent diabetes mellitus  Initially held basal insulin with solo PRN SSI coverage -> will resume basal insulin today with fixed/prandial dosing as mentation improved with increased oral intake and intermittent episodes of paradoxical hyperglycemia  Blood sugars now stable in the 100-130s s/p dextrose containing fluids en route to hospital  Endocrinology to follow    Essential hypertension  Assessment & Plan  Continue Zestril - additional PRN IV Hydralazine on board  Low-sodium restriction    Hyperlipidemia  Assessment & Plan  Continue statin           VTE Pharmacologic Prophylaxis:   Moderate Risk (Score 3-4) - Pharmacological DVT Prophylaxis Ordered: heparin. Patient Centered Rounds: I performed bedside rounds with nursing staff today. Discussions with Specialists or Other Care Team Provider: Rich    Education and Discussions with Family / Patient: Attempted to update  (daughter) via phone. Left voicemail. Total Time Spent on Date of Encounter in care of patient: 30 mins.  This time was spent on one or more of the following: performing physical exam; counseling and coordination of care; obtaining or reviewing history; documenting in the medical record; reviewing/ordering tests, medications or procedures; communicating with other healthcare professionals and discussing with patient's family/caregivers. Current Length of Stay: 2 day(s)  Current Patient Status: Inpatient   Certification Statement: The patient will continue to require additional inpatient hospital stay due to Awaiting placement  Discharge Plan: Anticipate discharge in 24-48 hrs to rehab facility. Code Status: Level 3 - DNAR and DNI    Subjective:   Patient reports feeling significantly improved. She continues to have generalized weakness. She denies chest pain/pressure, palpitations, numbness, nausea, shortness of breath, or chills. Objective:     Vitals:   Temp (24hrs), Av.3 °F (36.8 °C), Min:98.1 °F (36.7 °C), Max:98.5 °F (36.9 °C)    Temp:  [98.1 °F (36.7 °C)-98.5 °F (36.9 °C)] 98.1 °F (36.7 °C)  HR:  [82-91] 82  Resp:  [18-20] 18  BP: ()/(52-90) 154/75  SpO2:  [94 %-99 %] 97 %  Body mass index is 20.97 kg/m². Input and Output Summary (last 24 hours): Intake/Output Summary (Last 24 hours) at 2023 1609  Last data filed at 2023 1507  Gross per 24 hour   Intake --   Output 726 ml   Net -726 ml       Physical Exam:   Physical Exam  Vitals and nursing note reviewed. Constitutional:       Appearance: She is normal weight. HENT:      Head: Normocephalic. Nose: Nose normal.      Mouth/Throat:      Mouth: Mucous membranes are moist.      Pharynx: Oropharynx is clear. Eyes:      General: No scleral icterus. Conjunctiva/sclera: Conjunctivae normal.      Pupils: Pupils are equal, round, and reactive to light. Cardiovascular:      Rate and Rhythm: Normal rate and regular rhythm. Heart sounds: Murmur heard. No friction rub. No gallop. Pulmonary:      Effort: Pulmonary effort is normal. No respiratory distress.       Breath sounds: Normal breath sounds. No stridor. No wheezing, rhonchi or rales. Abdominal:      General: Abdomen is flat. Palpations: Abdomen is soft. Musculoskeletal:         General: Normal range of motion. Cervical back: Normal range of motion and neck supple. Right lower leg: No edema. Left lower leg: No edema. Lymphadenopathy:      Cervical: No cervical adenopathy. Skin:     General: Skin is warm. Coloration: Skin is not jaundiced or pale. Findings: No bruising, erythema or lesion. Neurological:      General: No focal deficit present. Mental Status: She is alert and oriented to person, place, and time. Mental status is at baseline. Cranial Nerves: No cranial nerve deficit. Motor: No weakness. Psychiatric:         Mood and Affect: Mood normal.         Behavior: Behavior normal.         Thought Content: Thought content normal.          Additional Data:     Labs:  Results from last 7 days   Lab Units 09/21/23  0501   WBC Thousand/uL 7.44   HEMOGLOBIN g/dL 11.8   HEMATOCRIT % 37.3   PLATELETS Thousands/uL 236   NEUTROS PCT % 70   LYMPHS PCT % 20   MONOS PCT % 8   EOS PCT % 1     Results from last 7 days   Lab Units 09/21/23  0501 09/20/23  0517 09/19/23  1050   SODIUM mmol/L 138   < > 136   POTASSIUM mmol/L 4.0   < > 3.4*   CHLORIDE mmol/L 104   < > 99   CO2 mmol/L 26   < > 29   BUN mg/dL 11   < > 17   CREATININE mg/dL 0.68   < > 0.59*   ANION GAP mmol/L 8   < > 8   CALCIUM mg/dL 8.3*   < > 8.4   ALBUMIN g/dL  --   --  4.0   TOTAL BILIRUBIN mg/dL  --   --  0.42   ALK PHOS U/L  --   --  57   ALT U/L  --   --  20   AST U/L  --   --  23   GLUCOSE RANDOM mg/dL 220*   < > 192*    < > = values in this interval not displayed.      Results from last 7 days   Lab Units 09/19/23  1050   INR  0.94     Results from last 7 days   Lab Units 09/21/23  1059 09/21/23  0705 09/20/23  2121 09/20/23  1617 09/20/23  1110 09/20/23  1108 09/20/23  0747 09/20/23  0320 09/20/23  0032 09/19/23 2052 09/19/23  1858 09/19/23  1558   POC GLUCOSE mg/dl 252* 214* 233* 100 350* 354* 70 80 87 121 137 107         Results from last 7 days   Lab Units 09/20/23  0517 09/19/23  1406 09/19/23  1050   LACTIC ACID mmol/L  --  0.9 2.6*   PROCALCITONIN ng/ml 0.07  --  0.06       Lines/Drains:  Invasive Devices     Peripheral Intravenous Line  Duration           Peripheral IV 09/21/23 Distal;Left;Ventral (anterior) Forearm <1 day                      Imaging: No pertinent imaging reviewed. Recent Cultures (last 7 days):   Results from last 7 days   Lab Units 09/19/23  1050   BLOOD CULTURE  No Growth at 24 hrs. No Growth at 24 hrs.        Last 24 Hours Medication List:   Current Facility-Administered Medications   Medication Dose Route Frequency Provider Last Rate   • acetaminophen  650 mg Oral Q6H PRN Rafita Cooney MD     • vitamin C  500 mg Oral BID Rafita Cooney MD     • atorvastatin  20 mg Oral Daily With Citlalli Null MD     • calcium carbonate  1 tablet Oral BID With Meals Rafita Cooney MD     • cyanocobalamin  1,000 mcg Oral Daily Rafita Cooney MD     • gabapentin  300 mg Oral TID Rafita Cooney MD     • heparin (porcine)  5,000 Units Subcutaneous Q8H [facility] Rafita Cooney MD     • hydrALAZINE  5 mg Intravenous Q6H PRN Rafita Cooney MD     • insulin glargine  4 Units Subcutaneous Q12H [facility] Delma Chandler MD     • insulin lispro  1-5 Units Subcutaneous TID [facility] Delma Chandler MD     • insulin lispro  2 Units Subcutaneous TID With Meals Delma Chandler MD     • levothyroxine  75 mcg Oral Early Morning Rafita Cooney MD     • lisinopril  5 mg Oral Daily Rafita Cooney MD     • meclizine  25 mg Oral Q8H PRN Rafita Cooney MD     • multivitamin stress formula  1 tablet Oral Daily Rafita Cooney MD     • mupirocin   Topical Daily Rafita Cooney MD     • ondansetron  4 mg Intravenous Q4H PRN Rafita Cooney MD     • pantoprazole  40 mg Oral BID AC Rafita Cooney MD          Today, Patient Was Seen By: Luna Hamilton PA-C    **Please Note: This note may have been constructed using a voice recognition system. **

## 2023-09-22 LAB
ANION GAP SERPL CALCULATED.3IONS-SCNC: 9 MMOL/L
BASOPHILS # BLD AUTO: 0.07 THOUSANDS/ÂΜL (ref 0–0.1)
BASOPHILS NFR BLD AUTO: 1 % (ref 0–1)
BUN SERPL-MCNC: 12 MG/DL (ref 5–25)
CALCIUM SERPL-MCNC: 8.5 MG/DL (ref 8.4–10.2)
CHLORIDE SERPL-SCNC: 101 MMOL/L (ref 96–108)
CO2 SERPL-SCNC: 27 MMOL/L (ref 21–32)
CREAT SERPL-MCNC: 0.7 MG/DL (ref 0.6–1.3)
EOSINOPHIL # BLD AUTO: 0.03 THOUSAND/ÂΜL (ref 0–0.61)
EOSINOPHIL NFR BLD AUTO: 0 % (ref 0–6)
ERYTHROCYTE [DISTWIDTH] IN BLOOD BY AUTOMATED COUNT: 12.5 % (ref 11.6–15.1)
GFR SERPL CREATININE-BSD FRML MDRD: 80 ML/MIN/1.73SQ M
GLUCOSE SERPL-MCNC: 110 MG/DL (ref 65–140)
GLUCOSE SERPL-MCNC: 113 MG/DL (ref 65–140)
GLUCOSE SERPL-MCNC: 254 MG/DL (ref 65–140)
GLUCOSE SERPL-MCNC: 304 MG/DL (ref 65–140)
GLUCOSE SERPL-MCNC: 382 MG/DL (ref 65–140)
GLUCOSE SERPL-MCNC: 391 MG/DL (ref 65–140)
GLUCOSE SERPL-MCNC: 61 MG/DL (ref 65–140)
GLUCOSE SERPL-MCNC: 87 MG/DL (ref 65–140)
HCT VFR BLD AUTO: 37 % (ref 34.8–46.1)
HGB BLD-MCNC: 12 G/DL (ref 11.5–15.4)
IMM GRANULOCYTES # BLD AUTO: 0.02 THOUSAND/UL (ref 0–0.2)
IMM GRANULOCYTES NFR BLD AUTO: 0 % (ref 0–2)
LYMPHOCYTES # BLD AUTO: 1.01 THOUSANDS/ÂΜL (ref 0.6–4.47)
LYMPHOCYTES NFR BLD AUTO: 13 % (ref 14–44)
MAGNESIUM SERPL-MCNC: 1.5 MG/DL (ref 1.9–2.7)
MCH RBC QN AUTO: 33.3 PG (ref 26.8–34.3)
MCHC RBC AUTO-ENTMCNC: 32.4 G/DL (ref 31.4–37.4)
MCV RBC AUTO: 103 FL (ref 82–98)
MONOCYTES # BLD AUTO: 0.69 THOUSAND/ÂΜL (ref 0.17–1.22)
MONOCYTES NFR BLD AUTO: 9 % (ref 4–12)
NEUTROPHILS # BLD AUTO: 5.94 THOUSANDS/ÂΜL (ref 1.85–7.62)
NEUTS SEG NFR BLD AUTO: 77 % (ref 43–75)
NRBC BLD AUTO-RTO: 0 /100 WBCS
PHOSPHATE SERPL-MCNC: 3.6 MG/DL (ref 2.3–4.1)
PLATELET # BLD AUTO: 228 THOUSANDS/UL (ref 149–390)
PMV BLD AUTO: 10.9 FL (ref 8.9–12.7)
POTASSIUM SERPL-SCNC: 4.1 MMOL/L (ref 3.5–5.3)
RBC # BLD AUTO: 3.6 MILLION/UL (ref 3.81–5.12)
SODIUM SERPL-SCNC: 137 MMOL/L (ref 135–147)
WBC # BLD AUTO: 7.76 THOUSAND/UL (ref 4.31–10.16)

## 2023-09-22 PROCEDURE — 84100 ASSAY OF PHOSPHORUS: CPT | Performed by: INTERNAL MEDICINE

## 2023-09-22 PROCEDURE — 80048 BASIC METABOLIC PNL TOTAL CA: CPT | Performed by: INTERNAL MEDICINE

## 2023-09-22 PROCEDURE — 83735 ASSAY OF MAGNESIUM: CPT | Performed by: INTERNAL MEDICINE

## 2023-09-22 PROCEDURE — 99232 SBSQ HOSP IP/OBS MODERATE 35: CPT | Performed by: INTERNAL MEDICINE

## 2023-09-22 PROCEDURE — 82948 REAGENT STRIP/BLOOD GLUCOSE: CPT

## 2023-09-22 PROCEDURE — 85025 COMPLETE CBC W/AUTO DIFF WBC: CPT | Performed by: INTERNAL MEDICINE

## 2023-09-22 RX ORDER — INSULIN LISPRO 100 [IU]/ML
2 INJECTION, SOLUTION INTRAVENOUS; SUBCUTANEOUS
Status: DISCONTINUED | OUTPATIENT
Start: 2023-09-23 | End: 2023-09-24 | Stop reason: HOSPADM

## 2023-09-22 RX ORDER — INSULIN GLARGINE 100 [IU]/ML
5 INJECTION, SOLUTION SUBCUTANEOUS
Status: DISCONTINUED | OUTPATIENT
Start: 2023-09-22 | End: 2023-09-24 | Stop reason: HOSPADM

## 2023-09-22 RX ORDER — INSULIN GLARGINE 100 [IU]/ML
7 INJECTION, SOLUTION SUBCUTANEOUS EVERY MORNING
Status: DISCONTINUED | OUTPATIENT
Start: 2023-09-23 | End: 2023-09-24 | Stop reason: HOSPADM

## 2023-09-22 RX ORDER — INSULIN LISPRO 100 [IU]/ML
4 INJECTION, SOLUTION INTRAVENOUS; SUBCUTANEOUS
Status: DISCONTINUED | OUTPATIENT
Start: 2023-09-22 | End: 2023-09-22

## 2023-09-22 RX ORDER — OLANZAPINE 10 MG/1
2.5 INJECTION, POWDER, LYOPHILIZED, FOR SOLUTION INTRAMUSCULAR ONCE
Status: COMPLETED | OUTPATIENT
Start: 2023-09-22 | End: 2023-09-22

## 2023-09-22 RX ORDER — INSULIN LISPRO 100 [IU]/ML
4 INJECTION, SOLUTION INTRAVENOUS; SUBCUTANEOUS
Status: DISCONTINUED | OUTPATIENT
Start: 2023-09-23 | End: 2023-09-24 | Stop reason: HOSPADM

## 2023-09-22 RX ORDER — INSULIN GLARGINE 100 [IU]/ML
10 INJECTION, SOLUTION SUBCUTANEOUS EVERY MORNING
Status: DISCONTINUED | OUTPATIENT
Start: 2023-09-23 | End: 2023-09-22

## 2023-09-22 RX ORDER — INSULIN GLARGINE 100 [IU]/ML
4 INJECTION, SOLUTION SUBCUTANEOUS ONCE
Status: COMPLETED | OUTPATIENT
Start: 2023-09-22 | End: 2023-09-22

## 2023-09-22 RX ORDER — MAGNESIUM SULFATE HEPTAHYDRATE 40 MG/ML
2 INJECTION, SOLUTION INTRAVENOUS ONCE
Status: COMPLETED | OUTPATIENT
Start: 2023-09-22 | End: 2023-09-22

## 2023-09-22 RX ADMIN — INSULIN LISPRO 4 UNITS: 100 INJECTION, SOLUTION INTRAVENOUS; SUBCUTANEOUS at 08:29

## 2023-09-22 RX ADMIN — INSULIN GLARGINE 4 UNITS: 100 INJECTION, SOLUTION SUBCUTANEOUS at 08:28

## 2023-09-22 RX ADMIN — CALCIUM 1 TABLET: 500 TABLET ORAL at 16:30

## 2023-09-22 RX ADMIN — MUPIROCIN: 20 OINTMENT TOPICAL at 08:37

## 2023-09-22 RX ADMIN — INSULIN GLARGINE 4 UNITS: 100 INJECTION, SOLUTION SUBCUTANEOUS at 11:32

## 2023-09-22 RX ADMIN — INSULIN GLARGINE 5 UNITS: 100 INJECTION, SOLUTION SUBCUTANEOUS at 21:26

## 2023-09-22 RX ADMIN — OLANZAPINE 2.5 MG: 10 INJECTION, POWDER, LYOPHILIZED, FOR SOLUTION INTRAMUSCULAR at 00:20

## 2023-09-22 RX ADMIN — GABAPENTIN 300 MG: 300 CAPSULE ORAL at 16:30

## 2023-09-22 RX ADMIN — GABAPENTIN 300 MG: 300 CAPSULE ORAL at 21:25

## 2023-09-22 RX ADMIN — LISINOPRIL 5 MG: 5 TABLET ORAL at 08:28

## 2023-09-22 RX ADMIN — MAGNESIUM SULFATE HEPTAHYDRATE 2 G: 40 INJECTION, SOLUTION INTRAVENOUS at 11:25

## 2023-09-22 RX ADMIN — INSULIN LISPRO 2 UNITS: 100 INJECTION, SOLUTION INTRAVENOUS; SUBCUTANEOUS at 08:34

## 2023-09-22 RX ADMIN — LEVOTHYROXINE SODIUM 75 MCG: 75 TABLET ORAL at 05:38

## 2023-09-22 RX ADMIN — HEPARIN SODIUM 5000 UNITS: 5000 INJECTION INTRAVENOUS; SUBCUTANEOUS at 21:26

## 2023-09-22 RX ADMIN — OXYCODONE HYDROCHLORIDE AND ACETAMINOPHEN 500 MG: 500 TABLET ORAL at 18:13

## 2023-09-22 RX ADMIN — CALCIUM 1 TABLET: 500 TABLET ORAL at 08:28

## 2023-09-22 RX ADMIN — PANTOPRAZOLE SODIUM 40 MG: 40 TABLET, DELAYED RELEASE ORAL at 16:30

## 2023-09-22 RX ADMIN — HEPARIN SODIUM 5000 UNITS: 5000 INJECTION INTRAVENOUS; SUBCUTANEOUS at 16:28

## 2023-09-22 RX ADMIN — B-COMPLEX W/ C & FOLIC ACID TAB 1 TABLET: TAB at 08:28

## 2023-09-22 RX ADMIN — CYANOCOBALAMIN TAB 500 MCG 1000 MCG: 500 TAB at 08:28

## 2023-09-22 RX ADMIN — HEPARIN SODIUM 5000 UNITS: 5000 INJECTION INTRAVENOUS; SUBCUTANEOUS at 05:38

## 2023-09-22 RX ADMIN — ATORVASTATIN CALCIUM 20 MG: 20 TABLET, FILM COATED ORAL at 16:30

## 2023-09-22 RX ADMIN — PANTOPRAZOLE SODIUM 40 MG: 40 TABLET, DELAYED RELEASE ORAL at 08:29

## 2023-09-22 RX ADMIN — GABAPENTIN 300 MG: 300 CAPSULE ORAL at 08:28

## 2023-09-22 RX ADMIN — OXYCODONE HYDROCHLORIDE AND ACETAMINOPHEN 500 MG: 500 TABLET ORAL at 08:29

## 2023-09-22 NOTE — ASSESSMENT & PLAN NOTE
Suspect secondary to transient hypoglycemia and hypothermia (see below)  CT head negative for acute intracranial etiology  Monitor/replete electrolytes  CK normal  Urinalysis unremarkable for infectious process  CT of chest/abdomen/pelvis negative  Continue neurochecks -> mentation waxing/waning but relatively improving towards baseline  PT/OT recommending short-term rehab - tentative plan for discharge to skilled rehab tomorrow

## 2023-09-22 NOTE — CASE MANAGEMENT
Case Management Discharge Planning Note    Patient name Maxim Delaney  Location /-71 MRN [id number]  : 1940 Date 2023       Current Admission Date: 2023  Current Admission Diagnosis:Acute metabolic encephalopathy   Patient Active Problem List    Diagnosis Date Noted   • Hypomagnesemia 2023   • Hypoglycemia 2023   • Hypothermia 2023   • Hypokalemia 2023   • History of Hodgkin's lymphoma 2023   • Hypothyroidism 2023   • Acute metabolic encephalopathy    • Gastroesophageal reflux disease without esophagitis 10/17/2022   • Abnormal CT of the abdomen 07/10/2022   • Soft tissue radionecrosis 2022   • 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   • Primary osteoarthritis of right shoulder 2021   • Primary osteoarthritis of both knees 2020   • Postoperative hypothyroidism 2015   • Hyperlipidemia 2014   • Essential hypertension 10/10/2013   • Type 1 diabetes mellitus with hypoglycemia ([address]) 2008      LOS (days): 3  Geometric Mean LOS (GMLOS) (days): 3.90  Days to GMLOS:0.9     OBJECTIVE:  Risk of Unplanned Readmission Score: 26.22     Current admission status: Inpatient   Preferred Pharmacy:   [address] [place] [address] [facility] [address] [place] [id number]  Phone: [phone] Fax: [phone]    Primary Care Provider: Charles Ballesteros MD    Primary Insurance: MEDICARE  Secondary Insurance: [facility]    DISCHARGE DETAILS:  Pt has been accepted by several facilities. Noted in EMR the pt was confused last pm.  TC to pt daughter Merlin Huger to chose from the accepting facilities. Pt daughter chose [facility]. Notified same in [facility], reserved.

## 2023-09-22 NOTE — QUICK NOTE
Patient was not seen at bedside, I did chart review. Nino Triplett is a 80year old female who is admitted for acute encephalopathy in context of hypoglycemia which has improved. She has type 1 diabetes which has been brittle and difficult to control as outpatiet requiring dose adjustment frequently. Currently on Lantus 4 units bid and humalog 2 units tid. However primary team increased insulin, she received extra dose of lantus 4 units this morning ( total of 8) and starting tomorrow , am lantus was increased to 10 units. as well humalog which was increased to 4 units, blood sugars though are tightly controlled  I decreased lantus to 7 units qa and 5 qam, Humalog 4 units before bf and 2 units before lunch and 4 units before dinner.

## 2023-09-22 NOTE — PLAN OF CARE
Problem: Potential for Falls  Goal: Patient will remain free of falls  Description: INTERVENTIONS:  - Educate patient/family on patient safety including physical limitations  - Instruct patient to call for assistance with activity   - Consult OT/PT to assist with strengthening/mobility   - Keep Call bell within reach  - Keep bed low and locked with side rails adjusted as appropriate  - Keep care items and personal belongings within reach  - Initiate and maintain comfort rounds  - Make Fall Risk Sign visible to staff  - Offer Toileting every 2 Hours, in advance of need  - Initiate/Maintain bed alarm  - Apply yellow socks and bracelet for high fall risk patients  - Consider moving patient to room near nurses station  Outcome: Progressing     Problem: MOBILITY - ADULT  Goal: Maintain or return to baseline ADL function  Description: INTERVENTIONS:  -  Assess patient's ability to carry out ADLs; assess patient's baseline for ADL function and identify physical deficits which impact ability to perform ADLs (bathing, care of mouth/teeth, toileting, grooming, dressing, etc.)  - Assess/evaluate cause of self-care deficits   - Assess range of motion  - Assess patient's mobility; develop plan if impaired  - Assess patient's need for assistive devices and provide as appropriate  - Encourage maximum independence but intervene and supervise when necessary  - Involve family in performance of ADLs  - Assess for home care needs following discharge   - Consider OT consult to assist with ADL evaluation and planning for discharge  - Provide patient education as appropriate  Outcome: Progressing  Goal: Maintains/Returns to pre admission functional level  Description: INTERVENTIONS:  - Perform BMAT or MOVE assessment daily.   - Set and communicate daily mobility goal to care team and patient/family/caregiver. - Collaborate with rehabilitation services on mobility goals if consulted  - Perform Range of Motion 3 times a day.   - Reposition patient every 2 hours.   - Dangle patient 3 times a day  - Stand patient 3 times a day  - Ambulate patient 3 times a day  - Out of bed to chair 3 times a day   - Out of bed for meals 3 times a day  - Out of bed for toileting  - Record patient progress and toleration of activity level   Outcome: Progressing     Problem: CARDIOVASCULAR - ADULT  Goal: Maintains optimal cardiac output and hemodynamic stability  Description: INTERVENTIONS:  - Monitor I/O, vital signs and rhythm  - Monitor for S/S and trends of decreased cardiac output  - Administer and titrate ordered vasoactive medications to optimize hemodynamic stability  - Assess quality of pulses, skin color and temperature  - Assess for signs of decreased coronary artery perfusion  - Instruct patient to report change in severity of symptoms  Outcome: Progressing     Problem: METABOLIC, FLUID AND ELECTROLYTES - ADULT  Goal: Electrolytes maintained within normal limits  Description: INTERVENTIONS:  - Monitor labs and assess patient for signs and symptoms of electrolyte imbalances  - Administer electrolyte replacement as ordered  - Monitor response to electrolyte replacements, including repeat lab results as appropriate  - Instruct patient on fluid and nutrition as appropriate  Outcome: Progressing  Goal: Glucose maintained within target range  Description: INTERVENTIONS:  - Monitor Blood Glucose as ordered  - Assess for signs and symptoms of hyperglycemia and hypoglycemia  - Administer ordered medications to maintain glucose within target range  - Assess nutritional intake and initiate nutrition service referral as needed  Outcome: Progressing     Problem: SKIN/TISSUE INTEGRITY - ADULT  Goal: Skin Integrity remains intact(Skin Breakdown Prevention)  Description: Assess:  -Perform Nate assessment every shift  -Clean and moisturize skin every shift  -Inspect skin when repositioning, toileting, and assisting with ADLS  -Assess extremities for adequate circulation and sensation     Bed Management:  -Have minimal linens on bed & keep smooth, unwrinkled  -Change linens as needed when moist or perspiring  -Avoid sitting or lying in one position for more than 2 hours while in bed  -Keep HOB at 30 degrees     Toileting:  -Offer bedside commode  -Assess for incontinence every shift  -Use incontinent care products after each incontinent episode such as pads    Activity:  -Mobilize patient 3 times a day  -Encourage activity and walks on unit  -Encourage or provide ROM exercises   -Turn and reposition patient every 2 Hours  -Use appropriate equipment to lift or move patient in bed  -Instruct/ Assist with weight shifting every hour when out of bed in chair  -Consider limitation of chair time 4 hour intervals    Skin Care:  -Avoid use of baby powder, tape, friction and shearing, hot water or constrictive clothing  -Relieve pressure over bony prominences using foam dsg  -Do not massage red bony areas    Outcome: Progressing     Problem: MUSCULOSKELETAL - ADULT  Goal: Maintain or return mobility to safest level of function  Description: INTERVENTIONS:  - Assess patient's ability to carry out ADLs; assess patient's baseline for ADL function and identify physical deficits which impact ability to perform ADLs (bathing, care of mouth/teeth, toileting, grooming, dressing, etc.)  - Assess/evaluate cause of self-care deficits   - Assess range of motion  - Assess patient's mobility  - Assess patient's need for assistive devices and provide as appropriate  - Encourage maximum independence but intervene and supervise when necessary  - Involve family in performance of ADLs  - Assess for home care needs following discharge   - Consider OT consult to assist with ADL evaluation and planning for discharge  - Provide patient education as appropriate  Outcome: Progressing     Problem: PAIN - ADULT  Goal: Verbalizes/displays adequate comfort level or baseline comfort level  Description: Interventions:  - Encourage patient to monitor pain and request assistance  - Assess pain using appropriate pain scale  - Administer analgesics based on type and severity of pain and evaluate response  - Implement non-pharmacological measures as appropriate and evaluate response  - Consider cultural and social influences on pain and pain management  - Notify physician/advanced practitioner if interventions unsuccessful or patient reports new pain  Outcome: Progressing     Problem: INFECTION - ADULT  Goal: Absence or prevention of progression during hospitalization  Description: INTERVENTIONS:  - Assess and monitor for signs and symptoms of infection  - Monitor lab/diagnostic results  - Monitor all insertion sites, i.e. indwelling lines, tubes, and drains  - Monitor endotracheal if appropriate and nasal secretions for changes in amount and color  - [place] appropriate cooling/warming therapies per order  - Administer medications as ordered  - Instruct and encourage patient and family to use good hand hygiene technique  - Identify and instruct in appropriate isolation precautions for identified infection/condition  Outcome: Progressing     Problem: SAFETY ADULT  Goal: Patient will remain free of falls  Description: INTERVENTIONS:  - Educate patient/family on patient safety including physical limitations  - Instruct patient to call for assistance with activity   - Consult OT/PT to assist with strengthening/mobility   - Keep Call bell within reach  - Keep bed low and locked with side rails adjusted as appropriate  - Keep care items and personal belongings within reach  - Initiate and maintain comfort rounds  - Make Fall Risk Sign visible to staff  - Offer Toileting every 2 Hours, in advance of need  - Initiate/Maintain bed alarm  - Apply yellow socks and bracelet for high fall risk patients  - Consider moving patient to room near nurses station  Outcome: Progressing  Goal: Maintain or return to baseline ADL function  Description: INTERVENTIONS:  -  Assess patient's ability to carry out ADLs; assess patient's baseline for ADL function and identify physical deficits which impact ability to perform ADLs (bathing, care of mouth/teeth, toileting, grooming, dressing, etc.)  - Assess/evaluate cause of self-care deficits   - Assess range of motion  - Assess patient's mobility; develop plan if impaired  - Assess patient's need for assistive devices and provide as appropriate  - Encourage maximum independence but intervene and supervise when necessary  - Involve family in performance of ADLs  - Assess for home care needs following discharge   - Consider OT consult to assist with ADL evaluation and planning for discharge  - Provide patient education as appropriate  Outcome: Progressing  Goal: Maintains/Returns to pre admission functional level  Description: INTERVENTIONS:  - Perform BMAT or MOVE assessment daily.   - Set and communicate daily mobility goal to care team and patient/family/caregiver. - Collaborate with rehabilitation services on mobility goals if consulted  - Perform Range of Motion 3 times a day. - Reposition patient every 2 hours.   - Dangle patient 3 times a day  - Stand patient 3 times a day  - Ambulate patient 3 times a day  - Out of bed to chair 3 times a day   - Out of bed for meals 3 times a day  - Out of bed for toileting  - Record patient progress and toleration of activity level   Outcome: Progressing     Problem: DISCHARGE PLANNING  Goal: Discharge to home or other facility with appropriate resources  Description: INTERVENTIONS:  - Identify barriers to discharge w/patient and caregiver  - Arrange for needed discharge resources and transportation as appropriate  - Identify discharge learning needs (meds, wound care, etc.)  - Arrange for interpretive services to assist at discharge as needed  - Refer to Case Management Department for coordinating discharge planning if the patient needs post-hospital services based on physician/advanced practitioner order or complex needs related to functional status, cognitive ability, or social support system  Outcome: Progressing     Problem: Knowledge Deficit  Goal: Patient/family/caregiver demonstrates understanding of disease process, treatment plan, medications, and discharge instructions  Description: Complete learning assessment and assess knowledge base. Interventions:  - Provide teaching at level of understanding  - Provide teaching via preferred learning methods  Outcome: Progressing     Problem: Prexisting or High Potential for Compromised Skin Integrity  Goal: Skin integrity is maintained or improved  Description: INTERVENTIONS:  - Identify patients at risk for skin breakdown  - Assess and monitor skin integrity  - Assess and monitor nutrition and hydration status  - Monitor labs   - Assess for incontinence   - Turn and reposition patient  - Assist with mobility/ambulation  - Relieve pressure over bony prominences  - Avoid friction and shearing  - Provide appropriate hygiene as needed including keeping skin clean and dry  - Evaluate need for skin moisturizer/barrier cream  - Collaborate with interdisciplinary team   - Patient/family teaching  - Consider wound care consult   Outcome: Progressing     Problem: Nutrition/Hydration-ADULT  Goal: Nutrient/Hydration intake appropriate for improving, restoring or maintaining nutritional needs  Description: Monitor and assess patient's nutrition/hydration status for malnutrition. Collaborate with interdisciplinary team and initiate plan and interventions as ordered. Monitor patient's weight and dietary intake as ordered or per policy. Utilize nutrition screening tool and intervene as necessary. Determine patient's food preferences and provide high-protein, high-caloric foods as appropriate.      INTERVENTIONS:  - Monitor oral intake, urinary output, labs, and treatment plans  - Assess nutrition and hydration status and recommend course of action  - Evaluate amount of meals eaten  - Assist patient with eating if necessary   - Allow adequate time for meals  - Recommend/ encourage appropriate diets, oral nutritional supplements, and vitamin/mineral supplements  - Order, calculate, and assess calorie counts as needed  - Recommend, monitor, and adjust tube feedings and TPN/PPN based on assessed needs  - Assess need for intravenous fluids  - Provide specific nutrition/hydration education as appropriate  - Include patient/family/caregiver in decisions related to nutrition  Outcome: Progressing

## 2023-09-22 NOTE — ASSESSMENT & PLAN NOTE
In the setting of in the setting of known insulin-dependent diabetes mellitus  Initially held basal insulin with solo PRN SSI coverage -> now resumed basal insulin today with fixed/prandial dosing as mentation improved with increased oral intake and intermittent episodes of paradoxical hyperglycemia  Blood sugars waxing/waning -> titrate insulin accordingly per endocrinology

## 2023-09-22 NOTE — NURSING NOTE
Pt very agitated, combative with staff and belligerant with staff. Pt attempting to get OOB and pt unsafe to do alone. Zyprexa given and pt calmed down for awhile.

## 2023-09-22 NOTE — PROGRESS NOTES
[address]  Progress Note  Name: Amber Brunner I  MRN: [id number]  Unit/Bed#: -01 I Date of Admission: 9/19/2023   Date of Service: 9/22/2023 I Hospital Day: 3      Assessment & Plan:    * Acute metabolic encephalopathy  Assessment & Plan  Suspect secondary to transient hypoglycemia and hypothermia (see below)  CT head negative for acute intracranial etiology  Monitor/replete electrolytes  CK normal  Urinalysis unremarkable for infectious process  CT of chest/abdomen/pelvis negative  Continue neurochecks -> mentation improving towards baseline  PT/OT recommending short-term rehab - awaiting placement    Hypoglycemia  Assessment & Plan  In the setting of in the setting of known insulin-dependent diabetes mellitus  Initially held basal insulin with solo PRN SSI coverage -> now resumed basal insulin today with fixed/prandial dosing as mentation improved with increased oral intake and intermittent episodes of paradoxical hyperglycemia  Blood sugars have stabilized an now are intermittently elevated -> titrate insulin accordingly  Endocrinology following    Hypothermia  Assessment & Plan  Transient course resolved status post Damaris hugger therapy in the ED  Possibly associated with hypoglycemia? ?    WBC count normal - no tachycardia/tachypnea in the ED thus far - hypotension resolved, hence, will discontinue IV fluids  Lactic acidosis normalized status post IV fluids   Procalcitonin normal -> less likely suspicion of acute infection at this time (observe off further antibiotics)  CT of chest/abdomen/pelvis unremarkable    Hypokalemia  Assessment & Plan  Normalized status post repletion    Hypomagnesemia  Assessment & Plan  Monitor/replete serum magnesium    History of Hodgkin's lymphoma  Assessment & Plan  Status post mastoidectomy/left neck dissection and subsequent radiation    Essential hypertension  Assessment & Plan  Continue Zestril - additional PRN IV Hydralazine on board  Low-sodium restriction    Hypothyroidism  Assessment & Plan  Continue Synthroid - acquired state status post prior thyroidectomy    Hyperlipidemia  Assessment & Plan  Continue statin      DVT Prophylaxis:  Heparin SC       Patient Centered Rounds:  I have performed bedside rounds and discussed plan of care with nursing today. Discussions with Specialists or Other Care Team Provider:  see above assessments if applicable    Education and Discussions with Family / Patient: Patient at bedside - daughter, Travis Tamayo, has given recommendations for rehab facilities    Time Spent for Care:  35 minutes. More than 50% of total time spent on counseling and coordination of care, on one or more of the following: performing physical exam; counseling and coordination of care, obtaining or reviewing history, documenting in the medical record, reviewing/ordering tests/medications/procedures, and communicating with other healthcare professionals. Current Length of Stay: 3 day(s)  Current Patient Status: Inpatient   Certification Statement:  Patient will continue to require additional hospital stay due to assessments as noted above. Code Status: Level 3 - DNAR and DNI        Subjective:     Seen and examined earlier today. Awake and alert at baseline, and denies new complaints. Remains weak/fatigued. Objective:     Vitals:   Temp (24hrs), Av °F (36.7 °C), Min:97.9 °F (36.6 °C), Max:98.1 °F (36.7 °C)    Temp:  [97.9 °F (36.6 °C)-98.1 °F (36.7 °C)] 97.9 °F (36.6 °C)  HR:  [] 110  Resp:  [18-20] 20  BP: ()/(54-75) 118/75  SpO2:  [94 %-95 %] 95 %  Body mass index is 20.97 kg/m². Input and Output Summary (last 24 hours):        Intake/Output Summary (Last 24 hours) at 2023 1520  Last data filed at 2023 1315  Gross per 24 hour   Intake 165.83 ml   Output 2050 ml   Net -1884.17 ml       Physical Exam:     GENERAL  remains weak/fatigued   HEAD   Normocephalic - atraumatic   EYES   PERRL - EOMI MOUTH   Mucosa moist   NECK   Supple - full range of motion   CARDIAC  rate controlled - S1/S2 positive   PULMONARY    Clear breath sounds bilaterally - nonlabored respirations   ABDOMEN   Soft - nontender/nondistended - active bowel sounds   MUSCULOSKELETAL   Motor strength/range of motion deconditioned   NEUROLOGIC  awake/alert with baseline cognitive impairment   SKIN   Chronic wrinkles/blemishes    PSYCHIATRIC   Mood/affect pleasant currently         Additional Data:     Labs & Recent Cultures:    Results from last 7 days   Lab Units 09/22/23  0544   WBC Thousand/uL 7.76   HEMOGLOBIN g/dL 12.0   HEMATOCRIT % 37.0   PLATELETS Thousands/uL 228   NEUTROS PCT % 77*   LYMPHS PCT % 13*   MONOS PCT % 9   EOS PCT % 0     Results from last 7 days   Lab Units 09/22/23  0544 09/20/23  0517 09/19/23  1050   POTASSIUM mmol/L 4.1   < > 3.4*   CHLORIDE mmol/L 101   < > 99   CO2 mmol/L 27   < > 29   BUN mg/dL 12   < > 17   CREATININE mg/dL 0.70   < > 0.59*   CALCIUM mg/dL 8.5   < > 8.4   ALK PHOS U/L  --   --  57   ALT U/L  --   --  20   AST U/L  --   --  23    < > = values in this interval not displayed. Results from last 7 days   Lab Units 09/19/23  1050   INR  0.94     Results from last 7 days   Lab Units 09/22/23  1236 09/22/23  1122 09/22/23  0827 09/21/23  2137 09/21/23  1929 09/21/23  1639 09/21/23  1059 09/21/23  0705 09/20/23  2121 09/20/23  1617 09/20/23  1110 09/20/23  1108   POC GLUCOSE mg/dl 87 113 382* 316* 294* 64* 252* 214* 233* 100 350* 354*         Results from last 7 days   Lab Units 09/20/23  0517 09/19/23  1406 09/19/23  1050   LACTIC ACID mmol/L  --  0.9 2.6*   PROCALCITONIN ng/ml 0.07  --  0.06         Results from last 7 days   Lab Units 09/19/23  1050   BLOOD CULTURE  No Growth at 48 hrs. No Growth at 48 hrs.          Lines/Drains:  Invasive Devices     Peripheral Intravenous Line  Duration           Peripheral IV 09/21/23 Distal;Left;Ventral (anterior) Forearm 1 day                  Last 24 Hours Medication List:   Current Facility-Administered Medications   Medication Dose Route Frequency Provider Last Rate   • acetaminophen  650 mg Oral Q6H PRN Rolinda Gowers, MD     • vitamin C  500 mg Oral BID Rolinda Gowers, MD     • atorvastatin  20 mg Oral Daily With Zoey Diaz MD     • calcium carbonate  1 tablet Oral BID With Meals Rolinda Gowers, MD     • cyanocobalamin  1,000 mcg Oral Daily Rolinda Gowers, MD     • gabapentin  300 mg Oral TID Rolinda Gowers, MD     • heparin (porcine)  5,000 Units Subcutaneous Q8H [facility] Rolinda Gowers, MD     • hydrALAZINE  5 mg Intravenous Q6H PRN Rolinda Gowers, MD     • [START ON 9/23/2023] insulin glargine  10 Units Subcutaneous QAM Rolinda Gowers, MD     • insulin glargine  5 Units Subcutaneous HS Rolinda Gowers, MD     • insulin lispro  1-5 Units Subcutaneous TID [facility] Tayo Lu MD     • insulin lispro  4 Units Subcutaneous TID With Meals Rolinda Gowers, MD     • levothyroxine  75 mcg Oral Early Morning Rolinda Gowers, MD     • lisinopril  5 mg Oral Daily Rolinda Gowers, MD     • meclizine  25 mg Oral Q8H PRN Rolinda Gowers, MD     • multivitamin stress formula  1 tablet Oral Daily Rolinda Gowers, MD     • mupirocin   Topical Daily Rolinda Gowers, MD     • ondansetron  4 mg Intravenous Q4H PRN Rolinda Gowers, MD     • pantoprazole  40 mg Oral BID AC Rolinda Gowers, MD                    ** Please Note: This note is constructed using a voice recognition dictation system. An occasional wrong word/phrase or “sound-a-like” substitution may have been picked up by dictation device due to the inherent limitations of voice recognition software. Read the chart carefully and recognize, using reasonable context, where substitutions may have occurred. **

## 2023-09-22 NOTE — ASSESSMENT & PLAN NOTE
Transient course resolved status post Damaris hugger therapy in the ED  Possibly associated with hypoglycemia? ?    WBC count normal - no tachycardia/tachypnea in the ED thus far - hypotension resolved, hence, will discontinue IV fluids  Lactic acidosis normalized status post IV fluids   Procalcitonin normal -> less likely suspicion of acute infection at this time (observe off further antibiotics)  CT of chest/abdomen/pelvis unremarkable  Body temperatures now normothermic

## 2023-09-23 LAB
ANION GAP SERPL CALCULATED.3IONS-SCNC: 6 MMOL/L
BASOPHILS # BLD AUTO: 0.08 THOUSANDS/ÂΜL (ref 0–0.1)
BASOPHILS NFR BLD AUTO: 1 % (ref 0–1)
BUN SERPL-MCNC: 13 MG/DL (ref 5–25)
CALCIUM SERPL-MCNC: 8.8 MG/DL (ref 8.4–10.2)
CHLORIDE SERPL-SCNC: 101 MMOL/L (ref 96–108)
CO2 SERPL-SCNC: 31 MMOL/L (ref 21–32)
CREAT SERPL-MCNC: 0.74 MG/DL (ref 0.6–1.3)
EOSINOPHIL # BLD AUTO: 0.1 THOUSAND/ÂΜL (ref 0–0.61)
EOSINOPHIL NFR BLD AUTO: 1 % (ref 0–6)
ERYTHROCYTE [DISTWIDTH] IN BLOOD BY AUTOMATED COUNT: 12.9 % (ref 11.6–15.1)
GFR SERPL CREATININE-BSD FRML MDRD: 75 ML/MIN/1.73SQ M
GLUCOSE SERPL-MCNC: 184 MG/DL (ref 65–140)
GLUCOSE SERPL-MCNC: 213 MG/DL (ref 65–140)
GLUCOSE SERPL-MCNC: 243 MG/DL (ref 65–140)
GLUCOSE SERPL-MCNC: 278 MG/DL (ref 65–140)
GLUCOSE SERPL-MCNC: 293 MG/DL (ref 65–140)
GLUCOSE SERPL-MCNC: 304 MG/DL (ref 65–140)
HCT VFR BLD AUTO: 40.1 % (ref 34.8–46.1)
HGB BLD-MCNC: 12.8 G/DL (ref 11.5–15.4)
IMM GRANULOCYTES # BLD AUTO: 0.02 THOUSAND/UL (ref 0–0.2)
IMM GRANULOCYTES NFR BLD AUTO: 0 % (ref 0–2)
LYMPHOCYTES # BLD AUTO: 1.45 THOUSANDS/ÂΜL (ref 0.6–4.47)
LYMPHOCYTES NFR BLD AUTO: 21 % (ref 14–44)
MAGNESIUM SERPL-MCNC: 1.8 MG/DL (ref 1.9–2.7)
MCH RBC QN AUTO: 33.2 PG (ref 26.8–34.3)
MCHC RBC AUTO-ENTMCNC: 31.9 G/DL (ref 31.4–37.4)
MCV RBC AUTO: 104 FL (ref 82–98)
MONOCYTES # BLD AUTO: 0.68 THOUSAND/ÂΜL (ref 0.17–1.22)
MONOCYTES NFR BLD AUTO: 10 % (ref 4–12)
NEUTROPHILS # BLD AUTO: 4.63 THOUSANDS/ÂΜL (ref 1.85–7.62)
NEUTS SEG NFR BLD AUTO: 67 % (ref 43–75)
NRBC BLD AUTO-RTO: 0 /100 WBCS
PHOSPHATE SERPL-MCNC: 4.5 MG/DL (ref 2.3–4.1)
PLATELET # BLD AUTO: 247 THOUSANDS/UL (ref 149–390)
PMV BLD AUTO: 10.6 FL (ref 8.9–12.7)
POTASSIUM SERPL-SCNC: 4.1 MMOL/L (ref 3.5–5.3)
RBC # BLD AUTO: 3.85 MILLION/UL (ref 3.81–5.12)
SODIUM SERPL-SCNC: 138 MMOL/L (ref 135–147)
WBC # BLD AUTO: 6.96 THOUSAND/UL (ref 4.31–10.16)

## 2023-09-23 PROCEDURE — 80048 BASIC METABOLIC PNL TOTAL CA: CPT | Performed by: INTERNAL MEDICINE

## 2023-09-23 PROCEDURE — 85025 COMPLETE CBC W/AUTO DIFF WBC: CPT | Performed by: INTERNAL MEDICINE

## 2023-09-23 PROCEDURE — 99232 SBSQ HOSP IP/OBS MODERATE 35: CPT | Performed by: INTERNAL MEDICINE

## 2023-09-23 PROCEDURE — 82948 REAGENT STRIP/BLOOD GLUCOSE: CPT

## 2023-09-23 PROCEDURE — 83735 ASSAY OF MAGNESIUM: CPT | Performed by: INTERNAL MEDICINE

## 2023-09-23 PROCEDURE — 84100 ASSAY OF PHOSPHORUS: CPT | Performed by: INTERNAL MEDICINE

## 2023-09-23 RX ORDER — MAGNESIUM SULFATE HEPTAHYDRATE 40 MG/ML
2 INJECTION, SOLUTION INTRAVENOUS ONCE
Status: COMPLETED | OUTPATIENT
Start: 2023-09-23 | End: 2023-09-23

## 2023-09-23 RX ADMIN — INSULIN LISPRO 2 UNITS: 100 INJECTION, SOLUTION INTRAVENOUS; SUBCUTANEOUS at 16:54

## 2023-09-23 RX ADMIN — CYANOCOBALAMIN TAB 500 MCG 1000 MCG: 500 TAB at 09:18

## 2023-09-23 RX ADMIN — INSULIN LISPRO 1 UNITS: 100 INJECTION, SOLUTION INTRAVENOUS; SUBCUTANEOUS at 11:54

## 2023-09-23 RX ADMIN — CALCIUM 1 TABLET: 500 TABLET ORAL at 07:47

## 2023-09-23 RX ADMIN — GABAPENTIN 300 MG: 300 CAPSULE ORAL at 21:39

## 2023-09-23 RX ADMIN — INSULIN LISPRO 2 UNITS: 100 INJECTION, SOLUTION INTRAVENOUS; SUBCUTANEOUS at 11:54

## 2023-09-23 RX ADMIN — INSULIN LISPRO 2 UNITS: 100 INJECTION, SOLUTION INTRAVENOUS; SUBCUTANEOUS at 07:47

## 2023-09-23 RX ADMIN — INSULIN GLARGINE 7 UNITS: 100 INJECTION, SOLUTION SUBCUTANEOUS at 09:17

## 2023-09-23 RX ADMIN — HEPARIN SODIUM 5000 UNITS: 5000 INJECTION INTRAVENOUS; SUBCUTANEOUS at 21:32

## 2023-09-23 RX ADMIN — B-COMPLEX W/ C & FOLIC ACID TAB 1 TABLET: TAB at 09:18

## 2023-09-23 RX ADMIN — LEVOTHYROXINE SODIUM 75 MCG: 75 TABLET ORAL at 06:02

## 2023-09-23 RX ADMIN — PANTOPRAZOLE SODIUM 40 MG: 40 TABLET, DELAYED RELEASE ORAL at 06:02

## 2023-09-23 RX ADMIN — HEPARIN SODIUM 5000 UNITS: 5000 INJECTION INTRAVENOUS; SUBCUTANEOUS at 13:30

## 2023-09-23 RX ADMIN — INSULIN LISPRO 4 UNITS: 100 INJECTION, SOLUTION INTRAVENOUS; SUBCUTANEOUS at 07:48

## 2023-09-23 RX ADMIN — INSULIN GLARGINE 5 UNITS: 100 INJECTION, SOLUTION SUBCUTANEOUS at 21:33

## 2023-09-23 RX ADMIN — LISINOPRIL 5 MG: 5 TABLET ORAL at 09:18

## 2023-09-23 RX ADMIN — MUPIROCIN: 20 OINTMENT TOPICAL at 09:21

## 2023-09-23 RX ADMIN — ATORVASTATIN CALCIUM 20 MG: 20 TABLET, FILM COATED ORAL at 16:53

## 2023-09-23 RX ADMIN — PANTOPRAZOLE SODIUM 40 MG: 40 TABLET, DELAYED RELEASE ORAL at 16:52

## 2023-09-23 RX ADMIN — INSULIN LISPRO 4 UNITS: 100 INJECTION, SOLUTION INTRAVENOUS; SUBCUTANEOUS at 16:55

## 2023-09-23 RX ADMIN — OXYCODONE HYDROCHLORIDE AND ACETAMINOPHEN 500 MG: 500 TABLET ORAL at 17:01

## 2023-09-23 RX ADMIN — OXYCODONE HYDROCHLORIDE AND ACETAMINOPHEN 500 MG: 500 TABLET ORAL at 09:18

## 2023-09-23 RX ADMIN — CALCIUM 1 TABLET: 500 TABLET ORAL at 16:53

## 2023-09-23 RX ADMIN — GABAPENTIN 300 MG: 300 CAPSULE ORAL at 16:53

## 2023-09-23 RX ADMIN — HEPARIN SODIUM 5000 UNITS: 5000 INJECTION INTRAVENOUS; SUBCUTANEOUS at 06:02

## 2023-09-23 RX ADMIN — MAGNESIUM SULFATE HEPTAHYDRATE 2 G: 40 INJECTION, SOLUTION INTRAVENOUS at 10:44

## 2023-09-23 RX ADMIN — GABAPENTIN 300 MG: 300 CAPSULE ORAL at 09:18

## 2023-09-23 NOTE — CASE MANAGEMENT
Case Management Discharge Planning Note    Patient name Elaina Espinoza  Location /-36 MRN [id number]  : 1940 Date 2023       Current Admission Date: 2023  Current Admission Diagnosis:Acute metabolic encephalopathy   Patient Active Problem List    Diagnosis Date Noted   • Hypomagnesemia 2023   • Hypoglycemia 2023   • Hypothermia 2023   • Hypokalemia 2023   • History of Hodgkin's lymphoma 2023   • Hypothyroidism 2023   • Acute metabolic encephalopathy    • Gastroesophageal reflux disease without esophagitis 10/17/2022   • Abnormal CT of the abdomen 07/10/2022   • Soft tissue radionecrosis 2022   • 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   • Primary osteoarthritis of right shoulder 2021   • Primary osteoarthritis of both knees 2020   • Postoperative hypothyroidism 2015   • Hyperlipidemia 2014   • Essential hypertension 10/10/2013   • Type 1 diabetes mellitus with hypoglycemia ([address]) 2008      LOS (days): 4  Geometric Mean LOS (GMLOS) (days): 3.90  Days to GMLOS:0     OBJECTIVE:  Risk of Unplanned Readmission Score: 23.6         Current admission status: Inpatient   [facility]   [facility] [address] [place] [address]  Phone: [phone] Fax: [phone]    Primary Care Provider: Lorne Palacios MD    Primary Insurance: MEDICARE  Secondary Insurance: [facility]    DISCHARGE DETAILS:    Discharge planning discussed with[de-identified] Patient and Dtr Nic Emerson  Freedom of Choice: Yes     CM contacted family/caregiver?: Yes     Did patient/caregiver verbalize understanding of patient care needs?: Yes  Were patient/caregiver advised of the risks associated with not following Treatment Team discharge recommendations?: Yes    Contacts  Patient Contacts: Wendy Lanier dtr  Relationship to Patient[de-identified] Family  Phone Number: [phone]  Reason/Outcome: Emergency Contact, Discharge Planning    Other Referral/Resources/Interventions Provided:  Interventions: Short Term Rehab    Treatment Team Recommendation: Short Term Rehab  Discharge Destination Plan[de-identified] Short Term Rehab  Transport at Discharge : Wheelchair [employer]  Dispatcher Contacted: Yes  Number/Name of Dispatcher: Vasile Michael     IMM Given (Date):: 09/23/23 (Copy provided to patient and media)  IMM Given to[de-identified] Patient     Additional Comments: Met with patient at bedside to discuss discharge planning. Discussed discharge to rehab and that [facility] Dr Davy Lee has medically cleared. Message to [facility] same. Call to dtr Monica to discuss discharge planning abnd rehab. Monica with many medical concerns and questions. Message to [employer] requesting medical update. Per Jackson Jefferson she has not seen patient she just returned home from vacation. Patient approved for 26 hrs a week waiver services. Family is appealing for more hours. Call back from dtr Jackson Jefferson and she agrees with rehab.     Accepting Facility Name, [address] [place] [address] : [facility]  Receiving Facility/Agency Phone Number: [phone]  Facility/Agency Fax Number: [phone]

## 2023-09-23 NOTE — PROGRESS NOTES
[address]  Progress Note  Name: Roz Mann I  MRN: [id number]  Unit/Bed#: -01 I Date of Admission: 9/19/2023   Date of Service: 9/23/2023 I Hospital Day: 4      Assessment & Plan:    * Acute metabolic encephalopathy  Assessment & Plan  Suspect secondary to transient hypoglycemia and hypothermia (see below)  CT head negative for acute intracranial etiology  Monitor/replete electrolytes  CK normal  Urinalysis unremarkable for infectious process  CT of chest/abdomen/pelvis negative  Continue neurochecks -> mentation waxing/waning but relatively improving towards baseline  PT/OT recommending short-term rehab - tentative plan for discharge to skilled rehab tomorrow    Hypoglycemia  Assessment & Plan  In the setting of in the setting of known insulin-dependent diabetes mellitus  Initially held basal insulin with solo PRN SSI coverage -> now resumed basal insulin today with fixed/prandial dosing as mentation improved with increased oral intake and intermittent episodes of paradoxical hyperglycemia  Blood sugars waxing/waning -> titrate insulin accordingly per endocrinology    Hypothermia  Assessment & Plan  Transient course resolved status post Damaris hugger therapy in the ED  Possibly associated with hypoglycemia? ?    WBC count normal - no tachycardia/tachypnea in the ED thus far - hypotension resolved, hence, will discontinue IV fluids  Lactic acidosis normalized status post IV fluids   Procalcitonin normal -> less likely suspicion of acute infection at this time (observe off further antibiotics)  CT of chest/abdomen/pelvis unremarkable  Body temperatures now normothermic    Hypokalemia  Assessment & Plan  Normalized status post repletion    Hypomagnesemia  Assessment & Plan  Monitor/replete serum magnesium    History of Hodgkin's lymphoma  Assessment & Plan  Status post mastoidectomy/left neck dissection and subsequent radiation    Essential hypertension  Assessment & Plan  Continue Zestril - additional PRN IV Hydralazine on board  Low-sodium restriction    Hypothyroidism  Assessment & Plan  Continue Synthroid - acquired state status post prior thyroidectomy    Hyperlipidemia  Assessment & Plan  Continue statin      DVT Prophylaxis:  Heparin SC       Patient Centered Rounds:  I have performed bedside rounds and discussed plan of care with nursing today. Discussions with Specialists or Other Care Team Provider:  see above assessments if applicable    Education and Discussions with Family / Patient:  Patient at bedside - discussed with daughter, Armando Fatima, over the phone today    Time Spent for Care:  35 minutes. More than 50% of total time spent on counseling and coordination of care, on one or more of the following: performing physical exam; counseling and coordination of care, obtaining or reviewing history, documenting in the medical record, reviewing/ordering tests/medications/procedures, and communicating with other healthcare professionals. Current Length of Stay: 4 day(s)  Current Patient Status: Inpatient   Certification Statement:  Patient will continue to require additional hospital stay due to assessments as noted above. Code Status: Level 3 - DNAR and DNI        Subjective:     Encountered earlier in the morning. Remains weak/fatigued however, in relatively pleasant spirits. Objective:     Vitals:   Temp (24hrs), Av °F (36.7 °C), Min:97.9 °F (36.6 °C), Max:98.1 °F (36.7 °C)    Temp:  [97.9 °F (36.6 °C)-98.1 °F (36.7 °C)] 97.9 °F (36.6 °C)  HR:  [] 82  Resp:  [16-19] 19  BP: ()/(50-66) 119/62  SpO2:  [94 %-97 %] 96 %  Body mass index is 20.97 kg/m². Input and Output Summary (last 24 hours):        Intake/Output Summary (Last 24 hours) at 2023 1304  Last data filed at 2023 0732  Gross per 24 hour   Intake 285.83 ml   Output 300 ml   Net -14.17 ml       Physical Exam:     GENERAL  remains weak/fatigued   HEAD   Normocephalic - atraumatic   EYES PERRL - EOMI    MOUTH   Mucosa moist   NECK   Supple - full range of motion   CARDIAC  rate controlled currently - S1/S2 positive   PULMONARY  fairly clear to auscultation - nonlabored respirations   ABDOMEN   Soft - nontender/nondistended - active bowel sounds   MUSCULOSKELETAL   Motor strength/range of motion deconditioned   NEUROLOGIC  awake/alert with baseline cognitive impairment   SKIN   Chronic wrinkles/blemishes    PSYCHIATRIC   Mood/affect stable         Additional Data:     Labs & Recent Cultures:    Results from last 7 days   Lab Units 09/23/23  0601   WBC Thousand/uL 6.96   HEMOGLOBIN g/dL 12.8   HEMATOCRIT % 40.1   PLATELETS Thousands/uL 247   NEUTROS PCT % 67   LYMPHS PCT % 21   MONOS PCT % 10   EOS PCT % 1     Results from last 7 days   Lab Units 09/23/23  0601 09/20/23  0517 09/19/23  1050   POTASSIUM mmol/L 4.1   < > 3.4*   CHLORIDE mmol/L 101   < > 99   CO2 mmol/L 31   < > 29   BUN mg/dL 13   < > 17   CREATININE mg/dL 0.74   < > 0.59*   CALCIUM mg/dL 8.8   < > 8.4   ALK PHOS U/L  --   --  57   ALT U/L  --   --  20   AST U/L  --   --  23    < > = values in this interval not displayed. Results from last 7 days   Lab Units 09/19/23  1050   INR  0.94     Results from last 7 days   Lab Units 09/23/23  1145 09/23/23  0917 09/23/23  0728 09/22/23  2109 09/22/23  1817 09/22/23  1648 09/22/23  1626 09/22/23  1236 09/22/23  1122 09/22/23  0827 09/21/23  2137 09/21/23  1929   POC GLUCOSE mg/dl 213* 278* 293* 304* 110 254* 61* 87 113 382* 316* 294*         Results from last 7 days   Lab Units 09/20/23  0517 09/19/23  1406 09/19/23  1050   LACTIC ACID mmol/L  --  0.9 2.6*   PROCALCITONIN ng/ml 0.07  --  0.06         Results from last 7 days   Lab Units 09/19/23  1050   BLOOD CULTURE  No Growth at 72 hrs. No Growth at 72 hrs.          Lines/Drains:  Invasive Devices     Peripheral Intravenous Line  Duration           Peripheral IV 09/21/23 Distal;Left;Ventral (anterior) Forearm 2 days                  Last 24 Hours Medication List:   Current Facility-Administered Medications   Medication Dose Route Frequency Provider Last Rate   • acetaminophen  650 mg Oral Q6H PRN Jenny Mckeon MD     • vitamin C  500 mg Oral BID Jenny Mckeon MD     • atorvastatin  20 mg Oral Daily With Petty Iverson MD     • calcium carbonate  1 tablet Oral BID With Meals Jenny Mckeon MD     • cyanocobalamin  1,000 mcg Oral Daily Jenny Mckeon MD     • gabapentin  300 mg Oral TID Jenny Mckeon MD     • heparin (porcine)  5,000 Units Subcutaneous Q8H [address] Jenny Mckeon MD     • hydrALAZINE  5 mg Intravenous Q6H PRN Jenny Mckeon MD     • insulin glargine  5 Units Subcutaneous HS Jenny Mckeon MD     • insulin glargine  7 Units Subcutaneous QAM Ta Elias MD     • insulin lispro  1-5 Units Subcutaneous TID [facility] Ta Elias MD     • insulin lispro  2 Units Subcutaneous Daily Before Lunch Ta Elias MD     • insulin lispro  4 Units Subcutaneous Before Arvindisaiah Peck MD     • insulin lispro  4 Units Subcutaneous Daily Before Breakfast Ta Elias MD     • levothyroxine  75 mcg Oral Early Morning Jenny Mckeon MD     • lisinopril  5 mg Oral Daily Jenny Mckeon MD     • meclizine  25 mg Oral Q8H PRN Jenny Mckeon MD     • multivitamin stress formula  1 tablet Oral Daily Jenny Mckeon MD     • mupirocin   Topical Daily Jenny Mckeon MD     • ondansetron  4 mg Intravenous Q4H PRPAWEL Mckeon MD     • pantoprazole  40 mg Oral BID AC Jenny Mckeon MD                      ** Please Note: This note is constructed using a voice recognition dictation system. An occasional wrong word/phrase or “sound-a-like” substitution may have been picked up by dictation device due to the inherent limitations of voice recognition software. Read the chart carefully and recognize, using reasonable context, where substitutions may have occurred. **

## 2023-09-24 VITALS
HEIGHT: 61 IN | WEIGHT: 111 LBS | SYSTOLIC BLOOD PRESSURE: 102 MMHG | TEMPERATURE: 97.6 F | DIASTOLIC BLOOD PRESSURE: 48 MMHG | OXYGEN SATURATION: 95 % | RESPIRATION RATE: 18 BRPM | HEART RATE: 82 BPM | BODY MASS INDEX: 20.96 KG/M2

## 2023-09-24 PROBLEM — D75.89 MACROCYTOSIS: Status: ACTIVE | Noted: 2023-09-24

## 2023-09-24 PROBLEM — I95.9 LOW BLOOD PRESSURE: Status: ACTIVE | Noted: 2023-09-24

## 2023-09-24 LAB
ANION GAP SERPL CALCULATED.3IONS-SCNC: 6 MMOL/L
BACTERIA BLD CULT: NORMAL
BACTERIA BLD CULT: NORMAL
BASOPHILS # BLD AUTO: 0.06 THOUSANDS/ÂΜL (ref 0–0.1)
BASOPHILS NFR BLD AUTO: 1 % (ref 0–1)
BUN SERPL-MCNC: 22 MG/DL (ref 5–25)
CALCIUM SERPL-MCNC: 8.2 MG/DL (ref 8.4–10.2)
CHLORIDE SERPL-SCNC: 103 MMOL/L (ref 96–108)
CO2 SERPL-SCNC: 27 MMOL/L (ref 21–32)
CREAT SERPL-MCNC: 0.72 MG/DL (ref 0.6–1.3)
EOSINOPHIL # BLD AUTO: 0.12 THOUSAND/ÂΜL (ref 0–0.61)
EOSINOPHIL NFR BLD AUTO: 2 % (ref 0–6)
ERYTHROCYTE [DISTWIDTH] IN BLOOD BY AUTOMATED COUNT: 13 % (ref 11.6–15.1)
GFR SERPL CREATININE-BSD FRML MDRD: 78 ML/MIN/1.73SQ M
GLUCOSE SERPL-MCNC: 293 MG/DL (ref 65–140)
GLUCOSE SERPL-MCNC: 347 MG/DL (ref 65–140)
GLUCOSE SERPL-MCNC: 390 MG/DL (ref 65–140)
HCT VFR BLD AUTO: 35.9 % (ref 34.8–46.1)
HGB BLD-MCNC: 11.5 G/DL (ref 11.5–15.4)
IMM GRANULOCYTES # BLD AUTO: 0.03 THOUSAND/UL (ref 0–0.2)
IMM GRANULOCYTES NFR BLD AUTO: 0 % (ref 0–2)
LYMPHOCYTES # BLD AUTO: 1.64 THOUSANDS/ÂΜL (ref 0.6–4.47)
LYMPHOCYTES NFR BLD AUTO: 21 % (ref 14–44)
MAGNESIUM SERPL-MCNC: 1.8 MG/DL (ref 1.9–2.7)
MCH RBC QN AUTO: 33.2 PG (ref 26.8–34.3)
MCHC RBC AUTO-ENTMCNC: 32 G/DL (ref 31.4–37.4)
MCV RBC AUTO: 104 FL (ref 82–98)
MONOCYTES # BLD AUTO: 0.64 THOUSAND/ÂΜL (ref 0.17–1.22)
MONOCYTES NFR BLD AUTO: 8 % (ref 4–12)
NEUTROPHILS # BLD AUTO: 5.33 THOUSANDS/ÂΜL (ref 1.85–7.62)
NEUTS SEG NFR BLD AUTO: 68 % (ref 43–75)
NRBC BLD AUTO-RTO: 0 /100 WBCS
PHOSPHATE SERPL-MCNC: 4.9 MG/DL (ref 2.3–4.1)
PLATELET # BLD AUTO: 228 THOUSANDS/UL (ref 149–390)
PMV BLD AUTO: 10.9 FL (ref 8.9–12.7)
POTASSIUM SERPL-SCNC: 4.2 MMOL/L (ref 3.5–5.3)
RBC # BLD AUTO: 3.46 MILLION/UL (ref 3.81–5.12)
SODIUM SERPL-SCNC: 136 MMOL/L (ref 135–147)
WBC # BLD AUTO: 7.82 THOUSAND/UL (ref 4.31–10.16)

## 2023-09-24 PROCEDURE — 85025 COMPLETE CBC W/AUTO DIFF WBC: CPT | Performed by: INTERNAL MEDICINE

## 2023-09-24 PROCEDURE — 84100 ASSAY OF PHOSPHORUS: CPT | Performed by: INTERNAL MEDICINE

## 2023-09-24 PROCEDURE — 82948 REAGENT STRIP/BLOOD GLUCOSE: CPT

## 2023-09-24 PROCEDURE — 99239 HOSP IP/OBS DSCHRG MGMT >30: CPT | Performed by: STUDENT IN AN ORGANIZED HEALTH CARE EDUCATION/TRAINING PROGRAM

## 2023-09-24 PROCEDURE — 80048 BASIC METABOLIC PNL TOTAL CA: CPT | Performed by: INTERNAL MEDICINE

## 2023-09-24 PROCEDURE — 83735 ASSAY OF MAGNESIUM: CPT | Performed by: INTERNAL MEDICINE

## 2023-09-24 RX ORDER — INSULIN LISPRO 100 [IU]/ML
4 INJECTION, SOLUTION INTRAVENOUS; SUBCUTANEOUS
Refills: 0
Start: 2023-09-24

## 2023-09-24 RX ORDER — INSULIN GLARGINE 100 [IU]/ML
INJECTION, SOLUTION SUBCUTANEOUS
Refills: 0
Start: 2023-09-24

## 2023-09-24 RX ORDER — INSULIN LISPRO 100 [IU]/ML
4 INJECTION, SOLUTION INTRAVENOUS; SUBCUTANEOUS
Refills: 0
Start: 2023-09-25

## 2023-09-24 RX ORDER — INSULIN LISPRO 100 [IU]/ML
2 INJECTION, SOLUTION INTRAVENOUS; SUBCUTANEOUS
Refills: 0
Start: 2023-09-25

## 2023-09-24 RX ORDER — MAGNESIUM SULFATE 1 G/100ML
1 INJECTION INTRAVENOUS ONCE
Status: COMPLETED | OUTPATIENT
Start: 2023-09-24 | End: 2023-09-24

## 2023-09-24 RX ORDER — GABAPENTIN 300 MG/1
300 CAPSULE ORAL 2 TIMES DAILY
Refills: 0
Start: 2023-09-24

## 2023-09-24 RX ORDER — INSULIN LISPRO 100 [IU]/ML
1-5 INJECTION, SOLUTION INTRAVENOUS; SUBCUTANEOUS
Refills: 0
Start: 2023-09-24

## 2023-09-24 RX ADMIN — INSULIN LISPRO 2 UNITS: 100 INJECTION, SOLUTION INTRAVENOUS; SUBCUTANEOUS at 11:44

## 2023-09-24 RX ADMIN — INSULIN GLARGINE 7 UNITS: 100 INJECTION, SOLUTION SUBCUTANEOUS at 10:00

## 2023-09-24 RX ADMIN — B-COMPLEX W/ C & FOLIC ACID TAB 1 TABLET: TAB at 10:00

## 2023-09-24 RX ADMIN — LEVOTHYROXINE SODIUM 75 MCG: 75 TABLET ORAL at 06:25

## 2023-09-24 RX ADMIN — ACETAMINOPHEN 650 MG: 325 TABLET ORAL at 07:40

## 2023-09-24 RX ADMIN — CYANOCOBALAMIN TAB 500 MCG 1000 MCG: 500 TAB at 10:00

## 2023-09-24 RX ADMIN — CALCIUM 1 TABLET: 500 TABLET ORAL at 07:39

## 2023-09-24 RX ADMIN — OXYCODONE HYDROCHLORIDE AND ACETAMINOPHEN 500 MG: 500 TABLET ORAL at 10:00

## 2023-09-24 RX ADMIN — INSULIN LISPRO 4 UNITS: 100 INJECTION, SOLUTION INTRAVENOUS; SUBCUTANEOUS at 11:43

## 2023-09-24 RX ADMIN — PANTOPRAZOLE SODIUM 40 MG: 40 TABLET, DELAYED RELEASE ORAL at 06:25

## 2023-09-24 RX ADMIN — MAGNESIUM SULFATE HEPTAHYDRATE 1 G: 1 INJECTION, SOLUTION INTRAVENOUS at 12:31

## 2023-09-24 RX ADMIN — INSULIN LISPRO 2 UNITS: 100 INJECTION, SOLUTION INTRAVENOUS; SUBCUTANEOUS at 07:57

## 2023-09-24 RX ADMIN — HEPARIN SODIUM 5000 UNITS: 5000 INJECTION INTRAVENOUS; SUBCUTANEOUS at 06:25

## 2023-09-24 RX ADMIN — INSULIN LISPRO 4 UNITS: 100 INJECTION, SOLUTION INTRAVENOUS; SUBCUTANEOUS at 07:57

## 2023-09-24 RX ADMIN — GABAPENTIN 300 MG: 300 CAPSULE ORAL at 10:00

## 2023-09-24 NOTE — PLAN OF CARE
Problem: Potential for Falls  Goal: Patient will remain free of falls  Description: INTERVENTIONS:  - Educate patient/family on patient safety including physical limitations  - Instruct patient to call for assistance with activity   - Consult OT/PT to assist with strengthening/mobility   - Keep Call bell within reach  - Keep bed low and locked with side rails adjusted as appropriate  - Keep care items and personal belongings within reach  - Initiate and maintain comfort rounds  - Make Fall Risk Sign visible to staff  - Offer Toileting every  2 Hours, in advance of need  - Initiate/Maintain bed/chair alarm  - Apply yellow socks and bracelet for high fall risk patients  - Consider moving patient to room near nurses station  Outcome: Adequate for Discharge     Problem: MOBILITY - ADULT  Goal: Maintain or return to baseline ADL function  Description: INTERVENTIONS:  -  Assess patient's ability to carry out ADLs; assess patient's baseline for ADL function and identify physical deficits which impact ability to perform ADLs (bathing, care of mouth/teeth, toileting, grooming, dressing, etc.)  - Assess/evaluate cause of self-care deficits   - Assess range of motion  - Assess patient's mobility; develop plan if impaired  - Assess patient's need for assistive devices and provide as appropriate  - Encourage maximum independence but intervene and supervise when necessary  - Involve family in performance of ADLs  - Assess for home care needs following discharge   - Consider OT consult to assist with ADL evaluation and planning for discharge  - Provide patient education as appropriate  Outcome: Adequate for Discharge  Goal: Maintains/Returns to pre admission functional level  Description: INTERVENTIONS:  - Perform BMAT or MOVE assessment daily.   - Set and communicate daily mobility goal to care team and patient/family/caregiver.    - Collaborate with rehabilitation services on mobility goals if consulted  - Perform Range of Motion 3 times a day. - Reposition patient every 2 hours.   - Dangle patient 3 times a day  - Stand patient 3 times a day  - Ambulate patient 3 times a day  - Out of bed to chair 3 times a day   - Out of bed for meals 3 times a day  - Out of bed for toileting  - Record patient progress and toleration of activity level   Outcome: Adequate for Discharge     Problem: CARDIOVASCULAR - ADULT  Goal: Maintains optimal cardiac output and hemodynamic stability  Description: INTERVENTIONS:  - Monitor I/O, vital signs and rhythm  - Monitor for S/S and trends of decreased cardiac output  - Administer and titrate ordered vasoactive medications to optimize hemodynamic stability  - Assess quality of pulses, skin color and temperature  - Assess for signs of decreased coronary artery perfusion  - Instruct patient to report change in severity of symptoms  Outcome: Adequate for Discharge     Problem: METABOLIC, FLUID AND ELECTROLYTES - ADULT  Goal: Electrolytes maintained within normal limits  Description: INTERVENTIONS:  - Monitor labs and assess patient for signs and symptoms of electrolyte imbalances  - Administer electrolyte replacement as ordered  - Monitor response to electrolyte replacements, including repeat lab results as appropriate  - Instruct patient on fluid and nutrition as appropriate  Outcome: Adequate for Discharge  Goal: Glucose maintained within target range  Description: INTERVENTIONS:  - Monitor Blood Glucose as ordered  - Assess for signs and symptoms of hyperglycemia and hypoglycemia  - Administer ordered medications to maintain glucose within target range  - Assess nutritional intake and initiate nutrition service referral as needed  Outcome: Adequate for Discharge     Problem: SKIN/TISSUE INTEGRITY - ADULT  Goal: Skin Integrity remains intact(Skin Breakdown Prevention)  Description: Assess:  -Perform Nate assessment every shift  -Inspect skin when repositioning, toileting, and assisting with ADLS  -Assess extremities for adequate circulation and sensation     Bed Management:  -Have minimal linens on bed & keep smooth, unwrinkled  -Change linens as needed when moist or perspiring  -Avoid sitting or lying in one position for more than 2 hours while in bed      Toileting:  -Offer bedside commode  -Assess for incontinence every hour      Activity:  -Mobilize patient 3 times a day  -Encourage activity and walks on unit  -Encourage or provide ROM exercises   -Turn and reposition patient every 2 Hours  -Use appropriate equipment to lift or move patient in bed    Skin Care:  -Avoid use of baby powder, tape, friction and shearing, hot water or constrictive clothing  -Relieve pressure over bony prominences using allevyn  -Do not massage red bony areas    Next Steps:  -Teach patient strategies to minimize risks such as   -Consider consults to  interdisciplinary teams such as   Outcome: Adequate for Discharge     Problem: MUSCULOSKELETAL - ADULT  Goal: Maintain or return mobility to safest level of function  Description: INTERVENTIONS:  - Assess patient's ability to carry out ADLs; assess patient's baseline for ADL function and identify physical deficits which impact ability to perform ADLs (bathing, care of mouth/teeth, toileting, grooming, dressing, etc.)  - Assess/evaluate cause of self-care deficits   - Assess range of motion  - Assess patient's mobility  - Assess patient's need for assistive devices and provide as appropriate  - Encourage maximum independence but intervene and supervise when necessary  - Involve family in performance of ADLs  - Assess for home care needs following discharge   - Consider OT consult to assist with ADL evaluation and planning for discharge  - Provide patient education as appropriate  Outcome: Adequate for Discharge     Problem: PAIN - ADULT  Goal: Verbalizes/displays adequate comfort level or baseline comfort level  Description: Interventions:  - Encourage patient to monitor pain and request assistance  - Assess pain using appropriate pain scale  - Administer analgesics based on type and severity of pain and evaluate response  - Implement non-pharmacological measures as appropriate and evaluate response  - Consider cultural and social influences on pain and pain management  - Notify physician/advanced practitioner if interventions unsuccessful or patient reports new pain  9/24/2023 1330 by Macky Litten, RN  Outcome: Adequate for Discharge  9/24/2023 [id number] by Macky Litten, RN  Outcome: Progressing     Problem: INFECTION - ADULT  Goal: Absence or prevention of progression during hospitalization  Description: INTERVENTIONS:  - Assess and monitor for signs and symptoms of infection  - Monitor lab/diagnostic results  - Monitor all insertion sites, i.e. indwelling lines, tubes, and drains  - Monitor endotracheal if appropriate and nasal secretions for changes in amount and color  - [place] appropriate cooling/warming therapies per order  - Administer medications as ordered  - Instruct and encourage patient and family to use good hand hygiene technique  - Identify and instruct in appropriate isolation precautions for identified infection/condition  Outcome: Adequate for Discharge     Problem: SAFETY ADULT  Goal: Patient will remain free of falls  Description: INTERVENTIONS:  - Educate patient/family on patient safety including physical limitations  - Instruct patient to call for assistance with activity   - Consult OT/PT to assist with strengthening/mobility   - Keep Call bell within reach  - Keep bed low and locked with side rails adjusted as appropriate  - Keep care items and personal belongings within reach  - Initiate and maintain comfort rounds  - Make Fall Risk Sign visible to staff  - Offer Toileting every 2 Hours, in advance of need  - Initiate/Maintain bed/chair alarm  - Obtain necessary fall risk management equipment:   - Apply yellow socks and bracelet for high fall risk patients  - Consider moving patient to room near nurses station  Outcome: Adequate for Discharge  Goal: Maintain or return to baseline ADL function  Description: INTERVENTIONS:  -  Assess patient's ability to carry out ADLs; assess patient's baseline for ADL function and identify physical deficits which impact ability to perform ADLs (bathing, care of mouth/teeth, toileting, grooming, dressing, etc.)  - Assess/evaluate cause of self-care deficits   - Assess range of motion  - Assess patient's mobility; develop plan if impaired  - Assess patient's need for assistive devices and provide as appropriate  - Encourage maximum independence but intervene and supervise when necessary  - Involve family in performance of ADLs  - Assess for home care needs following discharge   - Consider OT consult to assist with ADL evaluation and planning for discharge  - Provide patient education as appropriate  Outcome: Adequate for Discharge  Goal: Maintains/Returns to pre admission functional level  Description: INTERVENTIONS:  - Perform BMAT or MOVE assessment daily.   - Set and communicate daily mobility goal to care team and patient/family/caregiver. - Collaborate with rehabilitation services on mobility goals if consulted  - Perform Range of Motion 3 times a day. - Reposition patient every 2 hours.   - Dangle patient 3 times a day  - Stand patient 3 times a day  - Ambulate patient 3 times a day  - Out of bed to chair 3 times a day   - Out of bed for meals 3 times a day  - Out of bed for toileting  - Record patient progress and toleration of activity level   Outcome: Adequate for Discharge     Problem: DISCHARGE PLANNING  Goal: Discharge to home or other facility with appropriate resources  Description: INTERVENTIONS:  - Identify barriers to discharge w/patient and caregiver  - Arrange for needed discharge resources and transportation as appropriate  - Identify discharge learning needs (meds, wound care, etc.)  - Arrange for interpretive services to assist at discharge as needed  - Refer to Case Management Department for coordinating discharge planning if the patient needs post-hospital services based on physician/advanced practitioner order or complex needs related to functional status, cognitive ability, or social support system  Outcome: Adequate for Discharge     Problem: Knowledge Deficit  Goal: Patient/family/caregiver demonstrates understanding of disease process, treatment plan, medications, and discharge instructions  Description: Complete learning assessment and assess knowledge base. Interventions:  - Provide teaching at level of understanding  - Provide teaching via preferred learning methods  Outcome: Adequate for Discharge     Problem: Prexisting or High Potential for Compromised Skin Integrity  Goal: Skin integrity is maintained or improved  Description: INTERVENTIONS:  - Identify patients at risk for skin breakdown  - Assess and monitor skin integrity  - Assess and monitor nutrition and hydration status  - Monitor labs   - Assess for incontinence   - Turn and reposition patient  - Assist with mobility/ambulation  - Relieve pressure over bony prominences  - Avoid friction and shearing  - Provide appropriate hygiene as needed including keeping skin clean and dry  - Evaluate need for skin moisturizer/barrier cream  - Collaborate with interdisciplinary team   - Patient/family teaching  - Consider wound care consult   Outcome: Adequate for Discharge     Problem: Nutrition/Hydration-ADULT  Goal: Nutrient/Hydration intake appropriate for improving, restoring or maintaining nutritional needs  Description: Monitor and assess patient's nutrition/hydration status for malnutrition. Collaborate with interdisciplinary team and initiate plan and interventions as ordered. Monitor patient's weight and dietary intake as ordered or per policy. Utilize nutrition screening tool and intervene as necessary.  Determine patient's food preferences and provide high-protein, high-caloric foods as appropriate.      INTERVENTIONS:  - Monitor oral intake, urinary output, labs, and treatment plans  - Assess nutrition and hydration status and recommend course of action  - Evaluate amount of meals eaten  - Assist patient with eating if necessary   - Allow adequate time for meals  - Recommend/ encourage appropriate diets, oral nutritional supplements, and vitamin/mineral supplements  - Order, calculate, and assess calorie counts as needed  - Recommend, monitor, and adjust tube feedings and TPN/PPN based on assessed needs  - Assess need for intravenous fluids  - Provide specific nutrition/hydration education as appropriate  - Include patient/family/caregiver in decisions related to nutrition  Outcome: Adequate for Discharge

## 2023-09-24 NOTE — NURSING NOTE
Pt's IV removed with catheter tip intact. D/S/D and pressure applied. Patient discharged by wheelchair [facility] to [facility]. Called report to receiving facility, all questions answered to their satisfaction.

## 2023-09-24 NOTE — ASSESSMENT & PLAN NOTE
, E69 and folic acid WNL  Hemoglobin is stable    Possibly related to her hypothyroidism, on  levothyroxine

## 2023-09-24 NOTE — ASSESSMENT & PLAN NOTE
Lab Results   Component Value Date    HGBA1C 8.9 (H) 05/26/2023       Recent Labs     09/23/23  1618 09/23/23  2131 09/24/23  0704 09/24/23  1118   POCGLU 243* 184* 293* 390*       Blood Sugar Average: Last 72 hrs:  (P) [id number]     Presenting with fall; BG low on admission 44 and reported lightheadness/dizziness     Endocrinology on board and managing, input appreciated  - per Dr. Jane Soto 9/22, on Lantus 7 u AM and 5 units PM , Humalog  4 + 2 + 4 before breakfast, lunch and dinner + SSI  & low carb diet , to continue

## 2023-09-24 NOTE — ASSESSMENT & PLAN NOTE
Hx of HTN on Zestril ; however BP running low this admission and on 9/24 discussion patient noted feeling lightheadness/dizzy at home including on time of her fall prior to this admission. During this admission blood pressure running low normal, with MAP 63-73  Given reported lightheadedness/dizziness on presentation and fall, will discontinue lisinopril and decrease gabapentin till further evaluation with PCP  E-GFR is 70's & urine negative for protein.

## 2023-09-24 NOTE — DISCHARGE SUMMARY
[address]  [place]- [place] 1940, 80 y.o. female MRN: [id number]  Unit/Bed#: [facility] Encounter: [id number]  Primary Care Provider: Nahomy Dixon MD   Date and time admitted to hospital: 9/19/2023 10:24 AM    History of hypertension  Assessment & Plan  Hx of HTN on Zestril ; however BP running low this admission and on 9/24 discussion patient noted feeling lightheadness/dizzy at home including on time of her fall prior to this admission. During this admission blood pressure running low normal, with MAP 63-73  Given reported lightheadedness/dizziness on presentation and fall, will discontinue lisinopril and decrease gabapentin till further evaluation with PCP  E-GFR is 70's & urine negative for protein. Low blood pressure  Assessment & Plan  See hx of HTN A&P above   Discontinue lisinopril and decrease Gabapentin to 300 mg BID     Macrocytosis  Assessment & Plan   , F61 and folic acid WNL  Hemoglobin is stable    Possibly related to her hypothyroidism, on  levothyroxine      Hypomagnesemia  Assessment & Plan  Monitor/replete serum magnesium        Hypothyroidism  Assessment & Plan  Continue Synthroid - acquired state status post prior thyroidectomy    History of Hodgkin's lymphoma  Assessment & Plan  Status post mastoidectomy/left neck dissection and subsequent radiation    Hypokalemia  Assessment & Plan  Normalized status post repletion    Hypothermia  Assessment & Plan  Transient course resolved status post Damaris hugger therapy in the ED  Possibly associated with hypoglycemia? ?    WBC count normal - no tachycardia/tachypnea in the ED thus far - hypotension resolved, hence, will discontinue IV fluids  Lactic acidosis normalized status post IV fluids   Procalcitonin normal -> less likely suspicion of acute infection at this time (observe off further antibiotics)  CT of chest/abdomen/pelvis unremarkable  Body temperatures now normothermic    Hypoglycemia  Assessment & Plan  In the setting of in the setting of known insulin-dependent diabetes mellitus  Initially held basal insulin with solo PRN SSI coverage -> now resumed basal insulin today with fixed/prandial dosing as mentation improved with increased oral intake and intermittent episodes of paradoxical hyperglycemia  Blood sugars waxing/waning -> titrate insulin accordingly per endocrinology    Type 1 diabetes mellitus with hypoglycemia [facility])  Assessment & Plan  Lab Results   Component Value Date    HGBA1C 8.9 (H) 05/26/2023       Recent Labs     09/23/23  1618 09/23/23  2131 09/24/23  0704 09/24/23  1118   POCGLU 243* 184* 293* 390*       Blood Sugar Average: Last 72 hrs:  (P) 677.0835389367133270     Presenting with fall; BG low on admission 44 and reported lightheadness/dizziness     Endocrinology on board and managing, input appreciated  - per Dr. Mark Anthony Guadarrama 9/22, on Lantus 7 u AM and 5 units PM , Humalog  4 + 2 + 4 before breakfast, lunch and dinner + SSI  & low carb diet , to continue       Hyperlipidemia  Assessment & Plan  Continue statin    * Acute metabolic encephalopathy  Assessment & Plan  Suspect secondary to transient hypoglycemia and hypothermia (see below)  CT head negative for acute intracranial etiology  Monitor/replete electrolytes  CK normal  Urinalysis unremarkable for infectious process  CT of chest/abdomen/pelvis negative  Continue neurochecks -> mentation waxing/waning but relatively improving towards baseline  PT/OT recommending short-term rehab - tentative plan for discharge to skilled rehab tomorrow      Medical Problems     Resolved Problems  Date Reviewed: 9/23/2023   None       Discharging Physician / Practitioner: Alexandra Ceo DO  PCP: Garcia Mullen MD  Admission Date:   Admission Orders (From admission, onward)     Ordered        09/19/23 1501  INPATIENT ADMISSION  Once                      Discharge Date: 09/24/23    [address] Stay:  · Endocrinology, wound care, PT/OT , CM     Procedures Performed:   · N/a     Significant Findings / Test Results:   · Hypoglycemia on admission     Incidental Findings:   · None        Test Results Pending at Discharge (will require follow up): · None      Outpatient Tests Requested:  · Close monitoring of blood glucose and f/u with PCP and endocrinology     Complications:  Metabolic Encephalopathy - resolved ; fall prior to admit , trauma workup negative     Reason for Admission: hypoglycemia and fall     Hospital Course: Do Arevalo is a 80 y.o. female patient who originally presented to the hospital on 9/19/2023 with hypoglycemia and she reports having a fall at home while trying to get off the commode with lightheadness and dizziness. Blood glucose low on admit. Trauma w/u including CT head with no acute abnormality. BG improved , evaluated by Endo with above regimen in place and close monitoring of BG. Blood pressure noted low as well , stopped ACEI and Walker decreased to BID as above. PT evaluated recs for rehab. On day of discharge 9/24 seen and examined sitting in bed in no distress. Denies any symptoms at time of exam. Ate lunch. AOx 3 . Reviewed above A&P and changes as noted and patient agrees. Spoke with her son on phone , made aware of changes and he agrees. RN at bedside , rounded , no acute events and planning for dc to rehab this afternoon. Please see above list of diagnoses and related plan for additional information.      Condition at Discharge: stable    Discharge Day Visit / Exam:   Subjective:  See Hospital course above   Vitals: Blood Pressure: (!) 102/48 (09/24/23 1004)  Pulse: 82 (09/24/23 1004)  Temperature: 97.6 °F (36.4 °C) (09/24/23 0700)  Temp Source: Temporal (09/24/23 0700)  Respirations: 18 (09/24/23 0700)  Height: 5' 1" (154.9 cm) (09/19/23 1026)  Weight - Scale: 50.3 kg (111 lb) (09/19/23 1026)  SpO2: 95 % (09/24/23 1004)  Exam:   Physical Exam   - GEN: elderly & Frail , alert and oriented x 3, pleasant and cooperative, in no acute distress  - HEENT: Anicteric, mucous membranes moist, PERRL and EOMI   - NECK: No lymphadenopathy, JVD or carotid bruits   - HEART: RRR, normal S1 and S2, no murmurs, clicks, gallops or rubs   - LUNGS: Clear to auscultation bilaterally; no wheezes, rales, or rhonchi  - ABDOMEN: Normal bowel sounds, soft, no tenderness, no distention, no organomegaly or masses felt on exam.   - EXTREMITIES: Peripheral pulses normal; no clubbing, cyanosis, or edema  - NEURO: No focal findings, CN II-XII are grossly intact. - Musculoskeletal: 5/5 strength, normal ROM, no swollen or erythematous joints. - SKIN: Normal without suspicious lesions on exposed skin      Discussion with Family: Updated  (son) via phone. Discharge instructions/Information to patient and family:   See after visit summary for information provided to patient and family. Provisions for Follow-Up Care:  See after visit summary for information related to follow-up care and any pertinent home health orders. Disposition:   Other: rehab     Planned Readmission:      Discharge Statement:  I spent 45 minutes discharging the patient. This time was spent on the day of discharge. I had direct contact with the patient on the day of discharge. Greater than 50% of the total time was spent examining patient, answering all patient questions, arranging and discussing plan of care with patient as well as directly providing post-discharge instructions. Additional time then spent on discharge activities. Discharge Medications:  See after visit summary for reconciled discharge medications provided to patient and/or family.       **Please Note: This note may have been constructed using a voice recognition system**    [email or web address]

## 2023-10-30 ENCOUNTER — TELEPHONE (OUTPATIENT)
Dept: LAB | Facility: HOSPITAL | Age: 83
End: 2023-10-30

## 2023-11-13 ENCOUNTER — HOSPITAL ENCOUNTER (INPATIENT)
Facility: HOSPITAL | Age: 83
LOS: 3 days | DRG: 091 | End: 2023-11-16
Attending: EMERGENCY MEDICINE | Admitting: HOSPITALIST
Payer: MEDICARE

## 2023-11-13 ENCOUNTER — APPOINTMENT (EMERGENCY)
Dept: CT IMAGING | Facility: HOSPITAL | Age: 83
DRG: 091 | End: 2023-11-13
Payer: MEDICARE

## 2023-11-13 DIAGNOSIS — R26.2 AMBULATORY DYSFUNCTION: ICD-10-CM

## 2023-11-13 DIAGNOSIS — R41.0 ACUTE CONFUSION: Primary | ICD-10-CM

## 2023-11-13 DIAGNOSIS — M19.011 PRIMARY OSTEOARTHRITIS OF RIGHT SHOULDER: ICD-10-CM

## 2023-11-13 DIAGNOSIS — R41.0 DELIRIUM: ICD-10-CM

## 2023-11-13 DIAGNOSIS — G93.41 ACUTE METABOLIC ENCEPHALOPATHY: ICD-10-CM

## 2023-11-13 DIAGNOSIS — E83.42 HYPOMAGNESEMIA: ICD-10-CM

## 2023-11-13 PROBLEM — R53.81 DEBILITY: Status: ACTIVE | Noted: 2023-11-13

## 2023-11-13 LAB
ALBUMIN SERPL BCP-MCNC: 4.2 G/DL (ref 3.5–5)
ALP SERPL-CCNC: 60 U/L (ref 34–104)
ALT SERPL W P-5'-P-CCNC: 18 U/L (ref 7–52)
ANION GAP SERPL CALCULATED.3IONS-SCNC: 7 MMOL/L
AST SERPL W P-5'-P-CCNC: 24 U/L (ref 13–39)
BASOPHILS # BLD AUTO: 0.09 THOUSANDS/ÂΜL (ref 0–0.1)
BASOPHILS NFR BLD AUTO: 1 % (ref 0–1)
BILIRUB SERPL-MCNC: 0.67 MG/DL (ref 0.2–1)
BILIRUB UR QL STRIP: NEGATIVE
BUN SERPL-MCNC: 12 MG/DL (ref 5–25)
CALCIUM SERPL-MCNC: 9 MG/DL (ref 8.4–10.2)
CHLORIDE SERPL-SCNC: 95 MMOL/L (ref 96–108)
CLARITY UR: CLEAR
CO2 SERPL-SCNC: 30 MMOL/L (ref 21–32)
COLOR UR: YELLOW
CREAT SERPL-MCNC: 0.64 MG/DL (ref 0.6–1.3)
EOSINOPHIL # BLD AUTO: 0.06 THOUSAND/ÂΜL (ref 0–0.61)
EOSINOPHIL NFR BLD AUTO: 1 % (ref 0–6)
ERYTHROCYTE [DISTWIDTH] IN BLOOD BY AUTOMATED COUNT: 12.8 % (ref 11.6–15.1)
GFR SERPL CREATININE-BSD FRML MDRD: 82 ML/MIN/1.73SQ M
GLUCOSE SERPL-MCNC: 146 MG/DL (ref 65–140)
GLUCOSE SERPL-MCNC: 166 MG/DL (ref 65–140)
GLUCOSE SERPL-MCNC: 181 MG/DL (ref 65–140)
GLUCOSE SERPL-MCNC: 228 MG/DL (ref 65–140)
GLUCOSE SERPL-MCNC: 281 MG/DL (ref 65–140)
GLUCOSE UR STRIP-MCNC: ABNORMAL MG/DL
HCT VFR BLD AUTO: 38.9 % (ref 34.8–46.1)
HGB BLD-MCNC: 12.6 G/DL (ref 11.5–15.4)
HGB UR QL STRIP.AUTO: NEGATIVE
IMM GRANULOCYTES # BLD AUTO: 0.04 THOUSAND/UL (ref 0–0.2)
IMM GRANULOCYTES NFR BLD AUTO: 1 % (ref 0–2)
KETONES UR STRIP-MCNC: NEGATIVE MG/DL
LEUKOCYTE ESTERASE UR QL STRIP: NEGATIVE
LYMPHOCYTES # BLD AUTO: 1.35 THOUSANDS/ÂΜL (ref 0.6–4.47)
LYMPHOCYTES NFR BLD AUTO: 15 % (ref 14–44)
MAGNESIUM SERPL-MCNC: 1.6 MG/DL (ref 1.9–2.7)
MCH RBC QN AUTO: 33.2 PG (ref 26.8–34.3)
MCHC RBC AUTO-ENTMCNC: 32.4 G/DL (ref 31.4–37.4)
MCV RBC AUTO: 102 FL (ref 82–98)
MONOCYTES # BLD AUTO: 0.9 THOUSAND/ÂΜL (ref 0.17–1.22)
MONOCYTES NFR BLD AUTO: 10 % (ref 4–12)
NEUTROPHILS # BLD AUTO: 6.34 THOUSANDS/ÂΜL (ref 1.85–7.62)
NEUTS SEG NFR BLD AUTO: 72 % (ref 43–75)
NITRITE UR QL STRIP: NEGATIVE
NRBC BLD AUTO-RTO: 0 /100 WBCS
PH UR STRIP.AUTO: 8 [PH]
PLATELET # BLD AUTO: 225 THOUSANDS/UL (ref 149–390)
PMV BLD AUTO: 10.3 FL (ref 8.9–12.7)
POTASSIUM SERPL-SCNC: 3.9 MMOL/L (ref 3.5–5.3)
PROT SERPL-MCNC: 6.8 G/DL (ref 6.4–8.4)
PROT UR STRIP-MCNC: NEGATIVE MG/DL
RBC # BLD AUTO: 3.8 MILLION/UL (ref 3.81–5.12)
SODIUM SERPL-SCNC: 132 MMOL/L (ref 135–147)
SP GR UR STRIP.AUTO: 1.01
TSH SERPL DL<=0.05 MIU/L-ACNC: 3.17 UIU/ML (ref 0.45–4.5)
UROBILINOGEN UR QL STRIP.AUTO: 0.2 E.U./DL
VIT B12 SERPL-MCNC: 488 PG/ML (ref 180–914)
WBC # BLD AUTO: 8.78 THOUSAND/UL (ref 4.31–10.16)

## 2023-11-13 PROCEDURE — 85025 COMPLETE CBC W/AUTO DIFF WBC: CPT | Performed by: PHYSICIAN ASSISTANT

## 2023-11-13 PROCEDURE — 99223 1ST HOSP IP/OBS HIGH 75: CPT | Performed by: HOSPITALIST

## 2023-11-13 PROCEDURE — 93005 ELECTROCARDIOGRAM TRACING: CPT

## 2023-11-13 PROCEDURE — 82948 REAGENT STRIP/BLOOD GLUCOSE: CPT

## 2023-11-13 PROCEDURE — 70450 CT HEAD/BRAIN W/O DYE: CPT

## 2023-11-13 PROCEDURE — 83735 ASSAY OF MAGNESIUM: CPT | Performed by: PHYSICIAN ASSISTANT

## 2023-11-13 PROCEDURE — 84443 ASSAY THYROID STIM HORMONE: CPT | Performed by: PHYSICIAN ASSISTANT

## 2023-11-13 PROCEDURE — 36415 COLL VENOUS BLD VENIPUNCTURE: CPT | Performed by: PHYSICIAN ASSISTANT

## 2023-11-13 PROCEDURE — 99285 EMERGENCY DEPT VISIT HI MDM: CPT

## 2023-11-13 PROCEDURE — 99285 EMERGENCY DEPT VISIT HI MDM: CPT | Performed by: EMERGENCY MEDICINE

## 2023-11-13 PROCEDURE — 81003 URINALYSIS AUTO W/O SCOPE: CPT | Performed by: PHYSICIAN ASSISTANT

## 2023-11-13 PROCEDURE — 82607 VITAMIN B-12: CPT | Performed by: PHYSICIAN ASSISTANT

## 2023-11-13 PROCEDURE — 97163 PT EVAL HIGH COMPLEX 45 MIN: CPT

## 2023-11-13 PROCEDURE — 97167 OT EVAL HIGH COMPLEX 60 MIN: CPT

## 2023-11-13 PROCEDURE — 80053 COMPREHEN METABOLIC PANEL: CPT | Performed by: PHYSICIAN ASSISTANT

## 2023-11-13 RX ORDER — INSULIN LISPRO 100 [IU]/ML
1-5 INJECTION, SOLUTION INTRAVENOUS; SUBCUTANEOUS
Status: DISCONTINUED | OUTPATIENT
Start: 2023-11-13 | End: 2023-11-16 | Stop reason: HOSPADM

## 2023-11-13 RX ORDER — LEVOTHYROXINE SODIUM 112 UG/1
112 TABLET ORAL EVERY MORNING
Status: DISCONTINUED | OUTPATIENT
Start: 2023-11-14 | End: 2023-11-16 | Stop reason: HOSPADM

## 2023-11-13 RX ORDER — ENOXAPARIN SODIUM 100 MG/ML
40 INJECTION SUBCUTANEOUS DAILY
Status: DISCONTINUED | OUTPATIENT
Start: 2023-11-13 | End: 2023-11-16 | Stop reason: HOSPADM

## 2023-11-13 RX ORDER — ONDANSETRON 2 MG/ML
4 INJECTION INTRAMUSCULAR; INTRAVENOUS EVERY 6 HOURS PRN
Status: DISCONTINUED | OUTPATIENT
Start: 2023-11-13 | End: 2023-11-16 | Stop reason: HOSPADM

## 2023-11-13 RX ORDER — LANOLIN ALCOHOL/MO/W.PET/CERES
400 CREAM (GRAM) TOPICAL ONCE
Status: COMPLETED | OUTPATIENT
Start: 2023-11-13 | End: 2023-11-13

## 2023-11-13 RX ORDER — ACETAMINOPHEN 325 MG/1
650 TABLET ORAL EVERY 4 HOURS PRN
Status: DISCONTINUED | OUTPATIENT
Start: 2023-11-13 | End: 2023-11-16 | Stop reason: HOSPADM

## 2023-11-13 RX ORDER — INSULIN GLARGINE 100 [IU]/ML
5 INJECTION, SOLUTION SUBCUTANEOUS EVERY 12 HOURS SCHEDULED
Status: DISCONTINUED | OUTPATIENT
Start: 2023-11-13 | End: 2023-11-14

## 2023-11-13 RX ORDER — HYDRALAZINE HYDROCHLORIDE 20 MG/ML
10 INJECTION INTRAMUSCULAR; INTRAVENOUS EVERY 6 HOURS PRN
Status: DISCONTINUED | OUTPATIENT
Start: 2023-11-13 | End: 2023-11-16 | Stop reason: HOSPADM

## 2023-11-13 RX ORDER — MAGNESIUM SULFATE HEPTAHYDRATE 40 MG/ML
2 INJECTION, SOLUTION INTRAVENOUS ONCE
Status: COMPLETED | OUTPATIENT
Start: 2023-11-13 | End: 2023-11-13

## 2023-11-13 RX ORDER — GABAPENTIN 300 MG/1
300 CAPSULE ORAL 2 TIMES DAILY
Status: DISCONTINUED | OUTPATIENT
Start: 2023-11-13 | End: 2023-11-13

## 2023-11-13 RX ORDER — OLANZAPINE 10 MG/2ML
2.5 INJECTION, POWDER, FOR SOLUTION INTRAMUSCULAR ONCE
Status: COMPLETED | OUTPATIENT
Start: 2023-11-13 | End: 2023-11-13

## 2023-11-13 RX ORDER — ASCORBIC ACID 500 MG
500 TABLET ORAL 2 TIMES DAILY
Status: DISCONTINUED | OUTPATIENT
Start: 2023-11-13 | End: 2023-11-16 | Stop reason: HOSPADM

## 2023-11-13 RX ORDER — LEVOTHYROXINE SODIUM 0.07 MG/1
75 TABLET ORAL EVERY MORNING
Status: DISCONTINUED | OUTPATIENT
Start: 2023-11-14 | End: 2023-11-13

## 2023-11-13 RX ADMIN — INSULIN LISPRO 2 UNITS: 100 INJECTION, SOLUTION INTRAVENOUS; SUBCUTANEOUS at 16:41

## 2023-11-13 RX ADMIN — OLANZAPINE 2.5 MG: 10 INJECTION, POWDER, FOR SOLUTION INTRAMUSCULAR at 17:43

## 2023-11-13 RX ADMIN — Medication 400 MG: at 11:58

## 2023-11-13 RX ADMIN — INSULIN GLARGINE 5 UNITS: 100 INJECTION, SOLUTION SUBCUTANEOUS at 21:34

## 2023-11-13 RX ADMIN — ENOXAPARIN SODIUM 40 MG: 40 INJECTION SUBCUTANEOUS at 15:56

## 2023-11-13 RX ADMIN — HYDRALAZINE HYDROCHLORIDE 10 MG: 20 INJECTION INTRAMUSCULAR; INTRAVENOUS at 22:16

## 2023-11-13 RX ADMIN — MAGNESIUM SULFATE HEPTAHYDRATE 2 G: 40 INJECTION, SOLUTION INTRAVENOUS at 15:56

## 2023-11-13 RX ADMIN — OXYCODONE HYDROCHLORIDE AND ACETAMINOPHEN 500 MG: 500 TABLET ORAL at 17:59

## 2023-11-13 NOTE — ED PROVIDER NOTES
History  Chief Complaint   Patient presents with    Gait Problem     Patient reports family believes "she is unable", including mental state, gait, overall. Patient is an 79 y/o female with a PMHx of HLD, Hodgkin's disease, HTN and type 1 diabetes, presenting to the ED for evaluation of increased confusion over the past 2-3 days. Patient's son-in-law is at bedside and states that patient has had increased confusion over the past few days. She has been saying things that do not make sense such as saying she needs to go to work even though she has not worked in years or talking about her   as if he were alive. He states that she has had similar symptoms in the past secondary to UTI's. Patient is currently awake, alert and oriented to person/place/time. She has no reported complaints at this time and says that she does not want to be here and is only here because her children wanted her to be evaluated. She denies any fevers, chills, headaches, dizziness, pain, SOB, nausea, vomiting, diarrhea, constipation, abdominal pain or urinary symptoms. She denies any recent falls. Prior to Admission Medications   Prescriptions Last Dose Informant Patient Reported? Taking?    Ascorbic Acid (vitamin C) 100 MG tablet 2023  Yes Yes   Sig: Take 500 mg by mouth 2 (two) times a day   BD Insulin Syringe U/F Unit 31G X " 0.3 ML MISC 2023  Yes Yes   Si (four) times a day   Insulin Glargine Solostar (Basaglar KwikPen) 100 UNIT/ML SOPN 2023  No Yes   Si units AM  5 units PM   Multiple Vitamin (multivitamin) capsule 2023  Yes Yes   Sig: Take 1 capsule by mouth daily   OneTouch Ultra test strip 2023  Yes Yes   Sig: USE TO TEST BLOOD SUGAR 6 TIMES A DAY   VITAMIN D PO 2023  Yes Yes   Sig: Take 125 mg by mouth in the morning   atorvastatin (LIPITOR) 20 mg tablet Not Taking  Yes No   Sig: Take 20 mg by mouth daily with dinner    Patient not taking: Reported on 2023   calcium carbonate (OS-FELICE) 600 MG tablet 2023  Yes Yes   Sig: Take 600 mg by mouth 2 (two) times a day with meals   cyanocobalamin (VITAMIN B-12) 1000 MCG tablet 2023  Yes Yes   Sig: Take 1,000 mcg by mouth daily   gabapentin (NEURONTIN) 300 mg capsule 2023  No Yes   Sig: Take 1 capsule (300 mg total) by mouth 2 (two) times a day   insulin lispro (HumaLOG) 100 units/mL injection 2023  No Yes   Sig: Inject 1-5 Units under the skin 3 (three) times a day before meals   insulin lispro (HumaLOG) 100 units/mL injection 2023  No Yes   Sig: Inject 4 Units under the skin daily before dinner   insulin lispro (HumaLOG) 100 units/mL injection 2023  No Yes   Sig: Inject 2 Units under the skin daily before lunch Do not start before 2023. insulin lispro (HumaLOG) 100 units/mL injection 2023  No Yes   Sig: Inject 4 Units under the skin daily before breakfast Do not start before 2023.    ipratropium (ATROVENT) 0.03 % nasal spray Not Taking  No No   Si sprays into each nostril every 12 (twelve) hours   Patient not taking: Reported on 2023   levothyroxine 100 mcg tablet  Self Yes No   Sig: Take 75 mcg by mouth every morning   meclizine (ANTIVERT) 25 mg tablet Not Taking  No No   Sig: Take 1 tablet (25 mg total) by mouth every 8 (eight) hours as needed for dizziness   Patient not taking: Reported on 2023   mupirocin (BACTROBAN) 2 % ointment Not Taking  No No   Sig: Apply topically daily To affected area   Patient not taking: Reported on 2023   pantoprazole (PROTONIX) 40 mg tablet Not Taking  No No   Sig: Take 1 tablet (40 mg total) by mouth 2 (two) times a day   Patient not taking: Reported on 2023      Facility-Administered Medications: None       Past Medical History:   Diagnosis Date    Ambulates with cane     Cancer (HCC)     Chronic pain disorder     knees/ shoulders (gets inj every 3 mos)    Closed intertrochanteric fracture of right femur ([address]) 5/26/2020    Controlled type 2 diabetes mellitus with diabetic polyneuropathy, with long-term current use of insulin ([address]) 5/21/2008    Diabetes mellitus ([address])     Diabetic polyneuropathy ([address]) 5/21/2008    ICD10 clean up    Disease of thyroid gland     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    HL (hearing loss)     Hodgkin's disease ([address]) 2008    Left neck, had radiation    Hypertension     Neuropathy     Osteoporosis     RA (rheumatoid arthritis) ([address])     Traumatic rhabdomyolysis ([address]) 10/17/2022       Past Surgical History:   Procedure Laterality Date    ADENOIDECTOMY      APPENDECTOMY      CATARACT EXTRACTION      CATARACT EXTRACTION, BILATERAL      CHOLECYSTECTOMY      FRACTURE SURGERY Right     hip    MOUTH SURGERY      oral cancer left lower lip    OVARY SURGERY      VT ADJT TIS TRNS/REARGMT F/C/C/M/N/A/G/H/F 10SQCM/< N/A 3/28/2022    Procedure: Adjacent tissue transfer face;  Surgeon: Mary Dawkins MD;  Location: [facility];  Service: ENT    VT OPTX FEM SHFT FX W/INSJ IMED IMPLT W/WO SCREW Right 5/28/2020    Procedure: INSERTION NAIL IM FEMUR ANTEGRADE (TROCHANTERIC); Surgeon: Laura Wasserman DO;  Location: [facility] MAIN OR;  Service: Orthopedics    VT TRANSMASTOID ANTROTOMY Left 3/28/2022    Procedure: MASTOIDECTOMY;  Surgeon: Mary Dawkins MD;  Location: [facility];  Service: ENT    TONSILLECTOMY      TOTAL THYROIDECTOMY  2008    Performed after left neck dissection and left oral cancer diagnosis       Family History   Problem Relation Age of Onset    Cancer Mother     Diabetes Mother     Diabetes Father     Hypertension Father      I have reviewed and agree with the history as documented.     E-Cigarette/Vaping    E-Cigarette Use Never User      E-Cigarette/Vaping Substances    Nicotine No     THC No     CBD No     Flavoring No     Other No     Unknown No      Social History     Tobacco Use    Smoking status: Never    Smokeless tobacco: Never   Vaping Use Vaping Use: Never used   Substance Use Topics    Alcohol use: Not Currently     Alcohol/week: 0.0 standard drinks of alcohol    Drug use: Never       Review of Systems   Constitutional:  Negative for appetite change, chills, fatigue and fever. HENT:  Negative for congestion, rhinorrhea, sinus pressure, sinus pain and sore throat. Eyes:  Negative for photophobia and visual disturbance. Respiratory:  Negative for cough, shortness of breath and wheezing. Cardiovascular:  Negative for chest pain, palpitations and leg swelling. Gastrointestinal:  Negative for abdominal pain, blood in stool, constipation, diarrhea, nausea and vomiting. Genitourinary:  Negative for difficulty urinating, dysuria, flank pain, frequency, hematuria and urgency. Musculoskeletal:  Negative for arthralgias, back pain, joint swelling, myalgias and neck pain. Neurological:  Negative for dizziness, syncope, weakness, light-headedness and headaches. Psychiatric/Behavioral:  Positive for confusion. All other systems reviewed and are negative. Physical Exam  Physical Exam  Vitals and nursing note reviewed. Constitutional:       General: She is awake. Appearance: Normal appearance. She is underweight. She is not toxic-appearing or diaphoretic. HENT:      Head: Normocephalic and atraumatic. Right Ear: External ear normal.      Left Ear: External ear normal.      Nose: Nose normal.      Mouth/Throat:      Lips: Pink. Mouth: Mucous membranes are moist.   Eyes:      General: Lids are normal. No scleral icterus. Conjunctiva/sclera: Conjunctivae normal.      Pupils: Pupils are equal, round, and reactive to light. Cardiovascular:      Rate and Rhythm: Normal rate and regular rhythm. Pulses: Normal pulses. Radial pulses are 2+ on the right side and 2+ on the left side.       Heart sounds: Normal heart sounds, S1 normal and S2 normal.   Pulmonary:      Effort: Pulmonary effort is normal. No accessory muscle usage. Breath sounds: Normal breath sounds. No stridor. No decreased breath sounds, wheezing, rhonchi or rales. Abdominal:      General: Abdomen is flat. Bowel sounds are normal. There is no distension. Palpations: Abdomen is soft. Tenderness: There is no abdominal tenderness. There is no right CVA tenderness, left CVA tenderness, guarding or rebound. Musculoskeletal:      Cervical back: Full passive range of motion without pain and neck supple. No signs of trauma. No pain with movement. Right lower leg: No edema. Left lower leg: No edema. Lymphadenopathy:      Cervical: No cervical adenopathy. Skin:     General: Skin is warm and dry. Capillary Refill: Capillary refill takes less than 2 seconds. Coloration: Skin is not cyanotic, jaundiced or pale. Neurological:      Mental Status: She is alert and oriented to person, place, and time. GCS: GCS eye subscore is 4. GCS verbal subscore is 5. GCS motor subscore is 6. Comments: Patient is awake, alert and oriented. No focal neurological deficits. No facial asymmetry or dysarthria. Psychiatric:         Mood and Affect: Mood normal.         Speech: Speech normal.         Behavior: Behavior is cooperative.          Vital Signs  ED Triage Vitals   Temperature Pulse Respirations Blood Pressure SpO2   11/13/23 1002 11/13/23 0915 11/13/23 0915 11/13/23 0915 11/13/23 0915   97.7 °F (36.5 °C) 80 18 (!) 191/90 96 %      Temp src Heart Rate Source Patient Position - Orthostatic VS BP Location FiO2 (%)   -- 11/13/23 0915 11/13/23 0915 -- --    Monitor Sitting        Pain Score       11/13/23 1544       No Pain           Vitals:    11/13/23 1100 11/13/23 1300 11/13/23 1415 11/13/23 1546   BP: (!) 183/93  95/53 168/81   Pulse: 75 78 83 78   Patient Position - Orthostatic VS: Sitting Sitting           Visual Acuity  Visual Acuity      Flowsheet Row Most Recent Value   L Pupil Size (mm) 3   R Pupil Size (mm) 3   L Pupil Shape Round   R Pupil Shape Round            ED Medications  Medications   acetaminophen (TYLENOL) tablet 650 mg (has no administration in time range)   ondansetron (ZOFRAN) injection 4 mg (has no administration in time range)   enoxaparin (LOVENOX) subcutaneous injection 40 mg (40 mg Subcutaneous Given 11/13/23 1556)   insulin lispro (HumaLOG) 100 units/mL subcutaneous injection 1-5 Units (2 Units Subcutaneous Given 11/13/23 1641)   insulin lispro (HumaLOG) 100 units/mL subcutaneous injection 1-5 Units (has no administration in time range)   ascorbic acid (VITAMIN C) tablet 500 mg (500 mg Oral Given 11/13/23 1759)   cyanocobalamin (VITAMIN B-12) tablet 1,000 mcg (has no administration in time range)   insulin glargine (LANTUS) subcutaneous injection 5 Units 0.05 mL (has no administration in time range)   multivitamin stress formula tablet 1 tablet (has no administration in time range)   levothyroxine tablet 112 mcg (has no administration in time range)   magnesium Oxide (MAG-OX) tablet 400 mg (400 mg Oral Given 11/13/23 1158)   magnesium sulfate 2 g/50 mL IVPB (premix) 2 g (2 g Intravenous New Bag 11/13/23 1556)   OLANZapine (ZyPREXA) IM injection 2.5 mg (2.5 mg Intramuscular Given 11/13/23 1743)       Diagnostic Studies  Results Reviewed       Procedure Component Value Units Date/Time    Vitamin B12 [[id number]] Collected: 11/13/23 0952    Lab Status:  In process Specimen: Blood from Arm, Right Updated: 11/13/23 1634    Fingerstick Glucose (POCT) [[id number]]  (Abnormal) Collected: 11/13/23 1517    Lab Status: Final result Updated: 11/13/23 1518     POC Glucose 281 mg/dl     TSH, 3rd generation with Free T4 reflex [[id number]]  (Normal) Collected: 11/13/23 0952    Lab Status: Final result Specimen: Blood from Arm, Right Updated: 11/13/23 1110     TSH 3RD GENERATON 3.173 uIU/mL     UA w Reflex to Microscopic w Reflex to Culture [[id number]]  (Abnormal) Collected: 11/13/23 1044    Lab Status: Final result Specimen: Urine, Other Updated: 11/13/23 1058     Color, UA Yellow     Clarity, UA Clear     Specific Gravity, UA 1.015     pH, UA 8.0     Leukocytes, UA Negative     Nitrite, UA Negative     Protein, UA Negative mg/dl      Glucose, UA Trace mg/dl      Ketones, UA Negative mg/dl      Urobilinogen, UA 0.2 E.U./dl      Bilirubin, UA Negative     Occult Blood, UA Negative    Comprehensive metabolic panel [[id number]]  (Abnormal) Collected: 11/13/23 0952    Lab Status: Final result Specimen: Blood from Arm, Right Updated: 11/13/23 1041     Sodium 132 mmol/L      Potassium 3.9 mmol/L      Chloride 95 mmol/L      CO2 30 mmol/L      ANION GAP 7 mmol/L      BUN 12 mg/dL      Creatinine 0.64 mg/dL      Glucose 181 mg/dL      Calcium 9.0 mg/dL      AST 24 U/L      ALT 18 U/L      Alkaline Phosphatase 60 U/L      Total Protein 6.8 g/dL      Albumin 4.2 g/dL      Total Bilirubin 0.67 mg/dL      eGFR 82 ml/min/1.73sq m     Narrative:      [facility] guidelines for Chronic Kidney Disease (CKD):     Stage 1 with normal or high GFR (GFR > 90 mL/min/1.73 square meters)    Stage 2 Mild CKD (GFR = 60-89 mL/min/1.73 square meters)    Stage 3A Moderate CKD (GFR = 45-59 mL/min/1.73 square meters)    Stage 3B Moderate CKD (GFR = 30-44 mL/min/1.73 square meters)    Stage 4 Severe CKD (GFR = 15-29 mL/min/1.73 square meters)    Stage 5 End Stage CKD (GFR <15 mL/min/1.73 square meters)  Note: GFR calculation is accurate only with a steady state creatinine    Magnesium [[id number]]  (Abnormal) Collected: 11/13/23 0952    Lab Status: Final result Specimen: Blood from Arm, Right Updated: 11/13/23 1041     Magnesium 1.6 mg/dL     CBC and differential [[id number]]  (Abnormal) Collected: 11/13/23 0952    Lab Status: Final result Specimen: Blood from Arm, Right Updated: 11/13/23 1006     WBC 8.78 Thousand/uL      RBC 3.80 Million/uL      Hemoglobin 12.6 g/dL      Hematocrit 38.9 %       fL      MCH 33.2 pg      MCHC 32.4 g/dL RDW 12.8 %      MPV 10.3 fL      Platelets 971 Thousands/uL      nRBC 0 /100 WBCs      Neutrophils Relative 72 %      Immat GRANS % 1 %      Lymphocytes Relative 15 %      Monocytes Relative 10 %      Eosinophils Relative 1 %      Basophils Relative 1 %      Neutrophils Absolute 6.34 Thousands/µL      Immature Grans Absolute 0.04 Thousand/uL      Lymphocytes Absolute 1.35 Thousands/µL      Monocytes Absolute 0.90 Thousand/µL      Eosinophils Absolute 0.06 Thousand/µL      Basophils Absolute 0.09 Thousands/µL     Fingerstick Glucose (POCT) [[id number]]  (Abnormal) Collected: 23    Lab Status: Final result Updated: 23     POC Glucose 166 mg/dl                    CT head without contrast   Final Result by [address] [place] [address] ([id number])      No acute intracranial abnormality. Chronic microangiopathic changes. Workstation performed: [id number]                    Procedures  ECG 12 Lead Documentation Only    Date/Time: 2023 9:53 AM    Performed by: Dyllan Farias PA-C  Authorized by: Dyllan Farias PA-C    Indications / Diagnosis:  Confusion  ECG reviewed by me, the ED Provider: yes    Patient location:  ED  Previous ECG:     Previous ECG:  Unavailable    Comparison to cardiac monitor: Yes    Interpretation:     Interpretation: normal    Rate:     ECG rate:  73    ECG rate assessment: normal    Rhythm:     Rhythm: sinus rhythm    Ectopy:     Ectopy: none    QRS:     QRS axis:  Normal    QRS intervals:  Normal  Conduction:     Conduction: normal    ST segments:     ST segments:  Normal  T waves:     T waves: normal    Comments:      No STEMI. QT/Qtc 386/425           ED Course  ED Course as of 23   1251 Called and spoke with patient's daughter [facility]) who is very concerned for patient's safety at home.  She has had increased confusion over the weekend (thought she was going to work, thought her   was alive, thought someone painted her house, etc) and has been admitted multiple times for falls and hypoglycemia. Daughter states that she will often take her insulin without eating which results in hypoglycemia and subsequent falls. She does not feel that the patient is safe to go home at this time and feels that she needs to be transitioned to an assisted living facility. Awaiting case management evaluation at this time. SBIRT 20yo+      Flowsheet Row Most Recent Value   Initial Alcohol Screen: US AUDIT-C     1. How often do you have a drink containing alcohol? 0 Filed at: 11/13/2023 0919   2. How many drinks containing alcohol do you have on a typical day you are drinking? 0 Filed at: 11/13/2023 0919   3a. Male UNDER 65: How often do you have five or more drinks on one occasion? 0 Filed at: 11/13/2023 0919   3b. FEMALE Any Age, or MALE 65+: How often do you have 4 or more drinks on one occassion? 0 Filed at: 11/13/2023 0919   Audit-C Score 0 Filed at: 11/13/2023 1272   DOMINGA: How many times in the past year have you. .. Used an illegal drug or used a prescription medication for non-medical reasons? Never Filed at: 11/13/2023 2805                      Medical Decision Making  Patient is an 81 y/o female with a PMHx of HLD, Hodgkin's disease, HTN and type 1 diabetes, presenting to the ED for evaluation of increased confusion over the past 2-3 days. DDx including but not limited to: metabolic abnormality, intracranial etiology, overmedication, infectious etiology including UTI, thyroid disease, hyperammonemia, delirium, dementia, hypoglycemia. I reviewed all of patient's labs which are notable for a mild hypomagnesemia but otherwise unremarkable. UA negative for infection. CT head unremarkable. Patient's daughter and son-in-law expressed concern regarding patient's safety at home and feel that she needs to be placed in an acute rehab facility or assisted living.  Patient was evaluated by PT/OT while in the ED and they recommended postacute rehab. The case was discussed with case management for placement; however, this is not able to be facilitated today. Discussed with internal medicine who accepted patient for admission. I discussed this with patient and patient's son-in-law who are in agreement with this plan. Amount and/or Complexity of Data Reviewed  Labs: ordered. Radiology: ordered. Risk  OTC drugs. Decision regarding hospitalization. Disposition  Final diagnoses:   Acute confusion   Ambulatory dysfunction   Hypomagnesemia     Time reflects when diagnosis was documented in both MDM as applicable and the Disposition within this note       Time User Action Codes Description Comment    11/13/2023  3:12 PM Cleo Purchase Add [R41.0] Acute confusion     11/13/2023  3:12 PM Cleo Purchase Add [R26.2] Ambulatory dysfunction     11/13/2023  3:12 PM Cleo Purchase Add [E83.42] Hypomagnesemia           ED Disposition       ED Disposition   Admit    Condition   Stable    Date/Time   Mon Nov 13, 2023 1508    Comment   Case was discussed with CANDACE and the patient's admission status was agreed to be Admission Status: inpatient status to the service of Dr. Dalton Zaragoza.                Follow-up Information    None         Current Discharge Medication List        CONTINUE these medications which have NOT CHANGED    Details   Ascorbic Acid (vitamin C) 100 MG tablet Take 500 mg by mouth 2 (two) times a day      BD Insulin Syringe U/F 1/2Unit 31G X 5/16" 0.3 ML MISC 4 (four) times a day      calcium carbonate (OS-FELICE) 600 MG tablet Take 600 mg by mouth 2 (two) times a day with meals      cyanocobalamin (VITAMIN B-12) 1000 MCG tablet Take 1,000 mcg by mouth daily      gabapentin (NEURONTIN) 300 mg capsule Take 1 capsule (300 mg total) by mouth 2 (two) times a day  Refills: 0    Associated Diagnoses: Type 1 diabetes mellitus with hypoglycemia and without coma (HCC)      Insulin Glargine Solostar (Basaglar KwikPen) 100 UNIT/ML SOPN 7 units AM  5 units PM  Refills: 0    Associated Diagnoses: Type 1 diabetes mellitus with hypoglycemia and without coma ([address])      ! ! insulin lispro (HumaLOG) 100 units/mL injection Inject 1-5 Units under the skin 3 (three) times a day before meals  Refills: 0    Associated Diagnoses: Type 1 diabetes mellitus with hypoglycemia and without coma ([address])      ! ! insulin lispro (HumaLOG) 100 units/mL injection Inject 4 Units under the skin daily before dinner  Refills: 0    Associated Diagnoses: Type 1 diabetes mellitus with hypoglycemia and without coma ([address])      ! ! insulin lispro (HumaLOG) 100 units/mL injection Inject 2 Units under the skin daily before lunch Do not start before September 25, 2023. Refills: 0    Associated Diagnoses: Type 1 diabetes mellitus with hypoglycemia and without coma ([address])      ! ! insulin lispro (HumaLOG) 100 units/mL injection Inject 4 Units under the skin daily before breakfast Do not start before September 25, 2023.   Refills: 0    Associated Diagnoses: Type 1 diabetes mellitus with hypoglycemia and without coma (HCC)      Multiple Vitamin (multivitamin) capsule Take 1 capsule by mouth daily      OneTouch Ultra test strip USE TO TEST BLOOD SUGAR 6 TIMES A DAY      VITAMIN D PO Take 125 mg by mouth in the morning      atorvastatin (LIPITOR) 20 mg tablet Take 20 mg by mouth daily with dinner       ipratropium (ATROVENT) 0.03 % nasal spray 2 sprays into each nostril every 12 (twelve) hours  Qty: 30 mL, Refills: 11    Associated Diagnoses: Vasomotor rhinitis      levothyroxine 100 mcg tablet Take 75 mcg by mouth every morning      meclizine (ANTIVERT) 25 mg tablet Take 1 tablet (25 mg total) by mouth every 8 (eight) hours as needed for dizziness  Qty: 30 tablet, Refills: 0    Associated Diagnoses: Visual changes      mupirocin (BACTROBAN) 2 % ointment Apply topically daily To affected area  Qty: 22 g, Refills: 2    Associated Diagnoses: Osteoradionecrosis of temporal bone       pantoprazole (PROTONIX) 40 mg tablet Take 1 tablet (40 mg total) by mouth 2 (two) times a day  Qty: 60 tablet, Refills: 0    Associated Diagnoses: Abnormal CT of the abdomen       !! - Potential duplicate medications found. Please discuss with provider. No discharge procedures on file.     PDMP Review         Value Time User    PDMP Reviewed  Yes 6/17/2020  2:17 PM Pratik Uribe MD            ED Provider  Electronically Signed by             Koko Cantu PA-C  11/13/23 1950

## 2023-11-13 NOTE — PROGRESS NOTES
[id number] [place]  Progress Note  Name: Carlos Lewis I  MRN: [id number]  Unit/Bed#: -01 I Date of Admission: 11/13/2023   Date of Service: 11/13/2023 I Hospital Day: 0    Assessment/Plan   Debility  Assessment & Plan  Patient with confusion and weakness per family  PT/OT evaluated the patient: Rehab Resource Intensity Level, PT: II (Moderate Resource Intensity)   Patient and family agreeable  CM to place referrals. Acute metabolic encephalopathy  Assessment & Plan  Patient with worsening confusion x 2-3 days per family. CT head: negative for acute findings  UA not suggestive of UTI  Patient without leukocytosis and afebrile   Patient admitted with encephalopathy previously due to hypoglycemia or hypotension. Patient's vitals have been stable and BG of 166. TSH: 3.173, LFT's WNL. Will check B1, B12, and ammonia level  Patient reports daughter tells her she gets confused when taking tramadol- per PDMP the patient last filled her tramadol was on 9/6/23- unsure if she still has this at home.    Will hold her gabapentin   May be cognitive decline  Consider Neurology consult if no improvement    Hypomagnesemia  Assessment & Plan  Magnesium of 1.6 on admission  Patient received magnesium 400 mg po x 1 dose in the ED  Will give 2 g IV magnesium now  Repeat labs in am    Type 1 diabetes mellitus with hypoglycemia [facility])  Assessment & Plan  Lab Results   Component Value Date    HGBA1C 8.9 (H) 05/26/2023       Recent Labs     11/13/23  0955 11/13/23  1517 11/13/23  1559   POCGLU 166* 281* 228*       Blood Sugar Average: Last 72 hrs:  (P) 225    Patient's home regimen is insulin Glargine 7 units qam and 5 units qhs  Will start Lantus 5 units q 12 hours  ISS  Monitor blood glucose trends and adjust insulin as needed      Hypothyroidism  Assessment & Plan  TSH 3.173  Continue home regimen of levothyroxine        VTE Pharmacologic Prophylaxis: VTE Score: 3 Moderate Risk (Score 3-4) - Pharmacological DVT Prophylaxis Ordered: enoxaparin (Lovenox). Code Status: Level 3 - DNAR and DNI   Discussion with family: Updated  (son in law) via phone. Anticipated Length of Stay: Patient will be admitted on an inpatient basis with an anticipated length of stay of greater than 2 midnights secondary to workup and need for STR placement. Total Time Spent on Date of Encounter in care of patient: 65 mins. This time was spent on one or more of the following: performing physical exam; counseling and coordination of care; obtaining or reviewing history; documenting in the medical record; reviewing/ordering tests, medications or procedures; communicating with other healthcare professionals and discussing with patient's family/caregivers. Chief Complaint: difficulty walking, confusion    History of Present Illness:  Nino Triplett is a 80 y.o. female with a PMH of DM type 1, hypothyroidism who presents to the ED due to confusion. Per the patient's son in law. The patient reported falling at home which prompted him to go to her house however he states she didn't fall but was confused. He states she was confused last night as well and they tried to get her to go the ED however she refused. Given she was noted to be confused today again he brought her the to ED as he was concerned she may have an UTI as this caused her to be confused before. He states at one moment she will be completely normal then she will talk about an event that occurred years ago as if it just happened. He states she had problems with her blood sugars going low as she was taking her insulin then forgetting to eat. He states the family is concerned as they think she may need to be in an assisted living facility however she is completely against it and becomes agitated when it is brought up. UA is negative, CT head negative, she does not have a leukocytosis and is afebrile. Her BP and BS is within normal limits.  She was evaluated by PT and recommended STR. CM aware. The patient herself denies any complaints. Review of Systems:  Review of Systems   Constitutional: Negative. HENT: Negative. Neurological:  Positive for weakness. Psychiatric/Behavioral:  Positive for confusion. All other systems reviewed and are negative. Past Medical and Surgical History:   Past Medical History:   Diagnosis Date    Ambulates with cane     Cancer ([address])     Chronic pain disorder     knees/ shoulders (gets inj every 3 mos)    Closed intertrochanteric fracture of right femur ([address]) 5/26/2020    Controlled type 2 diabetes mellitus with diabetic polyneuropathy, with long-term current use of insulin ([address]) 5/21/2008    Diabetes mellitus ([address])     Diabetic polyneuropathy ([address]) 5/21/2008    ICD10 clean up    Disease of thyroid gland     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    HL (hearing loss)     Hodgkin's disease ([address]) 2008    Left neck, had radiation    Hypertension     Neuropathy     Osteoporosis     RA (rheumatoid arthritis) ([address])     Traumatic rhabdomyolysis ([address]) 10/17/2022       Past Surgical History:   Procedure Laterality Date    ADENOIDECTOMY      APPENDECTOMY      CATARACT EXTRACTION      CATARACT EXTRACTION, BILATERAL      CHOLECYSTECTOMY      FRACTURE SURGERY Right     hip    MOUTH SURGERY      oral cancer left lower lip    OVARY SURGERY      GA ADJT TIS TRNS/REARGMT F/C/C/M/N/A/G/H/F 10SQCM/< N/A 3/28/2022    Procedure: Adjacent tissue transfer face;  Surgeon: Divine Rodriguez MD;  [facility];  Service: ENT    GA OPTX FEM SHFT FX W/INSJ IMED IMPLT W/WO SCREW Right 5/28/2020    Procedure: INSERTION NAIL IM FEMUR ANTEGRADE (TROCHANTERIC);   Surgeon: Temitope Hendrickson DO;  Location: [address] [place] [address] MAIN OR;  Service: Orthopedics    GA TRANSMASTOID ANTROTOMY Left 3/28/2022    Procedure: MASTOIDECTOMY;  Surgeon: Divine Rodriguez MD;  [facility];  Service: ENT    TONSILLECTOMY      TOTAL THYROIDECTOMY  2008 Performed after left neck dissection and left oral cancer diagnosis       Meds/Allergies:  Prior to Admission medications    Medication Sig Start Date End Date Taking?  Authorizing Provider   Ascorbic Acid (vitamin C) 100 MG tablet Take 500 mg by mouth 2 (two) times a day   Yes Historical Provider, MD   BD Insulin Syringe U/F 1/2Unit 31G X 5/16" 0.3 ML MISC 4 (four) times a day 11/29/21  Yes Historical Provider, MD   calcium carbonate (OS-FELICE) 600 MG tablet Take 600 mg by mouth 2 (two) times a day with meals   Yes Historical Provider, MD   cyanocobalamin (VITAMIN B-12) 1000 MCG tablet Take 1,000 mcg by mouth daily   Yes Historical Provider, MD   gabapentin (NEURONTIN) 300 mg capsule Take 1 capsule (300 mg total) by mouth 2 (two) times a day 9/24/23  Yes Reyes Preston, DO   Insulin Glargine Solostar (Basaglar KwikPen) 100 UNIT/ML SOPN 7 units AM  5 units PM 9/24/23  Yes Reyes Preston, DO   insulin lispro (HumaLOG) 100 units/mL injection Inject 1-5 Units under the skin 3 (three) times a day before meals 9/24/23  Yes Reyes Preston, DO   insulin lispro (HumaLOG) 100 units/mL injection Inject 4 Units under the skin daily before dinner 9/24/23  Yes Reyes Preston, DO   insulin lispro (HumaLOG) 100 units/mL injection Inject 2 Units under the skin daily before lunch Do not start before September 25, 2023. 9/25/23  Yes Reyes Pretson, DO   insulin lispro (HumaLOG) 100 units/mL injection Inject 4 Units under the skin daily before breakfast Do not start before September 25, 2023. 9/25/23  Yes Reyes Preston, DO   Multiple Vitamin (multivitamin) capsule Take 1 capsule by mouth daily   Yes Historical Provider, MD   OneTouch Ultra test strip USE TO TEST BLOOD SUGAR 6 TIMES A DAY 9/27/21  Yes Historical Provider, MD   VITAMIN D PO Take 125 mg by mouth in the morning   Yes Historical Provider, MD   atorvastatin (LIPITOR) 20 mg tablet Take 20 mg by mouth daily with dinner   Patient not taking: Reported on 11/13/2023    Historical Provider, MD   ipratropium (ATROVENT) 0.03 % nasal spray 2 sprays into each nostril every 12 (twelve) hours  Patient not taking: Reported on 11/13/2023 8/22/21   Theresa Irby MD   levothyroxine 100 mcg tablet Take 75 mcg by mouth every morning    Historical Provider, MD   meclizine (ANTIVERT) 25 mg tablet Take 1 tablet (25 mg total) by mouth every 8 (eight) hours as needed for dizziness  Patient not taking: Reported on 11/13/2023 10/25/22   DO jaylon Horn [facility]) 2 % ointment Apply topically daily To affected area  Patient not taking: Reported on 11/13/2023 10/5/22   Theresa Irby MD   pantoprazole (PROTONIX) 40 mg tablet Take 1 tablet (40 mg total) by mouth 2 (two) times a day  Patient not taking: Reported on 11/13/2023 7/15/22   Donna Wei PA-C     I have reviewed home medications with patient personally. Allergies: No Known Allergies    Social History:  Marital Status:    Occupation: retired   Patient Pre-hospital Living Situation: Home  Patient Pre-hospital Level of Mobility: walks  Patient Pre-hospital Diet Restrictions: DM  Substance Use History:   Social History     Substance and Sexual Activity   Alcohol Use Not Currently    Alcohol/week: 0.0 standard drinks of alcohol     Social History     Tobacco Use   Smoking Status Never   Smokeless Tobacco Never     Social History     Substance and Sexual Activity   Drug Use Never       Family History:  Family History   Problem Relation Age of Onset    Cancer Mother     Diabetes Mother     Diabetes Father     Hypertension Father        Physical Exam:     Vitals:   Blood Pressure: 168/81 (11/13/23 1546)  Pulse: 78 (11/13/23 1546)  Temperature: 98.2 °F (36.8 °C) (11/13/23 1546)  Respirations: 18 (11/13/23 1546)  Height: 5' 1" (154.9 cm) (11/13/23 1542)  Weight - Scale: 47.5 kg (104 lb 11.5 oz) (11/13/23 1542)  SpO2: 94 % (11/13/23 1546)    Physical Exam  Vitals and nursing note reviewed. Constitutional:       General: She is awake.       Appearance: She is underweight. HENT:      Head: Normocephalic and atraumatic. Cardiovascular:      Rate and Rhythm: Normal rate and regular rhythm. Heart sounds: Normal heart sounds. Pulmonary:      Effort: Pulmonary effort is normal.      Breath sounds: Normal breath sounds. Abdominal:      Palpations: Abdomen is soft. Tenderness: There is no abdominal tenderness. Skin:     General: Skin is warm and dry. Neurological:      General: No focal deficit present. Mental Status: She is alert. Comments: Patient oriented to self and time. She knew she was in [facility] but stated she was in [facility]. Psychiatric:         Attention and Perception: Attention normal.         Mood and Affect: Mood normal.         Speech: Speech normal.         Behavior: Behavior is cooperative. Additional Data:     Lab Results:  Results from last 7 days   Lab Units 11/13/23  0952   WBC Thousand/uL 8.78   HEMOGLOBIN g/dL 12.6   HEMATOCRIT % 38.9   PLATELETS Thousands/uL 225   NEUTROS PCT % 72   LYMPHS PCT % 15   MONOS PCT % 10   EOS PCT % 1     Results from last 7 days   Lab Units 11/13/23  0952   SODIUM mmol/L 132*   POTASSIUM mmol/L 3.9   CHLORIDE mmol/L 95*   CO2 mmol/L 30   BUN mg/dL 12   CREATININE mg/dL 0.64   ANION GAP mmol/L 7   CALCIUM mg/dL 9.0   ALBUMIN g/dL 4.2   TOTAL BILIRUBIN mg/dL 0.67   ALK PHOS U/L 60   ALT U/L 18   AST U/L 24   GLUCOSE RANDOM mg/dL 181*         Results from last 7 days   Lab Units 11/13/23  1559 11/13/23  1517 11/13/23  0955   POC GLUCOSE mg/dl 228* 281* 166*               Lines/Drains:  Invasive Devices       Peripheral Intravenous Line  Duration             Peripheral IV 11/13/23 Right;Ventral (anterior) Forearm <1 day                        Imaging: Reviewed radiology reports from this admission including: CT head  CT head without contrast   Final Result by [facility],  (11/13 1042)      No acute intracranial abnormality. Chronic microangiopathic changes. Workstation performed: [id number]             EKG and Other Studies Reviewed on Admission:   EKG: NSR. HR 73.    ** Please Note: This note has been constructed using a voice recognition system.  **

## 2023-11-13 NOTE — PHYSICAL THERAPY NOTE
Physical Therapy Evaluation   Time in: 6950  Time out: 1228  Total evaluation time: 13 minutes    Patient's Name: Case Paul    Admitting Diagnosis  Abnormal gait [R26.9]  UTI (urinary tract infection) [N39.0]    Problem List  Patient Active Problem List   Diagnosis    Hyperlipidemia    History of hypertension    Postoperative hypothyroidism    Type 1 diabetes mellitus with hypoglycemia ([address])    Primary osteoarthritis of both knees    Primary osteoarthritis of right shoulder    Soft tissue radionecrosis    Osteoradionecrosis of temporal bone     Abnormal CT of the abdomen    Gastroesophageal reflux disease without esophagitis    Acute metabolic encephalopathy    Hypoglycemia    Hypothermia    Hypokalemia    History of Hodgkin's lymphoma    Hypothyroidism    Hypomagnesemia    Macrocytosis    Low blood pressure       Past Medical History  Past Medical History:   Diagnosis Date    Ambulates with cane     Cancer ([address])     Chronic pain disorder     knees/ shoulders (gets inj every 3 mos)    Closed intertrochanteric fracture of right femur ([address]) 5/26/2020    Controlled type 2 diabetes mellitus with diabetic polyneuropathy, with long-term current use of insulin ([address]) 5/21/2008    Diabetes mellitus ([address])     Diabetic polyneuropathy ([address]) 5/21/2008    ICD10 clean up    Disease of thyroid gland     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    HL (hearing loss)     Hodgkin's disease ([address]) 2008    Left neck, had radiation    Hypertension     Neuropathy     Osteoporosis     RA (rheumatoid arthritis) ([address])     Traumatic rhabdomyolysis ([address]) 10/17/2022       Past Surgical History  Past Surgical History:   Procedure Laterality Date    ADENOIDECTOMY      APPENDECTOMY      CATARACT EXTRACTION      CATARACT EXTRACTION, BILATERAL      CHOLECYSTECTOMY      FRACTURE SURGERY Right     hip    MOUTH SURGERY      oral cancer left lower lip    OVARY SURGERY      HI ADJT TIS TRNS/REARGMT F/C/C/M/N/A/G/H/F 10SQCM/< N/A 3/28/2022 Procedure: Adjacent tissue transfer face;  Surgeon: Darya Day MD;  [facility];  Service: ENT    ND OPTX FEM SHFT FX W/INSJ IMED IMPLT W/WO SCREW Right 5/28/2020    Procedure: INSERTION NAIL IM FEMUR ANTEGRADE (TROCHANTERIC); Surgeon: Stephania Moran DO;  Location: [facility] MAIN OR;  Service: Orthopedics    ND TRANSMASTOID ANTROTOMY Left 3/28/2022    Procedure: MASTOIDECTOMY;  Surgeon: Darya Day MD;  [facility];  Service: ENT    TONSILLECTOMY      TOTAL THYROIDECTOMY  2008    Performed after left neck dissection and left oral cancer diagnosis       PT performed at least 2 patient identifiers during session: Name and wristband. 11/13/23 1228   PT Last Visit   PT Visit Date 11/13/23   Note Type   Note type Evaluation   Pain Assessment   Pain Assessment Tool FLACC   Pain Rating: FLACC (Rest) - Face 0   Pain Rating: FLACC (Rest) - Legs 0   Pain Rating: FLACC (Rest) - Activity 0   Pain Rating: FLACC (Rest) - Cry 0   Pain Rating: FLACC (Rest) - Consolability 0   Score: FLACC (Rest) 0   Pain Rating: FLACC (Activity) - Face 0   Pain Rating: FLACC (Activity) - Legs 0   Pain Rating: FLACC (Activity) - Activity 0   Pain Rating: FLACC (Activity) - Cry 1   Pain Rating: FLACC (Activity) - Consolability 1   Score: FLACC (Activity) 2   Restrictions/Precautions   Weight Bearing Precautions Per Order No   Other Precautions Agitated;Cognitive;Multiple lines;Telemetry; Fall Risk;Pain   Home Living   Type of [address] [place] [address] Dr One level;Performs ADLs on one level; Able to live on main level with bedroom/bathroom;Stairs to enter with rails  (2 TEE)   Bathroom Shower/Tub Tub/shower unit   [employer] Raised   Bathroom Equipment Grab bars in shower; Toilet raiser;Grab bars around toilet   [address]   (rollator use)   Prior Function   Level of [place] Independent with ADLs; Independent with functional mobility; Needs assistance with IADLS  (per chart review)   Lives With Alone   Receives Help From Family  (family lives close)   Falls in the last 6 months 5 to 8  (+ fall history per family member at bedside)   Comments pt is poor historian during time of evaluation - agitation and reluctance to answer questions. PT obtained PLOF and social history from pt's chart   General   Family/Caregiver Present No   Cognition   Arousal/Participation Alert   Orientation Level Oriented to person;Oriented to place   Memory Decreased recall of precautions;Decreased recall of recent events   Following Commands Follows one step commands with increased time or repetition   Subjective   Subjective "Do you want me to slap you?"   RLE Assessment   RLE Assessment X   Strength RLE   RLE Overall Strength 3/5   LLE Assessment   LLE Assessment X   Strength LLE   LLE Overall Strength 3/5   Coordination   Movements are Fluid and Coordinated 0   Bed Mobility   Supine to Sit 4  Minimal assistance   Additional items Assist x 1;HOB elevated; Increased time required   Transfers   Sit to Stand 4  Minimal assistance   Additional items Assist x 1; Increased time required;Verbal cues   Stand to Sit 4  Minimal assistance   Additional items Assist x 1; Impulsive;Verbal cues  (VCs for safety)   Ambulation/Elevation   Gait pattern Improper Weight shift; Forward Flexion;Decreased foot clearance; Antalgic;Narrow JACQUE;Shuffling;Excessively slow   Gait Assistance 4  Minimal assist   Additional items Assist x 1  (cues for safety & impulsivity)   Assistive Device Rolling walker   Distance 50 ft   Stair Management Assistance Not tested   Balance   Static Sitting Good   Dynamic Sitting Fair +   Static Standing Fair -   Dynamic Standing Poor +   Ambulatory Poor +   Endurance Deficit   Endurance Deficit Yes   Activity Tolerance   Activity Tolerance Treatment limited secondary to agitation   Medical Staff Made Aware coordination of care provided with OT [address] [place] Yes, RN made aware of outcomes/recs Assessment   Prognosis Fair   Problem List Decreased strength;Decreased endurance; Impaired balance;Decreased mobility; Decreased cognition; Impaired judgement;Decreased safety awareness   Assessment Pt is 80 y.o. female seen for high-complexity PT evaluation on 11/13/2023 s/p admit [address] on 11/13/2023 w/ decreased mental state and gait tolerance. PT initially consulted to assess pt's functional mobility and d/c needs. Order placed for PT eval and tx. PTA, pt lives alone in 1  c 2 TEE, uses rollator vs RW for mobility means. At time of eval, pt requires min Ax1 for all functional mobility tasks, including amb x 50 ft with use of RW. Upon evaluation, pt presenting with impaired functional mobility d/t decreased strength, decreased endurance, impaired balance, decreased mobility, decreased cognition, impaired judgement, and decreased safety awareness. Pertinent PMHx and current co-morbidities affecting pt's physical performance at time of assessment include: h/o cancer, chronic pain disorder, DM type 2, HTN, neuropathy, osteoporosis, RA. Personal factors affecting pt at time of eval include: positive fall history, impulsivity, and agitation . Objective measures performed on IE also reveal limitations: AM-PAC 6-Clicks: 44/35. Pt's clinical presentation is currently unstable/unpredictable seen in pt's presentation of  progressive symptoms prior to hospitalization, high fall risk with positive fall history, impulsivity, agitation limiting pt's participation in therapy . Overall, pt's rehab potential and prognosis to return to PLOF is fair as impacted by objective findings, warranting pt to receive further skilled PT interventions to address identified impairments, activity limitation(s), and participation restriction(s).  Pt is to benefit from continued PT tx to address deficits as defined above and maximize level of functional independent mobility and consistency in order for pt to improve activity tolerance and safety to reside home alone. From PT/mobility standpoint, recommendation at time of d/c would be level 2, moderate resource intensity PT services, pending pt's overall functional progress in order to facilitate return to PLOF and abilities. Barriers to Discharge Decreased caregiver support  (pt lives alone)   Goals   Patient Goals none expressed by pt re: therapy outcomes   STG Expiration Date 11/23/23   Short Term Goal #1 In 7-10 days: Increase bilateral LE strength 1/2 grade to facilitate independent mobility, Perform all bed mobility tasks modified independent to decrease caregiver burden, Perform all transfers modified independent to improve independence, Ambulate > 200 ft. with RW modified independent w/o LOB and w/ normalized gait pattern 100% of the time, Navigate 2 stairs with distant S with unilateral handrail to facilitate return to previous living environment, Increase all balance 1/2 grade to decrease risk for falls, Complete exercise program independently and Tolerate 4 hr OOB to faciliate upright tolerance   PT Treatment Day 0   Plan   Treatment/Interventions Functional transfer training;LE strengthening/ROM; Elevations; Therapeutic exercise; Endurance training;Cognitive reorientation;Patient/family training;Equipment eval/education; Bed mobility;Gait training;Spoke to nursing   PT Frequency 3-5x/wk   Discharge Recommendation   Rehab Resource Intensity Level, PT II (Moderate Resource Intensity)   Equipment Recommended Marivel Naranjo  (continue RW use)   Walker Package Recommended Wheeled walker   Additional Comments Pt's raw score on the [facility] Basic Mobility inpatient short form is 17, standardized score is 39.67. Patients at this level are likely to benefit from DC to post acute rehabilitation services, however, please refer to therapist recommendation for safe DC planning.    -[facility] Basic Mobility Inpatient   Turning in Flat Bed Without Bedrails 3   Lying on Back to Sitting on Edge of Flat Bed Without Bedrails 3   Moving Bed to Chair 3   Standing Up From Chair Using Arms 3   Walk in Room 3   Climb 3-5 Stairs With Railing 2   Basic Mobility Inpatient Raw Score 17   Basic Mobility Standardized Score 39.67   Highest Level Of Mobility   -HLM Goal 5: Stand one or more mins   JH-HLM Achieved 7: Walk 25 feet or more   End of Consult   Patient Position at End of Consult Bedside chair; All needs within reach       Nikita Ceja, PT, DPT

## 2023-11-13 NOTE — PLAN OF CARE
Problem: OCCUPATIONAL THERAPY ADULT  Goal: Performs self-care activities at highest level of function for planned discharge setting. See evaluation for individualized goals. Description: Treatment Interventions: ADL retraining, Functional transfer training, UE strengthening/ROM, Endurance training, Patient/family training, Energy conservation, Activityengagement       See flowsheet documentation for full assessment, interventions and recommendations. Note: Limitation: Decreased ADL status, Decreased UE strength, Decreased Safe judgement during ADL, Decreased endurance, Decreased self-care trans, Decreased high-level ADLs  Prognosis: Fair  Assessment: Pt is a 80 y.o. female seen for OT evaluation s/p admit to [facility] on 11/13/2023 w/ weakness. Comorbidities affecting pt's functional performance at time of assessment include: HTN, type 1 diabetes, primary OA of both knees, primary OA of right shoulder, GERD. Personal factors affecting pt at time of IE include:limited home support, difficulty performing ADLS, difficulty performing IADLS , limited insight into deficits, decreased initiation and engagement , and health management . Prior to admission, pt was I with ADLs and required A with IADLs. Upon evaluation: the following deficits impact occupational performance: weakness, decreased strength, decreased balance, decreased tolerance, impaired memory, impaired problem solving, decreased safety awareness, and increased pain. Pt to benefit from continued skilled OT tx while in the hospital to address deficits as defined above and maximize level of functional independence w ADL's and functional mobility. Occupational Performance areas to address include: grooming, bathing/shower, toilet hygiene, dressing, functional mobility, community mobility, and clothing management. From OT standpoint, recommendation at time of d/c would be home with moderate intensity OT resources.      Rehab Resource Intensity Level, OT: II (Moderate Resource Intensity)     Mindy Ellison MS, OTR/L

## 2023-11-13 NOTE — PLAN OF CARE
Problem: MOBILITY - ADULT  Goal: Maintain or return to baseline ADL function  Description: INTERVENTIONS:  -  Assess patient's ability to carry out ADLs; assess patient's baseline for ADL function and identify physical deficits which impact ability to perform ADLs (bathing, care of mouth/teeth, toileting, grooming, dressing, etc.)  - Assess/evaluate cause of self-care deficits   - Assess range of motion  - Assess patient's mobility; develop plan if impaired  - Assess patient's need for assistive devices and provide as appropriate  - Encourage maximum independence but intervene and supervise when necessary  - Involve family in performance of ADLs  - Assess for home care needs following discharge   - Consider OT consult to assist with ADL evaluation and planning for discharge  - Provide patient education as appropriate  Outcome: Progressing   PT/OT consults placed

## 2023-11-13 NOTE — PLAN OF CARE
Problem: PHYSICAL THERAPY ADULT  Goal: Performs mobility at highest level of function for planned discharge setting. See evaluation for individualized goals. Description: Treatment/Interventions: Functional transfer training, LE strengthening/ROM, Elevations, Therapeutic exercise, Endurance training, Cognitive reorientation, Patient/family training, Equipment eval/education, Bed mobility, Gait training, Spoke to nursing  Equipment Recommended: Reina Villanueva (continue RW use)       See flowsheet documentation for full assessment, interventions and recommendations. Note: Prognosis: Fair  Problem List: Decreased strength, Decreased endurance, Impaired balance, Decreased mobility, Decreased cognition, Impaired judgement, Decreased safety awareness  Assessment: Pt is 80 y.o. female seen for high-complexity PT evaluation on 11/13/2023 s/p admit to [address] on 11/13/2023 w/ decreased mental state and gait tolerance. PT initially consulted to assess pt's functional mobility and d/c needs. Order placed for PT eval and tx. PTA, pt lives alone in 1  c 2 TEE, uses rollator vs RW for mobility means. At time of eval, pt requires min Ax1 for all functional mobility tasks, including amb x 50 ft with use of RW. Upon evaluation, pt presenting with impaired functional mobility d/t decreased strength, decreased endurance, impaired balance, decreased mobility, decreased cognition, impaired judgement, and decreased safety awareness. Pertinent PMHx and current co-morbidities affecting pt's physical performance at time of assessment include: h/o cancer, chronic pain disorder, DM type 2, HTN, neuropathy, osteoporosis, RA. Personal factors affecting pt at time of eval include: positive fall history, impulsivity, and agitation . Objective measures performed on IE also reveal limitations: AM-PAC 6-Clicks: 67/46.  Pt's clinical presentation is currently unstable/unpredictable seen in pt's presentation of  progressive symptoms prior to hospitalization, high fall risk with positive fall history, impulsivity, agitation limiting pt's participation in therapy . Overall, pt's rehab potential and prognosis to return to PLOF is fair as impacted by objective findings, warranting pt to receive further skilled PT interventions to address identified impairments, activity limitation(s), and participation restriction(s). Pt is to benefit from continued PT tx to address deficits as defined above and maximize level of functional independent mobility and consistency in order for pt to improve activity tolerance and safety to reside home alone. From PT/mobility standpoint, recommendation at time of d/c would be level 2, moderate resource intensity PT services, pending pt's overall functional progress in order to facilitate return to PLOF and abilities. Barriers to Discharge: Decreased caregiver support (pt lives alone)     Rehab Resource Intensity Level, PT: II (Moderate Resource Intensity)    See flowsheet documentation for full assessment.

## 2023-11-13 NOTE — H&P
[facility]  H&P  Name: Case Paul 80 y.o. female I MRN: [id number]  Unit/Bed#: -01 I Date of Admission: 11/13/2023   Date of Service: 11/13/2023 I Hospital Day: 0      Debility  Assessment & Plan  Patient with confusion and weakness per family  PT/OT evaluated the patient: Rehab Resource Intensity Level, PT: II (Moderate Resource Intensity)   Patient and family agreeable  CM to place referrals. Acute metabolic encephalopathy  Assessment & Plan  Patient with worsening confusion x 2-3 days per family. CT head: negative for acute findings  UA not suggestive of UTI  Patient without leukocytosis and afebrile   Patient admitted with encephalopathy previously due to hypoglycemia or hypotension. Patient's vitals have been stable and BG of 166. TSH: 3.173, LFT's WNL. Will check B1, B12, and ammonia level  Patient reports daughter tells her she gets confused when taking tramadol- per PDMP the patient last filled her tramadol was on 9/6/23- unsure if she still has this at home.    Will hold her gabapentin   May be cognitive decline  Consider Neurology consult if no improvement    Hypomagnesemia  Assessment & Plan  Magnesium of 1.6 on admission  Patient received magnesium 400 mg po x 1 dose in the ED  Will give 2 g IV magnesium now  Repeat labs in am    Type 1 diabetes mellitus with hypoglycemia [facility])  Assessment & Plan  Lab Results   Component Value Date    HGBA1C 8.9 (H) 05/26/2023       Recent Labs     11/13/23  0955 11/13/23  1517 11/13/23  1559   POCGLU 166* 281* 228*       Blood Sugar Average: Last 72 hrs:  (P) 225    Patient's home regimen is insulin Glargine 7 units qam and 5 units qhs  Will start Lantus 5 units q 12 hours  ISS  Monitor blood glucose trends and adjust insulin as needed      Hypothyroidism  Assessment & Plan  TSH 3.173  Continue home regimen of levothyroxine   VTE Pharmacologic Prophylaxis: VTE Score: 3 Moderate Risk (Score 3-4) - Pharmacological DVT Prophylaxis Ordered: enoxaparin (Lovenox). Code Status: Level 3 - DNAR and DNI   Discussion with family: Updated  (son in law) via phone. Anticipated Length of Stay: Patient will be admitted on an inpatient basis with an anticipated length of stay of greater than 2 midnights secondary to confusion . Total Time Spent on Date of Encounter in care of patient: 65 mins. This time was spent on one or more of the following: performing physical exam; counseling and coordination of care; obtaining or reviewing history; documenting in the medical record; reviewing/ordering tests, medications or procedures; communicating with other healthcare professionals and discussing with patient's family/caregivers. Chief Complaint: difficulty walking, confusion    History of Present Illness:  Shobha Guan is a 80 y.o. female with a PMH of DM type 1, hypothyroidism who presents with confusion. Per the patient's son in law, the patient reported a fall at home whch prompted him to go to her house however he sates she didn't fall but was confused. He states she was confused last night as well and they tried to get her to go to the ED last night however she refused. Given she had confusion today again he brought her to the ED for evaluation as he weas concerned she had an UTI as this has caused her to be confused in the past. He states at one moment she was completely normal then she will talk about an event that occurred years ago as if it just happened today. He states she had problems with her blood sugars dropping as she would take her insulin then forget to eat. He states the family is concerned as they think she may need to be in an assisted living facility however she is completely against this and becomes upset when it is brought up. UA is negative. CT head is negative. She does not have any signs of infection. Her BP and BS is within normal limits. She was evaluatted by PT and they recommended STR.  The patient herself denies any complaints. Review of Systems:  Review of Systems   Psychiatric/Behavioral:  Positive for confusion. All other systems reviewed and are negative. Past Medical and Surgical History:   Past Medical History:   Diagnosis Date    Ambulates with cane     Cancer ([address])     Chronic pain disorder     knees/ shoulders (gets inj every 3 mos)    Closed intertrochanteric fracture of right femur ([address]) 5/26/2020    Controlled type 2 diabetes mellitus with diabetic polyneuropathy, with long-term current use of insulin ([address]) 5/21/2008    Diabetes mellitus ([address])     Diabetic polyneuropathy ([address]) 5/21/2008    ICD10 clean up    Disease of thyroid gland     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    HL (hearing loss)     Hodgkin's disease ([address]) 2008    Left neck, had radiation    Hypertension     Neuropathy     Osteoporosis     RA (rheumatoid arthritis) ([address])     Traumatic rhabdomyolysis ([address]) 10/17/2022       Past Surgical History:   Procedure Laterality Date    ADENOIDECTOMY      APPENDECTOMY      CATARACT EXTRACTION      CATARACT EXTRACTION, BILATERAL      CHOLECYSTECTOMY      FRACTURE SURGERY Right     hip    MOUTH SURGERY      oral cancer left lower lip    OVARY SURGERY      MN ADJT TIS TRNS/REARGMT F/C/C/M/N/A/G/H/F 10SQCM/< N/A 3/28/2022    Procedure: Adjacent tissue transfer face;  Surgeon: Kandis Trujillo MD;  Location: [facility];  Service: ENT    MN OPTX FEM SHFT FX W/INSJ IMED IMPLT W/WO SCREW Right 5/28/2020    Procedure: INSERTION NAIL IM FEMUR ANTEGRADE (TROCHANTERIC);   Surgeon: Carline Hernandez DO;  Location: [address] [place] [address] MAIN OR;  Service: Orthopedics    MN TRANSMASTOID ANTROTOMY Left 3/28/2022    Procedure: MASTOIDECTOMY;  Surgeon: Kandis Trujillo MD;  Location: [facility];  Service: ENT    TONSILLECTOMY      TOTAL THYROIDECTOMY  2008    Performed after left neck dissection and left oral cancer diagnosis       Meds/Allergies:  Prior to Admission medications    Medication Sig Start Date End Date Taking?  Authorizing Provider   Ascorbic Acid (vitamin C) 100 MG tablet Take 500 mg by mouth 2 (two) times a day   Yes Historical Provider, MD   BD Insulin Syringe U/F 1/2Unit 31G X 5/16" 0.3 ML MISC 4 (four) times a day 11/29/21  Yes Historical Provider, MD   calcium carbonate (OS-FELICE) 600 MG tablet Take 600 mg by mouth 2 (two) times a day with meals   Yes Historical Provider, MD   cyanocobalamin (VITAMIN B-12) 1000 MCG tablet Take 1,000 mcg by mouth daily   Yes Historical Provider, MD   gabapentin (NEURONTIN) 300 mg capsule Take 1 capsule (300 mg total) by mouth 2 (two) times a day 9/24/23  Yes Reyes Preston, DO   Insulin Glargine Solostar (Basaglar KwikPen) 100 UNIT/ML SOPN 7 units AM  5 units PM 9/24/23  Yes Reyes Preston, DO   insulin lispro (HumaLOG) 100 units/mL injection Inject 1-5 Units under the skin 3 (three) times a day before meals 9/24/23  Yes Reyes Preston, DO   insulin lispro (HumaLOG) 100 units/mL injection Inject 4 Units under the skin daily before dinner 9/24/23  Yes Reyes Preston, DO   insulin lispro (HumaLOG) 100 units/mL injection Inject 2 Units under the skin daily before lunch Do not start before September 25, 2023. 9/25/23  Yes Reyes Preston, DO   insulin lispro (HumaLOG) 100 units/mL injection Inject 4 Units under the skin daily before breakfast Do not start before September 25, 2023. 9/25/23  Yes Reyes Preston, DO   Multiple Vitamin (multivitamin) capsule Take 1 capsule by mouth daily   Yes Historical Provider, MD   OneTouch Ultra test strip USE TO TEST BLOOD SUGAR 6 TIMES A DAY 9/27/21  Yes Historical Provider, MD   VITAMIN D PO Take 125 mg by mouth in the morning   Yes Historical Provider, MD   atorvastatin (LIPITOR) 20 mg tablet Take 20 mg by mouth daily with dinner   Patient not taking: Reported on 11/13/2023    Historical Provider, MD   ipratropium (ATROVENT) 0.03 % nasal spray 2 sprays into each nostril every 12 (twelve) hours  Patient not taking: Reported on 11/13/2023 8/22/21   [facility] Rodo MD   levothyroxine 100 mcg tablet Take 75 mcg by mouth every morning    Historical Provider, MD   meclizine (ANTIVERT) 25 mg tablet Take 1 tablet (25 mg total) by mouth every 8 (eight) hours as needed for dizziness  Patient not taking: Reported on 11/13/2023 10/25/22   [employer], DO   mupirocin [facility]) 2 % ointment Apply topically daily To affected area  Patient not taking: Reported on 11/13/2023 10/5/22   Salina Lynn MD   pantoprazole (PROTONIX) 40 mg tablet Take 1 tablet (40 mg total) by mouth 2 (two) times a day  Patient not taking: Reported on 11/13/2023 7/15/22   Adams Cardenas PA-C     I have reviewed home medications with patient personally. Allergies: No Known Allergies    Social History:  Marital Status:    Occupation: retired   Patient Pre-hospital Living Situation: Home  Patient Pre-hospital Level of Mobility: walks  Patient Pre-hospital Diet Restrictions: DM  Substance Use History:   Social History     Substance and Sexual Activity   Alcohol Use Not Currently    Alcohol/week: 0.0 standard drinks of alcohol     Social History     Tobacco Use   Smoking Status Never   Smokeless Tobacco Never     Social History     Substance and Sexual Activity   Drug Use Never       Family History:  Family History   Problem Relation Age of Onset    Cancer Mother     Diabetes Mother     Diabetes Father     Hypertension Father        Physical Exam:     Vitals:   Blood Pressure: 168/81 (11/13/23 1546)  Pulse: 78 (11/13/23 1546)  Temperature: 98.2 °F (36.8 °C) (11/13/23 1546)  Respirations: 18 (11/13/23 1546)  Height: 5' 1" (154.9 cm) (11/13/23 1542)  Weight - Scale: 47.5 kg (104 lb 11.5 oz) (11/13/23 1542)  SpO2: 94 % (11/13/23 1546)    Physical Exam  Vitals and nursing note reviewed. Constitutional:       General: She is awake. Appearance: She is underweight. HENT:      Head: Normocephalic and atraumatic. Cardiovascular:      Rate and Rhythm: Normal rate and regular rhythm.       Heart sounds: Normal heart sounds. Pulmonary:      Effort: Pulmonary effort is normal.      Breath sounds: Normal breath sounds. Abdominal:      Palpations: Abdomen is soft. Tenderness: There is no abdominal tenderness. Skin:     General: Skin is warm and dry. Neurological:      General: No focal deficit present. Mental Status: She is alert and oriented to person, place, and time. Psychiatric:         Attention and Perception: Attention normal.         Mood and Affect: Mood normal.         Speech: Speech normal.         Behavior: Behavior is cooperative. Additional Data:     Lab Results:  Results from last 7 days   Lab Units 11/13/23  0952   WBC Thousand/uL 8.78   HEMOGLOBIN g/dL 12.6   HEMATOCRIT % 38.9   PLATELETS Thousands/uL 225   NEUTROS PCT % 72   LYMPHS PCT % 15   MONOS PCT % 10   EOS PCT % 1     Results from last 7 days   Lab Units 11/13/23  0952   SODIUM mmol/L 132*   POTASSIUM mmol/L 3.9   CHLORIDE mmol/L 95*   CO2 mmol/L 30   BUN mg/dL 12   CREATININE mg/dL 0.64   ANION GAP mmol/L 7   CALCIUM mg/dL 9.0   ALBUMIN g/dL 4.2   TOTAL BILIRUBIN mg/dL 0.67   ALK PHOS U/L 60   ALT U/L 18   AST U/L 24   GLUCOSE RANDOM mg/dL 181*         Results from last 7 days   Lab Units 11/13/23  1559 11/13/23  1517 11/13/23  0955   POC GLUCOSE mg/dl 228* 281* 166*               Lines/Drains:  Invasive Devices       Peripheral Intravenous Line  Duration             Peripheral IV 11/13/23 Right;Ventral (anterior) Forearm <1 day                        Imaging: Reviewed radiology reports from this admission including: CT head  CT head without contrast   Final Result by [address] [place] [address] (11/13 1042)      No acute intracranial abnormality. Chronic microangiopathic changes. Workstation performed: [id number]             EKG and Other Studies Reviewed on Admission:   EKG: NSR. HR 73.    ** Please Note: This note has been constructed using a voice recognition system.  **

## 2023-11-13 NOTE — ASSESSMENT & PLAN NOTE
Patient with worsening confusion x 2-3 days per family. CT head: negative for acute findings  UA not suggestive of UTI  Patient without leukocytosis and afebrile   Patient admitted with encephalopathy previously due to hypoglycemia or hypotension. Patient's vitals and blood sugars have been stable. TSH: 3.173, LFT's WNL, B12 488, and ammonia level 25  B1 pending  Patient reports daughter tells her she gets confused when taking tramadol- per PDMP the patient last filled her tramadol was on 9/6/23- unsure if she still has this at home. Nursing staff reports patient asking for tramadol today (11/14)  Continue to hold her gabapentin   May be cognitive decline  Patient did require restraints for a short period of time last night. She was given Zyprexa 2.5 mg IM x 2 doses. This morning she does appear confused and having hallucinations.    Neurology consulted

## 2023-11-13 NOTE — OCCUPATIONAL THERAPY NOTE
Occupational Therapy Evaluation     Patient Name: Louise Sales Date: 11/13/2023  Problem List  Active Problems: There are no active Hospital Problems. Past Medical History  Past Medical History:   Diagnosis Date    Ambulates with cane     Cancer ([address])     Chronic pain disorder     knees/ shoulders (gets inj every 3 mos)    Closed intertrochanteric fracture of right femur ([address]) 5/26/2020    Controlled type 2 diabetes mellitus with diabetic polyneuropathy, with long-term current use of insulin ([address]) 5/21/2008    Diabetes mellitus ([address])     Diabetic polyneuropathy ([address]) 5/21/2008    ICD10 clean up    Disease of thyroid gland     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    HL (hearing loss)     Hodgkin's disease ([address]) 2008    Left neck, had radiation    Hypertension     Neuropathy     Osteoporosis     RA (rheumatoid arthritis) ([address])     Traumatic rhabdomyolysis ([address]) 10/17/2022     Past Surgical History  Past Surgical History:   Procedure Laterality Date    ADENOIDECTOMY      APPENDECTOMY      CATARACT EXTRACTION      CATARACT EXTRACTION, BILATERAL      CHOLECYSTECTOMY      FRACTURE SURGERY Right     hip    MOUTH SURGERY      oral cancer left lower lip    OVARY SURGERY      AR ADJT TIS TRNS/REARGMT F/C/C/M/N/A/G/H/F 10SQCM/< N/A 3/28/2022    Procedure: Adjacent tissue transfer face;  Surgeon: Fernando Kaufman MD;  [facility];  Service: ENT    AR OPTX FEM SHFT FX W/INSJ IMED IMPLT W/WO SCREW Right 5/28/2020    Procedure: INSERTION NAIL IM FEMUR ANTEGRADE (TROCHANTERIC);   Surgeon: Lorin Coates DO;  Location: [facility] MAIN OR;  Service: Orthopedics    AR TRANSMASTOID ANTROTOMY Left 3/28/2022    Procedure: MASTOIDECTOMY;  Surgeon: Fernando Kaufman MD;  Location[facility];  Service: ENT    TONSILLECTOMY      TOTAL THYROIDECTOMY  2008    Performed after left neck dissection and left oral cancer diagnosis         11/13/23 1216   OT Last Visit   OT Visit Date 11/13/23   Note Type   Note type Evaluation   Additional Comments Pt seen as a co-eval with PT due to the patient's co-morbidities, clinically unstable presentation, and present impairments which are a regression from the patient's baseline. Pain Assessment   Pain Assessment Tool FLACC   Pain Rating: FLACC (Rest) - Face 0   Pain Rating: FLACC (Rest) - Legs 0   Pain Rating: FLACC (Rest) - Activity 0   Pain Rating: FLACC (Rest) - Cry 0   Pain Rating: FLACC (Rest) - Consolability 0   Score: FLACC (Rest) 0   Pain Rating: FLACC (Activity) - Face 0   Pain Rating: FLACC (Activity) - Legs 0   Pain Rating: FLACC (Activity) - Activity 0   Pain Rating: FLACC (Activity) - Cry 1   Pain Rating: FLACC (Activity) - Consolability 1   Score: FLACC (Activity) 2   Restrictions/Precautions   Weight Bearing Precautions Per Order No   Other Precautions Agitated;Cognitive;Multiple lines;Telemetry; Fall Risk;Pain   Home Living   Type of [address] [place] [address] One level;Performs ADLs on one level; Able to live on main level with bedroom/bathroom;Stairs to enter with rails  (2 TEE)   Bathroom Shower/Tub Tub/shower unit   [employer] Raised   Bathroom Equipment Grab bars in shower; Toilet raiser;Grab bars around toilet   [address]  (Pt reports rollator use)   Prior Function   Level of [place] Independent with ADLs; Independent with functional mobility; Needs assistance with IADLS  (per chart review)   Lives With Alone   Receives Help From Family  (family lives close)   Falls in the last 6 months 5 to 8  (+ fall history per family member at bedside)   Comments poor historian at time of evaluation - significant agitation and reluctance to answer questions; obtained PLOF and social history from patient's chart   Subjective   Subjective "No, call me ma'am"   ADL   Where Assessed Edge of bed   UB Bathing Assistance 5  Supervision/Setup    N [facility] 5 Supervision/Setup   LB Dressing Assistance 4  Minimal Assistance   Bed Mobility   Supine to Sit 4  Minimal assistance   Additional items Assist x 1;HOB elevated; Increased time required   Sit to Supine   (DNT; pt seated in chair upon conclusion of session)   Transfers   Sit to Stand 4  Minimal assistance   Additional items Assist x 1; Increased time required;Verbal cues   Stand to Sit 4  Minimal assistance   Additional items Assist x 1; Impulsive;Verbal cues  (VC for safety)   Additional Comments RW used; VC for safe hand placement, proper body mechanics and RW management   Functional Mobility   Functional Mobility 4  Minimal assistance   Additional Comments min A x1 for functional mobility with RW. No significant LOB observed, moderate instability   Additional items Rolling walker   Balance   Static Sitting Good   Dynamic Sitting Fair +   Static Standing Fair -   Dynamic Standing Poor +   Ambulatory Poor +   Activity Tolerance   Activity Tolerance Treatment limited secondary to agitation   Medical Staff Made Aware Coordination of care provided with PT Janny   Nurse Made Aware Yes, RN made aware of outcomes/recs   RUE Assessment   RUE Assessment WFL   RUE Strength   RUE Overall Strength   (3/5)   LUE Assessment   LUE Assessment WFL   LUE Strength   LUE Overall Strength   (3/5)   Hand Function   Gross Motor Coordination Functional   Fine Motor Coordination Functional   Cognition   Overall Cognitive Status Impaired   Arousal/Participation Alert   Attention Difficulty attending to directions   Orientation Level Oriented to person;Oriented to place   Memory Decreased recall of precautions;Decreased recall of recent events   Following Commands Follows one step commands with increased time or repetition   Assessment   Limitation Decreased ADL status; Decreased UE strength;Decreased Safe judgement during ADL;Decreased endurance;Decreased self-care trans;Decreased high-level ADLs   Prognosis Fair   Assessment Pt is a 80 y.o. female seen for OT evaluation s/p admit to [facility] on 11/13/2023 w/ weakness. Comorbidities affecting pt's functional performance at time of assessment include: HTN, type 1 diabetes, primary OA of both knees, primary OA of right shoulder, GERD. Personal factors affecting pt at time of IE include:limited home support, difficulty performing ADLS, difficulty performing IADLS , limited insight into deficits, decreased initiation and engagement , and health management . Prior to admission, pt was I with ADLs and required A with IADLs. Upon evaluation: the following deficits impact occupational performance: weakness, decreased strength, decreased balance, decreased tolerance, impaired memory, impaired problem solving, decreased safety awareness, and increased pain. Pt to benefit from continued skilled OT tx while in the hospital to address deficits as defined above and maximize level of functional independence w ADL's and functional mobility. Occupational Performance areas to address include: grooming, bathing/shower, toilet hygiene, dressing, functional mobility, community mobility, and clothing management. From OT standpoint, recommendation at time of d/c would be home with moderate intensity OT resources. Goals   Patient Goals to go home   Plan   Treatment Interventions ADL retraining;Functional transfer training;UE strengthening/ROM; Endurance training;Patient/family training;Energy conservation; Activityengagement   Goal Expiration Date 11/23/23   OT Treatment Day 0   OT Frequency 3-5x/wk   Discharge Recommendation   Rehab Resource Intensity Level, OT II (Moderate Resource Intensity)   Additional Comments  The patient's raw score on the AM-PAC Daily Activity inpatient short form is 18, standardized score is 38.66, less than 39.4. Patients at this level are likely to benefit from discharge with moderate intensity OT resources.  Please refer to the recommendation of the Occupational Therapist for safe discharge planning.    AM-PAC Daily Activity Inpatient   Lower Body Dressing 2   Bathing 2   Toileting 3   Upper Body Dressing 3   Grooming 4   Eating 4   Daily Activity Raw Score 18   Daily Activity Standardized Score (Calc for Raw Score >=11) 38.66   AM-PAC Applied Cognition Inpatient   Following a Speech/Presentation 3   Understanding Ordinary Conversation 4   Taking Medications 3   Remembering Where Things Are Placed or Put Away 3   Remembering List of 4-5 Errands 2   Taking Care of Complicated Tasks 2   Applied Cognition Raw Score 17   Applied Cognition Standardized Score 36.52     GOALS:  Pt will achieve the following within specified time frame: LTG  Pt will achieve the following goals within 10 days    *ADL transfers with (S) for inc'd independence with ADLs/purposeful tasks    *UB ADL with (I) for inc'd independence with self cares    *LB ADL with (I) using AE prn for inc'd independence with self cares    *Toileting with (S) for clothing management and hygiene for return to PLOF with personal care    *Increase static stand balance and dyn stand balance to G for inc'd safety with standing purposeful tasks    *Increase stand tolerance x7 m for inc'd tolerance with standing purposeful tasks    *Bed mobility- (I) for inc'd independence to manage own comfort and initiate EOB & OOB purposeful tasks    *Participate in 10-15m UE therex to increase overall stamina/activity tolerance for purposeful tasks      Mamadou Brizuela MS, OTR/L

## 2023-11-13 NOTE — ASSESSMENT & PLAN NOTE
Patient with confusion and weakness per family  PT/OT evaluated the patient: Rehab Resource Intensity Level, PT: II (Moderate Resource Intensity)   Patient and family agreeable  CM to place referrals.

## 2023-11-13 NOTE — ASSESSMENT & PLAN NOTE
Lab Results   Component Value Date    HGBA1C 8.9 (H) 05/26/2023       Recent Labs     11/13/23  0955 11/13/23  1517 11/13/23  1559   POCGLU 166* 281* 228*       Blood Sugar Average: Last 72 hrs:  (P) 225    Patient's home regimen is insulin Glargine 7 units qam and 5 units qhs  Started Lantus 5 units q 12 hours on admission- blood sugars elevated today.  Will start Lantus 7 units qam tomorrow (11/15) and continue Lantus 5 units qhs  ISS  Monitor blood glucose trends and adjust insulin as needed

## 2023-11-14 PROBLEM — E43 SEVERE PROTEIN-CALORIE MALNUTRITION (HCC): Status: ACTIVE | Noted: 2023-11-14

## 2023-11-14 LAB
ALBUMIN SERPL BCP-MCNC: 4 G/DL (ref 3.5–5)
ALP SERPL-CCNC: 65 U/L (ref 34–104)
ALT SERPL W P-5'-P-CCNC: 15 U/L (ref 7–52)
AMMONIA PLAS-SCNC: 25 UMOL/L (ref 18–72)
ANION GAP SERPL CALCULATED.3IONS-SCNC: 13 MMOL/L
AST SERPL W P-5'-P-CCNC: 24 U/L (ref 13–39)
ATRIAL RATE: 73 BPM
BILIRUB SERPL-MCNC: 0.83 MG/DL (ref 0.2–1)
BUN SERPL-MCNC: 13 MG/DL (ref 5–25)
CALCIUM SERPL-MCNC: 8.7 MG/DL (ref 8.4–10.2)
CHLORIDE SERPL-SCNC: 98 MMOL/L (ref 96–108)
CO2 SERPL-SCNC: 25 MMOL/L (ref 21–32)
CREAT SERPL-MCNC: 0.68 MG/DL (ref 0.6–1.3)
ERYTHROCYTE [DISTWIDTH] IN BLOOD BY AUTOMATED COUNT: 12.7 % (ref 11.6–15.1)
GFR SERPL CREATININE-BSD FRML MDRD: 81 ML/MIN/1.73SQ M
GLUCOSE SERPL-MCNC: 190 MG/DL (ref 65–140)
GLUCOSE SERPL-MCNC: 211 MG/DL (ref 65–140)
GLUCOSE SERPL-MCNC: 299 MG/DL (ref 65–140)
GLUCOSE SERPL-MCNC: 62 MG/DL (ref 65–140)
GLUCOSE SERPL-MCNC: 94 MG/DL (ref 65–140)
HCT VFR BLD AUTO: 42.1 % (ref 34.8–46.1)
HGB BLD-MCNC: 13.5 G/DL (ref 11.5–15.4)
MAGNESIUM SERPL-MCNC: 2 MG/DL (ref 1.9–2.7)
MCH RBC QN AUTO: 32.5 PG (ref 26.8–34.3)
MCHC RBC AUTO-ENTMCNC: 32.1 G/DL (ref 31.4–37.4)
MCV RBC AUTO: 101 FL (ref 82–98)
P AXIS: 60 DEGREES
PLATELET # BLD AUTO: 261 THOUSANDS/UL (ref 149–390)
PMV BLD AUTO: 10.7 FL (ref 8.9–12.7)
POTASSIUM SERPL-SCNC: 3.4 MMOL/L (ref 3.5–5.3)
PR INTERVAL: 150 MS
PROCALCITONIN SERPL-MCNC: 0.05 NG/ML
PROT SERPL-MCNC: 6.6 G/DL (ref 6.4–8.4)
QRS AXIS: 45 DEGREES
QRSD INTERVAL: 78 MS
QT INTERVAL: 386 MS
QTC INTERVAL: 425 MS
RBC # BLD AUTO: 4.16 MILLION/UL (ref 3.81–5.12)
SODIUM SERPL-SCNC: 136 MMOL/L (ref 135–147)
T WAVE AXIS: 55 DEGREES
VENTRICULAR RATE: 73 BPM
WBC # BLD AUTO: 9.29 THOUSAND/UL (ref 4.31–10.16)

## 2023-11-14 PROCEDURE — 84145 PROCALCITONIN (PCT): CPT | Performed by: PHYSICIAN ASSISTANT

## 2023-11-14 PROCEDURE — 85027 COMPLETE CBC AUTOMATED: CPT | Performed by: PHYSICIAN ASSISTANT

## 2023-11-14 PROCEDURE — 93010 ELECTROCARDIOGRAM REPORT: CPT | Performed by: INTERNAL MEDICINE

## 2023-11-14 PROCEDURE — 99232 SBSQ HOSP IP/OBS MODERATE 35: CPT | Performed by: PHYSICIAN ASSISTANT

## 2023-11-14 PROCEDURE — G0426 INPT/ED TELECONSULT50: HCPCS | Performed by: PSYCHIATRY & NEUROLOGY

## 2023-11-14 PROCEDURE — 83735 ASSAY OF MAGNESIUM: CPT | Performed by: PHYSICIAN ASSISTANT

## 2023-11-14 PROCEDURE — 84425 ASSAY OF VITAMIN B-1: CPT | Performed by: PHYSICIAN ASSISTANT

## 2023-11-14 PROCEDURE — 82948 REAGENT STRIP/BLOOD GLUCOSE: CPT

## 2023-11-14 PROCEDURE — 82140 ASSAY OF AMMONIA: CPT | Performed by: PHYSICIAN ASSISTANT

## 2023-11-14 PROCEDURE — 80053 COMPREHEN METABOLIC PANEL: CPT | Performed by: PHYSICIAN ASSISTANT

## 2023-11-14 RX ORDER — OLANZAPINE 5 MG/1
2.5 TABLET, ORALLY DISINTEGRATING ORAL ONCE
Status: DISCONTINUED | OUTPATIENT
Start: 2023-11-14 | End: 2023-11-14

## 2023-11-14 RX ORDER — POTASSIUM CHLORIDE 20 MEQ/1
40 TABLET, EXTENDED RELEASE ORAL ONCE
Status: COMPLETED | OUTPATIENT
Start: 2023-11-14 | End: 2023-11-14

## 2023-11-14 RX ORDER — QUETIAPINE FUMARATE 25 MG/1
25 TABLET, FILM COATED ORAL
Status: DISCONTINUED | OUTPATIENT
Start: 2023-11-14 | End: 2023-11-16 | Stop reason: HOSPADM

## 2023-11-14 RX ORDER — OLANZAPINE 10 MG/2ML
2.5 INJECTION, POWDER, FOR SOLUTION INTRAMUSCULAR ONCE
Status: COMPLETED | OUTPATIENT
Start: 2023-11-14 | End: 2023-11-14

## 2023-11-14 RX ORDER — INSULIN GLARGINE 100 [IU]/ML
7 INJECTION, SOLUTION SUBCUTANEOUS EVERY MORNING
Status: DISCONTINUED | OUTPATIENT
Start: 2023-11-15 | End: 2023-11-16 | Stop reason: HOSPADM

## 2023-11-14 RX ORDER — INSULIN GLARGINE 100 [IU]/ML
5 INJECTION, SOLUTION SUBCUTANEOUS
Status: DISCONTINUED | OUTPATIENT
Start: 2023-11-14 | End: 2023-11-16 | Stop reason: HOSPADM

## 2023-11-14 RX ORDER — LANOLIN ALCOHOL/MO/W.PET/CERES
6 CREAM (GRAM) TOPICAL
Status: DISCONTINUED | OUTPATIENT
Start: 2023-11-14 | End: 2023-11-16 | Stop reason: HOSPADM

## 2023-11-14 RX ADMIN — INSULIN GLARGINE 5 UNITS: 100 INJECTION, SOLUTION SUBCUTANEOUS at 22:12

## 2023-11-14 RX ADMIN — INSULIN GLARGINE 5 UNITS: 100 INJECTION, SOLUTION SUBCUTANEOUS at 08:08

## 2023-11-14 RX ADMIN — INSULIN LISPRO 1 UNITS: 100 INJECTION, SOLUTION INTRAVENOUS; SUBCUTANEOUS at 08:07

## 2023-11-14 RX ADMIN — POTASSIUM CHLORIDE 40 MEQ: 1500 TABLET, EXTENDED RELEASE ORAL at 08:07

## 2023-11-14 RX ADMIN — B-COMPLEX W/ C & FOLIC ACID TAB 1 TABLET: TAB at 08:07

## 2023-11-14 RX ADMIN — Medication 6 MG: at 22:18

## 2023-11-14 RX ADMIN — CYANOCOBALAMIN TAB 500 MCG 1000 MCG: 500 TAB at 08:07

## 2023-11-14 RX ADMIN — OLANZAPINE 2.5 MG: 10 INJECTION, POWDER, FOR SOLUTION INTRAMUSCULAR at 03:00

## 2023-11-14 RX ADMIN — OXYCODONE HYDROCHLORIDE AND ACETAMINOPHEN 500 MG: 500 TABLET ORAL at 08:07

## 2023-11-14 RX ADMIN — OXYCODONE HYDROCHLORIDE AND ACETAMINOPHEN 500 MG: 500 TABLET ORAL at 17:29

## 2023-11-14 RX ADMIN — ENOXAPARIN SODIUM 40 MG: 40 INJECTION SUBCUTANEOUS at 08:07

## 2023-11-14 RX ADMIN — OLANZAPINE 2.5 MG: 10 INJECTION, POWDER, FOR SOLUTION INTRAMUSCULAR at 14:20

## 2023-11-14 RX ADMIN — INSULIN LISPRO 2 UNITS: 100 INJECTION, SOLUTION INTRAVENOUS; SUBCUTANEOUS at 22:13

## 2023-11-14 RX ADMIN — LEVOTHYROXINE SODIUM 112 MCG: 112 TABLET ORAL at 05:24

## 2023-11-14 RX ADMIN — QUETIAPINE FUMARATE 25 MG: 25 TABLET ORAL at 22:18

## 2023-11-14 NOTE — CASE MANAGEMENT
Case Management Assessment & Discharge Planning Note    Patient name Osmar Baltazar  Location /-14 MRN [id number]  : 1940 Date 2023       Current Admission Date: 2023  Current Admission Diagnosis:Type 1 diabetes mellitus with hypoglycemia [facility])   Patient Active Problem List    Diagnosis Date Noted    Debility 2023    Macrocytosis 2023    Low blood pressure 2023    Hypomagnesemia 2023    Hypoglycemia 2023    Hypothermia 2023    Hypokalemia 2023    History of Hodgkin's lymphoma 2023    Hypothyroidism     Acute metabolic encephalopathy 61/15/8296    Gastroesophageal reflux disease without esophagitis 10/17/2022    Abnormal CT of the abdomen 07/10/2022    Soft tissue radionecrosis 2022    Osteoradionecrosis of temporal bone  2022    Primary osteoarthritis of right shoulder 2021    Primary osteoarthritis of both knees 2020    Postoperative hypothyroidism 2015    Hyperlipidemia 2014    History of hypertension 10/10/2013    Type 1 diabetes mellitus with hypoglycemia ([address]) 2008      LOS (days): 0  Geometric Mean LOS (GMLOS) (days): 2.60  Days to GMLOS:2.4     OBJECTIVE:    Risk of Unplanned Readmission Score: 20.16         Current admission status: Inpatient  Referral Reason: Other (d/c planning)    Preferred Pharmacy:   [address], [address] [place] [id number]  Phone: [phone] Fax: [phone]    Primary Care Provider: Jaren Burton MD    Primary Insurance: MEDICARE  Secondary Insurance: AARP    ASSESSMENT:  Active Health Care Proxies       [facility] Representative - Daughter   Primary Phone: [phone] (Mobile)  Home Phone: [phone]                 Advance Directives  Does patient have a [address]?: Yes  Does patient have Advance Directives?: Yes         Readmission Root Cause  30 Day Readmission: No    Patient Information  Admitted from[de-identified] Home  Mental Status: Alert  During Assessment patient was accompanied by: Other-Comment (son in-law Fannie Auguste)  Assessment information provided by[de-identified] Daughter  Primary Caregiver: Family  Caregiver's Name[de-identified] José Miguel Reagan Relationship to Patient[de-identified] Family Member (daughter)  Caregiver's Telephone Number[de-identified] [phone]  Support Systems: Son, Daughter  [place] [facility] do you live in?: [address] [facility] entry access options.  Select all that apply.: Stairs  Number of steps to enter home.: 2  Do the steps have railings?: Yes  Type of Current Residence: [place]  In the last 12 months, was there a time when you were not able to pay the mortgage or rent on time?: No  In the last 12 months, how many places have you lived?: 1  In the last 12 months, was there a time when you did not have a steady place to sleep or slept in a shelter (including now)?: No  Homeless/housing insecurity resource given?: N/A  Living Arrangements: Lives Alone  Is patient a ?: No    Activities of Daily Living Prior to Admission  Functional Status: Assistance (shopping, cleaning , MOM's meals, laundry-)  Completes ADLs independently?: Yes  Ambulates independently?: Yes  Does patient use assisted devices?: Yes  Assisted Devices (DME) used: Walker, Rollator  Does patient currently own DME?: Yes  What DME does the patient currently own?: Straight Drema Mungo, Walker, Rollator, Shower Chair, Bedside Commode, Other (Comment) (bp cuf, glucometer,transport chair)  Does patient have a history of Outpatient Therapy (PT/OT)?: No  Does the patient have a history of Short-Term Rehab?: Yes ([facility])  Does patient have a history of HHC?: Yes ([address])  Does patient currently have [facility]?: Yes ([address])    Current [address] [facility]  Type of Current Home Care Services: Nurse visit  [address][de-identified] Other (please enter name in comment) [place]andes)  [address] Follow-Up Provider[de-identified] PCP    Patient Information Continued  Income Source: Pension/[employer]  Does patient have prescription coverage?: Yes ([facility])  Within the past 12 months, you worried that your food would run out before you got the money to buy more.: Never true  Within the past 12 months, the food you bought just didn't last and you didn't have money to get more.: Never true  Food insecurity resource given?: N/A  Does patient have a history of substance abuse?: No  Does patient have a history of Mental Health Diagnosis?: No         Means of Transportation  Means of Transport to Appts[de-identified] Family transport  In the past 12 months, has lack of transportation kept you from medical appointments or from getting medications?: No  In the past 12 months, has lack of transportation kept you from meetings, work, or from getting things needed for daily living?: No  Was application for public transport provided?: N/A        DISCHARGE DETAILS:    Discharge planning discussed with[de-identified] patient & son in-law  Chucho Dillon at the bedside-  daughter was called at 13:07pm  Freedom of Choice: Yes  Comments - Freedom of Choice: recommendation is for rehab-  permission was given to sned referrals via [employer] - [facility] - referral sent to them  CM contacted family/caregiver?: Yes             Contacts  Patient Contacts: Alyssa Foley dtr  Relationship to Patient[de-identified] Family (daughter)  Contact Method: Phone  Phone Number: [phone]  Reason/Outcome: Discharge [address]         Is the patient interested in [address] [facility] at discharge? :  (pt is active with [address] [place] referral was sent)         Other Referral/Resources/Interventions Provided:  Interventions: Short Term Rehab  Referral Comments: referrals sent for snf rehab via jodi-  cm received a consult to get [facility] pt placed from the ED- pt was changed to inpt status dx encphalopathy    Would you like to participate in our [address] [employer] service program?  : No - Declined    Treatment Team Recommendation: Short Term Rehab (referrals sent via jodi- tbd)                                            Additional Comments: pt was in ed for abnomal gate- cm spoke to the daughter and she stated her mother is not her normal self- she is concerned there is something wrong- cm made the doctor aware of tehis information- cm received the pt's ma informations and dwon loaded in aide for the snf's to see- pt was changed to inpt status for incephalopathy,

## 2023-11-14 NOTE — PROGRESS NOTES
[address]  Progress Note  Name: Shobha Guan I  MRN: [id number]  Unit/Bed#: -24 I Date of Admission: 11/13/2023   Date of Service: 11/14/2023 I Hospital Day: 1    Assessment/Plan   * Acute metabolic encephalopathy  Assessment & Plan  Patient with worsening confusion x 2-3 days per family. CT head: negative for acute findings  UA not suggestive of UTI  Patient without leukocytosis and afebrile   Patient admitted with encephalopathy previously due to hypoglycemia or hypotension. Patient's vitals and blood sugars have been stable. TSH: 3.173, LFT's WNL, B12 488, and ammonia level 25  B1 pending  Patient reports daughter tells her she gets confused when taking tramadol- per PDMP the patient last filled her tramadol was on 9/6/23- unsure if she still has this at home. Nursing staff reports patient asking for tramadol today (11/14)  Continue to hold her gabapentin   May be cognitive decline  Patient did require restraints for a short period of time last night. She was given Zyprexa 2.5 mg IM x 2 doses. This morning she does appear confused  Neurology consulted    [address] [place] [address]  Patient with confusion and weakness per family  PT/OT evaluated the patient: Rehab Resource Intensity Level, PT: II (Moderate Resource Intensity)   Patient and family agreeable  CM to place referrals. Hypomagnesemia  Assessment & Plan  Magnesium of 1.6 on admission now 2.0  Continue to monitor and replete as needed    Type 1 diabetes mellitus with hypoglycemia [facility])  Assessment & Plan  Lab Results   Component Value Date    HGBA1C 8.9 (H) 05/26/2023       Recent Labs     11/13/23  0955 11/13/23  1517 11/13/23  1559   POCGLU 166* 281* 228*       Blood Sugar Average: Last 72 hrs:  (P) 225    Patient's home regimen is insulin Glargine 7 units qam and 5 units qhs  Started Lantus 5 units q 12 hours on admission- blood sugars elevated today.  Will start Lantus 7 units qam tomorrow (11/15) and continue Lantus 5 units qhs  ISS  Monitor blood glucose trends and adjust insulin as needed      Hypothyroidism  Assessment & Plan  TSH 3.173  Continue home regimen of levothyroxine          VTE Pharmacologic Prophylaxis: VTE Score: 3 Moderate Risk (Score 3-4) - Pharmacological DVT Prophylaxis Ordered: enoxaparin (Lovenox). Mobility:   Basic Mobility Inpatient Raw Score: 17  JH-HLM Goal: 5: Stand one or more mins  JH-HLM Achieved: 7: Walk 25 feet or more  HLM Goal NOT achieved. Continue with multidisciplinary rounding and encourage appropriate mobility to improve upon [facility] goals. Patient Centered Rounds: I performed bedside rounds with nursing staff today. Discussions with Specialists or Other Care Team Provider: nursing, CM    Education and Discussions with Family / Patient: Updated  (daughter) via phone. Total Time Spent on Date of Encounter in care of patient: 25 mins. This time was spent on one or more of the following: performing physical exam; counseling and coordination of care; obtaining or reviewing history; documenting in the medical record; reviewing/ordering tests, medications or procedures; communicating with other healthcare professionals and discussing with patient's family/caregivers. Current Length of Stay: 1 day(s)  Current Patient Status: Inpatient   Certification Statement: The patient will continue to require additional inpatient hospital stay due to continued workup of confusion with neuro consult, need for placement to STR  Discharge Plan: Anticipate discharge in 48-72 hrs to rehab facility. Code Status: Level 3 - DNAR and DNI    Subjective: The patient was seen and examined. The patient is sitting up in bed eating her breakfast. She states she's in a diner eating, when told she was in the hospital she stated her daughter brought her here. I mentioned it was her son-in-law as he was concerned about her.  She stated that's what her daughter always says when they bring her in. She also stated she saw the cabinet in her room "spread it's wings". Objective:     Vitals:   Temp (24hrs), Av.9 °F (36.6 °C), Min:97.7 °F (36.5 °C), Max:98.2 °F (36.8 °C)    Temp:  [97.7 °F (36.5 °C)-98.2 °F (36.8 °C)] 97.7 °F (36.5 °C)  HR:  [] 112  Resp:  [18-20] 18  BP: ()/(53-93) 133/88  SpO2:  [94 %-99 %] 97 %  Body mass index is 19.79 kg/m². Input and Output Summary (last 24 hours):   No intake or output data in the 24 hours ending 23 1033    Physical Exam:   Physical Exam     Additional Data:     Labs:  Results from last 7 days   Lab Units 23  0524 23  0952   WBC Thousand/uL 9.29 8.78   HEMOGLOBIN g/dL 13.5 12.6   HEMATOCRIT % 42.1 38.9   PLATELETS Thousands/uL 261 225   NEUTROS PCT %  --  72   LYMPHS PCT %  --  15   MONOS PCT %  --  10   EOS PCT %  --  1     Results from last 7 days   Lab Units 23  0524   SODIUM mmol/L 136   POTASSIUM mmol/L 3.4*   CHLORIDE mmol/L 98   CO2 mmol/L 25   BUN mg/dL 13   CREATININE mg/dL 0.68   ANION GAP mmol/L 13   CALCIUM mg/dL 8.7   ALBUMIN g/dL 4.0   TOTAL BILIRUBIN mg/dL 0.83   ALK PHOS U/L 65   ALT U/L 15   AST U/L 24   GLUCOSE RANDOM mg/dL 211*         Results from last 7 days   Lab Units 23  2053 23  1559 23  1517 23  0955   POC GLUCOSE mg/dl 146* 228* 281* 166*               Lines/Drains:  Invasive Devices       Peripheral Intravenous Line  Duration             Peripheral IV 23 Right;Ventral (anterior) Forearm 1 day                          Imaging: No pertinent imaging reviewed.     Recent Cultures (last 7 days):         Last 24 Hours Medication List:   Current Facility-Administered Medications   Medication Dose Route Frequency Provider Last Rate    acetaminophen  650 mg Oral Q4H PRN Aleta Dowdy, PA-C      vitamin C  500 mg Oral BID Aleta Thibodeaux PA-C      cyanocobalamin  1,000 mcg Oral Daily Osman Alexander PA-C      enoxaparin  40 mg Subcutaneous Daily Aleta Thibodeaux PA-C hydrALAZINE  10 mg Intravenous Q6H PRN Julia Jarrett PA-C      insulin glargine  5 Units Subcutaneous HS Nomi Lopez PA-C      [START ON 11/15/2023] insulin glargine  7 Units Subcutaneous QAM Osman Alexander PA-C      insulin lispro  1-5 Units Subcutaneous TID AC Osman Alexander PA-C      insulin lispro  1-5 Units Subcutaneous HS Nomi Lopez PA-C      levothyroxine  112 mcg Oral QAM Osman Alexander PA-C      melatonin  6 mg Oral HS Julia Jarrett PA-C      multivitamin stress formula  1 tablet Oral Daily Osman Alexander PA-C      ondansetron  4 mg Intravenous Q6H PRN Nomi Lopez PA-C          Today, Patient Was Seen By: Nomi Lopez PA-C    **Please Note: This note may have been constructed using a voice recognition system. **

## 2023-11-14 NOTE — CASE MANAGEMENT
Case Management Discharge Planning Note    Patient name Vee Choudhary  Location /-11 MRN [id number]  : 1940 Date 2023       Current Admission Date: 2023  Current Admission Diagnosis:Acute metabolic encephalopathy   Patient Active Problem List    Diagnosis Date Noted    Severe protein-calorie malnutrition ([address]) 2023    Debility 2023    Macrocytosis 2023    Low blood pressure 2023    Hypomagnesemia 2023    Hypoglycemia 2023    Hypothermia 2023    Hypokalemia 2023    History of Hodgkin's lymphoma 2023    Hypothyroidism     Acute metabolic encephalopathy     Gastroesophageal reflux disease without esophagitis 10/17/2022    Abnormal CT of the abdomen 07/10/2022    Soft tissue radionecrosis 2022    Osteoradionecrosis of temporal bone  2022    Primary osteoarthritis of right shoulder 2021    Primary osteoarthritis of both knees 2020    Postoperative hypothyroidism 2015    Hyperlipidemia 2014    History of hypertension 10/10/2013    Type 1 diabetes mellitus with hypoglycemia ([address]) 2008      LOS (days): 1  Geometric Mean LOS (GMLOS) (days): 2.60  Days to GMLOS:1.6     OBJECTIVE:  Risk of Unplanned Readmission Score: 28.22     Current admission status: Inpatient   Preferred Pharmacy:   [id number], [address] [place] [id number]  Phone: [phone] Fax: [phone]    Primary Care Provider: Merced Howard MD    Primary Insurance: MEDICARE  Secondary Insurance: AARP    DISCHARGE DETAILS:  Provided the choice list to the pt of accepting facilities. TC to pt daughter Maribell Hong at [phone]. Discussed the accepting facilities with the daughter. Daughter states she will look up the facilities and get back to .

## 2023-11-14 NOTE — TELEMEDICINE
Kike Geller  TeleConsultation - Neurology   Nino Triplett 80 y.o. female MRN: [id number]  Unit/Bed#: -01 Encounter: [id number]        REQUIRED DOCUMENTATION:     1. This service was provided via Telemedicine. 2. Provider located at [address] [place] [address]. 3. TeleMed provider: Alondra Samuel MD.  4. Identify all parties in room with patient during tele consult:  Pt only  5. Patient was then informed that this was a Telemedicine visit and that the exam was being conducted confidentially over secure lines. My office door was closed. No one else was in the room. Patient acknowledged consent and understanding of privacy and security of the Telemedicine visit, and gave us permission to have the assistant stay in the room in order to assist with the history and to conduct the exam.  I informed the patient that I have reviewed their record in Epic and presented the opportunity for them to ask any questions regarding the visit today. The patient agreed to participate. Assessment/Plan     Delirium in the setting of acute toxic/metabolic encephalopathy in the setting of polypharmacy/sleep wake cycle disturbance superimposed on likely reduced cognitive reserve in the setting of mci vs usually well compensated dementia process. She appears very delusional. Answering some questions and following some prompts initially but later refusing. Nothing seizure like noted on exam.    Although not reported in the ct head read, she has what appears to be generalized atrophy disproportionate to age. Also has vascular risk factors. I have a stronger suspicion for an undiagnosed dementia process, at least vascular component, perhaps mixed type. Currently she is having visual and auditory hallucinations. She is somewhat interactive, cut off the exam getting annoyed says I'm wasting time and not helping her and two people in the room who aren't really there saying that the wall collapsed and they need to be rescued.  Lower suspicion for an acute cns inflammatory/infectious process. B12, tsh reviewed, no obvious abnormalities. Continue evaluating for toxic/metabolic derangements  Check qtc interval. Consider seroquel 12.5 mg po bid  Outpt neurocognitive assessment with [facility] neuropsychology or geriatrics then follow up with outpt general neurology team in 4-6 weeks, attending preferred. History of Present Illness     Reason for Consult / Principal Problem: encephalopathy    HPI: Honorio Caballero is a 80 y.o.  female who presents with ams over the past 3 days. Strong suspicion she was taking increased tramadol however not clear. Pt had a fall at home per son in law as documented in [facility] H[facility]P however she denied this. She refused coming to the ER for a couple days and family was ableto bring her in as they began to become concerned for a UTI as she's been confused in the past with UTIs. Here per workup no evidence of UTI or any other infectious process. CT head with no hemorrhage or developing infarct. She has received prn zyprexa doses here.   Inpatient consult to Neurology  Consult performed by: Ronald Gomez MD  Consult ordered by: Ronald Elmore PA-C           Review of Systems  Per 12 point review cannot obtain due to ams    Historical Information   Past Medical History:   Diagnosis Date    Ambulates with cane     Cancer ([address])     Chronic pain disorder     knees/ shoulders (gets inj every 3 mos)    Closed intertrochanteric fracture of right femur ([address]) 5/26/2020    Controlled type 2 diabetes mellitus with diabetic polyneuropathy, with long-term current use of insulin ([address]) 5/21/2008    Diabetes mellitus ([address])     Diabetic polyneuropathy ([address]) 5/21/2008    ICD10 clean up    Disease of thyroid gland     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    HL (hearing loss)     Hodgkin's disease ([address]) 2008    Left neck, had radiation    Hypertension     Neuropathy     Osteoporosis     RA (rheumatoid arthritis) ([address])     Traumatic rhabdomyolysis ([address] [facility]) 10/17/2022     Past Surgical History:   Procedure Laterality Date    ADENOIDECTOMY      APPENDECTOMY      CATARACT EXTRACTION      CATARACT EXTRACTION, BILATERAL      CHOLECYSTECTOMY      FRACTURE SURGERY Right     hip    MOUTH SURGERY      oral cancer left lower lip    OVARY SURGERY      RI ADJT TIS TRNS/REARGMT F/C/C/M/N/A/G/H/F 10SQCM/< N/A 3/28/2022    Procedure: Adjacent tissue transfer face;  Surgeon: Diane Norman MD;  Location: [facility];  Service: ENT    RI OPTX FEM SHFT FX W/INSJ IMED IMPLT W/WO SCREW Right 2020    Procedure: INSERTION NAIL IM FEMUR ANTEGRADE (TROCHANTERIC); Surgeon: Goldie Montiel DO;  Location: [facility] MAIN OR;  Service: Orthopedics    RI TRANSMASTOID ANTROTOMY Left 3/28/2022    Procedure: MASTOIDECTOMY;  Surgeon: Diane Norman MD;  Location: [facility];  Service: ENT    TONSILLECTOMY      TOTAL THYROIDECTOMY  2008    Performed after left neck dissection and left oral cancer diagnosis     Social History   Social History     Substance and Sexual Activity   Alcohol Use Not Currently    Alcohol/week: 0.0 standard drinks of alcohol     Social History     Substance and Sexual Activity   Drug Use Never     E-Cigarette/Vaping    E-Cigarette Use Never User      E-Cigarette/Vaping Substances    Nicotine No     THC No     CBD No     Flavoring No     Other No     Unknown No      Social History     Tobacco Use   Smoking Status Never   Smokeless Tobacco Never     Family History: non-contributory      Meds/Allergies   all current active meds have been reviewed    No Known Allergies    Objective   Vitals:Blood pressure 133/88, pulse (!) 112, temperature 97.7 °F (36.5 °C), resp. rate 18, height 5' 1" (1.549 m), weight 47.5 kg (104 lb 11.5 oz), SpO2 97 %. ,Body mass index is 19.79 kg/m². Physical Exam  General: no visible acute distress. Pt is laying crooked in bed. HEENT: No visible trauma    Neurologic Exam  MS: Alert and oriented to self, .  She states she lives by herself. She she says she has a son and daughter and mentioned son in law and was giving the same name to me for son and son in law. She states she's in the laundry room with two other ladies and the wall collapsed on them. She states that she doesn't have her phone and one women has it with her but didn't call the ambulance when she told her two and says she doesn't speak [address]. She states no one is hurt but the hallway is blocked. She was explaining this to me and asking me to get someone to rescue them. She turned her head to the right and then to the left and then said that they both are telling her they do speak [address]. 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  She followed some commands early on but later in evaluation said "I'm not going to answer any more questions or do what you're asking because you aren't having us rescued."  CN: no obvious deficits. Visual fields not tested. Motor: no involuntary movements at rest/activation. Moving ue's antigravity, le's alongside bed. When prompting her to lift all extremities antigravity she says I am wasting time and need to get help to rescue "them". Sensory:  cannot assess. Coordination:not able to perform. States I'm wasting time and need to rescue "them". Lab Results: I have personally reviewed pertinent reports.     Imaging Studies: I have personally reviewed pertinent films in PACS  EKG, Pathology, and Other Studies: I have personally reviewed pertinent films in PACS

## 2023-11-14 NOTE — NUTRITION
11/14/23 1038   Current PO Intake   Current Diet Order CCD2 diet, thin liquids   Estimated calorie intake compared to estimated need Anticipate nutrient needs are not met at this time. Recommendations/Interventions   360 Statement Malnutrition related to chronic illness as evidenced by moderate temporal muscle loss, severe clavicle muscle loss, > 10% body weight loss in 6 months. To treat with oral diet and nutrition supplements as tolerated. Summary Muscle/adipose loss. Presents with increased confusion per family report. Past medical history significant for type 1 diabetes, hypothyroidism, debility, Hodgkin's lymphoma, GERD. Weight history reviewed. Noted significant 17# weight loss in 6 months (14% body weight). No edema. No pressure areas. Prescribed a CCD2 diet, thin liquids. Unknown meal intakes at this time - will monitor. Met with pt at bedside. AMS noted, redirectable. She reports having 3 meals daily. Family members cook and grocery shop. Receives [employer]. Reports chewing difficulty however she reports ability to choose soft foods and cut food into safe bite-sized pieces. Noted moderate temporal muscle loss, severe clavicle muscle loss. Malnutrition criteria met. Will add Glucerna once daily - assess tolerance at follow up. RD to follow. Interventions/Recommendations Continue current diet order;Supplement initiate;Monitor I & O's   Education Assessment   Education Education not indicated at this time;Patient/caregiver not appropriate for education at this time   Patient Nutrition Goals   Goal Increase kcal/PRO intake; Avoid weight loss

## 2023-11-14 NOTE — ED ATTENDING ATTESTATION
11/13/2023  I, [address] [facility], , saw and evaluated the patient. I have discussed the patient with the resident/non-physician practitioner and agree with the resident's/non-physician practitioner's findings, Plan of Care, and MDM as documented in the resident's/non-physician practitioner's note, except where noted. All available labs and Radiology studies were reviewed. I was present for key portions of any procedure(s) performed by the resident/non-physician practitioner and I was immediately available to provide assistance. At this point I agree with the current assessment done in the Emergency Department. I have conducted an independent evaluation of this patient a history and physical is as follows:    ED Course  ED Course as of 11/13/23 2002 Mon Nov 13, 2023   1132 Seen and evaluated with the physician assistant, family concerned about generalized weakness, some mild confusion, work-up is negative currently, no evidence of urinary tract infection, magnesium level 1.6 which would be replaced, clinically no indication for admission to the hospital, patient lives alone with her son lives across the street and another son lives within 5 miles of her residence. Patient is adamantly refusing to be evaluated by  or placed in a supervised medical facility. Daughter is working with a state representative about having visiting nurses aide come in that has not happened yet they have had significant challenges in this happen, we will consult case management. 1227 Patient seen and evaluated by physical therapy occupational therapy, patient is not safe to go home, to gait dysfunction, will contact case management.          Critical Care Time  Procedures

## 2023-11-14 NOTE — ASSESSMENT & PLAN NOTE
Malnutrition Findings:   Adult Malnutrition type: Chronic illness  Adult Degree of Malnutrition: Other severe protein calorie malnutrition  Malnutrition Characteristics: Muscle loss, Weight loss         Appreciate input of nutrition  Continue supplements  Monitor appetite's  Daily weight         360 Statement: Malnutrition related to chronic illness as evidenced by moderate temporal muscle loss, severe clavicle muscle loss, > 10% bodyweight loss in 6 months. TO treat with oral diet and nutrition supplements as tolerated. BMI Findings: Body mass index is 19.79 kg/m².

## 2023-11-14 NOTE — PLAN OF CARE
Problem: Potential for Falls  Goal: Patient will remain free of falls  Description: INTERVENTIONS:  - Educate patient/family on patient safety including physical limitations  - Instruct patient to call for assistance with activity   - Consult OT/PT to assist with strengthening/mobility   - Keep Call bell within reach  - Keep bed low and locked with side rails adjusted as appropriate  - Keep care items and personal belongings within reach  - Initiate and maintain comfort rounds  - Make Fall Risk Sign visible to staff  - Offer Toileting every 2 Hours, in advance of need  - Initiate/Maintain bed alarm  - Apply yellow socks and bracelet for high fall risk patients  - Consider moving patient to room near nurses station  Outcome: Progressing     Problem: MOBILITY - ADULT  Goal: Maintain or return to baseline ADL function  Description: INTERVENTIONS:  -  Assess patient's ability to carry out ADLs; assess patient's baseline for ADL function and identify physical deficits which impact ability to perform ADLs (bathing, care of mouth/teeth, toileting, grooming, dressing, etc.)  - Assess/evaluate cause of self-care deficits   - Assess range of motion  - Assess patient's mobility; develop plan if impaired  - Assess patient's need for assistive devices and provide as appropriate  - Encourage maximum independence but intervene and supervise when necessary  - Involve family in performance of ADLs  - Assess for home care needs following discharge   - Consider OT consult to assist with ADL evaluation and planning for discharge  - Provide patient education as appropriate  Outcome: Progressing  Goal: Maintains/Returns to pre admission functional level  Description: INTERVENTIONS:  - Perform BMAT or MOVE assessment daily.   - Set and communicate daily mobility goal to care team and patient/family/caregiver.    - Collaborate with rehabilitation services on mobility goals if consulted  - Out of bed for toileting  - Record patient progress and toleration of activity level   Outcome: Progressing     Problem: PAIN - ADULT  Goal: Verbalizes/displays adequate comfort level or baseline comfort level  Description: Interventions:  - Encourage patient to monitor pain and request assistance  - Assess pain using appropriate pain scale  - Administer analgesics based on type and severity of pain and evaluate response  - Implement non-pharmacological measures as appropriate and evaluate response  - Consider cultural and social influences on pain and pain management  - Notify physician/advanced practitioner if interventions unsuccessful or patient reports new pain  Outcome: Progressing     Problem: INFECTION - ADULT  Goal: Absence or prevention of progression during hospitalization  Description: INTERVENTIONS:  - Assess and monitor for signs and symptoms of infection  - Monitor lab/diagnostic results  - Monitor all insertion sites, i.e. indwelling lines, tubes, and drains  - Monitor endotracheal if appropriate and nasal secretions for changes in amount and color  - [place] appropriate cooling/warming therapies per order  - Administer medications as ordered  - Instruct and encourage patient and family to use good hand hygiene technique  - Identify and instruct in appropriate isolation precautions for identified infection/condition  Outcome: Progressing  Goal: Absence of fever/infection during neutropenic period  Description: INTERVENTIONS:  - Monitor WBC    Outcome: Progressing     Problem: SAFETY ADULT  Goal: Patient will remain free of falls  Description: INTERVENTIONS:  - Educate patient/family on patient safety including physical limitations  - Instruct patient to call for assistance with activity   - Consult OT/PT to assist with strengthening/mobility   - Keep Call bell within reach  - Keep bed low and locked with side rails adjusted as appropriate  - Keep care items and personal belongings within reach  - Initiate and maintain comfort rounds  - Make Fall Risk Sign visible to staff  - Offer Toileting every 2 Hours, in advance of need  - Initiate/Maintain bed alarm  - Apply yellow socks and bracelet for high fall risk patients  - Consider moving patient to room near nurses station  Outcome: Progressing  Goal: Maintain or return to baseline ADL function  Description: INTERVENTIONS:  -  Assess patient's ability to carry out ADLs; assess patient's baseline for ADL function and identify physical deficits which impact ability to perform ADLs (bathing, care of mouth/teeth, toileting, grooming, dressing, etc.)  - Assess/evaluate cause of self-care deficits   - Assess range of motion  - Assess patient's mobility; develop plan if impaired  - Assess patient's need for assistive devices and provide as appropriate  - Encourage maximum independence but intervene and supervise when necessary  - Involve family in performance of ADLs  - Assess for home care needs following discharge   - Consider OT consult to assist with ADL evaluation and planning for discharge  - Provide patient education as appropriate  Outcome: Progressing  Goal: Maintains/Returns to pre admission functional level  Description: INTERVENTIONS:  - Perform BMAT or MOVE assessment daily.   - Set and communicate daily mobility goal to care team and patient/family/caregiver.    - Collaborate with rehabilitation services on mobility goals if consulted  - Out of bed for toileting  - Record patient progress and toleration of activity level   Outcome: Progressing     Problem: DISCHARGE PLANNING  Goal: Discharge to home or other facility with appropriate resources  Description: INTERVENTIONS:  - Identify barriers to discharge w/patient and caregiver  - Arrange for needed discharge resources and transportation as appropriate  - Identify discharge learning needs (meds, wound care, etc.)  - Arrange for interpretive services to assist at discharge as needed  - Refer to Case Management Department for coordinating discharge planning if the patient needs post-hospital services based on physician/advanced practitioner order or complex needs related to functional status, cognitive ability, or social support system  Outcome: Progressing     Problem: Knowledge Deficit  Goal: Patient/family/caregiver demonstrates understanding of disease process, treatment plan, medications, and discharge instructions  Description: Complete learning assessment and assess knowledge base. Interventions:  - Provide teaching at level of understanding  - Provide teaching via preferred learning methods  Outcome: Progressing     Problem: SAFETY,RESTRAINT: NV/NON-SELF DESTRUCTIVE BEHAVIOR  Goal: Remains free of harm/injury (restraint for non violent/non self-detsructive behavior)  Description: INTERVENTIONS:  - Instruct patient/family regarding restraint use   - Assess and monitor physiologic and psychological status   - Provide interventions and comfort measures to meet assessed patient needs   - Identify and implement measures to help patient regain control  - Assess readiness for release of restraint   Outcome: Progressing  Goal: Returns to optimal restraint-free functioning  Description: INTERVENTIONS:  - Assess the patient's behavior and symptoms that indicate continued need for restraint  - Identify and implement measures to help patient regain control  - Assess readiness for release of restraint   Outcome: Progressing     Problem: Nutrition/Hydration-ADULT  Goal: Nutrient/Hydration intake appropriate for improving, restoring or maintaining nutritional needs  Description: Monitor and assess patient's nutrition/hydration status for malnutrition. Collaborate with interdisciplinary team and initiate plan and interventions as ordered. Monitor patient's weight and dietary intake as ordered or per policy. Utilize nutrition screening tool and intervene as necessary. Determine patient's food preferences and provide high-protein, high-caloric foods as appropriate. INTERVENTIONS:  - Monitor oral intake, urinary output, labs, and treatment plans  - Assess nutrition and hydration status and recommend course of action  - Evaluate amount of meals eaten  - Assist patient with eating if necessary   - Allow adequate time for meals  - Recommend/ encourage appropriate diets, oral nutritional supplements, and vitamin/mineral supplements  - Order, calculate, and assess calorie counts as needed  - Recommend, monitor, and adjust tube feedings and TPN/PPN based on assessed needs  - Assess need for intravenous fluids  - Provide specific nutrition/hydration education as appropriate  - Include patient/family/caregiver in decisions related to nutrition  Outcome: Progressing No

## 2023-11-14 NOTE — MALNUTRITION/BMI
This medical record reflects one or more clinical indicators suggestive of malnutrition and/or morbid obesity. Malnutrition Findings:   Adult Malnutrition type: Chronic illness  Adult Degree of Malnutrition: Other severe protein calorie malnutrition  Malnutrition Characteristics: Muscle loss, Weight loss    360 Statement: Malnutrition related to chronic illness as evidenced by moderate temporal muscle loss, severe clavicle muscle loss, > 10% body weight loss in 6 months. To treat with oral diet and nutrition supplements as tolerated. BMI Findings: Body mass index is 19.79 kg/m². See Nutrition note dated 11/14/2023 in flow sheets for additional details. Completed nutrition assessment is viewable in the nutrition documentation.

## 2023-11-15 ENCOUNTER — APPOINTMENT (INPATIENT)
Dept: MRI IMAGING | Facility: HOSPITAL | Age: 83
DRG: 091 | End: 2023-11-15
Payer: MEDICARE

## 2023-11-15 LAB
ANION GAP SERPL CALCULATED.3IONS-SCNC: 11 MMOL/L
BUN SERPL-MCNC: 25 MG/DL (ref 5–25)
CALCIUM SERPL-MCNC: 8.6 MG/DL (ref 8.4–10.2)
CHLORIDE SERPL-SCNC: 103 MMOL/L (ref 96–108)
CO2 SERPL-SCNC: 23 MMOL/L (ref 21–32)
CREAT SERPL-MCNC: 0.79 MG/DL (ref 0.6–1.3)
ERYTHROCYTE [DISTWIDTH] IN BLOOD BY AUTOMATED COUNT: 13.2 % (ref 11.6–15.1)
GFR SERPL CREATININE-BSD FRML MDRD: 69 ML/MIN/1.73SQ M
GLUCOSE SERPL-MCNC: 105 MG/DL (ref 65–140)
GLUCOSE SERPL-MCNC: 118 MG/DL (ref 65–140)
GLUCOSE SERPL-MCNC: 168 MG/DL (ref 65–140)
GLUCOSE SERPL-MCNC: 180 MG/DL (ref 65–140)
GLUCOSE SERPL-MCNC: 76 MG/DL (ref 65–140)
HCT VFR BLD AUTO: 39.7 % (ref 34.8–46.1)
HGB BLD-MCNC: 12.6 G/DL (ref 11.5–15.4)
MCH RBC QN AUTO: 33.2 PG (ref 26.8–34.3)
MCHC RBC AUTO-ENTMCNC: 31.7 G/DL (ref 31.4–37.4)
MCV RBC AUTO: 105 FL (ref 82–98)
PLATELET # BLD AUTO: 239 THOUSANDS/UL (ref 149–390)
PMV BLD AUTO: 10.3 FL (ref 8.9–12.7)
POTASSIUM SERPL-SCNC: 4.4 MMOL/L (ref 3.5–5.3)
RBC # BLD AUTO: 3.8 MILLION/UL (ref 3.81–5.12)
SODIUM SERPL-SCNC: 137 MMOL/L (ref 135–147)
WBC # BLD AUTO: 6.61 THOUSAND/UL (ref 4.31–10.16)

## 2023-11-15 PROCEDURE — 80048 BASIC METABOLIC PNL TOTAL CA: CPT | Performed by: PHYSICIAN ASSISTANT

## 2023-11-15 PROCEDURE — 82948 REAGENT STRIP/BLOOD GLUCOSE: CPT

## 2023-11-15 PROCEDURE — 99233 SBSQ HOSP IP/OBS HIGH 50: CPT

## 2023-11-15 PROCEDURE — 70551 MRI BRAIN STEM W/O DYE: CPT

## 2023-11-15 PROCEDURE — 85027 COMPLETE CBC AUTOMATED: CPT | Performed by: PHYSICIAN ASSISTANT

## 2023-11-15 RX ADMIN — LEVOTHYROXINE SODIUM 112 MCG: 112 TABLET ORAL at 06:23

## 2023-11-15 RX ADMIN — INSULIN GLARGINE 7 UNITS: 100 INJECTION, SOLUTION SUBCUTANEOUS at 10:34

## 2023-11-15 RX ADMIN — CYANOCOBALAMIN TAB 500 MCG 1000 MCG: 500 TAB at 15:24

## 2023-11-15 RX ADMIN — INSULIN LISPRO 1 UNITS: 100 INJECTION, SOLUTION INTRAVENOUS; SUBCUTANEOUS at 09:50

## 2023-11-15 RX ADMIN — B-COMPLEX W/ C & FOLIC ACID TAB 1 TABLET: TAB at 15:19

## 2023-11-15 RX ADMIN — QUETIAPINE FUMARATE 25 MG: 25 TABLET ORAL at 21:12

## 2023-11-15 RX ADMIN — Medication 6 MG: at 21:12

## 2023-11-15 RX ADMIN — OXYCODONE HYDROCHLORIDE AND ACETAMINOPHEN 500 MG: 500 TABLET ORAL at 15:25

## 2023-11-15 RX ADMIN — INSULIN GLARGINE 5 UNITS: 100 INJECTION, SOLUTION SUBCUTANEOUS at 21:12

## 2023-11-15 RX ADMIN — ENOXAPARIN SODIUM 40 MG: 40 INJECTION SUBCUTANEOUS at 09:57

## 2023-11-15 NOTE — PROGRESS NOTES
Patient:    MRN:  [id number]    Noemí Request ID:  [id number]    Level of care reserved:  Stephen5 Salvador Garcia    Partner Reserved:  [address] [phone]    Clinical needs requested:    Geography searched:  [phone]    Start of Service:    Request sent:  6:49pm EST on 11/13/2023 by Jesika Barnes    Partner reserved:  9:07am EST on 11/15/2023 by Jesika Barnes    Choice list shared:  9:06am EST on 11/15/2023 by Jesika Barnes

## 2023-11-15 NOTE — PLAN OF CARE
Problem: Potential for Falls  Goal: Patient will remain free of falls  Description: INTERVENTIONS:  - Educate patient/family on patient safety including physical limitations  - Instruct patient to call for assistance with activity   - Consult OT/PT to assist with strengthening/mobility   - Keep Call bell within reach  - Keep bed low and locked with side rails adjusted as appropriate  - Keep care items and personal belongings within reach  - Initiate and maintain comfort rounds  - Make Fall Risk Sign visible to staff  - Offer Toileting every 2 Hours, in advance of need  - Initiate/Maintain bed alarm  - Apply yellow socks and bracelet for high fall risk patients  - Consider moving patient to room near nurses station  Outcome: Progressing     Problem: MOBILITY - ADULT  Goal: Maintain or return to baseline ADL function  Description: INTERVENTIONS:  -  Assess patient's ability to carry out ADLs; assess patient's baseline for ADL function and identify physical deficits which impact ability to perform ADLs (bathing, care of mouth/teeth, toileting, grooming, dressing, etc.)  - Assess/evaluate cause of self-care deficits   - Assess range of motion  - Assess patient's mobility; develop plan if impaired  - Assess patient's need for assistive devices and provide as appropriate  - Encourage maximum independence but intervene and supervise when necessary  - Involve family in performance of ADLs  - Assess for home care needs following discharge   - Consider OT consult to assist with ADL evaluation and planning for discharge  - Provide patient education as appropriate  Outcome: Progressing  Goal: Maintains/Returns to pre admission functional level  Description: INTERVENTIONS:  - Perform BMAT or MOVE assessment daily.   - Set and communicate daily mobility goal to care team and patient/family/caregiver.    - Collaborate with rehabilitation services on mobility goals if consulted  - Out of bed for toileting  - Record patient progress and toleration of activity level   Outcome: Progressing     Problem: PAIN - ADULT  Goal: Verbalizes/displays adequate comfort level or baseline comfort level  Description: Interventions:  - Encourage patient to monitor pain and request assistance  - Assess pain using appropriate pain scale  - Administer analgesics based on type and severity of pain and evaluate response  - Implement non-pharmacological measures as appropriate and evaluate response  - Consider cultural and social influences on pain and pain management  - Notify physician/advanced practitioner if interventions unsuccessful or patient reports new pain  Outcome: Progressing     Problem: INFECTION - ADULT  Goal: Absence or prevention of progression during hospitalization  Description: INTERVENTIONS:  - Assess and monitor for signs and symptoms of infection  - Monitor lab/diagnostic results  - Monitor all insertion sites, i.e. indwelling lines, tubes, and drains  - Monitor endotracheal if appropriate and nasal secretions for changes in amount and color  - Reynolds appropriate cooling/warming therapies per order  - Administer medications as ordered  - Instruct and encourage patient and family to use good hand hygiene technique  - Identify and instruct in appropriate isolation precautions for identified infection/condition  Outcome: Progressing  Goal: Absence of fever/infection during neutropenic period  Description: INTERVENTIONS:  - Monitor WBC    Outcome: Progressing     Problem: SAFETY ADULT  Goal: Patient will remain free of falls  Description: INTERVENTIONS:  - Educate patient/family on patient safety including physical limitations  - Instruct patient to call for assistance with activity   - Consult OT/PT to assist with strengthening/mobility   - Keep Call bell within reach  - Keep bed low and locked with side rails adjusted as appropriate  - Keep care items and personal belongings within reach  - Initiate and maintain comfort rounds  - Make Fall Risk Sign visible to staff  - Offer Toileting every 2 Hours, in advance of need  - Initiate/Maintain bed alarm  - Apply yellow socks and bracelet for high fall risk patients  - Consider moving patient to room near nurses station  Outcome: Progressing  Goal: Maintain or return to baseline ADL function  Description: INTERVENTIONS:  -  Assess patient's ability to carry out ADLs; assess patient's baseline for ADL function and identify physical deficits which impact ability to perform ADLs (bathing, care of mouth/teeth, toileting, grooming, dressing, etc.)  - Assess/evaluate cause of self-care deficits   - Assess range of motion  - Assess patient's mobility; develop plan if impaired  - Assess patient's need for assistive devices and provide as appropriate  - Encourage maximum independence but intervene and supervise when necessary  - Involve family in performance of ADLs  - Assess for home care needs following discharge   - Consider OT consult to assist with ADL evaluation and planning for discharge  - Provide patient education as appropriate  Outcome: Progressing  Goal: Maintains/Returns to pre admission functional level  Description: INTERVENTIONS:  - Perform BMAT or MOVE assessment daily.   - Set and communicate daily mobility goal to care team and patient/family/caregiver.    - Collaborate with rehabilitation services on mobility goals if consulted  - Out of bed for toileting  - Record patient progress and toleration of activity level   Outcome: Progressing     Problem: DISCHARGE PLANNING  Goal: Discharge to home or other facility with appropriate resources  Description: INTERVENTIONS:  - Identify barriers to discharge w/patient and caregiver  - Arrange for needed discharge resources and transportation as appropriate  - Identify discharge learning needs (meds, wound care, etc.)  - Arrange for interpretive services to assist at discharge as needed  - Refer to Case Management Department for coordinating discharge planning if the patient needs post-hospital services based on physician/advanced practitioner order or complex needs related to functional status, cognitive ability, or social support system  Outcome: Progressing     Problem: Knowledge Deficit  Goal: Patient/family/caregiver demonstrates understanding of disease process, treatment plan, medications, and discharge instructions  Description: Complete learning assessment and assess knowledge base. Interventions:  - Provide teaching at level of understanding  - Provide teaching via preferred learning methods  Outcome: Progressing     Problem: SAFETY,RESTRAINT: NV/NON-SELF DESTRUCTIVE BEHAVIOR  Goal: Remains free of harm/injury (restraint for non violent/non self-detsructive behavior)  Description: INTERVENTIONS:  - Instruct patient/family regarding restraint use   - Assess and monitor physiologic and psychological status   - Provide interventions and comfort measures to meet assessed patient needs   - Identify and implement measures to help patient regain control  - Assess readiness for release of restraint   Outcome: Progressing  Goal: Returns to optimal restraint-free functioning  Description: INTERVENTIONS:  - Assess the patient's behavior and symptoms that indicate continued need for restraint  - Identify and implement measures to help patient regain control  - Assess readiness for release of restraint   Outcome: Progressing     Problem: Nutrition/Hydration-ADULT  Goal: Nutrient/Hydration intake appropriate for improving, restoring or maintaining nutritional needs  Description: Monitor and assess patient's nutrition/hydration status for malnutrition. Collaborate with interdisciplinary team and initiate plan and interventions as ordered. Monitor patient's weight and dietary intake as ordered or per policy. Utilize nutrition screening tool and intervene as necessary. Determine patient's food preferences and provide high-protein, high-caloric foods as appropriate. INTERVENTIONS:  - Monitor oral intake, urinary output, labs, and treatment plans  - Assess nutrition and hydration status and recommend course of action  - Evaluate amount of meals eaten  - Assist patient with eating if necessary   - Allow adequate time for meals  - Recommend/ encourage appropriate diets, oral nutritional supplements, and vitamin/mineral supplements  - Order, calculate, and assess calorie counts as needed  - Recommend, monitor, and adjust tube feedings and TPN/PPN based on assessed needs  - Assess need for intravenous fluids  - Provide specific nutrition/hydration education as appropriate  - Include patient/family/caregiver in decisions related to nutrition  Outcome: Progressing     Problem: Prexisting or High Potential for Compromised Skin Integrity  Goal: Skin integrity is maintained or improved  Description: INTERVENTIONS:  - Identify patients at risk for skin breakdown  - Assess and monitor skin integrity  - Assess and monitor nutrition and hydration status  - Monitor labs   - Assess for incontinence   - Turn and reposition patient  - Assist with mobility/ambulation  - Relieve pressure over bony prominences  - Avoid friction and shearing  - Provide appropriate hygiene as needed including keeping skin clean and dry  - Evaluate need for skin moisturizer/barrier cream  - Collaborate with interdisciplinary team   - Patient/family teaching  - Consider wound care consult   Outcome: Progressing

## 2023-11-15 NOTE — PROGRESS NOTES
Patient:    MRN:  [id number]    Aidin Request ID:  [id number]    Level of care reserved:    Partner Reserved:    Clinical needs requested:    Geography searched:  [id number]    Start of Service:    Request sent:  6:49pm EST on 11/13/2023 by Mimi Baker    Partner reserved:  by Mimi Baker    Choice list shared:  9:06am EST on 11/15/2023 by Mimi Baker

## 2023-11-15 NOTE — CASE MANAGEMENT
Case Management Discharge Planning Note    Patient name Topher Grayson  Location /-69 MRN [id number]  : 1940 Date 11/15/2023       Current Admission Date: 2023  Current Admission Diagnosis:Acute metabolic encephalopathy   Patient Active Problem List    Diagnosis Date Noted    Severe protein-calorie malnutrition ([address]) 2023    Debility 2023    Macrocytosis 2023    Low blood pressure 2023    Hypomagnesemia 2023    Hypoglycemia 2023    Hypothermia 2023    Hypokalemia 2023    History of Hodgkin's lymphoma 2023    Hypothyroidism     Acute metabolic encephalopathy 5407    Gastroesophageal reflux disease without esophagitis 10/17/2022    Abnormal CT of the abdomen 07/10/2022    Soft tissue radionecrosis 2022    Osteoradionecrosis of temporal bone  2022    Primary osteoarthritis of right shoulder 2021    Primary osteoarthritis of both knees 2020    Postoperative hypothyroidism 2015    Hyperlipidemia 2014    History of hypertension 10/10/2013    Type 1 diabetes mellitus with hypoglycemia ([address]) 2008      LOS (days): 2  Geometric Mean LOS (GMLOS) (days): 4.50  Days to GMLOS:2.4     OBJECTIVE:  Risk of Unplanned Readmission Score: 27.87         Current admission status: Inpatient   Preferred Pharmacy:   [address] [place] [address] #[id number]  Phone: [phone] Fax: [phone]    Primary Care Provider: Bradley Church MD    Primary Insurance: MEDICARE  Secondary Insurance: AARP    DISCHARGE DETAILS:    Discharge planning discussed with[de-identified] patient & daughter was called at 15:07pm & met her at the bedside  Freedom of Choice: Yes  Comments - Freedom of Choice: ndation is for rehab- referralas were sent with permission- cm reviewed the list of accepting facilities- daughter's choice   [facility] post acute  CM contacted family/caregiver?: Yes             Contacts  Patient Contacts: Valeria Reynoso dtr  Relationship to Patient[de-identified] Family (daughter)  Contact Method: Phone  Phone Number: [phone]  Reason/Outcome: Discharge [address]         Is the patient interested in [address] at discharge? :  (pt is active with [facility] - referral was sent)    DME Referral Provided  Referral made for DME?: No    Other Referral/Resources/Interventions Provided:  Interventions: Short Term Rehab  Referral Comments: pt accepted to [facility] post acute- bed reserved- pt not stable for d/c - mri ordered    Would you like to participate in our [facility] service program?  : No - Declined    Treatment Team Recommendation: Short Term Rehab ([facility] post acute- tbd)                                   IMM Given (Date):: 11/15/23  IMM Given to[de-identified] Family (daughter)

## 2023-11-15 NOTE — ASSESSMENT & PLAN NOTE
Patient with confusion and weakness per family  PT/OT evaluated the patient: Rehab Resource Intensity Level, PT: II (Moderate Resource Intensity)   Referrals have been made by case management for rehab facilities

## 2023-11-15 NOTE — PROGRESS NOTES
[facility]  Progress Note  Name: Topher Grayson I  MRN: [id number]  Unit/Bed#: -01 I Date of Admission: 11/13/2023   Date of Service: 11/15/2023 I Hospital Day: 2    Assessment/Plan   * Acute metabolic encephalopathy  Assessment & Plan  Present on admission   Per family patient had weakness and worsening confusion 2 to 3 days prior to arrival   CT head: Negative for acute intracranial findings; chronic microangiopathic changes noted  UA negative  TSH: 3.173, LFT's WNL, B12 488, and ammonia level 25  Lactic acid 0.05  Patients daughter reported confusion possibly due to tramadol which she was taking prehospital.  This medication is on hold   Continue to hold her gabapentin   Neurology consulted, appreciate recommendations. Suspect undiagnosed dementia playing factor. Continue Seroquel    Debility  Assessment & Plan  Patient with confusion and weakness per family  PT/OT evaluated the patient: Rehab Resource Intensity Level, PT: II (Moderate Resource Intensity)   Referrals have been made by case management for rehab facilities     Hypomagnesemia  Assessment & Plan  Magnesium of 1.6 on admission   Resolved with repletion      Severe protein-calorie malnutrition ([address])  Assessment & Plan  Malnutrition Findings:   Adult Malnutrition type: Chronic illness  Adult Degree of Malnutrition: Other severe protein calorie malnutrition  Malnutrition Characteristics: Muscle loss, Weight loss         Appreciate input of nutrition  Continue supplements  Monitor appetite's  Daily weight         360 Statement: Malnutrition related to chronic illness as evidenced by moderate temporal muscle loss, severe clavicle muscle loss, > 10% bodyweight loss in 6 months. TO treat with oral diet and nutrition supplements as tolerated. BMI Findings: Body mass index is 19.79 kg/m².        Hypothyroidism  Assessment & Plan  TSH 3.173  Continue home levothyroxine    Type 1 diabetes mellitus with hypoglycemia [facility])  Assessment & Plan  Lab Results   Component Value Date    HGBA1C 8.9 (H) 05/26/2023       Recent Labs     11/14/23  1113 11/14/23  1616 11/14/23  2150 11/15/23  0730   POCGLU 62* 94 299* 180*         Blood Sugar Average: Last 72 hrs:  (P) 033.6012184555540417    Target blood sugar inpatient 140-180   Patient's home regimen is Glargine 7 units qam and 5 units qhs  Currently on home regimen   Continue SSI   CHO diet   Hypoglycemia protocol   BMP a.m. VTE Pharmacologic Prophylaxis:   Pharmacologic: Enoxaparin (Lovenox)  Mechanical VTE Prophylaxis in Place: Yes    Patient Centered Rounds: I have performed bedside rounds with nursing staff today. Discussions with Specialists or Other Care Team Provider: Neurology, PT/OT, nursing, case management    Education and Discussions with Family / Patient: To discuss treatment plan with patient, she is very sleepy today. Did answer orientation questions correctly. Patient's nurse reported that patient had Zyprexa last evening likely contributing to sleepiness today. Updated patients daughter Emma Mosqueda to patients current assessment and treatment plan. Time Spent for Care: 40 minutes. More than 50% of total time spent on counseling and coordination of care as described above. Current Length of Stay: 2 day(s)    Current Patient Status: Inpatient   Certification Statement: The patient will continue to require additional inpatient hospital stay due to need for rehab placement on discharge    Discharge Plan: Will require rehab placement on discharge. Case management has made referrals. Patient is sleepy today. Nursing staff does report that patient had Zyprexa and melatonin last evening     Code Status: Level 3 - DNAR and DNI      Subjective:   Patient is sleepy, does open eyes to voice. Patient was alert and oriented to person, place, and time. Went back to sleep after answering orientation questions. Did deny pain or discomfort.     Objective: Vitals:   Temp (24hrs), Av.3 °F (36.8 °C), Min:97.5 °F (36.4 °C), Max:99.5 °F (37.5 °C)    Temp:  [97.5 °F (36.4 °C)-99.5 °F (37.5 °C)] 98 °F (36.7 °C)  HR:  [] 79  Resp:  [14-18] 14  BP: (102-135)/(58-87) 104/58  SpO2:  [96 %-97 %] 97 %  Body mass index is 19.79 kg/m². Input and Output Summary (last 24 hours): Intake/Output Summary (Last 24 hours) at 11/15/2023 9453  Last data filed at 2023 1822  Gross per 24 hour   Intake 480 ml   Output --   Net 480 ml       Physical Exam:     Physical Exam  Vitals reviewed. Constitutional:       General: She is not in acute distress. Appearance: She is not ill-appearing. Comments: Patient sleepy this a.m. HENT:      Nose: Nose normal.      Mouth/Throat:      Mouth: Mucous membranes are dry. Cardiovascular:      Rate and Rhythm: Normal rate and regular rhythm. Pulses: Normal pulses. Heart sounds: Normal heart sounds. Pulmonary:      Effort: Pulmonary effort is normal. No respiratory distress. Breath sounds: Normal breath sounds. No wheezing or rales. Abdominal:      General: Abdomen is flat. Bowel sounds are normal. There is no distension. Palpations: Abdomen is soft. Tenderness: There is no abdominal tenderness. There is no guarding. Musculoskeletal:         General: Normal range of motion. Skin:     General: Skin is warm and dry. Capillary Refill: Capillary refill takes less than 2 seconds. Neurological:      General: No focal deficit present. Mental Status: She is oriented to person, place, and time. Mental status is at baseline. Comments: Patient is sleepy this a.m., did open eyes and answers orientation questions correctly then back to sleep.    Psychiatric:         Mood and Affect: Mood normal.         Additional Data:     Labs:    Results from last 7 days   Lab Units 11/15/23  0622 23  0524 23  0952   WBC Thousand/uL 6.61   < > 8.78   HEMOGLOBIN g/dL 12.6   < > 12.6 HEMATOCRIT % 39.7   < > 38.9   PLATELETS Thousands/uL 239   < > 225   NEUTROS PCT %  --   --  72   LYMPHS PCT %  --   --  15   MONOS PCT %  --   --  10   EOS PCT %  --   --  1    < > = values in this interval not displayed. Results from last 7 days   Lab Units 11/15/23  0622 11/14/23  0524   POTASSIUM mmol/L 4.4 3.4*   CHLORIDE mmol/L 103 98   CO2 mmol/L 23 25   BUN mg/dL 25 13   CREATININE mg/dL 0.79 0.68   CALCIUM mg/dL 8.6 8.7   ALK PHOS U/L  --  65   ALT U/L  --  15   AST U/L  --  24           * I Have Reviewed All Lab Data Listed Above. * Additional Pertinent Lab Tests Reviewed:  [address] Admission Reviewed    Imaging:    Imaging Reports Reviewed Today Include: Head CT  Imaging Personally Reviewed by Myself Includes: Head CT    Recent Cultures (last 7 days):           Last 24 Hours Medication List:   Current Facility-Administered Medications   Medication Dose Route Frequency Provider Last Rate    acetaminophen  650 mg Oral Q4H PRN Adiel Perrin PA-C      vitamin C  500 mg Oral BID Adiel Perrin PA-C      cyanocobalamin  1,000 mcg Oral Daily Osman Alexander PA-C      enoxaparin  40 mg Subcutaneous Daily Adiel Perrin PA-C      hydrALAZINE  10 mg Intravenous Q6H PRN Gillian Chris PA-C      insulin glargine  5 Units Subcutaneous HS Osman Alexander PA-C      insulin glargine  7 Units Subcutaneous QAM Osman Alexander PA-C      insulin lispro  1-5 Units Subcutaneous TID AC Osman Alexander PA-C      insulin lispro  1-5 Units Subcutaneous HS Adiel Perrin PA-C      levothyroxine  112 mcg Oral QAM Osman Alexander PA-C      melatonin  6 mg Oral HS Gillian Chris PA-C      multivitamin stress formula  1 tablet Oral Daily Osman Alexander PA-C      ondansetron  4 mg Intravenous Q6H PRN Adiel Perrin PA-C      QUEtiapine  25 mg Oral HS Adiel Perrin PA-C          Today, Patient Was Seen By: ADRIAN España    ** Please Note: Dictation voice to text software may have been used in the creation of this document.  **

## 2023-11-15 NOTE — QUICK NOTE
Spoke at length with patients daughter Kody Johnson at this time. We discussed patient's test findings including her CT scans from this visit and her September to CT scan. Patient's daughter states that she understands that neurology has evaluated the patient and feels that there is probably some element of dementia here. She is requesting an MRI of the brain at this time because she states that her mother was completely normal 2 days prior to arrival.  Work-up has been negative thus far. Order was placed for same.

## 2023-11-15 NOTE — PROGRESS NOTES
Patient:    MRN:  [id number]    Noemí Request ID:  [id number]    Level of care reserved:  [facility]    Partner Reserved:  [address] [phone]    Clinical needs requested:    Geography searched:  30 miles around [address] [place] [address] Service:    Request sent:  3:01pm EST on 11/13/2023 by Mimi Baker    Partner reserved:  3:57pm EST on 11/15/2023 by Mimi Baker    Choice list shared:  3:13pm EST on 11/14/2023 by Ermelinda Motta

## 2023-11-15 NOTE — ASSESSMENT & PLAN NOTE
Present on admission   Per family patient had weakness and worsening confusion 2 to 3 days prior to arrival   CT head: Negative for acute intracranial findings; chronic microangiopathic changes noted  UA negative  TSH: 3.173, LFT's WNL, B12 488, and ammonia level 25  Lactic acid 0.05  Patients daughter reported confusion possibly due to tramadol which she was taking prehospital.  This medication is on hold   Continue to hold her gabapentin   Neurology consulted, appreciate recommendations. Suspect undiagnosed dementia playing factor.   Continue Seroquel

## 2023-11-16 VITALS
HEIGHT: 61 IN | TEMPERATURE: 98.2 F | SYSTOLIC BLOOD PRESSURE: 140 MMHG | DIASTOLIC BLOOD PRESSURE: 71 MMHG | HEART RATE: 91 BPM | WEIGHT: 104.72 LBS | BODY MASS INDEX: 19.77 KG/M2 | OXYGEN SATURATION: 98 % | RESPIRATION RATE: 18 BRPM

## 2023-11-16 LAB
ANION GAP SERPL CALCULATED.3IONS-SCNC: 10 MMOL/L
BASOPHILS # BLD AUTO: 0.05 THOUSANDS/ÂΜL (ref 0–0.1)
BASOPHILS NFR BLD AUTO: 1 % (ref 0–1)
BUN SERPL-MCNC: 24 MG/DL (ref 5–25)
CALCIUM SERPL-MCNC: 8.4 MG/DL (ref 8.4–10.2)
CHLORIDE SERPL-SCNC: 104 MMOL/L (ref 96–108)
CO2 SERPL-SCNC: 23 MMOL/L (ref 21–32)
CREAT SERPL-MCNC: 0.63 MG/DL (ref 0.6–1.3)
EOSINOPHIL # BLD AUTO: 0.07 THOUSAND/ÂΜL (ref 0–0.61)
EOSINOPHIL NFR BLD AUTO: 1 % (ref 0–6)
ERYTHROCYTE [DISTWIDTH] IN BLOOD BY AUTOMATED COUNT: 13.2 % (ref 11.6–15.1)
GFR SERPL CREATININE-BSD FRML MDRD: 83 ML/MIN/1.73SQ M
GLUCOSE SERPL-MCNC: 209 MG/DL (ref 65–140)
GLUCOSE SERPL-MCNC: 80 MG/DL (ref 65–140)
GLUCOSE SERPL-MCNC: 81 MG/DL (ref 65–140)
HCT VFR BLD AUTO: 41.4 % (ref 34.8–46.1)
HGB BLD-MCNC: 12.4 G/DL (ref 11.5–15.4)
IMM GRANULOCYTES # BLD AUTO: 0.04 THOUSAND/UL (ref 0–0.2)
IMM GRANULOCYTES NFR BLD AUTO: 0 % (ref 0–2)
LYMPHOCYTES # BLD AUTO: 1.14 THOUSANDS/ÂΜL (ref 0.6–4.47)
LYMPHOCYTES NFR BLD AUTO: 12 % (ref 14–44)
MAGNESIUM SERPL-MCNC: 1.9 MG/DL (ref 1.9–2.7)
MCH RBC QN AUTO: 32.8 PG (ref 26.8–34.3)
MCHC RBC AUTO-ENTMCNC: 30 G/DL (ref 31.4–37.4)
MCV RBC AUTO: 110 FL (ref 82–98)
MONOCYTES # BLD AUTO: 1.11 THOUSAND/ÂΜL (ref 0.17–1.22)
MONOCYTES NFR BLD AUTO: 12 % (ref 4–12)
NEUTROPHILS # BLD AUTO: 7.22 THOUSANDS/ÂΜL (ref 1.85–7.62)
NEUTS SEG NFR BLD AUTO: 74 % (ref 43–75)
NRBC BLD AUTO-RTO: 0 /100 WBCS
PLATELET # BLD AUTO: 231 THOUSANDS/UL (ref 149–390)
PMV BLD AUTO: 10.2 FL (ref 8.9–12.7)
POTASSIUM SERPL-SCNC: 3.8 MMOL/L (ref 3.5–5.3)
RBC # BLD AUTO: 3.78 MILLION/UL (ref 3.81–5.12)
SODIUM SERPL-SCNC: 137 MMOL/L (ref 135–147)
WBC # BLD AUTO: 9.63 THOUSAND/UL (ref 4.31–10.16)

## 2023-11-16 PROCEDURE — 99239 HOSP IP/OBS DSCHRG MGMT >30: CPT

## 2023-11-16 PROCEDURE — 85025 COMPLETE CBC W/AUTO DIFF WBC: CPT | Performed by: HOSPITALIST

## 2023-11-16 PROCEDURE — 99223 1ST HOSP IP/OBS HIGH 75: CPT | Performed by: STUDENT IN AN ORGANIZED HEALTH CARE EDUCATION/TRAINING PROGRAM

## 2023-11-16 PROCEDURE — 83735 ASSAY OF MAGNESIUM: CPT | Performed by: HOSPITALIST

## 2023-11-16 PROCEDURE — 97530 THERAPEUTIC ACTIVITIES: CPT

## 2023-11-16 PROCEDURE — 80048 BASIC METABOLIC PNL TOTAL CA: CPT | Performed by: HOSPITALIST

## 2023-11-16 PROCEDURE — 82948 REAGENT STRIP/BLOOD GLUCOSE: CPT

## 2023-11-16 RX ORDER — QUETIAPINE FUMARATE 25 MG/1
25 TABLET, FILM COATED ORAL
Qty: 30 TABLET | Refills: 0
Start: 2023-11-16 | End: 2023-12-16

## 2023-11-16 RX ORDER — LANOLIN ALCOHOL/MO/W.PET/CERES
6 CREAM (GRAM) TOPICAL
Qty: 60 TABLET | Refills: 0
Start: 2023-11-16 | End: 2023-12-16

## 2023-11-16 RX ORDER — ACETAMINOPHEN 325 MG/1
650 TABLET ORAL EVERY 4 HOURS PRN
Refills: 0
Start: 2023-11-16

## 2023-11-16 RX ADMIN — CYANOCOBALAMIN TAB 500 MCG 1000 MCG: 500 TAB at 08:15

## 2023-11-16 RX ADMIN — LEVOTHYROXINE SODIUM 112 MCG: 112 TABLET ORAL at 05:16

## 2023-11-16 RX ADMIN — OXYCODONE HYDROCHLORIDE AND ACETAMINOPHEN 500 MG: 500 TABLET ORAL at 08:15

## 2023-11-16 RX ADMIN — INSULIN LISPRO 1 UNITS: 100 INJECTION, SOLUTION INTRAVENOUS; SUBCUTANEOUS at 11:54

## 2023-11-16 RX ADMIN — ENOXAPARIN SODIUM 40 MG: 40 INJECTION SUBCUTANEOUS at 08:15

## 2023-11-16 RX ADMIN — INSULIN GLARGINE 7 UNITS: 100 INJECTION, SOLUTION SUBCUTANEOUS at 08:15

## 2023-11-16 RX ADMIN — B-COMPLEX W/ C & FOLIC ACID TAB 1 TABLET: TAB at 08:15

## 2023-11-16 NOTE — DISCHARGE SUMMARY
[address] [facility]  Discharge- Roz Mann 1940, 80 y.o. female MRN: [id number]  Unit/Bed#: -[place] Encounter: [id number]  Primary Care Provider: Nika Goodwin MD   Date and time admitted to hospital: 11/13/2023  9:11 AM    * Acute metabolic encephalopathy  Assessment & Plan  Present on admission   Per family patient had weakness and worsening confusion 2 to 3 days prior to arrival   CT head: Negative for acute intracranial findings; chronic microangiopathic changes noted  UA negative  TSH: 3.173, LFT's WNL, B12 488, and ammonia level 25  Lactic acid 0.05  MRI brain negative for acute infarct, intracranial hemorrhage or mass  Patients daughter reported confusion possibly due to tramadol which she was taking prehospital.  This medication is on hold-will not resume on discharge  Neurology followed, appreciate recommendations. Patient to have follow-up appointment in 4 to 6 weeks for further neurocognitive testing  Psychiatry evaluated patient-we will continue Seroquel on discharge today. Recommending patient follow-up with geropsych and neurology on discharge for further neurocognitive testing  Patient is alert and oriented x3 today.   Medically stable for discharge  Patient will be discharged to [facility] [place] [facility]hab today as arranged by case management    Debility  Assessment & Plan  Patient with confusion and weakness per family  PT/OT evaluated the patient: Rehab Resource Intensity Level, PT: II (Moderate Resource Intensity)   Medically stable for discharge today  Patient is being discharged to [facility] [place] [place] [facility]hab as arranged by case management-daughter aware and in agreement    Hypomagnesemia  Assessment & Plan  Magnesium of 1.6 on admission   Resolved with repletion      Severe protein-calorie malnutrition ([address])  Assessment & Plan  Malnutrition Findings:   Adult Malnutrition type: Chronic illness  Adult Degree of Malnutrition: Other severe protein calorie malnutrition  Malnutrition Characteristics: Muscle loss, Weight loss         Appreciate input of nutrition  Has been tolerating diet  Patient being discharged to postacute rehab today       360 Statement: Malnutrition related to chronic illness as evidenced by moderate temporal muscle loss, severe clavicle muscle loss, > 10% bodyweight loss in 6 months. TO treat with oral diet and nutrition supplements as tolerated. BMI Findings: Body mass index is 19.79 kg/m². Hypothyroidism  Assessment & Plan  TSH 3.173  Continue home levothyroxine on discharge    Type 1 diabetes mellitus with hypoglycemia [facility])  Assessment & Plan  Lab Results   Component Value Date    HGBA1C 8.9 (H) 05/26/2023       Recent Labs     11/15/23  1504 11/15/23  2048 11/16/23  0700 11/16/23  1107   POCGLU 105 76 80 209*         Blood Sugar Average: Last 72 hrs:  (P) [id number]    Please continue outpatient diabetic regimen on discharge        Discharging Physician / Practitioner: Gerhard Felix, [address] [place] [address]  PCP: Asaf Whittington MD  Admission Date:   Admission Orders (From admission, onward)       Ordered        11/13/23 1513  [facility]  Once                          Discharge Date: 11/16/23    Medical Problems       Resolved Problems  Date Reviewed: 11/16/2023   None         Consultations During Hospital Stay:  Neurology, psychiatry    Procedures Performed:   none    Significant Findings / Test Results:   11/13 CT head: No acute intracranial abnormality, chronic microangiopathic changes  11/15 MRI brain: No acute infarct, intracranial hemorrhage or mass  11/13 UA negative  11/13 TSH 3.173  11/13 serum magnesium 1.6  11/14 serum magnesium 2.0  11/14 ammonia 25  11/14 procalcitonin 0.05      Incidental Findings:   none     Test Results Pending at Discharge (will require follow up): None     Outpatient Tests Requested:   To be determined by your primary care physician/physician at receiving facility    Complications: None    Reason for Admission: Acute metabolic encephalopathy    Hospital Course: Vee Choudhary is a 80 y.o. female patient who originally presented to the hospital on 11/13/2023 with her daughter reported patient was confused and had trouble walking. Family reported that patient is at home, had recent fall without injuries. Also reported that patient's blood sugars were not controlled at home. There was concern that she may need to be in assisted living facility. On arrival CT head was negative. UA negative. No clear source of infection. Neurology evaluated patient and felt that patient presentation likely related to dementia likely undiagnosed. MRI brain was done yesterday and was also negative for acute infarct, intracranial hemorrhage or mass. Patient continued to have waxing and waning of agitation which was felt to be delirium superimposed on worsening neurocognitive decline. On exam today patient is alert and oriented x3, follow commands. Denied pain or discomfort. Medically clear for discharge today. PT OT recommended postacute rehab on discharge. Spoke with patient's daughter who is in agreement for discharge to [facility] rehab today as arranged by case management. Patient will follow-up with neurology in 4 to 6 weeks for further neuro cognitive testing and psychiatry recommended patient follow with higinio on discharge. Please see above list of diagnoses and related plan for additional information. Condition at Discharge: good     Discharge Day Visit / Exam:     Subjective: Patient is alert and oriented x3, follows commands.   Denies pain or discomfort  Vitals: Blood Pressure: 140/71 (11/16/23 0703)  Pulse: 91 (11/16/23 0703)  Temperature: 98.2 °F (36.8 °C) (11/16/23 0703)  Temp Source: Temporal (11/13/23 2100)  Respirations: 18 (11/16/23 0703)  Height: 5' 1" (154.9 cm) (11/13/23 1542)  Weight - Scale: 47.5 kg (104 lb 11.5 oz) (11/13/23 1542)  SpO2: 98 % (11/16/23 0703)  Exam:   Physical Exam  Vitals and nursing note reviewed. Constitutional:       General: She is not in acute distress. Appearance: She is well-developed. HENT:      Head: Normocephalic and atraumatic. Mouth/Throat:      Mouth: Mucous membranes are moist.   Eyes:      Extraocular Movements: Extraocular movements intact. Conjunctiva/sclera: Conjunctivae normal.      Pupils: Pupils are equal, round, and reactive to light. Cardiovascular:      Rate and Rhythm: Normal rate and regular rhythm. Pulses: Normal pulses. Heart sounds: Normal heart sounds. No murmur heard. Pulmonary:      Effort: Pulmonary effort is normal. No respiratory distress. Breath sounds: Normal breath sounds. No wheezing or rales. Abdominal:      General: Bowel sounds are normal. There is no distension. Palpations: Abdomen is soft. Tenderness: There is no abdominal tenderness. There is no guarding. Musculoskeletal:         General: No swelling. Normal range of motion. Skin:     General: Skin is warm and dry. Capillary Refill: Capillary refill takes less than 2 seconds. Neurological:      General: No focal deficit present. Mental Status: She is alert and oriented to person, place, and time. Mental status is at baseline. Psychiatric:         Mood and Affect: Mood normal.         Behavior: Behavior normal.         Discussion with Family: Discussed course of hospitalization including testing and treatments with patient and her daughter via the phone. Both understand all has been explained. All questions answered to satisfaction. Daughter is aware patient is being discharged to [facility] rehab today. She is in agreement with this plan. Discharge instructions/Information to patient and family:   See after visit summary for information provided to patient and family.       Provisions for Follow-Up Care:  See after visit summary for information related to follow-up care and any pertinent home health orders. Disposition:     [address] Transfer to [facility] postacute rehab    For Discharges to [facility] SNF:   Not Applicable to this Patient - Not Applicable to this Patient    Planned Readmission: No     Discharge Statement:  I spent 40 minutes discharging the patient. This time was spent on the day of discharge. I had direct contact with the patient on the day of discharge. Greater than 50% of the total time was spent examining patient, answering all patient questions, arranging and discussing plan of care with patient as well as directly providing post-discharge instructions. Additional time then spent on discharge activities. Discharge Medications:  See after visit summary for reconciled discharge medications provided to patient and family.       ** Please Note: This note has been constructed using a voice recognition system **

## 2023-11-16 NOTE — PLAN OF CARE
Problem: Potential for Falls  Goal: Patient will remain free of falls  Description: INTERVENTIONS:  - Educate patient/family on patient safety including physical limitations  - Instruct patient to call for assistance with activity   - Consult OT/PT to assist with strengthening/mobility   - Keep Call bell within reach  - Keep bed low and locked with side rails adjusted as appropriate  - Keep care items and personal belongings within reach  - Initiate and maintain comfort rounds  - Make Fall Risk Sign visible to staff  - Offer Toileting every 2 Hours, in advance of need  - Initiate/Maintain bed alarm  - Apply yellow socks and bracelet for high fall risk patients  - Consider moving patient to room near nurses station  Outcome: Progressing     Problem: MOBILITY - ADULT  Goal: Maintain or return to baseline ADL function  Description: INTERVENTIONS:  -  Assess patient's ability to carry out ADLs; assess patient's baseline for ADL function and identify physical deficits which impact ability to perform ADLs (bathing, care of mouth/teeth, toileting, grooming, dressing, etc.)  - Assess/evaluate cause of self-care deficits   - Assess range of motion  - Assess patient's mobility; develop plan if impaired  - Assess patient's need for assistive devices and provide as appropriate  - Encourage maximum independence but intervene and supervise when necessary  - Involve family in performance of ADLs  - Assess for home care needs following discharge   - Consider OT consult to assist with ADL evaluation and planning for discharge  - Provide patient education as appropriate  Outcome: Progressing     Problem: PAIN - ADULT  Goal: Verbalizes/displays adequate comfort level or baseline comfort level  Description: Interventions:  - Encourage patient to monitor pain and request assistance  - Assess pain using appropriate pain scale  - Administer analgesics based on type and severity of pain and evaluate response  - Implement non-pharmacological measures as appropriate and evaluate response  - Consider cultural and social influences on pain and pain management  - Notify physician/advanced practitioner if interventions unsuccessful or patient reports new pain  Outcome: Progressing     Problem: INFECTION - ADULT  Goal: Absence or prevention of progression during hospitalization  Description: INTERVENTIONS:  - Assess and monitor for signs and symptoms of infection  - Monitor lab/diagnostic results  - Monitor all insertion sites, i.e. indwelling lines, tubes, and drains  - Monitor endotracheal if appropriate and nasal secretions for changes in amount and color  - [place] appropriate cooling/warming therapies per order  - Administer medications as ordered  - Instruct and encourage patient and family to use good hand hygiene technique  - Identify and instruct in appropriate isolation precautions for identified infection/condition  Outcome: Progressing

## 2023-11-16 NOTE — DISCHARGE INSTR - AVS FIRST PAGE
You are being discharged to [address] today  Please follow-up with your primary care physician on discharge from the facility  You were evaluated by neurology while inpatient. You had a stroke work-up which is negative. Please schedule follow-up appointment with neurology in 4 to 6 weeks for further neurocognitive testing  You were also evaluated by psychiatry. You are initiated on Seroquel while inpatient which we will continue on discharge. We will also continue melatonin to help control your sleep-wake cycle.   It was felt he would benefit from outpatient follow-up with higinio in the future  Other than the addition of the Seroquel and melatonin there have been no changes to your prehospital medication regimen 190.5

## 2023-11-16 NOTE — PLAN OF CARE
Problem: PHYSICAL THERAPY ADULT  Goal: Performs mobility at highest level of function for planned discharge setting. See evaluation for individualized goals. Description: Treatment/Interventions: Functional transfer training, LE strengthening/ROM, Elevations, Therapeutic exercise, Endurance training, Cognitive reorientation, Patient/family training, Equipment eval/education, Bed mobility, Gait training, Spoke to nursing  Equipment Recommended: Masha Galan (continue RW use)       See flowsheet documentation for full assessment, interventions and recommendations. Outcome: Progressing  Note: Prognosis: Fair  Problem List: Decreased strength, Decreased endurance, Impaired balance, Decreased mobility, Decreased coordination, Decreased cognition, Pain  Assessment: Pt seen for PT treatment session this date with interventions consisting of therapeutic activity to reduce fall risk and preserve skin integrity consisting of training: bed mobility, supine<>sit transfers, sit<>stand transfers, static sitting tolerance at EOB for <5 minutes w/ B UE support, and vc and tactile cues for static sitting posture faciliation. Pt agreeable to PT treatment session upon arrival, pt found supine in bed w/ HOB elevated, A&O x 2 . In comparison to previous session, pt with improvements in cognition . Post session: pt returned BTB, bed alarm engaged, all needs in reach, and RN notified of session findings/recommendations. Continued recommendation of moderate resource intensity at time of d/c in order to maximize pt's functional independence and safety w/ mobility. Pt continues to be functioning below baseline level, and remains limited 2* factors listed above and including weakness, pain, decreased endurance, activity intolerance, impaired cognition. PT will continue to see pt during current hospitalization in order to address the deficits listed above and provide interventions consistent w/ POC in effort to achieve STGs.   Barriers to Discharge: Decreased caregiver support     Rehab Resource Intensity Level, PT: II (Moderate Resource Intensity)    See flowsheet documentation for full assessment.

## 2023-11-16 NOTE — CONSULTS
TeleConsultation - [id number] [facility] Geoffrey Hinkle 80 y.o. female MRN: [id number]  Unit/Bed#: -01 Encounter: [id number]        REQUIRED DOCUMENTATION:     1. This service was provided via Telemedicine. 2. Provider located at [facility]. 3. TeleMed provider: Shayan Walker MD.  4. Identify all parties in room with patient during tele consult:  PHAM Mckeon for safety   5. Patient was then informed that this was a Telemedicine visit and that the exam was being conducted confidentially over secure lines. My office door was closed. No one else was in the room. Patient acknowledged consent and understanding of privacy and security of the Telemedicine visit, and gave us permission to have the assistant stay in the room in order to assist with the history and to conduct the exam.  I informed the patient that I have reviewed their record in Epic and presented the opportunity for them to ask any questions regarding the visit today. The patient agreed to participate. Assessment/Plan     Present on Admission:   Type 1 diabetes mellitus with hypoglycemia (HCC)   Acute metabolic encephalopathy   Hypomagnesemia   Hypothyroidism    Assessment:  80year old female with no pphx history in hospital after change in mental status. Pt with waxing and waning agitaiton and poor memory/insight into her behavior. Pt not diagnosed with dementia and hx of AMS with UTIs or other medical conditions. Family hesitant to treat for dementia, pt denies having dementia symptoms. Pt being sent to rehab facility      Delirium- resolved  Neurocognitive decline- dementia  Adjustment disorder, with behavioral disturbance    Treatment Plan:  patient's waxing and waning agitation as well as fluctuating disorientation is suggestive of delirium superimposed on worsening neurocognitive decline. Pt delirium resolved.     Refer to neuro and o/p [facility] after discharge for follow up- likely will need continuous adjustments due to progressive nature of dementia      Planned Medication Changes:    Continue Seroquel 25 mg qhs  For acute agitation can use Seroquel 12.5 mg to 25 mg q4 PRN  - can also use Haldol 2 mg IV/IM q 4 if unable to use po  - avoid benzos/anticholinergics- can worsen delirium/dementia  - hospital protocol for pt with dementia and AMS    Current Medications:     Current Facility-Administered Medications   Medication Dose Route Frequency Provider Last Rate    acetaminophen  650 mg Oral Q4H PRN Anabelle Keene PA-C      vitamin C  500 mg Oral BID Anabelle Keene PA-C      cyanocobalamin  1,000 mcg Oral Daily Osman Alexander PA-C      enoxaparin  40 mg Subcutaneous Daily Anabelle Keene PA-C      hydrALAZINE  10 mg Intravenous Q6H PRN Juan Daniel Skelton PA-C      insulin glargine  5 Units Subcutaneous HS Osman Alexander PA-C      insulin glargine  7 Units Subcutaneous QAM Osman Alexander PA-C      insulin lispro  1-5 Units Subcutaneous TID AC Osman Alexander PA-C      insulin lispro  1-5 Units Subcutaneous HS Anabelle Keene PA-C      levothyroxine  112 mcg Oral QAM Osman Alexander PA-C      melatonin  6 mg Oral HS Avelina Goyal PA-C      multivitamin stress formula  1 tablet Oral Daily Osman Alexander PA-C      ondansetron  4 mg Intravenous Q6H PRN Anablele Keene PA-C      QUEtiapine  25 mg Oral HS Osman Alexander PA-C         Risks / Benefits of Treatment:    Risks, benefits, and possible side effects of medications explained to patient and patient verbalizes understanding. Other treatment modalities recommended as indicated:    outpatient referral      Inpatient consult to Psychiatry  Consult performed by:  Eamon Kirby MD  Consult ordered by: Anabelle Keene PA-C        Physician Requesting Consult: Reina Elias MD  Principal Problem:Acute metabolic encephalopathy    Reason for Consult: dementia      History of Present Illness      80 y.o. female with a PMH of DM type 1, hypothyroidism who presents with confusion. Per the patient's son in law, the patient reported a fall at home whch prompted him to go to her house however he sates she didn't fall but was confused. He states she was confused last night as well and they tried to get her to go to the ED last night however she refused. Given she had confusion today again he brought her to the ED for evaluation as he was concerned she had an UTI as this has caused her to be confused in the past. He states at one moment she was completely normal then she will talk about an event that occurred years ago as if it just happened today. Patient seen via telepsych  Patient says she is in hospital because her daughter says she was unstable. She is upset about being in the hospital and says she was never confused. She says nothing happened. She says she lives alone and has people come to help her cook and shop. She denies any confusion, denies being upset or getting lost.  She says she is going back home to her house after discharge. Pamela Gourd any recent falls. She denies any psychiatric medications. Denies seeing a psychiatrist.  She says she is unclear about her discharge plan. She denies ever being upset or agitated and says they put her in restraints. She says she was told she was upset, but does not remember exactly what happened. She says she only remembers people telling her she was too upset and didn’t want to be told she didn’t know what was going on. Spoke with RN- was with her on Monday until today. Mon night she had to go in restraints. She was very rude and inappropriate, cursing at staff. She is aaox3 but gets irritable and agitated. The daughter is concerned because this happens with UTI and low sugar, but no infection this time. Daughter is in denial about neurocognitive decline.      Psychiatric Review Of Systems:    Denies except for in HPI    Historical Information     Past Psychiatric History:     denies    Substance Abuse History:    denies    Family Psychiatric History:      denies    Social History:  Lives alone  Has 2 adult children  Used to work as  for psychiatrist  Traumatic History:   denies    Past Medical History:   Diagnosis Date    Ambulates with cane     Cancer ([address])     Chronic pain disorder     knees/ shoulders (gets inj every 3 mos)    Closed intertrochanteric fracture of right femur ([address]) 5/26/2020    Controlled type 2 diabetes mellitus with diabetic polyneuropathy, with long-term current use of insulin ([address]) 5/21/2008    Diabetes mellitus ([address])     Diabetic polyneuropathy ([address]) 5/21/2008    ICD10 clean up    Disease of thyroid gland     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    HL (hearing loss)     Hodgkin's disease ([address]) 2008    Left neck, had radiation    Hypertension     Neuropathy     Osteoporosis     RA (rheumatoid arthritis) ([address])     Traumatic rhabdomyolysis ([address]) 10/17/2022       Medical Review Of Systems:    Review of Systems    Meds/Allergies     all current active meds have been reviewed  No Known Allergies    Objective     Vital signs in last 24 hours:  Temp:  [98.2 °F (36.8 °C)-98.6 °F (37 °C)] 98.2 °F (36.8 °C)  HR:  [91-99] 91  Resp:  [18] 18  BP: (129-141)/(64-71) 140/71      Intake/Output Summary (Last 24 hours) at 11/16/2023 1130  Last data filed at 11/16/2023 0900  Gross per 24 hour   Intake 820 ml   Output --   Net 820 ml       Mental Status Evaluation:    Appearance:  age appropriate   Behavior:  guarded   Speech:  normal pitch and normal volume   Mood:  irritable   Affect:  constricted   Language: naming objects   Thought Process:  normal   Associations intact associations   Thought Content:  normal   Perceptual Disturbances: None   Risk Potential: Suicidal Ideations none  Homicidal Ideations none  Potential for Aggression No   Sensorium:  person and place   Cognition:  Mildly impaired at times   Consciousness:  alert and awake    Attention: attention span and concentration were age appropriate           Insight:  fair   Judgment: fair     Lab Results: I have personally reviewed all pertinent laboratory/tests results. Most Recent Labs:   Lab Results   Component Value Date    WBC 9.63 11/16/2023    RBC 3.78 (L) 11/16/2023    HGB 12.4 11/16/2023    HCT 41.4 11/16/2023     11/16/2023    RDW 13.2 11/16/2023    NEUTROABS 7.22 11/16/2023    SODIUM 137 11/16/2023    K 3.8 11/16/2023     11/16/2023    CO2 23 11/16/2023    BUN 24 11/16/2023    CREATININE 0.63 11/16/2023    GLUC 81 11/16/2023    GLUF 106 (H) 06/30/2023    CALCIUM 8.4 11/16/2023    AST 24 11/14/2023    ALT 15 11/14/2023    ALKPHOS 65 11/14/2023    TP 6.6 11/14/2023    ALB 4.0 11/14/2023    TBILI 0.83 11/14/2023    CHOLESTEROL 146 07/11/2022    HDL 66 07/11/2022    TRIG 88 07/11/2022    LDLCALC 62 07/11/2022    NONHDLC 119 07/01/2022    AMMONIA 25 11/14/2023    WPY6WTGANFTO 3.173 11/13/2023    FREET4 1.39 (H) 09/20/2023    HGBA1C 8.9 (H) 05/26/2023     05/26/2023       Imaging Studies: MRI brain wo contrast    Result Date: 11/16/2023  Narrative: MRI BRAIN WITHOUT CONTRAST INDICATION: Altered mental status. COMPARISON:   7/11/2022 and 11/13/2023. TECHNIQUE:  Multiplanar, multisequence imaging of the brain was performed. IMAGE QUALITY:  Diagnostic. FINDINGS: BRAIN PARENCHYMA: Periventricular and subcortical T2/FLAIR hyperintense foci likely to represent microangiopathic disease. Few white matter lesions in the debby. No restricted diffusion. Punctate focus of susceptibility artifact in the left frontoparietal white matter suggesting nonspecific chronic microhemorrhage. No evidence of acute hemorrhage or mass effect. VENTRICLES: No hydrocephalus or extra-axial collection. SELLA AND PITUITARY GLAND:  Normal. ORBITS: Lens implants. PARANASAL SINUSES: Clear. VASCULATURE:  Evaluation of the major intracranial vasculature demonstrates appropriate flow voids. CALVARIUM AND SKULL BASE: Left mastoid effusion.  EXTRACRANIAL SOFT TISSUES: No nasopharyngeal mass. Impression: . No evidence of acute infarct, intracranial hemorrhage or mass Workstation performed: [id number]     CT head without contrast    Result Date: 11/13/2023  Narrative: CT BRAIN - WITHOUT CONTRAST INDICATION:   Increasing confusion. COMPARISON: 7/20/2023 TECHNIQUE:  CT examination of the brain was performed. Multiplanar 2D reformatted images were created from the source data. Radiation dose length product (DLP) for this visit:  789.34 mGy-cm . This examination, like all CT scans performed in the [facility], was performed utilizing techniques to minimize radiation dose exposure, including the use of iterative  reconstruction and automated exposure control. IMAGE QUALITY:  Diagnostic. FINDINGS: PARENCHYMA: Decreased attenuation is noted in periventricular and subcortical white matter demonstrating an appearance that is statistically most likely to represent moderate microangiopathic change. No CT signs of acute infarction. No intracranial mass, mass effect or midline shift. No acute parenchymal hemorrhage. VENTRICLES AND EXTRA-AXIAL SPACES:  Normal for the patient's age. VISUALIZED ORBITS: Normal visualized orbits. PARANASAL SINUSES: Normal visualized paranasal sinuses. CALVARIUM AND EXTRACRANIAL SOFT TISSUES:  Normal.     Impression: No acute intracranial abnormality. Chronic microangiopathic changes. Workstation performed: [id number]     EKG/Pathology/Other Studies:   Lab Results   Component Value Date    VENTRATE 73 11/13/2023    ATRIALRATE 73 11/13/2023    PRINT 150 11/13/2023    QRSDINT 78 11/13/2023    QTINT 386 11/13/2023    QTCINT 425 11/13/2023    PAXIS 60 11/13/2023    QRSAXIS 45 11/13/2023    TWAVEAXIS 55 11/13/2023        Code Status: Level 3 - DNAR and DNI  Advance Directive and Living Will: Yes    Power of : Yes  POLST:      Screenings:    1.  Nutrition Screening  Nutrition Assessment (completed by Staff):      2. Pain Screening  Pain Screening: Pain Assessment  Pain Assessment Tool: 0-10  Pain Score: 4  Pain Location/Orientation: Location: Generalized  Pain Rating: FLACC (Rest) - Face: No particular expression or smile  Pain Rating: FLACC (Rest) - Legs: Normal position or relaxed  Pain Rating: FLACC (Rest) - Activity: Lying quietly, normal position, moves easily  Pain Rating: FLACC (Rest) - Cry: No cry (awake or asleep)  Pain Rating: FLACC (Rest) - Consolability: Content, relaxed  Score: FLACC (Rest): 0  Pain Rating: FLACC (Activity) - Face: No particular expression or smile  Pain Rating: FLACC (Activity) - Legs: Normal position or relaxed  Pain Rating: FLACC (Activity) - Activity: Lying quietly, normal position, moves easily  Pain Rating: FLACC (Activity) - Cry: Moans or whimpers, occasional complaint, occasional verbal outburst or grunt  Pain Rating: FLACC (Activity) - Consolability: Content, relaxed  Score: FLACC (Activity): 1    3. Suicide Screening  ED Crisis Suicide Risk Assessment:      C-SSRS Screening (Nursing Assessment - recent):      Counseling / Coordination of Care: Total floor / unit time spent today 30 minutes. Greater than 50% of total time was spent with the patient and / or family counseling and / or coordination of care.  A description of the counseling / coordination of care: spoke with RN, attempted to call daughter and chart review

## 2023-11-16 NOTE — PHYSICAL THERAPY NOTE
Physical Therapy Treatment Session Note    Patient's Name: Carlos Lewis    Admitting Diagnosis  Hypomagnesemia [E83.42]  Abnormal gait [R26.9]  Acute confusion [R41.0]  UTI (urinary tract infection) [N39.0]  Ambulatory dysfunction [R26.2]    Problem List  Patient Active Problem List   Diagnosis    Hyperlipidemia    History of hypertension    Postoperative hypothyroidism    Type 1 diabetes mellitus with hypoglycemia ([address])    Primary osteoarthritis of both knees    Primary osteoarthritis of right shoulder    Soft tissue radionecrosis    Osteoradionecrosis of temporal bone     Abnormal CT of the abdomen    Gastroesophageal reflux disease without esophagitis    Acute metabolic encephalopathy    Hypoglycemia    Hypothermia    Hypokalemia    History of Hodgkin's lymphoma    Hypothyroidism    Hypomagnesemia    Macrocytosis    Low blood pressure    Debility    Severe protein-calorie malnutrition ([address])       Past Medical History  Past Medical History:   Diagnosis Date    Ambulates with cane     Cancer ([address])     Chronic pain disorder     knees/ shoulders (gets inj every 3 mos)    Closed intertrochanteric fracture of right femur ([address]) 5/26/2020    Controlled type 2 diabetes mellitus with diabetic polyneuropathy, with long-term current use of insulin ([address]) 5/21/2008    Diabetes mellitus ([address])     Diabetic polyneuropathy ([address]) 5/21/2008    ICD10 clean up    Disease of thyroid gland     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    HL (hearing loss)     Hodgkin's disease ([address]) 2008    Left neck, had radiation    Hypertension     Neuropathy     Osteoporosis     RA (rheumatoid arthritis) ([address])     Traumatic rhabdomyolysis ([address]) 10/17/2022       Past Surgical History  Past Surgical History:   Procedure Laterality Date    ADENOIDECTOMY      APPENDECTOMY      CATARACT EXTRACTION      CATARACT EXTRACTION, BILATERAL      CHOLECYSTECTOMY      FRACTURE SURGERY Right     hip    MOUTH SURGERY      oral cancer left lower lip    OVARY SURGERY CT ADJT TIS TRNS/REARGMT F/C/C/M/N/A/G/H/F 10SQCM/< N/A 3/28/2022    Procedure: Adjacent tissue transfer face;  Surgeon: Charu Solorio MD;  Location: [facility];  Service: ENT    CT OPTX FEM SHFT FX W/INSJ IMED IMPLT W/WO SCREW Right 5/28/2020    Procedure: INSERTION NAIL IM FEMUR ANTEGRADE (TROCHANTERIC); Surgeon: Mandy Daniel DO;  Location: [facility] MAIN OR;  Service: Orthopedics    CT TRANSMASTOID ANTROTOMY Left 3/28/2022    Procedure: MASTOIDECTOMY;  Surgeon: Charu Solorio MD;  Location: [facility];  Service: ENT    TONSILLECTOMY      TOTAL THYROIDECTOMY  2008    Performed after left neck dissection and left oral cancer diagnosis        11/16/23 1233   PT Last Visit   PT Visit Date 11/16/23   Note Type   Note Type Treatment   Pain Assessment   Pain Assessment Tool 0-10   Pain Score 5   Pain Location/Orientation Orientation: Left; Location: Leg   Pain Onset/Description Onset: Ongoing;Frequency: Constant/Continuous; Descriptor: Aching   Effect of Pain on Daily Activities yes   Patient's Stated Pain Goal No pain   Hospital Pain Intervention(s) Repositioned; Emotional support; Environmental changes   Multiple Pain Sites No   General   Chart Reviewed Yes   Additional Pertinent History Upon passing by Shakira's room, the callbell was lit. She requested to use the toilet. Response to Previous Treatment Patient unable to report, no changes reported from family or staff   Family/Caregiver Present No   Cognition   Overall Cognitive Status Impaired   Arousal/Participation Alert   Attention Attends with cues to redirect   Orientation Level Oriented to person;Disoriented to place;Oriented to time;Disoriented to situation   Memory Decreased short term memory;Decreased recall of recent events   Following Commands Follows one step commands with increased time or repetition   Comments Janeen Reeves was agreeable to attempting commode usage.    Subjective   Subjective "I can try"   Bed Mobility   Rolling R 3  Moderate assistance   Additional items Assist x 1;Bedrails; Increased time required;Verbal cues;LE management   Rolling L 3  Moderate assistance   Additional items Assist x 1;Bedrails; Increased time required;Verbal cues;LE management   Supine to Sit 3  Moderate assistance   Additional items Assist x 2;HOB elevated; Bedrails; Increased time required;Verbal cues;LE management   Sit to Supine 3  Moderate assistance   Additional items Assist x 2;Bedrails; Increased time required;Verbal cues;LE management   Additional Comments Verbal cues for bedrail utilization and proper body mechanics. Significant truncal rigidity noted with external cues for postural support. Transfers   Sit to Stand 2  Maximal assistance   Additional items Assist x 2;Bedrails; Increased time required;Verbal cues  (RW utilization)   Stand to Sit 2  Maximal assistance   Additional items Assist x 2;Bedrails; Increased time required;Verbal cues   Stand pivot Unable to assess   Additional Comments Verbal cues for bedrail utilization and truncal flexion to achieve static standing. Medina Harris could not safely achieve edge of bed standing and was assisted back to bed. Ambulation/Elevation   Gait pattern Not tested  (unable to safely assess at this time)   Balance   Static Sitting Poor +   Dynamic Sitting Poor   Static Standing Poor -   Dynamic Standing   (unable to advance for assessment)   Endurance Deficit   Endurance Deficit Yes   Activity Tolerance   Activity Tolerance Patient limited by fatigue;Patient limited by pain   Nurse Made Aware Yes, Annemarie HOWELL present for mobility. I appreciated the care coordination. Assessment   Prognosis Fair   Problem List Decreased strength;Decreased endurance; Impaired balance;Decreased mobility; Decreased coordination;Decreased cognition;Pain   Assessment Pt seen for PT treatment session this date with interventions consisting of therapeutic activity to reduce fall risk and preserve skin integrity consisting of training: bed mobility, supine<>sit transfers, sit<>stand transfers, static sitting tolerance at EOB for <5 minutes w/ B UE support, and vc and tactile cues for static sitting posture faciliation. Pt agreeable to PT treatment session upon arrival, pt found supine in bed w/ HOB elevated, A&O x 2 . In comparison to previous session, pt with improvements in cognition . Post session: pt returned BTB, bed alarm engaged, all needs in reach, and RN notified of session findings/recommendations. Continued recommendation of moderate resource intensity at time of d/c in order to maximize pt's functional independence and safety w/ mobility. Pt continues to be functioning below baseline level, and remains limited 2* factors listed above and including weakness, pain, decreased endurance, activity intolerance, impaired cognition. PT will continue to see pt during current hospitalization in order to address the deficits listed above and provide interventions consistent w/ POC in effort to achieve STGs. Barriers to Discharge Decreased caregiver support   Goals   Patient Goals to start her rehab today "if it helps me get better"   STG Expiration Date 11/23/23   Short Term Goal #1 STGs remain appropriate   PT Treatment Day 1   Plan   Treatment/Interventions Functional transfer training;Elevations; Endurance training;Cognitive reorientation;Patient/family training;Bed mobility;Spoke to nursing   Progress Slow progress, decreased activity tolerance   PT Frequency 3-5x/wk   Discharge Recommendation   Rehab Resource Intensity Level, PT II (Moderate Resource Intensity)   Additional Comments Upon conclusion, Julianne was resting in bed with all needs within reach. The bed alarm was engaged.    AM-PAC Basic Mobility Inpatient   Turning in Flat Bed Without Bedrails 2   Lying on Back to Sitting on Edge of Flat Bed Without Bedrails 2   Moving Bed to Chair 1   Standing Up From Chair Using Arms 1   Walk in Room 1   Climb 3-5 Stairs With Railing 1   Basic Mobility Inpatient Raw Score 8   Turning Head Towards Sound 3   Follow Simple Instructions 2   Low Function Basic Mobility Raw Score  13   Low Function Basic Mobility Standardized Score  20.14   Highest Level Of Mobility   -M Goal 3: Sit at edge of bed   -HLM Achieved 3: Sit at edge of bed     Treatment Time: 3051-7450    Siva Dumont, PT

## 2023-11-16 NOTE — PLAN OF CARE
Problem: Potential for Falls  Goal: Patient will remain free of falls  Description: INTERVENTIONS:  - Educate patient/family on patient safety including physical limitations  - Instruct patient to call for assistance with activity   - Consult OT/PT to assist with strengthening/mobility   - Keep Call bell within reach  - Keep bed low and locked with side rails adjusted as appropriate  - Keep care items and personal belongings within reach  - Initiate and maintain comfort rounds  - Make Fall Risk Sign visible to staff  - Offer Toileting every 2 Hours, in advance of need  - Initiate/Maintain bed alarm  - Apply yellow socks and bracelet for high fall risk patients  - Consider moving patient to room near nurses station  Outcome: Progressing     Problem: MOBILITY - ADULT  Goal: Maintain or return to baseline ADL function  Description: INTERVENTIONS:  -  Assess patient's ability to carry out ADLs; assess patient's baseline for ADL function and identify physical deficits which impact ability to perform ADLs (bathing, care of mouth/teeth, toileting, grooming, dressing, etc.)  - Assess/evaluate cause of self-care deficits   - Assess range of motion  - Assess patient's mobility; develop plan if impaired  - Assess patient's need for assistive devices and provide as appropriate  - Encourage maximum independence but intervene and supervise when necessary  - Involve family in performance of ADLs  - Assess for home care needs following discharge   - Consider OT consult to assist with ADL evaluation and planning for discharge  - Provide patient education as appropriate  Outcome: Progressing  Goal: Maintains/Returns to pre admission functional level  Description: INTERVENTIONS:  - Perform BMAT or MOVE assessment daily.   - Set and communicate daily mobility goal to care team and patient/family/caregiver.    - Collaborate with rehabilitation services on mobility goals if consulted  - Out of bed for toileting  - Record patient progress and toleration of activity level   Outcome: Progressing     Problem: PAIN - ADULT  Goal: Verbalizes/displays adequate comfort level or baseline comfort level  Description: Interventions:  - Encourage patient to monitor pain and request assistance  - Assess pain using appropriate pain scale  - Administer analgesics based on type and severity of pain and evaluate response  - Implement non-pharmacological measures as appropriate and evaluate response  - Consider cultural and social influences on pain and pain management  - Notify physician/advanced practitioner if interventions unsuccessful or patient reports new pain  Outcome: Progressing     Problem: INFECTION - ADULT  Goal: Absence or prevention of progression during hospitalization  Description: INTERVENTIONS:  - Assess and monitor for signs and symptoms of infection  - Monitor lab/diagnostic results  - Monitor all insertion sites, i.e. indwelling lines, tubes, and drains  - Monitor endotracheal if appropriate and nasal secretions for changes in amount and color  - [place] appropriate cooling/warming therapies per order  - Administer medications as ordered  - Instruct and encourage patient and family to use good hand hygiene technique  - Identify and instruct in appropriate isolation precautions for identified infection/condition  Outcome: Progressing  Goal: Absence of fever/infection during neutropenic period  Description: INTERVENTIONS:  - Monitor WBC    Outcome: Progressing     Problem: SAFETY ADULT  Goal: Patient will remain free of falls  Description: INTERVENTIONS:  - Educate patient/family on patient safety including physical limitations  - Instruct patient to call for assistance with activity   - Consult OT/PT to assist with strengthening/mobility   - Keep Call bell within reach  - Keep bed low and locked with side rails adjusted as appropriate  - Keep care items and personal belongings within reach  - Initiate and maintain comfort rounds  - Make Fall Risk Sign visible to staff  - Offer Toileting every 2 Hours, in advance of need  - Initiate/Maintain bed alarm  - Apply yellow socks and bracelet for high fall risk patients  - Consider moving patient to room near nurses station  Outcome: Progressing  Goal: Maintain or return to baseline ADL function  Description: INTERVENTIONS:  -  Assess patient's ability to carry out ADLs; assess patient's baseline for ADL function and identify physical deficits which impact ability to perform ADLs (bathing, care of mouth/teeth, toileting, grooming, dressing, etc.)  - Assess/evaluate cause of self-care deficits   - Assess range of motion  - Assess patient's mobility; develop plan if impaired  - Assess patient's need for assistive devices and provide as appropriate  - Encourage maximum independence but intervene and supervise when necessary  - Involve family in performance of ADLs  - Assess for home care needs following discharge   - Consider OT consult to assist with ADL evaluation and planning for discharge  - Provide patient education as appropriate  Outcome: Progressing  Goal: Maintains/Returns to pre admission functional level  Description: INTERVENTIONS:  - Perform BMAT or MOVE assessment daily.   - Set and communicate daily mobility goal to care team and patient/family/caregiver.    - Collaborate with rehabilitation services on mobility goals if consulted  - Out of bed for toileting  - Record patient progress and toleration of activity level   Outcome: Progressing     Problem: DISCHARGE PLANNING  Goal: Discharge to home or other facility with appropriate resources  Description: INTERVENTIONS:  - Identify barriers to discharge w/patient and caregiver  - Arrange for needed discharge resources and transportation as appropriate  - Identify discharge learning needs (meds, wound care, etc.)  - Arrange for interpretive services to assist at discharge as needed  - Refer to Case Management Department for coordinating discharge planning if the patient needs post-hospital services based on physician/advanced practitioner order or complex needs related to functional status, cognitive ability, or social support system  Outcome: Progressing     Problem: Knowledge Deficit  Goal: Patient/family/caregiver demonstrates understanding of disease process, treatment plan, medications, and discharge instructions  Description: Complete learning assessment and assess knowledge base. Interventions:  - Provide teaching at level of understanding  - Provide teaching via preferred learning methods  Outcome: Progressing     Problem: SAFETY,RESTRAINT: NV/NON-SELF DESTRUCTIVE BEHAVIOR  Goal: Remains free of harm/injury (restraint for non violent/non self-detsructive behavior)  Description: INTERVENTIONS:  - Instruct patient/family regarding restraint use   - Assess and monitor physiologic and psychological status   - Provide interventions and comfort measures to meet assessed patient needs   - Identify and implement measures to help patient regain control  - Assess readiness for release of restraint   Outcome: Progressing  Goal: Returns to optimal restraint-free functioning  Description: INTERVENTIONS:  - Assess the patient's behavior and symptoms that indicate continued need for restraint  - Identify and implement measures to help patient regain control  - Assess readiness for release of restraint   Outcome: Progressing     Problem: Nutrition/Hydration-ADULT  Goal: Nutrient/Hydration intake appropriate for improving, restoring or maintaining nutritional needs  Description: Monitor and assess patient's nutrition/hydration status for malnutrition. Collaborate with interdisciplinary team and initiate plan and interventions as ordered. Monitor patient's weight and dietary intake as ordered or per policy. Utilize nutrition screening tool and intervene as necessary. Determine patient's food preferences and provide high-protein, high-caloric foods as appropriate. INTERVENTIONS:  - Monitor oral intake, urinary output, labs, and treatment plans  - Assess nutrition and hydration status and recommend course of action  - Evaluate amount of meals eaten  - Assist patient with eating if necessary   - Allow adequate time for meals  - Recommend/ encourage appropriate diets, oral nutritional supplements, and vitamin/mineral supplements  - Order, calculate, and assess calorie counts as needed  - Recommend, monitor, and adjust tube feedings and TPN/PPN based on assessed needs  - Assess need for intravenous fluids  - Provide specific nutrition/hydration education as appropriate  - Include patient/family/caregiver in decisions related to nutrition  Outcome: Progressing     Problem: Prexisting or High Potential for Compromised Skin Integrity  Goal: Skin integrity is maintained or improved  Description: INTERVENTIONS:  - Identify patients at risk for skin breakdown  - Assess and monitor skin integrity  - Assess and monitor nutrition and hydration status  - Monitor labs   - Assess for incontinence   - Turn and reposition patient  - Assist with mobility/ambulation  - Relieve pressure over bony prominences  - Avoid friction and shearing  - Provide appropriate hygiene as needed including keeping skin clean and dry  - Evaluate need for skin moisturizer/barrier cream  - Collaborate with interdisciplinary team   - Patient/family teaching  - Consider wound care consult   Outcome: Progressing

## 2023-11-16 NOTE — ASSESSMENT & PLAN NOTE
Patient with confusion and weakness per family  PT/OT evaluated the patient: Rehab Resource Intensity Level, PT: II (Moderate Resource Intensity)   Medically stable for discharge today  Patient is being discharged to [facility] rehab as arranged by case management-daughter aware and in agreement

## 2023-11-16 NOTE — OCCUPATIONAL THERAPY NOTE
11/16/23 1054   OT Last Visit   OT Visit Date 11/16/23   Note Type   Note Type Cancelled Session   Cancel Reasons Medical status  (patient not appropriate for purposeful participation in treatment at this time)       SAMM Leiva/DAI

## 2023-11-16 NOTE — ASSESSMENT & PLAN NOTE
Lab Results   Component Value Date    HGBA1C 8.9 (H) 05/26/2023       Recent Labs     11/15/23  1504 11/15/23  2048 11/16/23  0700 11/16/23  1107   POCGLU 105 76 80 209*         Blood Sugar Average: Last 72 hrs:  (P) [id number]    Please continue outpatient diabetic regimen on discharge

## 2023-11-16 NOTE — ASSESSMENT & PLAN NOTE
Malnutrition Findings:   Adult Malnutrition type: Chronic illness  Adult Degree of Malnutrition: Other severe protein calorie malnutrition  Malnutrition Characteristics: Muscle loss, Weight loss         Appreciate input of nutrition  Has been tolerating diet  Patient being discharged to postacute rehab today       360 Statement: Malnutrition related to chronic illness as evidenced by moderate temporal muscle loss, severe clavicle muscle loss, > 10% bodyweight loss in 6 months. TO treat with oral diet and nutrition supplements as tolerated. BMI Findings: Body mass index is 19.79 kg/m².

## 2023-11-16 NOTE — CASE MANAGEMENT
Case Management Discharge Planning Note    Patient name Ashli Rasmussen  Location /-93 MRN [id number]  : 1940 Date 2023       Current Admission Date: 2023  Current Admission Diagnosis:Acute metabolic encephalopathy   Patient Active Problem List    Diagnosis Date Noted    Severe protein-calorie malnutrition ([address]) 2023    Debility 2023    Macrocytosis 2023    Low blood pressure 2023    Hypomagnesemia 2023    Hypoglycemia 2023    Hypothermia 2023    Hypokalemia 2023    History of Hodgkin's lymphoma 2023    Hypothyroidism     Acute metabolic encephalopathy     Gastroesophageal reflux disease without esophagitis 10/17/2022    Abnormal CT of the abdomen 07/10/2022    Soft tissue radionecrosis 2022    Osteoradionecrosis of temporal bone  2022    Primary osteoarthritis of right shoulder 2021    Primary osteoarthritis of both knees 2020    Postoperative hypothyroidism 2015    Hyperlipidemia 2014    History of hypertension 10/10/2013    Type 1 diabetes mellitus with hypoglycemia ([address]) 2008      LOS (days): 3  Geometric Mean LOS (GMLOS) (days): 4.50  Days to GMLOS:1.6     OBJECTIVE:  Risk of Unplanned Readmission Score: 28.17         Current admission status: Inpatient   Preferred Pharmacy:   [address] [place] [id number]  Phone: [phone] Fax: [phone]    Primary Care Provider: Alanna Red MD    Primary Insurance: MEDICARE  Secondary Insurance: AARP    DISCHARGE DETAILS:  Contacts  Patient Contacts: Milton Abraham dtr  Relationship to Patient[de-identified] Family  Contact Method: Phone  Phone Number: [phone]  Reason/Outcome: Discharge Planning    Pt daughter Gloria Irwin aware of plan for discharge and remains in agreement with pt going to [facility] today for STR.      Treatment Team Recommendation: Short Term Rehab  Discharge Destination Plan[de-identified] Short Term Rehab  Transport at Discharge : BLS Ambulance        Transported by [employer] and Unit #): Debra S  ETA of Transport (Date): 11/16/23  ETA of Transport (Time): 1400           Additional Comments: Pt stable for discharge today per Ana from [facility]. Pt accepted to [facility] post Acute SNF for STR. Per Delfin Suresh in admissions they are able to accept to today. They do not need a covid test. Transport schedued for 1400 with [facility]. Pt nurse Yesi aware and will call report. Ana from [facility] also aware.     Accepting Facility Name, [address] [place] [address] : [address] Tyron javed, [facility]  Receiving Facility/Agency Phone Number: [phone]  Facility/Agency Fax Number: Fax: [phone]

## 2023-11-16 NOTE — NURSING NOTE
Report called to receiving facility. Discharge instructions discussed with receiving facility. Daughter notified of discharge. 160

## 2023-11-16 NOTE — ASSESSMENT & PLAN NOTE
Present on admission   Per family patient had weakness and worsening confusion 2 to 3 days prior to arrival   CT head: Negative for acute intracranial findings; chronic microangiopathic changes noted  UA negative  TSH: 3.173, LFT's WNL, B12 488, and ammonia level 25  Lactic acid 0.05  MRI brain negative for acute infarct, intracranial hemorrhage or mass  Patients daughter reported confusion possibly due to tramadol which she was taking prehospital.  This medication is on hold-will not resume on discharge  Neurology followed, appreciate recommendations. Patient to have follow-up appointment in 4 to 6 weeks for further neurocognitive testing  Psychiatry evaluated patient-we will continue Seroquel on discharge today. Recommending patient follow-up with geropsych and neurology on discharge for further neurocognitive testing  Patient is alert and oriented x3 today.   Medically stable for discharge  Patient will be discharged to [facility] rehab today as arranged by case management

## 2023-11-18 LAB — VIT B1 BLD-SCNC: 157.9 NMOL/L (ref 66.5–200)

## 2023-12-15 ENCOUNTER — HOSPITAL ENCOUNTER (INPATIENT)
Facility: HOSPITAL | Age: 83
LOS: 7 days | End: 2023-12-22
Attending: EMERGENCY MEDICINE | Admitting: HOSPITALIST
Payer: MEDICARE

## 2023-12-15 ENCOUNTER — APPOINTMENT (EMERGENCY)
Dept: CT IMAGING | Facility: HOSPITAL | Age: 83
End: 2023-12-15
Payer: MEDICARE

## 2023-12-15 ENCOUNTER — APPOINTMENT (EMERGENCY)
Dept: RADIOLOGY | Facility: HOSPITAL | Age: 83
End: 2023-12-15
Payer: MEDICARE

## 2023-12-15 DIAGNOSIS — E03.9 HYPOTHYROIDISM, UNSPECIFIED TYPE: ICD-10-CM

## 2023-12-15 DIAGNOSIS — U07.1 COVID: ICD-10-CM

## 2023-12-15 DIAGNOSIS — E87.6 HYPOKALEMIA: ICD-10-CM

## 2023-12-15 DIAGNOSIS — R29.90 STROKE-LIKE SYMPTOMS: Primary | ICD-10-CM

## 2023-12-15 DIAGNOSIS — R53.1 GENERALIZED WEAKNESS: ICD-10-CM

## 2023-12-15 DIAGNOSIS — K21.9 GASTROESOPHAGEAL REFLUX DISEASE WITHOUT ESOPHAGITIS: Chronic | ICD-10-CM

## 2023-12-15 DIAGNOSIS — J18.9 PNEUMONIA: ICD-10-CM

## 2023-12-15 DIAGNOSIS — R41.0 DELIRIUM: ICD-10-CM

## 2023-12-15 DIAGNOSIS — I95.9 HYPOTENSION: ICD-10-CM

## 2023-12-15 DIAGNOSIS — A41.9 SEPSIS (HCC): ICD-10-CM

## 2023-12-15 DIAGNOSIS — R33.9 URINARY RETENTION: ICD-10-CM

## 2023-12-15 DIAGNOSIS — E87.20 LACTIC ACIDOSIS: ICD-10-CM

## 2023-12-15 DIAGNOSIS — E10.9 TYPE I DIABETES MELLITUS (HCC): ICD-10-CM

## 2023-12-15 PROBLEM — R47.1 DYSARTHRIA: Status: ACTIVE | Noted: 2023-12-15

## 2023-12-15 LAB
2HR DELTA HS TROPONIN: -4 NG/L
4HR DELTA HS TROPONIN: -1 NG/L
ABO GROUP BLD: NORMAL
ALBUMIN SERPL BCP-MCNC: 3.4 G/DL (ref 3.5–5)
ALP SERPL-CCNC: 107 U/L (ref 34–104)
ALT SERPL W P-5'-P-CCNC: 35 U/L (ref 7–52)
ANION GAP SERPL CALCULATED.3IONS-SCNC: 11 MMOL/L
APTT PPP: 35 SECONDS (ref 23–37)
AST SERPL W P-5'-P-CCNC: 79 U/L (ref 13–39)
BASOPHILS # BLD AUTO: 0.02 THOUSANDS/ÂΜL (ref 0–0.1)
BASOPHILS NFR BLD AUTO: 0 % (ref 0–1)
BILIRUB SERPL-MCNC: 0.31 MG/DL (ref 0.2–1)
BLD GP AB SCN SERPL QL: NEGATIVE
BUN SERPL-MCNC: 20 MG/DL (ref 5–25)
CALCIUM ALBUM COR SERPL-MCNC: 8.9 MG/DL (ref 8.3–10.1)
CALCIUM SERPL-MCNC: 8.4 MG/DL (ref 8.4–10.2)
CARDIAC TROPONIN I PNL SERPL HS: 10 NG/L
CARDIAC TROPONIN I PNL SERPL HS: 11 NG/L
CARDIAC TROPONIN I PNL SERPL HS: 7 NG/L
CHLORIDE SERPL-SCNC: 96 MMOL/L (ref 96–108)
CO2 SERPL-SCNC: 26 MMOL/L (ref 21–32)
CREAT SERPL-MCNC: 0.9 MG/DL (ref 0.6–1.3)
EOSINOPHIL # BLD AUTO: 0 THOUSAND/ÂΜL (ref 0–0.61)
EOSINOPHIL NFR BLD AUTO: 0 % (ref 0–6)
ERYTHROCYTE [DISTWIDTH] IN BLOOD BY AUTOMATED COUNT: 12.7 % (ref 11.6–15.1)
FLUAV RNA RESP QL NAA+PROBE: NEGATIVE
FLUBV RNA RESP QL NAA+PROBE: NEGATIVE
GFR SERPL CREATININE-BSD FRML MDRD: 59 ML/MIN/1.73SQ M
GLUCOSE SERPL-MCNC: 110 MG/DL (ref 65–140)
GLUCOSE SERPL-MCNC: 195 MG/DL (ref 65–140)
GLUCOSE SERPL-MCNC: 204 MG/DL (ref 65–140)
GLUCOSE SERPL-MCNC: 245 MG/DL (ref 65–140)
HCT VFR BLD AUTO: 37.1 % (ref 34.8–46.1)
HGB BLD-MCNC: 12 G/DL (ref 11.5–15.4)
IMM GRANULOCYTES # BLD AUTO: 0.09 THOUSAND/UL (ref 0–0.2)
IMM GRANULOCYTES NFR BLD AUTO: 1 % (ref 0–2)
INR PPP: 1 (ref 0.84–1.19)
LACTATE SERPL-SCNC: 1.3 MMOL/L (ref 0.5–2)
LACTATE SERPL-SCNC: 3.1 MMOL/L (ref 0.5–2)
LYMPHOCYTES # BLD AUTO: 0.94 THOUSANDS/ÂΜL (ref 0.6–4.47)
LYMPHOCYTES NFR BLD AUTO: 7 % (ref 14–44)
MCH RBC QN AUTO: 33 PG (ref 26.8–34.3)
MCHC RBC AUTO-ENTMCNC: 32.3 G/DL (ref 31.4–37.4)
MCV RBC AUTO: 102 FL (ref 82–98)
MONOCYTES # BLD AUTO: 0.82 THOUSAND/ÂΜL (ref 0.17–1.22)
MONOCYTES NFR BLD AUTO: 6 % (ref 4–12)
NEUTROPHILS # BLD AUTO: 12.58 THOUSANDS/ÂΜL (ref 1.85–7.62)
NEUTS SEG NFR BLD AUTO: 86 % (ref 43–75)
NRBC BLD AUTO-RTO: 0 /100 WBCS
PLATELET # BLD AUTO: 150 THOUSANDS/UL (ref 149–390)
PMV BLD AUTO: 10.9 FL (ref 8.9–12.7)
POTASSIUM SERPL-SCNC: 3.8 MMOL/L (ref 3.5–5.3)
PROCALCITONIN SERPL-MCNC: 53.31 NG/ML
PROT SERPL-MCNC: 5.9 G/DL (ref 6.4–8.4)
PROTHROMBIN TIME: 13.3 SECONDS (ref 11.6–14.5)
RBC # BLD AUTO: 3.64 MILLION/UL (ref 3.81–5.12)
RH BLD: POSITIVE
RSV RNA RESP QL NAA+PROBE: NEGATIVE
SARS-COV-2 RNA RESP QL NAA+PROBE: POSITIVE
SODIUM SERPL-SCNC: 133 MMOL/L (ref 135–147)
SPECIMEN EXPIRATION DATE: NORMAL
WBC # BLD AUTO: 14.45 THOUSAND/UL (ref 4.31–10.16)

## 2023-12-15 PROCEDURE — 0241U HB NFCT DS VIR RESP RNA 4 TRGT: CPT | Performed by: EMERGENCY MEDICINE

## 2023-12-15 PROCEDURE — 86901 BLOOD TYPING SEROLOGIC RH(D): CPT | Performed by: EMERGENCY MEDICINE

## 2023-12-15 PROCEDURE — 99285 EMERGENCY DEPT VISIT HI MDM: CPT

## 2023-12-15 PROCEDURE — 85610 PROTHROMBIN TIME: CPT | Performed by: EMERGENCY MEDICINE

## 2023-12-15 PROCEDURE — 84145 PROCALCITONIN (PCT): CPT | Performed by: EMERGENCY MEDICINE

## 2023-12-15 PROCEDURE — 36415 COLL VENOUS BLD VENIPUNCTURE: CPT | Performed by: EMERGENCY MEDICINE

## 2023-12-15 PROCEDURE — 85025 COMPLETE CBC W/AUTO DIFF WBC: CPT | Performed by: EMERGENCY MEDICINE

## 2023-12-15 PROCEDURE — 80053 COMPREHEN METABOLIC PANEL: CPT | Performed by: EMERGENCY MEDICINE

## 2023-12-15 PROCEDURE — 93005 ELECTROCARDIOGRAM TRACING: CPT

## 2023-12-15 PROCEDURE — 82948 REAGENT STRIP/BLOOD GLUCOSE: CPT

## 2023-12-15 PROCEDURE — 86900 BLOOD TYPING SEROLOGIC ABO: CPT | Performed by: EMERGENCY MEDICINE

## 2023-12-15 PROCEDURE — 99223 1ST HOSP IP/OBS HIGH 75: CPT | Performed by: HOSPITALIST

## 2023-12-15 PROCEDURE — 84484 ASSAY OF TROPONIN QUANT: CPT | Performed by: EMERGENCY MEDICINE

## 2023-12-15 PROCEDURE — 70496 CT ANGIOGRAPHY HEAD: CPT

## 2023-12-15 PROCEDURE — 85730 THROMBOPLASTIN TIME PARTIAL: CPT | Performed by: EMERGENCY MEDICINE

## 2023-12-15 PROCEDURE — 87040 BLOOD CULTURE FOR BACTERIA: CPT | Performed by: EMERGENCY MEDICINE

## 2023-12-15 PROCEDURE — 99291 CRITICAL CARE FIRST HOUR: CPT | Performed by: EMERGENCY MEDICINE

## 2023-12-15 PROCEDURE — 71045 X-RAY EXAM CHEST 1 VIEW: CPT

## 2023-12-15 PROCEDURE — 86850 RBC ANTIBODY SCREEN: CPT | Performed by: EMERGENCY MEDICINE

## 2023-12-15 PROCEDURE — 99291 CRITICAL CARE FIRST HOUR: CPT | Performed by: PSYCHIATRY & NEUROLOGY

## 2023-12-15 PROCEDURE — 83605 ASSAY OF LACTIC ACID: CPT | Performed by: EMERGENCY MEDICINE

## 2023-12-15 PROCEDURE — 70498 CT ANGIOGRAPHY NECK: CPT

## 2023-12-15 RX ORDER — INSULIN LISPRO 100 [IU]/ML
1-5 INJECTION, SOLUTION INTRAVENOUS; SUBCUTANEOUS
Status: DISCONTINUED | OUTPATIENT
Start: 2023-12-15 | End: 2023-12-22 | Stop reason: HOSPADM

## 2023-12-15 RX ORDER — QUETIAPINE FUMARATE 25 MG/1
25 TABLET, FILM COATED ORAL
Status: DISCONTINUED | OUTPATIENT
Start: 2023-12-15 | End: 2023-12-22 | Stop reason: HOSPADM

## 2023-12-15 RX ORDER — SODIUM CHLORIDE, SODIUM GLUCONATE, SODIUM ACETATE, POTASSIUM CHLORIDE, MAGNESIUM CHLORIDE, SODIUM PHOSPHATE, DIBASIC, AND POTASSIUM PHOSPHATE .53; .5; .37; .037; .03; .012; .00082 G/100ML; G/100ML; G/100ML; G/100ML; G/100ML; G/100ML; G/100ML
75 INJECTION, SOLUTION INTRAVENOUS CONTINUOUS
Status: DISCONTINUED | OUTPATIENT
Start: 2023-12-15 | End: 2023-12-18

## 2023-12-15 RX ORDER — PANTOPRAZOLE SODIUM 40 MG/1
40 TABLET, DELAYED RELEASE ORAL DAILY
Status: DISCONTINUED | OUTPATIENT
Start: 2023-12-16 | End: 2023-12-22 | Stop reason: HOSPADM

## 2023-12-15 RX ORDER — ENOXAPARIN SODIUM 100 MG/ML
30 INJECTION SUBCUTANEOUS DAILY
Status: DISCONTINUED | OUTPATIENT
Start: 2023-12-16 | End: 2023-12-22 | Stop reason: HOSPADM

## 2023-12-15 RX ORDER — ACETAMINOPHEN 325 MG/1
650 TABLET ORAL EVERY 6 HOURS PRN
Status: DISCONTINUED | OUTPATIENT
Start: 2023-12-15 | End: 2023-12-22 | Stop reason: HOSPADM

## 2023-12-15 RX ORDER — CALCIUM CARBONATE 500 MG/1
500 TABLET, CHEWABLE ORAL 2 TIMES DAILY
Status: DISCONTINUED | OUTPATIENT
Start: 2023-12-15 | End: 2023-12-22 | Stop reason: HOSPADM

## 2023-12-15 RX ORDER — GABAPENTIN 300 MG/1
300 CAPSULE ORAL 2 TIMES DAILY
Status: DISCONTINUED | OUTPATIENT
Start: 2023-12-15 | End: 2023-12-22 | Stop reason: HOSPADM

## 2023-12-15 RX ORDER — ONDANSETRON 2 MG/ML
4 INJECTION INTRAMUSCULAR; INTRAVENOUS EVERY 6 HOURS PRN
Status: DISCONTINUED | OUTPATIENT
Start: 2023-12-15 | End: 2023-12-22 | Stop reason: HOSPADM

## 2023-12-15 RX ORDER — ASCORBIC ACID 500 MG
500 TABLET ORAL 2 TIMES DAILY
Status: DISCONTINUED | OUTPATIENT
Start: 2023-12-15 | End: 2023-12-22 | Stop reason: HOSPADM

## 2023-12-15 RX ADMIN — CALCIUM CARBONATE (ANTACID) CHEW TAB 500 MG 500 MG: 500 CHEW TAB at 18:57

## 2023-12-15 RX ADMIN — INSULIN LISPRO 1 UNITS: 100 INJECTION, SOLUTION INTRAVENOUS; SUBCUTANEOUS at 21:22

## 2023-12-15 RX ADMIN — OXYCODONE HYDROCHLORIDE AND ACETAMINOPHEN 500 MG: 500 TABLET ORAL at 18:57

## 2023-12-15 RX ADMIN — QUETIAPINE FUMARATE 25 MG: 25 TABLET ORAL at 21:22

## 2023-12-15 RX ADMIN — SODIUM CHLORIDE, SODIUM GLUCONATE, SODIUM ACETATE, POTASSIUM CHLORIDE, MAGNESIUM CHLORIDE, SODIUM PHOSPHATE, DIBASIC, AND POTASSIUM PHOSPHATE 75 ML/HR: .53; .5; .37; .037; .03; .012; .00082 INJECTION, SOLUTION INTRAVENOUS at 16:34

## 2023-12-15 RX ADMIN — GABAPENTIN 300 MG: 300 CAPSULE ORAL at 18:57

## 2023-12-15 RX ADMIN — IOHEXOL 85 ML: 350 INJECTION, SOLUTION INTRAVENOUS at 15:26

## 2023-12-15 NOTE — ASSESSMENT & PLAN NOTE
Daughter reports that patient was discharged from a rehab facility today.  Her baseline is assist of 1, daughter reports patient was assist of 2 at time of discharge from the facility today.  After getting her home she was very weak, could not bear weight, lethargic, and had some garbled speech  EMS called and brought the patient for evaluation  Head CT negative  CTA head and neck negative for large vessel occlusion, dissection or aneurysm.  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

## 2023-12-15 NOTE — TELEMEDICINE
TeleConsultation - Stroke   Shakira Hinkle 83 y.o. female MRN: [id number]  Unit/Bed#: ED 18 Encounter: [id number]    REQUIRED DOCUMENTATION:     1. This service was provided via Telemedicine.  2. Provider located at [facility].  3. TeleMed provider: Mauricio Kaur DO.  4. Identify all parties in room with patient during tele consult:  Cindy (daughter); Anna Rebolledo, PHAM  5.Patient was then informed that this was a Telemedicine visit and that the exam was being conducted confidentially over secure lines. My office door was closed. No one else was in the room.  Patient acknowledged consent and understanding of privacy and security of the Telemedicine visit, and gave us permission to have the assistant stay in the room in order to assist with the history and to conduct the exam.  I informed the patient that I have reviewed their record in Epic and presented the opportunity for them to ask any questions regarding the visit today.  The patient agreed to participate.     Assessment/Plan   Assessment:   84 y/o F with HTN, HLD, DM, prior Hodgkin's lymphoma s/p chemoradiation and resection of R mandible, and likely dementia (seen last month by inpatient Neurology) that presents with AMS along with nonspecific dysarthria, generalized weakness, and reported leaning to the R. Exam itself is nonfocal and pt actually appears to be in a better mental state than she was last month when she was essentially disoriented and having frequent hallucinations. Do not suspect stroke clinically. Likely TME in the setting of COVID infection with poor cognitive reserve and baseline poor function.    TPA Decision: Patient not a candidate. Unclear time of onset outside appropriate time window. and Symptoms resolved/clearly non disabling.    Plan:   -continue neurochecks; notify with changes  -HCT reviewed - no evidence of acute ischemia/hemorrhage; diffuse cerebral atrophy  -CTA reviewed - no acute changes; known severe stenosis of L distal CCA  "and L vert origin  -do not feel MRI would be warranted at this time; does not need stroke pathway  -work-up and management as per ED/primary team  -may benefit from outpatient cognitive eval as recommended previously  -consider palliative care consult given recurrent ED visits with poor functional baseline and multiple comorbidities  -call with questions    Recommendations for outpatient neurological follow up have yet to be determined.      Reason for Consult / Principal Problem: stroke alert  Hx and PE limited by: mental status  Patient last known well: 2 days ago, unclear time  Stroke alert called: 2:59pm  Neurology time of arrival: immediate, by phone    HPI: Shakira Hinkle is a 83 y.o. female with HTN, HLD, DM, likely underlying dementia, and prior Hodgkin's lymphoma s/p chemoradiation who presents with report of altered mental status, generalized weakness, and dysarthria with leaning to the R. Pt was last seen by daughter 2 days ago in her usual state however was seen this morning and found to confused with these symptoms. Nonfocal exam per ED. Pt herself has no complaints and states she is in the hospital because her daughter feels she is \"losing\" her \"mind\". NIHSS was 5 for generalized weakness and dysarthria. She was subsequently found to be COVID positive.    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 She was not deemed a TNK candidate given she was outside of the window and suspicion for stroke is low.    Of note, pt was seen last month by our inpatient Neurology team due to altered mental status. She received an MRI at that time that was unremarkable. She was felt to most likely have underlying dementia with demonstration of both visual and auditory hallucinations. She was recommended for outpatient cognitive " evaluation however this has not been scheduled.    Consult to Neurology  Consult performed by: Mauricio Kaur DO  Consult ordered by: Tyson Lester DO        Review of Systems   Unable to perform ROS: Dementia       Historical Information   Past Medical History:   Diagnosis Date    Ambulates with cane     Cancer (HCC)     Chronic pain disorder     knees/ shoulders (gets inj every 3 mos)    Closed intertrochanteric fracture of right femur (HCC) 5/26/2020    Controlled type 2 diabetes mellitus with diabetic polyneuropathy, with long-term current use of insulin (HCC) 5/21/2008    Diabetes mellitus (HCC)     Diabetic polyneuropathy (HCC) 5/21/2008    ICD10 clean up    Disease of thyroid gland     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    HL (hearing loss)     Hodgkin's disease (HCC) 2008    Left neck, had radiation    Hypertension     Neuropathy     Osteoporosis     RA (rheumatoid arthritis) ([facility])     Traumatic rhabdomyolysis ([facility]) 10/17/2022     Past Surgical History:   Procedure Laterality Date    ADENOIDECTOMY      APPENDECTOMY      CATARACT EXTRACTION      CATARACT EXTRACTION, BILATERAL      CHOLECYSTECTOMY      FRACTURE SURGERY Right     hip    MOUTH SURGERY      oral cancer left lower lip    OVARY SURGERY      WA ADJT TIS TRNS/REARGMT F/C/C/M/N/A/G/H/F 10SQCM/< N/A 3/28/2022    Procedure: Adjacent tissue transfer face;  Surgeon: Ar Paris MD;  Location: [facility];  Service: ENT    WA OPTX FEM SHFT FX W/INSJ IMED IMPLT W/WO SCREW Right 5/28/2020    Procedure: INSERTION NAIL IM FEMUR ANTEGRADE (TROCHANTERIC);  Surgeon: Charles Seals DO;  Location:  MAIN OR;  Service: Orthopedics    WA TRANSMASTOID ANTROTOMY Left 3/28/2022    Procedure: MASTOIDECTOMY;  Surgeon: Ar Paris MD;  Location: [facility];  Service: ENT    TONSILLECTOMY      TOTAL THYROIDECTOMY  2008    Performed after left neck dissection and left oral cancer diagnosis     Social History   Social History     Substance and  "Sexual Activity   Alcohol Use Not Currently    Alcohol/week: 0.0 standard drinks of alcohol     Social History     Substance and Sexual Activity   Drug Use Never     E-Cigarette/Vaping    E-Cigarette Use Never User      E-Cigarette/Vaping Substances    Nicotine No     THC No     CBD No     Flavoring No     Other No     Unknown No      Social History     Tobacco Use   Smoking Status Never   Smokeless Tobacco Never     Family History:   Family History   Problem Relation Age of Onset    Cancer Mother     Diabetes Mother     Diabetes Father     Hypertension Father        Review of previous medical records was completed.    Meds/Allergies   all current active meds have been reviewed, current meds:   No current facility-administered medications for this encounter.   , and PTA meds:   Prior to Admission Medications   Prescriptions Last Dose Informant Patient Reported? Taking?   Ascorbic Acid (vitamin C) 100 MG tablet   Yes No   Sig: Take 500 mg by mouth 2 (two) times a day   BD Insulin Syringe U/F Unit 31G X \" 0.3 ML MISC   Yes No   Si (four) times a day   Insulin Glargine Solostar (Basaglar KwikPen) 100 UNIT/ML SOPN   No No   Si units AM  5 units PM   Multiple Vitamin (multivitamin) capsule   Yes No   Sig: Take 1 capsule by mouth daily   OneTouch Ultra test strip   Yes No   Sig: USE TO TEST BLOOD SUGAR 6 TIMES A DAY   QUEtiapine (SEROquel) 25 mg tablet   No No   Sig: Take 1 tablet (25 mg total) by mouth daily at bedtime   VITAMIN D PO   Yes No   Sig: Take 125 mg by mouth in the morning   acetaminophen (TYLENOL) 325 mg tablet   No No   Sig: Take 2 tablets (650 mg total) by mouth every 4 (four) hours as needed for mild pain, headaches or fever   atorvastatin (LIPITOR) 20 mg tablet   Yes No   Sig: Take 20 mg by mouth daily with dinner    Patient not taking: Reported on 2023   calcium carbonate (OS-FELICE) 600 MG tablet   Yes No   Sig: Take 600 mg by mouth 2 (two) times a day with meals   cyanocobalamin " (VITAMIN B-12) 1000 MCG tablet   Yes No   Sig: Take 1,000 mcg by mouth daily   gabapentin (NEURONTIN) 300 mg capsule   No No   Sig: Take 1 capsule (300 mg total) by mouth 2 (two) times a day   ipratropium (ATROVENT) 0.03 % nasal spray   No No   Si sprays into each nostril every 12 (twelve) hours   Patient not taking: Reported on 2023   levothyroxine 100 mcg tablet  Self Yes No   Sig: Take 75 mcg by mouth every morning   meclizine (ANTIVERT) 25 mg tablet   No No   Sig: Take 1 tablet (25 mg total) by mouth every 8 (eight) hours as needed for dizziness   Patient not taking: Reported on 2023   melatonin 3 mg   No No   Sig: Take 2 tablets (6 mg total) by mouth daily at bedtime   pantoprazole (PROTONIX) 40 mg tablet   No No   Sig: Take 1 tablet (40 mg total) by mouth 2 (two) times a day   Patient not taking: Reported on 2023      Facility-Administered Medications: None       No Known Allergies    Objective   Vitals:Blood pressure (!) 70/30, pulse 93, temperature (!) 97.4 °F (36.3 °C), temperature source Tympanic, resp. rate 18, SpO2 94%.,There is no height or weight on file to calculate BMI.  No intake or output data in the 24 hours ending 12/15/23 1511    Invasive Devices:   Invasive Devices       None                   Physical Exam  Constitutional:       Appearance: She is well-developed.   HENT:      Head: Normocephalic and atraumatic.   Eyes:      Extraocular Movements: EOM normal.      Conjunctiva/sclera: Conjunctivae normal.   Pulmonary:      Effort: No respiratory distress.   Abdominal:      General: There is no distension.   Musculoskeletal:         General: No swelling.      Cervical back: No rigidity.   Skin:     Coloration: Skin is not jaundiced.   Neurological:      Mental Status: She is alert and oriented to person, place, and time.       Neurologic Exam     Mental Status   Oriented to person, place, and time.   Level of consciousness: alert  Able to name object. Able to repeat. Normal  comprehension.   Moderately dysarthric however missing much of her R mandible and some teeth     Cranial Nerves     CN II   Visual fields full to confrontation.     CN III, IV, VI   Extraocular motions are normal.     CN V   Facial sensation intact.     CN VIII   Hearing: intact  No clear facial asymmetry noted however limited assessment due to resection of R mandible     Motor Exam At least 4/5 throughout with only some drift in all extremities; no clear asymmetry     Sensory Exam   Light touch normal.     Gait, Coordination, and Reflexes Did not cooperate with coordination testing; became agitated during exam stating that she was being held down       NIHSS:  1a.Level of Consciousness: 0 = Alert   1b. LOC Questions: 0 = Answers both correctly   1c. LOC Commands: 0 = Obeys both correctly   2. Best Gaze: 0 = Normal   3. Visual: 0 = No visual field loss   4. Facial Palsy: 0=Normal symmetric movement   5a. Motor Right Arm: 1=Drift, limb holds 90 (or 45) degrees but drifts down before full 10 seconds: does not hit bed   5b. Motor Left Arm: 1=Drift, limb holds 90 (or 45) degrees but drifts down before full 10 seconds: does not hit bed   6a. Motor Right Le=Drift, limb holds 90 (or 45) degrees but drifts down before full 10 seconds: does not hit bed   6b. Motor Left Le=Drift, limb holds 90 (or 45) degrees but drifts down before full 10 seconds: does not hit bed   7. Limb Ataxia:  0=Absent   8. Sensory: 0=Normal; no sensory loss   9. Best Language:  0=No aphasia, normal   10. Dysarthria: 1=Mild to moderate, patient slurs at least some words and at worst, can be understood with some difficulty   11. Extinction and Inattention (formerly Neglect): 0=No abnormality   Total Score: 5     Time NIHSS was completed: 3:40pm    Modified [place] Score:  3 (Moderate disability; requiring some help, but able to walk without assistance)    Lab Results: I have personally reviewed pertinent reports.  , CBC:   Results from last 7  "days   Lab Units 12/15/23  1502   WBC Thousand/uL 14.45*   RBC Million/uL 3.64*   HEMOGLOBIN g/dL 12.0   HEMATOCRIT % 37.1   MCV fL 102*   PLATELETS Thousands/uL 150   , BMP/CMP:   Results from last 7 days   Lab Units 12/15/23  1502   SODIUM mmol/L 133*   POTASSIUM mmol/L 3.8   CHLORIDE mmol/L 96   CO2 mmol/L 26   BUN mg/dL 20   CREATININE mg/dL 0.90   CALCIUM mg/dL 8.4   AST U/L 79*   ALT U/L 35   ALK PHOS U/L 107*   EGFR ml/min/1.73sq m 59   , HgBA1C:   , Coagulation:   Results from last 7 days   Lab Units 12/15/23  1502   INR  1.00   , Lipid Profile:   , Urinalysis:       Invalid input(s): \"URIBILINOGEN\"  Imaging Studies: I have personally reviewed pertinent films in PACS with a Radiologist.  EKG, Pathology, and Other Studies: I have personally reviewed pertinent reports.    VTE Prophylaxis: Sequential compression device (Venodyne)     Counseling / Coordination of Care  Total Critical Care time spent 45 minutes excluding procedures, teaching and family updates.      "

## 2023-12-15 NOTE — H&P
"[facility]  H&P  Name: Shakira Hinkle 83 y.o. female I MRN: [id number]  Unit/Bed#: [facility] I Date of Admission: 12/15/2023   Date of Service: 12/15/2023 I Hospital Day: 0      Assessment/Plan   * Generalized weakness  Assessment & Plan  Daughter reports that patient was discharged from a rehab facility today.  Her baseline is assist of 1, daughter reports patient was assist of 2 at time of discharge from the facility today.  After getting her home she was very weak, could not bear weight, lethargic, and had some garbled speech  EMS called and brought the patient for evaluation  Head CT negative  CTA head and neck negative for large vessel occlusion, dissection or aneurysm.  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, origin of left vertebral artery, and left distal common carotid artery just proximal to the carotid bifurcation due to heavy calcified atherosclerotic disease similar to prior exam  Neurology evaluated patient in the ED, felt patient does not need MRI brain, does not need stroke pathway  Continue neurological checks  Fall precautions  Consult PT/OT    Dysarthria  Assessment & Plan  Daughter reports patient had \"garbled speech\" and weakness prehospital so she called the EMS, felt patient may be having a stroke  Dysarthria resolved at time of my assessment  Head CT no acute intracranial abnormality  CTA head and neck: Negative CTA head and neck for large vessel occlusion, dissection, or aneurysm. 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 Consider follow-up with vascular surgery. 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  Neurology evaluated patient in the ED felt MRI brain not needed, does not need stroke pathway  Continue neurological checks      Lactic " acidosis  Assessment & Plan  Present on admission  Lactic acid 3.1  Patient initiated on IV fluids in the ED, will continue on floor  Patient was noted to be COVID-positive  Continue to trend lactic acid    COVID-19  Assessment & Plan  Patient presented to the ED via EMS-daughter at bedside reports patient was discharged from rehab facility today max assistance of 2 to transfer  Also having some dysarthria prehospital  Patient tested positive for COVID in the ED  She is currently on room air with nonlabored respirations  Given comorbidities initiate remdesivir  Supportive care  Isolation precautions    Type I diabetes mellitus (HCC)  Assessment & Plan  Lab Results   Component Value Date    HGBA1C 8.9 (H) 05/26/2023       Recent Labs     12/15/23  1428   POCGLU 245*       Blood Sugar Average: Last 72 hrs:  (P) 245    Target blood sugar inpatient 140-180  Home regimen includes Lantus insulin 7 units in the a.m., 5 units in the p.m.  Patient currently lethargic with likely poor oral intake  Will initiate SSI  Hold Lantus insulin for now  CHO diet  Hypoglycemia protocol  BMP a.m.    Hyperlipidemia  Assessment & Plan  Patient reported not taking statin prehospital  Will continue to hold given elevation in LFTs    Hypothyroidism  Assessment & Plan  Continue levothyroxine    Gastroesophageal reflux disease without esophagitis  Assessment & Plan  Continue PPI         VTE Prophylaxis: Enoxaparin (Lovenox)  / sequential compression device   Code Status: DNR/DNI Level 3  POLST: POLST form is not discussed and not completed at this time.  Discussion with family: Plan discussed with patient and daughter at bedside both of whom understand the plan as it explained and are agreeable to the plan as stated.  All questions answered to satisfaction.    Anticipated Length of Stay:  Patient will be admitted on an Inpatient basis with an anticipated length of stay of greater than 2 midnights.   Justification for Hospital Stay: Admitted to  mild COVID pathway, initiating IV remdesivir, lactic acidosis requiring IV fluids, generalized weakness consulted PT/OT will likely require rehab placement on discharge    Total Time for Visit, including Counseling / Coordination of Care: 45 minutes.  Greater than 50% of this total time spent on direct patient counseling and coordination of care.    Chief Complaint:   Generalized weakness, garbled speech    History of Present Illness:    Shakira Hinlke is a 83 y.o. female who presented to the ED via EMS due to weakness and garbled speech per daughter at bedside.  Patient's daughter reports that as of Wednesday this week her mother was transferring assist of 1 was at a rehab facility.  She was discharged from this same facility today in weakened state, max assist of 2 to transfer.  Daughter reports that patient was lethargic and unable to bear weight at home, also with some garbled speech and she was concerned that she may be having a stroke so she called EMS who brought her to the ED for evaluation.  Head CT on arrival negative, CTA head negative for large vessel occlusion, dissection or aneurysm.  Neurology evaluated patient in the ED, felt MRI not indicated and did not need to be needed stroke pathway.  Will continue neurological checks.  Patient tested positive for COVID on arrival to the ED.  She is currently on room air with nonlabored respirations, no cold symptoms per daughter at bedside.  Daughter reports that patient's roommate at the facility was coughing so likely exposed at facility.  Given comorbidities will initiate remdesivir.  Patient was noted to be hypotensive on arrival to the ED, improved with IV fluids.  Will admit to stepdown 2 status.    Review of Systems:    Review of Systems   Constitutional:  Positive for activity change and fatigue. Negative for appetite change, chills and fever.   HENT: Negative.  Negative for congestion and sneezing.    Eyes: Negative.    Respiratory: Negative.   Negative for cough, chest tightness, shortness of breath and wheezing.    Cardiovascular: Negative.  Negative for chest pain and palpitations.   Gastrointestinal:  Positive for diarrhea. Negative for abdominal distention, abdominal pain, constipation and nausea.   Genitourinary: Negative.  Negative for dysuria, flank pain, hematuria and urgency.   Musculoskeletal: Negative.    Skin: Negative.    Allergic/Immunologic: Negative.    Neurological:  Negative for dizziness, syncope, facial asymmetry, speech difficulty, weakness, light-headedness, numbness and headaches.   Hematological: Negative.    Psychiatric/Behavioral: Negative.         Past Medical and Surgical History:     Past Medical History:   Diagnosis Date    Ambulates with cane     Cancer ([facility])     Chronic pain disorder     knees/ shoulders (gets inj every 3 mos)    Closed intertrochanteric fracture of right femur ([facility]) 5/26/2020    Controlled type 2 diabetes mellitus with diabetic polyneuropathy, with long-term current use of insulin ([facility]) 5/21/2008    Diabetes mellitus ([facility])     Diabetic polyneuropathy ([facility]) 5/21/2008    ICD10 clean up    Disease of thyroid gland     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    HL (hearing loss)     Hodgkin's disease ([facility]) 2008    Left neck, had radiation    Hypertension     Neuropathy     Osteoporosis     RA (rheumatoid arthritis) ([facility])     Traumatic rhabdomyolysis ([facility]) 10/17/2022       Past Surgical History:   Procedure Laterality Date    ADENOIDECTOMY      APPENDECTOMY      CATARACT EXTRACTION      CATARACT EXTRACTION, BILATERAL      CHOLECYSTECTOMY      FRACTURE SURGERY Right     hip    MOUTH SURGERY      oral cancer left lower lip    OVARY SURGERY      [place] ADJT TIS TRNS/REARGMT F/C/C/M/N/A/G/H/F 10SQCM/< N/A 3/28/2022    Procedure: Adjacent tissue transfer face;  Surgeon: Ar Paris MD;  Location: [facility];  Service: ENT    [place] OPTX FEM SHFT FX W/INSJ IMED IMPLT W/WO SCREW Right 5/28/2020    Procedure:  "INSERTION NAIL IM FEMUR ANTEGRADE (TROCHANTERIC);  Surgeon: Charles Seals DO;  Location:  MAIN OR;  Service: Orthopedics    [place] TRANSMASTOID ANTROTOMY Left 3/28/2022    Procedure: MASTOIDECTOMY;  Surgeon: Ar Paris MD;  Location: [facility];  Service: ENT    TONSILLECTOMY      TOTAL THYROIDECTOMY  2008    Performed after left neck dissection and left oral cancer diagnosis       Meds/Allergies:    Prior to Admission medications    Medication Sig Start Date End Date Taking? Authorizing Provider   acetaminophen (TYLENOL) 325 mg tablet Take 2 tablets (650 mg total) by mouth every 4 (four) hours as needed for mild pain, headaches or fever 11/16/23   ADRIAN Tripp   Ascorbic Acid (vitamin C) 100 MG tablet Take 500 mg by mouth 2 (two) times a day    Historical Provider, MD   atorvastatin (LIPITOR) 20 mg tablet Take 20 mg by mouth daily with dinner   Patient not taking: Reported on 11/13/2023    Historical Provider, MD   BD Insulin Syringe U/F 1/2Unit 31G X 5/16\" 0.3 ML MISC 4 (four) times a day 11/29/21   Historical Provider, MD   calcium carbonate (OS-FELICE) 600 MG tablet Take 600 mg by mouth 2 (two) times a day with meals    Historical Provider, MD   cyanocobalamin (VITAMIN B-12) 1000 MCG tablet Take 1,000 mcg by mouth daily    Historical Provider, MD   gabapentin (NEURONTIN) 300 mg capsule Take 1 capsule (300 mg total) by mouth 2 (two) times a day 9/24/23   Reyes Preston DO   Insulin Glargine Solostar (Basaglar KwikPen) 100 UNIT/ML SOPN 7 units AM  5 units PM 9/24/23   Reyes Preston DO   ipratropium (ATROVENT) 0.03 % nasal spray 2 sprays into each nostril every 12 (twelve) hours  Patient not taking: Reported on 11/13/2023 8/22/21   Codey Mims MD   levothyroxine 100 mcg tablet Take 75 mcg by mouth every morning    Historical Provider, MD   meclizine (ANTIVERT) 25 mg tablet Take 1 tablet (25 mg total) by mouth every 8 (eight) hours as needed for dizziness  Patient not taking: Reported on 11/13/2023 " 10/25/22   Naomy Louis DO   melatonin 3 mg Take 2 tablets (6 mg total) by mouth daily at bedtime 11/16/23 12/16/23  ADRIAN Tripp   Multiple Vitamin (multivitamin) capsule Take 1 capsule by mouth daily    Historical Provider, MD   OneTouch Ultra test strip USE TO TEST BLOOD SUGAR 6 TIMES A DAY 9/27/21   Historical Provider, MD   pantoprazole (PROTONIX) 40 mg tablet Take 1 tablet (40 mg total) by mouth 2 (two) times a day  Patient not taking: Reported on 11/13/2023 7/15/22   Fiorella Ruiz PA-C   QUEtiapine (SEROquel) 25 mg tablet Take 1 tablet (25 mg total) by mouth daily at bedtime 11/16/23 12/16/23  ADRIAN Tripp   VITAMIN D PO Take 125 mg by mouth in the morning    Historical Provider, MD SEALS have reviewed home medications with patient family member.    Allergies: No Known Allergies    Social History:     Marital Status:    Occupation: Retired  Patient Pre-hospital Living Situation: Home with daughter  Patient Pre-hospital Level of Mobility: Transfers assist of 1  Patient Pre-hospital Diet Restrictions: Diabetic  Substance Use History:   Social History     Substance and Sexual Activity   Alcohol Use Not Currently    Alcohol/week: 0.0 standard drinks of alcohol     Social History     Tobacco Use   Smoking Status Never   Smokeless Tobacco Never     Social History     Substance and Sexual Activity   Drug Use Never       Family History:    non-contributory    Physical Exam:     Vitals:   Blood Pressure: 128/62 (12/15/23 1730)  Pulse: 89 (12/15/23 1730)  Temperature: (!) 97.4 °F (36.3 °C) (12/15/23 1433)  Temp Source: Tympanic (12/15/23 1433)  Respirations: 18 (12/15/23 1730)  SpO2: 95 % (12/15/23 1730)    Physical Exam  Vitals reviewed.   Constitutional:       General: She is not in acute distress.     Appearance: She is obese. She is not ill-appearing.   HENT:      Head: Normocephalic and atraumatic.      Nose: Nose normal.      Mouth/Throat:      Mouth: Mucous membranes are  dry.   Eyes:      Extraocular Movements: Extraocular movements intact.      Conjunctiva/sclera: Conjunctivae normal.      Pupils: Pupils are equal, round, and reactive to light.   Cardiovascular:      Rate and Rhythm: Normal rate and regular rhythm.      Pulses: Normal pulses.      Heart sounds: Normal heart sounds.   Pulmonary:      Effort: Pulmonary effort is normal. No respiratory distress.      Breath sounds: No wheezing or rales.      Comments: Nonlabored respirations on room air, no cough or shortness of breath  Abdominal:      General: Bowel sounds are normal. There is no distension.      Palpations: Abdomen is soft.      Tenderness: There is no abdominal tenderness. There is no guarding.   Musculoskeletal:         General: Normal range of motion.   Skin:     General: Skin is warm and dry.      Capillary Refill: Capillary refill takes less than 2 seconds.   Neurological:      General: No focal deficit present.      Mental Status: She is alert and oriented to person, place, and time. Mental status is at baseline.   Psychiatric:         Thought Content: Thought content normal.         Additional Data:     Lab Results: I have personally reviewed pertinent reports.      Results from last 7 days   Lab Units 12/15/23  1502   WBC Thousand/uL 14.45*   HEMOGLOBIN g/dL 12.0   HEMATOCRIT % 37.1   PLATELETS Thousands/uL 150   NEUTROS PCT % 86*   LYMPHS PCT % 7*   MONOS PCT % 6   EOS PCT % 0     Results from last 7 days   Lab Units 12/15/23  1502   SODIUM mmol/L 133*   POTASSIUM mmol/L 3.8   CHLORIDE mmol/L 96   CO2 mmol/L 26   BUN mg/dL 20   CREATININE mg/dL 0.90   ANION GAP mmol/L 11   CALCIUM mg/dL 8.4   ALBUMIN g/dL 3.4*   TOTAL BILIRUBIN mg/dL 0.31   ALK PHOS U/L 107*   ALT U/L 35   AST U/L 79*   GLUCOSE RANDOM mg/dL 204*     Results from last 7 days   Lab Units 12/15/23  1502   INR  1.00     Results from last 7 days   Lab Units 12/15/23  1428   POC GLUCOSE mg/dl 245*         Results from last 7 days   Lab Units  12/15/23  1547   LACTIC ACID mmol/L 3.1*       Imaging: I have personally reviewed pertinent reports.      CTA stroke alert (head/neck)   Final Result by Karri Bhatia MD (12/15 4506)      Negative CTA head and neck for large vessel occlusion, dissection, or aneurysm.      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 Consider    follow-up with vascular surgery.      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      Additional chronic/incidental findings as detailed above.               Findings were directly discussed with Mauricio Kaur 12/15/2023 at 3:27 PM.                        Workstation performed: [id number]         CT stroke alert brain   Final Result by Karri Bhatia MD (12/15 2427)      No acute intracranial abnormality.         Findings were directly discussed with Mauricio Kaur 12/15/2023 at 3:27 PM.      Workstation performed: [id number]         XR chest portable - 1 view    (Results Pending)       EKG, Pathology, and Other Studies Reviewed on Admission:   EKG: Sinus rhythm heart rate 95    Allscripts / Epic Records Reviewed: Yes     ** Please Note: This note has been constructed using a voice recognition syste

## 2023-12-15 NOTE — ASSESSMENT & PLAN NOTE
Present on admission  Lactic acid 3.1  Patient initiated on IV fluids in the ED, will continue on floor  Patient was noted to be COVID-positive  Continue to trend lactic acid

## 2023-12-15 NOTE — PLAN OF CARE
Problem: Potential for Falls  Goal: Patient will remain free of falls  Description: INTERVENTIONS:  - Educate patient/family on patient safety including physical limitations  - Instruct patient to call for assistance with activity   - Consult OT/PT to assist with strengthening/mobility   - Keep Call bell within reach  - Keep bed low and locked with side rails adjusted as appropriate  - Keep care items and personal belongings within reach  - Initiate and maintain comfort rounds  - Make Fall Risk Sign visible to staff  - Offer Toileting every 2 Hours, in advance of need  - Initiate/Maintain bed alarm  - Obtain necessary fall risk management equipment: yell  Problem: INFECTION - ADULT  Goal: Absence or prevention of progression during hospitalization  Description: INTERVENTIONS:  - Assess and monitor for signs and symptoms of infection  - Monitor lab/diagnostic results  - Monitor all insertion sites, i.e. indwelling lines, tubes, and drains  - Monitor endotracheal if appropriate and nasal secretions for changes in amount and color  - Reedy appropriate cooling/warming therapies per order  - Administer medications as ordered  - Instruct and encourage patient and family to use good hand hygiene technique  - Identify and instruct in appropriate isolation precautions for identified infection/condition  12/15/2023 1819 by Venkatesh Awad RN  Outcome: Progressing  12/15/2023 1819 by Venkatesh Awad RN  Outcome: Progressing  12/15/2023 1818 by Venkatesh Awad RN  Outcome: Progressing  Goal: Absence of fever/infection during neutropenic period  Description: INTERVENTIONS:  - Monitor WBC    12/15/2023 1819 by Venkatesh Awad RN  Outcome: Progressing  12/15/2023 1819 by Venkatesh Awad RN  Outcome: Progressing  12/15/2023 1818 by Venkatesh Awad RN  Outcome: Progressing     Problem: Knowledge Deficit  Goal: Patient/family/caregiver demonstrates understanding of disease process, treatment plan, medications, and discharge  instructions  Description: Complete learning assessment and assess knowledge base.  Interventions:  - Provide teaching at level of understanding  - Provide teaching via preferred learning methods  12/15/2023 1819 by Venkatesh Awad RN  Outcome: Progressing  12/15/2023 1819 by Venkatesh Awad RN  Outcome: Progressing  12/15/2023 1818 by Venkatesh Awad RN  Outcome: Progressing     Problem: MOBILITY - ADULT  Goal: Maintain or return to baseline ADL function  Description: INTERVENTIONS:  -  Assess patient's ability to carry out ADLs; assess patient's baseline for ADL function and identify physical deficits which impact ability to perform ADLs (bathing, care of mouth/teeth, toileting, grooming, dressing, etc.)  - Assess/evaluate cause of self-care deficits   - Assess range of motion  - Assess patient's mobility; develop plan if impaired  - Assess patient's need for assistive devices and provide as appropriate  - Encourage maximum independence but intervene and supervise when necessary  - Involve family in performance of ADLs  - Assess for home care needs following discharge   - Consider OT consult to assist with ADL evaluation and planning for discharge  - Provide patient education as appropriate  12/15/2023 1819 by Venkatesh Awad RN  Outcome: Progressing  12/15/2023 1819 by Venkatesh Awad RN  Outcome: Progressing  12/15/2023 1818 by Venkatesh Awad RN  Outcome: Progressing  Goal: Maintains/Returns to pre admission functional level  Description: INTERVENTIONS:  - Perform AM-PAC 6 Click Basic Mobility/ Daily Activity assessment daily.  - Set and communicate daily mobility goal to care team and patient/family/caregiver.   - Collaborate with rehabilitation services on mobility goals if consulted  - Perform Range of Motion 4 times a day.  - Reposition patient every 2 hours.  - Dangle patient 4 times a day  - Stand patient 4 times a day  - Ambulate patient 2 times a day  - Out of bed to chair 3 times a day   - Out of bed for  meals 3 times a day  - Out of bed for toileting  - Record patient progress and toleration of activity level   12/15/2023 1819 by Venkatesh Awad RN  Outcome: Progressing  12/15/2023 1819 by Venkatesh Awad RN  Outcome: Progressing  12/15/2023 1818 by Venkatesh Awad, PHAM  Outcome: Progressing   ow socks  - Apply yellow socks and bracelet for high fall risk patients  - Consider moving patient to room near nurses station  12/15/2023 1819 by Venkatesh Awad RN  Outcome: Progressing  12/15/2023 1819 by Venkatesh Awad RN  Outcome: Progressing  12/15/2023 1818 by Venkatesh Awad, PHAM  Outcome: Progressing

## 2023-12-15 NOTE — ED PROVIDER NOTES
"History  Chief Complaint   Patient presents with    CVA/TIA-like Symptoms     Patient arrived via EMS with right sided deficits for past 24 hours.      83-year-old female presenting with daughter for reports of progressively worsening weakness and with the daughter states is new onset dysarthria which she first noticed approximate 4 and half hours prior to arrival.  Patient daughter states that her mother has been in rehab in the last time she saw her was approximately 2 days ago and she did not have that.  States at that time her mother was a 1 person assist.  Patient at this time is unable to get herself to the side of the bed or even sit herself up in the bed under her own power.  No obvious fevers, night sweats, chills, sore throat, cough, chest pain or shortness of breath, abdominal pain, nausea or vomiting, diarrhea constipation with dysuria, hematuria.  Patient was reportedly complaining to her daughter of a headache earlier today.        Prior to Admission Medications   Prescriptions Last Dose Informant Patient Reported? Taking?   Ascorbic Acid (vitamin C) 100 MG tablet   Yes No   Sig: Take 500 mg by mouth 2 (two) times a day   BD Insulin Syringe U/F Unit 31G X \" 0.3 ML MISC   Yes No   Si (four) times a day   Insulin Glargine Solostar (Basaglar KwikPen) 100 UNIT/ML SOPN   No No   Si units AM  5 units PM   Multiple Vitamin (multivitamin) capsule   Yes No   Sig: Take 1 capsule by mouth daily   OneTouch Ultra test strip   Yes No   Sig: USE TO TEST BLOOD SUGAR 6 TIMES A DAY   QUEtiapine (SEROquel) 25 mg tablet   No No   Sig: Take 1 tablet (25 mg total) by mouth daily at bedtime   VITAMIN D PO   Yes No   Sig: Take 125 mg by mouth in the morning   acetaminophen (TYLENOL) 325 mg tablet   No No   Sig: Take 2 tablets (650 mg total) by mouth every 4 (four) hours as needed for mild pain, headaches or fever   atorvastatin (LIPITOR) 20 mg tablet   Yes No   Sig: Take 20 mg by mouth daily with dinner  "   Patient not taking: Reported on 2023   calcium carbonate (OS-FELICE) 600 MG tablet   Yes No   Sig: Take 600 mg by mouth 2 (two) times a day with meals   cyanocobalamin (VITAMIN B-12) 1000 MCG tablet   Yes No   Sig: Take 1,000 mcg by mouth daily   gabapentin (NEURONTIN) 300 mg capsule   No No   Sig: Take 1 capsule (300 mg total) by mouth 2 (two) times a day   ipratropium (ATROVENT) 0.03 % nasal spray   No No   Si sprays into each nostril every 12 (twelve) hours   Patient not taking: Reported on 2023   levothyroxine 100 mcg tablet  Self Yes No   Sig: Take 75 mcg by mouth every morning   meclizine (ANTIVERT) 25 mg tablet   No No   Sig: Take 1 tablet (25 mg total) by mouth every 8 (eight) hours as needed for dizziness   Patient not taking: Reported on 2023   melatonin 3 mg   No No   Sig: Take 2 tablets (6 mg total) by mouth daily at bedtime   pantoprazole (PROTONIX) 40 mg tablet   No No   Sig: Take 1 tablet (40 mg total) by mouth 2 (two) times a day   Patient not taking: Reported on 2023      Facility-Administered Medications: None       Past Medical History:   Diagnosis Date    Ambulates with cane     Cancer ([facility])     Chronic pain disorder     knees/ shoulders (gets inj every 3 mos)    Closed intertrochanteric fracture of right femur ([facility]) 2020    Controlled type 2 diabetes mellitus with diabetic polyneuropathy, with long-term current use of insulin ([facility]) 2008    Diabetes mellitus ([facility])     Diabetic polyneuropathy ([facility]) 2008    ICD10 clean up    Disease of thyroid gland     H/O oral cancer     Left lower lip    HL (hearing loss)     Hodgkin's disease ([facility])     Left neck, had radiation    Hypertension     Neuropathy     Osteoporosis     RA (rheumatoid arthritis) ([facility])     Traumatic rhabdomyolysis ([facility]) 10/17/2022       Past Surgical History:   Procedure Laterality Date    ADENOIDECTOMY      APPENDECTOMY      CATARACT EXTRACTION      CATARACT EXTRACTION, BILATERAL       CHOLECYSTECTOMY      FRACTURE SURGERY Right     hip    MOUTH SURGERY      oral cancer left lower lip    OVARY SURGERY      DE ADJT TIS TRNS/REARGMT F/C/C/M/N/A/G/H/F 10SQCM/< N/A 3/28/2022    Procedure: Adjacent tissue transfer face;  Surgeon: Ar Paris MD;  Location: [facility];  Service: ENT    DE OPTX FEM SHFT FX W/INSJ IMED IMPLT W/WO SCREW Right 5/28/2020    Procedure: INSERTION NAIL IM FEMUR ANTEGRADE (TROCHANTERIC);  Surgeon: Charles Seals DO;  Location:  MAIN OR;  Service: Orthopedics    DE TRANSMASTOID ANTROTOMY Left 3/28/2022    Procedure: MASTOIDECTOMY;  Surgeon: Ar Paris MD;  Location: [facility];  Service: ENT    TONSILLECTOMY      TOTAL THYROIDECTOMY  2008    Performed after left neck dissection and left oral cancer diagnosis       Family History   Problem Relation Age of Onset    Cancer Mother     Diabetes Mother     Diabetes Father     Hypertension Father      I have reviewed and agree with the history as documented.    E-Cigarette/Vaping    E-Cigarette Use Never User      E-Cigarette/Vaping Substances    Nicotine No     THC No     CBD No     Flavoring No     Other No     Unknown No      Social History     Tobacco Use    Smoking status: Never    Smokeless tobacco: Never   Vaping Use    Vaping status: Never Used   Substance Use Topics    Alcohol use: Not Currently     Alcohol/week: 0.0 standard drinks of alcohol    Drug use: Never       Review of Systems   Neurological:  Positive for speech difficulty, weakness and headaches.   All other systems reviewed and are negative.      Physical Exam  Physical Exam  Vitals and nursing note reviewed.   Constitutional:       General: She is awake. She is in acute distress.      Appearance: She is cachectic. She is ill-appearing. She is not diaphoretic.   HENT:      Head: Normocephalic and atraumatic.        Comments: Wound which patient's daughter states is an area of bone poking through the skin which is started to necrosis and is  being followed.     Right Ear: External ear normal.      Left Ear: External ear normal.      Nose: Nose normal.   Eyes:      General: No scleral icterus.        Right eye: No discharge.         Left eye: No discharge.      Conjunctiva/sclera: Conjunctivae normal.      Pupils: Pupils are equal, round, and reactive to light.   Neck:      Vascular: No JVD.      Trachea: No tracheal deviation.   Cardiovascular:      Rate and Rhythm: Normal rate and regular rhythm.      Heart sounds: Normal heart sounds. No murmur heard.     No friction rub. No gallop.   Pulmonary:      Effort: Pulmonary effort is normal. No respiratory distress.      Breath sounds: Normal breath sounds. No stridor. No wheezing or rales.   Abdominal:      General: Bowel sounds are normal. There is no distension.      Palpations: Abdomen is soft. There is no mass.      Tenderness: There is no abdominal tenderness. There is no guarding.   Musculoskeletal:      Cervical back: Normal range of motion and neck supple.   Skin:     General: Skin is warm and dry.      Coloration: Skin is not pale.      Findings: No erythema or rash.   Neurological:      Mental Status: She is alert, oriented to person, place, and time and easily aroused.      Cranial Nerves: No cranial nerve deficit.      Sensory: No sensory deficit.      Motor: Weakness present. No abnormal muscle tone.      Comments: Profound generalized weakness.  Patient leaning to the right unable to bring herself back up to a seated position under her own power.  1/5 B/L Les  3/5 B/L Ues  CN 2-12 intact  Alert and oriented to person place and time  Mild dysarthria   No aphasia   Psychiatric:         Behavior: Behavior normal. Behavior is cooperative.         Thought Content: Thought content normal.         Judgment: Judgment normal.         Vital Signs  ED Triage Vitals   Temperature Pulse Respirations Blood Pressure SpO2   12/15/23 1433 12/15/23 1433 12/15/23 1433 12/15/23 1433 12/15/23 1433   (!) 97.4 °F  (36.3 °C) 93 18 (!) 70/30 94 %      Temp Source Heart Rate Source Patient Position - Orthostatic VS BP Location FiO2 (%)   12/15/23 1433 12/15/23 1433 12/15/23 1500 12/15/23 1433 --   Tympanic Monitor Lying Left arm       Pain Score       --                  Vitals:    12/15/23 1433 12/15/23 1500 12/15/23 1529   BP: (!) 70/30 90/58 90/58   Pulse: 93 88    Patient Position - Orthostatic VS:  Lying          Visual Acuity  Visual Acuity      Flowsheet Row Most Recent Value   L Pupil Size (mm) 3   R Pupil Size (mm) 3            ED Medications  Medications   multi-electrolyte (PLASMALYTE-A/ISOLYTE-S PH 7.4) IV solution (has no administration in time range)   iohexol (OMNIPAQUE) 350 MG/ML injection (MULTI-DOSE) 100 mL (85 mL Intravenous Given 12/15/23 1526)       Diagnostic Studies  Results Reviewed       Procedure Component Value Units Date/Time    Lactic acid [[id number]]  (Abnormal) Collected: 12/15/23 1547    Lab Status: Final result Specimen: Blood from Arm, Left Updated: 12/15/23 1611     LACTIC ACID 3.1 mmol/L     Narrative:      Result may be elevated if tourniquet was used during collection.    Lactic acid 2 Hours [[id number]]     Lab Status: No result Specimen: Blood     HS Troponin I 4hr [[id number]]     Lab Status: No result Specimen: Blood     FLU/RSV/COVID - if FLU/RSV clinically relevant [[id number]]  (Abnormal) Collected: 12/15/23 1502    Lab Status: Final result Specimen: Nares from Nose Updated: 12/15/23 1551     SARS-CoV-2 Positive     INFLUENZA A PCR Negative     INFLUENZA B PCR Negative     RSV PCR Negative    Narrative:      FOR PEDIATRIC PATIENTS - copy/paste COVID Guidelines URL to browser: [email or web address]    SARS-CoV-2 assay is a Nucleic Acid Amplification assay intended for the  qualitative detection of nucleic acid from SARS-CoV-2 in nasopharyngeal  swabs. Results are for the presumptive identification of SARS-CoV-2 RNA.    Positive results  are indicative of infection with SARS-CoV-2, the virus  causing COVID-19, but do not rule out bacterial infection or co-infection  with other viruses. Laboratories within the [place] and its  territories are required to report all positive results to the appropriate  public health authorities. Negative results do not preclude SARS-CoV-2  infection and should not be used as the sole basis for treatment or other  patient management decisions. Negative results must be combined with  clinical observations, patient history, and epidemiological information.  This test has not been FDA cleared or approved.    This test has been authorized by FDA under an Emergency Use Authorization  (EUA). This test is only authorized for the duration of time the  declaration that circumstances exist justifying the authorization of the  emergency use of an in vitro diagnostic tests for detection of SARS-CoV-2  virus and/or diagnosis of COVID-19 infection under section 564(b)(1) of  the Act, 21 U.S.C. 360bbb-3(b)(1), unless the authorization is terminated  or revoked sooner. The test has been validated but independent review by FDA  and CLIA is pending.    Test performed using [employer] Gene[employer]pert: This RT-PCR assay targets N2,  a region unique to SARS-CoV-2. A conserved region in the E-gene was chosen  for pan-Sarbecovirus detection which includes SARS-CoV-2.    According to CMS-2020-01-R, this platform meets the definition of high-throughput technology.    Procalcitonin [[id number]] Collected: 12/15/23 1547    Lab Status: In process Specimen: Blood from Arm, Left Updated: 12/15/23 1550    HS Troponin 0hr (reflex protocol) [[id number]]  (Normal) Collected: 12/15/23 1502    Lab Status: Final result Specimen: Blood from Arm, Left Updated: 12/15/23 1538     hs TnI 0hr 11 ng/L     HS Troponin I 2hr [[id number]]     Lab Status: No result Specimen: Blood     Comprehensive metabolic panel [[id number]]  (Abnormal) Collected: 12/15/23 1502    Lab  Status: Final result Specimen: Blood from Arm, Left Updated: 12/15/23 1533     Sodium 133 mmol/L      Potassium 3.8 mmol/L      Chloride 96 mmol/L      CO2 26 mmol/L      ANION GAP 11 mmol/L      BUN 20 mg/dL      Creatinine 0.90 mg/dL      Glucose 204 mg/dL      Calcium 8.4 mg/dL      Corrected Calcium 8.9 mg/dL      AST 79 U/L      ALT 35 U/L      Alkaline Phosphatase 107 U/L      Total Protein 5.9 g/dL      Albumin 3.4 g/dL      Total Bilirubin 0.31 mg/dL      eGFR 59 ml/min/1.73sq m     Narrative:      National Kidney Disease Foundation guidelines for Chronic Kidney Disease (CKD):     Stage 1 with normal or high GFR (GFR > 90 mL/min/1.73 square meters)    Stage 2 Mild CKD (GFR = 60-89 mL/min/1.73 square meters)    Stage 3A Moderate CKD (GFR = 45-59 mL/min/1.73 square meters)    Stage 3B Moderate CKD (GFR = 30-44 mL/min/1.73 square meters)    Stage 4 Severe CKD (GFR = 15-29 mL/min/1.73 square meters)    Stage 5 End Stage CKD (GFR <15 mL/min/1.73 square meters)  Note: GFR calculation is accurate only with a steady state creatinine    Protime-INR [[id number]]  (Normal) Collected: 12/15/23 1502    Lab Status: Final result Specimen: Blood from Arm, Left Updated: 12/15/23 1527     Protime 13.3 seconds      INR 1.00    APTT [[id number]]  (Normal) Collected: 12/15/23 1502    Lab Status: Final result Specimen: Blood from Arm, Left Updated: 12/15/23 1527     PTT 35 seconds     Blood culture #2 [[id number]] Collected: 12/15/23 1502    Lab Status: In process Specimen: Blood from Arm, Right Updated: 12/15/23 1517    CBC and differential [[id number]]  (Abnormal) Collected: 12/15/23 1502    Lab Status: Final result Specimen: Blood from Arm, Left Updated: 12/15/23 1513     WBC 14.45 Thousand/uL      RBC 3.64 Million/uL      Hemoglobin 12.0 g/dL      Hematocrit 37.1 %       fL      MCH 33.0 pg      MCHC 32.3 g/dL      RDW 12.7 %      MPV 10.9 fL      Platelets 150 Thousands/uL      nRBC 0 /100 WBCs      Neutrophils Relative  86 %      Immat GRANS % 1 %      Lymphocytes Relative 7 %      Monocytes Relative 6 %      Eosinophils Relative 0 %      Basophils Relative 0 %      Neutrophils Absolute 12.58 Thousands/µL      Immature Grans Absolute 0.09 Thousand/uL      Lymphocytes Absolute 0.94 Thousands/µL      Monocytes Absolute 0.82 Thousand/µL      Eosinophils Absolute 0.00 Thousand/µL      Basophils Absolute 0.02 Thousands/µL     Blood culture #1 [[id number]] Collected: 12/15/23 1502    Lab Status: In process Specimen: Blood from Arm, Left Updated: 12/15/23 1510    UA w Reflex to Microscopic w Reflex to Culture [[id number]]     Lab Status: No result Specimen: Urine, Straight Cath     Fingerstick Glucose (POCT) [[id number]]  (Abnormal) Collected: 12/15/23 1428    Lab Status: Final result Updated: 12/15/23 1430     POC Glucose 245 mg/dl                    CTA stroke alert (head/neck)   Final Result by Karri Bhatia MD (12/15 4148)      Negative CTA head and neck for large vessel occlusion, dissection, or aneurysm.      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 Consider    follow-up with vascular surgery.      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      Additional chronic/incidental findings as detailed above.               Findings were directly discussed with Mauricio Kaur 12/15/2023 at 3:27 PM.                        Workstation performed: [id number]         CT stroke alert brain   Final Result by Karri Bhatia MD (12/15 8341)      No acute intracranial abnormality.         Findings were directly discussed with Mauricio Kaur 12/15/2023 at 3:27 PM.      Workstation performed: [id number]         XR chest portable - 1 view    (Results Pending)              Procedures  ECG 12 Lead Documentation Only    Date/Time:  12/15/2023 3:40 PM    Performed by: Tyson Lester DO  Authorized by: Tyson Lester DO    Interpretation:     Interpretation: normal    Rate:     ECG rate:  95    ECG rate assessment: normal    Rhythm:     Rhythm: sinus rhythm    Ectopy:     Ectopy: none    QRS:     QRS axis:  Normal    QRS intervals:  Normal  Conduction:     Conduction: normal    ST segments:     ST segments:  Normal  T waves:     T waves: normal    CriticalCare Time    Date/Time: 12/15/2023 4:28 PM    Performed by: Tyson Lester DO  Authorized by: Tyson Lester DO    Critical care provider statement:     Critical care time (minutes):  45    Critical care time was exclusive of:  Separately billable procedures and treating other patients and teaching time    Critical care was necessary to treat or prevent imminent or life-threatening deterioration of the following conditions:  Shock and circulatory failure    Critical care was time spent personally by me on the following activities:  Blood draw for specimens, obtaining history from patient or surrogate, development of treatment plan with patient or surrogate, discussions with consultants, evaluation of patient's response to treatment, examination of patient, review of old charts, re-evaluation of patient's condition, ordering and review of radiographic studies, ordering and review of laboratory studies, ordering and performing treatments and interventions and interpretation of cardiac output measurements    I assumed direction of critical care for this patient from another provider in my specialty: no             ED Course  ED Course as of 12/15/23 1628   Fri Dec 15, 2023   1601 SARS-COV-2(!): Positive   1603 Blood Pressure(!): 70/30  Manually performed as unable to get machine to register BP   1617 Discussed case with Critical Care who is stating patient responding to fluids and can be SD2 at this time.              HEART Risk Score      Flowsheet Row Most Recent Value    Heart Score Risk Calculator    History 0 Filed at: 12/15/2023 1628   ECG 0 Filed at: 12/15/2023 1628   Age 2 Filed at: 12/15/2023 1628   Risk Factors 1 Filed at: 12/15/2023 1628   Troponin 0 Filed at: 12/15/2023 1628   HEART Score 3 Filed at: 12/15/2023 1628                          SBIRT 22yo+      Flowsheet Row Most Recent Value   Initial Alcohol Screen:  AUDIT-C     1. How often do you have a drink containing alcohol? 0 Filed at: 12/15/2023 1451   2. How many drinks containing alcohol do you have on a typical day you are drinking?  0 Filed at: 12/15/2023 1455   3a. Male UNDER 65: How often do you have five or more drinks on one occasion? 0 Filed at: 12/15/2023 1455   3b. FEMALE Any Age, or MALE 65+: How often do you have 4 or more drinks on one occassion? 0 Filed at: 12/15/2023 1455   Audit-C Score 0 Filed at: 12/15/2023 1455   DOMINGA: How many times in the past year have you...    Used an illegal drug or used a prescription medication for non-medical reasons? Never Filed at: 12/15/2023 1455                      Medical Decision Making  80-year-old female with profound cachexia and generalized weakness concern for possible stroke as patient with reported dysarthria per the daughter however per the daughter last time she saw her mom normal was proximally 2 days ago and even the first time she saw her with the dysarthria was approximately 4 and half hours prior to arrival therefore not a candidate for TNK.  Case discussed with neurology and CTA head and neck ordered.  Patient found to be profoundly hypotensive with initial pressure only able to be obtained manually approximately 70/30.  Given IV hydration with some normalization of pressure.  Septic labs ordered although no obvious source at this time.  Patient will likely require straight catheterization for urine.  EKG showing no obvious ischemic changes.    Amount and/or Complexity of Data Reviewed  Labs: ordered. Decision-making details documented in ED  Course.  Radiology: ordered.    Risk  Prescription drug management.  Decision regarding hospitalization.             Disposition  Final diagnoses:   Stroke-like symptoms   COVID   Hypotension   Lactic acidosis     Time reflects when diagnosis was documented in both MDM as applicable and the Disposition within this note       Time User Action Codes Description Comment    12/15/2023  2:58 PM Tyson Lester [R29.90] Stroke-like symptoms     12/15/2023  4:23 PM Tyson Lester [U07.1] COVID     12/15/2023  4:23 PM Tyson Lester [I95.9] Hypotension     12/15/2023  4:23 PM Tyson Lester [E87.20] Lactic acidosis           ED Disposition       ED Disposition   Admit    Condition   Stable    Date/Time   Fri Dec 15, 2023 5548    Comment   Case was discussed with CANDACE and the patient's admission status was agreed to be Admission Status: inpatient status to the service of Dr. Ariza.               Follow-up Information    None         Patient's Medications   Discharge Prescriptions    No medications on file       No discharge procedures on file.    PDMP Review         Value Time User    PDMP Reviewed  Yes 6/17/2020  2:17 PM Shiraz Dwyer MD            ED Provider  Electronically Signed by             Tyson Lester DO  12/15/23 5848

## 2023-12-15 NOTE — ASSESSMENT & PLAN NOTE
Patient presented to the ED via EMS-daughter at bedside reports patient was discharged from rehab facility today max assistance of 2 to transfer  Also having some dysarthria prehospital  Patient tested positive for COVID in the ED  She is currently on room air with nonlabored respirations  Given comorbidities initiate remdesivir  Supportive care  Isolation precautions

## 2023-12-15 NOTE — ASSESSMENT & PLAN NOTE
Lab Results   Component Value Date    HGBA1C 8.9 (H) 05/26/2023       Recent Labs     12/15/23  1428   POCGLU 245*       Blood Sugar Average: Last 72 hrs:  (P) 245    Target blood sugar inpatient 140-180  Home regimen includes Lantus insulin 7 units in the a.m., 5 units in the p.m.  Patient currently lethargic with likely poor oral intake  Will initiate SSI  Hold Lantus insulin for now  CHO diet  Hypoglycemia protocol  BMP a.m.

## 2023-12-15 NOTE — ASSESSMENT & PLAN NOTE
"Daughter reports patient had \"garbled speech\" and weakness prehospital so she called the EMS, felt patient may be having a stroke  Dysarthria resolved at time of my assessment  Head CT no acute intracranial abnormality  CTA head and neck: Negative CTA head and neck for large vessel occlusion, dissection, or aneurysm. 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 Consider follow-up with vascular surgery. 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  Neurology evaluated patient in the ED felt MRI brain not needed, does not need stroke pathway  Continue neurological checks    "

## 2023-12-16 ENCOUNTER — APPOINTMENT (INPATIENT)
Dept: CT IMAGING | Facility: HOSPITAL | Age: 83
End: 2023-12-16
Payer: MEDICARE

## 2023-12-16 PROBLEM — R33.9 URINARY RETENTION: Status: ACTIVE | Noted: 2023-12-16

## 2023-12-16 LAB
ALBUMIN SERPL BCP-MCNC: 2.9 G/DL (ref 3.5–5)
ALP SERPL-CCNC: 108 U/L (ref 34–104)
ALT SERPL W P-5'-P-CCNC: 25 U/L (ref 7–52)
ANION GAP SERPL CALCULATED.3IONS-SCNC: 11 MMOL/L
AST SERPL W P-5'-P-CCNC: 49 U/L (ref 13–39)
ATRIAL RATE: 95 BPM
BACTERIA UR QL AUTO: ABNORMAL /HPF
BASOPHILS # BLD AUTO: 0.02 THOUSANDS/ÂΜL (ref 0–0.1)
BASOPHILS NFR BLD AUTO: 0 % (ref 0–1)
BILIRUB SERPL-MCNC: 0.32 MG/DL (ref 0.2–1)
BILIRUB UR QL STRIP: NEGATIVE
BUN SERPL-MCNC: 14 MG/DL (ref 5–25)
CALCIUM ALBUM COR SERPL-MCNC: 8.4 MG/DL (ref 8.3–10.1)
CALCIUM SERPL-MCNC: 7.5 MG/DL (ref 8.4–10.2)
CHLORIDE SERPL-SCNC: 98 MMOL/L (ref 96–108)
CLARITY UR: ABNORMAL
CO2 SERPL-SCNC: 23 MMOL/L (ref 21–32)
COLOR UR: YELLOW
CREAT SERPL-MCNC: 0.59 MG/DL (ref 0.6–1.3)
EOSINOPHIL # BLD AUTO: 0 THOUSAND/ÂΜL (ref 0–0.61)
EOSINOPHIL NFR BLD AUTO: 0 % (ref 0–6)
ERYTHROCYTE [DISTWIDTH] IN BLOOD BY AUTOMATED COUNT: 13 % (ref 11.6–15.1)
GFR SERPL CREATININE-BSD FRML MDRD: 85 ML/MIN/1.73SQ M
GLUCOSE SERPL-MCNC: 170 MG/DL (ref 65–140)
GLUCOSE SERPL-MCNC: 213 MG/DL (ref 65–140)
GLUCOSE SERPL-MCNC: 279 MG/DL (ref 65–140)
GLUCOSE SERPL-MCNC: 282 MG/DL (ref 65–140)
GLUCOSE SERPL-MCNC: 380 MG/DL (ref 65–140)
GLUCOSE SERPL-MCNC: 427 MG/DL (ref 65–140)
GLUCOSE SERPL-MCNC: >500 MG/DL (ref 65–140)
GLUCOSE UR STRIP-MCNC: ABNORMAL MG/DL
HCT VFR BLD AUTO: 32.2 % (ref 34.8–46.1)
HGB BLD-MCNC: 10.4 G/DL (ref 11.5–15.4)
HGB UR QL STRIP.AUTO: NEGATIVE
IMM GRANULOCYTES # BLD AUTO: 0.06 THOUSAND/UL (ref 0–0.2)
IMM GRANULOCYTES NFR BLD AUTO: 0 % (ref 0–2)
KETONES UR STRIP-MCNC: ABNORMAL MG/DL
LACTATE SERPL-SCNC: 1.9 MMOL/L (ref 0.5–2)
LEUKOCYTE ESTERASE UR QL STRIP: NEGATIVE
LYMPHOCYTES # BLD AUTO: 1.06 THOUSANDS/ÂΜL (ref 0.6–4.47)
LYMPHOCYTES NFR BLD AUTO: 7 % (ref 14–44)
MAGNESIUM SERPL-MCNC: 1.6 MG/DL (ref 1.9–2.7)
MCH RBC QN AUTO: 33.2 PG (ref 26.8–34.3)
MCHC RBC AUTO-ENTMCNC: 32.3 G/DL (ref 31.4–37.4)
MCV RBC AUTO: 103 FL (ref 82–98)
MONOCYTES # BLD AUTO: 1.02 THOUSAND/ÂΜL (ref 0.17–1.22)
MONOCYTES NFR BLD AUTO: 7 % (ref 4–12)
NEUTROPHILS # BLD AUTO: 13.37 THOUSANDS/ÂΜL (ref 1.85–7.62)
NEUTS SEG NFR BLD AUTO: 86 % (ref 43–75)
NITRITE UR QL STRIP: NEGATIVE
NON-SQ EPI CELLS URNS QL MICRO: ABNORMAL /HPF
NRBC BLD AUTO-RTO: 0 /100 WBCS
P AXIS: 53 DEGREES
PH UR STRIP.AUTO: 5.5 [PH]
PLATELET # BLD AUTO: 139 THOUSANDS/UL (ref 149–390)
PMV BLD AUTO: 11.3 FL (ref 8.9–12.7)
POTASSIUM SERPL-SCNC: 4.2 MMOL/L (ref 3.5–5.3)
PR INTERVAL: 144 MS
PROCALCITONIN SERPL-MCNC: 41.92 NG/ML
PROT SERPL-MCNC: 5.3 G/DL (ref 6.4–8.4)
PROT UR STRIP-MCNC: ABNORMAL MG/DL
QRS AXIS: 10 DEGREES
QRSD INTERVAL: 70 MS
QT INTERVAL: 340 MS
QTC INTERVAL: 427 MS
RBC # BLD AUTO: 3.13 MILLION/UL (ref 3.81–5.12)
RBC #/AREA URNS AUTO: ABNORMAL /HPF
SODIUM SERPL-SCNC: 132 MMOL/L (ref 135–147)
SP GR UR STRIP.AUTO: 1.02
T WAVE AXIS: 28 DEGREES
UROBILINOGEN UR QL STRIP.AUTO: 0.2 E.U./DL
VENTRICULAR RATE: 95 BPM
WBC # BLD AUTO: 15.53 THOUSAND/UL (ref 4.31–10.16)
WBC #/AREA URNS AUTO: ABNORMAL /HPF

## 2023-12-16 PROCEDURE — 85025 COMPLETE CBC W/AUTO DIFF WBC: CPT

## 2023-12-16 PROCEDURE — 71275 CT ANGIOGRAPHY CHEST: CPT

## 2023-12-16 PROCEDURE — 84145 PROCALCITONIN (PCT): CPT | Performed by: PHYSICIAN ASSISTANT

## 2023-12-16 PROCEDURE — 97163 PT EVAL HIGH COMPLEX 45 MIN: CPT

## 2023-12-16 PROCEDURE — 97167 OT EVAL HIGH COMPLEX 60 MIN: CPT

## 2023-12-16 PROCEDURE — 81001 URINALYSIS AUTO W/SCOPE: CPT | Performed by: EMERGENCY MEDICINE

## 2023-12-16 PROCEDURE — G1004 CDSM NDSC: HCPCS

## 2023-12-16 PROCEDURE — 99233 SBSQ HOSP IP/OBS HIGH 50: CPT

## 2023-12-16 PROCEDURE — 80053 COMPREHEN METABOLIC PANEL: CPT

## 2023-12-16 PROCEDURE — 83605 ASSAY OF LACTIC ACID: CPT

## 2023-12-16 PROCEDURE — 82948 REAGENT STRIP/BLOOD GLUCOSE: CPT

## 2023-12-16 PROCEDURE — 83735 ASSAY OF MAGNESIUM: CPT

## 2023-12-16 PROCEDURE — 74177 CT ABD & PELVIS W/CONTRAST: CPT

## 2023-12-16 PROCEDURE — 81003 URINALYSIS AUTO W/O SCOPE: CPT | Performed by: EMERGENCY MEDICINE

## 2023-12-16 RX ORDER — CEFEPIME HYDROCHLORIDE 2 G/50ML
2000 INJECTION, SOLUTION INTRAVENOUS EVERY 24 HOURS
Status: DISCONTINUED | OUTPATIENT
Start: 2023-12-16 | End: 2023-12-19

## 2023-12-16 RX ORDER — INSULIN GLARGINE 100 [IU]/ML
7 INJECTION, SOLUTION SUBCUTANEOUS EVERY MORNING
Status: DISCONTINUED | OUTPATIENT
Start: 2023-12-17 | End: 2023-12-18

## 2023-12-16 RX ORDER — INSULIN GLARGINE 100 [IU]/ML
3 INJECTION, SOLUTION SUBCUTANEOUS EVERY 12 HOURS SCHEDULED
Status: DISCONTINUED | OUTPATIENT
Start: 2023-12-16 | End: 2023-12-16

## 2023-12-16 RX ORDER — INSULIN GLARGINE 100 [IU]/ML
5 INJECTION, SOLUTION SUBCUTANEOUS
Status: DISCONTINUED | OUTPATIENT
Start: 2023-12-16 | End: 2023-12-18

## 2023-12-16 RX ORDER — MAGNESIUM SULFATE HEPTAHYDRATE 40 MG/ML
4 INJECTION, SOLUTION INTRAVENOUS ONCE
Status: COMPLETED | OUTPATIENT
Start: 2023-12-16 | End: 2023-12-16

## 2023-12-16 RX ADMIN — OXYCODONE HYDROCHLORIDE AND ACETAMINOPHEN 500 MG: 500 TABLET ORAL at 17:19

## 2023-12-16 RX ADMIN — SODIUM CHLORIDE, SODIUM GLUCONATE, SODIUM ACETATE, POTASSIUM CHLORIDE, MAGNESIUM CHLORIDE, SODIUM PHOSPHATE, DIBASIC, AND POTASSIUM PHOSPHATE 75 ML/HR: .53; .5; .37; .037; .03; .012; .00082 INJECTION, SOLUTION INTRAVENOUS at 22:34

## 2023-12-16 RX ADMIN — GABAPENTIN 300 MG: 300 CAPSULE ORAL at 09:22

## 2023-12-16 RX ADMIN — OXYCODONE HYDROCHLORIDE AND ACETAMINOPHEN 500 MG: 500 TABLET ORAL at 09:22

## 2023-12-16 RX ADMIN — CALCIUM CARBONATE (ANTACID) CHEW TAB 500 MG 500 MG: 500 CHEW TAB at 17:19

## 2023-12-16 RX ADMIN — QUETIAPINE FUMARATE 25 MG: 25 TABLET ORAL at 21:31

## 2023-12-16 RX ADMIN — CEFEPIME HYDROCHLORIDE 2000 MG: 2 INJECTION, SOLUTION INTRAVENOUS at 14:03

## 2023-12-16 RX ADMIN — SODIUM CHLORIDE, SODIUM GLUCONATE, SODIUM ACETATE, POTASSIUM CHLORIDE, MAGNESIUM CHLORIDE, SODIUM PHOSPHATE, DIBASIC, AND POTASSIUM PHOSPHATE 75 ML/HR: .53; .5; .37; .037; .03; .012; .00082 INJECTION, SOLUTION INTRAVENOUS at 06:12

## 2023-12-16 RX ADMIN — CYANOCOBALAMIN TAB 500 MCG 1000 MCG: 500 TAB at 09:22

## 2023-12-16 RX ADMIN — INSULIN LISPRO 5 UNITS: 100 INJECTION, SOLUTION INTRAVENOUS; SUBCUTANEOUS at 17:19

## 2023-12-16 RX ADMIN — INSULIN LISPRO 1 UNITS: 100 INJECTION, SOLUTION INTRAVENOUS; SUBCUTANEOUS at 07:58

## 2023-12-16 RX ADMIN — INSULIN GLARGINE 3 UNITS: 100 INJECTION, SOLUTION SUBCUTANEOUS at 15:08

## 2023-12-16 RX ADMIN — PANTOPRAZOLE SODIUM 40 MG: 40 TABLET, DELAYED RELEASE ORAL at 08:02

## 2023-12-16 RX ADMIN — VANCOMYCIN HYDROCHLORIDE 1250 MG: 1 INJECTION, POWDER, LYOPHILIZED, FOR SOLUTION INTRAVENOUS at 14:58

## 2023-12-16 RX ADMIN — INSULIN GLARGINE 5 UNITS: 100 INJECTION, SOLUTION SUBCUTANEOUS at 21:31

## 2023-12-16 RX ADMIN — IOHEXOL 85 ML: 350 INJECTION, SOLUTION INTRAVENOUS at 18:12

## 2023-12-16 RX ADMIN — CALCIUM CARBONATE (ANTACID) CHEW TAB 500 MG 500 MG: 500 CHEW TAB at 09:22

## 2023-12-16 RX ADMIN — MAGNESIUM SULFATE HEPTAHYDRATE 4 G: 4 INJECTION, SOLUTION INTRAVENOUS at 06:12

## 2023-12-16 RX ADMIN — GABAPENTIN 300 MG: 300 CAPSULE ORAL at 17:19

## 2023-12-16 RX ADMIN — ENOXAPARIN SODIUM 30 MG: 30 INJECTION SUBCUTANEOUS at 09:22

## 2023-12-16 RX ADMIN — B-COMPLEX W/ C & FOLIC ACID TAB 1 TABLET: TAB at 09:22

## 2023-12-16 RX ADMIN — INSULIN LISPRO 2 UNITS: 100 INJECTION, SOLUTION INTRAVENOUS; SUBCUTANEOUS at 21:30

## 2023-12-16 RX ADMIN — INSULIN LISPRO 2 UNITS: 100 INJECTION, SOLUTION INTRAVENOUS; SUBCUTANEOUS at 11:01

## 2023-12-16 NOTE — PLAN OF CARE
Problem: INFECTION - ADULT  Goal: Absence or prevention of progression during hospitalization  Description: INTERVENTIONS:  - Assess and monitor for signs and symptoms of infection  - Monitor lab/diagnostic results  - Monitor all insertion sites, i.e. indwelling lines, tubes, and drains  - Monitor endotracheal if appropriate and nasal secretions for changes in amount and color  - [place] appropriate cooling/warming therapies per order  - Administer medications as ordered  - Instruct and encourage patient and family to use good hand hygiene technique  - Identify and instruct in appropriate isolation precautions for identified infection/condition  Outcome: Progressing     Problem: Potential for Falls  Goal: Patient will remain free of falls  Description: INTERVENTIONS:  - Educate patient/family on patient safety including physical limitations  - Instruct patient to call for assistance with activity   - Consult OT/PT to assist with strengthening/mobility   - Keep Call bell within reach  - Keep bed low and locked with side rails adjusted as appropriate  - Keep care items and personal belongings within reach  - Initiate and maintain comfort rounds  - Make Fall Risk Sign visible to staff  - Offer Toileting every 2 Hours, in advance of need  - Initiate/Maintain bed alarm  - Apply yellow socks and bracelet for high fall risk patients  - Consider moving patient to room near nurses station  Outcome: Not Progressing     Problem: SAFETY ADULT  Goal: Maintain or return to baseline ADL function  Description: INTERVENTIONS:  -  Assess patient's ability to carry out ADLs; assess patient's baseline for ADL function and identify physical deficits which impact ability to perform ADLs (bathing, care of mouth/teeth, toileting, grooming, dressing, etc.)  - Assess/evaluate cause of self-care deficits   - Assess range of motion  - Assess patient's mobility; develop plan if impaired  - Assess patient's need for assistive devices and  provide as appropriate  - Encourage maximum independence but intervene and supervise when necessary  - Involve family in performance of ADLs  - Assess for home care needs following discharge   - Consider OT consult to assist with ADL evaluation and planning for discharge  - Provide patient education as appropriate  Outcome: Not Progressing

## 2023-12-16 NOTE — ASSESSMENT & PLAN NOTE
Nursing staff reported urinary retention overnight requiring straight catheterization  UA negative  Continue urinary retention protocol  Monitor intake and output

## 2023-12-16 NOTE — NURSING NOTE
"Bladder scan for 311  - assisted to  BSC - no void  - dripping some drops of brown fluid from rectum. Pt unable to void. Transferred to the bed with walker and 2 person assist -- moves poorly - stiff and very weak the patient states \" I am in [facility] [place] when questioned regarding orientation.  "

## 2023-12-16 NOTE — ASSESSMENT & PLAN NOTE
Daughter reports that patient was discharged from a rehab facility today.  Not at baseline, speech was garbled .EMS called and brought the patient for evaluation  Head CT negative  CTA head and neck negative for large vessel occlusion, dissection or aneurysm.  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, origin of left vertebral artery, and left distal common carotid artery just proximal to the carotid bifurcation due to heavy calcified atherosclerotic disease similar to prior exam  Neurology evaluated patient in the ED, felt patient does not need MRI brain, does not need stroke pathway  Continue neurological checks  Fall precautions  PT/OT consult-patient is moderate resource intensity  Case management following, aware

## 2023-12-16 NOTE — ASSESSMENT & PLAN NOTE
Patient presented to the ED via EMS-daughter at bedside reports patient was discharged from rehab facility today max assistance of 2 to transfer  Also having some dysarthria prehospital-resolved by time of my assessment  Patient tested positive for COVID in the ED  At time of arrival on room air with nonlabored respirations  This a.m. on 2 L nasal cannula, lungs decreased bilaterally, no cough  Will obtain CT chest abdomen pelvis given elevated procalcitonin  Initiate cefepime and vancomycin  Consult pharmacy   Supportive care for COVID  Isolation precautions

## 2023-12-16 NOTE — PROGRESS NOTES
Shaikra Hinkle is a 83 y.o. female who is currently ordered Vancomycin IV with management by the Pharmacy Consult service.  Relevant clinical data and objective / subjective history reviewed.  Vancomycin Assessment:  Indication and Goal AUC/Trough: Pneumonia (goal -600, trough >10)  Clinical Status: worsening  Micro:   BC - pending   Renal Function:  SCr: 0.59 mg/dL  CrCl: 54.4 mL/min  Renal replacement: Not on dialysis  Days of Therapy: 1  Current Dose: ordered on 750mg IV Q12hrs  Vancomycin Plan:  New Dosinmg IV Q24hrs  Estimated AUC: 478 mcg*hr/mL  Estimated Trough: 12.8 mcg/mL  Next Level:  with am labs   Renal Function Monitoring: Daily BMP and UOP  Pharmacy will continue to follow closely for s/sx of nephrotoxicity, infusion reactions and appropriateness of therapy.  BMP and CBC will be ordered per protocol. We will continue to follow the patient’s culture results and clinical progress daily.    Merced Rodrigues, Pharmacist

## 2023-12-16 NOTE — PROGRESS NOTES
"[facility]  Progress Note  Name: Shakira Hinkle I  MRN: [id number]  Unit/Bed#: ICU 05-01 I Date of Admission: 12/15/2023   Date of Service: 12/16/2023 I Hospital Day: 1    Assessment/Plan   * Generalized weakness  Assessment & Plan  Daughter reports that patient was discharged from a rehab facility today.  Not at baseline, speech was garbled .EMS called and brought the patient for evaluation  Head CT negative  CTA head and neck negative for large vessel occlusion, dissection or aneurysm.  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, origin of left vertebral artery, and left distal common carotid artery just proximal to the carotid bifurcation due to heavy calcified atherosclerotic disease similar to prior exam  Neurology evaluated patient in the ED, felt patient does not need MRI brain, does not need stroke pathway  Continue neurological checks  Fall precautions  PT/OT consult-patient is moderate resource intensity  Case management following, aware    Dysarthria  Assessment & Plan  Daughter reports patient had \"garbled speech\" and weakness prehospital so she called the EMS, felt patient may be having a stroke  Dysarthria resolved at time of my assessment  Head CT no acute intracranial abnormality  CTA head and neck: Negative CTA head and neck for large vessel occlusion, dissection, or aneurysm. 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 Consider follow-up with vascular surgery. 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  Neurology evaluated patient in the ED felt MRI brain not needed, does not need stroke pathway  Continue neurological checks      Lactic acidosis  Assessment & Plan  Present on admission  Lactic acid 3.1  Patient initiated on IV fluids in the ED, will " continue on floor  Continue to trend lactic acid    COVID-19  Assessment & Plan  Patient presented to the ED via EMS-daughter at bedside reports patient was discharged from rehab facility today max assistance of 2 to transfer  Also having some dysarthria prehospital-resolved by time of my assessment  Patient tested positive for COVID in the ED  At time of arrival on room air with nonlabored respirations  This a.m. on 2 L nasal cannula, lungs decreased bilaterally, no cough  Will obtain CT chest abdomen pelvis given elevated procalcitonin  Initiate cefepime and vancomycin  Consult pharmacy   Supportive care for COVID  Isolation precautions    Hypomagnesemia  Assessment & Plan  Serum magnesium today 1.6  In setting of loose stools  Repleted  Repeat magnesium in a.m.    Type I diabetes mellitus (HCC)  Assessment & Plan  Lab Results   Component Value Date    HGBA1C 8.9 (H) 05/26/2023       Recent Labs     12/15/23  1428 12/15/23  1830 12/15/23  2100 12/16/23  0724   POCGLU 245* 110 195* 213*         Blood Sugar Average: Last 72 hrs:  (P) 190.75    Target blood sugar inpatient 140-180  Home regimen includes Lantus insulin 7 units in the a.m., 5 units in the p.m.  Patient has poor p.o. intake currently  Continue SSI  Hold Lantus insulin for now  CHO diet  Hypoglycemia protocol  BMP a.m.    Hyperlipidemia  Assessment & Plan  Patient reported not taking statin prehospital  Will continue to hold given elevation in LFTs    Hypothyroidism  Assessment & Plan  Continue levothyroxine    Gastroesophageal reflux disease without esophagitis  Assessment & Plan  Continue PPI    Urinary retention  Assessment & Plan  Nursing staff reported urinary retention overnight requiring straight catheterization  UA negative  Continue urinary retention protocol  Monitor intake and output           VTE Pharmacologic Prophylaxis:   Pharmacologic: Enoxaparin (Lovenox)  Mechanical VTE Prophylaxis in Place: Yes    Patient Centered Rounds: I have  performed bedside rounds with nursing staff today.    Discussions with Specialists or Other Care Team Provider: Nursing, PT/OT, case management    Education and Discussions with Family / Patient: Treatment plan discussed with patient in terms she appeared to understand.  Also tried to update patient's daughter via the phone, no answer, left brief message.    Time Spent for Care: 45 minutes.  More than 50% of total time spent on counseling and coordination of care as described above.    Current Length of Stay: 1 day(s)    Current Patient Status: Inpatient   Certification Statement: The patient will continue to require additional inpatient hospital stay due to patient requiring IV antibiotics, procalcitonin obtaining CT chest abdomen pelvis today    Discharge Plan: Pending hospital course.  PT/OT evaluated patient noted moderate resource intensity, will likely need rehab placement on discharge.  Case management following aware.    Code Status: Level 3 - DNAR and DNI      Subjective:   Patient is out of bed to chair, denies any pain or discomfort.    Objective:     Vitals:   Temp (24hrs), Av.6 °F (37 °C), Min:97.4 °F (36.3 °C), Max:100.2 °F (37.9 °C)    Temp:  [97.4 °F (36.3 °C)-100.2 °F (37.9 °C)] 97.5 °F (36.4 °C)  HR:  [81-93] 83  Resp:  [12-31] 31  BP: ()/(30-85) 141/64  SpO2:  [84 %-99 %] 93 %  Body mass index is 20.58 kg/m².     Input and Output Summary (last 24 hours):       Intake/Output Summary (Last 24 hours) at 2023 1207  Last data filed at 2023 0901  Gross per 24 hour   Intake 430 ml   Output 850 ml   Net -420 ml       Physical Exam:     Physical Exam  Vitals reviewed.   Constitutional:       General: She is not in acute distress.     Appearance: She is normal weight. She is not ill-appearing.   HENT:      Head: Normocephalic and atraumatic.      Nose: Nose normal.      Mouth/Throat:      Mouth: Mucous membranes are dry.   Cardiovascular:      Rate and Rhythm: Normal rate and regular  rhythm.      Pulses: Normal pulses.      Heart sounds: Normal heart sounds.   Pulmonary:      Effort: Pulmonary effort is normal. No respiratory distress.      Breath sounds: No wheezing.      Comments: Nonlabored respirations O2 2 L nasal cannula, decreased breath sounds bilaterally.  Abdominal:      General: Bowel sounds are normal. There is no distension.      Palpations: Abdomen is soft.      Tenderness: There is no abdominal tenderness. There is no guarding.   Musculoskeletal:         General: Normal range of motion.   Skin:     General: Skin is warm and dry.      Capillary Refill: Capillary refill takes less than 2 seconds.   Neurological:      General: No focal deficit present.      Mental Status: She is alert. Mental status is at baseline. She is disoriented.      Comments: Oriented to person and place, disoriented to time   Psychiatric:         Behavior: Behavior normal.         Additional Data:     Labs:    Results from last 7 days   Lab Units 12/16/23  0453   WBC Thousand/uL 15.53*   HEMOGLOBIN g/dL 10.4*   HEMATOCRIT % 32.2*   PLATELETS Thousands/uL 139*   NEUTROS PCT % 86*   LYMPHS PCT % 7*   MONOS PCT % 7   EOS PCT % 0     Results from last 7 days   Lab Units 12/16/23  0453   POTASSIUM mmol/L 4.2   CHLORIDE mmol/L 98   CO2 mmol/L 23   BUN mg/dL 14   CREATININE mg/dL 0.59*   CALCIUM mg/dL 7.5*   ALK PHOS U/L 108*   ALT U/L 25   AST U/L 49*     Results from last 7 days   Lab Units 12/15/23  1502   INR  1.00       * I Have Reviewed All Lab Data Listed Above.  * Additional Pertinent Lab Tests Reviewed: All Labs For Current Hospital Admission Reviewed    Imaging:    Imaging Reports Reviewed Today Include: CT brain, CTA head and neck, chest x-ray  Imaging Personally Reviewed by Myself Includes: Same as above    Recent Cultures (last 7 days):     Results from last 7 days   Lab Units 12/15/23  1502   BLOOD CULTURE  Received in Microbiology Lab. Culture in Progress.  Received in Microbiology Lab. Culture in  Progress.       Last 24 Hours Medication List:   Current Facility-Administered Medications   Medication Dose Route Frequency Provider Last Rate    acetaminophen  650 mg Oral Q6H PRN ADRIAN Tripp      vitamin C  500 mg Oral BID ADRIAN Tripp      calcium carbonate  500 mg Oral BID ADRIAN Tripp      cefepime  2,000 mg Intravenous Q24H ADRIAN Tripp      cyanocobalamin  1,000 mcg Oral Daily ADRIAN Tripp      enoxaparin  30 mg Subcutaneous Daily ADRIAN Tripp      gabapentin  300 mg Oral BID ADRIAN Tripp      insulin lispro  1-5 Units Subcutaneous TID AC ADRIAN Tripp      insulin lispro  1-5 Units Subcutaneous HS ADRIAN Tripp      multi-electrolyte  75 mL/hr Intravenous Continuous Tyson Lester, DO 75 mL/hr (12/16/23 0612)    multivitamin stress formula  1 tablet Oral Daily ADRIAN Tripp      ondansetron  4 mg Intravenous Q6H PRN ADRIAN Tripp      pantoprazole  40 mg Oral Daily ADRIAN Tripp      QUEtiapine  25 mg Oral HS ADRIAN Tripp      vancomycin  15 mg/kg Intravenous Q12H ADRIAN Tripp          Today, Patient Was Seen By: ADRIAN Tripp    ** Please Note: Dictation voice to text software may have been used in the creation of this document. **

## 2023-12-16 NOTE — ASSESSMENT & PLAN NOTE
Lab Results   Component Value Date    HGBA1C 8.9 (H) 05/26/2023       Recent Labs     12/15/23  1428 12/15/23  1830 12/15/23  2100 12/16/23  0724   POCGLU 245* 110 195* 213*         Blood Sugar Average: Last 72 hrs:  (P) 190.75    Target blood sugar inpatient 140-180  Home regimen includes Lantus insulin 7 units in the a.m., 5 units in the p.m.  Patient has poor p.o. intake currently  Continue SSI  Hold Lantus insulin for now  CHO diet  Hypoglycemia protocol  BMP a.m.

## 2023-12-16 NOTE — OCCUPATIONAL THERAPY NOTE
Occupational Therapy Evaluation      Shakira Irwinreji    12/16/2023    Principal Problem:    Generalized weakness  Active Problems:    Hyperlipidemia    Type I diabetes mellitus (HCC)    Gastroesophageal reflux disease without esophagitis    Hypothyroidism    Hypomagnesemia    COVID-19    Dysarthria    Lactic acidosis      Past Medical History:   Diagnosis Date    Ambulates with cane     Cancer (HCC)     Chronic pain disorder     knees/ shoulders (gets inj every 3 mos)    Closed intertrochanteric fracture of right femur (HCC) 5/26/2020    Controlled type 2 diabetes mellitus with diabetic polyneuropathy, with long-term current use of insulin (HCC) 5/21/2008    Diabetes mellitus (HCC)     Diabetic polyneuropathy (HCC) 5/21/2008    ICD10 clean up    Disease of thyroid gland     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    HL (hearing loss)     Hodgkin's disease (HCC) 2008    Left neck, had radiation    Hypertension     Neuropathy     Osteoporosis     RA (rheumatoid arthritis) (HCC)     Traumatic rhabdomyolysis ([facility]) 10/17/2022       Past Surgical History:   Procedure Laterality Date    ADENOIDECTOMY      APPENDECTOMY      CATARACT EXTRACTION      CATARACT EXTRACTION, BILATERAL      CHOLECYSTECTOMY      FRACTURE SURGERY Right     hip    MOUTH SURGERY      oral cancer left lower lip    OVARY SURGERY      VA ADJT TIS TRNS/REARGMT F/C/C/M/N/A/G/H/F 10SQCM/< N/A 3/28/2022    Procedure: Adjacent tissue transfer face;  Surgeon: Ar Paris MD;  Location: [facility];  Service: ENT    VA OPTX FEM SHFT FX W/INSJ IMED IMPLT W/WO SCREW Right 5/28/2020    Procedure: INSERTION NAIL IM FEMUR ANTEGRADE (TROCHANTERIC);  Surgeon: Charles Seals DO;  Location:  MAIN OR;  Service: Orthopedics    VA TRANSMASTOID ANTROTOMY Left 3/28/2022    Procedure: MASTOIDECTOMY;  Surgeon: Ar Paris MD;  Location: [facility];  Service: ENT    TONSILLECTOMY      TOTAL THYROIDECTOMY  2008    Performed after left neck dissection and  "left oral cancer diagnosis        12/16/23 0849   OT Last Visit   OT Visit Date 12/16/23   Note Type   Note type Evaluation   Pain Assessment   Pain Assessment Tool FLACC   Pain Score No Pain  (denies at rest and with activity, however moaned \"ouch\" when movement)   Pain Rating: FLACC (Rest) - Face 0   Pain Rating: FLACC (Rest) - Legs 0   Pain Rating: FLACC (Rest) - Activity 0   Pain Rating: FLACC (Rest) - Cry 0   Pain Rating: FLACC (Rest) - Consolability 0   Score: FLACC (Rest) 0   Pain Rating: FLACC (Activity) - Face 0   Pain Rating: FLACC (Activity) - Legs 1   Pain Rating: FLACC (Activity) - Activity 1   Pain Rating: FLACC (Activity) - Cry 1   Pain Rating: FLACC (Activity) - Consolability 1   Score: FLACC (Activity) 4   Restrictions/Precautions   Weight Bearing Precautions Per Order No   Other Precautions Cognitive;Chair Alarm;Multiple lines;Telemetry;O2;Fall Risk;Contact/isolation;Airborne/isolation;Hard of hearing   Home Living   Type of Home House   Home Layout One level;Performs ADLs on one level;Able to live on main level with bedroom/bathroom;Stairs to enter with rails  (2 TEE)   Bathroom Shower/Tub Tub/shower unit   Bathroom Toilet Raised   Bathroom Equipment Grab bars in shower;Toilet raiser;Grab bars around toilet   Bathroom Accessibility Accessible   Home Equipment Walker   Additional Comments PLOF and home environment were obtained via chart review. Julianne could not provide due to cognitive impairment severity.Since recent d/c from rehab, patient has required increased assistance.   Prior Function   Level of [place] Independent with functional mobility;Needs assistance with IADLS;Needs assistance with ADLs   Lives With Alone   Receives Help From Family   IADLs Family/Friend/Other provides transportation;Family/Friend/Other provides meals;Family/Friend/Other provides medication management   Falls in the last 6 months 1 to 4   Vocational Retired   ADL   UB Bathing Assistance 4  Minimal Assistance   LB " Bathing Assistance 2  Maximal Assistance   UB Dressing Assistance 3  Moderate Assistance   LB Dressing Assistance 1  Total Assistance   Bed Mobility   Rolling R 2  Maximal assistance   Additional items Assist x 1;Bedrails;Increased time required;Verbal cues;LE management  (for posterior margarette care due to stool incontinence)   Supine to Sit 2  Maximal assistance   Additional items Assist x 2;HOB elevated;Bedrails;Increased time required;Verbal cues;LE management   Additional Comments Pt remained OOB in recliner upon conclusion  (Significant postural support to stabilize static sitting balance.)   Transfers   Sit to Stand 2  Maximal assistance   Additional items Assist x 2;Bedrails;Increased time required;Verbal cues   Stand to Sit 2  Maximal assistance   Additional items Assist x 2;Bedrails;Increased time required;Verbal cues   Stand pivot 2  Maximal assistance   Additional items Assist x 2;Increased time required;Verbal cues  (RW)   Additional Comments Verbal cues for proper hand placement with transitional movements, safety while turning.   Balance   Static Sitting Poor +   Dynamic Sitting Poor   Static Standing Poor   Dynamic Standing Poor -   Ambulatory Poor -   Activity Tolerance   Activity Tolerance Patient limited by fatigue;Patient limited by pain;Other (Comment)  (cognitive impairment)   Medical Staff Made Aware ELEN Mejia   Nurse Made Aware PHAM ROSS Assessment   RUE Assessment X  (AROM WFL)   RUE Strength   RUE Overall Strength Deficits  (3-/5)   LUE Assessment   LUE Assessment X  (AROM WFL)   LUE Strength   LUE Overall Strength Deficits  (3-/5)   Cognition   Overall Cognitive Status Impaired   Arousal/Participation Arousable;Cooperative   Attention Attends with cues to redirect   Orientation Level Oriented to person;Disoriented to place;Disoriented to situation;Oriented to time   Memory Decreased short term memory;Decreased recall of recent events;Decreased recall of precautions   Following Commands  Follows one step commands with increased time or repetition   Assessment   Limitation Decreased ADL status;Decreased UE strength;Decreased Safe judgement during ADL;Decreased cognition;Decreased endurance;Decreased self-care trans;Decreased high-level ADLs   Prognosis Fair   Assessment Pt is a 83 y.o. female seen for OT evaluation s/p admit to [facility] on 12/15/2023 w/ Generalized weakness.  Comorbidities affecting pt's functional performance at time of assessment include:  CA, DM II, Hodgkin's disease, HTN . Personal factors affecting pt at time of IE include:steps to enter environment, limited home support, difficulty performing ADLS, limited insight into deficits, and decreased initiation and engagement . Prior to admission, pt required A with ADLs. Upon evaluation: the following deficits impact occupational performance: decreased strength, decreased balance, decreased tolerance, impaired attention, impaired initiation, impaired memory, impaired sequencing, impaired problem solving, and decreased safety awareness. Pt to benefit from continued skilled OT tx while in the hospital to address deficits as defined above and maximize level of functional independence w ADL's and functional mobility. Occupational Performance areas to address include: eating, bathing/shower, toilet hygiene, dressing, functional mobility, and clothing management. From OT standpoint, recommendation at time of d/c would be Level II (Moderate Resource Intensity.   Goals   Patient Goals patient could not provide due to cognitive impairment severity   Plan   Treatment Interventions ADL retraining;Functional transfer training;UE strengthening/ROM;Endurance training;Cognitive reorientation;Patient/family training;Compensatory technique education;Energy conservation;Activityengagement   Goal Expiration Date 12/26/23   OT Treatment Day 0   OT Frequency 3-5x/wk   Discharge Recommendation   Rehab Resource Intensity Level, OT II (Moderate Resource  Intensity)   Additional Comments  Pt seen as a co-eval with PT due to the patient's co-morbidities, clinically unstable presentation, and present impairments which are a regression from the patient's baseline.   Additional Comments 2 The patient's raw score on the AM-PAC Daily Activity Inpatient Short Form is 9. A raw score of less than 19 suggests the patient may benefit from discharge to post-acute rehabilitation services. Please refer to the recommendation of the Occupational Therapist for safe discharge planning.   AM-PAC Daily Activity Inpatient   Lower Body Dressing 1   Bathing 1   Toileting 1   Upper Body Dressing 2   Grooming 2   Eating 2   Daily Activity Raw Score 9   Turning Head Towards Sound 2   Follow Simple Instructions 2   Low Function Daily Activity Raw Score 13   Low Function Daily Activity Standardized Score  23.16   AM-PAC Applied Cognition Inpatient   Following a Speech/Presentation 2   Understanding Ordinary Conversation 2   Taking Medications 1   Remembering Where Things Are Placed or Put Away 1   Remembering List of 4-5 Errands 1   Taking Care of Complicated Tasks 1   Applied Cognition Raw Score 8   Applied Cognition Standardized Score 19.32     GOALS:    Pt will achieve the following within specified time frame: STG  Pt will achieve the following goals within 5 days    *ADL transfers with Max (A) for inc'd independence with ADLs/purposeful tasks    *Self Feeding- Min (A) for inc'd independence with providing self nourishment    *UB ADL with Min (A) for inc'd independence with self cares    *LB ADL with Max (A) using AE prn for inc'd independence with self cares    *Toileting with Max (A) for clothing management and hygiene for return to PLOF with personal care    *Increase static stand balance to P+ and dyn stand balance to P for inc'd safety with standing purposeful tasks    *Increase stand tolerance x1 m for inc'd tolerance with standing purposeful tasks    *Participate in 10m UE therex to  increase overall stamina/activity tolerance for purposeful tasks    *Bed mobility- Max (A) for inc'd independence to manage own comfort and initiate EOB & OOB purposeful tasks    Pt will achieve the following within specified time frame: LTG  Pt will achieve the following goals within 10 days    *ADL transfers with Mod (A) for inc'd independence with ADLs/purposeful tasks    *Self Feeding- (S) for inc'd independence with providing self nourishment    *UB ADL with CGA for inc'd independence with self cares    *LB ADL with Mod (A) using AE prn for inc'd independence with self cares    *Toileting with Mod (A) for clothing management and hygiene for return to PLOF with personal care    *Increase static stand balance to F- and dyn stand balance to P+ for inc'd safety with standing purposeful tasks    *Increase stand tolerance x3 m for inc'd tolerance with standing purposeful tasks    *Bed mobility- Mod (A) for inc'd independence to manage own comfort and initiate EOB & OOB purposeful tasks      Pati Santos, MS, OTR/L

## 2023-12-16 NOTE — PROGRESS NOTES
1915: Notified APOORVA Woods that lactic acid was unable to be collected due to poor venous system; blood will not fill in tube without tourniquet. OK to discontinue order since pt is clinically stable; /85, HR 93.     2130: Pt bladder scanned for 740 ml. Pt placed on bedpan, unable to urinate. Pt straight cathed for 550 ml le urine. Pt bathed, linens changed & repositioned for comfort.     0500: Pt bladder scanned for 417 ml. Straight cathed for 300 ml le urine.     0545: Notified APOORVA Woods of Mag 1.6 and straight caths performed overnight. Orders received.

## 2023-12-16 NOTE — PLAN OF CARE
Problem: OCCUPATIONAL THERAPY ADULT  Goal: Performs self-care activities at highest level of function for planned discharge setting.  See evaluation for individualized goals.  Description: Treatment Interventions: ADL retraining, Functional transfer training, UE strengthening/ROM, Endurance training, Cognitive reorientation, Patient/family training, Compensatory technique education, Energy conservation, Activityengagement    See flowsheet documentation for full assessment, interventions and recommendations.   Note: Limitation: Decreased ADL status, Decreased UE strength, Decreased Safe judgement during ADL, Decreased cognition, Decreased endurance, Decreased self-care trans, Decreased high-level ADLs  Prognosis: Fair  Assessment: Pt is a 83 y.o. female seen for OT evaluation s/p admit to [facility] on 12/15/2023 w/ Generalized weakness.  Comorbidities affecting pt's functional performance at time of assessment include:  CA, DM II, Hodgkin's disease, HTN . Personal factors affecting pt at time of IE include:steps to enter environment, limited home support, difficulty performing ADLS, limited insight into deficits, and decreased initiation and engagement . Prior to admission, pt required A with ADLs. Upon evaluation: the following deficits impact occupational performance: decreased strength, decreased balance, decreased tolerance, impaired attention, impaired initiation, impaired memory, impaired sequencing, impaired problem solving, and decreased safety awareness. Pt to benefit from continued skilled OT tx while in the hospital to address deficits as defined above and maximize level of functional independence w ADL's and functional mobility. Occupational Performance areas to address include: eating, bathing/shower, toilet hygiene, dressing, functional mobility, and clothing management. From OT standpoint, recommendation at time of d/c would be Level II (Moderate Resource Intensity.     Rehab Resource Intensity  Level, OT: II (Moderate Resource Intensity)     Pati Santos MS, OTR/L

## 2023-12-16 NOTE — ASSESSMENT & PLAN NOTE
Present on admission  Lactic acid 3.1  Patient initiated on IV fluids in the ED, will continue on floor  Continue to trend lactic acid

## 2023-12-16 NOTE — PHYSICAL THERAPY NOTE
Physical Therapy Evaluation     Patient's Name: Shakira Hinkle    Admitting Diagnosis  Lactic acidosis [E87.20]  Hypotension [I95.9]  Stroke-like symptoms [R29.90]  COVID [U07.1]    Problem List  Patient Active Problem List   Diagnosis    Hyperlipidemia    History of hypertension    Postoperative hypothyroidism    Type I diabetes mellitus (HCC)    Primary osteoarthritis of both knees    Primary osteoarthritis of right shoulder    Soft tissue radionecrosis    Osteoradionecrosis of temporal bone     Abnormal CT of the abdomen    Gastroesophageal reflux disease without esophagitis    Acute metabolic encephalopathy    Hypoglycemia    Hypothermia    Hypokalemia    History of Hodgkin's lymphoma    Hypothyroidism    Hypomagnesemia    Macrocytosis    Low blood pressure    Generalized weakness    Severe protein-calorie malnutrition (HCC)    COVID-19    Dysarthria    Lactic acidosis       Past Medical History  Past Medical History:   Diagnosis Date    Ambulates with cane     Cancer (HCC)     Chronic pain disorder     knees/ shoulders (gets inj every 3 mos)    Closed intertrochanteric fracture of right femur (HCC) 5/26/2020    Controlled type 2 diabetes mellitus with diabetic polyneuropathy, with long-term current use of insulin (HCC) 5/21/2008    Diabetes mellitus ([facility])     Diabetic polyneuropathy (HCC) 5/21/2008    ICD10 clean up    Disease of thyroid gland     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    HL (hearing loss)     Hodgkin's disease (HCC) 2008    Left neck, had radiation    Hypertension     Neuropathy     Osteoporosis     RA (rheumatoid arthritis) ([facility])     Traumatic rhabdomyolysis ([facility]) 10/17/2022       Past Surgical History  Past Surgical History:   Procedure Laterality Date    ADENOIDECTOMY      APPENDECTOMY      CATARACT EXTRACTION      CATARACT EXTRACTION, BILATERAL      CHOLECYSTECTOMY      FRACTURE SURGERY Right     hip    MOUTH SURGERY      oral cancer left lower lip    OVARY SURGERY      OK ADJT TIS  "TRNS/REARGMT F/C/C/M/N/A/G/H/F 10SQCM/< N/A 3/28/2022    Procedure: Adjacent tissue transfer face;  Surgeon: Ar Paris MD;  Location: [facility];  Service: ENT    WI OPTX FEM SHFT FX W/INSJ IMED IMPLT W/WO SCREW Right 5/28/2020    Procedure: INSERTION NAIL IM FEMUR ANTEGRADE (TROCHANTERIC);  Surgeon: Charles Seals DO;  Location:  MAIN OR;  Service: Orthopedics    WI TRANSMASTOID ANTROTOMY Left 3/28/2022    Procedure: MASTOIDECTOMY;  Surgeon: rA Paris MD;  Location: [facility];  Service: ENT    TONSILLECTOMY      TOTAL THYROIDECTOMY  2008    Performed after left neck dissection and left oral cancer diagnosis        12/16/23 0806   PT Last Visit   PT Visit Date 12/16/23   Note Type   Note type Evaluation   Pain Assessment   Pain Assessment Tool FLACC   Pain Score No Pain  (denies at rest and with activity, however moaned \"ouch\" when movement)   Pain Rating: FLACC (Rest) - Face 0   Pain Rating: FLACC (Rest) - Legs 0   Pain Rating: FLACC (Rest) - Activity 0   Pain Rating: FLACC (Rest) - Cry 0   Pain Rating: FLACC (Rest) - Consolability 0   Score: FLACC (Rest) 0   Pain Rating: FLACC (Activity) - Face 0   Pain Rating: FLACC (Activity) - Legs 1   Pain Rating: FLACC (Activity) - Activity 1   Pain Rating: FLACC (Activity) - Cry 1   Pain Rating: FLACC (Activity) - Consolability 1   Score: FLACC (Activity) 4   Restrictions/Precautions   Weight Bearing Precautions Per Order No   Other Precautions Cognitive;Chair Alarm;Multiple lines;Telemetry;O2;Fall Risk;Contact/isolation;Airborne/isolation;Hard of hearing   Home Living   Type of Home House   Home Layout One level;Performs ADLs on one level;Able to live on main level with bedroom/bathroom;Stairs to enter with rails  (2 TEE)   Bathroom Shower/Tub Tub/shower unit   Bathroom Toilet Raised   Bathroom Equipment Grab bars in shower;Toilet raiser;Grab bars around toilet   Bathroom Accessibility Accessible   Home Equipment Walker   Additional Comments " "PLOF and home environment were obtained via chart review. Julianne could not provide due to cognitive impairment severity.Since recent d/c from rehab, patient has required increased assistance.   Prior Function   Level of Hook[place] Independent with functional mobility;Needs assistance with IADLS;Needs assistance with ADLs   Lives With Alone   Receives Help From Family   IADLs Family/Friend/Other provides transportation;Family/Friend/Other provides meals;Family/Friend/Other provides medication management   Falls in the last 6 months 1 to 4   Vocational Retired   General   Additional Pertinent History Myrisa OT present for co-assessment due to medical complexity, required skilled interventions of 2 clinicians for care delivery.   Family/Caregiver Present No   Cognition   Overall Cognitive Status Impaired   Arousal/Participation Responsive   Orientation Level Oriented to person;Disoriented to place;Disoriented to situation;Oriented to time   Memory Decreased short term memory;Decreased recall of recent events;Decreased recall of precautions   Following Commands Follows one step commands with increased time or repetition   Comments When asked if she had just left rehab, replied \"I don't know\"   RLE Assessment   RLE Assessment X  (3/5 gross musculature)   LLE Assessment   LLE Assessment X  (3/5 gross musculature)   Vestibular   Spontaneous Nystagmus (-) no evidence of nystagmus at rest in room light   Gaze Holding Nystagmus (-) no evidence of nystagmus   Coordination   Movements are Fluid and Coordinated 0   Coordination and Movement Description Incremental mobility requiring significant physical assistance of 2.   Bed Mobility   Rolling R 2  Maximal assistance   Additional items Assist x 1;Bedrails;Increased time required;Verbal cues;LE management  (for posterior margarette care due to stool incontinence)   Supine to Sit 2  Maximal assistance   Additional items Assist x 2;HOB elevated;Bedrails;Increased time required;Verbal " cues;LE management   Sit to Supine   (DNT as Julianne was sitting out of bed on the recliner upon conclusion.)   Additional Comments Verbal cues for bedrail utilization and proper body mechanics. Significant postural support to stabilize static sitting balance.   Transfers   Sit to Stand 2  Maximal assistance   Additional items Assist x 2;Bedrails;Increased time required;Verbal cues  (RW utilization)   Stand to Sit 2  Maximal assistance   Additional items Assist x 2;Bedrails;Increased time required;Verbal cues   Stand pivot 2  Maximal assistance   Additional items Assist x 2;Increased time required;Verbal cues   Additional Comments Verbal cues for proper hand placement with transitional movements, safety while turning.   Ambulation/Elevation   Gait pattern Improper Weight shift;Narrow JACQUE;Forward Flexion;Foward flexed;Short stride;Excessively slow;Step to   Gait Assistance 2  Maximal assist   Additional items Assist x 2;Verbal cues;Tactile cues   Assistive Device Rolling walker   Distance 2 feet  (to recliner without the ability to safely further advance)   Stair Management Assistance Not tested   Ambulation/Elevation Additional Comments Verbal cues for base of support widening, proper AD usage.   Balance   Static Sitting Poor +   Dynamic Sitting Poor   Static Standing Poor   Dynamic Standing Poor -   Ambulatory Poor -   Endurance Deficit   Endurance Deficit Yes   Activity Tolerance   Activity Tolerance Patient limited by fatigue;Patient limited by pain;Other (Comment)  (cognitive impairment)   Medical Staff Made Aware Yes, Roberto CM was informed of d/c disposition recommendation.   Nurse Made Aware yes, Sarah RN and Yesenia PCA   Assessment   Prognosis Fair   Problem List Decreased strength;Decreased endurance;Impaired balance;Decreased mobility;Decreased coordination;Decreased cognition;Impaired judgement;Decreased safety awareness;Pain;Impaired hearing   Assessment Pt is 83 y.o. female seen for PT evaluation s/p admit  to  [facility]  on 12/15/2023 w/ Generalized weakness. PT consulted to assess pt's functional mobility and d/c needs. Order placed for PT eval and tx, w/ up and OOB as tolerated order. Comorbidities affecting pt's physical performance at time of assessment include: generalized weakness,COVID-19, dysarthria, lactic acidosis, type 1 DM,hypomagnesemia . PTA, pt was  recently d/c from [facility] . Personal factors affecting pt at time of IE include: inability to ambulate household distances, inability to navigate community distances, inability to navigate level surfaces w/o external assistance, unable to perform dynamic tasks in community, decreased cognition, positive fall history, hearing impairments, decreased initiation and engagement, unable to perform physical activity, limited insight into impairments, and inability to perform ADLs. Please find objective findings from PT assessment regarding body systems outlined above with impairments and limitations including weakness, impaired balance, decreased endurance, impaired coordination, gait deviations, pain, decreased activity tolerance, decreased functional mobility tolerance, decreased safety awareness, impaired judgement, fall risk, and decreased cognition. The following objective measures performed on IE also reveal limitations: AM-PAC 6-Clicks low functioning score: 7/24.From PT/mobility standpoint, recommendation at time of d/c would be of moderate resource intensity pending progress in order to facilitate return to PLOF.   Goals   Patient Goals patient could not provide due to cognitive impairment severity   LTG Expiration Date 12/26/23   Long Term Goal #1 Perform transfers to mod A of 2 to decrease risk of skin breakdown with change of position. Perform ambulation with RW for 15 feet, mod A of 2 to improve activity tolerance. Patient will tolerate AROM BLE's to decrease risk of contractures and facilitate joint proprioception. Patient will demonstrate  increased static sitting balance to Fair- to facilitate activation of stabilizing muscles and prepare for safe transfers.   PT Treatment Day 0   Plan   Treatment/Interventions Functional transfer training;LE strengthening/ROM;Elevations;Therapeutic exercise;Endurance training;Cognitive reorientation;Patient/family training;Equipment eval/education;Bed mobility;Gait training;Spoke to nursing;Spoke to case management   PT Frequency 3-5x/wk   Discharge Recommendation   Rehab Resource Intensity Level, PT II (Moderate Resource Intensity)   Equipment Recommended Walker   Walker Package Recommended Wheeled walker   Change/add to Walker Package? No   Additional Comments Upon conclusion, Julianne was sitting out of bed on the recliner. All of her needs were within reach and the chair alarm was engaged.   AM-PAC Basic Mobility Inpatient   Turning in Flat Bed Without Bedrails 2   Lying on Back to Sitting on Edge of Flat Bed Without Bedrails 1   Moving Bed to Chair 1   Standing Up From Chair Using Arms 1   Walk in Room 1   Climb 3-5 Stairs With Railing 1   Basic Mobility Inpatient Raw Score 7   Turning Head Towards Sound 2   Follow Simple Instructions 1   Low Function Basic Mobility Raw Score  10   Low Function Basic Mobility Standardized Score  14.65   Highest Level Of Mobility   JH-HLM Goal 2: Bed activities/Dependent transfer   JH-HLM Achieved 4: Move to chair/commode     History/Personal Factors/Comorbidities:  generalized weakness,COVID-19, dysarthria, lactic acidosis, type 1 DM,hypomagnesemia    # of body structures/limitations: muscle weakness, activity intolerance,decreased endurance, impaired balance, gait deviations,pain, impaired cognition     Clinical presentation: unstable as seen in pain with movement, debility at an increased risk of medical complications, high fall risk with positive fall history, cognitive impairment severity    Initial Assessment Time: [id number]    Cleo Aldridge, PT

## 2023-12-16 NOTE — PLAN OF CARE
Problem: PHYSICAL THERAPY ADULT  Goal: Performs mobility at highest level of function for planned discharge setting.  See evaluation for individualized goals.  Description: Treatment/Interventions: Functional transfer training, LE strengthening/ROM, Elevations, Therapeutic exercise, Endurance training, Cognitive reorientation, Patient/family training, Equipment eval/education, Bed mobility, Gait training, Spoke to nursing, Spoke to case management  Equipment Recommended: Walker       See flowsheet documentation for full assessment, interventions and recommendations.  Note: Prognosis: Fair  Problem List: Decreased strength, Decreased endurance, Impaired balance, Decreased mobility, Decreased coordination, Decreased cognition, Impaired judgement, Decreased safety awareness, Pain, Impaired hearing  Assessment: Pt is 83 y.o. female seen for PT evaluation s/p admit to  [facility]  on 12/15/2023 w/ Generalized weakness. PT consulted to assess pt's functional mobility and d/c needs. Order placed for PT eval and tx, w/ up and OOB as tolerated order. Comorbidities affecting pt's physical performance at time of assessment include: generalized weakness,COVID-19, dysarthria, lactic acidosis, type 1 DM,hypomagnesemia . PTA, pt was  recently d/c from [facility] . Personal factors affecting pt at time of IE include: inability to ambulate household distances, inability to navigate community distances, inability to navigate level surfaces w/o external assistance, unable to perform dynamic tasks in community, decreased cognition, positive fall history, hearing impairments, decreased initiation and engagement, unable to perform physical activity, limited insight into impairments, and inability to perform ADLs. Please find objective findings from PT assessment regarding body systems outlined above with impairments and limitations including weakness, impaired balance, decreased endurance, impaired coordination, gait deviations,  pain, decreased activity tolerance, decreased functional mobility tolerance, decreased safety awareness, impaired judgement, fall risk, and decreased cognition. The following objective measures performed on IE also reveal limitations: AM-PAC 6-Clicks low functioning score: 7/24.From PT/mobility standpoint, recommendation at time of d/c would be of moderate resource intensity pending progress in order to facilitate return to PLOF.        Rehab Resource Intensity Level, PT: II (Moderate Resource Intensity)    See flowsheet documentation for full assessment.

## 2023-12-17 LAB
ALBUMIN SERPL BCP-MCNC: 2.7 G/DL (ref 3.5–5)
ALP SERPL-CCNC: 97 U/L (ref 34–104)
ALT SERPL W P-5'-P-CCNC: 23 U/L (ref 7–52)
ANION GAP SERPL CALCULATED.3IONS-SCNC: 8 MMOL/L
AST SERPL W P-5'-P-CCNC: 35 U/L (ref 13–39)
BASOPHILS # BLD AUTO: 0.02 THOUSANDS/ÂΜL (ref 0–0.1)
BASOPHILS NFR BLD AUTO: 0 % (ref 0–1)
BILIRUB SERPL-MCNC: 0.35 MG/DL (ref 0.2–1)
BUN SERPL-MCNC: 10 MG/DL (ref 5–25)
CALCIUM ALBUM COR SERPL-MCNC: 8.3 MG/DL (ref 8.3–10.1)
CALCIUM SERPL-MCNC: 7.3 MG/DL (ref 8.4–10.2)
CHLORIDE SERPL-SCNC: 102 MMOL/L (ref 96–108)
CO2 SERPL-SCNC: 26 MMOL/L (ref 21–32)
CREAT SERPL-MCNC: 0.57 MG/DL (ref 0.6–1.3)
EOSINOPHIL # BLD AUTO: 0.03 THOUSAND/ÂΜL (ref 0–0.61)
EOSINOPHIL NFR BLD AUTO: 0 % (ref 0–6)
ERYTHROCYTE [DISTWIDTH] IN BLOOD BY AUTOMATED COUNT: 12.9 % (ref 11.6–15.1)
GFR SERPL CREATININE-BSD FRML MDRD: 86 ML/MIN/1.73SQ M
GLUCOSE SERPL-MCNC: 124 MG/DL (ref 65–140)
GLUCOSE SERPL-MCNC: 129 MG/DL (ref 65–140)
GLUCOSE SERPL-MCNC: 156 MG/DL (ref 65–140)
GLUCOSE SERPL-MCNC: 280 MG/DL (ref 65–140)
GLUCOSE SERPL-MCNC: 98 MG/DL (ref 65–140)
HCT VFR BLD AUTO: 30.4 % (ref 34.8–46.1)
HGB BLD-MCNC: 9.9 G/DL (ref 11.5–15.4)
IMM GRANULOCYTES # BLD AUTO: 0.07 THOUSAND/UL (ref 0–0.2)
IMM GRANULOCYTES NFR BLD AUTO: 1 % (ref 0–2)
LYMPHOCYTES # BLD AUTO: 1.18 THOUSANDS/ÂΜL (ref 0.6–4.47)
LYMPHOCYTES NFR BLD AUTO: 9 % (ref 14–44)
MAGNESIUM SERPL-MCNC: 1.9 MG/DL (ref 1.9–2.7)
MCH RBC QN AUTO: 33.1 PG (ref 26.8–34.3)
MCHC RBC AUTO-ENTMCNC: 32.6 G/DL (ref 31.4–37.4)
MCV RBC AUTO: 102 FL (ref 82–98)
MONOCYTES # BLD AUTO: 0.62 THOUSAND/ÂΜL (ref 0.17–1.22)
MONOCYTES NFR BLD AUTO: 5 % (ref 4–12)
NEUTROPHILS # BLD AUTO: 11.35 THOUSANDS/ÂΜL (ref 1.85–7.62)
NEUTS SEG NFR BLD AUTO: 85 % (ref 43–75)
NRBC BLD AUTO-RTO: 0 /100 WBCS
PLATELET # BLD AUTO: 144 THOUSANDS/UL (ref 149–390)
PMV BLD AUTO: 11.6 FL (ref 8.9–12.7)
POTASSIUM SERPL-SCNC: 3.9 MMOL/L (ref 3.5–5.3)
PROCALCITONIN SERPL-MCNC: 24.47 NG/ML
PROT SERPL-MCNC: 4.9 G/DL (ref 6.4–8.4)
RBC # BLD AUTO: 2.99 MILLION/UL (ref 3.81–5.12)
SODIUM SERPL-SCNC: 136 MMOL/L (ref 135–147)
WBC # BLD AUTO: 13.27 THOUSAND/UL (ref 4.31–10.16)

## 2023-12-17 PROCEDURE — 84145 PROCALCITONIN (PCT): CPT | Performed by: PHYSICIAN ASSISTANT

## 2023-12-17 PROCEDURE — 85025 COMPLETE CBC W/AUTO DIFF WBC: CPT

## 2023-12-17 PROCEDURE — 80053 COMPREHEN METABOLIC PANEL: CPT

## 2023-12-17 PROCEDURE — 82948 REAGENT STRIP/BLOOD GLUCOSE: CPT

## 2023-12-17 PROCEDURE — 99233 SBSQ HOSP IP/OBS HIGH 50: CPT

## 2023-12-17 PROCEDURE — 83735 ASSAY OF MAGNESIUM: CPT

## 2023-12-17 RX ADMIN — CALCIUM CARBONATE (ANTACID) CHEW TAB 500 MG 500 MG: 500 CHEW TAB at 09:15

## 2023-12-17 RX ADMIN — CEFEPIME HYDROCHLORIDE 2000 MG: 2 INJECTION, SOLUTION INTRAVENOUS at 11:12

## 2023-12-17 RX ADMIN — OXYCODONE HYDROCHLORIDE AND ACETAMINOPHEN 500 MG: 500 TABLET ORAL at 17:00

## 2023-12-17 RX ADMIN — PANTOPRAZOLE SODIUM 40 MG: 40 TABLET, DELAYED RELEASE ORAL at 09:15

## 2023-12-17 RX ADMIN — GABAPENTIN 300 MG: 300 CAPSULE ORAL at 17:00

## 2023-12-17 RX ADMIN — INSULIN LISPRO 2 UNITS: 100 INJECTION, SOLUTION INTRAVENOUS; SUBCUTANEOUS at 11:15

## 2023-12-17 RX ADMIN — INSULIN GLARGINE 5 UNITS: 100 INJECTION, SOLUTION SUBCUTANEOUS at 22:28

## 2023-12-17 RX ADMIN — CYANOCOBALAMIN TAB 500 MCG 1000 MCG: 500 TAB at 09:15

## 2023-12-17 RX ADMIN — VANCOMYCIN HYDROCHLORIDE 1250 MG: 1 INJECTION, POWDER, LYOPHILIZED, FOR SOLUTION INTRAVENOUS at 15:34

## 2023-12-17 RX ADMIN — GABAPENTIN 300 MG: 300 CAPSULE ORAL at 09:15

## 2023-12-17 RX ADMIN — INSULIN GLARGINE 7 UNITS: 100 INJECTION, SOLUTION SUBCUTANEOUS at 09:15

## 2023-12-17 RX ADMIN — OXYCODONE HYDROCHLORIDE AND ACETAMINOPHEN 500 MG: 500 TABLET ORAL at 09:15

## 2023-12-17 RX ADMIN — QUETIAPINE FUMARATE 25 MG: 25 TABLET ORAL at 22:28

## 2023-12-17 RX ADMIN — ENOXAPARIN SODIUM 30 MG: 30 INJECTION SUBCUTANEOUS at 09:15

## 2023-12-17 RX ADMIN — CALCIUM CARBONATE (ANTACID) CHEW TAB 500 MG 500 MG: 500 CHEW TAB at 17:00

## 2023-12-17 RX ADMIN — B-COMPLEX W/ C & FOLIC ACID TAB 1 TABLET: TAB at 09:15

## 2023-12-17 RX ADMIN — INSULIN LISPRO 1 UNITS: 100 INJECTION, SOLUTION INTRAVENOUS; SUBCUTANEOUS at 22:28

## 2023-12-17 NOTE — PLAN OF CARE
Problem: Potential for Falls  Goal: Patient will remain free of falls  Description: INTERVENTIONS:  - Educate patient/family on patient safety including physical limitations  - Instruct patient to call for assistance with activity   - Consult OT/PT to assist with strengthening/mobility   - Keep Call bell within reach  - Keep bed low and locked with side rails adjusted as appropriate  - Keep care items and personal belongings within reach  - Initiate and maintain comfort rounds  - Make Fall Risk Sign visible to staff  - Offer Toileting every 2 Hours, in advance of need  - Initiate/Maintain bed alarm  - Obtain necessary fall risk management equipment: non skid socks  - Apply yellow socks and bracelet for high fall risk patients  - Consider moving patient to room near nurses station  Outcome: Not Progressing

## 2023-12-17 NOTE — PROGRESS NOTES
[facility]  Progress Note  Name: Shakira Hinkle I  MRN: [id number]  Unit/Bed#: -01 I Date of Admission: 12/15/2023   Date of Service: 12/17/2023 I Hospital Day: 2    Assessment/Plan   * Generalized weakness  Assessment & Plan  Brought to ED by daughter after discharge from rehab facility for weakness and garbled speech   Head CT negative  CTA head and neck negative for large vessel occlusion, dissection or aneurysm.  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, origin of left vertebral artery, and left distal common carotid artery just proximal to the carotid bifurcation due to heavy calcified atherosclerotic disease similar to prior exam  Neurology evaluated patient in the ED, felt patient does not need MRI brain, does not need stroke pathway  Continue neurological checks  Fall precautions  PT/OT consult-patient is moderate resource intensity  Case management following and aware    Dysarthria  Assessment & Plan  Patient had garbled speech prehospital per daughter, resolved on arrival   Head CT negative  Neurology evaluated patient in the ED, felt patient did not need MRI or stroke workup in reviewing notes    Lactic acidosis  Assessment & Plan  Present on admission  Lactic acid 3.1  Resolved with IV fluids    COVID-19  Assessment & Plan  Patient presented to the ED via EMS-daughter at bedside reports patient was discharged from rehab facility today max assistance of 2 to transfer  Also having some dysarthria prehospital-resolved by time of my assessment  Patient tested positive for COVID in the ED  At time of arrival on room air with nonlabored respirations  12/16 a.m required 2 L nasal cannula, lungs decreased bilaterally, no cough  12/16 CT chest abdomen pelvis negative for PE, did note possible nonspecific enteritis  12/16 initiated cefepime and vancomycin given procalcitonin ovation  Pharmacy following  Supportive care for COVID  Isolation  precautions    Hypomagnesemia  Assessment & Plan  Likely secondary to loose stools  Continue to monitor and replete as indicated    Type I diabetes mellitus (HCC)  Assessment & Plan  Lab Results   Component Value Date    HGBA1C 8.9 (H) 05/26/2023       Recent Labs     12/16/23  1808 12/16/23  2035 12/17/23  0738 12/17/23  1110   POCGLU 380* 282* 124 280*         Blood Sugar Average: Last 72 hrs:  (P) 253.5    Target blood sugar inpatient 140-180  On Home regimen which includes Lantus insulin 7 units in the a.m., 5 units in the p.m.  Blood sugars improved overnight  Continue SSI  CHO diet  Hypoglycemia protocol  BMP a.m.    Hyperlipidemia  Assessment & Plan  Not on statin prehospital    Hypothyroidism  Assessment & Plan  Continue levothyroxine    Gastroesophageal reflux disease without esophagitis  Assessment & Plan  Continue PPI    Urinary retention  Assessment & Plan  Failed retention protocol, requiring Kinney catheter placement  Urology consulted  Continue to monitor intake and output           VTE Pharmacologic Prophylaxis:   Pharmacologic: Enoxaparin (Lovenox)  Mechanical VTE Prophylaxis in Place: Yes    Patient Centered Rounds: I have performed bedside rounds with nursing staff today.    Discussions with Specialists or Other Care Team Provider: Nursing, PT/OT, case management    Education and Discussions with Family / Patient: Updated patients daughter via telephone at this time. All questions answered to satisfaction      Time Spent for Care:  42 minutes .  More than 50% of total time spent on counseling and coordination of care as described above.    Current Length of Stay: 2 day(s)    Current Patient Status: Inpatient   Certification Statement: The patient will continue to require additional inpatient hospital stay due to continues IV antibiotics, trending procalcitonin, supportive care for COVID, patient will require rehab placement on discharge    Discharge Plan: Patient noted to have elevated  procalcitonin yesterday was initiated on IV cefepime and vancomycin.  CT abdomen pelvis showed possible enteritis.  Continuing supportive care for COVID-19.  Patient will require rehab placement on discharge, case management following aware.    Code Status: Level 3 - DNAR and DNI      Subjective:   Patient resting comfortably, alert and oriented at time of exam.  Denies any shortness of breath, chest pain, dizziness lightheadedness or palpitations    Objective:     Vitals:   Temp (24hrs), Av.7 °F (37.1 °C), Min:98.2 °F (36.8 °C), Max:99.3 °F (37.4 °C)    Temp:  [98.2 °F (36.8 °C)-99.3 °F (37.4 °C)] 98.6 °F (37 °C)  HR:  [71-80] 71  Resp:  [16-21] 17  BP: ()/(44-62) 102/61  SpO2:  [93 %-96 %] 94 %  Body mass index is 20.58 kg/m².     Input and Output Summary (last 24 hours):       Intake/Output Summary (Last 24 hours) at 2023 1135  Last data filed at 2023 0830  Gross per 24 hour   Intake 710 ml   Output 1075 ml   Net -365 ml       Physical Exam:     Physical Exam  Vitals reviewed.   Constitutional:       General: She is not in acute distress.     Appearance: She is normal weight. She is not ill-appearing.   HENT:      Head: Normocephalic and atraumatic.      Nose: Nose normal.      Mouth/Throat:      Mouth: Mucous membranes are dry.   Eyes:      Extraocular Movements: Extraocular movements intact.      Conjunctiva/sclera: Conjunctivae normal.      Pupils: Pupils are equal, round, and reactive to light.   Cardiovascular:      Rate and Rhythm: Normal rate and regular rhythm.      Pulses: Normal pulses.      Heart sounds: Normal heart sounds.   Pulmonary:      Effort: Pulmonary effort is normal. No respiratory distress.      Breath sounds: No wheezing.      Comments: Nonlabored respirations on room air, decreased breath sounds bilaterally, no cough or shortness of breath  Abdominal:      General: Bowel sounds are normal. There is no distension.      Palpations: Abdomen is soft.      Tenderness:  There is no abdominal tenderness. There is no guarding.   Genitourinary:     Comments: Kinney patent for yellow urine  Musculoskeletal:         General: Normal range of motion.   Skin:     General: Skin is warm and dry.      Capillary Refill: Capillary refill takes less than 2 seconds.   Neurological:      General: No focal deficit present.      Mental Status: She is alert and oriented to person, place, and time.   Psychiatric:         Mood and Affect: Mood normal.         Behavior: Behavior normal.         Additional Data:     Labs:    Results from last 7 days   Lab Units 12/17/23  0455   WBC Thousand/uL 13.27*   HEMOGLOBIN g/dL 9.9*   HEMATOCRIT % 30.4*   PLATELETS Thousands/uL 144*   NEUTROS PCT % 85*   LYMPHS PCT % 9*   MONOS PCT % 5   EOS PCT % 0     Results from last 7 days   Lab Units 12/17/23  0455   POTASSIUM mmol/L 3.9   CHLORIDE mmol/L 102   CO2 mmol/L 26   BUN mg/dL 10   CREATININE mg/dL 0.57*   CALCIUM mg/dL 7.3*   ALK PHOS U/L 97   ALT U/L 23   AST U/L 35     Results from last 7 days   Lab Units 12/15/23  1502   INR  1.00       * I Have Reviewed All Lab Data Listed Above.  * Additional Pertinent Lab Tests Reviewed: All Labs For Current Hospital Admission Reviewed    Imaging:    Imaging Reports Reviewed Today Include: CT brain, CTA head and neck, chest x-ray  Imaging Personally Reviewed by Myself Includes: Same as above    Recent Cultures (last 7 days):     Results from last 7 days   Lab Units 12/15/23  1502   BLOOD CULTURE  No Growth at 24 hrs.  No Growth at 24 hrs.       Last 24 Hours Medication List:   Current Facility-Administered Medications   Medication Dose Route Frequency Provider Last Rate    acetaminophen  650 mg Oral Q6H PRN ADRIAN Tripp      vitamin C  500 mg Oral BID ADRIAN Tripp      calcium carbonate  500 mg Oral BID ADRIAN Tirpp      cefepime  2,000 mg Intravenous Q24H ADRIAN Tripp 2,000 mg (12/17/23 1112)    cyanocobalamin  1,000 mcg Oral  Daily ADRIAN Tripp      enoxaparin  30 mg Subcutaneous Daily ADRIAN Tripp      gabapentin  300 mg Oral BID ADRIAN Tripp      insulin glargine  5 Units Subcutaneous HS ADRIAN Tripp      insulin glargine  7 Units Subcutaneous QAM ADRIAN Tripp      insulin lispro  1-5 Units Subcutaneous TID AC ADRIAN Tripp      insulin lispro  1-5 Units Subcutaneous HS ADRIAN Tripp      multi-electrolyte  75 mL/hr Intravenous Continuous Tyson Lester, DO 75 mL/hr (12/16/23 9167)    multivitamin stress formula  1 tablet Oral Daily ADRIAN Tripp      ondansetron  4 mg Intravenous Q6H PRN ADRIAN Tripp      pantoprazole  40 mg Oral Daily ADRIAN Tripp      QUEtiapine  25 mg Oral HS ADRIAN Tripp      vancomycin  1,250 mg Intravenous Q24H ADRIAN Tripp 1,250 mg (12/16/23 3174)        Today, Patient Was Seen By: ADRIAN Tripp    ** Please Note: Dictation voice to text software may have been used in the creation of this document. **

## 2023-12-17 NOTE — ASSESSMENT & PLAN NOTE
Patient presented to the ED via EMS-daughter at bedside reports patient was discharged from rehab facility today max assistance of 2 to transfer  Also having some dysarthria prehospital-resolved by time of my assessment  Patient tested positive for COVID in the ED  At time of arrival on room air with nonlabored respirations  12/16 a.m required 2 L nasal cannula, lungs decreased bilaterally, no cough  12/16 CT chest abdomen pelvis negative for PE, did note possible nonspecific enteritis  12/16 initiated cefepime and vancomycin initiated  Finished 3 days of IV Vanco and cefepime.  Will de-escalate to Augmentin 875-125 twice daily for 7 more days to complete 10-day course  Supportive care for COVID  Procalcitonin continues to trend downward  Leukocytosis resolved, afebrile  Isolation precautions

## 2023-12-17 NOTE — ASSESSMENT & PLAN NOTE
Failed retention protocol, requiring Kinney catheter placement  Urology consulted, appreciate recommendations-planning continue Kinney for now until medically improved then will reattempt voiding trial  Continue to monitor intake and output

## 2023-12-17 NOTE — PLAN OF CARE
Problem: Potential for Falls  Goal: Patient will remain free of falls  Description: INTERVENTIONS:  - Educate patient/family on patient safety including physical limitations  - Instruct patient to call for assistance with activity   - Consult OT/PT to assist with strengthening/mobility   - Keep Call bell within reach  - Keep bed low and locked with side rails adjusted as appropriate  - Keep care items and personal belongings within reach  - Initiate and maintain comfort rounds  - Make Fall Risk Sign visible to staff  - Offer Toileting every 2 Hours, in advance of need  - Initiate/Maintain bed alarm  - Obtain necessary fall risk management equipment: alarms  - Apply yellow socks and bracelet for high fall risk patients  - Consider moving patient to room near nurses station  Outcome: Progressing     Problem: PAIN - ADULT  Goal: Verbalizes/displays adequate comfort level or baseline comfort level  Description: Interventions:  - Encourage patient to monitor pain and request assistance  - Assess pain using appropriate pain scale  - Administer analgesics based on type and severity of pain and evaluate response  - Implement non-pharmacological measures as appropriate and evaluate response  - Consider cultural and social influences on pain and pain management  - Notify physician/advanced practitioner if interventions unsuccessful or patient reports new pain  Outcome: Progressing     Problem: INFECTION - ADULT  Goal: Absence or prevention of progression during hospitalization  Description: INTERVENTIONS:  - Assess and monitor for signs and symptoms of infection  - Monitor lab/diagnostic results  - Monitor all insertion sites, i.e. indwelling lines, tubes, and drains  - Monitor endotracheal if appropriate and nasal secretions for changes in amount and color  - [place] appropriate cooling/warming therapies per order  - Administer medications as ordered  - Instruct and encourage patient and family to use good hand hygiene  technique  - Identify and instruct in appropriate isolation precautions for identified infection/condition  Outcome: Progressing  Goal: Absence of fever/infection during neutropenic period  Description: INTERVENTIONS:  - Monitor WBC    Outcome: Progressing     Problem: SAFETY ADULT  Goal: Patient will remain free of falls  Description: INTERVENTIONS:  - Educate patient/family on patient safety including physical limitations  - Instruct patient to call for assistance with activity   - Consult OT/PT to assist with strengthening/mobility   - Keep Call bell within reach  - Keep bed low and locked with side rails adjusted as appropriate  - Keep care items and personal belongings within reach  - Initiate and maintain comfort rounds  - Make Fall Risk Sign visible to staff  - Offer Toileting every 2 Hours, in advance of need  - Initiate/Maintain bed alarm  - Obtain necessary fall risk management equipment: alarms  - Apply yellow socks and bracelet for high fall risk patients  - Consider moving patient to room near nurses station  Outcome: Progressing  Goal: Maintain or return to baseline ADL function  Description: INTERVENTIONS:  -  Assess patient's ability to carry out ADLs; assess patient's baseline for ADL function and identify physical deficits which impact ability to perform ADLs (bathing, care of mouth/teeth, toileting, grooming, dressing, etc.)  - Assess/evaluate cause of self-care deficits   - Assess range of motion  - Assess patient's mobility; develop plan if impaired  - Assess patient's need for assistive devices and provide as appropriate  - Encourage maximum independence but intervene and supervise when necessary  - Involve family in performance of ADLs  - Assess for home care needs following discharge   - Consider OT consult to assist with ADL evaluation and planning for discharge  - Provide patient education as appropriate  Outcome: Progressing  Goal: Maintains/Returns to pre admission functional  level  Description: INTERVENTIONS:  - Perform AM-PAC 6 Click Basic Mobility/ Daily Activity assessment daily.  - Set and communicate daily mobility goal to care team and patient/family/caregiver.   - Collaborate with rehabilitation services on mobility goals if consulted  - Perform Range of Motion 3 times a day.  - Reposition patient every 2 hours.  - Dangle patient 3 times a day  - Stand patient 3 times a day  - Ambulate patient 3 times a day  - Out of bed to chair 3 times a day   - Out of bed for meals 3 times a day  - Out of bed for toileting  - Record patient progress and toleration of activity level   Outcome: Progressing     Problem: Knowledge Deficit  Goal: Patient/family/caregiver demonstrates understanding of disease process, treatment plan, medications, and discharge instructions  Description: Complete learning assessment and assess knowledge base.  Interventions:  - Provide teaching at level of understanding  - Provide teaching via preferred learning methods  Outcome: Progressing     Problem: MOBILITY - ADULT  Goal: Maintain or return to baseline ADL function  Description: INTERVENTIONS:  -  Assess patient's ability to carry out ADLs; assess patient's baseline for ADL function and identify physical deficits which impact ability to perform ADLs (bathing, care of mouth/teeth, toileting, grooming, dressing, etc.)  - Assess/evaluate cause of self-care deficits   - Assess range of motion  - Assess patient's mobility; develop plan if impaired  - Assess patient's need for assistive devices and provide as appropriate  - Encourage maximum independence but intervene and supervise when necessary  - Involve family in performance of ADLs  - Assess for home care needs following discharge   - Consider OT consult to assist with ADL evaluation and planning for discharge  - Provide patient education as appropriate  Outcome: Progressing  Goal: Maintains/Returns to pre admission functional level  Description:  INTERVENTIONS:  - Perform AM-PAC 6 Click Basic Mobility/ Daily Activity assessment daily.  - Set and communicate daily mobility goal to care team and patient/family/caregiver.   - Collaborate with rehabilitation services on mobility goals if consulted  - Perform Range of Motion 3 times a day.  - Reposition patient every 2 hours.  - Dangle patient 3 times a day  - Stand patient 3 times a day  - Ambulate patient 3 times a day  - Out of bed to chair 3 times a day   - Out of bed for meals 3 times a day  - Out of bed for toileting  - Record patient progress and toleration of activity level   Outcome: Progressing     Problem: Prexisting or High Potential for Compromised Skin Integrity  Goal: Skin integrity is maintained or improved  Description: INTERVENTIONS:  - Identify patients at risk for skin breakdown  - Assess and monitor skin integrity  - Assess and monitor nutrition and hydration status  - Monitor labs   - Assess for incontinence   - Turn and reposition patient  - Assist with mobility/ambulation  - Relieve pressure over bony prominences  - Avoid friction and shearing  - Provide appropriate hygiene as needed including keeping skin clean and dry  - Evaluate need for skin moisturizer/barrier cream  - Collaborate with interdisciplinary team   - Patient/family teaching  - Consider wound care consult   Outcome: Progressing

## 2023-12-17 NOTE — ASSESSMENT & PLAN NOTE
Patient had garbled speech prehospital per daughter, resolved on arrival   Head CT negative  Neurology evaluated patient in the ED, felt patient did not need MRI or stroke workup in reviewing notes

## 2023-12-17 NOTE — ASSESSMENT & PLAN NOTE
Brought to ED by daughter after discharge from rehab facility for weakness and garbled speech   Head CT negative  CTA head and neck negative for large vessel occlusion, dissection or aneurysm.  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  Will likely require rehab placement on discharge.    12/19 Will replete magnesium as this could be attributing to generalized weakness.    Follow-up B12 folate  TSH came back high 14. Upon medication review, patient's levothryoxine hasn't been re-started. Levothyroxine 75 mcg restarted today.  Continue with gentle IV hydration 50 ml/hr. 3 doses of 25% albumin ordered for pressure support and intravascular repletion.

## 2023-12-17 NOTE — PROGRESS NOTES
Shakira Hinkle is a 83 y.o. female who is currently ordered Vancomycin IV with management by the Pharmacy Consult service.  Relevant clinical data and objective / subjective history reviewed.  Vancomycin Assessment:  Indication and Goal AUC/Trough: Pneumonia (goal -600, trough >10)  Clinical Status: stable  Micro:     Renal Function:  SCr: 0.57 mg/dL  CrCl: 56.3 mL/min  Renal replacement: Not on dialysis  Days of Therapy: 2  Current Dose: 1250mg IV Q24hrs  Vancomycin Plan:  New Dosing: continue 1250mg IV Q24hrs  Estimated AUC: 485 mcg*hr/mL  Estimated Trough: 12.9 mcg/mL  Next Level: 12/18 with am labs   Renal Function Monitoring: Daily BMP and UOP  Pharmacy will continue to follow closely for s/sx of nephrotoxicity, infusion reactions and appropriateness of therapy.  BMP and CBC will be ordered per protocol. We will continue to follow the patient’s culture results and clinical progress daily.    Merced Rodrigues, Pharmacist

## 2023-12-17 NOTE — ASSESSMENT & PLAN NOTE
Lab Results   Component Value Date    HGBA1C 8.9 (H) 05/26/2023       Recent Labs     12/18/23  2149 12/18/23  2355 12/19/23  0745 12/19/23  1055   POCGLU 138 154* 175* 177*       Blood Sugar Average: Last 72 hrs:  (P) 253.5    Target blood sugar inpatient 140-180  Was on Home regimen which includes Lantus insulin 7 units in the a.m., 5 units in the p.m.  Hypoglycemic episode this a.m., asymptomatic  Will decrease regimen to Lantus 3 units every 12 hours and continue to monitor  Continue SSI  CHO diet  Hypoglycemia protocol  BMP a.m.

## 2023-12-18 PROBLEM — A41.9 SEPSIS (HCC): Status: ACTIVE | Noted: 2023-12-18

## 2023-12-18 LAB
ALBUMIN SERPL BCP-MCNC: 3 G/DL (ref 3.5–5)
ALP SERPL-CCNC: 96 U/L (ref 34–104)
ALT SERPL W P-5'-P-CCNC: 25 U/L (ref 7–52)
ANION GAP SERPL CALCULATED.3IONS-SCNC: 13 MMOL/L
AST SERPL W P-5'-P-CCNC: 42 U/L (ref 13–39)
BASOPHILS # BLD AUTO: 0.02 THOUSANDS/ÂΜL (ref 0–0.1)
BASOPHILS NFR BLD AUTO: 0 % (ref 0–1)
BILIRUB SERPL-MCNC: 0.32 MG/DL (ref 0.2–1)
BUN SERPL-MCNC: 7 MG/DL (ref 5–25)
CALCIUM ALBUM COR SERPL-MCNC: 8.8 MG/DL (ref 8.3–10.1)
CALCIUM SERPL-MCNC: 8 MG/DL (ref 8.4–10.2)
CHLORIDE SERPL-SCNC: 101 MMOL/L (ref 96–108)
CO2 SERPL-SCNC: 26 MMOL/L (ref 21–32)
CREAT SERPL-MCNC: 0.58 MG/DL (ref 0.6–1.3)
EOSINOPHIL # BLD AUTO: 0.01 THOUSAND/ÂΜL (ref 0–0.61)
EOSINOPHIL NFR BLD AUTO: 0 % (ref 0–6)
ERYTHROCYTE [DISTWIDTH] IN BLOOD BY AUTOMATED COUNT: 12.8 % (ref 11.6–15.1)
GFR SERPL CREATININE-BSD FRML MDRD: 85 ML/MIN/1.73SQ M
GLUCOSE SERPL-MCNC: 138 MG/DL (ref 65–140)
GLUCOSE SERPL-MCNC: 154 MG/DL (ref 65–140)
GLUCOSE SERPL-MCNC: 178 MG/DL (ref 65–140)
GLUCOSE SERPL-MCNC: 230 MG/DL (ref 65–140)
GLUCOSE SERPL-MCNC: 44 MG/DL (ref 65–140)
GLUCOSE SERPL-MCNC: 45 MG/DL (ref 65–140)
GLUCOSE SERPL-MCNC: 56 MG/DL (ref 65–140)
GLUCOSE SERPL-MCNC: 68 MG/DL (ref 65–140)
HCT VFR BLD AUTO: 38.4 % (ref 34.8–46.1)
HGB BLD-MCNC: 12.1 G/DL (ref 11.5–15.4)
IMM GRANULOCYTES # BLD AUTO: 0.06 THOUSAND/UL (ref 0–0.2)
IMM GRANULOCYTES NFR BLD AUTO: 1 % (ref 0–2)
LYMPHOCYTES # BLD AUTO: 0.9 THOUSANDS/ÂΜL (ref 0.6–4.47)
LYMPHOCYTES NFR BLD AUTO: 9 % (ref 14–44)
MCH RBC QN AUTO: 32.5 PG (ref 26.8–34.3)
MCHC RBC AUTO-ENTMCNC: 31.5 G/DL (ref 31.4–37.4)
MCV RBC AUTO: 103 FL (ref 82–98)
MONOCYTES # BLD AUTO: 0.77 THOUSAND/ÂΜL (ref 0.17–1.22)
MONOCYTES NFR BLD AUTO: 8 % (ref 4–12)
NEUTROPHILS # BLD AUTO: 8.2 THOUSANDS/ÂΜL (ref 1.85–7.62)
NEUTS SEG NFR BLD AUTO: 82 % (ref 43–75)
NRBC BLD AUTO-RTO: 0 /100 WBCS
PLATELET # BLD AUTO: 181 THOUSANDS/UL (ref 149–390)
PMV BLD AUTO: 10.8 FL (ref 8.9–12.7)
POTASSIUM SERPL-SCNC: 4.1 MMOL/L (ref 3.5–5.3)
PROCALCITONIN SERPL-MCNC: 13.09 NG/ML
PROT SERPL-MCNC: 5.6 G/DL (ref 6.4–8.4)
RBC # BLD AUTO: 3.72 MILLION/UL (ref 3.81–5.12)
SODIUM SERPL-SCNC: 140 MMOL/L (ref 135–147)
VANCOMYCIN TROUGH SERPL-MCNC: 12.4 UG/ML (ref 10–20)
WBC # BLD AUTO: 9.96 THOUSAND/UL (ref 4.31–10.16)

## 2023-12-18 PROCEDURE — 80202 ASSAY OF VANCOMYCIN: CPT

## 2023-12-18 PROCEDURE — 84145 PROCALCITONIN (PCT): CPT

## 2023-12-18 PROCEDURE — 85025 COMPLETE CBC W/AUTO DIFF WBC: CPT

## 2023-12-18 PROCEDURE — 99233 SBSQ HOSP IP/OBS HIGH 50: CPT

## 2023-12-18 PROCEDURE — 80053 COMPREHEN METABOLIC PANEL: CPT

## 2023-12-18 PROCEDURE — 82948 REAGENT STRIP/BLOOD GLUCOSE: CPT

## 2023-12-18 RX ORDER — DEXTROSE MONOHYDRATE 25 G/50ML
12.5 INJECTION, SOLUTION INTRAVENOUS ONCE
Status: DISCONTINUED | OUTPATIENT
Start: 2023-12-18 | End: 2023-12-18

## 2023-12-18 RX ORDER — DEXTROSE MONOHYDRATE 25 G/50ML
25 INJECTION, SOLUTION INTRAVENOUS ONCE
Status: DISCONTINUED | OUTPATIENT
Start: 2023-12-18 | End: 2023-12-22 | Stop reason: HOSPADM

## 2023-12-18 RX ORDER — DEXTROSE MONOHYDRATE 25 G/50ML
INJECTION, SOLUTION INTRAVENOUS
Status: COMPLETED
Start: 2023-12-18 | End: 2023-12-18

## 2023-12-18 RX ORDER — INSULIN GLARGINE 100 [IU]/ML
3 INJECTION, SOLUTION SUBCUTANEOUS EVERY 12 HOURS SCHEDULED
Status: DISCONTINUED | OUTPATIENT
Start: 2023-12-18 | End: 2023-12-21

## 2023-12-18 RX ADMIN — DEXTROSE MONOHYDRATE 50 ML: 25 INJECTION, SOLUTION INTRAVENOUS at 21:33

## 2023-12-18 RX ADMIN — CEFEPIME HYDROCHLORIDE 2000 MG: 2 INJECTION, SOLUTION INTRAVENOUS at 12:33

## 2023-12-18 RX ADMIN — INSULIN GLARGINE 3 UNITS: 100 INJECTION, SOLUTION SUBCUTANEOUS at 15:04

## 2023-12-18 RX ADMIN — OXYCODONE HYDROCHLORIDE AND ACETAMINOPHEN 500 MG: 500 TABLET ORAL at 08:47

## 2023-12-18 RX ADMIN — PANTOPRAZOLE SODIUM 40 MG: 40 TABLET, DELAYED RELEASE ORAL at 08:47

## 2023-12-18 RX ADMIN — INSULIN LISPRO 2 UNITS: 100 INJECTION, SOLUTION INTRAVENOUS; SUBCUTANEOUS at 12:38

## 2023-12-18 RX ADMIN — GABAPENTIN 300 MG: 300 CAPSULE ORAL at 08:47

## 2023-12-18 RX ADMIN — QUETIAPINE FUMARATE 25 MG: 25 TABLET ORAL at 21:17

## 2023-12-18 RX ADMIN — SODIUM CHLORIDE, SODIUM GLUCONATE, SODIUM ACETATE, POTASSIUM CHLORIDE, MAGNESIUM CHLORIDE, SODIUM PHOSPHATE, DIBASIC, AND POTASSIUM PHOSPHATE 75 ML/HR: .53; .5; .37; .037; .03; .012; .00082 INJECTION, SOLUTION INTRAVENOUS at 02:29

## 2023-12-18 RX ADMIN — VANCOMYCIN HYDROCHLORIDE 1250 MG: 1 INJECTION, POWDER, LYOPHILIZED, FOR SOLUTION INTRAVENOUS at 15:04

## 2023-12-18 RX ADMIN — CYANOCOBALAMIN TAB 500 MCG 1000 MCG: 500 TAB at 08:47

## 2023-12-18 RX ADMIN — ENOXAPARIN SODIUM 30 MG: 30 INJECTION SUBCUTANEOUS at 08:47

## 2023-12-18 RX ADMIN — GABAPENTIN 300 MG: 300 CAPSULE ORAL at 18:11

## 2023-12-18 RX ADMIN — B-COMPLEX W/ C & FOLIC ACID TAB 1 TABLET: TAB at 08:47

## 2023-12-18 RX ADMIN — OXYCODONE HYDROCHLORIDE AND ACETAMINOPHEN 500 MG: 500 TABLET ORAL at 18:11

## 2023-12-18 RX ADMIN — INSULIN LISPRO 1 UNITS: 100 INJECTION, SOLUTION INTRAVENOUS; SUBCUTANEOUS at 18:14

## 2023-12-18 NOTE — PROGRESS NOTES
27-Dec-2022 14:20 [facility]  Progress Note  Name: Shakira Hinkle I  MRN: [id number]  Unit/Bed#: -01 I Date of Admission: 12/15/2023   Date of Service: 12/18/2023 I Hospital Day: 3    Assessment/Plan   * Generalized weakness  Assessment & Plan  Brought to ED by daughter after discharge from rehab facility for weakness and garbled speech   Head CT negative  CTA head and neck negative for large vessel occlusion, dissection or aneurysm.  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  Will likely require rehab placement on discharge.    Sepsis (HCC)  Assessment & Plan  POA now resolved  In setting of COVID-19 and possible enteritis per CT imaging  Evidenced by WBC 14.45, heart rate 93, BP 70/30, lactic 3.1  Trended lactic acid-lactic acidosis resolved with IV fluids  Blood cultures x 2 negative after 48 hours  Trending WBCs-no cytosis today  Trending procalcitonin-continues to trend downward  Treating with IV vancomycin and cefepime  Continuous isolate 75 mL an hour-discontinuing today      Dysarthria  Assessment & Plan  Patient had garbled speech prehospital per daughter, resolved on arrival   Head CT negative  Neurology evaluated patient in the ED, felt patient did not need MRI or stroke workup in reviewing notes    Lactic acidosis  Assessment & Plan  Present on admission  Lactic acid 3.1  Resolved with IV fluids    COVID-19  Assessment & Plan  Patient presented to the ED via EMS-daughter at bedside reports patient was discharged from rehab facility today max assistance  of 2 to transfer  Also having some dysarthria prehospital-resolved by time of my assessment  Patient tested positive for COVID in the ED  At time of arrival on room air with nonlabored respirations  12/16 a.m required 2 L nasal cannula, lungs decreased bilaterally, no cough  12/16 CT chest abdomen pelvis negative for PE, did note possible nonspecific enteritis  12/16 initiated cefepime and vancomycin initiated  Pharmacy following  Supportive care for COVID  Procalcitonin continues to trend downward  Leukocytosis resolved, afebrile  Isolation precautions    Hypomagnesemia  Assessment & Plan  Likely secondary to loose stools  Continue to monitor and replete as indicated    Type I diabetes mellitus (HCC)  Assessment & Plan  Lab Results   Component Value Date    HGBA1C 8.9 (H) 05/26/2023       Recent Labs     12/16/23  1808 12/16/23  2035 12/17/23  0738 12/17/23  1110   POCGLU 380* 282* 124 280*         Blood Sugar Average: Last 72 hrs:  (P) 253.5    Target blood sugar inpatient 140-180  Was on Home regimen which includes Lantus insulin 7 units in the a.m., 5 units in the p.m.  Hypoglycemic episode this a.m., asymptomatic  Will decrease regimen to Lantus 3 units every 12 hours and continue to monitor  Continue SSI  CHO diet  Hypoglycemia protocol  BMP a.m.    Hyperlipidemia  Assessment & Plan  Not on statin prehospital    Hypothyroidism  Assessment & Plan  Continue levothyroxine    Gastroesophageal reflux disease without esophagitis  Assessment & Plan  Continue PPI    Urinary retention  Assessment & Plan  Failed retention protocol, requiring Kinney catheter placement  Urology consulted, appreciate recommendations-planning continue Kinney for now until medically improved then will reattempt voiding trial  Continue to monitor intake and output           VTE Pharmacologic Prophylaxis:   Pharmacologic: Enoxaparin (Lovenox)  Mechanical VTE Prophylaxis in Place: Yes    Patient Centered Rounds: I have performed bedside rounds  with nursing staff today.    Discussions with Specialists or Other Care Team Provider: Nursing, PT/OT, case management    Education and Discussions with Family / Patient: Updated patients daughter via telephone at this time. All questions answered to satisfaction      Time Spent for Care:  40 minutes .  More than 50% of total time spent on counseling and coordination of care as described above.    Current Length of Stay: 3 day(s)    Current Patient Status: Inpatient   Certification Statement: The patient will continue to require additional inpatient hospital stay due to continues IV antibiotics, trending procalcitonin, supportive care for COVID, patient will require rehab placement on discharge    Discharge Plan: Continues on IV antibiotics, calcitonin trending downward, leukocytosis resolved.  PT/OT evaluated patient was moderate resource intensity, will likely need rehab placement on discharge.  Case management following aware.    Code Status: Level 3 - DNAR and DNI      Subjective:   Denies shortness of breath, no dizziness or lightheadedness, denies chest pain or palpitations.  She was alert and oriented x 3 on time of exam.  Follows commands.    Objective:     Vitals:   Temp (24hrs), Av.4 °F (36.9 °C), Min:97.6 °F (36.4 °C), Max:99.2 °F (37.3 °C)    Temp:  [97.6 °F (36.4 °C)-99.2 °F (37.3 °C)] 97.6 °F (36.4 °C)  HR:  [76] 76  Resp:  [18] 18  BP: ()/(51-94) 96/60  SpO2:  [93 %-95 %] 95 %  Body mass index is 20.58 kg/m².     Input and Output Summary (last 24 hours):       Intake/Output Summary (Last 24 hours) at 2023 1440  Last data filed at 2023 1300  Gross per 24 hour   Intake 4583.75 ml   Output 1650 ml   Net 2933.75 ml       Physical Exam:     Physical Exam  Vitals reviewed.   Constitutional:       General: She is not in acute distress.     Appearance: She is normal weight. She is not ill-appearing.   HENT:      Head: Normocephalic and atraumatic.      Nose: Nose normal.       Mouth/Throat:      Mouth: Mucous membranes are dry.   Eyes:      Extraocular Movements: Extraocular movements intact.      Conjunctiva/sclera: Conjunctivae normal.      Pupils: Pupils are equal, round, and reactive to light.   Cardiovascular:      Rate and Rhythm: Normal rate and regular rhythm.      Pulses: Normal pulses.      Heart sounds: Normal heart sounds.   Pulmonary:      Effort: Pulmonary effort is normal. No respiratory distress.      Breath sounds: No wheezing.      Comments: Nonlabored respirations on room air, decreased breath sounds bilaterally, no cough or shortness of breath  Abdominal:      General: Bowel sounds are normal. There is no distension.      Palpations: Abdomen is soft.      Tenderness: There is no abdominal tenderness. There is no guarding.   Genitourinary:     Comments: Kinney patent for yellow urine  Musculoskeletal:         General: Normal range of motion.   Skin:     General: Skin is warm and dry.      Capillary Refill: Capillary refill takes less than 2 seconds.   Neurological:      General: No focal deficit present.      Mental Status: She is alert and oriented to person, place, and time.   Psychiatric:         Mood and Affect: Mood normal.         Behavior: Behavior normal.         Additional Data:     Labs:    Results from last 7 days   Lab Units 12/18/23  0537   WBC Thousand/uL 9.96   HEMOGLOBIN g/dL 12.1   HEMATOCRIT % 38.4   PLATELETS Thousands/uL 181   NEUTROS PCT % 82*   LYMPHS PCT % 9*   MONOS PCT % 8   EOS PCT % 0     Results from last 7 days   Lab Units 12/18/23  0537   POTASSIUM mmol/L 4.1   CHLORIDE mmol/L 101   CO2 mmol/L 26   BUN mg/dL 7   CREATININE mg/dL 0.58*   CALCIUM mg/dL 8.0*   ALK PHOS U/L 96   ALT U/L 25   AST U/L 42*     Results from last 7 days   Lab Units 12/15/23  1502   INR  1.00       * I Have Reviewed All Lab Data Listed Above.  * Additional Pertinent Lab Tests Reviewed: All Labs For Current Hospital Admission Reviewed    Imaging:    Imaging Reports  Reviewed Today Include: CT brain, CTA head and neck, chest x-ray  Imaging Personally Reviewed by Myself Includes: Same as above    Recent Cultures (last 7 days):     Results from last 7 days   Lab Units 12/15/23  1502   BLOOD CULTURE  No Growth at 48 hrs.  No Growth at 48 hrs.       Last 24 Hours Medication List:   Current Facility-Administered Medications   Medication Dose Route Frequency Provider Last Rate    acetaminophen  650 mg Oral Q6H PRN ADRIAN Tripp      vitamin C  500 mg Oral BID ADRIAN Tripp      calcium carbonate  500 mg Oral BID ADRIAN Tripp      cefepime  2,000 mg Intravenous Q24H ADRIAN Tripp 2,000 mg (12/18/23 1233)    cyanocobalamin  1,000 mcg Oral Daily ADRIAN Tripp      enoxaparin  30 mg Subcutaneous Daily ADRIAN Tripp      gabapentin  300 mg Oral BID ADRIAN Tripp      insulin glargine  3 Units Subcutaneous Q12H SIMEON ADRIAN Tripp      insulin lispro  1-5 Units Subcutaneous TID AC ADRIAN Tripp      insulin lispro  1-5 Units Subcutaneous HS ADRIAN Tripp      multivitamin stress formula  1 tablet Oral Daily ADRIAN Tripp      ondansetron  4 mg Intravenous Q6H PRN ADRIAN Tripp      pantoprazole  40 mg Oral Daily ADRIAN Tripp      QUEtiapine  25 mg Oral HS ADRIAN Tripp      vancomycin  1,250 mg Intravenous Q24H ADRIAN Tripp 1,250 mg (12/17/23 1534)        Today, Patient Was Seen By: ADRIAN Tripp    ** Please Note: Dictation voice to text software may have been used in the creation of this document. **

## 2023-12-18 NOTE — CONSULTS
Consultation - Urology   Shakira Hinkle 83 y.o. female MRN: [id number]  Unit/Bed#: -01 Encounter: [id number]      Assessment/Plan      Assessment:  Urinary retention failing urinary retention protocol requiring insertion of indwelling Kinney.  Generalized weakness secondary to COVID-19 infection.  Significantly elevated procalcitonin receiving vancomycin and cefepime.  Plan:  Continue Kinney catheter until medically improved and mobility has increased.  At that point, we can consider another trial of voiding with close follow-up of postvoid residual.  I will continue to follow with you with day-to-day decision making regarding indwelling Kinney.    History of Present Illness   Attending: Bhavik Ariza MD  Reason for Consult / Principal Problem: Urinary retention  HPI: Shakira Hinkle is a 83 y.o. year old female who presents with generalized weakness and found to have positive COVID-19.  Also has significantly elevated procalcitonin.  Started on vancomycin and cefepime.  She failed urinary retention protocol and Kinney catheter was placed for 425 mL of residual urine.  Urinalysis is negative for nitrite leukocytes and blood with innumerable bacteria but no RBC and only 2-4 WBC.  CT scan shows no hydronephrosis.    Inpatient consult to Urology  Consult performed by: Denny Herman MD  Consult ordered by: ADRIAN Tripp          Review of Systems   Gastrointestinal:  Negative for abdominal distention and abdominal pain.   Genitourinary:  Negative for flank pain and hematuria.       Historical Information   Past Medical History:   Diagnosis Date    Ambulates with cane     Cancer ([facility])     Chronic pain disorder     knees/ shoulders (gets inj every 3 mos)    Closed intertrochanteric fracture of right femur ([facility]) 5/26/2020    Controlled type 2 diabetes mellitus with diabetic polyneuropathy, with long-term current use of insulin ([facility]) 5/21/2008    Diabetes mellitus ([facility])     Diabetic polyneuropathy  (HCC) 5/21/2008    ICD10 clean up    Disease of thyroid gland     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    HL (hearing loss)     Hodgkin's disease (HCC) 2008    Left neck, had radiation    Hypertension     Neuropathy     Osteoporosis     RA (rheumatoid arthritis) (HCC)     Traumatic rhabdomyolysis (HCC) 10/17/2022     Past Surgical History:   Procedure Laterality Date    ADENOIDECTOMY      APPENDECTOMY      CATARACT EXTRACTION      CATARACT EXTRACTION, BILATERAL      CHOLECYSTECTOMY      FRACTURE SURGERY Right     hip    MOUTH SURGERY      oral cancer left lower lip    OVARY SURGERY      OR ADJT TIS TRNS/REARGMT F/C/C/M/N/A/G/H/F 10SQCM/< N/A 3/28/2022    Procedure: Adjacent tissue transfer face;  Surgeon: Ar Paris MD;  Location: [facility];  Service: ENT    OR OPTX FEM SHFT FX W/INSJ IMED IMPLT W/WO SCREW Right 5/28/2020    Procedure: INSERTION NAIL IM FEMUR ANTEGRADE (TROCHANTERIC);  Surgeon: Charles Seals DO;  Location:  MAIN OR;  Service: Orthopedics    OR TRANSMASTOID ANTROTOMY Left 3/28/2022    Procedure: MASTOIDECTOMY;  Surgeon: Ar Paris MD;  Location: [facility];  Service: ENT    TONSILLECTOMY      TOTAL THYROIDECTOMY  2008    Performed after left neck dissection and left oral cancer diagnosis     Social History   Social History     Substance and Sexual Activity   Alcohol Use Not Currently    Alcohol/week: 0.0 standard drinks of alcohol     @DRUGHX  E-Cigarette/Vaping    E-Cigarette Use Never User      E-Cigarette/Vaping Substances    Nicotine No     THC No     CBD No     Flavoring No     Other No     Unknown No      Social History     Tobacco Use   Smoking Status Never   Smokeless Tobacco Never     Family History: non-contributory    Meds/Allergies   current meds:   Current Facility-Administered Medications   Medication Dose Route Frequency    acetaminophen (TYLENOL) tablet 650 mg  650 mg Oral Q6H PRN    ascorbic acid (VITAMIN C) tablet 500 mg  500 mg Oral BID    calcium  "carbonate (TUMS) chewable tablet 500 mg  500 mg Oral BID    cefepime (MAXIPIME) IVPB (premix in dextrose) 2,000 mg 50 mL  2,000 mg Intravenous Q24H    cyanocobalamin (VITAMIN B-12) tablet 1,000 mcg  1,000 mcg Oral Daily    enoxaparin (LOVENOX) subcutaneous injection 30 mg  30 mg Subcutaneous Daily    gabapentin (NEURONTIN) capsule 300 mg  300 mg Oral BID    insulin lispro (HumaLOG) 100 units/mL subcutaneous injection 1-5 Units  1-5 Units Subcutaneous TID AC    insulin lispro (HumaLOG) 100 units/mL subcutaneous injection 1-5 Units  1-5 Units Subcutaneous HS    multivitamin stress formula tablet 1 tablet  1 tablet Oral Daily    ondansetron (ZOFRAN) injection 4 mg  4 mg Intravenous Q6H PRN    pantoprazole (PROTONIX) EC tablet 40 mg  40 mg Oral Daily    QUEtiapine (SEROquel) tablet 25 mg  25 mg Oral HS    vancomycin (VANCOCIN) 1,250 mg in sodium chloride 0.9 % 250 mL IVPB  1,250 mg Intravenous Q24H     No Known Allergies    Objective   Vitals: Blood pressure 97/79, pulse 76, temperature 98.5 °F (36.9 °C), resp. rate 18, height 5' 1\" (1.549 m), weight 49.4 kg (108 lb 14.5 oz), SpO2 94%.    I/O last 24 hours:  In: 5023.8 [P.O.:680; I.V.:4343.8]  Out: 1150 [Urine:1150]    Invasive Devices       Peripheral Intravenous Line  Duration             Peripheral IV 12/15/23 Right;Ventral (anterior) Forearm 2 days              Drain  Duration             Urethral Catheter Non-latex 16 Fr. 1 day                    Physical Exam  Abdominal:      General: There is no distension.      Tenderness: There is no abdominal tenderness. There is no right CVA tenderness or left CVA tenderness.         Lab Results: CBC:   Lab Results   Component Value Date    WBC 9.96 12/18/2023    HGB 12.1 12/18/2023    HCT 38.4 12/18/2023     (H) 12/18/2023     12/18/2023    RBC 3.72 (L) 12/18/2023    MCH 32.5 12/18/2023    MCHC 31.5 12/18/2023    RDW 12.8 12/18/2023    MPV 10.8 12/18/2023    NRBC 0 12/18/2023     CMP:   Lab Results   Component " "Value Date    SODIUM 140 12/18/2023     12/18/2023    CO2 26 12/18/2023    BUN 7 12/18/2023    CREATININE 0.58 (L) 12/18/2023    CALCIUM 8.0 (L) 12/18/2023    AST 42 (H) 12/18/2023    ALT 25 12/18/2023    ALKPHOS 96 12/18/2023    EGFR 85 12/18/2023     Urinalysis: No results found for: \"COLORU\", \"CLARITYU\", \"SPECGRAV\", \"PHUR\", \"LEUKOCYTESUR\", \"NITRITE\", \"PROTEINUA\", \"GLUCOSEU\", \"KETONESU\", \"BILIRUBINUR\", \"BLOODU\"  Imaging Studies: I have personally reviewed pertinent reports.    EKG, Pathology, and Other Studies: I have personally reviewed pertinent reports.    VTE Prophylaxis: Sequential compression device (Venodyne)     Code Status: Level 3 - DNAR and DNI  Advance Directive and Living Will: Yes    Power of : Yes  POLST:      Counseling / Coordination of Care  Total floor / unit time spent today 30 minutes. Greater than 50% of total time was spent with the patient and / or family counseling and / or coordination of care. A description of the counseling / coordination of care: I have discussed the case with the hospitalist.  "

## 2023-12-18 NOTE — QUICK NOTE
Updated patient's daughter Cindy via telephone as to patient's current assessment and treatment plan.  All questions answered to satisfaction.

## 2023-12-18 NOTE — ASSESSMENT & PLAN NOTE
POA now resolved  In setting of COVID-19 and possible enteritis per CT imaging  Evidenced by WBC 14.45, heart rate 93, BP 70/30, lactic 3.1  Trended lactic acid-lactic acidosis resolved with IV fluids  Blood cultures x 2 negative after 48 hours  Trending WBCs-no cytosis today  Trending procalcitonin-continues to trend downward  Finished 3 days of IV Vanco and cefepime.  Will de-escalate to Augmentin 875-125 twice daily for 7 more days to complete 10-day course  No clear source of infection. UA clean. Given cough and cxr negative for pneumonia. Sepsis most likely 2/2 bronchitis

## 2023-12-18 NOTE — PROGRESS NOTES
Shakira Hinkle is a 83 y.o. female who is currently ordered Vancomycin IV with management by the Pharmacy Consult service.  Relevant clinical data and objective / subjective history reviewed.  Vancomycin Assessment:  Indication and Goal AUC/Trough: Pneumonia (goal -600, trough >10)  Clinical Status: stable  Micro:     Renal Function:  SCr: 0.58 mg/dL  CrCl: 55.3 mL/min  Renal replacement: Not on dialysis  Days of Therapy: 3  Current Dose: 1250 mg iv q 24 hrs  Vancomycin Plan:  New Dosing: continue 1250 mg iv q 24 hrs  Estimated AUC: 467 mcg*hr/mL  Estimated Trough: 12.3 mcg/mL  Next Level: 12/25/23 @ 0600  Renal Function Monitoring: Daily BMP and UOP  Pharmacy will continue to follow closely for s/sx of nephrotoxicity, infusion reactions and appropriateness of therapy.  BMP and CBC will be ordered per protocol. We will continue to follow the patient’s culture results and clinical progress daily.    Joelle Terrell, Pharmacist

## 2023-12-18 NOTE — PLAN OF CARE
Problem: Potential for Falls  Goal: Patient will remain free of falls  Description: INTERVENTIONS:  - Educate patient/family on patient safety including physical limitations  - Instruct patient to call for assistance with activity   - Consult OT/PT to assist with strengthening/mobility   - Keep Call bell within reach  - Keep bed low and locked with side rails adjusted as appropriate  - Keep care items and personal belongings within reach  - Initiate and maintain comfort rounds  - Make Fall Risk Sign visible to staff  - Offer Toileting every . Hours, in advance of need  - Initiate/Maintain .alarm  - Obtain necessary fall risk management equipment: .  - Apply yellow socks and bracelet for high fall risk patients  - Consider moving patient to room near nurses station  Outcome: Progressing     Problem: PAIN - ADULT  Goal: Verbalizes/displays adequate comfort level or baseline comfort level  Description: Interventions:  - Encourage patient to monitor pain and request assistance  - Assess pain using appropriate pain scale  - Administer analgesics based on type and severity of pain and evaluate response  - Implement non-pharmacological measures as appropriate and evaluate response  - Consider cultural and social influences on pain and pain management  - Notify physician/advanced practitioner if interventions unsuccessful or patient reports new pain  Outcome: Progressing     Problem: INFECTION - ADULT  Goal: Absence or prevention of progression during hospitalization  Description: INTERVENTIONS:  - Assess and monitor for signs and symptoms of infection  - Monitor lab/diagnostic results  - Monitor all insertion sites, i.e. indwelling lines, tubes, and drains  - Monitor endotracheal if appropriate and nasal secretions for changes in amount and color  - Vernon appropriate cooling/warming therapies per order  - Administer medications as ordered  - Instruct and encourage patient and family to use good hand hygiene  technique  - Identify and instruct in appropriate isolation precautions for identified infection/condition  Outcome: Progressing  Goal: Absence of fever/infection during neutropenic period  Description: INTERVENTIONS:  - Monitor WBC    Outcome: Progressing     Problem: SAFETY ADULT  Goal: Patient will remain free of falls  Description: INTERVENTIONS:  - Educate patient/family on patient safety including physical limitations  - Instruct patient to call for assistance with activity   - Consult OT/PT to assist with strengthening/mobility   - Keep Call bell within reach  - Keep bed low and locked with side rails adjusted as appropriate  - Keep care items and personal belongings within reach  - Initiate and maintain comfort rounds  - Make Fall Risk Sign visible to staff  - Offer Toileting every . Hours, in advance of need  - Initiate/Maintain .alarm  - Obtain necessary fall risk management equipment: .  - Apply yellow socks and bracelet for high fall risk patients  - Consider moving patient to room near nurses station  Outcome: Progressing  Goal: Maintain or return to baseline ADL function  Description: INTERVENTIONS:  -  Assess patient's ability to carry out ADLs; assess patient's baseline for ADL function and identify physical deficits which impact ability to perform ADLs (bathing, care of mouth/teeth, toileting, grooming, dressing, etc.)  - Assess/evaluate cause of self-care deficits   - Assess range of motion  - Assess patient's mobility; develop plan if impaired  - Assess patient's need for assistive devices and provide as appropriate  - Encourage maximum independence but intervene and supervise when necessary  - Involve family in performance of ADLs  - Assess for home care needs following discharge   - Consider OT consult to assist with ADL evaluation and planning for discharge  - Provide patient education as appropriate  Outcome: Progressing  Goal: Maintains/Returns to pre admission functional level  Description:  INTERVENTIONS:  - Perform AM-PAC 6 Click Basic Mobility/ Daily Activity assessment daily.  - Set and communicate daily mobility goal to care team and patient/family/caregiver.   - Collaborate with rehabilitation services on mobility goals if consulted  - Perform Range of Motion . times a day.  - Reposition patient every . hours.  - Dangle patient .. times a day  - Stand patient . times a day  - Ambulate patient . times a day  - Out of bed to chair . times a day   - Out of bed for meals . times a day  - Out of bed for toileting  - Record patient progress and toleration of activity level   Outcome: Progressing     Problem: Knowledge Deficit  Goal: Patient/family/caregiver demonstrates understanding of disease process, treatment plan, medications, and discharge instructions  Description: Complete learning assessment and assess knowledge base.  Interventions:  - Provide teaching at level of understanding  - Provide teaching via preferred learning methods  Outcome: Progressing     Problem: MOBILITY - ADULT  Goal: Maintain or return to baseline ADL function  Description: INTERVENTIONS:  -  Assess patient's ability to carry out ADLs; assess patient's baseline for ADL function and identify physical deficits which impact ability to perform ADLs (bathing, care of mouth/teeth, toileting, grooming, dressing, etc.)  - Assess/evaluate cause of self-care deficits   - Assess range of motion  - Assess patient's mobility; develop plan if impaired  - Assess patient's need for assistive devices and provide as appropriate  - Encourage maximum independence but intervene and supervise when necessary  - Involve family in performance of ADLs  - Assess for home care needs following discharge   - Consider OT consult to assist with ADL evaluation and planning for discharge  - Provide patient education as appropriate  Outcome: Progressing  Goal: Maintains/Returns to pre admission functional level  Description: INTERVENTIONS:  - Perform AM-PAC 6  Click Basic Mobility/ Daily Activity assessment daily.  - Set and communicate daily mobility goal to care team and patient/family/caregiver.   - Collaborate with rehabilitation services on mobility goals if consulted  - Perform Range of Motion . times a day.  - Reposition patient every . hours.  - Dangle patient . times a day  - Stand patient . times a day  - Ambulate patient . times a day  - Out of bed to chair . times a day   - Out of bed for meals . times a day  - Out of bed for toileting  - Record patient progress and toleration of activity level   Outcome: Progressing     Problem: Prexisting or High Potential for Compromised Skin Integrity  Goal: Skin integrity is maintained or improved  Description: INTERVENTIONS:  - Identify patients at risk for skin breakdown  - Assess and monitor skin integrity  - Assess and monitor nutrition and hydration status  - Monitor labs   - Assess for incontinence   - Turn and reposition patient  - Assist with mobility/ambulation  - Relieve pressure over bony prominences  - Avoid friction and shearing  - Provide appropriate hygiene as needed including keeping skin clean and dry  - Evaluate need for skin moisturizer/barrier cream  - Collaborate with interdisciplinary team   - Patient/family teaching  - Consider wound care consult   Outcome: Progressing

## 2023-12-18 NOTE — CASE MANAGEMENT
Case Management Assessment & Discharge Planning Note    Patient name Shakira Hinkle  Location /-01 MRN [id number]  : 1940 Date 2023       Current Admission Date: 12/15/2023  Current Admission Diagnosis:Generalized weakness   Patient Active Problem List    Diagnosis Date Noted    Sepsis (HCC) 2023    Urinary retention 2023    COVID-19 12/15/2023    Dysarthria 12/15/2023    Lactic acidosis 12/15/2023    Severe protein-calorie malnutrition (HCC) 2023    Generalized weakness 2023    Macrocytosis 2023    Low blood pressure 2023    Hypomagnesemia 2023    Hypoglycemia 2023    Hypothermia 2023    Hypokalemia 2023    History of Hodgkin's lymphoma 2023    Hypothyroidism 2023    Acute metabolic encephalopathy 2023    Gastroesophageal reflux disease without esophagitis 10/17/2022    Abnormal CT of the abdomen 07/10/2022    Soft tissue radionecrosis 2022    Osteoradionecrosis of temporal bone  2022    Primary osteoarthritis of right shoulder 2021    Primary osteoarthritis of both knees 2020    Postoperative hypothyroidism 2015    Hyperlipidemia 2014    History of hypertension 10/10/2013    Type I diabetes mellitus (HCC) 2008      LOS (days): 3  Geometric Mean LOS (GMLOS) (days): 5  Days to GMLOS:1.9     OBJECTIVE:  PATIENT READMITTED TO HOSPITAL  Risk of Unplanned Readmission Score: 33.07         Current admission status: Inpatient  Referral Reason: Other (d/c planning)    Preferred Pharmacy:   [id number] - [place], [place] - [address]  [address] [address]  Phone: [phone] Fax: [phone]    Primary Care Provider: Dwayne Hilton MD    Primary Insurance: MEDICARE  Secondary Insurance: AARP    ASSESSMENT:  Active Health Care Proxies       Cindy Osborne [facility] Care Representative - Daughter   Primary Phone: [phone]  (Mobile)  Home Phone: [phone]                           Readmission Root Cause  30 Day Readmission: Yes  Who directed you to return to the hospital?: Family  Did you understand whom to contact if you had questions or problems?: Yes  Did you get your prescriptions before you left the hospital?: No  Reason:: Declined service  Were you able to get your prescriptions filled when you left the hospital?: Yes (pt went for rehab)  Did you take your medications as prescribed?: Yes (pt was at rehab- rn gave the meds)  Were you able to get to your follow-up appointments?: Yes (pt was at rehab)  During previous admission, was a post-acute recommendation made?: Yes  What post-acute resources were offered?: STR (pt went for rehab and was admitted the day she was d/c from the snf)  Patient was readmitted due to: pt was admitted for weakness- pt was just d/c from [facility] snf- pt is covid positive  Action Plan: pt will go to a snf for more rehab when stable    Patient Information  Admitted from:: Home (pt was just d/c from snf rehab)  Mental Status: Alert  During Assessment patient was accompanied by: Not accompanied during assessment  Assessment information provided by:: Daughter  Primary Caregiver: Family  Caregiver's Name:: Cindy Osborne  Caregiver's Relationship to Patient:: Family Member (daughter)  Caregiver's Telephone Number:: [phone]  Support Systems: Son, Daughter  County of Residence: [facility] do you live in?: [place]  Home entry access options. Select all that apply.: Stairs  Number of steps to enter home.: 2  Do the steps have railings?: Yes  Type of Current Residence: [facility]  Living Arrangements: Lives Alone  Is patient a ?: No    Activities of Daily Living Prior to Admission  Functional Status: Assistance (driving, shopping, cleaning - [employer] mel[employer] - pt approved for 10hrs a  day mon-sunday( 70 hrs per week))  Completes ADLs independently?: Yes  Ambulates independently?: Yes  Does patient use  assisted devices?: Yes  Assisted Devices (DME) used: Rollator  Does patient currently own DME?: Yes  What DME does the patient currently own?: Straight Cane, Walker, Rollator, Bedside Commode, Shower Chair, Other (Comment) (transport chair, bp cuff, glucometer, glucometer)  Does patient have a history of Outpatient Therapy (PT/OT)?: No  Does the patient have a history of Short-Term Rehab?: Yes ([facility], [facility])  Does patient have a history of HHC?: Yes ([facility])  Does patient currently have HHC?: No         Patient Information Continued  Income Source: Pension/long-term  Does patient have prescription coverage?: Yes ([facility] [place])  Does patient receive dialysis treatments?: No  Does patient have a history of substance abuse?: No    PHQ 2/9 Screening   Reviewed PHQ 2/9 Depression Screening Score?: No    Means of Transportation  Means of Transport to Appts:: Family transport      Housing Stability: Low Risk  (12/18/2023)    Housing Stability Vital Sign     Unable to Pay for Housing in the Last Year: No     Number of Places Lived in the Last Year: 1     Unstable Housing in the Last Year: No   Food Insecurity: No Food Insecurity (12/18/2023)    Hunger Vital Sign     Worried About Running Out of Food in the Last Year: Never true     Ran Out of Food in the Last Year: Never true   Transportation Needs: No Transportation Needs (12/18/2023)    PRAPARE - Transportation     Lack of Transportation (Medical): No     Lack of Transportation (Non-Medical): No   Utilities: Not At Risk (12/18/2023)    [facility] Utilities     Threatened with loss of utilities: No       DISCHARGE DETAILS:    Discharge planning discussed with:: Cindy barnhart called at 16:27pm  Freedom of Choice: Yes  Comments - Freedom of Choice: recommendation is for rehab-  permissionh was given to place referrals for snf [facility] CM contacted family/caregiver?: Yes             Contacts  Patient Contacts: Cindy Crooks  dtr  Relationship to Patient:: Family  Contact Method: Phone  Phone Number: [phone] (home)      [phone] (cell)  Reason/Outcome: Discharge Planning    Requested Home Health Care         Is the patient interested in HHC at discharge?: No    DME Referral Provided  Referral made for DME?: No    Other Referral/Resources/Interventions Provided:  Interventions: Short Term Rehab  Referral Comments: referrals sent via aide for snf rehab    Would you like to participate in our [facility] service program?  : No - Declined    Treatment Team Recommendation: Short Term Rehab (snf rehab - needs covid bed- referrals sent- TBD)

## 2023-12-19 ENCOUNTER — APPOINTMENT (INPATIENT)
Dept: RADIOLOGY | Facility: HOSPITAL | Age: 83
End: 2023-12-19
Payer: MEDICARE

## 2023-12-19 PROBLEM — E87.20 LACTIC ACIDOSIS: Status: RESOLVED | Noted: 2023-12-15 | Resolved: 2023-12-19

## 2023-12-19 LAB
ALBUMIN SERPL BCP-MCNC: 2.8 G/DL (ref 3.5–5)
ANION GAP SERPL CALCULATED.3IONS-SCNC: 7 MMOL/L
BNP SERPL-MCNC: 105 PG/ML (ref 0–100)
BUN SERPL-MCNC: 7 MG/DL (ref 5–25)
CALCIUM ALBUM COR SERPL-MCNC: 8.7 MG/DL (ref 8.3–10.1)
CALCIUM SERPL-MCNC: 7.7 MG/DL (ref 8.4–10.2)
CHLORIDE SERPL-SCNC: 99 MMOL/L (ref 96–108)
CO2 SERPL-SCNC: 28 MMOL/L (ref 21–32)
CREAT SERPL-MCNC: 0.63 MG/DL (ref 0.6–1.3)
ERYTHROCYTE [DISTWIDTH] IN BLOOD BY AUTOMATED COUNT: 12.8 % (ref 11.6–15.1)
FOLATE SERPL-MCNC: >22.3 NG/ML
GFR SERPL CREATININE-BSD FRML MDRD: 83 ML/MIN/1.73SQ M
GLUCOSE SERPL-MCNC: 175 MG/DL (ref 65–140)
GLUCOSE SERPL-MCNC: 176 MG/DL (ref 65–140)
GLUCOSE SERPL-MCNC: 177 MG/DL (ref 65–140)
GLUCOSE SERPL-MCNC: 188 MG/DL (ref 65–140)
GLUCOSE SERPL-MCNC: 240 MG/DL (ref 65–140)
HCT VFR BLD AUTO: 33.1 % (ref 34.8–46.1)
HGB BLD-MCNC: 10.8 G/DL (ref 11.5–15.4)
MAGNESIUM SERPL-MCNC: 1.5 MG/DL (ref 1.9–2.7)
MCH RBC QN AUTO: 32.5 PG (ref 26.8–34.3)
MCHC RBC AUTO-ENTMCNC: 32.6 G/DL (ref 31.4–37.4)
MCV RBC AUTO: 100 FL (ref 82–98)
PHOSPHATE SERPL-MCNC: 3 MG/DL (ref 2.3–4.1)
PLATELET # BLD AUTO: 202 THOUSANDS/UL (ref 149–390)
PMV BLD AUTO: 10.4 FL (ref 8.9–12.7)
POTASSIUM SERPL-SCNC: 3.7 MMOL/L (ref 3.5–5.3)
PROCALCITONIN SERPL-MCNC: 5.54 NG/ML
RBC # BLD AUTO: 3.32 MILLION/UL (ref 3.81–5.12)
SODIUM SERPL-SCNC: 134 MMOL/L (ref 135–147)
T4 FREE SERPL-MCNC: 0.83 NG/DL (ref 0.61–1.12)
TSH SERPL DL<=0.05 MIU/L-ACNC: 14.32 UIU/ML (ref 0.45–4.5)
VIT B12 SERPL-MCNC: 825 PG/ML (ref 180–914)
WBC # BLD AUTO: 6.74 THOUSAND/UL (ref 4.31–10.16)

## 2023-12-19 PROCEDURE — 80069 RENAL FUNCTION PANEL: CPT | Performed by: STUDENT IN AN ORGANIZED HEALTH CARE EDUCATION/TRAINING PROGRAM

## 2023-12-19 PROCEDURE — 84439 ASSAY OF FREE THYROXINE: CPT | Performed by: STUDENT IN AN ORGANIZED HEALTH CARE EDUCATION/TRAINING PROGRAM

## 2023-12-19 PROCEDURE — 85027 COMPLETE CBC AUTOMATED: CPT | Performed by: STUDENT IN AN ORGANIZED HEALTH CARE EDUCATION/TRAINING PROGRAM

## 2023-12-19 PROCEDURE — 83880 ASSAY OF NATRIURETIC PEPTIDE: CPT | Performed by: STUDENT IN AN ORGANIZED HEALTH CARE EDUCATION/TRAINING PROGRAM

## 2023-12-19 PROCEDURE — 71045 X-RAY EXAM CHEST 1 VIEW: CPT

## 2023-12-19 PROCEDURE — 84443 ASSAY THYROID STIM HORMONE: CPT | Performed by: STUDENT IN AN ORGANIZED HEALTH CARE EDUCATION/TRAINING PROGRAM

## 2023-12-19 PROCEDURE — 84145 PROCALCITONIN (PCT): CPT

## 2023-12-19 PROCEDURE — 99233 SBSQ HOSP IP/OBS HIGH 50: CPT | Performed by: STUDENT IN AN ORGANIZED HEALTH CARE EDUCATION/TRAINING PROGRAM

## 2023-12-19 PROCEDURE — 82948 REAGENT STRIP/BLOOD GLUCOSE: CPT

## 2023-12-19 PROCEDURE — 83735 ASSAY OF MAGNESIUM: CPT | Performed by: STUDENT IN AN ORGANIZED HEALTH CARE EDUCATION/TRAINING PROGRAM

## 2023-12-19 PROCEDURE — 82607 VITAMIN B-12: CPT | Performed by: STUDENT IN AN ORGANIZED HEALTH CARE EDUCATION/TRAINING PROGRAM

## 2023-12-19 PROCEDURE — 82746 ASSAY OF FOLIC ACID SERUM: CPT | Performed by: STUDENT IN AN ORGANIZED HEALTH CARE EDUCATION/TRAINING PROGRAM

## 2023-12-19 RX ORDER — ATORVASTATIN CALCIUM 20 MG/1
20 TABLET, FILM COATED ORAL
Status: DISCONTINUED | OUTPATIENT
Start: 2023-12-19 | End: 2023-12-22 | Stop reason: HOSPADM

## 2023-12-19 RX ORDER — SODIUM CHLORIDE, SODIUM LACTATE, POTASSIUM CHLORIDE, CALCIUM CHLORIDE 600; 310; 30; 20 MG/100ML; MG/100ML; MG/100ML; MG/100ML
50 INJECTION, SOLUTION INTRAVENOUS CONTINUOUS
Status: DISPENSED | OUTPATIENT
Start: 2023-12-19 | End: 2023-12-20

## 2023-12-19 RX ORDER — MAGNESIUM SULFATE HEPTAHYDRATE 40 MG/ML
4 INJECTION, SOLUTION INTRAVENOUS ONCE
Status: COMPLETED | OUTPATIENT
Start: 2023-12-19 | End: 2023-12-19

## 2023-12-19 RX ORDER — ALBUMIN (HUMAN) 12.5 G/50ML
25 SOLUTION INTRAVENOUS
Status: DISCONTINUED | OUTPATIENT
Start: 2023-12-19 | End: 2023-12-19

## 2023-12-19 RX ORDER — LEVOTHYROXINE SODIUM 0.07 MG/1
75 TABLET ORAL
Status: DISCONTINUED | OUTPATIENT
Start: 2023-12-19 | End: 2023-12-22 | Stop reason: HOSPADM

## 2023-12-19 RX ORDER — AMOXICILLIN AND CLAVULANATE POTASSIUM 875; 125 MG/1; MG/1
1 TABLET, FILM COATED ORAL EVERY 12 HOURS SCHEDULED
Status: DISCONTINUED | OUTPATIENT
Start: 2023-12-19 | End: 2023-12-22 | Stop reason: HOSPADM

## 2023-12-19 RX ORDER — ALBUMIN (HUMAN) 12.5 G/50ML
25 SOLUTION INTRAVENOUS
Status: COMPLETED | OUTPATIENT
Start: 2023-12-19 | End: 2023-12-21

## 2023-12-19 RX ORDER — LANOLIN ALCOHOL/MO/W.PET/CERES
800 CREAM (GRAM) TOPICAL DAILY
Status: DISCONTINUED | OUTPATIENT
Start: 2023-12-19 | End: 2023-12-22 | Stop reason: HOSPADM

## 2023-12-19 RX ORDER — MIDODRINE HYDROCHLORIDE 5 MG/1
5 TABLET ORAL
Status: DISCONTINUED | OUTPATIENT
Start: 2023-12-19 | End: 2023-12-22 | Stop reason: HOSPADM

## 2023-12-19 RX ORDER — FUROSEMIDE 10 MG/ML
20 INJECTION INTRAMUSCULAR; INTRAVENOUS
Status: DISCONTINUED | OUTPATIENT
Start: 2023-12-19 | End: 2023-12-19

## 2023-12-19 RX ADMIN — ALBUMIN (HUMAN) 25 G: 0.25 INJECTION, SOLUTION INTRAVENOUS at 09:30

## 2023-12-19 RX ADMIN — INSULIN LISPRO 1 UNITS: 100 INJECTION, SOLUTION INTRAVENOUS; SUBCUTANEOUS at 12:15

## 2023-12-19 RX ADMIN — OXYCODONE HYDROCHLORIDE AND ACETAMINOPHEN 500 MG: 500 TABLET ORAL at 09:31

## 2023-12-19 RX ADMIN — AMOXICILLIN AND CLAVULANATE POTASSIUM 1 TABLET: 875; 125 TABLET, FILM COATED ORAL at 09:31

## 2023-12-19 RX ADMIN — INSULIN LISPRO 1 UNITS: 100 INJECTION, SOLUTION INTRAVENOUS; SUBCUTANEOUS at 21:25

## 2023-12-19 RX ADMIN — MAGNESIUM SULFATE HEPTAHYDRATE 4 G: 40 INJECTION, SOLUTION INTRAVENOUS at 15:24

## 2023-12-19 RX ADMIN — INSULIN LISPRO 1 UNITS: 100 INJECTION, SOLUTION INTRAVENOUS; SUBCUTANEOUS at 08:02

## 2023-12-19 RX ADMIN — INSULIN GLARGINE 3 UNITS: 100 INJECTION, SOLUTION SUBCUTANEOUS at 21:25

## 2023-12-19 RX ADMIN — B-COMPLEX W/ C & FOLIC ACID TAB 1 TABLET: TAB at 09:31

## 2023-12-19 RX ADMIN — SODIUM CHLORIDE, SODIUM LACTATE, POTASSIUM CHLORIDE, AND CALCIUM CHLORIDE 50 ML/HR: .6; .31; .03; .02 INJECTION, SOLUTION INTRAVENOUS at 15:23

## 2023-12-19 RX ADMIN — AMOXICILLIN AND CLAVULANATE POTASSIUM 1 TABLET: 875; 125 TABLET, FILM COATED ORAL at 21:24

## 2023-12-19 RX ADMIN — MIDODRINE HYDROCHLORIDE 5 MG: 5 TABLET ORAL at 12:15

## 2023-12-19 RX ADMIN — CALCIUM CARBONATE (ANTACID) CHEW TAB 500 MG 500 MG: 500 CHEW TAB at 09:31

## 2023-12-19 RX ADMIN — PANTOPRAZOLE SODIUM 40 MG: 40 TABLET, DELAYED RELEASE ORAL at 08:01

## 2023-12-19 RX ADMIN — INSULIN LISPRO 1 UNITS: 100 INJECTION, SOLUTION INTRAVENOUS; SUBCUTANEOUS at 16:29

## 2023-12-19 RX ADMIN — ALBUMIN (HUMAN) 25 G: 0.25 INJECTION, SOLUTION INTRAVENOUS at 16:30

## 2023-12-19 RX ADMIN — ENOXAPARIN SODIUM 30 MG: 30 INJECTION SUBCUTANEOUS at 09:31

## 2023-12-19 RX ADMIN — ATORVASTATIN CALCIUM 20 MG: 20 TABLET, FILM COATED ORAL at 16:29

## 2023-12-19 RX ADMIN — GABAPENTIN 300 MG: 300 CAPSULE ORAL at 17:33

## 2023-12-19 RX ADMIN — LEVOTHYROXINE SODIUM 75 MCG: 75 TABLET ORAL at 15:25

## 2023-12-19 RX ADMIN — QUETIAPINE FUMARATE 25 MG: 25 TABLET ORAL at 21:24

## 2023-12-19 RX ADMIN — OXYCODONE HYDROCHLORIDE AND ACETAMINOPHEN 500 MG: 500 TABLET ORAL at 17:33

## 2023-12-19 RX ADMIN — GABAPENTIN 300 MG: 300 CAPSULE ORAL at 09:32

## 2023-12-19 RX ADMIN — MIDODRINE HYDROCHLORIDE 5 MG: 5 TABLET ORAL at 16:29

## 2023-12-19 RX ADMIN — Medication 800 MG: at 15:25

## 2023-12-19 RX ADMIN — INSULIN GLARGINE 3 UNITS: 100 INJECTION, SOLUTION SUBCUTANEOUS at 09:30

## 2023-12-19 RX ADMIN — MIDODRINE HYDROCHLORIDE 5 MG: 5 TABLET ORAL at 09:40

## 2023-12-19 NOTE — PLAN OF CARE
Problem: Potential for Falls  Goal: Patient will remain free of falls  Description: INTERVENTIONS:  - Educate patient/family on patient safety including physical limitations  - Instruct patient to call for assistance with activity   - Consult OT/PT to assist with strengthening/mobility   - Keep Call bell within reach  - Keep bed low and locked with side rails adjusted as appropriate  - Keep care items and personal belongings within reach  - Initiate and maintain comfort rounds  - Make Fall Risk Sign visible to staff  - Offer Toileting every 2 Hours, in advance of need  - Initiate/Maintain bed alarm  - Obtain necessary fall risk management equipment: socks/bed    Problem: Prexisting or High Potential for Compromised Skin Integrity  Goal: Skin integrity is maintained or improved  Description: INTERVENTIONS:  - Identify patients at risk for skin breakdown  - Assess and monitor skin integrity  - Assess and monitor nutrition and hydration status  - Monitor labs   - Assess for incontinence   - Turn and reposition patient  - Assist with mobility/ambulation  - Relieve pressure over bony prominences  - Avoid friction and shearing  - Provide appropriate hygiene as needed including keeping skin clean and dry  - Evaluate need for skin moisturizer/barrier cream  - Collaborate with interdisciplinary team   - Patient/family teaching  - Consider wound care consult   Outcome: Progressing     - Apply yellow socks and bracelet for high fall risk patients  - Consider moving patient to room near nurses station  Outcome: Progressing     Problem: PAIN - ADULT  Goal: Verbalizes/displays adequate comfort level or baseline comfort level  Description: Interventions:  - Encourage patient to monitor pain and request assistance  - Assess pain using appropriate pain scale  - Administer analgesics based on type and severity of pain and evaluate response  - Implement non-pharmacological measures as appropriate and evaluate response  - Consider  cultural and social influences on pain and pain management  - Notify physician/advanced practitioner if interventions unsuccessful or patient reports new pain  Outcome: Progressing

## 2023-12-19 NOTE — NURSING NOTE
Patient finger stick bg is 45. Contacted Hospitalist. Gave d50 25ml at 2133. Held lantus and Humalog per Hospitalist. Encourgaged carbs PO.

## 2023-12-19 NOTE — CONSULTS
The patient's Vancomycin therapy has been discontinued / completed.  Thank you for this consult.  Pharmacy will sign-off now.

## 2023-12-19 NOTE — CASE MANAGEMENT
Case Management Discharge Planning Note    Patient name Shakira Hinkle  Location /-01 MRN [id number]  : 1940 Date 2023       Current Admission Date: 12/15/2023  Current Admission Diagnosis:Generalized weakness   Patient Active Problem List    Diagnosis Date Noted    Sepsis (HCC) 2023    Urinary retention 2023    COVID-19 12/15/2023    Dysarthria 12/15/2023    Severe protein-calorie malnutrition (HCC) 2023    Generalized weakness 2023    Macrocytosis 2023    Low blood pressure 2023    Hypomagnesemia 2023    Hypoglycemia 2023    Hypothermia 2023    Hypokalemia 2023    History of Hodgkin's lymphoma 2023    Hypothyroidism 2023    Acute metabolic encephalopathy 2023    Gastroesophageal reflux disease without esophagitis 10/17/2022    Abnormal CT of the abdomen 07/10/2022    Soft tissue radionecrosis 2022    Osteoradionecrosis of temporal bone  2022    Primary osteoarthritis of right shoulder 2021    Primary osteoarthritis of both knees 2020    Postoperative hypothyroidism 2015    Hyperlipidemia 2014    History of hypertension 10/10/2013    Type I diabetes mellitus (HCC) 2008      LOS (days): 4  Geometric Mean LOS (GMLOS) (days): 5  Days to GMLOS:1     OBJECTIVE:  Risk of Unplanned Readmission Score: 38.98         Current admission status: Inpatient   Preferred Pharmacy:   [id number] [place], [place] - [address]  [address] [address]  Phone: [phone] Fax: [phone]    Primary Care Provider: Dwayne Hilton MD    Primary Insurance: MEDICARE  Secondary Insurance: [facility]    DISCHARGE DETAILS:    Discharge planning discussed with:: Cindy Osborne called at 14:59pm  Freedom of Choice: Yes  Comments - Freedom of Choice: recommendation is  for rehab - cm reviewed the list of accepting facilities -daughter's choice is [facility] -  pt was there in the past  CM contacted family/caregiver?: Yes             Contacts  Patient Contacts: Cindy Crooks dtr  Relationship to Patient:: Family  Contact Method: Phone  Phone Number: [phone] (cell)  Reason/Outcome: Discharge Planning    Requested Home Health Care         Is the patient interested in HHC at discharge?: No    DME Referral Provided  Referral made for DME?: No    Other Referral/Resources/Interventions Provided:  Interventions: Short Term Rehab  Referral Comments: pt accepted to [facility]  for rehab    Would you like to participate in our [facility] service program?  : No - Declined    Treatment Team Recommendation: Short Term Rehab ([facility] for rehab- tbd)                                   IMM Given (Date):: 12/19/23 (cm reviewed over the phone with the daughter at 15:01pm & the copy of the copy was placed in the pt's room as requested by the daughter)  IMM Given to:: Family (daughter)

## 2023-12-19 NOTE — PROGRESS NOTES
[facility]  Progress Note  Name: Shakira Hinkle I  MRN: [id number]  Unit/Bed#: -01 I Date of Admission: 12/15/2023   Date of Service: 12/19/2023 I Hospital Day: 4    Assessment/Plan   * Generalized weakness  Assessment & Plan  Brought to ED by daughter after discharge from rehab facility for weakness and garbled speech   Head CT negative  CTA head and neck negative for large vessel occlusion, dissection or aneurysm.  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  Will likely require rehab placement on discharge.    12/19 Will replete magnesium as this could be attributing to generalized weakness.    Follow-up B12 folate  TSH came back high 14. Upon medication review, patient's levothryoxine hasn't been re-started. Levothyroxine 75 mcg restarted today.  Continue with gentle IV hydration 50 ml/hr. 3 doses of 25% albumin ordered for pressure support and intravascular repletion.     COVID-19  Assessment & Plan  Patient presented to the ED via EMS-daughter at bedside reports patient was discharged from rehab facility today max assistance of 2 to transfer  Also having some dysarthria prehospital-resolved by time of my assessment  Patient tested positive for COVID in the ED  At time of arrival on room air with nonlabored respirations  12/16 a.m required 2 L nasal cannula, lungs decreased bilaterally, no cough  12/16 CT chest abdomen pelvis negative for PE, did note possible nonspecific enteritis  12/16 initiated cefepime and vancomycin initiated  Finished 3  days of IV Vanco and cefepime.  Will de-escalate to Augmentin 875-125 twice daily for 7 more days to complete 10-day course  Supportive care for COVID  Procalcitonin continues to trend downward  Leukocytosis resolved, afebrile  Isolation precautions    Sepsis (HCC)  Assessment & Plan  POA now resolved  In setting of COVID-19 and possible enteritis per CT imaging  Evidenced by WBC 14.45, heart rate 93, BP 70/30, lactic 3.1  Trended lactic acid-lactic acidosis resolved with IV fluids  Blood cultures x 2 negative after 48 hours  Trending WBCs-no cytosis today  Trending procalcitonin-continues to trend downward  Finished 3 days of IV Vanco and cefepime.  Will de-escalate to Augmentin 875-125 twice daily for 7 more days to complete 10-day course  No clear source of infection. UA clean. Given cough and cxr negative for pneumonia. Sepsis most likely 2/2 bronchitis      Urinary retention  Assessment & Plan  Failed retention protocol, requiring Kinney catheter placement  Urology consulted, appreciate recommendations-planning continue Kinney for now until medically improved then will reattempt voiding trial  Continue to monitor intake and output    Dysarthria  Assessment & Plan  Patient had garbled speech prehospital per daughter, resolved on arrival   Head CT negative  Neurology evaluated patient in the ED, felt patient did not need MRI or stroke workup in reviewing notes    Hypomagnesemia  Assessment & Plan  Magnesium 1.5 today  4 g of IV magnesium ordered along with magnesium oxide 800 mg daily    Hypothyroidism  Assessment & Plan  Patient's levothyroxine was never restarted. Will restart    Gastroesophageal reflux disease without esophagitis  Assessment & Plan  Continue PPI    Type I diabetes mellitus (HCC)  Assessment & Plan  Lab Results   Component Value Date    HGBA1C 8.9 (H) 05/26/2023       Recent Labs     12/18/23  2149 12/18/23  2355 12/19/23  0745 12/19/23  1055   POCGLU 138 154* 175* 177*       Blood Sugar Average:  Last 72 hrs:  (P) 253.5    Target blood sugar inpatient 140-180  Was on Home regimen which includes Lantus insulin 7 units in the a.m., 5 units in the p.m.  Hypoglycemic episode this a.m., asymptomatic  Will decrease regimen to Lantus 3 units every 12 hours and continue to monitor  Continue SSI  CHO diet  Hypoglycemia protocol  BMP a.m.    Hyperlipidemia  Assessment & Plan  Not on statin prehospital    Lactic acidosis-resolved as of 12/19/2023  Assessment & Plan  Present on admission  Lactic acid 3.1  Resolved with IV fluids               VTE Pharmacologic Prophylaxis:   Moderate Risk (Score 3-4) - Pharmacological DVT Prophylaxis Ordered: enoxaparin (Lovenox).    Mobility:   Basic Mobility Inpatient Raw Score: 9  -HLM Goal: 3: Sit at edge of bed  JH-HLM Achieved: 2: Bed activities/Dependent transfer  HLM Goal NOT achieved. Continue with multidisciplinary rounding and encourage appropriate mobility to improve upon HLM goals.    Patient Centered Rounds: I performed bedside rounds with nursing staff today.   Discussions with Specialists or Other Care Team Provider: case management    Education and Discussions with Family / Patient: Updated  (daughter) via phone.    Total Time Spent on Date of Encounter in care of patient: 35 mins. This time was spent on one or more of the following: performing physical exam; counseling and coordination of care; obtaining or reviewing history; documenting in the medical record; reviewing/ordering tests, medications or procedures; communicating with other healthcare professionals and discussing with patient's family/caregivers.    Current Length of Stay: 4 day(s)  Current Patient Status: Inpatient   Certification Statement: The patient will continue to require additional inpatient hospital stay due to generalized weakness  Discharge Plan: Anticipate discharge in 24-48 hrs to rehab facility.    Code Status: Level 3 - DNAR and DNI    Subjective:   Patient seen and examined  at bedside.  No acute events overnight.  States that she feels tired.    Objective:     Vitals:   Temp (24hrs), Av.6 °F (37 °C), Min:97.6 °F (36.4 °C), Max:99.3 °F (37.4 °C)    Temp:  [97.6 °F (36.4 °C)-99.3 °F (37.4 °C)] 99.3 °F (37.4 °C)  HR:  [76-85] 76  Resp:  [18] 18  BP: ()/(59-60) 85/59  SpO2:  [94 %-96 %] 94 %  Body mass index is 20.58 kg/m².     Input and Output Summary (last 24 hours):     Intake/Output Summary (Last 24 hours) at 2023 1413  Last data filed at 2023 1300  Gross per 24 hour   Intake --   Output 1750 ml   Net -1750 ml       Physical Exam:   Physical Exam  Vitals and nursing note reviewed.   Constitutional:       General: She is not in acute distress.     Appearance: She is well-developed. She is ill-appearing.   HENT:      Head: Normocephalic and atraumatic.   Eyes:      Conjunctiva/sclera: Conjunctivae normal.   Cardiovascular:      Rate and Rhythm: Normal rate and regular rhythm.      Heart sounds: No murmur heard.  Pulmonary:      Effort: Pulmonary effort is normal. No respiratory distress.      Breath sounds: Normal breath sounds.   Abdominal:      Palpations: Abdomen is soft.      Tenderness: There is no abdominal tenderness.   Musculoskeletal:         General: No swelling.      Cervical back: Neck supple.   Skin:     General: Skin is warm and dry.      Capillary Refill: Capillary refill takes less than 2 seconds.   Neurological:      Mental Status: She is alert.   Psychiatric:         Mood and Affect: Mood normal.          Additional Data:     Labs:  Results from last 7 days   Lab Units 23  1005 23  0537   WBC Thousand/uL 6.74 9.96   HEMOGLOBIN g/dL 10.8* 12.1   HEMATOCRIT % 33.1* 38.4   PLATELETS Thousands/uL 202 181   NEUTROS PCT %  --  82*   LYMPHS PCT %  --  9*   MONOS PCT %  --  8   EOS PCT %  --  0     Results from last 7 days   Lab Units 23  1005 23  0537   SODIUM mmol/L 134* 140   POTASSIUM mmol/L 3.7 4.1   CHLORIDE mmol/L 99 101    CO2 mmol/L 28 26   BUN mg/dL 7 7   CREATININE mg/dL 0.63 0.58*   ANION GAP mmol/L 7 13   CALCIUM mg/dL 7.7* 8.0*   ALBUMIN g/dL 2.8* 3.0*   TOTAL BILIRUBIN mg/dL  --  0.32   ALK PHOS U/L  --  96   ALT U/L  --  25   AST U/L  --  42*   GLUCOSE RANDOM mg/dL 176* 56*     Results from last 7 days   Lab Units 12/15/23  1502   INR  1.00     Results from last 7 days   Lab Units 12/19/23  1055 12/19/23  0745 12/18/23  2355 12/18/23  2149 12/18/23  2106 12/18/23  2102 12/18/23  1604 12/18/23  1051 12/18/23  0723 12/17/23  2101 12/17/23  1556 12/17/23  1110   POC GLUCOSE mg/dl 177* 175* 154* 138 45* 44* 178* 230* 68 156* 98 280*         Results from last 7 days   Lab Units 12/19/23  1005 12/18/23  0537 12/17/23  0455 12/16/23  1254 12/16/23  0453 12/15/23  1547 12/15/23  1515   LACTIC ACID mmol/L  --   --   --  1.9  --  3.1* 1.3   PROCALCITONIN ng/ml 5.54* 13.09* 24.47*  --  41.92* 53.31*  --        Lines/Drains:  Invasive Devices       Peripheral Intravenous Line  Duration             Peripheral IV 12/15/23 Right;Ventral (anterior) Forearm 3 days              Drain  Duration             Urethral Catheter Non-latex 16 Fr. 2 days                  Urinary Catheter:  Goal for removal:  defer to urology               Imaging: Reviewed radiology reports from this admission including: chest xray    Recent Cultures (last 7 days):   Results from last 7 days   Lab Units 12/15/23  1502   BLOOD CULTURE  No Growth at 72 hrs.  No Growth at 72 hrs.       Last 24 Hours Medication List:   Current Facility-Administered Medications   Medication Dose Route Frequency Provider Last Rate    acetaminophen  650 mg Oral Q6H PRN ADRIAN Tripp      Albumin 25%  25 g Intravenous BID (diuretic) Laya Ragland DO      amoxicillin-clavulanate  1 tablet Oral Q12H SIMEON Laya Ragland DO      vitamin C  500 mg Oral BID ADRIAN Tripp      atorvastatin  20 mg Oral Daily With Dinner Laya Ragland DO      calcium carbonate  500 mg Oral BID Ana  ADRIAN Howard      dextrose  25 mL Intravenous Once ADRIAN Tripp      enoxaparin  30 mg Subcutaneous Daily ADRIAN Tripp      gabapentin  300 mg Oral BID ADRIAN Tripp      insulin glargine  3 Units Subcutaneous Q12H SIMEON ADRIAN Tripp      insulin lispro  1-5 Units Subcutaneous TID AC ADRIAN Tripp      insulin lispro  1-5 Units Subcutaneous HS ADRIAN Tripp      lactated ringers  50 mL/hr Intravenous Continuous Laya Ragland DO      levothyroxine  75 mcg Oral Early Morning Laya Ragland DO      magnesium Oxide  800 mg Oral Daily Laya Ragland DO      magnesium sulfate  4 g Intravenous Once Laya Ragland DO      midodrine  5 mg Oral TID AC Laya Ragland DO      multivitamin stress formula  1 tablet Oral Daily ADRIAN Tripp      ondansetron  4 mg Intravenous Q6H PRN ADRIAN Tripp      pantoprazole  40 mg Oral Daily ADRIAN Tripp      QUEtiapine  25 mg Oral HS ADRIAN Tripp          Today, Patient Was Seen By: Laya Ragland DO    **Please Note: This note may have been constructed using a voice recognition system.**

## 2023-12-19 NOTE — PLAN OF CARE
Problem: Potential for Falls  Goal: Patient will remain free of falls  Description: INTERVENTIONS:  - Educate patient/family on patient safety including physical limitations  - Instruct patient to call for assistance with activity   - Consult OT/PT to assist with strengthening/mobility   - Keep Call bell within reach  - Keep bed low and locked with side rails adjusted as appropriate  - Keep care items and personal belongings within reach  - Initiate and maintain comfort rounds  - Make Fall Risk Sign visible to staff  - Offer Toileting every 2 Hours, in advance of need  - Initiate/Maintain bed alarm  - Obtain necessary fall risk management equipment: nonslip socks  - Apply yellow socks and bracelet for high fall risk patients  - Consider moving patient to room near nurses station  Outcome: Progressing     Problem: PAIN - ADULT  Goal: Verbalizes/displays adequate comfort level or baseline comfort level  Description: Interventions:  - Encourage patient to monitor pain and request assistance  - Assess pain using appropriate pain scale  - Administer analgesics based on type and severity of pain and evaluate response  - Implement non-pharmacological measures as appropriate and evaluate response  - Consider cultural and social influences on pain and pain management  - Notify physician/advanced practitioner if interventions unsuccessful or patient reports new pain  Outcome: Progressing     Problem: INFECTION - ADULT  Goal: Absence or prevention of progression during hospitalization  Description: INTERVENTIONS:  - Assess and monitor for signs and symptoms of infection  - Monitor lab/diagnostic results  - Monitor all insertion sites, i.e. indwelling lines, tubes, and drains  - Monitor endotracheal if appropriate and nasal secretions for changes in amount and color  - [place] appropriate cooling/warming therapies per order  - Administer medications as ordered  - Instruct and encourage patient and family to use good hand  hygiene technique  - Identify and instruct in appropriate isolation precautions for identified infection/condition  Outcome: Progressing  Goal: Absence of fever/infection during neutropenic period  Description: INTERVENTIONS:  - Monitor WBC    Outcome: Progressing     Problem: SAFETY ADULT  Goal: Patient will remain free of falls  Description: INTERVENTIONS:  - Educate patient/family on patient safety including physical limitations  - Instruct patient to call for assistance with activity   - Consult OT/PT to assist with strengthening/mobility   - Keep Call bell within reach  - Keep bed low and locked with side rails adjusted as appropriate  - Keep care items and personal belongings within reach  - Initiate and maintain comfort rounds  - Make Fall Risk Sign visible to staff  - Offer Toileting every 2 Hours, in advance of need  - Initiate/Maintain bed alarm  - Obtain necessary fall risk management equipment: nonslip socks  - Apply yellow socks and bracelet for high fall risk patients  - Consider moving patient to room near nurses station  Outcome: Progressing  Goal: Maintain or return to baseline ADL function  Description: INTERVENTIONS:  -  Assess patient's ability to carry out ADLs; assess patient's baseline for ADL function and identify physical deficits which impact ability to perform ADLs (bathing, care of mouth/teeth, toileting, grooming, dressing, etc.)  - Assess/evaluate cause of self-care deficits   - Assess range of motion  - Assess patient's mobility; develop plan if impaired  - Assess patient's need for assistive devices and provide as appropriate  - Encourage maximum independence but intervene and supervise when necessary  - Involve family in performance of ADLs  - Assess for home care needs following discharge   - Consider OT consult to assist with ADL evaluation and planning for discharge  - Provide patient education as appropriate  Outcome: Progressing  Goal: Maintains/Returns to pre admission  functional level  Description: INTERVENTIONS:  - Perform AM-PAC 6 Click Basic Mobility/ Daily Activity assessment daily.  - Set and communicate daily mobility goal to care team and patient/family/caregiver.   - Collaborate with rehabilitation services on mobility goals if consulted  - Perform Range of Motion 3 times a day.  - Reposition patient every 2 hours.  - Dangle patient 3 times a day  - Stand patient 3 times a day  - Ambulate patient 3 times a day  - Out of bed to chair 3 times a day   - Out of bed for meals 3 times a day  - Out of bed for toileting  - Record patient progress and toleration of activity level   Outcome: Progressing     Problem: Knowledge Deficit  Goal: Patient/family/caregiver demonstrates understanding of disease process, treatment plan, medications, and discharge instructions  Description: Complete learning assessment and assess knowledge base.  Interventions:  - Provide teaching at level of understanding  - Provide teaching via preferred learning methods  Outcome: Progressing     Problem: MOBILITY - ADULT  Goal: Maintain or return to baseline ADL function  Description: INTERVENTIONS:  -  Assess patient's ability to carry out ADLs; assess patient's baseline for ADL function and identify physical deficits which impact ability to perform ADLs (bathing, care of mouth/teeth, toileting, grooming, dressing, etc.)  - Assess/evaluate cause of self-care deficits   - Assess range of motion  - Assess patient's mobility; develop plan if impaired  - Assess patient's need for assistive devices and provide as appropriate  - Encourage maximum independence but intervene and supervise when necessary  - Involve family in performance of ADLs  - Assess for home care needs following discharge   - Consider OT consult to assist with ADL evaluation and planning for discharge  - Provide patient education as appropriate  Outcome: Progressing  Goal: Maintains/Returns to pre admission functional level  Description:  INTERVENTIONS:  - Perform AM-PAC 6 Click Basic Mobility/ Daily Activity assessment daily.  - Set and communicate daily mobility goal to care team and patient/family/caregiver.   - Collaborate with rehabilitation services on mobility goals if consulted  - Perform Range of Motion 3 times a day.  - Reposition patient every 2 hours.  - Dangle patient 3 times a day  - Stand patient 3 times a day  - Ambulate patient 3 times a day  - Out of bed to chair 3 times a day   - Out of bed for meals 3 times a day  - Out of bed for toileting  - Record patient progress and toleration of activity level   Outcome: Progressing     Problem: Prexisting or High Potential for Compromised Skin Integrity  Goal: Skin integrity is maintained or improved  Description: INTERVENTIONS:  - Identify patients at risk for skin breakdown  - Assess and monitor skin integrity  - Assess and monitor nutrition and hydration status  - Monitor labs   - Assess for incontinence   - Turn and reposition patient  - Assist with mobility/ambulation  - Relieve pressure over bony prominences  - Avoid friction and shearing  - Provide appropriate hygiene as needed including keeping skin clean and dry  - Evaluate need for skin moisturizer/barrier cream  - Collaborate with interdisciplinary team   - Patient/family teaching  - Consider wound care consult   Outcome: Progressing

## 2023-12-20 LAB
ALBUMIN SERPL BCP-MCNC: 3.4 G/DL (ref 3.5–5)
ANION GAP SERPL CALCULATED.3IONS-SCNC: 8 MMOL/L
BACTERIA BLD CULT: NORMAL
BACTERIA BLD CULT: NORMAL
BUN SERPL-MCNC: 13 MG/DL (ref 5–25)
CALCIUM ALBUM COR SERPL-MCNC: 8.5 MG/DL (ref 8.3–10.1)
CALCIUM SERPL-MCNC: 8 MG/DL (ref 8.4–10.2)
CHLORIDE SERPL-SCNC: 96 MMOL/L (ref 96–108)
CO2 SERPL-SCNC: 30 MMOL/L (ref 21–32)
CREAT SERPL-MCNC: 0.62 MG/DL (ref 0.6–1.3)
ERYTHROCYTE [DISTWIDTH] IN BLOOD BY AUTOMATED COUNT: 12.7 % (ref 11.6–15.1)
GFR SERPL CREATININE-BSD FRML MDRD: 83 ML/MIN/1.73SQ M
GLUCOSE SERPL-MCNC: 235 MG/DL (ref 65–140)
GLUCOSE SERPL-MCNC: 247 MG/DL (ref 65–140)
GLUCOSE SERPL-MCNC: 275 MG/DL (ref 65–140)
GLUCOSE SERPL-MCNC: 322 MG/DL (ref 65–140)
GLUCOSE SERPL-MCNC: 351 MG/DL (ref 65–140)
HCT VFR BLD AUTO: 36.1 % (ref 34.8–46.1)
HGB BLD-MCNC: 11.4 G/DL (ref 11.5–15.4)
MAGNESIUM SERPL-MCNC: 2.4 MG/DL (ref 1.9–2.7)
MCH RBC QN AUTO: 32.1 PG (ref 26.8–34.3)
MCHC RBC AUTO-ENTMCNC: 31.6 G/DL (ref 31.4–37.4)
MCV RBC AUTO: 102 FL (ref 82–98)
PHOSPHATE SERPL-MCNC: 3.3 MG/DL (ref 2.3–4.1)
PLATELET # BLD AUTO: 253 THOUSANDS/UL (ref 149–390)
PMV BLD AUTO: 10.6 FL (ref 8.9–12.7)
POTASSIUM SERPL-SCNC: 5.1 MMOL/L (ref 3.5–5.3)
RBC # BLD AUTO: 3.55 MILLION/UL (ref 3.81–5.12)
SODIUM SERPL-SCNC: 134 MMOL/L (ref 135–147)
WBC # BLD AUTO: 9.74 THOUSAND/UL (ref 4.31–10.16)

## 2023-12-20 PROCEDURE — 82948 REAGENT STRIP/BLOOD GLUCOSE: CPT

## 2023-12-20 PROCEDURE — 80069 RENAL FUNCTION PANEL: CPT | Performed by: STUDENT IN AN ORGANIZED HEALTH CARE EDUCATION/TRAINING PROGRAM

## 2023-12-20 PROCEDURE — 85027 COMPLETE CBC AUTOMATED: CPT | Performed by: STUDENT IN AN ORGANIZED HEALTH CARE EDUCATION/TRAINING PROGRAM

## 2023-12-20 PROCEDURE — 99233 SBSQ HOSP IP/OBS HIGH 50: CPT

## 2023-12-20 PROCEDURE — 83735 ASSAY OF MAGNESIUM: CPT | Performed by: STUDENT IN AN ORGANIZED HEALTH CARE EDUCATION/TRAINING PROGRAM

## 2023-12-20 PROCEDURE — 97530 THERAPEUTIC ACTIVITIES: CPT

## 2023-12-20 PROCEDURE — 83918 ORGANIC ACIDS TOTAL QUANT: CPT | Performed by: STUDENT IN AN ORGANIZED HEALTH CARE EDUCATION/TRAINING PROGRAM

## 2023-12-20 PROCEDURE — 97110 THERAPEUTIC EXERCISES: CPT

## 2023-12-20 RX ORDER — LANOLIN ALCOHOL/MO/W.PET/CERES
800 CREAM (GRAM) TOPICAL DAILY
Start: 2023-12-21

## 2023-12-20 RX ORDER — LANOLIN ALCOHOL/MO/W.PET/CERES
6 CREAM (GRAM) TOPICAL
Qty: 60 TABLET | Refills: 0 | OUTPATIENT
Start: 2023-12-20 | End: 2024-01-19

## 2023-12-20 RX ORDER — AMOXICILLIN AND CLAVULANATE POTASSIUM 875; 125 MG/1; MG/1
1 TABLET, FILM COATED ORAL EVERY 12 HOURS SCHEDULED
Qty: 11 TABLET | Refills: 0 | OUTPATIENT
Start: 2023-12-20 | End: 2023-12-26

## 2023-12-20 RX ORDER — MIDODRINE HYDROCHLORIDE 5 MG/1
5 TABLET ORAL
Start: 2023-12-21

## 2023-12-20 RX ADMIN — PANTOPRAZOLE SODIUM 40 MG: 40 TABLET, DELAYED RELEASE ORAL at 09:27

## 2023-12-20 RX ADMIN — INSULIN LISPRO 3 UNITS: 100 INJECTION, SOLUTION INTRAVENOUS; SUBCUTANEOUS at 12:07

## 2023-12-20 RX ADMIN — ENOXAPARIN SODIUM 30 MG: 30 INJECTION SUBCUTANEOUS at 09:26

## 2023-12-20 RX ADMIN — MIDODRINE HYDROCHLORIDE 5 MG: 5 TABLET ORAL at 12:06

## 2023-12-20 RX ADMIN — LEVOTHYROXINE SODIUM 75 MCG: 75 TABLET ORAL at 05:35

## 2023-12-20 RX ADMIN — QUETIAPINE FUMARATE 25 MG: 25 TABLET ORAL at 22:27

## 2023-12-20 RX ADMIN — GABAPENTIN 300 MG: 300 CAPSULE ORAL at 17:06

## 2023-12-20 RX ADMIN — SODIUM CHLORIDE, SODIUM LACTATE, POTASSIUM CHLORIDE, AND CALCIUM CHLORIDE 50 ML/HR: .6; .31; .03; .02 INJECTION, SOLUTION INTRAVENOUS at 14:50

## 2023-12-20 RX ADMIN — Medication 800 MG: at 09:27

## 2023-12-20 RX ADMIN — CALCIUM CARBONATE (ANTACID) CHEW TAB 500 MG 500 MG: 500 CHEW TAB at 09:27

## 2023-12-20 RX ADMIN — ATORVASTATIN CALCIUM 20 MG: 20 TABLET, FILM COATED ORAL at 17:06

## 2023-12-20 RX ADMIN — AMOXICILLIN AND CLAVULANATE POTASSIUM 1 TABLET: 875; 125 TABLET, FILM COATED ORAL at 22:26

## 2023-12-20 RX ADMIN — INSULIN LISPRO 3 UNITS: 100 INJECTION, SOLUTION INTRAVENOUS; SUBCUTANEOUS at 22:28

## 2023-12-20 RX ADMIN — B-COMPLEX W/ C & FOLIC ACID TAB 1 TABLET: TAB at 09:27

## 2023-12-20 RX ADMIN — INSULIN GLARGINE 3 UNITS: 100 INJECTION, SOLUTION SUBCUTANEOUS at 09:26

## 2023-12-20 RX ADMIN — INSULIN LISPRO 2 UNITS: 100 INJECTION, SOLUTION INTRAVENOUS; SUBCUTANEOUS at 17:08

## 2023-12-20 RX ADMIN — OXYCODONE HYDROCHLORIDE AND ACETAMINOPHEN 500 MG: 500 TABLET ORAL at 09:27

## 2023-12-20 RX ADMIN — MIDODRINE HYDROCHLORIDE 5 MG: 5 TABLET ORAL at 05:35

## 2023-12-20 RX ADMIN — INSULIN GLARGINE 3 UNITS: 100 INJECTION, SOLUTION SUBCUTANEOUS at 22:27

## 2023-12-20 RX ADMIN — MIDODRINE HYDROCHLORIDE 5 MG: 5 TABLET ORAL at 17:06

## 2023-12-20 RX ADMIN — INSULIN LISPRO 2 UNITS: 100 INJECTION, SOLUTION INTRAVENOUS; SUBCUTANEOUS at 09:34

## 2023-12-20 RX ADMIN — AMOXICILLIN AND CLAVULANATE POTASSIUM 1 TABLET: 875; 125 TABLET, FILM COATED ORAL at 09:27

## 2023-12-20 RX ADMIN — OXYCODONE HYDROCHLORIDE AND ACETAMINOPHEN 500 MG: 500 TABLET ORAL at 17:06

## 2023-12-20 RX ADMIN — GABAPENTIN 300 MG: 300 CAPSULE ORAL at 09:27

## 2023-12-20 RX ADMIN — ALBUMIN (HUMAN) 25 G: 0.25 INJECTION, SOLUTION INTRAVENOUS at 09:27

## 2023-12-20 NOTE — ASSESSMENT & PLAN NOTE
POA now resolved  In setting of COVID-19 and possible enteritis per CT imaging  Evidenced by WBC 14.45, heart rate 93, BP 70/30, lactic 3.1  Trended lactic acid-lactic acidosis resolved with IV fluids  Blood cultures x 2 negative after 48 hours  Trending WBCs-no cytosis today  Trending procalcitonin-continues to trend downward  Finished 3 days of IV Vanco and cefepime.   12/19 de-escalated to Augmentin 875-125 twice daily for 7 more days to complete 10-day course  No clear source of infection. UA clean. Given cough and cxr negative for pneumonia. Sepsis most likely 2/2 bronchitis

## 2023-12-20 NOTE — ASSESSMENT & PLAN NOTE
Lab Results   Component Value Date    HGBA1C 8.9 (H) 05/26/2023       Recent Labs     12/19/23  1649 12/19/23 2029 12/20/23  0723 12/20/23  1108   POCGLU 240* 188* 275* 322*         Blood Sugar Average: Last 72 hrs:  (P) 170.4032196938378548    Target blood sugar inpatient 140-180  Was on Home regimen which includes Lantus insulin 7 units in the a.m., 5 units in the p.m.  Sugars not currently optimized  Increase  Lantus to 5 units every 12 hours and continue to monitor  Continue SSI  CHO diet  Hypoglycemia protocol  BMP a.m.

## 2023-12-20 NOTE — ASSESSMENT & PLAN NOTE
Brought to ED by daughter after discharge from rehab facility for weakness and garbled speech   Head CT negative  CTA head and neck negative for large vessel occlusion, dissection or aneurysm.  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  Referrals have been made.  [facility] has accepted patient-required transfer in the a.m. case management is arranging.

## 2023-12-20 NOTE — PROGRESS NOTES
[facility]  Progress Note  Name: Shakira Hinkle I  MRN: [id number]  Unit/Bed#: -01 I Date of Admission: 12/15/2023   Date of Service: 12/20/2023 I Hospital Day: 5    Assessment/Plan   * Generalized weakness  Assessment & Plan  Brought to ED by daughter after discharge from rehab facility for weakness and garbled speech   Head CT negative  CTA head and neck negative for large vessel occlusion, dissection or aneurysm.  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  Referrals have been made.  [facility] has accepted patient-required transfer in the a.m. case management is arranging.      Sepsis (HCC)  Assessment & Plan  POA now resolved  In setting of COVID-19 and possible enteritis per CT imaging  Evidenced by WBC 14.45, heart rate 93, BP 70/30, lactic 3.1  Trended lactic acid-lactic acidosis resolved with IV fluids  Blood cultures x 2 negative after 48 hours  Trending WBCs-no cytosis today  Trending procalcitonin-continues to trend downward  Finished 3 days of IV Vanco and cefepime.   12/19 de-escalated to Augmentin 875-125 twice daily for 7 more days to complete 10-day course  No clear source of infection. UA clean. Given cough and cxr negative for pneumonia. Sepsis most likely 2/2 bronchitis      Dysarthria  Assessment & Plan  Patient had garbled speech prehospital per daughter, resolved on arrival   Head CT negative  Neurology evaluated patient in the ED, felt patient did not need MRI or stroke workup in reviewing notes    COVID-19  Assessment  & Plan  Patient presented to the ED via EMS-daughter at bedside reports patient was discharged from rehab facility today max assistance of 2 to transfer  Also having some dysarthria prehospital-resolved by time of my assessment  Patient tested positive for COVID in the ED  At time of arrival on room air with nonlabored respirations  12/16 a.m required 2 L nasal cannula, lungs decreased bilaterally, no cough  12/16 CT chest abdomen pelvis negative for PE, did note possible nonspecific enteritis  12/16 initiated cefepime and vancomycin initiated  12/19 de-escalated to Augmentin 875-125 twice daily for 7 more days to complete 10-day course  Supportive care for COVID  Procalcitonin continues to trend downward  Leukocytosis resolved, afebrile  Isolation precautions    Hypomagnesemia  Assessment & Plan  Monitoring and replating as indicated  Hypomagnesemia currently resolved    Type I diabetes mellitus (HCC)  Assessment & Plan  Lab Results   Component Value Date    HGBA1C 8.9 (H) 05/26/2023       Recent Labs     12/19/23  1649 12/19/23  2029 12/20/23  0723 12/20/23  1108   POCGLU 240* 188* 275* 322*         Blood Sugar Average: Last 72 hrs:  (P) [id number]    Target blood sugar inpatient 140-180  Was on Home regimen which includes Lantus insulin 7 units in the a.m., 5 units in the p.m.  Sugars not currently optimized  Increase  Lantus to 5 units every 12 hours and continue to monitor  Continue SSI  CHO diet  Hypoglycemia protocol  BMP a.m.    Hyperlipidemia  Assessment & Plan  Not on statin prehospital    Hypothyroidism  Assessment & Plan  Continue levothyroxine    Gastroesophageal reflux disease without esophagitis  Assessment & Plan  Continue PPI    Urinary retention  Assessment & Plan  Failed retention protocol, requiring Kinney catheter placement  Urology consulted, appreciate recommendations-planning continue Kinney for now until medically improved then will reattempt voiding trial  Continue to monitor intake  and output               VTE Pharmacologic Prophylaxis: VTE Score: 7 High Risk (Score >/= 5) - Pharmacological DVT Prophylaxis Ordered: enoxaparin (Lovenox). Sequential Compression Devices Ordered.    Mobility:   Basic Mobility Inpatient Raw Score: 8  JH-HLM Goal: 3: Sit at edge of bed  JH-HLM Achieved: 3: Sit at edge of bed  HLM Goal achieved. Continue to encourage appropriate mobility.    Patient Centered Rounds: I performed bedside rounds with nursing staff today.   Discussions with Specialists or Other Care Team Provider: Nursing, PT/OT, case management    Education and Discussions with Family / Patient: Updated  (daughter) via phone.    Total Time Spent on Date of Encounter in care of patient: 38 mins. This time was spent on one or more of the following: performing physical exam; counseling and coordination of care; obtaining or reviewing history; documenting in the medical record; reviewing/ordering tests, medications or procedures; communicating with other healthcare professionals and discussing with patient's family/caregivers.    Current Length of Stay: 5 day(s)  Current Patient Status: Inpatient   Certification Statement: The patient will continue to require additional inpatient hospital stay due to patient requires [facility] on discharge.  This management has made referrals, accepted to  [facility]-they are requesting transfer tomorrow.  Discharge Plan: Anticipate discharge tomorrow to rehab facility.    Code Status: Level 3 - DNAR and DNI    Subjective:   Patient denies any dizziness, lightheadedness, chest pain or discomfort.  Denies shortness of breath.    Objective:     Vitals:   Temp (24hrs), Av.5 °F (36.9 °C), Min:98.3 °F (36.8 °C), Max:98.7 °F (37.1 °C)    Temp:  [98.3 °F (36.8 °C)-98.7 °F (37.1 °C)] 98.4 °F (36.9 °C)  HR:  [70-77] 72  Resp:  [17-18] 17  BP: ()/(49-72) 101/60  SpO2:  [94 %-96 %] 95 %  Body mass index is 21.66 kg/m².     Input and Output Summary (last 24 hours):      Intake/Output Summary (Last 24 hours) at 12/20/2023 1401  Last data filed at 12/20/2023 1300  Gross per 24 hour   Intake 580 ml   Output 1050 ml   Net -470 ml       Physical Exam:   Physical Exam  Vitals and nursing note reviewed.   Constitutional:       General: She is not in acute distress.     Appearance: Normal appearance. She is well-developed. She is not ill-appearing.   HENT:      Head: Normocephalic and atraumatic.      Mouth/Throat:      Mouth: Mucous membranes are moist.   Eyes:      Extraocular Movements: Extraocular movements intact.      Conjunctiva/sclera: Conjunctivae normal.      Pupils: Pupils are equal, round, and reactive to light.   Cardiovascular:      Rate and Rhythm: Normal rate and regular rhythm.      Pulses: Normal pulses.      Heart sounds: Normal heart sounds. No murmur heard.  Pulmonary:      Effort: Pulmonary effort is normal. No respiratory distress.      Breath sounds: Normal breath sounds. No wheezing or rales.   Abdominal:      General: Bowel sounds are normal. There is no distension.      Palpations: Abdomen is soft.      Tenderness: There is no abdominal tenderness. There is no guarding.   Musculoskeletal:         General: No swelling. Normal range of motion.   Skin:     General: Skin is warm and dry.      Capillary Refill: Capillary refill takes less than 2 seconds.   Neurological:      General: No focal deficit present.      Mental Status: She is alert and oriented to person, place, and time. Mental status is at baseline.   Psychiatric:         Thought Content: Thought content normal.          Additional Data:     Labs:  Results from last 7 days   Lab Units 12/20/23  0545 12/19/23  1005 12/18/23  0537   WBC Thousand/uL 9.74   < > 9.96   HEMOGLOBIN g/dL 11.4*   < > 12.1   HEMATOCRIT % 36.1   < > 38.4   PLATELETS Thousands/uL 253   < > 181   NEUTROS PCT %  --   --  82*   LYMPHS PCT %  --   --  9*   MONOS PCT %  --   --  8   EOS PCT %  --   --  0    < > = values in this  interval not displayed.     Results from last 7 days   Lab Units 12/20/23  0545 12/19/23  1005 12/18/23  0537   SODIUM mmol/L 134*   < > 140   POTASSIUM mmol/L 5.1   < > 4.1   CHLORIDE mmol/L 96   < > 101   CO2 mmol/L 30   < > 26   BUN mg/dL 13   < > 7   CREATININE mg/dL 0.62   < > 0.58*   ANION GAP mmol/L 8   < > 13   CALCIUM mg/dL 8.0*   < > 8.0*   ALBUMIN g/dL 3.4*   < > 3.0*   TOTAL BILIRUBIN mg/dL  --   --  0.32   ALK PHOS U/L  --   --  96   ALT U/L  --   --  25   AST U/L  --   --  42*   GLUCOSE RANDOM mg/dL 235*   < > 56*    < > = values in this interval not displayed.     Results from last 7 days   Lab Units 12/15/23  1502   INR  1.00     Results from last 7 days   Lab Units 12/20/23  1108 12/20/23  0723 12/19/23  2029 12/19/23  1649 12/19/23  1055 12/19/23  0745 12/18/23  2355 12/18/23  2149 12/18/23  2106 12/18/23  2102 12/18/23  1604 12/18/23  1051   POC GLUCOSE mg/dl 322* 275* 188* 240* 177* 175* 154* 138 45* 44* 178* 230*         Results from last 7 days   Lab Units 12/19/23  1005 12/18/23  0537 12/17/23  0455 12/16/23  1254 12/16/23  0453 12/15/23  1547 12/15/23  1515   LACTIC ACID mmol/L  --   --   --  1.9  --  3.1* 1.3   PROCALCITONIN ng/ml 5.54* 13.09* 24.47*  --  41.92* 53.31*  --        Lines/Drains:  Invasive Devices       Peripheral Intravenous Line  Duration             Peripheral IV 12/20/23 Left;Ventral (anterior) Forearm <1 day              Drain  Duration             Urethral Catheter Non-latex 16 Fr. 3 days                  Urinary Catheter:  Goal for removal: N/A- Discharging with Kinney               Imaging: Reviewed radiology reports from this admission including: chest xray, chest CT scan, and CT head    Recent Cultures (last 7 days):   Results from last 7 days   Lab Units 12/15/23  1502   BLOOD CULTURE  No Growth After 4 Days.  No Growth After 4 Days.       Last 24 Hours Medication List:   Current Facility-Administered Medications   Medication Dose Route Frequency Provider Last Rate     acetaminophen  650 mg Oral Q6H PRN ADRIAN Tripp      amoxicillin-clavulanate  1 tablet Oral Q12H SIMEON Laya Ragland DO      vitamin C  500 mg Oral BID ADRIAN Tripp      atorvastatin  20 mg Oral Daily With Dinner Laya Ragland, DO      calcium carbonate  500 mg Oral BID ADRIAN Tripp      dextrose  25 mL Intravenous Once ADRIAN Tripp      enoxaparin  30 mg Subcutaneous Daily ADRIAN Tripp      gabapentin  300 mg Oral BID ADRIAN Tripp      insulin glargine  3 Units Subcutaneous Q12H SIMEON ADRIAN Tripp      insulin lispro  1-5 Units Subcutaneous TID AC ADRIAN Tripp      insulin lispro  1-5 Units Subcutaneous HS ADRIAN Tripp      lactated ringers  50 mL/hr Intravenous Continuous Laya Tomarika, DO 50 mL/hr (12/19/23 1523)    levothyroxine  75 mcg Oral Early Morning Laya Ragland, DO      magnesium Oxide  800 mg Oral Daily Laya Ragland, DO      midodrine  5 mg Oral TID AC Laya Ragland, DO      multivitamin stress formula  1 tablet Oral Daily ADRIAN Tripp      ondansetron  4 mg Intravenous Q6H PRN ADRIAN Tripp      pantoprazole  40 mg Oral Daily ADRIAN Tripp      QUEtiapine  25 mg Oral HS ADRIAN Tripp          Today, Patient Was Seen By: ADRIAN Tripp    **Please Note: This note may have been constructed using a voice recognition system.**

## 2023-12-20 NOTE — PLAN OF CARE
Problem: Potential for Falls  Goal: Patient will remain free of falls  Description: INTERVENTIONS:  - Educate patient/family on patient safety including physical limitations  - Instruct patient to call for assistance with activity   - Consult OT/PT to assist with strengthening/mobility   - Keep Call bell within reach  - Keep bed low and locked with side rails adjusted as appropriate  - Keep care items and personal belongings within reach  - Initiate and maintain comfort rounds  - Make Fall Risk Sign visible to staff  - Apply yellow socks and bracelet for high fall risk patients  - Consider moving patient to room near nurses station  Outcome: Progressing     Problem: PAIN - ADULT  Goal: Verbalizes/displays adequate comfort level or baseline comfort level  Description: Interventions:  - Encourage patient to monitor pain and request assistance  - Assess pain using appropriate pain scale  - Administer analgesics based on type and severity of pain and evaluate response  - Implement non-pharmacological measures as appropriate and evaluate response  - Consider cultural and social influences on pain and pain management  - Notify physician/advanced practitioner if interventions unsuccessful or patient reports new pain  Outcome: Progressing     Problem: INFECTION - ADULT  Goal: Absence or prevention of progression during hospitalization  Description: INTERVENTIONS:  - Assess and monitor for signs and symptoms of infection  - Monitor lab/diagnostic results  - Monitor all insertion sites, i.e. indwelling lines, tubes, and drains  - Monitor endotracheal if appropriate and nasal secretions for changes in amount and color  - [place] appropriate cooling/warming therapies per order  - Administer medications as ordered  - Instruct and encourage patient and family to use good hand hygiene technique  - Identify and instruct in appropriate isolation precautions for identified infection/condition  Outcome: Progressing

## 2023-12-20 NOTE — CASE MANAGEMENT
Case Management Discharge Planning Note    Patient name Shakira Hinkle  Location /-01 MRN [id number]  : 1940 Date 2023       Current Admission Date: 12/15/2023  Current Admission Diagnosis:Generalized weakness   Patient Active Problem List    Diagnosis Date Noted    Sepsis (HCC) 2023    Urinary retention 2023    COVID-19 12/15/2023    Dysarthria 12/15/2023    Severe protein-calorie malnutrition (HCC) 2023    Generalized weakness 2023    Macrocytosis 2023    Low blood pressure 2023    Hypomagnesemia 2023    Hypoglycemia 2023    Hypothermia 2023    Hypokalemia 2023    History of Hodgkin's lymphoma 2023    Hypothyroidism 2023    Acute metabolic encephalopathy 2023    Gastroesophageal reflux disease without esophagitis 10/17/2022    Abnormal CT of the abdomen 07/10/2022    Soft tissue radionecrosis 2022    Osteoradionecrosis of temporal bone  2022    Primary osteoarthritis of right shoulder 2021    Primary osteoarthritis of both knees 2020    Postoperative hypothyroidism 2015    Hyperlipidemia 2014    History of hypertension 10/10/2013    Type I diabetes mellitus (HCC) 2008      LOS (days): 5  Geometric Mean LOS (GMLOS) (days): 5.1  Days to GMLOS:0     OBJECTIVE:  Risk of Unplanned Readmission Score: 40.67         Current admission status: Inpatient   Preferred Pharmacy:   [id number] [address]  [address] [facility] [address]  Phone: [phone] Fax: [phone]    Primary Care Provider: Dwayne Hilton MD    Primary Insurance: MEDICARE  Secondary Insurance: [facility]    DISCHARGE DETAILS:    Discharge planning discussed with:: Cindy was called at 17;58 pm   Freedom of Choice: Yes  Comments - Freedom of Choice: recommendation is for rehab-  [facility] - they will let me know if they are able to receive  the pt tomorrow-  CM contacted family/caregiver?: Yes             Contacts  Patient Contacts: Cindy Crooks dtr  Relationship to Patient:: Family  Contact Method: Phone  Phone Number: [phone] (cell)  LM  Reason/Outcome: Discharge Planning    Requested Home Health Care         Is the patient interested in HHC at discharge?: No    DME Referral Provided  Referral made for DME?: No    Other Referral/Resources/Interventions Provided:  Interventions: Short Term Rehab  Referral Comments: [facility] accepted- they do not  have a bed today - they will let me know tomorrow when they can accept the pt- pt will need transport    Would you like to participate in our [facility] service program?  : No - Declined    Treatment Team Recommendation: Short Term Rehab ([facility] - BLS)

## 2023-12-20 NOTE — CASE MANAGEMENT
Case Management Progress Note    Patient name Shakira Hinkle  Location /-01 MRN [id number]  : 1940 Date 2023       LOS (days): 5  Geometric Mean LOS (GMLOS) (days): 5.1  Days to GMLOS:0        OBJECTIVE:        Current admission status: Inpatient  Preferred Pharmacy:   [id number] - [address] [address]  Phone: [phone] Fax: [phone]    Primary Care Provider: Dwayne Hilton MD    Primary Insurance: MEDICARE  Secondary Insurance: [facility]    PROGRESS NOTE:  Cm met with the daughter to discuss d/c plan, [facility] did not have a bed today , they may have a bed tomorrow for the pt,cm will continue to follow and work on a safe d/c plan.

## 2023-12-20 NOTE — ASSESSMENT & PLAN NOTE
Patient presented to the ED via EMS-daughter at bedside reports patient was discharged from rehab facility today max assistance of 2 to transfer  Also having some dysarthria prehospital-resolved by time of my assessment  Patient tested positive for COVID in the ED  At time of arrival on room air with nonlabored respirations  12/16 a.m required 2 L nasal cannula, lungs decreased bilaterally, no cough  12/16 CT chest abdomen pelvis negative for PE, did note possible nonspecific enteritis  12/16 initiated cefepime and vancomycin initiated  12/19 de-escalated to Augmentin 875-125 twice daily for 7 more days to complete 10-day course  Supportive care for COVID  Procalcitonin continues to trend downward  Leukocytosis resolved, afebrile  Isolation precautions

## 2023-12-20 NOTE — PLAN OF CARE
Problem: Potential for Falls  Goal: Patient will remain free of falls  Description: INTERVENTIONS:  - Educate patient/family on patient safety including physical limitations  - Instruct patient to call for assistance with activity   - Consult OT/PT to assist with strengthening/mobility   - Keep Call bell within reach  - Keep bed low and locked with side rails adjusted as appropriate  - Keep care items and personal belongings within reach  - Initiate and maintain comfort rounds  - Make Fall Risk Sign visible to staff  - Offer Toileting every 2 Hours, in advance of need  - Initiate/Maintain bed alarm  - Obtain necessary fall risk management equipment:   - Apply yellow socks and bracelet for high fall risk patients  - Consider moving patient to room near nurses station  Outcome: Progressing     Problem: PAIN - ADULT  Goal: Verbalizes/displays adequate comfort level or baseline comfort level  Description: Interventions:  - Encourage patient to monitor pain and request assistance  - Assess pain using appropriate pain scale  - Administer analgesics based on type and severity of pain and evaluate response  - Implement non-pharmacological measures as appropriate and evaluate response  - Consider cultural and social influences on pain and pain management  - Notify physician/advanced practitioner if interventions unsuccessful or patient reports new pain  Outcome: Progressing     Problem: INFECTION - ADULT  Goal: Absence or prevention of progression during hospitalization  Description: INTERVENTIONS:  - Assess and monitor for signs and symptoms of infection  - Monitor lab/diagnostic results  - Monitor all insertion sites, i.e. indwelling lines, tubes, and drains  - Monitor endotracheal if appropriate and nasal secretions for changes in amount and color  - [place] appropriate cooling/warming therapies per order  - Administer medications as ordered  - Instruct and encourage patient and family to use good hand hygiene  technique  - Identify and instruct in appropriate isolation precautions for identified infection/condition  Outcome: Progressing  Goal: Absence of fever/infection during neutropenic period  Description: INTERVENTIONS:  - Monitor WBC    Outcome: Progressing     Problem: SAFETY ADULT  Goal: Patient will remain free of falls  Description: INTERVENTIONS:  - Educate patient/family on patient safety including physical limitations  - Instruct patient to call for assistance with activity   - Consult OT/PT to assist with strengthening/mobility   - Keep Call bell within reach  - Keep bed low and locked with side rails adjusted as appropriate  - Keep care items and personal belongings within reach  - Initiate and maintain comfort rounds  - Make Fall Risk Sign visible to staff  - Offer Toileting every 2 Hours, in advance of need  - Initiate/Maintain bed alarm  - Obtain necessary fall risk management equipment: turn and reposition  - Apply yellow socks and bracelet for high fall risk patients  - Consider moving patient to room near nurses station  Outcome: Progressing  Goal: Maintain or return to baseline ADL function  Description: INTERVENTIONS:  -  Assess patient's ability to carry out ADLs; assess patient's baseline for ADL function and identify physical deficits which impact ability to perform ADLs (bathing, care of mouth/teeth, toileting, grooming, dressing, etc.)  - Assess/evaluate cause of self-care deficits   - Assess range of motion  - Assess patient's mobility; develop plan if impaired  - Assess patient's need for assistive devices and provide as appropriate  - Encourage maximum independence but intervene and supervise when necessary  - Involve family in performance of ADLs  - Assess for home care needs following discharge   - Consider OT consult to assist with ADL evaluation and planning for discharge  - Provide patient education as appropriate  Outcome: Progressing  Goal: Maintains/Returns to pre admission functional  level  Description: INTERVENTIONS:  - Perform AM-PAC 6 Click Basic Mobility/ Daily Activity assessment daily.  - Set and communicate daily mobility goal to care team and patient/family/caregiver.   - Collaborate with rehabilitation services on mobility goals if consulted  - Perform Range of Motion 3 times a day.  - Reposition patient every 2 hours.  - Dangle patient 3 times a day  - Stand patient 2 times a day  - Ambulate patient 3 times a day  - Out of bed to chair 3 times a day   - Out of bed for meals 3 times a day  - Out of bed for toileting  - Record patient progress and toleration of activity level   Outcome: Progressing     Problem: Knowledge Deficit  Goal: Patient/family/caregiver demonstrates understanding of disease process, treatment plan, medications, and discharge instructions  Description: Complete learning assessment and assess knowledge base.  Interventions:  - Provide teaching at level of understanding  - Provide teaching via preferred learning methods  Outcome: Progressing     Problem: MOBILITY - ADULT  Goal: Maintain or return to baseline ADL function  Description: INTERVENTIONS:  -  Assess patient's ability to carry out ADLs; assess patient's baseline for ADL function and identify physical deficits which impact ability to perform ADLs (bathing, care of mouth/teeth, toileting, grooming, dressing, etc.)  - Assess/evaluate cause of self-care deficits   - Assess range of motion  - Assess patient's mobility; develop plan if impaired  - Assess patient's need for assistive devices and provide as appropriate  - Encourage maximum independence but intervene and supervise when necessary  - Involve family in performance of ADLs  - Assess for home care needs following discharge   - Consider OT consult to assist with ADL evaluation and planning for discharge  - Provide patient education as appropriate  Outcome: Progressing  Goal: Maintains/Returns to pre admission functional level  Description:  INTERVENTIONS:  - Perform AM-PAC 6 Click Basic Mobility/ Daily Activity assessment daily.  - Set and communicate daily mobility goal to care team and patient/family/caregiver.   - Collaborate with rehabilitation services on mobility goals if consulted  - Perform Range of Motion 3 times a day.  - Reposition patient every 2 hours.  - Dangle patient 3 times a day  - Stand patient 3 times a day  - Ambulate patient 3 times a day  - Out of bed to chair 3 times a day   - Out of bed for meals 3 times a day  - Out of bed for toileting  - Record patient progress and toleration of activity level   Outcome: Progressing     Problem: Prexisting or High Potential for Compromised Skin Integrity  Goal: Skin integrity is maintained or improved  Description: INTERVENTIONS:  - Identify patients at risk for skin breakdown  - Assess and monitor skin integrity  - Assess and monitor nutrition and hydration status  - Monitor labs   - Assess for incontinence   - Turn and reposition patient  - Assist with mobility/ambulation  - Relieve pressure over bony prominences  - Avoid friction and shearing  - Provide appropriate hygiene as needed including keeping skin clean and dry  - Evaluate need for skin moisturizer/barrier cream  - Collaborate with interdisciplinary team   - Patient/family teaching  - Consider wound care consult   Outcome: Progressing

## 2023-12-20 NOTE — PLAN OF CARE
Problem: PHYSICAL THERAPY ADULT  Goal: Performs mobility at highest level of function for planned discharge setting.  See evaluation for individualized goals.  Description: Treatment/Interventions: Functional transfer training, LE strengthening/ROM, Elevations, Therapeutic exercise, Endurance training, Cognitive reorientation, Patient/family training, Equipment eval/education, Bed mobility, Gait training, Spoke to nursing, Spoke to case management  Equipment Recommended: Walker       See flowsheet documentation for full assessment, interventions and recommendations.  Outcome: Not Progressing  Note: Prognosis: Poor  Problem List: Decreased strength, Decreased endurance, Impaired balance, Decreased mobility, Decreased coordination, Decreased cognition, Impaired judgement, Decreased safety awareness, Pain, Impaired hearing  Assessment: Pt seen for PT treatment session this date with interventions consisting of Therapeutic exercise to improve endurance and reduce the risk of medical complications consisting of: PROM 15 reps B LE in supine position and therapeutic activity to reduce fall risk consisting of training: bed mobility and supine<>sit transfers. Pt agreeable to PT treatment session upon arrival, pt found supine in bed w/ HOB elevated, responsive. In comparison to previous session, pt with no improvements as evidenced by inability to safely advance functional mobility . Post session: pt returned BTB, bed alarm engaged, all needs in reach, and RN notified of session findings/recommendations. Continued recommendation at moderate resource intensity at time of d/c in order to maximize pt's functional independence and safety w/ mobility. Pt continues to be functioning below baseline level, and remains limited 2* factors listed above and including weakness, pain, activity intolerance, decreased endurance, impaired cognition. PT will continue to see pt during current hospitalization in order to address the deficits  listed above and provide interventions consistent w/ POC in effort to achieve LTGs.        Rehab Resource Intensity Level, PT: II (Moderate Resource Intensity)    See flowsheet documentation for full assessment.

## 2023-12-21 LAB
ALBUMIN SERPL BCP-MCNC: 3.1 G/DL (ref 3.5–5)
ANION GAP SERPL CALCULATED.3IONS-SCNC: 10 MMOL/L
ANION GAP SERPL CALCULATED.3IONS-SCNC: 10 MMOL/L
BUN SERPL-MCNC: 12 MG/DL (ref 5–25)
BUN SERPL-MCNC: 13 MG/DL (ref 5–25)
CALCIUM ALBUM COR SERPL-MCNC: 8.4 MG/DL (ref 8.3–10.1)
CALCIUM SERPL-MCNC: 7.7 MG/DL (ref 8.4–10.2)
CALCIUM SERPL-MCNC: 8.4 MG/DL (ref 8.4–10.2)
CHLORIDE SERPL-SCNC: 93 MMOL/L (ref 96–108)
CHLORIDE SERPL-SCNC: 94 MMOL/L (ref 96–108)
CO2 SERPL-SCNC: 26 MMOL/L (ref 21–32)
CO2 SERPL-SCNC: 28 MMOL/L (ref 21–32)
CREAT SERPL-MCNC: 0.53 MG/DL (ref 0.6–1.3)
CREAT SERPL-MCNC: 0.57 MG/DL (ref 0.6–1.3)
ERYTHROCYTE [DISTWIDTH] IN BLOOD BY AUTOMATED COUNT: 12.3 % (ref 11.6–15.1)
GFR SERPL CREATININE-BSD FRML MDRD: 86 ML/MIN/1.73SQ M
GFR SERPL CREATININE-BSD FRML MDRD: 88 ML/MIN/1.73SQ M
GLUCOSE SERPL-MCNC: 158 MG/DL (ref 65–140)
GLUCOSE SERPL-MCNC: 187 MG/DL (ref 65–140)
GLUCOSE SERPL-MCNC: 281 MG/DL (ref 65–140)
GLUCOSE SERPL-MCNC: 375 MG/DL (ref 65–140)
GLUCOSE SERPL-MCNC: 377 MG/DL (ref 65–140)
GLUCOSE SERPL-MCNC: 380 MG/DL (ref 65–140)
HCT VFR BLD AUTO: 32.6 % (ref 34.8–46.1)
HGB BLD-MCNC: 10.7 G/DL (ref 11.5–15.4)
MAGNESIUM SERPL-MCNC: 1.3 MG/DL (ref 1.9–2.7)
MAGNESIUM SERPL-MCNC: 2.8 MG/DL (ref 1.9–2.7)
MCH RBC QN AUTO: 32.3 PG (ref 26.8–34.3)
MCHC RBC AUTO-ENTMCNC: 32.8 G/DL (ref 31.4–37.4)
MCV RBC AUTO: 99 FL (ref 82–98)
PHOSPHATE SERPL-MCNC: 2.8 MG/DL (ref 2.3–4.1)
PLATELET # BLD AUTO: 287 THOUSANDS/UL (ref 149–390)
PMV BLD AUTO: 10.1 FL (ref 8.9–12.7)
POTASSIUM SERPL-SCNC: 4 MMOL/L (ref 3.5–5.3)
POTASSIUM SERPL-SCNC: 4.7 MMOL/L (ref 3.5–5.3)
PROCALCITONIN SERPL-MCNC: 1.88 NG/ML
RBC # BLD AUTO: 3.31 MILLION/UL (ref 3.81–5.12)
SODIUM SERPL-SCNC: 129 MMOL/L (ref 135–147)
SODIUM SERPL-SCNC: 132 MMOL/L (ref 135–147)
WBC # BLD AUTO: 9.87 THOUSAND/UL (ref 4.31–10.16)

## 2023-12-21 PROCEDURE — 85027 COMPLETE CBC AUTOMATED: CPT | Performed by: STUDENT IN AN ORGANIZED HEALTH CARE EDUCATION/TRAINING PROGRAM

## 2023-12-21 PROCEDURE — 99233 SBSQ HOSP IP/OBS HIGH 50: CPT

## 2023-12-21 PROCEDURE — 83735 ASSAY OF MAGNESIUM: CPT

## 2023-12-21 PROCEDURE — 83735 ASSAY OF MAGNESIUM: CPT | Performed by: STUDENT IN AN ORGANIZED HEALTH CARE EDUCATION/TRAINING PROGRAM

## 2023-12-21 PROCEDURE — 84145 PROCALCITONIN (PCT): CPT

## 2023-12-21 PROCEDURE — 80069 RENAL FUNCTION PANEL: CPT | Performed by: STUDENT IN AN ORGANIZED HEALTH CARE EDUCATION/TRAINING PROGRAM

## 2023-12-21 PROCEDURE — 80048 BASIC METABOLIC PNL TOTAL CA: CPT

## 2023-12-21 PROCEDURE — 82948 REAGENT STRIP/BLOOD GLUCOSE: CPT

## 2023-12-21 RX ORDER — INSULIN GLARGINE 100 [IU]/ML
6 INJECTION, SOLUTION SUBCUTANEOUS EVERY 12 HOURS SCHEDULED
Status: DISCONTINUED | OUTPATIENT
Start: 2023-12-21 | End: 2023-12-22 | Stop reason: HOSPADM

## 2023-12-21 RX ORDER — MAGNESIUM SULFATE HEPTAHYDRATE 40 MG/ML
4 INJECTION, SOLUTION INTRAVENOUS ONCE
Status: COMPLETED | OUTPATIENT
Start: 2023-12-21 | End: 2023-12-21

## 2023-12-21 RX ADMIN — QUETIAPINE FUMARATE 25 MG: 25 TABLET ORAL at 22:34

## 2023-12-21 RX ADMIN — GABAPENTIN 300 MG: 300 CAPSULE ORAL at 10:06

## 2023-12-21 RX ADMIN — ACETAMINOPHEN 650 MG: 325 TABLET ORAL at 05:12

## 2023-12-21 RX ADMIN — B-COMPLEX W/ C & FOLIC ACID TAB 1 TABLET: TAB at 10:06

## 2023-12-21 RX ADMIN — INSULIN LISPRO 4 UNITS: 100 INJECTION, SOLUTION INTRAVENOUS; SUBCUTANEOUS at 22:33

## 2023-12-21 RX ADMIN — LEVOTHYROXINE SODIUM 75 MCG: 75 TABLET ORAL at 05:08

## 2023-12-21 RX ADMIN — CALCIUM CARBONATE (ANTACID) CHEW TAB 500 MG 500 MG: 500 CHEW TAB at 10:06

## 2023-12-21 RX ADMIN — OXYCODONE HYDROCHLORIDE AND ACETAMINOPHEN 500 MG: 500 TABLET ORAL at 10:06

## 2023-12-21 RX ADMIN — INSULIN GLARGINE 3 UNITS: 100 INJECTION, SOLUTION SUBCUTANEOUS at 10:06

## 2023-12-21 RX ADMIN — MIDODRINE HYDROCHLORIDE 5 MG: 5 TABLET ORAL at 08:07

## 2023-12-21 RX ADMIN — MAGNESIUM SULFATE HEPTAHYDRATE 4 G: 4 INJECTION, SOLUTION INTRAVENOUS at 12:26

## 2023-12-21 RX ADMIN — Medication 800 MG: at 10:06

## 2023-12-21 RX ADMIN — CALCIUM CARBONATE (ANTACID) CHEW TAB 500 MG 500 MG: 500 CHEW TAB at 18:18

## 2023-12-21 RX ADMIN — ENOXAPARIN SODIUM 30 MG: 30 INJECTION SUBCUTANEOUS at 10:06

## 2023-12-21 RX ADMIN — GABAPENTIN 300 MG: 300 CAPSULE ORAL at 18:18

## 2023-12-21 RX ADMIN — AMOXICILLIN AND CLAVULANATE POTASSIUM 1 TABLET: 875; 125 TABLET, FILM COATED ORAL at 22:34

## 2023-12-21 RX ADMIN — INSULIN LISPRO 2 UNITS: 100 INJECTION, SOLUTION INTRAVENOUS; SUBCUTANEOUS at 08:07

## 2023-12-21 RX ADMIN — INSULIN LISPRO 4 UNITS: 100 INJECTION, SOLUTION INTRAVENOUS; SUBCUTANEOUS at 12:25

## 2023-12-21 RX ADMIN — MIDODRINE HYDROCHLORIDE 5 MG: 5 TABLET ORAL at 16:35

## 2023-12-21 RX ADMIN — PANTOPRAZOLE SODIUM 40 MG: 40 TABLET, DELAYED RELEASE ORAL at 08:07

## 2023-12-21 RX ADMIN — OXYCODONE HYDROCHLORIDE AND ACETAMINOPHEN 500 MG: 500 TABLET ORAL at 18:17

## 2023-12-21 RX ADMIN — INSULIN LISPRO 1 UNITS: 100 INJECTION, SOLUTION INTRAVENOUS; SUBCUTANEOUS at 16:36

## 2023-12-21 RX ADMIN — AMOXICILLIN AND CLAVULANATE POTASSIUM 1 TABLET: 875; 125 TABLET, FILM COATED ORAL at 10:06

## 2023-12-21 RX ADMIN — ATORVASTATIN CALCIUM 20 MG: 20 TABLET, FILM COATED ORAL at 16:35

## 2023-12-21 RX ADMIN — INSULIN GLARGINE 6 UNITS: 100 INJECTION, SOLUTION SUBCUTANEOUS at 22:32

## 2023-12-21 RX ADMIN — MIDODRINE HYDROCHLORIDE 5 MG: 5 TABLET ORAL at 12:25

## 2023-12-21 NOTE — ASSESSMENT & PLAN NOTE
Failed retention protocol, requiring Kinney catheter placement  Urology recommendations appreciated.  Follow-up as an outpatient.  Continue Kinney catheter with voiding trial as an outpatient

## 2023-12-21 NOTE — ASSESSMENT & PLAN NOTE
Serum sodium 129 this a.m. with fasting blood sugar 375, corrects to 133  Adjusted insulin regimen today  Repeat BMP a.m.

## 2023-12-21 NOTE — ASSESSMENT & PLAN NOTE
Serum magnesium 1.3   S/p 4 g IV magnesium  Repeat magnesium is 2.1  Monitor magnesium periodically as an outpatient

## 2023-12-21 NOTE — ASSESSMENT & PLAN NOTE
Brought to ED by daughter after discharge from rehab facility for weakness and garbled speech   Head CT negative  CTA head and neck negative for large vessel occlusion, dissection or aneurysm.  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 Patient to be discharged to [facility] today at 11:30 AM

## 2023-12-21 NOTE — ASSESSMENT & PLAN NOTE
Patient had garbled speech prehospital per daughter, resolved on arrival   Head CT negative  Neurology evaluated patient in the ED, felt patient did not need MRI or stroke workup at that time

## 2023-12-21 NOTE — ASSESSMENT & PLAN NOTE
Lab Results   Component Value Date    HGBA1C 8.9 (H) 05/26/2023       Recent Labs     12/20/23  1558 12/20/23  2052 12/21/23  0707 12/21/23  1104   POCGLU 247* 351* 281* 377*         Blood Sugar Average: Last 72 hrs:  (P) [id number]    Diabetic diet   Continue Lantus 6 units SQ BID  Continue SSI  Discharge to acute rehab. Continue outpatient follow-up with PCP

## 2023-12-21 NOTE — PLAN OF CARE
Problem: Potential for Falls  Goal: Patient will remain free of falls  Description: INTERVENTIONS:  - Educate patient/family on patient safety including physical limitations  - Instruct patient to call for assistance with activity   - Consult OT/PT to assist with strengthening/mobility   - Keep Call bell within reach  - Keep bed low and locked with side rails adjusted as appropriate  - Keep care items and personal belongings within reach  - Initiate and maintain comfort rounds  - Make Fall Risk Sign visible to staff  - Offer Toileting every 2 Hours, in advance of need  - Initiate/Maintain bed alarm  - Obtain necessary fall risk management equipment: socks  Problem: INFECTION - ADULT  Goal: Absence of fever/infection during neutropenic period  Description: INTERVENTIONS:  - Monitor WBC    Outcome: Progressing     Problem: SAFETY ADULT  Goal: Maintain or return to baseline ADL function  Description: INTERVENTIONS:  -  Assess patient's ability to carry out ADLs; assess patient's baseline for ADL function and identify physical deficits which impact ability to perform ADLs (bathing, care of mouth/teeth, toileting, grooming, dressing, etc.)  - Assess/evaluate cause of self-care deficits   - Assess range of motion  - Assess patient's mobility; develop plan if impaired  - Assess patient's need for assistive devices and provide as appropriate  - Encourage maximum independence but intervene and supervise when necessary  - Involve family in performance of ADLs  - Assess for home care needs following discharge   - Consider OT consult to assist with ADL evaluation and planning for discharge  - Provide patient education as appropriate  Outcome: Progressing     Problem: Prexisting or High Potential for Compromised Skin Integrity  Goal: Skin integrity is maintained or improved  Description: INTERVENTIONS:  - Identify patients at risk for skin breakdown  - Assess and monitor skin integrity  - Assess and monitor nutrition and  hydration status  - Monitor labs   - Assess for incontinence   - Turn and reposition patient  - Assist with mobility/ambulation  - Relieve pressure over bony prominences  - Avoid friction and shearing  - Provide appropriate hygiene as needed including keeping skin clean and dry  - Evaluate need for skin moisturizer/barrier cream  - Collaborate with interdisciplinary team   - Patient/family teaching  - Consider wound care consult   Outcome: Progressing   - Apply yellow socks and bracelet for high fall risk patients  - Consider moving patient to room near nurses station  Outcome: Progressing

## 2023-12-21 NOTE — ASSESSMENT & PLAN NOTE
POA with leukocytosis, tachycardia, and lactic acidosis now resolved  In setting of COVID-19 and possible enteritis per CT imaging  Unclear etiology. Urinalysis unremarkable, chest x-ray negative for pneumonia.  Possibly secondary to bronchitis  Blood cultures x 2: NGTD  S/p IVF  S/p  IV Vanco and cefepime, transitioned to Augmentin  Discharged to acute rehab.  Follow-up with PCP as an outpatient

## 2023-12-21 NOTE — ASSESSMENT & PLAN NOTE
Blood pressures have been running 90s systolic last few days, asymptomatic  Patient states that blood pressure normally runs around 100  Continue midodrine  Monitor BP per protocol

## 2023-12-21 NOTE — ASSESSMENT & PLAN NOTE
Presented to the ER via EMS-daughter at bedside reported that patient was discharged from rehab facility today with max assistance of 2 to transfer  Also having some dysarthria pre-hospital which resolved by time of admission assessment  Tested positive for COVID, initially saturating well on room air, later required supplemental oxygen nasal cannula 2 L  CT chest abdomen pelvis 12/16/2023: Negative for PE  Cefepime and vancomycin initiated, transitioned to Augmentin  Continue supportive care for COVID  Isolation precautions

## 2023-12-21 NOTE — PROGRESS NOTES
[facility]  Progress Note  Name: Shakira Hinkle I  MRN: [id number]  Unit/Bed#: -01 I Date of Admission: 12/15/2023   Date of Service: 12/21/2023 I Hospital Day: 6    Assessment/Plan   * Generalized weakness  Assessment & Plan  Brought to ED by daughter after discharge from rehab facility for weakness and garbled speech   Head CT negative  CTA head and neck negative for large vessel occlusion, dissection or aneurysm.  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  Referrals have been made.  [facility] has accepted patient when medically stable      Sepsis (HCC)  Assessment & Plan  POA now resolved  In setting of COVID-19 and possible enteritis per CT imaging  Evidenced by WBC 14.45, heart rate 93, BP 70/30, lactic 3.1  Trended lactic acid-lactic acidosis resolved with IV fluids  Blood cultures x 2 negative after 48 hours  Trending WBCs-no leukocytosis today  Trending procalcitonin-continues to trend downward  Finished 3 days of IV Vanco and cefepime.   12/19 de-escalated to Augmentin 875-125 twice daily for 7 more days to complete 10-day course  No clear source of infection. UA clean. Given cough and cxr negative for pneumonia. Sepsis most likely 2/2 bronchitis      Dysarthria  Assessment & Plan  Patient had garbled speech prehospital per daughter, resolved on arrival   Head CT negative  Neurology evaluated patient in the ED, felt patient did not need MRI or stroke workup in reviewing notes    COVID-19  Assessment & Plan  Patient presented to the  ED via EMS-daughter at bedside reports patient was discharged from rehab facility today max assistance of 2 to transfer  Also having some dysarthria prehospital-resolved by time of my assessment  Patient tested positive for COVID in the ED  At time of arrival on room air with nonlabored respirations  12/16 a.m required 2 L nasal cannula, lungs decreased bilaterally, no cough  12/16 CT chest abdomen pelvis negative for PE, did note possible nonspecific enteritis  12/16 initiated cefepime and vancomycin initiated  12/19 de-escalated to Augmentin 875-125 twice daily for 7 more days to complete 10-day course  Supportive care for COVID  Procalcitonin continues to trend downward  Leukocytosis resolved, afebrile  Isolation precautions    Hypomagnesemia  Assessment & Plan  Serum magnesium 1.3 this a.m.  Ordered 4 g IV magnesium  Repeat BMP 1600    Type I diabetes mellitus (HCC)  Assessment & Plan  Lab Results   Component Value Date    HGBA1C 8.9 (H) 05/26/2023       Recent Labs     12/20/23  1558 12/20/23  2052 12/21/23  0707 12/21/23  1104   POCGLU 247* 351* 281* 377*         Blood Sugar Average: Last 72 hrs:  (P) 205.0536252350996648    Target blood sugar inpatient 140-180  Was on Home regimen which includes Lantus insulin 7 units in the a.m., 5 units in the p.m.  Sugars not currently optimized  Increase  Lantus to  6 units every 12 hours and continue to monitor  Continue SSI  CHO diet  Hypoglycemia protocol  BMP a.m.    Hyperlipidemia  Assessment & Plan  Not on statin prehospital    Hypothyroidism  Assessment & Plan  Continue levothyroxine    Hypotension  Assessment & Plan  Blood pressures have been running 90s systolic last few days, patient has been asymptomatic  Patient states that blood pressure normally runs around 100  Patient has been on midodrine, will continue  Monitor BP per protocol    Gastroesophageal reflux disease without esophagitis  Assessment & Plan  Continue PPI    Urinary retention  Assessment &  Plan  Failed retention protocol, requiring Kinney catheter placement  Urology consulted, appreciate recommendations-discussed with Dr. Herman today, would continue Kinney catheter for now, should attempt voiding trial in outpatient setting at rehab facility  Continue to monitor intake and output    Hyponatremia  Assessment & Plan  Serum sodium 129 this a.m. with fasting blood sugar 375, corrects to 133  Adjusted insulin regimen today  Repeat BMP a.m.               VTE Pharmacologic Prophylaxis: VTE Score: 7 High Risk (Score >/= 5) - Pharmacological DVT Prophylaxis Ordered: enoxaparin (Lovenox). Sequential Compression Devices Ordered.    Mobility:   Basic Mobility Inpatient Raw Score: 8  -HLM Goal: 3: Sit at edge of bed  JH-HLM Achieved: 2: Bed activities/Dependent transfer  HLM Goal NOT achieved. Continue with multidisciplinary rounding and encourage appropriate mobility to improve upon HLM goals.    Patient Centered Rounds: I performed bedside rounds with nursing staff today.   Discussions with Specialists or Other Care Team Provider: Nursing, PT/OT, case management    Education and Discussions with Family / Patient: Updated  (daughter) via phone.    Total Time Spent on Date of Encounter in care of patient: 43 mins. This time was spent on one or more of the following: performing physical exam; counseling and coordination of care; obtaining or reviewing history; documenting in the medical record; reviewing/ordering tests, medications or procedures; communicating with other healthcare professionals and discussing with patient's family/caregivers.    Current Length of Stay: 6 day(s)  Current Patient Status: Inpatient   Certification Statement: The patient will continue to require additional inpatient hospital stay due to patient with electrolyte abnormalities today requiring repletion.  Repeat labs in the a.m.   rehab has excepted patient when she is medically stable.    Discharge Plan: Anticipate  discharge tomorrow to rehab facility.    Code Status: Level 3 - DNAR and DNI    Subjective:   Patient denies any dizziness, lightheadedness, chest pain or discomfort.  Denies shortness of breath.    Objective:     Vitals:   Temp (24hrs), Av °F (37.2 °C), Min:98.9 °F (37.2 °C), Max:99.1 °F (37.3 °C)    Temp:  [98.9 °F (37.2 °C)-99.1 °F (37.3 °C)] 99 °F (37.2 °C)  HR:  [72-83] 72  Resp:  [16-18] 16  BP: ()/(43-66) 152/66  SpO2:  [92 %-97 %] 96 %  Body mass index is 20.79 kg/m².     Input and Output Summary (last 24 hours):     Intake/Output Summary (Last 24 hours) at 2023 1602  Last data filed at 2023 1401  Gross per 24 hour   Intake 220 ml   Output 2150 ml   Net -1930 ml       Physical Exam:   Physical Exam  Vitals and nursing note reviewed.   Constitutional:       General: She is not in acute distress.     Appearance: Normal appearance. She is well-developed. She is not ill-appearing.   HENT:      Head: Normocephalic and atraumatic.      Mouth/Throat:      Mouth: Mucous membranes are moist.   Eyes:      Extraocular Movements: Extraocular movements intact.      Conjunctiva/sclera: Conjunctivae normal.      Pupils: Pupils are equal, round, and reactive to light.   Cardiovascular:      Rate and Rhythm: Normal rate and regular rhythm.      Pulses: Normal pulses.      Heart sounds: Normal heart sounds. No murmur heard.  Pulmonary:      Effort: Pulmonary effort is normal. No respiratory distress.      Breath sounds: Normal breath sounds. No wheezing or rales.   Abdominal:      General: Bowel sounds are normal. There is no distension.      Palpations: Abdomen is soft.      Tenderness: There is no abdominal tenderness. There is no guarding.   Musculoskeletal:         General: No swelling. Normal range of motion.   Skin:     General: Skin is warm and dry.      Capillary Refill: Capillary refill takes less than 2 seconds.   Neurological:      General: No focal deficit present.      Mental Status: She is  alert and oriented to person, place, and time. Mental status is at baseline.   Psychiatric:         Thought Content: Thought content normal.          Additional Data:     Labs:  Results from last 7 days   Lab Units 12/21/23  1019 12/19/23  1005 12/18/23  0537   WBC Thousand/uL 9.87   < > 9.96   HEMOGLOBIN g/dL 10.7*   < > 12.1   HEMATOCRIT % 32.6*   < > 38.4   PLATELETS Thousands/uL 287   < > 181   NEUTROS PCT %  --   --  82*   LYMPHS PCT %  --   --  9*   MONOS PCT %  --   --  8   EOS PCT %  --   --  0    < > = values in this interval not displayed.     Results from last 7 days   Lab Units 12/21/23  1019 12/19/23  1005 12/18/23  0537   SODIUM mmol/L 129*   < > 140   POTASSIUM mmol/L 4.7   < > 4.1   CHLORIDE mmol/L 93*   < > 101   CO2 mmol/L 26   < > 26   BUN mg/dL 13   < > 7   CREATININE mg/dL 0.57*   < > 0.58*   ANION GAP mmol/L 10   < > 13   CALCIUM mg/dL 7.7*   < > 8.0*   ALBUMIN g/dL 3.1*   < > 3.0*   TOTAL BILIRUBIN mg/dL  --   --  0.32   ALK PHOS U/L  --   --  96   ALT U/L  --   --  25   AST U/L  --   --  42*   GLUCOSE RANDOM mg/dL 375*   < > 56*    < > = values in this interval not displayed.     Results from last 7 days   Lab Units 12/15/23  1502   INR  1.00     Results from last 7 days   Lab Units 12/21/23  1104 12/21/23  0707 12/20/23  2052 12/20/23  1558 12/20/23  1108 12/20/23  0723 12/19/23  2029 12/19/23  1649 12/19/23  1055 12/19/23  0745 12/18/23  2355 12/18/23  2149   POC GLUCOSE mg/dl 377* 281* 351* 247* 322* 275* 188* 240* 177* 175* 154* 138         Results from last 7 days   Lab Units 12/21/23  1019 12/19/23  1005 12/18/23  0537 12/17/23  0455 12/16/23  1254 12/16/23  0453 12/15/23  1547 12/15/23  1547 12/15/23  1515   LACTIC ACID mmol/L  --   --   --   --  1.9  --   --  3.1* 1.3   PROCALCITONIN ng/ml 1.88* 5.54* 13.09* 24.47*  --  41.92*   < > 53.31*  --     < > = values in this interval not displayed.       Lines/Drains:  Invasive Devices       Peripheral Intravenous Line  Duration              Peripheral IV 12/20/23 Left;Ventral (anterior) Forearm 1 day              Drain  Duration             Urethral Catheter Non-latex 16 Fr. 5 days                  Urinary Catheter:  Goal for removal: N/A- Discharging with Kinney               Imaging: Reviewed radiology reports from this admission including: chest xray, chest CT scan, and CT head    Recent Cultures (last 7 days):   Results from last 7 days   Lab Units 12/15/23  1502   BLOOD CULTURE  No Growth After 5 Days.  No Growth After 5 Days.       Last 24 Hours Medication List:   Current Facility-Administered Medications   Medication Dose Route Frequency Provider Last Rate    acetaminophen  650 mg Oral Q6H PRN ADRIAN Tripp      amoxicillin-clavulanate  1 tablet Oral Q12H SIMEON Laya Ragland DO      vitamin C  500 mg Oral BID ADRIAN Tripp      atorvastatin  20 mg Oral Daily With Dinner Laya Ragland DO      calcium carbonate  500 mg Oral BID ADRIAN Tripp      dextrose  25 mL Intravenous Once ADRIAN Tripp      enoxaparin  30 mg Subcutaneous Daily ADRIAN Tripp      gabapentin  300 mg Oral BID ADRIAN Tripp      insulin glargine  6 Units Subcutaneous Q12H [facility] ADRIAN Tripp      insulin lispro  1-5 Units Subcutaneous TID AC ADRIAN Tripp      insulin lispro  1-5 Units Subcutaneous HS ADRIAN Tripp      levothyroxine  75 mcg Oral Early Morning Laya Ragland DO      magnesium Oxide  800 mg Oral Daily Laya Ragland DO      magnesium sulfate  4 g Intravenous Once ADRIAN Tripp 4 g (12/21/23 1226)    midodrine  5 mg Oral TID AC Laya Ragland DO      multivitamin stress formula  1 tablet Oral Daily ADRIAN Tripp      ondansetron  4 mg Intravenous Q6H PRN ADRIAN Tripp      pantoprazole  40 mg Oral Daily ADRIAN Tripp      QUEtiapine  25 mg Oral HS ADRIAN Tripp          Today, Patient Was Seen By: ADRIAN Tripp    **Please  Note: This note may have been constructed using a voice recognition system.**

## 2023-12-21 NOTE — DISCHARGE INSTR - AVS FIRST PAGE
You are being discharged on a short course of Augmentin antibiotic to treat your enteritis. Please take as directed  You have also been started on midodrine before your meals because your blood pressures have been lower than normal. Your blood pressures should be monitored on discharge and report elevated pressures to your attending physician. He may want to stop the midodrine at this point.  We have also added a daily magnesium supplement to your pre hospital regimen, There have been no other changes to your prehospital regimen      It has been a pleasure taking care of you Mrs. Hinkle. We wish you all the best on discharge. Happy Holidays!  Ana CAMPBELL and ADRIAN Wang

## 2023-12-21 NOTE — PLAN OF CARE
Problem: Knowledge Deficit  Goal: Patient/family/caregiver demonstrates understanding of disease process, treatment plan, medications, and discharge instructions  Description: Complete learning assessment and assess knowledge base.  Interventions:  - Provide teaching at level of understanding  - Provide teaching via preferred learning methods  Outcome: Progressing     Problem: MOBILITY - ADULT  Goal: Maintain or return to baseline ADL function  Description: INTERVENTIONS:  - Educate patient/family on patient safety including physical limitations  - Instruct patient to call for assistance with activity   - Consult OT/PT to assist with strengthening/mobility   - Keep Call bell within reach  - Keep bed low and locked with side rails adjusted as appropriate  - Keep care items and personal belongings within reach  - Initiate and maintain comfort rounds  - Make Fall Risk Sign visible to staff  - Apply yellow socks and bracelet for high fall risk patients  - Consider moving patient to room near nurses station  Outcome: Progressing  Goal: Maintains/Returns to pre admission functional level  Description: INTERVENTIONS:  -  Assess patient's ability to carry out ADLs; assess patient's baseline for ADL function and identify physical deficits which impact ability to perform ADLs (bathing, care of mouth/teeth, toileting, grooming, dressing, etc.)  - Assess/evaluate cause of self-care deficits   - Assess range of motion  - Assess patient's mobility; develop plan if impaired  - Assess patient's need for assistive devices and provide as appropriate  - Encourage maximum independence but intervene and supervise when necessary  - Involve family in performance of ADLs  - Assess for home care needs following discharge   - Consider OT consult to assist with ADL evaluation and planning for discharge  - Provide patient education as appropriate  Outcome: Progressing

## 2023-12-22 ENCOUNTER — NURSING HOME VISIT (OUTPATIENT)
Dept: GERIATRICS | Facility: OTHER | Age: 83
End: 2023-12-22
Payer: MEDICARE

## 2023-12-22 VITALS
HEART RATE: 85 BPM | TEMPERATURE: 97.5 F | SYSTOLIC BLOOD PRESSURE: 139 MMHG | HEIGHT: 61 IN | OXYGEN SATURATION: 95 % | BODY MASS INDEX: 20.6 KG/M2 | DIASTOLIC BLOOD PRESSURE: 59 MMHG | WEIGHT: 109.13 LBS | RESPIRATION RATE: 17 BRPM

## 2023-12-22 DIAGNOSIS — I95.89 OTHER SPECIFIED HYPOTENSION: ICD-10-CM

## 2023-12-22 DIAGNOSIS — E89.0 POSTOPERATIVE HYPOTHYROIDISM: ICD-10-CM

## 2023-12-22 DIAGNOSIS — G93.41 ACUTE METABOLIC ENCEPHALOPATHY: ICD-10-CM

## 2023-12-22 DIAGNOSIS — I70.90 ATHEROSCLEROTIC VASCULAR DISEASE: ICD-10-CM

## 2023-12-22 DIAGNOSIS — K21.9 GASTROESOPHAGEAL REFLUX DISEASE WITHOUT ESOPHAGITIS: Chronic | ICD-10-CM

## 2023-12-22 DIAGNOSIS — K52.9 ENTERITIS: ICD-10-CM

## 2023-12-22 DIAGNOSIS — E10.649 TYPE 1 DIABETES MELLITUS WITH HYPOGLYCEMIA AND WITHOUT COMA (HCC): ICD-10-CM

## 2023-12-22 DIAGNOSIS — Z71.89 ADVANCE CARE PLANNING: ICD-10-CM

## 2023-12-22 DIAGNOSIS — U07.1 COVID-19: Primary | ICD-10-CM

## 2023-12-22 DIAGNOSIS — E83.42 HYPOMAGNESEMIA: ICD-10-CM

## 2023-12-22 DIAGNOSIS — A41.9 SEPSIS WITHOUT ACUTE ORGAN DYSFUNCTION, DUE TO UNSPECIFIED ORGANISM (HCC): ICD-10-CM

## 2023-12-22 DIAGNOSIS — E87.1 HYPONATREMIA: ICD-10-CM

## 2023-12-22 DIAGNOSIS — R53.1 GENERALIZED WEAKNESS: ICD-10-CM

## 2023-12-22 DIAGNOSIS — Z91.89 AT RISK FOR DELIRIUM: ICD-10-CM

## 2023-12-22 DIAGNOSIS — E78.5 HYPERLIPIDEMIA, UNSPECIFIED HYPERLIPIDEMIA TYPE: ICD-10-CM

## 2023-12-22 DIAGNOSIS — Z91.89 AT RISK FOR CONSTIPATION: ICD-10-CM

## 2023-12-22 DIAGNOSIS — Z85.71 HISTORY OF HODGKIN'S LYMPHOMA: ICD-10-CM

## 2023-12-22 DIAGNOSIS — R33.9 URINARY RETENTION: ICD-10-CM

## 2023-12-22 LAB
ANION GAP SERPL CALCULATED.3IONS-SCNC: 10 MMOL/L
ANISOCYTOSIS BLD QL SMEAR: PRESENT
BASOPHILS # BLD MANUAL: 0 THOUSAND/UL (ref 0–0.1)
BASOPHILS NFR MAR MANUAL: 0 % (ref 0–1)
BUN SERPL-MCNC: 15 MG/DL (ref 5–25)
CALCIUM SERPL-MCNC: 7.9 MG/DL (ref 8.4–10.2)
CHLORIDE SERPL-SCNC: 95 MMOL/L (ref 96–108)
CO2 SERPL-SCNC: 28 MMOL/L (ref 21–32)
CREAT SERPL-MCNC: 0.61 MG/DL (ref 0.6–1.3)
EOSINOPHIL # BLD MANUAL: 0 THOUSAND/UL (ref 0–0.4)
EOSINOPHIL NFR BLD MANUAL: 0 % (ref 0–6)
ERYTHROCYTE [DISTWIDTH] IN BLOOD BY AUTOMATED COUNT: 12.6 % (ref 11.6–15.1)
GFR SERPL CREATININE-BSD FRML MDRD: 84 ML/MIN/1.73SQ M
GLUCOSE SERPL-MCNC: 167 MG/DL (ref 65–140)
GLUCOSE SERPL-MCNC: 176 MG/DL (ref 65–140)
GLUCOSE SERPL-MCNC: 240 MG/DL (ref 65–140)
HCT VFR BLD AUTO: 31.5 % (ref 34.8–46.1)
HGB BLD-MCNC: 10.3 G/DL (ref 11.5–15.4)
LYMPHOCYTES # BLD AUTO: 1.65 THOUSAND/UL (ref 0.6–4.47)
LYMPHOCYTES # BLD AUTO: 11 % (ref 14–44)
MAGNESIUM SERPL-MCNC: 2.1 MG/DL (ref 1.9–2.7)
MCH RBC QN AUTO: 32.3 PG (ref 26.8–34.3)
MCHC RBC AUTO-ENTMCNC: 32.7 G/DL (ref 31.4–37.4)
MCV RBC AUTO: 99 FL (ref 82–98)
MONOCYTES # BLD AUTO: 1.01 THOUSAND/UL (ref 0–1.22)
MONOCYTES NFR BLD: 11 % (ref 4–12)
MYELOCYTES NFR BLD MANUAL: 2 % (ref 0–1)
NEUTROPHILS # BLD MANUAL: 6.31 THOUSAND/UL (ref 1.85–7.62)
NEUTS BAND NFR BLD MANUAL: 6 % (ref 0–8)
NEUTS SEG NFR BLD AUTO: 63 % (ref 43–75)
PLATELET # BLD AUTO: 327 THOUSANDS/UL (ref 149–390)
PLATELET BLD QL SMEAR: ADEQUATE
PMV BLD AUTO: 10.4 FL (ref 8.9–12.7)
POLYCHROMASIA BLD QL SMEAR: PRESENT
POTASSIUM SERPL-SCNC: 4.3 MMOL/L (ref 3.5–5.3)
RBC # BLD AUTO: 3.19 MILLION/UL (ref 3.81–5.12)
RBC MORPH BLD: PRESENT
SODIUM SERPL-SCNC: 133 MMOL/L (ref 135–147)
VARIANT LYMPHS # BLD AUTO: 7 %
WBC # BLD AUTO: 9.15 THOUSAND/UL (ref 4.31–10.16)

## 2023-12-22 PROCEDURE — 99239 HOSP IP/OBS DSCHRG MGMT >30: CPT | Performed by: NURSE PRACTITIONER

## 2023-12-22 PROCEDURE — 82948 REAGENT STRIP/BLOOD GLUCOSE: CPT

## 2023-12-22 PROCEDURE — 99306 1ST NF CARE HIGH MDM 50: CPT | Performed by: STUDENT IN AN ORGANIZED HEALTH CARE EDUCATION/TRAINING PROGRAM

## 2023-12-22 PROCEDURE — 85007 BL SMEAR W/DIFF WBC COUNT: CPT

## 2023-12-22 PROCEDURE — 85027 COMPLETE CBC AUTOMATED: CPT

## 2023-12-22 PROCEDURE — 83735 ASSAY OF MAGNESIUM: CPT

## 2023-12-22 PROCEDURE — 80048 BASIC METABOLIC PNL TOTAL CA: CPT

## 2023-12-22 RX ORDER — INSULIN GLARGINE 100 [IU]/ML
6 INJECTION, SOLUTION SUBCUTANEOUS EVERY 12 HOURS SCHEDULED
Status: ON HOLD
Start: 2023-12-22 | End: 2023-12-26

## 2023-12-22 RX ORDER — LANOLIN ALCOHOL/MO/W.PET/CERES
800 CREAM (GRAM) TOPICAL DAILY
Status: ON HOLD
Start: 2023-12-23

## 2023-12-22 RX ORDER — AMOXICILLIN AND CLAVULANATE POTASSIUM 875; 125 MG/1; MG/1
1 TABLET, FILM COATED ORAL EVERY 12 HOURS SCHEDULED
Status: ON HOLD
Start: 2023-12-22 | End: 2023-12-26

## 2023-12-22 RX ORDER — INSULIN LISPRO 100 [IU]/ML
1-5 INJECTION, SOLUTION INTRAVENOUS; SUBCUTANEOUS
Status: ON HOLD
Start: 2023-12-22

## 2023-12-22 RX ORDER — MIDODRINE HYDROCHLORIDE 5 MG/1
5 TABLET ORAL
Status: ON HOLD
Start: 2023-12-22

## 2023-12-22 RX ORDER — LEVOTHYROXINE SODIUM 0.07 MG/1
75 TABLET ORAL
Status: ON HOLD
Start: 2023-12-23

## 2023-12-22 RX ORDER — INSULIN LISPRO 100 [IU]/ML
1-5 INJECTION, SOLUTION INTRAVENOUS; SUBCUTANEOUS
Status: ON HOLD
Start: 2023-12-22 | End: 2023-12-26

## 2023-12-22 RX ORDER — PANTOPRAZOLE SODIUM 40 MG/1
40 TABLET, DELAYED RELEASE ORAL DAILY
Status: ON HOLD
Start: 2023-12-23

## 2023-12-22 RX ADMIN — B-COMPLEX W/ C & FOLIC ACID TAB 1 TABLET: TAB at 10:01

## 2023-12-22 RX ADMIN — GABAPENTIN 300 MG: 300 CAPSULE ORAL at 10:01

## 2023-12-22 RX ADMIN — INSULIN GLARGINE 6 UNITS: 100 INJECTION, SOLUTION SUBCUTANEOUS at 10:00

## 2023-12-22 RX ADMIN — CALCIUM CARBONATE (ANTACID) CHEW TAB 500 MG 500 MG: 500 CHEW TAB at 10:01

## 2023-12-22 RX ADMIN — MIDODRINE HYDROCHLORIDE 5 MG: 5 TABLET ORAL at 07:59

## 2023-12-22 RX ADMIN — INSULIN LISPRO 1 UNITS: 100 INJECTION, SOLUTION INTRAVENOUS; SUBCUTANEOUS at 07:59

## 2023-12-22 RX ADMIN — MIDODRINE HYDROCHLORIDE 5 MG: 5 TABLET ORAL at 12:05

## 2023-12-22 RX ADMIN — PANTOPRAZOLE SODIUM 40 MG: 40 TABLET, DELAYED RELEASE ORAL at 07:59

## 2023-12-22 RX ADMIN — INSULIN LISPRO 2 UNITS: 100 INJECTION, SOLUTION INTRAVENOUS; SUBCUTANEOUS at 12:05

## 2023-12-22 RX ADMIN — Medication 800 MG: at 10:01

## 2023-12-22 RX ADMIN — ENOXAPARIN SODIUM 30 MG: 30 INJECTION SUBCUTANEOUS at 10:00

## 2023-12-22 RX ADMIN — AMOXICILLIN AND CLAVULANATE POTASSIUM 1 TABLET: 875; 125 TABLET, FILM COATED ORAL at 10:01

## 2023-12-22 RX ADMIN — ACETAMINOPHEN 650 MG: 325 TABLET ORAL at 05:18

## 2023-12-22 RX ADMIN — LEVOTHYROXINE SODIUM 75 MCG: 75 TABLET ORAL at 05:18

## 2023-12-22 RX ADMIN — OXYCODONE HYDROCHLORIDE AND ACETAMINOPHEN 500 MG: 500 TABLET ORAL at 10:01

## 2023-12-22 NOTE — ASSESSMENT & PLAN NOTE
recent TSH elevated (14.32, prior was WNL) - could be elevated in setting of acute infection  Continue levothyroxine  No apparent acute/associated thyroid related symptoms  Follow up with PCP, Endocrine, recommend routine outpatient monitoring of TSH in several weeks and dose adjustment of levothyroxine as appropriate

## 2023-12-22 NOTE — ASSESSMENT & PLAN NOTE
Noted on imaging in hospital, possible infectious etiology of her recent symptoms  Treated with antibiotics course as above  No acute abdominal complaints  Continue to monitor

## 2023-12-22 NOTE — CASE MANAGEMENT
Case Management Discharge Planning Note    Patient name Shakira Hinkle  Location /-01 MRN [id number]  : 1940 Date 2023       Current Admission Date: 12/15/2023  Current Admission Diagnosis:Generalized weakness   Patient Active Problem List    Diagnosis Date Noted    Sepsis (HCC) 2023    Urinary retention 2023    COVID-19 12/15/2023    Dysarthria 12/15/2023    Severe protein-calorie malnutrition (HCC) 2023    Generalized weakness 2023    Macrocytosis 2023    Hypotension 2023    Hypomagnesemia 2023    Hypoglycemia 2023    Hypothermia 2023    Hypokalemia 2023    History of Hodgkin's lymphoma 2023    Hypothyroidism 2023    Acute metabolic encephalopathy 2023    Gastroesophageal reflux disease without esophagitis 10/17/2022    Abnormal CT of the abdomen 07/10/2022    Soft tissue radionecrosis 2022    Osteoradionecrosis of temporal bone  2022    Primary osteoarthritis of right shoulder 2021    Primary osteoarthritis of both knees 2020    Hyponatremia 2020    Postoperative hypothyroidism 2015    Hyperlipidemia 2014    History of hypertension 10/10/2013    Type I diabetes mellitus (HCC) 2008      LOS (days): 6  Geometric Mean LOS (GMLOS) (days): 5.1  Days to GMLOS:-1     OBJECTIVE:  Risk of Unplanned Readmission Score: 40.79         Current admission status: Inpatient   Preferred Pharmacy:   [id number] [address] [facility]  [address] [facility] [address]  Phone: [phone] Fax: [phone]    Primary Care Provider: Dwayne Hilton MD    Primary Insurance: MEDICARE  Secondary Insurance: [facility]    DISCHARGE DETAILS:    Discharge planning discussed with:: pt &  met with celestine daughter outside of the pt's room  [place] of Choice: Yes  Comments - [place] of Choice: recommendation is for rehab- tp accepted to [facility] for [facility]- pt  was not staobe for d/c today pt needed  IV mag  CM contacted family/caregiver?: Yes             Contacts  Patient Contacts: Cindy Crooks dtr  Relationship to Patient:: Family  Contact Method: In Person  Reason/Outcome: Discharge Planning    Requested Home Health Care         Is the patient interested in HHC at discharge?: No    DME Referral Provided  Referral made for DME?: No    Other Referral/Resources/Interventions Provided:  Interventions: Short Term Rehab  Referral Comments: [facility] accepted- 12:30pm bls transport - report needs to be called & avs needs to be faxed    Would you like to participate in our [facility] service program?  : No - Declined    Treatment Team Recommendation: Short Term Rehab ([facility] for rehab - 12:30pm bls [place])                                   IMM Given (Date):: 12/21/23  IMM Given to:: Family (daughter)          Accepting Facility Name, City & State : [facility]  Receiving Facility/Agency Phone Number: [phone]  Facility/Agency Fax Number: [phone]

## 2023-12-22 NOTE — DISCHARGE SUMMARY
[facility]  Discharge- Shakira Hinkle 1940, 83 y.o. female MRN: [id number]  Unit/Bed#: -01 Encounter: [id number]  Primary Care Provider: Dwayne Hilton MD   Date and time admitted to hospital: 12/15/2023  2:24 PM    * Generalized weakness  Assessment & Plan  Brought to ED by daughter after discharge from rehab facility for weakness and garbled speech   Head CT negative  CTA head and neck negative for large vessel occlusion, dissection or aneurysm.  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 Patient to be discharged to [facility] today at 11:30 AM    COVID-19  Assessment & Plan  Presented to the ER via EMS-daughter at bedside reported that patient was discharged from rehab facility today with max assistance of 2 to transfer  Also having some dysarthria pre-hospital which resolved by time of admission assessment  Tested positive for COVID, initially saturating well on room air, later required supplemental oxygen nasal cannula 2 L  CT chest abdomen pelvis 12/16/2023: Negative for PE  Cefepime and vancomycin initiated, transitioned to Augmentin  Continue supportive care for COVID  Isolation precautions    Dysarthria  Assessment & Plan  Patient had garbled speech prehospital per daughter, resolved on arrival   Head CT negative  Neurology evaluated patient in the ED, felt patient did not need MRI or stroke workup at that time    Sepsis (HCC)  Assessment & Plan  POA with leukocytosis, tachycardia, and lactic acidosis now resolved  In setting of COVID-19 and possible  enteritis per CT imaging  Unclear etiology. Urinalysis unremarkable, chest x-ray negative for pneumonia.  Possibly secondary to bronchitis  Blood cultures x 2: NGTD  S/p IVF  S/p  IV Vanco and cefepime, transitioned to Augmentin  Discharged to acute rehab.  Follow-up with PCP as an outpatient      Urinary retention  Assessment & Plan  Failed retention protocol, requiring Kinney catheter placement  Urology recommendations appreciated.  Follow-up as an outpatient.  Continue Kinney catheter with voiding trial as an outpatient     Hypotension  Assessment & Plan  Blood pressures have been running 90s systolic last few days, asymptomatic  Patient states that blood pressure normally runs around 100  Continue midodrine  Monitor BP per protocol    Hypomagnesemia  Assessment & Plan  Serum magnesium 1.3   S/p 4 g IV magnesium  Repeat magnesium is 2.1  Monitor magnesium periodically as an outpatient    Hypothyroidism  Assessment & Plan  Continue levothyroxine    Gastroesophageal reflux disease without esophagitis  Assessment & Plan  Continue PPI    Type I diabetes mellitus (HCC)  Assessment & Plan  Lab Results   Component Value Date    HGBA1C 8.9 (H) 05/26/2023       Recent Labs     12/20/23  1558 12/20/23  2052 12/21/23  0707 12/21/23  1104   POCGLU 247* 351* 281* 377*         Blood Sugar Average: Last 72 hrs:  (P) [id number]    Diabetic diet   Continue Lantus 6 units SQ BID  Continue SSI  Discharge to acute rehab. Continue outpatient follow-up with PCP    Lactic acidosis-resolved as of 12/19/2023  Assessment & Plan  Present on admission  Lactic acid 3.1  Resolved with IV fluids      Medical Problems       Resolved Problems  Date Reviewed: 12/22/2023            Resolved    Lactic acidosis 12/19/2023     Resolved by  Laya Ragland DO        Discharging Physician / Practitioner: ADRIAN Ewing  PCP: Dwayne Hilton MD  Admission Date:   Admission Orders (From admission, onward)       Ordered        12/15/23 9037   INPATIENT ADMISSION  Once                          Discharge Date: 12/22/23    Consultations During Hospital Stay:  Urology    Significant Findings / Test Results:   CT stroke alert brain 12/15/2023: No acute cranial abnormality  CTA stroke alert head and neck 12/15/2023:Negative CTA head and neck for large vessel occlusion, dissection, or aneurysm. 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 Consider   follow-up with vascular surgery. Fibromuscular dysplasia of bilateral ICA cervical segments  Chest x-ray 12/16/2023 no acute cardiopulmonary disease  CTA chest PE study 12/16/2023: No evidence of pulmonary embolism.  Fluid-filled distal small bowel may be due to nonspecific enteritis.  Moderate diffuse thickening of the rectum with surrounding fat stranding which may be due to proctitis  Chest x-ray 12/20/2023 no evidence of CHF.  Mild groundglass type infiltrates in the left lung which could be COVID-pneumonia      Reason for Admission: Strokelike symptoms, COVID infection, sepsis    Hospital Course:   Shakira Hinkle is a 83 y.o. female patient who originally presented to the hospital on 12/15/2023 due to strokelike symptoms including dysarthria and headache.  CT head performed in the ER showed no acute intracranial abnormality.  She was evaluated by neurology.  She was subsequently found to be COVID-positive.  CT of the abdomen and pelvis showed evidence for possible nonspecific enteritis and proctitis. She met sepsis criteria criteria and also had episode of hypotension.  She was treated with IV fluid.  She was started on broad-spectrum antibiotics. She later developed urinary retention requiring insertion of the Kinney catheter.  She was seen by urology.  She was seen by therapy and the recommendation was acute rehab.   "The patient gradually improved.  Case management facilitated transfer to [facility].  This is a brief discharge summary.  See full patient chart for additional details.    Condition at Discharge: stable    Discharge Day Visit / Exam:   Subjective: Patient seen and examined.  Unable to obtain full review of systems from patient however she denies pain or discomfort  Vitals: Blood Pressure: 139/59 (12/22/23 1118)  Pulse: 85 (12/22/23 1118)  Temperature: 97.5 °F (36.4 °C) (12/22/23 0735)  Temp Source: Oral (12/20/23 2249)  Respirations: 17 (12/22/23 1118)  Height: 5' 1\" (154.9 cm) (12/15/23 1815)  Weight - Scale: 49.5 kg (109 lb 2 oz) (12/22/23 0600)  SpO2: 95 % (12/22/23 1118)    Exam:   Physical Exam  Vitals and nursing note reviewed.   Constitutional:       General: She is not in acute distress.  HENT:      Head: Normocephalic and atraumatic.      Nose: Nose normal.      Mouth/Throat:      Mouth: Mucous membranes are moist.      Pharynx: Oropharynx is clear.   Eyes:      Pupils: Pupils are equal, round, and reactive to light.   Cardiovascular:      Rate and Rhythm: Normal rate and regular rhythm.      Pulses: Normal pulses.      Heart sounds: Normal heart sounds.   Pulmonary:      Effort: Pulmonary effort is normal. No respiratory distress.      Breath sounds: Normal breath sounds.   Abdominal:      General: Bowel sounds are normal.      Palpations: Abdomen is soft.      Tenderness: There is no abdominal tenderness.   Genitourinary:     Comments: Kinney catheter in place  Musculoskeletal:      Cervical back: Neck supple.      Right lower leg: No edema.      Left lower leg: No edema.   Skin:     General: Skin is warm and dry.      Capillary Refill: Capillary refill takes less than 2 seconds.   Neurological:      General: No focal deficit present.      Mental Status: She is alert. Mental status is at baseline.          Discussion with Family:  Patient's daughter was updated on discharge to facility. "     Discharge instructions/Information to patient and family:   See after visit summary for information provided to patient and family.      Provisions for Follow-Up Care:  See after visit summary for information related to follow-up care and any pertinent home health orders.      Mobility at time of Discharge:   Basic Mobility Inpatient Raw Score: 8  JH-HLM Goal: 3: Sit at edge of bed  JH-HLM Achieved: 2: Bed activities/Dependent transfer  HLM Goal NOT achieved. Continue to encourage mobility in post discharge setting.     Disposition:   Acute Rehab at [facility]    Planned Readmission: No     Discharge Statement:  I spent 50 minutes discharging the patient. This time was spent on the day of discharge. I had direct contact with the patient on the day of discharge. Greater than 50% of the total time was spent examining patient, answering all patient questions, arranging and discussing plan of care with patient as well as directly providing post-discharge instructions.  Additional time then spent on discharge activities.    Discharge Medications:  See after visit summary for reconciled discharge medications provided to patient and/or family.      **Please Note: This note may have been constructed using a voice recognition system**

## 2023-12-22 NOTE — ASSESSMENT & PLAN NOTE
At risk due to hospitalization, relative immobility, comorbidities  Monitor stool output - last BM yesterday normal  Bowel regimen at rehab: consider miralax and senna as appropriate; consider bisacodyl suppository PRN if no BM in 2-3 days  Encourage mobility as tolerated, PO hydration as appropriate, high fiber diet/prune juice in outpatient setting  Goal is for 1 easy BM every 1-2 days

## 2023-12-22 NOTE — ASSESSMENT & PLAN NOTE
Hx of HTN with chronic hypotension more recently  Monitor BP  Avoid hypotension  Continue midodrine (with holding parameters)

## 2023-12-22 NOTE — ASSESSMENT & PLAN NOTE
Noted on recent hospitalization in Nov 2023  Neurological workup at the time reported generally unremarkable  Patient was reportedly evaluated by Neuro/Psych that admission and started on quetiapine  Arrived to rehab on quetiapine - continue for the moment, continue to evaluate for appropriateness, consider weaning as able  Presently at rehab appears to be mentating well, no acute confusion presently, continue to monitor  To follow up with PCP, Psych/Neuro outpatient as appropriate   30-Nov-2021 03:20

## 2023-12-22 NOTE — ASSESSMENT & PLAN NOTE
Patient with urinary retention in hospital, required Kinney placement  Kinney in place draining clear yellow urine  Consider trial of void at rehab once established/stable here, vs in outpatient setting  Follow up with PCP, Urology as appropriate

## 2023-12-22 NOTE — ASSESSMENT & PLAN NOTE
"Incidental finding on imaging \"Severe stenosis in left mid subclavian artery, origin of left vertebral artery, and left distal common carotid artery just proximal to the carotid bifurcation due to heavily calcified atherosclerotic disease, similar to prior examination. Consider follow-up with vascular surgery.\"  No apparent acute/associated symptoms  Has been ordered statin but reportedly not taking  Defer to PCP  "

## 2023-12-22 NOTE — PLAN OF CARE
Problem: Potential for Falls  Goal: Patient will remain free of falls  Description: INTERVENTIONS:  - Educate patient/family on patient safety including physical limitations  - Instruct patient to call for assistance with activity   - Consult OT/PT to assist with strengthening/mobility   - Keep Call bell within reach  - Keep bed low and locked with side rails adjusted as appropriate  - Keep care items and personal belongings within reach  - Initiate and maintain comfort rounds  - Make Fall Risk Sign visible to staff  - Offer Toileting every 2 Hours, in advance of need  - Initiate/Maintain bedalarm  - Obtain necessary fall risk management equipment: socks  Problem: SAFETY ADULT  Goal: Maintain or return to baseline ADL function  Description: INTERVENTIONS:  -  Assess patient's ability to carry out ADLs; assess patient's baseline for ADL function and identify physical deficits which impact ability to perform ADLs (bathing, care of mouth/teeth, toileting, grooming, dressing, etc.)  - Assess/evaluate cause of self-care deficits   - Assess range of motion  - Assess patient's mobility; develop plan if impaired  - Assess patient's need for assistive devices and provide as appropriate  - Encourage maximum independence but intervene and supervise when necessary  - Involve family in performance of ADLs  - Assess for home care needs following discharge   - Consider OT consult to assist with ADL evaluation and planning for discharge  - Provide patient education as appropriate  Outcome: Progressing     Problem: Prexisting or High Potential for Compromised Skin Integrity  Goal: Skin integrity is maintained or improved  Description: INTERVENTIONS:  - Identify patients at risk for skin breakdown  - Assess and monitor skin integrity  - Assess and monitor nutrition and hydration status  - Monitor labs   - Assess for incontinence   - Turn and reposition patient  - Assist with mobility/ambulation  - Relieve pressure over bony  prominences  - Avoid friction and shearing  - Provide appropriate hygiene as needed including keeping skin clean and dry  - Evaluate need for skin moisturizer/barrier cream  - Collaborate with interdisciplinary team   - Patient/family teaching  - Consider wound care consult   Outcome: Progressing     - Apply yellow socks and bracelet for high fall risk patients  - Consider moving patient to room near nurses station  Outcome: Progressing     Problem: PAIN - ADULT  Goal: Verbalizes/displays adequate comfort level or baseline comfort level  Description: Interventions:  - Encourage patient to monitor pain and request assistance  - Assess pain using appropriate pain scale  - Administer analgesics based on type and severity of pain and evaluate response  - Implement non-pharmacological measures as appropriate and evaluate response  - Consider cultural and social influences on pain and pain management  - Notify physician/advanced practitioner if interventions unsuccessful or patient reports new pain  Outcome: Progressing

## 2023-12-22 NOTE — ASSESSMENT & PLAN NOTE
COVID positive 12/15/23  Transiently required supplemental O2 in hospital, now on room air  Was treated with supportive care per hospital records  Continue supportive measures  Appears generally asymptomatic  Isolation as per facility protocol

## 2023-12-22 NOTE — ASSESSMENT & PLAN NOTE
Reported POA in hospital leukocytosis, tachycardia, and lactic acidosis now resolved  Etiology likely related to COVID 19 and/or enteritis noted on imaging  Otherwise infectious workup including UA, blood cx generally unremarkable for etiology  Treated with IV fluids and abx including vanco/cefepime, transitioned to complete Augmentin course  Monitor for acute/recurrent infectious symptoms - no present signs

## 2023-12-22 NOTE — ASSESSMENT & PLAN NOTE
Status post mastoidectomy/left neck dissection and subsequent radiation   Follow up with PCP, Oncology as appropriate

## 2023-12-22 NOTE — ASSESSMENT & PLAN NOTE
Likely in setting of COVID vs enteritis  -PT/OT  -fall precautions  -encourage appropriate DME use  -SW to follow for safe discharge planning/homecare services as appropriate

## 2023-12-22 NOTE — ASSESSMENT & PLAN NOTE
Delirium precautions  -Patient is high risk of delirium due to age, comorbidities, hospitalization/unfamiliar environment  -delirium precautions  -maintain normal sleep/wake cycle  -minimize overnight interruptions, group overnight vitals/labs/nursing checks as possible  -dim lights, close blinds and turn off tv to minimize stimulation and encourage sleep environment in evenings  -ensure that pain is well controlled  -monitor for fecal and urinary retention which may precipitate delirium  -encourage early mobilization and ambulation  -provide frequent reorientation and redirection  -encourage family and friends at the bedside to help help calm patient if anxious  -avoid medications which may precipitate or worsen delirium such as tramadol, benzodiazepine, anticholinergics, and benadryl  -encourage hydration and nutrition   -redirect unwanted behaviors as first line, avoid physical restraints, use chemical restraint only if all other attempts have been unsuccessful (could consider Zyprexa 2.5mg IM Q, monitor for orthostatic hypotension and QTc prolongation with repeat dosing, recommend lowest dose possible for shortest duration possible)

## 2023-12-22 NOTE — ASSESSMENT & PLAN NOTE
-stable  -continue PPI  -recommend OOB with meals, sit upright for at least 30 minutes afterwards, avoid trigger foods  -continue to monitor  -follow up with PCP, GI as appropriate

## 2023-12-22 NOTE — ASSESSMENT & PLAN NOTE
Baseline Na appears around high 130s  Mild/stable on recent labs  Possibly in setting of poor PO intake, recent infection  Encourage PO intake  Monitor on routine labs  Consider further workup if worsening

## 2023-12-22 NOTE — PROGRESS NOTES
[facility] Care  Facility: [facility]itional [facility]    HISTORY AND PHYSICAL  Nursing Home Place of Service: nursing home place of service: POS 31 Skilled Care-Part A Coverage    NAME: Shakira Hinkle  : 1940 AGE: 83 y.o. SEX: female MRN: [id number]  DATE OF ENCOUNTER: 2023    Records Reviewed include: Hospital records    Chief Complaint/ Reason for Admission:   Sepsis likely secondary to COVID/enteritis    History of Present Illness:     Shakira Hinkle is a 83 y.o. female with PMH including HTN, HLD, hypothyroidism, DM1, GERD  Patient was hospitalized at [facility] from 12/15 to 23 after recent discharge from rehab facility  For details of hospitalization, see hospital records including discharge documentation from 23 (final summary not yet in chart)  Briefly, patient hospitalized with generalized weakness/garbled speech, Neurology eval/workup reported generally unremarkable, found to have sepsis likely in setting of COVID positive and possible enteritis, was treated with supplemental O2 and antibiotic course in hospital, required Kinney due to urinary retention.    Patient seen and examined in room  Others present: none  Patient laying in bed  Appears comfortable, awake, alert, oriented to situation, able to converse appropriately  Patient appears in fair spirits and appears to have reasonable insight into history and present situation. States she feels completely fine and thinks she feels the same as she does at home, though notes she has not really been out of bed in the hospital and is motivated to participate in therapy with intent to return home at prior level of function. No pain anywhere. Appetite good, no swallowing concerns. Breathing fine, no acute COVID symptoms including SOB, cough, change to smell/taste. She does not feel weak. Catheter in place not bothering her, she would be amenable to considering trial of void next week. Last BM yesterday  "normal. No acute cardiopulmonary, abdominal, or urinary symptoms; see ROS for more details.  No further questions or acute concerns identified.    Lab Review:   12/22: BMP with Na 133 (stable/improved, baseline in high 130s); CBC with WBC 9.15 (trend down, recent peak ~15); Hb 10.3 (baseline around 12-13); blood cx 12/15 NGTD; recent TSH elevated (14.32, prior was WNL); last A1c 8.9    CT head 12/15 \"No acute intracranial abnormality. \"  CTA head/neck 12/15 \"Negative CTA head and neck for large vessel occlusion, dissection, or aneurysm.     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 Consider   follow-up with vascular surgery.     Fibromuscular dysplasia of bilateral ICA cervical segments.\"    CXR 12/15 \"No acute cardiopulmonary disease \"  CTA chest 12/16 \"1.  No evidence of pulmonary embolism.  2.  Fluid-filled distal small bowel may be due to nonspecific enteritis. Moderate diffuse thickening of the rectum with surrounding fat stranding which may be due to proctitis.\"  CXR 12/19 \"No evidence of CHF. Mild groundglass type infiltrates in the left lung which could be COVID-pneumonia. \"          EKG 12/15: NSR, 95bpm, Qtc 427  Echo July 2022: F 60, normal systolic function, grade 1 diastolic dysfunction    Social: Patient lives in a 1 story home alone. Daughter visits regularly. Patient reports being fairly independent including ADLs; daughter helps with medications, finances, shopping. Patient reports doing her own insulin. Does not drive any longer. Ambulates with cane or walker, no recent falls.    Lives: Home, Alone  Social Support: daughter  Fall in the past 12 months: none  Use of assistance Device: Cane and Walker    Allergies  No Known Allergies    Past Medical History  Past Medical History:   Diagnosis Date    " Ambulates with cane     Cancer (HCC)     Chronic pain disorder     knees/ shoulders (gets inj every 3 mos)    Closed intertrochanteric fracture of right femur (HCC) 5/26/2020    Controlled type 2 diabetes mellitus with diabetic polyneuropathy, with long-term current use of insulin (HCC) 5/21/2008    Diabetes mellitus (HCC)     Diabetic polyneuropathy (HCC) 5/21/2008    ICD10 clean up    Disease of thyroid gland     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    HL (hearing loss)     Hodgkin's disease (HCC) 2008    Left neck, had radiation    Hypertension     Neuropathy     Osteoporosis     RA (rheumatoid arthritis) (HCC)     Traumatic rhabdomyolysis (HCC) 10/17/2022        Past Surgical History:   Procedure Laterality Date    ADENOIDECTOMY      APPENDECTOMY      CATARACT EXTRACTION      CATARACT EXTRACTION, BILATERAL      CHOLECYSTECTOMY      FRACTURE SURGERY Right     hip    MOUTH SURGERY      oral cancer left lower lip    OVARY SURGERY      KY ADJT TIS TRNS/REARGMT F/C/C/M/N/A/G/H/F 10SQCM/< N/A 3/28/2022    Procedure: Adjacent tissue transfer face;  Surgeon: Ar Paris MD;  Location: [facility];  Service: ENT    KY OPTX FEM SHFT FX W/INSJ IMED IMPLT W/WO SCREW Right 5/28/2020    Procedure: INSERTION NAIL IM FEMUR ANTEGRADE (TROCHANTERIC);  Surgeon: Charles Seals DO;  Location:  MAIN OR;  Service: Orthopedics    KY TRANSMASTOID ANTROTOMY Left 3/28/2022    Procedure: MASTOIDECTOMY;  Surgeon: Ar Paris MD;  Location: [facility];  Service: ENT    TONSILLECTOMY      TOTAL THYROIDECTOMY  2008    Performed after left neck dissection and left oral cancer diagnosis       Family History  Family History   Problem Relation Age of Onset    Cancer Mother     Diabetes Mother     Diabetes Father     Hypertension Father        Social History  Social History     Tobacco Use   Smoking Status Never   Smokeless Tobacco Never      Social History     Substance and Sexual Activity   Alcohol Use Not Currently     "Alcohol/week: 0.0 standard drinks of alcohol      Social History     Substance and Sexual Activity   Drug Use Never            Physical Exam    Vital Signs  There were no vitals filed for this visit.  Temp 97.8 F  Pulse 73  RR 19  /63  Weight 107.2 lb  Height 5'1\"  O2 94 room air      Physical Exam  Vitals reviewed.   Constitutional:       General: She is not in acute distress.     Appearance: She is not toxic-appearing or diaphoretic.   HENT:      Head: Normocephalic and atraumatic.      Right Ear: External ear normal.      Left Ear: External ear normal.      Nose: Nose normal. No rhinorrhea.      Mouth/Throat:      Mouth: Mucous membranes are moist.      Pharynx: Oropharynx is clear. No posterior oropharyngeal erythema.      Comments: Left side of jaw appears s/p surgical resection  Eyes:      General: No scleral icterus.        Right eye: No discharge.         Left eye: No discharge.      Extraocular Movements: Extraocular movements intact.      Conjunctiva/sclera: Conjunctivae normal.      Pupils: Pupils are equal, round, and reactive to light.   Cardiovascular:      Rate and Rhythm: Normal rate and regular rhythm.   Pulmonary:      Effort: Pulmonary effort is normal. No respiratory distress.      Breath sounds: Normal breath sounds. No wheezing or rales.   Abdominal:      General: Bowel sounds are normal. There is no distension.      Palpations: Abdomen is soft.      Tenderness: There is no abdominal tenderness. There is no guarding.   Genitourinary:     Comments: Kinney in place draining clear yellow urine  Musculoskeletal:         General: No swelling or tenderness.      Cervical back: No rigidity.   Skin:     General: Skin is warm and dry.      Coloration: Skin is not jaundiced.   Neurological:      General: No focal deficit present.      Mental Status: She is alert and oriented to person, place, and time. Mental status is at baseline.   Psychiatric:         Mood and Affect: Mood normal.         " "Behavior: Behavior normal.         Thought Content: Thought content normal.         Judgment: Judgment normal.         Review of Systems:  Review of Systems   Constitutional:  Negative for appetite change, chills, diaphoresis, fatigue and fever.   HENT:  Negative for drooling, ear pain, hearing loss, rhinorrhea, sore throat and trouble swallowing.    Eyes:  Negative for pain, discharge, redness, itching and visual disturbance.   Respiratory:  Negative for cough, choking, chest tightness, shortness of breath and wheezing.    Cardiovascular:  Negative for chest pain, palpitations and leg swelling.   Gastrointestinal:  Negative for abdominal pain, blood in stool, constipation, diarrhea, nausea and vomiting.   Genitourinary:  Negative for difficulty urinating, dysuria, hematuria and urgency.   Musculoskeletal:  Positive for gait problem. Negative for arthralgias and back pain.   Skin:  Negative for color change.   Neurological:  Negative for dizziness, tremors, speech difficulty, weakness and headaches.   Psychiatric/Behavioral:  Negative for agitation, behavioral problems and confusion. The patient is not nervous/anxious and is not hyperactive.    All other systems reviewed and are negative.      List of Current Medications:  Current Outpatient Medications   Medication Instructions    acetaminophen (TYLENOL) 650 mg, Oral, Every 4 hours PRN    amoxicillin-clavulanate (AUGMENTIN) 875-125 mg per tablet 1 tablet, Oral, Every 12 hours scheduled    atorvastatin (LIPITOR) 20 mg, Daily with dinner    BD Insulin Syringe U/F 1/2Unit 31G X 5/16\" 0.3 ML MISC 4 times daily    calcium carbonate (OS-FELICE) 600 mg, Oral, 2 times daily with meals    gabapentin (NEURONTIN) 300 mg, Oral, 2 times daily    insulin glargine (LANTUS) 6 Units, Subcutaneous, Every 12 hours scheduled    insulin lispro (HUMALOG) 1-5 Units, Subcutaneous, Daily at bedtime    insulin lispro (HUMALOG) 1-5 Units, Subcutaneous, 3 times daily before meals    [START ON " 12/23/2023] levothyroxine 75 mcg, Oral, Daily (early morning)    [START ON 12/23/2023] magnesium Oxide (MAG-OX) 800 mg, Oral, Daily    midodrine (PROAMATINE) 5 mg, Oral, 3 times daily before meals    Multiple Vitamin (multivitamin) capsule 1 capsule, Oral, Daily    [START ON 12/23/2023] pantoprazole (PROTONIX) 40 mg, Oral, Daily    QUEtiapine (SEROQUEL) 25 mg, Oral, Daily at bedtime    vitamin C 500 mg, Oral, 2 times daily         Medication reviewed. All orders signed. Complete list is in the paper chart.     Allergies  No Known Allergies    Labs/Diagnostics (reviewed by this provider):     I personally reviewed lab results and imaging studies. Full reports are in the paper chart.     Assessment/Plan:    Gastroesophageal reflux disease without esophagitis  -stable  -continue PPI  -recommend OOB with meals, sit upright for at least 30 minutes afterwards, avoid trigger foods  -continue to monitor  -follow up with PCP, GI as appropriate      Hypothyroidism  recent TSH elevated (14.32, prior was WNL) - could be elevated in setting of acute infection  Continue levothyroxine  No apparent acute/associated thyroid related symptoms  Follow up with PCP, Endocrine, recommend routine outpatient monitoring of TSH in several weeks and dose adjustment of levothyroxine as appropriate    Type I diabetes mellitus (HCC)    Lab Results   Component Value Date    HGBA1C 8.9 (H) 05/26/2023     Monitor glucose  Avoid hypoglycemia  Continue insulin regimen with holding parameters  Follow up with PCP, Endocrine as appropriate    Hypotension  Hx of HTN with chronic hypotension more recently  Monitor BP  Avoid hypotension  Continue midodrine (with holding parameters)    Urinary retention  Patient with urinary retention in hospital, required Kinney placement  Kinney in place draining clear yellow urine  Consider trial of void at rehab once established/stable here, vs in outpatient setting  Follow up with PCP, Urology as appropriate    History of  Hodgkin's lymphoma  Status post mastoidectomy/left neck dissection and subsequent radiation   Follow up with PCP, Oncology as appropriate    Hyperlipidemia  Defer statin to PCP    Hypomagnesemia  Noted low on some recent labs  Repleted in hospital  Monitor routinely  Replete PRN    Hyponatremia  Baseline Na appears around high 130s  Mild/stable on recent labs  Possibly in setting of poor PO intake, recent infection  Encourage PO intake  Monitor on routine labs  Consider further workup if worsening    Sepsis (HCC)  Reported POA in hospital leukocytosis, tachycardia, and lactic acidosis now resolved  Etiology likely related to COVID 19 and/or enteritis noted on imaging  Otherwise infectious workup including UA, blood cx generally unremarkable for etiology  Treated with IV fluids and abx including vanco/cefepime, transitioned to complete Augmentin course  Monitor for acute/recurrent infectious symptoms - no present signs    COVID-19  COVID positive 12/15/23  Transiently required supplemental O2 in hospital, now on room air  Was treated with supportive care per hospital records  Continue supportive measures  Appears generally asymptomatic  Isolation as per facility protocol    Generalized weakness  Likely in setting of COVID vs enteritis  -PT/OT  -fall precautions  -encourage appropriate DME use  -SW to follow for safe discharge planning/homecare services as appropriate      At risk for constipation  At risk due to hospitalization, relative immobility, comorbidities  Monitor stool output - last BM yesterday normal  Bowel regimen at rehab: consider miralax and senna as appropriate; consider bisacodyl suppository PRN if no BM in 2-3 days  Encourage mobility as tolerated, PO hydration as appropriate, high fiber diet/prune juice in outpatient setting  Goal is for 1 easy BM every 1-2 days      At risk for delirium  Delirium precautions  -Patient is high risk of delirium due to age, comorbidities, hospitalization/unfamiliar  "environment  -delirium precautions  -maintain normal sleep/wake cycle  -minimize overnight interruptions, group overnight vitals/labs/nursing checks as possible  -dim lights, close blinds and turn off tv to minimize stimulation and encourage sleep environment in evenings  -ensure that pain is well controlled  -monitor for fecal and urinary retention which may precipitate delirium  -encourage early mobilization and ambulation  -provide frequent reorientation and redirection  -encourage family and friends at the bedside to help help calm patient if anxious  -avoid medications which may precipitate or worsen delirium such as tramadol, benzodiazepine, anticholinergics, and benadryl  -encourage hydration and nutrition   -redirect unwanted behaviors as first line, avoid physical restraints, use chemical restraint only if all other attempts have been unsuccessful (could consider Zyprexa 2.5mg IM Q, monitor for orthostatic hypotension and QTc prolongation with repeat dosing, recommend lowest dose possible for shortest duration possible)      Advance care planning  HCP and code discussion as below    Atherosclerotic vascular disease  Incidental finding on imaging \"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 Consider follow-up with vascular surgery.\"  No apparent acute/associated symptoms  Has been ordered statin but reportedly not taking  Defer to PCP    Enteritis  Noted on imaging in hospital, possible infectious etiology of her recent symptoms  Treated with antibiotics course as above  No acute abdominal complaints  Continue to monitor    Acute metabolic encephalopathy  Noted on recent hospitalization in Nov 2023  Neurological workup at the time reported generally unremarkable  Patient was reportedly evaluated by Neuro/Psych that " admission and started on quetiapine  Arrived to rehab on quetiapine - continue for the moment, continue to evaluate for appropriateness, consider weaning as able  Presently at rehab appears to be mentating well, no acute confusion presently, continue to monitor  To follow up with PCP, Psych/Neuro outpatient as appropriate       Pain: none  Rehab Potential:Good  Patient Informed of Medical Condition: yes   Patient is Capable of Understanding Their Right: yes   Discharge Plan: home  Vaccination:   Immunization History   Administered Date(s) Administered    COVID-19 MODERNA VACC 0.5 ML IM 02/05/2021, 03/03/2021, 01/21/2022       DVT ppx: continue lovenox at rehab (anticipate stopping on discharge)    Advanced Directives: patient verbalizes daughter Cindy Crooks as HCP  Code status:DNR (Per discussion with resident or POA) Extensive discussion/education with patient today regarding their wishes with respect to resuscitative measures; discussed potential risks vs benefits of resuscitative measures; patient verbalizes understanding, appears to have capacity to make this decision presently, and clearly verbalizes a desire for DNR/DNI  PCP: Yobani      -Total time spent on this encounter today including documentation and workup review, face to face time, history and exam, and documentation/orders was approximately 60 minutes.  -This note will be copied to [facility] EMR where vitals and medication orders are placed.    Kemar Moffett D.O.  Geriatric Medicine  12/22/2023 3:51 PM

## 2023-12-22 NOTE — NURSING NOTE
Patient took meds in applesauce this a.m. I gave her thickened cranberry juice via spoon because she would not sip from a straw.

## 2023-12-22 NOTE — ASSESSMENT & PLAN NOTE
Lab Results   Component Value Date    HGBA1C 8.9 (H) 05/26/2023     Monitor glucose  Avoid hypoglycemia  Continue insulin regimen with holding parameters  Follow up with PCP, Endocrine as appropriate

## 2023-12-23 LAB
GLUCOSE SERPL-MCNC: 127 MG/DL (ref 65–140)
GLUCOSE SERPL-MCNC: 145 MG/DL (ref 65–140)
GLUCOSE SERPL-MCNC: 212 MG/DL (ref 65–140)

## 2023-12-23 NOTE — CASE MANAGEMENT
Case Management Progress Note    Patient name Shakira Hinkle  Location /-01 MRN [id number]  : 1940 Date 2023       LOS (days): 7  Geometric Mean LOS (GMLOS) (days): 5.1  Days to GMLOS:-1.8        OBJECTIVE:        Current admission status: Inpatient  Preferred Pharmacy:   [id number] [address] [address]  Phone: [phone] Fax: [phone]    Primary Care Provider: Dwayne Hilton MD    Primary Insurance: MEDICARE  Secondary Insurance: [facility]    PROGRESS NOTE:  Report #   fax# [phone]

## 2023-12-23 NOTE — CASE MANAGEMENT
Case Management Discharge Planning Note    Patient name Shakira Hinkle  Location /-01 MRN [id number]  : 1940 Date 2023       Current Admission Date: 12/15/2023  Current Admission Diagnosis:Generalized weakness   Patient Active Problem List    Diagnosis Date Noted    At risk for constipation 2023    At risk for delirium 2023    Advance care planning 2023    Atherosclerotic vascular disease 2023    Enteritis 2023    Sepsis (HCC) 2023    Urinary retention 2023    COVID-19 12/15/2023    Dysarthria 12/15/2023    Severe protein-calorie malnutrition (HCC) 2023    Generalized weakness 2023    Macrocytosis 2023    Hypotension 2023    Hypomagnesemia 2023    Hypoglycemia 2023    Hypothermia 2023    Hypokalemia 2023    History of Hodgkin's lymphoma 2023    Hypothyroidism 2023    Acute metabolic encephalopathy 2023    Gastroesophageal reflux disease without esophagitis 10/17/2022    Abnormal CT of the abdomen 07/10/2022    Soft tissue radionecrosis 2022    Osteoradionecrosis of temporal bone  2022    Primary osteoarthritis of right shoulder 2021    Primary osteoarthritis of both knees 2020    Hyponatremia 2020    Postoperative hypothyroidism 2015    Hyperlipidemia 2014    History of hypertension 10/10/2013    Type I diabetes mellitus (HCC) 2008      LOS (days): 7  Geometric Mean LOS (GMLOS) (days): 5.1  Days to GMLOS:-1.8     OBJECTIVE:  Risk of Unplanned Readmission Score: 43         Current admission status: Inpatient   Preferred Pharmacy:   [id number] - [place] - [address] [facility]  [address] [address]  Phone: [phone] Fax: [phone]    Primary Care Provider: Dwayne Hilton MD    Primary Insurance: MEDICARE  Secondary Insurance: [employer]    DISCHARGE DETAILS:    Discharge planning discussed  with:: pt & daughter was called at 11:28am  Freedom of Choice: Yes  Comments - Freedom of Choice: recommendation is for rehab - [facility] & pt's choice  CM contacted family/caregiver?: Yes  Were Treatment Team discharge recommendations reviewed with patient/caregiver?: Yes  Did patient/caregiver verbalize understanding of patient care needs?: Yes  Were patient/caregiver advised of the risks associated with not following Treatment Team discharge recommendations?: Yes    Contacts  Patient Contacts: Cindy Crooks dtr  Relationship to Patient:: Family  Contact Method: Phone  Phone Number: [phone]  Reason/Outcome: Discharge Planning    Requested Home Health Care         Is the patient interested in HHC at discharge?: No         Other Referral/Resources/Interventions Provided:  Interventions: Short Term Rehab  Referral Comments: [facility] accepted- rn, pt, family &snf aware of d/c time 12:30pm- rn was given the fax & report #-    Would you like to participate in our [facility] service program?  : No - Declined    Treatment Team Recommendation: Short Term Rehab ([facility] -12:30pm [facility])  Discharge Destination Plan:: Short Term Rehab ([facility] for rehab - 12:30pm [facility])  Transport at Discharge : [facility]  Dispatcher Contacted: Yes  Number/Name of Dispatcher: [phone]  Transported by (Company and Unit #): [facility]  ETA of Transport (Date): 12/22/23  ETA of Transport (Time): 1230  Transport Service Arrived: Yes                 Family notified:: daughter was called

## 2023-12-24 LAB
GLUCOSE SERPL-MCNC: 139 MG/DL (ref 65–140)
GLUCOSE SERPL-MCNC: 147 MG/DL (ref 65–140)
GLUCOSE SERPL-MCNC: 163 MG/DL (ref 65–140)
GLUCOSE SERPL-MCNC: 205 MG/DL (ref 65–140)
GLUCOSE SERPL-MCNC: 215 MG/DL (ref 65–140)
GLUCOSE SERPL-MCNC: 224 MG/DL (ref 65–140)
GLUCOSE SERPL-MCNC: 27 MG/DL (ref 65–140)
GLUCOSE SERPL-MCNC: 32 MG/DL (ref 65–140)
GLUCOSE SERPL-MCNC: 91 MG/DL (ref 65–140)

## 2023-12-26 ENCOUNTER — NURSING HOME VISIT (OUTPATIENT)
Dept: GERIATRICS | Facility: OTHER | Age: 83
End: 2023-12-26
Payer: MEDICARE

## 2023-12-26 VITALS
TEMPERATURE: 97.6 F | SYSTOLIC BLOOD PRESSURE: 123 MMHG | OXYGEN SATURATION: 95 % | HEART RATE: 71 BPM | DIASTOLIC BLOOD PRESSURE: 67 MMHG

## 2023-12-26 DIAGNOSIS — E10.649 TYPE 1 DIABETES MELLITUS WITH HYPOGLYCEMIA AND WITHOUT COMA (HCC): Primary | ICD-10-CM

## 2023-12-26 DIAGNOSIS — E10.9 TYPE I DIABETES MELLITUS (HCC): ICD-10-CM

## 2023-12-26 DIAGNOSIS — R33.9 URINARY RETENTION: ICD-10-CM

## 2023-12-26 DIAGNOSIS — R53.1 GENERALIZED WEAKNESS: ICD-10-CM

## 2023-12-26 DIAGNOSIS — U07.1 COVID-19: ICD-10-CM

## 2023-12-26 DIAGNOSIS — Z91.89 AT RISK FOR INFLUENZA: ICD-10-CM

## 2023-12-26 LAB — METHYLMALONATE SERPL-SCNC: 142 NMOL/L (ref 0–378)

## 2023-12-26 PROCEDURE — 99309 SBSQ NF CARE MODERATE MDM 30: CPT

## 2023-12-26 RX ORDER — INSULIN GLARGINE 100 [IU]/ML
8 INJECTION, SOLUTION SUBCUTANEOUS
Start: 2023-12-26

## 2023-12-26 RX ORDER — OSELTAMIVIR PHOSPHATE 30 MG/1
30 CAPSULE ORAL DAILY
Start: 2023-12-26 | End: 2024-01-05

## 2023-12-26 NOTE — PROGRESS NOTES
[facility]  [address] [id number]  [phone]  FACILITY: [facility]  Code 31 (STR)          NAME: Shakira Hinkle  AGE: 83 y.o. SEX: female CODE STATUS: No CPR    DATE OF ENCOUNTER: 12/26/2023    Assessment and Plan     1. Type 1 diabetes mellitus with hypoglycemia and without coma (HCC)  Assessment & Plan:    Hypoglycemic with blood sugar of 27 on 12/24  Blood sugars improved with food/drink   On call provider had discontinued lantus and lispro sliding scale in response to hypoglycemia, once sugars improved lantus was restarted at lower dose of 6 units daily (previously 6 units Q12H), lispro sliding scale was reordered as well   Last two fasting sugars 200's  Will increase lantus to 8 units QHS   Continue sliding scale  QID accucheks and 2 am accucheck  Avoid hypoglycemia   Offer bedtime snack              2. COVID-19  Assessment & Plan:  Has effectively recovered from COVID-19  May discontinue isolation precautions      3. Generalized weakness  Assessment & Plan:  In the setting of COVID-19  Maintain fall and safety precautions  Continue PT/OT  Encourage adequate po intake   Continue supportive care      4. Urinary retention  Assessment & Plan:  Urinary retention noted during hospitalization requiring ramos catheter  Continues with weakness does not feel she is ambulatory enough to start void trial  Plan to initiate voiding trial later this week when patient is more ambulatory         5. Type I diabetes mellitus (HCC)  Assessment & Plan:    Hypoglycemic with blood sugar of 27 on 12/24  Blood sugars improved with food/drink   On call provider had discontinued lantus and lispro sliding scale in response to hypoglycemia, once sugars improved lantus was restarted at lower dose of 6 units daily (previously 6 units Q12H), lispro sliding scale was reordered as well   Last two fasting sugars 200's  Will increase lantus to 8 units QHS   Continue sliding scale  QID accucheks and 2  am accucheck  Avoid hypoglycemia   Offer bedtime snack            Orders:  -     insulin glargine (LANTUS) 100 units/mL subcutaneous injection; Inject 8 Units under the skin daily at bedtime    6. At risk for influenza  Assessment & Plan:  Given influenza A outbreak at [facility] facility will start patient on prophylaxis tamiflu     Orders:  -     oseltamivir (TAMIFLU) 30 MG capsule; Take 1 capsule (30 mg total) by mouth daily for 10 days         All medications and routine orders were reviewed and updated as needed.    Chief Complaint     STR follow up visit    Past Medical and Surgica History      Past Medical History:   Diagnosis Date    Ambulates with cane     Cancer (HCC)     Chronic pain disorder     knees/ shoulders (gets inj every 3 mos)    Closed intertrochanteric fracture of right femur ([facility]) 5/26/2020    Controlled type 2 diabetes mellitus with diabetic polyneuropathy, with long-term current use of insulin (HCC) 5/21/2008    Diabetes mellitus (HCC)     Diabetic polyneuropathy (HCC) 5/21/2008    ICD10 clean up    Disease of thyroid gland     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    HL (hearing loss)     Hodgkin's disease (HCC) 2008    Left neck, had radiation    Hypertension     Neuropathy     Osteoporosis     RA (rheumatoid arthritis) ([facility])     Traumatic rhabdomyolysis ([facility]) 10/17/2022     Past Surgical History:   Procedure Laterality Date    ADENOIDECTOMY      APPENDECTOMY      CATARACT EXTRACTION      CATARACT EXTRACTION, BILATERAL      CHOLECYSTECTOMY      FRACTURE SURGERY Right     hip    MOUTH SURGERY      oral cancer left lower lip    OVARY SURGERY      CA ADJT TIS TRNS/REARGMT F/C/C/M/N/A/G/H/F 10SQCM/< N/A 3/28/2022    Procedure: Adjacent tissue transfer face;  Surgeon: Ar Paris MD;  Location: [facility];  Service: ENT    CA OPTX FEM SHFT FX W/INSJ IMED IMPLT W/WO SCREW Right 5/28/2020    Procedure: INSERTION NAIL IM FEMUR ANTEGRADE (TROCHANTERIC);  Surgeon: Charles Seals DO;  Location:   MAIN OR;  Service: Orthopedics    IN TRANSMASTOID ANTROTOMY Left 3/28/2022    Procedure: MASTOIDECTOMY;  Surgeon: Ar Paris MD;  Location: [facility];  Service: ENT    TONSILLECTOMY      TOTAL THYROIDECTOMY  2008    Performed after left neck dissection and left oral cancer diagnosis     No Known Allergies       History of Present Illness     Patient being seen for routine follow up at [facility]. Patient recently diagnosed with COVID-19 on 12/15/23 and has since recovered from illness. She is currently sitting in her chair in her room without acute distress eating lunch. She denies any pain or discomfort at this time. She continues to report generalized weakness without improvement since admission to SNF. She had episode of hypoglycemia over the weekend and insulin was adjusted. She denies hypoglycemic symptoms. Her blood sugars have since remained slightly elevated in response to insulin titration.           The patient's allergies, past medical, surgical, social and family history were reviewed and unchanged.    Review of Systems     Review of Systems   Genitourinary:  Positive for difficulty urinating.   Musculoskeletal:  Positive for gait problem.   Neurological:  Positive for weakness.   All other systems reviewed and are negative.        Objective     Vitals:   Vitals:    12/26/23 1211   BP: 123/67   Pulse: 71   Temp: 97.6 °F (36.4 °C)   SpO2: 95%         Physical Exam  Vitals (appears weak and frail) reviewed.   Constitutional:       General: She is not in acute distress.     Appearance: She is not ill-appearing, toxic-appearing or diaphoretic.   HENT:      Head: Normocephalic and atraumatic.   Eyes:      Conjunctiva/sclera: Conjunctivae normal.   Cardiovascular:      Rate and Rhythm: Normal rate and regular rhythm.      Pulses: Normal pulses.      Heart sounds: Normal heart sounds. No murmur heard.  Pulmonary:      Effort: Pulmonary effort is normal. No respiratory distress.      Breath sounds:  "Decreased breath sounds present.   Abdominal:      General: Bowel sounds are normal. There is no distension.      Palpations: Abdomen is soft.      Tenderness: There is no abdominal tenderness.   Genitourinary:     Comments: Kinney catheter present   Musculoskeletal:      Right lower leg: No edema.      Left lower leg: No edema.   Skin:     General: Skin is warm and dry.   Neurological:      General: No focal deficit present.      Mental Status: She is alert and oriented to person, place, and time.      Motor: Weakness present.      Gait: Gait abnormal.   Psychiatric:         Mood and Affect: Mood normal.         Behavior: Behavior normal.         Thought Content: Thought content normal.         Pertinent Laboratory/Diagnostic Studies:   Reviewed in facility chart-stable      Current Medications   Medications reviewed and updated see facility MAR for details.      Current Outpatient Medications:     insulin glargine (LANTUS) 100 units/mL subcutaneous injection, Inject 8 Units under the skin daily at bedtime, Disp: , Rfl:     oseltamivir (TAMIFLU) 30 MG capsule, Take 1 capsule (30 mg total) by mouth daily for 10 days, Disp: , Rfl:        Please note:  Voice-recognition software may have been used in the preparation of this document.  Occasional wrong word or \"sound-alike\" substitutions may have occurred due to the inherent limitations of voice recognition software.  Interpretation should be guided by context.         ADRIAN Bennett  12/26/2023  1:24 PM    "

## 2023-12-26 NOTE — ASSESSMENT & PLAN NOTE
Urinary retention noted during hospitalization requiring ramos catheter  Continues with weakness does not feel she is ambulatory enough to start void trial  Plan to initiate voiding trial later this week when patient is more ambulatory

## 2023-12-26 NOTE — ASSESSMENT & PLAN NOTE
Hypoglycemic with blood sugar of 27 on 12/24  Blood sugars improved with food/drink   On call provider had discontinued lantus and lispro sliding scale in response to hypoglycemia, once sugars improved lantus was restarted at lower dose of 6 units daily (previously 6 units Q12H), lispro sliding scale was reordered as well   Last two fasting sugars 200's  Will increase lantus to 8 units QHS   Continue sliding scale  QID accucheks and 2 am accucheck  Avoid hypoglycemia   Offer bedtime snack

## 2023-12-26 NOTE — ASSESSMENT & PLAN NOTE
In the setting of COVID-19  Maintain fall and safety precautions  Continue PT/OT  Encourage adequate po intake   Continue supportive care

## 2023-12-28 ENCOUNTER — NURSING HOME VISIT (OUTPATIENT)
Dept: GERIATRICS | Facility: OTHER | Age: 83
End: 2023-12-28
Payer: MEDICARE

## 2023-12-28 DIAGNOSIS — G93.41 ACUTE METABOLIC ENCEPHALOPATHY: ICD-10-CM

## 2023-12-28 DIAGNOSIS — E83.42 HYPOMAGNESEMIA: ICD-10-CM

## 2023-12-28 DIAGNOSIS — M15.9 PRIMARY OSTEOARTHRITIS INVOLVING MULTIPLE JOINTS: ICD-10-CM

## 2023-12-28 DIAGNOSIS — E10.649 TYPE 1 DIABETES MELLITUS WITH HYPOGLYCEMIA AND WITHOUT COMA (HCC): Primary | ICD-10-CM

## 2023-12-28 DIAGNOSIS — I95.89 OTHER SPECIFIED HYPOTENSION: ICD-10-CM

## 2023-12-28 DIAGNOSIS — U07.1 COVID-19: ICD-10-CM

## 2023-12-28 DIAGNOSIS — R33.9 URINARY RETENTION: ICD-10-CM

## 2023-12-28 DIAGNOSIS — E43 SEVERE PROTEIN-CALORIE MALNUTRITION (HCC): ICD-10-CM

## 2023-12-28 DIAGNOSIS — E87.1 HYPONATREMIA: ICD-10-CM

## 2023-12-28 PROBLEM — M15.0 PRIMARY OSTEOARTHRITIS INVOLVING MULTIPLE JOINTS: Status: ACTIVE | Noted: 2023-12-28

## 2023-12-28 PROCEDURE — 99310 SBSQ NF CARE HIGH MDM 45: CPT | Performed by: STUDENT IN AN ORGANIZED HEALTH CARE EDUCATION/TRAINING PROGRAM

## 2023-12-28 NOTE — ASSESSMENT & PLAN NOTE
Baseline Na appears around high 130s  Mild/stable on recent labs  Likely in setting of poor PO intake, recent infection  Encourage PO intake  Monitor on routine labs  Consider further workup if worsening

## 2023-12-28 NOTE — ASSESSMENT & PLAN NOTE
Noted on recent hospitalization in Nov 2023  Neurological workup at the time reported generally unremarkable  Patient was reportedly evaluated by Neuro/Psych that admission and started on quetiapine  Arrived to rehab on quetiapine - continue for the moment, continue to evaluate for appropriateness, consider weaning as able  Presently at rehab appears to be mentating well, no acute confusion presently, continue to monitor  Per daughter patient has no hx of dementia or psychiatric issues but since Nov 2023 has seemed different with flatter affect and some concern of hallucinations - per daughter she seems stable/better with seroquel  Possibly some underlying dementia vs delirium in setting of recent infection, hyponatremia  Advised outpatient follow-up with formal cognitive testing when she is stable/baseline outpatient  Consider weaning seroquel as able  To follow up with PCP, Psych/Neuro outpatient as appropriate

## 2023-12-28 NOTE — ASSESSMENT & PLAN NOTE
COVID positive 12/15/23  Transiently required supplemental O2 in hospital, now on room air  Was treated with supportive care per hospital records  Continue supportive measures as appropriate  Appears generally asymptomatic/recovered  Was on isolation as per facility protocol - presently off isolation

## 2023-12-28 NOTE — PROGRESS NOTES
[facility]  Facility: [facility]    PROGRESS NOTE  Nursing Home Place of Service: nursing home place of service: POS 31 Skilled Care-Part A Coverage    NAME: Shakira Hinkle  : 1940 AGE: 83 y.o. SEX: female MRN: [id number]  DATE OF ENCOUNTER: 2023    Assessment and Plan     Problem List Items Addressed This Visit       Type I diabetes mellitus (HCC) - Primary       Lab Results   Component Value Date    HGBA1C 8.9 (H) 2023       Of note patient hypoglycemic to 27 on , on-call provider held insulin which has since been resumed at lower dosing  Monitor glucose - recent reading in 200-300s generally  Avoid hypoglycemia  Daughter reports patient very sensitive to insulin and they have been working for years to keep her sugars stable as they can be labile  Continue insulin lantus with holding parameters - as of  titrate from 8u to 10u and increase sliding scale to moderate  Follow up with PCP, Endocrine as appropriate         Hyponatremia     Baseline Na appears around high 130s  Mild/stable on recent labs  Likely in setting of poor PO intake, recent infection  Encourage PO intake  Monitor on routine labs  Consider further workup if worsening         Acute metabolic encephalopathy     Noted on recent hospitalization in 2023  Neurological workup at the time reported generally unremarkable  Patient was reportedly evaluated by Neuro/Psych that admission and started on quetiapine  Arrived to rehab on quetiapine - continue for the moment, continue to evaluate for appropriateness, consider weaning as able  Presently at rehab appears to be mentating well, no acute confusion presently, continue to monitor  Per daughter patient has no hx of dementia or psychiatric issues but since 2023 has seemed different with flatter affect and some concern of hallucinations - per daughter she seems stable/better with seroquel  Possibly some underlying dementia  vs delirium in setting of recent infection, hyponatremia  Advised outpatient follow-up with formal cognitive testing when she is stable/baseline outpatient  Consider weaning seroquel as able  To follow up with PCP, Psych/Neuro outpatient as appropriate         Hypomagnesemia     Noted low on some recent labs  Repleted in hospital  Monitor routinely - most recently WNL  Replete PRN         Hypotension     Hx of HTN with chronic hypotension more recently  Monitor BP - generally stable/maintained in 110s-130s sysolic  Avoid hypotension  Continue midodrine (with holding parameters)         Severe protein-calorie malnutrition (HCC)     Malnutrition Findings:   Adult Malnutrition type: Chronic illness  Adult Degree of Malnutrition: Other severe protein calorie malnutrition  Malnutrition Characteristics: Muscle loss, Weight loss  Nutrition consult at rehab  Has been tolerating diet  Glucerna ordered (per daughter takes regularly at home)        360 Statement: Malnutrition related to chronic illness as evidenced by moderate temporal muscle loss, severe clavicle muscle loss, > 10% bodyweight loss in 6 months. To treat with oral diet and nutrition supplements as tolerated.     BMI Findings:     Body mass index is 20.62 kg/m².          COVID-19     COVID positive 12/15/23  Transiently required supplemental O2 in hospital, now on room air  Was treated with supportive care per hospital records  Continue supportive measures as appropriate  Appears generally asymptomatic/recovered  Was on isolation as per facility protocol - presently off isolation         Urinary retention     Patient with urinary retention in hospital, required Kinney placement  Kinney in place draining clear yellow urine  Consider trial of void at rehab once established/stable here, vs in outpatient setting (wants to wait for now until she is stronger/more ambulatory)  Follow up with PCP, Urology as appropriate         Primary osteoarthritis involving multiple  joints     Pain stable  Continue PRN tylenol  Voltaren BID to affected joints                Chief Complaint     Follow up weakness, COVID, mentation    History of Present Illness     Shakira Hinkle is a 83 y.o. female who was seen today for follow up. PMH including HTN, HLD, hypothyroidism, DM1, GERD     Patient seen and examined in room following therapy session  Others present: none  Patient laying in bed  Appears comfortable, awake, alert, oriented to situation, able to converse appropriately  Patient appears to be in fair spirits, appears to have reasonable insight into history and present situation, has no new/acute concerns. Feels fine overall. Thinks overall she feels around 50% better than she did on arrival and making gradual progress with therapy. No pain anywhere presently though concern from therapy that she gets arthritic multijoint pain with therapy sessions. Appetite she reports as chronically poor but stable, no swallowing concerns. Breathing fine, no acute COVID symptoms including SOB, cough. Catheter in place not bothering her, she would be amenable to considering trial of void next week, requests some more time to strengthen with therapy first. Last BM she reports around 2 days ago normal. No acute cardiopulmonary, abdominal, or urinary symptoms; see ROS for more details.   No further questions or acute concerns identified.    Subsequently participated in family meeting with FRANK Vernon and patient's daughter Cindy with patient's permission from approx 2:30-2:45pm, reviewed plan of care and medical updates. Daughter shared that patient was at baseline mentation until acute hospitalization in Nov 2023 for altered mentation and since then has seemed different with generally flatter affect, seems to be doing better/stable with seroquel which was started at that time, but has no formal diagnosis of dementia or psychiatric history otherwise she is aware of; discussed outpatient PCP follow up and  "memory/cognitive testing in outpatient setting.    Lab Review:   12/22: BMP with Na 133 (stable/improved, baseline in high 130s); CBC with WBC 9.15 (trend down, recent peak ~15); Hb 10.3 (baseline around 12-13); blood cx 12/15 NGTD; recent TSH elevated (14.32, prior was WNL); last A1c 8.9  12/26: BMP with Na 129; CBC with Hb 10.1     CT head 12/15 \"No acute intracranial abnormality. \"  CTA head/neck 12/15 \"Negative CTA head and neck for large vessel occlusion, dissection, or aneurysm.     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 Consider   follow-up with vascular surgery.     Fibromuscular dysplasia of bilateral ICA cervical segments.\"     CXR 12/15 \"No acute cardiopulmonary disease \"  CTA chest 12/16 \"1.  No evidence of pulmonary embolism.  2.  Fluid-filled distal small bowel may be due to nonspecific enteritis. Moderate diffuse thickening of the rectum with surrounding fat stranding which may be due to proctitis.\"  CXR 12/19 \"No evidence of CHF. Mild groundglass type infiltrates in the left lung which could be COVID-pneumonia. \"              EKG 12/15: NSR, 95bpm, Qtc 427  Echo July 2022: F 60, normal systolic function, grade 1 diastolic dysfunction       The following portions of the patient's history were reviewed and updated as appropriate: allergies, current medications, past family history, past medical history, past social history, past surgical history and problem list.    Review of Systems     Review of Systems   Constitutional:  Negative for appetite change, chills, diaphoresis, fatigue and fever.   HENT:  Negative for drooling, ear pain, hearing loss, rhinorrhea, sore throat and trouble swallowing.    Eyes:  Negative for pain, discharge, redness, itching and visual disturbance.   Respiratory:  Negative for " cough, choking, chest tightness, shortness of breath and wheezing.    Cardiovascular:  Negative for chest pain, palpitations and leg swelling.   Gastrointestinal:  Negative for abdominal pain, blood in stool, constipation, diarrhea, nausea and vomiting.   Genitourinary:  Negative for difficulty urinating, dysuria, hematuria and urgency.   Musculoskeletal:  Positive for gait problem. Negative for arthralgias and back pain.   Skin:  Negative for color change.   Neurological:  Negative for dizziness, tremors, speech difficulty, weakness and headaches.   Psychiatric/Behavioral:  Negative for agitation, behavioral problems and confusion. The patient is not nervous/anxious and is not hyperactive.    All other systems reviewed and are negative.      Active Problem List     Patient Active Problem List   Diagnosis    Hyperlipidemia    History of hypertension    Postoperative hypothyroidism    Type I diabetes mellitus (HCC)    Hyponatremia    Primary osteoarthritis of both knees    Primary osteoarthritis of right shoulder    Soft tissue radionecrosis    Osteoradionecrosis of temporal bone     Abnormal CT of the abdomen    Gastroesophageal reflux disease without esophagitis    Acute metabolic encephalopathy    Hypoglycemia    Hypothermia    Hypokalemia    History of Hodgkin's lymphoma    Hypothyroidism    Hypomagnesemia    Macrocytosis    Hypotension    Generalized weakness    Severe protein-calorie malnutrition (HCC)    COVID-19    Dysarthria    Urinary retention    Sepsis (HCC)    At risk for constipation    At risk for delirium    Advance care planning    Atherosclerotic vascular disease    Enteritis    At risk for influenza    Primary osteoarthritis involving multiple joints       Objective     Vital Signs:     BP: 138/64 mmHg  12/28/2023 12:11   Temp:97.3 °F  12/28/2023 08:08 Pulse:69 bpm  12/28/2023 08:08   Weight:100.9 Lbs  12/28/2023 05:26   Resp:18 Breaths/min  12/28/2023 08:08 BS:224 mg/dL  12/28/2023 12:08 O2:95  %  12/28/2023 08:09 Pain:0  12/27/2023 23:33       Physical Exam  Vitals reviewed.   Constitutional:       General: She is not in acute distress.     Appearance: She is not toxic-appearing or diaphoretic.   HENT:      Head: Normocephalic and atraumatic.      Right Ear: External ear normal.      Left Ear: External ear normal.      Nose: Nose normal. No rhinorrhea.      Mouth/Throat:      Mouth: Mucous membranes are moist.      Pharynx: Oropharynx is clear. No posterior oropharyngeal erythema.      Comments: Left side of jaw appears s/p surgical resection  Eyes:      General: No scleral icterus.        Right eye: No discharge.         Left eye: No discharge.      Extraocular Movements: Extraocular movements intact.      Conjunctiva/sclera: Conjunctivae normal.      Pupils: Pupils are equal, round, and reactive to light.   Cardiovascular:      Rate and Rhythm: Normal rate and regular rhythm.   Pulmonary:      Effort: Pulmonary effort is normal. No respiratory distress.      Breath sounds: Normal breath sounds. No wheezing or rales.   Abdominal:      General: Bowel sounds are normal. There is no distension.      Palpations: Abdomen is soft.      Tenderness: There is no abdominal tenderness. There is no guarding.   Genitourinary:     Comments: Kinney in place draining clear yellow urine  Musculoskeletal:         General: No swelling or tenderness.      Cervical back: No rigidity.   Skin:     General: Skin is warm and dry.      Coloration: Skin is not jaundiced.   Neurological:      General: No focal deficit present.      Mental Status: She is alert and oriented to person, place, and time. Mental status is at baseline.   Psychiatric:         Mood and Affect: Mood normal.         Behavior: Behavior normal.         Thought Content: Thought content normal.         Judgment: Judgment normal.         Pertinent Laboratory/Diagnostic Studies:  Laboratory and Imaging studies reviewed. Full report in the paper chart.     Current  Medications   Medications reviewed and updated in facility chart.      -Total time spent on this encounter today including documentation and workup review, face to face time, history and exam, and documentation/orders, and family meeting with daughter and SW was approximately 50 minutes.  -This note will be copied to [facility] EMR where vitals and medication orders are placed.    Kemar Moffett D.O.  Geriatric Medicine  12/28/2023 3:36 PM

## 2023-12-28 NOTE — ASSESSMENT & PLAN NOTE
Patient with urinary retention in hospital, required Kinney placement  Kinney in place draining clear yellow urine  Consider trial of void at rehab once established/stable here, vs in outpatient setting (wants to wait for now until she is stronger/more ambulatory)  Follow up with PCP, Urology as appropriate

## 2023-12-28 NOTE — ASSESSMENT & PLAN NOTE
Lab Results   Component Value Date    HGBA1C 8.9 (H) 05/26/2023       Of note patient hypoglycemic to 27 on 12/24, on-call provider held insulin which has since been resumed at lower dosing  Monitor glucose - recent reading in 200-300s generally  Avoid hypoglycemia  Daughter reports patient very sensitive to insulin and they have been working for years to keep her sugars stable as they can be labile  Continue insulin lantus with holding parameters - as of 12/28 titrate from 8u to 10u and increase sliding scale to moderate  Follow up with PCP, Endocrine as appropriate

## 2023-12-28 NOTE — ASSESSMENT & PLAN NOTE
Hx of HTN with chronic hypotension more recently  Monitor BP - generally stable/maintained in 110s-130s sysolic  Avoid hypotension  Continue midodrine (with holding parameters)

## 2023-12-28 NOTE — ASSESSMENT & PLAN NOTE
Malnutrition Findings:   Adult Malnutrition type: Chronic illness  Adult Degree of Malnutrition: Other severe protein calorie malnutrition  Malnutrition Characteristics: Muscle loss, Weight loss  Nutrition consult at rehab  Has been tolerating diet  Glucerna ordered (per daughter takes regularly at home)        360 Statement: Malnutrition related to chronic illness as evidenced by moderate temporal muscle loss, severe clavicle muscle loss, > 10% bodyweight loss in 6 months. To treat with oral diet and nutrition supplements as tolerated.     BMI Findings:     Body mass index is 20.62 kg/m².

## 2023-12-28 NOTE — ASSESSMENT & PLAN NOTE
Noted low on some recent labs  Repleted in hospital  Monitor routinely - most recently WNL  Replete PRN

## 2024-01-04 ENCOUNTER — NURSING HOME VISIT (OUTPATIENT)
Dept: GERIATRICS | Facility: OTHER | Age: 84
End: 2024-01-04
Payer: MEDICARE

## 2024-01-04 DIAGNOSIS — E10.649 TYPE 1 DIABETES MELLITUS WITH HYPOGLYCEMIA AND WITHOUT COMA (HCC): Primary | ICD-10-CM

## 2024-01-04 DIAGNOSIS — E43 SEVERE PROTEIN-CALORIE MALNUTRITION (HCC): ICD-10-CM

## 2024-01-04 DIAGNOSIS — G93.41 ACUTE METABOLIC ENCEPHALOPATHY: ICD-10-CM

## 2024-01-04 DIAGNOSIS — R33.9 URINARY RETENTION: ICD-10-CM

## 2024-01-04 PROCEDURE — 99309 SBSQ NF CARE MODERATE MDM 30: CPT | Performed by: STUDENT IN AN ORGANIZED HEALTH CARE EDUCATION/TRAINING PROGRAM

## 2024-01-04 NOTE — ASSESSMENT & PLAN NOTE
Lab Results   Component Value Date    HGBA1C 8.9 (H) 05/26/2023   Last glucose reading today was 177, at goal for hospital setting considerations   Daughter reports patient very sensitive to insulin and they have been working for years to keep her glucose stable as they can be labile    -Avoid hypoglycemia  -Continue Lantus 6 Units BID and sliding scale  -Continue glucose monitoring  -Continue diabetic diet   -Follow up with PCP, Endocrine as appropriate

## 2024-01-04 NOTE — ASSESSMENT & PLAN NOTE
Patient with urinary retention in hospital, required Kinney placement  Kinney in place draining clear yellow urine    -Attempt voiding trial today  -Follow up with PCP, Urology as appropriate

## 2024-01-04 NOTE — ASSESSMENT & PLAN NOTE
Noted on recent hospitalization in Nov 2023  Neurological workup at the time reported generally unremarkable  Patient was reportedly evaluated by Neuro/Psych that admission and started on quetiapine  Per daughter patient has no hx of dementia or psychiatric issues but since Nov 2023 has seemed different with flatter affect and some concern of hallucinations - per daughter she seems stable/better with seroquel which could have been due to some underlying dementia vs delirium in setting of recent infection, hyponatremia    -Consider weaning seroquel as able  -To follow up with PCP, Psych/Neuro outpatient as appropriate

## 2024-01-04 NOTE — PROGRESS NOTES
[facility]  Facility: [facility]    PROGRESS NOTE  Nursing Home Place of Service: nursing home place of service: [facility] Coverage    NAME: Shakira Hinkle  : 1940 AGE: 83 y.o. SEX: female MRN: [id number]  DATE OF ENCOUNTER: 2024    Assessment and Plan     Problem List Items Addressed This Visit       Type I diabetes mellitus (HCC) - Primary       Lab Results   Component Value Date    HGBA1C 8.9 (H) 2023   Last glucose reading today was 177, at goal for hospital setting considerations   Daughter reports patient very sensitive to insulin and they have been working for years to keep her glucose stable as they can be labile    -Avoid hypoglycemia  -Continue Lantus 6 Units BID and sliding scale  -Continue glucose monitoring  -Continue diabetic diet   -Follow up with PCP, Endocrine as appropriate         Acute metabolic encephalopathy     Noted on recent hospitalization in 2023  Neurological workup at the time reported generally unremarkable  Patient was reportedly evaluated by Neuro/Psych that admission and started on quetiapine  Per daughter patient has no hx of dementia or psychiatric issues but since 2023 has seemed different with flatter affect and some concern of hallucinations - per daughter she seems stable/better with seroquel which could have been due to some underlying dementia vs delirium in setting of recent infection, hyponatremia    -Consider weaning seroquel as able  -To follow up with PCP, Psych/Neuro outpatient as appropriate         Severe protein-calorie malnutrition (HCC)     Malnutrition Findings:   Adult Malnutrition type: Chronic illness  Malnutrition Characteristics: Muscle loss, Weight loss  Has been tolerating diet  Glucerna        360 Statement: Malnutrition related to chronic illness as evidenced by moderate temporal muscle loss, severe clavicle muscle loss, > 10% bodyweight loss in 6 months. To treat  with oral diet and nutrition supplements as tolerated.          Body mass index is 20.62 kg/m².                                 BMI Findings:           There is no height or weight on file to calculate BMI.            Urinary retention     Patient with urinary retention in hospital, required Ramos placement  Ramos in place draining clear yellow urine    -Attempt voiding trial today  -Follow up with PCP, Urology as appropriate                Chief Complaint     Follow up voiding trial, blood glucose levels,     History of Present Illness     Shakira Hinkle is a 83 y.o. female who was seen today for follow up. She has a PMH significant but not limited to HTN, HLD, hypothyroidism, GERD and DM type 1.   Of note patient was hospitalized at [facility] from 12/15/23 until 12/22/23.   She is currently in [facility] with generalized weaknees. She had covid and was successfully treated. She is now off isolation.  Due to urination retention during hospital stay patient has ramos in place and discussion was provided today with patient to attempt a voiding trial. Patient agrees.  Patient currently denies sob, chest pain, weakness, abdominal pain, n/v/d/c.          Lab Review:   CBC and differential              Component  Ref Range & Units 1/3/24 0713 1/2/24 0705 12/29/23 0751 12/26/23 0530 12/22/23 0507 12/22/23 0507 12/21/23 1019   WBC  4.31 - 10.16 Thousand/uL 7.47 10.89 High  6.95 7.84  9.15 9.87   RBC  3.81 - 5.12 Million/uL 3.13 Low  3.91 3.15 Low  3.19 Low   3.19 Low  3.31 Low    Hemoglobin  11.5 - 15.4 g/dL 10.2 Low  12.3 10.0 Low  10.1 Low   10.3 Low  10.7 Low    Hematocrit  34.8 - 46.1 % 31.2 Low  38.9 30.6 Low  31.4 Low   31.5 Low  32.6 Low    MCV  82 - 98 fL 100 High  100 High  97 98  99 High  99 High    MCH  26.8 - 34.3 pg 32.6 31.5 31.7 31.7  32.3 32.3   MCHC  31.4 - 37.4 g/dL 32.7 31.6 32.7 32.2  32.7 32.8   RDW  11.6 - 15.1 % 13.1 13.1 13.0 12.5  12.6 12.3   MPV  8.9 - 12.7 fL 10.7 10.3 10.2 10.5  10.4 10.1    Platelets  149 - 390 Thousands/uL 398 High  540 High  548 High  509 High   327 287   nRBC  /100 WBCs 0         Comment: This is an appended report.  These results have been appended to a previously preliminary verified report.   Neutrophils Relative  43 - 75 % 58         Immat GRANS %  0 - 2 % 1         Lymphocytes Relative  14 - 44 % 22         Monocytes Relative  4 - 12 % 16 High          Eosinophils Relative  0 - 6 % 2    0     Basophils Relative  0 - 1 % 1    0     Neutrophils Absolute  1.85 - 7.62 Thousands/µL 4.34         Immature Grans Absolute  0.00 - 0.20 Thousand/uL 0.04         Lymphocytes Absolute  0.60 - 4.47 Thousands/µL 1.67         Monocytes Absolute  0.17 - 1.22 Thousand/µL 1.21         Eosinophils Absolute  0.00 - 0.61 Thousand/µL 0.12    0.00 R     Basophils Absolute  0.00 - 0.10 Thousands/µL 0.09    0.00 R     Segmented %     63 R     Total Counted          RBC Morphology     Present     Platelet Estimate     Adequate R     Anisocytosis     Present     Polychromasia     Present     Bands %     6 R     Lymphocytes %     11 Low  R     Monocytes %     11 R     Myelocytes %     2 High  R     Atypical Lymphocytes %     7 High  R     Absolute Neutrophils     6.31 R     Lymphocytes Absolute     1.65 R     Monocytes Absolute     1.01 R                     The following portions of the patient's history were reviewed and updated as appropriate: allergies, current medications, past family history, past medical history, past social history, past surgical history and problem list.    Review of Systems     Review of Systems   Constitutional:  Negative for chills and fever.   HENT:  Negative for ear pain and sore throat.    Eyes:  Negative for pain and visual disturbance.   Respiratory:  Negative for cough and shortness of breath.    Cardiovascular:  Negative for chest pain and palpitations.   Gastrointestinal:  Negative for abdominal pain and vomiting.   Genitourinary:  Negative for dysuria and hematuria.    Musculoskeletal:  Positive for gait problem. Negative for arthralgias and back pain.   Skin:  Negative for color change and rash.   Neurological:  Negative for seizures and syncope.   All other systems reviewed and are negative.      Active Problem List     Patient Active Problem List   Diagnosis    Hyperlipidemia    History of hypertension    Postoperative hypothyroidism    Type I diabetes mellitus (HCC)    Hyponatremia    Primary osteoarthritis of both knees    Primary osteoarthritis of right shoulder    Soft tissue radionecrosis    Osteoradionecrosis of temporal bone     Abnormal CT of the abdomen    Gastroesophageal reflux disease without esophagitis    Acute metabolic encephalopathy    Hypoglycemia    Hypothermia    Hypokalemia    History of Hodgkin's lymphoma    Hypothyroidism    Hypomagnesemia    Macrocytosis    Hypotension    Generalized weakness    Severe protein-calorie malnutrition (HCC)    COVID-19    Dysarthria    Urinary retention    Sepsis (HCC)    At risk for constipation    At risk for delirium    Advance care planning    Atherosclerotic vascular disease    Enteritis    At risk for influenza    Primary osteoarthritis involving multiple joints       Objective     Vital Signs:     Current Vitals     BP: 123/58 mmHg  1/4/2024 07:47  Temp:98.6 °F  1/4/2024 07:47  Pulse:76 bpm  1/4/2024 07:47    Weight:104.9 Lbs  1/4/2024 05:46  Resp:19 Breaths/min  1/4/2024 07:47  BS:190 mg/dL  1/4/2024 10:59  O2:96 %  1/4/2024 07:56  Pain:0  1/4/2024 10:39  Allergies: No Known Allergies       Physical Exam  Vitals reviewed.   HENT:      Head: Normocephalic.      Right Ear: External ear normal.      Left Ear: External ear normal.      Nose: Nose normal.      Mouth/Throat:      Mouth: Mucous membranes are moist.   Cardiovascular:      Rate and Rhythm: Normal rate and regular rhythm.   Pulmonary:      Effort: Pulmonary effort is normal.      Breath sounds: Normal breath sounds.   Abdominal:      General: Bowel sounds  are normal.   Genitourinary:     Comments: Kinney in place draining yellow urine   Musculoskeletal:         General: Normal range of motion.   Skin:     General: Skin is warm.   Neurological:      General: No focal deficit present.      Mental Status: She is alert. Mental status is at baseline.         Pertinent Laboratory/Diagnostic Studies:  Laboratory and Imaging studies reviewed. Full report in the paper chart.     Current Medications   Medications reviewed and updated in facility chart.      -Total time spent on this encounter today including documentation and workup review, face to face time, history and exam, and documentation/orders was approximately 30 minutes.  -This note will be copied to [facility] EMR where vitals and medication orders are placed.    Sabrina Pressley MD  PGY-2, Family Medicine  1/4/2024 11:50 AM

## 2024-01-04 NOTE — ASSESSMENT & PLAN NOTE
Malnutrition Findings:   Adult Malnutrition type: Chronic illness  Malnutrition Characteristics: Muscle loss, Weight loss  Has been tolerating diet  Glucerna        360 Statement: Malnutrition related to chronic illness as evidenced by moderate temporal muscle loss, severe clavicle muscle loss, > 10% bodyweight loss in 6 months. To treat with oral diet and nutrition supplements as tolerated.          Body mass index is 20.62 kg/m².                                 BMI Findings:           There is no height or weight on file to calculate BMI.

## 2024-01-08 ENCOUNTER — NURSING HOME VISIT (OUTPATIENT)
Dept: GERIATRICS | Facility: OTHER | Age: 84
End: 2024-01-08
Payer: MEDICARE

## 2024-01-08 DIAGNOSIS — U07.1 COVID-19: ICD-10-CM

## 2024-01-08 DIAGNOSIS — Z85.71 HISTORY OF HODGKIN'S LYMPHOMA: ICD-10-CM

## 2024-01-08 DIAGNOSIS — E10.9 TYPE I DIABETES MELLITUS (HCC): ICD-10-CM

## 2024-01-08 DIAGNOSIS — K21.9 GASTROESOPHAGEAL REFLUX DISEASE WITHOUT ESOPHAGITIS: Chronic | ICD-10-CM

## 2024-01-08 DIAGNOSIS — G93.41 ACUTE METABOLIC ENCEPHALOPATHY: ICD-10-CM

## 2024-01-08 DIAGNOSIS — I70.90 ATHEROSCLEROTIC VASCULAR DISEASE: ICD-10-CM

## 2024-01-08 DIAGNOSIS — E83.42 HYPOMAGNESEMIA: ICD-10-CM

## 2024-01-08 DIAGNOSIS — R53.1 GENERALIZED WEAKNESS: ICD-10-CM

## 2024-01-08 DIAGNOSIS — Z91.89 AT RISK FOR INFLUENZA: ICD-10-CM

## 2024-01-08 DIAGNOSIS — E89.0 POSTOPERATIVE HYPOTHYROIDISM: ICD-10-CM

## 2024-01-08 DIAGNOSIS — R33.9 URINARY RETENTION: ICD-10-CM

## 2024-01-08 DIAGNOSIS — R41.0 DELIRIUM: ICD-10-CM

## 2024-01-08 DIAGNOSIS — E43 SEVERE PROTEIN-CALORIE MALNUTRITION (HCC): ICD-10-CM

## 2024-01-08 DIAGNOSIS — K52.9 ENTERITIS: Primary | ICD-10-CM

## 2024-01-08 DIAGNOSIS — E87.1 HYPONATREMIA: ICD-10-CM

## 2024-01-08 DIAGNOSIS — E78.2 MIXED HYPERLIPIDEMIA: ICD-10-CM

## 2024-01-08 DIAGNOSIS — I95.89 OTHER SPECIFIED HYPOTENSION: ICD-10-CM

## 2024-01-08 DIAGNOSIS — A41.9 SEPSIS WITHOUT ACUTE ORGAN DYSFUNCTION, DUE TO UNSPECIFIED ORGANISM (HCC): ICD-10-CM

## 2024-01-08 DIAGNOSIS — M15.9 PRIMARY OSTEOARTHRITIS INVOLVING MULTIPLE JOINTS: ICD-10-CM

## 2024-01-08 PROCEDURE — 99316 NF DSCHRG MGMT 30 MIN+: CPT | Performed by: STUDENT IN AN ORGANIZED HEALTH CARE EDUCATION/TRAINING PROGRAM

## 2024-01-08 RX ORDER — QUETIAPINE FUMARATE 25 MG/1
12.5 TABLET, FILM COATED ORAL
Qty: 15 TABLET | Refills: 0 | Status: SHIPPED | OUTPATIENT
Start: 2024-01-08 | End: 2024-02-07

## 2024-01-08 RX ORDER — INSULIN GLARGINE 100 [IU]/ML
6 INJECTION, SOLUTION SUBCUTANEOUS EVERY 12 HOURS SCHEDULED
Qty: 10 ML | Refills: 0 | Status: SHIPPED | OUTPATIENT
Start: 2024-01-08

## 2024-01-08 NOTE — ASSESSMENT & PLAN NOTE
Noted on imaging in hospital, possible infectious etiology of her recent symptoms  Treated with antibiotics course as above  No acute abdominal complaints, acute issue seems resolved  Continue to monitor

## 2024-01-08 NOTE — ASSESSMENT & PLAN NOTE
Likely in setting of COVID vs enteritis  -PT/OT  -fall precautions  -encourage appropriate DME use  -SW followed for safe discharge planning/homecare services as appropriate

## 2024-01-08 NOTE — ASSESSMENT & PLAN NOTE
Baseline Na appears around high 130s  Mild/stable on recent labs  Likely in setting of poor PO intake, recent infection  Encourage PO intake  Monitor on routine labs - fairly stable/fluctuating, no apparent acute associated symptoms  Consider further workup, fluid restrict, Nephrology if worsening

## 2024-01-08 NOTE — ASSESSMENT & PLAN NOTE
Given recent influenza A outbreak at SNF facility patient was started on prophylaxic tamiflu at rehab  Patient with no URI symptoms

## 2024-01-08 NOTE — ASSESSMENT & PLAN NOTE
Lab Results   Component Value Date    HGBA1C 8.9 (H) 05/26/2023     Daughter reports patient very sensitive to insulin and they have been working for years to keep her glucose stable as they can be labile     -Avoid hypoglycemia  -Continue Lantus 6 Units BID and sliding scale coverage at rehab  -Continue glucose monitoring  -Continue diabetic diet   -Follow up with PCP, Endocrine as appropriate

## 2024-01-08 NOTE — PROGRESS NOTES
[facility]  Facility: [facility]    DISCHARGE SUMMARY  Nursing Home Place of Service: 31: SNF/Short Term Rehab    NAME: Shakira Hinkle  : 1940 AGE: 83 y.o. SEX: female MRN: [id number]  DATE OF ENCOUNTER: 2024    DATE OF ADMISSION: 23 DATE OF DISCHARGE:24 DISCHARGE DISPOSITION: SNF    HPI: 83 y.o. female with PMH including HTN, HLD, hypothyroidism, DM1, GERD     Reason for admission: Patient was hospitalized at [facility] from 12/15 to 23 after recent discharge from rehab facility  For details of hospitalization, see hospital records including discharge documentation from 23   Briefly, patient hospitalized with generalized weakness/garbled speech, Neurology eval/workup reported generally unremarkable, found to have sepsis likely in setting of COVID positive and possible enteritis, was treated with supplemental O2 and antibiotic course in hospital, required Kinney due to urinary retention.      Course of stay: Patient was admitted to [facility] for rehabilitation due to physical deconditioning and covid/weakness. Significant events during the stay include: n/a. The patient participated in PT/OT.     Patient seen and examined in room  Others present: none  Patient seated in chair  Appears comfortable, awake, alert, oriented to situation, able to converse appropriately  Patient seems in fair to good spirits, baseline somewhat flat affect but no acute change, reports stable mood with no exacerbation. Patient feels fine with no acute complaints or questions, feels therapy has gone OK and she is amenable and aware regarding discharge to SNF today; no acute pain anywhere, breathing fine, appetite stable, last BM yesterday. No acute cardiopulmonary, abdominal, or urinary symptoms; see ROS for more details. Kinney in place for retention in the hospital not bothering her, draining clear yellow urine.   Does  "not think she needs any specific medications/refills sent on discharge.  No further questions or acute concerns identified.    Lab Review:   12/22: BMP with Na 133 (stable/improved, baseline in high 130s); CBC with WBC 9.15 (trend down, recent peak ~15); Hb 10.3 (baseline around 12-13); blood cx 12/15 NGTD; recent TSH elevated (14.32, prior was WNL); last A1c 8.9  12/26: BMP with Na 129; CBC with Hb 10.1  12/29: BMP generally stable, CBC with Hb 10.0  1/2: BMP generally stable, CBC with WBC 10.89  1/3: CBC with Hb 10.2, WBC ~7  1/6: BMP with Na 128; CBC with Hb 10.1       CT head 12/15 \"No acute intracranial abnormality. \"  CTA head/neck 12/15 \"Negative CTA head and neck for large vessel occlusion, dissection, or aneurysm.     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 Consider   follow-up with vascular surgery.     Fibromuscular dysplasia of bilateral ICA cervical segments.\"     CXR 12/15 \"No acute cardiopulmonary disease \"  CTA chest 12/16 \"1.  No evidence of pulmonary embolism.  2.  Fluid-filled distal small bowel may be due to nonspecific enteritis. Moderate diffuse thickening of the rectum with surrounding fat stranding which may be due to proctitis.\"  CXR 12/19 \"No evidence of CHF. Mild groundglass type infiltrates in the left lung which could be COVID-pneumonia. \"              EKG 12/15: NSR, 95bpm, Qtc 427  Echo July 2022: F 60, normal systolic function, grade 1 diastolic dysfunction         Assessment/Plan:    Enteritis  Noted on imaging in hospital, possible infectious etiology of her recent symptoms  Treated with antibiotics course as above  No acute abdominal complaints, acute issue seems resolved  Continue to monitor    Gastroesophageal reflux disease without esophagitis  -stable  -continue PPI  -recommend OOB " "with meals, sit upright for at least 30 minutes afterwards, avoid trigger foods  -continue to monitor  -follow up with PCP, GI as appropriate    Hypothyroidism  recent TSH elevated (14.32, prior was WNL) - could be elevated in setting of acute infection  Continue levothyroxine  No apparent acute/associated thyroid related symptoms  Follow up with PCP, Endocrine, recommend routine outpatient monitoring of TSH in several weeks and dose adjustment of levothyroxine as appropriate    Type I diabetes mellitus (HCC)    Lab Results   Component Value Date    HGBA1C 8.9 (H) 05/26/2023     Daughter reports patient very sensitive to insulin and they have been working for years to keep her glucose stable as they can be labile     -Avoid hypoglycemia  -Continue Lantus 6 Units BID and sliding scale coverage at rehab  -Continue glucose monitoring  -Continue diabetic diet   -Follow up with PCP, Endocrine as appropriate    Atherosclerotic vascular disease  Incidental finding on imaging \"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 Consider follow-up with vascular surgery.\"  No apparent acute/associated symptoms  Has been ordered statin but reportedly not taking  Defer to PCP    Hypotension  Hx of HTN with chronic hypotension more recently  Monitor BP - generally stable/maintained in 110s-130s sysolic  Avoid hypotension  Continue midodrine (with holding parameters)    Acute metabolic encephalopathy  Noted on recent hospitalization in Nov 2023  Neurological workup at the time reported generally unremarkable  Patient was reportedly evaluated by Neuro/Psych that admission and started on quetiapine  Arrived to rehab on quetiapine - continue for the moment, continue to evaluate for appropriateness, consider weaning as able  Presently at rehab " appears to be mentating well, no acute confusion presently, continue to monitor  Per daughter patient has no hx of dementia or psychiatric issues but since Nov 2023 has seemed different with flatter affect and some concern of hallucinations - per daughter she seems stable/better with seroquel  Possibly some underlying dementia vs delirium in setting of recent infection, hyponatremia  Advised outpatient follow-up with formal cognitive testing when she is stable/baseline outpatient  Consider weaning seroquel as able - have weaned to 12.5mg qHS dose while at rehab  To follow up with PCP, Psych/Neuro outpatient as appropriate    Primary osteoarthritis involving multiple joints  Pain stable  Continue PRN tylenol, voltaren    Urinary retention  Patient with urinary retention in hospital, required Kinney placement  Failed trial of void while at rehab requiring replacement of Kinney  Kinney in place draining clear yellow urine  Follow up with PCP, Urology as appropriate    At risk for influenza  Given recent influenza A outbreak at SNF facility patient was started on prophylaxic tamiflu at rehab  Patient with no URI symptoms    COVID-19  COVID positive 12/15/23  Transiently required supplemental O2 in hospital, now on room air  Was treated with supportive care per hospital records  Continue supportive measures as appropriate  Appears generally asymptomatic/recovered  Was on isolation as per facility protocol - presently off isolation    Generalized weakness  Likely in setting of COVID vs enteritis  -PT/OT  -fall precautions  -encourage appropriate DME use  -SW followed for safe discharge planning/homecare services as appropriate    History of Hodgkin's lymphoma  Status post mastoidectomy/left neck dissection and subsequent radiation   Follow up with PCP, Oncology as appropriate    Hyperlipidemia  Defer statin to PCP     Hypomagnesemia  Noted low on some recent labs  Repleted in hospital  Monitor routinely - most recently  WNL  Replete PRN    Hyponatremia  Baseline Na appears around high 130s  Mild/stable on recent labs  Likely in setting of poor PO intake, recent infection  Encourage PO intake  Monitor on routine labs - fairly stable/fluctuating, no apparent acute associated symptoms  Consider further workup, fluid restrict, Nephrology if worsening    Sepsis (HCC)  Reported POA in hospital leukocytosis, tachycardia, and lactic acidosis now resolved  Etiology likely related to COVID 19 and/or enteritis noted on imaging  Otherwise infectious workup including UA, blood cx generally unremarkable for etiology  Treated with IV fluids and abx including vanco/cefepime, transitioned to complete Augmentin course  Monitor for acute/recurrent infectious symptoms - no present signs    Severe protein-calorie malnutrition (HCC)  Malnutrition Findings:   Adult Malnutrition type: Chronic illness  Adult Degree of Malnutrition: Other severe protein calorie malnutrition  Malnutrition Characteristics: Muscle loss, Weight loss  Nutrition consult at rehab  Has been tolerating diet  Glucerna ordered (per daughter takes regularly at home)        360 Statement: Malnutrition related to chronic illness as evidenced by moderate temporal muscle loss, severe clavicle muscle loss, > 10% bodyweight loss in 6 months. To treat with oral diet and nutrition supplements as tolerated.     BMI Findings:     Body mass index is 20.62 kg/m².            Review of Systems   Constitutional:  Negative for appetite change, chills, diaphoresis, fatigue and fever.   HENT:  Negative for drooling, ear pain, hearing loss, rhinorrhea, sore throat and trouble swallowing.    Eyes:  Negative for pain, discharge, redness, itching and visual disturbance.   Respiratory:  Negative for cough, choking, chest tightness, shortness of breath and wheezing.    Cardiovascular:  Negative for chest pain, palpitations and leg swelling.   Gastrointestinal:  Negative for abdominal pain, blood in stool,  constipation, diarrhea, nausea and vomiting.   Genitourinary:  Negative for difficulty urinating, dysuria, hematuria and urgency.   Musculoskeletal:  Positive for gait problem. Negative for arthralgias and back pain.   Skin:  Negative for color change.   Neurological:  Negative for dizziness, tremors, speech difficulty, weakness and headaches.   Psychiatric/Behavioral:  Negative for agitation, behavioral problems and confusion. The patient is not nervous/anxious and is not hyperactive.    All other systems reviewed and are negative.      Vital Signs:     Blood pressure 112/58 Heart Rate: 79 Respiratory Rate 19   Temperature 97.2 Oxygen Saturation 95 room air Weight 105.4 lb    Exam:     Physical Exam  Vitals reviewed.   Constitutional:       General: She is not in acute distress.     Appearance: She is not toxic-appearing or diaphoretic.   HENT:      Head: Normocephalic and atraumatic.      Right Ear: External ear normal.      Left Ear: External ear normal.      Nose: Nose normal. No rhinorrhea.      Mouth/Throat:      Mouth: Mucous membranes are moist.      Pharynx: Oropharynx is clear. No posterior oropharyngeal erythema.      Comments: Left side of jaw appears s/p surgical resection  Eyes:      General: No scleral icterus.        Right eye: No discharge.         Left eye: No discharge.      Extraocular Movements: Extraocular movements intact.      Conjunctiva/sclera: Conjunctivae normal.      Pupils: Pupils are equal, round, and reactive to light.   Cardiovascular:      Rate and Rhythm: Normal rate and regular rhythm.   Pulmonary:      Effort: Pulmonary effort is normal. No respiratory distress.      Breath sounds: Normal breath sounds. No wheezing or rales.   Abdominal:      General: Bowel sounds are normal. There is no distension.      Palpations: Abdomen is soft.      Tenderness: There is no abdominal tenderness. There is no guarding.   Genitourinary:     Comments: Kinney in place draining clear yellow  urine  Musculoskeletal:         General: No swelling or tenderness.      Cervical back: No rigidity.   Skin:     General: Skin is warm and dry.      Coloration: Skin is not jaundiced.   Neurological:      General: No focal deficit present.      Mental Status: She is alert and oriented to person, place, and time. Mental status is at baseline.   Psychiatric:         Mood and Affect: Mood normal.         Behavior: Behavior normal.         Thought Content: Thought content normal.         Judgment: Judgment normal.           Discharge Medications: See discharge medication list which was reviewed and signed.    Status at time of discharge: Stable    Discussion with patient/family as appropriate and further instructions:  -Fall precautions  -Aspiration precautions  -Bleeding precautions  -Monitor for signs/symptoms of infection  -Medication list was reviewed and signed  -DME form was completed    Follow-up Recommendations: Please follow-up with your primary care physician within 7-10 days of discharge to review medication changes and current status.     Problem List Follow-up Recommendations:      -Total time spent on this encounter today including documentation and workup review, face to face time, history and exam, and documentation/orders was approximately 45 minutes.  -This note will be copied to [facility] EMR where vitals and medication orders are placed.    Kemar Moffett D.O.  Geriatric Medicine  1/8/2024 11:14 AM

## 2024-01-08 NOTE — ASSESSMENT & PLAN NOTE
Noted on recent hospitalization in Nov 2023  Neurological workup at the time reported generally unremarkable  Patient was reportedly evaluated by Neuro/Psych that admission and started on quetiapine  Arrived to rehab on quetiapine - continue for the moment, continue to evaluate for appropriateness, consider weaning as able  Presently at rehab appears to be mentating well, no acute confusion presently, continue to monitor  Per daughter patient has no hx of dementia or psychiatric issues but since Nov 2023 has seemed different with flatter affect and some concern of hallucinations - per daughter she seems stable/better with seroquel  Possibly some underlying dementia vs delirium in setting of recent infection, hyponatremia  Advised outpatient follow-up with formal cognitive testing when she is stable/baseline outpatient  Consider weaning seroquel as able - have weaned to 12.5mg qHS dose while at rehab  To follow up with PCP, Psych/Neuro outpatient as appropriate

## 2024-01-08 NOTE — ASSESSMENT & PLAN NOTE
Patient with urinary retention in hospital, required Kinney placement  Failed trial of void while at rehab requiring replacement of Kinney  Kinney in place draining clear yellow urine  Follow up with PCP, Urology as appropriate

## 2024-01-10 ENCOUNTER — LAB REQUISITION (OUTPATIENT)
Dept: LAB | Facility: HOSPITAL | Age: 84
End: 2024-01-10
Payer: MEDICARE

## 2024-01-10 ENCOUNTER — TELEPHONE (OUTPATIENT)
Age: 84
End: 2024-01-10

## 2024-01-10 DIAGNOSIS — E11.42 TYPE 2 DIABETES MELLITUS WITH DIABETIC POLYNEUROPATHY (HCC): ICD-10-CM

## 2024-01-10 LAB
ALBUMIN SERPL BCP-MCNC: 3.4 G/DL (ref 3.5–5)
ALP SERPL-CCNC: 65 U/L (ref 34–104)
ALT SERPL W P-5'-P-CCNC: 12 U/L (ref 7–52)
ANION GAP SERPL CALCULATED.3IONS-SCNC: 8 MMOL/L
AST SERPL W P-5'-P-CCNC: 17 U/L (ref 13–39)
BASOPHILS # BLD AUTO: 0.07 THOUSANDS/ÂΜL (ref 0–0.1)
BASOPHILS NFR BLD AUTO: 1 % (ref 0–1)
BILIRUB SERPL-MCNC: 0.49 MG/DL (ref 0.2–1)
BILIRUB UR QL STRIP: NEGATIVE
BUN SERPL-MCNC: 16 MG/DL (ref 5–25)
CALCIUM ALBUM COR SERPL-MCNC: 9.1 MG/DL (ref 8.3–10.1)
CALCIUM SERPL-MCNC: 8.6 MG/DL (ref 8.4–10.2)
CHLORIDE SERPL-SCNC: 91 MMOL/L (ref 96–108)
CLARITY UR: ABNORMAL
CO2 SERPL-SCNC: 30 MMOL/L (ref 21–32)
COLOR UR: YELLOW
CREAT SERPL-MCNC: 0.58 MG/DL (ref 0.6–1.3)
EOSINOPHIL # BLD AUTO: 0.17 THOUSAND/ÂΜL (ref 0–0.61)
EOSINOPHIL NFR BLD AUTO: 2 % (ref 0–6)
ERYTHROCYTE [DISTWIDTH] IN BLOOD BY AUTOMATED COUNT: 12.7 % (ref 11.6–15.1)
GFR SERPL CREATININE-BSD FRML MDRD: 85 ML/MIN/1.73SQ M
GLUCOSE SERPL-MCNC: 254 MG/DL (ref 65–140)
GLUCOSE UR STRIP-MCNC: ABNORMAL MG/DL
HCT VFR BLD AUTO: 32.3 % (ref 34.8–46.1)
HGB BLD-MCNC: 10.6 G/DL (ref 11.5–15.4)
HGB UR QL STRIP.AUTO: NEGATIVE
IMM GRANULOCYTES # BLD AUTO: 0.08 THOUSAND/UL (ref 0–0.2)
IMM GRANULOCYTES NFR BLD AUTO: 1 % (ref 0–2)
KETONES UR STRIP-MCNC: NEGATIVE MG/DL
LEUKOCYTE ESTERASE UR QL STRIP: NEGATIVE
LYMPHOCYTES # BLD AUTO: 1.52 THOUSANDS/ÂΜL (ref 0.6–4.47)
LYMPHOCYTES NFR BLD AUTO: 21 % (ref 14–44)
MAGNESIUM SERPL-MCNC: 1.7 MG/DL (ref 1.9–2.7)
MCH RBC QN AUTO: 32.1 PG (ref 26.8–34.3)
MCHC RBC AUTO-ENTMCNC: 32.8 G/DL (ref 31.4–37.4)
MCV RBC AUTO: 98 FL (ref 82–98)
MONOCYTES # BLD AUTO: 1.07 THOUSAND/ÂΜL (ref 0.17–1.22)
MONOCYTES NFR BLD AUTO: 15 % (ref 4–12)
NEUTROPHILS # BLD AUTO: 4.45 THOUSANDS/ÂΜL (ref 1.85–7.62)
NEUTS SEG NFR BLD AUTO: 60 % (ref 43–75)
NITRITE UR QL STRIP: NEGATIVE
NRBC BLD AUTO-RTO: 0 /100 WBCS
PH UR STRIP.AUTO: 6 [PH]
PLATELET # BLD AUTO: 366 THOUSANDS/UL (ref 149–390)
PMV BLD AUTO: 10.7 FL (ref 8.9–12.7)
POTASSIUM SERPL-SCNC: 4.3 MMOL/L (ref 3.5–5.3)
PROT SERPL-MCNC: 6.1 G/DL (ref 6.4–8.4)
PROT UR STRIP-MCNC: NEGATIVE MG/DL
RBC # BLD AUTO: 3.3 MILLION/UL (ref 3.81–5.12)
SODIUM SERPL-SCNC: 129 MMOL/L (ref 135–147)
SP GR UR STRIP.AUTO: 1.01
UROBILINOGEN UR QL STRIP.AUTO: 0.2 E.U./DL
WBC # BLD AUTO: 7.36 THOUSAND/UL (ref 4.31–10.16)

## 2024-01-10 PROCEDURE — 83735 ASSAY OF MAGNESIUM: CPT | Performed by: INTERNAL MEDICINE

## 2024-01-10 PROCEDURE — 81003 URINALYSIS AUTO W/O SCOPE: CPT | Performed by: INTERNAL MEDICINE

## 2024-01-10 PROCEDURE — 87086 URINE CULTURE/COLONY COUNT: CPT | Performed by: INTERNAL MEDICINE

## 2024-01-10 PROCEDURE — 85025 COMPLETE CBC W/AUTO DIFF WBC: CPT | Performed by: INTERNAL MEDICINE

## 2024-01-10 PROCEDURE — 80053 COMPREHEN METABOLIC PANEL: CPT | Performed by: INTERNAL MEDICINE

## 2024-01-10 PROCEDURE — 87077 CULTURE AEROBIC IDENTIFY: CPT | Performed by: INTERNAL MEDICINE

## 2024-01-10 PROCEDURE — 87186 SC STD MICRODIL/AGAR DIL: CPT | Performed by: INTERNAL MEDICINE

## 2024-01-10 NOTE — TELEPHONE ENCOUNTER
Patient's facility [facility] called to ask for the [facility] office's fax number.     I provided the caller with Fax number [phone]

## 2024-01-12 LAB — BACTERIA UR CULT: ABNORMAL

## 2024-01-16 ENCOUNTER — LAB REQUISITION (OUTPATIENT)
Dept: LAB | Facility: HOSPITAL | Age: 84
End: 2024-01-16

## 2024-01-16 DIAGNOSIS — E11.42 TYPE 2 DIABETES MELLITUS WITH DIABETIC POLYNEUROPATHY (HCC): ICD-10-CM

## 2024-01-16 LAB
ANION GAP SERPL CALCULATED.3IONS-SCNC: 10 MMOL/L
BUN SERPL-MCNC: 17 MG/DL (ref 5–25)
CALCIUM SERPL-MCNC: 8.8 MG/DL (ref 8.4–10.2)
CHLORIDE SERPL-SCNC: 96 MMOL/L (ref 96–108)
CO2 SERPL-SCNC: 30 MMOL/L (ref 21–32)
CREAT SERPL-MCNC: 0.63 MG/DL (ref 0.6–1.3)
GFR SERPL CREATININE-BSD FRML MDRD: 83 ML/MIN/1.73SQ M
GLUCOSE SERPL-MCNC: 77 MG/DL (ref 65–140)
MAGNESIUM SERPL-MCNC: 1.9 MG/DL (ref 1.9–2.7)
POTASSIUM SERPL-SCNC: 4.3 MMOL/L (ref 3.5–5.3)
SODIUM SERPL-SCNC: 136 MMOL/L (ref 135–147)

## 2024-01-16 PROCEDURE — 83735 ASSAY OF MAGNESIUM: CPT | Performed by: NURSE PRACTITIONER

## 2024-01-16 PROCEDURE — 80048 BASIC METABOLIC PNL TOTAL CA: CPT | Performed by: NURSE PRACTITIONER

## 2024-01-23 ENCOUNTER — LAB REQUISITION (OUTPATIENT)
Dept: LAB | Facility: HOSPITAL | Age: 84
End: 2024-01-23

## 2024-01-23 DIAGNOSIS — Z11.52 ENCOUNTER FOR SCREENING FOR COVID-19: ICD-10-CM

## 2024-01-23 LAB
FLUAV RNA RESP QL NAA+PROBE: NEGATIVE
FLUBV RNA RESP QL NAA+PROBE: NEGATIVE
RSV RNA RESP QL NAA+PROBE: NEGATIVE
SARS-COV-2 RNA RESP QL NAA+PROBE: POSITIVE

## 2024-01-23 PROCEDURE — 0241U HB NFCT DS VIR RESP RNA 4 TRGT: CPT | Performed by: INTERNAL MEDICINE

## 2024-01-31 ENCOUNTER — LAB REQUISITION (OUTPATIENT)
Dept: LAB | Facility: HOSPITAL | Age: 84
End: 2024-01-31
Payer: MEDICARE

## 2024-01-31 DIAGNOSIS — E11.42 TYPE 2 DIABETES MELLITUS WITH DIABETIC POLYNEUROPATHY (HCC): ICD-10-CM

## 2024-01-31 LAB
ALBUMIN SERPL BCP-MCNC: 3 G/DL (ref 3.5–5)
ALP SERPL-CCNC: 69 U/L (ref 34–104)
ALT SERPL W P-5'-P-CCNC: 12 U/L (ref 7–52)
ANION GAP SERPL CALCULATED.3IONS-SCNC: 10 MMOL/L
AST SERPL W P-5'-P-CCNC: 25 U/L (ref 13–39)
BASOPHILS # BLD AUTO: 0.06 THOUSANDS/ÂΜL (ref 0–0.1)
BASOPHILS NFR BLD AUTO: 1 % (ref 0–1)
BILIRUB SERPL-MCNC: 0.32 MG/DL (ref 0.2–1)
BUN SERPL-MCNC: 16 MG/DL (ref 5–25)
CALCIUM ALBUM COR SERPL-MCNC: 9.1 MG/DL (ref 8.3–10.1)
CALCIUM SERPL-MCNC: 8.3 MG/DL (ref 8.4–10.2)
CHLORIDE SERPL-SCNC: 91 MMOL/L (ref 96–108)
CO2 SERPL-SCNC: 28 MMOL/L (ref 21–32)
CREAT SERPL-MCNC: 0.54 MG/DL (ref 0.6–1.3)
EOSINOPHIL # BLD AUTO: 0.29 THOUSAND/ÂΜL (ref 0–0.61)
EOSINOPHIL NFR BLD AUTO: 4 % (ref 0–6)
ERYTHROCYTE [DISTWIDTH] IN BLOOD BY AUTOMATED COUNT: 12.6 % (ref 11.6–15.1)
GFR SERPL CREATININE-BSD FRML MDRD: 87 ML/MIN/1.73SQ M
GLUCOSE SERPL-MCNC: 131 MG/DL (ref 65–140)
HCT VFR BLD AUTO: 34.4 % (ref 34.8–46.1)
HGB BLD-MCNC: 11.2 G/DL (ref 11.5–15.4)
IMM GRANULOCYTES # BLD AUTO: 0.06 THOUSAND/UL (ref 0–0.2)
IMM GRANULOCYTES NFR BLD AUTO: 1 % (ref 0–2)
LYMPHOCYTES # BLD AUTO: 1.84 THOUSANDS/ÂΜL (ref 0.6–4.47)
LYMPHOCYTES NFR BLD AUTO: 22 % (ref 14–44)
MCH RBC QN AUTO: 31.4 PG (ref 26.8–34.3)
MCHC RBC AUTO-ENTMCNC: 32.6 G/DL (ref 31.4–37.4)
MCV RBC AUTO: 96 FL (ref 82–98)
MONOCYTES # BLD AUTO: 0.62 THOUSAND/ÂΜL (ref 0.17–1.22)
MONOCYTES NFR BLD AUTO: 8 % (ref 4–12)
NEUTROPHILS # BLD AUTO: 5.37 THOUSANDS/ÂΜL (ref 1.85–7.62)
NEUTS SEG NFR BLD AUTO: 64 % (ref 43–75)
NRBC BLD AUTO-RTO: 0 /100 WBCS
PLATELET # BLD AUTO: 318 THOUSANDS/UL (ref 149–390)
PMV BLD AUTO: 10.3 FL (ref 8.9–12.7)
POTASSIUM SERPL-SCNC: 4.2 MMOL/L (ref 3.5–5.3)
PROT SERPL-MCNC: 6.1 G/DL (ref 6.4–8.4)
RBC # BLD AUTO: 3.57 MILLION/UL (ref 3.81–5.12)
SODIUM SERPL-SCNC: 129 MMOL/L (ref 135–147)
TSH SERPL DL<=0.05 MIU/L-ACNC: 65.7 UIU/ML (ref 0.45–4.5)
WBC # BLD AUTO: 8.24 THOUSAND/UL (ref 4.31–10.16)

## 2024-01-31 PROCEDURE — 85025 COMPLETE CBC W/AUTO DIFF WBC: CPT | Performed by: NURSE PRACTITIONER

## 2024-01-31 PROCEDURE — 84443 ASSAY THYROID STIM HORMONE: CPT | Performed by: NURSE PRACTITIONER

## 2024-01-31 PROCEDURE — 80053 COMPREHEN METABOLIC PANEL: CPT | Performed by: NURSE PRACTITIONER

## 2024-02-06 ENCOUNTER — NURSING HOME VISIT (OUTPATIENT)
Dept: BEHAVIORAL HEALTH UNIT | Facility: HOSPITAL | Age: 84
End: 2024-02-06

## 2024-02-06 DIAGNOSIS — R53.1 GENERALIZED WEAKNESS: Primary | ICD-10-CM

## 2024-02-06 DIAGNOSIS — R41.9 NEUROCOGNITIVE DISORDER: ICD-10-CM

## 2024-02-06 NOTE — PLAN OF CARE
Patient discharged to home. Left CVL via wheelchair, accompanied by family   Problem: PHYSICAL THERAPY ADULT  Goal: Performs mobility at highest level of function for planned discharge setting  See evaluation for individualized goals  Description  Treatment/Interventions: Functional transfer training, Elevations, Therapeutic exercise, Endurance training, Patient/family training, Gait training, Spoke to nursing  Equipment Recommended: (TBD)       See flowsheet documentation for full assessment, interventions and recommendations  Note:   Prognosis: Good  Problem List: Decreased endurance, Decreased mobility  Assessment: Pt is 78 y o  female seen for PT evaluation on 12/19/2019 s/p admit to [address] [place] [address] on 12/19/2019 w/ Acute intractable headache  PT consulted to assess pt's functional mobility and d/c needs  Order placed for PT eval and tx, w/ up w/ A and up and OOB as tolerated order  Performed at least 2 patient identifiers during session: Name and wristband  Comorbidities affecting pt's physical performance at time of assessment include: acute cystitis s hematuria, essential HTN, coronary arteriosclerosis, DM type 2, mixed HLD, hypothyroidism  PTA, pt was independent w/ all functional mobility w/ SPC, ambulates community distances and elevations, ambulates household distances, has 2 TEE and lives alone in 1 level home  Personal factors affecting pt at time of IE include: ambulating w/ assistive device, stairs to enter home, unable to perform physical activity and inability to perform IADLs  Please find objective findings from PT assessment regarding body systems outlined above with impairments and limitations including weakness, impaired balance, decreased activity tolerance, decreased functional mobility tolerance and fall risk, as well as mobility assessment (need for cueing for mobility technique)  The following objective measures performed on IE also reveal limitations: Barthel Index: 45/100   Pt's clinical presentation is currently unstable/unpredictable seen in pt's presentation of ongoing medical assessment  Pt to benefit from continued PT tx to address deficits as defined above and maximize level of functional independent mobility and consistency  From PT/mobility standpoint, recommendation at time of d/c would be TBD, pending progress in order to facilitate return to PLOF  Barriers to Discharge: Decreased caregiver support(pt lives alone, + support system)     Recommendation: Defer at this time(TBD, pending further mobility assessment)     PT - OK to Discharge: No    See flowsheet documentation for full assessment

## 2024-02-07 ENCOUNTER — LAB REQUISITION (OUTPATIENT)
Dept: LAB | Facility: HOSPITAL | Age: 84
End: 2024-02-07
Payer: MEDICARE

## 2024-02-07 ENCOUNTER — OFFICE VISIT (OUTPATIENT)
Dept: UROLOGY | Facility: CLINIC | Age: 84
End: 2024-02-07
Payer: MEDICARE

## 2024-02-07 VITALS
HEART RATE: 85 BPM | WEIGHT: 103 LBS | SYSTOLIC BLOOD PRESSURE: 102 MMHG | OXYGEN SATURATION: 94 % | HEIGHT: 61 IN | DIASTOLIC BLOOD PRESSURE: 58 MMHG | BODY MASS INDEX: 19.45 KG/M2

## 2024-02-07 DIAGNOSIS — R33.9 URINARY RETENTION: Primary | ICD-10-CM

## 2024-02-07 DIAGNOSIS — E87.1 HYPO-OSMOLALITY AND HYPONATREMIA: ICD-10-CM

## 2024-02-07 LAB
ANION GAP SERPL CALCULATED.3IONS-SCNC: 10 MMOL/L
BUN SERPL-MCNC: 17 MG/DL (ref 5–25)
CALCIUM SERPL-MCNC: 8.6 MG/DL (ref 8.4–10.2)
CHLORIDE SERPL-SCNC: 92 MMOL/L (ref 96–108)
CO2 SERPL-SCNC: 28 MMOL/L (ref 21–32)
CREAT SERPL-MCNC: 0.47 MG/DL (ref 0.6–1.3)
GFR SERPL CREATININE-BSD FRML MDRD: 91 ML/MIN/1.73SQ M
GLUCOSE SERPL-MCNC: 108 MG/DL (ref 65–140)
POTASSIUM SERPL-SCNC: 4.2 MMOL/L (ref 3.5–5.3)
SODIUM SERPL-SCNC: 130 MMOL/L (ref 135–147)

## 2024-02-07 PROCEDURE — 99214 OFFICE O/P EST MOD 30 MIN: CPT

## 2024-02-07 PROCEDURE — 80048 BASIC METABOLIC PNL TOTAL CA: CPT | Performed by: NURSE PRACTITIONER

## 2024-02-07 PROCEDURE — 99203 OFFICE O/P NEW LOW 30 MIN: CPT

## 2024-02-07 NOTE — PATIENT INSTRUCTIONS
Assessment/Plan    Urinary retention  Likely secondary acutely ill state as well as functional decline. I agree with Dr. Denny Herman's recommendations for void trial when patient has medically improved and mobility has increased. She has medically improved since discharge, but mobility is still limited and she reports that she has been non-ambulatory for approximately 6 months.   Kinney catheter was last exchanged on 2/02/2024. I recommend void trial at time of next catheter exchange in approximately 4 weeks.   Written orders for void trial have been provided. If able to successfully pass void trial I recommend a Renal US in 1 month to ensure no recurrence of urinary retention.

## 2024-02-07 NOTE — PROGRESS NOTES
2/7/2024    Chief Complaint   Patient presents with    Follow-up     NEWP to be established for urinary retention. Kinney Catheter in place.       Assessment and Plan    83 y.o. female new patient to office    Urinary retention  Likely secondary acutely ill state as well as functional decline. I agree with Dr. Denny Herman's recommendations for void trial when patient has medically improved and mobility has increased. She has medically improved since discharge, but mobility is still limited and she reports that she has been non-ambulatory for approximately 6 months.   Kinney catheter was last exchanged on 2/02/2024. I recommend void trial at time of next catheter exchange in approximately 4 weeks.   Written orders for void trial have been provided. If able to successfully pass void trial I recommend a Renal US in 1 month to ensure no recurrence of urinary retention.       History of Present Illness  Shakira Hinkle is a 83 y.o. female new patient to office. Here for evaluation of urinary retention.    Patient had hospitalization at [facility] in mid December 2023 due to generalized weakness, stroke like symptoms, and sepsis which was likely in the setting of COVID-19 and possible enteritis per CT imaging.  Patient was evaluated by neurology and CT imaging showed no acute intracranial abnormalities.  While hospitalized patient failed urinary retention protocol and a Kinney catheter was placed.  He was seen in consultation by Dr. Denny Herman with urology who recommended continuing the Kinney catheter until patient was medically improved and mobility had also increased.  Patient was discharged and transferred to [facility] acute rehab.    Patient presents today via stretcher from [facility] with staff.  Patient reports overall doing well since discharge from the hospital.  She still does not feel like she is back to her prior baseline.  She reports that she has been nonambulatory for almost 6  "months now.  She denies any history of urinary retention in the past.  Kinney catheter is in place draining clear yellow urine to gravity.  This was last exchanged on 2/2/2024.      Review of Systems   Constitutional:  Negative for chills and fever.   HENT:  Negative for ear pain and sore throat.    Eyes:  Negative for pain and visual disturbance.   Respiratory:  Negative for cough and shortness of breath.    Cardiovascular:  Negative for chest pain and palpitations.   Gastrointestinal:  Negative for abdominal pain and vomiting.   Genitourinary:  Negative for dysuria and hematuria.   Musculoskeletal:  Negative for arthralgias and back pain.   Skin:  Negative for color change and rash.   Neurological:  Negative for seizures and syncope.   All other systems reviewed and are negative.              Vitals  Vitals:    02/07/24 1336   BP: 102/58   Pulse: 85   SpO2: 94%   Weight: 46.7 kg (103 lb)   Height: 5' 1\" (1.549 m)       Physical Exam  Vitals reviewed.   Constitutional:       General: She is not in acute distress.     Appearance: Normal appearance. She is normal weight. She is not ill-appearing or toxic-appearing.   HENT:      Head: Normocephalic and atraumatic.      Nose: Nose normal.   Eyes:      General: No scleral icterus.     Conjunctiva/sclera: Conjunctivae normal.   Cardiovascular:      Rate and Rhythm: Normal rate.      Pulses: Normal pulses.   Pulmonary:      Effort: Pulmonary effort is normal. No respiratory distress.   Abdominal:      General: Abdomen is flat.      Palpations: Abdomen is soft.      Tenderness: There is no abdominal tenderness. There is no right CVA tenderness or left CVA tenderness.      Hernia: No hernia is present.   Genitourinary:     Comments: Urethral Kinney catheter draining clear yellow urine to gravity  Musculoskeletal:         General: Normal range of motion.      Cervical back: Normal range of motion.   Skin:     General: Skin is warm and dry.   Neurological:      General: No " focal deficit present.      Mental Status: She is alert and oriented to person, place, and time. Mental status is at baseline.      Motor: Weakness present.      Gait: Gait abnormal (Nonambulatory).   Psychiatric:         Mood and Affect: Mood normal.         Behavior: Behavior normal.         Thought Content: Thought content normal.         Judgment: Judgment normal.         Past History  Past Medical History:   Diagnosis Date    Ambulates with cane     Cancer ([facility])     Chronic pain disorder     knees/ shoulders (gets inj every 3 mos)    Closed intertrochanteric fracture of right femur ([facility]) 5/26/2020    Controlled type 2 diabetes mellitus with diabetic polyneuropathy, with long-term current use of insulin ([facility]) 5/21/2008    Diabetes mellitus ([facility])     Diabetic polyneuropathy ([facility]) 5/21/2008    ICD10 clean up    Disease of thyroid gland     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    HL (hearing loss)     Hodgkin's disease ([facility]) 2008    Left neck, had radiation    Hypertension     Neuropathy     Osteoporosis     RA (rheumatoid arthritis) ([facility])     Traumatic rhabdomyolysis ([facility]) 10/17/2022     Social History     Socioeconomic History    Marital status:      Spouse name: None    Number of children: None    Years of education: None    Highest education level: None   Occupational History    None   Tobacco Use    Smoking status: Never    Smokeless tobacco: Never   Vaping Use    Vaping status: Never Used   Substance and Sexual Activity    Alcohol use: Not Currently     Alcohol/week: 0.0 standard drinks of alcohol    Drug use: Never    Sexual activity: Not Currently   Other Topics Concern    None   Social History Narrative    None     Social Determinants of Health     Financial Resource Strain: Not on file   Food Insecurity: No Food Insecurity (12/18/2023)    Hunger Vital Sign     Worried About Running Out of Food in the Last Year: Never true     Ran Out of Food in the Last Year: Never true   Transportation Needs: No  "Transportation Needs (12/18/2023)    PRAPARE - Transportation     Lack of Transportation (Medical): No     Lack of Transportation (Non-Medical): No   Physical Activity: Not on file   Stress: Not on file   Social Connections: Not on file   Intimate Partner Violence: Not on file   Housing Stability: Low Risk  (12/18/2023)    Housing Stability Vital Sign     Unable to Pay for Housing in the Last Year: No     Number of Places Lived in the Last Year: 1     Unstable Housing in the Last Year: No     Social History     Tobacco Use   Smoking Status Never   Smokeless Tobacco Never     Family History   Problem Relation Age of Onset    Cancer Mother     Diabetes Mother     Diabetes Father     Hypertension Father        The following portions of the patient's history were reviewed and updated as appropriate allergies, current medications, past medical history, past social history, past surgical history and problem list    Imaging:    Results  No results found for this or any previous visit (from the past 1 hour(s)).]  No results found for: \"PSA\"  Lab Results   Component Value Date    CALCIUM 8.6 02/07/2024    K 4.2 02/07/2024    CO2 28 02/07/2024    CL 92 (L) 02/07/2024    BUN 17 02/07/2024    CREATININE 0.47 (L) 02/07/2024     Lab Results   Component Value Date    WBC 8.24 01/31/2024    HGB 11.2 (L) 01/31/2024    HCT 34.4 (L) 01/31/2024    MCV 96 01/31/2024     01/31/2024       Please Note:  Voice dictation software has been used to create this document. There may be inadvertent transcriptions errors.     ADRIAN Bella 02/07/24   "

## 2024-02-08 ENCOUNTER — TELEPHONE (OUTPATIENT)
Dept: NEUROLOGY | Facility: CLINIC | Age: 84
End: 2024-02-08

## 2024-02-08 NOTE — TELEPHONE ENCOUNTER
Hello,     Can you please advise which Speciality/Team and if patient can be seen by an Resident, AP and or Attending  only?    Thank you for your time,     Nancy        Recommendations for outpatient neurological follow up have yet to be determined.     [facility]/ [facility] / STROKE     IN- FACILITY- 12/22/2023.

## 2024-02-12 NOTE — PSYCH
PSYCHIATRIC EVALUATION         [facility]  POS: 32: NF- Long Term, [facility]       Name and Date of Birth:  Shakira Hinkle 83 y.o. 1940 MRN: [id number]    Date of Visit: February 6 , 2024    No Known Allergies    SUBJECTIVE:    Shakiar Hinkle is a 83 y.o. female patient who was recently treated for generalized weakness, COVID-19, dysarthria, sepsis, urinary retention, hypotension, hypothyroidism, GERD, diabetes, stroke-like symptoms including dysarthria and headache. In addition pt has history of neurocognitive disorder as she was seen and examined by psych consultant in November 2023 with reported diagnosis of dementia with behavioral disturbance, confusion and fall. The only psychotropic medication prescribed in the past was Seroquel low doses which has been discontinued recently with no side effects. Staff member reports that patient has been showing intermittent episodes of confusion, forgetfulness alternating with more organized thought process. She has not shown significant behavioral disturbances since admission to [facility]. On evaluation, Shakira presents mildly anxious, in wheelchair, able to answer questions concrete and goal directed way. She is alert and oriented in person and months, recall 1/3 but was unable able to provide any meaningful information about her recent medical treatments, physical and cognitive function decline. States she has a daughter but was living alone. Denies any current suicidal ideation or wish to die, appears preoccupied but no active psychotic symptoms was noted. Patient reports fair appetite and sleep and denies any current medication side effects. No acute focal neurological deficit or change in mental status noted during examination.    PLAN:    All medications and routine orders were reviewed and updated as needed.  Pt is not on any psych meds presently.  Will follow up as needed.     Diagnoses and all orders  for this visit:    Generalized weakness    Neurocognitive disorder     Current Medications  Medications reviewed and updated in facility chart.      HPI ROS Appetite Changes and Sleep:     She reports fluctuating sleep pattern, adequate appetite, fluctuating energy levels. Denies homicidal ideation, denies suicidal ideation    Review Of Systems:   no complaints, all other systems are negative       Mental Status Evaluation:    Appearance:  age appropriate   Behavior:  restless   Speech:  normal volume   Mood:  anxious   Affect:  mood-congruent   Thought Process:  concrete   Associations: concrete associations   Thought Content:  no overt delusions   Perceptual Disturbances: denies auditory hallucinations when asked   Risk Potential: Suicidal ideation - None at present  Homicidal ideation - None at present  Potential for aggression - Not at present   Sensorium:  oriented to person   Memory:  recent memory moderately impaired   Consciousness:  alert and awake   Attention: attention span and concentration appear shorter than expected for age   Insight:  limited   Judgment: limited   Gait/Station: in wheelchair   Motor Activity: no abnormal movements       History Review:The following portions of the patient's history were reviewed and updated as appropriate: psychiatric history, trauma history allergies, current medications, past family history, past medical history, past social history, past surgical history, and problem list     OBJECTIVE:     Vital signs in last 24 hours: Vital signs and nursing notes reviewed in facility chart.      Laboratory Results: Lab results reviewed in facility chart.      Medications Risks/Benefits:      Risks, Benefits And Possible Side Effects Of Medications:    Discussed risks and benefits of treatment with patient including :N/A pt is not on any psych meds presently.    Controlled Medication Discussion:     Not applicable - controlled prescriptions are not prescribed by this  practice    Evaluation of Psychotropic Drugs for possible gradual dose reductions    Psychotropic medications have been reviewed. Recent Seroquel low doses was discontinued with no side effects.       Benson Duong MD

## 2024-02-14 ENCOUNTER — LAB REQUISITION (OUTPATIENT)
Dept: LAB | Facility: HOSPITAL | Age: 84
End: 2024-02-14
Payer: MEDICARE

## 2024-02-14 DIAGNOSIS — E03.9 HYPOTHYROIDISM, UNSPECIFIED: ICD-10-CM

## 2024-02-14 LAB — TSH SERPL DL<=0.05 MIU/L-ACNC: 49.13 UIU/ML (ref 0.45–4.5)

## 2024-02-14 PROCEDURE — 84443 ASSAY THYROID STIM HORMONE: CPT | Performed by: NURSE PRACTITIONER

## 2024-02-16 PROBLEM — A41.9 SEPSIS (HCC): Status: RESOLVED | Noted: 2023-12-18 | Resolved: 2024-02-16

## 2024-03-06 ENCOUNTER — TELEPHONE (OUTPATIENT)
Age: 84
End: 2024-03-06

## 2024-03-06 NOTE — TELEPHONE ENCOUNTER
Received transferred call from patients nurse at [facility]. Patients Kinney catheter was removed this morning and patient has not yet voided. She was bladder scanned for 88 mL. Advised to increase fluids and continue monitoring bladder scans and straight catheterize as needed. If straight cath >350 mL, plan to leave Kinney in place and contact Urology. Staff will keep office informed over the next day or 2 if catheter ends up being re-inserted or not to determine appropriate follow up.

## 2024-03-06 NOTE — TELEPHONE ENCOUNTER
[facility] received Void Trial orders for this patient. The nurse had further questions. Was able to connect her with the office

## 2024-03-07 NOTE — TELEPHONE ENCOUNTER
Maranda from the [facility] called her cath is still out and she is voiding constantly but she is still voiding.     Maranda can be reached at [phone]

## 2024-03-07 NOTE — TELEPHONE ENCOUNTER
Returned call and spoke with Adriano, patients nurse at [facility]. She reports patient has been urinating without difficulty. She has been incontinent of urine and staff has been performing bladder scans post each incontinent episode. Bladder scan results as follows since yesterday afternoon: 88mL, 73 mL, 164 mL, 125 mL. Patient denies complaints and staff reports no abdominal distention noted. Spoke with ADRIAN Mendez and staff can discontinue bladder scans at this time and perform prn as patient is asymptomatic. They will schedule patient for renal US with PVR to be completed in 1 month.

## 2024-03-13 ENCOUNTER — LAB REQUISITION (OUTPATIENT)
Dept: LAB | Facility: HOSPITAL | Age: 84
End: 2024-03-13
Payer: MEDICARE

## 2024-03-13 DIAGNOSIS — E03.9 HYPOTHYROIDISM, UNSPECIFIED: ICD-10-CM

## 2024-03-13 LAB — TSH SERPL DL<=0.05 MIU/L-ACNC: 41.55 UIU/ML (ref 0.45–4.5)

## 2024-03-13 PROCEDURE — 84443 ASSAY THYROID STIM HORMONE: CPT | Performed by: INTERNAL MEDICINE

## 2024-03-20 PROBLEM — F03.90 MAJOR NEUROCOGNITIVE DISORDER (HCC): Status: ACTIVE | Noted: 2024-03-20

## 2024-03-20 PROBLEM — R41.9 NEUROCOGNITIVE DISORDER: Status: ACTIVE | Noted: 2024-03-20

## 2024-04-08 ENCOUNTER — HOSPITAL ENCOUNTER (OUTPATIENT)
Dept: ULTRASOUND IMAGING | Facility: HOSPITAL | Age: 84
Discharge: HOME | End: 2024-04-08
Payer: MEDICARE

## 2024-04-08 DIAGNOSIS — R33.9 URINARY RETENTION: ICD-10-CM

## 2024-04-08 PROCEDURE — 76770 US EXAM ABDO BACK WALL COMP: CPT

## 2024-04-09 ENCOUNTER — LAB REQUISITION (OUTPATIENT)
Dept: LAB | Facility: HOSPITAL | Age: 84
End: 2024-04-09
Payer: MEDICARE

## 2024-04-09 DIAGNOSIS — N32.89 BLADDER WALL THICKENING: Primary | ICD-10-CM

## 2024-04-09 DIAGNOSIS — I95.9 HYPOTENSION, UNSPECIFIED: ICD-10-CM

## 2024-04-09 DIAGNOSIS — R33.9 URINARY RETENTION: ICD-10-CM

## 2024-04-09 DIAGNOSIS — N21.0 BLADDER CALCULI: ICD-10-CM

## 2024-04-09 LAB
ALBUMIN SERPL BCP-MCNC: 3.7 G/DL (ref 3.5–5)
ALP SERPL-CCNC: 56 U/L (ref 34–104)
ALT SERPL W P-5'-P-CCNC: 17 U/L (ref 7–52)
ANION GAP SERPL CALCULATED.3IONS-SCNC: 8 MMOL/L (ref 4–13)
AST SERPL W P-5'-P-CCNC: 23 U/L (ref 13–39)
BASOPHILS # BLD AUTO: 0.1 THOUSANDS/ÂΜL (ref 0–0.1)
BASOPHILS NFR BLD AUTO: 1 % (ref 0–1)
BILIRUB SERPL-MCNC: 0.37 MG/DL (ref 0.2–1)
BUN SERPL-MCNC: 21 MG/DL (ref 5–25)
CALCIUM SERPL-MCNC: 9.4 MG/DL (ref 8.4–10.2)
CHLORIDE SERPL-SCNC: 98 MMOL/L (ref 96–108)
CO2 SERPL-SCNC: 30 MMOL/L (ref 21–32)
CREAT SERPL-MCNC: 0.61 MG/DL (ref 0.6–1.3)
EOSINOPHIL # BLD AUTO: 0.12 THOUSAND/ÂΜL (ref 0–0.61)
EOSINOPHIL NFR BLD AUTO: 1 % (ref 0–6)
ERYTHROCYTE [DISTWIDTH] IN BLOOD BY AUTOMATED COUNT: 13.9 % (ref 11.6–15.1)
GFR SERPL CREATININE-BSD FRML MDRD: 84 ML/MIN/1.73SQ M
GLUCOSE SERPL-MCNC: 111 MG/DL (ref 65–140)
HCT VFR BLD AUTO: 37.4 % (ref 34.8–46.1)
HGB BLD-MCNC: 12 G/DL (ref 11.5–15.4)
IMM GRANULOCYTES # BLD AUTO: 0.05 THOUSAND/UL (ref 0–0.2)
IMM GRANULOCYTES NFR BLD AUTO: 1 % (ref 0–2)
LYMPHOCYTES # BLD AUTO: 1.71 THOUSANDS/ÂΜL (ref 0.6–4.47)
LYMPHOCYTES NFR BLD AUTO: 16 % (ref 14–44)
MCH RBC QN AUTO: 31.7 PG (ref 26.8–34.3)
MCHC RBC AUTO-ENTMCNC: 32.1 G/DL (ref 31.4–37.4)
MCV RBC AUTO: 99 FL (ref 82–98)
MONOCYTES # BLD AUTO: 0.82 THOUSAND/ÂΜL (ref 0.17–1.22)
MONOCYTES NFR BLD AUTO: 8 % (ref 4–12)
NEUTROPHILS # BLD AUTO: 7.99 THOUSANDS/ÂΜL (ref 1.85–7.62)
NEUTS SEG NFR BLD AUTO: 73 % (ref 43–75)
NRBC BLD AUTO-RTO: 0 /100 WBCS
PLATELET # BLD AUTO: 310 THOUSANDS/UL (ref 149–390)
PMV BLD AUTO: 10 FL (ref 8.9–12.7)
POTASSIUM SERPL-SCNC: 3.9 MMOL/L (ref 3.5–5.3)
PROT SERPL-MCNC: 6.5 G/DL (ref 6.4–8.4)
RBC # BLD AUTO: 3.79 MILLION/UL (ref 3.81–5.12)
SODIUM SERPL-SCNC: 136 MMOL/L (ref 135–147)
WBC # BLD AUTO: 10.79 THOUSAND/UL (ref 4.31–10.16)

## 2024-04-09 PROCEDURE — 80053 COMPREHEN METABOLIC PANEL: CPT | Performed by: NURSE PRACTITIONER

## 2024-04-09 PROCEDURE — 85025 COMPLETE CBC W/AUTO DIFF WBC: CPT | Performed by: NURSE PRACTITIONER

## 2024-04-09 NOTE — RESULT ENCOUNTER NOTE
Patient's renal ultrasound was limited regards to PVR testing as patient was unable to void.  Prevoid volume with 187 mL.  Ultrasound also showed bladder stones and thickening along the posterior wall.  Due to findings of posterior wall thickening I recommend cystoscopy evaluation.  Please call patient to schedule this.

## 2024-04-10 ENCOUNTER — LAB REQUISITION (OUTPATIENT)
Dept: LAB | Facility: HOSPITAL | Age: 84
End: 2024-04-10
Payer: MEDICARE

## 2024-04-10 DIAGNOSIS — E03.9 HYPOTHYROIDISM, UNSPECIFIED: ICD-10-CM

## 2024-04-10 LAB
T4 SERPL-MCNC: 9.7 UG/DL (ref 6.09–12.23)
TSH SERPL DL<=0.05 MIU/L-ACNC: 22.34 UIU/ML (ref 0.45–4.5)

## 2024-04-10 PROCEDURE — 84443 ASSAY THYROID STIM HORMONE: CPT | Performed by: INTERNAL MEDICINE

## 2024-04-10 PROCEDURE — 84436 ASSAY OF TOTAL THYROXINE: CPT | Performed by: INTERNAL MEDICINE

## 2024-04-19 ENCOUNTER — LAB REQUISITION (OUTPATIENT)
Dept: LAB | Facility: HOSPITAL | Age: 84
End: 2024-04-19

## 2024-04-19 DIAGNOSIS — E10.42 TYPE 1 DIABETES MELLITUS WITH DIABETIC POLYNEUROPATHY (HCC): ICD-10-CM

## 2024-04-19 LAB
EST. AVERAGE GLUCOSE BLD GHB EST-MCNC: 214 MG/DL
HBA1C MFR BLD: 9.1 %

## 2024-05-09 ENCOUNTER — LAB REQUISITION (OUTPATIENT)
Dept: LAB | Facility: HOSPITAL | Age: 84
End: 2024-05-09
Payer: MEDICARE

## 2024-05-09 DIAGNOSIS — E03.9 HYPOTHYROIDISM, UNSPECIFIED: ICD-10-CM

## 2024-05-09 LAB — TSH SERPL DL<=0.05 MIU/L-ACNC: 10.95 UIU/ML (ref 0.45–4.5)

## 2024-05-09 PROCEDURE — 84443 ASSAY THYROID STIM HORMONE: CPT | Performed by: INTERNAL MEDICINE

## 2024-06-13 PROBLEM — Z86.16 PERSONAL HISTORY OF COVID-19: Status: ACTIVE | Noted: 2024-06-13

## 2024-06-13 PROBLEM — Z86.16 PERSONAL HISTORY OF COVID-19: Status: ACTIVE | Noted: 2023-12-15

## 2024-06-13 PROBLEM — R93.5 ABNORMAL CT OF THE ABDOMEN: Status: RESOLVED | Noted: 2022-07-10 | Resolved: 2024-06-13

## 2024-06-16 ENCOUNTER — LAB REQUISITION (OUTPATIENT)
Dept: LAB | Facility: HOSPITAL | Age: 84
DRG: 637 | End: 2024-06-16
Payer: MEDICARE

## 2024-06-16 DIAGNOSIS — E10.42 TYPE 1 DIABETES MELLITUS WITH DIABETIC POLYNEUROPATHY (HCC): ICD-10-CM

## 2024-06-16 LAB
ALBUMIN SERPL BCG-MCNC: 3.4 G/DL (ref 3.5–5)
ALP SERPL-CCNC: 53 U/L (ref 34–104)
ALT SERPL W P-5'-P-CCNC: 17 U/L (ref 7–52)
ANION GAP SERPL CALCULATED.3IONS-SCNC: 6 MMOL/L (ref 4–13)
AST SERPL W P-5'-P-CCNC: 22 U/L (ref 13–39)
BASOPHILS # BLD AUTO: 0.08 THOUSANDS/ÂΜL (ref 0–0.1)
BASOPHILS NFR BLD AUTO: 1 % (ref 0–1)
BILIRUB SERPL-MCNC: 0.33 MG/DL (ref 0.2–1)
BUN SERPL-MCNC: 19 MG/DL (ref 5–25)
CALCIUM ALBUM COR SERPL-MCNC: 9.2 MG/DL (ref 8.3–10.1)
CALCIUM SERPL-MCNC: 8.7 MG/DL (ref 8.4–10.2)
CHLORIDE SERPL-SCNC: 96 MMOL/L (ref 96–108)
CO2 SERPL-SCNC: 30 MMOL/L (ref 21–32)
CREAT SERPL-MCNC: 0.52 MG/DL (ref 0.6–1.3)
EOSINOPHIL # BLD AUTO: 0.14 THOUSAND/ÂΜL (ref 0–0.61)
EOSINOPHIL NFR BLD AUTO: 2 % (ref 0–6)
ERYTHROCYTE [DISTWIDTH] IN BLOOD BY AUTOMATED COUNT: 12.3 % (ref 11.6–15.1)
GFR SERPL CREATININE-BSD FRML MDRD: 88 ML/MIN/1.73SQ M
GLUCOSE SERPL-MCNC: 173 MG/DL (ref 65–140)
HCT VFR BLD AUTO: 34.4 % (ref 34.8–46.1)
HGB BLD-MCNC: 11.6 G/DL (ref 11.5–15.4)
IMM GRANULOCYTES # BLD AUTO: 0.02 THOUSAND/UL (ref 0–0.2)
IMM GRANULOCYTES NFR BLD AUTO: 0 % (ref 0–2)
LYMPHOCYTES # BLD AUTO: 1.94 THOUSANDS/ÂΜL (ref 0.6–4.47)
LYMPHOCYTES NFR BLD AUTO: 25 % (ref 14–44)
MCH RBC QN AUTO: 33 PG (ref 26.8–34.3)
MCHC RBC AUTO-ENTMCNC: 33.7 G/DL (ref 31.4–37.4)
MCV RBC AUTO: 98 FL (ref 82–98)
MONOCYTES # BLD AUTO: 0.78 THOUSAND/ÂΜL (ref 0.17–1.22)
MONOCYTES NFR BLD AUTO: 10 % (ref 4–12)
NEUTROPHILS # BLD AUTO: 4.79 THOUSANDS/ÂΜL (ref 1.85–7.62)
NEUTS SEG NFR BLD AUTO: 62 % (ref 43–75)
NRBC BLD AUTO-RTO: 0 /100 WBCS
PLATELET # BLD AUTO: 270 THOUSANDS/UL (ref 149–390)
PMV BLD AUTO: 11 FL (ref 8.9–12.7)
POTASSIUM SERPL-SCNC: 4.4 MMOL/L (ref 3.5–5.3)
PROT SERPL-MCNC: 5.5 G/DL (ref 6.4–8.4)
RBC # BLD AUTO: 3.52 MILLION/UL (ref 3.81–5.12)
SODIUM SERPL-SCNC: 132 MMOL/L (ref 135–147)
WBC # BLD AUTO: 7.75 THOUSAND/UL (ref 4.31–10.16)

## 2024-06-16 PROCEDURE — 80053 COMPREHEN METABOLIC PANEL: CPT | Performed by: INTERNAL MEDICINE

## 2024-06-16 PROCEDURE — 85025 COMPLETE CBC W/AUTO DIFF WBC: CPT | Performed by: INTERNAL MEDICINE

## 2024-06-19 ENCOUNTER — HOSPITAL ENCOUNTER (INPATIENT)
Facility: HOSPITAL | Age: 84
LOS: 3 days | Discharge: NURSING, REHAB OR LONG-TERM CARE | DRG: 637 | End: 2024-06-22
Attending: EMERGENCY MEDICINE | Admitting: INTERNAL MEDICINE
Payer: MEDICARE

## 2024-06-19 ENCOUNTER — APPOINTMENT (EMERGENCY)
Dept: RADIOLOGY | Facility: HOSPITAL | Age: 84
DRG: 637 | End: 2024-06-19
Payer: MEDICARE

## 2024-06-19 DIAGNOSIS — E10.649 TYPE 1 DIABETES MELLITUS WITH HYPOGLYCEMIA AND WITHOUT COMA (HCC): ICD-10-CM

## 2024-06-19 DIAGNOSIS — N39.0 URINARY TRACT INFECTION: ICD-10-CM

## 2024-06-19 DIAGNOSIS — N39.0 UTI (URINARY TRACT INFECTION): Primary | ICD-10-CM

## 2024-06-19 DIAGNOSIS — A41.9 SEPSIS (HCC): ICD-10-CM

## 2024-06-19 DIAGNOSIS — I95.9 HYPOTENSION: ICD-10-CM

## 2024-06-19 LAB
ALBUMIN SERPL BCG-MCNC: 3.3 G/DL (ref 3.5–5)
ALP SERPL-CCNC: 52 U/L (ref 34–104)
ALT SERPL W P-5'-P-CCNC: 12 U/L (ref 7–52)
ANION GAP SERPL CALCULATED.3IONS-SCNC: 8 MMOL/L (ref 4–13)
APTT PPP: 29 SECONDS (ref 23–37)
AST SERPL W P-5'-P-CCNC: 22 U/L (ref 13–39)
BACTERIA UR QL AUTO: ABNORMAL /HPF
BASOPHILS # BLD AUTO: 0.09 THOUSANDS/ÂΜL (ref 0–0.1)
BASOPHILS NFR BLD AUTO: 1 % (ref 0–1)
BILIRUB SERPL-MCNC: 0.33 MG/DL (ref 0.2–1)
BILIRUB UR QL STRIP: NEGATIVE
BUN SERPL-MCNC: 31 MG/DL (ref 5–25)
CALCIUM ALBUM COR SERPL-MCNC: 9.2 MG/DL (ref 8.3–10.1)
CALCIUM SERPL-MCNC: 8.6 MG/DL (ref 8.4–10.2)
CARDIAC TROPONIN I PNL SERPL HS: 3 NG/L
CHLORIDE SERPL-SCNC: 98 MMOL/L (ref 96–108)
CLARITY UR: ABNORMAL
CO2 SERPL-SCNC: 25 MMOL/L (ref 21–32)
COLOR UR: YELLOW
CREAT SERPL-MCNC: 0.63 MG/DL (ref 0.6–1.3)
EOSINOPHIL # BLD AUTO: 0.07 THOUSAND/ÂΜL (ref 0–0.61)
EOSINOPHIL NFR BLD AUTO: 0 % (ref 0–6)
ERYTHROCYTE [DISTWIDTH] IN BLOOD BY AUTOMATED COUNT: 12.2 % (ref 11.6–15.1)
GFR SERPL CREATININE-BSD FRML MDRD: 83 ML/MIN/1.73SQ M
GLUCOSE SERPL-MCNC: 128 MG/DL (ref 65–140)
GLUCOSE SERPL-MCNC: 140 MG/DL (ref 65–140)
GLUCOSE UR STRIP-MCNC: NEGATIVE MG/DL
HCT VFR BLD AUTO: 32.9 % (ref 34.8–46.1)
HGB BLD-MCNC: 10.6 G/DL (ref 11.5–15.4)
HGB UR QL STRIP.AUTO: NEGATIVE
IMM GRANULOCYTES # BLD AUTO: 0.12 THOUSAND/UL (ref 0–0.2)
IMM GRANULOCYTES NFR BLD AUTO: 1 % (ref 0–2)
INR PPP: 0.95 (ref 0.84–1.19)
KETONES UR STRIP-MCNC: NEGATIVE MG/DL
LACTATE SERPL-SCNC: 1.5 MMOL/L (ref 0.5–2)
LEUKOCYTE ESTERASE UR QL STRIP: ABNORMAL
LYMPHOCYTES # BLD AUTO: 1.46 THOUSANDS/ÂΜL (ref 0.6–4.47)
LYMPHOCYTES NFR BLD AUTO: 8 % (ref 14–44)
MCH RBC QN AUTO: 32.8 PG (ref 26.8–34.3)
MCHC RBC AUTO-ENTMCNC: 32.2 G/DL (ref 31.4–37.4)
MCV RBC AUTO: 102 FL (ref 82–98)
MONOCYTES # BLD AUTO: 1.51 THOUSAND/ÂΜL (ref 0.17–1.22)
MONOCYTES NFR BLD AUTO: 8 % (ref 4–12)
NEUTROPHILS # BLD AUTO: 15.8 THOUSANDS/ÂΜL (ref 1.85–7.62)
NEUTS SEG NFR BLD AUTO: 82 % (ref 43–75)
NITRITE UR QL STRIP: NEGATIVE
NON-SQ EPI CELLS URNS QL MICRO: ABNORMAL /HPF
NRBC BLD AUTO-RTO: 0 /100 WBCS
PH UR STRIP.AUTO: 7 [PH]
PLATELET # BLD AUTO: 249 THOUSANDS/UL (ref 149–390)
PMV BLD AUTO: 10 FL (ref 8.9–12.7)
POTASSIUM SERPL-SCNC: 4.3 MMOL/L (ref 3.5–5.3)
PROCALCITONIN SERPL-MCNC: 0.91 NG/ML
PROT SERPL-MCNC: 5.7 G/DL (ref 6.4–8.4)
PROT UR STRIP-MCNC: ABNORMAL MG/DL
PROTHROMBIN TIME: 12.8 SECONDS (ref 11.6–14.5)
RBC # BLD AUTO: 3.23 MILLION/UL (ref 3.81–5.12)
RBC #/AREA URNS AUTO: ABNORMAL /HPF
SODIUM SERPL-SCNC: 131 MMOL/L (ref 135–147)
SP GR UR STRIP.AUTO: 1.02
UROBILINOGEN UR QL STRIP.AUTO: 0.2 E.U./DL
WBC # BLD AUTO: 19.05 THOUSAND/UL (ref 4.31–10.16)
WBC #/AREA URNS AUTO: ABNORMAL /HPF

## 2024-06-19 PROCEDURE — 84484 ASSAY OF TROPONIN QUANT: CPT | Performed by: EMERGENCY MEDICINE

## 2024-06-19 PROCEDURE — 82948 REAGENT STRIP/BLOOD GLUCOSE: CPT

## 2024-06-19 PROCEDURE — 85730 THROMBOPLASTIN TIME PARTIAL: CPT | Performed by: EMERGENCY MEDICINE

## 2024-06-19 PROCEDURE — 84145 PROCALCITONIN (PCT): CPT | Performed by: EMERGENCY MEDICINE

## 2024-06-19 PROCEDURE — 96365 THER/PROPH/DIAG IV INF INIT: CPT

## 2024-06-19 PROCEDURE — 85610 PROTHROMBIN TIME: CPT | Performed by: EMERGENCY MEDICINE

## 2024-06-19 PROCEDURE — 36415 COLL VENOUS BLD VENIPUNCTURE: CPT | Performed by: EMERGENCY MEDICINE

## 2024-06-19 PROCEDURE — 87081 CULTURE SCREEN ONLY: CPT | Performed by: INTERNAL MEDICINE

## 2024-06-19 PROCEDURE — 93005 ELECTROCARDIOGRAM TRACING: CPT

## 2024-06-19 PROCEDURE — 99285 EMERGENCY DEPT VISIT HI MDM: CPT

## 2024-06-19 PROCEDURE — 87086 URINE CULTURE/COLONY COUNT: CPT | Performed by: EMERGENCY MEDICINE

## 2024-06-19 PROCEDURE — 80053 COMPREHEN METABOLIC PANEL: CPT | Performed by: EMERGENCY MEDICINE

## 2024-06-19 PROCEDURE — 85025 COMPLETE CBC W/AUTO DIFF WBC: CPT | Performed by: EMERGENCY MEDICINE

## 2024-06-19 PROCEDURE — 87040 BLOOD CULTURE FOR BACTERIA: CPT | Performed by: EMERGENCY MEDICINE

## 2024-06-19 PROCEDURE — 81001 URINALYSIS AUTO W/SCOPE: CPT | Performed by: EMERGENCY MEDICINE

## 2024-06-19 PROCEDURE — 83605 ASSAY OF LACTIC ACID: CPT | Performed by: EMERGENCY MEDICINE

## 2024-06-19 PROCEDURE — 71045 X-RAY EXAM CHEST 1 VIEW: CPT

## 2024-06-19 PROCEDURE — 96361 HYDRATE IV INFUSION ADD-ON: CPT

## 2024-06-19 PROCEDURE — 99223 1ST HOSP IP/OBS HIGH 75: CPT | Performed by: INTERNAL MEDICINE

## 2024-06-19 RX ORDER — VANCOMYCIN HYDROCHLORIDE 1 G/200ML
20 INJECTION, SOLUTION INTRAVENOUS ONCE
Status: COMPLETED | OUTPATIENT
Start: 2024-06-19 | End: 2024-06-19

## 2024-06-19 RX ORDER — VANCOMYCIN HYDROCHLORIDE 750 MG/150ML
15 INJECTION, SOLUTION INTRAVENOUS ONCE
Status: DISCONTINUED | OUTPATIENT
Start: 2024-06-19 | End: 2024-06-19

## 2024-06-19 RX ORDER — CEFEPIME HYDROCHLORIDE 1 G/50ML
1000 INJECTION, SOLUTION INTRAVENOUS EVERY 12 HOURS
Status: DISCONTINUED | OUTPATIENT
Start: 2024-06-20 | End: 2024-06-22 | Stop reason: HOSPADM

## 2024-06-19 RX ADMIN — PIPERACILLIN AND TAZOBACTAM 4.5 G: 4; .5 INJECTION, POWDER, LYOPHILIZED, FOR SOLUTION INTRAVENOUS at 20:46

## 2024-06-19 RX ADMIN — SODIUM CHLORIDE 1000 ML: 0.9 INJECTION, SOLUTION INTRAVENOUS at 21:55

## 2024-06-19 RX ADMIN — SODIUM CHLORIDE 1000 ML: 0.9 INJECTION, SOLUTION INTRAVENOUS at 20:17

## 2024-06-19 RX ADMIN — VANCOMYCIN HYDROCHLORIDE 1000 MG: 1 INJECTION, SOLUTION INTRAVENOUS at 21:25

## 2024-06-20 PROBLEM — I95.89 CHRONIC HYPOTENSION: Status: ACTIVE | Noted: 2023-09-24

## 2024-06-20 PROBLEM — K08.89 PAIN, DENTAL: Status: ACTIVE | Noted: 2024-06-20

## 2024-06-20 PROBLEM — R65.10 SIRS (SYSTEMIC INFLAMMATORY RESPONSE SYNDROME) (HCC): Status: RESOLVED | Noted: 2023-05-24 | Resolved: 2024-06-20

## 2024-06-20 LAB
ALBUMIN SERPL BCG-MCNC: 3.2 G/DL (ref 3.5–5)
ALP SERPL-CCNC: 49 U/L (ref 34–104)
ALT SERPL W P-5'-P-CCNC: 14 U/L (ref 7–52)
ANION GAP SERPL CALCULATED.3IONS-SCNC: 9 MMOL/L (ref 4–13)
AST SERPL W P-5'-P-CCNC: 20 U/L (ref 13–39)
ATRIAL RATE: 66 BPM
BASOPHILS # BLD AUTO: 0.09 THOUSANDS/ÂΜL (ref 0–0.1)
BASOPHILS NFR BLD AUTO: 1 % (ref 0–1)
BILIRUB SERPL-MCNC: 0.45 MG/DL (ref 0.2–1)
BUN SERPL-MCNC: 21 MG/DL (ref 5–25)
CALCIUM ALBUM COR SERPL-MCNC: 8.9 MG/DL (ref 8.3–10.1)
CALCIUM SERPL-MCNC: 8.3 MG/DL (ref 8.4–10.2)
CHLORIDE SERPL-SCNC: 101 MMOL/L (ref 96–108)
CO2 SERPL-SCNC: 24 MMOL/L (ref 21–32)
CREAT SERPL-MCNC: 0.52 MG/DL (ref 0.6–1.3)
EOSINOPHIL # BLD AUTO: 0.1 THOUSAND/ÂΜL (ref 0–0.61)
EOSINOPHIL NFR BLD AUTO: 1 % (ref 0–6)
ERYTHROCYTE [DISTWIDTH] IN BLOOD BY AUTOMATED COUNT: 12.3 % (ref 11.6–15.1)
GFR SERPL CREATININE-BSD FRML MDRD: 88 ML/MIN/1.73SQ M
GLUCOSE SERPL-MCNC: 107 MG/DL (ref 65–140)
GLUCOSE SERPL-MCNC: 137 MG/DL (ref 65–140)
GLUCOSE SERPL-MCNC: 146 MG/DL (ref 65–140)
GLUCOSE SERPL-MCNC: 159 MG/DL (ref 65–140)
GLUCOSE SERPL-MCNC: 167 MG/DL (ref 65–140)
GLUCOSE SERPL-MCNC: 378 MG/DL (ref 65–140)
GLUCOSE SERPL-MCNC: 39 MG/DL (ref 65–140)
GLUCOSE SERPL-MCNC: 52 MG/DL (ref 65–140)
HCT VFR BLD AUTO: 31.8 % (ref 34.8–46.1)
HGB BLD-MCNC: 10.3 G/DL (ref 11.5–15.4)
IMM GRANULOCYTES # BLD AUTO: 0.03 THOUSAND/UL (ref 0–0.2)
IMM GRANULOCYTES NFR BLD AUTO: 0 % (ref 0–2)
LYMPHOCYTES # BLD AUTO: 1.79 THOUSANDS/ÂΜL (ref 0.6–4.47)
LYMPHOCYTES NFR BLD AUTO: 14 % (ref 14–44)
MCH RBC QN AUTO: 32.8 PG (ref 26.8–34.3)
MCHC RBC AUTO-ENTMCNC: 32.4 G/DL (ref 31.4–37.4)
MCV RBC AUTO: 101 FL (ref 82–98)
MONOCYTES # BLD AUTO: 1.08 THOUSAND/ÂΜL (ref 0.17–1.22)
MONOCYTES NFR BLD AUTO: 8 % (ref 4–12)
NEUTROPHILS # BLD AUTO: 9.93 THOUSANDS/ÂΜL (ref 1.85–7.62)
NEUTS SEG NFR BLD AUTO: 76 % (ref 43–75)
NRBC BLD AUTO-RTO: 0 /100 WBCS
P AXIS: 64 DEGREES
PLATELET # BLD AUTO: 259 THOUSANDS/UL (ref 149–390)
PMV BLD AUTO: 9.9 FL (ref 8.9–12.7)
POTASSIUM SERPL-SCNC: 4.1 MMOL/L (ref 3.5–5.3)
PR INTERVAL: 178 MS
PROCALCITONIN SERPL-MCNC: 1.18 NG/ML
PROT SERPL-MCNC: 5.5 G/DL (ref 6.4–8.4)
QRS AXIS: 35 DEGREES
QRSD INTERVAL: 78 MS
QT INTERVAL: 432 MS
QTC INTERVAL: 452 MS
RBC # BLD AUTO: 3.14 MILLION/UL (ref 3.81–5.12)
SODIUM SERPL-SCNC: 134 MMOL/L (ref 135–147)
T WAVE AXIS: 54 DEGREES
VENTRICULAR RATE: 66 BPM
WBC # BLD AUTO: 13.02 THOUSAND/UL (ref 4.31–10.16)

## 2024-06-20 PROCEDURE — 85025 COMPLETE CBC W/AUTO DIFF WBC: CPT | Performed by: INTERNAL MEDICINE

## 2024-06-20 PROCEDURE — 99222 1ST HOSP IP/OBS MODERATE 55: CPT | Performed by: STUDENT IN AN ORGANIZED HEALTH CARE EDUCATION/TRAINING PROGRAM

## 2024-06-20 PROCEDURE — C9113 INJ PANTOPRAZOLE SODIUM, VIA: HCPCS | Performed by: INTERNAL MEDICINE

## 2024-06-20 PROCEDURE — 82948 REAGENT STRIP/BLOOD GLUCOSE: CPT

## 2024-06-20 PROCEDURE — 80053 COMPREHEN METABOLIC PANEL: CPT | Performed by: INTERNAL MEDICINE

## 2024-06-20 PROCEDURE — 84145 PROCALCITONIN (PCT): CPT | Performed by: INTERNAL MEDICINE

## 2024-06-20 PROCEDURE — 93010 ELECTROCARDIOGRAM REPORT: CPT | Performed by: INTERNAL MEDICINE

## 2024-06-20 PROCEDURE — 99233 SBSQ HOSP IP/OBS HIGH 50: CPT

## 2024-06-20 RX ORDER — INSULIN LISPRO 100 [IU]/ML
1-5 INJECTION, SOLUTION INTRAVENOUS; SUBCUTANEOUS
Status: DISCONTINUED | OUTPATIENT
Start: 2024-06-20 | End: 2024-06-21

## 2024-06-20 RX ORDER — DEXTROSE, SODIUM CHLORIDE, SODIUM LACTATE, POTASSIUM CHLORIDE, AND CALCIUM CHLORIDE 5; .6; .31; .03; .02 G/100ML; G/100ML; G/100ML; G/100ML; G/100ML
100 INJECTION, SOLUTION INTRAVENOUS CONTINUOUS
Status: DISCONTINUED | OUTPATIENT
Start: 2024-06-20 | End: 2024-06-20

## 2024-06-20 RX ORDER — INSULIN LISPRO 100 [IU]/ML
1-5 INJECTION, SOLUTION INTRAVENOUS; SUBCUTANEOUS
Status: DISCONTINUED | OUTPATIENT
Start: 2024-06-20 | End: 2024-06-22 | Stop reason: HOSPADM

## 2024-06-20 RX ORDER — ASCORBIC ACID 500 MG
500 TABLET ORAL 2 TIMES DAILY
Status: DISCONTINUED | OUTPATIENT
Start: 2024-06-20 | End: 2024-06-22 | Stop reason: HOSPADM

## 2024-06-20 RX ORDER — LANOLIN ALCOHOL/MO/W.PET/CERES
800 CREAM (GRAM) TOPICAL DAILY
Status: DISCONTINUED | OUTPATIENT
Start: 2024-06-20 | End: 2024-06-22 | Stop reason: HOSPADM

## 2024-06-20 RX ORDER — ATORVASTATIN CALCIUM 20 MG/1
20 TABLET, FILM COATED ORAL
Status: DISCONTINUED | OUTPATIENT
Start: 2024-06-20 | End: 2024-06-22 | Stop reason: HOSPADM

## 2024-06-20 RX ORDER — ACETAMINOPHEN 325 MG/1
650 TABLET ORAL EVERY 4 HOURS PRN
Status: DISCONTINUED | OUTPATIENT
Start: 2024-06-20 | End: 2024-06-22 | Stop reason: HOSPADM

## 2024-06-20 RX ORDER — QUETIAPINE FUMARATE 25 MG/1
12.5 TABLET, FILM COATED ORAL
Status: DISCONTINUED | OUTPATIENT
Start: 2024-06-20 | End: 2024-06-22 | Stop reason: HOSPADM

## 2024-06-20 RX ORDER — KETOROLAC TROMETHAMINE 30 MG/ML
15 INJECTION, SOLUTION INTRAMUSCULAR; INTRAVENOUS ONCE
Status: COMPLETED | OUTPATIENT
Start: 2024-06-20 | End: 2024-06-20

## 2024-06-20 RX ORDER — LEVOTHYROXINE SODIUM 0.07 MG/1
75 TABLET ORAL
Status: DISCONTINUED | OUTPATIENT
Start: 2024-06-20 | End: 2024-06-22 | Stop reason: HOSPADM

## 2024-06-20 RX ORDER — GABAPENTIN 300 MG/1
300 CAPSULE ORAL 2 TIMES DAILY
Status: DISCONTINUED | OUTPATIENT
Start: 2024-06-20 | End: 2024-06-22 | Stop reason: HOSPADM

## 2024-06-20 RX ORDER — PANTOPRAZOLE SODIUM 40 MG/1
40 TABLET, DELAYED RELEASE ORAL
Status: DISCONTINUED | OUTPATIENT
Start: 2024-06-20 | End: 2024-06-22 | Stop reason: HOSPADM

## 2024-06-20 RX ORDER — VANCOMYCIN HYDROCHLORIDE 1 G/200ML
1000 INJECTION, SOLUTION INTRAVENOUS EVERY 24 HOURS
Status: DISCONTINUED | OUTPATIENT
Start: 2024-06-20 | End: 2024-06-20

## 2024-06-20 RX ORDER — SODIUM CHLORIDE, SODIUM LACTATE, POTASSIUM CHLORIDE, CALCIUM CHLORIDE 600; 310; 30; 20 MG/100ML; MG/100ML; MG/100ML; MG/100ML
100 INJECTION, SOLUTION INTRAVENOUS CONTINUOUS
Status: DISCONTINUED | OUTPATIENT
Start: 2024-06-20 | End: 2024-06-20

## 2024-06-20 RX ORDER — CALCIUM CARBONATE 500 MG/1
500 TABLET, CHEWABLE ORAL 2 TIMES DAILY
Status: DISCONTINUED | OUTPATIENT
Start: 2024-06-20 | End: 2024-06-22 | Stop reason: HOSPADM

## 2024-06-20 RX ORDER — ENOXAPARIN SODIUM 100 MG/ML
40 INJECTION SUBCUTANEOUS DAILY
Status: DISCONTINUED | OUTPATIENT
Start: 2024-06-20 | End: 2024-06-22 | Stop reason: HOSPADM

## 2024-06-20 RX ORDER — PANTOPRAZOLE SODIUM 40 MG/10ML
40 INJECTION, POWDER, LYOPHILIZED, FOR SOLUTION INTRAVENOUS
Status: DISCONTINUED | OUTPATIENT
Start: 2024-06-20 | End: 2024-06-20

## 2024-06-20 RX ORDER — INSULIN GLARGINE 100 [IU]/ML
9 INJECTION, SOLUTION SUBCUTANEOUS EVERY MORNING
Status: DISCONTINUED | OUTPATIENT
Start: 2024-06-20 | End: 2024-06-21

## 2024-06-20 RX ORDER — VANCOMYCIN HYDROCHLORIDE 750 MG/150ML
15 INJECTION, SOLUTION INTRAVENOUS EVERY 12 HOURS
Status: DISCONTINUED | OUTPATIENT
Start: 2024-06-20 | End: 2024-06-22 | Stop reason: HOSPADM

## 2024-06-20 RX ORDER — DEXTROSE MONOHYDRATE 25 G/50ML
INJECTION, SOLUTION INTRAVENOUS
Status: COMPLETED
Start: 2024-06-20 | End: 2024-06-20

## 2024-06-20 RX ADMIN — INSULIN LISPRO 4 UNITS: 100 INJECTION, SOLUTION INTRAVENOUS; SUBCUTANEOUS at 12:34

## 2024-06-20 RX ADMIN — VANCOMYCIN HYDROCHLORIDE 750 MG: 750 INJECTION, SOLUTION INTRAVENOUS at 22:00

## 2024-06-20 RX ADMIN — CEFEPIME HYDROCHLORIDE 1000 MG: 1 INJECTION, SOLUTION INTRAVENOUS at 12:17

## 2024-06-20 RX ADMIN — DEXTROSE, SODIUM CHLORIDE, SODIUM LACTATE, POTASSIUM CHLORIDE, AND CALCIUM CHLORIDE 100 ML/HR: 5; .6; .31; .03; .02 INJECTION, SOLUTION INTRAVENOUS at 00:19

## 2024-06-20 RX ADMIN — CEFEPIME HYDROCHLORIDE 1000 MG: 1 INJECTION, SOLUTION INTRAVENOUS at 00:54

## 2024-06-20 RX ADMIN — CALCIUM CARBONATE 500 MG: 500 TABLET, CHEWABLE ORAL at 17:45

## 2024-06-20 RX ADMIN — GABAPENTIN 300 MG: 300 CAPSULE ORAL at 17:45

## 2024-06-20 RX ADMIN — ENOXAPARIN SODIUM 40 MG: 40 INJECTION SUBCUTANEOUS at 08:47

## 2024-06-20 RX ADMIN — INSULIN GLARGINE 9 UNITS: 100 INJECTION, SOLUTION SUBCUTANEOUS at 12:32

## 2024-06-20 RX ADMIN — QUETIAPINE FUMARATE 12.5 MG: 25 TABLET ORAL at 22:12

## 2024-06-20 RX ADMIN — OXYCODONE HYDROCHLORIDE AND ACETAMINOPHEN 500 MG: 500 TABLET ORAL at 17:45

## 2024-06-20 RX ADMIN — PANTOPRAZOLE SODIUM 40 MG: 40 INJECTION, POWDER, FOR SOLUTION INTRAVENOUS at 08:48

## 2024-06-20 RX ADMIN — INSULIN LISPRO 1 UNITS: 100 INJECTION, SOLUTION INTRAVENOUS; SUBCUTANEOUS at 22:13

## 2024-06-20 RX ADMIN — VANCOMYCIN HYDROCHLORIDE 1000 MG: 1 INJECTION, SOLUTION INTRAVENOUS at 08:47

## 2024-06-20 RX ADMIN — DEXTROSE MONOHYDRATE 50 ML: 25 INJECTION, SOLUTION INTRAVENOUS at 00:01

## 2024-06-20 RX ADMIN — CEFEPIME HYDROCHLORIDE 1000 MG: 1 INJECTION, SOLUTION INTRAVENOUS at 23:48

## 2024-06-20 RX ADMIN — KETOROLAC TROMETHAMINE 15 MG: 30 INJECTION, SOLUTION INTRAMUSCULAR at 10:40

## 2024-06-20 RX ADMIN — PANTOPRAZOLE SODIUM 40 MG: 40 INJECTION, POWDER, FOR SOLUTION INTRAVENOUS at 00:53

## 2024-06-20 RX ADMIN — INSULIN HUMAN 4 UNITS: 100 INJECTION, SOLUTION PARENTERAL at 12:32

## 2024-06-20 NOTE — OCCUPATIONAL THERAPY NOTE
Occupational Therapy     Patient Name: Shakira Hinkle  Today's Date: 6/20/2024 06/20/24 1311   OT Last Visit   OT Visit Date 06/20/24   Note Type   Note type Cancelled Session   Cancel Reasons Refusal   Additional Comments OT order received and chart review performed. @ this time, OT evaluation cancelled d/t refusal (severe pain). OT will follow and evaluate in efforts to provide skilled interventions as appropriate. Nsg aware.    Milli Hylton OTR/L           Re-attempted AM + PM; continued refusals.

## 2024-06-20 NOTE — ASSESSMENT & PLAN NOTE
Patient reported dental pain in her left upper jaw-refused to let me examine  Pain regimen monitor effectiveness  Patient will follow-up with dentist on discharge

## 2024-06-20 NOTE — H&P
[facility]  H&P  Name: Shakira Hinkle 83 y.o. female I MRN: [id number]  Unit/Bed#: -01 I Date of Admission: 6/19/2024   Date of Service: 6/20/2024 I Hospital Day: 1      Assessment & Plan   * Hypoglycemia  Assessment & Plan  Patient presenting from nursing home with complaints of hypoglycemia  Here blood glucose levels 128  On review of NH papers, patient is on 18 u lantus every am  Will hold lantus for now  Hypoglycemia protocol         Chronic hypotension  Assessment & Plan  On midodrine at NH  Improved with IVF   Continue midodrine when able to tolerate po     Urinary tract infection  Assessment & Plan  Patient presents with hypotension, leukocytosis, elevated procalcitonin  UA shows pyuria, bacteriuria  Continue with antibiotics vanc and cefepime   Continue to monitor VS and labs, follow up cultures  Follow up urine and blood cultures  Has hx of urinary retention, place on protocol    Acute metabolic encephalopathy  Assessment & Plan  Patient presented nonverbal, depressed mentation   No CT head on admission however patient is more verbal and alert, able to state her side hurts  AMS likely due to hypoglycemia as this is now improving with fluids   Will defer CT head at this time  Monitor mental status with neuro checks  NPO for now until more alert  Continue with IVF D5LR  Hold sedating medications - gabapentin, seroquel   Fall precautions    Gastroesophageal reflux disease without esophagitis  Assessment & Plan  Continue with PPI     Type I diabetes mellitus (HCC)  Assessment & Plan  Lab Results   Component Value Date    HGBA1C 9.1 (H) 04/19/2024       Recent Labs     06/19/24  1941   POCGLU 128       Blood Sugar Average: Last 72 hrs:  (P) 128    Presented with hypoglycemia  Will hold insulin for now   Continue to check sugars q6 hrs   On D5LR  Endocrinology consult            VTE Prophylaxis: Enoxaparin (Lovenox)  / sequential compression device   Code Status: Level 1  POLST:  There is no POLST form on file for this patient (pre-hospital)  Discussion with family: No    Anticipated Length of Stay:  Patient will be admitted on an Inpatient basis with an anticipated length of stay of  1 - 2 midnights.   Justification for Hospital Stay: as above    Total Time for Visit, including Counseling / Coordination of Care: 45 minutes.  Greater than 50% of this total time spent on direct patient counseling and coordination of care.    Chief Complaint:   Hypoglycemia    History of Present Illness:    Shakira Hinkle is a 83 y.o. female with a PMHx of urinary retention, hypothyroidism, GERD, Chronic hypotension on midodrine, IDDM with neuropathy who presents from nursing home with hypoglycemia. Patient is not able to provide history, only moans and opens eyes spontaneously, history obtained from charts and NH.   Per NH attendant Denny, patient has been having low and high blood glucose levels over the past month. Her glargine dose increased from 16 to 18 u earlier in the months but patient continued to have low and high glucose readings. At baseline she is alert but does not talk much, answers commands. She is generally weak, non ambulatory. She was sent to the ED today because of hypoglycemia, unsure of how low the reading was.     Patient received glucagon twice prior to hospital. Glucose level here is 128. She was also noted to have hypotension, reportedly systolic in the 50's.    In ED, BP as low as 73/50, , wbc 19.05, procalcitonin 0.91. UA shows trace leukocytes, 10-20 wbc, innumerable bacteria.     She received sepsis fluids, vanc and cefepime and will be admitted for further management    Review of Systems:    Review of Systems   Reason unable to perform ROS: Depressed mentation.       Past Medical and Surgical History:     Past Medical History:   Diagnosis Date    Abnormal CT of the abdomen 07/10/2022    Ambulates with cane     Cancer (HCC)     Chronic pain disorder     knees/ shoulders  (gets inj every 3 mos)    Closed intertrochanteric fracture of right femur (HCC) 05/26/2020    Controlled type 2 diabetes mellitus with diabetic polyneuropathy, with long-term current use of insulin (HCC) 05/21/2008    Diabetes mellitus (HCC)     Diabetic polyneuropathy (HCC) 05/21/2008    ICD10 clean up    Disease of thyroid gland     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    HL (hearing loss)     Hodgkin's disease (HCC) 2008    Left neck, had radiation    Hypertension     Neuropathy     Osteoporosis     RA (rheumatoid arthritis) (HCC)     Traumatic rhabdomyolysis ([facility]) 10/17/2022       Past Surgical History:   Procedure Laterality Date    ADENOIDECTOMY      APPENDECTOMY      CATARACT EXTRACTION      CATARACT EXTRACTION, BILATERAL      CHOLECYSTECTOMY      FRACTURE SURGERY Right     hip    MOUTH SURGERY      oral cancer left lower lip    OVARY SURGERY      FL ADJT TIS TRNS/REARGMT F/C/C/M/N/A/G/H/F 10SQCM/< N/A 3/28/2022    Procedure: Adjacent tissue transfer face;  Surgeon: Ar Paris MD;  [facility];  Service: ENT    FL OPTX FEM SHFT FX W/INSJ IMED IMPLT W/WO SCREW Right 5/28/2020    Procedure: INSERTION NAIL IM FEMUR ANTEGRADE (TROCHANTERIC);  Surgeon: Charles Seals DO;  Location:  MAIN OR;  Service: Orthopedics    FL TRANSMASTOID ANTROTOMY Left 3/28/2022    Procedure: MASTOIDECTOMY;  Surgeon: Ar Paris MD;  [facility];  Service: ENT    TONSILLECTOMY      TOTAL THYROIDECTOMY  2008    Performed after left neck dissection and left oral cancer diagnosis       Meds/Allergies:    Prior to Admission medications    Medication Sig Start Date End Date Taking? Authorizing Provider   acetaminophen (TYLENOL) 325 mg tablet Take 2 tablets (650 mg total) by mouth every 4 (four) hours as needed for mild pain, headaches or fever 11/16/23   ADRIAN Tripp   Ascorbic Acid (vitamin C) 100 MG tablet Take 500 mg by mouth 2 (two) times a day    Historical Provider, MD  "  atorvastatin (LIPITOR) 20 mg tablet Take 20 mg by mouth daily with dinner    Historical Provider, MD   BD Insulin Syringe U/F 1/2Unit 31G X 5/16\" 0.3 ML MISC 4 (four) times a day 11/29/21   Historical Provider, MD   calcium carbonate (OS-FELICE) 600 MG tablet Take 600 mg by mouth 2 (two) times a day with meals    Historical Provider, MD   gabapentin (NEURONTIN) 300 mg capsule Take 1 capsule (300 mg total) by mouth 2 (two) times a day 9/24/23   Reyes Preston DO   insulin glargine (LANTUS) 100 units/mL subcutaneous injection Inject 6 Units under the skin every 12 (twelve) hours  Patient taking differently: Inject 18 Units under the skin every morning 1/8/24   Kemar Moffett DO   insulin lispro (HumaLOG) 100 units/mL injection Inject 1-5 Units under the skin 3 (three) times a day before meals 12/22/23   ADRIAN Ewing   levothyroxine 75 mcg tablet Take 1 tablet (75 mcg total) by mouth daily in the early morning  Patient taking differently: Take 150 mcg by mouth daily in the early morning 12/23/23   ADRIAN Ewing   magnesium Oxide (MAG-OX) 400 mg TABS Take 2 tablets (800 mg total) by mouth daily 12/23/23   ADRIAN Ewing   midodrine (PROAMATINE) 5 mg tablet Take 1 tablet (5 mg total) by mouth 3 (three) times a day before meals  Patient taking differently: Take 2.5 mg by mouth 2 (two) times a day 12/22/23   ADRIAN Ewing   Multiple Vitamin (multivitamin) capsule Take 1 capsule by mouth daily    Historical Provider, MD   pantoprazole (PROTONIX) 40 mg tablet Take 1 tablet (40 mg total) by mouth in the morning 12/23/23   ADRIAN Ewing   QUEtiapine (SEROquel) 25 mg tablet Take 0.5 tablets (12.5 mg total) by mouth daily at bedtime 1/8/24 2/7/24  Kemar Moffett DO     I have reveiwed home medications using records provided by [facility].    Allergies: No Known Allergies    Social History:     Marital Status:    Substance Use History:   Social History     Substance and Sexual Activity "   Alcohol Use Not Currently    Alcohol/week: 0.0 standard drinks of alcohol     Social History     Tobacco Use   Smoking Status Never   Smokeless Tobacco Never     Social History     Substance and Sexual Activity   Drug Use Never       Family History:    non-contributory    Physical Exam:     Vitals:   Blood Pressure: (!) 150/104 (06/19/24 2308)  Pulse: 78 (06/19/24 2308)  Temperature: 97.5 °F (36.4 °C) (06/19/24 2308)  Temp Source: Oral (06/19/24 2308)  Respirations: 18 (06/19/24 2308)  Height: 5' (152.4 cm) (06/19/24 2308)  Weight - Scale: 47.6 kg (104 lb 15 oz) (06/19/24 2308)  SpO2: 98 % (06/19/24 2308)    Physical Exam  Constitutional:       General: She is not in acute distress.     Appearance: She is underweight. She is ill-appearing. She is not toxic-appearing or diaphoretic.   HENT:      Head: Normocephalic.      Nose: No congestion or rhinorrhea.   Cardiovascular:      Rate and Rhythm: Normal rate and regular rhythm.      Heart sounds: No murmur heard.  Pulmonary:      Effort: Pulmonary effort is normal. No respiratory distress.      Breath sounds: Normal breath sounds. No wheezing.   Abdominal:      General: Bowel sounds are normal. There is no distension.      Tenderness: There is no abdominal tenderness.   Musculoskeletal:      Right lower leg: No edema.      Left lower leg: No edema.   Skin:     General: Skin is warm and dry.      Findings: No rash.   Neurological:      Mental Status: She is easily aroused. Mental status is at baseline. She is lethargic.      GCS: GCS eye subscore is 4. GCS verbal subscore is 4. GCS motor subscore is 5.       Additional Data:     Lab Results: I have personally reviewed pertinent reports.      Results from last 7 days   Lab Units 06/20/24  0431   WBC Thousand/uL 13.02*   HEMOGLOBIN g/dL 10.3*   HEMATOCRIT % 31.8*   PLATELETS Thousands/uL 259   SEGS PCT % 76*   LYMPHO PCT % 14   MONO PCT % 8   EOS PCT % 1     Results from last 7 days   Lab Units 06/19/24  1954   SODIUM  mmol/L 131*   POTASSIUM mmol/L 4.3   CHLORIDE mmol/L 98   CO2 mmol/L 25   BUN mg/dL 31*   CREATININE mg/dL 0.63   ANION GAP mmol/L 8   CALCIUM mg/dL 8.6   ALBUMIN g/dL 3.3*   TOTAL BILIRUBIN mg/dL 0.33   ALK PHOS U/L 52   ALT U/L 12   AST U/L 22   GLUCOSE RANDOM mg/dL 140     Results from last 7 days   Lab Units 06/19/24 1954   INR  0.95     Results from last 7 days   Lab Units 06/20/24  0053 06/20/24  0002 06/19/24  2358 06/19/24  1941   POC GLUCOSE mg/dl 107 52* 39* 128         Results from last 7 days   Lab Units 06/19/24 2010 06/19/24 1954   LACTIC ACID mmol/L 1.5  --    PROCALCITONIN ng/ml  --  0.91*       Imaging: I have personally reviewed pertinent reports.      XR chest 1 view portable    (Results Pending)       EKG, Pathology, and Other Studies Reviewed on Admission:   EKG: NSR    Allscripts / Epic Records Reviewed: Yes     ** Please Note: This note has been constructed using a voice recognition system. **

## 2024-06-20 NOTE — ASSESSMENT & PLAN NOTE
Patient presenting from nursing home with complaints of hypoglycemia  Here blood glucose levels 128  On review of NH papers, patient is on 18 u lantus every am  Will hold lantus for now  Hypoglycemia protocol

## 2024-06-20 NOTE — ASSESSMENT & PLAN NOTE
Present on arrival   UA positive pyuria, bacteriuria   WBC's 19.05  Procalcitonin 0.91  Lactic acid 1.5  Blood cultures x 2 pending  MRSA swab results pending  Urine culture pending  Continue cefepime and vancomycin  Monitor intake and output  Patient has a history of retention-follow retention protocol  Trend procalcitonin and WBCs  CBC, procalcitonin a.m.

## 2024-06-20 NOTE — SPEECH THERAPY NOTE
"Orders received and appreciated, chart review completed.  Patient admitted to [facility] with hypotensive episode and varying blood sugar levels.  Patient is well known to this clinician as she is a long term resident of the [facility] facility.  While there, since admission to facility in January, patient has been tolerating a dental soft solids diet with thin liquids and medications whole in puree of choice.  Recently patient had been demonstrating increased time and thin liquids required to feel pill is \"down.\"  Pt refusing speech therapy interventions in the past, would prefer to be regular diet but given strength, endurance and lack of dentition as well as surgical history of the L side of the face, dental soft solids were indicated at that time (February 2024).    Patient with known variance in PO intake, trends on lower end of meal completion while at [facility].  Patient is verbal though SLUMS in February 2024 did indicate a cognitive-linguistic change from baseline.  Prior to hospitalizations and subsequent nursing home placement pt was fluently verbal and engaged in conversation with ease.  No s/s of expressive or receptive aphasia have ever been detected while at [facility].  She refuses PO trials during today's attempted dysphagia evaluation due to \"leg pain.\"  Patient reporting, \"I don't want anything done down there... I don't want any changes.\"  Patient reported pain is bilateral to LE 5/10 but reports L side pain is \"worse.\"  She also endorses pain in the upper left side of her maxillae that radiates to buccal cavity, cheek bone, some mild swelling is evident from patient's baseline as the L side of her facial symmetry is typically more atrophied in that same area.    Given overall presentation, admitting diagnosis, wakefulness of patient and prior dysphagia diet level tolerance, patient is not appropriate at this time for baseline dental soft solids " and thin liquids.  Recommendations include mechanical soft solids, NTL, total feed needs by staff (minding patient has history of lower PO intake, encourage verbally, present orally).  Pt self feeds at baseline, encourage as able.  Pt with minimal desire to participate across histories with ST.  Will attempt formal evaluation of dysphagia at a later time.

## 2024-06-20 NOTE — ASSESSMENT & PLAN NOTE
Patient presented nonverbal, depressed mentation   No CT head on admission however patient is more verbal and alert, able to state her side hurts  AMS likely due to hypoglycemia as this is now improving with fluids   Will defer CT head at this time  Monitor mental status with neuro checks  NPO for now until more alert  Continue with IVF D5LR  Hold sedating medications - gabapentin, seroquel   Fall precautions

## 2024-06-20 NOTE — CONSULTS
Consultation - Shakira Hinkle 83 y.o. female MRN: [id number]    Unit/Bed#: -01 Encounter: [id number]      Assessment & Plan     * Type 1 diabetes mellitus with hypoglycemia (HCC)  Assessment & Plan  Lab Results   Component Value Date    HGBA1C 9.1 (H) 04/19/2024       Recent Labs     06/20/24  0002 06/20/24  0053 06/20/24  0646 06/20/24  1119   POCGLU 52* 107 167* 378*       Blood Sugar Average: Last 72 hrs:  (P) [id number]    Type 1 diabetes which is complicated with several admissions for hypoglycemia, she is now nursing home resident, brought in due to acute metabolic encephalopathy due to hypoglycemia, spoke with the [facility] [place] , as per nurse, she was on Lantus 18 units every morning, NovoLog 4 units before breakfast and lunch and 2 units before dinner if if she eats more than 50% of her meals.  hypoglycemia has resolved, D5 infusion was discontinued and primary team started Lantus 9 units daily, first dose given at 12:32 PM today, I agree to continue Lantus at current dose, continue correctional insulin, no Premeal insulin for now given lack of appetite and poor oral intake.  We will monitor her blood sugar over time and make necessary changes.            CC: Diabetes Consult    History of Present Illness     HPI: Shakira Hinkle is a 83 y.o. year old female with type 1 diabetes prior admissions for hypoglycemia who presents from nursing home with hypoglycemia.    She is well-known to me, with prior admission for hypoglycemia, was following with [facility] endocrinology, last note is from August last year, she was seen and examined at bedside, disoriented, not able to provide more information, I called her daughter which was not available.  As per chart, she was on Lantus 18 units every morning.  Blood sugar was at 39, she was on D5 infusion which was discontinued due to hyperglycemia  Lantus 9 units started.        Inpatient consult to Endocrinology  Consult performed  by: Ciaran Mercedes MD  Consult ordered by: Christie Shanks MD          Review of Systems  Not able to assess due to baseline condition.    Historical Information   Past Medical History:   Diagnosis Date    Abnormal CT of the abdomen 07/10/2022    Ambulates with cane     Cancer (HCC)     Chronic pain disorder     knees/ shoulders (gets inj every 3 mos)    Closed intertrochanteric fracture of right femur (HCC) 05/26/2020    Controlled type 2 diabetes mellitus with diabetic polyneuropathy, with long-term current use of insulin (HCC) 05/21/2008    Diabetes mellitus (HCC)     Diabetic polyneuropathy (HCC) 05/21/2008    ICD10 clean up    Disease of thyroid gland     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    HL (hearing loss)     Hodgkin's disease (HCC) 2008    Left neck, had radiation    Hypertension     Neuropathy     Osteoporosis     RA (rheumatoid arthritis) ([facility])     Traumatic rhabdomyolysis (HCC) 10/17/2022     Past Surgical History:   Procedure Laterality Date    ADENOIDECTOMY      APPENDECTOMY      CATARACT EXTRACTION      CATARACT EXTRACTION, BILATERAL      CHOLECYSTECTOMY      FRACTURE SURGERY Right     hip    MOUTH SURGERY      oral cancer left lower lip    OVARY SURGERY      [place] ADJT TIS TRNS/REARGMT F/C/C/M/N/A/G/H/F 10SQCM/< N/A 3/28/2022    Procedure: Adjacent tissue transfer face;  Surgeon: Ar Paris MD;  [facility];  Service: ENT    [place] OPTX FEM SHFT FX W/INSJ IMED IMPLT W/WO SCREW Right 5/28/2020    Procedure: INSERTION NAIL IM FEMUR ANTEGRADE (TROCHANTERIC);  Surgeon: Charles Seals DO;  Location:  MAIN OR;  Service: Orthopedics    [place] TRANSMASTOID ANTROTOMY Left 3/28/2022    Procedure: MASTOIDECTOMY;  Surgeon: Ar Paris MD;  [facility];  Service: ENT    TONSILLECTOMY      TOTAL THYROIDECTOMY  2008    Performed after left neck dissection and left oral cancer diagnosis     Social History   Social History     Substance and Sexual Activity   Alcohol Use Not  Currently    Alcohol/week: 0.0 standard drinks of alcohol     Social History     Substance and Sexual Activity   Drug Use Never     Social History     Tobacco Use   Smoking Status Never   Smokeless Tobacco Never     Family History:   Family History   Problem Relation Age of Onset    Cancer Mother     Diabetes Mother     Diabetes Father     Hypertension Father        Meds/Allergies   Current Facility-Administered Medications   Medication Dose Route Frequency Provider Last Rate Last Admin    acetaminophen (TYLENOL) tablet 650 mg  650 mg Oral Q4H PRN ADRIAN Tripp        ascorbic acid (VITAMIN C) tablet 500 mg  500 mg Oral BID ADRIAN Tripp        atorvastatin (LIPITOR) tablet 20 mg  20 mg Oral Daily With Dinner ADRIAN Tripp        calcium carbonate (TUMS) chewable tablet 500 mg  500 mg Oral BID ADRIAN Tripp        cefepime (MAXIPIME) IVPB (premix in dextrose) 1,000 mg 50 mL  1,000 mg Intravenous Q12H Christie Shanks  mL/hr at 06/20/24 1217 1,000 mg at 06/20/24 1217    enoxaparin (LOVENOX) subcutaneous injection 40 mg  40 mg Subcutaneous Daily Christie Shanks MD   40 mg at 06/20/24 0847    gabapentin (NEURONTIN) capsule 300 mg  300 mg Oral BID ADRIAN Tripp        insulin glargine (LANTUS) subcutaneous injection 9 Units 0.09 mL  9 Units Subcutaneous QAM ADRIAN Tripp   9 Units at 06/20/24 1232    insulin lispro (HumALOG/ADMELOG) 100 units/mL subcutaneous injection 1-5 Units  1-5 Units Subcutaneous TID AC ADRIAN Tripp   4 Units at 06/20/24 1234    insulin lispro (HumALOG/ADMELOG) 100 units/mL subcutaneous injection 1-5 Units  1-5 Units Subcutaneous HS ADRIAN Tripp        levothyroxine tablet 75 mcg  75 mcg Oral Early Morning ADRIAN Tripp        magnesium Oxide (MAG-OX) tablet 800 mg  800 mg Oral Daily ADRIAN Tripp        multivitamin stress formula tablet 1 tablet  1 tablet Oral Daily Ana Howard  CRNP        pantoprazole (PROTONIX) EC tablet 40 mg  40 mg Oral Early Morning nAa DanielsnowADRIAN nogueira        QUEtiapine (SEROquel) tablet 12.5 mg  12.5 mg Oral HS ADRIAN Tripp        vancomycin (VANCOCIN) IVPB (premix in dextrose) 750 mg 150 mL  15 mg/kg Intravenous Q12H Catie Moran MD         No Known Allergies    Objective   Vitals: Blood pressure 95/59, pulse (!) 107, temperature 98.7 °F (37.1 °C), resp. rate 18, height 5' (1.524 m), weight 47.6 kg (104 lb 15 oz), SpO2 97%.    Intake/Output Summary (Last 24 hours) at 6/20/2024 1620  Last data filed at 6/20/2024 1305  Gross per 24 hour   Intake 3150 ml   Output --   Net 3150 ml     Invasive Devices       Peripheral Intravenous Line  Duration             Peripheral IV 06/19/24 Left Antecubital <1 day    Peripheral IV 06/19/24 Right;Ventral (anterior) Forearm <1 day              Drain  Duration             Urethral Catheter Non-latex 16 Fr. 187 days                    Physical Exam  Constitutional:       General: She is not in acute distress.     Appearance: She is not ill-appearing.   HENT:      Head: Normocephalic and atraumatic.   Pulmonary:      Effort: Pulmonary effort is normal. No respiratory distress.   Neurological:      General: No focal deficit present.         The history was obtained from the review of the chart, patient.    Lab Results:       Lab Results   Component Value Date    WBC 13.02 (H) 06/20/2024    HGB 10.3 (L) 06/20/2024    HCT 31.8 (L) 06/20/2024     (H) 06/20/2024     06/20/2024     Lab Results   Component Value Date/Time    BUN 21 06/20/2024 04:31 AM    BUN 21 12/08/2023 05:20 AM    K 4.1 06/20/2024 04:31 AM    K 3.5 12/08/2023 05:20 AM     06/20/2024 04:31 AM     12/08/2023 05:20 AM    CO2 24 06/20/2024 04:31 AM    CO2 29 12/08/2023 05:20 AM    CREATININE 0.52 (L) 06/20/2024 04:31 AM    CREATININE 0.66 12/08/2023 05:20 AM    AST 20 06/20/2024 04:31 AM    AST 14 10/04/2023 06:02 AM    ALT 14  "06/20/2024 04:31 AM    ALT 14 10/04/2023 06:02 AM    TP 5.5 (L) 06/20/2024 04:31 AM    TP 5.7 (L) 10/04/2023 06:02 AM    ALB 3.2 (L) 06/20/2024 04:31 AM    ALB 3.7 10/04/2023 06:02 AM     No results for input(s): \"CHOL\", \"HDL\", \"LDL\", \"TRIG\", \"VLDL\" in the last 72 hours.  No results found for: \"MICROALBUR\", \"[id number]\"  POC Glucose (mg/dl)   Date Value   06/20/2024 137   06/20/2024 378 (H)   06/20/2024 167 (H)   06/20/2024 107   06/20/2024 52 (L)   06/19/2024 39 (LL)   06/19/2024 128   01/08/2024 122   01/08/2024 48 (L)   01/07/2024 205 (H)       Imaging Studies: I have personally reviewed pertinent reports.      Portions of the record may have been created with voice recognition software.    "

## 2024-06-20 NOTE — ASSESSMENT & PLAN NOTE
Patient presented nonverbal, depressed mentation   Likely related to hypoglycemia, also with UTI  Mentation at baseline today  See plan for UTI  See plan for hypoglycemia  Continue to monitor mental status for changes

## 2024-06-20 NOTE — NURSING NOTE
0005    BLOOD SUGAR 39. RECHECK 52. 1 AMP D50 GIVEN. AND WILL RECHECK BLOOD SUGAR TO ASSESS POST D50. SLIM AWARE AND IVF HUNG D5LR 100/HR. 02 SAT 98% BED ALARM ON

## 2024-06-20 NOTE — ASSESSMENT & PLAN NOTE
Lab Results   Component Value Date    HGBA1C 9.1 (H) 04/19/2024       Recent Labs     06/19/24 1941   POCGLU 128       Blood Sugar Average: Last 72 hrs:  (P) 128    Presented with hypoglycemia  Will hold insulin for now   Continue to check sugars q6 hrs   On D5LR  Endocrinology consult

## 2024-06-20 NOTE — NUTRITION
"   06/20/24 1343   Current PO Intake   Current Diet Order Dental soft, CCD 2 diet, nectar thick liquids.  Total feed.  Aspiration precautions in place.   Current Meal Intake 0-25%   Estimated calorie intake compared to estimated need Nutrient needs are not met   Recommendations/Interventions   Malnutrition/BMI Present No  (2 criteria not met, will continue to monitor)   Summary ST consulted.  Presents from nursing home with hypoglycemia.  Past medical history is significant for type 1 diabetes, GERD, hypotension, hyperlipidemia, hypothyroidism, Hodgkin's lymphoma, malnutrition.  Weight history reviewed.  Weight trending down in past year however no significant changes.  6/29/2023 121#, 12/20/2023 114#, 2/7/2024 103#, 6/19/2024 104#(bed scale).  No edema.  No pressure areas. Evaluated by ST 6/20, recommending mechanical soft diet, nectar thick liquids, and total feed by staff.  Currently prescribed a dental soft, CCD 2 diet, nectar thick liquids.  Total feed.  Aspiration precautions in place.  Meal completion 0%.  Nutrient needs are not met.  Met with patient at bedside.  States her appetite has been \"terrible\" ongoing 1 day.  States she usually has 3 meals daily.  NKFA.  Enjoys chocolate, vanilla, strawberry flavors.  Patient declining to participate in nutrition interview further.  Will add Glucerna twice daily.  RD to monitor tolerance at follow-up.   Interventions/Recommendations Continue current diet order;Supplement initiate;Monitor I & O's   Education Assessment   Education Patient/caregiver not appropriate for education at this time   Patient Nutrition Goals   Goal Increase kcal/PRO intake;Avoid weight loss;Wt maintained;Meet PO needs       "

## 2024-06-20 NOTE — NURSING NOTE
RECEIVED FROM ED TO  318 WITH DX HYPOTENSION. IV 32 FLUID BOLUS ABSORBING. 02 2 LITERS N/C MAINTAINED. BP SYSTOLIC 150 NOW. HR 80/MIN. 02 SAT 98% ON 2 LITERS N/C. CALL MA NEAR AND BED ALARM ON

## 2024-06-20 NOTE — PROGRESS NOTES
[facility]  Progress Note  Name: Shakira Hinkle I  MRN: [id number]  Unit/Bed#: -01 I Date of Admission: 6/19/2024   Date of Service: 6/20/2024 I Hospital Day: 1    Assessment & Plan   * Hypoglycemia  Assessment & Plan  Patient presenting from nursing home with complaints of hypoglycemia  Prehospital Lantus was put on hold  Blood sugar on arrival 128, then dropped as low as 39  Patient was initiated on D5 LR IV fluids  Clarified prehospital insulin regimen with skilled nursing facility MAR: Patient was on 9 units of Lantus insulin in the a.m. and sliding scale coverage at the skilled nursing facility  Patient was complaining of some left upper dental pain-did admit that her appetite was not baseline prehospital likely contributed to hypoglycemia  Evaluated by speech therapy cleared for diet which I resumed  Blood sugar prior to lunch was 378  Discontinued D5 LR IV fluids  Resumed prehospital Lantus 9 units in the a.m.  Continue SSI  Hypoglycemia protocol  BMP a.m.  Pending endocrinology consult        Acute metabolic encephalopathy  Assessment & Plan  Patient presented nonverbal, depressed mentation   Likely related to hypoglycemia, also with UTI  Mentation at baseline today  See plan for UTI  See plan for hypoglycemia  Continue to monitor mental status for changes    Urinary tract infection  Assessment & Plan  Present on arrival   UA positive pyuria, bacteriuria   WBC's 19.05  Procalcitonin 0.91  Lactic acid 1.5  Blood cultures x 2 pending  MRSA swab results pending  Urine culture pending  Continue cefepime and vancomycin  Monitor intake and output  Patient has a history of retention-follow retention protocol  Trend procalcitonin and WBCs  CBC, procalcitonin a.m.    Type I diabetes mellitus (HCC)  Assessment & Plan  Lab Results   Component Value Date    HGBA1C 9.1 (H) 04/19/2024       Recent Labs     06/20/24  0002 06/20/24  0053 06/20/24  0646 06/20/24  1119   POCGLU 52* 107 167*  "378*         Blood Sugar Average: Last 72 hrs:  (P) 145.3253641792535189    Presented with hypoglycemia-see plan for hypoglycemia         Pain, dental  Assessment & Plan  Patient reported dental pain in her left upper jaw-refused to let me examine  Pain regimen monitor effectiveness  Patient will follow-up with dentist on discharge    Chronic hypotension  Assessment & Plan  On midodrine at her skilled nursing facility  Hypotensive on arrival, received IV fluids  Blood pressures a bit soft but asymptomatic, patient states \"that is my normal blood pressure\"  Continue prehospital midodrine  Monitor BP per protocol      Gastroesophageal reflux disease without esophagitis  Assessment & Plan  Continue  PPI                VTE Pharmacologic Prophylaxis: VTE Score: 4 Moderate Risk (Score 3-4) - Pharmacological DVT Prophylaxis Ordered: enoxaparin (Lovenox).    Mobility:   Basic Mobility Inpatient Raw Score: 6  JH-HLM Goal: 2: Bed activities/Dependent transfer  JH-HLM Achieved: 2: Bed activities/Dependent transfer  JH-HLM Goal achieved. Continue to encourage appropriate mobility.    Patient Centered Rounds: I performed bedside rounds with nursing staff today.   Discussions with Specialists or Other Care Team Provider: Nursing, case management    Education and Discussions with Family / Patient: Updated  (daughter) via phone.    Total Time Spent on Date of Encounter in care of patient: 38 mins. This time was spent on one or more of the following: performing physical exam; counseling and coordination of care; obtaining or reviewing history; documenting in the medical record; reviewing/ordering tests, medications or procedures; communicating with other healthcare professionals and discussing with patient's family/caregivers.    Current Length of Stay: 1 day(s)  Current Patient Status: Inpatient   Certification Statement: The patient will continue to require additional inpatient hospital stay due to continues on IV " antibiotics for UTI, trending labs repeat procalcitonin CBC in the a.m., monitoring fluid status closely  Discharge Plan:  Patient will return to her skilled nursing facility on discharge.  Continues on IV antibiotics today for UTI.  Repeating labs in the a.m.  Hopeful discharge next 24 to 48 hours to her skilled nursing facility pending improvement in her labs.    Code Status: Level 3 - DNAR and DNI    Subjective:   Patient reports left upper jaw dental pain today would not let me examine.  Otherwise no complaints.  Denied urinary complaints.  Alert and oriented x 4 on exam    Objective:     Vitals:   Temp (24hrs), Av °F (36.7 °C), Min:97.5 °F (36.4 °C), Max:98.7 °F (37.1 °C)    Temp:  [97.5 °F (36.4 °C)-98.7 °F (37.1 °C)] 98.7 °F (37.1 °C)  HR:  [] 107  Resp:  [12-18] 18  BP: ()/() 95/59  SpO2:  [97 %-100 %] 97 %  Body mass index is 20.49 kg/m².     Input and Output Summary (last 24 hours):     Intake/Output Summary (Last 24 hours) at 2024 1612  Last data filed at 2024 1305  Gross per 24 hour   Intake 3150 ml   Output --   Net 3150 ml       Physical Exam:   Physical Exam  Vitals and nursing note reviewed.   Constitutional:       General: She is not in acute distress.     Appearance: She is well-developed and normal weight. She is not ill-appearing.   HENT:      Head: Normocephalic and atraumatic.      Mouth/Throat:      Mouth: Mucous membranes are dry.   Eyes:      Extraocular Movements: Extraocular movements intact.      Conjunctiva/sclera: Conjunctivae normal.      Pupils: Pupils are equal, round, and reactive to light.   Cardiovascular:      Rate and Rhythm: Normal rate and regular rhythm.      Pulses: Normal pulses.      Heart sounds: Normal heart sounds. No murmur heard.  Pulmonary:      Effort: Pulmonary effort is normal. No respiratory distress.      Breath sounds: Normal breath sounds. No wheezing or rales.   Abdominal:      General: Bowel sounds are normal. There is no  distension.      Palpations: Abdomen is soft.      Tenderness: There is no abdominal tenderness. There is no guarding.   Musculoskeletal:         General: No swelling.   Skin:     General: Skin is warm and dry.      Capillary Refill: Capillary refill takes less than 2 seconds.   Neurological:      General: No focal deficit present.      Mental Status: She is alert and oriented to person, place, and time. Mental status is at baseline.   Psychiatric:         Thought Content: Thought content normal.          Additional Data:     Labs:  Results from last 7 days   Lab Units 06/20/24  0431   WBC Thousand/uL 13.02*   HEMOGLOBIN g/dL 10.3*   HEMATOCRIT % 31.8*   PLATELETS Thousands/uL 259   SEGS PCT % 76*   LYMPHO PCT % 14   MONO PCT % 8   EOS PCT % 1     Results from last 7 days   Lab Units 06/20/24  0431   SODIUM mmol/L 134*   POTASSIUM mmol/L 4.1   CHLORIDE mmol/L 101   CO2 mmol/L 24   BUN mg/dL 21   CREATININE mg/dL 0.52*   ANION GAP mmol/L 9   CALCIUM mg/dL 8.3*   ALBUMIN g/dL 3.2*   TOTAL BILIRUBIN mg/dL 0.45   ALK PHOS U/L 49   ALT U/L 14   AST U/L 20   GLUCOSE RANDOM mg/dL 146*     Results from last 7 days   Lab Units 06/19/24  1954   INR  0.95     Results from last 7 days   Lab Units 06/20/24  1119 06/20/24  0646 06/20/24  0053 06/20/24  0002 06/19/24  2358 06/19/24  1941   POC GLUCOSE mg/dl 378* 167* 107 52* 39* 128         Results from last 7 days   Lab Units 06/20/24  0431 06/19/24 2010 06/19/24 1954   LACTIC ACID mmol/L  --  1.5  --    PROCALCITONIN ng/ml 1.18*  --  0.91*       Lines/Drains:  Invasive Devices       Peripheral Intravenous Line  Duration             Peripheral IV 06/19/24 Left Antecubital <1 day    Peripheral IV 06/19/24 Right;Ventral (anterior) Forearm <1 day              Drain  Duration             Urethral Catheter Non-latex 16 Fr. 187 days                  Urinary Catheter:  Goal for removal:  Patient does not currently have a Kinney catheter she is incontinent of urine                Imaging: Reviewed radiology reports from this admission including: chest xray    Recent Cultures (last 7 days):   Results from last 7 days   Lab Units 06/19/24 1954   BLOOD CULTURE  Received in Microbiology Lab. Culture in Progress.  Received in Microbiology Lab. Culture in Progress.       Last 24 Hours Medication List:   Current Facility-Administered Medications   Medication Dose Route Frequency Provider Last Rate    acetaminophen  650 mg Oral Q4H PRN ADRIAN Tripp      vitamin C  500 mg Oral BID ADRIAN Tripp      atorvastatin  20 mg Oral Daily With Dinner ADRIAN Tripp      calcium carbonate  500 mg Oral BID ADRIAN rTipp      cefepime  1,000 mg Intravenous Q12H Christie Shanks MD 1,000 mg (06/20/24 1217)    enoxaparin  40 mg Subcutaneous Daily Christie Shanks MD      gabapentin  300 mg Oral BID ADRIAN Tripp      insulin glargine  9 Units Subcutaneous QAM ADRIAN Tripp      insulin lispro  1-5 Units Subcutaneous TID AC ADRIAN Tripp      insulin lispro  1-5 Units Subcutaneous HS ADRIAN Tripp      levothyroxine  75 mcg Oral Early Morning ADRIAN Tripp      magnesium Oxide  800 mg Oral Daily ADRIAN Tripp      multivitamin stress formula  1 tablet Oral Daily ADRIAN Tripp      pantoprazole  40 mg Oral Early Morning ADRIAN Tripp      QUEtiapine  12.5 mg Oral HS ADRIAN Tripp      vancomycin  15 mg/kg Intravenous Q12H Catie Moran MD          Today, Patient Was Seen By: ADRIAN Tripp    **Please Note: This note may have been constructed using a voice recognition system.**

## 2024-06-20 NOTE — PROGRESS NOTES
Shakira Hinkle is a 83 y.o. female who is currently ordered Vancomycin IV with management by the Pharmacy Consult service.  Relevant clinical data and objective / subjective history reviewed.  Vancomycin Assessment:  Indication and Goal AUC/Trough: Urinary tract infection (goal -600, trough >10)  Clinical Status:  new start  Micro:   pending  Renal Function:  SCr: 0.63 mg/dL  CrCl: 48.6 mL/min  Renal replacement: Not on dialysis  Days of Therapy: 2  Current Dose: 1000mg IV once  Vancomycin Plan:  New Dosing: then 1000mg IV q24h  Estimated AUC: 450 mcg*hr/mL  Estimated Trough: 10.3 mcg/mL  Next Level: 6/22/24 AM labs  Renal Function Monitoring: Daily BMP and UOP  Pharmacy will continue to follow closely for s/sx of nephrotoxicity, infusion reactions and appropriateness of therapy.  BMP and CBC will be ordered per protocol. We will continue to follow the patient’s culture results and clinical progress daily.    Betina Francis, Pharmacist

## 2024-06-20 NOTE — ASSESSMENT & PLAN NOTE
Lab Results   Component Value Date    HGBA1C 9.1 (H) 04/19/2024       Recent Labs     06/20/24  0002 06/20/24  0053 06/20/24  0646 06/20/24  1119   POCGLU 52* 107 167* 378*         Blood Sugar Average: Last 72 hrs:  (P) 145.6951598792279420    Presented with hypoglycemia-see plan for hypoglycemia

## 2024-06-20 NOTE — ASSESSMENT & PLAN NOTE
Patient presenting from nursing home with complaints of hypoglycemia  Prehospital Lantus was put on hold  Blood sugar on arrival 128, then dropped as low as 39  Patient was initiated on D5 LR IV fluids  Clarified prehospital insulin regimen with skilled nursing facility MAR: Patient was on 9 units of Lantus insulin in the a.m. and sliding scale coverage at the skilled nursing facility  Patient was complaining of some left upper dental pain-did admit that her appetite was not baseline prehospital likely contributed to hypoglycemia  Evaluated by speech therapy cleared for diet which I resumed  Blood sugar prior to lunch was 378  Discontinued D5 LR IV fluids  Resumed prehospital Lantus 9 units in the a.m.  Continue SSI  Hypoglycemia protocol  BMP a.m.  Pending endocrinology consult

## 2024-06-20 NOTE — ASSESSMENT & PLAN NOTE
"On midodrine at her skilled nursing facility  Hypotensive on arrival, received IV fluids  Blood pressures a bit soft but asymptomatic, patient states \"that is my normal blood pressure\"  Continue prehospital midodrine  Monitor BP per protocol    "

## 2024-06-20 NOTE — ASSESSMENT & PLAN NOTE
Lab Results   Component Value Date    HGBA1C 9.1 (H) 04/19/2024       Recent Labs     06/20/24  0002 06/20/24  0053 06/20/24  0646 06/20/24  1119   POCGLU 52* 107 167* 378*       Blood Sugar Average: Last 72 hrs:  (P) 145.6119349832082157    Type 1 diabetes which is complicated with several admissions for hypoglycemia, she is now nursing home resident, brought in due to acute metabolic encephalopathy due to hypoglycemia, as per chart, she is on Lantus 18 units every morning, hypoglycemia has resolved, D5 infusion was discontinued and primary team started Lantus 9 units daily, first dose given at 12:32 PM today, I agree to continue Lantus at current dose, continue correctional insulin, no Premeal insulin for now given lack of appetite and poor oral intake.  We will monitor her blood sugar over time and make necessary changes.

## 2024-06-20 NOTE — PLAN OF CARE
Problem: PAIN - ADULT  Goal: Verbalizes/displays adequate comfort level or baseline comfort level  Description: Interventions:  - Encourage patient to monitor pain and request assistance  - Assess pain using appropriate pain scale  - Administer analgesics based on type and severity of pain and evaluate response  - Implement non-pharmacological measures as appropriate and evaluate response  - Consider cultural and social influences on pain and pain management  - Notify physician/advanced practitioner if interventions unsuccessful or patient reports new pain  Outcome: Progressing     Problem: Knowledge Deficit  Goal: Patient/family/caregiver demonstrates understanding of disease process, treatment plan, medications, and discharge instructions  Description: Complete learning assessment and assess knowledge base.  Interventions:  - Provide teaching at level of understanding  - Provide teaching via preferred learning methods  Outcome: Progressing     Problem: METABOLIC, FLUID AND ELECTROLYTES - ADULT  Goal: Electrolytes maintained within normal limits  Description: INTERVENTIONS:  - Monitor labs and assess patient for signs and symptoms of electrolyte imbalances  - Administer electrolyte replacement as ordered  - Monitor response to electrolyte replacements, including repeat lab results as appropriate  - Instruct patient on fluid and nutrition as appropriate  Outcome: Progressing

## 2024-06-20 NOTE — NURSING NOTE
AWAKE AND TEARFUL. SHE WANTS NURSES TO STAY WITH HER ATC. 02 IS MAINTAINED 2 LITERS N/C. 02 SAT 97% HR 98/MIN. NO DISTRESS NOTED. CALL MA NEAR. SKIN IS DRY AND NO S/S HYPO/HYPER GLYCEMIA. BED ALARM ON

## 2024-06-20 NOTE — NURSING NOTE
Patient refused all oral medications, refused lunch, ok with insulin and IV antibiotic.  Ana CAMPBELL aware

## 2024-06-20 NOTE — ASSESSMENT & PLAN NOTE
Patient presents with leukocytosis, elevated procalcitonin and hypotension, UTI  Afebrile, not tachycardic  Continue with antibiotics vanc and cefepime   Continue to monitor VS and labs, follow up cultures

## 2024-06-20 NOTE — NURSING NOTE
Bs recheck 107 . No lab draw completed as blood sugar pre D50 was 52 . Slim aware. Iv d5lr 100 /hr absorbing.

## 2024-06-20 NOTE — ASSESSMENT & PLAN NOTE
Patient presents with hypotension, leukocytosis, elevated procalcitonin  UA shows pyuria, bacteriuria  Continue with antibiotics vanc and cefepime   Continue to monitor VS and labs, follow up cultures  Follow up urine and blood cultures  Has hx of urinary retention, place on protocol

## 2024-06-20 NOTE — PROGRESS NOTES
Shakira Hinkle is a 83 y.o. female who is currently ordered Vancomycin IV with management by the Pharmacy Consult service.  Relevant clinical data and objective / subjective history reviewed.  Vancomycin Assessment:  Indication and Goal AUC/Trough: Urinary tract infection (goal -600, trough >10), -600, trough >10  Clinical Status: stable  Micro:   pending  Renal Function:  SCr: 0.52 mg/dL  CrCl: 58.3 mL/min  Renal replacement: Not on dialysis  Days of Therapy: 2  Current Dose: 1000mg IV q24h  Vancomycin Plan:  New Dosinmg IV q12h  Estimated AUC: 555 mcg*hr/mL  Estimated Trough: 15.2 mcg/mL  Next Level:  AM  Renal Function Monitoring: Daily BMP and UOP  Pharmacy will continue to follow closely for s/sx of nephrotoxicity, infusion reactions and appropriateness of therapy.  BMP and CBC will be ordered per protocol. We will continue to follow the patient’s culture results and clinical progress daily.    Alex Johnston, Pharmacist

## 2024-06-20 NOTE — NURSING NOTE
Awake now and changed for inc of urine. She is speaking a little.. yes and no answers. Bed alarm on. Buttocks are pink and blanching and repositioned to her side

## 2024-06-20 NOTE — CASE MANAGEMENT
Case Management Assessment & Discharge Planning Note    Patient name Shakira Hinkle  Location /-01 MRN [id number]  : 1940 Date 2024       Current Admission Date: 2024  Current Admission Diagnosis:Hypoglycemia   Patient Active Problem List    Diagnosis Date Noted Date Diagnosed    Pain, dental 2024     Urinary tract infection 2024     Major neurocognitive disorder (HCC) 2024     Neurocognitive disorder 2024     Primary osteoarthritis involving multiple joints 2023     At risk for influenza 2023     At risk for constipation 2023     At risk for delirium 2023     Advance care planning 2023     Atherosclerotic vascular disease 2023     Enteritis 2023     Urinary retention 2023     Dysarthria 12/15/2023     Personal history of COVID-19 12/15/2023     Severe protein-calorie malnutrition (HCC) 2023     Generalized weakness 2023     Macrocytosis 2023     Chronic hypotension 2023     Hypomagnesemia 2023     Hypoglycemia 2023     Hypothermia 2023     Hypokalemia 2023     History of Hodgkin's lymphoma 2023     Hypothyroidism 2023     Acute metabolic encephalopathy 2023     Gastroesophageal reflux disease without esophagitis 10/17/2022     Soft tissue radionecrosis 2022     Osteoradionecrosis of temporal bone  (HCC) 2022     Primary osteoarthritis of right shoulder 2021     Primary osteoarthritis of both knees 2020     Hyponatremia 2020     Postoperative hypothyroidism 2015     Hyperlipidemia 2014     History of hypertension 10/10/2013     Type I diabetes mellitus (HCC) 2008       LOS (days): 1  Geometric Mean LOS (GMLOS) (days): 4.1  Days to GMLOS:3.5     OBJECTIVE:    Risk of Unplanned Readmission Score: 23.53         Current admission status: Inpatient  Referral Reason: Other (D/C planning)    Preferred  Pharmacy: No Pharmacies Listed  Primary Care Provider: Dwayne Hilton MD    Primary Insurance: MEDICARE  Secondary Insurance: AARP    ASSESSMENT:  Active Health Care Proxies       Cindy Osborne Health Care Representative - Daughter   Primary Phone: [phone] (Mobile)  Home Phone: [phone]                 Advance Directives  Does patient have a Health Care POA?: Yes  Does patient have Advance Directives?: Yes  Primary Contact: Cindy Osborne         Readmission Root Cause  30 Day Readmission: No    Patient Information  Admitted from:: Facility  Mental Status: Alert  During Assessment patient was accompanied by: Not accompanied during assessment  Assessment information provided by:: Other - please comment (Staff at NH)  Primary Caregiver: Other (Comment) (NH Staff)  Caregiver's Name:: [facility] Staff  Caregiver's Relationship to Patient:: Other (Specify) (Staff)  Caregiver's Telephone Number:: [phone]  Support Systems: Children, Other (Comment) (NH Staff)  County of Residence: [place]  What city do you live in?: [place]  Home entry access options. Select all that apply.: No steps to enter home  Type of Current Residence: Facility  Upon entering residence, is there a bedroom on the main floor (no further steps)?: Yes  Upon entering residence, is there a bathroom on the main floor (no further steps)?: Yes  Living Arrangements: Other (Comment) (Nursing Home)  Is patient a ?: No    Activities of Daily Living Prior to Admission  Functional Status: Total dependent  Completes ADLs independently?: No  Level of ADL dependence: Total Dependent  Ambulates independently?: No  Level of ambulatory dependence: Total Dependent  Does patient use assisted devices?: Yes  Assisted Devices (DME) used: Keshav lift, Hospital Bed  Does patient currently own DME?: Yes  What DME does the patient currently own?: Hospital Bed, Keshav lift  Does patient have a history of Outpatient Therapy (PT/OT)?: Yes  Does the patient  have a history of Short-Term Rehab?: Yes  Does patient have a history of HHC?: Yes  Does patient currently have HHC?: No         Patient Information Continued  Income Source: Pension/FCI  Does patient have prescription coverage?: Yes  Does patient receive dialysis treatments?: No  Does patient have a history of substance abuse?: No  Does patient have a history of Mental Health Diagnosis?: No         Means of Transportation  Means of Transport to [facility]:: Other (Comment) (SV)      Social Determinants of Health (SDOH)      Flowsheet Row Most Recent Value   Housing Stability    In the last 12 months, was there a time when you were not able to pay the mortgage or rent on time? N   In the past 12 months, how many times have you moved where you were living? 2   At any time in the past 12 months, were you homeless or living in a shelter (including now)? N   Transportation Needs    In the past 12 months, has lack of transportation kept you from medical appointments or from getting medications? no   In the past 12 months, has lack of transportation kept you from meetings, work, or from getting things needed for daily living? No   Food Insecurity    Within the past 12 months, you worried that your food would run out before you got the money to buy more. Never true   Within the past 12 months, the food you bought just didn't last and you didn't have money to get more. Never true   Utilities    In the past 12 months has the electric, gas, oil, or water company threatened to shut off services in your home? No            DISCHARGE DETAILS:    Discharge planning discussed with:: Staff at [facility]  [place] of Choice: Yes     CM contacted family/caregiver?: Yes  Were Treatment Team discharge recommendations reviewed with patient/caregiver?: Yes  Did patient/caregiver verbalize understanding of patient care needs?: Yes  Were patient/caregiver advised of the risks associated with not following Treatment Team discharge  recommendations?: Yes    Contacts  Patient Contacts: Cindy Crooks  Relationship to Patient:: Family  Contact Method: Phone  Phone Number: [phone]  Reason/Outcome: Continuity of Care, Discharge Planning    Requested Home Health Care         Is the patient interested in HHC at discharge?: No    DME Referral Provided  Referral made for DME?: No    Other Referral/Resources/Interventions Provided:  Interventions: Facility Return  Referral Comments: CM contacted staff at [facility] to confirm bed hold. Pt's SW confirmed that pt has a 15 day bed hold. AVS updated. CM attempted to contact pt's daughter and left voicemail. CM will continue to follow.    Would you like to participate in our [facility] service program?  : No - Declined    Treatment Team Recommendation: Facility Return  Discharge Destination Plan:: Facility Return  Transport at Discharge : Stretcher van                                           Accepting Facility Name, City & State : [facility] [place] [address]  Receiving Facility/Agency Phone Number: [phone]  Facility/Agency Fax Number: [phone]

## 2024-06-21 LAB
ANION GAP SERPL CALCULATED.3IONS-SCNC: 8 MMOL/L (ref 4–13)
BUN SERPL-MCNC: 14 MG/DL (ref 5–25)
CALCIUM SERPL-MCNC: 8.7 MG/DL (ref 8.4–10.2)
CHLORIDE SERPL-SCNC: 102 MMOL/L (ref 96–108)
CO2 SERPL-SCNC: 26 MMOL/L (ref 21–32)
CREAT SERPL-MCNC: 0.5 MG/DL (ref 0.6–1.3)
ERYTHROCYTE [DISTWIDTH] IN BLOOD BY AUTOMATED COUNT: 12.2 % (ref 11.6–15.1)
GFR SERPL CREATININE-BSD FRML MDRD: 89 ML/MIN/1.73SQ M
GLUCOSE P FAST SERPL-MCNC: 32 MG/DL (ref 65–99)
GLUCOSE SERPL-MCNC: 130 MG/DL (ref 65–140)
GLUCOSE SERPL-MCNC: 148 MG/DL (ref 65–140)
GLUCOSE SERPL-MCNC: 170 MG/DL (ref 65–140)
GLUCOSE SERPL-MCNC: 207 MG/DL (ref 65–140)
GLUCOSE SERPL-MCNC: 26 MG/DL (ref 65–140)
GLUCOSE SERPL-MCNC: 34 MG/DL (ref 65–140)
GLUCOSE SERPL-MCNC: 417 MG/DL (ref 65–140)
GLUCOSE SERPL-MCNC: 422 MG/DL (ref 65–140)
GLUCOSE SERPL-MCNC: 46 MG/DL (ref 65–140)
GLUCOSE SERPL-MCNC: 51 MG/DL (ref 65–140)
GLUCOSE SERPL-MCNC: 69 MG/DL (ref 65–140)
HCT VFR BLD AUTO: 32.3 % (ref 34.8–46.1)
HGB BLD-MCNC: 10.4 G/DL (ref 11.5–15.4)
MCH RBC QN AUTO: 32.2 PG (ref 26.8–34.3)
MCHC RBC AUTO-ENTMCNC: 32.2 G/DL (ref 31.4–37.4)
MCV RBC AUTO: 100 FL (ref 82–98)
MRSA NOSE QL CULT: NORMAL
PLATELET # BLD AUTO: 285 THOUSANDS/UL (ref 149–390)
PMV BLD AUTO: 10.1 FL (ref 8.9–12.7)
POTASSIUM SERPL-SCNC: 3.9 MMOL/L (ref 3.5–5.3)
PROCALCITONIN SERPL-MCNC: 0.9 NG/ML
RBC # BLD AUTO: 3.23 MILLION/UL (ref 3.81–5.12)
SODIUM SERPL-SCNC: 136 MMOL/L (ref 135–147)
WBC # BLD AUTO: 7.43 THOUSAND/UL (ref 4.31–10.16)

## 2024-06-21 PROCEDURE — 80048 BASIC METABOLIC PNL TOTAL CA: CPT

## 2024-06-21 PROCEDURE — 99233 SBSQ HOSP IP/OBS HIGH 50: CPT

## 2024-06-21 PROCEDURE — 82947 ASSAY GLUCOSE BLOOD QUANT: CPT

## 2024-06-21 PROCEDURE — 84145 PROCALCITONIN (PCT): CPT

## 2024-06-21 PROCEDURE — 82948 REAGENT STRIP/BLOOD GLUCOSE: CPT

## 2024-06-21 PROCEDURE — 85027 COMPLETE CBC AUTOMATED: CPT

## 2024-06-21 RX ORDER — DEXTROSE MONOHYDRATE 25 G/50ML
INJECTION, SOLUTION INTRAVENOUS
Status: COMPLETED
Start: 2024-06-21 | End: 2024-06-21

## 2024-06-21 RX ORDER — INSULIN LISPRO 100 [IU]/ML
4 INJECTION, SOLUTION INTRAVENOUS; SUBCUTANEOUS ONCE
Status: COMPLETED | OUTPATIENT
Start: 2024-06-21 | End: 2024-06-21

## 2024-06-21 RX ORDER — DEXTROSE MONOHYDRATE 25 G/50ML
25 INJECTION, SOLUTION INTRAVENOUS ONCE
Status: COMPLETED | OUTPATIENT
Start: 2024-06-21 | End: 2024-06-21

## 2024-06-21 RX ORDER — DEXTROSE MONOHYDRATE 25 G/50ML
50 INJECTION, SOLUTION INTRAVENOUS ONCE
Status: COMPLETED | OUTPATIENT
Start: 2024-06-21 | End: 2024-06-21

## 2024-06-21 RX ORDER — INSULIN GLARGINE 100 [IU]/ML
6 INJECTION, SOLUTION SUBCUTANEOUS EVERY MORNING
Status: DISCONTINUED | OUTPATIENT
Start: 2024-06-21 | End: 2024-06-22

## 2024-06-21 RX ADMIN — QUETIAPINE FUMARATE 12.5 MG: 25 TABLET ORAL at 21:00

## 2024-06-21 RX ADMIN — INSULIN LISPRO 4 UNITS: 100 INJECTION, SOLUTION INTRAVENOUS; SUBCUTANEOUS at 11:35

## 2024-06-21 RX ADMIN — CALCIUM CARBONATE 500 MG: 500 TABLET, CHEWABLE ORAL at 17:15

## 2024-06-21 RX ADMIN — DEXTROSE MONOHYDRATE 50 ML: 25 INJECTION, SOLUTION INTRAVENOUS at 05:35

## 2024-06-21 RX ADMIN — Medication 800 MG: at 10:01

## 2024-06-21 RX ADMIN — CEFEPIME HYDROCHLORIDE 1000 MG: 1 INJECTION, SOLUTION INTRAVENOUS at 12:40

## 2024-06-21 RX ADMIN — DEXTROSE MONOHYDRATE 25 ML: 25 INJECTION, SOLUTION INTRAVENOUS at 16:45

## 2024-06-21 RX ADMIN — SODIUM CHLORIDE 500 ML: 0.9 INJECTION, SOLUTION INTRAVENOUS at 15:23

## 2024-06-21 RX ADMIN — GABAPENTIN 300 MG: 300 CAPSULE ORAL at 17:15

## 2024-06-21 RX ADMIN — INSULIN GLARGINE 6 UNITS: 100 INJECTION, SOLUTION SUBCUTANEOUS at 10:00

## 2024-06-21 RX ADMIN — VANCOMYCIN HYDROCHLORIDE 750 MG: 750 INJECTION, SOLUTION INTRAVENOUS at 20:57

## 2024-06-21 RX ADMIN — INSULIN LISPRO 4 UNITS: 100 INJECTION, SOLUTION INTRAVENOUS; SUBCUTANEOUS at 11:36

## 2024-06-21 RX ADMIN — B-COMPLEX W/ C & FOLIC ACID TAB 1 TABLET: TAB at 10:01

## 2024-06-21 RX ADMIN — VANCOMYCIN HYDROCHLORIDE 750 MG: 750 INJECTION, SOLUTION INTRAVENOUS at 10:13

## 2024-06-21 RX ADMIN — ENOXAPARIN SODIUM 40 MG: 40 INJECTION SUBCUTANEOUS at 10:00

## 2024-06-21 RX ADMIN — OXYCODONE HYDROCHLORIDE AND ACETAMINOPHEN 500 MG: 500 TABLET ORAL at 17:15

## 2024-06-21 RX ADMIN — GABAPENTIN 300 MG: 300 CAPSULE ORAL at 10:01

## 2024-06-21 RX ADMIN — OXYCODONE HYDROCHLORIDE AND ACETAMINOPHEN 500 MG: 500 TABLET ORAL at 10:01

## 2024-06-21 RX ADMIN — LEVOTHYROXINE SODIUM 75 MCG: 75 TABLET ORAL at 05:45

## 2024-06-21 RX ADMIN — PANTOPRAZOLE SODIUM 40 MG: 40 TABLET, DELAYED RELEASE ORAL at 05:45

## 2024-06-21 RX ADMIN — CALCIUM CARBONATE 500 MG: 500 TABLET, CHEWABLE ORAL at 10:01

## 2024-06-21 RX ADMIN — ATORVASTATIN CALCIUM 20 MG: 20 TABLET, FILM COATED ORAL at 17:15

## 2024-06-21 NOTE — ASSESSMENT & PLAN NOTE
Lab Results   Component Value Date    HGBA1C 9.1 (H) 04/19/2024       Recent Labs     06/21/24  0607 06/21/24  0718 06/21/24  1115 06/21/24  1117   POCGLU 130 148* 422* 417*         Blood Sugar Average: Last 72 hrs:  (P) 170.6885007934985186    Presented with hypoglycemia-see plan for hypoglycemia

## 2024-06-21 NOTE — ASSESSMENT & PLAN NOTE
On midodrine at her skilled nursing facility  Hypotensive on arrival, received IV fluids  Blood pressures a bit soft but asymptomatic  Continue prehospital midodrine  Monitor BP per protocol

## 2024-06-21 NOTE — ASSESSMENT & PLAN NOTE
Present on arrival   UA positive pyuria, bacteriuria   WBC's 19.05-->7.43  Procalcitonin 0.91-->0.90  Lactic acid 1.5  Blood cultures x 2 negative thus far  MRSA swab results pending  Urine culture growing greater than 100,000 Providencia stuartii and greater than 100,000 alphahemolytic Streptococcus not Enterococcus, sensitivity pending, will plan for 7 day course discharge on Vanc/ Cefepime to LTC  Patient is afebrile without leukocytosis and denies urinary symptoms  Continue cefepime and vancomycin  Monitor intake and output  Patient has a history of retention-follow retention protocol  Trend procalcitonin and WBCs  CBC, procalcitonin a.m.

## 2024-06-21 NOTE — PROGRESS NOTES
Shakira Hinkle is a 83 y.o. female who is currently ordered Vancomycin IV with management by the Pharmacy Consult service.  Relevant clinical data and objective / subjective history reviewed.  Vancomycin Assessment:  Indication and Goal AUC/Trough: Urinary tract infection (goal -600, trough >10), -600, trough >10  Clinical Status: stable  Micro:     Renal Function:  SCr: 0.5 mg/dL  CrCl: 60.6 mL/min  Renal replacement: Not on dialysis  Days of Therapy: 3  Current Dose: 750mg IV q12h  Vancomycin Plan:  New Dosinmg IV q12h  Estimated AUC: 543 mcg*hr/mL  Estimated Trough: 14.7 mcg/mL  Next Level:  AM  Renal Function Monitoring: Daily BMP and UOP  Pharmacy will continue to follow closely for s/sx of nephrotoxicity, infusion reactions and appropriateness of therapy.  BMP and CBC will be ordered per protocol. We will continue to follow the patient’s culture results and clinical progress daily.    Alex Johnston, Pharmacist

## 2024-06-21 NOTE — PROGRESS NOTES
[facility]  Progress Note  Name: Shakira Hinkle I  MRN: [id number]  Unit/Bed#: -01 I Date of Admission: 6/19/2024   Date of Service: 6/21/2024 I Hospital Day: 2    Assessment & Plan   Hypoglycemia  Assessment & Plan  Patient presenting from nursing home with complaints of hypoglycemia  Prehospital Lantus was put on hold  Blood sugar on arrival 128, then dropped as low as 39  Patient was initiated on D5 LR IV fluids  Clarified prehospital insulin regimen with skilled nursing facility MAR: Patient was on 9 units of Lantus insulin in the a.m. and sliding scale coverage at the skilled nursing facility  Patient was complaining of some left upper dental pain-did admit that her appetite was not baseline prehospital likely contributed to hypoglycemia  Evaluated by speech therapy cleared for diet which I resumed  Appreciate input from endocrinology  Blood sugars continue to be labile today, adjusted regimen  See plan for type 1 diabetes        Acute metabolic encephalopathy  Assessment & Plan  Patient presented nonverbal, depressed mentation   Likely related to hypoglycemia, also with UTI  Mentation at baseline today  See plan for UTI  See plan for hypoglycemia  Continue to monitor mental status for changes    Urinary tract infection  Assessment & Plan  Present on arrival   UA positive pyuria, bacteriuria   WBC's 19.05-->7.43  Procalcitonin 0.91-->0.90  Lactic acid 1.5  Blood cultures x 2 negative thus far  MRSA swab results pending  Urine culture growing greater than 100,000 Providencia stuartii and greater than 100,000 alphahemolytic Streptococcus not Enterococcus, sensitivity pending  Patient is afebrile without leukocytosis and denies urinary symptoms  Continue cefepime and vancomycin  Monitor intake and output  Patient has a history of retention-follow retention protocol  Trend procalcitonin and WBCs  CBC, procalcitonin a.m.    * Type 1 diabetes mellitus with hypoglycemia  ([facility])  Assessment & Plan  Lab Results   Component Value Date    HGBA1C 9.1 (H) 04/19/2024       Recent Labs     06/21/24  0607 06/21/24  0718 06/21/24  1115 06/21/24  1117   POCGLU 130 148* 422* 417*         Blood Sugar Average: Last 72 hrs:  (P) [id number]    Presented with hypoglycemia-see plan for hypoglycemia         Pain, dental  Assessment & Plan  6/20 patient reported dental pain in her left upper jaw-refused to let me examine  6/21 denies dental pain today still will not let me examine  Patient may require follow-up with dentist on discharge    Chronic hypotension  Assessment & Plan  On midodrine at her skilled nursing facility  Hypotensive on arrival, received IV fluids  Blood pressures a bit soft but asymptomatic  Continue prehospital midodrine  Monitor BP per protocol      Gastroesophageal reflux disease without esophagitis  Assessment & Plan  Continue  PPI                VTE Pharmacologic Prophylaxis: VTE Score: 4 Moderate Risk (Score 3-4) - Pharmacological DVT Prophylaxis Ordered: enoxaparin (Lovenox).    Mobility:   Basic Mobility Inpatient Raw Score: 6  JH-HLM Goal: 2: Bed activities/Dependent transfer  JH-HLM Achieved: 2: Bed activities/Dependent transfer  JH-HLM Goal achieved. Continue to encourage appropriate mobility.    Patient Centered Rounds: I performed bedside rounds with nursing staff today.   Discussions with Specialists or Other Care Team Provider: Nursing, case management    Education and Discussions with Family / Patient: Updated  (daughter) via phone.    Total Time Spent on Date of Encounter in care of patient: 37 mins. This time was spent on one or more of the following: performing physical exam; counseling and coordination of care; obtaining or reviewing history; documenting in the medical record; reviewing/ordering tests, medications or procedures; communicating with other healthcare professionals and discussing with patient's family/caregivers.    Current  Length of Stay: 2 day(s)  Current Patient Status: Inpatient   Certification Statement: The patient will continue to require additional inpatient hospital stay due to continues on IV antibiotics for UTI, trending labs repeat procalcitonin CBC in the a.m., monitoring fluid status closely  Discharge Plan:  Patient will return to her skilled nursing facility on discharge.  Continues on IV antibiotics today for UTI.  Repeating labs in the a.m. sugars remain labile adjusted insulin regimen and endocrinology has been following.  Patient will return to her skilled nursing facility when medically stable for discharge per case management.    Code Status: Level 3 - DNAR and DNI    Subjective:   Denies any complaints of pain or discomfort today.    Objective:     Vitals:   Temp (24hrs), Av.6 °F (37 °C), Min:98.4 °F (36.9 °C), Max:98.7 °F (37.1 °C)    Temp:  [98.4 °F (36.9 °C)-98.7 °F (37.1 °C)] 98.4 °F (36.9 °C)  HR:  [] 74  Resp:  [12-18] 12  BP: ()/(51-67) 98/51  SpO2:  [97 %-99 %] 99 %  Body mass index is 20.49 kg/m².     Input and Output Summary (last 24 hours):     Intake/Output Summary (Last 24 hours) at 2024 1446  Last data filed at 2024 1306  Gross per 24 hour   Intake 1072.5 ml   Output --   Net 1072.5 ml       Physical Exam:   Physical Exam  Vitals and nursing note reviewed.   Constitutional:       General: She is not in acute distress.     Appearance: She is well-developed and normal weight. She is not ill-appearing.   HENT:      Head: Normocephalic and atraumatic.      Mouth/Throat:      Mouth: Mucous membranes are dry.   Eyes:      Extraocular Movements: Extraocular movements intact.      Conjunctiva/sclera: Conjunctivae normal.      Pupils: Pupils are equal, round, and reactive to light.   Cardiovascular:      Rate and Rhythm: Normal rate and regular rhythm.      Pulses: Normal pulses.      Heart sounds: Normal heart sounds. No murmur heard.  Pulmonary:      Effort: Pulmonary effort is  normal. No respiratory distress.      Breath sounds: Normal breath sounds. No wheezing or rales.   Abdominal:      General: Bowel sounds are normal. There is no distension.      Palpations: Abdomen is soft.      Tenderness: There is no abdominal tenderness. There is no guarding.   Musculoskeletal:         General: No swelling.   Skin:     General: Skin is warm and dry.      Capillary Refill: Capillary refill takes less than 2 seconds.   Neurological:      General: No focal deficit present.      Mental Status: She is alert and oriented to person, place, and time. Mental status is at baseline.   Psychiatric:         Thought Content: Thought content normal.          Additional Data:     Labs:  Results from last 7 days   Lab Units 06/21/24  0637 06/20/24  0431   WBC Thousand/uL 7.43 13.02*   HEMOGLOBIN g/dL 10.4* 10.3*   HEMATOCRIT % 32.3* 31.8*   PLATELETS Thousands/uL 285 259   SEGS PCT %  --  76*   LYMPHO PCT %  --  14   MONO PCT %  --  8   EOS PCT %  --  1     Results from last 7 days   Lab Units 06/21/24  0637 06/20/24  0431   SODIUM mmol/L 136 134*   POTASSIUM mmol/L 3.9 4.1   CHLORIDE mmol/L 102 101   CO2 mmol/L 26 24   BUN mg/dL 14 21   CREATININE mg/dL 0.50* 0.52*   ANION GAP mmol/L 8 9   CALCIUM mg/dL 8.7 8.3*   ALBUMIN g/dL  --  3.2*   TOTAL BILIRUBIN mg/dL  --  0.45   ALK PHOS U/L  --  49   ALT U/L  --  14   AST U/L  --  20   GLUCOSE RANDOM mg/dL 170* 146*     Results from last 7 days   Lab Units 06/19/24  1954   INR  0.95     Results from last 7 days   Lab Units 06/21/24  1117 06/21/24  1115 06/21/24  0718 06/21/24  0607 06/21/24  0526 06/21/24  0523 06/20/24  2117 06/20/24  1619 06/20/24  1119 06/20/24  0646 06/20/24  0053 06/20/24  0002   POC GLUCOSE mg/dl 417* 422* 148* 130 51* 46* 159* 137 378* 167* 107 52*         Results from last 7 days   Lab Units 06/21/24  0637 06/20/24  0431 06/19/24 2010 06/19/24  1954   LACTIC ACID mmol/L  --   --  1.5  --    PROCALCITONIN ng/ml 0.90* 1.18*  --  0.91*        Lines/Drains:  Invasive Devices       Peripheral Intravenous Line  Duration             Peripheral IV 06/19/24 Right;Ventral (anterior) Forearm 1 day                  Urinary Catheter:  Goal for removal:  Patient does not currently have a Kinney catheter she is incontinent of urine               Imaging: Reviewed radiology reports from this admission including: chest xray    Recent Cultures (last 7 days):   Results from last 7 days   Lab Units 06/19/24 2053 06/19/24 1954   BLOOD CULTURE   --  No Growth at 24 hrs.  No Growth at 24 hrs.   URINE CULTURE  >100,000 cfu/ml Providencia stuartii*  >100,000 cfu/ml Alpha Hemolytic Streptococcus NOT Enterococcus*  --        Last 24 Hours Medication List:   Current Facility-Administered Medications   Medication Dose Route Frequency Provider Last Rate    acetaminophen  650 mg Oral Q4H PRN ADRIAN Tripp      vitamin C  500 mg Oral BID ADRIAN Tripp      atorvastatin  20 mg Oral Daily With Dinner ADRIAN Tripp      calcium carbonate  500 mg Oral BID ADRIAN Tripp      cefepime  1,000 mg Intravenous Q12H Christie Shanks MD 1,000 mg (06/21/24 1240)    enoxaparin  40 mg Subcutaneous Daily Christie Shanks MD      gabapentin  300 mg Oral BID ADRIAN Tripp      insulin glargine  6 Units Subcutaneous QAM ADRIAN Tripp      insulin lispro  1-5 Units Subcutaneous TID AC ADRIAN Tripp      levothyroxine  75 mcg Oral Early Morning ADRIAN Tripp      magnesium Oxide  800 mg Oral Daily ADRIAN Tripp      multivitamin stress formula  1 tablet Oral Daily ADRIAN Tripp      pantoprazole  40 mg Oral Early Morning ADRIAN Tripp      QUEtiapine  12.5 mg Oral HS ADRIAN Tripp      vancomycin  15 mg/kg Intravenous Q12H Catie Moran  mg (06/21/24 1013)        Today, Patient Was Seen By: ADRIAN Tripp    **Please Note: This note may have been  constructed using a voice recognition system.**

## 2024-06-21 NOTE — PLAN OF CARE
Problem: Nutrition/Hydration-ADULT  Goal: Nutrient/Hydration intake appropriate for improving, restoring or maintaining nutritional needs  Description: Monitor and assess patient's nutrition/hydration status for malnutrition. Collaborate with interdisciplinary team and initiate plan and interventions as ordered.  Monitor patient's weight and dietary intake as ordered or per policy. Utilize nutrition screening tool and intervene as necessary. Determine patient's food preferences and provide high-protein, high-caloric foods as appropriate.     INTERVENTIONS:  - Monitor oral intake, urinary output, labs, and treatment plans  - Assess nutrition and hydration status and recommend course of action  - Evaluate amount of meals eaten  - Assist patient with eating if necessary   - Allow adequate time for meals  - Recommend/ encourage appropriate diets, oral nutritional supplements, and vitamin/mineral supplements  - Order, calculate, and assess calorie counts as needed  - Recommend, monitor, and adjust tube feedings and TPN/PPN based on assessed needs  - Assess need for intravenous fluids  - Provide specific nutrition/hydration education as appropriate  - Include patient/family/caregiver in decisions related to nutrition  Outcome: Progressing     Problem: PAIN - ADULT  Goal: Verbalizes/displays adequate comfort level or baseline comfort level  Description: Interventions:  - Encourage patient to monitor pain and request assistance  - Assess pain using appropriate pain scale  - Administer analgesics based on type and severity of pain and evaluate response  - Implement non-pharmacological measures as appropriate and evaluate response  - Consider cultural and social influences on pain and pain management  - Notify physician/advanced practitioner if interventions unsuccessful or patient reports new pain  Outcome: Progressing     Problem: INFECTION - ADULT  Goal: Absence or prevention of progression during  hospitalization  Description: INTERVENTIONS:  - Assess and monitor for signs and symptoms of infection  - Monitor lab/diagnostic results  - Monitor all insertion sites, i.e. indwelling lines, tubes, and drains  - Monitor endotracheal if appropriate and nasal secretions for changes in amount and color  - [place] appropriate cooling/warming therapies per order  - Administer medications as ordered  - Instruct and encourage patient and family to use good hand hygiene technique  - Identify and instruct in appropriate isolation precautions for identified infection/condition  Outcome: Progressing     Problem: Knowledge Deficit  Goal: Patient/family/caregiver demonstrates understanding of disease process, treatment plan, medications, and discharge instructions  Description: Complete learning assessment and assess knowledge base.  Interventions:  - Provide teaching at level of understanding  - Provide teaching via preferred learning methods  Outcome: Progressing     Problem: RESPIRATORY - ADULT  Goal: Achieves optimal ventilation and oxygenation  Description: INTERVENTIONS:  - Assess for changes in respiratory status  - Assess for changes in mentation and behavior  - Position to facilitate oxygenation and minimize respiratory effort  - Oxygen administered by appropriate delivery if ordered  - Initiate smoking cessation education as indicated  - Encourage broncho-pulmonary hygiene including cough, deep breathe, Incentive Spirometry  - Assess the need for suctioning and aspirate as needed  - Assess and instruct to report SOB or any respiratory difficulty  - Respiratory Therapy support as indicated  Outcome: Progressing

## 2024-06-21 NOTE — CASE MANAGEMENT
Case Management Discharge Planning Note    Patient name Shakira Hinkle  Location /-01 MRN [id number]  : 1940 Date 2024       Current Admission Date: 2024  Current Admission Diagnosis:Type 1 diabetes mellitus with hypoglycemia (HCC)   Patient Active Problem List    Diagnosis Date Noted Date Diagnosed    Pain, dental 2024     Urinary tract infection 2024     Major neurocognitive disorder (HCC) 2024     Neurocognitive disorder 2024     Primary osteoarthritis involving multiple joints 2023     At risk for influenza 2023     At risk for constipation 2023     At risk for delirium 2023     Advance care planning 2023     Atherosclerotic vascular disease 2023     Enteritis 2023     Urinary retention 2023     Dysarthria 12/15/2023     Personal history of COVID-19 12/15/2023     Severe protein-calorie malnutrition (HCC) 2023     Generalized weakness 2023     Macrocytosis 2023     Chronic hypotension 2023     Hypomagnesemia 2023     Hypoglycemia 2023     Hypothermia 2023     Hypokalemia 2023     History of Hodgkin's lymphoma 2023     Hypothyroidism 2023     Acute metabolic encephalopathy 2023     Gastroesophageal reflux disease without esophagitis 10/17/2022     Soft tissue radionecrosis 2022     Osteoradionecrosis of temporal bone  (HCC) 2022     Primary osteoarthritis of right shoulder 2021     Primary osteoarthritis of both knees 2020     Hyponatremia 2020     Postoperative hypothyroidism 2015     Hyperlipidemia 2014     History of hypertension 10/10/2013     Type 1 diabetes mellitus with hypoglycemia (HCC) 2008       LOS (days): 2  Geometric Mean LOS (GMLOS) (days): 4.1  Days to GMLOS:2.4     OBJECTIVE:  Risk of Unplanned Readmission Score: 26.67         Current admission status: Inpatient   Preferred  Pharmacy: No Pharmacies Listed  Primary Care Provider: Dwayne Hilton MD    Primary Insurance: MEDICARE  Secondary Insurance: AARP    DISCHARGE DETAILS:                                          Other Referral/Resources/Interventions Provided:  Interventions: Facility Return, Transportation  Referral Comments: CM was notified that pt will likely D/C within the next 24 hours. CM contacted [facility] to confirm weekend admission. CM arranged for [facility] transport tomorrow at 1330. CM notified [facility], pt's nurse, and pt's daughter. CM will continue to follow.         Treatment Team Recommendation: Facility Return     Transport at Discharge : Danny lester  Dispatcher Contacted: Yes  Number/Name of Dispatcher: [facility] / [phone]     ETA of Transport (Date): 06/22/24  ETA of Transport (Time): 1330

## 2024-06-21 NOTE — ASSESSMENT & PLAN NOTE
6/20 patient reported dental pain in her left upper jaw-refused to let me examine  6/21 denies dental pain today still will not let me examine  Patient may require follow-up with dentist on discharge

## 2024-06-21 NOTE — ASSESSMENT & PLAN NOTE
Patient presenting from nursing home with complaints of hypoglycemia  Prehospital Lantus was put on hold  Blood sugar on arrival 128, then dropped as low as 39  Patient was initiated on D5 LR IV fluids  Clarified prehospital insulin regimen with skilled nursing facility MAR: Patient was on 9 units of Lantus insulin in the a.m. and sliding scale coverage at the skilled nursing facility  Patient was complaining of some left upper dental pain-did admit that her appetite was not baseline prehospital likely contributed to hypoglycemia  Evaluated by speech therapy cleared for diet which I resumed  Appreciate input from endocrinology  Blood sugars continue to be labile today, adjusted regimen  See plan for type 1 diabetes

## 2024-06-22 VITALS
TEMPERATURE: 98.4 F | DIASTOLIC BLOOD PRESSURE: 58 MMHG | BODY MASS INDEX: 20.6 KG/M2 | HEART RATE: 80 BPM | HEIGHT: 60 IN | WEIGHT: 104.94 LBS | OXYGEN SATURATION: 95 % | SYSTOLIC BLOOD PRESSURE: 112 MMHG | RESPIRATION RATE: 18 BRPM

## 2024-06-22 LAB
ANION GAP SERPL CALCULATED.3IONS-SCNC: 7 MMOL/L (ref 4–13)
BACTERIA UR CULT: NORMAL
BUN SERPL-MCNC: 12 MG/DL (ref 5–25)
CALCIUM SERPL-MCNC: 8.4 MG/DL (ref 8.4–10.2)
CHLORIDE SERPL-SCNC: 101 MMOL/L (ref 96–108)
CO2 SERPL-SCNC: 26 MMOL/L (ref 21–32)
CREAT SERPL-MCNC: 0.53 MG/DL (ref 0.6–1.3)
ERYTHROCYTE [DISTWIDTH] IN BLOOD BY AUTOMATED COUNT: 12.2 % (ref 11.6–15.1)
GFR SERPL CREATININE-BSD FRML MDRD: 88 ML/MIN/1.73SQ M
GLUCOSE SERPL-MCNC: 188 MG/DL (ref 65–140)
GLUCOSE SERPL-MCNC: 238 MG/DL (ref 65–140)
GLUCOSE SERPL-MCNC: 326 MG/DL (ref 65–140)
HCT VFR BLD AUTO: 32.3 % (ref 34.8–46.1)
HGB BLD-MCNC: 10.7 G/DL (ref 11.5–15.4)
MCH RBC QN AUTO: 33.1 PG (ref 26.8–34.3)
MCHC RBC AUTO-ENTMCNC: 33.1 G/DL (ref 31.4–37.4)
MCV RBC AUTO: 100 FL (ref 82–98)
PLATELET # BLD AUTO: 266 THOUSANDS/UL (ref 149–390)
PMV BLD AUTO: 9.9 FL (ref 8.9–12.7)
POTASSIUM SERPL-SCNC: 4.2 MMOL/L (ref 3.5–5.3)
RBC # BLD AUTO: 3.23 MILLION/UL (ref 3.81–5.12)
SARS-COV-2 AG UPPER RESP QL IA: NEGATIVE
SARS-COV-2 AG UPPER RESP QL IA: NORMAL
SODIUM SERPL-SCNC: 134 MMOL/L (ref 135–147)
VANCOMYCIN SERPL-MCNC: 17.7 UG/ML (ref 10–20)
WBC # BLD AUTO: 10.38 THOUSAND/UL (ref 4.31–10.16)

## 2024-06-22 PROCEDURE — 99239 HOSP IP/OBS DSCHRG MGMT >30: CPT | Performed by: STUDENT IN AN ORGANIZED HEALTH CARE EDUCATION/TRAINING PROGRAM

## 2024-06-22 PROCEDURE — 80202 ASSAY OF VANCOMYCIN: CPT | Performed by: INTERNAL MEDICINE

## 2024-06-22 PROCEDURE — 85027 COMPLETE CBC AUTOMATED: CPT

## 2024-06-22 PROCEDURE — 80048 BASIC METABOLIC PNL TOTAL CA: CPT

## 2024-06-22 PROCEDURE — 82948 REAGENT STRIP/BLOOD GLUCOSE: CPT

## 2024-06-22 PROCEDURE — 99233 SBSQ HOSP IP/OBS HIGH 50: CPT | Performed by: STUDENT IN AN ORGANIZED HEALTH CARE EDUCATION/TRAINING PROGRAM

## 2024-06-22 RX ORDER — VANCOMYCIN HYDROCHLORIDE 750 MG/150ML
750 INJECTION, SOLUTION INTRAVENOUS EVERY 12 HOURS
Start: 2024-06-22 | End: 2024-06-26

## 2024-06-22 RX ORDER — CEFEPIME HYDROCHLORIDE 1 G/50ML
1000 INJECTION, SOLUTION INTRAVENOUS EVERY 12 HOURS
Start: 2024-06-22 | End: 2024-06-26

## 2024-06-22 RX ORDER — INSULIN GLARGINE 100 [IU]/ML
10 INJECTION, SOLUTION SUBCUTANEOUS EVERY MORNING
Status: DISCONTINUED | OUTPATIENT
Start: 2024-06-23 | End: 2024-06-22 | Stop reason: HOSPADM

## 2024-06-22 RX ADMIN — INSULIN LISPRO 3 UNITS: 100 INJECTION, SOLUTION INTRAVENOUS; SUBCUTANEOUS at 12:36

## 2024-06-22 RX ADMIN — CEFEPIME HYDROCHLORIDE 1000 MG: 1 INJECTION, SOLUTION INTRAVENOUS at 14:00

## 2024-06-22 RX ADMIN — INSULIN GLARGINE 6 UNITS: 100 INJECTION, SOLUTION SUBCUTANEOUS at 08:05

## 2024-06-22 RX ADMIN — GABAPENTIN 300 MG: 300 CAPSULE ORAL at 08:05

## 2024-06-22 RX ADMIN — CEFEPIME HYDROCHLORIDE 1000 MG: 1 INJECTION, SOLUTION INTRAVENOUS at 00:29

## 2024-06-22 RX ADMIN — LEVOTHYROXINE SODIUM 75 MCG: 75 TABLET ORAL at 05:08

## 2024-06-22 RX ADMIN — PANTOPRAZOLE SODIUM 40 MG: 40 TABLET, DELAYED RELEASE ORAL at 05:08

## 2024-06-22 RX ADMIN — Medication 800 MG: at 08:06

## 2024-06-22 RX ADMIN — B-COMPLEX W/ C & FOLIC ACID TAB 1 TABLET: TAB at 08:06

## 2024-06-22 RX ADMIN — VANCOMYCIN HYDROCHLORIDE 750 MG: 750 INJECTION, SOLUTION INTRAVENOUS at 11:27

## 2024-06-22 RX ADMIN — ENOXAPARIN SODIUM 40 MG: 40 INJECTION SUBCUTANEOUS at 08:05

## 2024-06-22 RX ADMIN — OXYCODONE HYDROCHLORIDE AND ACETAMINOPHEN 500 MG: 500 TABLET ORAL at 08:05

## 2024-06-22 RX ADMIN — CALCIUM CARBONATE 500 MG: 500 TABLET, CHEWABLE ORAL at 08:06

## 2024-06-22 RX ADMIN — INSULIN LISPRO 2 UNITS: 100 INJECTION, SOLUTION INTRAVENOUS; SUBCUTANEOUS at 08:00

## 2024-06-22 NOTE — PLAN OF CARE
Problem: Prexisting or High Potential for Compromised Skin Integrity  Goal: Skin integrity is maintained or improved  Description: INTERVENTIONS:  - Identify patients at risk for skin breakdown  - Assess and monitor skin integrity  - Assess and monitor nutrition and hydration status  - Monitor labs   - Assess for incontinence   - Turn and reposition patient  - Assist with mobility/ambulation  - Relieve pressure over bony prominences  - Avoid friction and shearing  - Provide appropriate hygiene as needed including keeping skin clean and dry  - Evaluate need for skin moisturizer/barrier cream  - Collaborate with interdisciplinary team   - Patient/family teaching  - Consider wound care consult   Outcome: Progressing     Problem: INFECTION - ADULT  Goal: Absence or prevention of progression during hospitalization  Description: INTERVENTIONS:  - Assess and monitor for signs and symptoms of infection  - Monitor lab/diagnostic results  - Monitor all insertion sites, i.e. indwelling lines, tubes, and drains  - Monitor endotracheal if appropriate and nasal secretions for changes in amount and color  - [place] appropriate cooling/warming therapies per order  - Administer medications as ordered  - Instruct and encourage patient and family to use good hand hygiene technique  - Identify and instruct in appropriate isolation precautions for identified infection/condition  Outcome: Progressing  Goal: Absence of fever/infection during neutropenic period  Description: INTERVENTIONS:  - Monitor WBC    Outcome: Progressing

## 2024-06-22 NOTE — DISCHARGE SUMMARY
Count includes the [facility]  Discharge- Shakira Hinkle 1940, 83 y.o. female MRN: [id number]  Unit/Bed#: -[facility] Encounter: [id number]  Primary Care Provider: Dwayne Hilton MD   Date and time admitted to hospital: 6/19/2024  7:37 PM    Pain, dental  Assessment & Plan  6/20 patient reported dental pain in her left upper jaw-refused to let me examine  6/21 denies dental pain today still will not let me examine  Patient may require follow-up with dentist on discharge    Urinary tract infection  Assessment & Plan  Present on arrival   UA positive pyuria, bacteriuria   WBC's 19.05-->7.43  Procalcitonin 0.91-->0.90  Lactic acid 1.5  Blood cultures x 2 negative thus far  MRSA swab results pending  Urine culture growing greater than 100,000 Providencia stuartii and greater than 100,000 alphahemolytic Streptococcus not Enterococcus, sensitivity pending, will plan for 7 day course discharge on Vanc/ Cefepime to LTC  Patient is afebrile without leukocytosis and denies urinary symptoms  Continue cefepime and vancomycin  Monitor intake and output  Patient has a history of retention-follow retention protocol  Trend procalcitonin and WBCs  CBC, procalcitonin a.m.    Chronic hypotension  Assessment & Plan  On midodrine at her skilled nursing facility  Hypotensive on arrival, received IV fluids  Blood pressures a bit soft but asymptomatic  Continue prehospital midodrine  Monitor BP per protocol      Hypoglycemia  Assessment & Plan  Patient presenting from nursing home with complaints of hypoglycemia  Prehospital Lantus was put on hold  Blood sugar on arrival 128, then dropped as low as 39  Patient was initiated on D5 LR IV fluids  Clarified prehospital insulin regimen with skilled nursing facility MAR: Patient was on 9 units of Lantus insulin in the a.m. and sliding scale coverage at the skilled nursing facility  Patient was complaining of some left upper dental pain-did admit that her appetite was not baseline  prehospital likely contributed to hypoglycemia  Evaluated by speech therapy cleared for diet which I resumed  Appreciate input from endocrinology  Blood sugars continue to be labile today, adjusted regimen  See plan for type 1 diabetes        Acute metabolic encephalopathy  Assessment & Plan  Patient presented nonverbal, depressed mentation   Likely related to hypoglycemia, also with UTI  Mentation at baseline today  See plan for UTI  See plan for hypoglycemia  Continue to monitor mental status for changes    Gastroesophageal reflux disease without esophagitis  Assessment & Plan  Continue  PPI     * Type 1 diabetes mellitus with hypoglycemia (HCC)  Assessment & Plan  Lab Results   Component Value Date    HGBA1C 9.1 (H) 04/19/2024       Recent Labs     06/21/24  0607 06/21/24  0718 06/21/24  1115 06/21/24  1117   POCGLU 130 148* 422* 417*         Blood Sugar Average: Last 72 hrs:  (P) [id number]    Presented with hypoglycemia-see plan for hypoglycemia

## 2024-06-22 NOTE — ED PROVIDER NOTES
"History  Chief Complaint   Patient presents with    Hypotension     Patient comes in with two episodes of hypoglycemia today and received glucagon X2. EMS reports patient blood pressures were low upon arrival to facility and were 50s systolic.      83F with hypoglycemia          Prior to Admission Medications   Prescriptions Last Dose Informant Patient Reported? Taking?   Ascorbic Acid (vitamin C) 100 MG tablet   Yes No   Sig: Take 500 mg by mouth 2 (two) times a day   BD Insulin Syringe U/F 2Unit 31G X \" 0.3 ML MISC   Yes No   Si (four) times a day   Multiple Vitamin (multivitamin) capsule   Yes No   Sig: Take 1 capsule by mouth daily   QUEtiapine (SEROquel) 25 mg tablet   No No   Sig: Take 0.5 tablets (12.5 mg total) by mouth daily at bedtime   acetaminophen (TYLENOL) 325 mg tablet   No No   Sig: Take 2 tablets (650 mg total) by mouth every 4 (four) hours as needed for mild pain, headaches or fever   atorvastatin (LIPITOR) 20 mg tablet   Yes No   Sig: Take 20 mg by mouth daily with dinner   calcium carbonate (OS-FELICE) 600 MG tablet   Yes No   Sig: Take 600 mg by mouth 2 (two) times a day with meals   gabapentin (NEURONTIN) 300 mg capsule   No No   Sig: Take 1 capsule (300 mg total) by mouth 2 (two) times a day   insulin glargine (LANTUS) 100 units/mL subcutaneous injection   No No   Sig: Inject 6 Units under the skin every 12 (twelve) hours   Patient taking differently: Inject 18 Units under the skin every morning   insulin lispro (HumaLOG) 100 units/mL injection   No No   Sig: Inject 1-5 Units under the skin 3 (three) times a day before meals   levothyroxine 75 mcg tablet   No No   Sig: Take 1 tablet (75 mcg total) by mouth daily in the early morning   Patient taking differently: Take 150 mcg by mouth daily in the early morning   magnesium Oxide (MAG-OX) 400 mg TABS   No No   Sig: Take 2 tablets (800 mg total) by mouth daily   midodrine (PROAMATINE) 5 mg tablet   No No   Sig: Take 1 tablet (5 mg total) " by mouth 3 (three) times a day before meals   Patient taking differently: Take 2.5 mg by mouth 2 (two) times a day   pantoprazole (PROTONIX) 40 mg tablet   No No   Sig: Take 1 tablet (40 mg total) by mouth in the morning      Facility-Administered Medications: None       Past Medical History:   Diagnosis Date    Abnormal CT of the abdomen 07/10/2022    Ambulates with cane     Cancer (HCC)     Chronic pain disorder     knees/ shoulders (gets inj every 3 mos)    Closed intertrochanteric fracture of right femur ([facility]) 05/26/2020    Controlled type 2 diabetes mellitus with diabetic polyneuropathy, with long-term current use of insulin (HCC) 05/21/2008    Diabetes mellitus (HCC)     Diabetic polyneuropathy (HCC) 05/21/2008    ICD10 clean up    Disease of thyroid gland     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    HL (hearing loss)     Hodgkin's disease (HCC) 2008    Left neck, had radiation    Hypertension     Neuropathy     Osteoporosis     RA (rheumatoid arthritis) ([facility])     Traumatic rhabdomyolysis ([facility]) 10/17/2022       Past Surgical History:   Procedure Laterality Date    ADENOIDECTOMY      APPENDECTOMY      CATARACT EXTRACTION      CATARACT EXTRACTION, BILATERAL      CHOLECYSTECTOMY      FRACTURE SURGERY Right     hip    MOUTH SURGERY      oral cancer left lower lip    OVARY SURGERY      VT ADJT TIS TRNS/REARGMT F/C/C/M/N/A/G/H/F 10SQCM/< N/A 3/28/2022    Procedure: Adjacent tissue transfer face;  Surgeon: Ar Paris MD;  [facility];  Service: ENT    VT OPTX FEM SHFT FX W/INSJ IMED IMPLT W/WO SCREW Right 5/28/2020    Procedure: INSERTION NAIL IM FEMUR ANTEGRADE (TROCHANTERIC);  Surgeon: Charles Seals DO;  Location:  MAIN OR;  Service: Orthopedics    VT TRANSMASTOID ANTROTOMY Left 3/28/2022    Procedure: MASTOIDECTOMY;  Surgeon: Ar Paris MD;  [facility];  Service: ENT    TONSILLECTOMY      TOTAL THYROIDECTOMY  2008    Performed after left neck dissection and left oral  cancer diagnosis       Family History   Problem Relation Age of Onset    Cancer Mother     Diabetes Mother     Diabetes Father     Hypertension Father      I have reviewed and agree with the history as documented.    E-Cigarette/Vaping    E-Cigarette Use Never User      E-Cigarette/Vaping Substances    Nicotine No     THC No     CBD No     Flavoring No     Other No     Unknown No      Social History     Tobacco Use    Smoking status: Never    Smokeless tobacco: Never   Vaping Use    Vaping status: Never Used   Substance Use Topics    Alcohol use: Not Currently     Alcohol/week: 0.0 standard drinks of alcohol    Drug use: Never       Review of Systems    Physical Exam  Physical Exam    See MDM for physican exam    Vital Signs  ED Triage Vitals   Temperature Pulse Respirations Blood Pressure SpO2   06/19/24 1941 06/19/24 1941 06/19/24 1941 06/19/24 1941 06/19/24 1941   97.8 °F (36.6 °C) 68 16 121/59 98 %      Temp Source Heart Rate Source Patient Position - Orthostatic VS BP Location FiO2 (%)   06/19/24 2308 06/19/24 2308 06/19/24 2308 06/19/24 2308 --   Oral Monitor Lying Left arm       Pain Score       06/20/24 0740       No Pain           Vitals:    06/21/24 0720 06/21/24 1511 06/21/24 2005 06/22/24 0742   BP: 98/51 (!) 88/52 114/62 112/58   Pulse: 74 84 82 80   Patient Position - Orthostatic VS: Lying Lying Lying          Visual Acuity      ED Medications  Medications   enoxaparin (LOVENOX) subcutaneous injection 40 mg (40 mg Subcutaneous Given 6/22/24 0805)   cefepime (MAXIPIME) IVPB (premix in dextrose) 1,000 mg 50 mL (1,000 mg Intravenous New Bag 6/22/24 0029)   vancomycin (VANCOCIN) IVPB (premix in dextrose) 750 mg 150 mL (750 mg Intravenous New Bag 6/22/24 1127)   QUEtiapine (SEROquel) tablet 12.5 mg (12.5 mg Oral Given 6/21/24 2100)   ascorbic acid (VITAMIN C) tablet 500 mg (500 mg Oral Given 6/22/24 0805)   atorvastatin (LIPITOR) tablet 20 mg (20 mg Oral Given 6/21/24 1715)   acetaminophen (TYLENOL) tablet  650 mg (has no administration in time range)   calcium carbonate (TUMS) chewable tablet 500 mg (500 mg Oral Given 6/22/24 0806)   gabapentin (NEURONTIN) capsule 300 mg (300 mg Oral Given 6/22/24 0805)   levothyroxine tablet 75 mcg (75 mcg Oral Given 6/22/24 0508)   magnesium Oxide (MAG-OX) tablet 800 mg (800 mg Oral Given 6/22/24 0806)   multivitamin stress formula tablet 1 tablet (1 tablet Oral Given 6/22/24 0806)   pantoprazole (PROTONIX) EC tablet 40 mg (40 mg Oral Given 6/22/24 0508)   insulin lispro (HumALOG/ADMELOG) 100 units/mL subcutaneous injection 1-5 Units (2 Units Subcutaneous Given 6/22/24 0800)   insulin glargine (LANTUS) subcutaneous injection 10 Units 0.1 mL (has no administration in time range)   sodium chloride 0.9 % bolus 1,000 mL (0 mL Intravenous Stopped 6/19/24 2152)   sodium chloride 0.9 % bolus 1,000 mL (0 mL Intravenous Stopped 6/20/24 0318)   piperacillin-tazobactam (ZOSYN) IVPB 4.5 g (0 g Intravenous Stopped 6/19/24 2116)   vancomycin (VANCOCIN) IVPB (premix in dextrose) 1,000 mg 200 mL (1,000 mg Intravenous New Bag 6/19/24 2125)   dextrose 50 % IV solution **ADS Override Pull** (50 mL  Given 6/20/24 0001)   ketorolac (TORADOL) injection 15 mg (15 mg Intravenous Given 6/20/24 1040)   insulin regular (HumuLIN R,NovoLIN R) 100 units/mL injection **ADS Override Pull** (4 Units  Given 6/20/24 1232)   dextrose 50 % IV solution 50 mL (50 mL Intravenous Given 6/21/24 0535)   insulin lispro (HumALOG/ADMELOG) 100 units/mL subcutaneous injection 4 Units (4 Units Subcutaneous Given 6/21/24 1135)   sodium chloride 0.9 % bolus 500 mL (0 mL Intravenous Stopped 6/21/24 1735)   dextrose 50 % IV solution 25 mL (25 mL Intravenous Given 6/21/24 1645)       Diagnostic Studies  Results Reviewed       Procedure Component Value Units Date/Time    Urine culture [[id number]] Collected: 06/19/24 2053    Lab Status: Final result Specimen: Urine, Clean Catch Updated: 06/22/24 1009     Urine Culture >100,000 cfu/ml     Blood culture #1 [[id number]] Collected: 06/19/24 1954    Lab Status: Preliminary result Specimen: Blood from Arm, Left Updated: 06/22/24 0801     Blood Culture No Growth at 48 hrs.    Blood culture #2 [[id number]] Collected: 06/19/24 1954    Lab Status: Preliminary result Specimen: Blood from Arm, Right Updated: 06/22/24 0801     Blood Culture No Growth at 48 hrs.    Urine Microscopic [[id number]]  (Abnormal) Collected: 06/19/24 2053    Lab Status: Final result Specimen: Urine, Clean Catch Updated: 06/19/24 2119     RBC, UA 0-1 /hpf      WBC, UA 10-20 /hpf      Epithelial Cells Occasional /hpf      Bacteria, UA Innumerable /hpf     UA w Reflex to Microscopic w Reflex to Culture [[id number]]  (Abnormal) Collected: 06/19/24 2053    Lab Status: Final result Specimen: Urine, Clean Catch Updated: 06/19/24 2104     Color, UA Yellow     Clarity, UA Hazy     Specific Gravity, UA 1.020     pH, UA 7.0     Leukocytes, UA Trace     Nitrite, UA Negative     Protein, UA Trace mg/dl      Glucose, UA Negative mg/dl      Ketones, UA Negative mg/dl      Urobilinogen, UA 0.2 E.U./dl      Bilirubin, UA Negative     Occult Blood, UA Negative    Procalcitonin [[id number]]  (Abnormal) Collected: 06/19/24 1954    Lab Status: Final result Specimen: Blood from Arm, Right Updated: 06/19/24 2043     Procalcitonin 0.91 ng/ml     HS Troponin 0hr (reflex protocol) [[id number]]  (Normal) Collected: 06/19/24 1954    Lab Status: Final result Specimen: Blood from Arm, Left Updated: 06/19/24 2038     hs TnI 0hr 3 ng/L     Lactic acid [[id number]]  (Normal) Collected: 06/19/24 2010    Lab Status: Final result Specimen: Blood from Arm, Right Updated: 06/19/24 2031     LACTIC ACID 1.5 mmol/L     Narrative:      Result may be elevated if tourniquet was used during collection.    Comprehensive metabolic panel [[id number]]  (Abnormal) Collected: 06/19/24 1954    Lab Status: Final result Specimen: Blood from Arm, Left Updated: 06/19/24 2030     Sodium 131 mmol/L       Potassium 4.3 mmol/L      Chloride 98 mmol/L      CO2 25 mmol/L      ANION GAP 8 mmol/L      BUN 31 mg/dL      Creatinine 0.63 mg/dL      Glucose 140 mg/dL      Calcium 8.6 mg/dL      Corrected Calcium 9.2 mg/dL      AST 22 U/L      ALT 12 U/L      Alkaline Phosphatase 52 U/L      Total Protein 5.7 g/dL      Albumin 3.3 g/dL      Total Bilirubin 0.33 mg/dL      eGFR 83 ml/min/1.73sq m     Narrative:      [employer] guidelines for Chronic Kidney Disease (CKD):     Stage 1 with normal or high GFR (GFR > 90 mL/min/1.73 square meters)    Stage 2 Mild CKD (GFR = 60-89 mL/min/1.73 square meters)    Stage 3A Moderate CKD (GFR = 45-59 mL/min/1.73 square meters)    Stage 3B Moderate CKD (GFR = 30-44 mL/min/1.73 square meters)    Stage 4 Severe CKD (GFR = 15-29 mL/min/1.73 square meters)    Stage 5 End Stage CKD (GFR <15 mL/min/1.73 square meters)  Note: GFR calculation is accurate only with a steady state creatinine    Protime-INR [[id number]]  (Normal) Collected: 06/19/24 1954    Lab Status: Final result Specimen: Blood from Arm, Right Updated: 06/19/24 2027     Protime 12.8 seconds      INR 0.95    APTT [[id number]]  (Normal) Collected: 06/19/24 1954    Lab Status: Final result Specimen: Blood from Arm, Right Updated: 06/19/24 2027     PTT 29 seconds     CBC and differential [[id number]]  (Abnormal) Collected: 06/19/24 1954    Lab Status: Final result Specimen: Blood from Arm, Left Updated: 06/19/24 2012     WBC 19.05 Thousand/uL      RBC 3.23 Million/uL      Hemoglobin 10.6 g/dL      Hematocrit 32.9 %       fL      MCH 32.8 pg      MCHC 32.2 g/dL      RDW 12.2 %      MPV 10.0 fL      Platelets 249 Thousands/uL      nRBC 0 /100 WBCs      Segmented % 82 %      Immature Grans % 1 %      Lymphocytes % 8 %      Monocytes % 8 %      Eosinophils Relative 0 %      Basophils Relative 1 %      Absolute Neutrophils 15.80 Thousands/µL      Absolute Immature Grans 0.12 Thousand/uL      Absolute  Lymphocytes 1.46 Thousands/µL      Absolute Monocytes 1.51 Thousand/µL      Eosinophils Absolute 0.07 Thousand/µL      Basophils Absolute 0.09 Thousands/µL     Fingerstick Glucose (POCT) [[id number]]  (Normal) Collected: 06/19/24 1941    Lab Status: Final result Specimen: Blood Updated: 06/19/24 1942     POC Glucose 128 mg/dl                    XR chest 1 view portable   Final Result by Tyson Camilo MD (06/20 0845)      No acute cardiopulmonary disease.            Workstation performed: [id number]                    Procedures  CriticalCare Time    Date/Time: 6/19/2024 12:01 PM    Performed by: Kennedy Alegre DO  Authorized by: Kennedy Alegre DO    Critical care provider statement:     Critical care time (minutes):  45    Critical care time was exclusive of:  Separately billable procedures and treating other patients and teaching time    Critical care was necessary to treat or prevent imminent or life-threatening deterioration of the following conditions:  Shock and sepsis    Critical care was time spent personally by me on the following activities:  Examination of patient, evaluation of patient's response to treatment, development of treatment plan with patient or surrogate, obtaining history from patient or surrogate, review of old charts, re-evaluation of patient's condition, ordering and review of radiographic studies, ordering and review of laboratory studies and ordering and performing treatments and interventions  Comments:      Sepsis with end-organ damage, encephalopathy, significant hypotension <90 systolic at risk for imminent death requiring aggressive IV fluids, broad spectrum antibiotics           ED Course                                             Medical Decision Making  Original note corrupted by EMR, unable to sign or edit. That note was copied and pasted below.    Expand All Collapse All  History    Chief Complaint  Patient presents with  · Hypotension      Patient comes in with  "two episodes of hypoglycemia today and received glucagon X2. EMS reports patient blood pressures were low upon arrival to facility and were 50s systolic.      83-year-old female history of chronic pain, hypertension, diabetes presents with 2 episodes of hypoglycemia today from [facility].  EMS reported she was hypotensive however on arrival she is normotensive.  No history of trauma.  She has been nonverbal for EMS.  Chart review indicates that she has been nonverbal in the past.                Prior to Admission Medications  Prescriptions Last Dose Informant Patient Reported? Taking?  Ascorbic Acid (vitamin C) 100 MG tablet     Yes No  Sig: Take 500 mg by mouth 2 (two) times a day  BD Insulin Syringe U/F Unit 31G X \" 0.3 ML MISC     Yes No  Si (four) times a day  Multiple Vitamin (multivitamin) capsule     Yes No  Sig: Take 1 capsule by mouth daily  QUEtiapine (SEROquel) 25 mg tablet     No No  Sig: Take 0.5 tablets (12.5 mg total) by mouth daily at bedtime  acetaminophen (TYLENOL) 325 mg tablet     No No  Sig: Take 2 tablets (650 mg total) by mouth every 4 (four) hours as needed for mild pain, headaches or fever  atorvastatin (LIPITOR) 20 mg tablet     Yes No  Sig: Take 20 mg by mouth daily with dinner  calcium carbonate (OS-FELICE) 600 MG tablet     Yes No  Sig: Take 600 mg by mouth 2 (two) times a day with meals  gabapentin (NEURONTIN) 300 mg capsule     No No  Sig: Take 1 capsule (300 mg total) by mouth 2 (two) times a day  insulin glargine (LANTUS) 100 units/mL subcutaneous injection     No No  Sig: Inject 6 Units under the skin every 12 (twelve) hours  insulin lispro (HumaLOG) 100 units/mL injection     No No  Sig: Inject 1-5 Units under the skin 3 (three) times a day before meals  levothyroxine 75 mcg tablet     No No  Sig: Take 1 tablet (75 mcg total) by mouth daily in the early morning  Patient taking differently: Take 100 mcg by mouth daily in the early morning  magnesium Oxide (MAG-OX) " 400 mg TABS     No No  Sig: Take 2 tablets (800 mg total) by mouth daily  midodrine (PROAMATINE) 5 mg tablet     No No  Sig: Take 1 tablet (5 mg total) by mouth 3 (three) times a day before meals  pantoprazole (PROTONIX) 40 mg tablet     No No  Sig: Take 1 tablet (40 mg total) by mouth in the morning    Facility-Administered Medications: None          Medical History  Past Medical History:  Diagnosis Date  · Abnormal CT of the abdomen 07/10/2022  · Ambulates with cane    · Cancer (HCC)    · Chronic pain disorder      knees/ shoulders (gets inj every 3 mos)  · Closed intertrochanteric fracture of right femur (HCC) 05/26/2020  · Controlled type 2 diabetes mellitus with diabetic polyneuropathy, with long-term current use of insulin (HCC) 05/21/2008  · Diabetes mellitus (HCC)    · Diabetic polyneuropathy (HCC) 05/21/2008    ICD10 clean up  · Disease of thyroid gland    · 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  · HL (hearing loss)    · Hodgkin's disease (HCC) 2008    Left neck, had radiation  · Hypertension    · Neuropathy    · Osteoporosis    · RA (rheumatoid arthritis) ([facility])    · Traumatic rhabdomyolysis ([facility]) 10/17/2022            Surgical History  Past Surgical History:  Procedure Laterality Date  · ADENOIDECTOMY      · APPENDECTOMY      · CATARACT EXTRACTION      · CATARACT EXTRACTION, BILATERAL      · CHOLECYSTECTOMY      · FRACTURE SURGERY Right      hip  · MOUTH SURGERY        oral cancer left lower lip  · OVARY SURGERY      · IL ADJT TIS TRNS/REARGMT F/C/C/M/N/A/G/H/F 10SQCM/< N/A 3/28/2022    Procedure: Adjacent tissue transfer face;  Surgeon: Ar Paris MD;  [facility];  Service: ENT  · IL OPTX FEM SHFT FX W/INSJ IMED IMPLT W/WO SCREW Right 5/28/2020    Procedure: INSERTION NAIL IM FEMUR ANTEGRADE (TROCHANTERIC);  Surgeon: Charles Seals DO;  Location:  MAIN OR;  Service: Orthopedics  · IL TRANSMASTOID ANTROTOMY Left 3/28/2022    Procedure: MASTOIDECTOMY;  Surgeon: Ar Banerjee  MD [place];  Location: [facility] OR;  Service: ENT  · TONSILLECTOMY      · TOTAL THYROIDECTOMY   2008    Performed after left neck dissection and left oral cancer diagnosis            Family History  Family History  Problem Relation Age of Onset  · Cancer Mother    · Diabetes Mother    · Diabetes Father    · Hypertension Father         I have reviewed and agree with the history as documented.       E-Cigarette/Vaping  · E-Cigarette Use Never User         E-Cigarette/Vaping Substances  · Nicotine No    · THC No    · CBD No    · Flavoring No    · Other No    · Unknown No         Social History  Social History       Tobacco Use  · Smoking status: Never  · Smokeless tobacco: Never  Vaping Use  · Vaping status: Never Used  Substance Use Topics  · Alcohol use: Not Currently      Alcohol/week: 0.0 standard drinks of alcohol  · Drug use: Never          Review of Systems     Physical Exam  Physical Exam     Vital Signs    ED Triage Vitals [06/19/24 1941]  Temperature Pulse Respirations Blood Pressure SpO2  97.8 °F (36.6 °C) 68 16 121/59 98 %     Temp src Heart Rate Source Patient Position - Orthostatic VS BP Location FiO2 (%)  -- -- -- -- --     Pain Score          --                  Vitals  Vitals:    06/19/24 1941  BP: 121/59  Pulse: 68             Visual Acuity        ED Medications  Medications - No data to display     Diagnostic Studies    Results Reviewed          Procedure Component Value Units Date/Time    CBC and differential [[id number]] Collected: 06/19/24 1954    Lab Status: No result Specimen: Blood      Comprehensive metabolic panel [[id number]]      Lab Status: No result Specimen: Blood      Lactic acid [[id number]]      Lab Status: No result Specimen: Blood      Procalcitonin [[id number]]      Lab Status: No result Specimen: Blood      Protime-INR [[id number]]      Lab Status: No result Specimen: Blood      APTT [[id number]]      Lab Status: No result Specimen: Blood      Blood culture #1 [[id number]]      Lab  Status: No result Specimen: Blood      Blood culture #2 [[id number]]      Lab Status: No result Specimen: Blood      UA w Reflex to Microscopic w Reflex to Culture [[id number]]      Lab Status: No result Specimen: Urine      HS Troponin 0hr (reflex protocol) [[id number]]      Lab Status: No result Specimen: Blood      Fingerstick Glucose (POCT) [[id number]]  (Normal) Collected: 06/19/24 1941    Lab Status: Final result Specimen: Blood Updated: 06/19/24 1942      POC Glucose 128 mg/dl                           XR chest 1 view portable    (Results Pending)               Procedures  Procedures           ED Course                                                              Medical Decision Making  Amount and/or Complexity of Data Reviewed  Labs: ordered.  Radiology: ordered.              Disposition    Final diagnoses:  None       ED Disposition          None               Follow-up Information   None               Patient's Medications  Discharge Prescriptions    No medications on file        No discharge procedures on file.       PDMP Review            Value Time User    PDMP Reviewed  Yes 6/17/2020  2:17 PM Shiraz Dwyer MD       ===    Hypotension and episodic hypoglycemia  Differential includes septic shock, will get blood work, lactate, CXR r/o pneumonia, UA  30cc/kg IVF ordered  Labs show significant luekocytosis        Problems Addressed:  Hypotension: acute illness or injury  Sepsis (HCC): acute illness or injury  UTI (urinary tract infection): acute illness or injury    Amount and/or Complexity of Data Reviewed  Labs: ordered. Decision-making details documented in ED Course.  Radiology: ordered and independent interpretation performed. Decision-making details documented in ED Course.    Risk  Prescription drug management.  Decision regarding hospitalization.             Disposition  Final diagnoses:   UTI (urinary tract infection)   Sepsis (HCC)   Hypotension     Time reflects when diagnosis was documented in both  MDM as applicable and the Disposition within this note       Time User Action Codes Description Comment    6/19/2024  9:50 PM Kennedy Alegre Add [N39.0] UTI (urinary tract infection)     6/19/2024  9:51 PM Kennedy Alegre Add [A41.9] Sepsis (HCC)     6/19/2024  9:51 PM Kennedy Alegre Add [I95.9] Hypotension     6/19/2024 11:49 PM Acholonu, Christie Add [N39.0] Urinary tract infection     6/20/2024  4:55 AM Acholonu, Christie Add [E10.649] Type 1 diabetes mellitus with hypoglycemia and without coma (HCC)           ED Disposition       ED Disposition   Admit    Condition   Stable    Date/Time   Wed Jun 19, 2024  9:50 PM    Comment   Case was discussed with maria r and the patient's admission status was agreed to be Admission Status: inpatient status to the service of Dr. heck .               MD Documentation      Flowsheet Row Most Recent Value   Accepting Facility Name, [facility] [address] [place] [id number]   Transported by (Company and Unit #) [facility]          RN Documentation      Flowsheet Row Most Recent Value   Accepting Facility Name, [facility] [address] [place] [id number]   Transported by (Company and Unit #) [facility]          Follow-up Information    None         Current Discharge Medication List        START taking these medications    Details   cefepime (MAXIPIME) 1000 mg IVPB Inject 50 mL (1,000 mg total) into a catheter in a vein over 30 minutes at 100 mL/hr every 12 (twelve) hours for 4 days    Associated Diagnoses: Urinary tract infection      vancomycin (VANCOCIN) Inject 150 mL (750 mg total) into a catheter in a vein over 60 minutes at 150 mL/hr every 12 (twelve) hours for 4 days    Associated Diagnoses: Urinary tract infection           CONTINUE these medications which have NOT CHANGED    Details   acetaminophen (TYLENOL) 325 mg tablet Take 2 tablets (650 mg  "total) by mouth every 4 (four) hours as needed for mild pain, headaches or fever  Refills: 0    Associated Diagnoses: Primary osteoarthritis of right shoulder      Ascorbic Acid (vitamin C) 100 MG tablet Take 500 mg by mouth 2 (two) times a day      atorvastatin (LIPITOR) 20 mg tablet Take 20 mg by mouth daily with dinner      BD Insulin Syringe U/F 1/2Unit 31G X 5/16\" 0.3 ML MISC 4 (four) times a day      calcium carbonate (OS-FELICE) 600 MG tablet Take 600 mg by mouth 2 (two) times a day with meals      gabapentin (NEURONTIN) 300 mg capsule Take 1 capsule (300 mg total) by mouth 2 (two) times a day  Refills: 0    Associated Diagnoses: Type 1 diabetes mellitus with hypoglycemia and without coma (HCC)      insulin glargine (LANTUS) 100 units/mL subcutaneous injection Inject 6 Units under the skin every 12 (twelve) hours  Qty: 10 mL, Refills: 0    Associated Diagnoses: Type I diabetes mellitus (HCC)      insulin lispro (HumaLOG) 100 units/mL injection Inject 1-5 Units under the skin 3 (three) times a day before meals    Associated Diagnoses: Type I diabetes mellitus (HCC)      levothyroxine 75 mcg tablet Take 1 tablet (75 mcg total) by mouth daily in the early morning    Associated Diagnoses: Hypothyroidism, unspecified type      magnesium Oxide (MAG-OX) 400 mg TABS Take 2 tablets (800 mg total) by mouth daily    Associated Diagnoses: Generalized weakness; Hypokalemia      midodrine (PROAMATINE) 5 mg tablet Take 1 tablet (5 mg total) by mouth 3 (three) times a day before meals    Associated Diagnoses: Hypotension      Multiple Vitamin (multivitamin) capsule Take 1 capsule by mouth daily      pantoprazole (PROTONIX) 40 mg tablet Take 1 tablet (40 mg total) by mouth in the morning    Associated Diagnoses: Gastroesophageal reflux disease without esophagitis      QUEtiapine (SEROquel) 25 mg tablet Take 0.5 tablets (12.5 mg total) by mouth daily at bedtime  Qty: 15 tablet, Refills: 0    Associated Diagnoses: Delirium       "       No discharge procedures on file.    PDMP Review         Value Time User    PDMP Reviewed  Yes 6/17/2020  2:17 PM Shiraz Dwyer MD            ED Provider  Electronically Signed by             Kennedy Alegre DO  06/22/24 7552

## 2024-06-22 NOTE — CASE MANAGEMENT
Case Management Discharge Planning Note    Patient name Shakira Hinkle  Location /-01 MRN [id number]  : 1940 Date 2024       Current Admission Date: 2024  Current Admission Diagnosis:Type 1 diabetes mellitus with hypoglycemia (HCC)   Patient Active Problem List    Diagnosis Date Noted Date Diagnosed    Pain, dental 2024     Urinary tract infection 2024     Major neurocognitive disorder (HCC) 2024     Neurocognitive disorder 2024     Primary osteoarthritis involving multiple joints 2023     At risk for influenza 2023     At risk for constipation 2023     At risk for delirium 2023     Advance care planning 2023     Atherosclerotic vascular disease 2023     Enteritis 2023     Urinary retention 2023     Dysarthria 12/15/2023     Personal history of COVID-19 12/15/2023     Severe protein-calorie malnutrition (HCC) 2023     Generalized weakness 2023     Macrocytosis 2023     Chronic hypotension 2023     Hypomagnesemia 2023     Hypoglycemia 2023     Hypothermia 2023     Hypokalemia 2023     History of Hodgkin's lymphoma 2023     Hypothyroidism 2023     Acute metabolic encephalopathy 2023     Gastroesophageal reflux disease without esophagitis 10/17/2022     Soft tissue radionecrosis 2022     Osteoradionecrosis of temporal bone  (HCC) 2022     Primary osteoarthritis of right shoulder 2021     Primary osteoarthritis of both knees 2020     Hyponatremia 2020     Postoperative hypothyroidism 2015     Hyperlipidemia 2014     History of hypertension 10/10/2013     Type 1 diabetes mellitus with hypoglycemia (HCC) 2008       LOS (days): 3  Geometric Mean LOS (GMLOS) (days): 4.1  Days to GMLOS:1.6     OBJECTIVE:  Risk of Unplanned Readmission Score: 26.96         Current admission status: Inpatient   Preferred  Pharmacy: No Pharmacies Listed  Primary Care Provider: Dwayne Hilton MD    Primary Insurance: MEDICARE  Secondary Insurance: AARP    DISCHARGE DETAILS:         Treatment Team Recommendation: Facility Return  Discharge Destination Plan:: Facility Return  Transport at Discharge : Stretcher van        Transported by (Company and Unit #): [facility]  ETA of Transport (Date): 06/22/24  ETA of Transport (Time): 1330           Additional Comments: Pt stable for discharge today per SLIM. COVID test ordered as requested. Pt will return to [facility] where she is long term care resident. [facility] aware of pt return. Pt nurse Emerson aware of transport time and will call report.    Accepting Facility Name, City & State : [facility] [address] [place] [address]  Receiving Facility/Agency Phone Number: [phone]  Facility/Agency Fax Number: [phone]

## 2024-06-22 NOTE — PROGRESS NOTES
[facility]  Progress Note  Name: Shakira Hinkle I  MRN: [id number]  Unit/Bed#: -01 I Date of Admission: 6/19/2024   Date of Service: 6/22/2024 I Hospital Day: 3    Assessment & Plan   Pain, dental  Assessment & Plan  6/20 patient reported dental pain in her left upper jaw-refused to let me examine  6/21 denies dental pain today still will not let me examine  Patient may require follow-up with dentist on discharge    Urinary tract infection  Assessment & Plan  Present on arrival   UA positive pyuria, bacteriuria   WBC's 19.05-->7.43  Procalcitonin 0.91-->0.90  Lactic acid 1.5  Blood cultures x 2 negative thus far  MRSA swab results pending  Urine culture growing greater than 100,000 Providencia stuartii and greater than 100,000 alphahemolytic Streptococcus not Enterococcus, sensitivity pending  Patient is afebrile without leukocytosis and denies urinary symptoms  Continue cefepime and vancomycin  Monitor intake and output  Patient has a history of retention-follow retention protocol  Trend procalcitonin and WBCs  CBC, procalcitonin a.m.    Chronic hypotension  Assessment & Plan  On midodrine at her skilled nursing facility  Hypotensive on arrival, received IV fluids  Blood pressures a bit soft but asymptomatic  Continue prehospital midodrine  Monitor BP per protocol      Hypoglycemia  Assessment & Plan  Patient presenting from nursing home with complaints of hypoglycemia  Prehospital Lantus was put on hold  Blood sugar on arrival 128, then dropped as low as 39  Patient was initiated on D5 LR IV fluids  Clarified prehospital insulin regimen with skilled nursing facility MAR: Patient was on 9 units of Lantus insulin in the a.m. and sliding scale coverage at the skilled nursing facility  Patient was complaining of some left upper dental pain-did admit that her appetite was not baseline prehospital likely contributed to hypoglycemia  Evaluated by speech therapy cleared for diet which I  resumed  Appreciate input from endocrinology  Blood sugars continue to be labile today, adjusted regimen  See plan for type 1 diabetes        Acute metabolic encephalopathy  Assessment & Plan  Patient presented nonverbal, depressed mentation   Likely related to hypoglycemia, also with UTI  Mentation at baseline today  See plan for UTI  See plan for hypoglycemia  Continue to monitor mental status for changes    Gastroesophageal reflux disease without esophagitis  Assessment & Plan  Continue  PPI     * Type 1 diabetes mellitus with hypoglycemia (HCC)  Assessment & Plan  Lab Results   Component Value Date    HGBA1C 9.1 (H) 04/19/2024       Recent Labs     06/21/24  0607 06/21/24  0718 06/21/24  1115 06/21/24  1117   POCGLU 130 148* 422* 417*         Blood Sugar Average: Last 72 hrs:  (P) [id number]    Presented with hypoglycemia-see plan for hypoglycemia                    VTE Pharmacologic Prophylaxis: VTE Score: 4 Moderate Risk (Score 3-4) - Pharmacological DVT Prophylaxis Ordered: enoxaparin (Lovenox).    Mobility:   Basic Mobility Inpatient Raw Score: 6  JH-HLM Goal: 2: Bed activities/Dependent transfer  JH-HLM Achieved: 2: Bed activities/Dependent transfer  JH-HLM Goal achieved. Continue to encourage appropriate mobility.    Patient Centered Rounds: I performed bedside rounds with nursing staff today.   Discussions with Specialists or Other Care Team Provider: Nursing, case management    Education and Discussions with Family / Patient: Updated  (daughter) via phone.    Total Time Spent on Date of Encounter in care of patient: 37 mins. This time was spent on one or more of the following: performing physical exam; counseling and coordination of care; obtaining or reviewing history; documenting in the medical record; reviewing/ordering tests, medications or procedures; communicating with other healthcare professionals and discussing with patient's family/caregivers.    Current Length of Stay:  3 day(s)  Current Patient Status: Inpatient   Certification Statement: The patient will continue to require additional inpatient hospital stay due to continues on IV antibiotics for UTI, trending labs repeat procalcitonin CBC in the a.m., monitoring fluid status closely  Discharge Plan:  Patient will return to her skilled nursing facility on discharge.  Continues on IV antibiotics today for UTI.  Repeating labs in the a.m. sugars remain labile adjusted insulin regimen and endocrinology has been following.  Patient will return to her skilled nursing facility when medically stable for discharge per case management.    Code Status: Level 3 - DNAR and DNI    Subjective:   Denies any complaints of pain or discomfort today.  Overall feels well, no questions during her care    Objective:     Vitals:   Temp (24hrs), Av.2 °F (36.8 °C), Min:98 °F (36.7 °C), Max:98.4 °F (36.9 °C)    Temp:  [98 °F (36.7 °C)-98.4 °F (36.9 °C)] 98.4 °F (36.9 °C)  HR:  [80-84] 80  Resp:  [16-18] 18  BP: ()/(52-62) 112/58  SpO2:  [95 %-97 %] 95 %  Body mass index is 20.49 kg/m².     Input and Output Summary (last 24 hours):     Intake/Output Summary (Last 24 hours) at 2024 0959  Last data filed at 2024 0029  Gross per 24 hour   Intake 940 ml   Output 1 ml   Net 939 ml       Physical Exam:   Physical Exam  Vitals and nursing note reviewed.   Constitutional:       General: She is not in acute distress.     Appearance: She is well-developed and normal weight. She is not ill-appearing.   HENT:      Head: Normocephalic and atraumatic.      Mouth/Throat:      Mouth: Mucous membranes are dry.   Eyes:      Extraocular Movements: Extraocular movements intact.      Conjunctiva/sclera: Conjunctivae normal.      Pupils: Pupils are equal, round, and reactive to light.   Cardiovascular:      Rate and Rhythm: Normal rate and regular rhythm.      Pulses: Normal pulses.      Heart sounds: Normal heart sounds. No murmur heard.  Pulmonary:       Effort: Pulmonary effort is normal. No respiratory distress.      Breath sounds: Normal breath sounds. No wheezing or rales.   Abdominal:      General: Bowel sounds are normal. There is no distension.      Palpations: Abdomen is soft.      Tenderness: There is no abdominal tenderness. There is no guarding.   Musculoskeletal:         General: No swelling.   Skin:     General: Skin is warm and dry.      Capillary Refill: Capillary refill takes less than 2 seconds.   Neurological:      General: No focal deficit present.      Mental Status: She is alert and oriented to person, place, and time. Mental status is at baseline.   Psychiatric:         Thought Content: Thought content normal.          Additional Data:     Labs:  Results from last 7 days   Lab Units 06/22/24  0508 06/21/24  0637 06/20/24  0431   WBC Thousand/uL 10.38*   < > 13.02*   HEMOGLOBIN g/dL 10.7*   < > 10.3*   HEMATOCRIT % 32.3*   < > 31.8*   PLATELETS Thousands/uL 266   < > 259   SEGS PCT %  --   --  76*   LYMPHO PCT %  --   --  14   MONO PCT %  --   --  8   EOS PCT %  --   --  1    < > = values in this interval not displayed.     Results from last 7 days   Lab Units 06/22/24  0508 06/21/24  0637 06/20/24  0431   SODIUM mmol/L 134*   < > 134*   POTASSIUM mmol/L 4.2   < > 4.1   CHLORIDE mmol/L 101   < > 101   CO2 mmol/L 26   < > 24   BUN mg/dL 12   < > 21   CREATININE mg/dL 0.53*   < > 0.52*   ANION GAP mmol/L 7   < > 9   CALCIUM mg/dL 8.4   < > 8.3*   ALBUMIN g/dL  --   --  3.2*   TOTAL BILIRUBIN mg/dL  --   --  0.45   ALK PHOS U/L  --   --  49   ALT U/L  --   --  14   AST U/L  --   --  20   GLUCOSE RANDOM mg/dL 188*   < > 146*    < > = values in this interval not displayed.     Results from last 7 days   Lab Units 06/19/24  1954   INR  0.95     Results from last 7 days   Lab Units 06/22/24  0739 06/21/24  1740 06/21/24  1700 06/21/24  1625 06/21/24  1623 06/21/24  1117 06/21/24  1115 06/21/24  0718 06/21/24  0607 06/21/24  0526 06/21/24  0523  06/20/24  2117   POC GLUCOSE mg/dl 238* 207* 69 26* 34* 417* 422* 148* 130 51* 46* 159*         Results from last 7 days   Lab Units 06/21/24  0637 06/20/24  0431 06/19/24 2010 06/19/24 1954   LACTIC ACID mmol/L  --   --  1.5  --    PROCALCITONIN ng/ml 0.90* 1.18*  --  0.91*       Lines/Drains:  Invasive Devices       Peripheral Intravenous Line  Duration             Peripheral IV 06/19/24 Right;Ventral (anterior) Forearm 2 days                  Urinary Catheter:  Goal for removal:  Patient does not currently have a Kinney catheter she is incontinent of urine               Imaging: Reviewed radiology reports from this admission including: chest xray    Recent Cultures (last 7 days):   Results from last 7 days   Lab Units 06/19/24 2053 06/19/24 1954   BLOOD CULTURE   --  No Growth at 48 hrs.  No Growth at 48 hrs.   URINE CULTURE  >100,000 cfu/ml Providencia stuartii*  >100,000 cfu/ml Alpha Hemolytic Streptococcus NOT Enterococcus*  --        Last 24 Hours Medication List:   Current Facility-Administered Medications   Medication Dose Route Frequency Provider Last Rate    acetaminophen  650 mg Oral Q4H PRN ADRIAN Tripp      vitamin C  500 mg Oral BID ADRIAN Tripp      atorvastatin  20 mg Oral Daily With Dinner ADRIAN Tripp      calcium carbonate  500 mg Oral BID ADRIAN Tripp      cefepime  1,000 mg Intravenous Q12H Christie Shanks MD 1,000 mg (06/22/24 0029)    enoxaparin  40 mg Subcutaneous Daily Christie Shanks MD      gabapentin  300 mg Oral BID ADRIAN Tripp      insulin glargine  6 Units Subcutaneous QAM ADRIAN Tripp      insulin lispro  1-5 Units Subcutaneous TID AC ADRIAN Tripp      levothyroxine  75 mcg Oral Early Morning ADRIAN Tripp      magnesium Oxide  800 mg Oral Daily ADRIAN Tripp      multivitamin stress formula  1 tablet Oral Daily ADRIAN Tripp      pantoprazole  40 mg Oral Early  Morning ADRIAN Tripp      QUEtiapine  12.5 mg Oral HS ADRIAN Tripp      vancomycin  15 mg/kg Intravenous Q12H Catie Moran  mg (06/21/24 2057)        Today, Patient Was Seen By: Danii Tompkins MD    **Please Note: This note may have been constructed using a voice recognition system.**

## 2024-06-24 ENCOUNTER — LAB REQUISITION (OUTPATIENT)
Dept: LAB | Facility: HOSPITAL | Age: 84
End: 2024-06-24
Payer: MEDICARE

## 2024-06-24 DIAGNOSIS — E10.42 TYPE 1 DIABETES MELLITUS WITH DIABETIC POLYNEUROPATHY (HCC): ICD-10-CM

## 2024-06-24 DIAGNOSIS — N39.0 URINARY TRACT INFECTION, SITE NOT SPECIFIED: ICD-10-CM

## 2024-06-24 DIAGNOSIS — I95.9 HYPOTENSION, UNSPECIFIED: ICD-10-CM

## 2024-06-24 LAB
ALBUMIN SERPL BCG-MCNC: 3.4 G/DL (ref 3.5–5)
ALP SERPL-CCNC: 64 U/L (ref 34–104)
ALT SERPL W P-5'-P-CCNC: 17 U/L (ref 7–52)
ANION GAP SERPL CALCULATED.3IONS-SCNC: 9 MMOL/L (ref 4–13)
AST SERPL W P-5'-P-CCNC: 21 U/L (ref 13–39)
BASOPHILS # BLD AUTO: 0.06 THOUSANDS/ÂΜL (ref 0–0.1)
BASOPHILS NFR BLD AUTO: 1 % (ref 0–1)
BILIRUB SERPL-MCNC: 0.44 MG/DL (ref 0.2–1)
BUN SERPL-MCNC: 11 MG/DL (ref 5–25)
CALCIUM ALBUM COR SERPL-MCNC: 9.1 MG/DL (ref 8.3–10.1)
CALCIUM SERPL-MCNC: 8.6 MG/DL (ref 8.4–10.2)
CHLORIDE SERPL-SCNC: 96 MMOL/L (ref 96–108)
CO2 SERPL-SCNC: 28 MMOL/L (ref 21–32)
CREAT SERPL-MCNC: 0.52 MG/DL (ref 0.6–1.3)
EOSINOPHIL # BLD AUTO: 0.13 THOUSAND/ÂΜL (ref 0–0.61)
EOSINOPHIL NFR BLD AUTO: 2 % (ref 0–6)
ERYTHROCYTE [DISTWIDTH] IN BLOOD BY AUTOMATED COUNT: 12 % (ref 11.6–15.1)
GFR SERPL CREATININE-BSD FRML MDRD: 88 ML/MIN/1.73SQ M
GLUCOSE SERPL-MCNC: 153 MG/DL (ref 65–140)
HCT VFR BLD AUTO: 34.7 % (ref 34.8–46.1)
HGB BLD-MCNC: 11.1 G/DL (ref 11.5–15.4)
IMM GRANULOCYTES # BLD AUTO: 0.02 THOUSAND/UL (ref 0–0.2)
IMM GRANULOCYTES NFR BLD AUTO: 0 % (ref 0–2)
LYMPHOCYTES # BLD AUTO: 1.41 THOUSANDS/ÂΜL (ref 0.6–4.47)
LYMPHOCYTES NFR BLD AUTO: 20 % (ref 14–44)
MCH RBC QN AUTO: 32.1 PG (ref 26.8–34.3)
MCHC RBC AUTO-ENTMCNC: 32 G/DL (ref 31.4–37.4)
MCV RBC AUTO: 100 FL (ref 82–98)
MONOCYTES # BLD AUTO: 0.54 THOUSAND/ÂΜL (ref 0.17–1.22)
MONOCYTES NFR BLD AUTO: 8 % (ref 4–12)
NEUTROPHILS # BLD AUTO: 5 THOUSANDS/ÂΜL (ref 1.85–7.62)
NEUTS SEG NFR BLD AUTO: 69 % (ref 43–75)
NRBC BLD AUTO-RTO: 0 /100 WBCS
PLATELET # BLD AUTO: 273 THOUSANDS/UL (ref 149–390)
PMV BLD AUTO: 11.3 FL (ref 8.9–12.7)
POTASSIUM SERPL-SCNC: 3.9 MMOL/L (ref 3.5–5.3)
PROT SERPL-MCNC: 6 G/DL (ref 6.4–8.4)
RBC # BLD AUTO: 3.46 MILLION/UL (ref 3.81–5.12)
SODIUM SERPL-SCNC: 133 MMOL/L (ref 135–147)
VANCOMYCIN TROUGH SERPL-MCNC: 11.6 UG/ML (ref 10–20)
WBC # BLD AUTO: 7.16 THOUSAND/UL (ref 4.31–10.16)

## 2024-06-24 PROCEDURE — 80202 ASSAY OF VANCOMYCIN: CPT | Performed by: INTERNAL MEDICINE

## 2024-06-24 PROCEDURE — 80053 COMPREHEN METABOLIC PANEL: CPT | Performed by: INTERNAL MEDICINE

## 2024-06-24 PROCEDURE — 85025 COMPLETE CBC W/AUTO DIFF WBC: CPT | Performed by: INTERNAL MEDICINE

## 2024-06-25 LAB
BACTERIA BLD CULT: NORMAL
BACTERIA BLD CULT: NORMAL

## 2024-07-01 ENCOUNTER — LAB REQUISITION (OUTPATIENT)
Dept: LAB | Facility: HOSPITAL | Age: 84
End: 2024-07-01
Payer: MEDICARE

## 2024-07-01 DIAGNOSIS — I95.9 HYPOTENSION, UNSPECIFIED: ICD-10-CM

## 2024-07-01 DIAGNOSIS — E10.42 TYPE 1 DIABETES MELLITUS WITH DIABETIC POLYNEUROPATHY (HCC): ICD-10-CM

## 2024-07-01 LAB
ALBUMIN SERPL BCG-MCNC: 3.6 G/DL (ref 3.5–5)
ALP SERPL-CCNC: 68 U/L (ref 34–104)
ALT SERPL W P-5'-P-CCNC: 14 U/L (ref 7–52)
ANION GAP SERPL CALCULATED.3IONS-SCNC: 7 MMOL/L (ref 4–13)
AST SERPL W P-5'-P-CCNC: 13 U/L (ref 13–39)
BASOPHILS # BLD AUTO: 0.08 THOUSANDS/ÂΜL (ref 0–0.1)
BASOPHILS NFR BLD AUTO: 1 % (ref 0–1)
BILIRUB SERPL-MCNC: 0.63 MG/DL (ref 0.2–1)
BUN SERPL-MCNC: 23 MG/DL (ref 5–25)
CALCIUM SERPL-MCNC: 8.9 MG/DL (ref 8.4–10.2)
CHLORIDE SERPL-SCNC: 92 MMOL/L (ref 96–108)
CO2 SERPL-SCNC: 29 MMOL/L (ref 21–32)
CREAT SERPL-MCNC: 0.63 MG/DL (ref 0.6–1.3)
EOSINOPHIL # BLD AUTO: 0.13 THOUSAND/ÂΜL (ref 0–0.61)
EOSINOPHIL NFR BLD AUTO: 2 % (ref 0–6)
ERYTHROCYTE [DISTWIDTH] IN BLOOD BY AUTOMATED COUNT: 12.3 % (ref 11.6–15.1)
GFR SERPL CREATININE-BSD FRML MDRD: 83 ML/MIN/1.73SQ M
GLUCOSE SERPL-MCNC: 507 MG/DL (ref 65–140)
HCT VFR BLD AUTO: 33.9 % (ref 34.8–46.1)
HGB BLD-MCNC: 11.1 G/DL (ref 11.5–15.4)
IMM GRANULOCYTES # BLD AUTO: 0.03 THOUSAND/UL (ref 0–0.2)
IMM GRANULOCYTES NFR BLD AUTO: 0 % (ref 0–2)
LYMPHOCYTES # BLD AUTO: 1.47 THOUSANDS/ÂΜL (ref 0.6–4.47)
LYMPHOCYTES NFR BLD AUTO: 17 % (ref 14–44)
MCH RBC QN AUTO: 32.6 PG (ref 26.8–34.3)
MCHC RBC AUTO-ENTMCNC: 32.7 G/DL (ref 31.4–37.4)
MCV RBC AUTO: 100 FL (ref 82–98)
MONOCYTES # BLD AUTO: 0.83 THOUSAND/ÂΜL (ref 0.17–1.22)
MONOCYTES NFR BLD AUTO: 10 % (ref 4–12)
NEUTROPHILS # BLD AUTO: 6.11 THOUSANDS/ÂΜL (ref 1.85–7.62)
NEUTS SEG NFR BLD AUTO: 70 % (ref 43–75)
NRBC BLD AUTO-RTO: 0 /100 WBCS
PLATELET # BLD AUTO: 292 THOUSANDS/UL (ref 149–390)
PMV BLD AUTO: 11.8 FL (ref 8.9–12.7)
POTASSIUM SERPL-SCNC: 4.5 MMOL/L (ref 3.5–5.3)
PROT SERPL-MCNC: 6.1 G/DL (ref 6.4–8.4)
RBC # BLD AUTO: 3.4 MILLION/UL (ref 3.81–5.12)
SODIUM SERPL-SCNC: 128 MMOL/L (ref 135–147)
WBC # BLD AUTO: 8.65 THOUSAND/UL (ref 4.31–10.16)

## 2024-07-01 PROCEDURE — 85025 COMPLETE CBC W/AUTO DIFF WBC: CPT | Performed by: INTERNAL MEDICINE

## 2024-07-01 PROCEDURE — 80053 COMPREHEN METABOLIC PANEL: CPT | Performed by: INTERNAL MEDICINE

## 2024-07-17 ENCOUNTER — LAB REQUISITION (OUTPATIENT)
Dept: LAB | Facility: HOSPITAL | Age: 84
End: 2024-07-17
Payer: MEDICARE

## 2024-07-17 DIAGNOSIS — R05.1 ACUTE COUGH: ICD-10-CM

## 2024-07-17 LAB
FLUAV RNA RESP QL NAA+PROBE: NEGATIVE
FLUBV RNA RESP QL NAA+PROBE: NEGATIVE
RSV RNA RESP QL NAA+PROBE: NEGATIVE
SARS-COV-2 RNA RESP QL NAA+PROBE: NEGATIVE

## 2024-07-17 PROCEDURE — 0241U HB NFCT DS VIR RESP RNA 4 TRGT: CPT | Performed by: INTERNAL MEDICINE

## 2024-07-19 PROBLEM — N39.0 URINARY TRACT INFECTION: Status: RESOLVED | Noted: 2024-06-19 | Resolved: 2024-07-19

## 2024-07-22 ENCOUNTER — LAB REQUISITION (OUTPATIENT)
Dept: LAB | Facility: HOSPITAL | Age: 84
End: 2024-07-22
Payer: MEDICARE

## 2024-07-22 DIAGNOSIS — E03.9 HYPOTHYROIDISM, UNSPECIFIED: ICD-10-CM

## 2024-07-22 LAB
T4 FREE SERPL-MCNC: 0.82 NG/DL (ref 0.61–1.12)
TSH SERPL DL<=0.05 MIU/L-ACNC: 55.44 UIU/ML (ref 0.45–4.5)

## 2024-07-22 PROCEDURE — 84443 ASSAY THYROID STIM HORMONE: CPT | Performed by: INTERNAL MEDICINE

## 2024-07-22 PROCEDURE — 84439 ASSAY OF FREE THYROXINE: CPT | Performed by: INTERNAL MEDICINE

## 2024-08-19 ENCOUNTER — LAB REQUISITION (OUTPATIENT)
Dept: LAB | Facility: HOSPITAL | Age: 84
End: 2024-08-19
Payer: MEDICARE

## 2024-08-19 DIAGNOSIS — E03.9 HYPOTHYROIDISM, UNSPECIFIED: ICD-10-CM

## 2024-08-19 LAB — TSH SERPL DL<=0.05 MIU/L-ACNC: 12.6 UIU/ML (ref 0.45–4.5)

## 2024-08-19 PROCEDURE — 84443 ASSAY THYROID STIM HORMONE: CPT | Performed by: NURSE PRACTITIONER

## 2024-09-27 ENCOUNTER — LAB REQUISITION (OUTPATIENT)
Dept: LAB | Facility: HOSPITAL | Age: 84
End: 2024-09-27
Payer: MEDICARE

## 2024-09-27 DIAGNOSIS — R05.1 ACUTE COUGH: ICD-10-CM

## 2024-09-27 PROCEDURE — 0241U HB NFCT DS VIR RESP RNA 4 TRGT: CPT | Performed by: INTERNAL MEDICINE

## 2024-09-30 ENCOUNTER — LAB REQUISITION (OUTPATIENT)
Dept: LAB | Facility: HOSPITAL | Age: 84
End: 2024-09-30
Payer: MEDICARE

## 2024-09-30 DIAGNOSIS — E03.9 HYPOTHYROIDISM, UNSPECIFIED: ICD-10-CM

## 2024-09-30 LAB
ANION GAP SERPL CALCULATED.3IONS-SCNC: 7 MMOL/L (ref 4–13)
BUN SERPL-MCNC: 17 MG/DL (ref 5–25)
CALCIUM SERPL-MCNC: 8.4 MG/DL (ref 8.4–10.2)
CHLORIDE SERPL-SCNC: 96 MMOL/L (ref 96–108)
CO2 SERPL-SCNC: 28 MMOL/L (ref 21–32)
CREAT SERPL-MCNC: 0.5 MG/DL (ref 0.6–1.3)
GFR SERPL CREATININE-BSD FRML MDRD: 89 ML/MIN/1.73SQ M
GLUCOSE SERPL-MCNC: 348 MG/DL (ref 65–140)
POTASSIUM SERPL-SCNC: 4.4 MMOL/L (ref 3.5–5.3)
SODIUM SERPL-SCNC: 131 MMOL/L (ref 135–147)
TSH SERPL DL<=0.05 MIU/L-ACNC: 0.53 UIU/ML (ref 0.45–4.5)

## 2024-09-30 PROCEDURE — 80048 BASIC METABOLIC PNL TOTAL CA: CPT | Performed by: NURSE PRACTITIONER

## 2024-09-30 PROCEDURE — 84443 ASSAY THYROID STIM HORMONE: CPT | Performed by: NURSE PRACTITIONER

## 2024-10-07 ENCOUNTER — LAB REQUISITION (OUTPATIENT)
Dept: LAB | Facility: HOSPITAL | Age: 84
End: 2024-10-07
Payer: MEDICARE

## 2024-10-07 DIAGNOSIS — E03.9 HYPOTHYROIDISM, UNSPECIFIED: ICD-10-CM

## 2024-10-07 LAB
T4 FREE SERPL-MCNC: 1.48 NG/DL (ref 0.61–1.12)
TSH SERPL DL<=0.05 MIU/L-ACNC: 0.43 UIU/ML (ref 0.45–4.5)

## 2024-10-07 PROCEDURE — 84439 ASSAY OF FREE THYROXINE: CPT | Performed by: NURSE PRACTITIONER

## 2024-10-07 PROCEDURE — 84443 ASSAY THYROID STIM HORMONE: CPT | Performed by: NURSE PRACTITIONER

## 2024-10-10 ENCOUNTER — LAB REQUISITION (OUTPATIENT)
Dept: LAB | Facility: HOSPITAL | Age: 84
End: 2024-10-10
Payer: MEDICARE

## 2024-10-10 DIAGNOSIS — R50.9 FEVER, UNSPECIFIED: ICD-10-CM

## 2024-10-10 PROCEDURE — 0241U HB NFCT DS VIR RESP RNA 4 TRGT: CPT | Performed by: INTERNAL MEDICINE

## 2024-10-11 ENCOUNTER — HOSPITAL ENCOUNTER (OUTPATIENT)
Dept: RADIOLOGY | Facility: HOSPITAL | Age: 84
End: 2024-10-11
Payer: MEDICARE

## 2024-10-11 ENCOUNTER — LAB REQUISITION (OUTPATIENT)
Dept: LAB | Facility: HOSPITAL | Age: 84
End: 2024-10-11
Payer: MEDICARE

## 2024-10-11 DIAGNOSIS — R05.1 ACUTE COUGH: ICD-10-CM

## 2024-10-11 LAB
ANION GAP SERPL CALCULATED.3IONS-SCNC: 7 MMOL/L (ref 4–13)
BASOPHILS # BLD AUTO: 0.06 THOUSANDS/ΜL (ref 0–0.1)
BASOPHILS NFR BLD AUTO: 1 % (ref 0–1)
BUN SERPL-MCNC: 16 MG/DL (ref 5–25)
CALCIUM SERPL-MCNC: 8.6 MG/DL (ref 8.4–10.2)
CHLORIDE SERPL-SCNC: 97 MMOL/L (ref 96–108)
CO2 SERPL-SCNC: 29 MMOL/L (ref 21–32)
CREAT SERPL-MCNC: 0.49 MG/DL (ref 0.6–1.3)
EOSINOPHIL # BLD AUTO: 0.27 THOUSAND/ΜL (ref 0–0.61)
EOSINOPHIL NFR BLD AUTO: 2 % (ref 0–6)
ERYTHROCYTE [DISTWIDTH] IN BLOOD BY AUTOMATED COUNT: 12.2 % (ref 11.6–15.1)
GFR SERPL CREATININE-BSD FRML MDRD: 90 ML/MIN/1.73SQ M
GLUCOSE SERPL-MCNC: 232 MG/DL (ref 65–140)
HCT VFR BLD AUTO: 30.8 % (ref 34.8–46.1)
HGB BLD-MCNC: 9.7 G/DL (ref 11.5–15.4)
IMM GRANULOCYTES # BLD AUTO: 0.05 THOUSAND/UL (ref 0–0.2)
IMM GRANULOCYTES NFR BLD AUTO: 0 % (ref 0–2)
LYMPHOCYTES # BLD AUTO: 1.35 THOUSANDS/ΜL (ref 0.6–4.47)
LYMPHOCYTES NFR BLD AUTO: 11 % (ref 14–44)
MCH RBC QN AUTO: 29.5 PG (ref 26.8–34.3)
MCHC RBC AUTO-ENTMCNC: 31.5 G/DL (ref 31.4–37.4)
MCV RBC AUTO: 94 FL (ref 82–98)
MONOCYTES # BLD AUTO: 1.17 THOUSAND/ΜL (ref 0.17–1.22)
MONOCYTES NFR BLD AUTO: 10 % (ref 4–12)
NEUTROPHILS # BLD AUTO: 9.2 THOUSANDS/ΜL (ref 1.85–7.62)
NEUTS SEG NFR BLD AUTO: 76 % (ref 43–75)
NRBC BLD AUTO-RTO: 0 /100 WBCS
PLATELET # BLD AUTO: 352 THOUSANDS/UL (ref 149–390)
PMV BLD AUTO: 10.8 FL (ref 8.9–12.7)
POTASSIUM SERPL-SCNC: 4.4 MMOL/L (ref 3.5–5.3)
RBC # BLD AUTO: 3.29 MILLION/UL (ref 3.81–5.12)
SODIUM SERPL-SCNC: 133 MMOL/L (ref 135–147)
WBC # BLD AUTO: 12.1 THOUSAND/UL (ref 4.31–10.16)

## 2024-10-11 PROCEDURE — 80048 BASIC METABOLIC PNL TOTAL CA: CPT | Performed by: INTERNAL MEDICINE

## 2024-10-11 PROCEDURE — 71046 X-RAY EXAM CHEST 2 VIEWS: CPT

## 2024-10-11 PROCEDURE — 85025 COMPLETE CBC W/AUTO DIFF WBC: CPT | Performed by: INTERNAL MEDICINE

## 2024-11-05 ENCOUNTER — LAB REQUISITION (OUTPATIENT)
Dept: LAB | Facility: HOSPITAL | Age: 84
End: 2024-11-05
Payer: MEDICARE

## 2024-11-05 DIAGNOSIS — E03.9 HYPOTHYROIDISM, UNSPECIFIED: ICD-10-CM

## 2024-11-05 LAB — TSH SERPL DL<=0.05 MIU/L-ACNC: 2.1 UIU/ML (ref 0.45–4.5)

## 2024-11-05 PROCEDURE — 84443 ASSAY THYROID STIM HORMONE: CPT | Performed by: INTERNAL MEDICINE

## 2025-01-26 NOTE — PHYSICAL THERAPY NOTE
1/26/2025 0580 by Josselyn Meraz  Physical Therapy Treatment Session Note    Patient's Name: Shakira Hinkle    Admitting Diagnosis  Lactic acidosis [E87.20]  Hypotension [I95.9]  Stroke-like symptoms [R29.90]  COVID [U07.1]    Problem List  Patient Active Problem List   Diagnosis    Hyperlipidemia    History of hypertension    Postoperative hypothyroidism    Type I diabetes mellitus (HCC)    Primary osteoarthritis of both knees    Primary osteoarthritis of right shoulder    Soft tissue radionecrosis    Osteoradionecrosis of temporal bone     Abnormal CT of the abdomen    Gastroesophageal reflux disease without esophagitis    Acute metabolic encephalopathy    Hypoglycemia    Hypothermia    Hypokalemia    History of Hodgkin's lymphoma    Hypothyroidism    Hypomagnesemia    Macrocytosis    Low blood pressure    Generalized weakness    Severe protein-calorie malnutrition (HCC)    COVID-19    Dysarthria    Urinary retention    Sepsis (HCC)       Past Medical History  Past Medical History:   Diagnosis Date    Ambulates with cane     Cancer (HCC)     Chronic pain disorder     knees/ shoulders (gets inj every 3 mos)    Closed intertrochanteric fracture of right femur (HCC) 5/26/2020    Controlled type 2 diabetes mellitus with diabetic polyneuropathy, with long-term current use of insulin (HCC) 5/21/2008    Diabetes mellitus (HCC)     Diabetic polyneuropathy (HCC) 5/21/2008    ICD10 clean up    Disease of thyroid gland     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    HL (hearing loss)     Hodgkin's disease (HCC) 2008    Left neck, had radiation    Hypertension     Neuropathy     Osteoporosis     RA (rheumatoid arthritis) (HCC)     Traumatic rhabdomyolysis ([facility]) 10/17/2022       Past Surgical History  Past Surgical History:   Procedure Laterality Date    ADENOIDECTOMY      APPENDECTOMY      CATARACT EXTRACTION      CATARACT EXTRACTION, BILATERAL      CHOLECYSTECTOMY      FRACTURE SURGERY Right     hip    MOUTH SURGERY      oral cancer left lower lip    OVARY  SURGERY      WV ADJT TIS TRNS/REARGMT F/C/C/M/N/A/G/H/F 10SQCM/< N/A 3/28/2022    Procedure: Adjacent tissue transfer face;  Surgeon: Ar Paris MD;  Location: [facility];  Service: ENT    WV OPTX FEM SHFT FX W/INSJ IMED IMPLT W/WO SCREW Right 5/28/2020    Procedure: INSERTION NAIL IM FEMUR ANTEGRADE (TROCHANTERIC);  Surgeon: Charles Seals DO;  Location:  MAIN OR;  Service: Orthopedics    WV TRANSMASTOID ANTROTOMY Left 3/28/2022    Procedure: MASTOIDECTOMY;  Surgeon: Ar Paris MD;  Location: [facility];  Service: ENT    TONSILLECTOMY      TOTAL THYROIDECTOMY  2008    Performed after left neck dissection and left oral cancer diagnosis        12/20/23 1112   PT Last Visit   PT Visit Date 12/20/23   Note Type   Note Type Treatment   Pain Assessment   Pain Assessment Tool FLACC   Pain Rating: FLACC (Rest) - Face 0   Pain Rating: FLACC (Rest) - Legs 0   Pain Rating: FLACC (Rest) - Activity 0   Pain Rating: FLACC (Rest) - Cry 0   Pain Rating: FLACC (Rest) - Consolability 0   Score: FLACC (Rest) 0   Pain Rating: FLACC (Activity) - Face 1   Pain Rating: FLACC (Activity) - Legs 0   Pain Rating: FLACC (Activity) - Activity 0   Pain Rating: FLACC (Activity) - Cry 1   Pain Rating: FLACC (Activity) - Consolability 1   Score: FLACC (Activity) 3   Restrictions/Precautions   Weight Bearing Precautions Per Order No   Other Precautions Contact/isolation;Airborne/isolation;Fall Risk;Hard of hearing;Cognitive;Bed Alarm  (ramos)   General   Chart Reviewed Yes   Response to Previous Treatment Patient unable to report, no changes reported from family or staff   Family/Caregiver Present No   Cognition   Overall Cognitive Status Impaired   Arousal/Participation Responsive;Persistent stimuli required   Attention Attends with cues to redirect   Orientation Level Oriented to person;Oriented to place;Disoriented to time;Disoriented to situation   Memory Decreased recall of recent events;Decreased short term memory  "  Following Commands Follows one step commands with increased time or repetition   Comments Shakira required consistent verbal and tactile cues throughout session. Decreased engagement noted throughout session.   Subjective   Subjective when asked if she is feeling any better, replied \"no\"   Bed Mobility   Rolling R 2  Maximal assistance   Additional items Assist x 1;Bedrails;Increased time required;Verbal cues;LE management   Rolling L 2  Maximal assistance   Additional items Assist x 1;Bedrails;Increased time required;Verbal cues;LE management   Supine to Sit 2  Maximal assistance   Additional items Assist x 2;HOB elevated;Bedrails;Increased time required;Verbal cues;LE management   Sit to Supine 2  Maximal assistance   Additional items Assist x 2;Bedrails;Increased time required;Verbal cues;LE management   Additional Comments B rolling trials were repeated for skin care and pressure offloading positioning. Significant tactile cues required to stabilize edge of bed static sitting. Notable postural instability noted.   Transfers   Sit to Stand Unable to assess   Additional Comments Significant fatigue noted with edge of bed sitting x 5 minutes. Shakira was assisted back to bed and repositioned accordingly.   Ambulation/Elevation   Gait pattern Not tested  (unable to safely assess at this time)   Balance   Static Sitting Poor   Dynamic Sitting Poor -   Endurance Deficit   Endurance Deficit Yes   Activity Tolerance   Activity Tolerance Patient limited by fatigue;Other (Comment)  (decreased engagement)   Nurse Made Aware yes, Raine NATH   Exercises   Heelslides Supine;15 reps;PROM;Bilateral   Hip Flexion Supine;15 reps;PROM;Bilateral   Hip Abduction Supine;15 reps;PROM;Bilateral   Hip Adduction Supine;15 reps;PROM;Bilateral   Ankle Pumps Supine;15 reps;PROM;Bilateral   Assessment   Prognosis Poor   Problem List Decreased strength;Decreased endurance;Impaired balance;Decreased mobility;Decreased coordination;Decreased " cognition;Impaired judgement;Decreased safety awareness;Pain;Impaired hearing   Assessment Pt seen for PT treatment session this date with interventions consisting of Therapeutic exercise to improve endurance and reduce the risk of medical complications consisting of: PROM 15 reps B LE in supine position and therapeutic activity to reduce fall risk consisting of training: bed mobility and supine<>sit transfers. Pt agreeable to PT treatment session upon arrival, pt found supine in bed w/ HOB elevated, responsive. In comparison to previous session, pt with no improvements as evidenced by inability to safely advance functional mobility . Post session: pt returned BTB, bed alarm engaged, all needs in reach, and RN notified of session findings/recommendations. Continued recommendation at moderate resource intensity at time of d/c in order to maximize pt's functional independence and safety w/ mobility. Pt continues to be functioning below baseline level, and remains limited 2* factors listed above and including weakness, pain, activity intolerance, decreased endurance, impaired cognition. PT will continue to see pt during current hospitalization in order to address the deficits listed above and provide interventions consistent w/ POC in effort to achieve LTGs.   Goals   Patient Goals to watch tv  (setup upon conclusion)   LTG Expiration Date 12/26/23   Long Term Goal #1 LTGs remain appropriate   PT Treatment Day 1   Plan   Treatment/Interventions Functional transfer training;Elevations;LE strengthening/ROM;Therapeutic exercise;Cognitive reorientation;Endurance training;Patient/family training;Bed mobility;Spoke to nursing   Progress No functional improvements   Discharge Recommendation   Rehab Resource Intensity Level, PT II (Moderate Resource Intensity)   Additional Comments Julianne was resting in bed with all needs within reach upon conclusion. The bed alarm was engaged.   AM-PAC Basic Mobility Inpatient   Turning in Flat  Bed Without Bedrails 2   Lying on Back to Sitting on Edge of Flat Bed Without Bedrails 2   Moving Bed to Chair 1   Standing Up From Chair Using Arms 1   Walk in Room 1   Climb 3-5 Stairs With Railing 1   Basic Mobility Inpatient Raw Score 8   Turning Head Towards Sound 2   Follow Simple Instructions 2   Low Function Basic Mobility Raw Score  12   Low Function Basic Mobility Standardized Score  18.33   Highest Level Of Mobility   -[facility] Goal 3: Sit at edge of bed   -HLM Achieved 3: Sit at edge of bed     Treatment Time: 6222-9600    Cleo Aldridge, PT

## 2025-02-17 NOTE — H&P
Aracely 1960 JOSÉ ANTONIO Hinkle 1940, 80 y o  female MRN: [id number]  Unit/Bed#: ED 26 Encounter: [id number]  Primary Care Provider: Ashwin Marrufo MD   Date and time admitted to hospital: 10/17/2022 12:53 PM    [address]  · Admit inpatient to [facility]  · Most likely a vasovagal fall in the setting of using the bathroom  · All trauma workup is negative  · Monitor on telemetry for arrhythmias  · Obtain a PT and OT evaluation    * Traumatic rhabdomyolysis ([facility] [address] )  Assessment & Plan  · In the setting of having a fall prior to arrival  · Arrival CPK 1372  · Will treat with Plasmalyte  · Monitor closely for any signs of renal insufficiency  · Repeat CPK testing in the a   Leukemoid reaction  Assessment & Plan  · Possibly reactive but cannot rule out a UTI in its entirety  · Will start with empiric ceftriaxone  · Optimize antibiotics based on urine culture results  · Procalcitonin level testing is pending  · Repeat CBC testing in the a   Type 2 diabetes mellitus with neurologic complication, with long-term current use of insulin ([facility])  Assessment & Plan  Lab Results   Component Value Date    HGBA1C 10 2 (H) 07/01/2022       No results for input(s): POCGLU in the last 72 hours      Blood Sugar Average: Last 72 hrs:     · Target blood sugar for the hospital 140-180  · Continue Levemir  · Implement Accu-Cheks AC and HS with sliding scale coverage  · Diabetic neuropathy-continue gabapentin    Acquired hypothyroidism  Assessment & Plan  · Continue levothyroxine    Gastroesophageal reflux disease without esophagitis  Assessment & Plan  · Continue Protonix    VTE Prophylaxis: Heparin  Code Status:  DNR DNI level 3  Discussion with family:  Patient's daughter Toñito Caputo was brought up to par at 4:30 p m  at phone number [phone], all questions answered to her satisfaction    Anticipated Length of Stay:  Patient will be admitted on an Inpatient basis with an anticipated length of stay of  > 2 midnights  Justification for Hospital Stay:  Need for PT and OT evaluation, need for IV fluids and rhabdomyolysis resolution, need for IV antibiotics for UTI    Total Time for Visit, including Counseling / Coordination of Care: 1 hour  Greater than 50% of this total time spent on direct patient counseling and coordination of care  Chief Complaint:   Syncope and collapse x1 day    History of Present Illness:    Petra Richmond is a 80 y o  female who presents with a fall  In brief, the patient is a very pleasant 30-year-old female, who lives home alone, but reports that her son lives in very close proximity and checks up on her frequently  She reports her son came over last evening, helped give her dose of insulin and then went home  She states she had a good night sleep  She reports that her son came back at approximately 6:00 a m     At 6:00 a m , he gave her her morning insulin, and then the patient ate breakfast   She reports watching TV for about an hour  She reports that the last thing she remembers was using the bathroom but has no recollection of any further events  She reports waking up on the bathroom floor  Unspecified amount of down time  She denied any chest pain, nausea, vomiting, diarrhea, fevers, chills, palpitations, shortness of breath, numbness, tingling, and or weakness  She called 911, and was brought into the emergency room  In the emergency room she had an extensive trauma workup  Her current complaint is primarily left lower extremity pain and discomfort particularly more so in the left pelvic region  Onset was acute after the fall, intensity is a 10/10, quality is described as sharp, pain does not radiate, aggravating factors include movements, alleviating factors include rest   Patient was found to have a rhabdomyolysis hence the reason for admission  Review of Systems:    Review of Systems   Musculoskeletal: Positive for arthralgias     Neurological: Positive for weakness  All other systems reviewed and are negative  Past Medical and Surgical History:     Past Medical History:   Diagnosis Date   • Ambulates with cane    • Cancer ([facility] [address] )    • Chronic pain disorder     knees/ shoulders (gets inj every 3 mos)   • Controlled type 2 diabetes mellitus with diabetic polyneuropathy, with long-term current use of insulin ([facility] [address] ) 5/21/2008   • Diabetes mellitus ([facility] [address] )    • Diabetic polyneuropathy ([facility] [address] ) 5/21/2008    ICD10 clean up   • Disease of thyroid gland    • 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   • HL (hearing loss)    • Hodgkin's disease ([facility] [address] ) 2008    Left neck, had radiation   • Hypertension    • Neuropathy    • Osteoporosis    • [facility]        Past Surgical History:   Procedure Laterality Date   • ADENOIDECTOMY     • APPENDECTOMY     • CATARACT EXTRACTION     • CATARACT EXTRACTION, BILATERAL     • CHOLECYSTECTOMY     • FRACTURE SURGERY Right     hip   • MOUTH SURGERY      oral cancer left lower lip   • OVARY SURGERY     • TN ADJ TISS XFER HEAD,FAC,HAND <10 SQCM N/A 3/28/2022    Procedure: Adjacent tissue transfer face;  Surgeon: Red Smith MD;  Location: [facility];  Service: ENT   • TN MASTOIDECTOMY,SIMPLE Left 3/28/2022    Procedure: Allan Hendrickson;  Surgeon: Red Smith MD;  Location: [facility];  Service: ENT   • TN OPEN RX FEMUR FX+INTRAMED SHE Right 5/28/2020    Procedure: INSERTION NAIL IM FEMUR ANTEGRADE (TROCHANTERIC); Surgeon: Makenna Fernandez DO;  Location: [address] [place] [address] MAIN OR;  Service: Orthopedics   • TONSILLECTOMY     • TOTAL THYROIDECTOMY  2008    Performed after left neck dissection and left oral cancer diagnosis       Meds/Allergies:    Prior to Admission medications    Medication Sig Start Date End Date Taking?  Authorizing Provider   Ascorbic Acid (vitamin C) 100 MG tablet Take 500 mg by mouth 2 (two) times a day    Historical Provider, MD   atorvastatin (LIPITOR) 20 mg tablet Take 20 mg by mouth daily with dinner     Historical Provider, MD   BD Insulin Syringe U/F 1/2Unit 31G X 5/16" 0 3 ML MISC 4 (four) times a day 11/29/21   Historical Provider, MD   calcium carbonate (OS-FELICE) 600 MG tablet Take 600 mg by mouth 2 (two) times a day with meals    Historical Provider, MD   cyanocobalamin (VITAMIN B-12) 1000 MCG tablet Take 1,000 mcg by mouth daily    Historical Provider, MD   gabapentin (NEURONTIN) 300 mg capsule Take 300 mg by mouth 3 (three) times a day    Historical Provider, MD   Insulin Aspart (NOVOLOG SC) Inject 5 Units under the skin 3 (three) times a day with meals Plus sliding scale  5 units @ 1730     Historical Provider, MD   Insulin Detemir (LEVEMIR SC) Inject 5 Units under the skin daily at bedtime    Historical Provider, MD   ipratropium (ATROVENT) 0 03 % nasal spray 2 sprays into each nostril every 12 (twelve) hours  Patient taking differently: 2 sprays into each nostril every 12 (twelve) hours As needed 8/22/21   Lala Lewis MD   levothyroxine 100 mcg tablet Take 112 mcg by mouth every morning     Historical Provider, MD   methocarbamol (ROBAXIN) 500 mg tablet Take 500 mg by mouth in the morning  Prn   Historical Provider, MD   Multiple Vitamin (multivitamin) capsule Take 1 capsule by mouth daily    Historical Provider, MD   mupirocin (BACTROBAN) 2 % ointment Apply topically daily To affected area 10/5/22   Lala Lewis MD   OneTouch Ultra test strip USE TO TEST BLOOD SUGAR 6 TIMES A DAY 9/27/21   Historical Provider, MD   pantoprazole (PROTONIX) 40 mg tablet Take 1 tablet (40 mg total) by mouth 2 (two) times a day 7/15/22   Raghavendra Conklin PA-C   traMADol (ULTRAM) 50 mg tablet tramadol 50 mg tablet   take 1 tablet by mouth three times a day if needed    Historical Provider, MD   VITAMIN D PO Take 125 mg by mouth in the morning    Historical Provider, MD     all medications and allergies reviewed    Allergies: No Known Allergies    Social History:     Marital Status:     Occupation: Retired  Patient Pre-hospital Living Situation:  Lives at home alone  Patient Pre-hospital Level of Mobility:  Independent  Patient Pre-hospital Diet Restrictions:  Diabetic dietary restrictions are in place  Substance Use History:  Denies alcohol, tobacco, and or recreational drug use  Social History     Substance and Sexual Activity   Alcohol Use Not Currently   • Alcohol/week: 0 0 standard drinks     Social History     Tobacco Use   Smoking Status Never Smoker   Smokeless Tobacco Never Used     Social History     Substance and Sexual Activity   Drug Use Never       Family History:  I have reviewed the patient's family history - noncontributory in view of the patient being 80years old    Physical Exam:     Vitals:   Blood Pressure: 127/76 (10/17/22 1500)  Pulse: 77 (10/17/22 1500)  Temperature: (S) (!) 96 2 °F (35 7 °C) (patient refusing rectal temperature) (10/17/22 1300)  Temp Source: Tympanic (10/17/22 1300)  Respirations: 15 (10/17/22 1500)  SpO2: 97 % (10/17/22 1500)    Physical Exam  Constitutional:       General: She is not in acute distress  Appearance: Normal appearance  She is normal weight  She is not ill-appearing  HENT:      Head: Normocephalic and atraumatic  Comments: Left sided facial/orbital bruising and mild swelling noted     Nose: Nose normal       Mouth/Throat:      Mouth: Mucous membranes are moist    Eyes:      Extraocular Movements: Extraocular movements intact  Pupils: Pupils are equal, round, and reactive to light  Cardiovascular:      Rate and Rhythm: Normal rate and regular rhythm  Pulses: Normal pulses  Heart sounds: Normal heart sounds  No murmur heard  No friction rub  No gallop  Pulmonary:      Effort: Pulmonary effort is normal  No respiratory distress  Breath sounds: Normal breath sounds  No wheezing, rhonchi or rales  Abdominal:      General: There is no distension  Palpations: Abdomen is soft  There is no mass  Tenderness:  There is no abdominal tenderness  Hernia: No hernia is present  Musculoskeletal:         General: No swelling or tenderness  Normal range of motion  Cervical back: Normal range of motion and neck supple  No rigidity  Right lower leg: No edema  Left lower leg: No edema  Skin:     General: Skin is warm  Capillary Refill: Capillary refill takes less than 2 seconds  Findings: No erythema or rash  Neurological:      General: No focal deficit present  Mental Status: She is alert and oriented to person, place, and time  Mental status is at baseline  Cranial Nerves: No cranial nerve deficit  Motor: No weakness  Psychiatric:         Mood and Affect: Mood normal          Behavior: Behavior normal          Additional Data:     Lab Results: I have personally reviewed pertinent reports  Results from last 7 days   Lab Units 10/17/22  1319   WBC Thousand/uL 16 73*   HEMOGLOBIN g/dL 13 3   HEMATOCRIT % 41 2   PLATELETS Thousands/uL 252   NEUTROS PCT % 84*   LYMPHS PCT % 6*   MONOS PCT % 9   EOS PCT % 0     Results from last 7 days   Lab Units 10/17/22  1319   SODIUM mmol/L 137   POTASSIUM mmol/L 4 8   CHLORIDE mmol/L 96   CO2 mmol/L 32   BUN mg/dL 15   CREATININE mg/dL 0 61   ANION GAP mmol/L 9   CALCIUM mg/dL 9 3   ALBUMIN g/dL 4 2   TOTAL BILIRUBIN mg/dL 0 44   ALK PHOS U/L 82   ALT U/L 32   AST U/L 55*   GLUCOSE RANDOM mg/dL 111     Results from last 7 days   Lab Units 10/17/22  1319   INR  0 91             Results from last 7 days   Lab Units 10/17/22  1548 10/17/22  1319   LACTIC ACID mmol/L 1 6  --    PROCALCITONIN ng/ml  --  <0 05       Imaging: I have personally reviewed pertinent reports  XR shoulder 2+ views LEFT   Final Result by Amy Gale MD (10/17 1430)   Degenerative changes      No acute osseous abnormality        Workstation performed: [id number]         XR humerus LEFT   Final Result by Amy Gale MD (10/17 1430)      No acute osseous abnormality  Workstation performed: [id number]         XR knee 4+ views Right injury   Final Result by Amy Gael MD (10/17 1432)   Persistent tricompartmental degenerative arthritis      No acute osseous abnormality  Workstation performed: [id number]         CT recon only thoracolumbar (no charge)   Final Result by Lilo Bruno DO (10/17 1442)   No fracture or traumatic subluxation  Because this was designated a trauma evaluation: The study was marked in EPIC for immediate notification  CT head, facial bones, chest abdomen pelvis and cervical spine are already dictated  Workstation performed: [id number]         TRAUMA - CT chest abdomen pelvis w contrast   Final Result by Lilo Bruno DO (10/17 1430)   No acute visceral or osseous injury identified  Workstation performed: [id number]         TRAUMA - CT spine cervical wo contrast   Final Result by Lilo Bruno DO (10/17 1445)   Addendum 1 of 1 by Lilo Bruno DO (10/17 1445)   ADDENDUM:   Extensive postoperative changes left lower neck  Final   No cervical spine fracture or traumatic malalignment  Workstation performed: [id number]         TRAUMA - CT head wo contrast   Final Result by Lilo Bruno DO (10/17 1443)   Addendum 1 of 1 by Lilo Bruno DO (10/17 1443)   ADDENDUM:   Prior mastoid surgery noted  No current mastoiditis  Final      No acute intracranial abnormality  Workstation performed: [id number]         TRAUMA - CT facial bones wo contrast   Final Result by Lilo Bruno DO (10/17 1407)   Normal noncontrast CT of the facial bones  Workstation performed: [id number]         XR Trauma chest portable   ED Interpretation by Flower Regalado III, DO (10/17 1326)   No obvious acute fractures, pneumothorax or osseous abnormalities noted  Calcification of the aortic knob noted  Final Result by Tyrese Musa DO (10/17 2305)   No acute cardiopulmonary disease  Please see subsequent CT  Workstation performed: [id number]               EKG, Pathology, and Other Studies Reviewed on Admission:   · EKG:  Normal sinus rhythm at 87 beats per minute, no changes any leads suggestive of active ischemia    Epic / Care Everywhere Records Reviewed: Yes    ** Please Note: This note has been constructed using a voice recognition system   ** done done

## 2025-03-03 ENCOUNTER — LAB REQUISITION (OUTPATIENT)
Dept: LAB | Facility: HOSPITAL | Age: 85
End: 2025-03-03
Payer: MEDICARE

## 2025-03-03 DIAGNOSIS — E10.42 TYPE 1 DIABETES MELLITUS WITH DIABETIC POLYNEUROPATHY (HCC): ICD-10-CM

## 2025-03-03 LAB
EST. AVERAGE GLUCOSE BLD GHB EST-MCNC: 229 MG/DL
HBA1C MFR BLD: 9.6 %

## 2025-03-03 PROCEDURE — 83036 HEMOGLOBIN GLYCOSYLATED A1C: CPT | Performed by: INTERNAL MEDICINE

## 2025-05-14 NOTE — ASSESSMENT & PLAN NOTE
Lab Results   Component Value Date    HGBA1C 8.9 (H) 05/26/2023       Recent Labs     11/14/23  1113 11/14/23  1616 11/14/23  2150 11/15/23  0730   POCGLU 62* 94 299* 180*         Blood Sugar Average: Last 72 hrs:  (P) [id number]    Target blood sugar inpatient 140-180   Patient's home regimen is Glargine 7 units qam and 5 units qhs  Currently on home regimen   Continue SSI   CHO diet   Hypoglycemia protocol   BMP a.m. No

## 2025-06-12 ENCOUNTER — LAB REQUISITION (OUTPATIENT)
Dept: LAB | Facility: HOSPITAL | Age: 85
End: 2025-06-12
Payer: MEDICARE

## 2025-06-12 DIAGNOSIS — R05.1 ACUTE COUGH: ICD-10-CM

## 2025-06-12 PROCEDURE — 0241U HB NFCT DS VIR RESP RNA 4 TRGT: CPT | Performed by: INTERNAL MEDICINE

## (undated) DEVICE — ABDOMINAL PAD: Brand: DERMACEA

## (undated) DEVICE — SCRUB SPONGE DURAPREP W/APPLI

## (undated) DEVICE — DERMATOME BLADES: Brand: DERMATOME

## (undated) DEVICE — BAG DECANTER

## (undated) DEVICE — SYRINGE 3ML LL

## (undated) DEVICE — 3000CC GUARDIAN II: Brand: GUARDIAN

## (undated) DEVICE — SYRINGE 10ML LL

## (undated) DEVICE — BANDAGE ELASTIC TENSOPLAST 3 X 5 YD

## (undated) DEVICE — ACE WRAP 6 IN UNSTERILE

## (undated) DEVICE — TUBING IRD400 5PK IPC LOW PRO IRRIGATION: Brand: MIDAS REX®

## (undated) DEVICE — GLOVE SRG BIOGEL ORTHOPEDIC 7.5

## (undated) DEVICE — BULB SYRINGE,IRRIGATION WITH PROTECTIVE CAP: Brand: DOVER

## (undated) DEVICE — SUT VICRYL 4-0 RB-1 27 IN J214H

## (undated) DEVICE — NEEDLE 21 G X 1 1/2 SAFETY

## (undated) DEVICE — GLOVE SRG BIOGEL 8

## (undated) DEVICE — STERILE EAR PACK: Brand: CARDINAL HEALTH

## (undated) DEVICE — INTENDED FOR TISSUE SEPARATION, AND OTHER PROCEDURES THAT REQUIRE A SHARP SURGICAL BLADE TO PUNCTURE OR CUT.: Brand: BARD-PARKER SAFETY BLADES SIZE 15, STERILE

## (undated) DEVICE — 6619 2 PTNT ISO SYS INCISE AREA&LT;(&GT;&&LT;)&GT;P: Brand: STERI-DRAPE™ IOBAN™ 2

## (undated) DEVICE — SMARTGOWN SURGICAL GOWN, XL, LONG: Brand: CONVERTORS

## (undated) DEVICE — Device

## (undated) DEVICE — CAST PADDING 4 IN UNSTERILE

## (undated) DEVICE — 6.0MM/10.0MM STEPPED DRILL BIT CANNULATED/LARGE QC/435MM

## (undated) DEVICE — GLOVE SRG BIOGEL 7

## (undated) DEVICE — DRESSING XEROFORM 5 X 9

## (undated) DEVICE — 17.0MM CANNULATED DRILL BIT LARGE QC/300MM

## (undated) DEVICE — SPONGE STICK WITH PVP-I: Brand: KENDALL

## (undated) DEVICE — IV CATH 18 G X 1.16 IN

## (undated) DEVICE — BIPOLAR CORD DISP

## (undated) DEVICE — PACK PBDS PLASTIC HEAD AND NECK RF

## (undated) DEVICE — SURGIFOAM 7 X 12 SPONGE ABS

## (undated) DEVICE — STAPLER SKIN 35 WIDE ULC APPOSE

## (undated) DEVICE — SUT VICRYL 0 CP-1 27 IN J267H

## (undated) DEVICE — BETHLEHEM UNIVERSAL MINOR GEN: Brand: CARDINAL HEALTH

## (undated) DEVICE — TOOL 14BA25D LEGEND 14CM 2.5MM BA DIAM: Brand: MIDAS REX ™

## (undated) DEVICE — ELECTRODE 8227410 PAIRED 2 CH SET ROHS

## (undated) DEVICE — GLOVE INDICATOR UNDERGLOVE SZ 7.5 GREEN

## (undated) DEVICE — ALL PURPOSE SPONGES,NON-WOVEN, 4 PLY: Brand: CURITY

## (undated) DEVICE — CUP MEDICINE 1OZ 5000/CS 50/PLT: Brand: MEDEGEN MEDICAL PRODUCTS, LLC

## (undated) DEVICE — SUT VICRYL 2-0 CP-1 27 IN J266H

## (undated) DEVICE — GLOVE INDICATOR UNDERGLOVE SZ 8 GREEN

## (undated) DEVICE — GAUZE SPONGES,16 PLY: Brand: CURITY

## (undated) DEVICE — NEEDLE 27 G X 1 1/4

## (undated) DEVICE — 3.2MM GUIDE WIRE 400MM

## (undated) DEVICE — SKIN MARKER DUAL TIP WITH RULER CAP, FLEXIBLE RULER AND LABELS: Brand: DEVON

## (undated) DEVICE — 11.0MM TAPERED DRILL BIT CANNULATED/LARGE QC/280MM

## (undated) DEVICE — 4.0MM THREE-FLUTED DRILL BIT QC/260MM/65MM CALIBRATION

## (undated) DEVICE — 3M™ TEGADERM™ TRANSPARENT FILM DRESSING FRAME STYLE, 1626W, 4 IN X 4-3/4 IN (10 CM X 12 CM), 50/CT 4CT/CASE: Brand: 3M™ TEGADERM™

## (undated) DEVICE — READY WET SKIN SCRUB TRAY-LF: Brand: MEDLINE INDUSTRIES, INC.

## (undated) DEVICE — INTENDED FOR TISSUE SEPARATION, AND OTHER PROCEDURES THAT REQUIRE A SHARP SURGICAL BLADE TO PUNCTURE OR CUT.: Brand: BARD-PARKER ® CARBON RIB-BACK BLADES